# Patient Record
Sex: MALE
[De-identification: names, ages, dates, MRNs, and addresses within clinical notes are randomized per-mention and may not be internally consistent; named-entity substitution may affect disease eponyms.]

---

## 2015-05-07 RX ORDER — CARVEDILOL PHOSPHATE 80 MG/1
1 CAPSULE, EXTENDED RELEASE ORAL
Qty: 0 | Refills: 0 | COMMUNITY
Start: 2015-05-07

## 2017-01-17 ENCOUNTER — APPOINTMENT (OUTPATIENT)
Dept: INTERNAL MEDICINE | Facility: CLINIC | Age: 62
End: 2017-01-17

## 2017-04-07 ENCOUNTER — APPOINTMENT (OUTPATIENT)
Dept: ORTHOPEDIC SURGERY | Facility: CLINIC | Age: 62
End: 2017-04-07

## 2017-04-07 DIAGNOSIS — M65.9 SYNOVITIS AND TENOSYNOVITIS, UNSPECIFIED: ICD-10-CM

## 2017-04-07 DIAGNOSIS — M17.12 UNILATERAL PRIMARY OSTEOARTHRITIS, LEFT KNEE: ICD-10-CM

## 2017-04-13 ENCOUNTER — CLINICAL ADVICE (OUTPATIENT)
Age: 62
End: 2017-04-13

## 2017-04-21 ENCOUNTER — APPOINTMENT (OUTPATIENT)
Dept: ORTHOPEDIC SURGERY | Facility: AMBULATORY SURGERY CENTER | Age: 62
End: 2017-04-21

## 2017-08-01 ENCOUNTER — RX RENEWAL (OUTPATIENT)
Age: 62
End: 2017-08-01

## 2017-08-11 ENCOUNTER — APPOINTMENT (OUTPATIENT)
Dept: VASCULAR SURGERY | Facility: CLINIC | Age: 62
End: 2017-08-11
Payer: COMMERCIAL

## 2017-08-11 VITALS
HEART RATE: 80 BPM | WEIGHT: 178 LBS | DIASTOLIC BLOOD PRESSURE: 85 MMHG | TEMPERATURE: 98.01 F | SYSTOLIC BLOOD PRESSURE: 125 MMHG | HEIGHT: 67.5 IN | BODY MASS INDEX: 27.61 KG/M2

## 2017-08-11 DIAGNOSIS — R09.89 OTHER SPECIFIED SYMPTOMS AND SIGNS INVOLVING THE CIRCULATORY AND RESPIRATORY SYSTEMS: ICD-10-CM

## 2017-08-11 PROCEDURE — 93880 EXTRACRANIAL BILAT STUDY: CPT

## 2017-08-11 PROCEDURE — 99214 OFFICE O/P EST MOD 30 MIN: CPT | Mod: 25

## 2017-08-11 PROCEDURE — 93978 VASCULAR STUDY: CPT

## 2017-10-02 ENCOUNTER — RX RENEWAL (OUTPATIENT)
Age: 62
End: 2017-10-02

## 2017-10-03 ENCOUNTER — RX RENEWAL (OUTPATIENT)
Age: 62
End: 2017-10-03

## 2017-10-12 ENCOUNTER — APPOINTMENT (OUTPATIENT)
Dept: INTERNAL MEDICINE | Facility: CLINIC | Age: 62
End: 2017-10-12
Payer: COMMERCIAL

## 2017-10-12 PROCEDURE — G0008: CPT

## 2017-10-12 PROCEDURE — 90686 IIV4 VACC NO PRSV 0.5 ML IM: CPT

## 2017-10-12 PROCEDURE — 90670 PCV13 VACCINE IM: CPT

## 2017-10-12 PROCEDURE — 90472 IMMUNIZATION ADMIN EACH ADD: CPT

## 2017-11-28 ENCOUNTER — APPOINTMENT (OUTPATIENT)
Dept: CARDIOLOGY | Facility: CLINIC | Age: 62
End: 2017-11-28
Payer: COMMERCIAL

## 2017-11-28 ENCOUNTER — OUTPATIENT (OUTPATIENT)
Dept: OUTPATIENT SERVICES | Facility: HOSPITAL | Age: 62
LOS: 1 days | End: 2017-11-28
Payer: COMMERCIAL

## 2017-11-28 VITALS
RESPIRATION RATE: 16 BRPM | BODY MASS INDEX: 26.22 KG/M2 | HEIGHT: 67.5 IN | WEIGHT: 169 LBS | SYSTOLIC BLOOD PRESSURE: 128 MMHG | HEART RATE: 79 BPM | DIASTOLIC BLOOD PRESSURE: 83 MMHG | OXYGEN SATURATION: 93 %

## 2017-11-28 DIAGNOSIS — Z98.89 OTHER SPECIFIED POSTPROCEDURAL STATES: Chronic | ICD-10-CM

## 2017-11-28 DIAGNOSIS — D25.9 LEIOMYOMA OF UTERUS, UNSPECIFIED: Chronic | ICD-10-CM

## 2017-11-28 DIAGNOSIS — Z00.8 ENCOUNTER FOR OTHER GENERAL EXAMINATION: ICD-10-CM

## 2017-11-28 DIAGNOSIS — C50.919 MALIGNANT NEOPLASM OF UNSPECIFIED SITE OF UNSPECIFIED FEMALE BREAST: Chronic | ICD-10-CM

## 2017-11-28 DIAGNOSIS — Z90.710 ACQUIRED ABSENCE OF BOTH CERVIX AND UTERUS: Chronic | ICD-10-CM

## 2017-11-28 PROCEDURE — 93005 ELECTROCARDIOGRAM TRACING: CPT

## 2017-11-28 PROCEDURE — G0463: CPT | Mod: 25

## 2017-11-28 PROCEDURE — 93010 ELECTROCARDIOGRAM REPORT: CPT

## 2017-11-28 PROCEDURE — ZZZZZ: CPT

## 2017-12-01 DIAGNOSIS — I10 ESSENTIAL (PRIMARY) HYPERTENSION: ICD-10-CM

## 2017-12-01 DIAGNOSIS — R06.09 OTHER FORMS OF DYSPNEA: ICD-10-CM

## 2018-05-29 ENCOUNTER — OUTPATIENT (OUTPATIENT)
Dept: OUTPATIENT SERVICES | Facility: HOSPITAL | Age: 63
LOS: 1 days | End: 2018-05-29
Payer: COMMERCIAL

## 2018-05-29 ENCOUNTER — APPOINTMENT (OUTPATIENT)
Dept: CARDIOLOGY | Facility: CLINIC | Age: 63
End: 2018-05-29

## 2018-05-29 DIAGNOSIS — C50.919 MALIGNANT NEOPLASM OF UNSPECIFIED SITE OF UNSPECIFIED FEMALE BREAST: Chronic | ICD-10-CM

## 2018-05-29 DIAGNOSIS — Z98.89 OTHER SPECIFIED POSTPROCEDURAL STATES: Chronic | ICD-10-CM

## 2018-05-29 DIAGNOSIS — Z00.8 ENCOUNTER FOR OTHER GENERAL EXAMINATION: ICD-10-CM

## 2018-05-29 DIAGNOSIS — D25.9 LEIOMYOMA OF UTERUS, UNSPECIFIED: Chronic | ICD-10-CM

## 2018-05-29 DIAGNOSIS — Z90.710 ACQUIRED ABSENCE OF BOTH CERVIX AND UTERUS: Chronic | ICD-10-CM

## 2018-05-29 PROCEDURE — 93306 TTE W/DOPPLER COMPLETE: CPT | Mod: 26

## 2018-05-29 PROCEDURE — 93306 TTE W/DOPPLER COMPLETE: CPT

## 2018-08-31 ENCOUNTER — APPOINTMENT (OUTPATIENT)
Dept: VASCULAR SURGERY | Facility: CLINIC | Age: 63
End: 2018-08-31
Payer: COMMERCIAL

## 2018-08-31 PROCEDURE — 93978 VASCULAR STUDY: CPT

## 2018-08-31 PROCEDURE — 99213 OFFICE O/P EST LOW 20 MIN: CPT

## 2018-10-30 ENCOUNTER — MEDICATION RENEWAL (OUTPATIENT)
Age: 63
End: 2018-10-30

## 2018-11-13 ENCOUNTER — APPOINTMENT (OUTPATIENT)
Dept: INTERNAL MEDICINE | Facility: CLINIC | Age: 63
End: 2018-11-13
Payer: COMMERCIAL

## 2018-11-13 VITALS
SYSTOLIC BLOOD PRESSURE: 122 MMHG | HEIGHT: 67 IN | DIASTOLIC BLOOD PRESSURE: 84 MMHG | TEMPERATURE: 97.4 F | HEART RATE: 96 BPM | BODY MASS INDEX: 26.68 KG/M2 | OXYGEN SATURATION: 98 % | RESPIRATION RATE: 16 BRPM | WEIGHT: 170 LBS

## 2018-11-13 DIAGNOSIS — Z86.718 PERSONAL HISTORY OF OTHER VENOUS THROMBOSIS AND EMBOLISM: ICD-10-CM

## 2018-11-13 DIAGNOSIS — M25.462 EFFUSION, LEFT KNEE: ICD-10-CM

## 2018-11-13 PROCEDURE — 99214 OFFICE O/P EST MOD 30 MIN: CPT

## 2018-11-14 ENCOUNTER — MED ADMIN CHARGE (OUTPATIENT)
Age: 63
End: 2018-11-14

## 2018-11-14 LAB
25(OH)D3 SERPL-MCNC: 39.9 NG/ML
ALBUMIN SERPL ELPH-MCNC: 4.5 G/DL
ALP BLD-CCNC: 100 U/L
ALT SERPL-CCNC: 7 U/L
ANION GAP SERPL CALC-SCNC: 17 MMOL/L
AST SERPL-CCNC: 17 U/L
BASOPHILS # BLD AUTO: 0.02 K/UL
BASOPHILS NFR BLD AUTO: 0.3 %
BILIRUB SERPL-MCNC: 0.4 MG/DL
BUN SERPL-MCNC: 16 MG/DL
CALCIUM SERPL-MCNC: 10.5 MG/DL
CHLORIDE SERPL-SCNC: 102 MMOL/L
CHOLEST SERPL-MCNC: 235 MG/DL
CHOLEST/HDLC SERPL: 5.5 RATIO
CO2 SERPL-SCNC: 24 MMOL/L
CREAT SERPL-MCNC: 1.27 MG/DL
EOSINOPHIL # BLD AUTO: 0.14 K/UL
EOSINOPHIL NFR BLD AUTO: 2.2 %
GLUCOSE SERPL-MCNC: 146 MG/DL
HBA1C MFR BLD HPLC: 6.5 %
HCT VFR BLD CALC: 40.6 %
HDLC SERPL-MCNC: 43 MG/DL
HGB BLD-MCNC: 13.8 G/DL
IMM GRANULOCYTES NFR BLD AUTO: 0.2 %
LDLC SERPL CALC-MCNC: 161 MG/DL
LYMPHOCYTES # BLD AUTO: 2.53 K/UL
LYMPHOCYTES NFR BLD AUTO: 39.3 %
MAN DIFF?: NORMAL
MCHC RBC-ENTMCNC: 31.6 PG
MCHC RBC-ENTMCNC: 34 GM/DL
MCV RBC AUTO: 92.9 FL
MONOCYTES # BLD AUTO: 0.36 K/UL
MONOCYTES NFR BLD AUTO: 5.6 %
NEUTROPHILS # BLD AUTO: 3.38 K/UL
NEUTROPHILS NFR BLD AUTO: 52.4 %
PLATELET # BLD AUTO: 425 K/UL
POTASSIUM SERPL-SCNC: 3.9 MMOL/L
PROT SERPL-MCNC: 8.6 G/DL
RBC # BLD: 4.37 M/UL
RBC # FLD: 14.4 %
SODIUM SERPL-SCNC: 143 MMOL/L
TRIGL SERPL-MCNC: 154 MG/DL
TSH SERPL-ACNC: 2.14 UIU/ML
WBC # FLD AUTO: 6.44 K/UL

## 2018-11-14 PROCEDURE — G0008: CPT

## 2018-11-14 PROCEDURE — 90686 IIV4 VACC NO PRSV 0.5 ML IM: CPT

## 2018-11-14 PROCEDURE — 90472 IMMUNIZATION ADMIN EACH ADD: CPT

## 2018-11-14 PROCEDURE — 90736 HZV VACCINE LIVE SUBQ: CPT

## 2018-11-14 NOTE — REVIEW OF SYSTEMS
[Fatigue] : fatigue [Joint Pain] : joint pain [Negative] : Heme/Lymph [Joint Stiffness] : no joint stiffness [Joint Swelling] : no joint swelling [Back Pain] : no back pain [FreeTextEntry9] : left knee joint pain

## 2018-11-14 NOTE — HEALTH RISK ASSESSMENT
[Patient reported mammogram was normal] : Patient reported mammogram was normal [Patient reported bone density results were normal] : Patient reported bone density results were normal [Patient declined colonoscopy] : Patient declined colonoscopy [HIV test declined] : HIV test declined [Hepatitis C test declined] : Hepatitis C test declined [Alone] : lives alone [Fully functional (bathing, dressing, toileting, transferring, walking, feeding)] : Fully functional (bathing, dressing, toileting, transferring, walking, feeding) [Fully functional (using the telephone, shopping, preparing meals, housekeeping, doing laundry, using] : Fully functional and needs no help or supervision to perform IADLs (using the telephone, shopping, preparing meals, housekeeping, doing laundry, using transportation, managing medications and managing finances) [0] : 2) Feeling down, depressed, or hopeless: Not at all (0) [MammogramDate] : 02/18 [BoneDensityDate] : 03/18 [] : No [No falls in past year] : Patient reported no falls in the past year [[id number]] : 0

## 2018-11-14 NOTE — HISTORY OF PRESENT ILLNESS
[FreeTextEntry1] : f/u [de-identified] : 62 yo f/u c/o L.knee pain.\par PMH HTN,controlled on BB,CCB.\par h/o T2D,on diet alone,refused meds.\par HLP,on diet.\par h/o COPD/smoker.\par st.p. R lumpectomy and axillary node dissection,RT,ChemoRx for BC,advanced stage,so far no recurrence,spread,on ER antagonist.f/u with oncol.last mammogram report benign 1 month ago.\par ?thyroid nodule.\par in 2015 developed transient CHF,prob.due to chemoRx.improved LV function later.currently denies c.p.,SOB,palpit.,claudication.coronaries were normal on cath.\par also incidental finding of AAA,3,6 cm,stable,no vasc.intervention required.\par h/o Mild L.ICA stenosis,asymptomatic.\par

## 2018-11-14 NOTE — ASSESSMENT
[FreeTextEntry1] : 62 yo with HTN,T2D,HLP,stable CAD,will continue the same .\par pt will f/u with oncol.,she is on Arimidex.\par vasc.f/u in 6 months to f/u on AAA,carotid stenosis.\par will refer to ortho for L.knee pain.\par labs taken.

## 2018-11-14 NOTE — COUNSELING
[Weight management counseling provided] : Weight management [Healthy eating counseling provided] : healthy eating [Activity counseling provided] : activity [Smoking cessation counseling provided] : smoking cessation [Low Fat Diet] : Low fat diet [Low Salt Diet] : Low salt diet [Walking] : Walking [Quit Smoking] : Quit smoking [Participate in a class] : Participate in a class [None] : None [Good understanding] : Patient has a good understanding of lifestyle changes and the steps needed to achieve self management goals [de-identified] : ADA diet

## 2018-11-14 NOTE — PHYSICAL EXAM
[No Acute Distress] : no acute distress [Well Nourished] : well nourished [Well Developed] : well developed [Well-Appearing] : well-appearing [Normal Sclera/Conjunctiva] : normal sclera/conjunctiva [PERRL] : pupils equal round and reactive to light [EOMI] : extraocular movements intact [Normal Outer Ear/Nose] : the outer ears and nose were normal in appearance [Normal Oropharynx] : the oropharynx was normal [No JVD] : no jugular venous distention [Supple] : supple [No Lymphadenopathy] : no lymphadenopathy [No Respiratory Distress] : no respiratory distress  [Clear to Auscultation] : lungs were clear to auscultation bilaterally [No Accessory Muscle Use] : no accessory muscle use [Normal Rate] : normal rate  [Regular Rhythm] : with a regular rhythm [Normal S1, S2] : normal S1 and S2 [No Murmur] : no murmur heard [No Varicosities] : no varicosities [Pedal Pulses Present] : the pedal pulses are present [No Edema] : there was no peripheral edema [Declined Breast Exam] : declined breast exam  [Soft] : abdomen soft [Non Tender] : non-tender [Non-distended] : non-distended [No Masses] : no abdominal mass palpated [Normal Posterior Cervical Nodes] : no posterior cervical lymphadenopathy [Normal Anterior Cervical Nodes] : no anterior cervical lymphadenopathy [No CVA Tenderness] : no CVA  tenderness [No Spinal Tenderness] : no spinal tenderness [No Joint Swelling] : no joint swelling [Grossly Normal Strength/Tone] : grossly normal strength/tone [No Rash] : no rash [Normal Gait] : normal gait [Coordination Grossly Intact] : coordination grossly intact [No Focal Deficits] : no focal deficits [Normal Affect] : the affect was normal [Normal Mood] : the mood was normal [Normal Insight/Judgement] : insight and judgment were intact [de-identified] : left carotid bruit

## 2018-11-14 NOTE — PAST MEDICAL HISTORY
[Menopause Age____] : age at menopause was [unfilled] [Live Births ___] : P[unfilled]  [de-identified] : hysterectomy at age ~29

## 2018-11-16 LAB — URATE SERPL-MCNC: 8 MG/DL

## 2018-12-03 ENCOUNTER — MEDICATION RENEWAL (OUTPATIENT)
Age: 63
End: 2018-12-03

## 2019-01-07 ENCOUNTER — RX RENEWAL (OUTPATIENT)
Age: 64
End: 2019-01-07

## 2019-02-21 ENCOUNTER — INPATIENT (INPATIENT)
Facility: HOSPITAL | Age: 64
LOS: 0 days | Discharge: HOME | DRG: 305 | End: 2019-02-22
Attending: HOSPITALIST | Admitting: HOSPITALIST
Payer: COMMERCIAL

## 2019-02-21 VITALS — WEIGHT: 169.98 LBS | HEIGHT: 67.5 IN

## 2019-02-21 DIAGNOSIS — Z90.710 ACQUIRED ABSENCE OF BOTH CERVIX AND UTERUS: Chronic | ICD-10-CM

## 2019-02-21 DIAGNOSIS — Z29.9 ENCOUNTER FOR PROPHYLACTIC MEASURES, UNSPECIFIED: ICD-10-CM

## 2019-02-21 DIAGNOSIS — D25.9 LEIOMYOMA OF UTERUS, UNSPECIFIED: Chronic | ICD-10-CM

## 2019-02-21 DIAGNOSIS — Z98.89 OTHER SPECIFIED POSTPROCEDURAL STATES: Chronic | ICD-10-CM

## 2019-02-21 DIAGNOSIS — R09.89 OTHER SPECIFIED SYMPTOMS AND SIGNS INVOLVING THE CIRCULATORY AND RESPIRATORY SYSTEMS: ICD-10-CM

## 2019-02-21 DIAGNOSIS — I25.10 ATHEROSCLEROTIC HEART DISEASE OF NATIVE CORONARY ARTERY WITHOUT ANGINA PECTORIS: ICD-10-CM

## 2019-02-21 DIAGNOSIS — I50.9 HEART FAILURE, UNSPECIFIED: ICD-10-CM

## 2019-02-21 DIAGNOSIS — I10 ESSENTIAL (PRIMARY) HYPERTENSION: ICD-10-CM

## 2019-02-21 DIAGNOSIS — N17.9 ACUTE KIDNEY FAILURE, UNSPECIFIED: ICD-10-CM

## 2019-02-21 DIAGNOSIS — I24.9 ACUTE ISCHEMIC HEART DISEASE, UNSPECIFIED: ICD-10-CM

## 2019-02-21 DIAGNOSIS — I21.4 NON-ST ELEVATION (NSTEMI) MYOCARDIAL INFARCTION: ICD-10-CM

## 2019-02-21 DIAGNOSIS — C50.919 MALIGNANT NEOPLASM OF UNSPECIFIED SITE OF UNSPECIFIED FEMALE BREAST: Chronic | ICD-10-CM

## 2019-02-21 LAB
ALBUMIN SERPL ELPH-MCNC: 3.5 G/DL — SIGNIFICANT CHANGE UP (ref 3.5–5)
ALP SERPL-CCNC: 114 U/L — SIGNIFICANT CHANGE UP (ref 40–120)
ALT FLD-CCNC: 20 U/L DA — SIGNIFICANT CHANGE UP (ref 10–60)
ANION GAP SERPL CALC-SCNC: 8 MMOL/L — SIGNIFICANT CHANGE UP (ref 5–17)
APTT BLD: 28.4 SEC — SIGNIFICANT CHANGE UP (ref 27.5–36.3)
AST SERPL-CCNC: 21 U/L — SIGNIFICANT CHANGE UP (ref 10–40)
BASOPHILS # BLD AUTO: 0.03 K/UL — SIGNIFICANT CHANGE UP (ref 0–0.2)
BASOPHILS NFR BLD AUTO: 0.6 % — SIGNIFICANT CHANGE UP (ref 0–2)
BILIRUB SERPL-MCNC: 0.4 MG/DL — SIGNIFICANT CHANGE UP (ref 0.2–1.2)
BUN SERPL-MCNC: 13 MG/DL — SIGNIFICANT CHANGE UP (ref 7–18)
CALCIUM SERPL-MCNC: 9.4 MG/DL — SIGNIFICANT CHANGE UP (ref 8.4–10.5)
CHLORIDE SERPL-SCNC: 107 MMOL/L — SIGNIFICANT CHANGE UP (ref 96–108)
CK MB BLD-MCNC: 7.2 % — HIGH (ref 0–3.5)
CK MB CFR SERPL CALC: 32.5 NG/ML — HIGH (ref 0–3.6)
CK SERPL-CCNC: 177 U/L — SIGNIFICANT CHANGE UP (ref 21–215)
CK SERPL-CCNC: 449 U/L — HIGH (ref 21–215)
CO2 SERPL-SCNC: 28 MMOL/L — SIGNIFICANT CHANGE UP (ref 22–31)
CREAT SERPL-MCNC: 1.32 MG/DL — HIGH (ref 0.5–1.3)
EOSINOPHIL # BLD AUTO: 0.09 K/UL — SIGNIFICANT CHANGE UP (ref 0–0.5)
EOSINOPHIL NFR BLD AUTO: 1.7 % — SIGNIFICANT CHANGE UP (ref 0–6)
GLUCOSE SERPL-MCNC: 120 MG/DL — HIGH (ref 70–99)
HCT VFR BLD CALC: 39.1 % — SIGNIFICANT CHANGE UP (ref 34.5–45)
HGB BLD-MCNC: 13 G/DL — SIGNIFICANT CHANGE UP (ref 11.5–15.5)
IMM GRANULOCYTES NFR BLD AUTO: 0.2 % — SIGNIFICANT CHANGE UP (ref 0–1.5)
INR BLD: 1.19 RATIO — HIGH (ref 0.88–1.16)
LIDOCAIN IGE QN: 54 U/L — LOW (ref 73–393)
LYMPHOCYTES # BLD AUTO: 1.84 K/UL — SIGNIFICANT CHANGE UP (ref 1–3.3)
LYMPHOCYTES # BLD AUTO: 34.3 % — SIGNIFICANT CHANGE UP (ref 13–44)
MCHC RBC-ENTMCNC: 30.6 PG — SIGNIFICANT CHANGE UP (ref 27–34)
MCHC RBC-ENTMCNC: 33.2 GM/DL — SIGNIFICANT CHANGE UP (ref 32–36)
MCV RBC AUTO: 92 FL — SIGNIFICANT CHANGE UP (ref 80–100)
MONOCYTES # BLD AUTO: 0.4 K/UL — SIGNIFICANT CHANGE UP (ref 0–0.9)
MONOCYTES NFR BLD AUTO: 7.4 % — SIGNIFICANT CHANGE UP (ref 2–14)
NEUTROPHILS # BLD AUTO: 3 K/UL — SIGNIFICANT CHANGE UP (ref 1.8–7.4)
NEUTROPHILS NFR BLD AUTO: 55.8 % — SIGNIFICANT CHANGE UP (ref 43–77)
NRBC # BLD: 0 /100 WBCS — SIGNIFICANT CHANGE UP (ref 0–0)
NT-PROBNP SERPL-SCNC: 947 PG/ML — HIGH (ref 0–125)
PLATELET # BLD AUTO: 365 K/UL — SIGNIFICANT CHANGE UP (ref 150–400)
POTASSIUM SERPL-MCNC: 3.4 MMOL/L — LOW (ref 3.5–5.3)
POTASSIUM SERPL-SCNC: 3.4 MMOL/L — LOW (ref 3.5–5.3)
PROT SERPL-MCNC: 8.4 G/DL — HIGH (ref 6–8.3)
PROTHROM AB SERPL-ACNC: 13.3 SEC — HIGH (ref 10–12.9)
RBC # BLD: 4.25 M/UL — SIGNIFICANT CHANGE UP (ref 3.8–5.2)
RBC # FLD: 13.2 % — SIGNIFICANT CHANGE UP (ref 10.3–14.5)
SODIUM SERPL-SCNC: 143 MMOL/L — SIGNIFICANT CHANGE UP (ref 135–145)
TROPONIN I SERPL-MCNC: 0.14 NG/ML — HIGH (ref 0–0.04)
TROPONIN I SERPL-MCNC: 3.91 NG/ML — HIGH (ref 0–0.04)
WBC # BLD: 5.37 K/UL — SIGNIFICANT CHANGE UP (ref 3.8–10.5)
WBC # FLD AUTO: 5.37 K/UL — SIGNIFICANT CHANGE UP (ref 3.8–10.5)

## 2019-02-21 PROCEDURE — 99285 EMERGENCY DEPT VISIT HI MDM: CPT

## 2019-02-21 PROCEDURE — 99223 1ST HOSP IP/OBS HIGH 75: CPT | Mod: GC

## 2019-02-21 PROCEDURE — 71046 X-RAY EXAM CHEST 2 VIEWS: CPT | Mod: 26

## 2019-02-21 PROCEDURE — 71275 CT ANGIOGRAPHY CHEST: CPT | Mod: 26

## 2019-02-21 PROCEDURE — 93010 ELECTROCARDIOGRAM REPORT: CPT | Mod: 76

## 2019-02-21 PROCEDURE — 74174 CTA ABD&PLVS W/CONTRAST: CPT | Mod: 26

## 2019-02-21 RX ORDER — PANTOPRAZOLE SODIUM 20 MG/1
40 TABLET, DELAYED RELEASE ORAL
Qty: 0 | Refills: 0 | Status: DISCONTINUED | OUTPATIENT
Start: 2019-02-21 | End: 2019-02-22

## 2019-02-21 RX ORDER — ATORVASTATIN CALCIUM 80 MG/1
40 TABLET, FILM COATED ORAL AT BEDTIME
Qty: 0 | Refills: 0 | Status: DISCONTINUED | OUTPATIENT
Start: 2019-02-21 | End: 2019-02-22

## 2019-02-21 RX ORDER — ACETAMINOPHEN 500 MG
975 TABLET ORAL ONCE
Qty: 0 | Refills: 0 | Status: COMPLETED | OUTPATIENT
Start: 2019-02-21 | End: 2019-02-21

## 2019-02-21 RX ORDER — NITROGLYCERIN 6.5 MG
0.4 CAPSULE, EXTENDED RELEASE ORAL ONCE
Qty: 0 | Refills: 0 | Status: COMPLETED | OUTPATIENT
Start: 2019-02-21 | End: 2019-02-21

## 2019-02-21 RX ORDER — METOPROLOL TARTRATE 50 MG
25 TABLET ORAL ONCE
Qty: 0 | Refills: 0 | Status: COMPLETED | OUTPATIENT
Start: 2019-02-21 | End: 2019-02-21

## 2019-02-21 RX ORDER — DEXTROSE 50 % IN WATER 50 %
25 SYRINGE (ML) INTRAVENOUS ONCE
Qty: 0 | Refills: 0 | Status: DISCONTINUED | OUTPATIENT
Start: 2019-02-21 | End: 2019-02-22

## 2019-02-21 RX ORDER — INSULIN LISPRO 100/ML
VIAL (ML) SUBCUTANEOUS
Qty: 0 | Refills: 0 | Status: DISCONTINUED | OUTPATIENT
Start: 2019-02-21 | End: 2019-02-22

## 2019-02-21 RX ORDER — GLUCAGON INJECTION, SOLUTION 0.5 MG/.1ML
1 INJECTION, SOLUTION SUBCUTANEOUS ONCE
Qty: 0 | Refills: 0 | Status: DISCONTINUED | OUTPATIENT
Start: 2019-02-21 | End: 2019-02-22

## 2019-02-21 RX ORDER — ASPIRIN/CALCIUM CARB/MAGNESIUM 324 MG
81 TABLET ORAL DAILY
Qty: 0 | Refills: 0 | Status: DISCONTINUED | OUTPATIENT
Start: 2019-02-21 | End: 2019-02-22

## 2019-02-21 RX ORDER — CARVEDILOL PHOSPHATE 80 MG/1
3.12 CAPSULE, EXTENDED RELEASE ORAL EVERY 12 HOURS
Qty: 0 | Refills: 0 | Status: DISCONTINUED | OUTPATIENT
Start: 2019-02-21 | End: 2019-02-22

## 2019-02-21 RX ORDER — POTASSIUM CHLORIDE 20 MEQ
40 PACKET (EA) ORAL ONCE
Qty: 0 | Refills: 0 | Status: COMPLETED | OUTPATIENT
Start: 2019-02-21 | End: 2019-02-21

## 2019-02-21 RX ORDER — ENOXAPARIN SODIUM 100 MG/ML
40 INJECTION SUBCUTANEOUS DAILY
Qty: 0 | Refills: 0 | Status: DISCONTINUED | OUTPATIENT
Start: 2019-02-21 | End: 2019-02-21

## 2019-02-21 RX ORDER — DEXTROSE 50 % IN WATER 50 %
15 SYRINGE (ML) INTRAVENOUS ONCE
Qty: 0 | Refills: 0 | Status: DISCONTINUED | OUTPATIENT
Start: 2019-02-21 | End: 2019-02-22

## 2019-02-21 RX ORDER — DEXTROSE 50 % IN WATER 50 %
12.5 SYRINGE (ML) INTRAVENOUS ONCE
Qty: 0 | Refills: 0 | Status: DISCONTINUED | OUTPATIENT
Start: 2019-02-21 | End: 2019-02-22

## 2019-02-21 RX ORDER — ENOXAPARIN SODIUM 100 MG/ML
80 INJECTION SUBCUTANEOUS EVERY 12 HOURS
Qty: 0 | Refills: 0 | Status: DISCONTINUED | OUTPATIENT
Start: 2019-02-21 | End: 2019-02-22

## 2019-02-21 RX ORDER — LOSARTAN POTASSIUM 100 MG/1
100 TABLET, FILM COATED ORAL DAILY
Qty: 0 | Refills: 0 | Status: DISCONTINUED | OUTPATIENT
Start: 2019-02-21 | End: 2019-02-22

## 2019-02-21 RX ORDER — ASPIRIN/CALCIUM CARB/MAGNESIUM 324 MG
162 TABLET ORAL ONCE
Qty: 0 | Refills: 0 | Status: COMPLETED | OUTPATIENT
Start: 2019-02-21 | End: 2019-02-21

## 2019-02-21 RX ORDER — NITROGLYCERIN 6.5 MG
0.4 CAPSULE, EXTENDED RELEASE ORAL
Qty: 0 | Refills: 0 | Status: DISCONTINUED | OUTPATIENT
Start: 2019-02-21 | End: 2019-02-22

## 2019-02-21 RX ORDER — SODIUM CHLORIDE 9 MG/ML
1000 INJECTION, SOLUTION INTRAVENOUS
Qty: 0 | Refills: 0 | Status: DISCONTINUED | OUTPATIENT
Start: 2019-02-21 | End: 2019-02-22

## 2019-02-21 RX ADMIN — CARVEDILOL PHOSPHATE 3.12 MILLIGRAM(S): 80 CAPSULE, EXTENDED RELEASE ORAL at 18:26

## 2019-02-21 RX ADMIN — Medication 0.4 MILLIGRAM(S): at 15:22

## 2019-02-21 RX ADMIN — Medication 40 MILLIEQUIVALENT(S): at 18:21

## 2019-02-21 RX ADMIN — Medication 975 MILLIGRAM(S): at 13:34

## 2019-02-21 RX ADMIN — Medication 162 MILLIGRAM(S): at 14:08

## 2019-02-21 RX ADMIN — Medication 25 MILLIGRAM(S): at 14:08

## 2019-02-21 RX ADMIN — ATORVASTATIN CALCIUM 40 MILLIGRAM(S): 80 TABLET, FILM COATED ORAL at 23:01

## 2019-02-21 RX ADMIN — ENOXAPARIN SODIUM 80 MILLIGRAM(S): 100 INJECTION SUBCUTANEOUS at 20:46

## 2019-02-21 RX ADMIN — Medication 975 MILLIGRAM(S): at 17:06

## 2019-02-21 NOTE — H&P ADULT - HISTORY OF PRESENT ILLNESS
Patient is a 63 F from home with PMH of HTN, HFrEF 49% 2016, COPD (not on Home O2), DVT (15 years ago, AC x 2 years then stop),  Breast ca s/p left lumpectomy s/p Chemotherapy and Radiation Therapy (did not Mi, infra-renal abdominal aortic aneurysm, hystrectomy, Sick cell trait. DM presents with mid sternal chest pain started 10:00 am this morning. Patient's pain is pressure like, 8/10 in intensity, radiating to both shoulders and back. Patient took asa 81 but it did not relieve her symptom ,she then came in to ED for evaluation. Patient reports dyspnea on exertion but denies any orthopnea or pedal edema, fever, headache, dizziness, nausea, vomiting. She reports compliance with her medications. In Ed Patient's bp is 168/113 HR 83. EKG is NSR @ 93 with T wave inversions in lead V1, prolong . Patient saw her cardiologist Dr Hanley 1 year ago. Patient is a 63 F from home with PMH of HTN, HFrEF 49% 2016, COPD (not on Home O2), DVT (15 years ago, AC x 2 years then stop),  Breast ca s/p left lumpectomy s/p Chemotherapy and Radiation Therapy (did not Mi, infra-renal abdominal aortic aneurysm, hystrectomy, Sick cell trait. DM presents with mid sternal chest pain started 10:00 am this morning. Patient's pain is pressure like, 8/10 in intensity, radiating to both shoulders and back. Patient took asa 81 but it did not relieve her symptom ,she then came in to ED for evaluation. Patient reports dyspnea on exertion but denies any orthopnea or pedal edema, fever, headache, dizziness, nausea, vomiting. She reports compliance with her medications. In Ed Patient's bp is 168/113 HR 83. EKG is NSR @ 93 with T wave inversions in lead V1, prolong . Patient saw her cardiologist Dr Hanley 1 year ago. Labs are remarkable for positive 0.138.

## 2019-02-21 NOTE — H&P ADULT - PROBLEM SELECTOR PLAN 5
RISK                                                          Points  [x] Previous VTE                                           3  [] Thrombophilia                                        2  [] Lower limb paralysis                              2   [] Current Cancer                                       2   [x] Immobilization > 24 hrs                        1  [] ICU/CCU stay > 24 hours                       1  [x] Age > 60                                                   1  IMPROVE score 5 points.    DVT prophylaxis with Lovenox -c/w asa, start statin and c/w carvedilol

## 2019-02-21 NOTE — H&P ADULT - PROBLEM SELECTOR PLAN 6
RISK                                                          Points  [x] Previous VTE                                           3  [] Thrombophilia                                        2  [] Lower limb paralysis                              2   [] Current Cancer                                       2   [x] Immobilization > 24 hrs                        1  [] ICU/CCU stay > 24 hours                       1  [x] Age > 60                                                   1  IMPROVE score 5 points.    DVT prophylaxis with Lovenox

## 2019-02-21 NOTE — H&P ADULT - PROBLEM SELECTOR PLAN 3
-c/w Home dose of losartan -Patient has elevated BUN/Cr 13/1.32  -could be due to worsening of her CKD  -Her baseline CKD is 0.86  -f/u urine lytes   -Monitor renal function

## 2019-02-21 NOTE — ED ADULT NURSE NOTE - OBJECTIVE STATEMENT
Patient came to the ED a/o x 3 ambulates c/o left sided dull chest pain started at 10 am this morning. Pt states the chest pain is radiating to the back. No dizziness, no SOB noted.

## 2019-02-21 NOTE — ED PROVIDER NOTE - PMH
Abdominal aneurysm  infrarenal- wached by Vascular , Dr. Gutierrez  Breast cancer  right-s/p chemo and radiation  Cardiomyopathy    DVT (deep venous thrombosis)  Left leg in 1994 after hysterectomy  Hypercholesteremia    Hypertension    Internal carotid aneurysm    Knee effusion, left  arthrocentesis- 05/25/16  Sickle cell trait

## 2019-02-21 NOTE — ED PROVIDER NOTE - PROGRESS NOTE DETAILS
labs reveal elevated troponin. admit to telemetry. will do CT angio to r/o Disection. still reports mild pain, will give sl nitro.

## 2019-02-21 NOTE — ED PROVIDER NOTE - CARE PLAN
Principal Discharge DX:	NSTEMI (non-ST elevated myocardial infarction)  Secondary Diagnosis:	Chest pain

## 2019-02-21 NOTE — ED ADULT NURSE NOTE - NSIMPLEMENTINTERV_GEN_ALL_ED
Implemented All Universal Safety Interventions:  [place] to call system. Call bell, personal items and telephone within reach. Instruct patient to call for assistance. Room bathroom lighting operational. Non-slip footwear when patient is off stretcher. Physically safe environment: no spills, clutter or unnecessary equipment. Stretcher in lowest position, wheels locked, appropriate side rails in place.

## 2019-02-21 NOTE — ED PROVIDER NOTE - CLINICAL SUMMARY MEDICAL DECISION MAKING FREE TEXT BOX
64 y/o F pt presents with multiple cardiac and pulmonary risk factors. Will do labs, EKG, reassess and likely admit. 64 y/o F pt presents with multiple cardiac and pulmonary risk factors. Will do labs, EKG, reassess and admit.

## 2019-02-21 NOTE — H&P ADULT - NSHPSOCIALHISTORY_GEN_ALL_CORE
Patient lives alone, denies any alcohol use, She smokes 1-2 cig every day. She used to smoke 1 PPD but she started to cut down on her smoking 5 years ago. Now she uses nicotine lozenges 2-3 times a day. She does not use any illicit drug of abuse.

## 2019-02-21 NOTE — H&P ADULT - PSH
Breast cancer  infusaport insertion- 2014- left chest  Fibroid uterus  sp hystrectomy 1196  [place] filter in place  1994  S/P hysterectomy  1994  S/P lumpectomy, right breast  2014, revision of position- 2015

## 2019-02-21 NOTE — H&P ADULT - ASSESSMENT
Patient is a 63 F from home with PMH of HTN, HFrEF 49% 2016, COPD (not on Home O2), DVT (15 years ago, AC x 2 years then stop),  Breast ca s/p left lumpectomy s/p Chemotherapy and Radiation Therapy (did not Mi, infra-renal abdominal aortic aneurysm, hystrectomy, Sick cell trait. DM presents with mid sternal chest pain started 10:00 am this morning. Patient's pain is pressure like, 8/10 in intensity, radiating to both shoulders and back. Patient took asa 81 but it did not relieve her symptom ,she then came in to ED for evaluation. Patient reports dyspnea on exertion but denies any orthopnea or pedal edema, fever, headache, dizziness, nausea, vomiting. She reports compliance with her medications. In Ed Patient's bp is 168/113 HR 83. EKG is NSR @ 93 with T wave inversions in lead V1, prolong . Patient saw her cardiologist Dr Hanley 1 year ago. Admitted for evaluation of ACS, PE

## 2019-02-21 NOTE — ED PROVIDER NOTE - MUSCULOSKELETAL, MLM
Spine appears normal, range of motion is not limited, no muscle or joint tenderness, no chest wall tenderness

## 2019-02-21 NOTE — H&P ADULT - PROBLEM SELECTOR PLAN 1
-Patient presented with chest pain, midsternal, pressure like chest pain, 8/10 in intensity associated with palpitations.   -cardiac enzymes x 1 0.138 f/u T2 t3   -Patient has hx of DVT in past. mid sternal chest pain radiating to back. Would rule out Aortic dissection and Pulmonary embolism (well's score 2.5 Moderate risk of PE).  -EKG is NSR @ 93 T wave inversion in Lead V1.   -ALTAGRACIA score   4 points  20% risk at 14 days of: all-cause mortality, new or recurrent MI, or severe recurrent ischemia requiring urgent revascularization.  -Will start on Aspirin, statin and carvedilol 3.125 twice a day for ACS protocol. (Patient takes it only once a day as per her cardiologist).   -f/u CT angio chest r/o aortic dissection  -She will need another CT angio to r/o PE if symptoms persist.   -Cardiology consult

## 2019-02-21 NOTE — ED PROVIDER NOTE - OBJECTIVE STATEMENT
62 y/o F pt with an extensive PMHx including DVT, HTN, and a significant PSHx of hysterectomy, presents to the ED with complaints of worsening chest pain radiating to the shoulders and back that started this morning at 10am. Patient reports she was sitting and feeling fine when she had sudden onset pain described as throbbing. Patient reports she had this pain 2 years ago and was told she had a heart attack, no stents were placed. Patient denies cough, dizziness, nausea, fever, leg swelling or pain or any other complaints. Patient was taking blood thinner but then suddenly stopped. Patient notes she last saw her cardiologist in October. Allergies: Advil, Statins

## 2019-02-21 NOTE — ED PROVIDER NOTE - PSH
Breast cancer  infusaport insertion- 2014- left chest  Fibroid uterus  sp hystrectomy 1196  [place] filter in place  1994  S/P hysterectomy  1994  S/P lumpectomy, right breast  2014, revision of position- 2015

## 2019-02-21 NOTE — H&P ADULT - PROBLEM SELECTOR PLAN 2
-patient has HFrEF 49%  -Not in exacerbation  -c/w Home meds like carvedilol, asa and start on Atorvastatin   -f/u repeat echo  -Strict I/Os  -Daily weight  -Monitor on telemetry

## 2019-02-21 NOTE — H&P ADULT - ATTENDING COMMENTS
Patient seen and examined at bedside, feels ok. States that today she was anxious about a problem with a prior bank and was listening to the news when she started to feel chest pain. The pain is located in the precordial area, radiating to the back and bilateral shoulders, feels more like a dull aching pain. She denies any prior chest pain. states the history of Breast cancer, heart attack and stroke but denies any angiogram in the past.   Physical exam:  Vital Signs Last 24 Hrs  T(C): 36.7 (21 Feb 2019 13:30), Max: 36.7 (21 Feb 2019 13:30)  T(F): 98 (21 Feb 2019 13:30), Max: 98 (21 Feb 2019 13:30)  HR: 83 (21 Feb 2019 13:30) (83 - 83)  BP: 168/113 (21 Feb 2019 13:30) (168/113 - 168/113)  BP(mean): --  RR: 20 (21 Feb 2019 13:30) (20 - 20)  SpO2: 98% (21 Feb 2019 13:30) (98% - 98%)  HEENT: Neck supple  heart: S1 S2 normal  Abdomen: NT< ND  Chest: Clear, chest pain reproducible by palpation  LE: No LE edema  A/P  1- Chest pain  troponin first set positive  Differentials: ischemia, ?dissection, Musculoskeletal  CT angio to rule out dissection, patient has history of infrarenal aortic aneurysm.   trend troponin  Repeat ECHO, last echo EF: 49%  cardiology consult  Maybe patient will need stress test prior to dc.     2- HTN: may start B blocker till dissection ruled out.    3- history of Stroke vs TIA:  continue asa, statin and heparin sub Q  patient denies any neurological deficit.

## 2019-02-22 ENCOUNTER — INPATIENT (INPATIENT)
Facility: HOSPITAL | Age: 64
LOS: 3 days | Discharge: HOME | End: 2019-02-26
Attending: INTERNAL MEDICINE | Admitting: INTERNAL MEDICINE
Payer: COMMERCIAL

## 2019-02-22 VITALS
RESPIRATION RATE: 18 BRPM | DIASTOLIC BLOOD PRESSURE: 74 MMHG | HEART RATE: 72 BPM | OXYGEN SATURATION: 100 % | SYSTOLIC BLOOD PRESSURE: 111 MMHG | TEMPERATURE: 98 F

## 2019-02-22 VITALS
OXYGEN SATURATION: 100 % | HEART RATE: 71 BPM | DIASTOLIC BLOOD PRESSURE: 94 MMHG | SYSTOLIC BLOOD PRESSURE: 165 MMHG | TEMPERATURE: 98 F | RESPIRATION RATE: 17 BRPM

## 2019-02-22 DIAGNOSIS — Z98.89 OTHER SPECIFIED POSTPROCEDURAL STATES: Chronic | ICD-10-CM

## 2019-02-22 DIAGNOSIS — D25.9 LEIOMYOMA OF UTERUS, UNSPECIFIED: Chronic | ICD-10-CM

## 2019-02-22 DIAGNOSIS — I21.4 NON-ST ELEVATION (NSTEMI) MYOCARDIAL INFARCTION: ICD-10-CM

## 2019-02-22 DIAGNOSIS — Z90.710 ACQUIRED ABSENCE OF BOTH CERVIX AND UTERUS: Chronic | ICD-10-CM

## 2019-02-22 DIAGNOSIS — I71.4 ABDOMINAL AORTIC ANEURYSM, WITHOUT RUPTURE: ICD-10-CM

## 2019-02-22 DIAGNOSIS — C50.919 MALIGNANT NEOPLASM OF UNSPECIFIED SITE OF UNSPECIFIED FEMALE BREAST: Chronic | ICD-10-CM

## 2019-02-22 DIAGNOSIS — I50.9 HEART FAILURE, UNSPECIFIED: ICD-10-CM

## 2019-02-22 LAB
ALBUMIN SERPL ELPH-MCNC: 3.5 G/DL — SIGNIFICANT CHANGE UP (ref 3.5–5)
ALP SERPL-CCNC: 103 U/L — SIGNIFICANT CHANGE UP (ref 40–120)
ALT FLD-CCNC: 21 U/L DA — SIGNIFICANT CHANGE UP (ref 10–60)
ANION GAP SERPL CALC-SCNC: 9 MMOL/L — SIGNIFICANT CHANGE UP (ref 5–17)
AST SERPL-CCNC: 111 U/L — HIGH (ref 10–40)
BASOPHILS # BLD AUTO: 0.05 K/UL — SIGNIFICANT CHANGE UP (ref 0–0.2)
BASOPHILS NFR BLD AUTO: 0.9 % — SIGNIFICANT CHANGE UP (ref 0–2)
BILIRUB SERPL-MCNC: 0.4 MG/DL — SIGNIFICANT CHANGE UP (ref 0.2–1.2)
BUN SERPL-MCNC: 14 MG/DL — SIGNIFICANT CHANGE UP (ref 7–18)
CALCIUM SERPL-MCNC: 9.4 MG/DL — SIGNIFICANT CHANGE UP (ref 8.4–10.5)
CHLORIDE SERPL-SCNC: 105 MMOL/L — SIGNIFICANT CHANGE UP (ref 96–108)
CHOLEST SERPL-MCNC: 251 MG/DL — HIGH (ref 10–199)
CK MB BLD-MCNC: 5.7 % — HIGH (ref 0–3.5)
CK MB BLD-MCNC: 7.6 % — HIGH (ref 0–3.5)
CK MB CFR SERPL CALC: 35.5 NG/ML — HIGH (ref 0–3.6)
CK MB CFR SERPL CALC: 49.4 NG/ML — HIGH (ref 0–3.6)
CK SERPL-CCNC: 618 U/L — HIGH (ref 21–215)
CK SERPL-CCNC: 650 U/L — HIGH (ref 21–215)
CO2 SERPL-SCNC: 27 MMOL/L — SIGNIFICANT CHANGE UP (ref 22–31)
CREAT SERPL-MCNC: 1.28 MG/DL — SIGNIFICANT CHANGE UP (ref 0.5–1.3)
EOSINOPHIL # BLD AUTO: 0.15 K/UL — SIGNIFICANT CHANGE UP (ref 0–0.5)
EOSINOPHIL NFR BLD AUTO: 2.7 % — SIGNIFICANT CHANGE UP (ref 0–6)
GLUCOSE BLDC GLUCOMTR-MCNC: 116 MG/DL — HIGH (ref 70–99)
GLUCOSE BLDC GLUCOMTR-MCNC: 119 MG/DL — HIGH (ref 70–99)
GLUCOSE SERPL-MCNC: 189 MG/DL — HIGH (ref 70–99)
HBA1C BLD-MCNC: 7 % — HIGH (ref 4–5.6)
HCT VFR BLD CALC: 39.7 % — SIGNIFICANT CHANGE UP (ref 34.5–45)
HCV AB S/CO SERPL IA: 0.24 S/CO — SIGNIFICANT CHANGE UP (ref 0–0.79)
HCV AB SERPL-IMP: SIGNIFICANT CHANGE UP
HDLC SERPL-MCNC: 34 MG/DL — LOW
HGB BLD-MCNC: 12.8 G/DL — SIGNIFICANT CHANGE UP (ref 11.5–15.5)
IMM GRANULOCYTES NFR BLD AUTO: 0.2 % — SIGNIFICANT CHANGE UP (ref 0–1.5)
LIPID PNL WITH DIRECT LDL SERPL: 177 MG/DL — SIGNIFICANT CHANGE UP
LYMPHOCYTES # BLD AUTO: 2.58 K/UL — SIGNIFICANT CHANGE UP (ref 1–3.3)
LYMPHOCYTES # BLD AUTO: 46.7 % — HIGH (ref 13–44)
MAGNESIUM SERPL-MCNC: 2 MG/DL — SIGNIFICANT CHANGE UP (ref 1.6–2.6)
MCHC RBC-ENTMCNC: 30.1 PG — SIGNIFICANT CHANGE UP (ref 27–34)
MCHC RBC-ENTMCNC: 32.2 GM/DL — SIGNIFICANT CHANGE UP (ref 32–36)
MCV RBC AUTO: 93.4 FL — SIGNIFICANT CHANGE UP (ref 80–100)
MONOCYTES # BLD AUTO: 0.39 K/UL — SIGNIFICANT CHANGE UP (ref 0–0.9)
MONOCYTES NFR BLD AUTO: 7.1 % — SIGNIFICANT CHANGE UP (ref 2–14)
NEUTROPHILS # BLD AUTO: 2.35 K/UL — SIGNIFICANT CHANGE UP (ref 1.8–7.4)
NEUTROPHILS NFR BLD AUTO: 42.4 % — LOW (ref 43–77)
NRBC # BLD: 0 /100 WBCS — SIGNIFICANT CHANGE UP (ref 0–0)
PHOSPHATE SERPL-MCNC: 3.5 MG/DL — SIGNIFICANT CHANGE UP (ref 2.5–4.5)
PLATELET # BLD AUTO: 320 K/UL — SIGNIFICANT CHANGE UP (ref 150–400)
POTASSIUM SERPL-MCNC: 3.5 MMOL/L — SIGNIFICANT CHANGE UP (ref 3.5–5.3)
POTASSIUM SERPL-SCNC: 3.5 MMOL/L — SIGNIFICANT CHANGE UP (ref 3.5–5.3)
PROT SERPL-MCNC: 8 G/DL — SIGNIFICANT CHANGE UP (ref 6–8.3)
RBC # BLD: 4.25 M/UL — SIGNIFICANT CHANGE UP (ref 3.8–5.2)
RBC # FLD: 13.2 % — SIGNIFICANT CHANGE UP (ref 10.3–14.5)
SODIUM SERPL-SCNC: 141 MMOL/L — SIGNIFICANT CHANGE UP (ref 135–145)
TOTAL CHOLESTEROL/HDL RATIO MEASUREMENT: 7.4 RATIO — HIGH (ref 3.3–7.1)
TRIGL SERPL-MCNC: 201 MG/DL — HIGH (ref 10–149)
TROPONIN I SERPL-MCNC: 29.7 NG/ML — HIGH (ref 0–0.04)
TROPONIN I SERPL-MCNC: 60.4 NG/ML — HIGH (ref 0–0.04)
TSH SERPL-MCNC: 1.66 UU/ML — SIGNIFICANT CHANGE UP (ref 0.34–4.82)
VIT B12 SERPL-MCNC: 489 PG/ML — SIGNIFICANT CHANGE UP (ref 232–1245)
WBC # BLD: 5.53 K/UL — SIGNIFICANT CHANGE UP (ref 3.8–10.5)
WBC # FLD AUTO: 5.53 K/UL — SIGNIFICANT CHANGE UP (ref 3.8–10.5)

## 2019-02-22 PROCEDURE — 85027 COMPLETE CBC AUTOMATED: CPT

## 2019-02-22 PROCEDURE — 99285 EMERGENCY DEPT VISIT HI MDM: CPT | Mod: 25

## 2019-02-22 PROCEDURE — 82607 VITAMIN B-12: CPT

## 2019-02-22 PROCEDURE — 83690 ASSAY OF LIPASE: CPT

## 2019-02-22 PROCEDURE — 80061 LIPID PANEL: CPT

## 2019-02-22 PROCEDURE — 99239 HOSP IP/OBS DSCHRG MGMT >30: CPT

## 2019-02-22 PROCEDURE — 80053 COMPREHEN METABOLIC PANEL: CPT

## 2019-02-22 PROCEDURE — 36415 COLL VENOUS BLD VENIPUNCTURE: CPT

## 2019-02-22 PROCEDURE — 93306 TTE W/DOPPLER COMPLETE: CPT

## 2019-02-22 PROCEDURE — 82550 ASSAY OF CK (CPK): CPT

## 2019-02-22 PROCEDURE — 82553 CREATINE MB FRACTION: CPT

## 2019-02-22 PROCEDURE — 82962 GLUCOSE BLOOD TEST: CPT

## 2019-02-22 PROCEDURE — 71275 CT ANGIOGRAPHY CHEST: CPT

## 2019-02-22 PROCEDURE — 93458 L HRT ARTERY/VENTRICLE ANGIO: CPT | Mod: 26

## 2019-02-22 PROCEDURE — 86803 HEPATITIS C AB TEST: CPT

## 2019-02-22 PROCEDURE — 83880 ASSAY OF NATRIURETIC PEPTIDE: CPT

## 2019-02-22 PROCEDURE — 93010 ELECTROCARDIOGRAM REPORT: CPT | Mod: 77

## 2019-02-22 PROCEDURE — 84443 ASSAY THYROID STIM HORMONE: CPT

## 2019-02-22 PROCEDURE — 83735 ASSAY OF MAGNESIUM: CPT

## 2019-02-22 PROCEDURE — 93010 ELECTROCARDIOGRAM REPORT: CPT

## 2019-02-22 PROCEDURE — 85610 PROTHROMBIN TIME: CPT

## 2019-02-22 PROCEDURE — 85730 THROMBOPLASTIN TIME PARTIAL: CPT

## 2019-02-22 PROCEDURE — 99255 IP/OBS CONSLTJ NEW/EST HI 80: CPT

## 2019-02-22 PROCEDURE — 84484 ASSAY OF TROPONIN QUANT: CPT

## 2019-02-22 PROCEDURE — 93005 ELECTROCARDIOGRAM TRACING: CPT

## 2019-02-22 PROCEDURE — 93306 TTE W/DOPPLER COMPLETE: CPT | Mod: 26

## 2019-02-22 PROCEDURE — 71046 X-RAY EXAM CHEST 2 VIEWS: CPT

## 2019-02-22 PROCEDURE — 74174 CTA ABD&PLVS W/CONTRAST: CPT

## 2019-02-22 PROCEDURE — 84100 ASSAY OF PHOSPHORUS: CPT

## 2019-02-22 PROCEDURE — 83036 HEMOGLOBIN GLYCOSYLATED A1C: CPT

## 2019-02-22 RX ORDER — HYDROCHLOROTHIAZIDE 25 MG
12.5 TABLET ORAL DAILY
Qty: 0 | Refills: 0 | Status: DISCONTINUED | OUTPATIENT
Start: 2019-02-22 | End: 2019-02-26

## 2019-02-22 RX ORDER — ANASTROZOLE 1 MG/1
1 TABLET ORAL DAILY
Qty: 0 | Refills: 0 | Status: DISCONTINUED | OUTPATIENT
Start: 2019-02-22 | End: 2019-02-26

## 2019-02-22 RX ORDER — HEPARIN SODIUM 5000 [USP'U]/ML
4700 INJECTION INTRAVENOUS; SUBCUTANEOUS ONCE
Qty: 0 | Refills: 0 | Status: COMPLETED | OUTPATIENT
Start: 2019-02-22 | End: 2019-02-22

## 2019-02-22 RX ORDER — HEPARIN SODIUM 5000 [USP'U]/ML
4700 INJECTION INTRAVENOUS; SUBCUTANEOUS EVERY 6 HOURS
Qty: 0 | Refills: 0 | Status: DISCONTINUED | OUTPATIENT
Start: 2019-02-22 | End: 2019-02-22

## 2019-02-22 RX ORDER — CARVEDILOL PHOSPHATE 80 MG/1
12.5 CAPSULE, EXTENDED RELEASE ORAL EVERY 12 HOURS
Qty: 0 | Refills: 0 | Status: DISCONTINUED | OUTPATIENT
Start: 2019-02-22 | End: 2019-02-23

## 2019-02-22 RX ORDER — ASPIRIN/CALCIUM CARB/MAGNESIUM 324 MG
81 TABLET ORAL DAILY
Qty: 0 | Refills: 0 | Status: DISCONTINUED | OUTPATIENT
Start: 2019-02-22 | End: 2019-02-26

## 2019-02-22 RX ORDER — PANTOPRAZOLE SODIUM 20 MG/1
40 TABLET, DELAYED RELEASE ORAL
Qty: 0 | Refills: 0 | Status: DISCONTINUED | OUTPATIENT
Start: 2019-02-22 | End: 2019-02-26

## 2019-02-22 RX ORDER — LOSARTAN POTASSIUM 100 MG/1
100 TABLET, FILM COATED ORAL DAILY
Qty: 0 | Refills: 0 | Status: DISCONTINUED | OUTPATIENT
Start: 2019-02-22 | End: 2019-02-26

## 2019-02-22 RX ORDER — LOSARTAN POTASSIUM 100 MG/1
50 TABLET, FILM COATED ORAL DAILY
Qty: 0 | Refills: 0 | Status: DISCONTINUED | OUTPATIENT
Start: 2019-02-22 | End: 2019-02-22

## 2019-02-22 RX ORDER — PREGABALIN 225 MG/1
1 CAPSULE ORAL
Qty: 0 | Refills: 0 | COMMUNITY

## 2019-02-22 RX ORDER — CLOPIDOGREL BISULFATE 75 MG/1
600 TABLET, FILM COATED ORAL ONCE
Qty: 0 | Refills: 0 | Status: COMPLETED | OUTPATIENT
Start: 2019-02-22 | End: 2019-02-22

## 2019-02-22 RX ORDER — CARVEDILOL PHOSPHATE 80 MG/1
1 CAPSULE, EXTENDED RELEASE ORAL
Qty: 0 | Refills: 0 | COMMUNITY

## 2019-02-22 RX ORDER — HEPARIN SODIUM 5000 [USP'U]/ML
INJECTION INTRAVENOUS; SUBCUTANEOUS
Qty: 25000 | Refills: 0 | Status: DISCONTINUED | OUTPATIENT
Start: 2019-02-22 | End: 2019-02-22

## 2019-02-22 RX ORDER — SODIUM CHLORIDE 9 MG/ML
3 INJECTION INTRAMUSCULAR; INTRAVENOUS; SUBCUTANEOUS EVERY 8 HOURS
Qty: 0 | Refills: 0 | Status: DISCONTINUED | OUTPATIENT
Start: 2019-02-22 | End: 2019-02-26

## 2019-02-22 RX ORDER — LOSARTAN POTASSIUM 100 MG/1
1 TABLET, FILM COATED ORAL
Qty: 0 | Refills: 0 | COMMUNITY

## 2019-02-22 RX ORDER — AMLODIPINE BESYLATE 2.5 MG/1
5 TABLET ORAL DAILY
Qty: 0 | Refills: 0 | Status: DISCONTINUED | OUTPATIENT
Start: 2019-02-22 | End: 2019-02-23

## 2019-02-22 RX ORDER — HEPARIN SODIUM 5000 [USP'U]/ML
5000 INJECTION INTRAVENOUS; SUBCUTANEOUS EVERY 12 HOURS
Qty: 0 | Refills: 0 | Status: DISCONTINUED | OUTPATIENT
Start: 2019-02-23 | End: 2019-02-26

## 2019-02-22 RX ORDER — CARVEDILOL PHOSPHATE 80 MG/1
6.25 CAPSULE, EXTENDED RELEASE ORAL EVERY 12 HOURS
Qty: 0 | Refills: 0 | Status: DISCONTINUED | OUTPATIENT
Start: 2019-02-22 | End: 2019-02-22

## 2019-02-22 RX ADMIN — LOSARTAN POTASSIUM 100 MILLIGRAM(S): 100 TABLET, FILM COATED ORAL at 05:34

## 2019-02-22 RX ADMIN — HEPARIN SODIUM 950 UNIT(S)/HR: 5000 INJECTION INTRAVENOUS; SUBCUTANEOUS at 10:43

## 2019-02-22 RX ADMIN — ENOXAPARIN SODIUM 80 MILLIGRAM(S): 100 INJECTION SUBCUTANEOUS at 05:34

## 2019-02-22 RX ADMIN — CLOPIDOGREL BISULFATE 600 MILLIGRAM(S): 75 TABLET, FILM COATED ORAL at 12:42

## 2019-02-22 RX ADMIN — SODIUM CHLORIDE 3 MILLILITER(S): 9 INJECTION INTRAMUSCULAR; INTRAVENOUS; SUBCUTANEOUS at 21:25

## 2019-02-22 RX ADMIN — CARVEDILOL PHOSPHATE 3.12 MILLIGRAM(S): 80 CAPSULE, EXTENDED RELEASE ORAL at 05:34

## 2019-02-22 RX ADMIN — HEPARIN SODIUM 4700 UNIT(S): 5000 INJECTION INTRAVENOUS; SUBCUTANEOUS at 10:45

## 2019-02-22 NOTE — CHART NOTE - NSCHARTNOTEFT_GEN_A_CORE
63y Female s/p cardiac cath today for right radial site check. Pt without any complaints at this time.    Right radial site: Dressing in place c/d/i. No hematoma. No swelling/edema/discoloration/coldness of extremity. Pulses and sensation intact. Cap refill <3 secs.     Will cont to monitor.

## 2019-02-22 NOTE — TRANSFER ACCEPTANCE NOTE - NEGATIVE CARDIOVASCULAR SYMPTOMS
no peripheral edema/no paroxysmal nocturnal dyspnea/no dyspnea on exertion/no orthopnea/no palpitations

## 2019-02-22 NOTE — TRANSFER ACCEPTANCE NOTE - HISTORY OF PRESENT ILLNESS
Patient is a 63 Female everyday smoker with PMH of HTN, HFrEF 49% 2016, COPD (not on Home O2), DVT (15 years ago, AC x 2 years then stop),  Breast ca s/p left lumpectomy s/p Chemotherapy and Radiation Therapy , infra-renal abdominal aortic aneurysm, s/p hysterectomy, Sick cell trait and  DM presented to [facility] with mid sternal chest pain which she describes as pressure like, 8/10 in intensity, radiating to both shoulders and back. Patient took asa 81 but it did not relieve her symptom ,she then came in to ED for evaluation. Patient reports dyspnea on exertion but denies any orthopnea or pedal edema, fever, headache, dizziness, nausea, vomiting. She reports compliance with her medications. In [place] Ed Patient's Trops were noted to be trending up, and pt was transferred to [facility] for coronary angiogram.

## 2019-02-22 NOTE — ACUTE INTERFACILITY TRANSFER NOTE - HOSPITAL COURSE
Patient is a 63 F from home with PMH of HTN, HFrEF 49% 2016, COPD (not on Home O2), DVT (15 years ago, AC x 2 years then stop),  Breast ca s/p left lumpectomy s/p Chemotherapy and Radiation Therapy (did not Mi, infra-renal abdominal aortic aneurysm, hystrectomy, Sick cell trait. DM presents with mid sternal chest pain started 10:00 am this morning. Patient's pain is pressure like, 8/10 in intensity, radiating to both shoulders and back. Patient took asa 81 but it did not relieve her symptom ,she then came in to ED for evaluation. Patient reports dyspnea on exertion but denies any orthopnea or pedal edema, fever, headache, dizziness, nausea, vomiting. She reports compliance with her medications. In Ed Patient's bp is 168/113 HR 83. EKG is NSR @ 93 with T wave inversions in lead V1, prolong . Patient saw her cardiologist Dr Hanley 1 year ago. Labs are remarkable for positive 0.138. (21 Feb 2019 15:30)  - Trops trending up, pt to be transferred to [facility] for cath

## 2019-02-22 NOTE — CHART NOTE - NSCHARTNOTEFT_GEN_A_CORE
Cardiac enzymes are uptrending. Received a page that the patient's trop is 29.7. The patient is refusing heparin drip and is on full dose lovenox. Repeat EKG shows nonspecific changes, sinus sage, no ST elevations. The patient is lying comfortably in bed and denies chest pain, sob, dizziness, headache. She has no complaints. Dr Doshi made aware, sent image of repeat EKG, says patient will be going for cath tomorrow. Will continue to monitor. Next CE ordered for 8am.

## 2019-02-22 NOTE — ACUTE INTERFACILITY TRANSFER NOTE - PLAN OF CARE
to have cardiac cath admitted for chest pain, troponins trending up, transfer to [facility] required for cardiac cath

## 2019-02-22 NOTE — CONSULT NOTE ADULT - ASSESSMENT
64 yo F with DM, HTN, HLD, NICM (49% in 2018 echo) after chemotherapy with negative cardiac catheterization, COPD not on home O2, breast CA s/p lumpectomy, chemotherapy/XRT, who presented with chest pain in the setting of NSTEMI    1. NSTEMI:   -Heparin gtt, aspirin, atorvastatin, carvedilol  -Plavix load with 600mg PO x1  -Will discuss with patient regarding transfer for cardiac catheterization today  -Echocardiogram    2. HTN: On losartan, carvedilol    3. HLD: Atorvastatin    4. AAA: Infrarenal, 4.5cm, also ulcerative plaque  -Maintain BP in target range, should see Dr. Gutierrez vascular surgery as outpatient    ***Note that this is a preliminary note and any recommendations should NOT be carried out until this note is finalized. *** 62 yo F with DM, HTN, HLD, NICM (49% in 2018 echo) after chemotherapy with negative cardiac catheterization, COPD not on home O2, breast CA s/p lumpectomy, chemotherapy/XRT, who presented with chest pain in the setting of NSTEMI    1. NSTEMI:   -Trend troponins to peak with serial EKG (highest troponin = 60 so far)  -Heparin gtt, aspirin, atorvastatin, carvedilol  -Plavix load with 600mg PO x1  -Check echocardiogram  -Transfer today to [facility] for cardiac catheterization    2. NSVT: Likely in setting of ischemia; patient was asymptomatic  -Would cut dose of losartan in half and go up on carvedilol as hemodynamically tolerated    3. HTN: On losartan, carvedilol    4. HLD: Atorvastatin    5. AAA: Infrarenal, 4.5cm, also ulcerative plaque  -Maintain BP in target range, should see Dr. Gutierrez vascular surgery as outpatient

## 2019-02-22 NOTE — TRANSFER ACCEPTANCE NOTE - PSH
Breast cancer  infusaport insertion- 2014- left chest  Fibroid uterus  sp hystrectomy 1196  [place] filter in place  1994  S/P hysterectomy  1994  S/P lumpectomy, right breast  2014, revision of position- 2015

## 2019-02-22 NOTE — PROGRESS NOTE ADULT - SUBJECTIVE AND OBJECTIVE BOX
Patient is a 63y old  Female who presents with a chief complaint of chest pain (22 Feb 2019 11:19)      INTERVAL HPI/ OVERNIGHT EVENTS:  Patient seen and examined at bedside, feels ok  denies any chest pain.     Allergies    Advil (Hives; Vomiting)    Intolerances        REVIEW OF SYSTEMS:  CONSTITUTIONAL: No fever, weight loss, or fatigue  EYES: No eye pain, visual disturbances, or discharge  RESPIRATORY: No cough, wheezing or shortness of breath  CARDIOVASCULAR: No chest pain, feeling of heart beats  GASTROINTESTINAL: No abdominal or epigastric pain. No nausea, vomiting; No diarrhea or constipation.  GENITOURINARY: No dysuria, frequency, hematuria  NEUROLOGICAL: No headaches  MUSCULOSKELETAL: No joint pain  PSYCHIATRIC: No depression or anxiety  HEME/LYMPH: No easy bruising, or bleeding gums  ALLERGY AND IMMUNOLOGIC: No hives or eczema    Medications:  aspirin enteric coated 81 milliGRAM(s) Oral daily  atorvastatin 40 milliGRAM(s) Oral at bedtime  carvedilol 6.25 milliGRAM(s) Oral every 12 hours  dextrose 40% Gel 15 Gram(s) Oral once PRN Blood Glucose LESS THAN 70 milliGRAM(s)/deciliter  dextrose 5%. 1000 milliLiter(s) IV Continuous <Continuous>  dextrose 50% Injectable 12.5 Gram(s) IV Push once  dextrose 50% Injectable 25 Gram(s) IV Push once  dextrose 50% Injectable 25 Gram(s) IV Push once  glucagon  Injectable 1 milliGRAM(s) IntraMuscular once PRN Glucose LESS THAN 70 milligrams/deciliter  heparin  Infusion.  Unit(s)/Hr IV Continuous <Continuous>  heparin  Injectable 4700 Unit(s) IV Push every 6 hours PRN For aPTT less than 40  insulin lispro (HumaLOG) corrective regimen sliding scale   SubCutaneous three times a day before meals  losartan 50 milliGRAM(s) Oral daily  nitroglycerin     SubLingual 0.4 milliGRAM(s) SubLingual every 5 minutes PRN Chest Pain  pantoprazole    Tablet 40 milliGRAM(s) Oral before breakfast      PHYSICAL EXAM:  Vital Signs Last 24 Hrs  T(C): 36.4 (22 Feb 2019 11:18), Max: 37 (22 Feb 2019 05:24)  T(F): 97.6 (22 Feb 2019 11:18), Max: 98.6 (22 Feb 2019 05:24)  HR: 71 (22 Feb 2019 11:18) (60 - 71)  BP: 165/94 (22 Feb 2019 11:18) (143/78 - 165/94)  BP(mean): --  RR: 17 (22 Feb 2019 11:18) (16 - 20)  SpO2: 100% (22 Feb 2019 11:18) (98% - 100%)    GENERAL: NAD  HEAD:  Atraumatic, Normocephalic  EYES: EOMI, PERRLA, conjunctiva and sclera clear  ENT: moist mucous membranes  NECK: Supple, No JVD, Normal thyroid  NERVOUS SYSTEM:  Alert & Oriented X3, Good concentration; Motor Strength 5/5 B/L upper and lower extremities; DTRs 2+ intact and symmetric  CHEST/LUNG: No rales, rhonchi, wheezing, or rubs  HEART: irregular heart rate.  ABDOMEN: Soft, Nontender, Nondistended; Bowel sounds present  EXTREMITIES:  2+ Peripheral Pulses, No clubbing, cyanosis, or edema  SKIN: No rashes or lesions    LABS:                        12.8   5.53  )-----------( 320      ( 22 Feb 2019 07:14 )             39.7     02-22    141  |  105  |  14  ----------------------------<  189<H>  3.5   |  27  |  1.28    Ca    9.4      22 Feb 2019 07:14  Phos  3.5     02-22  Mg     2.0     02-22    TPro  8.0  /  Alb  3.5  /  TBili  0.4  /  DBili  x   /  AST  111<H>  /  ALT  21  /  AlkPhos  103  02-22    LIVER FUNCTIONS - ( 22 Feb 2019 07:14 )  Alb: 3.5 g/dL / Pro: 8.0 g/dL / ALK PHOS: 103 U/L / ALT: 21 U/L DA / AST: 111 U/L / GGT: x             PT/INR - ( 21 Feb 2019 13:18 )   PT: 13.3 sec;   INR: 1.19 ratio         PTT - ( 21 Feb 2019 13:18 )  PTT:28.4 sec  CARDIAC MARKERS ( 22 Feb 2019 09:43 )  60.400 ng/mL / x     / 618 U/L / x     / 35.5 ng/mL  CARDIAC MARKERS ( 22 Feb 2019 01:20 )  29.700 ng/mL / x     / 650 U/L / x     / 49.4 ng/mL  CARDIAC MARKERS ( 21 Feb 2019 19:20 )  3.910 ng/mL / x     / 449 U/L / x     / 32.5 ng/mL  CARDIAC MARKERS ( 21 Feb 2019 17:20 )  x     / x     / x     / x     / <1.0 ng/mL  CARDIAC MARKERS ( 21 Feb 2019 13:18 )  0.138 ng/mL / x     / 177 U/L / x     / x            Culture & Sensitivity:   CAPILLARY BLOOD GLUCOSE      POCT Blood Glucose.: 119 mg/dL (22 Feb 2019 14:15)  POCT Blood Glucose.: 173 mg/dL (22 Feb 2019 08:20)  POCT Blood Glucose.: 127 mg/dL (21 Feb 2019 22:30)        RADIOLOGY & ADDITIONAL TESTS:  Radiology testing independently reviewed:     Consultant(s) Notes Reviewed:  [ x] YES  [ ] NO    Care Discussed with Consultants/Other Providers [ x] YES  [ ] NO

## 2019-02-22 NOTE — CONSULT NOTE ADULT - SUBJECTIVE AND OBJECTIVE BOX
CHIEF COMPLAINT: Chest Pain    HPI: 64 yo F with DM, HTN, HLD, NICM (49% in 2018 echo) after chemotherapy with negative cardiac catheterization, COPD not on home O2, breast CA s/p lumpectomy, chemotherapy/XRT, who presented with chest pain. Patient     PAST MEDICAL & SURGICAL HISTORY:  Knee effusion, left: arthrocentesis- 05/25/16, Internal carotid aneurysm, Abdominal aneurysm: infrarenal- followed by Vascular , Dr. Gutierrez  Sickle cell trait, DVT (deep venous thrombosis): Left leg in 1994 after hysterectomy  [place] filter in place: 1994  S/P lumpectomy, right breast: 2014, revision of position- 2015    Allergies    Advil (Hives; Vomiting)    MEDICATIONS  (STANDING):  aspirin enteric coated 81 milliGRAM(s) Oral daily  atorvastatin 40 milliGRAM(s) Oral at bedtime  carvedilol 3.125 milliGRAM(s) Oral every 12 hours  dextrose 5%. 1000 milliLiter(s) (50 mL/Hr) IV Continuous <Continuous>  dextrose 50% Injectable 12.5 Gram(s) IV Push once  dextrose 50% Injectable 25 Gram(s) IV Push once  dextrose 50% Injectable 25 Gram(s) IV Push once  enoxaparin Injectable 80 milliGRAM(s) SubCutaneous every 12 hours  insulin lispro (HumaLOG) corrective regimen sliding scale   SubCutaneous three times a day before meals  losartan 100 milliGRAM(s) Oral daily  pantoprazole    Tablet 40 milliGRAM(s) Oral before breakfast    MEDICATIONS  (PRN):  dextrose 40% Gel 15 Gram(s) Oral once PRN Blood Glucose LESS THAN 70 milliGRAM(s)/deciliter  glucagon  Injectable 1 milliGRAM(s) IntraMuscular once PRN Glucose LESS THAN 70 milligrams/deciliter  nitroglycerin     SubLingual 0.4 milliGRAM(s) SubLingual every 5 minutes PRN Chest Pain      FAMILY HISTORY:  Family history of other condition: please open box and see additional medical family history  Family history of renal failure: uncle  Family history of glioblastoma (Sibling)  History of hypertension: mother  Family history of liver failure  Family history of glioblastoma  Family history of renal stone  Family history of diabetes mellitus  Family history of hypertension    No family history of premature coronary artery disease or sudden cardiac death    SOCIAL HISTORY:  Smoking-  Alcohol-  Illicit Drug use-    REVIEW OF SYSTEMS:  Constitutional: [ ] fever, [ ]weight loss,  [ ]fatigue  Eyes: [ ] visual changes  Respiratory: [ ]shortness of breath;  [ ] cough, [ ]wheezing, [ ]chills, [ ]hemoptysis  Cardiovascular: [ ] chest pain, [ ]palpitations, [ ]dizziness,  [ ]leg swelling [ ]syncope  Gastrointestinal: [ ] abdominal pain, [ ]nausea, [ ]vomiting,  [ ]diarrhea   Genitourinary: [ ] dysuria, [ ] hematuria  Neurologic: [ ] headaches [ ] tremors  [ ] weakness [ ] lightheadedness  Skin: [ ] itching, [ ]burning, [ ] rashes  Endocrine: [ ] heat or cold intolerance  Musculoskeletal: [ ] joint pain or swelling; [ ] muscle, back, or extremity pain  Psychiatric: [ ] depression, [ ]anxiety, [ ]mood swings, or [ ]difficulty sleeping  Hematologic: [ ] easy bruising, [ ] bleeding gums       [ x] All others negative	  [ ] Unable to obtain    Vital Signs Last 24 Hrs  T(C): 36.8 (22 Feb 2019 07:55), Max: 37 (22 Feb 2019 05:24)  T(F): 98.3 (22 Feb 2019 07:55), Max: 98.6 (22 Feb 2019 05:24)  HR: 65 (22 Feb 2019 07:55) (60 - 83)  BP: 151/70 (22 Feb 2019 07:55) (143/78 - 168/113)  BP(mean): --  RR: 17 (22 Feb 2019 07:55) (16 - 20)  SpO2: 100% (22 Feb 2019 07:55) (98% - 100%)  I&O's Summary      PHYSICAL EXAM:  General: No acute distress  HEENT: EOMI, PERRL  Neck: Supple, No JVD  Lungs: Clear to auscultation bilaterally; No rales or wheezing  Heart: Regular rate and rhythm; No murmurs, rubs, or gallops  Abdomen: Nontender, bowel sounds present  Extremities: No clubbing, cyanosis, or edema  Nervous system:  Alert & Oriented X3, no focal deficits  Psychiatric: Normal affect  Skin: No rashes or lesions      LABS:  02-22    141  |  105  |  14  ----------------------------<  189<H>  3.5   |  27  |  1.28    Ca    9.4      22 Feb 2019 07:14  Phos  3.5     02-22  Mg     2.0     02-22    TPro  8.0  /  Alb  3.5  /  TBili  0.4  /  DBili  x   /  AST  111<H>  /  ALT  21  /  AlkPhos  103  02-22    Creatinine Trend: 1.28<--, 1.32<--                        12.8   5.53  )-----------( 320      ( 22 Feb 2019 07:14 )             39.7     PT/INR - ( 21 Feb 2019 13:18 )   PT: 13.3 sec;   INR: 1.19 ratio         PTT - ( 21 Feb 2019 13:18 )  PTT:28.4 sec    Lipid Panel: Cholesterol, Serum 251  Direct   HDL Cholesterol, Serum 34  Triglycerides, Serum 201    Cardiac Enzymes: CARDIAC MARKERS ( 22 Feb 2019 01:20 )  29.700 ng/mL / x     / 650 U/L / x     / 49.4 ng/mL  CARDIAC MARKERS ( 21 Feb 2019 19:20 )  3.910 ng/mL / x     / 449 U/L / x     / 32.5 ng/mL  CARDIAC MARKERS ( 21 Feb 2019 17:20 )  x     / x     / x     / x     / <1.0 ng/mL  CARDIAC MARKERS ( 21 Feb 2019 13:18 )  0.138 ng/mL / x     / 177 U/L / x     / x        Serum Pro-Brain Natriuretic Peptide: 947 pg/mL (02-21-19 @ 13:18)    RADIOLOGY: < from: Xray Chest 2 Views PA/Lat (02.21.19 @ 13:24) >  IMPRESSION: Heart enlargement. No acute lung finding.    < end of copied text >    < from: CT Angio Abdomen and Pelvis w/ IV Cont (02.21.19 @ 17:08) >  IMPRESSION:  No aortic dissection. Descending aortic aneurysm, up to 4.1 cm. Abdominal   aortic aneurysm, suprarenal (3.7 cm) and infrarenal (4.5 cm). Extensive   ulcerative, irregular atheromatous plaque, partially calcified.    5 mm left upper lobe nodule. Follow-up chest CT is recommended in 6   months. Bilateral lower lobe nodular opacities measuring up to 7 mm can   also be re-evaluated on follow-up.    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    Infrarenal IVC filter in place.    Other findings as above.    < end of copied text >      ECG [my interpretation]: 2/22/2019 @ 01:01: Sinus bradycardia at 58 bpm, LVH with nonspecific ST abnormality, not significantly changed from priors from 2017    TELEMETRY:    ECHO: < from: Transthoracic Echocardiogram (05.29.18 @ 08:55) >  CONCLUSIONS:  1. Aortic Root: 3.0 cm.  2. Normal left atrium.  LA volume index = 17 cc/m2.  3. Eccentric left ventricular hypertrophy (dilated left  ventricle with normal relative wall thickness).  4. Mild to moderate global left ventricular systolic  dysfunction.  5. Grade I diastolic dysfunction (Impaired relaxation).  6. Right ventricle not well visualized.  7. Incomplete tricuspid regurgitation jet precludes  accurate assessment of pulmonary artery systolic pressure.    < end of copied text >    CATHETERIZATION: < from: Cardiac Cath Lab - Adult (05.06.15 @ 16:22) >  CORONARY VESSELS: The coronary circulation is right dominant.  LM:   --  LM: Angiography showed minor luminal irregularities with no flow  limiting lesions.  LAD:   --  Proximal LAD: Normal.  --  Mid LAD: Angiography showed minor luminal irregularities with no flow  limiting lesions.  --  Distal LAD: Angiography showed mild atherosclerosis with no flow  limiting lesions.  CX:   --  Circumflex: Angiography showed minor luminal irregularities with  no flow limiting lesions.  RCA:   --  RCA: Angiography showed mild atherosclerosis with no flow  limiting lesions.  --  RPDA: Angiography showed mild atherosclerosis with no flow limiting  lesions.  --  RPLS: Angiography showed minor luminal irregularities with no flow  limiting lesions.  COMPLICATIONS: There were no complications. No complications occurred  during the cath lab visit.  DIAGNOSTIC RECOMMENDATIONS: Coronary angiogram demonstrates mild CAD. Would  continue with medical therapy of nonischemic cardiomyopathy and provide  aggressive risk factor modification.    < end of copied text > CHIEF COMPLAINT: Chest Pain    HPI: 62 yo F with DM, HTN, HLD, NICM (49% in 2018 echo) after chemotherapy with negative cardiac catheterization, COPD not on home O2, breast CA s/p lumpectomy, chemotherapy/XRT, who presented with chest pain. Patient reports symptoms started the morning of admission when she was sitting in bed under emotional stress. She reports midsternal, "dull" pain radiating to both shoulders as well as a sharper pain in her back. No associated dyspnea, LH or syncope, no palpitations. Currently has no chest pain or dyspnea, feels at baseline.      PAST MEDICAL & SURGICAL HISTORY:  Knee effusion, left: arthrocentesis- 05/25/16, Internal carotid aneurysm, Abdominal aneurysm: infrarenal- followed by Vascular , Dr. Gutierrez  Sickle cell trait, DVT (deep venous thrombosis): Left leg in 1994 after hysterectomy  [place] filter in place: 1994  S/P lumpectomy, right breast: 2014, revision of position- 2015    Allergies    Advil (Hives; Vomiting)    MEDICATIONS  (STANDING):  aspirin enteric coated 81 milliGRAM(s) Oral daily  atorvastatin 40 milliGRAM(s) Oral at bedtime  carvedilol 3.125 milliGRAM(s) Oral every 12 hours  dextrose 5%. 1000 milliLiter(s) (50 mL/Hr) IV Continuous <Continuous>  dextrose 50% Injectable 12.5 Gram(s) IV Push once  dextrose 50% Injectable 25 Gram(s) IV Push once  dextrose 50% Injectable 25 Gram(s) IV Push once  enoxaparin Injectable 80 milliGRAM(s) SubCutaneous every 12 hours  insulin lispro (HumaLOG) corrective regimen sliding scale   SubCutaneous three times a day before meals  losartan 100 milliGRAM(s) Oral daily  pantoprazole    Tablet 40 milliGRAM(s) Oral before breakfast    MEDICATIONS  (PRN):  dextrose 40% Gel 15 Gram(s) Oral once PRN Blood Glucose LESS THAN 70 milliGRAM(s)/deciliter  glucagon  Injectable 1 milliGRAM(s) IntraMuscular once PRN Glucose LESS THAN 70 milligrams/deciliter  nitroglycerin     SubLingual 0.4 milliGRAM(s) SubLingual every 5 minutes PRN Chest Pain      FAMILY HISTORY:  Family history of other condition: please open box and see additional medical family history  Family history of renal failure: uncle  Family history of glioblastoma (Sibling)  History of hypertension: mother  Family history of liver failure  Family history of glioblastoma  Family history of renal stone  Family history of diabetes mellitus  Family history of hypertension    No family history of premature coronary artery disease or sudden cardiac death    SOCIAL HISTORY:  Smoking-40 pack-year smoker, trying to quit, now down to several cigarettes/day  Alcohol-Denies  Illicit Drug use-Denies    REVIEW OF SYSTEMS:  Constitutional: [ ] fever, [ ]weight loss,  [ ]fatigue  Eyes: [ ] visual changes  Respiratory: [ ]shortness of breath;  [ ] cough, [ ]wheezing, [ ]chills, [ ]hemoptysis  Cardiovascular: [x ] chest pain, [ ]palpitations, [ ]dizziness,  [ ]leg swelling [ ]syncope  Gastrointestinal: [ ] abdominal pain, [ ]nausea, [ ]vomiting,  [ ]diarrhea   Genitourinary: [ ] dysuria, [ ] hematuria  Neurologic: [ ] headaches [ ] tremors  [ ] weakness [ ] lightheadedness  Skin: [ ] itching, [ ]burning, [ ] rashes  Endocrine: [ ] heat or cold intolerance  Musculoskeletal: [ ] joint pain or swelling; [ ] muscle, back, or extremity pain  Psychiatric: [ ] depression, [ ]anxiety, [ ]mood swings, or [ ]difficulty sleeping  Hematologic: [ ] easy bruising, [ ] bleeding gums       [ x] All others negative	  [ ] Unable to obtain    Vital Signs Last 24 Hrs  T(C): 36.8 (22 Feb 2019 07:55), Max: 37 (22 Feb 2019 05:24)  T(F): 98.3 (22 Feb 2019 07:55), Max: 98.6 (22 Feb 2019 05:24)  HR: 65 (22 Feb 2019 07:55) (60 - 83)  BP: 151/70 (22 Feb 2019 07:55) (143/78 - 168/113)  BP(mean): --  RR: 17 (22 Feb 2019 07:55) (16 - 20)  SpO2: 100% (22 Feb 2019 07:55) (98% - 100%)  I&O's Summary      PHYSICAL EXAM:  General: No acute distress  HEENT: EOMI, PERRL  Neck: Supple, No JVD  Lungs: Clear to auscultation bilaterally; No rales or wheezing  Heart: Regular rate and rhythm; No murmurs, rubs, or gallops  Abdomen: Nontender, bowel sounds present  Extremities: No clubbing, cyanosis, or edema  Nervous system:  Alert & Oriented X3, no focal deficits  Psychiatric: Normal affect  Skin: No rashes or lesions      LABS:  02-22    141  |  105  |  14  ----------------------------<  189<H>  3.5   |  27  |  1.28    Ca    9.4      22 Feb 2019 07:14  Phos  3.5     02-22  Mg     2.0     02-22    TPro  8.0  /  Alb  3.5  /  TBili  0.4  /  DBili  x   /  AST  111<H>  /  ALT  21  /  AlkPhos  103  02-22    Creatinine Trend: 1.28<--, 1.32<--                        12.8   5.53  )-----------( 320      ( 22 Feb 2019 07:14 )             39.7     PT/INR - ( 21 Feb 2019 13:18 )   PT: 13.3 sec;   INR: 1.19 ratio         PTT - ( 21 Feb 2019 13:18 )  PTT:28.4 sec    Lipid Panel: Cholesterol, Serum 251  Direct   HDL Cholesterol, Serum 34  Triglycerides, Serum 201    Cardiac Enzymes: CARDIAC MARKERS ( 22 Feb 2019 01:20 )  29.700 ng/mL / x     / 650 U/L / x     / 49.4 ng/mL  CARDIAC MARKERS ( 21 Feb 2019 19:20 )  3.910 ng/mL / x     / 449 U/L / x     / 32.5 ng/mL  CARDIAC MARKERS ( 21 Feb 2019 17:20 )  x     / x     / x     / x     / <1.0 ng/mL  CARDIAC MARKERS ( 21 Feb 2019 13:18 )  0.138 ng/mL / x     / 177 U/L / x     / x        Serum Pro-Brain Natriuretic Peptide: 947 pg/mL (02-21-19 @ 13:18)    RADIOLOGY: < from: Xray Chest 2 Views PA/Lat (02.21.19 @ 13:24) >  IMPRESSION: Heart enlargement. No acute lung finding.    < end of copied text >    < from: CT Angio Abdomen and Pelvis w/ IV Cont (02.21.19 @ 17:08) >  IMPRESSION:  No aortic dissection. Descending aortic aneurysm, up to 4.1 cm. Abdominal   aortic aneurysm, suprarenal (3.7 cm) and infrarenal (4.5 cm). Extensive   ulcerative, irregular atheromatous plaque, partially calcified.    5 mm left upper lobe nodule. Follow-up chest CT is recommended in 6   months. Bilateral lower lobe nodular opacities measuring up to 7 mm can   also be re-evaluated on follow-up.    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    Infrarenal IVC filter in place.    Other findings as above.    < end of copied text >      ECG [my interpretation]: 2/22/2019 @ 01:01: Sinus bradycardia at 58 bpm, LVH with nonspecific ST abnormality, not significantly changed from priors from 2017    TELEMETRY: Sinus rhythm with PVCs and several episodes of NSVT, longest lasting 23 beats.     ECHO: < from: Transthoracic Echocardiogram (05.29.18 @ 08:55) >  CONCLUSIONS:  1. Aortic Root: 3.0 cm.  2. Normal left atrium.  LA volume index = 17 cc/m2.  3. Eccentric left ventricular hypertrophy (dilated left  ventricle with normal relative wall thickness).  4. Mild to moderate global left ventricular systolic  dysfunction.  5. Grade I diastolic dysfunction (Impaired relaxation).  6. Right ventricle not well visualized.  7. Incomplete tricuspid regurgitation jet precludes  accurate assessment of pulmonary artery systolic pressure.    < end of copied text >    CATHETERIZATION: < from: Cardiac Cath Lab - Adult (05.06.15 @ 16:22) >  CORONARY VESSELS: The coronary circulation is right dominant.  LM:   --  LM: Angiography showed minor luminal irregularities with no flow  limiting lesions.  LAD:   --  Proximal LAD: Normal.  --  Mid LAD: Angiography showed minor luminal irregularities with no flow  limiting lesions.  --  Distal LAD: Angiography showed mild atherosclerosis with no flow  limiting lesions.  CX:   --  Circumflex: Angiography showed minor luminal irregularities with  no flow limiting lesions.  RCA:   --  RCA: Angiography showed mild atherosclerosis with no flow  limiting lesions.  --  RPDA: Angiography showed mild atherosclerosis with no flow limiting  lesions.  --  RPLS: Angiography showed minor luminal irregularities with no flow  limiting lesions.  COMPLICATIONS: There were no complications. No complications occurred  during the cath lab visit.  DIAGNOSTIC RECOMMENDATIONS: Coronary angiogram demonstrates mild CAD. Would  continue with medical therapy of nonischemic cardiomyopathy and provide  aggressive risk factor modification.    < end of copied text >

## 2019-02-22 NOTE — TRANSFER ACCEPTANCE NOTE - ASSESSMENT
Patient is a 63 Female everyday smoker with PMH of HTN, HFrEF 49% 2016, COPD (not on Home O2), DVT (15 years ago, AC x 2 years then stop),  Breast ca s/p left lumpectomy s/p Chemotherapy and Radiation Therapy , infra-renal abdominal aortic aneurysm, s/p hysterectomy, Sick cell trait and  DM presented to [facility] with mid sternal chest pain arrived to [facility] for coronary angiogram

## 2019-02-22 NOTE — PROGRESS NOTE ADULT - PROBLEM SELECTOR PLAN 3
EF: 26%, severly declined from before 49%.  continue carvedilol, ACEI, statin, asa.   s.p Load with plavix 600 in plan for urgent cardiac cath.

## 2019-02-22 NOTE — PROGRESS NOTE ADULT - PROBLEM SELECTOR PLAN 1
Troponin peaked to 60. Patient states feeling ok and denies chest pain   ECHO done showing severe decline of her systolic function  Cardio input appreciated  plan to transfer for urgent cath.

## 2019-02-22 NOTE — TRANSFER ACCEPTANCE NOTE - FAMILY HISTORY
Family history of hypertension     Family history of diabetes mellitus     Family history of renal stone     Family history of glioblastoma     Family history of liver failure     History of hypertension, mother     Family history of renal failure, uncle     Family history of other condition, please open box and see additional medical family history     Sibling  Still living? No  Family history of glioblastoma, Age at diagnosis: Age Unknown

## 2019-02-23 LAB
ANION GAP SERPL CALC-SCNC: 12 MMO/L — SIGNIFICANT CHANGE UP (ref 7–14)
BUN SERPL-MCNC: 15 MG/DL — SIGNIFICANT CHANGE UP (ref 7–23)
CALCIUM SERPL-MCNC: 9.5 MG/DL — SIGNIFICANT CHANGE UP (ref 8.4–10.5)
CHLORIDE SERPL-SCNC: 103 MMOL/L — SIGNIFICANT CHANGE UP (ref 98–107)
CO2 SERPL-SCNC: 27 MMOL/L — SIGNIFICANT CHANGE UP (ref 22–31)
CREAT SERPL-MCNC: 1.12 MG/DL — SIGNIFICANT CHANGE UP (ref 0.5–1.3)
GLUCOSE SERPL-MCNC: 146 MG/DL — HIGH (ref 70–99)
HCT VFR BLD CALC: 38.2 % — SIGNIFICANT CHANGE UP (ref 34.5–45)
HGB BLD-MCNC: 12.5 G/DL — SIGNIFICANT CHANGE UP (ref 11.5–15.5)
MAGNESIUM SERPL-MCNC: 1.9 MG/DL — SIGNIFICANT CHANGE UP (ref 1.6–2.6)
MCHC RBC-ENTMCNC: 30.1 PG — SIGNIFICANT CHANGE UP (ref 27–34)
MCHC RBC-ENTMCNC: 32.7 % — SIGNIFICANT CHANGE UP (ref 32–36)
MCV RBC AUTO: 92 FL — SIGNIFICANT CHANGE UP (ref 80–100)
NRBC # FLD: 0 K/UL — LOW (ref 25–125)
PHOSPHATE SERPL-MCNC: 3.3 MG/DL — SIGNIFICANT CHANGE UP (ref 2.5–4.5)
PLATELET # BLD AUTO: 315 K/UL — SIGNIFICANT CHANGE UP (ref 150–400)
PMV BLD: 9.8 FL — SIGNIFICANT CHANGE UP (ref 7–13)
POTASSIUM SERPL-MCNC: 3.4 MMOL/L — LOW (ref 3.5–5.3)
POTASSIUM SERPL-SCNC: 3.4 MMOL/L — LOW (ref 3.5–5.3)
RBC # BLD: 4.15 M/UL — SIGNIFICANT CHANGE UP (ref 3.8–5.2)
RBC # FLD: 13.1 % — SIGNIFICANT CHANGE UP (ref 10.3–14.5)
SODIUM SERPL-SCNC: 142 MMOL/L — SIGNIFICANT CHANGE UP (ref 135–145)
WBC # BLD: 4.77 K/UL — SIGNIFICANT CHANGE UP (ref 3.8–10.5)
WBC # FLD AUTO: 4.77 K/UL — SIGNIFICANT CHANGE UP (ref 3.8–10.5)

## 2019-02-23 RX ORDER — SPIRONOLACTONE 25 MG/1
25 TABLET, FILM COATED ORAL DAILY
Qty: 0 | Refills: 0 | Status: DISCONTINUED | OUTPATIENT
Start: 2019-02-23 | End: 2019-02-26

## 2019-02-23 RX ORDER — POTASSIUM CHLORIDE 20 MEQ
20 PACKET (EA) ORAL ONCE
Qty: 0 | Refills: 0 | Status: COMPLETED | OUTPATIENT
Start: 2019-02-23 | End: 2019-02-23

## 2019-02-23 RX ORDER — ATORVASTATIN CALCIUM 80 MG/1
20 TABLET, FILM COATED ORAL AT BEDTIME
Qty: 0 | Refills: 0 | Status: DISCONTINUED | OUTPATIENT
Start: 2019-02-23 | End: 2019-02-26

## 2019-02-23 RX ORDER — METOPROLOL TARTRATE 50 MG
50 TABLET ORAL DAILY
Qty: 0 | Refills: 0 | Status: DISCONTINUED | OUTPATIENT
Start: 2019-02-23 | End: 2019-02-26

## 2019-02-23 RX ADMIN — AMLODIPINE BESYLATE 5 MILLIGRAM(S): 2.5 TABLET ORAL at 05:55

## 2019-02-23 RX ADMIN — SPIRONOLACTONE 25 MILLIGRAM(S): 25 TABLET, FILM COATED ORAL at 16:49

## 2019-02-23 RX ADMIN — Medication 50 MILLIGRAM(S): at 09:59

## 2019-02-23 RX ADMIN — SODIUM CHLORIDE 3 MILLILITER(S): 9 INJECTION INTRAMUSCULAR; INTRAVENOUS; SUBCUTANEOUS at 22:41

## 2019-02-23 RX ADMIN — SODIUM CHLORIDE 3 MILLILITER(S): 9 INJECTION INTRAMUSCULAR; INTRAVENOUS; SUBCUTANEOUS at 05:59

## 2019-02-23 RX ADMIN — LOSARTAN POTASSIUM 100 MILLIGRAM(S): 100 TABLET, FILM COATED ORAL at 05:57

## 2019-02-23 RX ADMIN — Medication 81 MILLIGRAM(S): at 11:56

## 2019-02-23 RX ADMIN — Medication 20 MILLIEQUIVALENT(S): at 10:00

## 2019-02-23 RX ADMIN — HEPARIN SODIUM 5000 UNIT(S): 5000 INJECTION INTRAVENOUS; SUBCUTANEOUS at 05:58

## 2019-02-23 RX ADMIN — ATORVASTATIN CALCIUM 20 MILLIGRAM(S): 80 TABLET, FILM COATED ORAL at 22:54

## 2019-02-23 RX ADMIN — SODIUM CHLORIDE 3 MILLILITER(S): 9 INJECTION INTRAMUSCULAR; INTRAVENOUS; SUBCUTANEOUS at 14:34

## 2019-02-23 RX ADMIN — Medication 12.5 MILLIGRAM(S): at 05:56

## 2019-02-23 RX ADMIN — ANASTROZOLE 1 MILLIGRAM(S): 1 TABLET ORAL at 11:57

## 2019-02-23 NOTE — CONSULT NOTE ADULT - SUBJECTIVE AND OBJECTIVE BOX
Patient is a 63y old  Female who presents with a chief complaint of NSTEMI (22 Feb 2019 14:40)      HPI:  Patient is a 63 Female everyday smoker with PMH of HTN, HFrEF 49% 2016, COPD (not on Home O2), DVT (15 years ago, AC x 2 years then stop),  Breast ca s/p left lumpectomy s/p Chemotherapy and Radiation Therapy , infra-renal abdominal aortic aneurysm, s/p hysterectomy, Sick cell trait and  DM presented to [facility] with mid sternal chest pain which she describes as pressure like, 8/10 in intensity, radiating to both shoulders and back. Patient took asa 81 but it did not relieve her symptom ,she then came in to ED for evaluation. Patient reports dyspnea on exertion but denies any orthopnea or pedal edema, fever, headache, dizziness, nausea, vomiting. She reports compliance with her medications. In [place] [facility] Patient's Trops were noted to be trending up, and pt was transferred to [facility] for coronary angiogram. (22 Feb 2019 14:40)      PAST MEDICAL & SURGICAL HISTORY:  Knee effusion, left: arthrocentesis- 05/25/16  Internal carotid aneurysm  Abdominal aneurysm: infrarenal- wached by Vascular , Dr. Gutierrez  Cardiomyopathy  Sickle cell trait  DVT (deep venous thrombosis): Left leg in 1994 after hysterectomy  Hypercholesteremia  Breast cancer: right-s/p chemo and radiation  Hypertension  Breast cancer: infusaport insertion- 2014- left chest  S/P hysterectomy: 1994  [place] filter in place: 1994  S/P lumpectomy, right breast: 2014, revision of position- 2015  Fibroid uterus: sp hystrectomy 1196      Review of Systems:   CONSTITUTIONAL: No fever,  or fatigue  CARDIOVASCULAR: Chest pain  GASTROINTESTINAL: No abdominal or epigastric pain. No nausea, vomiting, or hematemesis; No diarrhea or constipation.   NEUROLOGICAL: No headaches,           Allergies    Advil (Hives; Vomiting)    Intolerances        Social History:     FAMILY HISTORY:  Family history of other condition: please open box and see additional medical family history  Family history of renal failure: uncle  Family history of glioblastoma (Sibling)  History of hypertension: mother  Family history of liver failure  Family history of glioblastoma  Family history of renal stone  Family history of diabetes mellitus  Family history of hypertension      MEDICATIONS  (STANDING):  anastrozole 1 milliGRAM(s) Oral daily  aspirin enteric coated 81 milliGRAM(s) Oral daily  atorvastatin 20 milliGRAM(s) Oral at bedtime  heparin  Injectable 5000 Unit(s) SubCutaneous every 12 hours  hydrochlorothiazide 12.5 milliGRAM(s) Oral daily  losartan 100 milliGRAM(s) Oral daily  metoprolol succinate ER 50 milliGRAM(s) Oral daily  pantoprazole    Tablet 40 milliGRAM(s) Oral before breakfast  sodium chloride 0.9% lock flush 3 milliLiter(s) IV Push every 8 hours  spironolactone 25 milliGRAM(s) Oral daily    MEDICATIONS  (PRN):      CAPILLARY BLOOD GLUCOSE        I&O's Summary      PHYSICAL EXAM:    GENERAL: NAD  NECK: Supple, No JVD  CHEST/LUNG: Clear to auscultation bilaterally; No wheezing.  HEART: Regular rate and rhythm; No murmurs, rubs, or gallops  ABDOMEN: Soft, Nontender, Nondistended; Bowel sounds present  EXTREMITIES:  2+ Peripheral Pulses, No edema  NEUROLOGY: AAOx 3      LABS:                        12.5   4.77  )-----------( 315      ( 23 Feb 2019 06:12 )             38.2     02-23    142  |  103  |  15  ----------------------------<  146<H>  3.4<L>   |  27  |  1.12    Ca    9.5      23 Feb 2019 06:12  Phos  3.3     02-23  Mg     1.9     02-23    TPro  8.0  /  Alb  3.5  /  TBili  0.4  /  DBili  x   /  AST  111<H>  /  ALT  21  /  AlkPhos  103  02-22      CARDIAC MARKERS ( 22 Feb 2019 09:43 )  60.400 ng/mL / x     / 618 U/L / x     / 35.5 ng/mL        CAPILLARY BLOOD GLUCOSE                    RADIOLOGY & ADDITIONAL TESTS:    Imaging Personally Reviewed:    Consultant(s) Notes Reviewed:      Care Discussed with Consultants/Other Providers:    Thanks for consult. Will follow.

## 2019-02-23 NOTE — CONSULT NOTE ADULT - ASSESSMENT
62 yo F with DM, HTN, HLD, NICM (49% in 2018 echo) after chemotherapy with negative cardiac catheterization, COPD not on home O2, breast CA s/p lumpectomy, chemotherapy/XRT, who presented with chest pain in the setting of NSTEMI    1. NSTEMI:Acute Systolic Heart Failure   Cath-Severe LV Dysfunction EF-30%,-RPL1.  Medical management-Continue Carvedilol and Losartan  Added Spironolactone.      2. NSVT: Likely in setting of ischemia; patient was asymptomatic  -Continue Telemetry.  EP Consult     3. HTN: On losartan, carvedilol    4. HLD: Atorvastatin    5. AAA: Infrarenal, 4.5cm, also ulcerative plaque  -Maintain BP in target range, should see Dr. Gutierrez vascular surgery as outpatient

## 2019-02-23 NOTE — PROGRESS NOTE ADULT - SUBJECTIVE AND OBJECTIVE BOX
PRESENTING CC:Chest Pain  HPI:64 yo F with DM, HTN, HLD, NICM (49% in 2018 echo) after chemotherapy with negative cardiac catheterization, COPD not on home O2, breast CA s/p lumpectomy, chemotherapy/XRT, who presented with chest pain.Noted elevated troponins had cath-Severe LVdysfunction-EF 30%,Occluded distal RPLS , not amenable to PCI-remains chest pain free no dyspnea.        PMH -reviewed admission note, no change since admission  Heart failure: acute [x ] chronic [ ] acute or chronic [ ] diastolic [ ] systolic [x ] combined systolic and diastolic[ ]  CHEN: ATN[ ] renal medullary necrosis [ ] CKD I [ ]CKDII [ ]CKD III [ ]CKD IV [ ]CKD V [ ]Other pathological lesions [ ]    MEDICATIONS  (STANDING):  amLODIPine   Tablet 5 milliGRAM(s) Oral daily  anastrozole 1 milliGRAM(s) Oral daily  aspirin enteric coated 81 milliGRAM(s) Oral daily  carvedilol 12.5 milliGRAM(s) Oral every 12 hours  heparin  Injectable 5000 Unit(s) SubCutaneous every 12 hours  hydrochlorothiazide 12.5 milliGRAM(s) Oral daily  losartan 100 milliGRAM(s) Oral daily  pantoprazole    Tablet 40 milliGRAM(s) Oral before breakfast  sodium chloride 0.9% lock flush 3 milliLiter(s) IV Push every 8 hours      REVIEW OF SYSTEMS:  Constitutional: [ ] fever, [ ]weight loss,  [x ]fatigue  Eyes: [ ] visual changes  Respiratory: [ ]shortness of breath;  [ ] cough, [ ]wheezing, [ ]chills, [ ]hemoptysis  Cardiovascular: [ ] chest pain, [ ]palpitations, [ ]dizziness,  [ ]leg swelling[ ]orthopnea[ ]PND  Gastrointestinal: [ ] abdominal pain, [ ]nausea, [ ]vomiting,  [ ]diarrhea   Genitourinary: [ ] dysuria, [ ] hematuria  Neurologic: [ ] headaches [ ] tremors[ ]weakness  Skin: [ ] itching, [ ]burning, [ ] rashes  Endocrine: [ ] heat or cold intolerance  Musculoskeletal: [ ] joint pain or swelling; [ ] muscle, back, or extremity pain  Psychiatric: [ ] depression, [ ]anxiety, [ ]mood swings, or [ ]difficulty sleeping  Hematologic: [ ] easy bruising, [ ] bleeding gums    [x] All remaining systems negative except as per above.   [ ]Unable to obtain.    Vital Signs Last 24 Hrs  T(C): 36.9 (23 Feb 2019 05:50), Max: 36.9 (23 Feb 2019 05:50)  T(F): 98.4 (23 Feb 2019 05:50), Max: 98.4 (23 Feb 2019 05:50)  HR: 64 (23 Feb 2019 05:50) (64 - 72)  BP: 149/90 (23 Feb 2019 05:50) (111/74 - 165/94)  RR: 18 (23 Feb 2019 05:50) (17 - 18)  SpO2: 100% (23 Feb 2019 05:50) (100% - 100%)  I&O's Summary      PHYSICAL EXAM:  General: No acute distress BMI-27.1  HEENT: EOMI, PERRL  Neck: Supple, [ ] JVD  Lungs: Equal air entry bilaterally; [ ] rales [ ] wheezing [ ] rhonchi  Heart: Regular rate and rhythm; [x ] murmur  2 /6 [x ] systolic [ ] diastolic [ ] radiation[ ] rubs [ ]  gallops  Abdomen: Nontender, bowel sounds present  Extremities: No clubbing, cyanosis, [ ] edema  Nervous system:  Alert & Oriented X3, no focal deficits  Psychiatric: Normal affect  Skin: No rashes or lesions    LABS:  02-23    142  |  103  |  15  ----------------------------<  146<H>  3.4<L>   |  27  |  1.12    Ca    9.5      23 Feb 2019 06:12  Phos  3.3     02-23  Mg     1.9     02-23    TPro  8.0  /  Alb  3.5  /  TBili  0.4  /  DBili  x   /  AST  111<H>  /  ALT  21  /  AlkPhos  103  02-22    Creatinine Trend: 1.12<--, 1.28<--, 1.32<--                        12.5   4.77  )-----------( 315      ( 23 Feb 2019 06:12 )             38.2     PT/INR - ( 21 Feb 2019 13:18 )   PT: 13.3 sec;   INR: 1.19 ratio    PTT - ( 21 Feb 2019 13:18 )  PTT:28.4 sec        Cardiac Enzymes: CARDIAC MARKERS ( 22 Feb 2019 09:43 )  60.400 ng/mL / x     / 618 U/L / x     / 35.5 ng/mL  CARDIAC MARKERS ( 22 Feb 2019 01:20 )  29.700 ng/mL / x     / 650 U/L / x     / 49.4 ng/mL  CARDIAC MARKERS ( 21 Feb 2019 19:20 )  3.910 ng/mL / x     / 449 U/L / x     / 32.5 ng/mL  CARDIAC MARKERS ( 21 Feb 2019 17:20 )  x     / x     / x     / x     / <1.0 ng/mL  CARDIAC MARKERS ( 21 Feb 2019 13:18 )  0.138 ng/mL / x     / 177 U/L / x     / x          Serum Pro-Brain Natriuretic Peptide: 947 pg/mL (02-21-19 @ 13:18)          ECG [my interpretation]:Normal sinus rhythm at  93 BPM  Left anterior fascicular block  Borderline QT interval    TELEMETRY:Sinus rhythm no arrhythmias    ECHO: Study Date: 2/22/2019  Severe global left ventricular systolic dysfunction (EF 26% by biplane).  Mild mitral regurgitation.  Moderately dilated left atrium.    CATHETERIZATION:  VENTRICLES: EF estimated was 30 %.  CORONARY VESSELS: The coronary circulation is right dominant.  LM:   --  LM: Angiography showed mild atherosclerosis with no flow limiting lesions.  LAD:   --  LAD: Angiography showed mild atherosclerosis with no flow limiting lesions.  CX:   --  Circumflex: Angiography showed mild atherosclerosis with no flow limiting lesions.  RCA:   --  Mid RCA: There was a tubular 20 % stenosis at a site with no prior intervention. The lesion was eccentric. There was ALTAGRACIA grade 3 flow through the vessel (brisk flow).  --  RPDA: Angiography showed mild atherosclerosis with no flow limiting lesions.  --  RPLS: Angiography showed mild atherosclerosis with no flow limiting lesions.  --  RPL1: There was a 100 % stenosis. There was ALTAGRACIA grade 0 flow through the vessel (no flow) and a small vascular territory distal to the lesion.    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 It does not appear amenable to intervention.        IMPRESSION AND PLAN:  64 yo F with DM, HTN, HLD, NICM (49% in 2018 echo) after chemotherapy with negative cardiac catheterization, COPD not on home O2, breast CA s/p lumpectomy, chemotherapy/XRT, who presented with chest pain in the setting of NSTEMI    1. NSTEMI:Acute Systolic Heart Failure   Cath-Severe LV Dysfunction EF-30%,-RPL1.  Medical management-Continue Carvedilol and Losartan  Add Spironolactone.      2. NSVT: Likely in setting of ischemia; patient was asymptomatic  -Continue Telemetry.  EP Consult Dr Mejia    3. HTN: On losartan, carvedilol    4. HLD: Atorvastatin    5. AAA: Infrarenal, 4.5cm, also ulcerative plaque  -Maintain BP in target range, should see Dr. Gutierrez vascular surgery as outpatient

## 2019-02-24 LAB
ALBUMIN SERPL ELPH-MCNC: 4.1 G/DL — SIGNIFICANT CHANGE UP (ref 3.3–5)
ALP SERPL-CCNC: 96 U/L — SIGNIFICANT CHANGE UP (ref 40–120)
ALT FLD-CCNC: 16 U/L — SIGNIFICANT CHANGE UP (ref 4–33)
ANION GAP SERPL CALC-SCNC: 15 MMO/L — HIGH (ref 7–14)
ANION GAP SERPL CALC-SCNC: 17 MMO/L — HIGH (ref 7–14)
AST SERPL-CCNC: 38 U/L — HIGH (ref 4–32)
B PERT DNA SPEC QL NAA+PROBE: NOT DETECTED — SIGNIFICANT CHANGE UP
BASOPHILS # BLD AUTO: 0.02 K/UL — SIGNIFICANT CHANGE UP (ref 0–0.2)
BASOPHILS NFR BLD AUTO: 0.4 % — SIGNIFICANT CHANGE UP (ref 0–2)
BILIRUB SERPL-MCNC: 0.3 MG/DL — SIGNIFICANT CHANGE UP (ref 0.2–1.2)
BUN SERPL-MCNC: 15 MG/DL — SIGNIFICANT CHANGE UP (ref 7–23)
BUN SERPL-MCNC: 20 MG/DL — SIGNIFICANT CHANGE UP (ref 7–23)
C PNEUM DNA SPEC QL NAA+PROBE: NOT DETECTED — SIGNIFICANT CHANGE UP
CALCIUM SERPL-MCNC: 9.6 MG/DL — SIGNIFICANT CHANGE UP (ref 8.4–10.5)
CALCIUM SERPL-MCNC: 9.6 MG/DL — SIGNIFICANT CHANGE UP (ref 8.4–10.5)
CHLORIDE SERPL-SCNC: 100 MMOL/L — SIGNIFICANT CHANGE UP (ref 98–107)
CHLORIDE SERPL-SCNC: 98 MMOL/L — SIGNIFICANT CHANGE UP (ref 98–107)
CO2 SERPL-SCNC: 20 MMOL/L — LOW (ref 22–31)
CO2 SERPL-SCNC: 24 MMOL/L — SIGNIFICANT CHANGE UP (ref 22–31)
CREAT SERPL-MCNC: 1.05 MG/DL — SIGNIFICANT CHANGE UP (ref 0.5–1.3)
CREAT SERPL-MCNC: 1.62 MG/DL — HIGH (ref 0.5–1.3)
EOSINOPHIL # BLD AUTO: 0.03 K/UL — SIGNIFICANT CHANGE UP (ref 0–0.5)
EOSINOPHIL NFR BLD AUTO: 0.5 % — SIGNIFICANT CHANGE UP (ref 0–6)
FLUAV H1 2009 PAND RNA SPEC QL NAA+PROBE: DETECTED — HIGH
FLUAV H1 RNA SPEC QL NAA+PROBE: NOT DETECTED — SIGNIFICANT CHANGE UP
FLUAV H3 RNA SPEC QL NAA+PROBE: NOT DETECTED — SIGNIFICANT CHANGE UP
FLUBV RNA SPEC QL NAA+PROBE: NOT DETECTED — SIGNIFICANT CHANGE UP
GLUCOSE SERPL-MCNC: 134 MG/DL — HIGH (ref 70–99)
GLUCOSE SERPL-MCNC: 139 MG/DL — HIGH (ref 70–99)
HADV DNA SPEC QL NAA+PROBE: NOT DETECTED — SIGNIFICANT CHANGE UP
HCOV PNL SPEC NAA+PROBE: SIGNIFICANT CHANGE UP
HCT VFR BLD CALC: 36.8 % — SIGNIFICANT CHANGE UP (ref 34.5–45)
HCT VFR BLD CALC: 39.2 % — SIGNIFICANT CHANGE UP (ref 34.5–45)
HGB BLD-MCNC: 11.9 G/DL — SIGNIFICANT CHANGE UP (ref 11.5–15.5)
HGB BLD-MCNC: 12.4 G/DL — SIGNIFICANT CHANGE UP (ref 11.5–15.5)
HMPV RNA SPEC QL NAA+PROBE: NOT DETECTED — SIGNIFICANT CHANGE UP
HPIV1 RNA SPEC QL NAA+PROBE: NOT DETECTED — SIGNIFICANT CHANGE UP
HPIV2 RNA SPEC QL NAA+PROBE: NOT DETECTED — SIGNIFICANT CHANGE UP
HPIV3 RNA SPEC QL NAA+PROBE: NOT DETECTED — SIGNIFICANT CHANGE UP
HPIV4 RNA SPEC QL NAA+PROBE: NOT DETECTED — SIGNIFICANT CHANGE UP
IMM GRANULOCYTES NFR BLD AUTO: 0.4 % — SIGNIFICANT CHANGE UP (ref 0–1.5)
LYMPHOCYTES # BLD AUTO: 0.8 K/UL — LOW (ref 1–3.3)
LYMPHOCYTES # BLD AUTO: 14.3 % — SIGNIFICANT CHANGE UP (ref 13–44)
MAGNESIUM SERPL-MCNC: 1.8 MG/DL — SIGNIFICANT CHANGE UP (ref 1.6–2.6)
MAGNESIUM SERPL-MCNC: 1.9 MG/DL — SIGNIFICANT CHANGE UP (ref 1.6–2.6)
MCHC RBC-ENTMCNC: 29.9 PG — SIGNIFICANT CHANGE UP (ref 27–34)
MCHC RBC-ENTMCNC: 30.4 PG — SIGNIFICANT CHANGE UP (ref 27–34)
MCHC RBC-ENTMCNC: 31.6 % — LOW (ref 32–36)
MCHC RBC-ENTMCNC: 32.3 % — SIGNIFICANT CHANGE UP (ref 32–36)
MCV RBC AUTO: 93.9 FL — SIGNIFICANT CHANGE UP (ref 80–100)
MCV RBC AUTO: 94.5 FL — SIGNIFICANT CHANGE UP (ref 80–100)
MONOCYTES # BLD AUTO: 0.47 K/UL — SIGNIFICANT CHANGE UP (ref 0–0.9)
MONOCYTES NFR BLD AUTO: 8.4 % — SIGNIFICANT CHANGE UP (ref 2–14)
NEUTROPHILS # BLD AUTO: 4.25 K/UL — SIGNIFICANT CHANGE UP (ref 1.8–7.4)
NEUTROPHILS NFR BLD AUTO: 76 % — SIGNIFICANT CHANGE UP (ref 43–77)
NRBC # FLD: 0 K/UL — LOW (ref 25–125)
NRBC # FLD: 0 K/UL — LOW (ref 25–125)
PHOSPHATE SERPL-MCNC: 2.9 MG/DL — SIGNIFICANT CHANGE UP (ref 2.5–4.5)
PHOSPHATE SERPL-MCNC: 3.3 MG/DL — SIGNIFICANT CHANGE UP (ref 2.5–4.5)
PLATELET # BLD AUTO: 298 K/UL — SIGNIFICANT CHANGE UP (ref 150–400)
PLATELET # BLD AUTO: 299 K/UL — SIGNIFICANT CHANGE UP (ref 150–400)
PMV BLD: 9.6 FL — SIGNIFICANT CHANGE UP (ref 7–13)
PMV BLD: 9.9 FL — SIGNIFICANT CHANGE UP (ref 7–13)
POTASSIUM SERPL-MCNC: 3.4 MMOL/L — LOW (ref 3.5–5.3)
POTASSIUM SERPL-MCNC: 3.5 MMOL/L — SIGNIFICANT CHANGE UP (ref 3.5–5.3)
POTASSIUM SERPL-SCNC: 3.4 MMOL/L — LOW (ref 3.5–5.3)
POTASSIUM SERPL-SCNC: 3.5 MMOL/L — SIGNIFICANT CHANGE UP (ref 3.5–5.3)
PROT SERPL-MCNC: 8 G/DL — SIGNIFICANT CHANGE UP (ref 6–8.3)
RBC # BLD: 3.92 M/UL — SIGNIFICANT CHANGE UP (ref 3.8–5.2)
RBC # BLD: 4.15 M/UL — SIGNIFICANT CHANGE UP (ref 3.8–5.2)
RBC # FLD: 13.1 % — SIGNIFICANT CHANGE UP (ref 10.3–14.5)
RBC # FLD: 13.2 % — SIGNIFICANT CHANGE UP (ref 10.3–14.5)
RSV RNA SPEC QL NAA+PROBE: NOT DETECTED — SIGNIFICANT CHANGE UP
RV+EV RNA SPEC QL NAA+PROBE: NOT DETECTED — SIGNIFICANT CHANGE UP
SODIUM SERPL-SCNC: 137 MMOL/L — SIGNIFICANT CHANGE UP (ref 135–145)
SODIUM SERPL-SCNC: 137 MMOL/L — SIGNIFICANT CHANGE UP (ref 135–145)
WBC # BLD: 5.22 K/UL — SIGNIFICANT CHANGE UP (ref 3.8–10.5)
WBC # BLD: 5.59 K/UL — SIGNIFICANT CHANGE UP (ref 3.8–10.5)
WBC # FLD AUTO: 5.22 K/UL — SIGNIFICANT CHANGE UP (ref 3.8–10.5)
WBC # FLD AUTO: 5.59 K/UL — SIGNIFICANT CHANGE UP (ref 3.8–10.5)

## 2019-02-24 PROCEDURE — 71045 X-RAY EXAM CHEST 1 VIEW: CPT | Mod: 26

## 2019-02-24 RX ORDER — ACETAMINOPHEN 500 MG
650 TABLET ORAL EVERY 6 HOURS
Qty: 0 | Refills: 0 | Status: DISCONTINUED | OUTPATIENT
Start: 2019-02-24 | End: 2019-02-26

## 2019-02-24 RX ADMIN — ATORVASTATIN CALCIUM 20 MILLIGRAM(S): 80 TABLET, FILM COATED ORAL at 21:26

## 2019-02-24 RX ADMIN — SODIUM CHLORIDE 3 MILLILITER(S): 9 INJECTION INTRAMUSCULAR; INTRAVENOUS; SUBCUTANEOUS at 05:48

## 2019-02-24 RX ADMIN — HEPARIN SODIUM 5000 UNIT(S): 5000 INJECTION INTRAVENOUS; SUBCUTANEOUS at 05:57

## 2019-02-24 RX ADMIN — SPIRONOLACTONE 25 MILLIGRAM(S): 25 TABLET, FILM COATED ORAL at 17:03

## 2019-02-24 RX ADMIN — SODIUM CHLORIDE 3 MILLILITER(S): 9 INJECTION INTRAMUSCULAR; INTRAVENOUS; SUBCUTANEOUS at 15:49

## 2019-02-24 RX ADMIN — LOSARTAN POTASSIUM 100 MILLIGRAM(S): 100 TABLET, FILM COATED ORAL at 05:57

## 2019-02-24 RX ADMIN — Medication 81 MILLIGRAM(S): at 11:20

## 2019-02-24 RX ADMIN — Medication 50 MILLIGRAM(S): at 09:17

## 2019-02-24 RX ADMIN — HEPARIN SODIUM 5000 UNIT(S): 5000 INJECTION INTRAVENOUS; SUBCUTANEOUS at 17:03

## 2019-02-24 RX ADMIN — ANASTROZOLE 1 MILLIGRAM(S): 1 TABLET ORAL at 11:20

## 2019-02-24 RX ADMIN — Medication 12.5 MILLIGRAM(S): at 05:57

## 2019-02-24 RX ADMIN — PANTOPRAZOLE SODIUM 40 MILLIGRAM(S): 20 TABLET, DELAYED RELEASE ORAL at 05:58

## 2019-02-24 RX ADMIN — SODIUM CHLORIDE 3 MILLILITER(S): 9 INJECTION INTRAMUSCULAR; INTRAVENOUS; SUBCUTANEOUS at 21:25

## 2019-02-24 NOTE — PROGRESS NOTE ADULT - SUBJECTIVE AND OBJECTIVE BOX
PRESENTING CC:Chest pain    SUBJ:62 yo F with T2DM, HTN, HLD, NICM (49% in 2018 echo) after chemotherapy with negative cardiac catheterization, COPD not on home O2, breast CA s/p lumpectomy, chemotherapy/XRT, who presented with chest pain.Noted elevated troponins had cath-Severe LVdysfunction-EF 30%,Occluded distal RPLS , not amenable to PCI-remains chest pain free no dyspnea.No arrhythmias noted       PMH -reviewed admission note, no change since admission  Heart failure: acute [x ] chronic [ ] acute or chronic [ ] diastolic [x ] systolic [ ] combined systolic and diastolic[ ]  CHEN: ATN[ ] renal medullary necrosis [ ] CKD I [ ]CKDII [ ]CKD III [ ]CKD IV [ ]CKD V [ ]Other pathological lesions [ ]    MEDICATIONS  (STANDING):  anastrozole 1 milliGRAM(s) Oral daily  aspirin enteric coated 81 milliGRAM(s) Oral daily  atorvastatin 20 milliGRAM(s) Oral at bedtime  heparin  Injectable 5000 Unit(s) SubCutaneous every 12 hours  hydrochlorothiazide 12.5 milliGRAM(s) Oral daily  losartan 100 milliGRAM(s) Oral daily  metoprolol succinate ER 50 milliGRAM(s) Oral daily  pantoprazole    Tablet 40 milliGRAM(s) Oral before breakfast  sodium chloride 0.9% lock flush 3 milliLiter(s) IV Push every 8 hours  spironolactone 25 milliGRAM(s) Oral daily      FAMILY HISTORY:  No family history of premature coronary artery disease or sudden cardiac death      REVIEW OF SYSTEMS:  Constitutional: [ ] fever, [ ]weight loss,  [x]fatigue  Eyes: [ ] visual changes  Respiratory: [ ]shortness of breath;  [ ] cough, [ ]wheezing, [ ]chills, [ ]hemoptysis  Cardiovascular: [x ] chest pain, [ ]palpitations, [ ]dizziness,  [ ]leg swelling[ ]orthopnea[ ]PND  Gastrointestinal: [ ] abdominal pain, [ ]nausea, [ ]vomiting,  [ ]diarrhea   Genitourinary: [ ] dysuria, [ ] hematuria  Neurologic: [ ] headaches [ ] tremors[ ]weakness  Skin: [ ] itching, [ ]burning, [ ] rashes  Endocrine: [ ] heat or cold intolerance  Musculoskeletal: [ ] joint pain or swelling; [ ] muscle, back, or extremity pain  Psychiatric: [ ] depression, [ ]anxiety, [ ]mood swings, or [ ]difficulty sleeping  Hematologic: [ ] easy bruising, [ ] bleeding gums    [x] All remaining systems negative except as per above.   [ ]Unable to obtain.    Vital Signs Last 24 Hrs  T(C): 36.7 (24 Feb 2019 09:16), Max: 36.7 (23 Feb 2019 16:45)  T(F): 98 (24 Feb 2019 09:16), Max: 98 (23 Feb 2019 16:45)  HR: 82 (24 Feb 2019 09:16) (72 - 82)  BP: 130/72 (24 Feb 2019 09:16) (130/72 - 167/94)  RR: 18 (24 Feb 2019 09:16) (18 - 18)  SpO2: 99% (24 Feb 2019 09:16) (99% - 100%)  I&O's Summary      PHYSICAL EXAM:  General: No acute distress BMI-27.1  HEENT: EOMI, PERRL  Neck: Supple, [ ] JVD  Lungs: Equal air entry bilaterally; [ ] rales [ ] wheezing [ ] rhonchi  Heart: Regular rate and rhythm; [x ] murmur  2 /6 [x ] systolic [ ] diastolic [ ] radiation[ ] rubs [ ]  gallops  Abdomen: Nontender, bowel sounds present  Extremities: No clubbing, cyanosis, [ ] edema  Nervous system:  Alert & Oriented X3, no focal deficits  Psychiatric: Normal affect  Skin: No rashes or lesions    LABS:  02-24    137  |  100  |  15  ----------------------------<  134<H>  3.5   |  20<L>  |  1.05    Ca    9.6      24 Feb 2019 06:30  Phos  3.3     02-24  Mg     1.9     02-24      Creatinine Trend: 1.05<--, 1.12<--, 1.28<--, 1.32<--                        12.4   5.22  )-----------( 298      ( 24 Feb 2019 06:30 )             39.2       Lipid Panel:   Cardiac Enzymes: CARDIAC MARKERS ( 22 Feb 2019 09:43 )  60.400 ng/mL / x     / 618 U/L / x     / 35.5 ng/mL              ECHO: Study Date: 2/22/2019  Severe global left ventricular systolic dysfunction (EF 26% by biplane).  Mild mitral regurgitation.  Moderately dilated left atrium.      CATHETERIZATION:VENTRICLES: EF estimated was 30 %.  CORONARY VESSELS: The coronary circulation is right dominant.  LM:   --  LM: Angiography showed mild atherosclerosis with no flow limiting lesions.  LAD:   --  LAD: Angiography showed mild atherosclerosis with no flow limiting lesions.  CX:   --  Circumflex: Angiography showed mild atherosclerosis with no flow limiting lesions.  RCA:   --  Mid RCA: There was a tubular 20 % stenosis at a site with no prior intervention. The lesion was eccentric. There was ALTAGRACIA grade 3 flow through the vessel (brisk flow).  --  RPDA: Angiography showed mild atherosclerosis with no flow limiting lesions.  --  RPLS: Angiography showed mild atherosclerosis with no flow limiting lesions.  --  RPL1: There was a 100 % stenosis. There was ALTAGRACIA grade 0 flow through the vessel (no flow) and a small vascular territory distal to the lesion.    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 It does not appear amenable to intervention.        IMPRESSION AND PLAN:    62 yo F with T2DM, HTN, HLD, NICM (49% in 2018 echo) after chemotherapy with negative cardiac catheterization, COPD not on home O2, breast CA s/p lumpectomy, chemotherapy/XRT, who presented with chest pain in the setting of NSTEMI    1. NSTEMI:Acute Systolic Heart Failure   Cath-Severe LV Dysfunction EF-30%,-RPL1.  Medical management-Continue Metoprolol and Losartan  Add Spironolactone.      2. NSVT: Likely in setting of ischemia; patient was asymptomatic  -Continue Telemetry.  EP Consult Dr Mejia    3. HTN: On losartan, metoprolol      4. HLD: Atorvastatin    5. AAA: Infrarenal, 4.5cm, also ulcerative plaque  -Maintain BP in target range, should see Dr. Gutierrez vascular surgery as outpatient

## 2019-02-24 NOTE — CONSULT NOTE ADULT - SUBJECTIVE AND OBJECTIVE BOX
HPI:  Patient is a 63 Female everyday smoker with PMH of HTN, HFrEF 49% in 2016, COPD (not on Home O2), DVT (15 years ago, AC x 2 years then stop),  Breast ca s/p left lumpectomy s/p Chemotherapy and Radiation Therapy , infra-renal abdominal aortic aneurysm, s/p hysterectomy, Sick cell trait and DM who presented to [facility] with chest pain/NSTEMI and was transferred to [facility] for LHC.  LHC revealed distal disease not amenable to PCI with EF now 30%.  She denies Palpitations, syncope or SOB.        PAST MEDICAL & SURGICAL HISTORY:  Knee effusion, left: arthrocentesis- 05/25/16  Internal carotid aneurysm  Abdominal aneurysm: infrarenal- wached by Vascular , Dr. Gutierrez  Cardiomyopathy  Sickle cell trait  DVT (deep venous thrombosis): Left leg in 1994 after hysterectomy  Hypercholesteremia  Breast cancer: right-s/p chemo and radiation  Hypertension  Breast cancer: infusaport insertion- 2014- left chest  S/P hysterectomy: 1994  [place] filter in place: 1994  S/P lumpectomy, right breast: 2014, revision of position- 2015  Fibroid uterus: sp hystrectomy 1196      MEDICATIONS  (STANDING):  anastrozole 1 milliGRAM(s) Oral daily  aspirin enteric coated 81 milliGRAM(s) Oral daily  atorvastatin 20 milliGRAM(s) Oral at bedtime  heparin  Injectable 5000 Unit(s) SubCutaneous every 12 hours  hydrochlorothiazide 12.5 milliGRAM(s) Oral daily  losartan 100 milliGRAM(s) Oral daily  metoprolol succinate ER 50 milliGRAM(s) Oral daily  pantoprazole    Tablet 40 milliGRAM(s) Oral before breakfast  sodium chloride 0.9% lock flush 3 milliLiter(s) IV Push every 8 hours  spironolactone 25 milliGRAM(s) Oral daily      Allergies  Advil (Hives; Vomiting)    FAMILY HISTORY:  Family history of renal failure: uncle  Family history of glioblastoma (Sibling)  History of hypertension: mother  Family history of liver failure  Family history of renal stone  Family history of diabetes mellitus  Family history of hypertension  Noncontributory for premature coronary disease or sudden cardiac death    SOCIAL HISTORY:    [ ] Non-smoker  [x ] Smoker  [ ] Alcohol      REVIEW OF SYSTEMS:  [x ]chest pain  [  ]shortness of breath  [  ]palpitations  [  ]syncope  [ ]near syncope [ ]upper extremity weakness   [ ] lower extremity weakness  [  ]diplopia  [  ]altered mental status   [  ]fevers  [ ]chills [ ]nausea  [ ]vomitting  [  ]dysphagia    [ ]abdominal pain  [ ]melena  [ ]BRBPR    [  ]epistaxis  [  ]rash  [ ]lower extremity edema      [x ] All others negative	  [ ] Unable to obtain    PHYSICAL EXAM:  T(C): 36.7 (02-24-19 @ 09:16), Max: 36.7 (02-23-19 @ 16:45)  HR: 82 (02-24-19 @ 09:16) (72 - 82)  BP: 130/72 (02-24-19 @ 09:16) (130/72 - 146/80)  RR: 18 (02-24-19 @ 09:16) (18 - 18)  SpO2: 99% (02-24-19 @ 09:16) (99% - 99%)  	  HEENT:   Normal oral mucosa, PERRL, EOMI	  Lymphatic: No lymphadenopathy , no edema  Cardiovascular: Normal S1 S2, No JVD, No murmurs , Peripheral pulses palpable 2+ bilaterally  Respiratory: Lungs clear to auscultation, normal effort 	  Gastrointestinal:  Soft, Non-tender, + BS	  Skin: No rashes, No ecchymoses, No cyanosis, warm to touch    DIAGNOSTIC DATA:    TELEMETRY: 	 NSR     ECG:  	SB, QRS 96ms, SD 176ms, QTc 476ms    Echo: < from: Transthoracic Echocardiogram (02.22.19 @ 07:12) >  CONCLUSIONS:  1. Tethered mitral valve leaflets. Mild mitral  regurgitation.  2. Normal trileaflet aortic valve. No aortic stenosis. No  aortic valve regurgitation seen.  3. Normal aortic root.  4. Moderately dilated left atrium.  LA volume index = 47  cc/m2.  5. Moderate left ventricular enlargement.  6. Severe global left ventricular systolic dysfunction (EF  26% by biplane).  7. Grade I diastolic dysfunction (Impaired relaxation).  8. Normal right atrium.  9. Right ventricle not well visualized. Normal RV systolic  function (TAPSE 1.9 cm).  10. Normal tricuspid valve. Trace tricuspid regurgitation.  11. Normal pulmonic valve. Trace pulmonic insufficiency is  noted.  12. No pericardial effusion.    *** Compared with echocardiogram report of 5/29/2018, there  has been a significant decrease in LV systolic function.  ------------------------------------------------------------------------  Confirmed on  2/22/2019 - 14:06:58 by Nakul Doshi MD    < end of copied text >      Cath: < from: Cardiac Cath Lab - Adult (02.22.19 @ 17:02) >  VENTRICLES: EF estimated was 30 %.  CORONARY VESSELS: The coronary circulation is right dominant.  LM:   --  LM: Angiography showed mild atherosclerosis with no flow limiting  lesions.  LAD:   --  LAD: Angiography showed mild atherosclerosis with no flow  limiting lesions.  CX:   --  Circumflex: Angiography showed mild atherosclerosis with no flow  limiting lesions.  RCA:   --  Mid RCA: There was a tubular 20 % stenosis at a site with no  prior intervention. The lesion was eccentric. There was ALTAGRACIA grade 3 flow  through the vessel (brisk flow).  --  RPDA: Angiography showed mild atherosclerosis with no flow limiting  lesions.  --  RPLS: Angiography showed mild atherosclerosis with no flow limiting  lesions.  --  RPL1: Therewas a 100 % stenosis. There was ALTAGRACIA grade 0 flow through  the vessel (no flow) and a small vascular territory distal to the lesion.  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 It does not appear amenable to  intervention.  COMPLICATIONS: There were no complications.  DIAGNOSTIC IMPRESSIONS: Severe LVdysfunction  Occluded distal RPLS , not amenable to PCI  DIAGNOSTIC RECOMMENDATIONS: Medical therapy  INTERVENTIONAL IMPRESSIONS: Severe LVdysfunction  Occluded distal RPLS , not amenable to PCI  INTERVENTIONAL RECOMMENDATIONS: Medical therapy  Prepared and signed by  Eliana Hernandez M.D.  Signed 02/22/2019 18:52:28    < end of copied text >  	  LABS:	 	                        12.4   5.22  )-----------( 298      ( 24 Feb 2019 06:30 )             39.2     137  |  100  |  15  ----------------------------<  134<H>  3.5   |  20<L>  |  1.05    Ca    9.6      24 Feb 2019 06:30  Phos  3.3     02-24  Mg     1.9     02-24    ASSESSMENT/PLAN: 63 year old female with PMH NICM, EF 49% in 2016, admitted with NSTEMI/ s/p cath with non obs CAD and EF 30%, PMH of tobacco abuse, COPD, DVT, breast ca s/p lumpectomy/chemo/XRT, infra-renal AAA, sickle cell trait and DM.      --no events on tele  --cont optimal medical therapy for her NICM  --repeat TTE in 3 months and recommend ICD if LVEF remains <35%    d/w Dr Mejia

## 2019-02-24 NOTE — CHART NOTE - NSCHARTNOTEFT_GEN_A_CORE
Called by nurse for Temp 102.9     Pt is c/o nonproductive that started yesterday.  She denies chills, nausea, vomiting, shortness of breath, palpitations, chest pain, abdominal pain and sore throat.    Vital Signs Last 24 Hrs  T(C): 39.4 (24 Feb 2019 20:22), Max: 39.4 (24 Feb 2019 20:22)  T(F): 102.9 (24 Feb 2019 20:22), Max: 102.9 (24 Feb 2019 20:22)  HR: 85 (24 Feb 2019 20:22) (75 - 85)  BP: 126/57 (24 Feb 2019 20:22) (126/57 - 138/85)  RR: 19 (24 Feb 2019 20:22) (18 - 19)  SpO2: 94% (24 Feb 2019 20:22) (94% - 99%)    PHYSICAL EXAM:  Constitutional: Well developed, no signs of acute distress, A&O X 3, + cough   Skin: No rash or erythema, good turgor  HEENT: Normocephalic, atraumatic, PERRLA, EOMI,  Neck: Supple, No Lymphadenopathy, No JVD  Respiratory: Clear to Auscultation bilaterally,  No Wheezing /crackles /rhonchi  Cardiovascular: Positive  S1 and S2, Regular rate rhythm, No murmurs    Gastrointestinal:  Soft, Nontender, nondistended, positive bowel sound in all four quadrant  BacK: Spine normal without deformity or tenderness.  No CVA tenderness  Extremities: No amputations or deformities, no peripheral edema, peripheral pulses intact    Neurological: A&O x 3,  Gross motor and sensational intact. no focal deficits  Musculoskeletal: Normal gait, FROM, No tenderness, masses or effusion                          11.9   5.59  )-----------( 299      ( 24 Feb 2019 21:07 )             36.8   02-24    137  |  98  |  20  ----------------------------<  139<H>  3.4<L>   |  24  |  1.62<H>    Ca    9.6      24 Feb 2019 21:07  Phos  2.9     02-24  Mg     1.8     02-24    TPro  8.0  /  Alb  4.1  /  TBili  0.3  /  DBili  x   /  AST  38<H>  /  ALT  16  /  AlkPhos  96  02-24    RVP- Positive for Flu     This is a 63 Female everyday smoker with PMH of HTN, HFrEF 49% 2016, COPD (not on Home O2), DVT (15 years ago, AC x 2 years then stop),  Breast ca s/p left lumpectomy s/p Chemotherapy and Radiation Therapy , infra-renal abdominal aortic aneurysm, s/p hysterectomy, Sick cell trait and  DM presented to [facility] with mid sternal chest pain status post Left Heart Cath: RPLS 100% (small vessel), LVEDP 20, EF 30%, now positive for Flu.   - Influenza AH3  Isolation Ordered  Tamiflu Ordered   Tylenol Ordered   Will continue to monitor Called by nurse for Temp 102.9     Pt is c/o nonproductive cough and chills that started yesterday.  She denies nausea, vomiting, shortness of breath, palpitations, chest pain, abdominal pain and sore throat.    Vital Signs Last 24 Hrs  T(C): 39.4 (24 Feb 2019 20:22), Max: 39.4 (24 Feb 2019 20:22)  T(F): 102.9 (24 Feb 2019 20:22), Max: 102.9 (24 Feb 2019 20:22)  HR: 85 (24 Feb 2019 20:22) (75 - 85)  BP: 126/57 (24 Feb 2019 20:22) (126/57 - 138/85)  RR: 19 (24 Feb 2019 20:22) (18 - 19)  SpO2: 94% (24 Feb 2019 20:22) (94% - 99%)    PHYSICAL EXAM:  Constitutional: Well developed, no signs of acute distress, A&O X 3, + cough   Skin: No rash or erythema, good turgor  HEENT: Normocephalic, atraumatic, PERRLA, EOMI,  Neck: Supple, No Lymphadenopathy, No JVD  Respiratory: Clear to Auscultation bilaterally,  No Wheezing /crackles /rhonchi  Cardiovascular: Positive  S1 and S2, Regular rate rhythm, No murmurs    Gastrointestinal:  Soft, Nontender, nondistended, positive bowel sound in all four quadrant  BacK: Spine normal without deformity or tenderness.  No CVA tenderness  Extremities: No amputations or deformities, no peripheral edema, peripheral pulses intact    Neurological: A&O x 3,  Gross motor and sensational intact. no focal deficits  Musculoskeletal: Normal gait, FROM, No tenderness, masses or effusion                          11.9   5.59  )-----------( 299      ( 24 Feb 2019 21:07 )             36.8   02-24    137  |  98  |  20  ----------------------------<  139<H>  3.4<L>   |  24  |  1.62<H>    Ca    9.6      24 Feb 2019 21:07  Phos  2.9     02-24  Mg     1.8     02-24    TPro  8.0  /  Alb  4.1  /  TBili  0.3  /  DBili  x   /  AST  38<H>  /  ALT  16  /  AlkPhos  96  02-24    RVP- Positive for Flu     This is a 63 Female everyday smoker with PMH of HTN, HFrEF 49% 2016, COPD (not on Home O2), DVT (15 years ago, AC x 2 years then stop),  Breast ca s/p left lumpectomy s/p Chemotherapy and Radiation Therapy , infra-renal abdominal aortic aneurysm, s/p hysterectomy, Sick cell trait and  DM presented to [facility] with mid sternal chest pain status post Left Heart Cath: RPLS 100% (small vessel), LVEDP 20, EF 30%, now positive for Flu.   - Influenza AH3  Isolation Ordered  Tamiflu Ordered   Tylenol Ordered   Robitussin as needed Ordered   Will continue to monitor Called by nurse for Temp 102.9     Pt is c/o nonproductive cough and chills that started yesterday.  She denies nausea, vomiting, shortness of breath, palpitations, chest pain, abdominal pain and sore throat.    Vital Signs Last 24 Hrs  T(C): 39.4 (24 Feb 2019 20:22), Max: 39.4 (24 Feb 2019 20:22)  T(F): 102.9 (24 Feb 2019 20:22), Max: 102.9 (24 Feb 2019 20:22)  HR: 85 (24 Feb 2019 20:22) (75 - 85)  BP: 126/57 (24 Feb 2019 20:22) (126/57 - 138/85)  RR: 19 (24 Feb 2019 20:22) (18 - 19)  SpO2: 94% (24 Feb 2019 20:22) (94% - 99%)    PHYSICAL EXAM:  Constitutional: Well developed, no signs of acute distress, A&O X 3, + cough   Skin: No rash or erythema, good turgor  HEENT: Normocephalic, atraumatic, PERRLA, EOMI,  Neck: Supple, No Lymphadenopathy, No JVD  Respiratory: Clear to Auscultation bilaterally,  No Wheezing /crackles /rhonchi  Cardiovascular: Positive  S1 and S2, Regular rate rhythm, No murmurs    Gastrointestinal:  Soft, Nontender, nondistended, positive bowel sound in all four quadrant  BacK: Spine normal without deformity or tenderness.  No CVA tenderness  Extremities: No amputations or deformities, no peripheral edema, peripheral pulses intact    Neurological: A&O x 3,  Gross motor and sensational intact. no focal deficits  Musculoskeletal: Normal gait, FROM, No tenderness, masses or effusion                          11.9   5.59  )-----------( 299      ( 24 Feb 2019 21:07 )             36.8   02-24    137  |  98  |  20  ----------------------------<  139<H>  3.4<L>   |  24  |  1.62<H>    Ca    9.6      24 Feb 2019 21:07  Phos  2.9     02-24  Mg     1.8     02-24    TPro  8.0  /  Alb  4.1  /  TBili  0.3  /  DBili  x   /  AST  38<H>  /  ALT  16  /  AlkPhos  96  02-24    RVP- Positive for Flu   Blood Cx testing   This is a 63 Female everyday smoker with PMH of HTN, HFrEF 49% 2016, COPD (not on Home O2), DVT (15 years ago, AC x 2 years then stop),  Breast ca s/p left lumpectomy s/p Chemotherapy and Radiation Therapy , infra-renal abdominal aortic aneurysm, s/p hysterectomy, Sick cell trait and  DM presented to [facility] with mid sternal chest pain status post Left Heart Cath: RPLS 100% (small vessel), LVEDP 20, EF 30%, now positive for Flu.   - Influenza AH3  - Isolation, Tamiflu 30 mg PO BID X 5 days Ordered   Tylenol & Robitussin as needed Ordered   Will continue to monitor   Will continue to monitor Called by nurse for Temp 102.9     Pt is c/o nonproductive cough and chills that started yesterday.  She denies nausea, vomiting, shortness of breath, palpitations, chest pain, abdominal pain and sore throat.    Vital Signs Last 24 Hrs  T(C): 39.4 (24 Feb 2019 20:22), Max: 39.4 (24 Feb 2019 20:22)  T(F): 102.9 (24 Feb 2019 20:22), Max: 102.9 (24 Feb 2019 20:22)  HR: 85 (24 Feb 2019 20:22) (75 - 85)  BP: 126/57 (24 Feb 2019 20:22) (126/57 - 138/85)  RR: 19 (24 Feb 2019 20:22) (18 - 19)  SpO2: 94% (24 Feb 2019 20:22) (94% - 99%)    PHYSICAL EXAM:  Constitutional: Well developed, no signs of acute distress, A&O X 3, + cough   Skin: No rash or erythema, good turgor  HEENT: Normocephalic, atraumatic, PERRLA, EOMI,  Neck: Supple, No Lymphadenopathy, No JVD  Respiratory: Clear to Auscultation bilaterally,  No Wheezing /crackles /rhonchi  Cardiovascular: Positive  S1 and S2, Regular rate rhythm, No murmurs    Gastrointestinal:  Soft, Nontender, nondistended, positive bowel sound in all four quadrant  BacK: Spine normal without deformity or tenderness.  No CVA tenderness  Extremities: No amputations or deformities, no peripheral edema, peripheral pulses intact    Neurological: A&O x 3,  Gross motor and sensational intact. no focal deficits  Musculoskeletal: Normal gait, FROM, No tenderness, masses or effusion                          11.9   5.59  )-----------( 299      ( 24 Feb 2019 21:07 )             36.8   02-24    137  |  98  |  20  ----------------------------<  139<H>  3.4<L>   |  24  |  1.62<H>    Ca    9.6      24 Feb 2019 21:07  Phos  2.9     02-24  Mg     1.8     02-24    TPro  8.0  /  Alb  4.1  /  TBili  0.3  /  DBili  x   /  AST  38<H>  /  ALT  16  /  AlkPhos  96  02-24    RVP- Positive for Flu   Blood Cx testing   This is a 63 Female everyday smoker with PMH of HTN, HFrEF 49% 2016, COPD (not on Home O2), DVT (15 years ago, AC x 2 years then stop),  Breast ca s/p left lumpectomy s/p Chemotherapy and Radiation Therapy , infra-renal abdominal aortic aneurysm, s/p hysterectomy, Sick cell trait and  DM presented to [facility] with mid sternal chest pain status post Left Heart Cath: RPLS 100% (small vessel), LVEDP 20, EF 30%, now positive for Flu.     Positive Influenza AH3  - Isolation, Tamiflu 30 mg PO BID X 5 days Ordered   - Tylenol & Robitussin as needed Ordered   - FU CXR Result   Will continue to monitor

## 2019-02-24 NOTE — PROGRESS NOTE ADULT - SUBJECTIVE AND OBJECTIVE BOX
Patient is a 63y old  Female who presents with a chief complaint of NSTEMI-HFrEF (24 Feb 2019 09:29)      SUBJECTIVE / OVERNIGHT EVENTS:    Events noted.  No CP/SOB/Dizziness    MEDICATIONS  (STANDING):  anastrozole 1 milliGRAM(s) Oral daily  aspirin enteric coated 81 milliGRAM(s) Oral daily  atorvastatin 20 milliGRAM(s) Oral at bedtime  heparin  Injectable 5000 Unit(s) SubCutaneous every 12 hours  hydrochlorothiazide 12.5 milliGRAM(s) Oral daily  losartan 100 milliGRAM(s) Oral daily  metoprolol succinate ER 50 milliGRAM(s) Oral daily  pantoprazole    Tablet 40 milliGRAM(s) Oral before breakfast  sodium chloride 0.9% lock flush 3 milliLiter(s) IV Push every 8 hours  spironolactone 25 milliGRAM(s) Oral daily    MEDICATIONS  (PRN):        CAPILLARY BLOOD GLUCOSE        I&O's Summary      PHYSICAL EXAM:  GENERAL: NAD  NECK: Supple, No JVD  CHEST/LUNG: Clear to auscultation bilaterally; No wheezing.  HEART: Regular rate and rhythm; No murmurs, rubs, or gallops  ABDOMEN: Soft, Nontender, Nondistended; Bowel sounds present  EXTREMITIES:   No clubbing, cyanosis, or edema  NEUROLOGY: AAO       LABS:                        12.4   5.22  )-----------( 298      ( 24 Feb 2019 06:30 )             39.2     02-24    137  |  100  |  15  ----------------------------<  134<H>  3.5   |  20<L>  |  1.05    Ca    9.6      24 Feb 2019 06:30  Phos  3.3     02-24  Mg     1.9     02-24              CAPILLARY BLOOD GLUCOSE                    RADIOLOGY & ADDITIONAL TESTS:    Imaging Personally Reviewed:    Consultant(s) Notes Reviewed:      Care Discussed with Consultants/Other Providers:

## 2019-02-25 ENCOUNTER — OTHER (OUTPATIENT)
Age: 64
End: 2019-02-25

## 2019-02-25 DIAGNOSIS — N17.9 ACUTE KIDNEY FAILURE, UNSPECIFIED: ICD-10-CM

## 2019-02-25 DIAGNOSIS — I10 ESSENTIAL (PRIMARY) HYPERTENSION: ICD-10-CM

## 2019-02-25 DIAGNOSIS — E87.6 HYPOKALEMIA: ICD-10-CM

## 2019-02-25 LAB
ANION GAP SERPL CALC-SCNC: 18 MMO/L — HIGH (ref 7–14)
APPEARANCE UR: SIGNIFICANT CHANGE UP
BACTERIA # UR AUTO: NEGATIVE — SIGNIFICANT CHANGE UP
BILIRUB UR-MCNC: NEGATIVE — SIGNIFICANT CHANGE UP
BLOOD UR QL VISUAL: HIGH
BUN SERPL-MCNC: 21 MG/DL — SIGNIFICANT CHANGE UP (ref 7–23)
CALCIUM SERPL-MCNC: 9.3 MG/DL — SIGNIFICANT CHANGE UP (ref 8.4–10.5)
CHLORIDE SERPL-SCNC: 97 MMOL/L — LOW (ref 98–107)
CO2 SERPL-SCNC: 23 MMOL/L — SIGNIFICANT CHANGE UP (ref 22–31)
COLOR SPEC: YELLOW — SIGNIFICANT CHANGE UP
CREAT SERPL-MCNC: 1.31 MG/DL — HIGH (ref 0.5–1.3)
GLUCOSE SERPL-MCNC: 134 MG/DL — HIGH (ref 70–99)
GLUCOSE UR-MCNC: NEGATIVE — SIGNIFICANT CHANGE UP
HCT VFR BLD CALC: 38.2 % — SIGNIFICANT CHANGE UP (ref 34.5–45)
HGB BLD-MCNC: 12 G/DL — SIGNIFICANT CHANGE UP (ref 11.5–15.5)
HYALINE CASTS # UR AUTO: NEGATIVE — SIGNIFICANT CHANGE UP
KETONES UR-MCNC: NEGATIVE — SIGNIFICANT CHANGE UP
LEUKOCYTE ESTERASE UR-ACNC: SIGNIFICANT CHANGE UP
MAGNESIUM SERPL-MCNC: 2 MG/DL — SIGNIFICANT CHANGE UP (ref 1.6–2.6)
MCHC RBC-ENTMCNC: 29.3 PG — SIGNIFICANT CHANGE UP (ref 27–34)
MCHC RBC-ENTMCNC: 31.4 % — LOW (ref 32–36)
MCV RBC AUTO: 93.4 FL — SIGNIFICANT CHANGE UP (ref 80–100)
NITRITE UR-MCNC: NEGATIVE — SIGNIFICANT CHANGE UP
NRBC # FLD: 0 K/UL — LOW (ref 25–125)
PH UR: 6 — SIGNIFICANT CHANGE UP (ref 5–8)
PHOSPHATE SERPL-MCNC: 4 MG/DL — SIGNIFICANT CHANGE UP (ref 2.5–4.5)
PLATELET # BLD AUTO: 306 K/UL — SIGNIFICANT CHANGE UP (ref 150–400)
PMV BLD: 10 FL — SIGNIFICANT CHANGE UP (ref 7–13)
POTASSIUM SERPL-MCNC: 3.3 MMOL/L — LOW (ref 3.5–5.3)
POTASSIUM SERPL-SCNC: 3.3 MMOL/L — LOW (ref 3.5–5.3)
PROT UR-MCNC: 20 — SIGNIFICANT CHANGE UP
RBC # BLD: 4.09 M/UL — SIGNIFICANT CHANGE UP (ref 3.8–5.2)
RBC # FLD: 13.3 % — SIGNIFICANT CHANGE UP (ref 10.3–14.5)
RBC CASTS # UR COMP ASSIST: >50 — HIGH (ref 0–?)
SODIUM SERPL-SCNC: 138 MMOL/L — SIGNIFICANT CHANGE UP (ref 135–145)
SP GR SPEC: 1.02 — SIGNIFICANT CHANGE UP (ref 1–1.04)
SPECIMEN SOURCE: SIGNIFICANT CHANGE UP
SPECIMEN SOURCE: SIGNIFICANT CHANGE UP
SQUAMOUS # UR AUTO: SIGNIFICANT CHANGE UP
UROBILINOGEN FLD QL: NORMAL — SIGNIFICANT CHANGE UP
WBC # BLD: 4.03 K/UL — SIGNIFICANT CHANGE UP (ref 3.8–10.5)
WBC # FLD AUTO: 4.03 K/UL — SIGNIFICANT CHANGE UP (ref 3.8–10.5)
WBC UR QL: HIGH (ref 0–?)

## 2019-02-25 RX ORDER — POTASSIUM CHLORIDE 20 MEQ
40 PACKET (EA) ORAL ONCE
Qty: 0 | Refills: 0 | Status: COMPLETED | OUTPATIENT
Start: 2019-02-25 | End: 2019-02-25

## 2019-02-25 RX ADMIN — Medication 30 MILLIGRAM(S): at 10:43

## 2019-02-25 RX ADMIN — SODIUM CHLORIDE 3 MILLILITER(S): 9 INJECTION INTRAMUSCULAR; INTRAVENOUS; SUBCUTANEOUS at 06:04

## 2019-02-25 RX ADMIN — ATORVASTATIN CALCIUM 20 MILLIGRAM(S): 80 TABLET, FILM COATED ORAL at 22:04

## 2019-02-25 RX ADMIN — Medication 50 MILLIGRAM(S): at 10:43

## 2019-02-25 RX ADMIN — Medication 200 MILLIGRAM(S): at 19:53

## 2019-02-25 RX ADMIN — Medication 40 MILLIEQUIVALENT(S): at 10:43

## 2019-02-25 RX ADMIN — Medication 650 MILLIGRAM(S): at 06:23

## 2019-02-25 RX ADMIN — Medication 650 MILLIGRAM(S): at 07:23

## 2019-02-25 RX ADMIN — HEPARIN SODIUM 5000 UNIT(S): 5000 INJECTION INTRAVENOUS; SUBCUTANEOUS at 06:18

## 2019-02-25 RX ADMIN — Medication 650 MILLIGRAM(S): at 00:08

## 2019-02-25 RX ADMIN — Medication 81 MILLIGRAM(S): at 11:50

## 2019-02-25 RX ADMIN — SPIRONOLACTONE 25 MILLIGRAM(S): 25 TABLET, FILM COATED ORAL at 17:42

## 2019-02-25 RX ADMIN — SODIUM CHLORIDE 3 MILLILITER(S): 9 INJECTION INTRAMUSCULAR; INTRAVENOUS; SUBCUTANEOUS at 14:11

## 2019-02-25 RX ADMIN — Medication 600 MILLIGRAM(S): at 18:21

## 2019-02-25 RX ADMIN — Medication 30 MILLIGRAM(S): at 22:04

## 2019-02-25 RX ADMIN — Medication 650 MILLIGRAM(S): at 01:10

## 2019-02-25 RX ADMIN — SODIUM CHLORIDE 3 MILLILITER(S): 9 INJECTION INTRAMUSCULAR; INTRAVENOUS; SUBCUTANEOUS at 21:40

## 2019-02-25 RX ADMIN — ANASTROZOLE 1 MILLIGRAM(S): 1 TABLET ORAL at 11:50

## 2019-02-25 RX ADMIN — Medication 650 MILLIGRAM(S): at 14:23

## 2019-02-25 RX ADMIN — PANTOPRAZOLE SODIUM 40 MILLIGRAM(S): 20 TABLET, DELAYED RELEASE ORAL at 06:18

## 2019-02-25 RX ADMIN — LOSARTAN POTASSIUM 100 MILLIGRAM(S): 100 TABLET, FILM COATED ORAL at 06:18

## 2019-02-25 RX ADMIN — Medication 650 MILLIGRAM(S): at 15:23

## 2019-02-25 RX ADMIN — HEPARIN SODIUM 5000 UNIT(S): 5000 INJECTION INTRAVENOUS; SUBCUTANEOUS at 17:41

## 2019-02-25 RX ADMIN — Medication 100 MILLIGRAM(S): at 11:50

## 2019-02-25 RX ADMIN — Medication 30 MILLIGRAM(S): at 00:08

## 2019-02-25 RX ADMIN — Medication 12.5 MILLIGRAM(S): at 06:18

## 2019-02-25 NOTE — PROGRESS NOTE ADULT - SUBJECTIVE AND OBJECTIVE BOX
Patient is a 63y old  Female who presents with a chief complaint of Chest pain (2019 07:00)      SUBJECTIVE / OVERNIGHT EVENTS:    Events noted.  Feels better.  CONSTITUTIONAL: No fever,  or fatigue  NECK: No pain or stiffness  RESPIRATORY: No cough, wheezing, chills or hemoptysis; No shortness of breath  CARDIOVASCULAR: No chest pain, palpitations, dizziness, or leg swelling  GASTROINTESTINAL: No abdominal or epigastric pain. No nausea, vomiting, or hematemesis; No diarrhea or constipation.   NEUROLOGICAL: No headaches,     MEDICATIONS  (STANDING):  anastrozole 1 milliGRAM(s) Oral daily  aspirin enteric coated 81 milliGRAM(s) Oral daily  atorvastatin 20 milliGRAM(s) Oral at bedtime  heparin  Injectable 5000 Unit(s) SubCutaneous every 12 hours  hydrochlorothiazide 12.5 milliGRAM(s) Oral daily  losartan 100 milliGRAM(s) Oral daily  metoprolol succinate ER 50 milliGRAM(s) Oral daily  oseltamivir 30 milliGRAM(s) Oral two times a day  pantoprazole    Tablet 40 milliGRAM(s) Oral before breakfast  sodium chloride 0.9% lock flush 3 milliLiter(s) IV Push every 8 hours  spironolactone 25 milliGRAM(s) Oral daily    MEDICATIONS  (PRN):  acetaminophen   Tablet .. 650 milliGRAM(s) Oral every 6 hours PRN Temp greater or equal to 38C (100.4F), Mild Pain (1 - 3), Moderate Pain (4 - 6)  guaiFENesin   Syrup  (Sugar-Free) 200 milliGRAM(s) Oral every 6 hours PRN Cough        CAPILLARY BLOOD GLUCOSE        I&O's Summary      PHYSICAL EXAM:  GENERAL: NAD  NECK: Supple, No JVD  CHEST/LUNG: Clear to auscultation bilaterally; No wheezing.  HEART: Regular rate and rhythm; No murmurs, rubs, or gallops  ABDOMEN: Soft, Nontender, Nondistended; Bowel sounds present  EXTREMITIES:   No clubbing, cyanosis, or edema  NEUROLOGY: AAO X 3      LABS:                        12.0   4.03  )-----------( 306      ( 2019 07:15 )             38.2     02-25    138  |  97<L>  |  21  ----------------------------<  134<H>  3.3<L>   |  23  |  1.31<H>    Ca    9.3      2019 07:15  Phos  4.0       Mg     2.0         TPro  8.0  /  Alb  4.1  /  TBili  0.3  /  DBili  x   /  AST  38<H>  /  ALT  16  /  AlkPhos  96            Urinalysis Basic - ( 2019 22:10 )    Color: YELLOW / Appearance: Lt TURBID / S.023 / pH: 6.0  Gluc: NEGATIVE / Ketone: NEGATIVE  / Bili: NEGATIVE / Urobili: NORMAL   Blood: LARGE / Protein: 20 / Nitrite: NEGATIVE   Leuk Esterase: SMALL / RBC: >50 / WBC 11-25   Sq Epi: OCC / Non Sq Epi: x / Bacteria: NEGATIVE      CAPILLARY BLOOD GLUCOSE         @ 22:45  Culture-urine --  Culture results --  method type --  Organism --  Organism Identification --  Specimen source BLOOD PERIPHERAL            @ 22:45  Culture blood   NO ORGANISMS ISOLATED  NO ORGANISMS ISOLATED AT 24 HOURS  Culture results --  Gram stain --  Gram stain blood --  Method type --  Organism --  Organism identification --  Specimen source BLOOD PERIPHERAL      RADIOLOGY & ADDITIONAL TESTS:    Imaging Personally Reviewed:    Consultant(s) Notes Reviewed:      Care Discussed with Consultants/Other Providers:

## 2019-02-25 NOTE — CONSULT NOTE ADULT - ASSESSMENT
63 Female everyday smoker with PMH of HTN, HFrEF 49% 2016, COPD (not on Home O2), DVT (15 years ago, AC x 2 years then stop),  Breast ca s/p left lumpectomy s/p Chemotherapy and Radiation Therapy , infra-renal abdominal aortic aneurysm, s/p hysterectomy, Sick cell trait and  DM presented to [facility] with mid sternal chest pain transferred to [facility] for coronary angiogram. s/p cath 2/22, nephrology consulted for mireille

## 2019-02-25 NOTE — PROGRESS NOTE ADULT - SUBJECTIVE AND OBJECTIVE BOX
SUBJECTIVE:      MEDICATIONS  (STANDING):  anastrozole 1 milliGRAM(s) Oral daily  aspirin enteric coated 81 milliGRAM(s) Oral daily  atorvastatin 20 milliGRAM(s) Oral at bedtime  heparin  Injectable 5000 Unit(s) SubCutaneous every 12 hours  hydrochlorothiazide 12.5 milliGRAM(s) Oral daily  losartan 100 milliGRAM(s) Oral daily  metoprolol succinate ER 50 milliGRAM(s) Oral daily  oseltamivir 30 milliGRAM(s) Oral two times a day  pantoprazole    Tablet 40 milliGRAM(s) Oral before breakfast  sodium chloride 0.9% lock flush 3 milliLiter(s) IV Push every 8 hours  spironolactone 25 milliGRAM(s) Oral daily    MEDICATIONS  (PRN):  acetaminophen   Tablet .. 650 milliGRAM(s) Oral every 6 hours PRN Temp greater or equal to 38C (100.4F), Mild Pain (1 - 3), Moderate Pain (4 - 6)  guaiFENesin    Syrup 100 milliGRAM(s) Oral every 6 hours PRN Cough      LABS:                        12.0   4.03  )-----------( 306      ( 25 Feb 2019 07:15 )             38.2       02-25    138  |  97<L>  |  21  ----------------------------<  134<H>  3.3<L>   |  23  |  1.31<H>    Ca    9.3      25 Feb 2019 07:15  Phos  4.0     02-25  Mg     2.0     02-25    TPro  8.0  /  Alb  4.1  /  TBili  0.3  /  DBili  x   /  AST  38<H>  /  ALT  16  /  AlkPhos  96  02-24      PHYSICAL EXAM:  Vital Signs Last 24 Hrs  T(C): 36.7 (25 Feb 2019 12:01), Max: 39.4 (24 Feb 2019 20:22)  T(F): 98.1 (25 Feb 2019 12:01), Max: 102.9 (24 Feb 2019 20:22)  HR: 82 (25 Feb 2019 12:01) (71 - 85)  BP: 159/84 (25 Feb 2019 12:01) (126/57 - 159/84)  BP(mean): --  RR: 18 (25 Feb 2019 12:01) (17 - 19)  SpO2: 100% (25 Feb 2019 12:01) (94% - 100%)    Cardiovascular:  S1S2 RRR, No JVD  Respiratory: Lungs clear to auscultation, normal effort  Gastrointestinal: Abdomen soft, ND, NT, +BS  Skin: Warm, dry, intact. No rash.  Musculoskeletal: Normal ROM, normal strength  Ext: No C/C/E B/L LE      TELEMETRY: 	 NSR     ECG:  	SB, QRS 96ms, CO 176ms, QTc 476ms    Echo: < from: Transthoracic Echocardiogram (02.22.19 @ 07:12) >  CONCLUSIONS:  1. Tethered mitral valve leaflets. Mild mitral  regurgitation.  2. Normal trileaflet aortic valve. No aortic stenosis. No  aortic valve regurgitation seen.  3. Normal aortic root.  4. Moderately dilated left atrium.  LA volume index = 47  cc/m2.  5. Moderate left ventricular enlargement.  6. Severe global left ventricular systolic dysfunction (EF  26% by biplane).  7. Grade I diastolic dysfunction (Impaired relaxation).  8. Normal right atrium.  9. Right ventricle not well visualized. Normal RV systolic  function (TAPSE 1.9 cm).  10. Normal tricuspid valve. Trace tricuspid regurgitation.  11. Normal pulmonic valve. Trace pulmonic insufficiency is  noted.  12. No pericardial effusion.    *** Compared with echocardiogram report of 5/29/2018, there  has been a significant decrease in LV systolic function.  ------------------------------------------------------------------------  Confirmed on  2/22/2019 - 14:06:58 by Nakul Doshi MD    < end of copied text >      Cath: < from: Cardiac Cath Lab - Adult (02.22.19 @ 17:02) >  VENTRICLES: EF estimated was 30 %.  CORONARY VESSELS: The coronary circulation is right dominant.  LM:   --  LM: Angiography showed mild atherosclerosis with no flow limiting  lesions.  LAD:   --  LAD: Angiography showed mild atherosclerosis with no flow  limiting lesions.  CX:   --  Circumflex: Angiography showed mild atherosclerosis with no flow  limiting lesions.  RCA:   --  Mid RCA: There was a tubular 20 % stenosis at a site with no  prior intervention. The lesion was eccentric. There was ALTAGRACIA grade 3 flow  through the vessel (brisk flow).  --  RPDA: Angiography showed mild atherosclerosis with no flow limiting  lesions.  --  RPLS: Angiography showed mild atherosclerosis with no flow limiting  lesions.  --  RPL1: Therewas a 100 % stenosis. There was ALTAGRACIA grade 0 flow through  the vessel (no flow) and a small vascular territory distal to the lesion.  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 It does not appear amenable to  intervention.  COMPLICATIONS: There were no complications.  DIAGNOSTIC IMPRESSIONS: Severe LVdysfunction  Occluded distal RPLS , not amenable to PCI  DIAGNOSTIC RECOMMENDATIONS: Medical therapy  INTERVENTIONAL IMPRESSIONS: Severe LVdysfunction  Occluded distal RPLS , not amenable to PCI  INTERVENTIONAL RECOMMENDATIONS: Medical therapy  Prepared and signed by  Eliana Hernandez M.D.  Signed 02/22/2019 18:52:28    < end of copied text >      ASSESSMENT/PLAN: 63 year old female with PMH NICM, EF 49% in 2016, admitted with NSTEMI/ s/p cath with non obs CAD and EF 30%, PMH of tobacco abuse, COPD, DVT, breast ca s/p lumpectomy/chemo/XRT, infra-renal AAA, sickle cell trait and DM.      --Telemetry unremarkable thus far  --cont optimal medical therapy for her NICM ( May benefit from Entresto in lieu of current ARB)  --repeat TTE in 3 months and recommend ICD if LVEF remains <35%  --Will D/W Dr. Jackie Oneill PA-C EP     SUBJECTIVE: Patient seen and examined at the Bedside. No CP or SOB.    Denies palpitations, dizziness or syncope.    Tele: NSR, no arrhythmias noted.      MEDICATIONS  (STANDING):  anastrozole 1 milliGRAM(s) Oral daily  aspirin enteric coated 81 milliGRAM(s) Oral daily  atorvastatin 20 milliGRAM(s) Oral at bedtime  heparin  Injectable 5000 Unit(s) SubCutaneous every 12 hours  hydrochlorothiazide 12.5 milliGRAM(s) Oral daily  losartan 100 milliGRAM(s) Oral daily  metoprolol succinate ER 50 milliGRAM(s) Oral daily  oseltamivir 30 milliGRAM(s) Oral two times a day  pantoprazole    Tablet 40 milliGRAM(s) Oral before breakfast  sodium chloride 0.9% lock flush 3 milliLiter(s) IV Push every 8 hours  spironolactone 25 milliGRAM(s) Oral daily    MEDICATIONS  (PRN):  acetaminophen   Tablet .. 650 milliGRAM(s) Oral every 6 hours PRN Temp greater or equal to 38C (100.4F), Mild Pain (1 - 3), Moderate Pain (4 - 6)  guaiFENesin    Syrup 100 milliGRAM(s) Oral every 6 hours PRN Cough      LABS:                        12.0   4.03  )-----------( 306      ( 25 Feb 2019 07:15 )             38.2       02-25    138  |  97<L>  |  21  ----------------------------<  134<H>  3.3<L>   |  23  |  1.31<H>    Ca    9.3      25 Feb 2019 07:15  Phos  4.0     02-25  Mg     2.0     02-25    TPro  8.0  /  Alb  4.1  /  TBili  0.3  /  DBili  x   /  AST  38<H>  /  ALT  16  /  AlkPhos  96  02-24      PHYSICAL EXAM:  Vital Signs Last 24 Hrs  T(C): 36.7 (25 Feb 2019 12:01), Max: 39.4 (24 Feb 2019 20:22)  T(F): 98.1 (25 Feb 2019 12:01), Max: 102.9 (24 Feb 2019 20:22)  HR: 82 (25 Feb 2019 12:01) (71 - 85)  BP: 159/84 (25 Feb 2019 12:01) (126/57 - 159/84)  BP(mean): --  RR: 18 (25 Feb 2019 12:01) (17 - 19)  SpO2: 100% (25 Feb 2019 12:01) (94% - 100%)    Cardiovascular:  S1S2 RRR, No JVD  Respiratory: Lungs clear to auscultation, normal effort  Gastrointestinal: Abdomen soft, ND, NT, +BS  Skin: Warm, dry, intact. No rash.  Musculoskeletal: Normal ROM, normal strength  Ext: No C/C/E B/L LE      TELEMETRY: 	 NSR     ECG:  	SB, QRS 96ms, NM 176ms, QTc 476ms    Echo: < from: Transthoracic Echocardiogram (02.22.19 @ 07:12) >  CONCLUSIONS:  1. Tethered mitral valve leaflets. Mild mitral  regurgitation.  2. Normal trileaflet aortic valve. No aortic stenosis. No  aortic valve regurgitation seen.  3. Normal aortic root.  4. Moderately dilated left atrium.  LA volume index = 47  cc/m2.  5. Moderate left ventricular enlargement.  6. Severe global left ventricular systolic dysfunction (EF  26% by biplane).  7. Grade I diastolic dysfunction (Impaired relaxation).  8. Normal right atrium.  9. Right ventricle not well visualized. Normal RV systolic  function (TAPSE 1.9 cm).  10. Normal tricuspid valve. Trace tricuspid regurgitation.  11. Normal pulmonic valve. Trace pulmonic insufficiency is  noted.  12. No pericardial effusion.    *** Compared with echocardiogram report of 5/29/2018, there  has been a significant decrease in LV systolic function.  ------------------------------------------------------------------------  Confirmed on  2/22/2019 - 14:06:58 by Nakul Doshi MD    < end of copied text >      Cath: < from: Cardiac Cath Lab - Adult (02.22.19 @ 17:02) >  VENTRICLES: EF estimated was 30 %.  CORONARY VESSELS: The coronary circulation is right dominant.  LM:   --  LM: Angiography showed mild atherosclerosis with no flow limiting  lesions.  LAD:   --  LAD: Angiography showed mild atherosclerosis with no flow  limiting lesions.  CX:   --  Circumflex: Angiography showed mild atherosclerosis with no flow  limiting lesions.  RCA:   --  Mid RCA: There was a tubular 20 % stenosis at a site with no  prior intervention. The lesion was eccentric. There was ALTAGRACIA grade 3 flow  through the vessel (brisk flow).  --  RPDA: Angiography showed mild atherosclerosis with no flow limiting  lesions.  --  RPLS: Angiography showed mild atherosclerosis with no flow limiting  lesions.  --  RPL1: Therewas a 100 % stenosis. There was ALTAGRACIA grade 0 flow through  the vessel (no flow) and a small vascular territory distal to the lesion.  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 It does not appear amenable to  intervention.  COMPLICATIONS: There were no complications.  DIAGNOSTIC IMPRESSIONS: Severe LVdysfunction  Occluded distal RPLS , not amenable to PCI  DIAGNOSTIC RECOMMENDATIONS: Medical therapy  INTERVENTIONAL IMPRESSIONS: Severe LVdysfunction  Occluded distal RPLS , not amenable to PCI  INTERVENTIONAL RECOMMENDATIONS: Medical therapy  Prepared and signed by  Eliana Hernandez M.D.  Signed 02/22/2019 18:52:28    < end of copied text >      ASSESSMENT/PLAN: 63 year old female with PMH NICM, EF 49% in 2016, admitted with NSTEMI/ s/p cath with non obs CAD and EF 30%, PMH of tobacco abuse, COPD, DVT, breast ca s/p lumpectomy/chemo/XRT, infra-renal AAA, sickle cell trait and DM.      --Telemetry unremarkable thus far  --cont optimal medical therapy for her NICM ( May benefit from Entresto in lieu of current ARB)  --repeat TTE in 3 months and recommend ICD if LVEF remains <35%  --Will D/W Dr. Jackie Oneill PA-C EP     SUBJECTIVE: Patient seen and examined at the Bedside. No CP or SOB.    Denies palpitations, dizziness or syncope.    Tele: NSR, no arrhythmias noted.      MEDICATIONS  (STANDING):  anastrozole 1 milliGRAM(s) Oral daily  aspirin enteric coated 81 milliGRAM(s) Oral daily  atorvastatin 20 milliGRAM(s) Oral at bedtime  heparin  Injectable 5000 Unit(s) SubCutaneous every 12 hours  hydrochlorothiazide 12.5 milliGRAM(s) Oral daily  losartan 100 milliGRAM(s) Oral daily  metoprolol succinate ER 50 milliGRAM(s) Oral daily  oseltamivir 30 milliGRAM(s) Oral two times a day  pantoprazole    Tablet 40 milliGRAM(s) Oral before breakfast  sodium chloride 0.9% lock flush 3 milliLiter(s) IV Push every 8 hours  spironolactone 25 milliGRAM(s) Oral daily    MEDICATIONS  (PRN):  acetaminophen   Tablet .. 650 milliGRAM(s) Oral every 6 hours PRN Temp greater or equal to 38C (100.4F), Mild Pain (1 - 3), Moderate Pain (4 - 6)  guaiFENesin    Syrup 100 milliGRAM(s) Oral every 6 hours PRN Cough      LABS:                        12.0   4.03  )-----------( 306      ( 25 Feb 2019 07:15 )             38.2       02-25    138  |  97<L>  |  21  ----------------------------<  134<H>  3.3<L>   |  23  |  1.31<H>    Ca    9.3      25 Feb 2019 07:15  Phos  4.0     02-25  Mg     2.0     02-25    TPro  8.0  /  Alb  4.1  /  TBili  0.3  /  DBili  x   /  AST  38<H>  /  ALT  16  /  AlkPhos  96  02-24      PHYSICAL EXAM:  Vital Signs Last 24 Hrs  T(C): 36.7 (25 Feb 2019 12:01), Max: 39.4 (24 Feb 2019 20:22)  T(F): 98.1 (25 Feb 2019 12:01), Max: 102.9 (24 Feb 2019 20:22)  HR: 82 (25 Feb 2019 12:01) (71 - 85)  BP: 159/84 (25 Feb 2019 12:01) (126/57 - 159/84)  BP(mean): --  RR: 18 (25 Feb 2019 12:01) (17 - 19)  SpO2: 100% (25 Feb 2019 12:01) (94% - 100%)    Cardiovascular:  S1S2 RRR, No JVD  Respiratory: Lungs clear to auscultation, normal effort  Gastrointestinal: Abdomen soft, ND, NT, +BS  Skin: Warm, dry, intact. No rash.  Musculoskeletal: Normal ROM, normal strength  Ext: No C/C/E B/L LE      TELEMETRY: 	 NSR     ECG:  	SB, QRS 96ms, CA 176ms, QTc 476ms    Echo: < from: Transthoracic Echocardiogram (02.22.19 @ 07:12) >  CONCLUSIONS:  1. Tethered mitral valve leaflets. Mild mitral  regurgitation.  2. Normal trileaflet aortic valve. No aortic stenosis. No  aortic valve regurgitation seen.  3. Normal aortic root.  4. Moderately dilated left atrium.  LA volume index = 47  cc/m2.  5. Moderate left ventricular enlargement.  6. Severe global left ventricular systolic dysfunction (EF  26% by biplane).  7. Grade I diastolic dysfunction (Impaired relaxation).  8. Normal right atrium.  9. Right ventricle not well visualized. Normal RV systolic  function (TAPSE 1.9 cm).  10. Normal tricuspid valve. Trace tricuspid regurgitation.  11. Normal pulmonic valve. Trace pulmonic insufficiency is  noted.  12. No pericardial effusion.    *** Compared with echocardiogram report of 5/29/2018, there  has been a significant decrease in LV systolic function.  ------------------------------------------------------------------------  Confirmed on  2/22/2019 - 14:06:58 by Nakul Doshi MD    < end of copied text >      Cath: < from: Cardiac Cath Lab - Adult (02.22.19 @ 17:02) >  VENTRICLES: EF estimated was 30 %.  CORONARY VESSELS: The coronary circulation is right dominant.  LM:   --  LM: Angiography showed mild atherosclerosis with no flow limiting  lesions.  LAD:   --  LAD: Angiography showed mild atherosclerosis with no flow  limiting lesions.  CX:   --  Circumflex: Angiography showed mild atherosclerosis with no flow  limiting lesions.  RCA:   --  Mid RCA: There was a tubular 20 % stenosis at a site with no  prior intervention. The lesion was eccentric. There was ALTAGRACIA grade 3 flow  through the vessel (brisk flow).  --  RPDA: Angiography showed mild atherosclerosis with no flow limiting  lesions.  --  RPLS: Angiography showed mild atherosclerosis with no flow limiting  lesions.  --  RPL1: Therewas a 100 % stenosis. There was ALTAGRACIA grade 0 flow through  the vessel (no flow) and a small vascular territory distal to the lesion.  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 It does not appear amenable to  intervention.  COMPLICATIONS: There were no complications.  DIAGNOSTIC IMPRESSIONS: Severe LVdysfunction  Occluded distal RPLS , not amenable to PCI  DIAGNOSTIC RECOMMENDATIONS: Medical therapy  INTERVENTIONAL IMPRESSIONS: Severe LVdysfunction  Occluded distal RPLS , not amenable to PCI  INTERVENTIONAL RECOMMENDATIONS: Medical therapy  Prepared and signed by  Eliana Hernandez M.D.  Signed 02/22/2019 18:52:28    < end of copied text >      ASSESSMENT/PLAN: 63 year old female with PMH NICM, EF 49% in 2016, admitted with NSTEMI/ s/p cath with non obs CAD and EF 30%, PMH of tobacco abuse, COPD, DVT, breast ca s/p lumpectomy/chemo/XRT, infra-renal AAA, sickle cell trait and DM.      --Telemetry unremarkable thus far  --cont optimal medical therapy for her NICM   --repeat TTE in 3 months and recommend ICD if LVEF remains <35%  --Will D/W Dr. Jackie Oneill PA-C

## 2019-02-25 NOTE — CHART NOTE - NSCHARTNOTEFT_GEN_A_CORE
PA Addendum     case discussed with Dr. Patterson, agreed with Tamiflu for Flu  Will continue to monitor

## 2019-02-25 NOTE — PROGRESS NOTE ADULT - SUBJECTIVE AND OBJECTIVE BOX
PRESENTING CC:Chest pain    SUBJ: 64 yo F with T2DM, HTN, HLD, NICM (49% in 2018 echo) after chemotherapy with negative cardiac catheterization, COPD not on home O2, breast CA s/p lumpectomy, chemotherapy/XRT, who presented with chest pain.Noted elevated troponins had cath-Severe LVdysfunction-EF 30%,Occluded distal RPLS , not amenable to PCI-had fever last night -positive Influenza startred on Tamiflu,no dyspnea      PMH -reviewed admission note, no change since admission  Heart failure: acute [ ] chronic [ ] acute or chronic [x ] diastolic [ ] systolic [x ] combined systolic and diastolic[ ]  CHEN: ATN[ ] renal medullary necrosis [ ] CKD I [ ]CKDII [ ]CKD III [ ]CKD IV [ ]CKD V [ ]Other pathological lesions [ ]    MEDICATIONS  (STANDING):  anastrozole 1 milliGRAM(s) Oral daily  aspirin enteric coated 81 milliGRAM(s) Oral daily  atorvastatin 20 milliGRAM(s) Oral at bedtime  heparin  Injectable 5000 Unit(s) SubCutaneous every 12 hours  hydrochlorothiazide 12.5 milliGRAM(s) Oral daily  losartan 100 milliGRAM(s) Oral daily  metoprolol succinate ER 50 milliGRAM(s) Oral daily  oseltamivir 30 milliGRAM(s) Oral two times a day  pantoprazole    Tablet 40 milliGRAM(s) Oral before breakfast  sodium chloride 0.9% lock flush 3 milliLiter(s) IV Push every 8 hours  spironolactone 25 milliGRAM(s) Oral daily    MEDICATIONS  (PRN):  acetaminophen   Tablet .. 650 milliGRAM(s) Oral every 6 hours PRN Temp greater or equal to 38C (100.4F), Mild Pain (1 - 3), Moderate Pain (4 - 6)          FAMILY HISTORY:  No family history of premature coronary artery disease or sudden cardiac death      REVIEW OF SYSTEMS:  Constitutional: [x ] fever, [ ]weight loss,  [x ]fatigue  Eyes: [ ] visual changes  Respiratory: [ ]shortness of breath;  [x ] cough, [ ]wheezing, [ ]chills, [ ]hemoptysis  Cardiovascular: [ ] chest pain, [ ]palpitations, [ ]dizziness,  [ ]leg swelling[ ]orthopnea[ ]PND  Gastrointestinal: [ ] abdominal pain, [ ]nausea, [ ]vomiting,  [ ]diarrhea   Genitourinary: [ ] dysuria, [ ] hematuria  Neurologic: [ ] headaches [ ] tremors[ ]weakness  Skin: [ ] itching, [ ]burning, [ ] rashes  Endocrine: [ ] heat or cold intolerance  Musculoskeletal: [ ] joint pain or swelling; [ ] muscle, back, or extremity pain  Psychiatric: [ ] depression, [ ]anxiety, [ ]mood swings, or [ ]difficulty sleeping  Hematologic: [ ] easy bruising, [ ] bleeding gums    [x] All remaining systems negative except as per above.   [ ]Unable to obtain.    Vital Signs Last 24 Hrs  T(C): 37.1 (25 Feb 2019 06:14), Max: 39.4 (24 Feb 2019 20:22)  T(F): 98.7 (25 Feb 2019 06:14), Max: 102.9 (24 Feb 2019 20:22)  HR: 71 (25 Feb 2019 06:14) (71 - 85)  BP: 136/89 (25 Feb 2019 06:14) (126/57 - 138/85)  RR: 17 (25 Feb 2019 06:14) (17 - 19)  SpO2: 97% (25 Feb 2019 06:14) (94% - 99%)  I&O's Summary      PHYSICAL EXAM:  General: No acute distress BMI-27.1  HEENT: EOMI, PERRL  Neck: Supple, [ ] JVD  Lungs: Equal air entry bilaterally; [ ] rales [ ] wheezing [ ] rhonchi  Heart: Regular rate and rhythm; [x ] murmur  2 /6 [x ] systolic [ ] diastolic [ ] radiation[ ] rubs [ ]  gallops  Abdomen: Nontender, bowel sounds present  Extremities: No clubbing, cyanosis, [ ] edema  Nervous system:  Alert & Oriented X3, no focal deficits  Psychiatric: Normal affect  Skin: No rashes or lesions    LABS:  02-24    137  |  98  |  20  ----------------------------<  139<H>  3.4<L>   |  24  |  1.62<H>    Ca    9.6      24 Feb 2019 21:07  Phos  2.9     02-24  Mg     1.8     02-24    TPro  8.0  /  Alb  4.1  /  TBili  0.3  /  DBili  x   /  AST  38<H>  /  ALT  16  /  AlkPhos  96  02-24    Creatinine Trend: 1.62<--, 1.05<--, 1.12<--, 1.28<--, 1.32<--                        11.9   5.59  )-----------( 299      ( 24 Feb 2019 21:07 )             36.8     Rapid Respiratory Viral Panel (02.24.19 @ 20:45)    Adenovirus (RapRVP): Not Detected    Influenza AH1 2009 (RapRVP): Not Detected    Influenza AH1 (RapRVP): Not Detected    Influenza AH3 (RapRVP): Detected    Influenza B (RapRVP): Not Detected    Parainfluenza 1 (RapRVP): Not Detected    Parainfluenza 2 (RapRVP): Not Detected    Parainfluenza 3 (RapRVP): Not Detected    Parainfluenza 4 (RapRVP): Not Detected    Resp Syncytial Virus (RapRVP): Not Detected    Chlamydia pneumoniae (RapRVP): Not Detected    Mycoplasma pneumoniae (RapRVP): Not Detected    Entero/Rhinovirus (RapRVP): Not Detected    hMPV (RapRVP): Not Detected    Coronavirus (229E,HKU1,NL63,OC43): Not Detected     TELEMETRY:Sinus rhythm no arrhythmias    ECHO:Study Date: 2/22/2019  Severe global left ventricular systolic dysfunction (EF 26% by biplane).  Mild mitral regurgitation.  Moderately dilated left atrium.      CATHETERIZATION:VENTRICLES: EF estimated was 30 %.  CORONARY VESSELS: The coronary circulation is right dominant.  LM:   --  LM: Angiography showed mild atherosclerosis with no flow limiting lesions.  LAD:   --  LAD: Angiography showed mild atherosclerosis with no flow limiting lesions.  CX:   --  Circumflex: Angiography showed mild atherosclerosis with no flow limiting lesions.  RCA:   --  Mid RCA: There was a tubular 20 % stenosis at a site with no prior intervention. The lesion was eccentric. There was ALTAGRACIA grade 3 flow through the vessel (brisk flow).  --  RPDA: Angiography showed mild atherosclerosis with no flow limiting lesions.  --  RPLS: Angiography showed mild atherosclerosis with no flow limiting lesions.  --  RPL1: There was a 100 % stenosis. There was ALTAGRACIA grade 0 flow through the vessel (no flow) and a small vascular territory distal to the lesion.    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 It does not appear amenable to intervention.      IMPRESSION AND PLAN:    64 yo F with T2DM, HTN, HLD, NICM (49% in 2018 echo) after chemotherapy with negative cardiac catheterization, COPD not on home O2, breast CA s/p lumpectomy, chemotherapy/XRT, who presented with chest pain in the setting of NSTEMI    1. NSTEMI:Acute Systolic Heart Failure   Cath-Severe LV Dysfunction EF-30%,-RPL1.  Medical management-Continue Metoprolol and Losartan  Add Spironolactone.      2. NSVT: Likely in setting of ischemia; patient was asymptomatic  -Continue Telemetry.  EP Consult Dr Mejia noted repeat TTE in 3 months    3. HTN: On losartan, metoprolol    4. HLD: Atorvastatin  Lipid Profile (02.22.19 @ 07:14)    Total Cholesterol/HDL Ratio Measurement: 7.4 RATIO    Cholesterol, Serum: 251 mg/dL    Triglycerides, Serum: 201 mg/dL    HDL Cholesterol, Serum: 34 mg/dL      5. AAA: Infrarenal, 4.5cm, also ulcerative plaque  -Maintain BP in target range, should see Dr. Gutierrez vascular surgery as outpatient.    6.CHEN: hold diuretics monitor creatinine    7. Influenza A3-remains afebrile-complete 5 days Tamiflu.    8.T2DM-Hemoglobin A1C, Whole Blood (02.22.19 @ 10:14)    Hemoglobin A1C, Whole Blood: 7.0%  POCT  Blood Glucose (02.22.19 @ 17:38)    POCT Blood Glucose.: 116 ---> 119 ---> 173 mg/dL

## 2019-02-25 NOTE — CONSULT NOTE ADULT - PROBLEM SELECTOR RECOMMENDATION 9
CHEN likely sec to contrast use, s/p cath 2/22  scr peaked at 1.62, now improving  continue losartan, spironolactone and HCTZ   monitor bmp  avoid nephrotoxic agents  check UA

## 2019-02-25 NOTE — PROGRESS NOTE ADULT - ATTENDING COMMENTS
Patient was seen and examined,interim events noted,labs and radiology studies reviewed.  Devin Patterson MD,FACC.  [address] [place] [address].  [id number].  [phone]
Patient was seen and examined,interim events noted,labs and radiology studies reviewed.  Devin Patterson MD,FACC.  [address] [place] [address].  [id number].  [phone]
Patient was seen and examined,interim events noted,labs and radiology studies reviewed.  Devin Patterson MD,FACC.  [address] [place] [address].  [id number].  [phone]
EP ATTENDING    Patient seen and examined. Agree with above. Has a newly diagnosed systolic cardiomyopathy on 2/22/19. Recommend repeat echo after 3 months of optimal medical therapy (i.e. repeat echo after 5/22/19) to determine candidacy for primary prevention ICD. If repeat echo after 5/22/19 shows LVEF persists to be less than or equal to 35% then will recommend a dual chamber ICD given underlying SSS. No further inpatient EP workup needed as long as tele remains benign.

## 2019-02-25 NOTE — CONSULT NOTE ADULT - SUBJECTIVE AND OBJECTIVE BOX
[facility]   MD ELLYN GARVEY MD ANGELA WONG, PA    TEL:  OFFICE: [phone]  DR BAIRES CELL: [phone]  DR. EWING CELL: [phone]  CANDI HEBERT CELL: [phone]      HPI:  63 Female everyday smoker with PMH of HTN, HFrEF 49% 2016, COPD (not on Home O2), DVT (15 years ago, AC x 2 years then stop),  Breast ca s/p left lumpectomy s/p Chemotherapy and Radiation Therapy , infra-renal abdominal aortic aneurysm, s/p hysterectomy, Sick cell trait and  DM presented to [facility] with mid sternal chest pain transferred to [facility] for coronary angiogram. s/p cath 2/22, nephrology consulted for mireille      Allergies:  Advil (Hives; Vomiting)      PAST MEDICAL & SURGICAL HISTORY:  Knee effusion, left: arthrocentesis- 05/25/16  Internal carotid aneurysm  Abdominal aneurysm: infrarenal- wached by Vascular , Dr. Gutierrez  Cardiomyopathy  Sickle cell trait  DVT (deep venous thrombosis): Left leg in 1994 after hysterectomy  Hypercholesteremia  Breast cancer: right-s/p chemo and radiation  Hypertension  Breast cancer: infusaport insertion- 2014- left chest  S/P hysterectomy: 1994  Gilson filter in place: 1994  S/P lumpectomy, right breast: 2014, revision of position- 2015  Fibroid uterus: sp hystrectomy 1196      Home Medications Reviewed    Hospital Medications:   MEDICATIONS  (STANDING):  anastrozole 1 milliGRAM(s) Oral daily  aspirin enteric coated 81 milliGRAM(s) Oral daily  atorvastatin 20 milliGRAM(s) Oral at bedtime  heparin  Injectable 5000 Unit(s) SubCutaneous every 12 hours  hydrochlorothiazide 12.5 milliGRAM(s) Oral daily  losartan 100 milliGRAM(s) Oral daily  metoprolol succinate ER 50 milliGRAM(s) Oral daily  oseltamivir 30 milliGRAM(s) Oral two times a day  pantoprazole    Tablet 40 milliGRAM(s) Oral before breakfast  sodium chloride 0.9% lock flush 3 milliLiter(s) IV Push every 8 hours  spironolactone 25 milliGRAM(s) Oral daily      SOCIAL HISTORY:  Denies ETOh, Smoking,     FAMILY HISTORY:  Family history of other condition: please open box and see additional medical family history  Family history of renal failure: uncle  Family history of glioblastoma (Sibling)  History of hypertension: mother  Family history of liver failure  Family history of glioblastoma  Family history of renal stone  Family history of diabetes mellitus  Family history of hypertension      REVIEW OF SYSTEMS:  CONSTITUTIONAL: No weakness, fevers or chills  EYES/ENT: No visual changes;  No vertigo or throat pain   NECK: No pain or stiffness  RESPIRATORY: + cough, wNo shortness of breath  CARDIOVASCULAR: + chest pain   GASTROINTESTINAL: No abdominal or epigastric pain. No nausea, vomiting, or hematemesis; No diarrhea or constipation. No melena or hematochezia.  GENITOURINARY: No dysuria, frequency, foamy urine, urinary urgency, incontinence or hematuria  NEUROLOGICAL: No numbness or weakness  SKIN: No itching, burning, rashes, or lesions   VASCULAR: No bilateral lower extremity edema.   All other review of systems is negative unless indicated above.    VITALS:  T(F): 98.1 (02-25-19 @ 12:01), Max: 102.9 (02-24-19 @ 20:22)  HR: 82 (02-25-19 @ 12:01)  BP: 159/84 (02-25-19 @ 12:01)  RR: 18 (02-25-19 @ 12:01)  SpO2: 100% (02-25-19 @ 12:01)  Wt(kg): --        PHYSICAL EXAM:  Constitutional: NAD  HEENT: anicteric sclera, oropharynx clear, MMM  Neck: No JVD  Respiratory: CTAB, no wheezes, rales or rhonchi  Cardiovascular: S1, S2, RRR  Gastrointestinal: BS+, soft, NT/ND  Extremities: No cyanosis or clubbing. No peripheral edema  Neurological: A/O x 3, no focal deficits  Psychiatric: Normal mood, normal affect  : No CVA tenderness. No de.   Skin: No rashes      LABS:  02-25    138  |  97<L>  |  21  ----------------------------<  134<H>  3.3<L>   |  23  |  1.31<H>    Ca    9.3      25 Feb 2019 07:15  Phos  4.0     02-25  Mg     2.0     02-25    TPro  8.0  /  Alb  4.1  /  TBili  0.3  /  DBili      /  AST  38<H>  /  ALT  16  /  AlkPhos  96  02-24    Creatinine Trend: 1.31 <--, 1.62 <--, 1.05 <--, 1.12 <--, 1.28 <--, 1.32 <--                        12.0   4.03  )-----------( 306      ( 25 Feb 2019 07:15 )             38.2     Urine Studies:        RADIOLOGY & ADDITIONAL STUDIES:

## 2019-02-25 NOTE — PROGRESS NOTE ADULT - ASSESSMENT
62 yo F with DM, HTN, HLD, NICM (49% in 2018 echo) after chemotherapy with negative cardiac catheterization, COPD not on home O2, breast CA s/p lumpectomy, chemotherapy/XRT, who presented with chest pain in the setting of NSTEMI    1. NSTEMI:Acute Systolic Heart Failure   Cath-Severe LV Dysfunction EF-30%,-RPL1.  Medical management-Continue Carvedilol and Losartan   Spironolactone.    CHEN:  BMP  Nephro f/up noted.    2. NSVT: Likely in setting of ischemia; patient was asymptomatic  -Continue Telemetry.  EP f/up    3. HTN: On losartan, carvedilol    4. HLD: Atorvastatin

## 2019-02-26 ENCOUNTER — TRANSCRIPTION ENCOUNTER (OUTPATIENT)
Age: 64
End: 2019-02-26

## 2019-02-26 VITALS
OXYGEN SATURATION: 98 % | DIASTOLIC BLOOD PRESSURE: 79 MMHG | HEART RATE: 64 BPM | RESPIRATION RATE: 18 BRPM | SYSTOLIC BLOOD PRESSURE: 127 MMHG | TEMPERATURE: 98 F

## 2019-02-26 LAB
ANION GAP SERPL CALC-SCNC: 14 MMO/L — SIGNIFICANT CHANGE UP (ref 7–14)
APPEARANCE UR: CLEAR — SIGNIFICANT CHANGE UP
BACTERIA # UR AUTO: NEGATIVE — SIGNIFICANT CHANGE UP
BILIRUB UR-MCNC: NEGATIVE — SIGNIFICANT CHANGE UP
BLOOD UR QL VISUAL: HIGH
BUN SERPL-MCNC: 24 MG/DL — HIGH (ref 7–23)
CALCIUM SERPL-MCNC: 9.4 MG/DL — SIGNIFICANT CHANGE UP (ref 8.4–10.5)
CHLORIDE SERPL-SCNC: 101 MMOL/L — SIGNIFICANT CHANGE UP (ref 98–107)
CO2 SERPL-SCNC: 23 MMOL/L — SIGNIFICANT CHANGE UP (ref 22–31)
COLOR SPEC: SIGNIFICANT CHANGE UP
CREAT SERPL-MCNC: 1.31 MG/DL — HIGH (ref 0.5–1.3)
GLUCOSE SERPL-MCNC: 122 MG/DL — HIGH (ref 70–99)
GLUCOSE UR-MCNC: NEGATIVE — SIGNIFICANT CHANGE UP
HCT VFR BLD CALC: 36.9 % — SIGNIFICANT CHANGE UP (ref 34.5–45)
HGB BLD-MCNC: 12.1 G/DL — SIGNIFICANT CHANGE UP (ref 11.5–15.5)
HYALINE CASTS # UR AUTO: NEGATIVE — SIGNIFICANT CHANGE UP
KETONES UR-MCNC: NEGATIVE — SIGNIFICANT CHANGE UP
LEUKOCYTE ESTERASE UR-ACNC: NEGATIVE — SIGNIFICANT CHANGE UP
MAGNESIUM SERPL-MCNC: 2 MG/DL — SIGNIFICANT CHANGE UP (ref 1.6–2.6)
MCHC RBC-ENTMCNC: 30.3 PG — SIGNIFICANT CHANGE UP (ref 27–34)
MCHC RBC-ENTMCNC: 32.8 % — SIGNIFICANT CHANGE UP (ref 32–36)
MCV RBC AUTO: 92.3 FL — SIGNIFICANT CHANGE UP (ref 80–100)
NITRITE UR-MCNC: NEGATIVE — SIGNIFICANT CHANGE UP
NRBC # FLD: 0 K/UL — LOW (ref 25–125)
PH UR: 6 — SIGNIFICANT CHANGE UP (ref 5–8)
PLATELET # BLD AUTO: 300 K/UL — SIGNIFICANT CHANGE UP (ref 150–400)
PMV BLD: 10.2 FL — SIGNIFICANT CHANGE UP (ref 7–13)
POTASSIUM SERPL-MCNC: 3.7 MMOL/L — SIGNIFICANT CHANGE UP (ref 3.5–5.3)
POTASSIUM SERPL-SCNC: 3.7 MMOL/L — SIGNIFICANT CHANGE UP (ref 3.5–5.3)
PROT UR-MCNC: 10 — SIGNIFICANT CHANGE UP
RBC # BLD: 4 M/UL — SIGNIFICANT CHANGE UP (ref 3.8–5.2)
RBC # FLD: 13.3 % — SIGNIFICANT CHANGE UP (ref 10.3–14.5)
RBC CASTS # UR COMP ASSIST: >50 — HIGH (ref 0–?)
SODIUM SERPL-SCNC: 138 MMOL/L — SIGNIFICANT CHANGE UP (ref 135–145)
SP GR SPEC: 1.02 — SIGNIFICANT CHANGE UP (ref 1–1.04)
SQUAMOUS # UR AUTO: SIGNIFICANT CHANGE UP
UROBILINOGEN FLD QL: NORMAL — SIGNIFICANT CHANGE UP
WBC # BLD: 3.77 K/UL — LOW (ref 3.8–10.5)
WBC # FLD AUTO: 3.77 K/UL — LOW (ref 3.8–10.5)
WBC UR QL: SIGNIFICANT CHANGE UP (ref 0–?)

## 2019-02-26 RX ORDER — METOPROLOL TARTRATE 50 MG
1 TABLET ORAL
Qty: 30 | Refills: 0
Start: 2019-02-26 | End: 2019-03-27

## 2019-02-26 RX ORDER — CARVEDILOL PHOSPHATE 80 MG/1
1 CAPSULE, EXTENDED RELEASE ORAL
Qty: 0 | Refills: 0 | COMMUNITY

## 2019-02-26 RX ORDER — SPIRONOLACTONE 25 MG/1
1 TABLET, FILM COATED ORAL
Qty: 30 | Refills: 0
Start: 2019-02-26 | End: 2019-03-27

## 2019-02-26 RX ORDER — ATORVASTATIN CALCIUM 80 MG/1
1 TABLET, FILM COATED ORAL
Qty: 30 | Refills: 0
Start: 2019-02-26 | End: 2019-03-27

## 2019-02-26 RX ORDER — AMLODIPINE BESYLATE 2.5 MG/1
1 TABLET ORAL
Qty: 0 | Refills: 0 | COMMUNITY

## 2019-02-26 RX ADMIN — LOSARTAN POTASSIUM 100 MILLIGRAM(S): 100 TABLET, FILM COATED ORAL at 06:25

## 2019-02-26 RX ADMIN — Medication 81 MILLIGRAM(S): at 11:27

## 2019-02-26 RX ADMIN — SODIUM CHLORIDE 3 MILLILITER(S): 9 INJECTION INTRAMUSCULAR; INTRAVENOUS; SUBCUTANEOUS at 13:46

## 2019-02-26 RX ADMIN — Medication 12.5 MILLIGRAM(S): at 06:25

## 2019-02-26 RX ADMIN — Medication 50 MILLIGRAM(S): at 09:42

## 2019-02-26 RX ADMIN — SODIUM CHLORIDE 3 MILLILITER(S): 9 INJECTION INTRAMUSCULAR; INTRAVENOUS; SUBCUTANEOUS at 06:26

## 2019-02-26 RX ADMIN — HEPARIN SODIUM 5000 UNIT(S): 5000 INJECTION INTRAVENOUS; SUBCUTANEOUS at 17:24

## 2019-02-26 RX ADMIN — SPIRONOLACTONE 25 MILLIGRAM(S): 25 TABLET, FILM COATED ORAL at 17:24

## 2019-02-26 RX ADMIN — ANASTROZOLE 1 MILLIGRAM(S): 1 TABLET ORAL at 11:27

## 2019-02-26 RX ADMIN — Medication 30 MILLIGRAM(S): at 09:42

## 2019-02-26 RX ADMIN — PANTOPRAZOLE SODIUM 40 MILLIGRAM(S): 20 TABLET, DELAYED RELEASE ORAL at 06:25

## 2019-02-26 RX ADMIN — HEPARIN SODIUM 5000 UNIT(S): 5000 INJECTION INTRAVENOUS; SUBCUTANEOUS at 06:25

## 2019-02-26 NOTE — DISCHARGE NOTE ADULT - MEDICATION SUMMARY - MEDICATIONS TO TAKE
I will START or STAY ON the medications listed below when I get home from the hospital:    spironolactone 25 mg oral tablet  -- 1 tab(s) by mouth once a day  -- Indication: For Heart failure    aspirin 81 mg oral tablet  -- 1 tab(s) by mouth once a day  -- Indication: For NSTEMI (non-ST elevated myocardial infarction)    atorvastatin 20 mg oral tablet  -- 1 tab(s) by mouth once a day (at bedtime)  -- Indication: For NSTEMI (non-ST elevated myocardial infarction)    losartan-hydrochlorothiazide 100mg-12.5mg oral tablet  -- 1 tab(s) by mouth once a day  -- Indication: For Essential hypertension    anastrozole 1 mg oral tablet  -- 1 tab(s) by mouth once a day  -- Indication: For Chemotherapy    oseltamivir 30 mg oral capsule  -- 1 cap(s) by mouth 2 times a day (finish on 2/28/19)  -- Indication: For Flu    metoprolol succinate 50 mg oral tablet, extended release  -- 1 tab(s) by mouth once a day  -- Indication: For NSVT    pantoprazole 40 mg oral delayed release tablet  -- 1 tab(s) by mouth once a day  -- Indication: For GERD

## 2019-02-26 NOTE — DISCHARGE NOTE ADULT - MEDICATION SUMMARY - MEDICATIONS TO STOP TAKING
I will STOP taking the medications listed below when I get home from the hospital:    carvedilol 12.5 mg oral tablet  -- 1 tab(s) by mouth once a day    amLODIPine 5 mg oral tablet  -- 1 tab(s) by mouth once a day

## 2019-02-26 NOTE — PROGRESS NOTE ADULT - PROBLEM SELECTOR PLAN 1
CHEN likely sec to contrast use, s/p cath 2/22  scr peaked at 1.62, now improving, and stable today  continue losartan, spironolactone and HCTZ for now  monitor bmp  avoid nephrotoxic agents  Patient to follow up with dr. khoury in 1-2 weeks post discharge

## 2019-02-26 NOTE — DISCHARGE NOTE ADULT - CARE PROVIDER_API CALL
Devin Patterson)  Medicine  Dept Director  [address] [place] [address]  Phone: [phone]  Fax: [phone]  Follow Up Time:     Willie Mejia)  Cardiac Electrophysiology; Cardiovascular Disease; Nuclear Cardiology  [address] [facility], [address]  [place] [address]  Phone: [phone]  Fax: [phone]  Follow Up Time:

## 2019-02-26 NOTE — DISCHARGE NOTE ADULT - CARE PLAN
Principal Discharge DX:	NSTEMI (non-ST elevated myocardial infarction)  Goal:	Prevent further progression of disease  Assessment and plan of treatment:	You underwent cardiac catheterization with no intervention. Continue Aspirin, HCTZ, Losartan, Toprol XL, Spironolactone, and Atorvastatin. Low salt, low cholesterol diet. Follow up with Dr. Patterson (Cardiologist) in 1-2 weeks; call [phone] for appointment.  Secondary Diagnosis:	CHEN (acute kidney injury)  Assessment and plan of treatment:	Likely secondary to contrast. Stable. Follow up with your primary care physician within 1-2 weeks to monitor your kidney function.  Secondary Diagnosis:	NSVT (nonsustained ventricular tachycardia)  Assessment and plan of treatment:	Continue optimal medical therapy for NICM with HCTZ, Losartan, Toprol XL, and Spironolactone. Repeat echocardiogram after 5/22/19 to determine candidacy for primary prevention ICD. Follow up with Dr. eMjia (EP); call [phone] for appointment.  Secondary Diagnosis:	Essential hypertension  Assessment and plan of treatment:	Low sodium and fat diet, continue anti-hypertensive medications, and follow up with primary care physician.  Secondary Diagnosis:	Influenza  Assessment and plan of treatment:	Continue Tamiflu 30mg twice a day through 2/28/19.

## 2019-02-26 NOTE — DISCHARGE NOTE ADULT - HOSPITAL COURSE
62 y/o Female everyday smoker with PMHx of HTN, HFrEF 49% 2016, COPD (not on Home O2), DVT (15 years ago, AC x 2 years then stop),  Breast ca s/p left lumpectomy s/p Chemotherapy and Radiation Therapy, infra-renal abdominal aortic aneurysm, s/p hysterectomy, Sick cell trait and  DM presented to [facility] with mid sternal chest pain. Transferred from  to [facility] for cath.    +NSTEMI - s/p LHC- RPLS 100% (small vessel), LVEDP 20, EF 30%, no intervention.   +Severe LV dysfunction (EF 30%) - on Spironolactone  +NSVT - EP (Dr. Mejia) following, on Toprol XL, cont optimal medical therapy for her NICM, repeat TTE in 3 months  +CHEN - renal following, likely secondary to contrast, Cr improving.  +Flu - on Tamiflu started 2/24    Case discussed with Dr. Patterson and is medically stable for discharge home.

## 2019-02-26 NOTE — PROGRESS NOTE ADULT - SUBJECTIVE AND OBJECTIVE BOX
EP ATTENDING    tele: NSR, no events    no palpitations, no syncope, no angina    acetaminophen   Tablet .. 650 milliGRAM(s) Oral every 6 hours PRN  anastrozole 1 milliGRAM(s) Oral daily  aspirin enteric coated 81 milliGRAM(s) Oral daily  atorvastatin 20 milliGRAM(s) Oral at bedtime  guaiFENesin   Syrup  (Sugar-Free) 200 milliGRAM(s) Oral every 6 hours PRN  heparin  Injectable 5000 Unit(s) SubCutaneous every 12 hours  hydrochlorothiazide 12.5 milliGRAM(s) Oral daily  losartan 100 milliGRAM(s) Oral daily  metoprolol succinate ER 50 milliGRAM(s) Oral daily  oseltamivir 30 milliGRAM(s) Oral two times a day  pantoprazole    Tablet 40 milliGRAM(s) Oral before breakfast  sodium chloride 0.9% lock flush 3 milliLiter(s) IV Push every 8 hours  spironolactone 25 milliGRAM(s) Oral daily                            12.1   3.77  )-----------( 300      ( 26 Feb 2019 06:34 )             36.9       02-26    138  |  101  |  24<H>  ----------------------------<  122<H>  3.7   |  23  |  1.31<H>    Ca    9.4      26 Feb 2019 06:34  Phos  4.0     02-25  Mg     2.0     02-26    TPro  8.0  /  Alb  4.1  /  TBili  0.3  /  DBili  x   /  AST  38<H>  /  ALT  16  /  AlkPhos  96  02-24        T(C): 37.2 (02-26-19 @ 06:24), Max: 37.6 (02-25-19 @ 17:38)  HR: 66 (02-26-19 @ 06:24) (64 - 82)  BP: 130/81 (02-26-19 @ 06:24) (130/81 - 159/84)  RR: 18 (02-26-19 @ 06:24) (18 - 18)  SpO2: 98% (02-26-19 @ 06:24) (97% - 100%)  Wt(kg): --    no JVD  RRR, no murmurs  CTAB  soft nt/nd  no c/c/e      A/P) 64 y/o female PMH breast CA s/p lumpectomy/chemo/cxr, DM, COPD admitted with NSTEMI that was unable to be successfully revascularized. She subsequently now has newly diagnosed severe LV dysfunction.    -Has a newly diagnosed systolic cardiomyopathy on 2/22/19.   -Recommend repeat echo after 3 months of optimal medical therapy (i.e. repeat echo after 5/22/19) to determine candidacy for primary prevention ICD.   -If repeat echo after 5/22/19 shows LVEF persists to be less than or equal to 35% then will recommend a dual chamber ICD given underlying SSS.   -No further inpatient EP workup needed as long as tele remains benign  -medical therapy as per cardiology

## 2019-02-26 NOTE — DISCHARGE NOTE ADULT - PLAN OF CARE
Prevent further progression of disease You underwent cardiac catheterization with no intervention. Continue Aspirin, HCTZ, Losartan, Toprol XL, Spironolactone, and Atorvastatin. Low salt, low cholesterol diet. Follow up with Dr. Patterson (Cardiologist) in 1-2 weeks; call [phone] for appointment. Likely secondary to contrast. Stable. Follow up with your primary care physician within 1-2 weeks to monitor your kidney function. Continue optimal medical therapy for NICM with HCTZ, Losartan, Toprol XL, and Spironolactone. Repeat echocardiogram after 5/22/19 to determine candidacy for primary prevention ICD. Follow up with Dr. Mejia (EP); call [phone] for appointment. Low sodium and fat diet, continue anti-hypertensive medications, and follow up with primary care physician. Continue Tamiflu 30mg twice a day through 2/28/19.

## 2019-02-26 NOTE — PROGRESS NOTE ADULT - ASSESSMENT
63 Female everyday smoker with PMH of HTN, HFrEF 49% 2016, COPD (not on Home O2), DVT (15 years ago, AC x 2 years then stop),  Breast ca s/p left lumpectomy s/p Chemotherapy and Radiation Therapy , infra-renal abdominal aortic aneurysm, s/p hysterectomy, Sick cell trait and  DM presented to [facility] with mid sternal chest pain transferred to [facility] for coronary angiogram. s/p cath 2/22, nephrology consulted for mireille

## 2019-02-26 NOTE — DISCHARGE NOTE ADULT - PATIENT PORTAL LINK FT
You can access the [email or web address] Patient Portal, offered by [facility], by registering with the following website: [email or web address]

## 2019-02-26 NOTE — DISCHARGE NOTE ADULT - ADDITIONAL INSTRUCTIONS
no back pain/no vomiting/no shortness of breath/no dizziness/no cough Follow up with your primary care physician within 1-2 weeks to monitor your kidney function.  Follow up with Dr. Patterson (Cardiologist) in 1-2 weeks; call [phone] for appointment.  Follow up with Dr. Mejia (EP); call [phone] for appointment.

## 2019-02-26 NOTE — DISCHARGE NOTE ADULT - SECONDARY DIAGNOSIS.
CHEN (acute kidney injury) NSVT (nonsustained ventricular tachycardia) Essential hypertension Influenza

## 2019-02-26 NOTE — PROGRESS NOTE ADULT - PROVIDER SPECIALTY LIST ADULT
Cardiology
Cardiology
Electrophysiology
Electrophysiology
Internal Medicine
Nephrology
Cardiology

## 2019-02-26 NOTE — PROGRESS NOTE ADULT - SUBJECTIVE AND OBJECTIVE BOX
[facility]   MD ELLYN GARVEY MD ANGELA WONG, PA    TEL:  OFFICE: [phone]  DR BAIRES CELL: [phone]  DR. EWING CELL: [phone]  CANDI HEBERT CELL: [phone]        Patient is a 63y old  Female who presents with a chief complaint of Chest pain (25 Feb 2019 07:00)      Patient seen and examined at bedside. No chest pain/sob    VITALS:  T(F): 99.1 (02-26-19 @ 09:40), Max: 99.6 (02-25-19 @ 17:38)  HR: 67 (02-26-19 @ 09:40)  BP: 126/76 (02-26-19 @ 09:40)  RR: 18 (02-26-19 @ 09:40)  SpO2: 99% (02-26-19 @ 09:40)  Wt(kg): --        PHYSICAL EXAM:  Constitutional: NAD  Neck: No JVD  Respiratory: CTAB, no wheezes, rales or rhonchi  Cardiovascular: S1, S2, RRR  Gastrointestinal: BS+, soft, NT/ND  Extremities: No peripheral edema    Hospital Medications:   MEDICATIONS  (STANDING):  anastrozole 1 milliGRAM(s) Oral daily  aspirin enteric coated 81 milliGRAM(s) Oral daily  atorvastatin 20 milliGRAM(s) Oral at bedtime  heparin  Injectable 5000 Unit(s) SubCutaneous every 12 hours  hydrochlorothiazide 12.5 milliGRAM(s) Oral daily  losartan 100 milliGRAM(s) Oral daily  metoprolol succinate ER 50 milliGRAM(s) Oral daily  oseltamivir 30 milliGRAM(s) Oral two times a day  pantoprazole    Tablet 40 milliGRAM(s) Oral before breakfast  sodium chloride 0.9% lock flush 3 milliLiter(s) IV Push every 8 hours  spironolactone 25 milliGRAM(s) Oral daily      LABS:  02-26    138  |  101  |  24<H>  ----------------------------<  122<H>  3.7   |  23  |  1.31<H>    Ca    9.4      26 Feb 2019 06:34  Phos  4.0     02-25  Mg     2.0     02-26    TPro  8.0  /  Alb  4.1  /  TBili  0.3  /  DBili      /  AST  38<H>  /  ALT  16  /  AlkPhos  96  02-24    Creatinine Trend: 1.31 <--, 1.31 <--, 1.62 <--, 1.05 <--, 1.12 <--, 1.28 <--, 1.32 <--                                12.1   3.77  )-----------( 300      ( 26 Feb 2019 06:34 )             36.9     Urine Studies:  Urinalysis - [02-26-19 @ 09:40]      Color LIGHT YELLOW / Appearance CLEAR / SG 1.021 / pH 6.0      Gluc NEGATIVE / Ketone NEGATIVE  / Bili NEGATIVE / Urobili NORMAL       Blood MODERATE / Protein 10 / Leuk Est NEGATIVE / Nitrite NEGATIVE      RBC >50 / WBC 0-2 / Hyaline NEGATIVE / Gran  / Sq Epi OCC / Non Sq Epi  / Bacteria NEGATIVE      HbA1c 7.0      [02-22-19 @ 10:14]  TSH 1.66      [02-22-19 @ 07:14]  Lipid: chol 251, , HDL 34,       [02-22-19 @ 07:14]    HCV 0.24, Nonreact      [02-22-19 @ 09:43]      RADIOLOGY & ADDITIONAL STUDIES:

## 2019-02-26 NOTE — PROGRESS NOTE ADULT - ASSESSMENT
64 yo F with DM, HTN, HLD, NICM (49% in 2018 echo) after chemotherapy with negative cardiac catheterization, COPD not on home O2, breast CA s/p lumpectomy, chemotherapy/XRT, who presented with chest pain in the setting of NSTEMI    1. NSTEMI:Acute Systolic Heart Failure   Cath-Severe LV Dysfunction EF-30%,-RPL1.  Medical management-Continue Carvedilol and Losartan   Spironolactone.    CHEN:  BMP  Nephro f/up noted.      3. HTN: On losartan, carvedilol    Dc planning.

## 2019-02-28 ENCOUNTER — OTHER (OUTPATIENT)
Age: 64
End: 2019-02-28

## 2019-03-01 LAB
BACTERIA BLD CULT: SIGNIFICANT CHANGE UP
BACTERIA BLD CULT: SIGNIFICANT CHANGE UP

## 2019-03-05 ENCOUNTER — APPOINTMENT (OUTPATIENT)
Dept: INTERNAL MEDICINE | Facility: CLINIC | Age: 64
End: 2019-03-05
Payer: COMMERCIAL

## 2019-03-05 VITALS
BODY MASS INDEX: 27.47 KG/M2 | HEIGHT: 67 IN | OXYGEN SATURATION: 97 % | SYSTOLIC BLOOD PRESSURE: 152 MMHG | RESPIRATION RATE: 16 BRPM | DIASTOLIC BLOOD PRESSURE: 102 MMHG | TEMPERATURE: 97 F | WEIGHT: 175 LBS | HEART RATE: 84 BPM

## 2019-03-05 DIAGNOSIS — I21.4 NON-ST ELEVATION (NSTEMI) MYOCARDIAL INFARCTION: ICD-10-CM

## 2019-03-05 DIAGNOSIS — Z00.00 ENCOUNTER FOR GENERAL ADULT MEDICAL EXAMINATION W/OUT ABNORMAL FINDINGS: ICD-10-CM

## 2019-03-05 DIAGNOSIS — R09.89 OTHER SPECIFIED SYMPTOMS AND SIGNS INVOLVING THE CIRCULATORY AND RESPIRATORY SYSTEMS: ICD-10-CM

## 2019-03-05 DIAGNOSIS — E78.00 PURE HYPERCHOLESTEROLEMIA, UNSPECIFIED: ICD-10-CM

## 2019-03-05 DIAGNOSIS — L98.9 DISORDER OF THE SKIN AND SUBCUTANEOUS TISSUE, UNSPECIFIED: ICD-10-CM

## 2019-03-05 PROCEDURE — 99213 OFFICE O/P EST LOW 20 MIN: CPT | Mod: 25

## 2019-03-05 PROCEDURE — 90732 PPSV23 VACC 2 YRS+ SUBQ/IM: CPT

## 2019-03-05 PROCEDURE — G0009: CPT

## 2019-03-05 PROCEDURE — 99214 OFFICE O/P EST MOD 30 MIN: CPT | Mod: 25

## 2019-03-05 RX ORDER — NAPROXEN SODIUM 220 MG
TABLET ORAL
Refills: 0 | Status: DISCONTINUED | COMMUNITY
End: 2019-03-05

## 2019-03-05 NOTE — PHYSICAL EXAM
[No Acute Distress] : no acute distress [Well Nourished] : well nourished [Well Developed] : well developed [Well-Appearing] : well-appearing [Normal Sclera/Conjunctiva] : normal sclera/conjunctiva [PERRL] : pupils equal round and reactive to light [EOMI] : extraocular movements intact [Normal Outer Ear/Nose] : the outer ears and nose were normal in appearance [Normal Oropharynx] : the oropharynx was normal [No JVD] : no jugular venous distention [Supple] : supple [No Lymphadenopathy] : no lymphadenopathy [No Respiratory Distress] : no respiratory distress  [Clear to Auscultation] : lungs were clear to auscultation bilaterally [No Accessory Muscle Use] : no accessory muscle use [Normal Rate] : normal rate  [Regular Rhythm] : with a regular rhythm [Normal S1, S2] : normal S1 and S2 [No Murmur] : no murmur heard [No Varicosities] : no varicosities [Pedal Pulses Present] : the pedal pulses are present [No Edema] : there was no peripheral edema [Soft] : abdomen soft [Non Tender] : non-tender [Non-distended] : non-distended [No Masses] : no abdominal mass palpated [Normal Posterior Cervical Nodes] : no posterior cervical lymphadenopathy [Normal Anterior Cervical Nodes] : no anterior cervical lymphadenopathy [No CVA Tenderness] : no CVA  tenderness [No Spinal Tenderness] : no spinal tenderness [No Joint Swelling] : no joint swelling [Grossly Normal Strength/Tone] : grossly normal strength/tone [No Rash] : no rash [Normal Gait] : normal gait [Coordination Grossly Intact] : coordination grossly intact [No Focal Deficits] : no focal deficits [Normal Affect] : the affect was normal [Normal Mood] : the mood was normal [Normal Insight/Judgement] : insight and judgment were intact [Comprehensive Foot Exam Normal] : Right and left foot were examined and both feet are normal. No ulcers in either foot. Toes are normal and with full ROM.  Normal tactile sensation with monofilament testing throughout both feet [de-identified] : left carotid bruit

## 2019-03-05 NOTE — ASSESSMENT
[FreeTextEntry1] : 62 yo st.p.recent NSTEMI,small vessel on cath.,NICM,EF 30% recommended medical management and follow up echo in 3 months.\par  uncontrolled HTN due to noncompliance with meds,pt did not take her meds today.T2D,on diet alone,\par -HLP,on statin \par - Recommend to f/u with vascular surgeon for aneurysms. \par - will give pulmonology referral for further evaluation of multiple pulmonary nodules and COPD. \par - recommend compliance with diet and medications and to follow up with cardiology. \par -oncol.f/u\par

## 2019-03-05 NOTE — HISTORY OF PRESENT ILLNESS
[FreeTextEntry1] : follow up  [de-identified] : 62 yo coming in today for follow up after recent hospitalization 02/21/19-02/26/19.. c/o generalized weakness,sleepiness.\par was admitted to [facility] for chest pain and was transferred to [facility] for cardiac cath, which showed NSTEMI,RPLS 100% (small vessel), LVEDP 20, EF 30%, no intervention.\par +Severe LV dysfunction (EF 30%),was started on  Spironolactone. +NSVT - EP (Dr. Mejia) following, on Toprol XL.\par for her NICM, repeat TTE in 3 months.PMH of HTN ARB..Pt currently reports compliance with medications, denies any chest pain , SOB, palpitations,amaurosis,dizziness,syncope,claudication.\par  Pt was also treated for Influenza during the hospital stay. \par \par h/o T2D,on diet alone,refused meds. HLP,on diet. h/o COPD/smoker- recently stopped smoking 2 weeks back.\par  s/p. R lumpectomy and axillary node dissection,RT,ChemoRx for BC,advanced stage,so far no recurrence,spread,on ER antagonist\par . Pt reports having Rt sided upper chest pain upon pressure on the upper ribs, sharp in nature, not related to breathing,no recent fevers/ chills. \par

## 2019-03-05 NOTE — REVIEW OF SYSTEMS
[Negative] : Heme/Lymph [Fatigue] : no fatigue [Chest Pain] : no chest pain [Palpitations] : no palpitations [Leg Claudication] : no leg claudication [Lower Ext Edema] : no lower extremity edema [Orthopnea] : no orthopnea [Paroysmal Nocturnal Dyspnea] : no paroysmal nocturnal dyspnea [Wheezing] : no wheezing [Cough] : no cough [Dyspnea on Exertion] : no dyspnea on exertion [Joint Pain] : no joint pain [Joint Stiffness] : no joint stiffness [Joint Swelling] : no joint swelling [Back Pain] : no back pain [FreeTextEntry9] : R.upper ribs pain on pressure

## 2019-03-05 NOTE — COUNSELING
[Weight management counseling provided] : Weight management [Healthy eating counseling provided] : healthy eating [Activity counseling provided] : activity [Low Fat Diet] : Low fat diet [Low Salt Diet] : Low salt diet [None] : None [Good understanding] : Patient has a good understanding of lifestyle changes and the steps needed to achieve self management goals [de-identified] : ADA diet

## 2019-03-05 NOTE — HEALTH RISK ASSESSMENT
[Intercurrent hospitalizations] : was admitted to the hospital  [de-identified] : Chronic smoker, recently quit 2 weeks back.  [YearQuit] : 2

## 2019-03-13 ENCOUNTER — APPOINTMENT (OUTPATIENT)
Dept: CARDIOLOGY | Facility: CLINIC | Age: 64
End: 2019-03-13
Payer: COMMERCIAL

## 2019-03-13 VITALS
DIASTOLIC BLOOD PRESSURE: 85 MMHG | SYSTOLIC BLOOD PRESSURE: 146 MMHG | HEART RATE: 70 BPM | HEIGHT: 67 IN | BODY MASS INDEX: 27.47 KG/M2 | TEMPERATURE: 98.4 F | WEIGHT: 175 LBS | OXYGEN SATURATION: 99 %

## 2019-03-13 DIAGNOSIS — Z87.891 PERSONAL HISTORY OF NICOTINE DEPENDENCE: ICD-10-CM

## 2019-03-13 PROCEDURE — 99214 OFFICE O/P EST MOD 30 MIN: CPT

## 2019-03-13 NOTE — HISTORY OF PRESENT ILLNESS
[FreeTextEntry1] : 63-year-old female former smoker with DM, HTN, HLD, and NICM (EF 49% in 2018) s/p chemotherapy/XRT with negative cardiac catheterization, who presents for follow up visit. Patient initially presented with chest pain to [facility] on 2/22 in the setting of NSTEMI, with troponins peaking at 60. She was transferred for cardiac catheterization which showed dRPL occlusion without ability to intervene, and patient's echocardiogram showed EF 26% by biplane. Patient reports since her discharge, she has been feeling a lot of fatigue and has had less energy to perform her usual chores. She denies any lower extremity edema, or any kristin dyspnea, orthopnea, or PND. She reports compliance with all medications. She states she quit smoking after this last hospitalization, and has been using nicotine lozenges.

## 2019-03-13 NOTE — ASSESSMENT
[FreeTextEntry1] : 63-year-old female former smoker with DM, HTN, HLD, NICM (EF 26% by echo 02/2019) and recent NSTEMI who presents for post hospitalization visit. \par \par 1.NICM: Patient currently is on metoprolol, losartan, and spironolactone.\par -She has room on her blood pressure, will therefore start patient on Entresto 100 in lieu of losartan\par -Patient appears euvolemic on exam, no need for furosemide at that time. She was instructed on how to check herself for signs and symptoms of heart failure, and to give us a call of there is any change\par -Patient will have repeat echocardiogram after May 22 to assess for improvement in EF\par -If EF remains less than 35%, will have her followup with Dr. Mejia from electrophysiology for consideration of ICD placement for primary prevention of VT\par \par 2.HTN:\par -Currently on losartan, metoprolol and spironolactone\par -Will switch patient from losartan to Entresto, this may help improve BP control\par \par 3.HLD: Continue atorvastatin\par \par Patient will followup in one month for further titration of medications as needed

## 2019-03-14 LAB
ANION GAP SERPL CALC-SCNC: 20 MMOL/L
BUN SERPL-MCNC: 14 MG/DL
CALCIUM SERPL-MCNC: 10 MG/DL
CHLORIDE SERPL-SCNC: 104 MMOL/L
CO2 SERPL-SCNC: 21 MMOL/L
CREAT SERPL-MCNC: 1.15 MG/DL
GLUCOSE SERPL-MCNC: 125 MG/DL
POTASSIUM SERPL-SCNC: 3.9 MMOL/L
SODIUM SERPL-SCNC: 145 MMOL/L

## 2019-03-25 ENCOUNTER — APPOINTMENT (OUTPATIENT)
Dept: DERMATOLOGY | Facility: CLINIC | Age: 64
End: 2019-03-25
Payer: COMMERCIAL

## 2019-03-25 VITALS — SYSTOLIC BLOOD PRESSURE: 114 MMHG | DIASTOLIC BLOOD PRESSURE: 76 MMHG

## 2019-03-25 DIAGNOSIS — Z87.39 PERSONAL HISTORY OF OTHER DISEASES OF THE MUSCULOSKELETAL SYSTEM AND CONNECTIVE TISSUE: ICD-10-CM

## 2019-03-25 DIAGNOSIS — Z87.2 PERSONAL HISTORY OF DISEASES OF THE SKIN AND SUBCUTANEOUS TISSUE: ICD-10-CM

## 2019-03-25 DIAGNOSIS — L82.1 OTHER SEBORRHEIC KERATOSIS: ICD-10-CM

## 2019-03-25 PROCEDURE — 99201 OFFICE OUTPATIENT NEW 10 MINUTES: CPT

## 2019-04-10 ENCOUNTER — APPOINTMENT (OUTPATIENT)
Dept: CARDIOLOGY | Facility: CLINIC | Age: 64
End: 2019-04-10
Payer: COMMERCIAL

## 2019-04-10 VITALS
OXYGEN SATURATION: 98 % | WEIGHT: 179 LBS | BODY MASS INDEX: 28.09 KG/M2 | DIASTOLIC BLOOD PRESSURE: 91 MMHG | HEART RATE: 99 BPM | TEMPERATURE: 97.8 F | HEIGHT: 67 IN | SYSTOLIC BLOOD PRESSURE: 139 MMHG

## 2019-04-10 PROCEDURE — 99214 OFFICE O/P EST MOD 30 MIN: CPT

## 2019-04-10 NOTE — ASSESSMENT
[FreeTextEntry1] : 63-year-old female former smoker with DM, HTN, HLD, NICM (EF 26% by echo 02/2019) and recent NSTEMI who presents for post hospitalization visit. \par \par 1.NICM: Patient currently is on metoprolol,  spironolactone, and Entresto\par -She has room on her blood pressure, will therefore increase Entresto to 97/103 \par -Patient appears euvolemic on exam, no need for furosemide at that time. She was instructed on how to check herself for signs and symptoms of heart failure, and to give us a call of there is any change\par -Patient will have repeat echocardiogram after May 22 to assess for improvement in EF\par -If EF remains less than 35%, will have her followup with Dr. Mejia from electrophysiology for consideration of ICD placement for primary prevention of VT\par \par 2.HTN:\par -Currently on Entresto, metoprolol and spironolactone\par -Will increase dose of Entresto, this will also help improve BP control\par -Will check chem 8 at next visit to asses stability of renal function and K+\par \par 3.HLD: Continue atorvastatin\par \par Patient will followup in one month for further titration of medications as needed

## 2019-04-10 NOTE — HISTORY OF PRESENT ILLNESS
[FreeTextEntry1] : 63-year-old female former smoker with DM, HTN, HLD, and NICM (EF 49% in 2018) s/p chemotherapy/XRT with negative cardiac catheterization, who presents for follow up visit. Patient initially presented with chest pain to [facility] on 2/22 in the setting of NSTEMI, with troponins peaking at 60. She was transferred for cardiac catheterization which showed dRPL occlusion without ability to intervene, and patient's echocardiogram showed EF 26% by biplane.\par \par Since her last visit, patient reports she has been doing well. She continues to be successful in abstaining from further smoking. She is compliant with all medications including Entresto, which was started last visit, and denies any adverse effects. She denies any chest pain, LH, dyspnea, orthopnea, PND, or lower extremity edema.

## 2019-04-12 LAB
ANION GAP SERPL CALC-SCNC: 15 MMOL/L
BUN SERPL-MCNC: 12 MG/DL
CALCIUM SERPL-MCNC: 9.8 MG/DL
CHLORIDE SERPL-SCNC: 104 MMOL/L
CO2 SERPL-SCNC: 27 MMOL/L
CREAT SERPL-MCNC: 1.2 MG/DL
GLUCOSE SERPL-MCNC: 136 MG/DL
POTASSIUM SERPL-SCNC: 3.5 MMOL/L
SODIUM SERPL-SCNC: 146 MMOL/L

## 2019-04-24 ENCOUNTER — APPOINTMENT (OUTPATIENT)
Dept: CARDIOLOGY | Facility: CLINIC | Age: 64
End: 2019-04-24

## 2019-07-03 ENCOUNTER — APPOINTMENT (OUTPATIENT)
Dept: CARDIOLOGY | Facility: CLINIC | Age: 64
End: 2019-07-03
Payer: MEDICARE

## 2019-07-03 VITALS
DIASTOLIC BLOOD PRESSURE: 93 MMHG | HEIGHT: 67 IN | OXYGEN SATURATION: 96 % | SYSTOLIC BLOOD PRESSURE: 135 MMHG | TEMPERATURE: 98.4 F | BODY MASS INDEX: 28.09 KG/M2 | WEIGHT: 179 LBS | HEART RATE: 100 BPM

## 2019-07-03 VITALS — WEIGHT: 184 LBS | BODY MASS INDEX: 28.82 KG/M2

## 2019-07-03 PROCEDURE — 99214 OFFICE O/P EST MOD 30 MIN: CPT

## 2019-07-03 NOTE — HISTORY OF PRESENT ILLNESS
[FreeTextEntry1] : 63-year-old female former smoker with DM, HTN, HLD, and NICM (EF 49% in 2018) s/p chemotherapy/XRT with negative cardiac catheterization, who presents for follow up visit. Patient initially presented with chest pain to [facility] on 2/22 in the setting of NSTEMI, with troponins peaking at 60. She was transferred for cardiac catheterization which showed dRPL occlusion without ability to intervene, and patient's echocardiogram showed EF 26% by biplane.\par \par Since her last visit, patient reports she has been doing about the same. She continues to be successful in abstaining from further smoking. She does mention fatigue and lack of energy, though she still maintains her interests and spends time with friends. She is compliant with all medications including Entresto, and denies any adverse effects. She denies any chest pain, LH, dyspnea, orthopnea, PND, or lower extremity edema. \par \par Of note, patient bumped her left foot 1 week ago. The pain has been improving since that time, but she notes some LE edema on that side.

## 2019-07-03 NOTE — ASSESSMENT
[FreeTextEntry1] : 63-year-old female former smoker with DM, HTN, HLD, NICM (EF 26% by echo 02/2019) and recent NSTEMI who presents for follow-up visit.\par \par 1.NICM: Patient currently is on metoprolol,  spironolactone, and maximum-dose Entresto\par -Patient appears euvolemic on exam, no need for furosemide at that time. She was instructed on how to check herself for signs and symptoms of heart failure, and to give us a call of there is any change\par -Patient given Rx for repeat echocardiogram assess for improvement in EF\par -If EF remains less than 35%, will have her followup with Dr. Mejia from electrophysiology for consideration of ICD placement for primary prevention of VT\par \par 2.HTN:\par -Well controlled on Entresto, metoprolol and spironolactone\par -Given Rx for chem 8 to check renal function\par \par 3.HLD: Continue atorvastatin\par \par 4. LE edema: Will send for LE Duplex to rule out DVT given her history of malignancy and DVT\par -If symptoms do not improve, patient was instructed to follow-up with PMD or urgent care center for further assessment including consideration of X-ray, etc. \par \par FUV in 3 months; in the interim period will call patient with results of echocardiogram.

## 2019-07-05 LAB
ANION GAP SERPL CALC-SCNC: 16 MMOL/L
BUN SERPL-MCNC: 14 MG/DL
CALCIUM SERPL-MCNC: 10.2 MG/DL
CHLORIDE SERPL-SCNC: 102 MMOL/L
CO2 SERPL-SCNC: 27 MMOL/L
CREAT SERPL-MCNC: 1.3 MG/DL
GLUCOSE SERPL-MCNC: 142 MG/DL
POTASSIUM SERPL-SCNC: 3.6 MMOL/L
SODIUM SERPL-SCNC: 145 MMOL/L

## 2019-07-16 ENCOUNTER — OUTPATIENT (OUTPATIENT)
Dept: OUTPATIENT SERVICES | Facility: HOSPITAL | Age: 64
LOS: 1 days | End: 2019-07-16
Payer: COMMERCIAL

## 2019-07-16 ENCOUNTER — APPOINTMENT (OUTPATIENT)
Dept: ULTRASOUND IMAGING | Facility: HOSPITAL | Age: 64
End: 2019-07-16
Payer: MEDICARE

## 2019-07-16 ENCOUNTER — OUTPATIENT (OUTPATIENT)
Dept: OUTPATIENT SERVICES | Facility: HOSPITAL | Age: 64
LOS: 1 days | End: 2019-07-16
Payer: MEDICARE

## 2019-07-16 DIAGNOSIS — Z98.89 OTHER SPECIFIED POSTPROCEDURAL STATES: Chronic | ICD-10-CM

## 2019-07-16 DIAGNOSIS — I82.409 ACUTE EMBOLISM AND THROMBOSIS OF UNSPECIFIED DEEP VEINS OF UNSPECIFIED LOWER EXTREMITY: ICD-10-CM

## 2019-07-16 DIAGNOSIS — D25.9 LEIOMYOMA OF UTERUS, UNSPECIFIED: Chronic | ICD-10-CM

## 2019-07-16 DIAGNOSIS — C50.919 MALIGNANT NEOPLASM OF UNSPECIFIED SITE OF UNSPECIFIED FEMALE BREAST: Chronic | ICD-10-CM

## 2019-07-16 DIAGNOSIS — I42.8 OTHER CARDIOMYOPATHIES: ICD-10-CM

## 2019-07-16 DIAGNOSIS — Z90.710 ACQUIRED ABSENCE OF BOTH CERVIX AND UTERUS: Chronic | ICD-10-CM

## 2019-07-16 PROCEDURE — 93306 TTE W/DOPPLER COMPLETE: CPT | Mod: 26

## 2019-07-16 PROCEDURE — 93971 EXTREMITY STUDY: CPT | Mod: 26,LT

## 2019-07-16 PROCEDURE — 93971 EXTREMITY STUDY: CPT

## 2019-07-16 PROCEDURE — 93306 TTE W/DOPPLER COMPLETE: CPT

## 2019-07-22 NOTE — PATIENT PROFILE ADULT - NSTOBACCOCESSATIONEDU6_GEN_A_NUR
UPDATE TO MEDICAL RECORD--AS OF 8/7/19 PT STILL REFUSING ORDERED TEST/SYSTEMIC WORK UP BY DR. Sandeep Leon to eval poss systemic cause of ocular symptoms, Dr. Roni Powers office attempted x 2 to schedule Temporal Artery/ophthalmic artery Doppler and Carotid Doppler but pt still refuses.  Pt has been advised of reasoning for ordering additional carotid Doppler and TA scan, advised we have received last Doppler in March but again explained pt is having new symptoms and rec repeat. and that even though no blockage seen in retina on exam could be intermittent if blood supply is compromised.  Blood work ordered showed A1C 6.0; Sed Rate 17; CRP 14.3 H.  Pt was rec to seek second opinion in timely manner if does not wish to proceed with recommended testing, again pt was advised with symptoms need to eval for possible systemic cause and should not wait.  Rec pt contact her insurance company to find local retina specialist in her network and schedule an appt.  Pt also cancelled scheduled VF OU 24-2 test and exam 8/5/19. Use the 5 A's (Ask, Advise, Assess, Assist, Arrange)

## 2019-07-25 ENCOUNTER — FORM ENCOUNTER (OUTPATIENT)
Age: 64
End: 2019-07-25

## 2019-07-26 ENCOUNTER — OUTPATIENT (OUTPATIENT)
Dept: OUTPATIENT SERVICES | Facility: HOSPITAL | Age: 64
LOS: 1 days | End: 2019-07-26
Payer: MEDICARE

## 2019-07-26 DIAGNOSIS — I50.9 HEART FAILURE, UNSPECIFIED: ICD-10-CM

## 2019-07-26 DIAGNOSIS — Z98.89 OTHER SPECIFIED POSTPROCEDURAL STATES: Chronic | ICD-10-CM

## 2019-07-26 DIAGNOSIS — D25.9 LEIOMYOMA OF UTERUS, UNSPECIFIED: Chronic | ICD-10-CM

## 2019-07-26 DIAGNOSIS — C50.919 MALIGNANT NEOPLASM OF UNSPECIFIED SITE OF UNSPECIFIED FEMALE BREAST: Chronic | ICD-10-CM

## 2019-07-26 DIAGNOSIS — Z90.710 ACQUIRED ABSENCE OF BOTH CERVIX AND UTERUS: Chronic | ICD-10-CM

## 2019-07-26 PROCEDURE — 78472 GATED HEART PLANAR SINGLE: CPT

## 2019-07-26 PROCEDURE — A9512: CPT

## 2019-07-26 PROCEDURE — A9560: CPT

## 2019-07-26 PROCEDURE — 78473 GATED HEART MULTIPLE: CPT | Mod: 26

## 2019-08-19 ENCOUNTER — APPOINTMENT (OUTPATIENT)
Dept: ELECTROPHYSIOLOGY | Facility: CLINIC | Age: 64
End: 2019-08-19
Payer: MEDICARE

## 2019-08-19 VITALS
HEIGHT: 67 IN | WEIGHT: 185 LBS | DIASTOLIC BLOOD PRESSURE: 118 MMHG | HEART RATE: 72 BPM | SYSTOLIC BLOOD PRESSURE: 167 MMHG | BODY MASS INDEX: 29.03 KG/M2 | OXYGEN SATURATION: 97 %

## 2019-08-19 DIAGNOSIS — R06.00 DYSPNEA, UNSPECIFIED: ICD-10-CM

## 2019-08-19 PROCEDURE — 93000 ELECTROCARDIOGRAM COMPLETE: CPT

## 2019-08-19 PROCEDURE — 99205 OFFICE O/P NEW HI 60 MIN: CPT

## 2019-08-20 ENCOUNTER — OTHER (OUTPATIENT)
Age: 64
End: 2019-08-20

## 2019-08-20 LAB
ALBUMIN SERPL ELPH-MCNC: 4.7 G/DL
ALP BLD-CCNC: 111 U/L
ALT SERPL-CCNC: 5 U/L
ANION GAP SERPL CALC-SCNC: 19 MMOL/L
AST SERPL-CCNC: 13 U/L
BASOPHILS # BLD AUTO: 0.04 K/UL
BASOPHILS NFR BLD AUTO: 0.6 %
BILIRUB SERPL-MCNC: 0.4 MG/DL
BUN SERPL-MCNC: 19 MG/DL
CALCIUM SERPL-MCNC: 10.4 MG/DL
CHLORIDE SERPL-SCNC: 100 MMOL/L
CO2 SERPL-SCNC: 27 MMOL/L
CREAT SERPL-MCNC: 1.22 MG/DL
EOSINOPHIL # BLD AUTO: 0.05 K/UL
EOSINOPHIL NFR BLD AUTO: 0.8 %
HCT VFR BLD CALC: 44.7 %
HGB BLD-MCNC: 14 G/DL
IMM GRANULOCYTES NFR BLD AUTO: 0.3 %
LYMPHOCYTES # BLD AUTO: 2.84 K/UL
LYMPHOCYTES NFR BLD AUTO: 44.4 %
MAN DIFF?: NORMAL
MCHC RBC-ENTMCNC: 28.6 PG
MCHC RBC-ENTMCNC: 31.3 GM/DL
MCV RBC AUTO: 91.2 FL
MONOCYTES # BLD AUTO: 0.27 K/UL
MONOCYTES NFR BLD AUTO: 4.2 %
NEUTROPHILS # BLD AUTO: 3.17 K/UL
NEUTROPHILS NFR BLD AUTO: 49.7 %
PLATELET # BLD AUTO: 391 K/UL
POTASSIUM SERPL-SCNC: 2.8 MMOL/L
PROT SERPL-MCNC: 9.2 G/DL
RBC # BLD: 4.9 M/UL
RBC # FLD: 13.1 %
SODIUM SERPL-SCNC: 146 MMOL/L
WBC # FLD AUTO: 6.39 K/UL

## 2019-08-20 NOTE — HISTORY OF PRESENT ILLNESS
[FreeTextEntry1] : Nakul Doshi MD\par \par Jessy Read is a 63y/o woman with Hx of HTN, HLD, DMII, all of which are stable, breast ca s/p CT/XRT, NICM (LVEF 49% in 2018 with negative LHC), CAD s/p MI-NSTEMI (2/22/2019) and now ICM/chronic systolic CHF, LVEF 30%, who presents today for initial evaluation. Admits increased exertional fatigue and dyspnea. Follows with Cardiologist closely with LVEF > 35% despite 3 mo of OMT. Denies chest pain, palpitations, SOB at rest, syncope or near syncope.\par

## 2019-08-20 NOTE — DISCUSSION/SUMMARY
[FreeTextEntry1] : Jessy Read is a 63y/o woman with Hx of HTN, HLD, DMII, all of which are stable, breast ca s/p CT/XRT, NICM (LVEF 49% in 2018 with negative LHC), CAD s/p MI-NSTEMI (2/22/2019) and now ICM/chronic systolic CHF, LVEF 30%, who presents today for initial evaluation.\par \par Impression:\par \par 1. Chronic systolic CHF: NYHA Class II symptoms. Has been maintained on OMT > 3 mo with no improvement in LVEF. Recommend undergoing placement of ICD for primary prevention of SCD. A thorough discussion was had with the patient concerning all aspects of ICD therapy. We reviewed the data supporting ICD therapy and how it applies individually.  We discussed the procedures, risks and outcomes of ICD implantation an living with an ICD. We discussed management of ICD therapy throughout life, including deactivation of the ICD. After all questions were answered, and literature from the [facility] was provided, it was a shared decision to proceed with ICD therapy. Prefers subcutaneous placement. Screened by [employer] and deemed appropriate candidate.\par \par 2. HTN: resume oral antihypertensives as prescribed. Encouraged heart healthy diet, sodium restriction, and weight loss. Continue regular f/u with Cardiologist for further HTN management.\par \par 3. HLD: resume statin therapy as prescribed and regular f/u with Cardiologist for routine lipid monitoring and management.\par \par Will schedule for subcutaneous ICD placement. \par

## 2019-08-20 NOTE — PHYSICAL EXAM
[General Appearance - Well Developed] : well developed [Normal Appearance] : normal appearance [Well Groomed] : well groomed [General Appearance - Well Nourished] : well nourished [No Deformities] : no deformities [General Appearance - In No Acute Distress] : no acute distress [Eyelids - No Xanthelasma] : the eyelids demonstrated no xanthelasmas [Normal Conjunctiva] : the conjunctiva exhibited no abnormalities [No Oral Pallor] : no oral pallor [Normal Oral Mucosa] : normal oral mucosa [No Oral Cyanosis] : no oral cyanosis [Normal Jugular Venous A Waves Present] : normal jugular venous A waves present [Normal Jugular Venous V Waves Present] : normal jugular venous V waves present [No Jugular Venous Olivo A Waves] : no jugular venous olivo A waves [Heart Rate And Rhythm] : heart rate and rhythm were normal [Heart Sounds] : normal S1 and S2 [Murmurs] : no murmurs present [Respiration, Rhythm And Depth] : normal respiratory rhythm and effort [Exaggerated Use Of Accessory Muscles For Inspiration] : no accessory muscle use [Auscultation Breath Sounds / Voice Sounds] : lungs were clear to auscultation bilaterally [Abdomen Soft] : soft [Abdomen Tenderness] : non-tender [Abdomen Mass (___ Cm)] : no abdominal mass palpated [Gait - Sufficient For Exercise Testing] : the gait was sufficient for exercise testing [Abnormal Walk] : normal gait [Nail Clubbing] : no clubbing of the fingernails [Cyanosis, Localized] : no localized cyanosis [Petechial Hemorrhages (___cm)] : no petechial hemorrhages [Skin Color & Pigmentation] : normal skin color and pigmentation [] : no rash [No Venous Stasis] : no venous stasis [Skin Lesions] : no skin lesions [No Skin Ulcers] : no skin ulcer [No Xanthoma] : no  xanthoma was observed [Oriented To Time, Place, And Person] : oriented to person, place, and time [Affect] : the affect was normal [Mood] : the mood was normal [No Anxiety] : not feeling anxious

## 2019-08-23 ENCOUNTER — APPOINTMENT (OUTPATIENT)
Dept: VASCULAR SURGERY | Facility: CLINIC | Age: 64
End: 2019-08-23
Payer: MEDICARE

## 2019-08-23 LAB
ANION GAP SERPL CALC-SCNC: 16 MMOL/L
BUN SERPL-MCNC: 15 MG/DL
CALCIUM SERPL-MCNC: 10.1 MG/DL
CHLORIDE SERPL-SCNC: 100 MMOL/L
CO2 SERPL-SCNC: 28 MMOL/L
CREAT SERPL-MCNC: 1.25 MG/DL
GLUCOSE SERPL-MCNC: 141 MG/DL
POTASSIUM SERPL-SCNC: 3.6 MMOL/L
SODIUM SERPL-SCNC: 144 MMOL/L

## 2019-08-23 PROCEDURE — 99213 OFFICE O/P EST LOW 20 MIN: CPT

## 2019-08-23 PROCEDURE — 93978 VASCULAR STUDY: CPT

## 2019-08-23 NOTE — HISTORY OF PRESENT ILLNESS
[FreeTextEntry1] : the patient is a 62-year-old with history of abdominal aortic aneurysm. No complaint of abdominal pain back pain. Patient is a current heavy smoker. No complaint of claudication or rest pain. No history of CVA or TIA.

## 2019-08-23 NOTE — ASSESSMENT
[FreeTextEntry1] : patient with asymptomatic 3.6 cm abdominal aortic aneurysm which is not growing. No intervention. Followup in one year.\par \par Patient with left carotid bruit. No evidence of significant carotid stenosis. Continue antiplatelet therapy.\par \par Smoking cessation.

## 2019-08-23 NOTE — PHYSICAL EXAM
[JVD] : no jugular venous distention  [Normal Breath Sounds] : Normal breath sounds [Normal Heart Sounds] : normal heart sounds [Right Carotid Bruit] : right carotid bruit heard [2+] : left 2+ [Ankle Swelling (On Exam)] : not present [Varicose Veins Of Lower Extremities] : not present [Abdomen Masses] : No abdominal masses [] : not present [Skin Ulcer] : no ulcer [Oriented to Place] : oriented to place

## 2019-08-24 ENCOUNTER — NON-APPOINTMENT (OUTPATIENT)
Age: 64
End: 2019-08-24

## 2019-08-27 ENCOUNTER — APPOINTMENT (OUTPATIENT)
Dept: ULTRASOUND IMAGING | Facility: HOSPITAL | Age: 64
End: 2019-08-27

## 2019-08-27 ENCOUNTER — APPOINTMENT (OUTPATIENT)
Dept: MAMMOGRAPHY | Facility: HOSPITAL | Age: 64
End: 2019-08-27

## 2019-09-03 ENCOUNTER — OUTPATIENT (OUTPATIENT)
Dept: OUTPATIENT SERVICES | Facility: HOSPITAL | Age: 64
LOS: 1 days | End: 2019-09-03
Payer: MEDICARE

## 2019-09-03 ENCOUNTER — APPOINTMENT (OUTPATIENT)
Dept: ULTRASOUND IMAGING | Facility: HOSPITAL | Age: 64
End: 2019-09-03

## 2019-09-03 ENCOUNTER — APPOINTMENT (OUTPATIENT)
Dept: MAMMOGRAPHY | Facility: HOSPITAL | Age: 64
End: 2019-09-03

## 2019-09-03 DIAGNOSIS — C50.919 MALIGNANT NEOPLASM OF UNSPECIFIED SITE OF UNSPECIFIED FEMALE BREAST: Chronic | ICD-10-CM

## 2019-09-03 DIAGNOSIS — D24.9 BENIGN NEOPLASM OF UNSPECIFIED BREAST: ICD-10-CM

## 2019-09-03 DIAGNOSIS — Z90.710 ACQUIRED ABSENCE OF BOTH CERVIX AND UTERUS: Chronic | ICD-10-CM

## 2019-09-03 DIAGNOSIS — D25.9 LEIOMYOMA OF UTERUS, UNSPECIFIED: Chronic | ICD-10-CM

## 2019-09-03 DIAGNOSIS — Z98.89 OTHER SPECIFIED POSTPROCEDURAL STATES: Chronic | ICD-10-CM

## 2019-09-03 DIAGNOSIS — Z12.31 ENCOUNTER FOR SCREENING MAMMOGRAM FOR MALIGNANT NEOPLASM OF BREAST: ICD-10-CM

## 2019-09-03 PROCEDURE — 76641 ULTRASOUND BREAST COMPLETE: CPT | Mod: 26,50

## 2019-09-03 PROCEDURE — G0279: CPT

## 2019-09-03 PROCEDURE — 77066 DX MAMMO INCL CAD BI: CPT

## 2019-09-03 PROCEDURE — G0279: CPT | Mod: 26

## 2019-09-03 PROCEDURE — 76641 ULTRASOUND BREAST COMPLETE: CPT

## 2019-09-03 PROCEDURE — 77066 DX MAMMO INCL CAD BI: CPT | Mod: 26

## 2019-10-02 ENCOUNTER — APPOINTMENT (OUTPATIENT)
Dept: CARDIOLOGY | Facility: CLINIC | Age: 64
End: 2019-10-02
Payer: MEDICARE

## 2019-10-02 ENCOUNTER — APPOINTMENT (OUTPATIENT)
Dept: ELECTROPHYSIOLOGY | Facility: CLINIC | Age: 64
End: 2019-10-02

## 2019-10-02 VITALS
HEART RATE: 85 BPM | SYSTOLIC BLOOD PRESSURE: 147 MMHG | BODY MASS INDEX: 28.41 KG/M2 | OXYGEN SATURATION: 97 % | HEIGHT: 67 IN | WEIGHT: 181 LBS | TEMPERATURE: 98.1 F | DIASTOLIC BLOOD PRESSURE: 92 MMHG

## 2019-10-02 PROCEDURE — 99213 OFFICE O/P EST LOW 20 MIN: CPT

## 2019-10-02 NOTE — HISTORY OF PRESENT ILLNESS
[FreeTextEntry1] : 64-year-old female former smoker with DM, HTN, HLD, breast CA s/p chemo/XRT, and NICM (catheterization 02/2019 showed dRPL occlusion without ability to intervene), with latest EF 31% by MUGA 7/2019, who presents for follow-up visit. \par \par Patient ports she has been feeling well overall. She noted some dyspnea today when taking the garbage out but she notes it is a particularly hot day. She denies any lower extremity edema, orthopnea, or PND. She reports compliance with all medications. The patient was evaluated by electrophysiologist and is pending subQ ICU placement in a few days.\par \par

## 2019-10-02 NOTE — ASSESSMENT
[FreeTextEntry1] : 64-year-old female former smoker with DM, HTN, HLD, NICM (EF 31% by MUGA 07/2019) who presents for post hospitalization visit. \par \par 1.NICM: Patient currently is on metoprolol,  spironolactone, and Entresto\par -Patient is euvoelmic on exam\par -Patient has been on OMT for > 3 months without improvement in EF. She was evaluated by EP and is pending placement of SubQ ICD later this month (11/10/2019)\par \par 2.HTN:\par -Currently on max dose Entresto, metoprolol and spironolactone\par -Renal function preserved, will re-check chem 8 by the end of the year\par \par 3.HLD: Continue atorvastatin\par \par FUV 1 month after ICD placement

## 2019-10-07 ENCOUNTER — APPOINTMENT (OUTPATIENT)
Dept: ELECTROPHYSIOLOGY | Facility: CLINIC | Age: 64
End: 2019-10-07
Payer: MEDICARE

## 2019-10-07 ENCOUNTER — OTHER (OUTPATIENT)
Age: 64
End: 2019-10-07

## 2019-10-07 ENCOUNTER — LABORATORY RESULT (OUTPATIENT)
Age: 64
End: 2019-10-07

## 2019-10-07 DIAGNOSIS — Z12.11 ENCOUNTER FOR SCREENING FOR MALIGNANT NEOPLASM OF COLON: ICD-10-CM

## 2019-10-07 PROCEDURE — 36415 COLL VENOUS BLD VENIPUNCTURE: CPT | Mod: PD

## 2019-10-08 LAB
ANION GAP SERPL CALC-SCNC: 14 MMOL/L
BASOPHILS # BLD AUTO: 0.05 K/UL
BASOPHILS NFR BLD AUTO: 0.9 %
BUN SERPL-MCNC: 20 MG/DL
CALCIUM SERPL-MCNC: 10.3 MG/DL
CHLORIDE SERPL-SCNC: 99 MMOL/L
CO2 SERPL-SCNC: 27 MMOL/L
CREAT SERPL-MCNC: 1.22 MG/DL
EOSINOPHIL # BLD AUTO: 0 K/UL
EOSINOPHIL NFR BLD AUTO: 0 %
GLUCOSE SERPL-MCNC: 127 MG/DL
HCT VFR BLD CALC: 39.5 %
HGB BLD-MCNC: 12.7 G/DL
LYMPHOCYTES # BLD AUTO: 2.2 K/UL
LYMPHOCYTES NFR BLD AUTO: 41.7 %
MAN DIFF?: NORMAL
MCHC RBC-ENTMCNC: 28.8 PG
MCHC RBC-ENTMCNC: 32.2 GM/DL
MCV RBC AUTO: 89.6 FL
MONOCYTES # BLD AUTO: 0.23 K/UL
MONOCYTES NFR BLD AUTO: 4.3 %
NEUTROPHILS # BLD AUTO: 2.75 K/UL
NEUTROPHILS NFR BLD AUTO: 52.2 %
PLATELET # BLD AUTO: 400 K/UL
POTASSIUM SERPL-SCNC: 3.4 MMOL/L
RBC # BLD: 4.41 M/UL
RBC # FLD: 13.9 %
SODIUM SERPL-SCNC: 140 MMOL/L
WBC # FLD AUTO: 5.27 K/UL

## 2019-10-09 ENCOUNTER — INPATIENT (INPATIENT)
Facility: HOSPITAL | Age: 64
LOS: 0 days | Discharge: HOME | End: 2019-10-10
Attending: STUDENT IN AN ORGANIZED HEALTH CARE EDUCATION/TRAINING PROGRAM | Admitting: STUDENT IN AN ORGANIZED HEALTH CARE EDUCATION/TRAINING PROGRAM
Payer: MEDICARE

## 2019-10-09 VITALS
DIASTOLIC BLOOD PRESSURE: 92 MMHG | TEMPERATURE: 98 F | HEART RATE: 80 BPM | RESPIRATION RATE: 18 BRPM | SYSTOLIC BLOOD PRESSURE: 156 MMHG | OXYGEN SATURATION: 100 %

## 2019-10-09 DIAGNOSIS — C50.919 MALIGNANT NEOPLASM OF UNSPECIFIED SITE OF UNSPECIFIED FEMALE BREAST: Chronic | ICD-10-CM

## 2019-10-09 DIAGNOSIS — Z90.710 ACQUIRED ABSENCE OF BOTH CERVIX AND UTERUS: Chronic | ICD-10-CM

## 2019-10-09 DIAGNOSIS — Z98.89 OTHER SPECIFIED POSTPROCEDURAL STATES: Chronic | ICD-10-CM

## 2019-10-09 DIAGNOSIS — D25.9 LEIOMYOMA OF UTERUS, UNSPECIFIED: Chronic | ICD-10-CM

## 2019-10-09 DIAGNOSIS — I50.22 CHRONIC SYSTOLIC (CONGESTIVE) HEART FAILURE: ICD-10-CM

## 2019-10-09 LAB — GLUCOSE BLDC GLUCOMTR-MCNC: 113 MG/DL — HIGH (ref 70–99)

## 2019-10-09 PROCEDURE — 71045 X-RAY EXAM CHEST 1 VIEW: CPT | Mod: 26

## 2019-10-09 PROCEDURE — 33270 INS/REP SUBQ DEFIBRILLATOR: CPT

## 2019-10-09 PROCEDURE — 93010 ELECTROCARDIOGRAM REPORT: CPT

## 2019-10-09 RX ORDER — SPIRONOLACTONE 25 MG/1
25 TABLET, FILM COATED ORAL DAILY
Refills: 0 | Status: DISCONTINUED | OUTPATIENT
Start: 2019-10-09 | End: 2019-10-10

## 2019-10-09 RX ORDER — ANASTROZOLE 1 MG/1
1 TABLET ORAL DAILY
Refills: 0 | Status: DISCONTINUED | OUTPATIENT
Start: 2019-10-09 | End: 2019-10-10

## 2019-10-09 RX ORDER — METOPROLOL TARTRATE 50 MG
50 TABLET ORAL DAILY
Refills: 0 | Status: DISCONTINUED | OUTPATIENT
Start: 2019-10-09 | End: 2019-10-10

## 2019-10-09 RX ORDER — ATORVASTATIN CALCIUM 80 MG/1
20 TABLET, FILM COATED ORAL AT BEDTIME
Refills: 0 | Status: DISCONTINUED | OUTPATIENT
Start: 2019-10-09 | End: 2019-10-10

## 2019-10-09 RX ORDER — ASPIRIN/CALCIUM CARB/MAGNESIUM 324 MG
81 TABLET ORAL DAILY
Refills: 0 | Status: DISCONTINUED | OUTPATIENT
Start: 2019-10-09 | End: 2019-10-10

## 2019-10-09 RX ORDER — SACUBITRIL AND VALSARTAN 24; 26 MG/1; MG/1
1 TABLET, FILM COATED ORAL
Refills: 0 | Status: DISCONTINUED | OUTPATIENT
Start: 2019-10-09 | End: 2019-10-10

## 2019-10-09 RX ORDER — PANTOPRAZOLE SODIUM 20 MG/1
1 TABLET, DELAYED RELEASE ORAL
Qty: 0 | Refills: 0 | DISCHARGE

## 2019-10-09 RX ORDER — CHOLECALCIFEROL (VITAMIN D3) 125 MCG
1000 CAPSULE ORAL
Refills: 0 | Status: DISCONTINUED | OUTPATIENT
Start: 2019-10-09 | End: 2019-10-10

## 2019-10-09 RX ORDER — SODIUM CHLORIDE 9 MG/ML
3 INJECTION INTRAMUSCULAR; INTRAVENOUS; SUBCUTANEOUS EVERY 8 HOURS
Refills: 0 | Status: DISCONTINUED | OUTPATIENT
Start: 2019-10-09 | End: 2019-10-10

## 2019-10-09 RX ORDER — CEFAZOLIN SODIUM 1 G
1000 VIAL (EA) INJECTION EVERY 8 HOURS
Refills: 0 | Status: COMPLETED | OUTPATIENT
Start: 2019-10-09 | End: 2019-10-10

## 2019-10-09 RX ORDER — LOSARTAN/HYDROCHLOROTHIAZIDE 100MG-25MG
1 TABLET ORAL
Qty: 0 | Refills: 0 | DISCHARGE

## 2019-10-09 RX ADMIN — Medication 100 MILLIGRAM(S): at 21:52

## 2019-10-09 RX ADMIN — SODIUM CHLORIDE 3 MILLILITER(S): 9 INJECTION INTRAMUSCULAR; INTRAVENOUS; SUBCUTANEOUS at 21:37

## 2019-10-09 RX ADMIN — Medication 50 MILLIGRAM(S): at 19:05

## 2019-10-09 RX ADMIN — Medication 1000 UNIT(S): at 21:52

## 2019-10-09 RX ADMIN — SACUBITRIL AND VALSARTAN 1 TABLET(S): 24; 26 TABLET, FILM COATED ORAL at 21:51

## 2019-10-09 NOTE — H&P CARDIOLOGY - HISTORY OF PRESENT ILLNESS
65 y/o F w/ PMH of HTN, HLD, DMII, Breast CA s/p CT/XRT, ICM, HFrEF 30% and CAD s/p MI-NSTEMI (2/22/2019) presents for subcutaneous ICD placement. Pt has been maintained on OMT > 3 mo with no improvement in LVEF. Pt has agreed to undergo elective placement of ICD for primary prevention of SCD. Pt denies N/V/D, fevers, chills, cough, palpitations, chest pain, syncope, dyspnea on exertion, orthopnea, nocturnal paroxysmal dyspnea, edema, cyanosis, heart murmurs, varicosities, phlebitis, claudication.

## 2019-10-09 NOTE — H&P CARDIOLOGY - PSH
Breast cancer  infusaport insertion- 2014- left chest  Fibroid uterus  sp hystrectomy 1196  Wallace filter in place  1994  S/P hysterectomy  1994  S/P lumpectomy, right breast  2014, revision of position- 2015

## 2019-10-09 NOTE — H&P CARDIOLOGY - RS GEN PE MLT RESP DETAILS PC
clear to auscultation bilaterally/breath sounds equal/respirations non-labored/airway patent/no chest wall tenderness/good air movement

## 2019-10-10 ENCOUNTER — TRANSCRIPTION ENCOUNTER (OUTPATIENT)
Age: 64
End: 2019-10-10

## 2019-10-10 VITALS
DIASTOLIC BLOOD PRESSURE: 89 MMHG | TEMPERATURE: 98 F | OXYGEN SATURATION: 100 % | RESPIRATION RATE: 16 BRPM | HEART RATE: 63 BPM | SYSTOLIC BLOOD PRESSURE: 157 MMHG

## 2019-10-10 LAB
ANION GAP SERPL CALC-SCNC: 17 MMO/L — HIGH (ref 7–14)
B PERT DNA SPEC QL NAA+PROBE: NOT DETECTED — SIGNIFICANT CHANGE UP
BUN SERPL-MCNC: 20 MG/DL — SIGNIFICANT CHANGE UP (ref 7–23)
C PNEUM DNA SPEC QL NAA+PROBE: NOT DETECTED — SIGNIFICANT CHANGE UP
CALCIUM SERPL-MCNC: 9.7 MG/DL — SIGNIFICANT CHANGE UP (ref 8.4–10.5)
CHLORIDE SERPL-SCNC: 97 MMOL/L — LOW (ref 98–107)
CO2 SERPL-SCNC: 24 MMOL/L — SIGNIFICANT CHANGE UP (ref 22–31)
CREAT SERPL-MCNC: 1.19 MG/DL — SIGNIFICANT CHANGE UP (ref 0.5–1.3)
FLUAV H1 2009 PAND RNA SPEC QL NAA+PROBE: NOT DETECTED — SIGNIFICANT CHANGE UP
FLUAV H1 RNA SPEC QL NAA+PROBE: NOT DETECTED — SIGNIFICANT CHANGE UP
FLUAV H3 RNA SPEC QL NAA+PROBE: NOT DETECTED — SIGNIFICANT CHANGE UP
FLUAV SUBTYP SPEC NAA+PROBE: NOT DETECTED — SIGNIFICANT CHANGE UP
FLUBV RNA SPEC QL NAA+PROBE: NOT DETECTED — SIGNIFICANT CHANGE UP
GLUCOSE SERPL-MCNC: 216 MG/DL — HIGH (ref 70–99)
HADV DNA SPEC QL NAA+PROBE: NOT DETECTED — SIGNIFICANT CHANGE UP
HCOV PNL SPEC NAA+PROBE: SIGNIFICANT CHANGE UP
HCT VFR BLD CALC: 35.5 % — SIGNIFICANT CHANGE UP (ref 34.5–45)
HGB BLD-MCNC: 11.2 G/DL — LOW (ref 11.5–15.5)
HMPV RNA SPEC QL NAA+PROBE: NOT DETECTED — SIGNIFICANT CHANGE UP
HPIV1 RNA SPEC QL NAA+PROBE: NOT DETECTED — SIGNIFICANT CHANGE UP
HPIV2 RNA SPEC QL NAA+PROBE: NOT DETECTED — SIGNIFICANT CHANGE UP
HPIV3 RNA SPEC QL NAA+PROBE: NOT DETECTED — SIGNIFICANT CHANGE UP
HPIV4 RNA SPEC QL NAA+PROBE: NOT DETECTED — SIGNIFICANT CHANGE UP
MAGNESIUM SERPL-MCNC: 1.7 MG/DL — SIGNIFICANT CHANGE UP (ref 1.6–2.6)
MCHC RBC-ENTMCNC: 28 PG — SIGNIFICANT CHANGE UP (ref 27–34)
MCHC RBC-ENTMCNC: 31.5 % — LOW (ref 32–36)
MCV RBC AUTO: 88.8 FL — SIGNIFICANT CHANGE UP (ref 80–100)
NRBC # FLD: 0 K/UL — SIGNIFICANT CHANGE UP (ref 0–0)
PHOSPHATE SERPL-MCNC: 2.9 MG/DL — SIGNIFICANT CHANGE UP (ref 2.5–4.5)
PLATELET # BLD AUTO: 364 K/UL — SIGNIFICANT CHANGE UP (ref 150–400)
PMV BLD: 10.5 FL — SIGNIFICANT CHANGE UP (ref 7–13)
POTASSIUM SERPL-MCNC: 3.7 MMOL/L — SIGNIFICANT CHANGE UP (ref 3.5–5.3)
POTASSIUM SERPL-SCNC: 3.7 MMOL/L — SIGNIFICANT CHANGE UP (ref 3.5–5.3)
RBC # BLD: 4 M/UL — SIGNIFICANT CHANGE UP (ref 3.8–5.2)
RBC # FLD: 14.4 % — SIGNIFICANT CHANGE UP (ref 10.3–14.5)
RSV RNA SPEC QL NAA+PROBE: NOT DETECTED — SIGNIFICANT CHANGE UP
RV+EV RNA SPEC QL NAA+PROBE: NOT DETECTED — SIGNIFICANT CHANGE UP
SODIUM SERPL-SCNC: 138 MMOL/L — SIGNIFICANT CHANGE UP (ref 135–145)
WBC # BLD: 9.32 K/UL — SIGNIFICANT CHANGE UP (ref 3.8–10.5)
WBC # FLD AUTO: 9.32 K/UL — SIGNIFICANT CHANGE UP (ref 3.8–10.5)

## 2019-10-10 PROCEDURE — 99232 SBSQ HOSP IP/OBS MODERATE 35: CPT

## 2019-10-10 RX ORDER — NICOTINE POLACRILEX 2 MG
1 GUM BUCCAL
Qty: 30 | Refills: 0
Start: 2019-10-10 | End: 2019-11-08

## 2019-10-10 RX ORDER — NICOTINE POLACRILEX 2 MG
1 GUM BUCCAL DAILY
Refills: 0 | Status: DISCONTINUED | OUTPATIENT
Start: 2019-10-10 | End: 2019-10-10

## 2019-10-10 RX ADMIN — SPIRONOLACTONE 25 MILLIGRAM(S): 25 TABLET, FILM COATED ORAL at 05:11

## 2019-10-10 RX ADMIN — SACUBITRIL AND VALSARTAN 1 TABLET(S): 24; 26 TABLET, FILM COATED ORAL at 11:53

## 2019-10-10 RX ADMIN — SODIUM CHLORIDE 3 MILLILITER(S): 9 INJECTION INTRAMUSCULAR; INTRAVENOUS; SUBCUTANEOUS at 05:08

## 2019-10-10 RX ADMIN — Medication 100 MILLIGRAM(S): at 05:11

## 2019-10-10 RX ADMIN — Medication 1 PATCH: at 11:57

## 2019-10-10 RX ADMIN — ANASTROZOLE 1 MILLIGRAM(S): 1 TABLET ORAL at 11:57

## 2019-10-10 RX ADMIN — Medication 81 MILLIGRAM(S): at 11:57

## 2019-10-10 RX ADMIN — Medication 50 MILLIGRAM(S): at 05:10

## 2019-10-10 RX ADMIN — Medication 1000 UNIT(S): at 05:10

## 2019-10-10 NOTE — DISCHARGE NOTE PROVIDER - PROVIDER TOKENS
PROVIDER:[TOKEN:[[id number]]] PROVIDER:[TOKEN:[[id number]]],PROVIDER:[TOKEN:[[id number]]],PROVIDER:[TOKEN:[[id number]]]

## 2019-10-10 NOTE — DISCHARGE NOTE PROVIDER - NSFOLLOWUPCLINICS_GEN_ALL_ED_FT
[facility] Pulmonolgy and Sleep Medicine  Pulmonology  [address] [place] [address]  Phone: [phone]  Fax:   Follow Up Time:

## 2019-10-10 NOTE — DISCHARGE NOTE PROVIDER - NSDCCPCAREPLAN_GEN_ALL_CORE_FT
PRINCIPAL DISCHARGE DIAGNOSIS  Diagnosis: Systolic CHF  Assessment and Plan of Treatment: During your hospitalization, an ICD (defibrillator) was implanted by electrophysiology.  Continue medications as prescribed.  Low salt, fluid restricted (less than 1.5 L/day) diet.  Monitor your daily morning weight.  Follow up with EP ____  Follow up with your PCP & cardiologist within 1-2 weeks; call for appointments.      SECONDARY DISCHARGE DIAGNOSES  Diagnosis: Smoker  Assessment and Plan of Treatment: It is highly recommended that you abstain from smoking as this habit can cause & contribute to a multitude of health conditions.  Continue nicotine patch as directed.  Follow up with your PCP.    Diagnosis: DM (diabetes mellitus)  Assessment and Plan of Treatment: Monitor finger sticks pre-meal and bedtime, low salt, fat and carbohydrate diet, minimize glucose intake.  Exercise daily for at least 30 minutes and weight loss.  Follow up with primary care physician and endocrinologist for routine Hemoglobin A1C checks and management.  Follow up with your ophthalmologist for routine yearly vision exams.    Diagnosis: Breast cancer  Assessment and Plan of Treatment: Follow up with your oncologist routinely.    Diagnosis: CAD (coronary artery disease)  Assessment and Plan of Treatment: Continue aspirin and do not stop unless instructed by your physician.  Continue low salt, fat, cholesterol and carbohydrate diet. Follow up with cardiologist and primary care physician's routine appointment.    Diagnosis: HLD (hyperlipidemia)  Assessment and Plan of Treatment: Low fat diet, exercise daily and continue current medications. Follow up with primary care physician and cardiologist for management.    Diagnosis: HTN (hypertension)  Assessment and Plan of Treatment: Low sodium and fat diet, continue anti-hypertensive medications, and follow up with primary care physician. PRINCIPAL DISCHARGE DIAGNOSIS  Diagnosis: Systolic CHF  Assessment and Plan of Treatment: During your hospitalization, an ICD (defibrillator) was implanted by electrophysiology.  Continue medications as prescribed.  Low salt, fluid restricted (less than 1.5 L/day) diet.  Monitor your daily morning weight.  Follow up with EP on 10/24/19 at 9:45am (an appointment has been made for you).   Follow up with your PCP & cardiologist within 1-2 weeks; call for appointments.      SECONDARY DISCHARGE DIAGNOSES  Diagnosis: Pulmonary nodule, left  Assessment and Plan of Treatment: Your CT scan in February 2019 revealed a left upper lobe nodule.  This nodule was not seen on your CXR from this hospitalization.  Follow up with your PCP within 1-2 weeks for ongoing monitoring and chest imaging to monitor this nodule; call for appointment.  You should also follow up with a pulmonologist within 2 weeks; call for appointment.    Diagnosis: H/O aneurysm  Assessment and Plan of Treatment: You have a history of aneursyms (descending aortic aneurysm, suprarenal abdominal aortic aneurysm & infrarenal aortic aneurysm).   It is imperative that you follow up with your vascular surgeon Dr. Gutierrez for close monitoring of the size of your aneurysms.  Follow up with Dr. Gutierrez within 2 weeks; call for appointment.  You should have outpatient imaging of your aneurysms as directed by Dr. Gutierrez.    Diagnosis: Smoker  Assessment and Plan of Treatment: It is highly recommended that you abstain from smoking as this habit can cause & contribute to a multitude of health conditions.  Continue nicotine patch as directed.  Follow up with your PCP.    Diagnosis: DM (diabetes mellitus)  Assessment and Plan of Treatment: Monitor finger sticks pre-meal and bedtime, low salt, fat and carbohydrate diet, minimize glucose intake.  Exercise daily for at least 30 minutes and weight loss.  Follow up with primary care physician and endocrinologist for routine Hemoglobin A1C checks and management.  Follow up with your ophthalmologist for routine yearly vision exams.    Diagnosis: Breast cancer  Assessment and Plan of Treatment: Follow up with your oncologist routinely.    Diagnosis: CAD (coronary artery disease)  Assessment and Plan of Treatment: Continue aspirin and do not stop unless instructed by your physician.  Continue low salt, fat, cholesterol and carbohydrate diet. Follow up with cardiologist and primary care physician's routine appointment.    Diagnosis: HLD (hyperlipidemia)  Assessment and Plan of Treatment: Low fat diet, exercise daily and continue current medications. Follow up with primary care physician and cardiologist for management.    Diagnosis: HTN (hypertension)  Assessment and Plan of Treatment: Low sodium and fat diet, continue anti-hypertensive medications, and follow up with primary care physician.

## 2019-10-10 NOTE — DISCHARGE NOTE PROVIDER - NSDCFUSCHEDAPPT_GEN_ALL_CORE_FT
RISHABH HUNT ; 10/24/2019 ; NPP Cardio Electro [address]  RISHABH HUNT ; 11/06/2019 ; NPP Med [address] 25 d d  RISHABH HUNT ; 12/10/2019 ; NPP Cardio [address] [facility] RISHABH HUNT ; 10/24/2019 ; NPP Cardio Electro [address]  RISHABH HUNT ; 11/06/2019 ; NPP Med [address] 25 d d  RISHABH HUNT ; 12/10/2019 ; NPP Cardio [address] [facility] RISHABH HUNT ; 10/24/2019 ; NPP Cardio Electro [address]  RISHABH HUNT ; 11/06/2019 ; NPP Med [address] 25 d d  RISHABH HUNT ; 12/10/2019 ; NPP Cardio 95 25 [facility]

## 2019-10-10 NOTE — CHART NOTE - NSCHARTNOTEFT_GEN_A_CORE
Patient is s/p AICD placement. AICD site is clean, dry and intact.  No hematoma or swelling. Patient states she feels like as if she is getting flu. CXR: prelim: No pneumothorax. f/u official results. RVP ordered. Will continue to monitor.

## 2019-10-10 NOTE — DISCHARGE NOTE PROVIDER - INSTRUCTIONS
Continue diet modification. Avoid complex carbohydrates such as bread, pasta, cereal, white rice, white potatoes, etc. Avoid concentrated sugar as found in desserts, candy, soda, juice, etc. Consume a diet based on lean protein (chicken, fish) and vegetables. Low salt, low fat, fluid restricted (less than 1.5 L/day) diet.

## 2019-10-10 NOTE — DISCHARGE NOTE NURSING/CASE MANAGEMENT/SOCIAL WORK - PATIENT PORTAL LINK FT
You can access the FollowMyHealth Patient Portal offered by [facility] by registering at the following website: [email or web address]. By joining [facility]’s FollowMyHealth portal, you will also be able to view your health information using other applications (apps) compatible with our system.

## 2019-10-10 NOTE — PROGRESS NOTE ADULT - ASSESSMENT
63 y/o F w/ PMH of HTN, HLD, DMII, Breast CA s/p CT/XRT, ICM, HFrEF 30% and CAD s/p MI-NSTEMI (2/22/2019) presented for subcutaneous ICD placement. S/P SQ ICD placement yesterday. ICD site with no bleeding or hematoma. Mild soft tissue swelling noted. Device teaching given to patient and she demonstrated understanding of the instructions. All questions answered. CXR normal with no pneumothorax. Patient is to follow up with primary cardiologist and PCP regarding descending thoracic aneuysm and lung nodules. Last Ct scan in 9/2019 as per patient  - May D/C home  -  F/U with device clinic on 10/24/19 @ 9:45 am

## 2019-10-10 NOTE — DISCHARGE NOTE NURSING/CASE MANAGEMENT/SOCIAL WORK - NSDCPEEMAIL_GEN_ALL_CORE
[facility] for Tobacco Control email [email or web address]

## 2019-10-10 NOTE — DISCHARGE NOTE PROVIDER - CARE PROVIDER_API CALL
Taiwo Nam (MD)  Cardiac Electrophysiology; Cardiovascular Disease; Internal Medicine  [address] [address] [place] [address], [address] [place] [address]  Phone: [phone]  Fax: [phone]  Follow Up Time: Taiwo Nam (MD)  Cardiac Electrophysiology; Cardiovascular Disease; Internal Medicine  [address] [place] [address], [address]  [place] [address]  Phone: [phone]  Fax: [phone]  Follow Up Time:     Shahriar Gutierrez)  Vascular Surgery  [address] [facility], [address]  [place] [address]  Phone: [phone]  Fax: [phone]  Follow Up Time:     Huyen Figueroa)  Internal Medicine  [address] [place] [address], 3rd [place] [address]  Phone: [phone]  Fax: [phone]  Follow Up Time:

## 2019-10-10 NOTE — PROGRESS NOTE ADULT - SUBJECTIVE AND OBJECTIVE BOX
Patient is seen and examined. Denies any chest pain, SOB, palpitations or dizziness. s/p SQ ICD yesterday. No overnight events    PAST MEDICAL & SURGICAL HISTORY:  Knee effusion, left: arthrocentesis- 05/25/16  Internal carotid aneurysm  Abdominal aneurysm: infrarenal- wached by Vascular , Dr. Gutierrez  Cardiomyopathy  Sickle cell trait  DVT (deep venous thrombosis): Left leg in 1994 after hysterectomy  Hypercholesteremia  Breast cancer: right-s/p chemo and radiation  Hypertension  Breast cancer: infusaport insertion- 2014- left chest  S/P hysterectomy: 1994  [place] filter in place: 1994  S/P lumpectomy, right breast: 2014, revision of position- 2015  Fibroid uterus: sp hystrectomy 1196      MEDICATIONS  (STANDING):  anastrozole 1 milliGRAM(s) Oral daily  aspirin enteric coated 81 milliGRAM(s) Oral daily  atorvastatin 20 milliGRAM(s) Oral at bedtime  cholecalciferol 1000 Unit(s) Oral two times a day  metoprolol succinate ER 50 milliGRAM(s) Oral daily  nicotine -   7 mG/24Hr(s) Patch 1 patch Transdermal daily  sacubitril 97 mG/valsartan 103 mG 1 Tablet(s) Oral two times a day  sodium chloride 0.9% lock flush 3 milliLiter(s) IV Push every 8 hours  spironolactone 25 milliGRAM(s) Oral daily    MEDICATIONS  (PRN):    Vital Signs Last 24 Hrs  T(C): 36.7 (10 Oct 2019 12:10), Max: 36.7 (10 Oct 2019 12:10)  T(F): 98 (10 Oct 2019 12:10), Max: 98 (10 Oct 2019 12:10)  HR: 63 (10 Oct 2019 12:10) (63 - 80)  BP: 157/89 (10 Oct 2019 12:10) (149/91 - 157/89)  BP(mean): --  RR: 16 (10 Oct 2019 12:10) (16 - 18)  SpO2: 100% (10 Oct 2019 12:10) (97% - 100%)    INTERPRETATION OF TELEMETRY: Sinus rhythm with HR 50s-80s,     LABS:                        11.2   9.32  )-----------( 364      ( 10 Oct 2019 07:00 )             35.5     10-10    138  |  97<L>  |  20  ----------------------------<  216<H>  3.7   |  24  |  1.19    Ca    9.7      10 Oct 2019 07:00  Phos  2.9     10-10  Mg     1.7     10-10          PHYSICAL EXAM:    GENERAL: In no apparent distress, well nourished, and hydrated.  HEAD:  Atraumatic, Normocephalic  HEART: Regular rate and rhythm; No murmurs, rubs, or gallops.  PULMONARY: Clear to auscultation and percussion.  No rales, wheezing, or rhonchi bilaterally.  ABDOMEN: Soft, Nontender, Nondistended; Bowel sounds present  EXTREMITIES:  2+ Peripheral Pulses, No clubbing, cyanosis, or edema

## 2019-10-10 NOTE — DISCHARGE NOTE PROVIDER - NSDCFUADDINST_GEN_ALL_CORE_FT
Follow up with electrophysiology ______    Follow up with your PCP & cardiology within 1-2 weeks; call for appointments. Follow up with electrophysiology as scheduled on 10/24/19 at 9:45am (an appointment has been made for you).    Follow up with your PCP Dr. Figueroa within 1-2 weeks; call for appointment.  You should discuss with your PCP your pulmonary nodule seen on your CT scan of your chest in February 2019.  You should have repeat imaging of your chest as outpatient to monitor this & follow up with a pulmonologist.    Follow up with Dr. Gutierrez regarding your aneurysms as discussed within 2 weeks; call for appointment.  You should have regular imaging to monitor your aneurysms as outpatient as directed by Dr. Gutierrez.    Follow up with cardiology within 1-2 weeks; call for appointment.

## 2019-10-10 NOTE — DISCHARGE NOTE PROVIDER - HOSPITAL COURSE
HPI:    63 y/o F w/ PMH of HTN, HLD, DMII, Breast CA s/p CT/XRT, ICM, HFrEF 30% and CAD s/p MI-NSTEMI (2/22/2019) presents for subcutaneous ICD placement. Pt has been maintained on OMT > 3 mo with no improvement in LVEF. Pt has agreed to undergo elective placement of ICD for primary prevention of SCD. Pt denies N/V/D, fevers, chills, cough, palpitations, chest pain, syncope, dyspnea on exertion, orthopnea, nocturnal paroxysmal dyspnea, edema, cyanosis, heart murmurs, varicosities, phlebitis, claudication. (09 Oct 2019 14:08)        On admission:    s/p ICD implantation 10/10.    Site checked & stable.    Post-procedure CXR: no pneumothorax    Received post-procedure cefazolin x 2 doses.    Patient cleared for d/c home by EP.  Outpatient f/u with PCP, cardiology & EP. HPI:    65 y/o F w/ PMH of HTN, HLD, DMII, Breast CA s/p CT/XRT, ICM, HFrEF 30% and CAD s/p MI-NSTEMI (2/22/2019) presents for subcutaneous ICD placement. Pt has been maintained on OMT > 3 mo with no improvement in LVEF. Pt has agreed to undergo elective placement of ICD for primary prevention of SCD. Pt denies N/V/D, fevers, chills, cough, palpitations, chest pain, syncope, dyspnea on exertion, orthopnea, nocturnal paroxysmal dyspnea, edema, cyanosis, heart murmurs, varicosities, phlebitis, claudication. (09 Oct 2019 14:08)        On admission:    s/p ICD implantation 10/10.    Site checked & stable.    Received post-procedure cefazolin x 2 doses.        Post-procedure CXR: Subcutaneous ICD with generator projecting over the left lower chest wall. Adjacent left subcutaneous emphysema. No pneumothorax. Prominent soft tissue opacity in medial left retrocardiac region likely corresponding to a descending thoracic aortic aneurysm noted on the CT scan. Clinical correlation will determine the need for a follow-up CT     scan.        Discussed CXR findings with patient.  She is very aware that she has been diagnosed with aneurysms in the past.  She follows very closely with Dr. Shahriar Gutierrez for this and last got a CT scan to monitor this in September.  She reports that her aneurysms are stable.     Reviewed CT scan from 2/2019 with patient.  She is not aware that she had a JB pulmonary nodule at that time.  On the CXR from this admission, the radiologist did not appreciate a nodule.  Informed patient that she should follow up  with PCP regarding this nodule and have repeat imaging as outpatient.  Also informed patient that she should follow up with a pulmonologist.  Patient in agreement with this plan.         Patient cleared for d/c home by EP.  Outpatient f/u with PCP, cardiology & EP.  Outpatient EP f/u appointment 10/24 at 9:45am. HPI:    65 y/o F w/ PMH of HTN, HLD, DMII, Breast CA s/p CT/XRT, ICM, HFrEF 30% and CAD s/p MI-NSTEMI (2/22/2019) presents for subcutaneous ICD placement. Pt has been maintained on OMT > 3 mo with no improvement in LVEF. Pt has agreed to undergo elective placement of ICD for primary prevention of SCD. Pt denies N/V/D, fevers, chills, cough, palpitations, chest pain, syncope, dyspnea on exertion, orthopnea, nocturnal paroxysmal dyspnea, edema, cyanosis, heart murmurs, varicosities, phlebitis, claudication. (09 Oct 2019 14:08)        On admission:    s/p ICD implantation 10/10.    Site checked & stable.    Received post-procedure cefazolin x 2 doses.        Post-procedure CXR: Subcutaneous ICD with generator projecting over the left lower chest wall. Adjacent left subcutaneous emphysema. No pneumothorax. Prominent soft tissue opacity in medial left retrocardiac region likely corresponding to a descending thoracic aortic aneurysm noted on the CT scan. Clinical correlation will determine the need for a follow-up CT scan.        Discussed CXR findings with patient.  She is very aware that she has been diagnosed with aneurysms in the past.  She follows very closely with Dr. Shahriar Gutierrez for this and last got a CT scan to monitor this in September.  She reports that her aneurysms are stable.     Reviewed CT scan from 2/2019 with patient.  She is not aware that she had a JB pulmonary nodule at that time.  On the CXR from this admission, the radiologist did not appreciate a nodule.  Informed patient that she should follow up  with PCP regarding this nodule and have repeat imaging as outpatient.  Also informed patient that she should follow up with a pulmonologist.  Patient in agreement with this plan.         Patient cleared for d/c home by EP.  Outpatient f/u with PCP, pulm, vascular Dr. Gutierrez, cardiology & EP.  Outpatient EP f/u appointment 10/24 at 9:45am.

## 2019-10-10 NOTE — DISCHARGE NOTE PROVIDER - CARE PROVIDERS DIRECT ADDRESSES
[email or web address]

## 2019-10-10 NOTE — DISCHARGE NOTE NURSING/CASE MANAGEMENT/SOCIAL WORK - NSDCPEWEB_GEN_ALL_CORE
[facility] for Tobacco Control website --- [email or web address] website --- [email or web address]

## 2019-10-11 ENCOUNTER — OTHER (OUTPATIENT)
Age: 64
End: 2019-10-11

## 2019-10-15 ENCOUNTER — APPOINTMENT (OUTPATIENT)
Dept: INTERNAL MEDICINE | Facility: CLINIC | Age: 64
End: 2019-10-15
Payer: MEDICARE

## 2019-10-15 VITALS
WEIGHT: 186 LBS | BODY MASS INDEX: 29.19 KG/M2 | RESPIRATION RATE: 16 BRPM | SYSTOLIC BLOOD PRESSURE: 132 MMHG | TEMPERATURE: 97.4 F | DIASTOLIC BLOOD PRESSURE: 78 MMHG | HEIGHT: 67 IN | HEART RATE: 77 BPM | OXYGEN SATURATION: 97 %

## 2019-10-15 DIAGNOSIS — Z12.39 ENCOUNTER FOR OTHER SCREENING FOR MALIGNANT NEOPLASM OF BREAST: ICD-10-CM

## 2019-10-15 DIAGNOSIS — E04.9 NONTOXIC GOITER, UNSPECIFIED: ICD-10-CM

## 2019-10-15 DIAGNOSIS — C50.919 MALIGNANT NEOPLASM OF UNSPECIFIED SITE OF UNSPECIFIED FEMALE BREAST: ICD-10-CM

## 2019-10-15 PROCEDURE — 99214 OFFICE O/P EST MOD 30 MIN: CPT | Mod: 25

## 2019-10-15 PROCEDURE — 90662 IIV NO PRSV INCREASED AG IM: CPT

## 2019-10-15 PROCEDURE — G0008: CPT

## 2019-10-15 PROCEDURE — 99406 BEHAV CHNG SMOKING 3-10 MIN: CPT

## 2019-10-15 NOTE — ASSESSMENT
[FreeTextEntry1] : 63 yo with CAD/ICD,CHF,T2M,BC,COPD.\par will continue the same management,f/u card.,hemat.,pulm.

## 2019-10-15 NOTE — PHYSICAL EXAM
[Well Nourished] : well nourished [No Acute Distress] : no acute distress [Well Developed] : well developed [Normal Sclera/Conjunctiva] : normal sclera/conjunctiva [Well-Appearing] : well-appearing [PERRL] : pupils equal round and reactive to light [EOMI] : extraocular movements intact [Normal Outer Ear/Nose] : the outer ears and nose were normal in appearance [Normal Oropharynx] : the oropharynx was normal [No JVD] : no jugular venous distention [Supple] : supple [No Lymphadenopathy] : no lymphadenopathy [Clear to Auscultation] : lungs were clear to auscultation bilaterally [No Respiratory Distress] : no respiratory distress  [No Accessory Muscle Use] : no accessory muscle use [Normal Rate] : normal rate  [Regular Rhythm] : with a regular rhythm [Normal S1, S2] : normal S1 and S2 [No Murmur] : no murmur heard [Pedal Pulses Present] : the pedal pulses are present [No Varicosities] : no varicosities [No Edema] : there was no peripheral edema [Soft] : abdomen soft [Declined Breast Exam] : declined breast exam  [Non Tender] : non-tender [Non-distended] : non-distended [No Masses] : no abdominal mass palpated [Normal Posterior Cervical Nodes] : no posterior cervical lymphadenopathy [Normal Anterior Cervical Nodes] : no anterior cervical lymphadenopathy [No CVA Tenderness] : no CVA  tenderness [No Spinal Tenderness] : no spinal tenderness [No Joint Swelling] : no joint swelling [Grossly Normal Strength/Tone] : grossly normal strength/tone [No Rash] : no rash [Normal Gait] : normal gait [Coordination Grossly Intact] : coordination grossly intact [No Focal Deficits] : no focal deficits [Normal Affect] : the affect was normal [Normal Mood] : the mood was normal [Normal Insight/Judgement] : insight and judgment were intact [de-identified] : left carotid bruit

## 2019-10-15 NOTE — COUNSELING
[Fall prevention counseling provided] : Fall prevention counseling provided [Risk of tobacco use and health benefits of smoking cessation discussed] : Risk of tobacco use and health benefits of smoking cessation discussed [Cessation strategies including cessation program discussed] : Cessation strategies including cessation program discussed [Use of nicotine replacement therapies and other medications discussed] : Use of nicotine replacement therapies and other medications discussed [Encouraged to pick a quit date and identify support needed to quit] : Encouraged to pick a quit date and identify support needed to quit [Willing to Quit Smoking] : Not willing to quit smoking [Tobacco Use Cessation Intermediate Greater Than 3 Minutes Up to 10 Minutes] : Tobacco Use Cessation Intermediate Greater Than 3 Minutes Up to 10 Minutes [None] : None [Good understanding] : Patient has a good understanding of lifestyle changes and steps needed to achieve self management goal

## 2019-10-15 NOTE — HISTORY OF PRESENT ILLNESS
[Post-hospitalization from ___ Hospital] : Post-hospitalization from [unfilled] Hospital [Admitted on: ___] : The patient was admitted on [unfilled] [Discharged on ___] : discharged on [unfilled] [FreeTextEntry2] : 65 yo was admitted for sq ICD placement to prevention of SCD.\par h/o CAD,st.p.NSTEMI,cardiomyopathy,EF 30%.(failed OMT)\par PMH HTN,HLD,T2D,BC,st.p.R.lumpectomy,CT,RT,2014-15.\par h/o carotid bruit,no significant stenosis,infrarenal AAA,3,6 cm,stable. note appreciated.\par JB nodule,needs pulm.eval.\par h/o DVT post hysterectomy,in 1994,umbrella placement.\par Sickle cell trait.\par COPD/smoking.\par

## 2019-10-17 ENCOUNTER — OTHER (OUTPATIENT)
Age: 64
End: 2019-10-17

## 2019-10-17 LAB
25(OH)D3 SERPL-MCNC: 36 NG/ML
ALBUMIN SERPL ELPH-MCNC: 4.4 G/DL
ALP BLD-CCNC: 121 U/L
ALT SERPL-CCNC: 10 U/L
ANION GAP SERPL CALC-SCNC: 17 MMOL/L
AST SERPL-CCNC: 16 U/L
BASOPHILS # BLD AUTO: 0.05 K/UL
BASOPHILS NFR BLD AUTO: 0.9 %
BILIRUB SERPL-MCNC: 0.3 MG/DL
BUN SERPL-MCNC: 21 MG/DL
CALCIUM SERPL-MCNC: 10 MG/DL
CHLORIDE SERPL-SCNC: 100 MMOL/L
CHOLEST SERPL-MCNC: 207 MG/DL
CHOLEST/HDLC SERPL: 4.3 RATIO
CO2 SERPL-SCNC: 23 MMOL/L
CREAT SERPL-MCNC: 1.4 MG/DL
EOSINOPHIL # BLD AUTO: 0.09 K/UL
EOSINOPHIL NFR BLD AUTO: 1.6 %
ESTIMATED AVERAGE GLUCOSE: 151 MG/DL
GLUCOSE SERPL-MCNC: 158 MG/DL
HBA1C MFR BLD HPLC: 6.9 %
HCT VFR BLD CALC: 42.4 %
HDLC SERPL-MCNC: 48 MG/DL
HGB BLD-MCNC: 13 G/DL
IMM GRANULOCYTES NFR BLD AUTO: 0.3 %
LDLC SERPL CALC-MCNC: 137 MG/DL
LYMPHOCYTES # BLD AUTO: 2.13 K/UL
LYMPHOCYTES NFR BLD AUTO: 37.1 %
MAN DIFF?: NORMAL
MCHC RBC-ENTMCNC: 28.9 PG
MCHC RBC-ENTMCNC: 30.7 GM/DL
MCV RBC AUTO: 94.2 FL
MONOCYTES # BLD AUTO: 0.32 K/UL
MONOCYTES NFR BLD AUTO: 5.6 %
NEUTROPHILS # BLD AUTO: 3.13 K/UL
NEUTROPHILS NFR BLD AUTO: 54.5 %
PLATELET # BLD AUTO: 402 K/UL
POTASSIUM SERPL-SCNC: 4 MMOL/L
PROT SERPL-MCNC: 7.7 G/DL
RBC # BLD: 4.5 M/UL
RBC # FLD: 14.8 %
SODIUM SERPL-SCNC: 140 MMOL/L
TRIGL SERPL-MCNC: 111 MG/DL
TSH SERPL-ACNC: 1.52 UIU/ML
WBC # FLD AUTO: 5.74 K/UL

## 2019-10-24 ENCOUNTER — APPOINTMENT (OUTPATIENT)
Dept: ELECTROPHYSIOLOGY | Facility: CLINIC | Age: 64
End: 2019-10-24
Payer: MEDICARE

## 2019-10-24 PROCEDURE — 99024 POSTOP FOLLOW-UP VISIT: CPT

## 2019-11-06 ENCOUNTER — APPOINTMENT (OUTPATIENT)
Dept: INTERNAL MEDICINE | Facility: CLINIC | Age: 64
End: 2019-11-06

## 2019-12-11 ENCOUNTER — APPOINTMENT (OUTPATIENT)
Dept: CARDIOLOGY | Facility: CLINIC | Age: 64
End: 2019-12-11
Payer: MEDICARE

## 2019-12-11 VITALS
BODY MASS INDEX: 28.96 KG/M2 | TEMPERATURE: 97.2 F | HEART RATE: 87 BPM | HEIGHT: 67 IN | DIASTOLIC BLOOD PRESSURE: 87 MMHG | OXYGEN SATURATION: 97 % | WEIGHT: 184.5 LBS | SYSTOLIC BLOOD PRESSURE: 125 MMHG

## 2019-12-11 PROCEDURE — 99214 OFFICE O/P EST MOD 30 MIN: CPT

## 2019-12-11 RX ORDER — POTASSIUM CHLORIDE 1500 MG/1
20 TABLET, FILM COATED, EXTENDED RELEASE ORAL TWICE DAILY
Qty: 8 | Refills: 0 | Status: DISCONTINUED | COMMUNITY
Start: 2019-08-20 | End: 2019-12-11

## 2019-12-11 NOTE — HISTORY OF PRESENT ILLNESS
[FreeTextEntry1] : 64-year-old female former smoker with DM, HTN, HLD, breast CA s/p chemo/XRT, and NICM (catheterization 02/2019 showed dRPL occlusion without ability to intervene), EF 31% by MUGA 7/2019, s/p SubQ ICD 10/2019, who presents for follow-up visit. \par \par Patient ports she has been feeling well overall. She continues to have some exertional dyspnea that has been stable for months. She denies any lower extremity edema, orthopnea, or PND. She is "aware" of her ICD if she lies on that side, otherwise it does not bother her. She reports compliance with all medications.  \par \par

## 2019-12-12 LAB
ANION GAP SERPL CALC-SCNC: 18 MMOL/L
BUN SERPL-MCNC: 21 MG/DL
CALCIUM SERPL-MCNC: 10.4 MG/DL
CHLORIDE SERPL-SCNC: 102 MMOL/L
CO2 SERPL-SCNC: 25 MMOL/L
CREAT SERPL-MCNC: 1.69 MG/DL
GLUCOSE SERPL-MCNC: 165 MG/DL
POTASSIUM SERPL-SCNC: 3.9 MMOL/L
SODIUM SERPL-SCNC: 145 MMOL/L

## 2019-12-19 ENCOUNTER — RX RENEWAL (OUTPATIENT)
Age: 64
End: 2019-12-19

## 2019-12-20 ENCOUNTER — RX RENEWAL (OUTPATIENT)
Age: 64
End: 2019-12-20

## 2019-12-24 LAB
ANION GAP SERPL CALC-SCNC: 14 MMOL/L
BUN SERPL-MCNC: 18 MG/DL
CALCIUM SERPL-MCNC: 9.6 MG/DL
CHLORIDE SERPL-SCNC: 103 MMOL/L
CO2 SERPL-SCNC: 26 MMOL/L
CREAT SERPL-MCNC: 1.17 MG/DL
GLUCOSE SERPL-MCNC: 209 MG/DL
POTASSIUM SERPL-SCNC: 3.6 MMOL/L
SODIUM SERPL-SCNC: 143 MMOL/L

## 2020-01-21 ENCOUNTER — OTHER (OUTPATIENT)
Age: 65
End: 2020-01-21

## 2020-01-24 ENCOUNTER — APPOINTMENT (OUTPATIENT)
Dept: ELECTROPHYSIOLOGY | Facility: CLINIC | Age: 65
End: 2020-01-24
Payer: MEDICARE

## 2020-01-24 PROCEDURE — 93260 PRGRMG DEV EVAL IMPLTBL SYS: CPT

## 2020-01-24 RX ORDER — SPIRONOLACTONE 25 MG/1
25 TABLET ORAL DAILY
Qty: 15 | Refills: 5 | Status: DISCONTINUED | COMMUNITY
Start: 2019-03-05 | End: 2020-01-24

## 2020-03-11 ENCOUNTER — APPOINTMENT (OUTPATIENT)
Dept: CARDIOLOGY | Facility: CLINIC | Age: 65
End: 2020-03-11

## 2020-04-30 ENCOUNTER — INPATIENT (INPATIENT)
Facility: HOSPITAL | Age: 65
LOS: 3 days | Discharge: HOME HEALTH | DRG: 65 | End: 2020-05-04
Attending: INTERNAL MEDICINE | Admitting: INTERNAL MEDICINE
Payer: MEDICARE

## 2020-04-30 ENCOUNTER — APPOINTMENT (OUTPATIENT)
Dept: ELECTROPHYSIOLOGY | Facility: CLINIC | Age: 65
End: 2020-04-30
Payer: MEDICARE

## 2020-04-30 VITALS
DIASTOLIC BLOOD PRESSURE: 109 MMHG | WEIGHT: 179.9 LBS | HEART RATE: 86 BPM | RESPIRATION RATE: 20 BRPM | SYSTOLIC BLOOD PRESSURE: 166 MMHG | TEMPERATURE: 98 F | OXYGEN SATURATION: 96 % | HEIGHT: 67 IN

## 2020-04-30 DIAGNOSIS — D25.9 LEIOMYOMA OF UTERUS, UNSPECIFIED: Chronic | ICD-10-CM

## 2020-04-30 DIAGNOSIS — C50.919 MALIGNANT NEOPLASM OF UNSPECIFIED SITE OF UNSPECIFIED FEMALE BREAST: Chronic | ICD-10-CM

## 2020-04-30 DIAGNOSIS — Z98.89 OTHER SPECIFIED POSTPROCEDURAL STATES: Chronic | ICD-10-CM

## 2020-04-30 DIAGNOSIS — R47.01 APHASIA: ICD-10-CM

## 2020-04-30 DIAGNOSIS — I50.42 CHRONIC COMBINED SYSTOLIC (CONGESTIVE) AND DIASTOLIC (CONGESTIVE) HEART FAILURE: ICD-10-CM

## 2020-04-30 DIAGNOSIS — E87.6 HYPOKALEMIA: ICD-10-CM

## 2020-04-30 DIAGNOSIS — Z29.9 ENCOUNTER FOR PROPHYLACTIC MEASURES, UNSPECIFIED: ICD-10-CM

## 2020-04-30 DIAGNOSIS — C50.919 MALIGNANT NEOPLASM OF UNSPECIFIED SITE OF UNSPECIFIED FEMALE BREAST: ICD-10-CM

## 2020-04-30 DIAGNOSIS — Z90.710 ACQUIRED ABSENCE OF BOTH CERVIX AND UTERUS: Chronic | ICD-10-CM

## 2020-04-30 LAB
24R-OH-CALCIDIOL SERPL-MCNC: 36.9 NG/ML — SIGNIFICANT CHANGE UP (ref 30–80)
ALBUMIN SERPL ELPH-MCNC: 3.5 G/DL — SIGNIFICANT CHANGE UP (ref 3.5–5)
ALP SERPL-CCNC: 120 U/L — SIGNIFICANT CHANGE UP (ref 40–120)
ALT FLD-CCNC: 10 U/L DA — SIGNIFICANT CHANGE UP (ref 10–60)
ANION GAP SERPL CALC-SCNC: 11 MMOL/L — SIGNIFICANT CHANGE UP (ref 5–17)
APPEARANCE UR: CLEAR — SIGNIFICANT CHANGE UP
APTT BLD: 29.6 SEC — SIGNIFICANT CHANGE UP (ref 27.5–36.3)
AST SERPL-CCNC: 10 U/L — SIGNIFICANT CHANGE UP (ref 10–40)
BACTERIA # UR AUTO: ABNORMAL /HPF
BASE EXCESS BLDV CALC-SCNC: 7.6 MMOL/L — HIGH (ref -2–2)
BASOPHILS # BLD AUTO: 0.04 K/UL — SIGNIFICANT CHANGE UP (ref 0–0.2)
BASOPHILS NFR BLD AUTO: 0.7 % — SIGNIFICANT CHANGE UP (ref 0–2)
BILIRUB SERPL-MCNC: 0.4 MG/DL — SIGNIFICANT CHANGE UP (ref 0.2–1.2)
BILIRUB UR-MCNC: NEGATIVE — SIGNIFICANT CHANGE UP
BLOOD GAS COMMENTS, VENOUS: SIGNIFICANT CHANGE UP
BUN SERPL-MCNC: 18 MG/DL — SIGNIFICANT CHANGE UP (ref 7–18)
CALCIUM SERPL-MCNC: 9.8 MG/DL — SIGNIFICANT CHANGE UP (ref 8.4–10.5)
CHLORIDE SERPL-SCNC: 102 MMOL/L — SIGNIFICANT CHANGE UP (ref 96–108)
CK SERPL-CCNC: 68 U/L — SIGNIFICANT CHANGE UP (ref 21–215)
CO2 SERPL-SCNC: 31 MMOL/L — SIGNIFICANT CHANGE UP (ref 22–31)
COLOR SPEC: YELLOW — SIGNIFICANT CHANGE UP
CREAT SERPL-MCNC: 1.56 MG/DL — HIGH (ref 0.5–1.3)
CRP SERPL-MCNC: 3.79 MG/DL — HIGH (ref 0–0.4)
D DIMER BLD IA.RAPID-MCNC: 872 NG/ML DDU — HIGH
DIFF PNL FLD: ABNORMAL
EOSINOPHIL # BLD AUTO: 0.07 K/UL — SIGNIFICANT CHANGE UP (ref 0–0.5)
EOSINOPHIL NFR BLD AUTO: 1.3 % — SIGNIFICANT CHANGE UP (ref 0–6)
EPI CELLS # UR: SIGNIFICANT CHANGE UP /HPF
FERRITIN SERPL-MCNC: 527 NG/ML — HIGH (ref 15–150)
GLUCOSE SERPL-MCNC: 134 MG/DL — HIGH (ref 70–99)
GLUCOSE UR QL: NEGATIVE — SIGNIFICANT CHANGE UP
HCO3 BLDV-SCNC: 32 MMOL/L — HIGH (ref 21–29)
HCT VFR BLD CALC: 35.7 % — SIGNIFICANT CHANGE UP (ref 34.5–45)
HGB BLD-MCNC: 11.6 G/DL — SIGNIFICANT CHANGE UP (ref 11.5–15.5)
HOROWITZ INDEX BLDV+IHG-RTO: 21 — SIGNIFICANT CHANGE UP
IMM GRANULOCYTES NFR BLD AUTO: 0.4 % — SIGNIFICANT CHANGE UP (ref 0–1.5)
INR BLD: 1.29 RATIO — HIGH (ref 0.88–1.16)
KETONES UR-MCNC: NEGATIVE — SIGNIFICANT CHANGE UP
LEUKOCYTE ESTERASE UR-ACNC: ABNORMAL
LYMPHOCYTES # BLD AUTO: 1.85 K/UL — SIGNIFICANT CHANGE UP (ref 1–3.3)
LYMPHOCYTES # BLD AUTO: 34.4 % — SIGNIFICANT CHANGE UP (ref 13–44)
MCHC RBC-ENTMCNC: 29.4 PG — SIGNIFICANT CHANGE UP (ref 27–34)
MCHC RBC-ENTMCNC: 32.5 GM/DL — SIGNIFICANT CHANGE UP (ref 32–36)
MCV RBC AUTO: 90.6 FL — SIGNIFICANT CHANGE UP (ref 80–100)
MONOCYTES # BLD AUTO: 0.43 K/UL — SIGNIFICANT CHANGE UP (ref 0–0.9)
MONOCYTES NFR BLD AUTO: 8 % — SIGNIFICANT CHANGE UP (ref 2–14)
NEUTROPHILS # BLD AUTO: 2.97 K/UL — SIGNIFICANT CHANGE UP (ref 1.8–7.4)
NEUTROPHILS NFR BLD AUTO: 55.2 % — SIGNIFICANT CHANGE UP (ref 43–77)
NITRITE UR-MCNC: NEGATIVE — SIGNIFICANT CHANGE UP
NRBC # BLD: 0 /100 WBCS — SIGNIFICANT CHANGE UP (ref 0–0)
NT-PROBNP SERPL-SCNC: 2642 PG/ML — HIGH (ref 0–125)
PCO2 BLDV: 44 MMHG — SIGNIFICANT CHANGE UP (ref 35–50)
PH BLDV: 7.48 — HIGH (ref 7.35–7.45)
PH UR: 7 — SIGNIFICANT CHANGE UP (ref 5–8)
PLATELET # BLD AUTO: 320 K/UL — SIGNIFICANT CHANGE UP (ref 150–400)
PO2 BLDV: 53 MMHG — HIGH (ref 25–45)
POTASSIUM SERPL-MCNC: 3 MMOL/L — LOW (ref 3.5–5.3)
POTASSIUM SERPL-SCNC: 3 MMOL/L — LOW (ref 3.5–5.3)
PROCALCITONIN SERPL-MCNC: 0.03 NG/ML — SIGNIFICANT CHANGE UP (ref 0.02–0.1)
PROT SERPL-MCNC: 8.5 G/DL — HIGH (ref 6–8.3)
PROT UR-MCNC: NEGATIVE — SIGNIFICANT CHANGE UP
PROTHROM AB SERPL-ACNC: 14.7 SEC — HIGH (ref 10–12.9)
RBC # BLD: 3.94 M/UL — SIGNIFICANT CHANGE UP (ref 3.8–5.2)
RBC # FLD: 13.4 % — SIGNIFICANT CHANGE UP (ref 10.3–14.5)
RBC CASTS # UR COMP ASSIST: ABNORMAL /HPF (ref 0–2)
SAO2 % BLDV: 86 % — SIGNIFICANT CHANGE UP (ref 67–88)
SARS-COV-2 RNA SPEC QL NAA+PROBE: SIGNIFICANT CHANGE UP
SODIUM SERPL-SCNC: 144 MMOL/L — SIGNIFICANT CHANGE UP (ref 135–145)
SP GR SPEC: 1.01 — SIGNIFICANT CHANGE UP (ref 1.01–1.02)
TROPONIN I SERPL-MCNC: 0.07 NG/ML — HIGH (ref 0–0.04)
TROPONIN I SERPL-MCNC: 0.08 NG/ML — HIGH (ref 0–0.04)
UROBILINOGEN FLD QL: NEGATIVE — SIGNIFICANT CHANGE UP
WBC # BLD: 5.38 K/UL — SIGNIFICANT CHANGE UP (ref 3.8–10.5)
WBC # FLD AUTO: 5.38 K/UL — SIGNIFICANT CHANGE UP (ref 3.8–10.5)
WBC UR QL: ABNORMAL /HPF (ref 0–5)

## 2020-04-30 PROCEDURE — 99223 1ST HOSP IP/OBS HIGH 75: CPT | Mod: AI,GC

## 2020-04-30 PROCEDURE — 70450 CT HEAD/BRAIN W/O DYE: CPT | Mod: 26

## 2020-04-30 PROCEDURE — 93295 DEV INTERROG REMOTE 1/2/MLT: CPT

## 2020-04-30 PROCEDURE — 93010 ELECTROCARDIOGRAM REPORT: CPT

## 2020-04-30 PROCEDURE — 99285 EMERGENCY DEPT VISIT HI MDM: CPT

## 2020-04-30 PROCEDURE — 71045 X-RAY EXAM CHEST 1 VIEW: CPT | Mod: 26

## 2020-04-30 PROCEDURE — 93296 REM INTERROG EVL PM/IDS: CPT

## 2020-04-30 RX ORDER — METOPROLOL TARTRATE 50 MG
25 TABLET ORAL
Refills: 0 | Status: DISCONTINUED | OUTPATIENT
Start: 2020-04-30 | End: 2020-05-02

## 2020-04-30 RX ORDER — ASPIRIN/CALCIUM CARB/MAGNESIUM 324 MG
81 TABLET ORAL DAILY
Refills: 0 | Status: DISCONTINUED | OUTPATIENT
Start: 2020-04-30 | End: 2020-05-04

## 2020-04-30 RX ORDER — CHOLECALCIFEROL (VITAMIN D3) 125 MCG
1 CAPSULE ORAL
Qty: 0 | Refills: 0 | DISCHARGE

## 2020-04-30 RX ORDER — ENOXAPARIN SODIUM 100 MG/ML
40 INJECTION SUBCUTANEOUS DAILY
Refills: 0 | Status: DISCONTINUED | OUTPATIENT
Start: 2020-04-30 | End: 2020-05-04

## 2020-04-30 RX ORDER — ATORVASTATIN CALCIUM 80 MG/1
80 TABLET, FILM COATED ORAL AT BEDTIME
Refills: 0 | Status: DISCONTINUED | OUTPATIENT
Start: 2020-04-30 | End: 2020-05-04

## 2020-04-30 RX ORDER — SACUBITRIL AND VALSARTAN 24; 26 MG/1; MG/1
1 TABLET, FILM COATED ORAL
Qty: 0 | Refills: 0 | DISCHARGE

## 2020-04-30 RX ORDER — ASPIRIN/CALCIUM CARB/MAGNESIUM 324 MG
1 TABLET ORAL
Qty: 0 | Refills: 0 | DISCHARGE

## 2020-04-30 RX ORDER — SPIRONOLACTONE 25 MG/1
1 TABLET, FILM COATED ORAL
Qty: 0 | Refills: 0 | DISCHARGE

## 2020-04-30 RX ORDER — POTASSIUM CHLORIDE 20 MEQ
1 PACKET (EA) ORAL
Qty: 0 | Refills: 0 | DISCHARGE

## 2020-04-30 RX ORDER — POTASSIUM CHLORIDE 20 MEQ
40 PACKET (EA) ORAL EVERY 4 HOURS
Refills: 0 | Status: COMPLETED | OUTPATIENT
Start: 2020-04-30 | End: 2020-04-30

## 2020-04-30 RX ORDER — POTASSIUM CHLORIDE 20 MEQ
10 PACKET (EA) ORAL ONCE
Refills: 0 | Status: COMPLETED | OUTPATIENT
Start: 2020-04-30 | End: 2020-04-30

## 2020-04-30 RX ORDER — CLOPIDOGREL BISULFATE 75 MG/1
75 TABLET, FILM COATED ORAL DAILY
Refills: 0 | Status: DISCONTINUED | OUTPATIENT
Start: 2020-04-30 | End: 2020-05-04

## 2020-04-30 RX ORDER — SACUBITRIL AND VALSARTAN 24; 26 MG/1; MG/1
1 TABLET, FILM COATED ORAL
Refills: 0 | Status: DISCONTINUED | OUTPATIENT
Start: 2020-04-30 | End: 2020-05-04

## 2020-04-30 RX ADMIN — ATORVASTATIN CALCIUM 80 MILLIGRAM(S): 80 TABLET, FILM COATED ORAL at 22:04

## 2020-04-30 RX ADMIN — CLOPIDOGREL BISULFATE 75 MILLIGRAM(S): 75 TABLET, FILM COATED ORAL at 18:51

## 2020-04-30 RX ADMIN — Medication 40 MILLIEQUIVALENT(S): at 18:51

## 2020-04-30 RX ADMIN — Medication 100 MILLIEQUIVALENT(S): at 14:13

## 2020-04-30 RX ADMIN — Medication 40 MILLIEQUIVALENT(S): at 21:35

## 2020-04-30 RX ADMIN — SACUBITRIL AND VALSARTAN 1 TABLET(S): 24; 26 TABLET, FILM COATED ORAL at 22:04

## 2020-04-30 NOTE — H&P ADULT - HISTORY OF PRESENT ILLNESS
Patient is 64-year-old female former smoker with DM, HTN, HLD, breast CA s/p chemo/XRT, and NICM (catheterization 02/2019 showed dRPL occlusion without ability to intervene), EF 31% by MUGA 7/2019, s/p SubQ ICD 10/2019, AAA came to ED after having aphasia.   Per pt she woke at 2:30 in the AM today and she was fine until today morning around 5:30- 6 am when she suddenly found to have difficulty speaking, she could not find words then she called her cousin who called 911 and pt was brought to the ED. After arriving to ED pt had CT head which was negative for acute intracranial changes, her EKG was NSR, tele Neurology was consulted and pt did not get TPA as she was out of window. Pt passed dysphagia screen.  Pt states she was not feeling well last night. Now her symptoms are improving. Pt denies having weakness, numbness, seizure, LOC, head trauma, vision problem, fever, chills, cough, sob, cp, palpitation, abdominal pain, n/v/d, urinary symptoms, leg swelling.     Code status: Full code

## 2020-04-30 NOTE — ED ADULT NURSE NOTE - NS_SISCREENINGSR_GEN_ALL_ED
----- Message from Destinee Babcock MD sent at 5/30/2017  1:23 PM CDT -----  Please call patient, xray of back and hip shows degenerative changes. Can leave message on machine. If wants can see physical therapy or pain management. Cbc and cmp so far okay, except creatinine just slightly elevated. tsh and lipid pending  yet  
----- Message from Destinee Babcock MD sent at 5/30/2017  3:20 PM CDT -----  Please call patient with results.  Patient said okay to leave a message on her machine with results.  Also see previous x-ray results, lipid panel is good continue same medication also thyroid is good continue same medication.  
Patient informed of below. States that she will call back if she wants referral.  
Negative

## 2020-04-30 NOTE — H&P ADULT - NSHPPHYSICALEXAM_GEN_ALL_CORE
Vital Signs Last 24 Hrs  T(C): 36.8 (30 Apr 2020 15:58), Max: 36.8 (30 Apr 2020 12:39)  T(F): 98.2 (30 Apr 2020 15:58), Max: 98.2 (30 Apr 2020 12:39)  HR: 69 (30 Apr 2020 15:58) (69 - 86)  BP: 177/107 (30 Apr 2020 15:58) (166/109 - 177/107)  BP(mean): --  RR: 20 (30 Apr 2020 15:58) (20 - 20)  SpO2: 97% (30 Apr 2020 15:58) (96% - 97%)    PHYSICAL EXAM:  GENERAL: NAD, well-developed  HEAD:  Atraumatic, Normocephalic  EYES: EOMI, PERRLA, conjunctiva and sclera clear  NECK: Supple, No JVD  CHEST/LUNG: Clear to auscultation bilaterally; No wheeze  HEART: Regular rate and rhythm; s1+ s2+  ABDOMEN: Soft, Nontender, Nondistended; Bowel sounds present  EXTREMITIES: No clubbing, cyanosis, or edema,   Right foot: tenderness on dorsum of the Foot     NEUROLOGY:AAOx3 non-focal  SKIN: No rashes or lesions

## 2020-04-30 NOTE — ED ADULT NURSE NOTE - OBJECTIVE STATEMENT
patient states she was in a restaurant ordering food and all of the sudden her speech stated slurring. patient states she was in a restaurant ordering food and all of the sudden her speech started slurring. She states that she is having expressive aphasia.

## 2020-04-30 NOTE — H&P ADULT - NSICDXPASTMEDICALHX_GEN_ALL_CORE_FT
PAST MEDICAL HISTORY:  Abdominal aneurysm infrarenal- wached by Vascular , Dr. Gutierrez    Breast cancer right-s/p chemo and radiation    Cardiomyopathy     DVT (deep venous thrombosis) Left leg in 1994 after hysterectomy    Hypercholesteremia     Hypertension     Internal carotid aneurysm     Knee effusion, left arthrocentesis- 05/25/16    Sickle cell trait

## 2020-04-30 NOTE — ED PROVIDER NOTE - CLINICAL SUMMARY MEDICAL DECISION MAKING FREE TEXT BOX
65 yo female presents with dysarthria and expressive aphasia. Will obtain CT head, CT angio head and neck, and will consider admission for CVA. Will reassess. 65 yo female presents with dysarthria and expressive aphasia. Will obtain CT head, CT angio head and neck, and will consider admission for CVA. d/w Dr Blackman and ARISTEO Desai admit

## 2020-04-30 NOTE — H&P ADULT - PROBLEM SELECTOR PLAN 1
TIA or Stroke   Expressive aphasia   did not get tpa as out of window   symptoms resolving   passed dysphagia screen   f/u official s/s   started pt on asa and plavix   increased lipitor 80mg od   ct head negative  ekg nsr   trop 0.06-->0.07 trend down trop   discussed with Dr. Anguiano   pt will need MRI brain with and without contrast   PT consult   monitor on tele   cardio Dr. Doshi -TIA or Stroke   -Expressive aphasia   -did not get tpa as out of window   -symptoms resolving   -passed dysphagia screen   -f/u official s/s   -started pt on asa and plavix   -increased lipitor 80mg od   -ct head negative  -ekg nsr   -trop 0.06-->0.07 trend down trop   -discussed with Dr. Anguiano   -pt will need MRI brain with and without contrast but since pt has defoliator pt might not be the candidate for MRI would recommend pt to get MRI as out pt.    -PT consult   -monitor on tele   -cardio Dr. Doshi

## 2020-04-30 NOTE — ED ADULT NURSE NOTE - CHPI ED NUR SYMPTOMS NEG
no dizziness/no confusion/no numbness/no change in level of consciousness/no loss of consciousness/no blurred vision

## 2020-04-30 NOTE — H&P ADULT - NSICDXPASTSURGICALHX_GEN_ALL_CORE_FT
PAST SURGICAL HISTORY:  Breast cancer infusaport insertion- 2014- left chest    Fibroid uterus sp hystrectomy 1196    [place] filter in place 1994    S/P hysterectomy 1994    S/P lumpectomy, right breast 2014, revision of position- 2015

## 2020-04-30 NOTE — H&P ADULT - ASSESSMENT
Patient is 64-year-old female former smoker with DM, HTN, HLD, breast CA s/p chemo/XRT, and NICM (catheterization 02/2019 showed dRPL occlusion without ability to intervene), EF 31% by MUGA 7/2019, s/p SubQ ICD 10/2019, AAA came to ED after having aphasia. CT head negative, ekg nsr, covid negative, did not get tpa as she was out of window. Will admit pt for concern for TIA or stroke.

## 2020-04-30 NOTE — H&P ADULT - NSICDXFAMILYHX_GEN_ALL_CORE_FT
FAMILY HISTORY:  Family history of diabetes mellitus  Family history of glioblastoma  Family history of hypertension  Family history of liver failure  Family history of other condition, please open box and see additional medical family history  Family history of renal failure, uncle  Family history of renal stone  History of hypertension, mother    Sibling  Still living? No  Family history of glioblastoma, Age at diagnosis: Age Unknown

## 2020-04-30 NOTE — ED PROVIDER NOTE - PSH
Breast cancer  infusaport insertion- 2014- left chest  Fibroid uterus  sp hystrectomy 1196  [place] filter in place  1994  S/P hysterectomy  1994  S/P lumpectomy, right breast  2014, revision of position- 2015

## 2020-04-30 NOTE — ED PROVIDER NOTE - PROGRESS NOTE DETAILS
d/w neurologist on call Dr Anguiano and recommended to speak with telestroke.  d/w Dr Bailey stroke director who states patient is outside the endovascular window and TPA window since she wike with these symptoms and patient should be admitted for MRI.  Dr Bailey states that patient's low GFR precludes CTAngio. d/w neurologist on call Dr Anguiano and recommended to speak with telestroke.  d/w Dr Bailey stroke director who states patient is outside the endovascular window and TPA window since she woke with these symptoms and patient should be admitted for MRI.  Dr Bailey states that patient's low GFR precludes CTAngio.

## 2020-04-30 NOTE — H&P ADULT - PROBLEM SELECTOR PLAN 5
IMPROVE VTE Individual Risk Assessment  RISK                                                         Points  [  ] Previous VTE                                      3  [  ] Thrombophilia                                   2  [  ] Lower limb paralysis                         2 (unable to hold up >15 seconds)    [  ] Current Cancer                                  2       (within 6 months)  [  ] Immobilization > 24 hrs                    1  [  ] ICU/CCU stay > 24 hrs                         1  [  ] Age > 60                                              1  IMPROVE VTE Score 3  lovenox for dvt ppx

## 2020-04-30 NOTE — ED PROVIDER NOTE - OBJECTIVE STATEMENT
63 yo female patient with PMhx of CVA, heart failure, COPD, Type II DM,  dysarthria, AAA, CAD, left carotid bruit, presents BIBA with c/o dysarthria since 6 am today. Patient states she tried to get breakfast and order food but she could not get the words out. Patient was able to walk to the ambulance from her building. Patient denies fever, chills, abdominal pain, nausea, vomiting, or diarrhea.

## 2020-04-30 NOTE — H&P ADULT - ATTENDING COMMENTS
Patient was examined and discussed with Dr. Desai.     She presented with a new expressive aphasia and mild dysarthria x 10 hours.    She has PMH of systolic CHF with EF 31%, defibrillator, and CVA    Alert, cooperative woman, able to eat dinner and swallow  Vital Signs Last 24 Hrs  T(C): 36.8 (30 Apr 2020 15:58), Max: 36.8 (30 Apr 2020 12:39)  T(F): 98.2 (30 Apr 2020 15:58), Max: 98.2 (30 Apr 2020 12:39)  HR: 75 (30 Apr 2020 18:02) (69 - 86)  BP: 168/94 (30 Apr 2020 18:02) (166/109 - 177/107)  BP(mean): --  RR: 20 (30 Apr 2020 15:58) (20 - 20)  SpO2: 97% (30 Apr 2020 15:58) (96% - 97%)    Tongue deviates to the right.  Able to swallow                          11.6   5.38  )-----------( 320      ( 30 Apr 2020 13:28 )             35.7       04-30    144  |  102  |  18  ----------------------------<  134<H>  3.0<L>   |  31  |  1.56<H>    Ca    9.8      30 Apr 2020 13:28    TPro  8.5<H>  /  Alb  3.5  /  TBili  0.4  /  DBili  x   /  AST  10  /  ALT  10  /  AlkPhos  120  04-30                  PT/INR - ( 30 Apr 2020 13:28 )   PT: 14.7 sec;   INR: 1.29 ratio         PTT - ( 30 Apr 2020 13:28 )  PTT:29.6 sec      CARDIAC MARKERS ( 30 Apr 2020 17:54 )  0.078 ng/mL / x     / x     / x     / x      CARDIAC MARKERS ( 30 Apr 2020 13:28 )  0.067 ng/mL / x     / 68 U/L / x     / x        POCT Blood Glucose.: 154 mg/dL (30 Apr 2020 12:44)    EKG, NSR  < from: Xray Chest 1 View-PORTABLE IMMEDIATE (04.30.20 @ 14:41) >      Impression: No evidence for pulmonary consolidation, pleural effusion or pneumothorax.    Stable cardiac silhouette.    Stable implantable electronic device in the left chest/abdominal wall.      DISCRETE X-RAY DATA:  Percent of LEFT lung opacification: Clear  Percent of RIGHT lung opacification: Clear  Change in lung opacification from most recent x-ray (<=3 days): No Prior  Change from prior dated 3 or more days (same admission): No Prior    < end of copied text >    < from: CT Head No Cont (04.30.20 @ 14:29) >    There is no acute intracranial hemorrhage, or midline shift. There is no acute territorial infarct. There is no hydrocephalus. Partial empty sella    The cranium is intact.    Mild mucosal thickening paranasal sinuses. Partial opacification right mastoid air cells.    IMPRESSION:  No acute intracranialhemorrhage or acute territorial infarct.  If symptoms persist, follow-up MRI exam recommended.    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          Neurology consultation, appreciated.  In view of defibrillator, may defer MRI to outpatient facility that  can accommodate.   Plan: ASA, plavix, increase statin.           Cardiology, Dr. Doshi

## 2020-05-01 DIAGNOSIS — R47.01 APHASIA: ICD-10-CM

## 2020-05-01 DIAGNOSIS — N39.0 URINARY TRACT INFECTION, SITE NOT SPECIFIED: ICD-10-CM

## 2020-05-01 LAB
24R-OH-CALCIDIOL SERPL-MCNC: 37.1 NG/ML — SIGNIFICANT CHANGE UP (ref 30–80)
A1C WITH ESTIMATED AVERAGE GLUCOSE RESULT: 6.4 % — HIGH (ref 4–5.6)
ALBUMIN SERPL ELPH-MCNC: 3.4 G/DL — LOW (ref 3.5–5)
ALP SERPL-CCNC: 114 U/L — SIGNIFICANT CHANGE UP (ref 40–120)
ALT FLD-CCNC: 8 U/L DA — LOW (ref 10–60)
ANION GAP SERPL CALC-SCNC: 7 MMOL/L — SIGNIFICANT CHANGE UP (ref 5–17)
AST SERPL-CCNC: 12 U/L — SIGNIFICANT CHANGE UP (ref 10–40)
BASOPHILS # BLD AUTO: 0.02 K/UL — SIGNIFICANT CHANGE UP (ref 0–0.2)
BASOPHILS NFR BLD AUTO: 0.4 % — SIGNIFICANT CHANGE UP (ref 0–2)
BILIRUB SERPL-MCNC: 0.4 MG/DL — SIGNIFICANT CHANGE UP (ref 0.2–1.2)
BUN SERPL-MCNC: 14 MG/DL — SIGNIFICANT CHANGE UP (ref 7–18)
CALCIUM SERPL-MCNC: 9.1 MG/DL — SIGNIFICANT CHANGE UP (ref 8.4–10.5)
CHLORIDE SERPL-SCNC: 109 MMOL/L — HIGH (ref 96–108)
CHOLEST SERPL-MCNC: 156 MG/DL — SIGNIFICANT CHANGE UP (ref 10–199)
CO2 SERPL-SCNC: 26 MMOL/L — SIGNIFICANT CHANGE UP (ref 22–31)
CREAT SERPL-MCNC: 1.08 MG/DL — SIGNIFICANT CHANGE UP (ref 0.5–1.3)
EOSINOPHIL # BLD AUTO: 0.14 K/UL — SIGNIFICANT CHANGE UP (ref 0–0.5)
EOSINOPHIL NFR BLD AUTO: 2.6 % — SIGNIFICANT CHANGE UP (ref 0–6)
ESTIMATED AVERAGE GLUCOSE: 137 MG/DL — HIGH (ref 68–114)
FOLATE SERPL-MCNC: 8.3 NG/ML — SIGNIFICANT CHANGE UP
GLUCOSE SERPL-MCNC: 125 MG/DL — HIGH (ref 70–99)
HCT VFR BLD CALC: 35.1 % — SIGNIFICANT CHANGE UP (ref 34.5–45)
HDLC SERPL-MCNC: 37 MG/DL — LOW
HGB BLD-MCNC: 11.3 G/DL — LOW (ref 11.5–15.5)
IMM GRANULOCYTES NFR BLD AUTO: 0.2 % — SIGNIFICANT CHANGE UP (ref 0–1.5)
LIPID PNL WITH DIRECT LDL SERPL: 95 MG/DL — SIGNIFICANT CHANGE UP
LYMPHOCYTES # BLD AUTO: 2.34 K/UL — SIGNIFICANT CHANGE UP (ref 1–3.3)
LYMPHOCYTES # BLD AUTO: 44.1 % — HIGH (ref 13–44)
MAGNESIUM SERPL-MCNC: 2 MG/DL — SIGNIFICANT CHANGE UP (ref 1.6–2.6)
MCHC RBC-ENTMCNC: 29.4 PG — SIGNIFICANT CHANGE UP (ref 27–34)
MCHC RBC-ENTMCNC: 32.2 GM/DL — SIGNIFICANT CHANGE UP (ref 32–36)
MCV RBC AUTO: 91.4 FL — SIGNIFICANT CHANGE UP (ref 80–100)
MONOCYTES # BLD AUTO: 0.46 K/UL — SIGNIFICANT CHANGE UP (ref 0–0.9)
MONOCYTES NFR BLD AUTO: 8.7 % — SIGNIFICANT CHANGE UP (ref 2–14)
NEUTROPHILS # BLD AUTO: 2.34 K/UL — SIGNIFICANT CHANGE UP (ref 1.8–7.4)
NEUTROPHILS NFR BLD AUTO: 44 % — SIGNIFICANT CHANGE UP (ref 43–77)
NRBC # BLD: 0 /100 WBCS — SIGNIFICANT CHANGE UP (ref 0–0)
PHOSPHATE SERPL-MCNC: 2.8 MG/DL — SIGNIFICANT CHANGE UP (ref 2.5–4.5)
PLATELET # BLD AUTO: 295 K/UL — SIGNIFICANT CHANGE UP (ref 150–400)
POTASSIUM SERPL-MCNC: 3.6 MMOL/L — SIGNIFICANT CHANGE UP (ref 3.5–5.3)
POTASSIUM SERPL-SCNC: 3.6 MMOL/L — SIGNIFICANT CHANGE UP (ref 3.5–5.3)
PROT SERPL-MCNC: 8 G/DL — SIGNIFICANT CHANGE UP (ref 6–8.3)
RBC # BLD: 3.84 M/UL — SIGNIFICANT CHANGE UP (ref 3.8–5.2)
RBC # FLD: 13.7 % — SIGNIFICANT CHANGE UP (ref 10.3–14.5)
SODIUM SERPL-SCNC: 142 MMOL/L — SIGNIFICANT CHANGE UP (ref 135–145)
TOTAL CHOLESTEROL/HDL RATIO MEASUREMENT: 4.2 RATIO — SIGNIFICANT CHANGE UP (ref 3.3–7.1)
TRIGL SERPL-MCNC: 118 MG/DL — SIGNIFICANT CHANGE UP (ref 10–149)
TROPONIN I SERPL-MCNC: 0.08 NG/ML — HIGH (ref 0–0.04)
TROPONIN I SERPL-MCNC: 0.08 NG/ML — HIGH (ref 0–0.04)
TROPONIN I SERPL-MCNC: 0.09 NG/ML — HIGH (ref 0–0.04)
TSH SERPL-MCNC: 2.86 UU/ML — SIGNIFICANT CHANGE UP (ref 0.34–4.82)
VIT B12 SERPL-MCNC: 434 PG/ML — SIGNIFICANT CHANGE UP (ref 232–1245)
WBC # BLD: 5.31 K/UL — SIGNIFICANT CHANGE UP (ref 3.8–10.5)
WBC # FLD AUTO: 5.31 K/UL — SIGNIFICANT CHANGE UP (ref 3.8–10.5)

## 2020-05-01 PROCEDURE — 99223 1ST HOSP IP/OBS HIGH 75: CPT

## 2020-05-01 PROCEDURE — 99232 SBSQ HOSP IP/OBS MODERATE 35: CPT | Mod: GC

## 2020-05-01 RX ORDER — CEFTRIAXONE 500 MG/1
1000 INJECTION, POWDER, FOR SOLUTION INTRAMUSCULAR; INTRAVENOUS EVERY 24 HOURS
Refills: 0 | Status: DISCONTINUED | OUTPATIENT
Start: 2020-05-01 | End: 2020-05-04

## 2020-05-01 RX ORDER — OXYCODONE AND ACETAMINOPHEN 5; 325 MG/1; MG/1
1 TABLET ORAL ONCE
Refills: 0 | Status: DISCONTINUED | OUTPATIENT
Start: 2020-05-01 | End: 2020-05-01

## 2020-05-01 RX ORDER — SPIRONOLACTONE 25 MG/1
25 TABLET, FILM COATED ORAL DAILY
Refills: 0 | Status: DISCONTINUED | OUTPATIENT
Start: 2020-05-01 | End: 2020-05-04

## 2020-05-01 RX ORDER — HYDRALAZINE HCL 50 MG
5 TABLET ORAL ONCE
Refills: 0 | Status: COMPLETED | OUTPATIENT
Start: 2020-05-01 | End: 2020-05-01

## 2020-05-01 RX ORDER — ANASTROZOLE 1 MG/1
1 TABLET ORAL DAILY
Refills: 0 | Status: DISCONTINUED | OUTPATIENT
Start: 2020-05-01 | End: 2020-05-04

## 2020-05-01 RX ADMIN — OXYCODONE AND ACETAMINOPHEN 1 TABLET(S): 5; 325 TABLET ORAL at 02:47

## 2020-05-01 RX ADMIN — CLOPIDOGREL BISULFATE 75 MILLIGRAM(S): 75 TABLET, FILM COATED ORAL at 14:03

## 2020-05-01 RX ADMIN — Medication 5 MILLIGRAM(S): at 02:47

## 2020-05-01 RX ADMIN — Medication 81 MILLIGRAM(S): at 14:03

## 2020-05-01 RX ADMIN — ATORVASTATIN CALCIUM 80 MILLIGRAM(S): 80 TABLET, FILM COATED ORAL at 20:43

## 2020-05-01 RX ADMIN — SPIRONOLACTONE 25 MILLIGRAM(S): 25 TABLET, FILM COATED ORAL at 18:22

## 2020-05-01 RX ADMIN — SACUBITRIL AND VALSARTAN 1 TABLET(S): 24; 26 TABLET, FILM COATED ORAL at 17:40

## 2020-05-01 RX ADMIN — SACUBITRIL AND VALSARTAN 1 TABLET(S): 24; 26 TABLET, FILM COATED ORAL at 05:35

## 2020-05-01 RX ADMIN — Medication 25 MILLIGRAM(S): at 05:35

## 2020-05-01 RX ADMIN — ANASTROZOLE 1 MILLIGRAM(S): 1 TABLET ORAL at 21:37

## 2020-05-01 RX ADMIN — CEFTRIAXONE 100 MILLIGRAM(S): 500 INJECTION, POWDER, FOR SOLUTION INTRAMUSCULAR; INTRAVENOUS at 14:37

## 2020-05-01 RX ADMIN — ENOXAPARIN SODIUM 40 MILLIGRAM(S): 100 INJECTION SUBCUTANEOUS at 14:03

## 2020-05-01 NOTE — CONSULT NOTE ADULT - SUBJECTIVE AND OBJECTIVE BOX
CHIEF COMPLAINT:    HPI: 64-year-old female former smoker with DM, HTN, HLD, breast CA s/p chemo/XRT, and NICM (catheterization 02/2019 showed dRPL occlusion without ability to intervene), EF 31% by MUGA 7/2019, s/p SubQ ICD 10/2019, who presented with aphasia in setting of suspected TIA or CVA. Patient     PAST MEDICAL & SURGICAL HISTORY:  As above, also Knee effusion, left: arthrocentesis- 05/25/16, Internal carotid aneurysm, Abdominal aneurysm: infrarenal- followed by Vascular , Dr. Gutierrez, Sickle cell trait, DVT (deep venous thrombosis): Left leg in 1994 after hysterectomy  S/P hysterectomy: 1994, [place] filter in place: 1994  S/P lumpectomy, right breast: 2014, revision of position- 2015    Allergies    Advil (Hives; Vomiting)    MEDICATIONS  (STANDING):  aspirin enteric coated 81 milliGRAM(s) Oral daily  atorvastatin 80 milliGRAM(s) Oral at bedtime  cefTRIAXone   IVPB 1000 milliGRAM(s) IV Intermittent every 24 hours  clopidogrel Tablet 75 milliGRAM(s) Oral daily  enoxaparin Injectable 40 milliGRAM(s) SubCutaneous daily  metoprolol tartrate 25 milliGRAM(s) Oral two times a day  sacubitril 49 mG/valsartan 51 mG 1 Tablet(s) Oral two times a day    MEDICATIONS  (PRN):      FAMILY HISTORY:  Family history of other condition: please open box and see additional medical family history  Family history of renal failure: uncle  Family history of glioblastoma (Sibling)  History of hypertension: mother  Family history of liver failure  Family history of glioblastoma  Family history of renal stone  Family history of diabetes mellitus  Family history of hypertension    No family history of premature coronary artery disease or sudden cardiac death    SOCIAL HISTORY:  Smoking-  Alcohol-  Illicit Drug use-    REVIEW OF SYSTEMS:  Constitutional: [ ] fever, [ ]weight loss,  [ ]fatigue  Eyes: [ ] visual changes  Respiratory: [ ]shortness of breath;  [ ] cough, [ ]wheezing, [ ]chills, [ ]hemoptysis  Cardiovascular: [ ] chest pain, [ ]palpitations, [ ]dizziness,  [ ]leg swelling [ ]syncope  Gastrointestinal: [ ] abdominal pain, [ ]nausea, [ ]vomiting,  [ ]diarrhea   Genitourinary: [ ] dysuria, [ ] hematuria  Neurologic: [ ] headaches [ ] tremors  [ ] weakness [ ] lightheadedness  Skin: [ ] itching, [ ]burning, [ ] rashes  Endocrine: [ ] heat or cold intolerance  Musculoskeletal: [ ] joint pain or swelling; [ ] muscle, back, or extremity pain  Psychiatric: [ ] depression, [ ]anxiety, [ ]mood swings, or [ ]difficulty sleeping  Hematologic: [ ] easy bruising, [ ] bleeding gums       [ x] All others negative	  [ ] Unable to obtain    Vital Signs Last 24 Hrs  T(C): 36.4 (01 May 2020 05:29), Max: 36.8 (30 Apr 2020 12:39)  T(F): 97.5 (01 May 2020 05:29), Max: 98.2 (30 Apr 2020 12:39)  HR: 72 (01 May 2020 05:29) (57 - 86)  BP: 150/83 (01 May 2020 05:29) (150/83 - 177/110)  BP(mean): --  RR: 17 (01 May 2020 05:29) (15 - 20)  SpO2: 100% (01 May 2020 05:29) (96% - 100%)  I&O's Summary      PHYSICAL EXAM:  General: No acute distress  HEENT: EOMI, PERRL  Neck: Supple, No JVD  Lungs: Clear to auscultation bilaterally; No rales or wheezing  Heart: Regular rate and rhythm; No murmurs, rubs, or gallops  Abdomen: Nontender, bowel sounds present  Extremities: No clubbing, cyanosis, or edema  Nervous system:  Alert & Oriented X3, no focal deficits  Psychiatric: Normal affect  Skin: No rashes or lesions      LABS:  05-01    142  |  109<H>  |  14  ----------------------------<  125<H>  3.6   |  26  |  1.08    Ca    9.1      01 May 2020 06:58  Phos  2.8     05-01  Mg     2.0     05-01    TPro  8.0  /  Alb  3.4<L>  /  TBili  0.4  /  DBili  x   /  AST  12  /  ALT  8<L>  /  AlkPhos  114  05-01    Creatinine Trend: 1.08<--, 1.56<--                        11.3   5.31  )-----------( 295      ( 01 May 2020 06:58 )             35.1     PT/INR - ( 30 Apr 2020 13:28 )   PT: 14.7 sec;   INR: 1.29 ratio         PTT - ( 30 Apr 2020 13:28 )  PTT:29.6 sec    Lipid Panel: Cholesterol, Serum 156  Direct LDL 95  HDL Cholesterol, Serum 37  Triglycerides, Serum 118    Cardiac Enzymes: CARDIAC MARKERS ( 01 May 2020 06:58 )  0.083 ng/mL / x     / x     / x     / x      CARDIAC MARKERS ( 01 May 2020 00:03 )  0.087 ng/mL / x     / x     / x     / x      CARDIAC MARKERS ( 30 Apr 2020 17:54 )  0.078 ng/mL / x     / x     / x     / x      CARDIAC MARKERS ( 30 Apr 2020 13:28 )  0.067 ng/mL / x     / 68 U/L / x     / x          Serum Pro-Brain Natriuretic Peptide: 2642 pg/mL (04-30-20 @ 13:28)        RADIOLOGY:    ECG [my interpretation]:    TELEMETRY:    ECHO:    STRESS TEST:    CATHETERIZATION: CHIEF COMPLAINT:    HPI: 64-year-old female former smoker with DM, HTN, HLD, breast CA s/p chemo/XRT, and NICM (catheterization 02/2019 showed dRPL occlusion without ability to intervene), EF 31% by MUGA 7/2019, s/p SubQ ICD 10/2019, who presented with aphasia in setting of suspected TIA or CVA. Patient reports symptoms started 2 days ago when she developed difficulty with speech. There was no associated chest pain, dyspnea, palpitations, lightheadedness, or syncope. Thinks symptoms improved a bit but still worse than baseline. Reports dyspnea at baseline. No LE edema.     PAST MEDICAL & SURGICAL HISTORY:  As above, also Knee effusion, left: arthrocentesis- 05/25/16, Internal carotid aneurysm, Abdominal aneurysm: infrarenal- followed by Vascular , Dr. Gutierrez, Sickle cell trait, DVT (deep venous thrombosis): Left leg in 1994 after hysterectomy  S/P hysterectomy: 1994, [place] filter in place: 1994  S/P lumpectomy, right breast: 2014, revision of position- 2015    Allergies    Advil (Hives; Vomiting)    MEDICATIONS  (STANDING):  aspirin enteric coated 81 milliGRAM(s) Oral daily  atorvastatin 80 milliGRAM(s) Oral at bedtime  cefTRIAXone   IVPB 1000 milliGRAM(s) IV Intermittent every 24 hours  clopidogrel Tablet 75 milliGRAM(s) Oral daily  enoxaparin Injectable 40 milliGRAM(s) SubCutaneous daily  metoprolol tartrate 25 milliGRAM(s) Oral two times a day  sacubitril 49 mG/valsartan 51 mG 1 Tablet(s) Oral two times a day    MEDICATIONS  (PRN):      FAMILY HISTORY:  No family history of premature coronary artery disease or sudden cardiac death    SOCIAL HISTORY:  Smoking-Former  Alcohol-Denies  Illicit Drug use-denies    REVIEW OF SYSTEMS:  Constitutional: [ ] fever, [ ]weight loss,  [ ]fatigue  Eyes: [ ] visual changes  Respiratory: [ ]shortness of breath;  [ ] cough, [ ]wheezing, [ ]chills, [ ]hemoptysis  Cardiovascular: [ ] chest pain, [ ]palpitations, [ ]dizziness,  [ ]leg swelling [ ]syncope  Gastrointestinal: [ ] abdominal pain, [ ]nausea, [ ]vomiting,  [ ]diarrhea   Genitourinary: [ ] dysuria, [ ] hematuria  Neurologic: [ ] headaches [ ] tremors  [ ] weakness [ ] lightheadedness  Skin: [ ] itching, [ ]burning, [ ] rashes  Endocrine: [ ] heat or cold intolerance  Musculoskeletal: [ ] joint pain or swelling; [ ] muscle, back, or extremity pain  Psychiatric: [ ] depression, [ ]anxiety, [ ]mood swings, or [ ]difficulty sleeping  Hematologic: [ ] easy bruising, [ ] bleeding gums       [ x] All others negative	  [ ] Unable to obtain    Vital Signs Last 24 Hrs  T(C): 36.4 (01 May 2020 05:29), Max: 36.8 (30 Apr 2020 12:39)  T(F): 97.5 (01 May 2020 05:29), Max: 98.2 (30 Apr 2020 12:39)  HR: 72 (01 May 2020 05:29) (57 - 86)  BP: 150/83 (01 May 2020 05:29) (150/83 - 177/110)  BP(mean): --  RR: 17 (01 May 2020 05:29) (15 - 20)  SpO2: 100% (01 May 2020 05:29) (96% - 100%)  I&O's Summary      PHYSICAL EXAM:  see below      LABS:  05-01    142  |  109<H>  |  14  ----------------------------<  125<H>  3.6   |  26  |  1.08    Ca    9.1      01 May 2020 06:58  Phos  2.8     05-01  Mg     2.0     05-01    TPro  8.0  /  Alb  3.4<L>  /  TBili  0.4  /  DBili  x   /  AST  12  /  ALT  8<L>  /  AlkPhos  114  05-01    Creatinine Trend: 1.08<--, 1.56<--                        11.3   5.31  )-----------( 295      ( 01 May 2020 06:58 )             35.1     PT/INR - ( 30 Apr 2020 13:28 )   PT: 14.7 sec;   INR: 1.29 ratio         PTT - ( 30 Apr 2020 13:28 )  PTT:29.6 sec    Lipid Panel: Cholesterol, Serum 156  Direct LDL 95  HDL Cholesterol, Serum 37  Triglycerides, Serum 118    Cardiac Enzymes: CARDIAC MARKERS ( 01 May 2020 06:58 )  0.083 ng/mL / x     / x     / x     / x      CARDIAC MARKERS ( 01 May 2020 00:03 )  0.087 ng/mL / x     / x     / x     / x      CARDIAC MARKERS ( 30 Apr 2020 17:54 )  0.078 ng/mL / x     / x     / x     / x      CARDIAC MARKERS ( 30 Apr 2020 13:28 )  0.067 ng/mL / x     / 68 U/L / x     / x          Serum Pro-Brain Natriuretic Peptide: 2642 pg/mL (04-30-20 @ 13:28)    RADIOLOGY: < from: Xray Chest 1 View-PORTABLE IMMEDIATE (04.30.20 @ 14:41) >  Impression: No evidence for pulmonary consolidation, pleural effusion or pneumothorax.    Stable cardiac silhouette.    Stable implantable electronic device in the left chest/abdominal wall.    < end of copied text >    < from: CT Head No Cont (04.30.20 @ 14:29) >    IMPRESSION:  No acute intracranialhemorrhage or acute territorial infarct.  If symptoms persist, follow-up MRI exam recommended.    Stable partially calcified/thrombosed right supraclinoid internal carotid artery aneurysm    < end of copied text >      ECG [my interpretation]: < from: 12 Lead ECG (04.30.20 @ 12:49) >  Diagnosis Line Normal sinus rhythm  Left anterior fascicular block  Nonspecific T wave abnormality  Abnormal ECG    Confirmed by DEVEN QUINTANA ([id number]) on 5/1/2020 9:36:07 AM    < end of copied text >      TELEMETRY: sinus rhythm no PVCs    ECHO: < from: Transthoracic Echocardiogram (07.16.19 @ 09:00) >  CONCLUSIONS:  1. Normal mitral valve. No mitral regurgitation is noted.  2. Normal trileaflet aortic valve. No aortic stenosis. No  aortic valve regurgitation seen.  3. Normal aortic root.  4. Normal left atrium.  5. Mild concentric left ventricular hypertrophy.  6. Moderate to severe global left ventricular systolic  dysfunction (EF 35% by biplane). Not all LV wall segments  were seen.  7. Grade I diastolic dysfunction (Impaired relaxation).  8. Normal right atrium.  9. Normal right ventricular size and function (RV tissue  Doppler 10 cm/s).  10. Normal tricuspid valve. Trace tricuspid regurgitation.  11. Normal pulmonic valve. Trace pulmonic insufficiency is  noted.  12. No pericardialeffusion.    < end of copied text >      CATHETERIZATION: < from: Cardiac Cath Lab - Adult (02.22.19 @ 17:02) >  DIAGNOSTIC IMPRESSIONS: Severe LVdysfunction  Occluded distal RPLS , not amenable to PCI    < end of copied text >

## 2020-05-01 NOTE — PROGRESS NOTE ADULT - PROBLEM SELECTOR PLAN 3
EF 31% on MUGA, grade 1 DD on echo   s/p ICD  pt is on entresto and metoprolol  F/U Repeat ECHO EF 31% on MUGA, grade 1 DD on echo   s/p ICD  pt is on entresto and metoprolol, resumed on spirinolactone.  F/U Repeat ECHO

## 2020-05-01 NOTE — CONSULT NOTE ADULT - ASSESSMENT
****TEMPLATE ONLY***      Patient is a 64y old  Female who presents with a chief complaint of CVA (01 May 2020 09:34)      HPI:  Patient is 64-year-old female former smoker with DM, HTN, HLD, breast CA s/p chemo/XRT, and NICM (catheterization 2019 showed dRPL occlusion without ability to intervene), EF 31% by MUGA 2019, s/p SubQ ICD 10/2019, AAA came to ED after having aphasia.   Per pt she woke at 2:30 in the AM today and she was fine until today morning around 5:30- 6 am when she suddenly found to have difficulty speaking, she could not find words then she called her cousin who called 911 and pt was brought to the ED. After arriving to ED pt had CT head which was negative for acute intracranial changes, her EKG was NSR, tele Neurology was consulted and pt did not get TPA as she was out of window. Pt passed dysphagia screen.  Pt states she was not feeling well last night. Now her symptoms are improving. Pt denies having weakness, numbness, seizure, LOC, head trauma, vision problem, fever, chills, cough, sob, cp, palpitation, abdominal pain, n/v/d, urinary symptoms, leg swelling.     Code status: Full code (2020 17:20)           The patient was last know well at  The patient lives at home/ NH.  The patient walks without assistance/ with a cane or walker    Neurological Review of Systems:  No difficulty with language.  No vision loss or double vision.  No dizziness, vertigo or new hearing loss.  No difficulty with speech or swallowing.  No focal weakness.  No focal sensory changes.  No numbness or tingling in the bilateral lower extremities.  No difficulty with balance.  No difficulty with ambulation.      MEDICATIONS  (STANDING):  aspirin enteric coated 81 milliGRAM(s) Oral daily  atorvastatin 80 milliGRAM(s) Oral at bedtime  cefTRIAXone   IVPB 1000 milliGRAM(s) IV Intermittent every 24 hours  clopidogrel Tablet 75 milliGRAM(s) Oral daily  enoxaparin Injectable 40 milliGRAM(s) SubCutaneous daily  metoprolol tartrate 25 milliGRAM(s) Oral two times a day  sacubitril 49 mG/valsartan 51 mG 1 Tablet(s) Oral two times a day  spironolactone 25 milliGRAM(s) Oral daily    MEDICATIONS  (PRN):    Allergies    Advil (Hives; Vomiting)    Intolerances      PAST MEDICAL & SURGICAL HISTORY:  Knee effusion, left: arthrocentesis- 16  Internal carotid aneurysm  Abdominal aneurysm: infrarenal- wached by Vascular , Dr. Gutierrez  Cardiomyopathy  Sickle cell trait  DVT (deep venous thrombosis): Left leg in  after hysterectomy  Hypercholesteremia  Breast cancer: right-s/p chemo and radiation  Hypertension  Breast cancer: infusaport insertion- - left chest  S/P hysterectomy:   [place] filter in place:   S/P lumpectomy, right breast: , revision of position-   Fibroid uterus: sp hystrectomy 1196    FAMILY HISTORY:  Family history of other condition: please open box and see additional medical family history  Family history of renal failure: uncle  Family history of glioblastoma (Sibling)  History of hypertension: mother  Family history of liver failure  Family history of glioblastoma  Family history of renal stone  Family history of diabetes mellitus  Family history of hypertension    SOCIAL HISTORY: non smoker/ former smoker/ active smoker    Review of Systems:  Constitutional: No generalized weakness. No fevers or chills.                    Eyes, Ears, Mouth, Throat: No vision loss   Respiratory: No shortness of breath or cough.                                Cardiovascular: No chest pain or palpitations  Gastrointestinal: No nausea or vomiting.                                         Genitourinary: No urinary incontinence or burning on urination.  Musculoskeletal: No joint pain.                                                           Dermatologic: No rash.  Neurological: as per HPI                                                                      Psychiatric: No behavioral problems.  Endocrine: No known hypoglycemia.               Hematologic/Lymphatic: No easy bleeding.    O:  Vital Signs Last 24 Hrs  T(C): 36.3 (01 May 2020 10:05), Max: 36.8 (2020 12:39)  T(F): 97.3 (01 May 2020 10:05), Max: 98.2 (2020 12:39)  HR: 64 (01 May 2020 10:05) (57 - 86)  BP: 150/82 (01 May 2020 10:05) (150/82 - 177/110)  BP(mean): --  RR: 17 (01 May 2020 10:05) (15 - 20)  SpO2: 97% (01 May 2020 10:05) (96% - 100%)    General Exam:   General appearance: No acute distress                 Cardiovascular: Pedal dorsalis pulses intact bilaterally    Neurological Exam:  NIH Stroke Scale (NIHSS):   1a. LOConscious:  0-alert 1-lethargy 2-obtund 3-coma:    _____  1b. LOC Questions:  0-both 1-one 2-none                       _____  1c. LOC Commands:  0-both 1-one 2-none                     _____  2.   Gaze:  0-nl 1-partial 2-conjugate                                _____  3.   Visual:  0-nl 1-part wendy 2-full wendy 3-bilat wendy         _____  4.   Facial Palsy:  0-nl 1-minor 2-part 3-complete             _____  5.   Motor Arm:  0-nl 1-drift 2-effort 3-no effort         Left             _____                              4-no move UN-amputated                     Right  _____  6.   Motor Leg:                                                                 Left   _____                                                                                   Right _____  7.   Ataxia:  0-nl 1-one limb 2-two UN-amp                      _____  8.   Sensory:  0-nl 1-mild 2-severe                                  _____  9.   Language:  0-nl 1-mild 2-severe 3-mute                     _____  10.  Dysarthria:  0-nl 1-mild 2-severe 3-barrier                  _____  11.  Extinction/Inattention:  0-nl 1-mild 2-deep                 _____         TOTAL NIHSS       ________    Mental Status: Orientated to self, date and place.  Attention intact.  No dysarthria, aphasia or neglect.  Knowledge intact.  Registration intact.  Short and long term memory grossly intact.      Cranial Nerves: CN I - not tested.  PERRL, EOMI, VFF, no nystagmus or diplopia.  No APD.  Fundi not visualized bilaterally.  CN V1-3 intact to light touch and pinprick.  No facial asymmetry.  Hearing intact to finger rub bilaterally.  Tongue, uvula and palate midline.  Sternocleidomastoid and Trapezius intact bilaterally.    Motor:   Tone: normal.                  Strength intact throughout  No pronator drift bilaterally                      No dysmetria on finger-nose-finger or heel-shin-heel  No truncal ataxia.  No resting, postural or action tremor.  No myoclonus.    Sensation: intact to light touch, pinprick, vibration and proprioception    Deep Tendon Reflexes: 1+ bilateral biceps, triceps, brachioradialis, knee and ankle  Toes flexor bilaterally    Gait: normal and stable.  Rhomberg -gia.    Other:      LABS:                        11.3   5.31  )-----------( 295      ( 01 May 2020 06:58 )             35.1     05-01    142  |  109<H>  |  14  ----------------------------<  125<H>  3.6   |  26  |  1.08    Ca    9.1      01 May 2020 06:58  Phos  2.8     05-  Mg     2.0     -    TPro  8.0  /  Alb  3.4<L>  /  TBili  0.4  /  DBili  x   /  AST  12  /  ALT  8<L>  /  AlkPhos  114  -    PT/INR - ( 2020 13:28 )   PT: 14.7 sec;   INR: 1.29 ratio         PTT - ( 2020 13:28 )  PTT:29.6 sec  Urinalysis Basic - ( 2020 23:05 )    Color: Yellow / Appearance: Clear / S.010 / pH: x  Gluc: x / Ketone: Negative  / Bili: Negative / Urobili: Negative   Blood: x / Protein: Negative / Nitrite: Negative   Leuk Esterase: Moderate / RBC: 2-5 /HPF / WBC 26-50 /HPF   Sq Epi: x / Non Sq Epi: Few /HPF / Bacteria: Few /HPF      LDL  HbA1C    RADIOLOGY & ADDITIONAL STUDIES:    EKG: < from: 12 Lead ECG (20 @ 12:49) >  Diagnosis Line Normal sinus rhythm  Left anterior fascicular block  Nonspecific T wave abnormality  Abnormal ECG    < end of copied text >    < from: CT Head No Cont (20 @ 14:29) >  IMPRESSION:  No acute intracranialhemorrhage or acute territorial infarct.  If symptoms persist, follow-up MRI exam recommended.    Stable partially calcified/thrombosed right supraclinoid internal carotid artery aneurysm    < end of copied text >    Tele:  TTE:  EEG:    Impression:  Aphasia in patient with h/o breast ca concenring for new stroke vs. metastasis     Recommendations:  1.             Admit to telemetry   2.             MRI brain, MRA head with and without contrast, Carotid duplex (CD).   3.             TTE  4.             Please check HbA1C and fasting lipid profile  5.             ASA 81mg () and Lipitor 40mg HS.  Plavix x 3weeks  6.            EEG  8.             Frequent neurochecks  11.          Formal speech and swallow evaluation  12.          PT evaluation  13.          STAT CTH IF the patient has sudden change in mental status or neurological exam  14.          DVT PPx  aneurysm, fu with neurosurgery as outpatient    Thank you for the courtesy of this consult. Impression:  Aphasia in patient with h/o breast ca concenring for new stroke vs. metastasis     Recommendations:  1.             Admit to telemetry   2.             MRI brain, MRA head with and without contrast, Carotid duplex (CD).   3.             TTE  4.             Please check HbA1C and fasting lipid profile  5.             ASA 81mg () and Lipitor 40mg HS.  Plavix x 3weeks  6.            EEG  7.             Frequent neurochecks  8.          Formal speech and swallow evaluation  9.          PT evaluation  10.          STAT CTH IF the patient has sudden change in mental status or neurological exam  11.          DVT PPx  12. aneurysm, fu with neurosurgery as outpatient  dw Dr. Grier    Thank you for the courtesy of this consult.

## 2020-05-01 NOTE — PROGRESS NOTE ADULT - SUBJECTIVE AND OBJECTIVE BOX
PGY 1 Note discussed with supervising resident and primary attending    Patient is a 64y old  Female who presents with a chief complaint of     INTERVAL HPI/OVERNIGHT EVENTS: Pt aphasia resolved. Pt is able to speak well.     MEDICATIONS  (STANDING):  aspirin enteric coated 81 milliGRAM(s) Oral daily  atorvastatin 80 milliGRAM(s) Oral at bedtime  cefTRIAXone   IVPB 1000 milliGRAM(s) IV Intermittent every 24 hours  clopidogrel Tablet 75 milliGRAM(s) Oral daily  enoxaparin Injectable 40 milliGRAM(s) SubCutaneous daily  metoprolol tartrate 25 milliGRAM(s) Oral two times a day  sacubitril 49 mG/valsartan 51 mG 1 Tablet(s) Oral two times a day    MEDICATIONS  (PRN):      __________________________________________________  REVIEW OF SYSTEMS:    CONSTITUTIONAL: No fever,   EYES: no acute visual disturbances  NECK: No pain or stiffness  RESPIRATORY: No cough; No shortness of breath  CARDIOVASCULAR: No chest pain, no palpitations  GASTROINTESTINAL: No pain. No nausea or vomiting; No diarrhea   NEUROLOGICAL: No headache or numbness, no tremors  MUSCULOSKELETAL: No joint pain, no muscle pain  GENITOURINARY: no dysuria, no frequency, no hesitancy  PSYCHIATRY: no depression , no anxiety  ALL OTHER  ROS negative        Vital Signs Last 24 Hrs  T(C): 36.4 (01 May 2020 05:29), Max: 36.8 (2020 12:39)  T(F): 97.5 (01 May 2020 05:29), Max: 98.2 (2020 12:39)  HR: 72 (01 May 2020 05:29) (57 - 86)  BP: 150/83 (01 May 2020 05:29) (150/83 - 177/110)  BP(mean): --  RR: 17 (01 May 2020 05:29) (15 - 20)  SpO2: 100% (01 May 2020 05:29) (96% - 100%)    ________________________________________________  PHYSICAL EXAM:  GENERAL: well built elderly female, sitting comfortably on bed.,   HEENT: Normocephalic;  conjunctivae and sclerae clear; moist mucous membranes;   NECK : supple  CHEST/LUNG: Clear to auscultation bilaterally with good air entry   HEART: S1 S2  regular; no murmurs, gallops or rubs  ABDOMEN: Soft, Nontender, Nondistended; Bowel sounds present  EXTREMITIES: no cyanosis; no edema; no calf tenderness  SKIN: warm and dry; no rash  NERVOUS SYSTEM:  AAOX3 pt speech is good and understandable.     _________________________________________________  LABS:                        11.3   5.31  )-----------( 295      ( 01 May 2020 06:58 )             35.1     05-01    142  |  109<H>  |  14  ----------------------------<  125<H>  3.6   |  26  |  1.08    Ca    9.1      01 May 2020 06:58  Phos  2.8     05-  Mg     2.0     05-    TPro  8.0  /  Alb  3.4<L>  /  TBili  0.4  /  DBili  x   /  AST  12  /  ALT  8<L>  /  AlkPhos  114  05-01    PT/INR - ( 2020 13:28 )   PT: 14.7 sec;   INR: 1.29 ratio         PTT - ( 2020 13:28 )  PTT:29.6 sec  Urinalysis Basic - ( 2020 23:05 )    Color: Yellow / Appearance: Clear / S.010 / pH: x  Gluc: x / Ketone: Negative  / Bili: Negative / Urobili: Negative   Blood: x / Protein: Negative / Nitrite: Negative   Leuk Esterase: Moderate / RBC: 2-5 /HPF / WBC 26-50 /HPF   Sq Epi: x / Non Sq Epi: Few /HPF / Bacteria: Few /HPF      CAPILLARY BLOOD GLUCOSE      POCT Blood Glucose.: 154 mg/dL (2020 12:44)        RADIOLOGY & ADDITIONAL TESTS:    < from: CT Head No Cont (20 @ 14:29) >  IMPRESSION:  No acute intracranialhemorrhage or acute territorial infarct.  If symptoms persist, follow-up MRI exam recommended.    Stable partially calcified/thrombosed right supraclinoid internal carotid artery aneurysm          Imaging Personally Reviewed:  YES/NO    Consultant(s) Notes Reviewed:   YES/ No    Care Discussed with Consultants :     Plan of care was discussed with patient and /or primary care giver; all questions and concerns were addressed and care was aligned with patient's wishes. PGY 1 Note discussed with supervising resident and primary attending    Patient is a 64y old  Female who presents with a chief complaint of     INTERVAL HPI/OVERNIGHT EVENTS: Pt aphasia resolved. Pt is able to speak well. Cardio approved of MRI. Radiology not comfortable with mri with the defib. Resumed on spirinolactone.    MEDICATIONS  (STANDING):  aspirin enteric coated 81 milliGRAM(s) Oral daily  atorvastatin 80 milliGRAM(s) Oral at bedtime  cefTRIAXone   IVPB 1000 milliGRAM(s) IV Intermittent every 24 hours  clopidogrel Tablet 75 milliGRAM(s) Oral daily  enoxaparin Injectable 40 milliGRAM(s) SubCutaneous daily  metoprolol tartrate 25 milliGRAM(s) Oral two times a day  sacubitril 49 mG/valsartan 51 mG 1 Tablet(s) Oral two times a day    MEDICATIONS  (PRN):      __________________________________________________  REVIEW OF SYSTEMS:    CONSTITUTIONAL: No fever,   EYES: no acute visual disturbances  NECK: No pain or stiffness  RESPIRATORY: No cough; No shortness of breath  CARDIOVASCULAR: No chest pain, no palpitations  GASTROINTESTINAL: No pain. No nausea or vomiting; No diarrhea   NEUROLOGICAL: No headache or numbness, no tremors  MUSCULOSKELETAL: No joint pain, no muscle pain  GENITOURINARY: no dysuria, no frequency, no hesitancy  PSYCHIATRY: no depression , no anxiety  ALL OTHER  ROS negative        Vital Signs Last 24 Hrs  T(C): 36.4 (01 May 2020 05:29), Max: 36.8 (2020 12:39)  T(F): 97.5 (01 May 2020 05:29), Max: 98.2 (2020 12:39)  HR: 72 (01 May 2020 05:29) (57 - 86)  BP: 150/83 (01 May 2020 05:29) (150/83 - 177/110)  BP(mean): --  RR: 17 (01 May 2020 05:29) (15 - 20)  SpO2: 100% (01 May 2020 05:29) (96% - 100%)    ________________________________________________  PHYSICAL EXAM:  GENERAL: well built elderly female, sitting comfortably on bed.,   HEENT: Normocephalic;  conjunctivae and sclerae clear; moist mucous membranes;   NECK : supple  CHEST/LUNG: Clear to auscultation bilaterally with good air entry   HEART: S1 S2  regular; no murmurs, gallops or rubs  ABDOMEN: Soft, Nontender, Nondistended; Bowel sounds present  EXTREMITIES: no cyanosis; no edema; no calf tenderness  SKIN: warm and dry; no rash  NERVOUS SYSTEM:  AAOX3 pt speech is good and understandable.     _________________________________________________  LABS:                        11.3   5.31  )-----------( 295      ( 01 May 2020 06:58 )             35.1     05-    142  |  109<H>  |  14  ----------------------------<  125<H>  3.6   |  26  |  1.08    Ca    9.1      01 May 2020 06:58  Phos  2.8     05-  Mg     2.0     -    TPro  8.0  /  Alb  3.4<L>  /  TBili  0.4  /  DBili  x   /  AST  12  /  ALT  8<L>  /  AlkPhos  114  05-    PT/INR - ( 2020 13:28 )   PT: 14.7 sec;   INR: 1.29 ratio         PTT - ( 2020 13:28 )  PTT:29.6 sec  Urinalysis Basic - ( 2020 23:05 )    Color: Yellow / Appearance: Clear / S.010 / pH: x  Gluc: x / Ketone: Negative  / Bili: Negative / Urobili: Negative   Blood: x / Protein: Negative / Nitrite: Negative   Leuk Esterase: Moderate / RBC: 2-5 /HPF / WBC 26-50 /HPF   Sq Epi: x / Non Sq Epi: Few /HPF / Bacteria: Few /HPF      CAPILLARY BLOOD GLUCOSE      POCT Blood Glucose.: 154 mg/dL (2020 12:44)        RADIOLOGY & ADDITIONAL TESTS:    < from: CT Head No Cont (20 @ 14:29) >  IMPRESSION:  No acute intracranialhemorrhage or acute territorial infarct.  If symptoms persist, follow-up MRI exam recommended.    Stable partially calcified/thrombosed right supraclinoid internal carotid artery aneurysm          Imaging Personally Reviewed:  YES/NO    Consultant(s) Notes Reviewed:   YES/ No    Care Discussed with Consultants :     Plan of care was discussed with patient and /or primary care giver; all questions and concerns were addressed and care was aligned with patient's wishes.

## 2020-05-01 NOTE — CONSULT NOTE ADULT - SUBJECTIVE AND OBJECTIVE BOX
*** Patient is a 64y old  Female who presents with a chief complaint of CVA (01 May 2020 09:34)      HPI:  Patient is 64-year-old female former smoker with DM, HTN, HLD, breast CA s/p chemo/XRT, and NICM (catheterization 2019 showed dRPL occlusion without ability to intervene), EF 31% by MUGA 2019, s/p SubQ ICD 10/2019, AAA came to ED after having aphasia. Per pt she woke at 2:30 in the AM day of presentation and she was fine until today morning around 5:30- 6 am when she suddenly found to have difficulty speaking, she could not find words then she called her cousin who called 911 and pt was brought to the ED. After arriving to ED pt had CT head which was negative for acute intracranial changes, her EKG was NSR, tele Neurology was consulted and pt did not get TPA as she was out of window. Pt passed dysphagia screen.  Pt states she was not feeling well last night. Now her symptoms are improving. Pt denies having weakness, numbness, seizure, LOC, head trauma, vision problem, fever, chills, cough, sob, cp, palpitation, abdominal pain, n/v/d, urinary symptoms, leg swelling.     The patient lives at home.  The patient walks without assistance.    Neurological Review of Systems:  No vision loss or double vision.  No dizziness, vertigo or new hearing loss.  No difficulty with speech or swallowing.  No focal weakness.  No focal sensory changes.   No difficulty with balance.  No difficulty with ambulation.      MEDICATIONS  (STANDING):  aspirin enteric coated 81 milliGRAM(s) Oral daily  atorvastatin 80 milliGRAM(s) Oral at bedtime  cefTRIAXone   IVPB 1000 milliGRAM(s) IV Intermittent every 24 hours  clopidogrel Tablet 75 milliGRAM(s) Oral daily  enoxaparin Injectable 40 milliGRAM(s) SubCutaneous daily  metoprolol tartrate 25 milliGRAM(s) Oral two times a day  sacubitril 49 mG/valsartan 51 mG 1 Tablet(s) Oral two times a day  spironolactone 25 milliGRAM(s) Oral daily    MEDICATIONS  (PRN):    Allergies    Advil (Hives; Vomiting)    Intolerances      PAST MEDICAL & SURGICAL HISTORY:  Knee effusion, left: arthrocentesis- 16  Internal carotid aneurysm  Abdominal aneurysm: infrarenal- wached by Vascular , Dr. Gutierrez  Cardiomyopathy  Sickle cell trait  DVT (deep venous thrombosis): Left leg in  after hysterectomy  Hypercholesteremia  Breast cancer: right-s/p chemo and radiation  Hypertension  Breast cancer: infusaport insertion- - left chest  S/P hysterectomy:   Gilson filter in place:   S/P lumpectomy, right breast: , revision of position-   Fibroid uterus: sp hystrectomy 1196    FAMILY HISTORY:  Family history of other condition: please open box and see additional medical family history  Family history of renal failure: uncle  Family history of glioblastoma (Sibling)  History of hypertension: mother  Family history of liver failure  Family history of glioblastoma  Family history of renal stone  Family history of diabetes mellitus  Family history of hypertension    SOCIAL HISTORY: non smoker    Review of Systems:  Constitutional: No fevers.                    Eyes, Ears, Mouth, Throat: No vision loss   Respiratory: No cough.                                Cardiovascular: No chest pain or palpitations  Gastrointestinal: No  vomiting.                                         Genitourinary: No burning on urination.  Musculoskeletal: No joint pain.                                                           Dermatologic: No rash.  Neurological: as per HPI                                                                      Psychiatric: No behavioral problems.  Endocrine: No known hypoglycemia.               Hematologic/Lymphatic: No easy bleeding.    O:  Vital Signs Last 24 Hrs  T(C): 36.3 (01 May 2020 10:05), Max: 36.8 (2020 12:39)  T(F): 97.3 (01 May 2020 10:05), Max: 98.2 (2020 12:39)  HR: 64 (01 May 2020 10:05) (57 - 86)  BP: 150/82 (01 May 2020 10:05) (150/82 - 177/110)  BP(mean): --  RR: 17 (01 May 2020 10:05) (15 - 20)  SpO2: 97% (01 May 2020 10:05) (96% - 100%)    General Exam:   General appearance: No acute distress                 Cardiovascular: Pedal dorsalis pulses intact bilaterally    Neurological Exam:  NIH Stroke Scale (NIHSS):   1a. LOConscious:  0-alert 1-lethargy 2-obtund 3-coma:    __0___  1b. LOC Questions:  0-both 1-one 2-none                       ___0__  1c. LOC Commands:  0-both 1-one 2-none                     ____0_  2.   Gaze:  0-nl 1-partial 2-conjugate                                ___0__  3.   Visual:  0-nl 1-part wendy 2-full wendy 3-bilat wendy         ____0_  4.   Facial Palsy:  0-nl 1-minor 2-part 3-complete             ____0_  5.   Motor Arm:  0-nl 1-drift 2-effort 3-no effort         Left             __0___                              4-no move UN-amputated                     Right  __0___  6.   Motor Leg:                                                                 Left   ____0_                                                                                   Right ___0__  7.   Ataxia:  0-nl 1-one limb 2-two UN-amp                      ___0__  8.   Sensory:  0-nl 1-mild 2-severe                                  ____0_  9.   Language:  0-nl 1-mild 2-severe 3-mute                     ___1__  10.  Dysarthria:  0-nl 1-mild 2-severe 3-barrier                  __0___  11.  Extinction/Inattention:  0-nl 1-mild 2-deep                 ___0__         TOTAL NIHSS       ___1_____    Mental Status: Orientated to self, date and place.  Attention intact.  No dysarthria.  + expressive aphasia.  No neglect.  Knowledge intact.  Registration intact.  Short and long term memory grossly intact.      Cranial Nerves: CN I - not tested.  PERRL, EOMI, VFF, no nystagmus or diplopia.  No APD.  Fundi not visualized bilaterally.  CN V1-3 intact to light touch.  No facial asymmetry.  Hearing intact to finger rub bilaterally.  Tongue, uvula and palate midline.  Sternocleidomastoid and Trapezius intact bilaterally.    Motor:   Tone: normal.                  Strength intact throughout  No pronator drift bilaterally                      No dysmetria on finger-nose-finger or heel-shin-heel  No truncal ataxia.  No resting, postural or action tremor.  No myoclonus.    Sensation: intact to light touch    Deep Tendon Reflexes: 1+ bilateral biceps, triceps, brachioradialis, knee   Toes flexor bilaterally    Gait: normal and stable.      Other:      LABS:                        11.3   5.31  )-----------( 295      ( 01 May 2020 06:58 )             35.1     05-01    142  |  109<H>  |  14  ----------------------------<  125<H>  3.6   |  26  |  1.08    Ca    9.1      01 May 2020 06:58  Phos  2.8     05-  Mg     2.0     -    TPro  8.0  /  Alb  3.4<L>  /  TBili  0.4  /  DBili  x   /  AST  12  /  ALT  8<L>  /  AlkPhos  114      PT/INR - ( 2020 13:28 )   PT: 14.7 sec;   INR: 1.29 ratio         PTT - ( 2020 13:28 )  PTT:29.6 sec  Urinalysis Basic - ( 2020 23:05 )    Color: Yellow / Appearance: Clear / S.010 / pH: x  Gluc: x / Ketone: Negative  / Bili: Negative / Urobili: Negative   Blood: x / Protein: Negative / Nitrite: Negative   Leuk Esterase: Moderate / RBC: 2-5 /HPF / WBC 26-50 /HPF   Sq Epi: x / Non Sq Epi: Few /HPF / Bacteria: Few /HPF      LDL  HbA1C    RADIOLOGY & ADDITIONAL STUDIES:    EKG: < from: 12 Lead ECG (20 @ 12:49) >  Diagnosis Line Normal sinus rhythm  Left anterior fascicular block  Nonspecific T wave abnormality  Abnormal ECG    < end of copied text >    < from: CT Head No Cont (20 @ 14:29) > (images reviwed)  IMPRESSION:  No acute intracranialhemorrhage or acute territorial infarct.  If symptoms persist, follow-up MRI exam recommended.    Stable partially calcified/thrombosed right supraclinoid internal carotid artery aneurysm    < end of copied text >

## 2020-05-01 NOTE — CONSULT NOTE ADULT - ASSESSMENT
64-year-old female former smoker with DM, HTN, HLD, NICM (EF 31% by MUGA 07/2019) s/p [employer] SubQ ICD who presented with aphasia in setting of possible TIA.     1. NICM s/p ICD: Patient currently is on metoprolol, spironolactone, and Entresto at home  -Please resume medications including spironolactone     2. HTN:  -Currently on max dose Entresto, also on metoprolol and spironolactone at home, please resume here     3. HLD: Continue atorvastatin    4. CVA/TIA: Workup as per neurology  -MRI pending; patient's [employer] SubQ ICD is SAFE to undergo MRI  -If no need for aspirin/clopidogrel from neurology perspective, can discontinue    ***Note that this is a preliminary note and any recommendations should NOT be carried out until this note is finalized. *** 64-year-old female former smoker with DM, HTN, HLD, NICM (EF 31% by MUGA 07/2019) s/p [employer] SubQ ICD who presented with aphasia in setting of possible TIA or CVA.     1. NICM s/p ICD: Patient currently is on metoprolol, spironolactone, and Entresto at home  -Please resume medications including spironolactone     2. HTN:  -Currently on max dose Entresto, also on metoprolol and spironolactone at home, please resume here     3. HLD: Continue atorvastatin    4. CVA/TIA: Workup as per neurology  -Please have ICD interrogated; if no events then discontinue telemetry monitoring  -MRI pending; patient's [employer] MRI SubQ ICD is SAFE to undergo MRI; please have device rep place device into "mri mode" prior to imaging  -If no need for aspirin/clopidogrel from neurology perspective, can discontinue

## 2020-05-01 NOTE — PHYSICAL THERAPY INITIAL EVALUATION ADULT - GENERAL OBSERVATIONS, REHAB EVAL
awake, alert, NAD; +facial asymmetry on left lower quadrant; + left tongue deviation; +peripheral IV access on left forearm

## 2020-05-01 NOTE — PHYSICAL THERAPY INITIAL EVALUATION ADULT - DIAGNOSIS, PT EVAL
mild weakness of left UE/LE; mild left facial asymmetry; dysmetria and impaired UE coordination; mild, unsteady gait

## 2020-05-01 NOTE — PROGRESS NOTE ADULT - PROBLEM SELECTOR PLAN 1
-TIA or Stroke   -Expressive aphasia. Now resolved completely  -did not get tpa as out of window   -passed dysphagia screen   -f/u official s/s   -started pt on asa and plavix   -increased lipitor 80mg od   -ct head negative  -ekg nsr   -trop 0.06-->0.07/ 0.08 trend down trop   -discussed with Dr. Anguiano   -pt will need MRI brain with and without contrast but since pt has defoliator pt might not be the candidate for MRI would recommend pt to get MRI as out pt.    -PT consult   -monitor on tele   -cardio Dr. Doshi  f/u ECHO

## 2020-05-02 LAB
ALBUMIN SERPL ELPH-MCNC: 3.6 G/DL — SIGNIFICANT CHANGE UP (ref 3.5–5)
ALP SERPL-CCNC: 118 U/L — SIGNIFICANT CHANGE UP (ref 40–120)
ALT FLD-CCNC: 8 U/L DA — LOW (ref 10–60)
ANION GAP SERPL CALC-SCNC: 7 MMOL/L — SIGNIFICANT CHANGE UP (ref 5–17)
AST SERPL-CCNC: 10 U/L — SIGNIFICANT CHANGE UP (ref 10–40)
BILIRUB SERPL-MCNC: 0.5 MG/DL — SIGNIFICANT CHANGE UP (ref 0.2–1.2)
BUN SERPL-MCNC: 14 MG/DL — SIGNIFICANT CHANGE UP (ref 7–18)
CALCIUM SERPL-MCNC: 9.8 MG/DL — SIGNIFICANT CHANGE UP (ref 8.4–10.5)
CHLORIDE SERPL-SCNC: 106 MMOL/L — SIGNIFICANT CHANGE UP (ref 96–108)
CO2 SERPL-SCNC: 28 MMOL/L — SIGNIFICANT CHANGE UP (ref 22–31)
CREAT SERPL-MCNC: 1.25 MG/DL — SIGNIFICANT CHANGE UP (ref 0.5–1.3)
CULTURE RESULTS: SIGNIFICANT CHANGE UP
GLUCOSE SERPL-MCNC: 126 MG/DL — HIGH (ref 70–99)
HCT VFR BLD CALC: 37.9 % — SIGNIFICANT CHANGE UP (ref 34.5–45)
HGB BLD-MCNC: 11.9 G/DL — SIGNIFICANT CHANGE UP (ref 11.5–15.5)
MAGNESIUM SERPL-MCNC: 2 MG/DL — SIGNIFICANT CHANGE UP (ref 1.6–2.6)
MCHC RBC-ENTMCNC: 29 PG — SIGNIFICANT CHANGE UP (ref 27–34)
MCHC RBC-ENTMCNC: 31.4 GM/DL — LOW (ref 32–36)
MCV RBC AUTO: 92.2 FL — SIGNIFICANT CHANGE UP (ref 80–100)
NRBC # BLD: 0 /100 WBCS — SIGNIFICANT CHANGE UP (ref 0–0)
PHOSPHATE SERPL-MCNC: 3.8 MG/DL — SIGNIFICANT CHANGE UP (ref 2.5–4.5)
PLATELET # BLD AUTO: 330 K/UL — SIGNIFICANT CHANGE UP (ref 150–400)
POTASSIUM SERPL-MCNC: 3.8 MMOL/L — SIGNIFICANT CHANGE UP (ref 3.5–5.3)
POTASSIUM SERPL-SCNC: 3.8 MMOL/L — SIGNIFICANT CHANGE UP (ref 3.5–5.3)
PROT SERPL-MCNC: 8.7 G/DL — HIGH (ref 6–8.3)
RBC # BLD: 4.11 M/UL — SIGNIFICANT CHANGE UP (ref 3.8–5.2)
RBC # FLD: 13.6 % — SIGNIFICANT CHANGE UP (ref 10.3–14.5)
SODIUM SERPL-SCNC: 141 MMOL/L — SIGNIFICANT CHANGE UP (ref 135–145)
SPECIMEN SOURCE: SIGNIFICANT CHANGE UP
WBC # BLD: 4.9 K/UL — SIGNIFICANT CHANGE UP (ref 3.8–10.5)
WBC # FLD AUTO: 4.9 K/UL — SIGNIFICANT CHANGE UP (ref 3.8–10.5)

## 2020-05-02 PROCEDURE — 99232 SBSQ HOSP IP/OBS MODERATE 35: CPT

## 2020-05-02 PROCEDURE — 93880 EXTRACRANIAL BILAT STUDY: CPT | Mod: 26

## 2020-05-02 PROCEDURE — 70450 CT HEAD/BRAIN W/O DYE: CPT | Mod: 26

## 2020-05-02 RX ORDER — METOPROLOL TARTRATE 50 MG
50 TABLET ORAL
Refills: 0 | Status: DISCONTINUED | OUTPATIENT
Start: 2020-05-02 | End: 2020-05-04

## 2020-05-02 RX ADMIN — Medication 50 MILLIGRAM(S): at 16:35

## 2020-05-02 RX ADMIN — SACUBITRIL AND VALSARTAN 1 TABLET(S): 24; 26 TABLET, FILM COATED ORAL at 05:43

## 2020-05-02 RX ADMIN — ATORVASTATIN CALCIUM 80 MILLIGRAM(S): 80 TABLET, FILM COATED ORAL at 21:32

## 2020-05-02 RX ADMIN — Medication 81 MILLIGRAM(S): at 12:16

## 2020-05-02 RX ADMIN — SACUBITRIL AND VALSARTAN 1 TABLET(S): 24; 26 TABLET, FILM COATED ORAL at 16:35

## 2020-05-02 RX ADMIN — CLOPIDOGREL BISULFATE 75 MILLIGRAM(S): 75 TABLET, FILM COATED ORAL at 12:16

## 2020-05-02 RX ADMIN — ENOXAPARIN SODIUM 40 MILLIGRAM(S): 100 INJECTION SUBCUTANEOUS at 12:16

## 2020-05-02 RX ADMIN — Medication 25 MILLIGRAM(S): at 05:43

## 2020-05-02 RX ADMIN — ANASTROZOLE 1 MILLIGRAM(S): 1 TABLET ORAL at 12:16

## 2020-05-02 RX ADMIN — SPIRONOLACTONE 25 MILLIGRAM(S): 25 TABLET, FILM COATED ORAL at 06:06

## 2020-05-02 RX ADMIN — CEFTRIAXONE 100 MILLIGRAM(S): 500 INJECTION, POWDER, FOR SOLUTION INTRAMUSCULAR; INTRAVENOUS at 14:24

## 2020-05-02 NOTE — PROGRESS NOTE ADULT - SUBJECTIVE AND OBJECTIVE BOX
Neurology Follow up note    Name  RISHABH HUNT    Subjective:  aphasia has improved  had [facility]    Review of Systems:  Constitutional:      no fever  Respiratory:           no cough                   MEDICATIONS  (STANDING):  anastrozole 1 milliGRAM(s) Oral daily  aspirin enteric coated 81 milliGRAM(s) Oral daily  atorvastatin 80 milliGRAM(s) Oral at bedtime  cefTRIAXone   IVPB 1000 milliGRAM(s) IV Intermittent every 24 hours  clopidogrel Tablet 75 milliGRAM(s) Oral daily  enoxaparin Injectable 40 milliGRAM(s) SubCutaneous daily  metoprolol tartrate 50 milliGRAM(s) Oral two times a day  sacubitril 49 mG/valsartan 51 mG 1 Tablet(s) Oral two times a day  spironolactone 25 milliGRAM(s) Oral daily    MEDICATIONS  (PRN):      Allergies    Advil (Hives; Vomiting)    Intolerances        Objective:   Vital Signs Last 24 Hrs  T(C): 37 (02 May 2020 17:30), Max: 37 (02 May 2020 17:30)  T(F): 98.6 (02 May 2020 17:30), Max: 98.6 (02 May 2020 17:30)  HR: 73 (02 May 2020 17:30) (67 - 85)  BP: 176/97 (02 May 2020 17:30) (160/98 - 176/97)  BP(mean): --  RR: 18 (02 May 2020 17:30) (18 - 19)  SpO2: 98% (02 May 2020 17:30) (98% - 99%)    General Exam:   General appearance: No acute distress                 Cardiovascular: Pedal dorsalis pulses intact bilaterally    Neurological Exam:  Mental Status: Orientated to self, date and place.  Attention intact.  No dysarthria, aphasia or neglect.      Cranial Nerves: CN I - not tested.  PERRL, EOMI, VFF, no nystagmus or diplopia.  No APD.  Fundi not visualized bilaterally.  CN V1-3 intact to light touc.  No facial asymmetry.    Motor:   Tone: normal.                  Strength: intact throughout  Pronator drift: none                 Dysmeria: None to finger-nose-finger or heel-shin-heel  No truncal ataxia.    Tremor: No resting, postural or action tremor.  No myoclonus.    Sensation: intact to light touch,    Other: NIHSS 0    05-02    141  |  106  |  14  ----------------------------<  126<H>  3.8   |  28  |  1.25    Ca    9.8      02 May 2020 08:48  Phos  3.8     05-02  Mg     2.0     05-02    TPro  8.7<H>  /  Alb  3.6  /  TBili  0.5  /  DBili  x   /  AST  10  /  ALT  8<L>  /  AlkPhos  118  05-02    05-02    141  |  106  |  14  ----------------------------<  126<H>  3.8   |  28  |  1.25    Ca    9.8      02 May 2020 08:48  Phos  3.8     05-02  Mg     2.0     05-02    TPro  8.7<H>  /  Alb  3.6  /  TBili  0.5  /  DBili  x   /  AST  10  /  ALT  8<L>  /  AlkPhos  118  05-02    LIVER FUNCTIONS - ( 02 May 2020 08:48 )  Alb: 3.6 g/dL / Pro: 8.7 g/dL / ALK PHOS: 118 U/L / ALT: 8 U/L DA / AST: 10 U/L / GGT: x             Radiology    < from: CT Head No Cont (05.02.20 @ 14:36) >    IMPRESSION:   1.  No acute intracranial hemorrhage. If clinical suspicion for acute infarct persists, brain MRI can be obtained.  2.  Peripherally calcified right suprasellar lesion suggesting a right ICA aneurysm, similar to prior exam.     < end of copied text >

## 2020-05-02 NOTE — CHART NOTE - NSCHARTNOTEFT_GEN_A_CORE
Patient was hesitant to have Ct scan with contrast done. 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 Explained to her the need for the test and the intervention if needed. She showed understanding but still refused.

## 2020-05-02 NOTE — PROGRESS NOTE ADULT - ASSESSMENT
Pt is adamant on not getting a MRI brain or CTA head and neck. Pt states that she does not want contrast. She also states that her IVC filter is not compatible for MRI's. Will reorder CT brain w/o contrast. Discussed with Neuro: eeg and us carotids pending.   Pt notes her speech is improved today but still having difficulty pronouncing certain words. Speech is not completely back to normal. Pt 65 yo F with pmh dm, htn, hld, breast cancer sp chemo/radiation, NICM sp cardiac cath 2/2019, ef 31% by MUGA, sp ICD 10/2019, AAA presenting with aphasia.     -expressive aphasia  -uti  -systolic and diastolic HF: sp ICD  -Breast Ca  -Dm    Pt started on plavix with asa. Neurology consulted. Pt is adamant on not getting a MRI brain or CTA head and neck. Pt states that she does not want contrast. She also states that her IVC filter is not compatible for MRI's. Will reorder CT brain w/o contrast. Discussed with Neuro: eeg and us carotids pending. Pt notes her speech is improved today but still having difficulty pronouncing certain words. Speech is not completely back to normal. Will start DC planning for home with PT and speech therapy.

## 2020-05-02 NOTE — PROGRESS NOTE ADULT - SUBJECTIVE AND OBJECTIVE BOX
Patient is a 64y old  Female who presents with a chief complaint of CVA (01 May 2020 09:34)      HPI:  Patient is 64-year-old female former smoker with DM, HTN, HLD, breast CA s/p chemo/XRT, and NICM (catheterization 2019 showed dRPL occlusion without ability to intervene), EF 31% by MUGA 2019, s/p SubQ ICD 10/2019, AAA came to ED after having aphasia.   Per pt she woke at 2:30 in the AM today and she was fine until today morning around 5:30- 6 am when she suddenly found to have difficulty speaking, she could not find words then she called her cousin who called 911 and pt was brought to the ED. After arriving to ED pt had CT head which was negative for acute intracranial changes, her EKG was NSR, tele Neurology was consulted and pt did not get TPA as she was out of window. Pt passed dysphagia screen.  Pt states she was not feeling well last night. Now her symptoms are improving. Pt denies having weakness, numbness, seizure, LOC, head trauma, vision problem, fever, chills, cough, sob, cp, palpitation, abdominal pain, n/v/d, urinary symptoms, leg swelling.     Code status: Full code (2020 17:20)    [id number]    ROS:    PAST MEDICAL & SURGICAL HISTORY:  Knee effusion, left: arthrocentesis- 16  Internal carotid aneurysm  Abdominal aneurysm: infrarenal- wached by Vascular , Dr. Gutierrez  Cardiomyopathy  Sickle cell trait  DVT (deep venous thrombosis): Left leg in  after hysterectomy  Hypercholesteremia  Breast cancer: right-s/p chemo and radiation  Hypertension  Breast cancer: infusaport insertion- - left chest  S/P hysterectomy:   Bruni filter in place:   S/P lumpectomy, right breast: , revision of position-   Fibroid uterus: sp hystrectomy 1196    MEDICATIONS  (STANDING):  anastrozole 1 milliGRAM(s) Oral daily  aspirin enteric coated 81 milliGRAM(s) Oral daily  atorvastatin 80 milliGRAM(s) Oral at bedtime  cefTRIAXone   IVPB 1000 milliGRAM(s) IV Intermittent every 24 hours  clopidogrel Tablet 75 milliGRAM(s) Oral daily  enoxaparin Injectable 40 milliGRAM(s) SubCutaneous daily  metoprolol tartrate 50 milliGRAM(s) Oral two times a day  sacubitril 49 mG/valsartan 51 mG 1 Tablet(s) Oral two times a day  spironolactone 25 milliGRAM(s) Oral daily    MEDICATIONS  (PRN):      EXAM:  Vital Signs Last 24 Hrs  T(C): 36.3 (02 May 2020 12:19), Max: 36.7 (01 May 2020 14:07)  T(F): 97.4 (02 May 2020 12:19), Max: 98.1 (01 May 2020 14:07)  HR: 67 (02 May 2020 12:19) (67 - 85)  BP: 162/88 (02 May 2020 12:06) (141/95 - 175/95)  BP(mean): --  RR: 19 (02 May 2020 12:19) (18 - 19)  SpO2: 98% (02 May 2020 12:) (97% - 100%)      PHYSICAL EXAM:  Constitutional: awake  Neck: supple  Respiratory: bilaterally clear to auscultation, no wheezing, no rhonchi, no crackles, no decreased air entry  Cardiovascular: s1s2, rrr, no murmurs.   Gastrointestinal: soft, non tender, +bowel sounds, no rebound, no guarding.   Extremities: no edema.   Neurological: alert and oriented to person, date and place.   Skin: intact no rash, warm to touch.   Musculoskeletal: moves all 4 extremities  Psychiatric: appropriate affect.     LABS:  Urinalysis Basic - ( 2020 23:05 )    Color: Yellow / Appearance: Clear / S.010 / pH: x  Gluc: x / Ketone: Negative  / Bili: Negative / Urobili: Negative   Blood: x / Protein: Negative / Nitrite: Negative   Leuk Esterase: Moderate / RBC: 2-5 /HPF / WBC 26-50 /HPF   Sq Epi: x / Non Sq Epi: Few /HPF / Bacteria: Few /HPF      PT/INR - ( 2020 13:28 )   PT: 14.7 sec;   INR: 1.29 ratio         PTT - ( 2020 13:28 )  PTT:29.6 sec  LIVER FUNCTIONS - ( 02 May 2020 08:48 )  Alb: 3.6 g/dL / Pro: 8.7 g/dL / ALK PHOS: 118 U/L / ALT: 8 U/L DA / AST: 10 U/L / GGT: x                                 11.9   4.90  )-----------( 330      ( 02 May 2020 08:48 )             37.9     05-02    141  |  106  |  14  ----------------------------<  126<H>  3.8   |  28  |  1.25    Ca    9.8      02 May 2020 08:48  Phos  3.8     05-02  Mg     2.0     05-02    TPro  8.7<H>  /  Alb  3.6  /  TBili  0.5  /  DBili  x   /  AST  10  /  ALT  8<L>  /  AlkPhos  118  05-02    CARDIAC MARKERS ( 01 May 2020 15:06 )  0.080 ng/mL / x     / x     / x     / x      CARDIAC MARKERS ( 01 May 2020 06:58 )  0.083 ng/mL / x     / x     / x     / x      CARDIAC MARKERS ( 01 May 2020 00:03 )  0.087 ng/mL / x     / x     / x     / x      CARDIAC MARKERS ( 2020 17:54 )  0.078 ng/mL / x     / x     / x     / x      CARDIAC MARKERS ( 2020 13:28 )  0.067 ng/mL / x     / 68 U/L / x     / x Patient is a 64y old  Female who presents with a chief complaint of CVA (01 May 2020 09:34)    HPI:  Patient is 64-year-old female former smoker with DM, HTN, HLD, breast CA s/p chemo/XRT, and NICM (catheterization 2019 showed dRPL occlusion without ability to intervene), EF 31% by MUGA 2019, s/p SubQ ICD 10/2019, AAA came to ED after having aphasia.   Per pt she woke at 2:30 in the AM today and she was fine until today morning around 5:30- 6 am when she suddenly found to have difficulty speaking, she could not find words then she called her cousin who called 911 and pt was brought to the ED. After arriving to ED pt had CT head which was negative for acute intracranial changes, her EKG was NSR, tele Neurology was consulted and pt did not get TPA as she was out of window. Pt passed dysphagia screen.  Pt states she was not feeling well last night. Now her symptoms are improving. Pt denies having weakness, numbness, seizure, LOC, head trauma, vision problem, fever, chills, cough, sob, cp, palpitation, abdominal pain, n/v/d, urinary symptoms, leg swelling.     Code status: Full code (2020 17:20)    Today:  Pt states her speech is improved but still having difficulty pronouncing some words. Pt refused MRI and CTA head and neck. PT recommended home with PT.     PAST MEDICAL & SURGICAL HISTORY:  Knee effusion, left: arthrocentesis- 16  Internal carotid aneurysm  Abdominal aneurysm: infrarenal- wached by Vascular , Dr. Gutierrez  Cardiomyopathy  Sickle cell trait  DVT (deep venous thrombosis): Left leg in  after hysterectomy  Hypercholesteremia  Breast cancer: right-s/p chemo and radiation  Hypertension  Breast cancer: infusaport insertion- - left chest  S/P hysterectomy:   Gilson filter in place:   S/P lumpectomy, right breast: , revision of position-   Fibroid uterus: sp hystrectomy 1196    MEDICATIONS  (STANDING):  anastrozole 1 milliGRAM(s) Oral daily  aspirin enteric coated 81 milliGRAM(s) Oral daily  atorvastatin 80 milliGRAM(s) Oral at bedtime  cefTRIAXone   IVPB 1000 milliGRAM(s) IV Intermittent every 24 hours  clopidogrel Tablet 75 milliGRAM(s) Oral daily  enoxaparin Injectable 40 milliGRAM(s) SubCutaneous daily  metoprolol tartrate 50 milliGRAM(s) Oral two times a day  sacubitril 49 mG/valsartan 51 mG 1 Tablet(s) Oral two times a day  spironolactone 25 milliGRAM(s) Oral daily    MEDICATIONS  (PRN):      EXAM:  Vital Signs Last 24 Hrs  T(C): 36.3 (02 May 2020 12:19), Max: 36.7 (01 May 2020 14:07)  T(F): 97.4 (02 May 2020 12:19), Max: 98.1 (01 May 2020 14:07)  HR: 67 (02 May 2020 12:19) (67 - 85)  BP: 162/88 (02 May 2020 12:06) (141/95 - 175/95)  BP(mean): --  RR: 19 (02 May 2020 12:19) (18 - 19)  SpO2: 98% (02 May 2020 12:19) (97% - 100%)      PHYSICAL EXAM:  Constitutional: awake, nad. speech is improved today.   Neck: supple  Extremities: no edema.   Neurological: alert and oriented to person, date and place.   Skin: intact no rash, warm to touch.   Musculoskeletal: moves all 4 extremities  Psychiatric: appropriate affect.     LABS:  Urinalysis Basic - ( 2020 23:05 )    Color: Yellow / Appearance: Clear / S.010 / pH: x  Gluc: x / Ketone: Negative  / Bili: Negative / Urobili: Negative   Blood: x / Protein: Negative / Nitrite: Negative   Leuk Esterase: Moderate / RBC: 2-5 /HPF / WBC 26-50 /HPF   Sq Epi: x / Non Sq Epi: Few /HPF / Bacteria: Few /HPF      PT/INR - ( 2020 13:28 )   PT: 14.7 sec;   INR: 1.29 ratio         PTT - ( 2020 13:28 )  PTT:29.6 sec  LIVER FUNCTIONS - ( 02 May 2020 08:48 )  Alb: 3.6 g/dL / Pro: 8.7 g/dL / ALK PHOS: 118 U/L / ALT: 8 U/L DA / AST: 10 U/L / GGT: x                                 11.9   4.90  )-----------( 330      ( 02 May 2020 08:48 )             37.9     05-02    141  |  106  |  14  ----------------------------<  126<H>  3.8   |  28  |  1.25    Ca    9.8      02 May 2020 08:48  Phos  3.8     05-02  Mg     2.0     05-02    TPro  8.7<H>  /  Alb  3.6  /  TBili  0.5  /  DBili  x   /  AST  10  /  ALT  8<L>  /  AlkPhos  118  05-02    CARDIAC MARKERS ( 01 May 2020 15:06 )  0.080 ng/mL / x     / x     / x     / x      CARDIAC MARKERS ( 01 May 2020 06:58 )  0.083 ng/mL / x     / x     / x     / x      CARDIAC MARKERS ( 01 May 2020 00:03 )  0.087 ng/mL / x     / x     / x     / x      CARDIAC MARKERS ( 2020 17:54 )  0.078 ng/mL / x     / x     / x     / x      CARDIAC MARKERS ( 2020 13:28 )  0.067 ng/mL / x     / 68 U/L / x     / x

## 2020-05-02 NOTE — PROGRESS NOTE ADULT - ASSESSMENT
Impression:  Aphasia in patient with h/o breast ca concenring for new stroke vs. metastasis     Recommendations:  1.           EEG  2. patient understands that given she had CTH and not MRI there are limitations in picking up a stroke or mets.  can consider outpatient PET scan to evaluate for possible metastatic lesion  3. aneurysm, fu with neurosurgery as outpatient  dw Dr. Grier

## 2020-05-03 LAB
GLUCOSE BLDC GLUCOMTR-MCNC: 117 MG/DL — HIGH (ref 70–99)
GLUCOSE BLDC GLUCOMTR-MCNC: 131 MG/DL — HIGH (ref 70–99)
GLUCOSE BLDC GLUCOMTR-MCNC: 137 MG/DL — HIGH (ref 70–99)

## 2020-05-03 PROCEDURE — 99232 SBSQ HOSP IP/OBS MODERATE 35: CPT

## 2020-05-03 PROCEDURE — 99232 SBSQ HOSP IP/OBS MODERATE 35: CPT | Mod: GC

## 2020-05-03 PROCEDURE — 95819 EEG AWAKE AND ASLEEP: CPT | Mod: 26

## 2020-05-03 RX ADMIN — CEFTRIAXONE 100 MILLIGRAM(S): 500 INJECTION, POWDER, FOR SOLUTION INTRAMUSCULAR; INTRAVENOUS at 14:24

## 2020-05-03 RX ADMIN — ANASTROZOLE 1 MILLIGRAM(S): 1 TABLET ORAL at 20:35

## 2020-05-03 RX ADMIN — SACUBITRIL AND VALSARTAN 1 TABLET(S): 24; 26 TABLET, FILM COATED ORAL at 17:48

## 2020-05-03 RX ADMIN — Medication 50 MILLIGRAM(S): at 17:48

## 2020-05-03 RX ADMIN — SACUBITRIL AND VALSARTAN 1 TABLET(S): 24; 26 TABLET, FILM COATED ORAL at 05:42

## 2020-05-03 RX ADMIN — ATORVASTATIN CALCIUM 80 MILLIGRAM(S): 80 TABLET, FILM COATED ORAL at 22:11

## 2020-05-03 RX ADMIN — Medication 50 MILLIGRAM(S): at 05:42

## 2020-05-03 RX ADMIN — Medication 81 MILLIGRAM(S): at 11:23

## 2020-05-03 RX ADMIN — SPIRONOLACTONE 25 MILLIGRAM(S): 25 TABLET, FILM COATED ORAL at 05:42

## 2020-05-03 RX ADMIN — CLOPIDOGREL BISULFATE 75 MILLIGRAM(S): 75 TABLET, FILM COATED ORAL at 11:23

## 2020-05-03 RX ADMIN — ENOXAPARIN SODIUM 40 MILLIGRAM(S): 100 INJECTION SUBCUTANEOUS at 11:23

## 2020-05-03 NOTE — EEG REPORT - NS EEG TEXT BOX
[facility]  Comprehensive Epilepsy Center  Report of Routine EEG Study with Video      [facility]: [address], [address] [place] [address], Phone: [phone]  [facility]: [phone] [place] [id number], Phone: [phone]  Office: [address] [place] [address], [facility] [address], [place] [address], Phone: [phone]    Patient Name: RISHABH HUNT  Age: 64 year  : 1955  Location: [address] [place] [address] 51 [address]  Referring Physician: DR SPAIN  EEG #: [id number]    Study Date: 5/3/2020		    Technical Information:					  On Instrument: -  Placement and Labeling of Electrodes:  The EEG was performed utilizing 20 channels referential EEG connections (coronal over temporal over parasagittal montage) using all standard 10-20 electrode placements with EKG.  Recording was at a sampling rate of 256 samples per second per channel.  Time synchronized digital video recording was done simultaneously with EEG recording.  A low light infrared camera was used for low light recording.  Armen and seizure detection algorithms were utilized.    History:  ROUTINE EEG PERFORMED AT BEDSIDE  65 Y/O FEMALE   H/O : CVA , DM, HTN, HLD, BREAST CANCER s/p CHEMO / XRT, CARDIOMYOPATHY, SICKLE CELL TRAIT, INTERNAL CAROTID ANEURYSM   P/W : EXPRESSIVE APHAGIA /SLURRED SPEECH  PATIENT IS ALERT AND ORIENTED X 3   PATIENT IS RIGHT HANDED  NO HYPERVENTILATION PERFORMED DUE TO PATIENT CONDITION  PHOTIC STIMULATION COMPLETED  PATIENT COMPLAINS OF BEEING FORGETFUL  PDR 9.5  HR 62-67  -    Medication	  No Data.	    [Abbreviation Key:  PDR=alpha rhythm/posterior dominant rhythm. A-P=anterior posterior gradient.  Amplitude: ‘very low’:<20; ‘low’:20-50; ‘medium’:; ‘high’:>200uV.  Persistence for periodic/rhythmic patterns (% of epoch) ‘rare’:<1%; ‘occasional’:1-10%; ‘frequent’:10-50%; ‘abundant’:50-90%; ‘continuous’:>90%.  Persistence for sporadic discharges: ‘rare’:<1/hr; ‘occasional’:1/min-1/hr; ‘frequent’:>1/min; ‘abundant’:>1/10 sec.  GRDA=generalized rhythmic delta activity, LRDA=lateralized rhythmic delta activity, TIRDA=temporal intermittent rhythmic delta activity, FIRDA=frontal intermittent rhythmic activity. LPD=PLED=lateralized periodic discharges, GPD=generalized periodic discharges, BiPDs=BiPLEDs=bilateral independent periodic epileptiform discharges, SIRPID=stimulus induced rhythmic, periodic, or ictal appearing discharges.  Modifiers: +F=with fast component, +S=with spike component, +R=with rhythmic component.  S-B=burst suppression pattern. PFA: paroxysmal fast activity. Max=maximal. N1-drowsy, N2-stage II sleep, N3-slow wave sleep.  HV=hyperventilation, PS=photic stimulation]    Study Interpretation:    FINDINGS:      Background:   -The background was continuous, spontaneously variable and reactive. During wakefulness, the posterior dominant rhythm consisted of symmetric, well-modulated 9.5 Hz activity, with amplitude to 30 uV, that attenuated to eye opening. Low amplitude frontal beta was noted in wakefulness.      Background Slowing:  -No generalized background slowing was present.    Focal Slowing:   -there is intermittent polymorphic delta slowing independently in the left and right temporal regions. At times the slowing in the left temporal region is nearly continuous.     Sleep Background:  -Drowsiness and stage II sleep were not recorded.    Other Non-Epileptiform Findings:  -None were present.    Interictal Epileptiform Activity:   -None were present.    Events:  -Clinical events: None recorded.  -Seizures: None recorded.    Activation Procedures:   -Hyperventilation was not performed.    -Photic stimulation was performed and did not elicit any abnormalities.      Artifacts:  -Intermittent myogenic and movement artifacts were noted.    ECG:  -The heart rate on single channel ECG was predominantly between 56-80 BPM.    EEG Summary/Classification:  -Abnormal EEG in an awake patient.  1. intermittent polymorphic delta activity independently in the left and right temporal regions.    EEG Impression/Clinical Correlate:  -No clear epileptiform abnormalities recorded. There is evidence of focal dysfunction affecting the left and right temporal regions.   _______________________________________      Nadeem Kirby MD PhD  Director, Epilepsy Division, [facility]

## 2020-05-03 NOTE — PROGRESS NOTE ADULT - ASSESSMENT
Pt 63 yo F with pmh dm, htn, hld, breast cancer sp chemo/radiation, NICM sp cardiac cath 2/2019, ef 31% by MUGA, sp ICD 10/2019, AAA presenting with aphasia.     -1. Presumed Acute CVA with residual expressive aphasia  -2.  UTI  -3. Chronic Systolic and diastolic HF: sp ICD  -4. H/o Breast Ca  -5. T2DM     Continue Plavix x 21days; also continue Aspirin with statins.  Discussed with neuro attending - likely stroke.  Patient MRI brain or CTA head and neck - risks benefits discussed and understood.  Discharge planning - with out patient neuro follow up.  Home with PT and speech therapy.   Patient eager for dischare - however she lives alone and will need home care and support in the community. She has no immediate family in [place].

## 2020-05-03 NOTE — PROGRESS NOTE ADULT - SUBJECTIVE AND OBJECTIVE BOX
Neurology Follow up note    Name  RISHABH HUNT    HPI:  Patient is 64-year-old female former smoker with DM, HTN, HLD, breast CA s/p chemo/XRT, and NICM (catheterization 02/2019 showed dRPL occlusion without ability to intervene), EF 31% by MUGA 7/2019, s/p SubQ ICD 10/2019, AAA came to ED after having aphasia.   Per pt she woke at 2:30 in the AM today and she was fine until today morning around 5:30- 6 am when she suddenly found to have difficulty speaking, she could not find words then she called her cousin who called 911 and pt was brought to the ED. After arriving to ED pt had CT head which was negative for acute intracranial changes, her EKG was NSR, tele Neurology was consulted and pt did not get TPA as she was out of window. Pt passed dysphagia screen.  Pt states she was not feeling well last night. Now her symptoms are improving. Pt denies having weakness, numbness, seizure, LOC, head trauma, vision problem, fever, chills, cough, sob, cp, palpitation, abdominal pain, n/v/d, urinary symptoms, leg swelling.     Code status: Full code (30 Apr 2020 17:20)    Subjective:  patient feels better  aphasia resolved    Review of Systems:  Constitutional:      no fever  Respiratory:        no cough                      MEDICATIONS  (STANDING):  anastrozole 1 milliGRAM(s) Oral daily  aspirin enteric coated 81 milliGRAM(s) Oral daily  atorvastatin 80 milliGRAM(s) Oral at bedtime  cefTRIAXone   IVPB 1000 milliGRAM(s) IV Intermittent every 24 hours  clopidogrel Tablet 75 milliGRAM(s) Oral daily  enoxaparin Injectable 40 milliGRAM(s) SubCutaneous daily  metoprolol tartrate 50 milliGRAM(s) Oral two times a day  sacubitril 49 mG/valsartan 51 mG 1 Tablet(s) Oral two times a day  spironolactone 25 milliGRAM(s) Oral daily    MEDICATIONS  (PRN):      Allergies    Advil (Hives; Vomiting)    Intolerances        Objective:   Vital Signs Last 24 Hrs  T(C): 36.3 (03 May 2020 11:29), Max: 37.1 (03 May 2020 05:57)  T(F): 97.4 (03 May 2020 11:29), Max: 98.7 (03 May 2020 05:57)  HR: 63 (03 May 2020 11:29) (63 - 74)  BP: 150/84 (03 May 2020 11:29) (146/77 - 177/102)  BP(mean): --  RR: 18 (03 May 2020 11:29) (17 - 19)  SpO2: 99% (03 May 2020 11:29) (95% - 100%)    General Exam:   General appearance: No acute distress                 Cardiovascular: Pedal dorsalis pulses intact bilaterally    Neurological Exam:  Mental Status: Orientated to self, date and place.  Attention intact.  No dysarthria, aphasia or neglect.      Cranial Nerves: CN I - not tested.  PERRL, EOMI, VFF, no nystagmus or diplopia.  No APD.  Fundi not visualized bilaterally.  CN V1-3 intact to light touch.  No facial asymmetry.      Motor:   Tone: normal.                  Strength: intact throughout  Pronator drift: none                 Dysmeria: None to finger-nose-finger or heel-shin-heel  Tremor: No resting, postural or action tremor.  No myoclonus.    Sensation: intact to light touch    Gait: normal    Other: NIHSS 0, MRS 0    05-02    141  |  106  |  14  ----------------------------<  126<H>  3.8   |  28  |  1.25    Ca    9.8      02 May 2020 08:48  Phos  3.8     05-02  Mg     2.0     05-02    TPro  8.7<H>  /  Alb  3.6  /  TBili  0.5  /  DBili  x   /  AST  10  /  ALT  8<L>  /  AlkPhos  118  05-02    05-02    141  |  106  |  14  ----------------------------<  126<H>  3.8   |  28  |  1.25    Ca    9.8      02 May 2020 08:48  Phos  3.8     05-02  Mg     2.0     05-02    TPro  8.7<H>  /  Alb  3.6  /  TBili  0.5  /  DBili  x   /  AST  10  /  ALT  8<L>  /  AlkPhos  118  05-02    LIVER FUNCTIONS - ( 02 May 2020 08:48 )  Alb: 3.6 g/dL / Pro: 8.7 g/dL / ALK PHOS: 118 U/L / ALT: 8 U/L DA / AST: 10 U/L / GGT: x             Lipid Profile (05.01.20 @ 06:58)    Direct LDL: 95: LDL Cholesterol (mg/dL) --- Interpretive Comment (for adults 18 and over)  Optimal LDL Level may vary based on clinical situation  Below 70                   Ideal for people at very high risk of heart  disease  Below 100                  Ideal for people at risk of heart disease  100 - 129                    [place]  130 - 159                    Borderline high  160 - 189                    High  190 and Above           Very high mg/dL        Radiology    EEG Summary/Classification:  -Abnormal EEG in an awake patient.  1. intermittent polymorphic delta activity independently in the left and right temporal regions.    EEG Impression/Clinical Correlate:  -No clear epileptiform abnormalities recorded. There is evidence of focal dysfunction affecting the left and right temporal regions.          < from: TTE with Doppler (w/Cont) (05.01.20 @ 07:38) >  1. Normal mitral valve. Mild mitral regurgitation.  2. Normal trileaflet aortic valve.  3. Aortic Root: 3.3 cm.  4. Normal left atrium.  LA volume index = 34 cc/m2.  5. Mild concentric left ventricular hypertrophy.  6. Severe global left ventricular systolic dysfunction.  7. Grade I diastolic dysfunction (Impaired relaxation).  8. Normal right atrium.  9. Normal right ventricular size and systolic function  (TAPSE 1.7 cm).  10. Unable to estimate RVSP.  11. There is trace tricuspid regurgitation.  12. There is mild pulmonic regurgitation.  13. Agitated saline injection was negative for intracardiac  shunt.  14. Normal pericardium with no pericardial effusion.    < end of copied text >    Hemoglobin A1C, Whole Blood (05.03.15 @ 08:53)    Hemoglobin A1C, Whole Blood: 6.4: Method: Immunoassay, NGSP       Reference Range                4.0-5.6%       High risk (prediabetic)        5.7-6.4%       Diabetic, diagnostic             >=6.5%       ADA diabetic treatment goal       <7.0%  The Hemoglobin A1c reference ranges arebased on the 2010 recommendations  of  The American Diabetes Association.  Interpretation may vary for children  and  adolescent % Neurology Follow up note    Name  RISHABH HUNT      Subjective:  patient feels better  aphasia resolved    Review of Systems:  Constitutional:      no fever  Respiratory:        no cough                      MEDICATIONS  (STANDING):  anastrozole 1 milliGRAM(s) Oral daily  aspirin enteric coated 81 milliGRAM(s) Oral daily  atorvastatin 80 milliGRAM(s) Oral at bedtime  cefTRIAXone   IVPB 1000 milliGRAM(s) IV Intermittent every 24 hours  clopidogrel Tablet 75 milliGRAM(s) Oral daily  enoxaparin Injectable 40 milliGRAM(s) SubCutaneous daily  metoprolol tartrate 50 milliGRAM(s) Oral two times a day  sacubitril 49 mG/valsartan 51 mG 1 Tablet(s) Oral two times a day  spironolactone 25 milliGRAM(s) Oral daily    MEDICATIONS  (PRN):      Allergies    Advil (Hives; Vomiting)    Intolerances        Objective:   Vital Signs Last 24 Hrs  T(C): 36.3 (03 May 2020 11:29), Max: 37.1 (03 May 2020 05:57)  T(F): 97.4 (03 May 2020 11:29), Max: 98.7 (03 May 2020 05:57)  HR: 63 (03 May 2020 11:29) (63 - 74)  BP: 150/84 (03 May 2020 11:29) (146/77 - 177/102)  BP(mean): --  RR: 18 (03 May 2020 11:29) (17 - 19)  SpO2: 99% (03 May 2020 11:29) (95% - 100%)    General Exam:   General appearance: No acute distress                 Cardiovascular: Pedal dorsalis pulses intact bilaterally    Neurological Exam:  Mental Status: Orientated to self, date and place.  Attention intact.  No dysarthria, aphasia or neglect.      Cranial Nerves: CN I - not tested.  PERRL, EOMI, VFF, no nystagmus or diplopia.  No APD.  Fundi not visualized bilaterally.  CN V1-3 intact to light touch.  No facial asymmetry.      Motor:   Tone: normal.                  Strength: intact throughout  Pronator drift: none                 Dysmeria: None to finger-nose-finger or heel-shin-heel  Tremor: No resting, postural or action tremor.  No myoclonus.    Sensation: intact to light touch    Gait: normal    Other: NIHSS 0, MRS 0    05-02    141  |  106  |  14  ----------------------------<  126<H>  3.8   |  28  |  1.25    Ca    9.8      02 May 2020 08:48  Phos  3.8     05-02  Mg     2.0     05-02    TPro  8.7<H>  /  Alb  3.6  /  TBili  0.5  /  DBili  x   /  AST  10  /  ALT  8<L>  /  AlkPhos  118  05-02    05-02    141  |  106  |  14  ----------------------------<  126<H>  3.8   |  28  |  1.25    Ca    9.8      02 May 2020 08:48  Phos  3.8     05-02  Mg     2.0     05-02    TPro  8.7<H>  /  Alb  3.6  /  TBili  0.5  /  DBili  x   /  AST  10  /  ALT  8<L>  /  AlkPhos  118  05-02    LIVER FUNCTIONS - ( 02 May 2020 08:48 )  Alb: 3.6 g/dL / Pro: 8.7 g/dL / ALK PHOS: 118 U/L / ALT: 8 U/L DA / AST: 10 U/L / GGT: x             Lipid Profile (05.01.20 @ 06:58)    Direct LDL: 95: LDL Cholesterol (mg/dL) --- Interpretive Comment (for adults 18 and over)  Optimal LDL Level may vary based on clinical situation  Below 70                   Ideal for people at very high risk of heart  disease  Below 100                  Ideal for people at risk of heart disease  100 - 129                    [place]  130 - 159                    Borderline high  160 - 189                    High  190 and Above           Very high mg/dL        Radiology    EEG Summary/Classification:  -Abnormal EEG in an awake patient.  1. intermittent polymorphic delta activity independently in the left and right temporal regions.    EEG Impression/Clinical Correlate:  -No clear epileptiform abnormalities recorded. There is evidence of focal dysfunction affecting the left and right temporal regions.          < from: TTE with Doppler (w/Cont) (05.01.20 @ 07:38) >  1. Normal mitral valve. Mild mitral regurgitation.  2. Normal trileaflet aortic valve.  3. Aortic Root: 3.3 cm.  4. Normal left atrium.  LA volume index = 34 cc/m2.  5. Mild concentric left ventricular hypertrophy.  6. Severe global left ventricular systolic dysfunction.  7. Grade I diastolic dysfunction (Impaired relaxation).  8. Normal right atrium.  9. Normal right ventricular size and systolic function  (TAPSE 1.7 cm).  10. Unable to estimate RVSP.  11. There is trace tricuspid regurgitation.  12. There is mild pulmonic regurgitation.  13. Agitated saline injection was negative for intracardiac  shunt.  14. Normal pericardium with no pericardial effusion.    < end of copied text >    Hemoglobin A1C, Whole Blood (05.03.15 @ 08:53)    Hemoglobin A1C, Whole Blood: 6.4: Method: Immunoassay, NGSP       Reference Range                4.0-5.6%       High risk (prediabetic)        5.7-6.4%       Diabetic, diagnostic             >=6.5%       ADA diabetic treatment goal       <7.0%  The Hemoglobin A1c reference ranges arebased on the 2010 recommendations  of  The American Diabetes Association.  Interpretation may vary for children  and  adolescent %

## 2020-05-03 NOTE — PROGRESS NOTE ADULT - SUBJECTIVE AND OBJECTIVE BOX
MEDICAL ATTENDING NOTE    Patient is a 64y old  Female who presents with a chief complaint of aphasia (03 May 2020 16:07)      INTERVAL HPI/OVERNIGHT EVENTS: offers no new complaints today    MEDICATIONS  (STANDING):  anastrozole 1 milliGRAM(s) Oral daily  aspirin enteric coated 81 milliGRAM(s) Oral daily  atorvastatin 80 milliGRAM(s) Oral at bedtime  cefTRIAXone   IVPB 1000 milliGRAM(s) IV Intermittent every 24 hours  clopidogrel Tablet 75 milliGRAM(s) Oral daily  enoxaparin Injectable 40 milliGRAM(s) SubCutaneous daily  metoprolol tartrate 50 milliGRAM(s) Oral two times a day  sacubitril 49 mG/valsartan 51 mG 1 Tablet(s) Oral two times a day  spironolactone 25 milliGRAM(s) Oral daily    MEDICATIONS  (PRN):      __________________________________________________  ----------------------------------------------------------------------------------  REVIEW OF SYSTEMS: negative      Vital Signs Last 24 Hrs  T(C): 36.5 (03 May 2020 16:55), Max: 37.4 (03 May 2020 14:22)  T(F): 97.7 (03 May 2020 16:55), Max: 99.4 (03 May 2020 14:22)  HR: 71 (03 May 2020 16:55) (63 - 74)  BP: 153/92 (03 May 2020 16:55) (146/77 - 177/102)  BP(mean): --  RR: 18 (03 May 2020 16:55) (17 - 19)  SpO2: 96% (03 May 2020 16:55) (95% - 100%)    _________________  PHYSICAL EXAM:  ---------------------------   NAD; Normocephalic;   LUNGS - no wheezing  HEART: S1 S2+   ABDOMEN: Soft, Nontender, non distended  EXTREMITIES: no cyanosis; no edema  NERVOUS SYSTEM:  Awake and alert; no focal neuro deficits appreciated    _________________________________________________  LABS:                        11.9   4.90  )-----------( 330      ( 02 May 2020 08:48 )             37.9     05-02    141  |  106  |  14  ----------------------------<  126<H>  3.8   |  28  |  1.25    Ca    9.8      02 May 2020 08:48  Phos  3.8     05-02  Mg     2.0     05-02    TPro  8.7<H>  /  Alb  3.6  /  TBili  0.5  /  DBili  x   /  AST  10  /  ALT  8<L>  /  AlkPhos  118  05-02        CAPILLARY BLOOD GLUCOSE      POCT Blood Glucose.: 117 mg/dL (03 May 2020 16:44)  POCT Blood Glucose.: 131 mg/dL (03 May 2020 08:02)                Plan of care was discussed with patient ; all questions and concerns were addressed and care was aligned with patient's wishes.

## 2020-05-03 NOTE — PROGRESS NOTE ADULT - ASSESSMENT
Impression:  Aphasia in patient with h/o breast ca concenring for new stroke vs. metastasis without epileptoform activity on EEg but showing structural abnormalities, repeat CTH unremarkable, patient refused contrast CT and MRI brain.     Recommendations:  1.              ASA 81mg and Lipitor 40mg HS.  Plavix x 3weeks  2. As patient  refuses/ unable to get contrast CT and MRI brain, then recommend outpatient PET scan to evaluate for brain metastasis  3. outpatient neuro fu with Dr. Anguiano, [phone] in 2 weeks  4. aneurysm, fu with neurosurgery as outpatient Impression:  Aphasia in patient with h/o breast ca concenring for new stroke vs. metastasis without epileptoform activity on EEg but showing structural abnormalities, repeat CTH unremarkable, patient refused contrast CT and MRI brain.     Recommendations:  1.              ASA 81mg and Lipitor 40mg HS.  Plavix x 3weeks  2. As patient  refuses/ unable to get contrast CT and MRI brain, then recommend outpatient PET scan to evaluate for brain metastasis  3. outpatient neuro fu with Dr. Anguiano, [phone] in 2 weeks  4. aneurysm, fu with neurosurgery as outpatient    dw Dr. Addison

## 2020-05-04 ENCOUNTER — TRANSCRIPTION ENCOUNTER (OUTPATIENT)
Age: 65
End: 2020-05-04

## 2020-05-04 VITALS
TEMPERATURE: 98 F | SYSTOLIC BLOOD PRESSURE: 159 MMHG | HEART RATE: 63 BPM | DIASTOLIC BLOOD PRESSURE: 70 MMHG | OXYGEN SATURATION: 98 % | RESPIRATION RATE: 19 BRPM

## 2020-05-04 LAB
ANION GAP SERPL CALC-SCNC: 9 MMOL/L — SIGNIFICANT CHANGE UP (ref 5–17)
BUN SERPL-MCNC: 18 MG/DL — SIGNIFICANT CHANGE UP (ref 7–18)
CALCIUM SERPL-MCNC: 9.4 MG/DL — SIGNIFICANT CHANGE UP (ref 8.4–10.5)
CHLORIDE SERPL-SCNC: 106 MMOL/L — SIGNIFICANT CHANGE UP (ref 96–108)
CO2 SERPL-SCNC: 24 MMOL/L — SIGNIFICANT CHANGE UP (ref 22–31)
CREAT SERPL-MCNC: 1.14 MG/DL — SIGNIFICANT CHANGE UP (ref 0.5–1.3)
GLUCOSE SERPL-MCNC: 160 MG/DL — HIGH (ref 70–99)
HCT VFR BLD CALC: 37 % — SIGNIFICANT CHANGE UP (ref 34.5–45)
HGB BLD-MCNC: 11.9 G/DL — SIGNIFICANT CHANGE UP (ref 11.5–15.5)
MAGNESIUM SERPL-MCNC: 2.2 MG/DL — SIGNIFICANT CHANGE UP (ref 1.6–2.6)
MCHC RBC-ENTMCNC: 29 PG — SIGNIFICANT CHANGE UP (ref 27–34)
MCHC RBC-ENTMCNC: 32.2 GM/DL — SIGNIFICANT CHANGE UP (ref 32–36)
MCV RBC AUTO: 90.2 FL — SIGNIFICANT CHANGE UP (ref 80–100)
NRBC # BLD: 0 /100 WBCS — SIGNIFICANT CHANGE UP (ref 0–0)
PHOSPHATE SERPL-MCNC: 3.9 MG/DL — SIGNIFICANT CHANGE UP (ref 2.5–4.5)
PLATELET # BLD AUTO: 325 K/UL — SIGNIFICANT CHANGE UP (ref 150–400)
POTASSIUM SERPL-MCNC: 4 MMOL/L — SIGNIFICANT CHANGE UP (ref 3.5–5.3)
POTASSIUM SERPL-SCNC: 4 MMOL/L — SIGNIFICANT CHANGE UP (ref 3.5–5.3)
RBC # BLD: 4.1 M/UL — SIGNIFICANT CHANGE UP (ref 3.8–5.2)
RBC # FLD: 13.6 % — SIGNIFICANT CHANGE UP (ref 10.3–14.5)
SODIUM SERPL-SCNC: 139 MMOL/L — SIGNIFICANT CHANGE UP (ref 135–145)
WBC # BLD: 4.49 K/UL — SIGNIFICANT CHANGE UP (ref 3.8–10.5)
WBC # FLD AUTO: 4.49 K/UL — SIGNIFICANT CHANGE UP (ref 3.8–10.5)

## 2020-05-04 PROCEDURE — 99239 HOSP IP/OBS DSCHRG MGMT >30: CPT

## 2020-05-04 RX ORDER — SACUBITRIL AND VALSARTAN 24; 26 MG/1; MG/1
1 TABLET, FILM COATED ORAL
Qty: 60 | Refills: 0
Start: 2020-05-04 | End: 2020-06-02

## 2020-05-04 RX ORDER — CEFUROXIME AXETIL 250 MG
1 TABLET ORAL
Qty: 2 | Refills: 0
Start: 2020-05-04 | End: 2020-05-04

## 2020-05-04 RX ORDER — ASPIRIN/CALCIUM CARB/MAGNESIUM 324 MG
1 TABLET ORAL
Qty: 30 | Refills: 1
Start: 2020-05-04 | End: 2020-07-02

## 2020-05-04 RX ORDER — METOPROLOL TARTRATE 50 MG
1 TABLET ORAL
Qty: 30 | Refills: 0
Start: 2020-05-04 | End: 2020-06-02

## 2020-05-04 RX ORDER — SACUBITRIL AND VALSARTAN 24; 26 MG/1; MG/1
1 TABLET, FILM COATED ORAL
Qty: 0 | Refills: 0 | DISCHARGE

## 2020-05-04 RX ORDER — CLOPIDOGREL BISULFATE 75 MG/1
1 TABLET, FILM COATED ORAL
Qty: 16 | Refills: 0
Start: 2020-05-04 | End: 2020-05-19

## 2020-05-04 RX ORDER — SPIRONOLACTONE 25 MG/1
1 TABLET, FILM COATED ORAL
Qty: 30 | Refills: 1
Start: 2020-05-04 | End: 2020-07-02

## 2020-05-04 RX ADMIN — CEFTRIAXONE 100 MILLIGRAM(S): 500 INJECTION, POWDER, FOR SOLUTION INTRAMUSCULAR; INTRAVENOUS at 12:39

## 2020-05-04 RX ADMIN — ANASTROZOLE 1 MILLIGRAM(S): 1 TABLET ORAL at 12:39

## 2020-05-04 RX ADMIN — SACUBITRIL AND VALSARTAN 1 TABLET(S): 24; 26 TABLET, FILM COATED ORAL at 05:58

## 2020-05-04 RX ADMIN — CLOPIDOGREL BISULFATE 75 MILLIGRAM(S): 75 TABLET, FILM COATED ORAL at 12:39

## 2020-05-04 RX ADMIN — ENOXAPARIN SODIUM 40 MILLIGRAM(S): 100 INJECTION SUBCUTANEOUS at 12:38

## 2020-05-04 RX ADMIN — Medication 50 MILLIGRAM(S): at 05:58

## 2020-05-04 RX ADMIN — SPIRONOLACTONE 25 MILLIGRAM(S): 25 TABLET, FILM COATED ORAL at 05:58

## 2020-05-04 RX ADMIN — Medication 81 MILLIGRAM(S): at 12:39

## 2020-05-04 NOTE — DISCHARGE NOTE PROVIDER - NSDCMRMEDTOKEN_GEN_ALL_CORE_FT
anastrozole 1 mg oral tablet: 1 tab(s) orally once a day  atorvastatin 20 mg oral tablet: 1 tab(s) orally once a day  Entresto 49 mg-51 mg oral tablet: 1 tab(s) orally 2 times a day  metoprolol succinate 50 mg oral tablet, extended release: 1 tab(s) orally once a day anastrozole 1 mg oral tablet: 1 tab(s) orally once a day  aspirin 81 mg oral delayed release tablet: 1 tab(s) orally once a day  atorvastatin 20 mg oral tablet: 1 tab(s) orally once a day  cefuroxime 500 mg oral tablet: 1 tab(s) orally 2 times a day   Entresto 49 mg-51 mg oral tablet: 1 tab(s) orally 2 times a day  metoprolol succinate 50 mg oral tablet, extended release: 1 tab(s) orally once a day  Plavix 75 mg oral tablet: 1 tab(s) orally once a day  spironolactone 25 mg oral tablet: 1 tab(s) orally once a day

## 2020-05-04 NOTE — DISCHARGE NOTE PROVIDER - HOSPITAL COURSE
Patient is a 64 year old female, with DM, HTN, HLD, breast CA s/p chemo/XRT, and NICM (catheterization 02/2019 showed dRPL occlusion without ability to intervene), EF 31% by MUGA 7/2019, s/p SubQ ICD 10/2019, and AAA, who came to the ED after having aphasia.     Patient was evaluated by neurologist, Dr. Anguiano and was started on aspirin and plavix. CT head done was negative for acute abnormalities. Cardiologist, Dr. Doshi was consulted. Patient is a 64 year old female, with DM, HTN, HLD, breast CA s/p chemo/XRT, and NICM (catheterization 02/2019 showed dRPL occlusion without ability to intervene), EF 31% by MUGA 7/2019, s/p SubQ ICD 10/2019, and AAA, who came to the ED after having aphasia.     Patient was evaluated by neurologist, Dr. Anguiano and was started on aspirin and plavix. CT head done was negative for acute abnormalities but was noted to have peripherally calcified right suprasellar lesion suggesting a right ICA aneurysm. Cardiologist, Dr. Doshi was consulted and recommended to continue home medications. Patient was advised to have CT imaging done with contrast but refused to have one. MRI was advised by neurology, but since patient has metal in body, patient is not able to have MRI done. Advised to follow up with neurology as outpatient in 2 weeks. Advised to get PET scan done as outpatient to evaluate for lesions.         Patient is medically stable for discharge. Case was discussed with attending.

## 2020-05-04 NOTE — PROGRESS NOTE ADULT - ATTENDING COMMENTS
Patient was interviewed and examined at the bedside.     She presented with a new expressive aphasia and mild dysarthria x 10 hours.    She has PMH of systolic CHF with EF 31%, defibrillator, and CVA    Alert, cooperative woman, mild dysarthria  Vital Signs Last 24 Hrs  T(C): 36.5 (04 May 2020 10:56), Max: 37 (03 May 2020 21:30)  T(F): 97.7 (04 May 2020 10:56), Max: 98.6 (03 May 2020 21:30)  HR: 63 (04 May 2020 10:56) (57 - 74)  BP: 159/70 (04 May 2020 10:56) (145/77 - 160/87)  BP(mean): --  RR: 19 (04 May 2020 10:56) (18 - 19)  SpO2: 98% (04 May 2020 10:56) (92% - 100%)          PT/INR - ( 30 Apr 2020 13:28 )   PT: 14.7 sec;   INR: 1.29 ratio         PTT - ( 30 Apr 2020 13:28 )  PTT:29.6 sec      CARDIAC MARKERS ( 30 Apr 2020 17:54 )  0.078 ng/mL / x     / x     / x     / x      CARDIAC MARKERS ( 30 Apr 2020 13:28 )  0.067 ng/mL / x     / 68 U/L / x     / x        POCT Blood Glucose.: 154 mg/dL (30 Apr 2020 12:44)    EKG, NSR  < from: Xray Chest 1 View-PORTABLE IMMEDIATE (04.30.20 @ 14:41) >      Impression: No evidence for pulmonary consolidation, pleural effusion or pneumothorax.    Stable cardiac silhouette.    Stable implantable electronic device in the left chest/abdominal wall.      DISCRETE X-RAY DATA:  Percent of LEFT lung opacification: Clear  Percent of RIGHT lung opacification: Clear  Change in lung opacification from most recent x-ray (<=3 days): No Prior  Change from prior dated 3 or more days (same admission): No Prior    < end of copied text >    < from: CT Head No Cont (04.30.20 @ 14:29) >    There is no acute intracranial hemorrhage, or midline shift. There is no acute territorial infarct. There is no hydrocephalus. Partial empty sella    The cranium is intact.    Mild mucosal thickening paranasal sinuses. Partial opacification right mastoid air cells.    IMPRESSION:  No acute intracranialhemorrhage or acute territorial infarct.  If symptoms persist, follow-up MRI exam recommended.    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  Expressive aphasia has resolved.  Dysarthria persists.             Plan: ASA, plavix x 21 days.  Continue statin.            Home PT, evaluation for services, and speech therapy.
Pt 63 yo F with pmh dm, htn, hld, breast cancer sp chemo/radiation, NICM sp cardiac cath 2/2019, ef 31% by MUGA, sp ICD 10/2019, AAA presenting with aphasia.     -expressive aphasia  -uti  -systolic and diastolic HF: sp ICD  -Breast Ca    Pt can not have MRI due to a non compatible IVC filter. Pt to have CTA head and neck with contrast. Will cw rocephin.

## 2020-05-04 NOTE — DISCHARGE NOTE PROVIDER - CARE PROVIDERS DIRECT ADDRESSES
[email or web address]

## 2020-05-04 NOTE — PROGRESS NOTE ADULT - SUBJECTIVE AND OBJECTIVE BOX
MEDICAL ATTENDING NOTE  Patient is a 64y old  Female who presents with a chief complaint of aphasia (03 May 2020 18:11)      HPI:  Patient is 64-year-old female former smoker with DM, HTN, HLD, breast CA s/p chemo/XRT, and NICM (catheterization 02/2019 showed dRPL occlusion without ability to intervene), EF 31% by MUGA 7/2019, s/p SubQ ICD 10/2019, AAA came to ED after having aphasia.   Per pt she woke at 2:30 in the AM today and she was fine until today morning around 5:30- 6 am when she suddenly found to have difficulty speaking, she could not find words then she called her cousin who called 911 and pt was brought to the ED. After arriving to ED pt had CT head which was negative for acute intracranial changes, her EKG was NSR, tele Neurology was consulted and pt did not get TPA as she was out of window. Pt passed dysphagia screen.  Pt states she was not feeling well last night. Now her symptoms are improving. Pt denies having weakness, numbness, seizure, LOC, head trauma, vision problem, fever, chills, cough, sob, cp, palpitation, abdominal pain, n/v/d, urinary symptoms, leg swelling.     Code status: Full code (30 Apr 2020 17:20)      INTERVAL HPI/OVERNIGHT EVENTS: no new complaints    MEDICATIONS  (STANDING):  anastrozole 1 milliGRAM(s) Oral daily  aspirin enteric coated 81 milliGRAM(s) Oral daily  atorvastatin 80 milliGRAM(s) Oral at bedtime  cefTRIAXone   IVPB 1000 milliGRAM(s) IV Intermittent every 24 hours  clopidogrel Tablet 75 milliGRAM(s) Oral daily  enoxaparin Injectable 40 milliGRAM(s) SubCutaneous daily  metoprolol tartrate 50 milliGRAM(s) Oral two times a day  sacubitril 49 mG/valsartan 51 mG 1 Tablet(s) Oral two times a day  spironolactone 25 milliGRAM(s) Oral daily    MEDICATIONS  (PRN):      __________________________________________________  REVIEW OF SYSTEMS:    CONSTITUTIONAL: No fever,   EYES: no acute visual disturbances  NECK: No pain or stiffness  RESPIRATORY: No cough; No shortness of breath  CARDIOVASCULAR: No chest pain, no palpitations  GASTROINTESTINAL: No pain. No nausea or vomiting; No diarrhea   NEUROLOGICAL: No headache or numbness, no tremors  MUSCULOSKELETAL: No joint pain, no muscle pain  GENITOURINARY: no dysuria, no frequency, no hesitancy  PSYCHIATRY: no depression , no anxiety  ALL OTHER  ROS negative        Vital Signs Last 24 Hrs  T(C): 36.5 (04 May 2020 10:56), Max: 37 (03 May 2020 21:30)  T(F): 97.7 (04 May 2020 10:56), Max: 98.6 (03 May 2020 21:30)  HR: 63 (04 May 2020 10:56) (57 - 74)  BP: 159/70 (04 May 2020 10:56) (145/77 - 160/87)  BP(mean): --  RR: 19 (04 May 2020 10:56) (18 - 19)  SpO2: 98% (04 May 2020 10:56) (92% - 100%)    ________________________________________________  PHYSICAL EXAM:  GENERAL: NAD  HEENT: Normocephalic;  conjunctivae and sclerae clear; moist mucous membranes;   NECK : supple  CHEST/LUNG: Clear to auscultation bilaterally with good air entry   HEART: S1 S2  regular; no murmurs, gallops or rubs  ABDOMEN: Soft, Nontender, Nondistended; Bowel sounds present  EXTREMITIES: no cyanosis; no edema; no calf tenderness  SKIN: warm and dry; no rash  NERVOUS SYSTEM:  Awake and alert; Oriented  to place, person and time;  no expressive aphasia; mild dysarthria persists.     _________________________________________________  LABS:                        11.9   4.49  )-----------( 325      ( 04 May 2020 10:46 )             37.0     05-04    139  |  106  |  18  ----------------------------<  160<H>  4.0   |  24  |  1.14    Ca    9.4      04 May 2020 10:46  Phos  3.9     05-04  Mg     2.2     05-04          CAPILLARY BLOOD GLUCOSE      POCT Blood Glucose.: 137 mg/dL (03 May 2020 21:17)

## 2020-05-04 NOTE — DISCHARGE NOTE PROVIDER - NSDCCPCAREPLAN_GEN_ALL_CORE_FT
PRINCIPAL DISCHARGE DIAGNOSIS  Diagnosis: Expressive aphasia  Assessment and Plan of Treatment: PRINCIPAL DISCHARGE DIAGNOSIS  Diagnosis: Expressive aphasia  Assessment and Plan of Treatment:       SECONDARY DISCHARGE DIAGNOSES  Diagnosis: Chronic combined systolic and diastolic heart failure  Assessment and Plan of Treatment: Chronic combined systolic and diastolic heart failure    Diagnosis: Breast CA  Assessment and Plan of Treatment: Breast CA    Diagnosis: UTI (urinary tract infection)  Assessment and Plan of Treatment: UTI (urinary tract infection) PRINCIPAL DISCHARGE DIAGNOSIS  Diagnosis: Expressive aphasia  Assessment and Plan of Treatment: Patient came to the ED after having aphasia and difficulty with speech.   Evaluated by neurologist, Dr. Anguiano and was started on aspirin and plavix. CT head done was negative for acute abnormalities but was noted to have peripherally calcified right suprasellar lesion suggesting a right ICA aneurysm. Cardiologist, Dr. Doshi was consulted and recommended to continue home medications. Advised to have CT imaging done but did not want to be done due to contrast. MRI was advised by neurology, but since patient has metal in body, patient is not able to have MRI done. Advised to follow up with neurologist, Dr. Anguiano as outpatient in 2 weeks. Advised to get PET scan done as outpatient to evaluate for brain lesions. Continue taking aspirin 81mg daily. Take plavix once a day for 16 more days. Recommend to follow up with neurosurgeon as outpatient for further evaluation of right ICA aneurysm. Follow up with your PCP within 1 week for further evaluation and management.      SECONDARY DISCHARGE DIAGNOSES  Diagnosis: Chronic combined systolic and diastolic heart failure  Assessment and Plan of Treatment: Continue taking home medications of entresto and metoprolol. Recommend to take spirinolactone daily. Follow up with cardiologist, Dr. Doshi for further management.    Diagnosis: Breast CA  Assessment and Plan of Treatment: Continue taking home medication of anastrazole. Follow up with your PCP for further evaluation and management.    Diagnosis: UTI (urinary tract infection)  Assessment and Plan of Treatment: Noted to have positive urinalysis on admission. Started on rocephin antibiotics. Urine culture grew no bacteria. Recommend to take ceftin twice a day for one more day to complete antibiotic course. Follow up with your PCP for further evaluation and management within 1 week.

## 2020-05-04 NOTE — PROGRESS NOTE ADULT - PROBLEM SELECTOR PLAN 1
-TIA or Stroke   -Expressive aphasia   -did not get tpa as out of window   -symptoms resolving   -passed dysphagia screen   -f/u official s/s   -started pt on asa and plavix   -increased lipitor 80mg od   -ct head negative  -ekg nsr   -trop 0.06-->0.07 trend down trop   -discussed with Dr. Anguiano   -pt will need MRI brain with and without contrast but since pt has defoliator pt might not be the candidate for MRI would recommend pt to get MRI as out pt.    -PT consult   -monitor on tele   -cardio Dr. Doshi

## 2020-05-04 NOTE — PROGRESS NOTE ADULT - ASSESSMENT
Pt 65 yo F with pmh dm, htn, hld, breast cancer sp chemo/radiation, NICM sp cardiac cath 2/2019, ef 31% by MUGA, sp ICD 10/2019, AAA presenting with aphasia.     -1. Presumed Acute CVA with residual expressive aphasia  -2.  UTI  -3. Chronic Systolic and diastolic HF: sp ICD  -4. H/o Breast Ca  -5. T2DM     Continue Plavix x 21days; also continue Aspirin with statins.  Discussed with neuro attending - likely stroke.  Patient MRI brain or CTA head and neck - risks benefits discussed and understood.  Discharge planning - with out patient neuro follow up.  Home with PT and speech therapy.   Patient eager for discharge.

## 2020-05-04 NOTE — DISCHARGE NOTE NURSING/CASE MANAGEMENT/SOCIAL WORK - PATIENT PORTAL LINK FT
You can access the FollowMyHealth Patient Portal offered by [facility] by registering at the following website: [email or web address]. By joining [employer]s FollowMyHealth portal, you will also be able to view your health information using other applications (apps) compatible with our system.

## 2020-05-04 NOTE — DISCHARGE NOTE PROVIDER - CARE PROVIDER_API CALL
Nakul Doshi)  Cardiovascular Disease; Internal Medicine  [address]  [place] [address]  Phone: [phone]  Fax: [phone]  Follow Up Time:     Tianna Anguiano)  Neurology  [address] [place] [address]  Phone: [phone]  Fax: [phone]  Follow Up Time:

## 2020-05-08 ENCOUNTER — APPOINTMENT (OUTPATIENT)
Dept: NEUROLOGY | Facility: CLINIC | Age: 65
End: 2020-05-08
Payer: MEDICARE

## 2020-05-08 DIAGNOSIS — M25.561 PAIN IN RIGHT KNEE: ICD-10-CM

## 2020-05-08 PROCEDURE — 99214 OFFICE O/P EST MOD 30 MIN: CPT | Mod: 95

## 2020-05-08 PROCEDURE — 99205 OFFICE O/P NEW HI 60 MIN: CPT | Mod: 95

## 2020-05-08 NOTE — HISTORY OF PRESENT ILLNESS
[Patient] : the patient [Other Location: e.g. Home (Enter Location, City,State)___] : at [unfilled] [Home] : at home, [unfilled] , at the time of the visit. [FreeTextEntry4] : Rohith Gastelum [FreeTextEntry1] : The patient has h/o of breast CA and had acute aphasia on awakening end of April 2020.  The episode lasted for a few days.  The patient was not IVTPA candidate as she was out of the window.  The patient did not have any weakness, sensory loss, difficulty with vision, double vision, difficulty with swallowing, dizziness/ vertigo, balance or walking.\par \par The aphasia has resolves.  Show now has new right knee pain, she feels that the knee is swollen.

## 2020-05-08 NOTE — ASSESSMENT
[FreeTextEntry1] : Transient aphasia in patient with breast CA who was found to have normal CTH (unable to get MRI and declines contrast) but the EEG showed bl temporal lobe dysfunction suspicious for CVA vs. mets.  WIll get PET scan of the brain.  Plavix x 3 weeks.  Cw asa and lipitor 40,\par \par Right knee pain\par will get Xray of the knee. Tylenol prn for pain, patient unable to take Advil/\par \par Cerebral  Aneurysm, will refer to interventional neuro for eval

## 2020-05-08 NOTE — DATA REVIEWED
[de-identified] : CTH: suggestion of right ICA aneurysm\par EEG: no epileptoform activity, bl temporal lobe dysfunction\par [id number]

## 2020-05-08 NOTE — CONSULT LETTER
[Dear  ___] : Dear  [unfilled], [Consult Letter:] : I had the pleasure of evaluating your patient, [unfilled]. [Please see my note below.] : Please see my note below. [Consult Closing:] : Thank you very much for allowing me to participate in the care of this patient.  If you have any questions, please do not hesitate to contact me. [Sincerely,] : Sincerely, [FreeTextEntry3] : Tianna Anguiano MD, MPH\par

## 2020-05-08 NOTE — REVIEW OF SYSTEMS
[As Noted in HPI] : as noted in HPI [Depression] : depression [Incontinence] : incontinence [Fever] : no fever [Suicidal] : not suicidal [Anxiety] : no anxiety [Eyesight Problems] : no eyesight problems [Loss Of Hearing] : no hearing loss [Chest Pain] : no chest pain [Shortness Of Breath] : no shortness of breath [Vomiting] : no vomiting [Joint Pain] : no joint pain [Itching] : no itching [Easy Bleeding] : no tendency for easy bleeding [Muscle Weakness] : no muscle weakness

## 2020-05-08 NOTE — PHYSICAL EXAM
[General Appearance - Alert] : alert [General Appearance - In No Acute Distress] : in no acute distress [Person] : oriented to person [Place] : oriented to place [Registration Intact] : recent registration memory intact [Time] : oriented to time [Concentration Intact] : normal concentrating ability [Visual Intact] : visual attention was ~T not ~L decreased [Fluency] : fluency intact [Naming Objects] : no difficulty naming common objects [Repeating Phrases] : no difficulty repeating a phrase [Cranial Nerves Optic (II)] : visual acuity intact bilaterally,  visual fields full to confrontation, pupils equal round and reactive to light [Comprehension] : comprehension intact [Vocabulary] : adequate range of vocabulary [Cranial Nerves Facial (VII)] : face symmetrical [Cranial Nerves Oculomotor (III)] : extraocular motion intact [Cranial Nerves Trigeminal (V)] : facial sensation intact symmetrically [Cranial Nerves Vestibulocochlear (VIII)] : hearing was intact bilaterally [Cranial Nerves Glossopharyngeal (IX)] : tongue and palate midline [Cranial Nerves Accessory (XI - Cranial And Spinal)] : head turning and shoulder shrug symmetric [Cranial Nerves Hypoglossal (XII)] : there was no tongue deviation with protrusion [Motor Tone] : muscle tone was normal in all four extremities [Motor Strength] : muscle strength was normal in all four extremities [Abnormal Walk] : normal gait [Involuntary Movements] : no involuntary movements were seen [Sensation Tactile Decrease] : light touch was intact [Balance] : balance was intact [] : no respiratory distress [Coordination - Dysmetria Impaired Heel-to-Shin Bilateral] : not present [Coordination - Dysmetria Impaired Finger-to-Nose Bilateral] : not present [FreeTextEntry9] : unable [FreeTextEntry5] : fundi not visualized

## 2020-05-12 ENCOUNTER — APPOINTMENT (OUTPATIENT)
Dept: INTERNAL MEDICINE | Facility: CLINIC | Age: 65
End: 2020-05-12
Payer: MEDICARE

## 2020-05-12 DIAGNOSIS — E11.9 TYPE 2 DIABETES MELLITUS W/OUT COMPLICATIONS: ICD-10-CM

## 2020-05-12 DIAGNOSIS — E87.6 HYPOKALEMIA: ICD-10-CM

## 2020-05-12 PROCEDURE — 99443: CPT | Mod: CR

## 2020-05-12 NOTE — ASSESSMENT
[FreeTextEntry1] : 65 yo very pleasant pt,currently on DAPLT Rx post transient aphasia.\par neurology report reviewed.pt is willing to defer the recommended PET scan for now due to pandemic.\par pt is stable,A,OX3.speaks in full sentences.currently in her usual state of health,compliant with her meds.\par she will continue oncol.card.,neurology f/u.\par latest labs reviewed,all stable.\par

## 2020-05-12 NOTE — HISTORY OF PRESENT ILLNESS
[Verbal consent obtained from patient] : the patient, [unfilled] [FreeTextEntry1] : aphasia [de-identified] : 63 yo was admitted to [facility] 0n 04/30/20 and d/c-ed on 04/03/20 due to transient aphasia when she woke up on 04/30/20,she knew what she wanted to say but was not able to express it in words.she sent a text message to her cousin who called 911.\par the hospital evaluation was neg.for neurological deficit other than aphasia,which resolved in 24 h.\par the CT showed \par labs were unremarkable \par pt was d/c-ed on Plavix and ASA.\par PMH:HTN,T2D,HLD,non ischemic cardiomyopathy,EF=31%,ICD.on Entresto,Spironolactone,hemodynamically at optimal level.\par h/o BC,post chemoRx,RT,on Anastrazole.\par h/o COPD,former smoker.

## 2020-06-04 ENCOUNTER — APPOINTMENT (OUTPATIENT)
Dept: NEUROLOGY | Facility: CLINIC | Age: 65
End: 2020-06-04
Payer: MEDICARE

## 2020-06-04 DIAGNOSIS — I67.1 CEREBRAL ANEURYSM, NONRUPTURED: ICD-10-CM

## 2020-06-04 DIAGNOSIS — R47.01 APHASIA: ICD-10-CM

## 2020-06-04 PROCEDURE — 99214 OFFICE O/P EST MOD 30 MIN: CPT | Mod: 95

## 2020-06-04 RX ORDER — CLOPIDOGREL BISULFATE 75 MG/1
75 TABLET, FILM COATED ORAL
Qty: 21 | Refills: 0 | Status: DISCONTINUED | COMMUNITY
Start: 2020-05-08 | End: 2020-06-04

## 2020-06-04 RX ORDER — CLOPIDOGREL 75 MG/1
75 TABLET, FILM COATED ORAL DAILY
Refills: 0 | Status: DISCONTINUED | COMMUNITY
End: 2020-06-04

## 2020-06-04 NOTE — ASSESSMENT
[FreeTextEntry1] : Aphasia in patient with breast cancer with normal CTH, unable to get MRI and patient declines contrast CTH, EEg abnormal showing bl temporal lobe dysfunction, suspicious for CVA vs. mets.  Pending PET scan to [facility] for mets.  Cw asa 81 and lipitor 40 for secondary stroke prevention.\par \par Cerebral aneurysm\par patient has been told and understands the symptoms of thundercap headache and understands that if such headache or AMS happen she needs to go to the hospital ASA for eval of aneurysmal bleed\par referred to Dr. Antonio Shelton for further eval

## 2020-06-04 NOTE — HISTORY OF PRESENT ILLNESS
[Verbal consent obtained from patient] : the patient, [unfilled] [Home] : at home, [unfilled] , at the time of the visit. [Other Location: e.g. Home (Enter Location, City,State)___] : at [unfilled] [FreeTextEntry4] : [place] [FreeTextEntry1] : Patient's aphasia is mostly improved except her having trouble with finding the words of long sentences. She declines speech therapy.  She has not yet had the PET scan.  She is on ASA and statin for stroke prevention, completed Plavix.  She has not get seen Dr. Shelton for the aneurysm.

## 2020-06-04 NOTE — PHYSICAL EXAM
[General Appearance - Alert] : alert [General Appearance - In No Acute Distress] : in no acute distress [Person] : oriented to person [Place] : oriented to place [Concentration Intact] : normal concentrating ability [Time] : oriented to time [Naming Objects] : no difficulty naming common objects [Repeating Phrases] : no difficulty repeating a phrase [Comprehension] : comprehension intact [Vocabulary] : adequate range of vocabulary [Fluency] : fluency intact [Motor Tone] : muscle tone was normal in all four extremities [Cranial Nerves Facial (VII)] : face symmetrical [Cranial Nerves Oculomotor (III)] : extraocular motion intact [Motor Strength] : muscle strength was normal in all four extremities [FreeTextEntry4] : m

## 2020-06-09 ENCOUNTER — APPOINTMENT (OUTPATIENT)
Dept: NEUROLOGY | Facility: CLINIC | Age: 65
End: 2020-06-09
Payer: MEDICARE

## 2020-06-09 PROCEDURE — 99214 OFFICE O/P EST MOD 30 MIN: CPT | Mod: 95

## 2020-06-09 RX ORDER — CEFUROXIME AXETIL 500 MG/1
500 TABLET ORAL TWICE DAILY
Refills: 0 | Status: DISCONTINUED | COMMUNITY
End: 2020-06-09

## 2020-06-09 NOTE — PHYSICAL EXAM
[General Appearance - Alert] : alert [General Appearance - Well Nourished] : well nourished [General Appearance - Well Developed] : well developed [Oriented To Time, Place, And Person] : oriented to person, place, and time [Affect] : the affect was normal [Mood] : the mood was normal

## 2020-06-09 NOTE — HISTORY OF PRESENT ILLNESS
[Home] : at home, [unfilled] , at the time of the visit. [Medical Office: ([facility])___] : at the medical office located in  [Verbal consent obtained from patient] : the patient, [unfilled] [FreeTextEntry4] : Tigist Ho NP [FreeTextEntry1] : Ms. Read is a 64 year old woman with a PMHx of T2DM, HTN, HLD, breast CA s/p chemo/XRT, and NICM (catheterization 02/2019 showed dRPL occlusion without ability to intervene), EF 31% by MUGA 7/2019, s/p SubQ ICD 10/2019, and AAA who presented to [facility] ED on 04/30/20 for an episode of aphasia. CT head was negative for acute infarct, but showed a heavily calcified 2 cm supraclinoid right CA aneurysm. She refused to have CTA and was unable to have an MRI because of the AICD. EEG showed no clear epileptiform abnormalities. There is evidence of focal dysfunction affecting the left and right temporal regions. Her aphasia resolved after 1 day. She was discharged home on 05/04 on ASA and Plavix for stroke prevention. Today she denies any new symptoms and reports being back to baseline.

## 2020-06-09 NOTE — CONSULT LETTER
[Dear  ___] : Dear  [unfilled], [Consult Letter:] : I had the pleasure of evaluating your patient, [unfilled]. [Please see my note below.] : Please see my note below. [Consult Closing:] : Thank you very much for allowing me to participate in the care of this patient.  If you have any questions, please do not hesitate to contact me. [Sincerely,] : Sincerely, [FreeTextEntry3] : Antonio Shelton MD\par Chief, Vascular Neurology\par  of Neurology and Radiology\par [facility]\par Director, Comprehensive Stroke Center and Stroke Unit\par [facility]\par Attending, Interventional Neuroradiology\par \par

## 2020-06-09 NOTE — DISCUSSION/SUMMARY
[FreeTextEntry1] : Ms. Read is a 64 year old woman with a PMHx of T2DM, HTN, HLD, breast CA s/p chemo/XRT, and NICM (catheterization 02/2019 showed dRPL occlusion without ability to intervene), EF 31% by MUGA 7/2019, s/p SubQ ICD 10/2019, and AAA who presented to [facility] ED on 04/30/20 for an episode of aphasia, likely a TIA. CT head was negative for acute infarct, but showed heavily calcified 2 cm supraclinoid right ICA aneurysm. Plan for CTA head and neck to that this is a patent aneurysm, though my suspicion based on the appearance and calcification is that it is thrombosed. In addition, it is surrounded by a 4 mm thick wall of calcium, so the chance of it causing SAH, despite it's size, may be very low. I will see her again after the CTA. All of her questions and concerns were addressed.

## 2020-06-09 NOTE — REVIEW OF SYSTEMS
[Fever] : no fever [Chills] : no chills [Feeling Poorly] : not feeling poorly [Feeling Tired] : not feeling tired [Confused or Disoriented] : no confusion [Memory Lapses or Loss] : no memory loss [Decr. Concentrating Ability] : no decrease in concentrating ability [Difficulty with Language] : no ~M difficulty with language [Changed Thought Patterns] : no change in thought patterns [Repeating Questions] : no repeated questioning about recent events [Facial Weakness] : no facial weakness [Arm Weakness] : no arm weakness [Hand Weakness] : no hand weakness [Leg Weakness] : no leg weakness [Poor Coordination] : good coordination [Difficulty Writing] : no difficulty writing [Difficulties in Speech] : no speech difficulties [Numbness] : no numbness [Tingling] : no tingling [Seizures] : no convulsions [Dizziness] : no dizziness [Fainting] : no fainting [Lightheadedness] : no lightheadedness [Vertigo] : no vertigo [Tension Headache] : no tension-type headache [Difficulty Walking] : no difficulty walking [Inability to Walk] : able to walk [Ataxia] : no ataxia [Frequent Falls] : not falling [Anxiety] : no anxiety [Depression] : no depression [Eyesight Problems] : no eyesight problems [Nosebleeds] : no nosebleeds [Chest Pain] : no chest pain [Palpitations] : no palpitations [Cough] : no cough [Constipation] : no constipation [Diarrhea] : no diarrhea

## 2020-06-25 PROCEDURE — 36415 COLL VENOUS BLD VENIPUNCTURE: CPT

## 2020-06-25 PROCEDURE — 80061 LIPID PANEL: CPT

## 2020-06-25 PROCEDURE — 84145 PROCALCITONIN (PCT): CPT

## 2020-06-25 PROCEDURE — 80048 BASIC METABOLIC PNL TOTAL CA: CPT

## 2020-06-25 PROCEDURE — 95957 EEG DIGITAL ANALYSIS: CPT

## 2020-06-25 PROCEDURE — 95819 EEG AWAKE AND ASLEEP: CPT

## 2020-06-25 PROCEDURE — 87086 URINE CULTURE/COLONY COUNT: CPT

## 2020-06-25 PROCEDURE — 87635 SARS-COV-2 COVID-19 AMP PRB: CPT

## 2020-06-25 PROCEDURE — 82803 BLOOD GASES ANY COMBINATION: CPT

## 2020-06-25 PROCEDURE — 85027 COMPLETE CBC AUTOMATED: CPT

## 2020-06-25 PROCEDURE — 93880 EXTRACRANIAL BILAT STUDY: CPT

## 2020-06-25 PROCEDURE — 82746 ASSAY OF FOLIC ACID SERUM: CPT

## 2020-06-25 PROCEDURE — 80053 COMPREHEN METABOLIC PANEL: CPT

## 2020-06-25 PROCEDURE — 82962 GLUCOSE BLOOD TEST: CPT

## 2020-06-25 PROCEDURE — 82728 ASSAY OF FERRITIN: CPT

## 2020-06-25 PROCEDURE — 84443 ASSAY THYROID STIM HORMONE: CPT

## 2020-06-25 PROCEDURE — 71045 X-RAY EXAM CHEST 1 VIEW: CPT

## 2020-06-25 PROCEDURE — 82306 VITAMIN D 25 HYDROXY: CPT

## 2020-06-25 PROCEDURE — 85379 FIBRIN DEGRADATION QUANT: CPT

## 2020-06-25 PROCEDURE — 83880 ASSAY OF NATRIURETIC PEPTIDE: CPT

## 2020-06-25 PROCEDURE — 70450 CT HEAD/BRAIN W/O DYE: CPT

## 2020-06-25 PROCEDURE — 93005 ELECTROCARDIOGRAM TRACING: CPT

## 2020-06-25 PROCEDURE — 86140 C-REACTIVE PROTEIN: CPT

## 2020-06-25 PROCEDURE — 85610 PROTHROMBIN TIME: CPT

## 2020-06-25 PROCEDURE — 81001 URINALYSIS AUTO W/SCOPE: CPT

## 2020-06-25 PROCEDURE — 93306 TTE W/DOPPLER COMPLETE: CPT

## 2020-06-25 PROCEDURE — 85730 THROMBOPLASTIN TIME PARTIAL: CPT

## 2020-06-25 PROCEDURE — 84484 ASSAY OF TROPONIN QUANT: CPT

## 2020-06-25 PROCEDURE — 82550 ASSAY OF CK (CPK): CPT

## 2020-06-25 PROCEDURE — 82607 VITAMIN B-12: CPT

## 2020-06-25 PROCEDURE — 99285 EMERGENCY DEPT VISIT HI MDM: CPT

## 2020-06-25 PROCEDURE — 84100 ASSAY OF PHOSPHORUS: CPT

## 2020-06-25 PROCEDURE — 83036 HEMOGLOBIN GLYCOSYLATED A1C: CPT

## 2020-06-25 PROCEDURE — 83735 ASSAY OF MAGNESIUM: CPT

## 2020-07-07 ENCOUNTER — RESULT REVIEW (OUTPATIENT)
Age: 65
End: 2020-07-07

## 2020-07-07 ENCOUNTER — APPOINTMENT (OUTPATIENT)
Dept: CT IMAGING | Facility: HOSPITAL | Age: 65
End: 2020-07-07
Payer: MEDICARE

## 2020-07-07 ENCOUNTER — OUTPATIENT (OUTPATIENT)
Dept: OUTPATIENT SERVICES | Facility: HOSPITAL | Age: 65
LOS: 1 days | End: 2020-07-07
Payer: MEDICARE

## 2020-07-07 DIAGNOSIS — Z98.89 OTHER SPECIFIED POSTPROCEDURAL STATES: Chronic | ICD-10-CM

## 2020-07-07 DIAGNOSIS — D25.9 LEIOMYOMA OF UTERUS, UNSPECIFIED: Chronic | ICD-10-CM

## 2020-07-07 DIAGNOSIS — Z90.710 ACQUIRED ABSENCE OF BOTH CERVIX AND UTERUS: Chronic | ICD-10-CM

## 2020-07-07 DIAGNOSIS — I72.9 ANEURYSM OF UNSPECIFIED SITE: ICD-10-CM

## 2020-07-07 DIAGNOSIS — C50.919 MALIGNANT NEOPLASM OF UNSPECIFIED SITE OF UNSPECIFIED FEMALE BREAST: Chronic | ICD-10-CM

## 2020-07-07 PROCEDURE — 70498 CT ANGIOGRAPHY NECK: CPT

## 2020-07-07 PROCEDURE — 70496 CT ANGIOGRAPHY HEAD: CPT

## 2020-07-07 PROCEDURE — 70496 CT ANGIOGRAPHY HEAD: CPT | Mod: 26

## 2020-07-07 PROCEDURE — 70498 CT ANGIOGRAPHY NECK: CPT | Mod: 26

## 2020-07-14 ENCOUNTER — APPOINTMENT (OUTPATIENT)
Dept: NEUROLOGY | Facility: CLINIC | Age: 65
End: 2020-07-14
Payer: MEDICARE

## 2020-07-14 DIAGNOSIS — I72.9 ANEURYSM OF UNSPECIFIED SITE: ICD-10-CM

## 2020-07-14 DIAGNOSIS — Z72.0 TOBACCO USE: ICD-10-CM

## 2020-07-14 DIAGNOSIS — G45.9 TRANSIENT CEREBRAL ISCHEMIC ATTACK, UNSPECIFIED: ICD-10-CM

## 2020-07-14 PROCEDURE — 99214 OFFICE O/P EST MOD 30 MIN: CPT | Mod: 95

## 2020-07-14 NOTE — DISCUSSION/SUMMARY
[FreeTextEntry1] : Ms. Read is a 64 year old woman with a PMHx of T2DM, HTN, HLD, breast CA s/p chemo/XRT, and NICM (catheterization 02/2019 showed dRPL occlusion without ability to intervene), EF 31% by MUGA 7/2019, s/p SubQ ICD 10/2019, and AAA who presented to [facility] ED on 04/30/20 for an episode of aphasia, likely a TIA. CT head was negative for acute infarct, but showed heavily calcified 2 cm supraclinoid right ICA aneurysm.  CTA head and neck showed a thrombosed aneurysm. We discussed there is no indication for treatment because it is fully occluded spontaneous. She will continue the Plavix for stroke prevention. She quit smoking a month ago, but has a lot of cravings, will start her on Zyban. Possible medication side effects discussed with patient. All of her questions and concerns were addressed.

## 2020-07-14 NOTE — HISTORY OF PRESENT ILLNESS
[Home] : at home, [unfilled] , at the time of the visit. [Medical Office: ([facility])___] : at the medical office located in  [Verbal consent obtained from patient] : the patient, [unfilled] [FreeTextEntry4] : Tigist Ho NP [FreeTextEntry1] : Ms. Read is a 64 year old woman with a PMHx of T2DM, HTN, HLD, breast CA s/p chemo/XRT, and NICM (catheterization 02/2019 showed dRPL occlusion without ability to intervene), EF 31% by MUGA 7/2019, s/p SubQ ICD 10/2019, and AAA who presented to [facility] ED on 04/30/20 for an episode of aphasia. CT head was negative for acute infarct, but showed a heavily calcified 2 cm supraclinoid right CA aneurysm. She refused to have CTA and was unable to have an MRI because of the AICD. EEG showed no clear epileptiform abnormalities. There is evidence of focal dysfunction affecting the left and right temporal regions. Her aphasia resolved after 1 day. She was discharged home on 05/04 on ASA and Plavix for stroke prevention. CTA head and neck showed a thrombosed right supraclinoid ICA aneurysm.  Today she denies any new symptoms and reports being back to baseline. She stopped the ASA, and continues to take Plavix for stroke prevention.

## 2020-07-14 NOTE — REVIEW OF SYSTEMS
[Fever] : no fever [Feeling Poorly] : not feeling poorly [Chills] : no chills [Confused or Disoriented] : no confusion [Feeling Tired] : not feeling tired [Memory Lapses or Loss] : no memory loss [Decr. Concentrating Ability] : no decrease in concentrating ability [Difficulty with Language] : no ~M difficulty with language [Changed Thought Patterns] : no change in thought patterns [Repeating Questions] : no repeated questioning about recent events [Vertigo] : no vertigo [Tension Headache] : no tension-type headache [Difficulty Walking] : no difficulty walking [Inability to Walk] : able to walk [Anxiety] : no anxiety [Eyesight Problems] : no eyesight problems [Depression] : no depression [Chest Pain] : no chest pain [Palpitations] : no palpitations [Shortness Of Breath] : no shortness of breath [Wheezing] : no wheezing [Constipation] : no constipation [Cough] : no cough [Skin Lesions] : no skin lesions [Diarrhea] : no diarrhea [Skin Wound] : no skin wound [Easy Bleeding] : no tendency for easy bleeding [Easy Bruising] : no tendency for easy bruising

## 2020-07-30 ENCOUNTER — APPOINTMENT (OUTPATIENT)
Dept: ELECTROPHYSIOLOGY | Facility: CLINIC | Age: 65
End: 2020-07-30
Payer: MEDICARE

## 2020-07-30 PROCEDURE — 93296 REM INTERROG EVL PM/IDS: CPT

## 2020-07-30 PROCEDURE — 93295 DEV INTERROG REMOTE 1/2/MLT: CPT

## 2020-08-14 ENCOUNTER — APPOINTMENT (OUTPATIENT)
Dept: VASCULAR SURGERY | Facility: CLINIC | Age: 65
End: 2020-08-14
Payer: MEDICARE

## 2020-08-14 ENCOUNTER — EMERGENCY (EMERGENCY)
Facility: HOSPITAL | Age: 65
LOS: 1 days | Discharge: HOME | End: 2020-08-14
Attending: EMERGENCY MEDICINE
Payer: MEDICARE

## 2020-08-14 VITALS
TEMPERATURE: 98 F | RESPIRATION RATE: 18 BRPM | OXYGEN SATURATION: 96 % | SYSTOLIC BLOOD PRESSURE: 156 MMHG | DIASTOLIC BLOOD PRESSURE: 95 MMHG | HEART RATE: 70 BPM

## 2020-08-14 VITALS
BODY MASS INDEX: 28.88 KG/M2 | HEART RATE: 65 BPM | DIASTOLIC BLOOD PRESSURE: 117 MMHG | WEIGHT: 184 LBS | SYSTOLIC BLOOD PRESSURE: 173 MMHG | HEIGHT: 67 IN

## 2020-08-14 VITALS
DIASTOLIC BLOOD PRESSURE: 116 MMHG | TEMPERATURE: 98 F | HEART RATE: 76 BPM | OXYGEN SATURATION: 97 % | WEIGHT: 167.99 LBS | SYSTOLIC BLOOD PRESSURE: 169 MMHG | RESPIRATION RATE: 20 BRPM

## 2020-08-14 VITALS — TEMPERATURE: 97.9 F

## 2020-08-14 DIAGNOSIS — Z90.710 ACQUIRED ABSENCE OF BOTH CERVIX AND UTERUS: Chronic | ICD-10-CM

## 2020-08-14 DIAGNOSIS — Z98.89 OTHER SPECIFIED POSTPROCEDURAL STATES: Chronic | ICD-10-CM

## 2020-08-14 DIAGNOSIS — D25.9 LEIOMYOMA OF UTERUS, UNSPECIFIED: Chronic | ICD-10-CM

## 2020-08-14 DIAGNOSIS — C50.919 MALIGNANT NEOPLASM OF UNSPECIFIED SITE OF UNSPECIFIED FEMALE BREAST: Chronic | ICD-10-CM

## 2020-08-14 PROCEDURE — 99213 OFFICE O/P EST LOW 20 MIN: CPT

## 2020-08-14 PROCEDURE — 99283 EMERGENCY DEPT VISIT LOW MDM: CPT

## 2020-08-14 PROCEDURE — 93978 VASCULAR STUDY: CPT

## 2020-08-14 PROCEDURE — 93005 ELECTROCARDIOGRAM TRACING: CPT

## 2020-08-14 PROCEDURE — 93010 ELECTROCARDIOGRAM REPORT: CPT

## 2020-08-14 NOTE — ASSESSMENT
[FreeTextEntry1] : patient with asymptomatic 4.2 cm abdominal aortic aneurysm  No intervention. Followup in 6 months.\par \par \par \par Smoking cessation.

## 2020-08-14 NOTE — HISTORY OF PRESENT ILLNESS
[FreeTextEntry1] : the patient is a 62-year-old with history of abdominal aortic aneurysm. No complaint of abdominal pain back pain. Patient is a current heavy smoker. No complaint of claudication or rest pain.\par Patient with recent hospitalization with a CVA.  Work-up did not reveal any significant carotid disease.\par \par Patient has had prior CAT scans of the chest and abdomen which have shown the infrarenal portion of the aneurysm to be over 4 cm.  Patient also has had pulmonary nodules which have not been followed up

## 2020-08-14 NOTE — ED PROVIDER NOTE - OBJECTIVE STATEMENT
64 y/o female with h/o multiple medical problems states that for last two days had fan in her face and she felt like she couldn't breath and hence could not sleep , she is   afraid she wont sleep tonight , she is afraid she would die at home   states doesn't drink as much as as she used to

## 2020-08-14 NOTE — ED PROVIDER NOTE - PATIENT PORTAL LINK FT
You can access the FollowMyHealth Patient Portal offered by [facility] by registering at the following website: [email or web address]. By joining [facility]’s FollowMyHealth portal, you will also be able to view your health information using other applications (apps) compatible with our system.

## 2020-08-14 NOTE — ED PROVIDER NOTE - PSH
Breast cancer  infusaport insertion- 2014- left chest  Fibroid uterus  sp hystrectomy 1196  Stevenson filter in place  1994  S/P hysterectomy  1994  S/P lumpectomy, right breast  2014, revision of position- 2015

## 2020-08-14 NOTE — ED ADULT NURSE NOTE - CHPI ED NUR SYMPTOMS NEG
no nausea/no decreased eating/drinking/no weakness/no fever/no chills/no dizziness/no vomiting/no tingling/no pain

## 2020-08-14 NOTE — ED ADULT NURSE NOTE - NS ED NURSE RECORD ANOTHER VITAL SIGN
LOV- 8/8. Due for a wellness visit with fasting labs, sent letter and aniceto x1.  Irish Valerio RN      Yes

## 2020-08-14 NOTE — PHYSICAL EXAM
[JVD] : no jugular venous distention  [Normal Breath Sounds] : Normal breath sounds [Normal Heart Sounds] : normal heart sounds [Right Carotid Bruit] : right carotid bruit heard [2+] : left 2+ [Ankle Swelling (On Exam)] : not present [Varicose Veins Of Lower Extremities] : not present [] : not present [Abdomen Masses] : No abdominal masses [Skin Ulcer] : no ulcer [Oriented to Place] : oriented to place

## 2020-08-14 NOTE — SBIRT NOTE ADULT - NSSBIRTUNABLESCROTHER_GEN_A_CORE
Pt claimed that she has been sober and the last time she drank was 10 yrs ago. Pt was provided with supportive counseling and encouraged her to make need known if she ever needed assistance.  Pt is discharged from the ED.

## 2020-08-14 NOTE — CHART NOTE - NSCHARTNOTEFT_GEN_A_CORE
Positive SBIRT screen consult  Pt is a  65 year old female with h/o multiple medical problems. Ebenezer met with Pt at bedside, role of ebenezer explained. , Pt appeared alert and oriented. Pt reported that she was cold. Warm blanket provided. Pt declined screening she  claimed that she has been sober and the last time she drank was 10 yrs ago. Pt was provided with supportive counseling and was encouraged her to make need known if she ever needed assistance. Pt refused resources when offered. Pt voiced understanding. Pt was socially cleared.

## 2020-08-14 NOTE — ED ADULT NURSE NOTE - NSFALLRSKASSESSTYPE_ED_ALL_ED
Final Beta strep group A r/o culture is NEGATIVE for Group A streptococcus.    No treatment or change in treatment per [facility] Strep protocol. Initial (On Arrival)

## 2020-08-18 DIAGNOSIS — R91.8 OTHER NONSPECIFIC ABNORMAL FINDING OF LUNG FIELD: ICD-10-CM

## 2020-08-18 DIAGNOSIS — F17.200 NICOTINE DEPENDENCE, UNSPECIFIED, UNCOMPLICATED: ICD-10-CM

## 2020-08-20 ENCOUNTER — APPOINTMENT (OUTPATIENT)
Dept: ELECTROPHYSIOLOGY | Facility: CLINIC | Age: 65
End: 2020-08-20
Payer: MEDICARE

## 2020-08-20 VITALS — DIASTOLIC BLOOD PRESSURE: 106 MMHG | HEART RATE: 80 BPM | SYSTOLIC BLOOD PRESSURE: 160 MMHG | RESPIRATION RATE: 14 BRPM

## 2020-08-20 PROCEDURE — 93282 PRGRMG EVAL IMPLANTABLE DFB: CPT

## 2020-08-20 RX ORDER — SPIRONOLACTONE 25 MG/1
25 TABLET ORAL
Refills: 0 | Status: DISCONTINUED | COMMUNITY
End: 2020-08-20

## 2020-08-26 DIAGNOSIS — K59.09 OTHER CONSTIPATION: ICD-10-CM

## 2020-08-27 ENCOUNTER — APPOINTMENT (OUTPATIENT)
Dept: PULMONOLOGY | Facility: HOSPITAL | Age: 65
End: 2020-08-27

## 2020-08-27 ENCOUNTER — INPATIENT (INPATIENT)
Facility: HOSPITAL | Age: 65
LOS: 12 days | Discharge: HOME | DRG: 291 | End: 2020-09-09
Attending: HOSPITALIST | Admitting: HOSPITALIST
Payer: MEDICARE

## 2020-08-27 ENCOUNTER — APPOINTMENT (OUTPATIENT)
Dept: CT IMAGING | Facility: HOSPITAL | Age: 65
End: 2020-08-27

## 2020-08-27 VITALS
SYSTOLIC BLOOD PRESSURE: 156 MMHG | TEMPERATURE: 98 F | OXYGEN SATURATION: 99 % | RESPIRATION RATE: 16 BRPM | DIASTOLIC BLOOD PRESSURE: 98 MMHG | HEART RATE: 77 BPM

## 2020-08-27 DIAGNOSIS — Z86.72 PERSONAL HISTORY OF THROMBOPHLEBITIS: ICD-10-CM

## 2020-08-27 DIAGNOSIS — I50.9 HEART FAILURE, UNSPECIFIED: ICD-10-CM

## 2020-08-27 DIAGNOSIS — R79.89 OTHER SPECIFIED ABNORMAL FINDINGS OF BLOOD CHEMISTRY: ICD-10-CM

## 2020-08-27 DIAGNOSIS — Z90.710 ACQUIRED ABSENCE OF BOTH CERVIX AND UTERUS: Chronic | ICD-10-CM

## 2020-08-27 DIAGNOSIS — Z98.89 OTHER SPECIFIED POSTPROCEDURAL STATES: Chronic | ICD-10-CM

## 2020-08-27 DIAGNOSIS — Z29.9 ENCOUNTER FOR PROPHYLACTIC MEASURES, UNSPECIFIED: ICD-10-CM

## 2020-08-27 DIAGNOSIS — R06.00 DYSPNEA, UNSPECIFIED: ICD-10-CM

## 2020-08-27 DIAGNOSIS — N17.9 ACUTE KIDNEY FAILURE, UNSPECIFIED: ICD-10-CM

## 2020-08-27 DIAGNOSIS — D25.9 LEIOMYOMA OF UTERUS, UNSPECIFIED: Chronic | ICD-10-CM

## 2020-08-27 DIAGNOSIS — I21.4 NON-ST ELEVATION (NSTEMI) MYOCARDIAL INFARCTION: ICD-10-CM

## 2020-08-27 DIAGNOSIS — C50.919 MALIGNANT NEOPLASM OF UNSPECIFIED SITE OF UNSPECIFIED FEMALE BREAST: Chronic | ICD-10-CM

## 2020-08-27 DIAGNOSIS — J44.9 CHRONIC OBSTRUCTIVE PULMONARY DISEASE, UNSPECIFIED: ICD-10-CM

## 2020-08-27 DIAGNOSIS — I10 ESSENTIAL (PRIMARY) HYPERTENSION: ICD-10-CM

## 2020-08-27 DIAGNOSIS — I63.9 CEREBRAL INFARCTION, UNSPECIFIED: ICD-10-CM

## 2020-08-27 DIAGNOSIS — C50.919 MALIGNANT NEOPLASM OF UNSPECIFIED SITE OF UNSPECIFIED FEMALE BREAST: ICD-10-CM

## 2020-08-27 LAB
ALBUMIN SERPL ELPH-MCNC: 3.6 G/DL — SIGNIFICANT CHANGE UP (ref 3.5–5)
ALP SERPL-CCNC: 94 U/L — SIGNIFICANT CHANGE UP (ref 40–120)
ALT FLD-CCNC: 10 U/L DA — SIGNIFICANT CHANGE UP (ref 10–60)
ANION GAP SERPL CALC-SCNC: 11 MMOL/L — SIGNIFICANT CHANGE UP (ref 5–17)
ANION GAP SERPL CALC-SCNC: 12 MMOL/L — SIGNIFICANT CHANGE UP (ref 5–17)
AST SERPL-CCNC: 19 U/L — SIGNIFICANT CHANGE UP (ref 10–40)
BASE EXCESS BLDV CALC-SCNC: 2.4 MMOL/L — HIGH (ref -2–2)
BASOPHILS # BLD AUTO: 0.04 K/UL — SIGNIFICANT CHANGE UP (ref 0–0.2)
BASOPHILS NFR BLD AUTO: 0.5 % — SIGNIFICANT CHANGE UP (ref 0–2)
BILIRUB SERPL-MCNC: 0.8 MG/DL — SIGNIFICANT CHANGE UP (ref 0.2–1.2)
BLOOD GAS COMMENTS, VENOUS: SIGNIFICANT CHANGE UP
BUN SERPL-MCNC: 22 MG/DL — HIGH (ref 7–18)
BUN SERPL-MCNC: 25 MG/DL — HIGH (ref 7–18)
CALCIUM SERPL-MCNC: 9.4 MG/DL — SIGNIFICANT CHANGE UP (ref 8.4–10.5)
CALCIUM SERPL-MCNC: 9.5 MG/DL — SIGNIFICANT CHANGE UP (ref 8.4–10.5)
CHLORIDE SERPL-SCNC: 107 MMOL/L — SIGNIFICANT CHANGE UP (ref 96–108)
CHLORIDE SERPL-SCNC: 109 MMOL/L — HIGH (ref 96–108)
CO2 SERPL-SCNC: 25 MMOL/L — SIGNIFICANT CHANGE UP (ref 22–31)
CO2 SERPL-SCNC: 25 MMOL/L — SIGNIFICANT CHANGE UP (ref 22–31)
CREAT SERPL-MCNC: 1.52 MG/DL — HIGH (ref 0.5–1.3)
CREAT SERPL-MCNC: 1.62 MG/DL — HIGH (ref 0.5–1.3)
EOSINOPHIL # BLD AUTO: 0.01 K/UL — SIGNIFICANT CHANGE UP (ref 0–0.5)
EOSINOPHIL NFR BLD AUTO: 0.1 % — SIGNIFICANT CHANGE UP (ref 0–6)
GLUCOSE SERPL-MCNC: 145 MG/DL — HIGH (ref 70–99)
GLUCOSE SERPL-MCNC: 153 MG/DL — HIGH (ref 70–99)
HCO3 BLDV-SCNC: 26 MMOL/L — SIGNIFICANT CHANGE UP (ref 21–29)
HCT VFR BLD CALC: 33.6 % — LOW (ref 34.5–45)
HGB BLD-MCNC: 10.8 G/DL — LOW (ref 11.5–15.5)
HOROWITZ INDEX BLDV+IHG-RTO: 21 — SIGNIFICANT CHANGE UP
IMM GRANULOCYTES NFR BLD AUTO: 0.2 % — SIGNIFICANT CHANGE UP (ref 0–1.5)
LYMPHOCYTES # BLD AUTO: 1.97 K/UL — SIGNIFICANT CHANGE UP (ref 1–3.3)
LYMPHOCYTES # BLD AUTO: 23.7 % — SIGNIFICANT CHANGE UP (ref 13–44)
MCHC RBC-ENTMCNC: 29.3 PG — SIGNIFICANT CHANGE UP (ref 27–34)
MCHC RBC-ENTMCNC: 32.1 GM/DL — SIGNIFICANT CHANGE UP (ref 32–36)
MCV RBC AUTO: 91.1 FL — SIGNIFICANT CHANGE UP (ref 80–100)
MONOCYTES # BLD AUTO: 0.46 K/UL — SIGNIFICANT CHANGE UP (ref 0–0.9)
MONOCYTES NFR BLD AUTO: 5.5 % — SIGNIFICANT CHANGE UP (ref 2–14)
NEUTROPHILS # BLD AUTO: 5.82 K/UL — SIGNIFICANT CHANGE UP (ref 1.8–7.4)
NEUTROPHILS NFR BLD AUTO: 70 % — SIGNIFICANT CHANGE UP (ref 43–77)
NRBC # BLD: 0 /100 WBCS — SIGNIFICANT CHANGE UP (ref 0–0)
PCO2 BLDV: 36 MMHG — SIGNIFICANT CHANGE UP (ref 35–50)
PH BLDV: 7.47 — HIGH (ref 7.35–7.45)
PLATELET # BLD AUTO: 369 K/UL — SIGNIFICANT CHANGE UP (ref 150–400)
PO2 BLDV: 51 MMHG — HIGH (ref 25–45)
POTASSIUM SERPL-MCNC: 3 MMOL/L — LOW (ref 3.5–5.3)
POTASSIUM SERPL-MCNC: 3.1 MMOL/L — LOW (ref 3.5–5.3)
POTASSIUM SERPL-SCNC: 3 MMOL/L — LOW (ref 3.5–5.3)
POTASSIUM SERPL-SCNC: 3.1 MMOL/L — LOW (ref 3.5–5.3)
PROT SERPL-MCNC: 8.6 G/DL — HIGH (ref 6–8.3)
RBC # BLD: 3.69 M/UL — LOW (ref 3.8–5.2)
RBC # FLD: 13.9 % — SIGNIFICANT CHANGE UP (ref 10.3–14.5)
SAO2 % BLDV: 78 % — SIGNIFICANT CHANGE UP (ref 67–88)
SODIUM SERPL-SCNC: 144 MMOL/L — SIGNIFICANT CHANGE UP (ref 135–145)
SODIUM SERPL-SCNC: 145 MMOL/L — SIGNIFICANT CHANGE UP (ref 135–145)
TROPONIN I SERPL-MCNC: 0.23 NG/ML — HIGH (ref 0–0.04)
WBC # BLD: 8.32 K/UL — SIGNIFICANT CHANGE UP (ref 3.8–10.5)
WBC # FLD AUTO: 8.32 K/UL — SIGNIFICANT CHANGE UP (ref 3.8–10.5)

## 2020-08-27 PROCEDURE — 99285 EMERGENCY DEPT VISIT HI MDM: CPT

## 2020-08-27 PROCEDURE — 71045 X-RAY EXAM CHEST 1 VIEW: CPT | Mod: 26

## 2020-08-27 PROCEDURE — 99223 1ST HOSP IP/OBS HIGH 75: CPT

## 2020-08-27 PROCEDURE — 71275 CT ANGIOGRAPHY CHEST: CPT | Mod: 26

## 2020-08-27 RX ORDER — HEPARIN SODIUM 5000 [USP'U]/ML
5000 INJECTION INTRAVENOUS; SUBCUTANEOUS EVERY 12 HOURS
Refills: 0 | Status: DISCONTINUED | OUTPATIENT
Start: 2020-08-27 | End: 2020-09-01

## 2020-08-27 RX ORDER — FUROSEMIDE 40 MG
20 TABLET ORAL DAILY
Refills: 0 | Status: DISCONTINUED | OUTPATIENT
Start: 2020-08-27 | End: 2020-08-28

## 2020-08-27 RX ORDER — METOPROLOL TARTRATE 50 MG
50 TABLET ORAL DAILY
Refills: 0 | Status: DISCONTINUED | OUTPATIENT
Start: 2020-08-27 | End: 2020-09-09

## 2020-08-27 RX ORDER — POTASSIUM CHLORIDE 20 MEQ
40 PACKET (EA) ORAL EVERY 4 HOURS
Refills: 0 | Status: COMPLETED | OUTPATIENT
Start: 2020-08-27 | End: 2020-08-27

## 2020-08-27 RX ORDER — ALBUTEROL 90 UG/1
2 AEROSOL, METERED ORAL EVERY 6 HOURS
Refills: 0 | Status: DISCONTINUED | OUTPATIENT
Start: 2020-08-27 | End: 2020-08-31

## 2020-08-27 RX ORDER — SODIUM CHLORIDE 9 MG/ML
1000 INJECTION, SOLUTION INTRAVENOUS
Refills: 0 | Status: DISCONTINUED | OUTPATIENT
Start: 2020-08-27 | End: 2020-09-09

## 2020-08-27 RX ORDER — BUPROPION HYDROCHLORIDE 150 MG/1
150 TABLET, EXTENDED RELEASE ORAL DAILY
Refills: 0 | Status: DISCONTINUED | OUTPATIENT
Start: 2020-08-27 | End: 2020-09-03

## 2020-08-27 RX ORDER — ANASTROZOLE 1 MG/1
1 TABLET ORAL DAILY
Refills: 0 | Status: DISCONTINUED | OUTPATIENT
Start: 2020-08-27 | End: 2020-09-09

## 2020-08-27 RX ORDER — ASPIRIN/CALCIUM CARB/MAGNESIUM 324 MG
81 TABLET ORAL DAILY
Refills: 0 | Status: DISCONTINUED | OUTPATIENT
Start: 2020-08-27 | End: 2020-09-09

## 2020-08-27 RX ORDER — INSULIN LISPRO 100/ML
VIAL (ML) SUBCUTANEOUS
Refills: 0 | Status: DISCONTINUED | OUTPATIENT
Start: 2020-08-27 | End: 2020-09-09

## 2020-08-27 RX ORDER — ATORVASTATIN CALCIUM 80 MG/1
40 TABLET, FILM COATED ORAL AT BEDTIME
Refills: 0 | Status: DISCONTINUED | OUTPATIENT
Start: 2020-08-27 | End: 2020-09-03

## 2020-08-27 RX ORDER — SODIUM CHLORIDE 9 MG/ML
1000 INJECTION INTRAMUSCULAR; INTRAVENOUS; SUBCUTANEOUS
Refills: 0 | Status: DISCONTINUED | OUTPATIENT
Start: 2020-08-27 | End: 2020-08-28

## 2020-08-27 RX ORDER — INSULIN LISPRO 100/ML
VIAL (ML) SUBCUTANEOUS AT BEDTIME
Refills: 0 | Status: DISCONTINUED | OUTPATIENT
Start: 2020-08-27 | End: 2020-09-09

## 2020-08-27 RX ADMIN — SODIUM CHLORIDE 60 MILLILITER(S): 9 INJECTION INTRAMUSCULAR; INTRAVENOUS; SUBCUTANEOUS at 23:55

## 2020-08-27 RX ADMIN — ATORVASTATIN CALCIUM 40 MILLIGRAM(S): 80 TABLET, FILM COATED ORAL at 22:21

## 2020-08-27 RX ADMIN — Medication 50 MILLIGRAM(S): at 21:05

## 2020-08-27 RX ADMIN — Medication 40 MILLIEQUIVALENT(S): at 16:40

## 2020-08-27 NOTE — ED PROVIDER NOTE - CLINICAL SUMMARY MEDICAL DECISION MAKING FREE TEXT BOX
pt with cp and sob  with hx of breast ca will get ekg and labs   even though trop is elevated will get cta chest to r/o pe

## 2020-08-27 NOTE — H&P ADULT - NSICDXPASTSURGICALHX_GEN_ALL_CORE_FT
PAST SURGICAL HISTORY:  Breast cancer infusaport insertion- 2014- left chest    Fibroid uterus sp hystrectomy 1196    [place] filter in place 1994    S/P hysterectomy 1994    S/P lumpectomy, right breast 2014, revision of position- 2015

## 2020-08-27 NOTE — H&P ADULT - PROBLEM SELECTOR PLAN 3
Patient has 50 Pack year smoking history.  will continue with Albuterol inhaler PRN q 6. Patient has 50 Pack year smoking history, not on home O2 - currently does not have wheezing.   will continue with Albuterol inhaler PRN q 6, hold off on steroids/azithromycin for now, but if worsens, will add 5-day course of prednisone and azithro

## 2020-08-27 NOTE — H&P ADULT - NSHPLABSRESULTS_GEN_ALL_CORE
LABS personally reviewed: anemia, hypokalemic, CHEN on CKD, trops elevated, 	                ECG personally reviewed: yes, T-wave inversions lateral leads    RADIOLOGY:  CTA C pending

## 2020-08-27 NOTE — H&P ADULT - NSHPSOCIALHISTORY_GEN_ALL_CORE
Patient lives [place], smokes 1 PPD X 50 years, denies use of Alcohol or illicit drugs. Patient lives alone, smokes 1 PPD X 50 years, denies use of Alcohol or illicit drugs.

## 2020-08-27 NOTE — H&P ADULT - NSHPPHYSICALEXAM_GEN_ALL_CORE
Vital Signs Last 24 Hrs  T(C): 36.3 (27 Aug 2020 15:51), Max: 36.8 (27 Aug 2020 11:36)  T(F): 97.4 (27 Aug 2020 15:51), Max: 98.2 (27 Aug 2020 11:36)  HR: 82 (27 Aug 2020 15:51) (77 - 82)  BP: 164/111 (27 Aug 2020 15:51) (156/98 - 164/111)  BP(mean): --  RR: 17 (27 Aug 2020 15:51) (16 - 17)  SpO2: 98% (27 Aug 2020 15:51) (98% - 99%)    GENERAL: NAD, speaks in full sentences, no signs of respiratory distress  HEAD:  Atraumatic, Normocephalic  EYES: EOMI, PERRLA, conjunctiva and sclera clear  NECK: Supple, No JVD  CHEST/LUNG: Clear to auscultation bilaterally; No wheeze; No crackles; No accessory muscles used  HEART: Regular rate and rhythm; No murmurs;   ABDOMEN: Soft, Nontender, Nondistended; Bowel sounds present; No guarding  EXTREMITIES:  2+ Peripheral Pulses, No cyanosis or edema  PSYCH:  Normal Affect, confused  NEUROLOGY: AAO X 2. patient is not oriented to place and she knows its Thursday  and 2020 but does not remember month. can recall current President name.  SKIN: Chronic venous stasis skin changes on both Lower extremities

## 2020-08-27 NOTE — H&P ADULT - PROBLEM SELECTOR PLAN 4
Creatinine, Serum: 1.62 mg/dL (08.27.20 @ 16:20)  Patients creatinine  is elevated , has CHEN over CKD.  Follow Urine lytes.  can be CHEN ( pre renal or intrinsic)  Holding entresto in setting of CHEN.  Holding IV fluid in setting of CHF  follow BMP  consider renal US if renal function does not improve or worsens.

## 2020-08-27 NOTE — H&P ADULT - PROBLEM SELECTOR PLAN 7
Patient has hx of breast cancer . S/p lumpectomy and radiation therapy.  Will continue with Anastrazole Patient has hx of breast cancer . S/p lumpectomy and radiation therapy.  Will continue with Anastrazole for now Patient has hx of breast cancer . S/p lumpectomy, chemo, and radiation therapy.  Will continue with Anastrazole for now

## 2020-08-27 NOTE — H&P ADULT - PROBLEM SELECTOR PLAN 1
Patient presented with dyspna.   currently saturating > 95 % on room air.   can be secondary to CHF exacerbation/ COPD exacerbation or Pulmonary embolism.  F/U CT angio , pro BNP   will continue with albuterol inhaler PRN  monitor respiratory status. Patient presented with dyspnea.   currently saturating > 95 % on room air.   can be secondary to CHF exacerbation/ COPD exacerbation or Pulmonary embolism in the setting of malignancy  F/U CT angio , pro BNP   will continue with albuterol inhaler PRN  monitor respiratory status.

## 2020-08-27 NOTE — ED PROVIDER NOTE - OBJECTIVE STATEMENT
pt was at radiology outpt and felt sob and sent to the ed for further work up   has been feeling sob and tired for several days/weeks with mild cp   hx of breast ca s/p left lumpectomy

## 2020-08-27 NOTE — H&P ADULT - PROBLEM SELECTOR PLAN 8
Patient has hx of DVT.  no on any anticoagulants now. Patient has hx of DVT.  not on any anticoagulants now. Patient has hx of DVT.  not on any anticoagulants now - no clinical signs of DVT, including redness, swelling, hx travel - will f/u CTA C

## 2020-08-27 NOTE — H&P ADULT - PROBLEM SELECTOR PLAN 6
Patient has hx of [id number].   takes Entresto, lasix 20 and metoprolol at home.  Holding Entresto in setting of CHEN, will continue with home medications. Patient has hx of hypertension.   takes Entresto, lasix 20 and metoprolol at home.  Holding Entresto in setting of CHEN, will continue with home medications. Patient has hx of hypertension.   takes Entresto, lasix 20 and metoprolol at home.  Holding Entresto in setting of CHEN, will continue with clonidine, metoprolol with hold parameters  -monitorvitals

## 2020-08-27 NOTE — ED PROVIDER NOTE - PSH
Breast cancer  infusaport insertion- 2014- left chest  Fibroid uterus  sp hystrectomy 1196  [place] filter in place  1994  S/P hysterectomy  1994  S/P lumpectomy, right breast  2014, revision of position- 2015

## 2020-08-27 NOTE — H&P ADULT - PROBLEM SELECTOR PLAN 2
Patient has history of CHF.  ECHO done in may 2020 showed grade I diastolic dysfunction and severe global left ventricular dysfunction. EF 30-35%.  CXR clear.  Patient takes furosemide 20 mg at home will continue with home medications. Patient has history of CHF.  ECHO done in may 2020 showed grade I diastolic dysfunction and severe global left ventricular dysfunction. EF 30-35%.  CXR clear.  Patient takes furosemide 20 mg at home will continue with home medications  -daily weights, strict Is/Os  -monitor on tele  -diuresis with Lasix  -trend CE  -maintain K+>4 Mg2+>2  -DASH/TLC diet

## 2020-08-27 NOTE — H&P ADULT - NSHPREVIEWOFSYSTEMS_GEN_ALL_CORE
Constitutional: denies fevers, chills, sweats, weight loss  HEENT: denies changes in vision, rhinorrhea  CV: denies palpitations, CP  Pulm: +SOB +cough non-productive  GI: denies abdominal pain, hematochezia/melena + nausea vomiting clear fluid  : denies dysuria/hematuria  Skin: denies new lesions, peripheral swelling  Back/MSK: denies new muscle/joint aches  Neuro/psych: denies neurologic deficits, seizure-like activity, dizziness  Heme/lymph: denies new bruises/bleeds, lymphadenopathy, +weight loss, unsure of amount

## 2020-08-27 NOTE — H&P ADULT - PROBLEM SELECTOR PLAN 5
Patient troponin is elevated .   T1 Troponin I, Serum: 0.233:  can be in the setting of demand ischemia in setting of CHF .  EKG showed T wave inversions in lead 1, aVL, V4 and V 5. ( unchanged from old EKG)  follow up echo.  Cardiology consult Dr Doshi. Patient troponin is elevated. No CP or EKG changes, less likely acute ACS.  T1 Troponin I, Serum: 0.233:  can be in the setting of demand ischemia in setting of CHF .  EKG showed T wave inversions in lead 1, aVL, V4 and V 5. ( unchanged from old EKG)  follow up echo.  Cardiology consult Dr Doshi  -monitor on tele

## 2020-08-27 NOTE — ED ADULT NURSE NOTE - NSIMPLEMENTINTERV_GEN_ALL_ED
Implemented All Fall Risk Interventions:  [place] to call system. Call bell, personal items and telephone within reach. Instruct patient to call for assistance. Room bathroom lighting operational. Non-slip footwear when patient is off stretcher. Physically safe environment: no spills, clutter or unnecessary equipment. Stretcher in lowest position, wheels locked, appropriate side rails in place. Provide visual cue, wrist band, yellow gown, etc. Monitor gait and stability. Monitor for mental status changes and reorient to person, place, and time. Review medications for side effects contributing to fall risk. Reinforce activity limits and safety measures with patient and family.

## 2020-08-27 NOTE — H&P ADULT - PROBLEM SELECTOR PLAN 9
RISK                                                          Points  [x] Previous VTE                                           3  [] Thrombophilia                                        2  [] Lower limb paralysis                              2   [] Current Cancer                                       2   [x] Immobilization > 24 hrs                        1  [] ICU/CCU stay > 24 hours                       1  [x] Age > 60                                                   1    Heparin for DVT ppx -recent CTA head/neck shows chronic rt frontal, parietal, lacunar infarcts. Pt AAOX2, but with mild paranoia "I don't trust you... I'm a little scared of you, so I called friends to ask what I should do." May have mild vascular dementia (microischemic changes also seen on CTH), but based on chart review does not appear to be acutely worsening  -monitor, redirect if necessary

## 2020-08-27 NOTE — H&P ADULT - NSICDXFAMILYHX_GEN_ALL_CORE_FT
FAMILY HISTORY:  Family history of diabetes mellitus  Family history of glioblastoma  Family history of glioblastoma  Family history of hypertension  Family history of liver failure  Family history of other condition, please open box and see additional medical family history  Family history of renal failure, uncle  Family history of renal stone  History of hypertension, mother    Sibling  Still living? No  Family history of glioblastoma, Age at diagnosis: Age Unknown

## 2020-08-27 NOTE — H&P ADULT - ASSESSMENT
65 year-old female  smoker with DM, HTN, HLD, breast CA s/p chemo/XRT, and NICM (catheterization 02/2019 showed dRPL occlusion without ability to intervene), EF 31% by MUGA 7/2019, s/p SubQ ICD 10/2019, AAA came to ED after she was having short of breath since morning. Patient is admitted for dyspnea work up. 65 year-old female  smoker with DM, HTN, HLD, breast CA s/p chemo/XRT, and NICM (catheterization 02/2019 showed dRPL occlusion without ability to intervene), EF 31% by MUGA 7/2019, s/p SubQ ICD 10/2019, AAA came to ED for dyspnea. Patient is admitted for dyspnea work up. 65 year-old female  smoker with DM, HTN, HLD, breast CA s/p chemo/XRT, COPD not on home O2, CVA no motor deficits, and NICM (catheterization 02/2019 showed dRPL occlusion without ability to intervene), EF 31% by MUGA 7/2019, s/p SubQ ICD 10/2019, AAA came to ED for dyspnea. Patient is admitted for dyspnea work up.

## 2020-08-28 DIAGNOSIS — E87.6 HYPOKALEMIA: ICD-10-CM

## 2020-08-28 DIAGNOSIS — Z71.89 OTHER SPECIFIED COUNSELING: ICD-10-CM

## 2020-08-28 DIAGNOSIS — I50.9 HEART FAILURE, UNSPECIFIED: ICD-10-CM

## 2020-08-28 DIAGNOSIS — Z95.810 PRESENCE OF AUTOMATIC (IMPLANTABLE) CARDIAC DEFIBRILLATOR: ICD-10-CM

## 2020-08-28 LAB
A1C WITH ESTIMATED AVERAGE GLUCOSE RESULT: 5.8 % — HIGH (ref 4–5.6)
ALBUMIN SERPL ELPH-MCNC: 3.6 G/DL — SIGNIFICANT CHANGE UP (ref 3.5–5)
ALP SERPL-CCNC: 117 U/L — SIGNIFICANT CHANGE UP (ref 40–120)
ALT FLD-CCNC: 14 U/L DA — SIGNIFICANT CHANGE UP (ref 10–60)
ANION GAP SERPL CALC-SCNC: 12 MMOL/L — SIGNIFICANT CHANGE UP (ref 5–17)
AST SERPL-CCNC: 20 U/L — SIGNIFICANT CHANGE UP (ref 10–40)
BASOPHILS # BLD AUTO: 0.02 K/UL — SIGNIFICANT CHANGE UP (ref 0–0.2)
BASOPHILS NFR BLD AUTO: 0.3 % — SIGNIFICANT CHANGE UP (ref 0–2)
BILIRUB SERPL-MCNC: 0.8 MG/DL — SIGNIFICANT CHANGE UP (ref 0.2–1.2)
BUN SERPL-MCNC: 31 MG/DL — HIGH (ref 7–18)
CALCIUM SERPL-MCNC: 9 MG/DL — SIGNIFICANT CHANGE UP (ref 8.4–10.5)
CHLORIDE SERPL-SCNC: 109 MMOL/L — HIGH (ref 96–108)
CHOLEST SERPL-MCNC: 148 MG/DL — SIGNIFICANT CHANGE UP (ref 10–199)
CK MB BLD-MCNC: <1 % — SIGNIFICANT CHANGE UP (ref 0–3.5)
CK MB CFR SERPL CALC: <1 NG/ML — SIGNIFICANT CHANGE UP (ref 0–3.6)
CK SERPL-CCNC: 100 U/L — SIGNIFICANT CHANGE UP (ref 21–215)
CO2 SERPL-SCNC: 23 MMOL/L — SIGNIFICANT CHANGE UP (ref 22–31)
CREAT SERPL-MCNC: 1.55 MG/DL — HIGH (ref 0.5–1.3)
EOSINOPHIL # BLD AUTO: 0 K/UL — SIGNIFICANT CHANGE UP (ref 0–0.5)
EOSINOPHIL NFR BLD AUTO: 0 % — SIGNIFICANT CHANGE UP (ref 0–6)
ESTIMATED AVERAGE GLUCOSE: 120 MG/DL — HIGH (ref 68–114)
FOLATE SERPL-MCNC: 8 NG/ML — SIGNIFICANT CHANGE UP
GLUCOSE BLDC GLUCOMTR-MCNC: 165 MG/DL — HIGH (ref 70–99)
GLUCOSE BLDC GLUCOMTR-MCNC: 172 MG/DL — HIGH (ref 70–99)
GLUCOSE BLDC GLUCOMTR-MCNC: 183 MG/DL — HIGH (ref 70–99)
GLUCOSE BLDC GLUCOMTR-MCNC: 183 MG/DL — HIGH (ref 70–99)
GLUCOSE SERPL-MCNC: 171 MG/DL — HIGH (ref 70–99)
HCT VFR BLD CALC: 32.8 % — LOW (ref 34.5–45)
HDLC SERPL-MCNC: 36 MG/DL — LOW
HGB BLD-MCNC: 10.4 G/DL — LOW (ref 11.5–15.5)
IMM GRANULOCYTES NFR BLD AUTO: 0.8 % — SIGNIFICANT CHANGE UP (ref 0–1.5)
LIPID PNL WITH DIRECT LDL SERPL: 89 MG/DL — SIGNIFICANT CHANGE UP
LYMPHOCYTES # BLD AUTO: 2.02 K/UL — SIGNIFICANT CHANGE UP (ref 1–3.3)
LYMPHOCYTES # BLD AUTO: 27 % — SIGNIFICANT CHANGE UP (ref 13–44)
MAGNESIUM SERPL-MCNC: 2.2 MG/DL — SIGNIFICANT CHANGE UP (ref 1.6–2.6)
MCHC RBC-ENTMCNC: 28.6 PG — SIGNIFICANT CHANGE UP (ref 27–34)
MCHC RBC-ENTMCNC: 31.7 GM/DL — LOW (ref 32–36)
MCV RBC AUTO: 90.1 FL — SIGNIFICANT CHANGE UP (ref 80–100)
MONOCYTES # BLD AUTO: 0.35 K/UL — SIGNIFICANT CHANGE UP (ref 0–0.9)
MONOCYTES NFR BLD AUTO: 4.7 % — SIGNIFICANT CHANGE UP (ref 2–14)
NEUTROPHILS # BLD AUTO: 5.03 K/UL — SIGNIFICANT CHANGE UP (ref 1.8–7.4)
NEUTROPHILS NFR BLD AUTO: 67.2 % — SIGNIFICANT CHANGE UP (ref 43–77)
NRBC # BLD: 0 /100 WBCS — SIGNIFICANT CHANGE UP (ref 0–0)
NT-PROBNP SERPL-SCNC: HIGH PG/ML (ref 0–125)
PHOSPHATE SERPL-MCNC: 3.2 MG/DL — SIGNIFICANT CHANGE UP (ref 2.5–4.5)
PLATELET # BLD AUTO: 365 K/UL — SIGNIFICANT CHANGE UP (ref 150–400)
POTASSIUM SERPL-MCNC: 3.1 MMOL/L — LOW (ref 3.5–5.3)
POTASSIUM SERPL-SCNC: 3.1 MMOL/L — LOW (ref 3.5–5.3)
PROT SERPL-MCNC: 8.3 G/DL — SIGNIFICANT CHANGE UP (ref 6–8.3)
RBC # BLD: 3.64 M/UL — LOW (ref 3.8–5.2)
RBC # FLD: 14.1 % — SIGNIFICANT CHANGE UP (ref 10.3–14.5)
SARS-COV-2 IGG SERPL QL IA: NEGATIVE — SIGNIFICANT CHANGE UP
SARS-COV-2 IGM SERPL IA-ACNC: 0.23 INDEX — SIGNIFICANT CHANGE UP
SARS-COV-2 RNA SPEC QL NAA+PROBE: SIGNIFICANT CHANGE UP
SODIUM SERPL-SCNC: 144 MMOL/L — SIGNIFICANT CHANGE UP (ref 135–145)
TOTAL CHOLESTEROL/HDL RATIO MEASUREMENT: 4.1 RATIO — SIGNIFICANT CHANGE UP (ref 3.3–7.1)
TRIGL SERPL-MCNC: 114 MG/DL — SIGNIFICANT CHANGE UP (ref 10–149)
TROPONIN I SERPL-MCNC: 0.26 NG/ML — HIGH (ref 0–0.04)
TROPONIN I SERPL-MCNC: 0.26 NG/ML — HIGH (ref 0–0.04)
TROPONIN I SERPL-MCNC: 0.3 NG/ML — HIGH (ref 0–0.04)
TSH SERPL-MCNC: 2.18 UU/ML — SIGNIFICANT CHANGE UP (ref 0.34–4.82)
VIT B12 SERPL-MCNC: 645 PG/ML — SIGNIFICANT CHANGE UP (ref 232–1245)
WBC # BLD: 7.48 K/UL — SIGNIFICANT CHANGE UP (ref 3.8–10.5)
WBC # FLD AUTO: 7.48 K/UL — SIGNIFICANT CHANGE UP (ref 3.8–10.5)

## 2020-08-28 PROCEDURE — 99232 SBSQ HOSP IP/OBS MODERATE 35: CPT | Mod: GC

## 2020-08-28 PROCEDURE — 99223 1ST HOSP IP/OBS HIGH 75: CPT

## 2020-08-28 RX ORDER — FUROSEMIDE 40 MG
40 TABLET ORAL
Refills: 0 | Status: DISCONTINUED | OUTPATIENT
Start: 2020-08-28 | End: 2020-08-29

## 2020-08-28 RX ORDER — ONDANSETRON 8 MG/1
4 TABLET, FILM COATED ORAL ONCE
Refills: 0 | Status: DISCONTINUED | OUTPATIENT
Start: 2020-08-28 | End: 2020-08-28

## 2020-08-28 RX ORDER — POTASSIUM CHLORIDE 20 MEQ
40 PACKET (EA) ORAL ONCE
Refills: 0 | Status: COMPLETED | OUTPATIENT
Start: 2020-08-28 | End: 2020-08-28

## 2020-08-28 RX ORDER — FUROSEMIDE 40 MG
0 TABLET ORAL
Qty: 10 | Refills: 0 | DISCHARGE

## 2020-08-28 RX ORDER — HYDRALAZINE HCL 50 MG
5 TABLET ORAL ONCE
Refills: 0 | Status: COMPLETED | OUTPATIENT
Start: 2020-08-28 | End: 2020-08-28

## 2020-08-28 RX ORDER — SACUBITRIL AND VALSARTAN 24; 26 MG/1; MG/1
1 TABLET, FILM COATED ORAL
Refills: 0 | Status: DISCONTINUED | OUTPATIENT
Start: 2020-08-28 | End: 2020-09-09

## 2020-08-28 RX ORDER — AMLODIPINE BESYLATE 2.5 MG/1
5 TABLET ORAL DAILY
Refills: 0 | Status: DISCONTINUED | OUTPATIENT
Start: 2020-08-28 | End: 2020-08-28

## 2020-08-28 RX ORDER — TIOTROPIUM BROMIDE 18 UG/1
1 CAPSULE ORAL; RESPIRATORY (INHALATION) DAILY
Refills: 0 | Status: DISCONTINUED | OUTPATIENT
Start: 2020-08-28 | End: 2020-09-09

## 2020-08-28 RX ADMIN — Medication 40 MILLIEQUIVALENT(S): at 09:17

## 2020-08-28 RX ADMIN — Medication 1: at 09:18

## 2020-08-28 RX ADMIN — Medication 1: at 17:19

## 2020-08-28 RX ADMIN — Medication 1: at 13:30

## 2020-08-28 RX ADMIN — Medication 20 MILLIGRAM(S): at 05:20

## 2020-08-28 RX ADMIN — TIOTROPIUM BROMIDE 1 CAPSULE(S): 18 CAPSULE ORAL; RESPIRATORY (INHALATION) at 17:19

## 2020-08-28 RX ADMIN — Medication 5 MILLIGRAM(S): at 03:41

## 2020-08-28 RX ADMIN — Medication 40 MILLIGRAM(S): at 09:15

## 2020-08-28 RX ADMIN — HEPARIN SODIUM 5000 UNIT(S): 5000 INJECTION INTRAVENOUS; SUBCUTANEOUS at 05:19

## 2020-08-28 RX ADMIN — ANASTROZOLE 1 MILLIGRAM(S): 1 TABLET ORAL at 13:29

## 2020-08-28 RX ADMIN — SACUBITRIL AND VALSARTAN 1 TABLET(S): 24; 26 TABLET, FILM COATED ORAL at 17:19

## 2020-08-28 RX ADMIN — Medication 50 MILLIGRAM(S): at 05:20

## 2020-08-28 RX ADMIN — Medication 81 MILLIGRAM(S): at 13:29

## 2020-08-28 RX ADMIN — ATORVASTATIN CALCIUM 40 MILLIGRAM(S): 80 TABLET, FILM COATED ORAL at 21:15

## 2020-08-28 RX ADMIN — HEPARIN SODIUM 5000 UNIT(S): 5000 INJECTION INTRAVENOUS; SUBCUTANEOUS at 17:19

## 2020-08-28 RX ADMIN — BUPROPION HYDROCHLORIDE 150 MILLIGRAM(S): 150 TABLET, EXTENDED RELEASE ORAL at 13:29

## 2020-08-28 RX ADMIN — Medication 40 MILLIGRAM(S): at 17:19

## 2020-08-28 NOTE — PROGRESS NOTE ADULT - PROBLEM SELECTOR PLAN 1
Patient has history of CHF.  ECHO done in may 2020 showed grade I diastolic dysfunction and severe global left ventricular dysfunction. EF 30-35%.  Patient takes furosemide 20 mg at home   -daily weights, strict Is/Os  -monitor on tele  -diuresis with Lasix 40mg IVP BID   -maintain K+>4 Mg2+>2  -DASH/TLC diet.  - Dr. Doshi is following - resume entrasto 24/26 1tab QD, continue lasix and continue to hold spironolactone

## 2020-08-28 NOTE — PROGRESS NOTE ADULT - PROBLEM SELECTOR PLAN 6
takes Entrasto, lasix 20 and metoprolol at home.  resume home meds   continue with clonidine, metoprolol with hold parameters  monitor v/s

## 2020-08-28 NOTE — PROGRESS NOTE ADULT - SUBJECTIVE AND OBJECTIVE BOX
NP Note discussed with  Primary Attending    Patient is a 65y old  Female who presents with a chief complaint of Dyspnea (28 Aug 2020 09:30)    HPI - 65 year-old female  smoker with DM, HTN, HLD, breast CA s/p chemo/XRT, SCC, CVA no motor deficits, COPD not on home O2, and NICM (catheterization 02/2019 showed dRPL occlusion without ability to intervene), EF 31% by MUGA 7/2019, s/p SubQ ICD 10/2019, AAA came to ED after she was having shortness of breath x 1-2 days. She was going for an xray when they told her to come into the hospital. Patient states that she was feeling nauseous and was having difficulty breathing. Patient was little confused and is very poor historian. Patient states that she smokes 1 PPD and has been smoking for 50 years. She denies chest pain, head ache, dizziness, palpitations LOC, bowel or urinary problems. Does reports she has a cough, non-productive, and vomited once, with clear non-bloody vomit. No sick contacts, no recent travel. Of note pt reports history "a long time ago" of DVT/PE, and was on blood thinners, unsure of time period. Sister also had PE.    GOC : full code.    admitted to tele for dyspnea w/u. pt c/o dyspnea, however, O2 sat is >95% on RA, placed supplemental oxygen 2l/min via N-C PRN. due to agitation 1:1 was started.       INTERVAL HPI/OVERNIGHT EVENTS: dyspnea     MEDICATIONS  (STANDING):  anastrozole 1 milliGRAM(s) Oral daily  aspirin enteric coated 81 milliGRAM(s) Oral daily  atorvastatin 40 milliGRAM(s) Oral at bedtime  buPROPion XL . 150 milliGRAM(s) Oral daily  dextrose 5%. 1000 milliLiter(s) (50 mL/Hr) IV Continuous <Continuous>  furosemide   Injectable 40 milliGRAM(s) IV Push two times a day  heparin   Injectable 5000 Unit(s) SubCutaneous every 12 hours  insulin lispro (HumaLOG) corrective regimen sliding scale   SubCutaneous three times a day before meals  insulin lispro (HumaLOG) corrective regimen sliding scale   SubCutaneous at bedtime  metoprolol succinate ER 50 milliGRAM(s) Oral daily    MEDICATIONS  (PRN):  ALBUTerol    90 MICROgram(s) HFA Inhaler 2 Puff(s) Inhalation every 6 hours PRN Shortness of Breath and/or Wheezing      __________________________________________________  REVIEW OF SYSTEMS:    CONSTITUTIONAL: No fever,   EYES: no acute visual disturbances  NECK: No pain or stiffness  RESPIRATORY: No cough; No shortness of breath  CARDIOVASCULAR: No chest pain, no palpitations  GASTROINTESTINAL: No pain. No nausea or vomiting; No diarrhea   NEUROLOGICAL: No headache or numbness, no tremors  MUSCULOSKELETAL: No joint pain, no muscle pain  GENITOURINARY: no dysuria, no frequency, no hesitancy  PSYCHIATRY: no depression , no anxiety  ALL OTHER  ROS negative        Vital Signs Last 24 Hrs  T(C): 36.7 (28 Aug 2020 10:49), Max: 36.8 (27 Aug 2020 11:36)  T(F): 98.1 (28 Aug 2020 10:49), Max: 98.2 (27 Aug 2020 11:36)  HR: 89 (28 Aug 2020 10:49) (77 - 91)  BP: 155/88 (28 Aug 2020 10:49) (152/91 - 170/106)  BP(mean): --  RR: 18 (28 Aug 2020 10:49) (16 - 20)  SpO2: 100% (28 Aug 2020 10:49) (96% - 100%)    ________________________________________________  PHYSICAL EXAM:  GENERAL: NAD  HEENT: Normocephalic;  conjunctivae and sclerae clear; moist mucous membranes;   NECK : supple  CHEST/LUNG: Clear to auscultation bilaterally with good air entry   HEART: S1 S2  regular; no murmurs, gallops or rubs  ABDOMEN: Soft, Nontender, Nondistended; Bowel sounds present  EXTREMITIES: no cyanosis; no edema; no calf tenderness  SKIN: warm and dry; no rash  NERVOUS SYSTEM:  Awake and alert; Oriented  to place, person and time ; no new deficits    _________________________________________________  LABS:                        10.4   7.48  )-----------( 365      ( 28 Aug 2020 06:11 )             32.8     08-28    144  |  109<H>  |  31<H>  ----------------------------<  171<H>  3.1<L>   |  23  |  1.55<H>    Ca    9.0      28 Aug 2020 06:11  Phos  3.2     08-28  Mg     2.2     08-28    TPro  8.3  /  Alb  3.6  /  TBili  0.8  /  DBili  x   /  AST  20  /  ALT  14  /  AlkPhos  117  08-28        CAPILLARY BLOOD GLUCOSE      POCT Blood Glucose.: 183 mg/dL (28 Aug 2020 08:57)        RADIOLOGY & ADDITIONAL TESTS:    Imaging Personally Reviewed:  YES/NO    Consultant(s) Notes Reviewed:   YES/ No    Care Discussed with Consultants :     Plan of care was discussed with patient and /or primary care giver; all questions and concerns were addressed and care was aligned with patient's wishes. NP Note discussed with  Primary Attending    Patient is a 65y old  Female who presents with a chief complaint of Dyspnea (28 Aug 2020 09:30)    HPI - 65 year-old female  smoker with DM, HTN, HLD, breast CA s/p chemo/XRT, SCC, CVA no motor deficits, COPD not on home O2, and NICM (catheterization 02/2019 showed dRPL occlusion without ability to intervene), EF 31% by MUGA 7/2019, s/p SubQ ICD 10/2019, AAA came to ED after she was having shortness of breath x 1-2 days. She was going for an xray when they told her to come into the hospital. Patient states that she was feeling nauseous and was having difficulty breathing. Patient was little confused and is very poor historian. Patient states that she smokes 1 PPD and has been smoking for 50 years. She denies chest pain, head ache, dizziness, palpitations LOC, bowel or urinary problems. Does reports she has a cough, non-productive, and vomited once, with clear non-bloody vomit. No sick contacts, no recent travel. Of note pt reports history "a long time ago" of DVT/PE, and was on blood thinners, unsure of time period. Sister also had PE.    GOC : full code.    admitted to tele for dyspnea w/u. pt c/o dyspnea, however, O2 sat is >95% on RA, placed supplemental oxygen 2l/min via N-C PRN. due to agitation 1:1 was started.       INTERVAL HPI/OVERNIGHT EVENTS: dyspnea     MEDICATIONS  (STANDING):  anastrozole 1 milliGRAM(s) Oral daily  aspirin enteric coated 81 milliGRAM(s) Oral daily  atorvastatin 40 milliGRAM(s) Oral at bedtime  buPROPion XL . 150 milliGRAM(s) Oral daily  dextrose 5%. 1000 milliLiter(s) (50 mL/Hr) IV Continuous <Continuous>  furosemide   Injectable 40 milliGRAM(s) IV Push two times a day  heparin   Injectable 5000 Unit(s) SubCutaneous every 12 hours  insulin lispro (HumaLOG) corrective regimen sliding scale   SubCutaneous three times a day before meals  insulin lispro (HumaLOG) corrective regimen sliding scale   SubCutaneous at bedtime  metoprolol succinate ER 50 milliGRAM(s) Oral daily    MEDICATIONS  (PRN):  ALBUTerol    90 MICROgram(s) HFA Inhaler 2 Puff(s) Inhalation every 6 hours PRN Shortness of Breath and/or Wheezing      __________________________________________________  REVIEW OF SYSTEMS:    CONSTITUTIONAL: No fever,   EYES: no acute visual disturbances  NECK: No pain or stiffness  RESPIRATORY: No cough; No shortness of breath  CARDIOVASCULAR: No chest pain, no palpitations  GASTROINTESTINAL: No pain. No nausea or vomiting; No diarrhea   NEUROLOGICAL: No headache or numbness, no tremors  MUSCULOSKELETAL: No joint pain, no muscle pain  GENITOURINARY: no dysuria, no frequency, no hesitancy  PSYCHIATRY: no depression , no anxiety  ALL OTHER  ROS negative        Vital Signs Last 24 Hrs  T(C): 36.7 (28 Aug 2020 10:49), Max: 36.8 (27 Aug 2020 11:36)  T(F): 98.1 (28 Aug 2020 10:49), Max: 98.2 (27 Aug 2020 11:36)  HR: 89 (28 Aug 2020 10:49) (77 - 91)  BP: 155/88 (28 Aug 2020 10:49) (152/91 - 170/106)  BP(mean): --  RR: 18 (28 Aug 2020 10:49) (16 - 20)  SpO2: 100% (28 Aug 2020 10:49) (96% - 100%)    ________________________________________________  PHYSICAL EXAM:  GENERAL: NAD  HEENT: Normocephalic;  conjunctivae and sclerae clear; moist mucous membranes;   NECK : supple  CHEST/LUNG: Clear to auscultation bilaterally with good air entry   HEART: S1 S2  regular; no murmurs, gallops or rubs  ABDOMEN: Soft, Nontender, Nondistended; Bowel sounds present  EXTREMITIES: no cyanosis; no edema; no calf tenderness  SKIN: warm and dry; no rash  NERVOUS SYSTEM:  Awake and alert; Oriented  to place, person, forgetful  ; no new deficits    _________________________________________________  LABS:                        10.4   7.48  )-----------( 365      ( 28 Aug 2020 06:11 )             32.8     08-28    144  |  109<H>  |  31<H>  ----------------------------<  171<H>  3.1<L>   |  23  |  1.55<H>    Ca    9.0      28 Aug 2020 06:11  Phos  3.2     08-28  Mg     2.2     08-28    TPro  8.3  /  Alb  3.6  /  TBili  0.8  /  DBili  x   /  AST  20  /  ALT  14  /  AlkPhos  117  08-28        CAPILLARY BLOOD GLUCOSE      POCT Blood Glucose.: 183 mg/dL (28 Aug 2020 08:57)        RADIOLOGY & ADDITIONAL TESTS:    < from: CT Angio Chest w/ IV Cont (08.27.20 @ 23:12) >    EXAM:  CT ANGIO CHEST (W)AW IC                            PROCEDURE DATE:  08/27/2020          INTERPRETATION:  HISTORY: Shortness of breath. History of breast cancer. Evaluate for pulmonary embolism.    TECHNIQUE:  CT pulmonary angiography was performed of the chest according to standard institutional protocol. Coronal, sagittal and transaxial 3-D MIP reformatted images are provided from the transaxial source data.   50mL of Omnipaque 350 was administered without complication and 50 mL was discarded.    COMPARISON: CTA of the chest from 2/21/2019    FINDINGS:  There is good opacification of pulmonary arterial tree. No filling defect is present to suggest acute pulmonary embolus. The main pulmonary arteries within normal limits of size.    The thyroid gland is unremarkable.    Evaluation of lung parenchyma demonstrates a stable 7 mm subpleural nodule left lower lobe (series 6 image 122). Additional subcentimeter nodules described on the previous study are not clearly identified on this exam. There is no evidence of pneumonia. Stable scattered lung cysts and mild centrilobular emphysema. New mild interlobular septal thickening and trace bilateral pleural effusions. The tracheobronchial tree is clear.  There is no significant axillary, mediastinal or hilar adenopathy.    The heart is not globally enlarged, however, the left ventricle is again dilated. There is no pericardial effusion. The thoracic aorta is again mildly ectatic distally, without change. Limited assessment for dissection due to phase of contrast enhancement. Left lateral chest wall subcutaneous pacer.    Limited images to the upper abdomen demonstrate no acute finding.    There are no acute osseous abnormalities. 2.3 x 2.1 cm mass in the right breast abutting and inseparable from the underlying pectoralis major muscle is unchanged and likely corresponds to known breast cancer.    IMPRESSION:  No pulmonary embolism.    New mild pulmonary edema and trace bilateral pleural effusions. Please correlate clinicallyfor congestive heart failure.    2.3 cm right breast mass.              JAMES QUINONEZ M.D., ATTENDING RADIOLOGIST  This document has been electronically signed. Aug 28 2020 12:22AM                < end of copied text >      Imaging Personally Reviewed:  YES/NO    Consultant(s) Notes Reviewed:   YES/ No    Care Discussed with Consultants :     Plan of care was discussed with patient and /or primary care giver; all questions and concerns were addressed and care was aligned with patient's wishes.

## 2020-08-28 NOTE — PROGRESS NOTE ADULT - ATTENDING COMMENTS
I have personally seen, examined, and participated in the care of this patient. I have reviewed all pertinent clinical information, including history, physical exam, plan and medical student/resident/PA/NP note, and agree, with my independent findings and conclusions as noted:     65 year-old female  smoker with DM, HTN, HLD, breast CA s/p chemo/XRT, SCC, CVA no motor deficits, COPD not on home O2, and NICM (catheterization 02/2019 showed dRPL occlusion without ability to intervene), EF 31% by MUGA 7/2019, s/p SubQ ICD 10/2019, AAA came to ED for dyspnea.    #Dyspnea: likely CHF exacerbation given BNP >28650 and pulmonary congestion seen on CTA. CTA shows no sign of PE. Also less likely COPD exacerbation given no wheezing, and improvement with diuretics. Cardiology on board - appreciate recs. C/w diuresis, monitor Is/Os, fluid restriction 1200 ml.    #CAD: with abnormal EKG, elevated trops: EKG showing ?lateral ischemia, consistent with previous EKGs. Trops likely demand ischemia. Trend. Since ICD interrogation shows no events, will downgrade off tele per cards.    #HTN: resume entresto at lower dose, per cards. Currently elevated, added amlodipine.    #COPD: not on home COPD meds - started albuterol and tiotropium.    #DM: A1C 5.8%, HISS    #breast CA: c/w home anastrazole

## 2020-08-28 NOTE — CHART NOTE - NSCHARTNOTEFT_GEN_A_CORE
Patient's [employer] ICD was interrogated at bedside, unable to detect device with our detector. Contacted patients Cardiologist Dr. Tico ruvalcaba to call [employer] to obtain a representative who will come eval ICD. Will call Dr. Marcos to confirm when they will come to [facility]. Patient's [place] [employer] ICD was interrogated at bedside, unable to detect device with our detector. Contacted patients Cardiologist Dr. Tico ruvalcaba to call [employer] to obtain a representative who will come eval ICD. Will call Dr. Marcos to confirm when they will come to [facility].    [employer] interrogated pacemaker at bedside, no events recorded.   Normal interrogation. Placed in chart by representative.

## 2020-08-28 NOTE — PROGRESS NOTE ADULT - PROBLEM SELECTOR PLAN 2
pt has  [employer] defibrillator   Dr. Doshi evaluated pt -- do interrogation and if that is normal then pt can be d/tenzin from tele to medicine

## 2020-08-28 NOTE — CONSULT NOTE ADULT - ASSESSMENT
64-year-old female former smoker with DM, HTN, HLD, NICM (EF 31% by MUGA 07/2019) s/p [employer] SubQ ICD who presented with dyspnea in the setting of acute on chronic systolic HF exacerbation    1. NICM s/p ICD: Patient currently is on metoprolol, spironolactone, and Entresto at home  -Continue metoprolol  -Hold spironolactone in setting of elevated creatinine  -Mild CHEN, would resume lower-dose Entresto 24/26 and monitor renal function  -Please interrogate ICD; if no events can discontinue telemetry monitoring    2. HTN:  -On Entresto, metoprolol, and spironolactone at home  -Hold spironolactone, resume Entresto as per above  -May need to temporarily add on amlodipine if BP does not improve with Entresto     3. HLD: Continue atorvastatin    ***Note that this is a preliminary note and any recommendations should NOT be carried out until this note is finalized. *** 64-year-old female former smoker with DM, HTN, HLD, NICM (EF 31% by MUGA 07/2019) s/p [employer] SubQ ICD who presented with dyspnea in the setting of acute on chronic systolic HF exacerbation    1. NICM s/p ICD: Patient currently is on metoprolol, spironolactone, and Entresto at home  -IV furosemide  -Continue metoprolol  -Hold spironolactone in setting of elevated creatinine  -Mild CHEN, would resume lower-dose Entresto 24/26 and monitor renal function  -Please interrogate ICD; if no events can discontinue telemetry monitoring    2. HTN:  -On Entresto, metoprolol, and spironolactone at home  -Hold spironolactone, resume Entresto as per above  -May need to temporarily add on amlodipine if BP does not improve with Entresto     3. HLD: Continue atorvastatin

## 2020-08-28 NOTE — CONSULT NOTE ADULT - SUBJECTIVE AND OBJECTIVE BOX
CHIEF COMPLAINT: Shortness of breath    HPI: 64-year-old female former smoker with DM, HTN, HLD, breast CA s/p chemo/XRT, and NICM (catheterization 02/2019 showed dRPL occlusion without ability to intervene), EF 31% by MUGA 7/2019, s/p [employer] SubQ ICD 10/2019, who presented with dyspnea. Patient reports     PAST MEDICAL & SURGICAL HISTORY:  Knee effusion, left: arthrocentesis- 05/25/16  Internal carotid aneurysm  Abdominal aneurysm: infrarenal- wached by Vascular , Dr. Gutierrez  Cardiomyopathy  Sickle cell trait  DVT (deep venous thrombosis): Left leg in 1994 after hysterectomy  Hypercholesteremia  Breast cancer: right-s/p chemo and radiation  Hypertension  Breast cancer: infusaport insertion- 2014- left chest  S/P hysterectomy: 1994  Gilson filter in place: 1994  S/P lumpectomy, right breast: 2014, revision of position- 2015  Fibroid uterus: sp hystrectomy 1196      Allergies    Advil (Hives; Vomiting)    Intolerances        MEDICATIONS  (STANDING):  anastrozole 1 milliGRAM(s) Oral daily  aspirin enteric coated 81 milliGRAM(s) Oral daily  atorvastatin 40 milliGRAM(s) Oral at bedtime  buPROPion XL . 150 milliGRAM(s) Oral daily  dextrose 5%. 1000 milliLiter(s) (50 mL/Hr) IV Continuous <Continuous>  furosemide   Injectable 40 milliGRAM(s) IV Push two times a day  heparin   Injectable 5000 Unit(s) SubCutaneous every 12 hours  insulin lispro (HumaLOG) corrective regimen sliding scale   SubCutaneous three times a day before meals  insulin lispro (HumaLOG) corrective regimen sliding scale   SubCutaneous at bedtime  metoprolol succinate ER 50 milliGRAM(s) Oral daily    MEDICATIONS  (PRN):  ALBUTerol    90 MICROgram(s) HFA Inhaler 2 Puff(s) Inhalation every 6 hours PRN Shortness of Breath and/or Wheezing      FAMILY HISTORY:  Family history of glioblastoma  Family history of other condition: please open box and see additional medical family history  Family history of renal failure: uncle  Family history of glioblastoma (Sibling)  History of hypertension: mother  Family history of liver failure  Family history of glioblastoma  Family history of renal stone  Family history of diabetes mellitus  Family history of hypertension    No family history of premature coronary artery disease or sudden cardiac death    SOCIAL HISTORY:  Smoking-  Alcohol-  Illicit Drug use-    REVIEW OF SYSTEMS:  Constitutional: [ ] fever, [ ]weight loss,  [ ]fatigue  Eyes: [ ] visual changes  Respiratory: [ ]shortness of breath;  [ ] cough, [ ]wheezing, [ ]chills, [ ]hemoptysis  Cardiovascular: [ ] chest pain, [ ]palpitations, [ ]dizziness,  [ ]leg swelling [ ]syncope  Gastrointestinal: [ ] abdominal pain, [ ]nausea, [ ]vomiting,  [ ]diarrhea   Genitourinary: [ ] dysuria, [ ] hematuria  Neurologic: [ ] headaches [ ] tremors  [ ] weakness [ ] lightheadedness  Skin: [ ] itching, [ ]burning, [ ] rashes  Endocrine: [ ] heat or cold intolerance  Musculoskeletal: [ ] joint pain or swelling; [ ] muscle, back, or extremity pain  Psychiatric: [ ] depression, [ ]anxiety, [ ]mood swings, or [ ]difficulty sleeping  Hematologic: [ ] easy bruising, [ ] bleeding gums       [ x] All others negative	  [ ] Unable to obtain    Vital Signs Last 24 Hrs  T(C): 36.3 (28 Aug 2020 08:19), Max: 36.8 (27 Aug 2020 11:36)  T(F): 97.3 (28 Aug 2020 08:19), Max: 98.2 (27 Aug 2020 11:36)  HR: 81 (28 Aug 2020 08:19) (77 - 91)  BP: 162/90 (28 Aug 2020 08:19) (152/91 - 170/106)  BP(mean): --  RR: 20 (28 Aug 2020 08:19) (16 - 20)  SpO2: 100% (28 Aug 2020 08:19) (96% - 100%)  I&O's Summary      PHYSICAL EXAM:  General: No acute distress  HEENT: EOMI, PERRL  Neck: Supple, No JVD  Lungs: Clear to auscultation bilaterally; No rales or wheezing  Heart: Regular rate and rhythm; No murmurs, rubs, or gallops  Abdomen: Nontender, bowel sounds present  Extremities: No clubbing, cyanosis, or edema  Nervous system:  Alert & Oriented X3, no focal deficits  Psychiatric: Normal affect  Skin: No rashes or lesions      LABS:  08-28    144  |  109<H>  |  31<H>  ----------------------------<  171<H>  3.1<L>   |  23  |  1.55<H>    Ca    9.0      28 Aug 2020 06:11  Phos  3.2     08-28  Mg     2.2     08-28    TPro  8.3  /  Alb  3.6  /  TBili  0.8  /  DBili  x   /  AST  20  /  ALT  14  /  AlkPhos  117  08-28    Creatinine Trend: 1.55<--, 1.62<--, 1.52<--                        10.4   7.48  )-----------( 365      ( 28 Aug 2020 06:11 )             32.8         Lipid Panel: Cholesterol, Serum 148  Direct LDL 89  HDL Cholesterol, Serum 36  Triglycerides, Serum 114    Cardiac Enzymes: CARDIAC MARKERS ( 28 Aug 2020 06:11 )  0.262 ng/mL / x     / x     / x     / x      CARDIAC MARKERS ( 27 Aug 2020 23:37 )  0.262 ng/mL / x     / 100 U/L / x     / <1.0 ng/mL  CARDIAC MARKERS ( 27 Aug 2020 13:30 )  0.233 ng/mL / x     / x     / x     / x          Serum Pro-Brain Natriuretic Peptide: >83173 pg/mL (08-27-20 @ 23:37)        RADIOLOGY:    ECG [my interpretation]:    TELEMETRY:    ECHO:    STRESS TEST:    CATHETERIZATION: CHIEF COMPLAINT: Shortness of breath    HPI: 64-year-old female former smoker with DM, HTN, HLD, breast CA s/p chemo/XRT, and NICM (catheterization 02/2019 showed dRPL occlusion without ability to intervene), EF 31% by MUGA 7/2019, s/p [place] [employer] SubQ ICD 10/2019, who presented with dyspnea. Patient reports     PAST MEDICAL & SURGICAL HISTORY:  As above, also Knee effusion, left: arthrocentesis- 05/25/16, Internal carotid aneurysm, Abdominal aneurysm: infrarenal- followed by Vascular , Dr. Gutierrez, Sickle cell trait, DVT (deep venous thrombosis): Left leg in 1994 after hysterectomy  S/P hysterectomy: 1994, Gilson filter in place: 1994  S/P lumpectomy, right breast: 2014, revision of position- 2015    Allergies    Advil (Hives; Vomiting)    MEDICATIONS  (STANDING):  anastrozole 1 milliGRAM(s) Oral daily  aspirin enteric coated 81 milliGRAM(s) Oral daily  atorvastatin 40 milliGRAM(s) Oral at bedtime  buPROPion XL . 150 milliGRAM(s) Oral daily  dextrose 5%. 1000 milliLiter(s) (50 mL/Hr) IV Continuous <Continuous>  furosemide   Injectable 40 milliGRAM(s) IV Push two times a day  heparin   Injectable 5000 Unit(s) SubCutaneous every 12 hours  insulin lispro (HumaLOG) corrective regimen sliding scale   SubCutaneous three times a day before meals  insulin lispro (HumaLOG) corrective regimen sliding scale   SubCutaneous at bedtime  metoprolol succinate ER 50 milliGRAM(s) Oral daily    MEDICATIONS  (PRN):  ALBUTerol    90 MICROgram(s) HFA Inhaler 2 Puff(s) Inhalation every 6 hours PRN Shortness of Breath and/or Wheezing      FAMILY HISTORY:  Family history of glioblastoma  Family history of other condition: please open box and see additional medical family history  Family history of renal failure: uncle  Family history of glioblastoma (Sibling)  History of hypertension: mother  Family history of liver failure  Family history of glioblastoma  Family history of renal stone  Family history of diabetes mellitus  Family history of hypertension    No family history of premature coronary artery disease or sudden cardiac death    SOCIAL HISTORY:  Smoking-Former  Alcohol-Denies  Illicit Drug use-denies    REVIEW OF SYSTEMS:  Constitutional: [ ] fever, [ ]weight loss,  [ ]fatigue  Eyes: [ ] visual changes  Respiratory: [ ]shortness of breath;  [ ] cough, [ ]wheezing, [ ]chills, [ ]hemoptysis  Cardiovascular: [ ] chest pain, [ ]palpitations, [ ]dizziness,  [ ]leg swelling [ ]syncope  Gastrointestinal: [ ] abdominal pain, [ ]nausea, [ ]vomiting,  [ ]diarrhea   Genitourinary: [ ] dysuria, [ ] hematuria  Neurologic: [ ] headaches [ ] tremors  [ ] weakness [ ] lightheadedness  Skin: [ ] itching, [ ]burning, [ ] rashes  Endocrine: [ ] heat or cold intolerance  Musculoskeletal: [ ] joint pain or swelling; [ ] muscle, back, or extremity pain  Psychiatric: [ ] depression, [ ]anxiety, [ ]mood swings, or [ ]difficulty sleeping  Hematologic: [ ] easy bruising, [ ] bleeding gums     [ x] All others negative	  [ ] Unable to obtain    Vital Signs Last 24 Hrs  T(C): 36.3 (28 Aug 2020 08:19), Max: 36.8 (27 Aug 2020 11:36)  T(F): 97.3 (28 Aug 2020 08:19), Max: 98.2 (27 Aug 2020 11:36)  HR: 81 (28 Aug 2020 08:19) (77 - 91)  BP: 162/90 (28 Aug 2020 08:19) (152/91 - 170/106)  BP(mean): --  RR: 20 (28 Aug 2020 08:19) (16 - 20)  SpO2: 100% (28 Aug 2020 08:19) (96% - 100%)  I&O's Summary    PHYSICAL EXAM:  General: No acute distress  HEENT: EOMI, PERRL  Neck: Supple, No JVD  Lungs: Clear to auscultation bilaterally; No rales or wheezing  Heart: Regular rate and rhythm; No murmurs, rubs, or gallops  Abdomen: Nontender, bowel sounds present  Extremities: No clubbing, cyanosis, or edema  Nervous system:  Alert & Oriented X3, no focal deficits  Psychiatric: Normal affect  Skin: No rashes or lesions    LABS:  08-28    144  |  109<H>  |  31<H>  ----------------------------<  171<H>  3.1<L>   |  23  |  1.55<H>    Ca    9.0      28 Aug 2020 06:11  Phos  3.2     08-28  Mg     2.2     08-28    TPro  8.3  /  Alb  3.6  /  TBili  0.8  /  DBili  x   /  AST  20  /  ALT  14  /  AlkPhos  117  08-28    Creatinine Trend: 1.55<--, 1.62<--, 1.52<--   (1.46 in 06/2020)                        10.4   7.48  )-----------( 365      ( 28 Aug 2020 06:11 )             32.8     Lipid Panel: Cholesterol, Serum 148  Direct LDL 89  HDL Cholesterol, Serum 36  Triglycerides, Serum 114    Cardiac Enzymes: CARDIAC MARKERS ( 28 Aug 2020 06:11 )  0.262 ng/mL / x     / x     / x     / x      CARDIAC MARKERS ( 27 Aug 2020 23:37 )  0.262 ng/mL / x     / 100 U/L / x     / <1.0 ng/mL  CARDIAC MARKERS ( 27 Aug 2020 13:30 )  0.233 ng/mL / x     / x     / x     / x        Serum Pro-Brain Natriuretic Peptide: >79052 pg/mL (08-27-20 @ 23:37)    RADIOLOGY: < from: Xray Chest 1 View AP/PA (08.27.20 @ 13:13) >  IMPRESSION:  No active pulmonary disease.    < end of copied text >    ECG [my interpretation]: 8/28/2020 @ 01:22: Sinus rhythm, voltage criteria for LVH, T-wave abnormality not significantly changed from priors    TELEMETRY:    ECHO: < from: TTE with Doppler (w/Cont) (05.01.20 @ 07:38) >  CONCLUSIONS:  1. Normal mitral valve. Mild mitral regurgitation.  2. Normal trileaflet aortic valve.  3. Aortic Root: 3.3 cm.  4. Normal left atrium.  LA volume index = 34 cc/m2.  5. Mild concentric left ventricular hypertrophy.  6. Severe global left ventricular systolic dysfunction.  7. Grade I diastolic dysfunction (Impaired relaxation).  8. Normal right atrium.  9. Normal right ventricular size and systolic function  (TAPSE 1.7 cm).  10. Unable to estimate RVSP.  11. There is trace tricuspid regurgitation.  12. There is mild pulmonic regurgitation.  13. Agitated saline injection was negative for intracardiac  shunt.  14. Normal pericardium with no pericardial effusion.    < end of copied text >    CATHETERIZATION: < from: Cardiac Cath Lab - Adult (02.22.19 @ 17:02) >  DIAGNOSTIC IMPRESSIONS: Severe LVdysfunction  Occluded distal RPLS , not amenable to PCI    < end of copied text > CHIEF COMPLAINT: Shortness of breath    HPI: 64-year-old female former smoker with DM, HTN, HLD, breast CA s/p chemo/XRT, and NICM (catheterization 02/2019 showed dRPL occlusion without ability to intervene), EF 31% by MUGA 7/2019, s/p [place] [employer] SubQ ICD 10/2019, who presented with dyspnea. Patient reports symptoms have been present for the past 3-4 days and have been progressively worsening. Reports orthopnea and some PND. No associated chest pain or palpitations. No ICD shocks felt. Of note, patient reports she had not been taking all of her HF medications (she thinks she it taking too much medicine), and also she has had dietary indiscretions (take-out food including barber, etc).     PAST MEDICAL & SURGICAL HISTORY:  As above, also Knee effusion, left: arthrocentesis- 05/25/16, Internal carotid aneurysm, Abdominal aneurysm: infrarenal- followed by Vascular , Dr. Gutierrez, Sickle cell trait, DVT (deep venous thrombosis): Left leg in 1994 after hysterectomy  S/P hysterectomy: 1994, Gilson filter in place: 1994  S/P lumpectomy, right breast: 2014, revision of position- 2015    Allergies    Advil (Hives; Vomiting)    MEDICATIONS  (STANDING):  anastrozole 1 milliGRAM(s) Oral daily  aspirin enteric coated 81 milliGRAM(s) Oral daily  atorvastatin 40 milliGRAM(s) Oral at bedtime  buPROPion XL . 150 milliGRAM(s) Oral daily  dextrose 5%. 1000 milliLiter(s) (50 mL/Hr) IV Continuous <Continuous>  furosemide   Injectable 40 milliGRAM(s) IV Push two times a day  heparin   Injectable 5000 Unit(s) SubCutaneous every 12 hours  insulin lispro (HumaLOG) corrective regimen sliding scale   SubCutaneous three times a day before meals  insulin lispro (HumaLOG) corrective regimen sliding scale   SubCutaneous at bedtime  metoprolol succinate ER 50 milliGRAM(s) Oral daily    MEDICATIONS  (PRN):  ALBUTerol    90 MICROgram(s) HFA Inhaler 2 Puff(s) Inhalation every 6 hours PRN Shortness of Breath and/or Wheezing      FAMILY HISTORY:  Family history of glioblastoma  Family history of other condition: please open box and see additional medical family history  Family history of renal failure: uncle  Family history of glioblastoma (Sibling)  History of hypertension: mother  Family history of liver failure  Family history of glioblastoma  Family history of renal stone  Family history of diabetes mellitus  Family history of hypertension    No family history of premature coronary artery disease or sudden cardiac death    SOCIAL HISTORY:  Smoking-Former  Alcohol-Denies  Illicit Drug use-denies    REVIEW OF SYSTEMS:  Constitutional: [ ] fever, [ ]weight loss,  [ ]fatigue  Eyes: [ ] visual changes  Respiratory: [ ]shortness of breath;  [ ] cough, [ ]wheezing, [ ]chills, [ ]hemoptysis  Cardiovascular: [ ] chest pain, [ ]palpitations, [ ]dizziness,  [ ]leg swelling [ ]syncope  Gastrointestinal: [ ] abdominal pain, [ ]nausea, [ ]vomiting,  [ ]diarrhea   Genitourinary: [ ] dysuria, [ ] hematuria  Neurologic: [ ] headaches [ ] tremors  [ ] weakness [ ] lightheadedness  Skin: [ ] itching, [ ]burning, [ ] rashes  Endocrine: [ ] heat or cold intolerance  Musculoskeletal: [ ] joint pain or swelling; [ ] muscle, back, or extremity pain  Psychiatric: [ ] depression, [ ]anxiety, [ ]mood swings, or [ ]difficulty sleeping  Hematologic: [ ] easy bruising, [ ] bleeding gums     [ x] All others negative	  [ ] Unable to obtain    Vital Signs Last 24 Hrs  T(C): 36.3 (28 Aug 2020 08:19), Max: 36.8 (27 Aug 2020 11:36)  T(F): 97.3 (28 Aug 2020 08:19), Max: 98.2 (27 Aug 2020 11:36)  HR: 81 (28 Aug 2020 08:19) (77 - 91)  BP: 162/90 (28 Aug 2020 08:19) (152/91 - 170/106)  BP(mean): --  RR: 20 (28 Aug 2020 08:19) (16 - 20)  SpO2: 100% (28 Aug 2020 08:19) (96% - 100%)  I&O's Summary    PHYSICAL EXAM:  General: No acute distress  HEENT: EOMI, PERRL  Neck: Supple, No JVD  Lungs: Clear to auscultation bilaterally; No rales or wheezing  Heart: Regular rate and rhythm; No murmurs, rubs, or gallops  Abdomen: Nontender, bowel sounds present  Extremities: No clubbing, cyanosis, or edema  Nervous system:  Alert & Oriented X3, no focal deficits  Psychiatric: Normal affect  Skin: No rashes or lesions    LABS:  08-28    144  |  109<H>  |  31<H>  ----------------------------<  171<H>  3.1<L>   |  23  |  1.55<H>    Ca    9.0      28 Aug 2020 06:11  Phos  3.2     08-28  Mg     2.2     08-28    TPro  8.3  /  Alb  3.6  /  TBili  0.8  /  DBili  x   /  AST  20  /  ALT  14  /  AlkPhos  117  08-28    Creatinine Trend: 1.55<--, 1.62<--, 1.52<--   (1.46 in 06/2020)                        10.4   7.48  )-----------( 365      ( 28 Aug 2020 06:11 )             32.8     Lipid Panel: Cholesterol, Serum 148  Direct LDL 89  HDL Cholesterol, Serum 36  Triglycerides, Serum 114    Cardiac Enzymes: CARDIAC MARKERS ( 28 Aug 2020 06:11 )  0.262 ng/mL / x     / x     / x     / x      CARDIAC MARKERS ( 27 Aug 2020 23:37 )  0.262 ng/mL / x     / 100 U/L / x     / <1.0 ng/mL  CARDIAC MARKERS ( 27 Aug 2020 13:30 )  0.233 ng/mL / x     / x     / x     / x        Serum Pro-Brain Natriuretic Peptide: >71791 pg/mL (08-27-20 @ 23:37)    RADIOLOGY: < from: Xray Chest 1 View AP/PA (08.27.20 @ 13:13) >  IMPRESSION:  No active pulmonary disease.    < end of copied text >    ECG [my interpretation]: 8/28/2020 @ 01:22: Sinus rhythm, voltage criteria for LVH, T-wave abnormality not significantly changed from priors    TELEMETRY: Sinus rhtyhm, episodes of A tach, occasional PVCs and runs of NSVT (longest 9 beats)    ECHO: < from: TTE with Doppler (w/Cont) (05.01.20 @ 07:38) >  CONCLUSIONS:  1. Normal mitral valve. Mild mitral regurgitation.  2. Normal trileaflet aortic valve.  3. Aortic Root: 3.3 cm.  4. Normal left atrium.  LA volume index = 34 cc/m2.  5. Mild concentric left ventricular hypertrophy.  6. Severe global left ventricular systolic dysfunction.  7. Grade I diastolic dysfunction (Impaired relaxation).  8. Normal right atrium.  9. Normal right ventricular size and systolic function  (TAPSE 1.7 cm).  10. Unable to estimate RVSP.  11. There is trace tricuspid regurgitation.  12. There is mild pulmonic regurgitation.  13. Agitated saline injection was negative for intracardiac  shunt.  14. Normal pericardium with no pericardial effusion.    < end of copied text >    CATHETERIZATION: < from: Cardiac Cath Lab - Adult (02.22.19 @ 17:02) >  DIAGNOSTIC IMPRESSIONS: Severe LVdysfunction  Occluded distal RPLS , not amenable to PCI    < end of copied text >

## 2020-08-28 NOTE — PROGRESS NOTE ADULT - PROBLEM SELECTOR PLAN 4
Patient has hx of breast cancer . S/p lumpectomy, chemo, and radiation therapy.  continue with Anastrazole for now.

## 2020-08-28 NOTE — PROGRESS NOTE ADULT - PROBLEM SELECTOR PLAN 3
Cr was 1.14 in May 2020  CHEN on CKD  Follow Urine lytes.  Holding IV fluid in setting of CHF  follow BMP  consider renal US if renal function does not improve or worsens.

## 2020-08-28 NOTE — PROGRESS NOTE ADULT - PROBLEM SELECTOR PLAN 7
-recent CTA head/neck shows chronic rt frontal, parietal, lacunar infarcts. Pt AAOX2, but with mild paranoia and anxiety  - monitor any acute weakness or s/s of stroke

## 2020-08-29 LAB
ANION GAP SERPL CALC-SCNC: 10 MMOL/L — SIGNIFICANT CHANGE UP (ref 5–17)
ANION GAP SERPL CALC-SCNC: 13 MMOL/L — SIGNIFICANT CHANGE UP (ref 5–17)
BUN SERPL-MCNC: 41 MG/DL — HIGH (ref 7–18)
BUN SERPL-MCNC: 49 MG/DL — HIGH (ref 7–18)
CALCIUM SERPL-MCNC: 8.9 MG/DL — SIGNIFICANT CHANGE UP (ref 8.4–10.5)
CALCIUM SERPL-MCNC: 9.2 MG/DL — SIGNIFICANT CHANGE UP (ref 8.4–10.5)
CHLORIDE SERPL-SCNC: 103 MMOL/L — SIGNIFICANT CHANGE UP (ref 96–108)
CHLORIDE SERPL-SCNC: 107 MMOL/L — SIGNIFICANT CHANGE UP (ref 96–108)
CO2 SERPL-SCNC: 24 MMOL/L — SIGNIFICANT CHANGE UP (ref 22–31)
CO2 SERPL-SCNC: 26 MMOL/L — SIGNIFICANT CHANGE UP (ref 22–31)
CREAT SERPL-MCNC: 1.63 MG/DL — HIGH (ref 0.5–1.3)
CREAT SERPL-MCNC: 1.94 MG/DL — HIGH (ref 0.5–1.3)
GLUCOSE BLDC GLUCOMTR-MCNC: 121 MG/DL — HIGH (ref 70–99)
GLUCOSE BLDC GLUCOMTR-MCNC: 133 MG/DL — HIGH (ref 70–99)
GLUCOSE BLDC GLUCOMTR-MCNC: 155 MG/DL — HIGH (ref 70–99)
GLUCOSE BLDC GLUCOMTR-MCNC: 176 MG/DL — HIGH (ref 70–99)
GLUCOSE BLDC GLUCOMTR-MCNC: 184 MG/DL — HIGH (ref 70–99)
GLUCOSE SERPL-MCNC: 142 MG/DL — HIGH (ref 70–99)
GLUCOSE SERPL-MCNC: 172 MG/DL — HIGH (ref 70–99)
HCT VFR BLD CALC: 32.8 % — LOW (ref 34.5–45)
HGB BLD-MCNC: 10.5 G/DL — LOW (ref 11.5–15.5)
MAGNESIUM SERPL-MCNC: 2.1 MG/DL — SIGNIFICANT CHANGE UP (ref 1.6–2.6)
MCHC RBC-ENTMCNC: 29.1 PG — SIGNIFICANT CHANGE UP (ref 27–34)
MCHC RBC-ENTMCNC: 32 GM/DL — SIGNIFICANT CHANGE UP (ref 32–36)
MCV RBC AUTO: 90.9 FL — SIGNIFICANT CHANGE UP (ref 80–100)
NRBC # BLD: 0 /100 WBCS — SIGNIFICANT CHANGE UP (ref 0–0)
PHOSPHATE SERPL-MCNC: 3.7 MG/DL — SIGNIFICANT CHANGE UP (ref 2.5–4.5)
PLATELET # BLD AUTO: 364 K/UL — SIGNIFICANT CHANGE UP (ref 150–400)
POTASSIUM SERPL-MCNC: 2.7 MMOL/L — CRITICAL LOW (ref 3.5–5.3)
POTASSIUM SERPL-MCNC: 3.6 MMOL/L — SIGNIFICANT CHANGE UP (ref 3.5–5.3)
POTASSIUM SERPL-SCNC: 2.7 MMOL/L — CRITICAL LOW (ref 3.5–5.3)
POTASSIUM SERPL-SCNC: 3.6 MMOL/L — SIGNIFICANT CHANGE UP (ref 3.5–5.3)
RBC # BLD: 3.61 M/UL — LOW (ref 3.8–5.2)
RBC # FLD: 14.2 % — SIGNIFICANT CHANGE UP (ref 10.3–14.5)
SODIUM SERPL-SCNC: 141 MMOL/L — SIGNIFICANT CHANGE UP (ref 135–145)
SODIUM SERPL-SCNC: 142 MMOL/L — SIGNIFICANT CHANGE UP (ref 135–145)
TROPONIN I SERPL-MCNC: 0.3 NG/ML — HIGH (ref 0–0.04)
TROPONIN I SERPL-MCNC: 0.31 NG/ML — HIGH (ref 0–0.04)
WBC # BLD: 7.74 K/UL — SIGNIFICANT CHANGE UP (ref 3.8–10.5)
WBC # FLD AUTO: 7.74 K/UL — SIGNIFICANT CHANGE UP (ref 3.8–10.5)

## 2020-08-29 PROCEDURE — 99232 SBSQ HOSP IP/OBS MODERATE 35: CPT | Mod: GC

## 2020-08-29 RX ORDER — HYDRALAZINE HCL 50 MG
25 TABLET ORAL ONCE
Refills: 0 | Status: DISCONTINUED | OUTPATIENT
Start: 2020-08-29 | End: 2020-08-29

## 2020-08-29 RX ORDER — POTASSIUM CHLORIDE 20 MEQ
20 PACKET (EA) ORAL
Refills: 0 | Status: COMPLETED | OUTPATIENT
Start: 2020-08-29 | End: 2020-08-29

## 2020-08-29 RX ORDER — CLOPIDOGREL BISULFATE 75 MG/1
75 TABLET, FILM COATED ORAL DAILY
Refills: 0 | Status: DISCONTINUED | OUTPATIENT
Start: 2020-08-29 | End: 2020-09-09

## 2020-08-29 RX ORDER — ONDANSETRON 8 MG/1
4 TABLET, FILM COATED ORAL ONCE
Refills: 0 | Status: COMPLETED | OUTPATIENT
Start: 2020-08-29 | End: 2020-08-29

## 2020-08-29 RX ORDER — ONDANSETRON 8 MG/1
4 TABLET, FILM COATED ORAL ONCE
Refills: 0 | Status: DISCONTINUED | OUTPATIENT
Start: 2020-08-29 | End: 2020-08-29

## 2020-08-29 RX ORDER — LOSARTAN POTASSIUM 100 MG/1
50 TABLET, FILM COATED ORAL ONCE
Refills: 0 | Status: DISCONTINUED | OUTPATIENT
Start: 2020-08-29 | End: 2020-08-29

## 2020-08-29 RX ORDER — POTASSIUM CHLORIDE 20 MEQ
40 PACKET (EA) ORAL EVERY 4 HOURS
Refills: 0 | Status: DISCONTINUED | OUTPATIENT
Start: 2020-08-29 | End: 2020-08-29

## 2020-08-29 RX ADMIN — ONDANSETRON 4 MILLIGRAM(S): 8 TABLET, FILM COATED ORAL at 19:57

## 2020-08-29 RX ADMIN — Medication 81 MILLIGRAM(S): at 12:10

## 2020-08-29 RX ADMIN — HEPARIN SODIUM 5000 UNIT(S): 5000 INJECTION INTRAVENOUS; SUBCUTANEOUS at 05:15

## 2020-08-29 RX ADMIN — BUPROPION HYDROCHLORIDE 150 MILLIGRAM(S): 150 TABLET, EXTENDED RELEASE ORAL at 12:10

## 2020-08-29 RX ADMIN — Medication 20 MILLIEQUIVALENT(S): at 19:57

## 2020-08-29 RX ADMIN — ANASTROZOLE 1 MILLIGRAM(S): 1 TABLET ORAL at 12:10

## 2020-08-29 RX ADMIN — Medication 50 MILLIGRAM(S): at 05:16

## 2020-08-29 RX ADMIN — Medication 1: at 17:09

## 2020-08-29 RX ADMIN — Medication 20 MILLIEQUIVALENT(S): at 09:55

## 2020-08-29 RX ADMIN — Medication 1: at 08:07

## 2020-08-29 RX ADMIN — CLOPIDOGREL BISULFATE 75 MILLIGRAM(S): 75 TABLET, FILM COATED ORAL at 12:10

## 2020-08-29 RX ADMIN — Medication 20 MILLIEQUIVALENT(S): at 22:02

## 2020-08-29 RX ADMIN — SACUBITRIL AND VALSARTAN 1 TABLET(S): 24; 26 TABLET, FILM COATED ORAL at 05:16

## 2020-08-29 RX ADMIN — SACUBITRIL AND VALSARTAN 1 TABLET(S): 24; 26 TABLET, FILM COATED ORAL at 17:10

## 2020-08-29 RX ADMIN — Medication 40 MILLIGRAM(S): at 05:16

## 2020-08-29 RX ADMIN — Medication 20 MILLIEQUIVALENT(S): at 12:10

## 2020-08-29 RX ADMIN — Medication 40 MILLIGRAM(S): at 17:09

## 2020-08-29 RX ADMIN — TIOTROPIUM BROMIDE 1 CAPSULE(S): 18 CAPSULE ORAL; RESPIRATORY (INHALATION) at 16:28

## 2020-08-29 RX ADMIN — ATORVASTATIN CALCIUM 40 MILLIGRAM(S): 80 TABLET, FILM COATED ORAL at 22:02

## 2020-08-29 RX ADMIN — HEPARIN SODIUM 5000 UNIT(S): 5000 INJECTION INTRAVENOUS; SUBCUTANEOUS at 17:09

## 2020-08-29 NOTE — PROGRESS NOTE ADULT - PROBLEM SELECTOR PLAN 1
- Troponin I trending down 0.304>0.307>0.296  - diuresis on 40mg IV frusemide BID.  -daily weights, strict Is/Os  - I&Os for last 24hrs is Negative 200  - DASH/TLC diet  - Dr. Doshi is on board

## 2020-08-29 NOTE — PROGRESS NOTE ADULT - PROBLEM SELECTOR PLAN 4
Patient has hx of breast cancer . S/p lumpectomy, chemo, and radiation therapy.  continue with Anastrazole for now.  Called Dr. Serrato, He is following up with the patient. Next appointment is Sept 18th.

## 2020-08-29 NOTE — PROGRESS NOTE ADULT - PROBLEM SELECTOR PLAN 8
Continue with Albuterol inhaler PRN q 6.  Patient is on 2L NC. will taper it down through the day.  Will monitor vitals through the day,

## 2020-08-29 NOTE — PROGRESS NOTE ADULT - ASSESSMENT
PGY-1 Progress Note discussed with attending    PAGER #: [[phone]] TILL 5:00 PM  PLEASE CONTACT ON CALL TEAM:  - On Call Team (Please refer to Taryn) FROM 5:00 PM - 8:30PM  - Nightfloat Team FROM 8:30 -7:30 AM    INTERVAL HPI/OVERNIGHT EVENTS: No acute evew     MEDICATIONS:  ALBUTerol    90 MICROgram(s) HFA Inhaler 2 Puff(s) Inhalation every 6 hours PRN  anastrozole 1 milliGRAM(s) Oral daily  aspirin enteric coated 81 milliGRAM(s) Oral daily  atorvastatin 40 milliGRAM(s) Oral at bedtime  buPROPion XL . 150 milliGRAM(s) Oral daily  clopidogrel Tablet 75 milliGRAM(s) Oral daily  dextrose 5%. 1000 milliLiter(s) IV Continuous <Continuous>  furosemide   Injectable 40 milliGRAM(s) IV Push two times a day  guaiFENesin   Syrup  (Sugar-Free) 100 milliGRAM(s) Oral every 6 hours PRN  heparin   Injectable 5000 Unit(s) SubCutaneous every 12 hours  insulin lispro (HumaLOG) corrective regimen sliding scale   SubCutaneous three times a day before meals  insulin lispro (HumaLOG) corrective regimen sliding scale   SubCutaneous at bedtime  metoprolol succinate ER 50 milliGRAM(s) Oral daily  sacubitril 24 mG/valsartan 26 mG 1 Tablet(s) Oral two times a day  tiotropium 18 MICROgram(s) Capsule 1 Capsule(s) Inhalation daily      REVIEW OF SYSTEMS:  CONSTITUTIONAL: No fever, weight loss, or fatigue  RESPIRATORY: No cough, wheezing, chills or hemoptysis; No shortness of breath  CARDIOVASCULAR: No chest pain, palpitations, dizziness, or leg swelling  GASTROINTESTINAL: No abdominal pain. No nausea, vomiting, or hematemesis; No diarrhea or constipation. No melena or hematochezia.  GENITOURINARY: No dysuria or hematuria, urinary frequency  NEUROLOGICAL: No headaches, memory loss, loss of strength, numbness, or tremors  SKIN: No itching, burning, rashes, or lesions     Vital Signs Last 24 Hrs  T(C): 36.8 (29 Aug 2020 11:35), Max: 37.3 (29 Aug 2020 08:23)  T(F): 98.3 (29 Aug 2020 11:35), Max: 99.1 (29 Aug 2020 08:23)  HR: 79 (29 Aug 2020 11:35) (73 - 90)  BP: 146/90 (29 Aug 2020 11:35) (129/71 - 170/126)  BP(mean): --  RR: 18 (29 Aug 2020 11:35) (18 - 20)  SpO2: 100% (29 Aug 2020 11:35) (98% - 100%)    PHYSICAL EXAMINATION:  GENERAL: NAD, well built  HEAD:  Atraumatic, Normocephalic  EYES:  conjunctiva and sclera clear  NECK: Supple, No JVD, Normal thyroid  CHEST/LUNG: Clear to auscultation. Clear to percussion bilaterally; No rales, rhonchi, wheezing, or rubs  HEART: Regular rate and rhythm; No murmurs, rubs, or gallops  ABDOMEN: Soft, Nontender, Nondistended; Bowel sounds present  NERVOUS SYSTEM:  Alert & Oriented X3,    EXTREMITIES:  2+ Peripheral Pulses, No clubbing, cyanosis, or edema  SKIN: warm dry                          10.5   7.74  )-----------( 364      ( 29 Aug 2020 07:45 )             32.8     08-29    142  |  103  |  41<H>  ----------------------------<  172<H>  2.7<LL>   |  26  |  1.63<H>    Ca    9.2      29 Aug 2020 07:45  Phos  3.7     08-29  Mg     2.1     08-29    TPro  8.3  /  Alb  3.6  /  TBili  0.8  /  DBili  x   /  AST  20  /  ALT  14  /  AlkPhos  117  08-28    LIVER FUNCTIONS - ( 28 Aug 2020 06:11 )  Alb: 3.6 g/dL / Pro: 8.3 g/dL / ALK PHOS: 117 U/L / ALT: 14 U/L DA / AST: 20 U/L / GGT: x           CARDIAC MARKERS ( 29 Aug 2020 07:45 )  0.296 ng/mL / x     / x     / x     / x      CARDIAC MARKERS ( 29 Aug 2020 00:37 )  0.307 ng/mL / x     / x     / x     / x      CARDIAC MARKERS ( 28 Aug 2020 16:45 )  0.304 ng/mL / x     / x     / x     / x      CARDIAC MARKERS ( 28 Aug 2020 06:11 )  0.262 ng/mL / x     / x     / x     / x      CARDIAC MARKERS ( 27 Aug 2020 23:37 )  0.262 ng/mL / x     / 100 U/L / x     / <1.0 ng/mL  CARDIAC MARKERS ( 27 Aug 2020 13:30 )  0.233 ng/mL / x     / x     / x     / x              CAPILLARY BLOOD GLUCOSE      RADIOLOGY & ADDITIONAL TESTS: 65 year-old female  smoker with DM, HTN, HLD, breast CA s/p chemo/XRT, COPD not on home O2, CVA no motor deficits, and NICM (catheterization 02/2019 showed dRPL occlusion without ability to intervene), EF 31% by MUGA 7/2019, s/p SubQ ICD 10/2019, AAA came to ED for dyspnea. Patient is admitted for dyspnea work up.

## 2020-08-29 NOTE — PROGRESS NOTE ADULT - PROBLEM SELECTOR PLAN 6
continue with metoprolol with hold parameters.  Patient was running high blood pressure today 163/111. Probably the patient was anxious. repeated Blood pressure was 146/90 without medications   monitor v/s

## 2020-08-29 NOTE — PROGRESS NOTE ADULT - SUBJECTIVE AND OBJECTIVE BOX
PGY-1 Progress Note discussed with attending    PAGER #: [[phone]] TILL 5:00 PM  PLEASE CONTACT ON CALL TEAM:  - On Call Team (Please refer to Taryn) FROM 5:00 PM - 8:30PM  - Nightfloat Team FROM 8:30 -7:30 AM    CHIEF COMPLAINT & BRIEF HOSPITAL COURSE:    INTERVAL HPI/OVERNIGHT EVENTS:     MEDICATIONS:  ALBUTerol    90 MICROgram(s) HFA Inhaler 2 Puff(s) Inhalation every 6 hours PRN  anastrozole 1 milliGRAM(s) Oral daily  aspirin enteric coated 81 milliGRAM(s) Oral daily  atorvastatin 40 milliGRAM(s) Oral at bedtime  buPROPion XL . 150 milliGRAM(s) Oral daily  clopidogrel Tablet 75 milliGRAM(s) Oral daily  dextrose 5%. 1000 milliLiter(s) IV Continuous <Continuous>  furosemide   Injectable 40 milliGRAM(s) IV Push two times a day  guaiFENesin   Syrup  (Sugar-Free) 100 milliGRAM(s) Oral every 6 hours PRN  heparin   Injectable 5000 Unit(s) SubCutaneous every 12 hours  insulin lispro (HumaLOG) corrective regimen sliding scale   SubCutaneous three times a day before meals  insulin lispro (HumaLOG) corrective regimen sliding scale   SubCutaneous at bedtime  metoprolol succinate ER 50 milliGRAM(s) Oral daily  sacubitril 24 mG/valsartan 26 mG 1 Tablet(s) Oral two times a day  tiotropium 18 MICROgram(s) Capsule 1 Capsule(s) Inhalation daily      REVIEW OF SYSTEMS:  CONSTITUTIONAL: No fever, weight loss, or fatigue  RESPIRATORY: No cough, wheezing, chills or hemoptysis; No shortness of breath  CARDIOVASCULAR: No chest pain, palpitations, dizziness, or leg swelling  GASTROINTESTINAL: No abdominal pain. No nausea, vomiting, or hematemesis; No diarrhea or constipation. No melena or hematochezia.  GENITOURINARY: No dysuria or hematuria, urinary frequency  NEUROLOGICAL: No headaches, memory loss, loss of strength, numbness, or tremors  SKIN: No itching, burning, rashes, or lesions     Vital Signs Last 24 Hrs  T(C): 36.8 (29 Aug 2020 11:35), Max: 37.3 (29 Aug 2020 08:23)  T(F): 98.3 (29 Aug 2020 11:35), Max: 99.1 (29 Aug 2020 08:23)  HR: 79 (29 Aug 2020 11:35) (73 - 90)  BP: 146/90 (29 Aug 2020 11:35) (129/71 - 170/126)  BP(mean): --  RR: 18 (29 Aug 2020 11:35) (18 - 20)  SpO2: 100% (29 Aug 2020 11:35) (98% - 100%)    PHYSICAL EXAMINATION:  GENERAL: NAD, well built  HEAD:  Atraumatic, Normocephalic  EYES:  conjunctiva and sclera clear  NECK: Supple, No JVD, Normal thyroid  CHEST/LUNG: Clear to auscultation. Clear to percussion bilaterally; No rales, rhonchi, wheezing, or rubs  HEART: Regular rate and rhythm; No murmurs, rubs, or gallops  ABDOMEN: Soft, Nontender, Nondistended; Bowel sounds present  NERVOUS SYSTEM:  Alert & Oriented X3,    EXTREMITIES:  2+ Peripheral Pulses, No clubbing, cyanosis, or edema  SKIN: warm dry                          10.5   7.74  )-----------( 364      ( 29 Aug 2020 07:45 )             32.8     08-29    142  |  103  |  41<H>  ----------------------------<  172<H>  2.7<LL>   |  26  |  1.63<H>    Ca    9.2      29 Aug 2020 07:45  Phos  3.7     08-29  Mg     2.1     08-29    TPro  8.3  /  Alb  3.6  /  TBili  0.8  /  DBili  x   /  AST  20  /  ALT  14  /  AlkPhos  117  08-28    LIVER FUNCTIONS - ( 28 Aug 2020 06:11 )  Alb: 3.6 g/dL / Pro: 8.3 g/dL / ALK PHOS: 117 U/L / ALT: 14 U/L DA / AST: 20 U/L / GGT: x           CARDIAC MARKERS ( 29 Aug 2020 07:45 )  0.296 ng/mL / x     / x     / x     / x      CARDIAC MARKERS ( 29 Aug 2020 00:37 )  0.307 ng/mL / x     / x     / x     / x      CARDIAC MARKERS ( 28 Aug 2020 16:45 )  0.304 ng/mL / x     / x     / x     / x      CARDIAC MARKERS ( 28 Aug 2020 06:11 )  0.262 ng/mL / x     / x     / x     / x      CARDIAC MARKERS ( 27 Aug 2020 23:37 )  0.262 ng/mL / x     / 100 U/L / x     / <1.0 ng/mL  CARDIAC MARKERS ( 27 Aug 2020 13:30 )  0.233 ng/mL / x     / x     / x     / x              CAPILLARY BLOOD GLUCOSE      RADIOLOGY & ADDITIONAL TESTS: PGY-1 Progress Note discussed with attending    PAGER #: [[phone]] TILL 5:00 PM  PLEASE CONTACT ON CALL TEAM:  - On Call Team (Please refer to Taryn) FROM 5:00 PM - 8:30PM  - Nightfloat Team FROM 8:30 -7:30 AM    INTERVAL HPI/OVERNIGHT EVENTS: No acute events. Patient was examined on bedsetting. Tapered down 4L O2 to 2L O2 NC. Will monitor her PaO2 through the day.     MEDICATIONS:  ALBUTerol    90 MICROgram(s) HFA Inhaler 2 Puff(s) Inhalation every 6 hours PRN  anastrozole 1 milliGRAM(s) Oral daily  aspirin enteric coated 81 milliGRAM(s) Oral daily  atorvastatin 40 milliGRAM(s) Oral at bedtime  buPROPion XL . 150 milliGRAM(s) Oral daily  clopidogrel Tablet 75 milliGRAM(s) Oral daily  dextrose 5%. 1000 milliLiter(s) IV Continuous <Continuous>  furosemide   Injectable 40 milliGRAM(s) IV Push two times a day  guaiFENesin   Syrup  (Sugar-Free) 100 milliGRAM(s) Oral every 6 hours PRN  heparin   Injectable 5000 Unit(s) SubCutaneous every 12 hours  insulin lispro (HumaLOG) corrective regimen sliding scale   SubCutaneous three times a day before meals  insulin lispro (HumaLOG) corrective regimen sliding scale   SubCutaneous at bedtime  metoprolol succinate ER 50 milliGRAM(s) Oral daily  sacubitril 24 mG/valsartan 26 mG 1 Tablet(s) Oral two times a day  tiotropium 18 MICROgram(s) Capsule 1 Capsule(s) Inhalation daily    Vital Signs Last 24 Hrs  T(C): 36.8 (29 Aug 2020 11:35), Max: 37.3 (29 Aug 2020 08:23)  T(F): 98.3 (29 Aug 2020 11:35), Max: 99.1 (29 Aug 2020 08:23)  HR: 79 (29 Aug 2020 11:35) (73 - 90)  BP: 146/90 (29 Aug 2020 11:35) (129/71 - 170/126)  BP(mean): --  RR: 18 (29 Aug 2020 11:35) (18 - 20)  SpO2: 100% (29 Aug 2020 11:35) (98% - 100%)    GENERAL: NAD, speaks in full sentences, no signs of respiratory distressHEAD:  Atraumatic, Normocephalic  EYES: EOMI, PERRLA, conjunctiva and sclera clear  NECK: Supple, No JVD  CHEST/LUNG: Clear to auscultation bilaterally; No wheeze; No crackles; No accessory muscles used  HEART: Regular rate and rhythm; No murmurs;   ABDOMEN: Soft, Nontender, Nondistended; Bowel sounds present; No guarding  EXTREMITIES:  2+ Peripheral Pulses, No cyanosis or edema  PSYCH:  Normal Affect, confused  NEURLOGY: AAO X 2. patient is not oriented to place and she knows its Thursday  and 2020 but does not remember month. can recall current President name.  SKIN: Chronic venous stasis skin changes on both Lower extremitiesy                          10.5   7.74  )-----------( 364      ( 29 Aug 2020 07:45 )             32.8     08-29    142  |  103  |  41<H>  ----------------------------<  172<H>  2.7<LL>   |  26  |  1.63<H>    Ca    9.2      29 Aug 2020 07:45  Phos  3.7     08-29  Mg     2.1     08-29    TPro  8.3  /  Alb  3.6  /  TBili  0.8  /  DBili  x   /  AST  20  /  ALT  14  /  AlkPhos  117  08-28    LIVER FUNCTIONS - ( 28 Aug 2020 06:11 )  Alb: 3.6 g/dL / Pro: 8.3 g/dL / ALK PHOS: 117 U/L / ALT: 14 U/L DA / AST: 20 U/L / GGT: x           CARDIAC MARKERS ( 29 Aug 2020 07:45 )  0.296 ng/mL / x     / x     / x     / x      CARDIAC MARKERS ( 29 Aug 2020 00:37 )  0.307 ng/mL / x     / x     / x     / x      CARDIAC MARKERS ( 28 Aug 2020 16:45 )  0.304 ng/mL / x     / x     / x     / x      CARDIAC MARKERS ( 28 Aug 2020 06:11 )  0.262 ng/mL / x     / x     / x     / x      CARDIAC MARKERS ( 27 Aug 2020 23:37 )  0.262 ng/mL / x     / 100 U/L / x     / <1.0 ng/mL  CARDIAC MARKERS ( 27 Aug 2020 13:30 )  0.233 ng/mL / x     / x     / x     / x              CAPILLARY BLOOD GLUCOSE      RADIOLOGY & ADDITIONAL TESTS:

## 2020-08-29 NOTE — PROGRESS NOTE ADULT - ATTENDING COMMENTS
I have personally seen, examined, and participated in the care of this patient. I have reviewed all pertinent clinical information, including history, physical exam, plan and medical student/resident/PA/NP note, and agree, with my independent findings and conclusions as noted:     On physical exam:  Gen: NAD   Chest/CV: RRR, +2 peripheral pulses  Pulm: reduced lung sounds B/L  Abd: soft, NT, mildly distended +BS  MSK: trace pitting edema B/L/cyanosis     65 year-old female smoker with DM, HTN, HLD, breast CA s/p chemo/XRT, SCC, CVA no motor deficits, COPD not on home O2, and NICM (catheterization 02/2019 showed dRPL occlusion without ability to intervene), EF 31% by MUGA 7/2019, s/p SubQ ICD 10/2019, AAA came to ED for dyspnea.    #Dyspnea: likely CHF exacerbation given BNP >15571 and pulmonary congestion seen on CTA. CTA shows no sign of PE. Also less likely COPD exacerbation given no wheezing, and improvement with diuretics. Cardiology on board - appreciate recs. Improved s/p diuresis. Will monitor Is/Os, fluid restriction 1200 ml. Will change to PO tomorrow if improved then as well.    #CAD: with abnormal EKG, elevated trops: EKG showing ?lateral ischemia, consistent with previous EKGs. Trops likely demand ischemia. Trend. Since ICD interrogation shows no events, off tele per cards.    #HTN: resume entresto at lower dose, per cards. Currently improved after amlodipine addition.    #COPD: not on home COPD meds - started albuterol and tiotropium. Titrate down O2.    #DM: A1C 5.8%, HISS    #CVA: pt with prior infarcts, no known residual deficits. Currently mildly confused, AAOX1-2. Monitor, redirect if needed.    #breast CA: c/w home anastrazole. Unclear what her current plan is, or if she follows up with onc. Pt reports her heme/onc "disappeared" last year so she started with a new one. Unable to remember new one. Have consulted Dr. Lee for management of breast cancer, and to see whether or not the rt breast lesion may be associated with the pleural effusions contributing to her SOB.

## 2020-08-29 NOTE — PROGRESS NOTE ADULT - PROBLEM SELECTOR PLAN 3
Cr was 1.63   8/29  CHEN on CKD  Follow Urine lytes.  Still Holding IV fluid in setting of CHF  K was 2.7 today AM. Repleted.   follow BMP 4 PM today   consider renal US if renal function does not improve or worsens. Cr was 1.63   8/29  CHEN on CKD  Will monitor Cr and will adjust frusemide accordingly.   Follow Urine lytes.  Still Holding IV fluid in setting of CHF  K was 2.7 today AM. Repleted.   follow BMP 4 PM today   consider renal US if renal function does not improve or worsens.

## 2020-08-29 NOTE — PROGRESS NOTE ADULT - SUBJECTIVE AND OBJECTIVE BOX
PGY-1 Progress Note discussed with attending    PAGER #: [[phone]] TILL 5:00 PM  PLEASE CONTACT ON CALL TEAM:   - On Call Team (Please refer to Taryn) FROM 5:00 PM - 8:30PM  - Nightfloat Team FROM 8:30 -7:30 AM    CHIEF COMPLAINT & BRIEF HOSPITAL COURSE:  65 year-old female  smoker with DM, HTN, HLD, breast CA s/p chemo/XRT, SCC, CVA no motor deficits, COPD not on home O2, and NICM (catheterization 02/2019 showed dRPL occlusion without ability to intervene), EF 31% by MUGA 7/2019, s/p SubQ ICD 10/2019, AAA came to ED after she was having shortness of breath x 1-2 days. She was going for an xray when they told her to come into the hospital. Patient states that she was feeling nauseous and was having difficulty breathing. Patient was little confused and is very poor historian. Patient states that she smokes 1 PPD and has been smoking for 50 years. She denies chest pain, head ache, dizziness, palpitations LOC, bowel or urinary problems. Does reports she has a cough, non-productive, and vomited once, with clear non-bloody vomit. No sick contacts, no recent travel. Of note pt reports history "a long time ago" of DVT/PE, and was on blood thinners, unsure of time period. Sister also had PE. **      INTERVAL HPI/OVERNIGHT EVENTS:       REVIEW OF SYSTEMS:  CONSTITUTIONAL: No fever, weight loss, or fatigue  RESPIRATORY: No cough, wheezing, chills or hemoptysis; No shortness of breath  CARDIOVASCULAR: No chest pain, palpitations, dizziness, or leg swelling  GASTROINTESTINAL: No abdominal pain. No nausea, vomiting, or hematemesis; No diarrhea or constipation. No melena or hematochezia.  GENITOURINARY: No dysuria or hematuria, urinary frequency  NEUROLOGICAL: No headaches, memory loss, loss of strength, numbness, or tremors  SKIN: No itching, burning, rashes, or lesions     MEDICATIONS  (STANDING):  anastrozole 1 milliGRAM(s) Oral daily  aspirin enteric coated 81 milliGRAM(s) Oral daily  atorvastatin 40 milliGRAM(s) Oral at bedtime  buPROPion XL . 150 milliGRAM(s) Oral daily  clopidogrel Tablet 75 milliGRAM(s) Oral daily  dextrose 5%. 1000 milliLiter(s) (50 mL/Hr) IV Continuous <Continuous>  furosemide   Injectable 40 milliGRAM(s) IV Push two times a day  heparin   Injectable 5000 Unit(s) SubCutaneous every 12 hours  insulin lispro (HumaLOG) corrective regimen sliding scale   SubCutaneous three times a day before meals  insulin lispro (HumaLOG) corrective regimen sliding scale   SubCutaneous at bedtime  metoprolol succinate ER 50 milliGRAM(s) Oral daily  sacubitril 24 mG/valsartan 26 mG 1 Tablet(s) Oral two times a day  tiotropium 18 MICROgram(s) Capsule 1 Capsule(s) Inhalation daily    MEDICATIONS  (PRN):  ALBUTerol    90 MICROgram(s) HFA Inhaler 2 Puff(s) Inhalation every 6 hours PRN Shortness of Breath and/or Wheezing  guaiFENesin   Syrup  (Sugar-Free) 100 milliGRAM(s) Oral every 6 hours PRN Cough      Vital Signs Last 24 Hrs  T(C): 36.8 (29 Aug 2020 11:35), Max: 37.3 (29 Aug 2020 08:23)  T(F): 98.3 (29 Aug 2020 11:35), Max: 99.1 (29 Aug 2020 08:23)  HR: 79 (29 Aug 2020 11:35) (73 - 90)  BP: 146/90 (29 Aug 2020 11:35) (129/71 - 170/126)  BP(mean): --  RR: 18 (29 Aug 2020 11:35) (18 - 20)  SpO2: 100% (29 Aug 2020 11:35) (98% - 100%)    PHYSICAL EXAMINATION:  GENERAL: NAD, well built  HEAD:  Atraumatic, Normocephalic  EYES:  conjunctiva and sclera clear  NECK: Supple, No JVD, Normal thyroid  CHEST/LUNG: Clear to auscultation. Clear to percussion bilaterally; No rales, rhonchi, wheezing, or rubs  HEART: Regular rate and rhythm; No murmurs, rubs, or gallops  ABDOMEN: Soft, Nontender, Nondistended; Bowel sounds present  NERVOUS SYSTEM:  Alert & Oriented X3,    EXTREMITIES:  2+ Peripheral Pulses, No clubbing, cyanosis, or edema  SKIN: warm dry                          10.5   7.74  )-----------( 364      ( 29 Aug 2020 07:45 )             32.8     08-29    142  |  103  |  41<H>  ----------------------------<  172<H>  2.7<LL>   |  26  |  1.63<H>    Ca    9.2      29 Aug 2020 07:45  Phos  3.7     08-29  Mg     2.1     08-29    TPro  8.3  /  Alb  3.6  /  TBili  0.8  /  DBili  x   /  AST  20  /  ALT  14  /  AlkPhos  117  08-28    LIVER FUNCTIONS - ( 28 Aug 2020 06:11 )  Alb: 3.6 g/dL / Pro: 8.3 g/dL / ALK PHOS: 117 U/L / ALT: 14 U/L DA / AST: 20 U/L / GGT: x           CARDIAC MARKERS ( 29 Aug 2020 07:45 )  0.296 ng/mL / x     / x     / x     / x      CARDIAC MARKERS ( 29 Aug 2020 00:37 )  0.307 ng/mL / x     / x     / x     / x      CARDIAC MARKERS ( 28 Aug 2020 16:45 )  0.304 ng/mL / x     / x     / x     / x      CARDIAC MARKERS ( 28 Aug 2020 06:11 )  0.262 ng/mL / x     / x     / x     / x      CARDIAC MARKERS ( 27 Aug 2020 23:37 )  0.262 ng/mL / x     / 100 U/L / x     / <1.0 ng/mL  CARDIAC MARKERS ( 27 Aug 2020 13:30 )  0.233 ng/mL / x     / x     / x     / x                  I&O's Summary    28 Aug 2020 07:01  -  29 Aug 2020 07:00  --------------------------------------------------------  IN: 0 mL / OUT: 200 mL / NET: -200 mL          CAPILLARY BLOOD GLUCOSE  CAPILLARY BLOOD GLUCOSE      POCT Blood Glucose.: 133 mg/dL (29 Aug 2020 11:44)    CAPILLARY BLOOD GLUCOSE      POCT Blood Glucose.: 133 mg/dL (29 Aug 2020 11:44)  POCT Blood Glucose.: 176 mg/dL (29 Aug 2020 07:47)  POCT Blood Glucose.: 165 mg/dL (28 Aug 2020 21:09)  POCT Blood Glucose.: 183 mg/dL (28 Aug 2020 16:47)  POCT Blood Glucose.: 172 mg/dL (28 Aug 2020 13:13)      RADIOLOGY & ADDITIONAL TESTS:

## 2020-08-30 LAB
ALBUMIN SERPL ELPH-MCNC: 3.3 G/DL — LOW (ref 3.5–5)
ALP SERPL-CCNC: 437 U/L — HIGH (ref 40–120)
ALT FLD-CCNC: 24 U/L DA — SIGNIFICANT CHANGE UP (ref 10–60)
ANION GAP SERPL CALC-SCNC: 10 MMOL/L — SIGNIFICANT CHANGE UP (ref 5–17)
AST SERPL-CCNC: 43 U/L — HIGH (ref 10–40)
BILIRUB SERPL-MCNC: 1.1 MG/DL — SIGNIFICANT CHANGE UP (ref 0.2–1.2)
BUN SERPL-MCNC: 58 MG/DL — HIGH (ref 7–18)
CALCIUM SERPL-MCNC: 8.9 MG/DL — SIGNIFICANT CHANGE UP (ref 8.4–10.5)
CHLORIDE SERPL-SCNC: 105 MMOL/L — SIGNIFICANT CHANGE UP (ref 96–108)
CO2 SERPL-SCNC: 25 MMOL/L — SIGNIFICANT CHANGE UP (ref 22–31)
CREAT ?TM UR-MCNC: 215 MG/DL — SIGNIFICANT CHANGE UP
CREAT ?TM UR-MCNC: 241 MG/DL — SIGNIFICANT CHANGE UP
CREAT SERPL-MCNC: 2.06 MG/DL — HIGH (ref 0.5–1.3)
GLUCOSE BLDC GLUCOMTR-MCNC: 122 MG/DL — HIGH (ref 70–99)
GLUCOSE BLDC GLUCOMTR-MCNC: 135 MG/DL — HIGH (ref 70–99)
GLUCOSE BLDC GLUCOMTR-MCNC: 160 MG/DL — HIGH (ref 70–99)
GLUCOSE SERPL-MCNC: 135 MG/DL — HIGH (ref 70–99)
MICROALBUMIN UR-MCNC: 4.1 MG/DL — SIGNIFICANT CHANGE UP
MICROALBUMIN/CREAT UR-RTO: 19 MG/G — SIGNIFICANT CHANGE UP (ref 0–30)
OSMOLALITY UR: 711 MOS/KG — SIGNIFICANT CHANGE UP (ref 50–1200)
POTASSIUM SERPL-MCNC: 3.8 MMOL/L — SIGNIFICANT CHANGE UP (ref 3.5–5.3)
POTASSIUM SERPL-SCNC: 3.8 MMOL/L — SIGNIFICANT CHANGE UP (ref 3.5–5.3)
PROT SERPL-MCNC: 7.4 G/DL — SIGNIFICANT CHANGE UP (ref 6–8.3)
SODIUM SERPL-SCNC: 140 MMOL/L — SIGNIFICANT CHANGE UP (ref 135–145)
SODIUM UR-SCNC: 27 MMOL/L — SIGNIFICANT CHANGE UP

## 2020-08-30 PROCEDURE — 99232 SBSQ HOSP IP/OBS MODERATE 35: CPT

## 2020-08-30 RX ORDER — ONDANSETRON 8 MG/1
4 TABLET, FILM COATED ORAL ONCE
Refills: 0 | Status: COMPLETED | OUTPATIENT
Start: 2020-08-30 | End: 2020-08-30

## 2020-08-30 RX ADMIN — Medication 50 MILLIGRAM(S): at 05:29

## 2020-08-30 RX ADMIN — ANASTROZOLE 1 MILLIGRAM(S): 1 TABLET ORAL at 11:24

## 2020-08-30 RX ADMIN — BUPROPION HYDROCHLORIDE 150 MILLIGRAM(S): 150 TABLET, EXTENDED RELEASE ORAL at 11:24

## 2020-08-30 RX ADMIN — ONDANSETRON 4 MILLIGRAM(S): 8 TABLET, FILM COATED ORAL at 11:23

## 2020-08-30 RX ADMIN — TIOTROPIUM BROMIDE 1 CAPSULE(S): 18 CAPSULE ORAL; RESPIRATORY (INHALATION) at 11:24

## 2020-08-30 RX ADMIN — SACUBITRIL AND VALSARTAN 1 TABLET(S): 24; 26 TABLET, FILM COATED ORAL at 05:28

## 2020-08-30 RX ADMIN — HEPARIN SODIUM 5000 UNIT(S): 5000 INJECTION INTRAVENOUS; SUBCUTANEOUS at 19:00

## 2020-08-30 RX ADMIN — SACUBITRIL AND VALSARTAN 1 TABLET(S): 24; 26 TABLET, FILM COATED ORAL at 19:00

## 2020-08-30 RX ADMIN — Medication 81 MILLIGRAM(S): at 11:23

## 2020-08-30 RX ADMIN — ATORVASTATIN CALCIUM 40 MILLIGRAM(S): 80 TABLET, FILM COATED ORAL at 22:07

## 2020-08-30 RX ADMIN — CLOPIDOGREL BISULFATE 75 MILLIGRAM(S): 75 TABLET, FILM COATED ORAL at 11:23

## 2020-08-30 NOTE — PROGRESS NOTE ADULT - PROBLEM SELECTOR PLAN 5
-Bp ranging 130-145/90's  -Pt is currently on Entresto 24-26 mg, Metoprolol succinate 50 mg daily  -Monitor BP, hold medications with parameters

## 2020-08-30 NOTE — PROGRESS NOTE ADULT - PROBLEM SELECTOR PLAN 2
Cr was 1.63   8/29, ordered for CMP around noon today as pt refused morning labs, to follow up creatinine  CHEN on CKD  Will monitor Cr, IV Lasix d/c'ed as creatinine is uptrending  Follow Urine lytes.  Still Holding IV fluid in setting of CHF  K was 2.7 today AM. Repleted.   follow BMP 4 PM today   consider renal US if renal function does not improve or worsens. CHEN on CKD, possibly also from lasix  Will monitor Cr, IV Lasix d/c'ed as creatinine is uptrending  Follow Urine lytes.  Still Holding IV fluid in setting of CHF  consider renal US if renal function does not improve or worsens.

## 2020-08-30 NOTE — PROGRESS NOTE ADULT - PROBLEM SELECTOR PLAN 7
Continue with Albuterol inhaler PRN q 6.  Patient is on 3L NC. will taper it down through the day.  Will monitor vitals

## 2020-08-30 NOTE — PROGRESS NOTE ADULT - PROBLEM SELECTOR PLAN 1
- BNP on admission 35,000  -Troponin I trending down 0.304>0.307>0.296  - IV Lasix d/c'ed as pt's creatinine levels are uptrending  -daily weights, strict Is/Os  - I&Os negative 200 yesterday, Input 480 ml, output 100 ml as of 1 pm today. Will monitor closely  - DASH/TLC diet  - Dr. Doshi is on board  -Echo 8/29 showing EF 15-20% with severe left ventricular systolic and diastolic dysfunction. Pt has ICD placed 10/2019 ([employer]), interrogated, no events    -Pt saturating 100% on 3 L O2 NC, plan to taper off oxygen levels - BNP on admission 35,000  -Troponin I trending down 0.304>0.307>0.296  - IV Lasix d/c'ed as pt's creatinine levels are uptrending  -daily weights, strict Is/Os  - I&Os negative 200 yesterday, Input 480 ml, output 100 ml as of 1 pm today. Will monitor closely, although may be due to inaccurate tracking  - DASH/TLC diet  -Echo 8/29 showing EF 15-20% with severe left ventricular systolic and diastolic dysfunction. Pt has ICD placed 10/2019 ([employer]), interrogated, no events. Per discussion with Dr. Doshi no indication for cardiac cath to r/o ischemic etiology for worsening HF.  -Pt saturating 100% on 3 L O2 NC, plan to taper off oxygen levels

## 2020-08-30 NOTE — PROGRESS NOTE ADULT - SUBJECTIVE AND OBJECTIVE BOX
PGY-1 Progress Note discussed with attending    PAGER #: [[phone]] TILL 5:00 PM  PLEASE CONTACT ON CALL TEAM:  - On Call Team (Please refer to Taryn) FROM 5:00 PM - 8:30PM  - Nightfloat Team FROM 8:30 -7:30 AM    CHIEF COMPLAINT & BRIEF HOSPITAL COURSE: Patient is a 65y old  Female who presents with a chief complaint of Dyspnea (28 Aug 2020 09:30)    HPI - 65 year-old female  smoker with DM, HTN, HLD, breast CA s/p chemo/XRT, SCC, CVA no motor deficits, COPD not on home O2, and NICM (catheterization 02/2019 showed dRPL occlusion without ability to intervene), EF 31% by MUGA 7/2019, s/p SubQ ICD 10/2019, AAA came to ED after she was having shortness of breath x 1-2 days. She was going for an xray when they told her to come into the hospital. Patient states that she was feeling nauseous and was having difficulty breathing. Patient was little confused and is very poor historian. Patient states that she smokes 1 PPD and has been smoking for 50 years. She denies chest pain, head ache, dizziness, palpitations LOC, bowel or urinary problems. Does reports she has a cough, non-productive, and vomited once, with clear non-bloody vomit. No sick contacts, no recent travel. Of note pt reports history "a long time ago" of DVT/PE, and was on blood thinners, unsure of time period. Sister also had PE.    GOC : full code.  Admitted for Dyspnea workup, BNP >35,000 was on IV Lasix, discontinued 8/29 due to rising creatinine levels. Echo form 8/29 with EF 15-20%, troponins downtrending. Pt saturating 100% on 3 L NC.       INTERVAL HPI/OVERNIGHT EVENTS: Pt was examined at bedside this morning, no c/o chest pain or SOB- pt saturating well on 3 L NC, will try tapering off 02. Will monitor I/O's closely and do CMP to monitor creatinine trend (pt refused AM labs, CMP ordered routine at 12 pm). Intake 480 ml, voided 100 ml today as of 1 pm.    MEDICATIONS  (STANDING):  anastrozole 1 milliGRAM(s) Oral daily  aspirin enteric coated 81 milliGRAM(s) Oral daily  atorvastatin 40 milliGRAM(s) Oral at bedtime  buPROPion XL . 150 milliGRAM(s) Oral daily  clopidogrel Tablet 75 milliGRAM(s) Oral daily  dextrose 5%. 1000 milliLiter(s) (50 mL/Hr) IV Continuous <Continuous>  heparin   Injectable 5000 Unit(s) SubCutaneous every 12 hours  insulin lispro (HumaLOG) corrective regimen sliding scale   SubCutaneous three times a day before meals  insulin lispro (HumaLOG) corrective regimen sliding scale   SubCutaneous at bedtime  metoprolol succinate ER 50 milliGRAM(s) Oral daily  sacubitril 24 mG/valsartan 26 mG 1 Tablet(s) Oral two times a day  tiotropium 18 MICROgram(s) Capsule 1 Capsule(s) Inhalation daily    MEDICATIONS  (PRN):  ALBUTerol    90 MICROgram(s) HFA Inhaler 2 Puff(s) Inhalation every 6 hours PRN Shortness of Breath and/or Wheezing  guaiFENesin   Syrup  (Sugar-Free) 100 milliGRAM(s) Oral every 6 hours PRN Cough      REVIEW OF SYSTEMS:  CONSTITUTIONAL: No fever, weight loss, or fatigue  RESPIRATORY: No cough, wheezing, chills or hemoptysis; No shortness of breath  CARDIOVASCULAR: No chest pain, palpitations, dizziness, or leg swelling  GASTROINTESTINAL: No abdominal pain. No nausea, vomiting, or hematemesis; No diarrhea or constipation. No melena or hematochezia.  GENITOURINARY: No dysuria or hematuria, urinary frequency  NEUROLOGICAL: No headaches, memory loss, loss of strength, numbness, or tremors  SKIN: No itching, burning, rashes, or lesions     Vital Signs Last 24 Hrs  T(C): 36.4 (30 Aug 2020 10:30), Max: 37 (29 Aug 2020 15:06)  T(F): 97.6 (30 Aug 2020 10:30), Max: 98.6 (29 Aug 2020 15:06)  HR: 74 (30 Aug 2020 10:30) (71 - 85)  BP: 133/96 (30 Aug 2020 10:30) (132/113 - 147/105)  BP(mean): --  RR: 18 (30 Aug 2020 10:30) (16 - 18)  SpO2: 100% (30 Aug 2020 10:30) (94% - 100%)    PHYSICAL EXAMINATION:  GENERAL: Pt lying comfortably with 3 L Oxygen through NC. No acute distress noted  HEAD:  Atraumatic, Normocephalic  EYES:  conjunctiva and sclera clear  NECK: Supple, No JVD, Normal thyroid  CHEST/LUNG: Clear to auscultation. Clear to percussion bilaterally; No rales, rhonchi, wheezing, or rubs  HEART: Regular rate and rhythm; No murmurs, rubs, or gallops  ABDOMEN: Soft, Nontender, Nondistended; Bowel sounds present  NERVOUS SYSTEM:  Alert & Oriented X3,    EXTREMITIES:  2+ Peripheral Pulses, No pedal edema noted  SKIN: warm dry                          10.5   7.74  )-----------( 364      ( 29 Aug 2020 07:45 )             32.8     08-29    141  |  107  |  49<H>  ----------------------------<  142<H>  3.6   |  24  |  1.94<H>    Ca    8.9      29 Aug 2020 18:15  Phos  3.7     08-29  Mg     2.1     08-29        CARDIAC MARKERS ( 29 Aug 2020 07:45 )  0.296 ng/mL / x     / x     / x     / x      CARDIAC MARKERS ( 29 Aug 2020 00:37 )  0.307 ng/mL / x     / x     / x     / x      CARDIAC MARKERS ( 28 Aug 2020 16:45 )  0.304 ng/mL / x     / x     / x     / x              CAPILLARY BLOOD GLUCOSE      RADIOLOGY & ADDITIONAL TESTS:  Echo 8/29- EF 15-20%, severe global LV systolic dysfunction and diastolic dysfunction  < from: Transthoracic Echocardiogram (08.29.20 @ 07:23) >    Derived Variables:  LVMI: 196 g/m2  RWT: 0.42  Ejection Fraction Visual Estimate: 15-20 %    ------------------------------------------------------------------------  OBSERVATIONS:  Mitral Valve: Tethered mitral valve leaflets. Moderate to  severe mitral regurgitation.  Aortic Root: Normal aortic root.  Aortic Valve: Normal trileaflet aortic valve.  Left Atrium: Severely dilated left atrium.  LA volume index  = 63 cc/m2.  Left Ventricle: Severe global left ventricular systolic  dysfunction.   Severe diastolic dysfunction (stage III-IV).  Severe left ventricular enlargement.  Right Heart: Normal right atrium. Normal right ventricular  size with decreased RV systolic function. (TAPSE 1.5 cm)  There is moderate tricuspid regurgitation. There is mild  pulmonic regurgitation.  Pericardium/PleuraNormal pericardium with no pericardial  effusion.  Hemodynamic: RA Pressure is 10 mm Hg. RV systolic pressure  is 51 mm Hg. Moderate pulmonary hypertension.  ------------------------------------------------------------------------  CONCLUSIONS:  1. Tethered mitral valve leaflets. Moderate to severe  mitral regurgitation.  2. Severely dilated left atrium.  LA volume index = 63  cc/m2.  3. Severe left ventricular enlargement.  4. Severe global left ventricular systolic dysfunction.  Severe diastolic dysfunction (stage III-IV).  5. Normal right ventricular size with decreased RV systolic  function. (TAPSE 1.5 cm)  6. RV systolic pressure is 51 mm Hg. Moderate pulmonary  hypertension.    ------------------------------------------------------------------------  Confirmed on  8/29/2020 - 22:40:57 by Sebas Osman MD  ------------------------------------------------------------------------    < end of copied text >

## 2020-08-30 NOTE — PROGRESS NOTE ADULT - SUBJECTIVE AND OBJECTIVE BOX
Contact info:  Hillary Richard MD  Internal Medicine, PGY3  Pager: [phone] (NS)/[phone] (JALEESA)    M-F 7AM-7PM: pager covered by primary day team  Mon-Sun 7PM-7AM: Night float page [phone] for teams 1-3, [phone] for teams 4, CIRO, CMB  Sa-Sun 7AM-12PM: please see contact sheet in front of chart and/or Provider to RN, primary day team  Sa-Sun 12PM-7PM: Page [phone] for teams 1-4 (NS), primary team for CMA/LAKSHMI, JALEESA please page covering resident    24 HOUR EVENTS/ROS:    MEDICATIONS:  aspirin enteric coated 81 milliGRAM(s) Oral daily  clopidogrel Tablet 75 milliGRAM(s) Oral daily  heparin   Injectable 5000 Unit(s) SubCutaneous every 12 hours  metoprolol succinate ER 50 milliGRAM(s) Oral daily  sacubitril 24 mG/valsartan 26 mG 1 Tablet(s) Oral two times a day      ALBUTerol    90 MICROgram(s) HFA Inhaler 2 Puff(s) Inhalation every 6 hours PRN  guaiFENesin   Syrup  (Sugar-Free) 100 milliGRAM(s) Oral every 6 hours PRN  tiotropium 18 MICROgram(s) Capsule 1 Capsule(s) Inhalation daily    buPROPion XL . 150 milliGRAM(s) Oral daily  ondansetron    Tablet 4 milliGRAM(s) Oral once      atorvastatin 40 milliGRAM(s) Oral at bedtime  insulin lispro (HumaLOG) corrective regimen sliding scale   SubCutaneous three times a day before meals  insulin lispro (HumaLOG) corrective regimen sliding scale   SubCutaneous at bedtime    anastrozole 1 milliGRAM(s) Oral daily  dextrose 5%. 1000 milliLiter(s) IV Continuous <Continuous>      PHYSICAL EXAM:  T(C): 36.9 (20 @ 04:58), Max: 37 (20 @ 15:06)  HR: 81 (20 @ 06:42) (71 - 85)  BP: 145/94 (20 @ 06:42) (132/113 - 152/98)  RR: 18 (20 @ 04:58) (16 - 18)  SpO2: 94% (20 @ 04:58) (94% - 100%)  Wt(kg): --  Daily     Daily Weight in k.1 (30 Aug 2020 04:58)  I&O's Summary    29 Aug 2020 07:01  -  30 Aug 2020 07:00  --------------------------------------------------------  IN: 0 mL / OUT: 1 mL / NET: -1 mL      TELEMETRY:     Appearance: NAD	  HEENT:   Normal oral mucosa, PERRL, EOMI	  Lymphatic: No lymphadenopathy  Cardiovascular: Normal S1 S2, No JVD, No murmurs, No edema  Respiratory: Lungs clear to auscultation	  Neuro/psych: Grossly non-focal, CN 2-12 intact, AAOX3, mood and affect normal  Gastrointestinal:  Soft, Non-tender, + BS	  Skin: No rashes, No ecchymoses, No cyanosis	  Extremities: Normal range of motion, No clubbing, cyanosis or edema  Vascular: Peripheral pulses palpable 2+ bilaterally    LABS:	 	                        10.5   7.74  )-----------( 364      ( 29 Aug 2020 07:45 )             32.8         141  |  107  |  49<H>  ----------------------------<  142<H>  3.6   |  24  |  1.94<H>      142  |  103  |  41<H>  ----------------------------<  172<H>  2.7<LL>   |  26  |  1.63<H>    Ca    8.9      29 Aug 2020 18:15  Ca    9.2      29 Aug 2020 07:45  Phos  3.7       Mg     2.1           proBNP: Serum Pro-Brain Natriuretic Peptide: >44676 pg/mL ( @ 23:37)    Lipid Profile:   HgA1c:   TSH:   FS: CAPILLARY BLOOD GLUCOSE      POCT Blood Glucose.: 135 mg/dL (30 Aug 2020 07:30)  POCT Blood Glucose.: 121 mg/dL (29 Aug 2020 21:56)  POCT Blood Glucose.: 155 mg/dL (29 Aug 2020 16:32)  POCT Blood Glucose.: 133 mg/dL (29 Aug 2020 11:44)    BCX/UCX:   Coags:     CARDIAC MARKERS:  Troponin I, Serum: 0.296 ng/mL ( @ 07:45)  Troponin I, Serum: 0.307 ng/mL ( @ 00:37)  Troponin I, Serum: 0.304 ng/mL ( @ 16:45)  Troponin I, Serum: 0.262 ng/mL ( @ 06:11)  Troponin I, Serum: 0.262 ng/mL ( @ 23:37)  Troponin I, Serum: 0.233 ng/mL ( @ 13:30)            	    ECG:  	  RADIOLOGY:    Consult notes reviewed:  Care discussed with providers:

## 2020-08-30 NOTE — PROGRESS NOTE ADULT - ASSESSMENT
65 year-old female  smoker with DM, HTN, HLD, breast CA s/p chemo/XRT, COPD not on home O2, CVA no motor deficits, and NICM (catheterization 02/2019 showed dRPL occlusion without ability to intervene), EF 31% by MUGA 7/2019, s/p SubQ ICD 10/2019, AAA came to ED for dyspnea with BNP of 35,000, Echo 8/29 showing EF 15-20% with severe left ventricular systolic and diastolic dysfunction, admitted for CHF management.

## 2020-08-30 NOTE — PROGRESS NOTE ADULT - PROBLEM SELECTOR PLAN 9
pt can make decisions but has HCP also - Jaki Garland - [phone]  -- spoke with HCP - updated pts condition

## 2020-08-30 NOTE — PROGRESS NOTE ADULT - ATTENDING COMMENTS
I have personally seen, examined, and participated in the care of this patient. I have reviewed all pertinent clinical information, including history, physical exam, plan and medical student/resident/PA/NP note, and agree, with my independent findings and conclusions as noted:     On physical exam:  Gen: NAD   Chest/CV: RRR, +2 peripheral pulses  Pulm: reduced lung sounds B/L  Abd: soft, NT, mildly distended +BS  MSK: no edema, no clubbing, no cyanosis    65 year-old female smoker with DM, HTN, HLD, breast CA s/p chemo/XRT, SCC, CVA no motor deficits, COPD not on home O2, and NICM (catheterization 02/2019 showed dRPL occlusion without ability to intervene), EF 31% by MUGA 7/2019, s/p SubQ ICD 10/2019, AAA came to ED for dyspnea.    #Dyspnea: likely CHF exacerbation given BNP >15541 and pulmonary congestion seen on CTA. CTA shows no sign of PE. Also less likely COPD exacerbation given no wheezing, and improvement with diuretics. Cardiology on board - appreciate recs. Improved s/p diuresis. Will monitor Is/Os, fluid restriction 1200 ml. DC'd lasix due to uptrending creatinine. TTE shows EF 15-20%, down from 30% last year; per Dr. Doshi no catheterization intervention needed.    #CAD: with abnormal EKG, elevated trops: EKG showing ?lateral ischemia, consistent with previous EKGs. Trops likely demand ischemia. Trend. Since ICD interrogation shows no events, off tele per cards.    #HTN: resume entresto at lower dose, per cards. c/w metoprolol.    #COPD: not on home COPD meds - started albuterol and tiotropium. Titrate down O2.    #DM: A1C 5.8%, HISS Controlled in hospital.    #CVA: pt with prior infarcts, no known residual deficits. Currently mildly confused, AAOX1-2. Monitor, redirect if needed.    #breast CA: c/w home anastrazole. Following Dr. Serrato.

## 2020-08-31 DIAGNOSIS — N17.9 ACUTE KIDNEY FAILURE, UNSPECIFIED: ICD-10-CM

## 2020-08-31 DIAGNOSIS — R74.8 ABNORMAL LEVELS OF OTHER SERUM ENZYMES: ICD-10-CM

## 2020-08-31 LAB
ANION GAP SERPL CALC-SCNC: 10 MMOL/L — SIGNIFICANT CHANGE UP (ref 5–17)
APTT BLD: 25.4 SEC — LOW (ref 27.5–35.5)
BUN SERPL-MCNC: 56 MG/DL — HIGH (ref 7–18)
CALCIUM SERPL-MCNC: 8.8 MG/DL — SIGNIFICANT CHANGE UP (ref 8.4–10.5)
CHLORIDE SERPL-SCNC: 103 MMOL/L — SIGNIFICANT CHANGE UP (ref 96–108)
CO2 SERPL-SCNC: 28 MMOL/L — SIGNIFICANT CHANGE UP (ref 22–31)
CREAT SERPL-MCNC: 1.91 MG/DL — HIGH (ref 0.5–1.3)
GGT SERPL-CCNC: 656 U/L — HIGH (ref 8–40)
GGT SERPL-CCNC: 701 U/L — HIGH (ref 8–40)
GLUCOSE BLDC GLUCOMTR-MCNC: 135 MG/DL — HIGH (ref 70–99)
GLUCOSE BLDC GLUCOMTR-MCNC: 136 MG/DL — HIGH (ref 70–99)
GLUCOSE BLDC GLUCOMTR-MCNC: 163 MG/DL — HIGH (ref 70–99)
GLUCOSE BLDC GLUCOMTR-MCNC: 175 MG/DL — HIGH (ref 70–99)
GLUCOSE SERPL-MCNC: 119 MG/DL — HIGH (ref 70–99)
HCT VFR BLD CALC: 31.8 % — LOW (ref 34.5–45)
HGB BLD-MCNC: 10.2 G/DL — LOW (ref 11.5–15.5)
INR BLD: 1.43 RATIO — HIGH (ref 0.88–1.16)
MAGNESIUM SERPL-MCNC: 2.3 MG/DL — SIGNIFICANT CHANGE UP (ref 1.6–2.6)
MCHC RBC-ENTMCNC: 29.5 PG — SIGNIFICANT CHANGE UP (ref 27–34)
MCHC RBC-ENTMCNC: 32.1 GM/DL — SIGNIFICANT CHANGE UP (ref 32–36)
MCV RBC AUTO: 91.9 FL — SIGNIFICANT CHANGE UP (ref 80–100)
NRBC # BLD: 0 /100 WBCS — SIGNIFICANT CHANGE UP (ref 0–0)
PHOSPHATE SERPL-MCNC: 3.7 MG/DL — SIGNIFICANT CHANGE UP (ref 2.5–4.5)
PLATELET # BLD AUTO: 293 K/UL — SIGNIFICANT CHANGE UP (ref 150–400)
POTASSIUM SERPL-MCNC: 3.6 MMOL/L — SIGNIFICANT CHANGE UP (ref 3.5–5.3)
POTASSIUM SERPL-SCNC: 3.6 MMOL/L — SIGNIFICANT CHANGE UP (ref 3.5–5.3)
PROTHROM AB SERPL-ACNC: 16.5 SEC — HIGH (ref 10.6–13.6)
RBC # BLD: 3.46 M/UL — LOW (ref 3.8–5.2)
RBC # FLD: 14.7 % — HIGH (ref 10.3–14.5)
SODIUM SERPL-SCNC: 141 MMOL/L — SIGNIFICANT CHANGE UP (ref 135–145)
WBC # BLD: 5.65 K/UL — SIGNIFICANT CHANGE UP (ref 3.8–10.5)
WBC # FLD AUTO: 5.65 K/UL — SIGNIFICANT CHANGE UP (ref 3.8–10.5)

## 2020-08-31 PROCEDURE — 99232 SBSQ HOSP IP/OBS MODERATE 35: CPT | Mod: GC

## 2020-08-31 PROCEDURE — 99233 SBSQ HOSP IP/OBS HIGH 50: CPT

## 2020-08-31 PROCEDURE — 76705 ECHO EXAM OF ABDOMEN: CPT | Mod: 26

## 2020-08-31 RX ORDER — ACETAMINOPHEN 500 MG
650 TABLET ORAL ONCE
Refills: 0 | Status: COMPLETED | OUTPATIENT
Start: 2020-08-31 | End: 2020-08-31

## 2020-08-31 RX ORDER — MECLIZINE HCL 12.5 MG
12.5 TABLET ORAL DAILY
Refills: 0 | Status: DISCONTINUED | OUTPATIENT
Start: 2020-08-31 | End: 2020-09-09

## 2020-08-31 RX ORDER — ALBUTEROL 90 UG/1
2 AEROSOL, METERED ORAL EVERY 6 HOURS
Refills: 0 | Status: DISCONTINUED | OUTPATIENT
Start: 2020-08-31 | End: 2020-09-09

## 2020-08-31 RX ADMIN — Medication 50 MILLIGRAM(S): at 06:03

## 2020-08-31 RX ADMIN — Medication 650 MILLIGRAM(S): at 19:26

## 2020-08-31 RX ADMIN — HEPARIN SODIUM 5000 UNIT(S): 5000 INJECTION INTRAVENOUS; SUBCUTANEOUS at 17:01

## 2020-08-31 RX ADMIN — CLOPIDOGREL BISULFATE 75 MILLIGRAM(S): 75 TABLET, FILM COATED ORAL at 13:45

## 2020-08-31 RX ADMIN — BUPROPION HYDROCHLORIDE 150 MILLIGRAM(S): 150 TABLET, EXTENDED RELEASE ORAL at 13:46

## 2020-08-31 RX ADMIN — Medication 650 MILLIGRAM(S): at 20:30

## 2020-08-31 RX ADMIN — SACUBITRIL AND VALSARTAN 1 TABLET(S): 24; 26 TABLET, FILM COATED ORAL at 06:03

## 2020-08-31 RX ADMIN — HEPARIN SODIUM 5000 UNIT(S): 5000 INJECTION INTRAVENOUS; SUBCUTANEOUS at 06:03

## 2020-08-31 RX ADMIN — ANASTROZOLE 1 MILLIGRAM(S): 1 TABLET ORAL at 13:45

## 2020-08-31 RX ADMIN — Medication 1: at 17:01

## 2020-08-31 RX ADMIN — Medication 81 MILLIGRAM(S): at 13:46

## 2020-08-31 RX ADMIN — ALBUTEROL 2 PUFF(S): 90 AEROSOL, METERED ORAL at 14:15

## 2020-08-31 RX ADMIN — ATORVASTATIN CALCIUM 40 MILLIGRAM(S): 80 TABLET, FILM COATED ORAL at 21:33

## 2020-08-31 RX ADMIN — TIOTROPIUM BROMIDE 1 CAPSULE(S): 18 CAPSULE ORAL; RESPIRATORY (INHALATION) at 13:46

## 2020-08-31 RX ADMIN — ALBUTEROL 2 PUFF(S): 90 AEROSOL, METERED ORAL at 21:35

## 2020-08-31 RX ADMIN — SACUBITRIL AND VALSARTAN 1 TABLET(S): 24; 26 TABLET, FILM COATED ORAL at 17:01

## 2020-08-31 NOTE — PROGRESS NOTE ADULT - PROBLEM SELECTOR PLAN 2
- Creatinine was uptrending, hence IV lasix d/c'ed 8/29  - Started on Low dose Entresto, hold Spironolactone  - Creatinine today 1.94 (improving from 2 yesterday)  - Monitor BMP daily  - Avoid Nephrotoxins - Creatinine was uptrending, hence IV lasix d/c'ed 8/29  - Resumed Low dose Entresto, hold Spironolactone  - Creatinine today 1.94 (improving from 2 yesterday)  - Monitor BMP daily  - Avoid Nephrotoxins

## 2020-08-31 NOTE — PROGRESS NOTE ADULT - ATTENDING COMMENTS
I have personally seen, examined, and participated in the care of this patient. I have reviewed all pertinent clinical information, including history, physical exam, plan and medical student/resident/PA/NP:    On physical exam:  Gen: NAD, complaining of nausea/vomiting  Chest/CV: RRR, +2 peripheral pulses  Pulm: reduced lung sounds B/L  Abd: soft, NT, mildly distended +BS  MSK: no B/L edema  Neuro: AAOX2    65 year-old female smoker with DM, HTN, HLD, breast CA s/p chemo/XRT, SCC, CVA no motor deficits, COPD not on home O2, and NICM (catheterization 02/2019 showed dRPL occlusion without ability to intervene), EF 31% by MUGA 7/2019, s/p SubQ ICD 10/2019, AAA came to ED for dyspnea.    #Dyspnea: likely CHF exacerbation given BNP >41573 and pulmonary congestion seen on CTA. CTA shows no sign of PE. Diuresed with lasix, but holding due to CHNE on CKD. Improved today, breathing improved off O2. Will monitor.    #NICM s/p ICD: Cards following, ICD shows no events. TTE shows 15-20% EF, lower than previous. Per cards moderate to severe MR new, and may be benefitted with a mitraclip - will order ANGELINA to evaluate further. In the meanwhile, c/w metoprolol, Entresto at lower dose, holding spironolactone and lasix for CHEN.    #CHEN: likely 2/2 diuresis. Will hold lasix for now, avoid nephrotoxins, hold spironolactone.    #HTN: resume entresto at lower dose, per cards.     #COPD: not on home COPD meds - started albuterol and tiotropium. Titrate down O2.    #DM: A1C 5.8%, HISS FSBG controlled.    #CVA: pt with prior infarcts, no known residual deficits. Currently mildly confused, AAOX1-2. Monitor, redirect if needed.    #breast CA: c/w home anastrazole    #Dispo: pending ANGELINA, nutrition consult, PT I have personally seen, examined, and participated in the care of this patient. I have reviewed all pertinent clinical information, including history, physical exam, plan and medical student/resident/PA/NP:    On physical exam:  Gen: NAD, complaining of nausea/vomiting  Chest/CV: RRR, +2 peripheral pulses  Pulm: reduced lung sounds B/L  Abd: soft, NT, mildly distended +BS  MSK: no B/L edema  Neuro: AAOX2    65 year-old female smoker with DM, HTN, HLD, breast CA s/p chemo/XRT, SCC, CVA no motor deficits, COPD not on home O2, and NICM (catheterization 02/2019 showed dRPL occlusion without ability to intervene), EF 31% by MUGA 7/2019, s/p SubQ ICD 10/2019, AAA came to ED for dyspnea.    #Dyspnea: likely CHF exacerbation given BNP >54503 and pulmonary congestion seen on CTA. CTA shows no sign of PE. Diuresed with lasix, but holding due to CHEN on CKD. Improved today, breathing improved off O2. Will monitor.    #NICM s/p ICD: Cards following, ICD shows no events. TTE shows 15-20% EF, lower than previous. Per cards moderate to severe MR new, and may be benefitted with a mitraclip - will order ANGELINA to evaluate further. In the meanwhile, c/w metoprolol, Entresto at lower dose, holding spironolactone and lasix for CHEN.    #CHEN: likely 2/2 diuresis. Will hold lasix for now, avoid nephrotoxins, hold spironolactone.    #Transaminitis: alk phos, GGT elevated. RUQ US showing mild fatty liver - may be source. Pt complaining of mild LLQ aching, no guarding, no rebound on exam. Will monitor LFTs for now, consider CT A/P if no improvement tomorrow.    #HTN: resume entresto at lower dose, per cards.     #COPD: not on home COPD meds - started albuterol and tiotropium. Titrate down O2.    #DM: A1C 5.8%, HISS FSBG controlled.    #CVA: pt with prior infarcts, no known residual deficits. Currently mildly confused, AAOX1-2. Monitor, redirect if needed.    #breast CA: c/w home anastrazole    #Dispo: pending ANGELINA, nutrition consult, PT I have personally seen, examined, and participated in the care of this patient. I have reviewed all pertinent clinical information, including history, physical exam, plan and medical student/resident/PA/NP:    On physical exam:  Gen: NAD, complaining of nausea/vomiting  Chest/CV: RRR, +2 peripheral pulses  Pulm: reduced lung sounds B/L  Abd: soft, NT, mildly distended +BS  MSK: no B/L edema  Neuro: AAOX2    65 year-old female smoker with DM, HTN, HLD, breast CA s/p chemo/XRT, SCC, CVA no motor deficits, COPD not on home O2, and NICM (catheterization 02/2019 showed dRPL occlusion without ability to intervene), EF 31% by MUGA 7/2019, s/p SubQ ICD 10/2019, AAA came to ED for dyspnea.    #Dyspnea: likely CHF exacerbation given BNP >17469 and pulmonary congestion seen on CTA. CTA shows no sign of PE. Diuresed with lasix, but holding due to CHEN on CKD. Improved today, breathing improved off O2. Will monitor.    #NICM s/p ICD: Cards following, ICD shows no events. TTE shows 15-20% EF, lower than previous. Per cards moderate to severe MR new, and may be benefitted with a mitraclip - will order ANGELINA to evaluate further. In the meanwhile, c/w metoprolol, Entresto at lower dose, holding spironolactone and lasix for CHEN.    #CHEN: likely 2/2 diuresis. Will hold lasix for now, avoid nephrotoxins, hold spironolactone.    #Transaminitis: alk phos, GGT elevated. RUQ US showing mild fatty liver - may be source. Pt complaining of mild LLQ aching, no guarding, no rebound on exam, soft, BP. Will monitor LFTs for now, consider CT A/P w/contrast to eval for worsening AAA if clinical exam shows no improvement, renal function improves tomorrow.    #HTN: resume entresto at lower dose, per cards.     #COPD: not on home COPD meds - started albuterol and tiotropium. Titrate down O2.    #DM: A1C 5.8%, HISS FSBG controlled.    #CVA: pt with prior infarcts, no known residual deficits. Currently mildly confused, AAOX1-2. Monitor, redirect if needed.    #breast CA: c/w home anastrazole    #Dispo: pending ANGELINA, nutrition consult, PT

## 2020-08-31 NOTE — PROGRESS NOTE ADULT - PROBLEM SELECTOR PLAN 8
pt can make decisions but has HCP also - Jaki Garland - [phone]  -- spoke with HCP - updated pts condition continue heparin for DVT ppx

## 2020-08-31 NOTE — PROGRESS NOTE ADULT - SUBJECTIVE AND OBJECTIVE BOX
PRESENTING CC: Shortness of breath    SUBJ:     In summary, this is a 64-year-old female former smoker with DM, HTN, HLD, breast CA s/p chemo/XRT, and NICM (catheterization 02/2019 showed dRPL occlusion without ability to intervene), EF 31% by MUGA 7/2019, s/p [employer] SubQ ICD 10/2019, who presented with dyspnea in the setting of acute on chronic, decompensated, systolic HF exacerbation.     Overnight, there were no acute events. Patient reports       ------------------------  PMH: As above, also Knee effusion, left: arthrocentesis- 05/25/16, Internal carotid aneurysm, Abdominal aneurysm: infrarenal- followed by Vascular , Dr. Gutierrez, Sickle cell trait, DVT (deep venous thrombosis): Left leg in 1994 after hysterectomy  S/P hysterectomy: 1994, Gilson filter in place: 1994  S/P lumpectomy, right breast: 2014, revision of position- 2015    Allergies    Advil (Hives; Vomiting)    MEDICATIONS  (STANDING):  anastrozole 1 milliGRAM(s) Oral daily  aspirin enteric coated 81 milliGRAM(s) Oral daily  atorvastatin 40 milliGRAM(s) Oral at bedtime  buPROPion XL . 150 milliGRAM(s) Oral daily  clopidogrel Tablet 75 milliGRAM(s) Oral daily  dextrose 5%. 1000 milliLiter(s) (50 mL/Hr) IV Continuous <Continuous>  heparin   Injectable 5000 Unit(s) SubCutaneous every 12 hours  insulin lispro (HumaLOG) corrective regimen sliding scale   SubCutaneous three times a day before meals  insulin lispro (HumaLOG) corrective regimen sliding scale   SubCutaneous at bedtime  metoprolol succinate ER 50 milliGRAM(s) Oral daily  sacubitril 24 mG/valsartan 26 mG 1 Tablet(s) Oral two times a day  tiotropium 18 MICROgram(s) Capsule 1 Capsule(s) Inhalation daily    MEDICATIONS  (PRN):  ALBUTerol    90 MICROgram(s) HFA Inhaler 2 Puff(s) Inhalation every 6 hours PRN Shortness of Breath and/or Wheezing  guaiFENesin   Syrup  (Sugar-Free) 100 milliGRAM(s) Oral every 6 hours PRN Cough      FAMILY HISTORY:  Family history of glioblastoma  Family history of other condition: please open box and see additional medical family history  Family history of renal failure: uncle  Family history of glioblastoma (Sibling)  History of hypertension: mother  Family history of liver failure  Family history of glioblastoma  Family history of renal stone  Family history of diabetes mellitus  Family history of hypertension    Reviewed; no change from my prior note    SOCIAL HISTORY:  Reviewed, no change from my prior note    REVIEW OF SYSTEMS:  Constitutional: [ ] fever, [ ]weight loss,  [ ]fatigue  Eyes: [ ] visual changes  Respiratory: [ ]shortness of breath;  [ ] cough, [ ]wheezing, [ ]chills, [ ]hemoptysis  Cardiovascular: [ ] chest pain, [ ]palpitations, [ ]dizziness,  [ ]leg swelling [ ]syncope  Gastrointestinal: [ ] abdominal pain, [ ]nausea, [ ]vomiting,  [ ]diarrhea   Genitourinary: [ ] dysuria, [ ] hematuria  Neurologic: [ ] headaches [ ] tremors [ ] weakness [ ] lightheadedness  Skin: [ ] itching, [ ]burning, [ ] rashes  Endocrine: [ ] heat or cold intolerance  Musculoskeletal: [ ] joint pain or swelling; [ ] muscle, back, or extremity pain  Psychiatric: [ ] depression, [ ]anxiety, [ ]mood swings, or [ ]difficulty sleeping  Hematologic: [ ] easy bruising, [ ] bleeding gums    [x] All remaining systems negative except as per above.   [  ] Unable to obtain    Vital Signs Last 24 Hrs  T(C): 36.2 (31 Aug 2020 05:23), Max: 36.7 (30 Aug 2020 21:55)  T(F): 97.1 (31 Aug 2020 05:23), Max: 98.1 (30 Aug 2020 21:55)  HR: 70 (31 Aug 2020 05:23) (70 - 77)  BP: 147/105 (31 Aug 2020 05:23) (129/71 - 147/105)  BP(mean): --  RR: 16 (31 Aug 2020 05:23) (16 - 18)  SpO2: 100% (31 Aug 2020 05:23) (98% - 100%)  I&O's Summary    30 Aug 2020 07:01  -  31 Aug 2020 07:00  --------------------------------------------------------  IN: 600 mL / OUT: 300 mL / NET: 300 mL        PHYSICAL EXAM:  General: No acute distress  HEENT: EOMI, PERRL  Neck: Supple, No JVD  Lungs: Clear to auscultation bilaterally; No rales or wheezing  Heart: Regular rate and rhythm; No murmurs, rubs, or gallops  Abdomen: Nontender, bowel sounds present  Extremities: No clubbing, cyanosis, or edema  Nervous system:  Alert & Oriented X3, no focal deficits  Psychiatric: Normal affect  Skin: No rashes or lesions    LABS:  08-31    141  |  103  |  56<H>  ----------------------------<  119<H>  3.6   |  28  |  1.91<H>    Ca    8.8      31 Aug 2020 06:06  Phos  3.7     08-31  Mg     2.3     08-31    TPro  7.4  /  Alb  3.3<L>  /  TBili  1.1  /  DBili  x   /  AST  43<H>  /  ALT  24  /  AlkPhos  437<H>  08-30    Creatinine Trend: 1.91<--, 2.06<--, 1.94<--, 1.63<--, 1.55<--, 1.62<--                        10.2   5.65  )-----------( 293      ( 31 Aug 2020 06:06 )             31.8     PT/INR - ( 31 Aug 2020 06:06 )   PT: 16.5 sec;   INR: 1.43 ratio         PTT - ( 31 Aug 2020 06:06 )  PTT:25.4 sec       ECHO: < from: Transthoracic Echocardiogram (08.29.20 @ 07:23) >  CONCLUSIONS:  1. Tethered mitral valve leaflets. Moderate to severe  mitral regurgitation.  2. Severely dilated left atrium.  LA volume index = 63  cc/m2.  3. Severe left ventricular enlargement.  4. Severe global left ventricular systolic dysfunction.  Severe diastolic dysfunction (stage III-IV).  5. Normal right ventricular size with decreased RV systolic  function. (TAPSE 1.5 cm)  6. RV systolic pressure is 51 mm Hg. Moderate pulmonary  hypertension.    < end of copied text > PRESENTING CC: Shortness of breath    SUBJ:     In summary, this is a 64-year-old female former smoker with DM, HTN, HLD, breast CA s/p chemo/XRT, and NICM (catheterization 02/2019 showed dRPL occlusion without ability to intervene), EF 31% by MUGA 7/2019, s/p [employer] SubQ ICD 10/2019, who presented with dyspnea in the setting of acute on chronic, decompensated, systolic HF exacerbation.     Overnight, there were no acute events. Patient reports she has no chest pain or dyspnea.       ------------------------  PMH: As above, also Knee effusion, left: arthrocentesis- 05/25/16, Internal carotid aneurysm, Abdominal aneurysm: infrarenal- followed by Vascular , Dr. Gutierrez, Sickle cell trait, DVT (deep venous thrombosis): Left leg in 1994 after hysterectomy  S/P hysterectomy: 1994, Gilson filter in place: 1994  S/P lumpectomy, right breast: 2014, revision of position- 2015    Allergies    Advil (Hives; Vomiting)    MEDICATIONS  (STANDING):  anastrozole 1 milliGRAM(s) Oral daily  aspirin enteric coated 81 milliGRAM(s) Oral daily  atorvastatin 40 milliGRAM(s) Oral at bedtime  buPROPion XL . 150 milliGRAM(s) Oral daily  clopidogrel Tablet 75 milliGRAM(s) Oral daily  dextrose 5%. 1000 milliLiter(s) (50 mL/Hr) IV Continuous <Continuous>  heparin   Injectable 5000 Unit(s) SubCutaneous every 12 hours  insulin lispro (HumaLOG) corrective regimen sliding scale   SubCutaneous three times a day before meals  insulin lispro (HumaLOG) corrective regimen sliding scale   SubCutaneous at bedtime  metoprolol succinate ER 50 milliGRAM(s) Oral daily  sacubitril 24 mG/valsartan 26 mG 1 Tablet(s) Oral two times a day  tiotropium 18 MICROgram(s) Capsule 1 Capsule(s) Inhalation daily    MEDICATIONS  (PRN):  ALBUTerol    90 MICROgram(s) HFA Inhaler 2 Puff(s) Inhalation every 6 hours PRN Shortness of Breath and/or Wheezing  guaiFENesin   Syrup  (Sugar-Free) 100 milliGRAM(s) Oral every 6 hours PRN Cough      FAMILY HISTORY:  Family history of glioblastoma  Family history of other condition: please open box and see additional medical family history  Family history of renal failure: uncle  Family history of glioblastoma (Sibling)  History of hypertension: mother  Family history of liver failure  Family history of glioblastoma  Family history of renal stone  Family history of diabetes mellitus  Family history of hypertension    Reviewed; no change from my prior note    SOCIAL HISTORY:  Reviewed, no change from my prior note    REVIEW OF SYSTEMS:  Constitutional: [ ] fever, [ ]weight loss,  [ ]fatigue  Eyes: [ ] visual changes  Respiratory: [ ]shortness of breath;  [ ] cough, [ ]wheezing, [ ]chills, [ ]hemoptysis  Cardiovascular: [ ] chest pain, [ ]palpitations, [ ]dizziness,  [ ]leg swelling [ ]syncope  Gastrointestinal: [ ] abdominal pain, [ ]nausea, [ ]vomiting,  [ ]diarrhea   Genitourinary: [ ] dysuria, [ ] hematuria  Neurologic: [ ] headaches [ ] tremors [ ] weakness [ ] lightheadedness  Skin: [ ] itching, [ ]burning, [ ] rashes  Endocrine: [ ] heat or cold intolerance  Musculoskeletal: [ ] joint pain or swelling; [ ] muscle, back, or extremity pain  Psychiatric: [ ] depression, [ ]anxiety, [ ]mood swings, or [ ]difficulty sleeping  Hematologic: [ ] easy bruising, [ ] bleeding gums    [x] All remaining systems negative except as per above.   [  ] Unable to obtain    Vital Signs Last 24 Hrs  T(C): 36.2 (31 Aug 2020 05:23), Max: 36.7 (30 Aug 2020 21:55)  T(F): 97.1 (31 Aug 2020 05:23), Max: 98.1 (30 Aug 2020 21:55)  HR: 70 (31 Aug 2020 05:23) (70 - 77)  BP: 147/105 (31 Aug 2020 05:23) (129/71 - 147/105)  BP(mean): --  RR: 16 (31 Aug 2020 05:23) (16 - 18)  SpO2: 100% (31 Aug 2020 05:23) (98% - 100%)  I&O's Summary    30 Aug 2020 07:01  -  31 Aug 2020 07:00  --------------------------------------------------------  IN: 600 mL / OUT: 300 mL / NET: 300 mL        PHYSICAL EXAM:  General: No acute distress  HEENT: EOMI, PERRL  Neck: Supple, No JVD  Lungs: Clear to auscultation bilaterally; No rales or wheezing  Heart: Regular rate and rhythm; No murmurs, rubs, or gallops  Abdomen: Nontender, bowel sounds present  Extremities: No clubbing, cyanosis, or edema  Nervous system:  Alert & Oriented X3, no focal deficits  Psychiatric: Normal affect  Skin: No rashes or lesions    LABS:  08-31    141  |  103  |  56<H>  ----------------------------<  119<H>  3.6   |  28  |  1.91<H>    Ca    8.8      31 Aug 2020 06:06  Phos  3.7     08-31  Mg     2.3     08-31    TPro  7.4  /  Alb  3.3<L>  /  TBili  1.1  /  DBili  x   /  AST  43<H>  /  ALT  24  /  AlkPhos  437<H>  08-30    Creatinine Trend: 1.91<--, 2.06<--, 1.94<--, 1.63<--, 1.55<--, 1.62<--                        10.2   5.65  )-----------( 293      ( 31 Aug 2020 06:06 )             31.8     PT/INR - ( 31 Aug 2020 06:06 )   PT: 16.5 sec;   INR: 1.43 ratio         PTT - ( 31 Aug 2020 06:06 )  PTT:25.4 sec       ECHO: < from: Transthoracic Echocardiogram (08.29.20 @ 07:23) >  CONCLUSIONS:  1. Tethered mitral valve leaflets. Moderate to severe  mitral regurgitation.  2. Severely dilated left atrium.  LA volume index = 63  cc/m2.  3. Severe left ventricular enlargement.  4. Severe global left ventricular systolic dysfunction.  Severe diastolic dysfunction (stage III-IV).  5. Normal right ventricular size with decreased RV systolic  function. (TAPSE 1.5 cm)  6. RV systolic pressure is 51 mm Hg. Moderate pulmonary  hypertension.    < end of copied text >

## 2020-08-31 NOTE — PROGRESS NOTE ADULT - PROBLEM SELECTOR PLAN 9
pt can make decisions but has HCP also - Jaki Garland - [phone]  -- spoke with HCP - updated pts condition

## 2020-08-31 NOTE — PROGRESS NOTE ADULT - PROBLEM SELECTOR PLAN 3
-Patient has hx Breast Cancer S/p Lumpectomy, Chemo, Radio therapy  -C/w with Anastrazole   -Follow up with Dr. Serrato in Sep 18 -Bp ranging 140-160/'s  -Pt is currently on Entresto 24-26 mg, Metoprolol succinate 50 mg daily  -Monitor BP, hold medications with parameters  -Can add Hydralazine Imdur for further management as necessary as per cardio recommendations -Bp ranging 140-160/  -Pt is currently on Entresto 24-26 mg, Metoprolol succinate 50 mg daily  -Monitor BP, hold medications with parameters  -Can add Hydralazine Imdur for further management as necessary as per cardio recommendations

## 2020-08-31 NOTE — PROGRESS NOTE ADULT - PROBLEM SELECTOR PLAN 6
Continue with Albuterol inhaler PRN q 6.  Patient is on 3L NC. will taper it down through the day.  Will monitor vitals -recent CTA head/neck shows chronic rt frontal, parietal, lacunar infarcts. Pt AAOX2, but with mild paranoia and anxiety  - monitor any acute weakness or s/s of stroke

## 2020-08-31 NOTE — PROGRESS NOTE ADULT - ASSESSMENT
64-year-old female former smoker with DM, HTN, HLD, NICM (EF 31% by MUGA 07/2019) s/p [employer] SubQ ICD who presented with dyspnea in the setting of acute on chronic systolic HF exacerbation    1. NICM s/p ICD: Patient currently is on metoprolol, spironolactone, and Entresto at home  -Continue metoprolol  -Hold spironolactone in setting of elevated creatinine  -Mild CHEN, can continue lower-dose Entresto 24/26 for now; renal function worsened by ~15% which is within the acceptable range  -Consider outpatient referral to nephrology  -Patient with recent angiogram showing no targets for revascularization; no need to repeat one at this time.    2. Moderate-severe MR noted on echocardiogram:   -This appears new compared to prior echo; likely functional MR in setting of LV dilation  -Will discuss with patient regarding ANGELINA for further assessment of MR. If confirmed, she may benefit from MitraClip per COAPT trial     3. HTN:  -On Entresto, metoprolol, and spironolactone at home  -Hold spironolactone, resume Entresto as per above  -BP acceptable for now; may add hydralazine-Imdur if need additional BP control     4. HLD: Continue atorvastatin    ***Note that this is a preliminary note and any recommendations should NOT be carried out until this note is finalized. *** 64-year-old female former smoker with DM, HTN, HLD, NICM (EF 31% by MUGA 07/2019) s/p [employer] SubQ ICD who presented with dyspnea in the setting of acute on chronic systolic HF exacerbation    1. NICM s/p ICD: Patient currently is on metoprolol, spironolactone, and Entresto at home  -Continue metoprolol  -Hold spironolactone in setting of elevated creatinine  -Mild CHEN, can continue lower-dose Entresto 24/26 for now; renal function worsened by ~15% which is within the acceptable range  -Consider referral to nephrology  -Patient with recent angiogram showing no targets for revascularization; no need to repeat one at this time.    2. Moderate-severe MR noted on echocardiogram:   -This appears new compared to prior echo; likely functional MR in setting of LV dilation  -Will discuss with patient regarding ANGELINA for further assessment of MR. If confirmed, she may benefit from MitraClip per COAPT trial     3. HTN:  -On Entresto, metoprolol, and spironolactone at home  -Hold spironolactone, resume Entresto as per above  -BP acceptable for now; may add hydralazine-Imdur if need additional BP control     4. HLD: Continue atorvastatin

## 2020-08-31 NOTE — PROGRESS NOTE ADULT - PROBLEM SELECTOR PLAN 7
continue heparin for DVT ppx Continue with Albuterol inhaler PRN q 6.  Patient is on 3L NC. will taper it down through the day.  Will monitor vitals -Continue Albuterol inhaler Q6 hrs  -Not in exacerbation  -Pt saturating well on 1 L NC, will taper off oxygen and monitor

## 2020-08-31 NOTE — PROGRESS NOTE ADULT - SUBJECTIVE AND OBJECTIVE BOX
PGY-1 Progress Note discussed with attending    PAGER #: [[phone]] TILL 5:00 PM  PLEASE CONTACT ON CALL TEAM:  - On Call Team (Please refer to Taryn) FROM 5:00 PM - 8:30PM  - Nightfloat Team FROM 8:30 -7:30 AM    CHIEF COMPLAINT & BRIEF HOSPITAL COURSE: 64 y/o F smoker with medical history significant for DM, HTN, HLD, Breast CA s/p chemo/XRT, SCC, CVA no motor deficits, COPD (not on home O2), NICM with EF 31% by MUGA 7/2019 (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 She was going for an xray when they told her to come into the hospital. Patient states that she was feeling nauseous and was having difficulty breathing. Patient was little confused and is very poor historian. Patient states that she smokes 1 PPD x 50 years. She denies chest pain, head ache, dizziness, palpitations LOC, bowel or urinary problems. Does reports she has a cough, non-productive, and vomited once, with clear non-bloody vomit. No sick contacts, no recent travel. Of note pt reports history "a long time ago" of DVT/PE, and was on blood thinners, unsure of time period. Sister also had PE.    During her hospital course, patient was admitted for Dyspnea workup, CTA BNP >35,000, pt was started on IV Lasix, discontinued 8/29 due to rising creatinine levels. Echo from 8/29 with EF 15-20% showing new Moderate to Severe Mitral regurgitation (likely functional leading to HF exacerbations), troponins downtrending. As per Cardiology recommendations, low dose Entresto has been resumed and ANGELINA will be pursued tomorrow to investigate mitral regurgitation + need for Mitral clip.     INTERVAL HPI/OVERNIGHT EVENTS:     MEDICATIONS  (STANDING):  anastrozole 1 milliGRAM(s) Oral daily  aspirin enteric coated 81 milliGRAM(s) Oral daily  atorvastatin 40 milliGRAM(s) Oral at bedtime  buPROPion XL . 150 milliGRAM(s) Oral daily  clopidogrel Tablet 75 milliGRAM(s) Oral daily  dextrose 5%. 1000 milliLiter(s) (50 mL/Hr) IV Continuous <Continuous>  heparin   Injectable 5000 Unit(s) SubCutaneous every 12 hours  insulin lispro (HumaLOG) corrective regimen sliding scale   SubCutaneous three times a day before meals  insulin lispro (HumaLOG) corrective regimen sliding scale   SubCutaneous at bedtime  metoprolol succinate ER 50 milliGRAM(s) Oral daily  sacubitril 24 mG/valsartan 26 mG 1 Tablet(s) Oral two times a day  tiotropium 18 MICROgram(s) Capsule 1 Capsule(s) Inhalation daily    MEDICATIONS  (PRN):  ALBUTerol    90 MICROgram(s) HFA Inhaler 2 Puff(s) Inhalation every 6 hours PRN Shortness of Breath and/or Wheezing  guaiFENesin   Syrup  (Sugar-Free) 100 milliGRAM(s) Oral every 6 hours PRN Cough      REVIEW OF SYSTEMS:  CONSTITUTIONAL: No fever, weight loss, or fatigue  RESPIRATORY: No cough, wheezing, chills or hemoptysis; No shortness of breath  CARDIOVASCULAR: No chest pain, palpitations, dizziness, or leg swelling  GASTROINTESTINAL: No abdominal pain. No nausea, vomiting, or hematemesis; No diarrhea or constipation. No melena or hematochezia.  GENITOURINARY: No dysuria or hematuria, urinary frequency  NEUROLOGICAL: No headaches, memory loss, loss of strength, numbness, or tremors  SKIN: No itching, burning, rashes, or lesions     Vital Signs Last 24 Hrs  T(C): 36.2 (31 Aug 2020 10:18), Max: 36.7 (30 Aug 2020 21:55)  T(F): 97.1 (31 Aug 2020 10:18), Max: 98.1 (30 Aug 2020 21:55)  HR: 72 (31 Aug 2020 10:18) (70 - 77)  BP: 162/107 (31 Aug 2020 10:18) (129/71 - 162/107)  BP(mean): --  RR: 17 (31 Aug 2020 10:18) (16 - 18)  SpO2: 99% (31 Aug 2020 10:18) (98% - 100%)    PHYSICAL EXAMINATION:  GENERAL: NAD, well built  HEAD:  Atraumatic, Normocephalic  EYES:  conjunctiva and sclera clear  NECK: Supple, No JVD, Normal thyroid  CHEST/LUNG: Clear to auscultation. Clear to percussion bilaterally; No rales, rhonchi, wheezing, or rubs  HEART: Regular rate and rhythm; No murmurs, rubs, or gallops  ABDOMEN: Soft, Nontender, Nondistended; Bowel sounds present  NERVOUS SYSTEM:  Alert & Oriented X3,    EXTREMITIES:  2+ Peripheral Pulses, No clubbing, cyanosis, or edema  SKIN: warm dry                          10.2   5.65  )-----------( 293      ( 31 Aug 2020 06:06 )             31.8     08-31    141  |  103  |  56<H>  ----------------------------<  119<H>  3.6   |  28  |  1.91<H>    Ca    8.8      31 Aug 2020 06:06  Phos  3.7     08-31  Mg     2.3     08-31    TPro  7.4  /  Alb  3.3<L>  /  TBili  1.1  /  DBili  x   /  AST  43<H>  /  ALT  24  /  AlkPhos  437<H>  08-30    LIVER FUNCTIONS - ( 30 Aug 2020 14:46 )  Alb: 3.3 g/dL / Pro: 7.4 g/dL / ALK PHOS: 437 U/L / ALT: 24 U/L DA / AST: 43 U/L / GGT: 701 U/L           PT/INR - ( 31 Aug 2020 06:06 )   PT: 16.5 sec;   INR: 1.43 ratio         PTT - ( 31 Aug 2020 06:06 )  PTT:25.4 sec    CAPILLARY BLOOD GLUCOSE      RADIOLOGY & ADDITIONAL TESTS: PGY-1 Progress Note discussed with attending    PAGER #: [[phone]] TILL 5:00 PM  PLEASE CONTACT ON CALL TEAM:  - On Call Team (Please refer to Taryn) FROM 5:00 PM - 8:30PM  - Nightfloat Team FROM 8:30 -7:30 AM    CHIEF COMPLAINT & BRIEF HOSPITAL COURSE: 64 y/o F smoker with medical history significant for DM, HTN, HLD, Breast CA s/p chemo/XRT, SCC, CVA no motor deficits, COPD (not on home O2), NICM with EF 31% by MUGA 7/2019 (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 She was going for an xray when they told her to come into the hospital. Patient states that she was feeling nauseous and was having difficulty breathing. Patient was little confused and is very poor historian. Patient states that she smokes 1 PPD x 50 years. She denies chest pain, head ache, dizziness, palpitations LOC, bowel or urinary problems. Does reports she has a cough, non-productive, and vomited once, with clear non-bloody vomit. No sick contacts, no recent travel. Of note pt reports history "a long time ago" of DVT/PE, and was on blood thinners, unsure of time period. Sister also had PE.    During her hospital course, patient was admitted for Dyspnea workup, CTA negative for PE, BNP >35,000, pt was started on IV Lasix, discontinued 8/29 due to rising creatinine levels. Echo from 8/29 with EF 15-20% showing new Moderate to Severe Mitral regurgitation (likely functional leading to HF exacerbations), troponins downtrending. As per Cardiology recommendations, low dose Entresto has been resumed and ANGELINA will be pursued tomorrow to investigate mitral regurgitation + need for Mitral clip.     INTERVAL HPI/OVERNIGHT EVENTS: Pt assessed at bedside, did not endorse any complaints of chest pain or SOB. Pt is using NC inconsistently, uses 1 L O2 now, saturating well. Will taper off oxygen and monitor. C/o nausea +, added Meclizine PRN.    MEDICATIONS  (STANDING):  anastrozole 1 milliGRAM(s) Oral daily  aspirin enteric coated 81 milliGRAM(s) Oral daily  atorvastatin 40 milliGRAM(s) Oral at bedtime  buPROPion XL . 150 milliGRAM(s) Oral daily  clopidogrel Tablet 75 milliGRAM(s) Oral daily  dextrose 5%. 1000 milliLiter(s) (50 mL/Hr) IV Continuous <Continuous>  heparin   Injectable 5000 Unit(s) SubCutaneous every 12 hours  insulin lispro (HumaLOG) corrective regimen sliding scale   SubCutaneous three times a day before meals  insulin lispro (HumaLOG) corrective regimen sliding scale   SubCutaneous at bedtime  metoprolol succinate ER 50 milliGRAM(s) Oral daily  sacubitril 24 mG/valsartan 26 mG 1 Tablet(s) Oral two times a day  tiotropium 18 MICROgram(s) Capsule 1 Capsule(s) Inhalation daily    MEDICATIONS  (PRN):  ALBUTerol    90 MICROgram(s) HFA Inhaler 2 Puff(s) Inhalation every 6 hours PRN Shortness of Breath and/or Wheezing  guaiFENesin   Syrup  (Sugar-Free) 100 milliGRAM(s) Oral every 6 hours PRN Cough      REVIEW OF SYSTEMS:  CONSTITUTIONAL: No fever, weight loss, or fatigue  RESPIRATORY: No cough, wheezing, chills or hemoptysis; No shortness of breath  CARDIOVASCULAR: No chest pain, palpitations, dizziness, or leg swelling  GASTROINTESTINAL: No abdominal pain. No nausea, vomiting, or hematemesis; No diarrhea or constipation. No melena or hematochezia.  GENITOURINARY: No dysuria or hematuria, urinary frequency  NEUROLOGICAL: No headaches, memory loss, loss of strength, numbness, or tremors  SKIN: No itching, burning, rashes, or lesions     Vital Signs Last 24 Hrs  T(C): 36.2 (31 Aug 2020 10:18), Max: 36.7 (30 Aug 2020 21:55)  T(F): 97.1 (31 Aug 2020 10:18), Max: 98.1 (30 Aug 2020 21:55)  HR: 72 (31 Aug 2020 10:18) (70 - 77)  BP: 162/107 (31 Aug 2020 10:18) (129/71 - 162/107)  BP(mean): --  RR: 17 (31 Aug 2020 10:18) (16 - 18)  SpO2: 99% (31 Aug 2020 10:18) (98% - 100%)    PHYSICAL EXAMINATION:  GENERAL: Pt in no acute distress  HEAD:  Atraumatic, Normocephalic  EYES:  conjunctiva and sclera clear  NECK: Supple, No JVD, Normal thyroid  CHEST/LUNG: Clear to auscultation. No rales, rhonchi, wheezing, or rubs  HEART: Regular rate and rhythm; No murmurs, rubs, or gallops  ABDOMEN: Soft, Nontender, Nondistended; Bowel sounds present  NERVOUS SYSTEM:  AAO x 2    EXTREMITIES:  2+ Peripheral Pulses, No clubbing, cyanosis, or edema  SKIN: warm dry                          10.2   5.65  )-----------( 293      ( 31 Aug 2020 06:06 )             31.8     08-31    141  |  103  |  56<H>  ----------------------------<  119<H>  3.6   |  28  |  1.91<H>    Ca    8.8      31 Aug 2020 06:06  Phos  3.7     08-31  Mg     2.3     08-31    TPro  7.4  /  Alb  3.3<L>  /  TBili  1.1  /  DBili  x   /  AST  43<H>  /  ALT  24  /  AlkPhos  437<H>  08-30    LIVER FUNCTIONS - ( 30 Aug 2020 14:46 )  Alb: 3.3 g/dL / Pro: 7.4 g/dL / ALK PHOS: 437 U/L / ALT: 24 U/L DA / AST: 43 U/L / GGT: 701 U/L           PT/INR - ( 31 Aug 2020 06:06 )   PT: 16.5 sec;   INR: 1.43 ratio         PTT - ( 31 Aug 2020 06:06 )  PTT:25.4 sec    CAPILLARY BLOOD GLUCOSE  POCT 135     RADIOLOGY & ADDITIONAL TESTS:

## 2020-08-31 NOTE — PROGRESS NOTE ADULT - PROBLEM SELECTOR PLAN 4
-Bp ranging 130-145/90's  -Pt is currently on Entresto 24-26 mg, Metoprolol succinate 50 mg daily  -Monitor BP, hold medications with parameters  -Can add Hydralazine Imdur for further management as necessary as per cardio recommendations -Pt has elevated Alk phosphatase 437,   -Ordered RUQ USG to investigate for Liver/GB disease, will follow up with results  -TBili wnl  -Pt has no c/o RUQ pain, O/E No RUQ tenderness

## 2020-08-31 NOTE — PROGRESS NOTE ADULT - PROBLEM SELECTOR PLAN 1
- BNP on admission 35,000, Troponin downtrending  - IV Lasix d/c'ed 8/29 as pt's creatinine levels were uptrending  - Daily weights, strict Is/Os (fluid restriction 1200 ml)  - I&Os negative 200 yesterday, Input 480 ml, output 100 ml as of 1 pm today. Will monitor closely, although may be due to inaccurate tracking  - DASH/TLC diet  - Echo 8/29 EF 15-20%, New Moderate to Severe MR, most likely functional  - Pt planned for ANGELINA tomorrow to evaluate MR (if functional or true valve regurgitation) as it can be the precipitate of pt's HF exacerbations  - As per cardio recs, Pt may benefit from Mitral clip intervention in the future  - Pt on low dose Entresto, Metoprolol, hold Spironolactone due to creatinine elevation - BNP on admission 35,000, Troponin downtrending  - IV Lasix d/c'ed 8/29 as pt's creatinine levels were uptrending  - Daily weights, strict Is/Os (fluid restriction 1200 ml)  - DASH/TLC diet  - Echo 8/29 EF 15-20%, New Moderate to Severe MR, most likely functional  - Pt planned for ANGELINA tomorrow to evaluate MR (if functional or true valve regurgitation) as it can be the precipitate of pt's HF exacerbations  - As per cardio recs, Pt may benefit from Mitral clip intervention in the future  - Pt on low dose Entresto, Metoprolol, hold Spironolactone due to creatinine elevation

## 2020-08-31 NOTE — PROGRESS NOTE ADULT - PROBLEM SELECTOR PLAN 5
-recent CTA head/neck shows chronic rt frontal, parietal, lacunar infarcts. Pt AAOX2, but with mild paranoia and anxiety  - monitor any acute weakness or s/s of stroke -Patient has hx Breast Cancer S/p Lumpectomy, Chemo, Radio therapy  -C/w with Anastrazole   -Follow up with Dr. Serrato in Sep 18

## 2020-09-01 ENCOUNTER — TRANSCRIPTION ENCOUNTER (OUTPATIENT)
Age: 65
End: 2020-09-01

## 2020-09-01 LAB
ALP SERPL-CCNC: 556 U/L — HIGH (ref 40–120)
ANION GAP SERPL CALC-SCNC: 9 MMOL/L — SIGNIFICANT CHANGE UP (ref 5–17)
APTT BLD: 25.4 SEC — LOW (ref 27.5–35.5)
BLD GP AB SCN SERPL QL: SIGNIFICANT CHANGE UP
BUN SERPL-MCNC: 48 MG/DL — HIGH (ref 7–18)
CALCIUM SERPL-MCNC: 9.1 MG/DL — SIGNIFICANT CHANGE UP (ref 8.4–10.5)
CHLORIDE SERPL-SCNC: 105 MMOL/L — SIGNIFICANT CHANGE UP (ref 96–108)
CO2 SERPL-SCNC: 26 MMOL/L — SIGNIFICANT CHANGE UP (ref 22–31)
CREAT SERPL-MCNC: 1.59 MG/DL — HIGH (ref 0.5–1.3)
GGT SERPL-CCNC: 753 U/L — HIGH (ref 8–40)
GLUCOSE BLDC GLUCOMTR-MCNC: 111 MG/DL — HIGH (ref 70–99)
GLUCOSE BLDC GLUCOMTR-MCNC: 131 MG/DL — HIGH (ref 70–99)
GLUCOSE BLDC GLUCOMTR-MCNC: 140 MG/DL — HIGH (ref 70–99)
GLUCOSE BLDC GLUCOMTR-MCNC: 147 MG/DL — HIGH (ref 70–99)
GLUCOSE BLDC GLUCOMTR-MCNC: 162 MG/DL — HIGH (ref 70–99)
GLUCOSE SERPL-MCNC: 126 MG/DL — HIGH (ref 70–99)
HAV IGM SER-ACNC: SIGNIFICANT CHANGE UP
HBV CORE IGM SER-ACNC: SIGNIFICANT CHANGE UP
HBV SURFACE AG SER-ACNC: SIGNIFICANT CHANGE UP
HCT VFR BLD CALC: 33.3 % — LOW (ref 34.5–45)
HCV AB S/CO SERPL IA: 0.41 S/CO — SIGNIFICANT CHANGE UP (ref 0–0.99)
HCV AB SERPL-IMP: SIGNIFICANT CHANGE UP
HGB BLD-MCNC: 10.5 G/DL — LOW (ref 11.5–15.5)
INR BLD: 1.35 RATIO — HIGH (ref 0.88–1.16)
MAGNESIUM SERPL-MCNC: 2.4 MG/DL — SIGNIFICANT CHANGE UP (ref 1.6–2.6)
MCHC RBC-ENTMCNC: 29.2 PG — SIGNIFICANT CHANGE UP (ref 27–34)
MCHC RBC-ENTMCNC: 31.5 GM/DL — LOW (ref 32–36)
MCV RBC AUTO: 92.5 FL — SIGNIFICANT CHANGE UP (ref 80–100)
NRBC # BLD: 0 /100 WBCS — SIGNIFICANT CHANGE UP (ref 0–0)
PHOSPHATE SERPL-MCNC: 3.6 MG/DL — SIGNIFICANT CHANGE UP (ref 2.5–4.5)
PLATELET # BLD AUTO: 304 K/UL — SIGNIFICANT CHANGE UP (ref 150–400)
POTASSIUM SERPL-MCNC: 3.8 MMOL/L — SIGNIFICANT CHANGE UP (ref 3.5–5.3)
POTASSIUM SERPL-SCNC: 3.8 MMOL/L — SIGNIFICANT CHANGE UP (ref 3.5–5.3)
PROTHROM AB SERPL-ACNC: 15.6 SEC — HIGH (ref 10.6–13.6)
RBC # BLD: 3.6 M/UL — LOW (ref 3.8–5.2)
RBC # FLD: 15.1 % — HIGH (ref 10.3–14.5)
SARS-COV-2 RNA SPEC QL NAA+PROBE: SIGNIFICANT CHANGE UP
SODIUM SERPL-SCNC: 140 MMOL/L — SIGNIFICANT CHANGE UP (ref 135–145)
WBC # BLD: 5.91 K/UL — SIGNIFICANT CHANGE UP (ref 3.8–10.5)
WBC # FLD AUTO: 5.91 K/UL — SIGNIFICANT CHANGE UP (ref 3.8–10.5)

## 2020-09-01 PROCEDURE — 99232 SBSQ HOSP IP/OBS MODERATE 35: CPT | Mod: GC

## 2020-09-01 RX ORDER — HYDRALAZINE HCL 50 MG
1 TABLET ORAL
Qty: 60 | Refills: 0
Start: 2020-09-01 | End: 2020-09-30

## 2020-09-01 RX ORDER — SACUBITRIL AND VALSARTAN 24; 26 MG/1; MG/1
1 TABLET, FILM COATED ORAL
Qty: 60 | Refills: 0
Start: 2020-09-01 | End: 2020-09-30

## 2020-09-01 RX ORDER — ISOSORBIDE MONONITRATE 60 MG/1
1 TABLET, EXTENDED RELEASE ORAL
Qty: 30 | Refills: 0
Start: 2020-09-01 | End: 2020-09-30

## 2020-09-01 RX ORDER — APIXABAN 2.5 MG/1
2 TABLET, FILM COATED ORAL
Qty: 28 | Refills: 0
Start: 2020-09-01 | End: 2020-09-07

## 2020-09-01 RX ORDER — HYDRALAZINE HCL 50 MG
10 TABLET ORAL EVERY 12 HOURS
Refills: 0 | Status: DISCONTINUED | OUTPATIENT
Start: 2020-09-01 | End: 2020-09-01

## 2020-09-01 RX ORDER — HYDRALAZINE HCL 50 MG
10 TABLET ORAL EVERY 12 HOURS
Refills: 0 | Status: DISCONTINUED | OUTPATIENT
Start: 2020-09-01 | End: 2020-09-03

## 2020-09-01 RX ORDER — APIXABAN 2.5 MG/1
10 TABLET, FILM COATED ORAL EVERY 12 HOURS
Refills: 0 | Status: DISCONTINUED | OUTPATIENT
Start: 2020-09-01 | End: 2020-09-03

## 2020-09-01 RX ORDER — LANOLIN ALCOHOL/MO/W.PET/CERES
3 CREAM (GRAM) TOPICAL AT BEDTIME
Refills: 0 | Status: DISCONTINUED | OUTPATIENT
Start: 2020-09-01 | End: 2020-09-01

## 2020-09-01 RX ORDER — SPIRONOLACTONE 25 MG/1
25 TABLET, FILM COATED ORAL
Qty: 0 | Refills: 0 | DISCHARGE

## 2020-09-01 RX ORDER — LANOLIN ALCOHOL/MO/W.PET/CERES
3 CREAM (GRAM) TOPICAL ONCE
Refills: 0 | Status: COMPLETED | OUTPATIENT
Start: 2020-09-01 | End: 2020-09-01

## 2020-09-01 RX ORDER — ISOSORBIDE MONONITRATE 60 MG/1
30 TABLET, EXTENDED RELEASE ORAL DAILY
Refills: 0 | Status: DISCONTINUED | OUTPATIENT
Start: 2020-09-01 | End: 2020-09-09

## 2020-09-01 RX ADMIN — TIOTROPIUM BROMIDE 1 CAPSULE(S): 18 CAPSULE ORAL; RESPIRATORY (INHALATION) at 12:59

## 2020-09-01 RX ADMIN — Medication 81 MILLIGRAM(S): at 14:29

## 2020-09-01 RX ADMIN — Medication 10 MILLIGRAM(S): at 17:25

## 2020-09-01 RX ADMIN — ALBUTEROL 2 PUFF(S): 90 AEROSOL, METERED ORAL at 03:02

## 2020-09-01 RX ADMIN — Medication 3 MILLIGRAM(S): at 01:29

## 2020-09-01 RX ADMIN — Medication 1: at 17:25

## 2020-09-01 RX ADMIN — CLOPIDOGREL BISULFATE 75 MILLIGRAM(S): 75 TABLET, FILM COATED ORAL at 14:29

## 2020-09-01 RX ADMIN — ISOSORBIDE MONONITRATE 30 MILLIGRAM(S): 60 TABLET, EXTENDED RELEASE ORAL at 14:29

## 2020-09-01 RX ADMIN — BUPROPION HYDROCHLORIDE 150 MILLIGRAM(S): 150 TABLET, EXTENDED RELEASE ORAL at 14:29

## 2020-09-01 RX ADMIN — ALBUTEROL 2 PUFF(S): 90 AEROSOL, METERED ORAL at 14:30

## 2020-09-01 RX ADMIN — Medication 50 MILLIGRAM(S): at 05:03

## 2020-09-01 RX ADMIN — Medication 10 MILLIGRAM(S): at 14:29

## 2020-09-01 RX ADMIN — APIXABAN 10 MILLIGRAM(S): 2.5 TABLET, FILM COATED ORAL at 17:25

## 2020-09-01 RX ADMIN — SACUBITRIL AND VALSARTAN 1 TABLET(S): 24; 26 TABLET, FILM COATED ORAL at 17:25

## 2020-09-01 RX ADMIN — ANASTROZOLE 1 MILLIGRAM(S): 1 TABLET ORAL at 14:29

## 2020-09-01 RX ADMIN — SACUBITRIL AND VALSARTAN 1 TABLET(S): 24; 26 TABLET, FILM COATED ORAL at 05:03

## 2020-09-01 NOTE — DIETITIAN INITIAL EVALUATION ADULT. - PERTINENT LABORATORY DATA
09-01 Na140 mmol/L Glu 126 mg/dL<H> K+ 3.8 mmol/L Cr  1.59 mg/dL<H> BUN 48 mg/dL<H>   09-01 Phos 3.6 mg/dL   08-30 Alb 3.3 g/dL<L>       08-28 Chol 148 mg/dL LDL 89 mg/dL HDL 36 mg/dL<L> Trig 114 mg/dL  08-28-20 @ 09:52 HgbA1C 5.8 [4.0 - 5.6]

## 2020-09-01 NOTE — PROGRESS NOTE ADULT - ASSESSMENT
65 year-old female  smoker with DM, HTN, HLD, breast CA s/p chemo/XRT, COPD not on home O2, CVA no motor deficits, and NICM (catheterization 02/2019 showed dRPL occlusion without ability to intervene), EF 31% by MUGA 7/2019, s/p SubQ ICD 10/2019, AAA came to ED for dyspnea with BNP of 35,000, Echo 8/29 showing EF 15-20% with moderate MR, ANGELINA today showing mural thrombus noted in the descending aorta and will be started on NOACs, currently admitted for CHF management. 65 year-old female  smoker with DM, HTN, HLD, breast CA s/p chemo/XRT, COPD not on home O2, CVA no motor deficits, and NICM (catheterization 02/2019 showed dRPL occlusion without ability to intervene), EF 31% by MUGA 7/2019, s/p SubQ ICD 10/2019, AAA came to ED for dyspnea with BNP of 35,000, Echo 8/29 showing EF 15-20% with moderate MR, ANGELINA today showing mural thrombus noted in the descending aorta with Vascular surgery consulted, currently admitted for CHF management.

## 2020-09-01 NOTE — CONSULT NOTE ADULT - ASSESSMENT
65 year old female with history of AAA, found to have intramural thrombus of descending aorta on ANGELINA, aortic arch atheroma, smoker, HTN, right breast cancer    - patient known to Dr. Gutierrez  - recommend outpatient follow up with Dr. Gutierrez for continued surveillance  - recommend to continue antiplatelet therapy   - continue medical management  - discussed with Dr. Gutierrez

## 2020-09-01 NOTE — DISCHARGE NOTE PROVIDER - NSDCMRMEDTOKEN_GEN_ALL_CORE_FT
anastrozole 1 mg oral tablet: 1 tab(s) orally once a day  aspirin 81 mg oral delayed release tablet: 1 tab(s) orally once a day  atorvastatin 20 mg oral tablet: 1 tab(s) orally once a day  buPROPion 150 mg/12 hours (SR) oral tablet, extended release: 1 tab(s) orally 2 times a day  clopidogrel 75 mg oral tablet: 1 tab(s) orally once a day  Entresto 49 mg-51 mg oral tablet: 1 tab(s) orally 2 times a day  furosemide 20 mg oral tablet: 1 tab(s) orally once a day, As Needed  metoprolol succinate 50 mg oral tablet, extended release: 1 tab(s) orally once a day  spironolactone 25 mg oral tablet: 25 milligram(s) orally once a day anastrozole 1 mg oral tablet: 1 tab(s) orally once a day  aspirin 81 mg oral delayed release tablet: 1 tab(s) orally once a day  atorvastatin 20 mg oral tablet: 1 tab(s) orally once a day  buPROPion 150 mg/12 hours (SR) oral tablet, extended release: 1 tab(s) orally 2 times a day  clopidogrel 75 mg oral tablet: 1 tab(s) orally once a day  Entresto 24 mg-26 mg oral tablet: 1 tab(s) orally 2 times a day  furosemide 20 mg oral tablet: 1 tab(s) orally once a day, As Needed  hydrALAZINE 10 mg oral tablet: 1 tab(s) orally every 12 hours  isosorbide mononitrate 30 mg oral tablet, extended release: 1 tab(s) orally once a day  metoprolol succinate 50 mg oral tablet, extended release: 1 tab(s) orally once a day anastrozole 1 mg oral tablet: 1 tab(s) orally once a day  aspirin 81 mg oral delayed release tablet: 1 tab(s) orally once a day  atorvastatin 40 mg oral tablet: 1 tab(s) orally once a day (at bedtime)  buPROPion 150 mg/12 hours (SR) oral tablet, extended release: 1 tab(s) orally 2 times a day  clopidogrel 75 mg oral tablet: 1 tab(s) orally once a day  Entresto 24 mg-26 mg oral tablet: 1 tab(s) orally 2 times a day  furosemide 20 mg oral tablet: 1 tab(s) orally once a day, As Needed  hydrALAZINE 50 mg oral tablet: 1 tab(s) orally 3 times a day  isosorbide mononitrate 30 mg oral tablet, extended release: 1 tab(s) orally once a day  metoprolol succinate 50 mg oral tablet, extended release: 1 tab(s) orally once a day  QUEtiapine 25 mg oral tablet: 1 tab(s) orally once a day in the morning and 2 tab(s) at bedtime

## 2020-09-01 NOTE — PROGRESS NOTE ADULT - ATTENDING COMMENTS
History was reviewed, patient, examined, and discussed with Dr. Plasencia, Cardiology, and vascular surgery.     MR, not clinically significant  Mural thrombus in descending aorta.   Aneurysm, 4 cm, according to Radiologist's direct read.     Will send cardiolipin antibody, RPR in AM.   Add apixaban.

## 2020-09-01 NOTE — DISCHARGE NOTE PROVIDER - PROVIDER TOKENS
PROVIDER:[TOKEN:[[id number]]] PROVIDER:[TOKEN:[[id number]]],PROVIDER:[TOKEN:[[id number]]]

## 2020-09-01 NOTE — DISCHARGE NOTE PROVIDER - NSDCFUSCHEDAPPT_GEN_ALL_CORE_FT
RISHABH HUNT ; 09/08/2020 ; NPP BrstImag [id number]  RISHABH HUNT ; 09/08/2020 ; NPP Ultrasnd [id number]  RISHABH HUNT ; 09/09/2020 ; NPP Med [address] RISHABH HUNT ; 09/08/2020 ; NPP BrstImag [id number]  RISHABH HUNT ; 09/08/2020 ; NPP Ultrasnd [id number]  RISHABH HUNT ; 09/09/2020 ; NPP Med [address] RISHABH HUNT ; 09/08/2020 ; NPP BrstImag [id number]  RISHABH HUNT ; 09/08/2020 ; NPP Ultrasnd [id number]  RISHABH HUNT ; 09/09/2020 ; NPP Med [address] RISHABH HUNT ; 09/08/2020 ; NPP BrstImag [id number]  RISHABH HUNT ; 09/08/2020 ; NPP Ultrasnd [id number]  RISHABH HUNT ; 09/09/2020 ; NPP Med [address] RISHABH HUNT ; 09/08/2020 ; NPP BrstImag [address]  RISHABH HUNT ; 09/08/2020 ; NPP Ultrasnd [id number]  RISHABH HUNT ; 09/09/2020 ; NPP Med [address] RISHABH HUNT ; 09/08/2020 ; NPP BrstImag [address]  RISHABH HUNT ; 09/08/2020 ; NPP Ultrasnd [address]  RISHABH HUNT ; 09/09/2020 ; NPP Med [address] RISHABH HUNT ; 09/08/2020 ; NPP BrstImag [id number]  RISHABH HUNT ; 09/08/2020 ; NPP Ultrasnd [address]  RISHABH HUNT ; 09/09/2020 ; NPP Med [address] RISHABH HUNT ; 09/08/2020 ; NPP BrstImag [id number]  RISHABH HUNT ; 09/08/2020 ; NPP Ultrasnd [address]  RISHABH HUNT ; 09/09/2020 ; NPP Med [address] RISHABH HUNT ; 09/08/2020 ; NPP BrstImag [id number]  RISHABH HUNT ; 09/08/2020 ; NPP Ultrasnd [id number]  RISHABH HUNT ; 09/09/2020 ; NPP Med [address] RISHABH HUNT ; 12/03/2020 ; P Cardio Electro [address]

## 2020-09-01 NOTE — PROGRESS NOTE ADULT - PROBLEM SELECTOR PLAN 7
-Continue Albuterol inhaler Q6 hrs  -Not in exacerbation  -Pt saturating well on 1 L NC, will taper off oxygen and monitor -Continue Albuterol inhaler Q6 hrs  -Not in exacerbation  -Pt saturating well without oxygen

## 2020-09-01 NOTE — PROGRESS NOTE ADULT - SUBJECTIVE AND OBJECTIVE BOX
PGY-1 Progress Note discussed with attending    PAGER #: [[phone]] TILL 5:00 PM  PLEASE CONTACT ON CALL TEAM:  - On Call Team (Please refer to Taryn) FROM 5:00 PM - 8:30PM  - Nightfloat Team FROM 8:30 -7:30 AM    CHIEF COMPLAINT & BRIEF HOSPITAL COURSE:    INTERVAL HPI/OVERNIGHT EVENTS:   MEDICATIONS  (STANDING):  ALBUTerol    90 MICROgram(s) HFA Inhaler 2 Puff(s) Inhalation every 6 hours  anastrozole 1 milliGRAM(s) Oral daily  apixaban 10 milliGRAM(s) Oral every 12 hours  aspirin enteric coated 81 milliGRAM(s) Oral daily  atorvastatin 40 milliGRAM(s) Oral at bedtime  buPROPion XL . 150 milliGRAM(s) Oral daily  clopidogrel Tablet 75 milliGRAM(s) Oral daily  dextrose 5%. 1000 milliLiter(s) (50 mL/Hr) IV Continuous <Continuous>  hydrALAZINE 10 milliGRAM(s) Oral every 12 hours  insulin lispro (HumaLOG) corrective regimen sliding scale   SubCutaneous three times a day before meals  insulin lispro (HumaLOG) corrective regimen sliding scale   SubCutaneous at bedtime  isosorbide   mononitrate ER Tablet (IMDUR) 30 milliGRAM(s) Oral daily  metoprolol succinate ER 50 milliGRAM(s) Oral daily  sacubitril 24 mG/valsartan 26 mG 1 Tablet(s) Oral two times a day  tiotropium 18 MICROgram(s) Capsule 1 Capsule(s) Inhalation daily    MEDICATIONS  (PRN):  guaiFENesin   Syrup  (Sugar-Free) 100 milliGRAM(s) Oral every 6 hours PRN Cough  meclizine 12.5 milliGRAM(s) Oral daily PRN Nausea      REVIEW OF SYSTEMS:  CONSTITUTIONAL: No fever, weight loss, or fatigue  RESPIRATORY: No cough, wheezing, chills or hemoptysis; No shortness of breath  CARDIOVASCULAR: No chest pain, palpitations, dizziness, or leg swelling  GASTROINTESTINAL: No abdominal pain. No nausea, vomiting, or hematemesis; No diarrhea or constipation. No melena or hematochezia.  GENITOURINARY: No dysuria or hematuria, urinary frequency  NEUROLOGICAL: No headaches, memory loss, loss of strength, numbness, or tremors  SKIN: No itching, burning, rashes, or lesions     Vital Signs Last 24 Hrs  T(C): 36.3 (01 Sep 2020 10:20), Max: 36.7 (31 Aug 2020 18:25)  T(F): 97.4 (01 Sep 2020 10:20), Max: 98 (31 Aug 2020 18:25)  HR: 74 (01 Sep 2020 10:42) (69 - 74)  BP: 139/95 (01 Sep 2020 10:42) (139/95 - 157/100)  BP(mean): --  RR: 17 (01 Sep 2020 10:20) (16 - 17)  SpO2: 100% (01 Sep 2020 10:20) (98% - 100%)    PHYSICAL EXAMINATION:  GENERAL: NAD, well built  HEAD:  Atraumatic, Normocephalic  EYES:  conjunctiva and sclera clear  NECK: Supple, No JVD, Normal thyroid  CHEST/LUNG: Clear to auscultation. Clear to percussion bilaterally; No rales, rhonchi, wheezing, or rubs  HEART: Regular rate and rhythm; No murmurs, rubs, or gallops  ABDOMEN: Soft, Nontender, Nondistended; Bowel sounds present  NERVOUS SYSTEM:  Alert & Oriented X3,    EXTREMITIES:  2+ Peripheral Pulses, No clubbing, cyanosis, or edema  SKIN: warm dry                          10.5   5.91  )-----------( 304      ( 01 Sep 2020 06:33 )             33.3     09-01    140  |  105  |  48<H>  ----------------------------<  126<H>  3.8   |  26  |  1.59<H>    Ca    9.1      01 Sep 2020 06:33  Phos  3.6     09-01  Mg     2.4     09-01    TPro  x   /  Alb  x   /  TBili  x   /  DBili  x   /  AST  x   /  ALT  x   /  AlkPhos  556<H>  09-01    LIVER FUNCTIONS - ( 01 Sep 2020 06:33 )  Alb: x     / Pro: x     / ALK PHOS: 556 U/L / ALT: x     / AST: x     / GGT: x               PT/INR - ( 01 Sep 2020 06:33 )   PT: 15.6 sec;   INR: 1.35 ratio         PTT - ( 01 Sep 2020 06:33 )  PTT:25.4 sec    CAPILLARY BLOOD GLUCOSE      RADIOLOGY & ADDITIONAL TESTS: PGY-1 Progress Note discussed with attending    PAGER #: [[phone]] TILL 5:00 PM  PLEASE CONTACT ON CALL TEAM:  - On Call Team (Please refer to Taryn) FROM 5:00 PM - 8:30PM  - Nightfloat Team FROM 8:30 -7:30 AM    CHIEF COMPLAINT & BRIEF HOSPITAL COURSE: 66 y/o F smoker with medical history significant for DM, HTN, HLD, Breast CA s/p chemo/XRT, SCC, CVA no motor deficits, COPD (not on home O2), NICM with EF 31% by [facility] 7/2019 (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 She was going for an xray when they told her to come into the hospital. Patient states that she was feeling nauseous and was having difficulty breathing. Patient was little confused and is very poor historian. Patient states that she smokes 1 PPD x 50 years. She denies chest pain, head ache, dizziness, palpitations LOC, bowel or urinary problems. Does reports she has a cough, non-productive, and vomited once, with clear non-bloody vomit. No sick contacts, no recent travel. Of note pt reports history "a long time ago" of DVT/PE, and was on blood thinners, unsure of time period. Sister also had PE.    During her hospital course, patient was admitted for Dyspnea workup, CTA negative for PE, BNP >35,000, pt was started on IV Lasix, discontinued 8/29 due to rising creatinine levels. Echo from 8/29 with EF 15-20% showing new Moderate to Severe Mitral regurgitation (likely functional leading to HF exacerbations), troponins downtrending. As per Cardiology recommendations, low dose Entresto has been resumed and ANGELINA done 9/1 showed moderate MR, Mural thrombus extending into descending aorta, no LA thrombus, LAE + Pt will be started on NOAC for the thrombus (Eliquis).    INTERVAL HPI/OVERNIGHT EVENTS: : Pt assessed at bedside, did not endorse any complaints of chest pain or SOB. Pt is now saturating well on RA, no supplemental oxygen necessary at this time.       MEDICATIONS  (STANDING):  ALBUTerol    90 MICROgram(s) HFA Inhaler 2 Puff(s) Inhalation every 6 hours  anastrozole 1 milliGRAM(s) Oral daily  apixaban 10 milliGRAM(s) Oral every 12 hours  aspirin enteric coated 81 milliGRAM(s) Oral daily  atorvastatin 40 milliGRAM(s) Oral at bedtime  buPROPion XL . 150 milliGRAM(s) Oral daily  clopidogrel Tablet 75 milliGRAM(s) Oral daily  dextrose 5%. 1000 milliLiter(s) (50 mL/Hr) IV Continuous <Continuous>  hydrALAZINE 10 milliGRAM(s) Oral every 12 hours  insulin lispro (HumaLOG) corrective regimen sliding scale   SubCutaneous three times a day before meals  insulin lispro (HumaLOG) corrective regimen sliding scale   SubCutaneous at bedtime  isosorbide   mononitrate ER Tablet (IMDUR) 30 milliGRAM(s) Oral daily  metoprolol succinate ER 50 milliGRAM(s) Oral daily  sacubitril 24 mG/valsartan 26 mG 1 Tablet(s) Oral two times a day  tiotropium 18 MICROgram(s) Capsule 1 Capsule(s) Inhalation daily    MEDICATIONS  (PRN):  guaiFENesin   Syrup  (Sugar-Free) 100 milliGRAM(s) Oral every 6 hours PRN Cough  meclizine 12.5 milliGRAM(s) Oral daily PRN Nausea      REVIEW OF SYSTEMS:  CONSTITUTIONAL: No fever, weight loss, or fatigue  RESPIRATORY: No cough, wheezing, chills or hemoptysis; No shortness of breath  CARDIOVASCULAR: No chest pain, palpitations, dizziness, or leg swelling  GASTROINTESTINAL: No abdominal pain. No nausea, vomiting, or hematemesis; No diarrhea or constipation. No melena or hematochezia.  GENITOURINARY: No dysuria or hematuria, urinary frequency  NEUROLOGICAL: No headaches, memory loss, loss of strength, numbness, or tremors  SKIN: No itching, burning, rashes, or lesions     Vital Signs Last 24 Hrs  T(C): 36.3 (01 Sep 2020 10:20), Max: 36.7 (31 Aug 2020 18:25)  T(F): 97.4 (01 Sep 2020 10:20), Max: 98 (31 Aug 2020 18:25)  HR: 74 (01 Sep 2020 10:42) (69 - 74)  BP: 139/95 (01 Sep 2020 10:42) (139/95 - 157/100)  BP(mean): --  RR: 17 (01 Sep 2020 10:20) (16 - 17)  SpO2: 100% (01 Sep 2020 10:20) (98% - 100%)    PHYSICAL EXAMINATION:  GENERAL: Pt in no acute distress  HEAD:  Atraumatic, Normocephalic  EYES:  conjunctiva and sclera clear  NECK: Supple, No JVD, Normal thyroid  CHEST/LUNG: Clear to auscultation. No rales, rhonchi, wheezing, or rubs  HEART: Regular rate and rhythm; No murmurs, rubs, or gallops  ABDOMEN: Soft, Nontender, Nondistended; Bowel sounds present  NERVOUS SYSTEM:  AAO x 2    EXTREMITIES:  2+ Peripheral Pulses, No clubbing, cyanosis, or edema                          10.5   5.91  )-----------( 304      ( 01 Sep 2020 06:33 )             33.3     09-01    140  |  105  |  48<H>  ----------------------------<  126<H>  3.8   |  26  |  1.59<H>    Ca    9.1      01 Sep 2020 06:33  Phos  3.6     09-01  Mg     2.4     09-01    TPro  x   /  Alb  x   /  TBili  x   /  DBili  x   /  AST  x   /  ALT  x   /  AlkPhos  556<H>  09-01    LIVER FUNCTIONS - ( 01 Sep 2020 06:33 )  Alb: x     / Pro: x     / ALK PHOS: 556 U/L / ALT: x     / AST: x     / GGT: x               PT/INR - ( 01 Sep 2020 06:33 )   PT: 15.6 sec;   INR: 1.35 ratio         PTT - ( 01 Sep 2020 06:33 )  PTT:25.4 sec    CAPILLARY BLOOD GLUCOSE      RADIOLOGY & ADDITIONAL TESTS:    < from: ANGELINA w/Doppler (09.01.20 @ 10:23) >    ------------------------------------------------------------------------  OBSERVATIONS:  Mitral Valve: Tethered mitral valve leaflets. Moderate  mitral regurgitation.  Aortic Root: Aortic Root: 3.4 cm.   Normal aortic root. Simple atheroma (sessile plaque  protruding 1.0-3.9mm into lumen) noted in aortic  arch/descending aorta. Mural thrombus is also noted in the  descending aorta, up to 1.12 cm in depth on crossectional  imaging.  Aortic Valve: Normal trileaflet aortic valve. No aortic  stenosis. Trace aortic regurgitation.  Left Atrium: Severe left atrial enlargement. No left atrial  or left atrial appendage thrombus. Left atrial appendage  velocity is reduced at 0.16 m/s  Left Ventricle: Severe global left ventricular systolic  dysfunction. Severe left ventricular enlargement. Diastolic  function not assessed.  Right Heart: Normal right atrium. Normal right ventricular  size and function. Normal tricuspid valve. Moderate  tricuspid regurgitation.  Normal pulmonic valve. Trace  pulmonic insufficiency is noted.  Pericardium/PleuraNo pericardial effusion. Left pleural  effusion.  Hemodynamic: RA Pressure is 8 mm Hg. RV systolic pressure  is moderately increased at  45 mm Hg. Atrial septum appears  intact on 2D echo and color Doppler.  ------------------------------------------------------------------------  CONCLUSIONS:  1. Tethered mitral valve leaflets. Moderate mitral  regurgitation.  2. Normal trileaflet aortic valve. No aortic stenosis.  Trace aortic regurgitation.  3. Normal aortic root. Simple atheroma noted in aortic  arch/descending aorta. Mural thrombus is also noted in the  descending aorta, up to 1.12 cm in depth on crossectional  imaging.  4. Severe left atrial enlargement. No left atrial or left  atrial appendage thrombus.  5. Severe left ventricular enlargement.  6. Severe global left ventricular systolic dysfunction.  7. Normal right atrium.  8. Normal right ventricular size and function.  9. RV systolic pressure is moderately increased at 45 mm  Hg.  10. Normal tricuspid valve. Moderate tricuspid  regurgitation.  11. Normal pulmonic valve. Trace pulmonic insufficiency is  noted.  12. Atrial septum appears intact on 2D echo and color  Doppler.  13. No pericardial effusion.  14. Left pleural effusion.    *** Compared with echocardiogram of 8/29/2020, a ANGELINA was  performed and additional information provided.  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 She was going for an xray when they told her to come into the hospital. Patient states that she was feeling nauseous and was having difficulty breathing. Patient was little confused and is very poor historian. Patient states that she smokes 1 PPD x 50 years. She denies chest pain, head ache, dizziness, palpitations LOC, bowel or urinary problems. Does reports she has a cough, non-productive, and vomited once, with clear non-bloody vomit. No sick contacts, no recent travel. Of note pt reports history "a long time ago" of DVT/PE, and was on blood thinners, unsure of time period. Sister also had PE.    During her hospital course, patient was admitted for Dyspnea workup, CTA negative for PE, BNP >35,000, pt was started on IV Lasix, discontinued 8/29 due to rising creatinine levels. Echo from 8/29 with EF 15-20% showing new Moderate to Severe Mitral regurgitation (likely functional leading to HF exacerbations), troponins downtrending. As per Cardiology recommendations, low dose Entresto has been resumed and ANGELINA done 9/1 showed moderate MR, Mural thrombus extending into descending aorta, no LA thrombus, LAE + Vascular surgery was consulted regarding need for intervention.    INTERVAL HPI/OVERNIGHT EVENTS: : Pt assessed at bedside, did not endorse any complaints of chest pain or SOB. Pt is now saturating well on RA, no supplemental oxygen necessary at this time.       MEDICATIONS  (STANDING):  ALBUTerol    90 MICROgram(s) HFA Inhaler 2 Puff(s) Inhalation every 6 hours  anastrozole 1 milliGRAM(s) Oral daily  apixaban 10 milliGRAM(s) Oral every 12 hours  aspirin enteric coated 81 milliGRAM(s) Oral daily  atorvastatin 40 milliGRAM(s) Oral at bedtime  buPROPion XL . 150 milliGRAM(s) Oral daily  clopidogrel Tablet 75 milliGRAM(s) Oral daily  dextrose 5%. 1000 milliLiter(s) (50 mL/Hr) IV Continuous <Continuous>  hydrALAZINE 10 milliGRAM(s) Oral every 12 hours  insulin lispro (HumaLOG) corrective regimen sliding scale   SubCutaneous three times a day before meals  insulin lispro (HumaLOG) corrective regimen sliding scale   SubCutaneous at bedtime  isosorbide   mononitrate ER Tablet (IMDUR) 30 milliGRAM(s) Oral daily  metoprolol succinate ER 50 milliGRAM(s) Oral daily  sacubitril 24 mG/valsartan 26 mG 1 Tablet(s) Oral two times a day  tiotropium 18 MICROgram(s) Capsule 1 Capsule(s) Inhalation daily    MEDICATIONS  (PRN):  guaiFENesin   Syrup  (Sugar-Free) 100 milliGRAM(s) Oral every 6 hours PRN Cough  meclizine 12.5 milliGRAM(s) Oral daily PRN Nausea      REVIEW OF SYSTEMS:  CONSTITUTIONAL: No fever, weight loss, or fatigue  RESPIRATORY: No cough, wheezing, chills or hemoptysis; No shortness of breath  CARDIOVASCULAR: No chest pain, palpitations, dizziness, or leg swelling  GASTROINTESTINAL: No abdominal pain. No nausea, vomiting, or hematemesis; No diarrhea or constipation. No melena or hematochezia.  GENITOURINARY: No dysuria or hematuria, urinary frequency  NEUROLOGICAL: No headaches, memory loss, loss of strength, numbness, or tremors  SKIN: No itching, burning, rashes, or lesions     Vital Signs Last 24 Hrs  T(C): 36.3 (01 Sep 2020 10:20), Max: 36.7 (31 Aug 2020 18:25)  T(F): 97.4 (01 Sep 2020 10:20), Max: 98 (31 Aug 2020 18:25)  HR: 74 (01 Sep 2020 10:42) (69 - 74)  BP: 139/95 (01 Sep 2020 10:42) (139/95 - 157/100)  BP(mean): --  RR: 17 (01 Sep 2020 10:20) (16 - 17)  SpO2: 100% (01 Sep 2020 10:20) (98% - 100%)    PHYSICAL EXAMINATION:  GENERAL: Pt in no acute distress  HEAD:  Atraumatic, Normocephalic  EYES:  conjunctiva and sclera clear  NECK: Supple, No JVD, Normal thyroid  CHEST/LUNG: Clear to auscultation. No rales, rhonchi, wheezing, or rubs  HEART: Regular rate and rhythm; No murmurs, rubs, or gallops  ABDOMEN: Soft, Nontender, Nondistended; Bowel sounds present  NERVOUS SYSTEM:  AAO x 2    EXTREMITIES:  2+ Peripheral Pulses, No clubbing, cyanosis, or edema                          10.5   5.91  )-----------( 304      ( 01 Sep 2020 06:33 )             33.3     09-01    140  |  105  |  48<H>  ----------------------------<  126<H>  3.8   |  26  |  1.59<H>    Ca    9.1      01 Sep 2020 06:33  Phos  3.6     09-01  Mg     2.4     09-01    TPro  x   /  Alb  x   /  TBili  x   /  DBili  x   /  AST  x   /  ALT  x   /  AlkPhos  556<H>  09-01    LIVER FUNCTIONS - ( 01 Sep 2020 06:33 )  Alb: x     / Pro: x     / ALK PHOS: 556 U/L / ALT: x     / AST: x     / GGT: x               PT/INR - ( 01 Sep 2020 06:33 )   PT: 15.6 sec;   INR: 1.35 ratio         PTT - ( 01 Sep 2020 06:33 )  PTT:25.4 sec    CAPILLARY BLOOD GLUCOSE      RADIOLOGY & ADDITIONAL TESTS:    < from: ANGELINA w/Doppler (09.01.20 @ 10:23) >    ------------------------------------------------------------------------  OBSERVATIONS:  Mitral Valve: Tethered mitral valve leaflets. Moderate  mitral regurgitation.  Aortic Root: Aortic Root: 3.4 cm.   Normal aortic root. Simple atheroma (sessile plaque  protruding 1.0-3.9mm into lumen) noted in aortic  arch/descending aorta. Mural thrombus is also noted in the  descending aorta, up to 1.12 cm in depth on crossectional  imaging.  Aortic Valve: Normal trileaflet aortic valve. No aortic  stenosis. Trace aortic regurgitation.  Left Atrium: Severe left atrial enlargement. No left atrial  or left atrial appendage thrombus. Left atrial appendage  velocity is reduced at 0.16 m/s  Left Ventricle: Severe global left ventricular systolic  dysfunction. Severe left ventricular enlargement. Diastolic  function not assessed.  Right Heart: Normal right atrium. Normal right ventricular  size and function. Normal tricuspid valve. Moderate  tricuspid regurgitation.  Normal pulmonic valve. Trace  pulmonic insufficiency is noted.  Pericardium/PleuraNo pericardial effusion. Left pleural  effusion.  Hemodynamic: RA Pressure is 8 mm Hg. RV systolic pressure  is moderately increased at  45 mm Hg. Atrial septum appears  intact on 2D echo and color Doppler.  ------------------------------------------------------------------------  CONCLUSIONS:  1. Tethered mitral valve leaflets. Moderate mitral  regurgitation.  2. Normal trileaflet aortic valve. No aortic stenosis.  Trace aortic regurgitation.  3. Normal aortic root. Simple atheroma noted in aortic  arch/descending aorta. Mural thrombus is also noted in the  descending aorta, up to 1.12 cm in depth on crossectional  imaging.  4. Severe left atrial enlargement. No left atrial or left  atrial appendage thrombus.  5. Severe left ventricular enlargement.  6. Severe global left ventricular systolic dysfunction.  7. Normal right atrium.  8. Normal right ventricular size and function.  9. RV systolic pressure is moderately increased at 45 mm  Hg.  10. Normal tricuspid valve. Moderate tricuspid  regurgitation.  11. Normal pulmonic valve. Trace pulmonic insufficiency is  noted.  12. Atrial septum appears intact on 2D echo and color  Doppler.  13. No pericardial effusion.  14. Left pleural effusion.    *** Compared with echocardiogram of 8/29/2020, a ANGELINA was  performed and additional information provided.  ------------------------------------------------------------------------  Confirmed on  9/1/2020 - 12:34:11 by Nakul Doshi MD    < end of copied text >

## 2020-09-01 NOTE — DIETITIAN INITIAL EVALUATION ADULT. - OTHER INFO
Pt alert, oriented, well-communicated, lives home PTA; appetite good, denied recent wt changes, denied GI distress, chewing or swallowing problem at present, no specific food choices reported; NPO today for test; Discussed with RN

## 2020-09-01 NOTE — CONSULT NOTE ADULT - SUBJECTIVE AND OBJECTIVE BOX
HPI:  65 year-old female  smoker with DM, HTN, HLD, breast CA s/p chemo/XRT, SCC, CVA no motor deficits, COPD not on home O2, and NICM (catheterization 02/2019 showed dRPL occlusion without ability to intervene), EF 31% by MUGA 7/2019, s/p SubQ ICD 10/2019, AAA came to ED after she was having shortness of breath x 1-2 days. She was going for an xray when they told her to come into the hospital. Patient states that she was feeling nauseous and was having difficulty breathing. Patient was little confused and is very poor historian. Patient states that she smokes 1 PPD and has been smoking for 50 years. She denies chest pain, head ache, dizziness, palpitations LOC, bowel or urinary problems. Does reports she has a cough, non-productive, and vomited once, with clear non-bloody vomit. No sick contacts, no recent travel. Of note pt reports history "a long time ago" of DVT/PE, and was on blood thinners, unsure of time period. Sister also had PE.    [facility] : full code. (27 Aug 2020 19:28)    Patient seen and examined at bedside due to ANGELINA findings and history of AAA. Patient very poor historian and unsure if she has been followed. She is unsure if she has been on blood thinners. She is unsure of where she is at the time of consult. Denies any pain.     PAST MEDICAL & SURGICAL HISTORY:  Knee effusion, left: arthrocentesis- 05/25/16  Internal carotid aneurysm  Abdominal aneurysm: infrarenal- watched by Vascular , Dr. Gutierrez  Cardiomyopathy  Sickle cell trait  DVT (deep venous thrombosis): Left leg in 1994 after hysterectomy  Hypercholesteremia  Breast cancer: right-s/p chemo and radiation  Hypertension  Breast cancer: infusaport insertion- 2014- left chest  S/P hysterectomy: 1994  [place] filter in place: 1994  S/P lumpectomy, right breast: 2014, revision of position- 2015  Fibroid uterus: sp hystrectomy 1196    Review of Systems: Contained in HPI     MEDICATIONS  (STANDING):  ALBUTerol    90 MICROgram(s) HFA Inhaler 2 Puff(s) Inhalation every 6 hours  anastrozole 1 milliGRAM(s) Oral daily  apixaban 10 milliGRAM(s) Oral every 12 hours  aspirin enteric coated 81 milliGRAM(s) Oral daily  atorvastatin 40 milliGRAM(s) Oral at bedtime  buPROPion XL . 150 milliGRAM(s) Oral daily  clopidogrel Tablet 75 milliGRAM(s) Oral daily  dextrose 5%. 1000 milliLiter(s) (50 mL/Hr) IV Continuous <Continuous>  hydrALAZINE 10 milliGRAM(s) Oral every 12 hours  insulin lispro (HumaLOG) corrective regimen sliding scale   SubCutaneous three times a day before meals  insulin lispro (HumaLOG) corrective regimen sliding scale   SubCutaneous at bedtime  isosorbide   mononitrate ER Tablet (IMDUR) 30 milliGRAM(s) Oral daily  metoprolol succinate ER 50 milliGRAM(s) Oral daily  sacubitril 24 mG/valsartan 26 mG 1 Tablet(s) Oral two times a day  tiotropium 18 MICROgram(s) Capsule 1 Capsule(s) Inhalation daily    MEDICATIONS  (PRN):  guaiFENesin   Syrup  (Sugar-Free) 100 milliGRAM(s) Oral every 6 hours PRN Cough  meclizine 12.5 milliGRAM(s) Oral daily PRN Nausea    Allergies: Advil (Hives; Vomiting)    FAMILY HISTORY:  Family history of glioblastoma  Family history of other condition: please open box and see additional medical family history  Family history of renal failure: uncle  Family history of glioblastoma (Sibling)  History of hypertension: mother  Family history of liver failure  Family history of glioblastoma  Family history of renal stone  Family history of diabetes mellitus  Family history of hypertension    Vital Signs Last 24 Hrs  T(C): 36.3 (01 Sep 2020 10:20), Max: 36.7 (31 Aug 2020 18:25)  T(F): 97.4 (01 Sep 2020 10:20), Max: 98 (31 Aug 2020 18:25)  HR: 74 (01 Sep 2020 10:42) (69 - 74)  BP: 139/95 (01 Sep 2020 10:42) (139/95 - 157/100)  RR: 17 (01 Sep 2020 10:20) (16 - 17)  SpO2: 100% (01 Sep 2020 10:20) (98% - 100%)    Physical Exam:    General:  Appears stated age, well-groomed, well-nourished, no distress  Eyes: EOMI  HENT:  WNL, no JVD  Chest: respirations nonlabored  Cardiovascular:  Regular rate & rhythm  Abdomen: soft, NT/ND    Extremities: no edema bilaterally  Skin: warm and dry  Musculoskeletal: no calf tenderness  Vascular: 2+DP bilaterally, 2+ radial pulses bilaterally   Neuro:  Alert, oriented to person, not oriented to place  Psych: normal affect    LABS:                        10.5   5.91  )-----------( 304      ( 01 Sep 2020 06:33 )             33.3     09-01    140  |  105  |  48<H>  ----------------------------<  126<H>  3.8   |  26  |  1.59<H>    Ca    9.1      01 Sep 2020 06:33  Phos  3.6     09-01  Mg     2.4     09-01    TPro  x   /  Alb  x   /  TBili  x   /  DBili  x   /  AST  x   /  ALT  x   /  AlkPhos  556<H>  09-01    PT/INR - ( 01 Sep 2020 06:33 )   PT: 15.6 sec;   INR: 1.35 ratio      PTT - ( 01 Sep 2020 06:33 )  PTT:25.4 sec    RADIOLOGY & ADDITIONAL STUDIES:  < from: CT Angio Chest w/ IV Cont (08.27.20 @ 23:12) >  EXAM:  CT ANGIO CHEST (W)AW IC                            PROCEDURE DATE:  08/27/2020          INTERPRETATION:  HISTORY: Shortness of breath. History of breast cancer. Evaluate for pulmonary embolism.    TECHNIQUE:  CT pulmonary angiography was performed of the chest according to standard institutional protocol. Coronal, sagittal and transaxial 3-D MIP reformatted images are provided from the transaxial source data.   50mL of Omnipaque 350 was administered without complication and 50 mL was discarded.    COMPARISON: CTA of the chest from 2/21/2019    FINDINGS:  There is good opacification of pulmonary arterial tree. No filling defect is present to suggest acute pulmonary embolus. The main pulmonary arteries within normal limits of size.    The thyroid gland is unremarkable.    Evaluation of lung parenchyma demonstrates a stable 7 mm subpleural nodule left lower lobe (series 6 image 122). Additional subcentimeter nodules described on the previous study are not clearly identified on this exam. There is no evidence of pneumonia. Stable scattered lung cysts and mild centrilobular emphysema. New mild interlobular septal thickening and trace bilateral pleural effusions. The tracheobronchial tree is clear.  There is no significant axillary, mediastinal or hilar adenopathy.    The heart is not globally enlarged, however, the left ventricle is again dilated. There is no pericardial effusion. The thoracic aorta is again mildly ectatic distally, without change. Limited assessment for dissection due to phase of contrast enhancement. Left lateral chest wall subcutaneous pacer.    Limited images to the upper abdomen demonstrate no acute finding.    There are no acute osseous abnormalities. 2.3 x 2.1 cm mass in the right breast abutting and inseparable from the underlying pectoralis major muscle is unchanged and likely corresponds to known breast cancer.    IMPRESSION:  No pulmonary embolism.    New mild pulmonary edema and trace bilateral pleural effusions. Please correlate clinicallyfor congestive heart failure.    2.3 cm right breast mass.    JAMES QUINONEZ M.D., ATTENDING RADIOLOGIST  This document has been electronically signed. Aug 28 2020 12:22AM    < end of copied text >    < from: ANGELINA w/Doppler (09.01.20 @ 10:23) >  CONCLUSIONS:  1. Tethered mitral valve leaflets. Moderate mitral  regurgitation.  2. Normal trileaflet aortic valve. No aortic stenosis.  Trace aortic regurgitation.  3. Normal aortic root. Simple atheroma noted in aortic  arch/descending aorta. Mural thrombus is also noted in the  descending aorta, up to 1.12 cm in depth on crossectional  imaging.  4. Severe left atrial enlargement. No left atrial or left  atrial appendage thrombus.  5. Severe left ventricular enlargement.  6. Severe global left ventricular systolic dysfunction.  7. Normal right atrium.  8. Normal right ventricular size and function.  9. RV systolic pressure is moderately increased at 45 mm  Hg.  10. Normal tricuspid valve. Moderate tricuspid  regurgitation.  11. Normal pulmonic valve. Trace pulmonic insufficiency is  noted.  12. Atrial septum appears intact on 2D echo and color  Doppler.  13. No pericardial effusion.  14. Left pleural effusion.    *** Compared with echocardiogram of 8/29/2020, a ANGELINA was  performed and additional information provided.  ------------------------------------------------------------------------  Confirmed on  9/1/2020 - 12:34:11 by Nakul Doshi MD  ------------------------------------------------------------------------    < end of copied text >

## 2020-09-01 NOTE — PROGRESS NOTE ADULT - PROBLEM SELECTOR PLAN 4
-Pt has elevated Alk phosphatase 437,   -Ordered RUQ USG to investigate for Liver/GB disease, will follow up with results  -TBili wnl  -Pt has no c/o RUQ pain, O/E No RUQ tenderness -Pt has elevated Alk phosphatase 437,   -RUQ USG showed no CBD dilation, no GB thickening  -Pt has no c/o RUQ pain, O/E No RUQ tenderness  -Alk phos and GGT downtrending (elevated values may be 2/2 pt being sensitive to medication changes), monitor Alk phos and GGT trend  -If values worsen, CT may be pursued

## 2020-09-01 NOTE — DISCHARGE NOTE PROVIDER - HOSPITAL COURSE
66 y/o female smoker with PMH of DM, HTN, HLD, breast cancer s/p chemo/XRT, SCC, CVA no motor deficits, COPD not on home O2, and NICM (cathetrization 02/2019 showed dRPL occlusion without ability to intervene), EF 31% by MUGA 7/2019, s/p SubQ ICD 10/2019, AAA came to ED after having shortness of breath x1-2 days.  She was going to an xray when they told her to come into the hospital.  The patient reported feeling nauseous and difficulty breathing.  She reported having a non-productive cough and vomited once with clear, non-bloody vomit.  At admission the patient was confused and was a poor historian.  She has a history significant of smoking 1 PPD and has been smoking for 50 years and a DVT/[place] "a long time ago" for which she was treated with blood thinners, she is unsure of the tie period.  Family history is notable for a sister with a PE.        During her hospital course, she was admitted for dyspnea workup.  CTA was negative for PE, BNP >35,000.  The patient was started on IV Lasix, discontinued on 8/29 due to rising creatine levels (increased from admission at 1.63).  Echo from 8/29 with EF 15-20% showing new moderate to severe mitral regurgitation (likely functional leading to HF exacerbations).  At admission troponins were elevated at admission at 0.233 and downtrended.  Cardiology was consulted and recommendations were followed.  As per cardiology, low dose Entresto was resumed and ANGELINA was pursued on 9/1 to investigate mitral regurgitation and need for mitral clip. 66 y/o female smoker with PMH of DM, HTN, HLD, breast cancer s/p chemo/XRT, SCC, CVA no motor deficits, COPD not on home O2, and NICM (cathetrization 02/2019 showed dRPL occlusion without ability to intervene), EF 31% by MUGA 7/2019, s/p SubQ ICD 10/2019, AAA came to ED after having shortness of breath x1-2 days.  She was going to an xray when they told her to come into the hospital.  The patient reported feeling nauseous and difficulty breathing.  She reported having a non-productive cough and vomited once with clear, non-bloody vomit.  At admission the patient was confused and was a poor historian.  She has a history significant of smoking 1 PPD and has been smoking for 50 years and a DVT/[place] "a long time ago" for which she was treated with blood thinners, she is unsure of the tie period.  Family history is notable for a sister with a PE.        GOC: full code        Patient was admitted for dyspnea workup.        Patient was found to have CHF exacerbation.  CTA was negative for PE, BNP >35,000.  The patient was started on IV Lasix, discontinued on 8/29 due to rising creatine levels (increased from admission at 1.63).  At admission troponins were elevated at admission at 0.233 and downtrended. Echo from 8/29 with EF 15-20% showing new moderate to severe mitral regurgitation (likely functional leading to HF exacerbations).   Cardiology was consulted and recommendations were followed.  As per cardiology, low dose Entresto was resumed and ANGELINA was pursued on 9/1 to investigate mitral regurgitation and need for mitral clip.  During her stay, her pacemaker, [employer], was interrogated at bedside and no events were recorded, interrogation was normal.          During her hospital course, the patient was found to have CHEN with uptrending creatinine levels, likely due to CHF exacerbation.  Lasix and spironolactone were discontinued.  Low dose Entresto was continued.  Creatinine levels has since improved to 1.94.        Patient has a history of hypertension.  During her course her BP ranged from 140-160/.  Patient is on Entresto 24-26 mg and Metoprolol succinate 50 mg daily.  She was also started on hydralazine 10 mg on 7/31 per cardiology recommendations.  BP was monitored during her stay.        Patient had elevated alkaline phosphatase levels during her hospital course (556) from her baseline of 94 as well as elevated gamma glutamyl transferase levels (701).  Bilirubin levels were WNL (1.1).  An ultrasound of the liver and pancreas was done on 8/31 and was significant for mild fatty liver.  A 1.5 cm hyperechoic lesion was also found in the midpole of the right kidney which may reflect angiomyolipoma.        Patient has a history of breast cancer s/p lumpectomy, chemo, and radiotherapy.  The patient will be continuing with Anastrazole upon discharge asnd following up with her oncologist, Dr. Serrato on Sep. 18th.          Patient has a history of cerebrovascular accident.  CT angio of head and neck were recently done (7/7/20) which showed chronic right frontal, parietal, and lacunar infarcts.  She was AAOX2 during her stay with mild paranoia and anxiety.  She was monitored during her stay for signs of acute weakness of signs of stroke.        Patient has a history of chronic obstructive pulmonary disease for which she was continued with Albuterol inhaler Q6 hrs during her stay.  No COPD exacerbation was noted and she was saturating well and was tapered off the 1L nasal canula oxygen she was on during her stay.        Given patients improved clinical status and current hemodynamic stability, decision was made for discharge.  Please refer to patients complete medical chart with documents for a full hospital course, this is only a brief summary. 64 y/o female smoker with PMH of DM, HTN, HLD, breast cancer s/p chemo/XRT, SCC, CVA no motor deficits, COPD not on home O2, and NICM (cathetrization 02/2019 showed dRPL occlusion without ability to intervene), EF 31% by MUGA 7/2019, s/p SubQ ICD 10/2019, AAA came to ED after having shortness of breath x1-2 days.  She was going to an xray when they told her to come into the hospital.  The patient reported feeling nauseous and difficulty breathing.  She reported having a non-productive cough and vomited once with clear, non-bloody vomit.  At admission the patient was confused and was a poor historian.  She has a history significant of smoking 1 PPD and has been smoking for 50 years and a DVT/[place] "a long time ago" for which she was treated with blood thinners, she is unsure of the time period.  Family history is notable for a sister with a PE.        GOC: full code        Patient was admitted for dyspnea workup.        Patient was found to have CHF exacerbation.  CTA was negative for PE, BNP >35,000.  The patient was started on IV Lasix, discontinued on 8/29 due to rising creatine levels (increased from admission at 1.63).  At admission troponins were elevated at admission at 0.233 and downtrended. Echo from 8/29 with EF 15-20% showing new moderate to severe mitral regurgitation (likely functional leading to HF exacerbations).   Cardiology was consulted and recommendations were followed.  As per cardiology, low dose Entresto was resumed and ANGELINA was pursued on 9/1 to investigate mitral regurgitation and need for mitral clip.  During her stay, her pacemaker, [employer], was interrogated at bedside and no events were recorded, interrogation was normal.          During her hospital course, the patient was found to have CHEN with uptrending creatinine levels, likely due to CHF exacerbation.  Lasix and spironolactone were discontinued.  Low dose Entresto was continued.  Creatinine levels has since improved to 1.94.        Patient has a history of hypertension.  During her course her BP ranged from 140-160/.  Patient is on Entresto 24-26 mg and Metoprolol succinate 50 mg daily.  She was also started on hydralazine 10 mg on 7/31 per cardiology recommendations.  BP was monitored during her stay.        Patient had elevated alkaline phosphatase levels during her hospital course (556) from her baseline of 94 as well as elevated gamma glutamyl transferase levels (701).  Bilirubin levels were WNL (1.1).  An ultrasound of the liver and pancreas was done on 8/31 and was significant for mild fatty liver.  A 1.5 cm hyperechoic lesion was also found in the midpole of the right kidney which may reflect angiomyolipoma.        Patient has a history of breast cancer s/p lumpectomy, chemo, and radiotherapy.  The patient will be continuing with Anastrazole upon discharge asnd following up with her oncologist, Dr. Serrato on Sep. 18th.          Patient has a history of cerebrovascular accident.  CT angio of head and neck were recently done (7/7/20) which showed chronic right frontal, parietal, and lacunar infarcts.  She was AAOX2 during her stay with mild paranoia and anxiety.  She was monitored during her stay for signs of acute weakness of signs of stroke.        Patient has a history of chronic obstructive pulmonary disease for which she was continued with Albuterol inhaler Q6 hrs during her stay.  No COPD exacerbation was noted and she was saturating well and was tapered off the 1L nasal canula oxygen she was on during her stay.        Given patients improved clinical status and current hemodynamic stability, decision was made for discharge.  Please refer to patients complete medical chart with documents for a full hospital course, this is only a brief summary. 64 y/o female smoker with PMH of DM, HTN, HLD, breast cancer s/p chemo/XRT, SCC, CVA no motor deficits, COPD not on home O2, and NICM (cathetrization 02/2019 showed dRPL occlusion without ability to intervene), EF 31% by MUGA 7/2019, s/p SubQ ICD 10/2019, AAA came to ED after having shortness of breath x1-2 days.  She was going to an xray when they told her to come into the hospital.  The patient reported feeling nauseous and difficulty breathing.  She reported having a non-productive cough and vomited once with clear, non-bloody vomit.  At admission the patient was confused and was a poor historian.  She has a history significant of smoking 1 PPD and has been smoking for 50 years and a DVT/PE "a long time ago" for which she was treated with blood thinners, she is unsure of the time period.  Family history is notable for a sister with a PE.        GOC: full code        Patient was admitted for dyspnea workup.        Patient was found to have CHF exacerbation.  CTA was negative for PE, BNP >35,000.  The patient was started on IV Lasix, discontinued on 8/29 due to rising creatine levels (increased from admission at 1.63).  At admission troponins were elevated at admission at 0.233 and downtrended. Echo from 8/29 with EF 15-20% showing new moderate to severe mitral regurgitation (likely functional leading to HF exacerbations).   Cardiology was consulted and recommendations were followed.  As per cardiology, low dose Entresto was resumed and ANGELINA was pursued on 9/1 to investigate mitral regurgitation and need for mitral clip.  During her stay, her pacemaker, [employer], was interrogated at bedside and no events were recorded, interrogation was normal.          During her hospital course, the patient was found to have CHEN with uptrending creatinine levels, likely due to CHF exacerbation.  Lasix and spironolactone were discontinued.  Low dose Entresto was continued.  Creatinine levels has since improved to 1.94.        Patient has a history of hypertension.  During her course her BP ranged from 140-160/.  Patient is on Entresto 24-26 mg and Metoprolol succinate 50 mg daily.  She was also started on hydralazine 10 mg on 7/31 per cardiology recommendations.  BP was monitored during her stay.        Patient had elevated alkaline phosphatase levels during her hospital course (556) from her baseline of 94 as well as elevated gamma glutamyl transferase levels (701).  Bilirubin levels were WNL (1.1).  An ultrasound of the liver and pancreas was done on 8/31 and was significant for mild fatty liver.  A 1.5 cm hyperechoic lesion was also found in the midpole of the right kidney which may reflect angiomyolipoma.        Patient has a history of breast cancer s/p lumpectomy, chemo, and radiotherapy.  The patient will be continuing with Anastrazole upon discharge asnd following up with her oncologist, Dr. Serrato on Sep. 18th.          Patient has a history of cerebrovascular accident.  CT angio of head and neck were recently done (7/7/20) which showed chronic right frontal, parietal, and lacunar infarcts.  She was AAOX2 during her stay with mild paranoia and anxiety.  She was monitored during her stay for signs of acute weakness of signs of stroke.        Patient has a history of chronic obstructive pulmonary disease for which she was continued with Albuterol inhaler Q6 hrs during her stay.  No COPD exacerbation was noted and she was saturating well and was tapered off the 1L nasal canula oxygen she was on during her stay.        Given patients improved clinical status and current hemodynamic stability, decision was made for discharge.  Please refer to patients complete medical chart with documents for a full hospital course, this is only a brief summary. 64 y/o female smoker with PMH of DM, HTN, HLD, breast cancer s/p chemo/XRT, SCC, CVA no motor deficits, COPD not on home O2, and NICM (cathetrization 02/2019 showed dRPL occlusion without ability to intervene), EF 31% by MUGA 7/2019, s/p SubQ ICD 10/2019, AAA came to ED after having shortness of breath x1-2 days.  She was going to an xray when they told her to come into the hospital.  The patient reported feeling nauseous and difficulty breathing.  She reported having a non-productive cough and vomited once with clear, non-bloody vomit.  At admission the patient was confused and was a poor historian.  She has a history significant of smoking 1 PPD and has been smoking for 50 years and a DVT/PE "a long time ago" for which she was treated with blood thinners, she is unsure of the time period.  Family history is notable for a sister with a PE.        GOC: full code        Patient was admitted for dyspnea workup.        Patient was found to have CHF exacerbation.  CTA was negative for PE, BNP >35,000.  The patient was started on IV Lasix, discontinued on 8/29 due to rising creatine levels (increased from admission at 1.63).  At admission troponins were elevated at admission at 0.233 and downtrended. Echo from 8/29 with EF 15-20% showing new moderate to severe mitral regurgitation (likely functional leading to HF exacerbations).   Cardiology was consulted and recommendations were followed.  As per cardiology, low dose Entresto was resumed and ANGELINA was pursued on 9/1 to investigate mitral regurgitation and need for mitral clip.  During her stay, her pacemaker, [employer], was interrogated at bedside and no events were recorded, interrogation was normal.          During her hospital course, the patient was found to have CHEN with uptrending creatinine levels, likely due to CHF exacerbation.  Lasix and spironolactone were discontinued.  Low dose Entresto was continued.  Creatinine levels has since improved to 1.94.        Patient has a history of hypertension.  During her course her BP ranged from 140-160/.  Patient is continued on Entresto 24-26 mg and Metoprolol succinate 50 mg daily.  She was also started on hydralazine 10 mg and imdur 30 on 7/31 per cardiology recommendations as bp was high.  BP was monitored during her stay.        Patient had elevated alkaline phosphatase levels during her hospital course (556) from her baseline of 94 as well as elevated gamma glutamyl transferase levels (701).  Bilirubin levels were WNL (1.1).  An ultrasound of the liver and pancreas was done on 8/31 and was significant for mild fatty liver.  A 1.5 cm hyperechoic lesion was also found in the midpole of the right kidney which may reflect angiomyolipoma.        Patient has a history of breast cancer s/p lumpectomy, chemo, and radiotherapy.  The patient will be continuing with Anastrazole upon discharge asnd following up with her oncologist, Dr. Serrato on Sep. 18th.          Patient has a history of cerebrovascular accident.  CT angio of head and neck were recently done (7/7/20) which showed chronic right frontal, parietal, and lacunar infarcts.  She was AAOX2 during her stay with mild paranoia and anxiety.  She was monitored during her stay for signs of acute weakness of signs of stroke.        Patient has a history of chronic obstructive pulmonary disease for which she was continued with Albuterol inhaler Q6 hrs during her stay.  No COPD exacerbation was noted and she was saturating well and was tapered off the 1L nasal canula oxygen she was on during her stay.        Given patients improved clinical status and current hemodynamic stability, decision was made for discharge.  Please refer to patients complete medical chart with documents for a full hospital course, this is only a brief summary. 64 y/o female smoker with PMH of DM, HTN, HLD, breast cancer s/p chemo/XRT, SCC, CVA no motor deficits, COPD not on home O2, and NICM (cathetrization 02/2019 showed dRPL occlusion without ability to intervene), EF 31% by MUGA 7/2019, s/p SubQ ICD 10/2019, AAA came to ED after having shortness of breath x1-2 days.  She was going to an xray when they told her to come into the hospital.  The patient reported feeling nauseous and difficulty breathing.  She reported having a non-productive cough and vomited once with clear, non-bloody vomit.  At admission the patient was confused and was a poor historian.  She has a history significant of smoking 1 PPD and has been smoking for 50 years and a DVT/PE "a long time ago" for which she was treated with blood thinners, she is unsure of the time period.  Family history is notable for a sister with a PE.        GOC: full code        Patient was admitted for dyspnea workup.        Patient was found to have CHF exacerbation.  CTA was negative for PE, BNP >35,000.  The patient was started on IV Lasix, discontinued on 8/29 due to rising creatine levels (increased from admission at 1.63).  At admission troponins were elevated at admission at 0.233 and downtrended. Echo from 8/29 with EF 15-20% showing new moderate to severe mitral regurgitation (likely functional leading to HF exacerbations).   Cardiology was consulted and recommendations were followed.  As per cardiology, low dose Entresto was resumed and ANGELINA was pursued on 9/1 to investigate mitral regurgitation and need for mitral clip. 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 vascular was consulted and only recommends antiplatelet and no surgical intervention is indication and patient should follow with Dr. Gutierrez for outpatient follow up. During her stay, her pacemaker, [employer], was interrogated at bedside and no events were recorded, interrogation was normal.          During her hospital course, the patient was found to have CHEN with uptrending creatinine levels, likely due to CHF exacerbation.  Lasix and spironolactone were discontinued.  Low dose Entresto was continued.  Creatinine levels has since improved to 1.94.        Patient has a history of hypertension.  During her course her BP ranged from 140-160/.  Patient is continued on Entresto 24-26 mg and Metoprolol succinate 50 mg daily.  She was also started on hydralazine 10 mg and imdur 30 on 7/31 per cardiology recommendations as bp was high.  BP was monitored during her stay.        Patient had elevated alkaline phosphatase levels during her hospital course (556) from her baseline of 94 as well as elevated gamma glutamyl transferase levels (701).  Bilirubin levels were WNL (1.1).  An ultrasound of the liver and pancreas was done on 8/31 and was significant for mild fatty liver.  A 1.5 cm hyperechoic lesion was also found in the midpole of the right kidney which may reflect angiomyolipoma.        Patient has a history of breast cancer s/p lumpectomy, chemo, and radiotherapy.  The patient will be continuing with Anastrazole upon discharge asnd following up with her oncologist, Dr. Serrato on Sep. 18th.          Patient has a history of cerebrovascular accident.  CT angio of head and neck were recently done (7/7/20) which showed chronic right frontal, parietal, and lacunar infarcts.  She was AAOX2 during her stay with mild paranoia and anxiety.  She was monitored during her stay for signs of acute weakness of signs of stroke.        Patient has a history of chronic obstructive pulmonary disease for which she was continued with Albuterol inhaler Q6 hrs during her stay.  No COPD exacerbation was noted and she was saturating well and was tapered off the 1L nasal canula oxygen she was on during her stay.        Given patients improved clinical status and current hemodynamic stability, decision was made for discharge.  Please refer to patients complete medical chart with documents for a full hospital course, this is only a brief summary. 64 y/o female smoker with PMH of DM, HTN, HLD, breast cancer s/p chemo/XRT, SCC, CVA no motor deficits, COPD not on home O2, and NICM (cathetrization 02/2019 showed dRPL occlusion without ability to intervene), EF 31% by MUGA 7/2019, s/p SubQ ICD 10/2019, AAA came to ED after having shortness of breath x1-2 days.  She was going to an xray when they told her to come into the hospital.  The patient reported feeling nauseous and difficulty breathing.  She reported having a non-productive cough and vomited once with clear, non-bloody vomit.  At admission the patient was confused and was a poor historian.  She has a history significant of smoking 1 PPD and has been smoking for 50 years and a DVT/PE "a long time ago" for which she was treated with blood thinners, she is unsure of the time period.  Family history is notable for a sister with a PE.        GOC: full code        Patient was admitted for dyspnea workup.        Patient was found to have CHF exacerbation.  CTA was negative for PE, BNP >35,000.  The patient was started on IV Lasix, discontinued on 8/29 due to rising creatine levels (increased from admission at 1.63).  At admission troponins were elevated at admission at 0.233 and downtrended. Echo from 8/29 with EF 15-20% showing new moderate to severe mitral regurgitation (likely functional leading to HF exacerbations).   Cardiology was consulted and recommendations were followed.  As per cardiology, low dose Entresto was resumed and ANGELINA was pursued on 9/1 to investigate mitral regurgitation and need for mitral clip. 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 vascular was consulted and only recommends antiplatelet and no surgical intervention is indication and patient should follow with Dr. Gutierrez for outpatient follow up. During her stay, her pacemaker, [employer], was interrogated at bedside and no events were recorded, interrogation was normal. Patient's anticardiolipin antibody, RPR is testing and patient will follow up with PCP after discharge          During her hospital course, the patient was found to have CHEN with uptrending creatinine levels, likely due to CHF exacerbation.  Lasix and spironolactone were discontinued.  Low dose Entresto was continued.  Creatinine levels has since improved to 1.94.        Patient has a history of hypertension.  During her course her BP ranged from 140-160/.  Patient is continued on Entresto 24-26 mg and Metoprolol succinate 50 mg daily.  She was also started on hydralazine 10 mg and imdur 30 on 7/31 per cardiology recommendations as bp was high.  BP was monitored during her stay.        Patient had elevated alkaline phosphatase levels during her hospital course (556) from her baseline of 94 as well as elevated gamma glutamyl transferase levels (701).  Bilirubin levels were WNL (1.1).  An ultrasound of the liver and pancreas was done on 8/31 and was significant for mild fatty liver.  A 1.5 cm hyperechoic lesion was also found in the midpole of the right kidney which may reflect angiomyolipoma.        Patient has a history of breast cancer s/p lumpectomy, chemo, and radiotherapy.  The patient will be continuing with Anastrazole upon discharge asnd following up with her oncologist, Dr. Serrato on Sep. 18th.          Patient has a history of cerebrovascular accident.  CT angio of head and neck were recently done (7/7/20) which showed chronic right frontal, parietal, and lacunar infarcts.  She was AAOX2 during her stay with mild paranoia and anxiety.  She was monitored during her stay for signs of acute weakness of signs of stroke.        Patient has a history of chronic obstructive pulmonary disease for which she was continued with Albuterol inhaler Q6 hrs during her stay.  No COPD exacerbation was noted and she was saturating well and was tapered off the 1L nasal canula oxygen she was on during her stay.        Given patients improved clinical status and current hemodynamic stability, decision was made for discharge.  Please refer to patients complete medical chart with documents for a full hospital course, this is only a brief summary. 66 y/o female smoker with PMH of DM, HTN, HLD, breast cancer s/p chemo/XRT, SCC, CVA no motor deficits, COPD not on home O2, and NICM (cathetrization 02/2019 showed dRPL occlusion without ability to intervene), EF 31% by MUGA 7/2019, s/p SubQ ICD 10/2019, AAA came to ED after having shortness of breath x1-2 days.  She was going to an xray when they told her to come into the hospital.  The patient reported feeling nauseous and difficulty breathing.  She reported having a non-productive cough and vomited once with clear, non-bloody vomit.  At admission the patient was confused and was a poor historian.  She has a history significant of smoking 1 PPD and has been smoking for 50 years and a DVT/PE "a long time ago" for which she was treated with blood thinners, she is unsure of the time period.  Family history is notable for a sister with a PE.        GOC: full code        Patient was admitted for dyspnea workup.        Patient was found to have CHF exacerbation.  CTA was negative for PE, BNP >35,000.  The patient was started on IV Lasix, discontinued on 8/29 due to rising creatine levels (increased from admission at 1.63).  At admission troponins were elevated at admission at 0.233 and downtrended. Echo from 8/29 with EF 15-20% showing new moderate to severe mitral regurgitation (likely functional leading to HF exacerbations).   Cardiology was consulted and recommendations were followed.  As per cardiology, low dose Entresto was resumed and ANGELINA was pursued on 9/1 to investigate mitral regurgitation and need for mitral clip. 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 vascular was consulted and only recommends antiplatelet and no surgical intervention is indication and patient should follow with Dr. Gutierrez for outpatient follow up. During her stay, her pacemaker, [employer], was interrogated at bedside and no events were recorded, interrogation was normal. Patient's anticardiolipin antibody was positive, IGM, IGG is testing and patient will follow up with PCP after discharge          During her hospital course, the patient was found to have CHEN with uptrending creatinine levels, likely due to CHF exacerbation.  Lasix and spironolactone were discontinued.  Low dose Entresto was continued.  Creatinine levels has since improved to 1.94.        Patient's was noted to have worsening of mental status with agitation and CT head was performed which showed Moderate chronic microvascular changes without evidence of an acute transcortical infarction or hemorrhage. MR is a more sensitive imaging modality for the evaluation of an acute infarction. 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 Patient was continued on asa and plavix. Ammonia and acetaminophen levels were within normal range,             Patient has a history of hypertension.  During her course her BP ranged from 140-160/.  Patient is continued on Entresto 24-26 mg and Metoprolol succinate 50 mg daily.  She was also started on hydralazine 10 mg and imdur 30 on 7/31 per cardiology recommendations as bp was high.  BP was monitored during her stay.        Patient had elevated alkaline phosphatase levels during her hospital course (556) from her baseline of 94 as well as elevated gamma glutamyl transferase levels (701).  Bilirubin levels were WNL (1.1).  An ultrasound of the liver and pancreas was done on 8/31 and was significant for mild fatty liver.  A 1.5 cm hyperechoic lesion was also found in the midpole of the right kidney which may reflect angiomyolipoma. HIDA scan showed no pathology. Patient was take for EGD which showed         Patient has a history of breast cancer s/p lumpectomy, chemo, and radiotherapy.  The patient will be continuing with Anastrazole upon discharge asnd following up with her oncologist, Dr. Serrato on Sep. 18th.          Patient has a history of cerebrovascular accident.  CT angio of head and neck were recently done (7/7/20) which showed chronic right frontal, parietal, and lacunar infarcts.  She was AAOX2 during her stay with mild paranoia and anxiety.  She was monitored during her stay for signs of acute weakness of signs of stroke.        Patient has a history of chronic obstructive pulmonary disease for which she was continued with Albuterol inhaler Q6 hrs during her stay.  No COPD exacerbation was noted and she was saturating well and was tapered off the 1L nasal canula oxygen she was on during her stay.        Given patients improved clinical status and current hemodynamic stability, decision was made for discharge.  Please refer to patients complete medical chart with documents for a full hospital course, this is only a brief summary. 64 y/o female smoker with PMH of DM, HTN, HLD, breast cancer s/p chemo/XRT, SCC, CVA no motor deficits, COPD not on home O2, and NICM (cathetrization 02/2019 showed dRPL occlusion without ability to intervene), EF 31% by MUGA 7/2019, s/p SubQ ICD 10/2019, AAA came to ED after having shortness of breath x1-2 days.  She was going to an xray when they told her to come into the hospital.  The patient reported feeling nauseous and difficulty breathing.  She reported having a non-productive cough and vomited once with clear, non-bloody vomit.  At admission the patient was confused and was a poor historian.  She has a history significant of smoking 1 PPD and has been smoking for 50 years and a DVT/PE "a long time ago" for which she was treated with blood thinners, she is unsure of the time period.  Family history is notable for a sister with a PE.        GOC: full code        Patient was admitted for dyspnea workup.        Patient was found to have CHF exacerbation.  CTA was negative for PE, BNP >35,000.  The patient was started on IV Lasix, discontinued on 8/29 due to rising creatine levels (increased from admission at 1.63).  At admission troponins were elevated at admission at 0.233 and downtrended. Echo from 8/29 with EF 15-20% showing new moderate to severe mitral regurgitation (likely functional leading to HF exacerbations).   Cardiology was consulted and recommendations were followed.  As per cardiology, low dose Entresto was resumed and ANGELINA was pursued on 9/1 to investigate mitral regurgitation and need for mitral clip. 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 vascular was consulted and only recommends antiplatelet and no surgical intervention is indication and patient should follow with Dr. Gutierrez for outpatient follow up. During her stay, her pacemaker, [employer], was interrogated at bedside and no events were recorded, interrogation was normal. Patient's anticardiolipin antibody was positive, IGM, IGG is testing and patient will follow up with PCP after discharge          During her hospital course, the patient was found to have CHEN with uptrending creatinine levels, likely due to CHF exacerbation.  Lasix and spironolactone were discontinued.  Low dose Entresto was continued.  Creatinine levels has since improved and she is to continue Lasix 20mg PO daily upon discharge.        Patient's was noted to have worsening of mental status with agitation and CT head was performed which showed Moderate chronic microvascular changes without evidence of an acute transcortical infarction or hemorrhage. MR is a more sensitive imaging modality for the evaluation of an acute infarction. 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 Patient was continued on asa and plavix. Ammonia and acetaminophen levels were within normal range. Psychiatry was consulted and she was started on seroquel with improvement in her agitation. On day of discharge she was calm and cooperative. Psych recommended to continue Seroquel and her home Wellbutrin on discharge.            Patient has a history of hypertension.  During her course her BP ranged from 140-160/.  Patient is continued on Entresto 24-26 mg and Metoprolol succinate 50 mg daily.  She was also started on hydralazine 10 mg and imdur 30 on 7/31 per cardiology recommendations as bp was high.  BP was monitored during her stay.        Patient had elevated alkaline phosphatase levels during her hospital course (556) from her baseline of 94 as well as elevated gamma glutamyl transferase levels (701).  Bilirubin levels were WNL (1.1).  An ultrasound of the liver and pancreas was done on 8/31 and was significant for mild fatty liver.  A 1.5 cm hyperechoic lesion was also found in the midpole of the right kidney which may reflect angiomyolipoma. HIDA scan showed no pathology. Patient was take for EGD which showed         Patient has a history of breast cancer s/p lumpectomy, chemo, and radiotherapy.  The patient will be continuing with Anastrazole upon discharge asnd following up with her oncologist, Dr. Serrato on Sep. 18th.          Patient has a history of cerebrovascular accident.  CT angio of head and neck were recently done (7/7/20) which showed chronic right frontal, parietal, and lacunar infarcts.  She was AAOX2 during her stay with mild paranoia and anxiety.  She was monitored during her stay for signs of acute weakness of signs of stroke.        Patient has a history of chronic obstructive pulmonary disease for which she was continued with Albuterol inhaler Q6 hrs during her stay.  No COPD exacerbation was noted and she was saturating well and was tapered off the 1L nasal canula oxygen she was on during her stay.        Given patients improved clinical status and current hemodynamic stability, decision was made for discharge.  Please refer to patients complete medical chart with documents for a full hospital course, this is only a brief summary.

## 2020-09-01 NOTE — PROGRESS NOTE ADULT - PROBLEM SELECTOR PLAN 3
-Bp ranging 140-160/  -Pt is currently on Entresto 24-26 mg, Metoprolol succinate 50 mg daily  -Monitor BP, hold medications with parameters  -Can add Hydralazine Imdur for further management as necessary as per cardio recommendations -Bp ranging 140-160/  -Pt is currently on Entresto 24-26 mg, Metoprolol succinate 50 mg daily  -Monitor BP, hold medications with parameters  -Hydralazine 10 mg, Isosorbide mononitrate 30 mg added 9/1 for better control of BP

## 2020-09-01 NOTE — DISCHARGE NOTE PROVIDER - CARE PROVIDER_API CALL
Nakul Doshi  CARDIOVASCULAR DISEASE  [address] [facility], [address] [place] [address]  Phone: [phone]  Fax: [phone]  Follow Up Time: Nakul Doshi  CARDIOVASCULAR DISEASE  [address] [facility], [address] [place] [id number]  Phone: [phone]  Fax: [phone]  Follow Up Time:     Shahriar Gutierrez  VASCULAR SURGERY  [address] [facility], [address]  [place] [address]  Phone: [phone]  Fax: [phone]  Follow Up Time:

## 2020-09-01 NOTE — PROGRESS NOTE ADULT - PROBLEM SELECTOR PLAN 1
- BNP on admission 35,000, Troponin downtrending  - IV Lasix d/c'ed 8/29 as Cr was uptrending(Cr now downtrending after Lasix was d/c'ed)  - Daily weights, strict Is/Os (fluid restriction 1200 ml)  - DASH/TLC diet  - Echo 8/29 EF 15-20%, New Moderate to Severe MR  - ANGELINA was done to evaluate valve function- showed mural thrombus in the descending aorta, will be started on NOAC's Eliquis   - As per cardio recs, Pt may benefit from Mitral clip intervention in the future  - Pt on low dose Entresto, Metoprolol, hold Spironolactone due to creatinine elevation - BNP on admission 35,000, Troponin downtrending  - IV Lasix d/c'ed 8/29 as Cr was uptrending(Cr now downtrending after Lasix was d/c'ed)  - Daily weights, strict Is/Os (fluid restriction 1200 ml)  - DASH/TLC diet  - Echo 8/29 EF 15-20%, New Moderate to Severe MR  - ANGELINA was done to evaluate valve function- showed mural thrombus in the descending aorta, Pt is now started on Eliquis 10 mg 9/1  - Pt on low dose Entresto, Metoprolol, hold Spironolactone due to creatinine elevation

## 2020-09-01 NOTE — PROGRESS NOTE ADULT - PROBLEM SELECTOR PLAN 2
- Creatinine was uptrending, hence IV lasix d/c'ed 8/29  - Resumed Low dose Entresto, hold Spironolactone  - Creatinine today 1.94 (improving from 2 yesterday)  - Monitor BMP daily  - Avoid Nephrotoxins - Creatinine was uptrending, hence IV lasix d/c'ed 8/29  - Resumed Low dose Entresto, hold Spironolactone  - Creatinine today 1.5 (improving from 1.9 yesterday)  - Monitor BMP daily  - Avoid Nephrotoxins

## 2020-09-01 NOTE — DISCHARGE NOTE PROVIDER - CARE PROVIDERS DIRECT ADDRESSES
,[email or web address]

## 2020-09-01 NOTE — DIETITIAN INITIAL EVALUATION ADULT. - PERTINENT MEDS FT
MEDICATIONS  (STANDING):  ALBUTerol    90 MICROgram(s) HFA Inhaler 2 Puff(s) Inhalation every 6 hours  anastrozole 1 milliGRAM(s) Oral daily  aspirin enteric coated 81 milliGRAM(s) Oral daily  atorvastatin 40 milliGRAM(s) Oral at bedtime  buPROPion XL . 150 milliGRAM(s) Oral daily  clopidogrel Tablet 75 milliGRAM(s) Oral daily  dextrose 5%. 1000 milliLiter(s) (50 mL/Hr) IV Continuous <Continuous>  heparin   Injectable 5000 Unit(s) SubCutaneous every 12 hours  hydrALAZINE 10 milliGRAM(s) Oral every 12 hours  insulin lispro (HumaLOG) corrective regimen sliding scale   SubCutaneous three times a day before meals  insulin lispro (HumaLOG) corrective regimen sliding scale   SubCutaneous at bedtime  isosorbide   mononitrate ER Tablet (IMDUR) 30 milliGRAM(s) Oral daily  metoprolol succinate ER 50 milliGRAM(s) Oral daily  sacubitril 24 mG/valsartan 26 mG 1 Tablet(s) Oral two times a day  tiotropium 18 MICROgram(s) Capsule 1 Capsule(s) Inhalation daily

## 2020-09-01 NOTE — PROGRESS NOTE ADULT - PROBLEM SELECTOR PLAN 5
-Patient has hx Breast Cancer S/p Lumpectomy, Chemo, Radio therapy  -C/w with Anastrazole   -Follow up with Dr. Serrato in Sep 18

## 2020-09-01 NOTE — PROGRESS NOTE ADULT - PROBLEM SELECTOR PLAN 9
pt can make decisions but has HCP also - Jaki Garland - [phone]  -- spoke with HCP - updated pts condition

## 2020-09-02 ENCOUNTER — TRANSCRIPTION ENCOUNTER (OUTPATIENT)
Age: 65
End: 2020-09-02

## 2020-09-02 DIAGNOSIS — R11.0 NAUSEA: ICD-10-CM

## 2020-09-02 DIAGNOSIS — R74.8 ABNORMAL LEVELS OF OTHER SERUM ENZYMES: ICD-10-CM

## 2020-09-02 LAB
ALBUMIN SERPL ELPH-MCNC: 3.2 G/DL — LOW (ref 3.5–5)
ALP SERPL-CCNC: 517 U/L — HIGH (ref 40–120)
ALT FLD-CCNC: 25 U/L DA — SIGNIFICANT CHANGE UP (ref 10–60)
AMMONIA BLD-MCNC: 23 UMOL/L — SIGNIFICANT CHANGE UP (ref 11–32)
ANION GAP SERPL CALC-SCNC: 8 MMOL/L — SIGNIFICANT CHANGE UP (ref 5–17)
APAP SERPL-MCNC: <2 UG/ML — LOW (ref 10–30)
AST SERPL-CCNC: 34 U/L — SIGNIFICANT CHANGE UP (ref 10–40)
BILIRUB SERPL-MCNC: 0.8 MG/DL — SIGNIFICANT CHANGE UP (ref 0.2–1.2)
BUN SERPL-MCNC: 36 MG/DL — HIGH (ref 7–18)
CALCIUM SERPL-MCNC: 8.6 MG/DL — SIGNIFICANT CHANGE UP (ref 8.4–10.5)
CHLORIDE SERPL-SCNC: 106 MMOL/L — SIGNIFICANT CHANGE UP (ref 96–108)
CO2 SERPL-SCNC: 27 MMOL/L — SIGNIFICANT CHANGE UP (ref 22–31)
CREAT SERPL-MCNC: 1.36 MG/DL — HIGH (ref 0.5–1.3)
GLUCOSE BLDC GLUCOMTR-MCNC: 124 MG/DL — HIGH (ref 70–99)
GLUCOSE BLDC GLUCOMTR-MCNC: 127 MG/DL — HIGH (ref 70–99)
GLUCOSE BLDC GLUCOMTR-MCNC: 132 MG/DL — HIGH (ref 70–99)
GLUCOSE BLDC GLUCOMTR-MCNC: 161 MG/DL — HIGH (ref 70–99)
GLUCOSE SERPL-MCNC: 126 MG/DL — HIGH (ref 70–99)
HCT VFR BLD CALC: 33.1 % — LOW (ref 34.5–45)
HGB BLD-MCNC: 10.5 G/DL — LOW (ref 11.5–15.5)
LACTATE SERPL-SCNC: 1.9 MMOL/L — SIGNIFICANT CHANGE UP (ref 0.7–2)
MAGNESIUM SERPL-MCNC: 2.4 MG/DL — SIGNIFICANT CHANGE UP (ref 1.6–2.6)
MCHC RBC-ENTMCNC: 29.8 PG — SIGNIFICANT CHANGE UP (ref 27–34)
MCHC RBC-ENTMCNC: 31.7 GM/DL — LOW (ref 32–36)
MCV RBC AUTO: 94 FL — SIGNIFICANT CHANGE UP (ref 80–100)
NRBC # BLD: 0 /100 WBCS — SIGNIFICANT CHANGE UP (ref 0–0)
PHOSPHATE SERPL-MCNC: 2.6 MG/DL — SIGNIFICANT CHANGE UP (ref 2.5–4.5)
PLATELET # BLD AUTO: 286 K/UL — SIGNIFICANT CHANGE UP (ref 150–400)
POTASSIUM SERPL-MCNC: 3.6 MMOL/L — SIGNIFICANT CHANGE UP (ref 3.5–5.3)
POTASSIUM SERPL-SCNC: 3.6 MMOL/L — SIGNIFICANT CHANGE UP (ref 3.5–5.3)
PROT SERPL-MCNC: 7.2 G/DL — SIGNIFICANT CHANGE UP (ref 6–8.3)
RBC # BLD: 3.52 M/UL — LOW (ref 3.8–5.2)
RBC # FLD: 15.6 % — HIGH (ref 10.3–14.5)
SODIUM SERPL-SCNC: 141 MMOL/L — SIGNIFICANT CHANGE UP (ref 135–145)
T PALLIDUM AB TITR SER: NEGATIVE — SIGNIFICANT CHANGE UP
WBC # BLD: 4.93 K/UL — SIGNIFICANT CHANGE UP (ref 3.8–10.5)
WBC # FLD AUTO: 4.93 K/UL — SIGNIFICANT CHANGE UP (ref 3.8–10.5)

## 2020-09-02 PROCEDURE — 99233 SBSQ HOSP IP/OBS HIGH 50: CPT | Mod: GC

## 2020-09-02 RX ADMIN — Medication 81 MILLIGRAM(S): at 12:04

## 2020-09-02 RX ADMIN — BUPROPION HYDROCHLORIDE 150 MILLIGRAM(S): 150 TABLET, EXTENDED RELEASE ORAL at 12:04

## 2020-09-02 RX ADMIN — ALBUTEROL 2 PUFF(S): 90 AEROSOL, METERED ORAL at 22:04

## 2020-09-02 RX ADMIN — Medication 10 MILLIGRAM(S): at 05:35

## 2020-09-02 RX ADMIN — ALBUTEROL 2 PUFF(S): 90 AEROSOL, METERED ORAL at 14:45

## 2020-09-02 RX ADMIN — ANASTROZOLE 1 MILLIGRAM(S): 1 TABLET ORAL at 12:04

## 2020-09-02 RX ADMIN — APIXABAN 10 MILLIGRAM(S): 2.5 TABLET, FILM COATED ORAL at 17:21

## 2020-09-02 RX ADMIN — ALBUTEROL 2 PUFF(S): 90 AEROSOL, METERED ORAL at 08:34

## 2020-09-02 RX ADMIN — CLOPIDOGREL BISULFATE 75 MILLIGRAM(S): 75 TABLET, FILM COATED ORAL at 12:04

## 2020-09-02 RX ADMIN — TIOTROPIUM BROMIDE 1 CAPSULE(S): 18 CAPSULE ORAL; RESPIRATORY (INHALATION) at 12:04

## 2020-09-02 RX ADMIN — SACUBITRIL AND VALSARTAN 1 TABLET(S): 24; 26 TABLET, FILM COATED ORAL at 17:21

## 2020-09-02 RX ADMIN — Medication 50 MILLIGRAM(S): at 05:35

## 2020-09-02 RX ADMIN — SACUBITRIL AND VALSARTAN 1 TABLET(S): 24; 26 TABLET, FILM COATED ORAL at 05:35

## 2020-09-02 RX ADMIN — Medication 10 MILLIGRAM(S): at 17:21

## 2020-09-02 RX ADMIN — ISOSORBIDE MONONITRATE 30 MILLIGRAM(S): 60 TABLET, EXTENDED RELEASE ORAL at 12:04

## 2020-09-02 RX ADMIN — APIXABAN 10 MILLIGRAM(S): 2.5 TABLET, FILM COATED ORAL at 05:35

## 2020-09-02 NOTE — CHART NOTE - NSCHARTNOTEFT_GEN_A_CORE
Pt has been expressing increasing anxiety and fearfulness over her hospital stay today, uses oxygen inconsistently (saturates well on RA). O/E She is AAO x 2 on baseline, Abdominal exam- mild RUQ tenderness noted. On labs, ALP and GGT were found to be elevated, USG Abdomen done yesterday (9/1/2020) negative for CBD dilation, GB thickening. As pt has c/o nausea and mild RUQ tenderness upon exam, HIDA scan will be pursued tomorrow. Ammonia level (to r/out Encephalopathy 2/2 Liver pathology), Acetaminophen level, Blood cs and Urine cs ordered (Sepsis workup). In addition, Psych and Neurology consult (Dr. Hernandez) were requested. Pt has been expressing increasing anxiety and fearfulness over her hospital stay today, uses oxygen inconsistently (saturates well on RA). O/E She is AAO x 2 on baseline, Abdominal exam- mild RUQ tenderness noted. On labs, ALP and GGT were found to be elevated, USG Abdomen done yesterday (9/1/2020) negative for CBD dilation, GB thickening. As pt has c/o nausea after meal intake and mild RUQ tenderness upon exam, HIDA scan will be pursued tomorrow. In light of investigating causes of Encephalopathy, Ammonia level, Acetaminophen level, Blood cs and Urine cs (Sepsis workup), Head CT were ordered. In addition, Psych and Neurology consult (Dr. Hrenandez) were requested. Will sign out to call team.

## 2020-09-02 NOTE — PROGRESS NOTE ADULT - PROBLEM SELECTOR PLAN 3
-Bp ranging 140-160/  -Pt is currently on Entresto 24-26 mg, Metoprolol succinate 50 mg daily  -Monitor BP, hold medications with parameters  -Hydralazine 10 mg, Isosorbide mononitrate 30 mg added 9/1 for better control of BP

## 2020-09-02 NOTE — CONSULT NOTE ADULT - SUBJECTIVE AND OBJECTIVE BOX
All health maintenance and other pertinent information has been reviewed in preparation for today's office visit. Patient presents in the office today for:    Chief Complaint   Patient presents with    Breast Cancer     Left Side     1. Have you been to the ER, urgent care clinic since your last visit? Hospitalized since your last visit? No    2. Have you seen or consulted any other health care providers outside of the [address] [place] [address] since your last visit? Include any pap smears or colon screening.  No Patient is a 65y old  Female who presents with a chief complaint of Dyspnea (02 Sep 2020 13:27)     .     HPI:  Pt states she has nausea for 3 weeks, had 2 vomits last week.  Nausea is comes and goes, pt is not sure what makes her nauseous. Pt denied diarrhea or constipation. Pt doesn't remember the last BM, but believes earlier this week.   Last colonoscopy was 10 years ago, pt believes it was normal. She had nausea today when she had a lunch but didn't vomit. Pt states "cardiac exam this week makes me nauseous."        REVIEW OF SYSTEMS  Constitutional:   No fever, no fatigue, no pallor, no night sweats, no weight loss.  HEENT:   No eye pain, no vision changes, no icterus, no mouth ulcers.  Respiratory:   No shortness of breath, no cough, no respiratory distress.   Cardiovascular:   No chest pain, no palpitations.   Gastrointestinal: No abdominal pain, (+) nausea, no vomiting , no diarrhea, no constipation, no hematochezia,no melena.  Skin:   No rashes, no jaundice, no eczema.   Musculoskeletal:   No joint pain, no swelling, no myalgia.   Neurologic:   No headache, no seizure, no weakness.   Genitourinary:   No dysuria, no decreased urine output.  Psychiatric:  No depression, no anxiety,   Endocrine:   No thyroid disease, no diabetes.  Heme/Lymphatic:   No anemia, no blood transfusions, no lymph node enlargement, no bleeding, no bruising.  ___________________________________________________________________________________________  Allergies    Advil (Hives; Vomiting)    Intolerances      MEDICATIONS  (STANDING):  ALBUTerol    90 MICROgram(s) HFA Inhaler 2 Puff(s) Inhalation every 6 hours  anastrozole 1 milliGRAM(s) Oral daily  apixaban 10 milliGRAM(s) Oral every 12 hours  aspirin enteric coated 81 milliGRAM(s) Oral daily  atorvastatin 40 milliGRAM(s) Oral at bedtime  buPROPion XL . 150 milliGRAM(s) Oral daily  clopidogrel Tablet 75 milliGRAM(s) Oral daily  dextrose 5%. 1000 milliLiter(s) (50 mL/Hr) IV Continuous <Continuous>  hydrALAZINE 10 milliGRAM(s) Oral every 12 hours  insulin lispro (HumaLOG) corrective regimen sliding scale   SubCutaneous three times a day before meals  insulin lispro (HumaLOG) corrective regimen sliding scale   SubCutaneous at bedtime  isosorbide   mononitrate ER Tablet (IMDUR) 30 milliGRAM(s) Oral daily  metoprolol succinate ER 50 milliGRAM(s) Oral daily  sacubitril 24 mG/valsartan 26 mG 1 Tablet(s) Oral two times a day  tiotropium 18 MICROgram(s) Capsule 1 Capsule(s) Inhalation daily    MEDICATIONS  (PRN):  guaiFENesin   Syrup  (Sugar-Free) 100 milliGRAM(s) Oral every 6 hours PRN Cough  meclizine 12.5 milliGRAM(s) Oral daily PRN Nausea      PAST MEDICAL & SURGICAL HISTORY:  Knee effusion, left: arthrocentesis- 15  Internal carotid aneurysm  Abdominal aneurysm: infrarenal- wached by Vascular , Dr. Gutierrez  Cardiomyopathy  Sickle cell trait  DVT (deep venous thrombosis): Left leg in  after hysterectomy  Hypercholesteremia  Breast cancer: right-s/p chemo and radiation  Hypertension  Breast cancer: infusaport insertion- - left chest  S/P hysterectomy:   Gilson filter in place:   S/P lumpectomy, right breast: , revision of position-   Fibroid uterus: sp hystrectomy 1196    FAMILY HISTORY:  Family history of glioblastoma  Family history of other condition: please open box and see additional medical family history  Family history of renal failure: uncle  Family history of glioblastoma (Sibling)  History of hypertension: mother  Family history of liver failure  Family history of glioblastoma  Family history of renal stone  Family history of diabetes mellitus  Family history of hypertension    Social History: No hsitory of : Tobacco use, IVDA, EToH  ______________________________________________________________________________________    PHYSICAL EXAM    Daily     Daily Weight in k.7 (02 Sep 2020 04:48)  BMI: 24.5 (08-31 @ 14:36)  Change in Weight:  Vital Signs Last 24 Hrs  T(C): 36.8 (02 Sep 2020 13:46), Max: 36.8 (02 Sep 2020 04:48)  T(F): 98.2 (02 Sep 2020 13:46), Max: 98.2 (02 Sep 2020 04:48)  HR: 81 (02 Sep 2020 13:46) (68 - 81)  BP: 138/100 (02 Sep 2020 13:46) (138/100 - 149/97)  BP(mean): --  RR: 18 (02 Sep 2020 13:46) (18 - 18)  SpO2: 100% (02 Sep 2020 13:46) (97% - 100%)    General:  Well developed, well nourished, alert and active, no pallor, NAD.  HEENT:    Normal appearance of conjunctiva, ears, nose, lips, oropharynx, and oral mucosa, anicteric.  Neck:  No masses, no asymmetry.  Lymph Nodes:  (+) 2 hard lymph nodes on R axillary    Cardiovascular:  RRR normal S1/S2, no murmur.  Respiratory:  CTA B/L, normal respiratory effort.   Abdominal:   soft, no masses or tenderness, (+) uncomfortable on palpation on epigastric and RUQ area, normoactive BS, NT/ND, no HSM.  Extremities:   No clubbing or cyanosis, normal capillary refill, no edema.   Skin:   No rash, jaundice, lesions, eczema.   Musculoskeletal:  No joint swelling, erythema or tenderness.   Neuro: No focal deficits.   Other:   _______________________________________________________________________________________________  Lab Results:                          10.5   4.93  )-----------( 286      ( 02 Sep 2020 07:11 )             33.1     09-    141  |  106  |  36<H>  ----------------------------<  126<H>  3.6   |  27  |  1.36<H>    Ca    8.6      02 Sep 2020 07:11  Phos  2.6       Mg     2.4         TPro  7.2  /  Alb  3.2<L>  /  TBili  0.8  /  DBili  x   /  AST  34  /  ALT  25  /  AlkPhos  517<H>  09    LIVER FUNCTIONS - ( 02 Sep 2020 07:11 )  Alb: 3.2 g/dL / Pro: 7.2 g/dL / ALK PHOS: 517 U/L / ALT: 25 U/L DA / AST: 34 U/L / GGT: x           PT/INR - ( 01 Sep 2020 06:33 )   PT: 15.6 sec;   INR: 1.35 ratio         PTT - ( 01 Sep 2020 06:33 )  PTT:25.4 sec  Gamma Glutamyl Transferase, Serum: 753 U/L ( @ 19:43)        Stool Results:      RADIOLOGY RESULTS:    < from: CT Angio Chest w/ IV Cont (20 @ 23:12) >  FINDINGS:  There is good opacification of pulmonary arterial tree. No filling defect is present to suggest acute pulmonary embolus. The main pulmonary arteries within normal limits of size.    The thyroid gland is unremarkable.    Evaluation of lung parenchyma demonstrates a stable 7 mm subpleural nodule left lower lobe (series 6 image 122). Additional subcentimeter nodules described on the previous study are not clearly identified on this exam. There is no evidence of pneumonia. Stable scattered lung cysts and mild centrilobular emphysema. New mild interlobular septal thickening and trace bilateral pleural effusions. The tracheobronchial tree is clear.  There is no significant axillary, mediastinal or hilar adenopathy.    The heart is not globally enlarged, however, the left ventricle is again dilated. There is no pericardial effusion. The thoracic aorta is again mildly ectatic distally, without change. Limited assessment for dissection due to phase of contrast enhancement. Left lateral chest wall subcutaneous pacer.    Limited images to the upper abdomen demonstrate no acute finding.    There are no acute osseous abnormalities. 2.3 x 2.1 cm mass in the right breast abutting and inseparable from the underlying pectoralis major muscle is unchanged and likely corresponds to known breast cancer.    IMPRESSION:  No pulmonary embolism.    New mild pulmonary edema and trace bilateral pleural effusions. Please correlate clinicallyfor congestive heart failure.    2.3 cm right breast mass.      < end of copied text >    < from: US Hepatic & Pancreatic (20 @ 12:53) >  FINDINGS:   No previous examinations are available for review.    The liver demonstrates mild fatty echotexture without focal lesion.  Hepatic size and contours are maintained.Hepatic and portal veins appear patent and are not displaced.  No intrahepatic or ductal dilatation is found.  The common duct is not dilated, measuring 0.4 cm.  The gallbladder is intact without calculi.  There is no gallbladder wall thickening.  Notenderness was elicited with direct compression by the transducer (no sonographic Lopez's sign identified). Pancreas obscured by bowel gas.    The right kidney measures 10.3 cm in length. 1.5 cm hyperechoic lesion in the midpole of the RIGHT kidney which may reflect an angiomyolipoma. It demonstrates no calculus or hydronephrosis.  The abdominal aorta and IVC appear intact.      IMPRESSION:   Mild fatty liver.  1.5 cm hyperechoic lesion in the midpole of the RIGHT kidney which may reflect an angiomyolipoma.  .    < end of copied text >        SURGICAL PATHOLOGY:

## 2020-09-02 NOTE — DISCHARGE NOTE NURSING/CASE MANAGEMENT/SOCIAL WORK - PATIENT PORTAL LINK FT
You can access the FollowMyHealth Patient Portal offered by [facility] by registering at the following website: [email or web address]. By joining [email or web address]’s FollowMyHealth portal, you will also be able to view your health information using other applications (apps) compatible with our system.

## 2020-09-02 NOTE — MEDICAL STUDENT PROGRESS NOTE(EDUCATION) - NS MD HP STUD ASPLAN PLAN FT
1. Altered mental status, acute  2. Nausea  3. Elevated alkaline phosphatase levels  4. CHF exacerbation   5. Acute kidney injury  6. Hypertension  7. Breast cancer  ·	HX of r. breast cancer  ·	Anastrozole continued  ·	F/U Dr. Serrato September 18    8. Cerebrovascular accident:   ·	AAOX2; pt. is anxious and in acute distress  ·	recent CTA head/neck shows chronic rt frontal, parietal, lacunar infarcts.  ·	Plan is to continue to monitor for signs/symptoms of stroke    9. COPD: Albuterol inhaler Q6 hours.  Saturating well without oxygen (98% x2)    10. DVT Prophylaxis: pt. is currently being given heparin; also has HX of [place] filter (1994) 1. Altered mental status, acute:  ·	Differentials  ·	Metabolic encephalopathy: elevated ALKP, GGT; potential hyperammonemia   ·	Sepsis  ·	Depression  ·	Plan  ·	Psych consult r/o depression  ·	Blood culture r/o sepsis  ·	Ammonia levels r/o metabolic encephalopathy  ·	Monitor vitals + electrolytes  ·	See below for encephalopathy recommendations    2. Nausea: reports nausea x1 month  ·	Reglan PRN; zofran withheld b/c prolonged QTc (510 ms)  ·	Possible gastroparesis from DM v. biliary obstruction v. nicotine withdrawal  ·	R/O gastroparesis with gastric emptying scintigraphy  ·	See below for biliary obstruction  ·	C/W bupropion for nicotine WD    3. Elevated alkaline phosphatase levels  ·	GGT (753) and ALKP (517) elevated since admission; AST/ALT and bilirubin WNL  ·	Pt. reports RUQ abdominal pain and positive lombardi sign  ·	U/S hepatic: mild fatty liver, no gallstones, no gallbladder wall thickness or dilated CBD  ·	GI consulted  ·	R/O biliary obstruction with HIDA scan and possible MRCP  ·	Check anti-mitochondrial antibody (r/o PBC), anti P-ANCA (r/o PSC), and anti-SMA (r/o autoimmune hepatitis), EL  ·	  Atorvastatin and Anastrozole associated with elevated LFTs    4. CHF exacerbation   ·	BNP on admission 35,000; troponin downtrending  ·	IV Lasix d/c 8/29 ([facility])  ·	DASH/TLC diet  ·	Cardio consulted (Dr. Patterson)  ·	Echo 8/29 EF 15-20%.  New moderate to severe MR  ·	ANGELINA 9/1 to evaluate valve function showed mural thrombus in descending aorta; started on Eliuquis 10 mg (9/1)  ·	On low dose entresto and metoprolol; spironolactone held due to Cr. elevation    5. Acute kidney injury  ·	Superimposed on CKD  ·	Creatine initially uptrending to 1.91 (8/31), has since improved to 1.36 today   ·	BMP monitored daily  ·	Nephrotoxins avoided  ·	Lasix + spironolactone withheld    6. Hypertension  ·	BP ranging from 138/100 - 149/97  ·	Pt. on Entresto 24-26 mg and metoprolol succinate 50 mg daily  ·	Hydralazine 10 mg, isosorbide mononitrite 30 mg added 9/1 for better control of BP improved today to 138/100 - 149/97 from 140-160/  ·	Monitor BP. hold medications with parameters    7. Breast cancer  ·	HX of r. breast cancer  ·	Anastrozole continued  ·	F/U Dr. Serrato September 18    8. Cerebrovascular accident:   ·	AAOX2; pt. is anxious and in acute distress  ·	recent CTA head/neck shows chronic rt frontal, parietal, lacunar infarcts.  ·	Plan is to continue to monitor for signs/symptoms of stroke    9. COPD: Albuterol inhaler Q6 hours.  Saturating well without oxygen (98% x2)    10. DVT Prophylaxis: pt. is currently being given heparin; also has HX of [place] filter (1994)

## 2020-09-02 NOTE — PROGRESS NOTE ADULT - SUBJECTIVE AND OBJECTIVE BOX
PGY-1 Progress Note discussed with attending    PAGER #: [phone]] TILL 5:00 PM  PLEASE CONTACT ON CALL TEAM:  - On Call Team (Please refer to Taryn) FROM 5:00 PM - 8:30PM  - Nightfloat Team FROM 8:30 -7:30 AM    CHIEF COMPLAINT & BRIEF HOSPITAL COURSE: 64 y/o F smoker with medical history significant for DM, HTN, HLD, Breast CA s/p chemo/XRT, SCC, CVA no motor deficits, COPD (not on home O2), NICM with EF 31% by MUGA 7/2019 (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 She was going for an xray when they told her to come into the hospital. Patient states that she was feeling nauseous and was having difficulty breathing. Patient was little confused and is very poor historian. Patient states that she smokes 1 PPD x 50 years. She denies chest pain, head ache, dizziness, palpitations LOC, bowel or urinary problems. Does reports she has a cough, non-productive, and vomited once, with clear non-bloody vomit. No sick contacts, no recent travel. Of note pt reports history "a long time ago" of DVT/PE, and was on blood thinners, unsure of time period. Sister also had PE.    During her hospital course, patient was admitted for Dyspnea workup, CTA negative for PE, BNP >35,000, pt was started on IV Lasix, discontinued 8/29 due to rising creatinine levels. Echo from 8/29 with EF 15-20% showing new Moderate to Severe Mitral regurgitation (likely functional leading to HF exacerbations), troponins downtrending. As per Cardiology recommendations, low dose Entresto has been resumed and ANGELINA done 9/1 showed moderate MR, Mural thrombus extending into descending aorta, no LA thrombus, LAE + Vascular surgery was consulted regarding need for intervention. Due to persistent elevation in Alk phosphatase and GGT, although USG Abdomen was negative for CBD dilation and GB thickening, HIDA scan will be pursued tomorrow in AM.     INTERVAL HPI/OVERNIGHT EVENTS: Pt assessed at bedside, did not endorse any complaints of chest pain or SOB. Pt is now saturating well on RA, no supplemental oxygen necessary at this time. Patient endorses complaints of nausea after having meals, will pursue HIDA scan tomorrow.       MEDICATIONS  (STANDING):  ALBUTerol    90 MICROgram(s) HFA Inhaler 2 Puff(s) Inhalation every 6 hours  anastrozole 1 milliGRAM(s) Oral daily  apixaban 10 milliGRAM(s) Oral every 12 hours  aspirin enteric coated 81 milliGRAM(s) Oral daily  atorvastatin 40 milliGRAM(s) Oral at bedtime  buPROPion XL . 150 milliGRAM(s) Oral daily  clopidogrel Tablet 75 milliGRAM(s) Oral daily  dextrose 5%. 1000 milliLiter(s) (50 mL/Hr) IV Continuous <Continuous>  hydrALAZINE 10 milliGRAM(s) Oral every 12 hours  insulin lispro (HumaLOG) corrective regimen sliding scale   SubCutaneous three times a day before meals  insulin lispro (HumaLOG) corrective regimen sliding scale   SubCutaneous at bedtime  isosorbide   mononitrate ER Tablet (IMDUR) 30 milliGRAM(s) Oral daily  metoprolol succinate ER 50 milliGRAM(s) Oral daily  sacubitril 24 mG/valsartan 26 mG 1 Tablet(s) Oral two times a day  tiotropium 18 MICROgram(s) Capsule 1 Capsule(s) Inhalation daily    MEDICATIONS  (PRN):  guaiFENesin   Syrup  (Sugar-Free) 100 milliGRAM(s) Oral every 6 hours PRN Cough  meclizine 12.5 milliGRAM(s) Oral daily PRN Nausea      REVIEW OF SYSTEMS:  CONSTITUTIONAL: No fever, weight loss, or fatigue  RESPIRATORY: No cough, wheezing, chills or hemoptysis; No shortness of breath  CARDIOVASCULAR: No chest pain, palpitations, dizziness, or leg swelling  GASTROINTESTINAL: No abdominal pain. Vomiting, or hematemesis; No diarrhea or constipation. No melena or hematochezia.  C/o Nausea +  GENITOURINARY: No dysuria or hematuria, urinary frequency  NEUROLOGICAL: No headaches, memory loss, loss of strength, numbness, or tremors  SKIN: No itching, burning, rashes, or lesions     Vital Signs Last 24 Hrs  T(C): 36.8 (02 Sep 2020 04:48), Max: 36.8 (02 Sep 2020 04:48)  T(F): 98.2 (02 Sep 2020 04:48), Max: 98.2 (02 Sep 2020 04:48)  HR: 81 (02 Sep 2020 12:07) (68 - 81)  BP: 147/93 (02 Sep 2020 12:07) (147/93 - 149/97)  BP(mean): --  RR: 18 (02 Sep 2020 04:48) (18 - 18)  SpO2: 97% (02 Sep 2020 04:48) (97% - 100%)    PHYSICAL EXAMINATION:  GENERAL: Pt is lying comfortably, not in acute distress, AAO x 2  HEAD:  Atraumatic, Normocephalic  EYES:  conjunctiva and sclera clear  NECK: Supple, No JVD, Normal thyroid  CHEST/LUNG: Clear to auscultation. Clear to percussion bilaterally; No rales, rhonchi, wheezing, or rubs  HEART: Regular rate and rhythm; No murmurs, rubs, or gallops  ABDOMEN: Soft, Nontender, Nondistended; Bowel sounds present  NERVOUS SYSTEM:  Alert & Oriented X2    EXTREMITIES:  2+ Peripheral Pulses, No clubbing, cyanosis, or edema  SKIN: warm dry                          10.5   4.93  )-----------( 286      ( 02 Sep 2020 07:11 )             33.1     09-02    141  |  106  |  36<H>  ----------------------------<  126<H>  3.6   |  27  |  1.36<H>    Ca    8.6      02 Sep 2020 07:11  Phos  2.6     09-02  Mg     2.4     09-02    TPro  7.2  /  Alb  3.2<L>  /  TBili  0.8  /  DBili  x   /  AST  34  /  ALT  25  /  AlkPhos  517<H>  09-02    LIVER FUNCTIONS - ( 02 Sep 2020 07:11 )  Alb: 3.2 g/dL / Pro: 7.2 g/dL / ALK PHOS: 517 U/L / ALT: 25 U/L DA / AST: 34 U/L / GGT: x               PT/INR - ( 01 Sep 2020 06:33 )   PT: 15.6 sec;   INR: 1.35 ratio         PTT - ( 01 Sep 2020 06:33 )  PTT:25.4 sec    CAPILLARY BLOOD GLUCOSE      RADIOLOGY & ADDITIONAL TESTS:  No new imaging today

## 2020-09-02 NOTE — PROGRESS NOTE ADULT - PROBLEM SELECTOR PLAN 4
-Pt has elevated Alk phosphatase 437, -> today ,   -RUQ USG showed no CBD dilation, no GB thickening  -Pt has no c/o RUQ pain, O/E No RUQ tenderness  -Monitor for ALP and GGT levels, as pt has ongoing c/o nausea after meals, HIDA scan will be pursued tomorrow  -GI Dr. Ashby consulted, Pt NPO after midnight tonight

## 2020-09-02 NOTE — CONSULT NOTE ADULT - ASSESSMENT
65 year-old female  smoker with DM, HTN, HLD, breast CA s/p chemo/XRT, SCC, CVA no motor deficits, COPD not on home O2, and NICM (catheterization 02/2019 showed dRPL occlusion without ability to intervene), EF 31% by MUGA 7/2019, s/p SubQ ICD 10/2019, AAA came to ED after she was having shortness of breath x 1-2 days.    Patient has nausea x 3 weeks and had vomit 2 times last week, denied diarrhea or constipation.

## 2020-09-02 NOTE — MEDICAL STUDENT PROGRESS NOTE(EDUCATION) - SUBJECTIVE AND OBJECTIVE BOX
C/C: "I can't breathe"  HPI: Pt. is a 66 y/o female smoker with PMH of diabetes mellitus, hypertension, hyperlipidemia, breast cancer s/p chemo/XRT, cerebrovascular accident (no motor deficits), COPD (not on home O2), and non-ischemic cardiomyopathy (catheterization 2/2019 showed dRPL occlusion without ability to intervene), EF 31% by MUGA 7/2019, s/p SUBQ ICD 10/2019, AAA presented to the ED on 8/27 with SOB.  The patient is a poor historian.  Upon questioning the patient reports the SOB is a chronic issue she has had "for a while".  She reports having nausea for the past month as well that is not associated with eating.  She denied chest pain, headaches, dizziness, loss of consciousness, or bladder/bowel problems.    Overnight events: Pt. was seen at bedside.  She appeared to be in respiratory distress reporting that she cannot breathe.  Her O2 sat. was taken at the time of this complaint and was found to be 98% x2.  She also reported nausea which has been persistent for the past month.  She admits to RUQ abdominal pain.  She reports to be anxious and wishes to go home.    ROS:  Constitutional:   HEENT:  CV:  Pulm:  GI:  :  Skin:  Back/MSK:  Neuro/Psych:  Heme/lymph: C/C: "I can't breathe"  HPI: Pt. is a 66 y/o female with PMH of diabetes mellitus, hypertension, hyperlipidemia, breast cancer s/p chemo/XRT, cerebrovascular accident (no motor deficits), COPD (not on home O2), and non-ischemic cardiomyopathy (catheterization 2/2019 showed dRPL occlusion without ability to intervene), EF 31% by MUGA 7/2019, s/p SUBQ ICD 10/2019, AAA presented to the ED on 8/27 with SOB; I saw the pt. on 9/2.  The SOB began 1 month ago.  The pt. reports there was no change in her medical history, diet, activity level, or personal life at the time the SOB began.  She reports she began the new medication, bupropion, around this time to help with nicotine cravings as she has just recently quit smoking.     The patient is a poor historian.  Upon questioning the patient reports the SOB is a chronic issue she has had "for a while".  She reports having nausea for the past month as well that is not associated with eating.  She denied chest pain, headaches, dizziness, loss of consciousness, or bladder/bowel problems.    Overnight events: Pt. was seen at bedside.  She appeared to be in respiratory distress reporting that she cannot breathe.  Her O2 sat. was taken at the time of this complaint and was found to be 98% x2.  She also reported nausea which has been persistent for the past month.  She admits to RUQ abdominal pain.  She reports to be anxious and wishes to go home.    ROS:  Constitutional:   HEENT:  CV:  Pulm:  GI:  :  Skin:  Back/MSK:  Neuro/Psych:  Heme/lymph: C/C: "I can't breathe"  HPI: Pt. is a 66 y/o female with PMH of diabetes mellitus, hypertension, hyperlipidemia, breast cancer s/p chemo/XRT, cerebrovascular accident (no motor deficits), COPD (not on home O2), and non-ischemic cardiomyopathy (catheterization 2/2019 showed dRPL occlusion without ability to intervene), EF 31% by MUGA 7/2019, s/p SUBQ ICD 10/2019, AAA presented to the ED on 8/27 with SOB.  The SOB began 1 month ago.  The pt. reports there was no change in her medical history, diet, activity level, or personal life at the time the SOB began.  She reports she began the new medication, bupropion, around this time to help with nicotine cravings as she has just recently quit smoking.  She denies pain with breathing, trouble sleeping, headaches, cough, edema, or palpitations.  She reports nausea which has been persistent for 1 month and is not associated with eating.  The patient is a poor historian and was not willing to discuss any further details.  As per the ED, she came to the ED after she was told to come in when she had SOB when getting an x-ray.      Overnight events: Pt. was seen at bedside.  She appeared to be in respiratory distress reporting that she cannot breathe.  Her O2 sat. was taken at the time of this complaint and was found to be 98% x2.  She also reported nausea which has been persistent for the past month.  She admits to RUQ abdominal pain.  She reports to be anxious and wishes to go home.    MEDICATIONS  (STANDING):  ALBUTerol    90 MICROgram(s) HFA Inhaler 2 Puff(s) Inhalation every 6 hours  anastrozole 1 milliGRAM(s) Oral daily  apixaban 10 milliGRAM(s) Oral every 12 hours  aspirin enteric coated 81 milliGRAM(s) Oral daily  atorvastatin 40 milliGRAM(s) Oral at bedtime  buPROPion XL . 150 milliGRAM(s) Oral daily  clopidogrel Tablet 75 milliGRAM(s) Oral daily  dextrose 5%. 1000 milliLiter(s) (50 mL/Hr) IV Continuous <Continuous>  hydrALAZINE 10 milliGRAM(s) Oral every 12 hours  insulin lispro (HumaLOG) corrective regimen sliding scale   SubCutaneous three times a day before meals  insulin lispro (HumaLOG) corrective regimen sliding scale   SubCutaneous at bedtime  isosorbide   mononitrate ER Tablet (IMDUR) 30 milliGRAM(s) Oral daily  metoprolol succinate ER 50 milliGRAM(s) Oral daily  sacubitril 24 mG/valsartan 26 mG 1 Tablet(s) Oral two times a day  tiotropium 18 MICROgram(s) Capsule 1 Capsule(s) Inhalation daily    MEDICATIONS  (PRN):  guaiFENesin   Syrup  (Sugar-Free) 100 milliGRAM(s) Oral every 6 hours PRN Cough  meclizine 12.5 milliGRAM(s) Oral daily PRN Nausea    SOC:  -Prior smoker; recently quit    ROS:   Constitutional: Denies changes in weight, appetite, sleep  HEENT: Denies headache, denies diplopia, denies nasal congestion; denies dysphagia or odynophagia   CV: denies palpitations, denies chest pain  Pulm: admits to trouble breathing, denies cough or wheezing  GI: LUQ abdominal pain, positive Hills sign, NNV; denies changes in BM  : Denies dysuria  Skin: denies jaundice  Back/MSK: denies back pain or weakness  Neuro/Psych: denies headache; admits to anxiety    PE:  Vital Signs Last 24 Hrs  T(C): 36.8 (02 Sep 2020 13:46), Max: 36.8 (02 Sep 2020 04:48)  T(F): 98.2 (02 Sep 2020 13:46), Max: 98.2 (02 Sep 2020 04:48)  HR: 81 (02 Sep 2020 13:46) (68 - 81)  BP: 138/100 (02 Sep 2020 13:46) (138/100 - 149/97)  BP(mean): --  RR: 18 (02 Sep 2020 13:46) (18 - 18)  SpO2: 100% (02 Sep 2020 13:46) (97% - 100%)  General: Pt. appears in acute respiratory distress.  Well developed, obese,  female.  Head: Atraumatic normocephalic  Eyes: conjunctiva and sclera clear; EOMI; PERRLA  Neck: No JVD, normal thyroid, trachea midline  Chest/Lungs: CTABL  Heart: regular rate and rhythm; no murmurs rubs or gallops  Abdomen: soft, nontender, nondistended  Nervous system: AAOX2  Extremities: 2+ pre tibial and radial pulses; no clubbing cyanosis or edema  Skin: warm; lower extremities are dry                          10.5   4.93  )-----------( 286      ( 02 Sep 2020 07:11 )             33.1   09-02    141  |  106  |  36<H>  ----------------------------<  126<H>  3.6   |  27  |  1.36<H>    Ca    8.6      02 Sep 2020 07:11  Phos  2.6     09-02  Mg     2.4     09-02    TPro  7.2  /  Alb  3.2<L>  /  TBili  0.8  /  DBili  x   /  AST  34  /  ALT  25  /  AlkPhos  517<H>  09-02  GVJ=529  Treponema=negative

## 2020-09-02 NOTE — PROGRESS NOTE ADULT - ASSESSMENT
65 year-old female  smoker with DM, HTN, HLD, breast CA s/p chemo/XRT, COPD not on home O2, CVA no motor deficits, and NICM (catheterization 02/2019 showed dRPL occlusion without ability to intervene), EF 31% by MUGA 7/2019, s/p SubQ ICD 10/2019, AAA came to ED for dyspnea with BNP of 35,000, Echo 8/29 showing EF 15-20% with moderate MR, ANGELINA today showing mural thrombus noted in the descending aorta with Vascular surgery consulted, currently admitted for CHF management. As patient has ongoing c/o nausea, with ALP and GGT elevations, USG abdomen was done which was negative for CBD dilation, GB thickening, but as nausea is not relieved, HIDA scan will be pursued.

## 2020-09-02 NOTE — PROGRESS NOTE ADULT - PROBLEM SELECTOR PLAN 9
pt can make decisions but has HCP also - Jaki Garland - [phone]  -- spoke with HCP - updated pts condition

## 2020-09-02 NOTE — PROGRESS NOTE ADULT - PROBLEM SELECTOR PLAN 2
- Creatinine was uptrending, hence IV lasix d/c'ed 8/29  - Resumed Low dose Entresto, hold Spironolactone  - Creatinine today 1.36 (improving from 1.54 yesterday)  - Monitor BMP daily  - Avoid Nephrotoxins

## 2020-09-02 NOTE — CONSULT NOTE ADULT - PROBLEM SELECTOR RECOMMENDATION 9
Nausea, on and off  for 3 weeks, better now than last week  - no zofran as pt's QTc is 510ms  - Possible gastroparesis from DM  - Reglan PRN

## 2020-09-02 NOTE — CONSULT NOTE ADULT - PROBLEM SELECTOR RECOMMENDATION 2
Alk phos is elevated at 517 + nausea + RUQ discomfort, markus, AST/ALT WNL  - US hepatic:  Mild fatty liver, no gallstone, no gallbladder wall thickness or dilated CBD  - f/u HIDA scan ordered by primary team  - check AMA(anti-mitochondrial antibody), SMA(anti-smooth muscle antibody), EL  - possible MRCP (?)

## 2020-09-02 NOTE — CHART NOTE - NSCHARTNOTEFT_GEN_A_CORE
Assessment:      Nutrition consult requested for assessment and education, also verbally by MD for CHF. Chart reviewed, pt visited, verbally responsive, weak, but able to communicate well, willing to have nutrition information with discussion.     Factors impacting intake: [ ] none [ ] nausea  [ ] vomiting [ ] diarrhea [ ] constipation  [ ]chewing problems [ ] swallowing issues  [ X ] other: acute on chronic comorbidities     Diet Prescription: Diet, Clear Liquid (20 @ 13:25)    Intake: NPO/clear x 2d, diet to be advanced today per MD     Daily Weight in k.7 (02 Sep 2020 04:48)  Weight in k.3 (01 Sep 2020 09:17)  Weight in k.6 (01 Sep 2020 05:01)  Weight in k.6 (31 Aug 2020 05:23)  Weight in k.1 (30 Aug 2020 04:58)    % Weight Change    Pertinent Medications: MEDICATIONS  (STANDING):  ALBUTerol    90 MICROgram(s) HFA Inhaler 2 Puff(s) Inhalation every 6 hours  anastrozole 1 milliGRAM(s) Oral daily  apixaban 10 milliGRAM(s) Oral every 12 hours  aspirin enteric coated 81 milliGRAM(s) Oral daily  atorvastatin 40 milliGRAM(s) Oral at bedtime  buPROPion XL . 150 milliGRAM(s) Oral daily  clopidogrel Tablet 75 milliGRAM(s) Oral daily  dextrose 5%. 1000 milliLiter(s) (50 mL/Hr) IV Continuous <Continuous>  hydrALAZINE 10 milliGRAM(s) Oral every 12 hours  insulin lispro (HumaLOG) corrective regimen sliding scale   SubCutaneous three times a day before meals  insulin lispro (HumaLOG) corrective regimen sliding scale   SubCutaneous at bedtime  isosorbide   mononitrate ER Tablet (IMDUR) 30 milliGRAM(s) Oral daily  metoprolol succinate ER 50 milliGRAM(s) Oral daily  sacubitril 24 mG/valsartan 26 mG 1 Tablet(s) Oral two times a day  tiotropium 18 MICROgram(s) Capsule 1 Capsule(s) Inhalation daily    MEDICATIONS  (PRN):  guaiFENesin   Syrup  (Sugar-Free) 100 milliGRAM(s) Oral every 6 hours PRN Cough  meclizine 12.5 milliGRAM(s) Oral daily PRN Nausea    Pertinent Labs:  Na141 mmol/L Glu 126 mg/dL<H> K+ 3.6 mmol/L Cr  1.36 mg/dL<H> BUN 36 mg/dL<H>  Phos 2.6 mg/dL  Alb 3.2 g/dL<L>  Chol 148 mg/dL LDL 89 mg/dL HDL 36 mg/dL<L> Trig 114 mg/dL     CAPILLARY BLOOD GLUCOSE      POCT Blood Glucose.: 132 mg/dL (02 Sep 2020 08:20)  POCT Blood Glucose.: 111 mg/dL (01 Sep 2020 21:24)  POCT Blood Glucose.: 162 mg/dL (01 Sep 2020 17:18)    Skin: intact     Estimated Needs:   [ X ] no change since previous assessment  [ ] recalculated:     Previous Nutrition Diagnosis:   [ X ]Inadequate Oral Intake      Nutrition Diagnosis is [ X ] ongoing  [ ] Improving   [ ] resolved [ ] not applicable     New Nutrition Diagnosis: [ X ] not applicable     Interventions:   Recommend  [ X ] Change Diet To: Advance diet or consider nutritional supplement if Clear liquid diet prolonged as medically feasible   [ ] Nutrition Supplement  [ ] Nutrition Support  [ X ] Other: Discussed with MD                   Nutrition information on basic Healthy Plate, CHF Nutrition Therapy discussed with pt, written copy with RD business card attached, encouraged to contact RD as needed, pt receptive, verbalized understanding     Monitoring and Evaluation:   [ X ] PO intake [ x ] Tolerance to diet prescription [ x ] weights [ x ] labs[ x ] follow up per protocol  [ ] other:

## 2020-09-02 NOTE — PROGRESS NOTE ADULT - PROBLEM SELECTOR PLAN 1
- BNP on admission 35,000, Troponin downtrending  - IV Lasix d/c'ed 8/29 as Cr was uptrending(Cr now downtrending after Lasix was d/c'ed)  - Daily weights, strict Is/Os (fluid restriction 1200 ml)  - DASH/TLC diet  - Echo 8/29 EF 15-20%, New Moderate to Severe MR  - ANGELINA was done to evaluate valve function- showed mural thrombus in the descending aorta, Pt is now started on Eliquis 10 mg 9/1  - Pt on low dose Entresto, Metoprolol, hold Spironolactone due to creatinine elevation

## 2020-09-03 DIAGNOSIS — F05 DELIRIUM DUE TO KNOWN PHYSIOLOGICAL CONDITION: ICD-10-CM

## 2020-09-03 LAB
ALBUMIN SERPL ELPH-MCNC: 3.5 G/DL — SIGNIFICANT CHANGE UP (ref 3.5–5)
ALP SERPL-CCNC: 524 U/L — HIGH (ref 40–120)
ALT FLD-CCNC: 25 U/L DA — SIGNIFICANT CHANGE UP (ref 10–60)
ANION GAP SERPL CALC-SCNC: 13 MMOL/L — SIGNIFICANT CHANGE UP (ref 5–17)
AST SERPL-CCNC: 33 U/L — SIGNIFICANT CHANGE UP (ref 10–40)
B2 GLYCOPROT1 AB SER QL: NEGATIVE — SIGNIFICANT CHANGE UP
BILIRUB SERPL-MCNC: 1 MG/DL — SIGNIFICANT CHANGE UP (ref 0.2–1.2)
BUN SERPL-MCNC: 30 MG/DL — HIGH (ref 7–18)
CALCIUM SERPL-MCNC: 9 MG/DL — SIGNIFICANT CHANGE UP (ref 8.4–10.5)
CARDIOLIPIN AB SER-ACNC: POSITIVE
CARDIOLIPIN IGM SER-MCNC: 25 MPL — HIGH (ref 0–12.5)
CARDIOLIPIN IGM SER-MCNC: 7.8 GPL — SIGNIFICANT CHANGE UP (ref 0–12.5)
CHLORIDE SERPL-SCNC: 106 MMOL/L — SIGNIFICANT CHANGE UP (ref 96–108)
CO2 SERPL-SCNC: 23 MMOL/L — SIGNIFICANT CHANGE UP (ref 22–31)
CREAT SERPL-MCNC: 1.36 MG/DL — HIGH (ref 0.5–1.3)
CULTURE RESULTS: SIGNIFICANT CHANGE UP
DEPRECATED CARDIOLIPIN IGA SER: <5 APL — SIGNIFICANT CHANGE UP (ref 0–12.5)
GLUCOSE BLDC GLUCOMTR-MCNC: 122 MG/DL — HIGH (ref 70–99)
GLUCOSE BLDC GLUCOMTR-MCNC: 149 MG/DL — HIGH (ref 70–99)
GLUCOSE BLDC GLUCOMTR-MCNC: 155 MG/DL — HIGH (ref 70–99)
GLUCOSE BLDC GLUCOMTR-MCNC: 155 MG/DL — HIGH (ref 70–99)
GLUCOSE SERPL-MCNC: 129 MG/DL — HIGH (ref 70–99)
HCT VFR BLD CALC: 36.6 % — SIGNIFICANT CHANGE UP (ref 34.5–45)
HGB BLD-MCNC: 11.4 G/DL — LOW (ref 11.5–15.5)
MAGNESIUM SERPL-MCNC: 2.5 MG/DL — SIGNIFICANT CHANGE UP (ref 1.6–2.6)
MCHC RBC-ENTMCNC: 29.3 PG — SIGNIFICANT CHANGE UP (ref 27–34)
MCHC RBC-ENTMCNC: 31.1 GM/DL — LOW (ref 32–36)
MCV RBC AUTO: 94.1 FL — SIGNIFICANT CHANGE UP (ref 80–100)
NRBC # BLD: 0 /100 WBCS — SIGNIFICANT CHANGE UP (ref 0–0)
PHOSPHATE SERPL-MCNC: 3.2 MG/DL — SIGNIFICANT CHANGE UP (ref 2.5–4.5)
PLATELET # BLD AUTO: 319 K/UL — SIGNIFICANT CHANGE UP (ref 150–400)
POTASSIUM SERPL-MCNC: 3.9 MMOL/L — SIGNIFICANT CHANGE UP (ref 3.5–5.3)
POTASSIUM SERPL-SCNC: 3.9 MMOL/L — SIGNIFICANT CHANGE UP (ref 3.5–5.3)
PROT SERPL-MCNC: 8 G/DL — SIGNIFICANT CHANGE UP (ref 6–8.3)
RBC # BLD: 3.89 M/UL — SIGNIFICANT CHANGE UP (ref 3.8–5.2)
RBC # FLD: 15.9 % — HIGH (ref 10.3–14.5)
SODIUM SERPL-SCNC: 142 MMOL/L — SIGNIFICANT CHANGE UP (ref 135–145)
SPECIMEN SOURCE: SIGNIFICANT CHANGE UP
WBC # BLD: 5.56 K/UL — SIGNIFICANT CHANGE UP (ref 3.8–10.5)
WBC # FLD AUTO: 5.56 K/UL — SIGNIFICANT CHANGE UP (ref 3.8–10.5)

## 2020-09-03 PROCEDURE — 93010 ELECTROCARDIOGRAM REPORT: CPT

## 2020-09-03 PROCEDURE — 78227 HEPATOBIL SYST IMAGE W/DRUG: CPT | Mod: 26

## 2020-09-03 PROCEDURE — 70450 CT HEAD/BRAIN W/O DYE: CPT | Mod: 26

## 2020-09-03 PROCEDURE — 99233 SBSQ HOSP IP/OBS HIGH 50: CPT | Mod: GC

## 2020-09-03 PROCEDURE — 90792 PSYCH DIAG EVAL W/MED SRVCS: CPT | Mod: 95

## 2020-09-03 PROCEDURE — 99223 1ST HOSP IP/OBS HIGH 75: CPT

## 2020-09-03 RX ORDER — HYDRALAZINE HCL 50 MG
25 TABLET ORAL THREE TIMES A DAY
Refills: 0 | Status: DISCONTINUED | OUTPATIENT
Start: 2020-09-03 | End: 2020-09-04

## 2020-09-03 RX ORDER — HYDRALAZINE HCL 50 MG
25 TABLET ORAL ONCE
Refills: 0 | Status: COMPLETED | OUTPATIENT
Start: 2020-09-03 | End: 2020-09-03

## 2020-09-03 RX ORDER — MORPHINE SULFATE 50 MG/1
2 CAPSULE, EXTENDED RELEASE ORAL ONCE
Refills: 0 | Status: DISCONTINUED | OUTPATIENT
Start: 2020-09-03 | End: 2020-09-03

## 2020-09-03 RX ORDER — FUROSEMIDE 40 MG
20 TABLET ORAL DAILY
Refills: 0 | Status: DISCONTINUED | OUTPATIENT
Start: 2020-09-03 | End: 2020-09-09

## 2020-09-03 RX ORDER — THIAMINE MONONITRATE (VIT B1) 100 MG
500 TABLET ORAL DAILY
Refills: 0 | Status: COMPLETED | OUTPATIENT
Start: 2020-09-03 | End: 2020-09-06

## 2020-09-03 RX ORDER — MORPHINE SULFATE 50 MG/1
2 CAPSULE, EXTENDED RELEASE ORAL DAILY
Refills: 0 | Status: DISCONTINUED | OUTPATIENT
Start: 2020-09-03 | End: 2020-09-03

## 2020-09-03 RX ORDER — HEPARIN SODIUM 5000 [USP'U]/ML
5000 INJECTION INTRAVENOUS; SUBCUTANEOUS EVERY 8 HOURS
Refills: 0 | Status: DISCONTINUED | OUTPATIENT
Start: 2020-09-04 | End: 2020-09-09

## 2020-09-03 RX ORDER — THIAMINE MONONITRATE (VIT B1) 100 MG
500 TABLET ORAL DAILY
Refills: 0 | Status: DISCONTINUED | OUTPATIENT
Start: 2020-09-03 | End: 2020-09-03

## 2020-09-03 RX ORDER — LABETALOL HCL 100 MG
5 TABLET ORAL ONCE
Refills: 0 | Status: DISCONTINUED | OUTPATIENT
Start: 2020-09-03 | End: 2020-09-03

## 2020-09-03 RX ADMIN — ANASTROZOLE 1 MILLIGRAM(S): 1 TABLET ORAL at 11:56

## 2020-09-03 RX ADMIN — CLOPIDOGREL BISULFATE 75 MILLIGRAM(S): 75 TABLET, FILM COATED ORAL at 11:55

## 2020-09-03 RX ADMIN — BUPROPION HYDROCHLORIDE 150 MILLIGRAM(S): 150 TABLET, EXTENDED RELEASE ORAL at 11:55

## 2020-09-03 RX ADMIN — ALBUTEROL 2 PUFF(S): 90 AEROSOL, METERED ORAL at 03:00

## 2020-09-03 RX ADMIN — Medication 25 MILLIGRAM(S): at 17:12

## 2020-09-03 RX ADMIN — ALBUTEROL 2 PUFF(S): 90 AEROSOL, METERED ORAL at 11:21

## 2020-09-03 RX ADMIN — Medication 50 MILLIGRAM(S): at 06:55

## 2020-09-03 RX ADMIN — Medication 12.5 MILLIGRAM(S): at 17:12

## 2020-09-03 RX ADMIN — SACUBITRIL AND VALSARTAN 1 TABLET(S): 24; 26 TABLET, FILM COATED ORAL at 06:56

## 2020-09-03 RX ADMIN — Medication 25 MILLIGRAM(S): at 21:54

## 2020-09-03 RX ADMIN — Medication 1 MILLIGRAM(S): at 12:36

## 2020-09-03 RX ADMIN — Medication 105 MILLIGRAM(S): at 19:05

## 2020-09-03 RX ADMIN — ISOSORBIDE MONONITRATE 30 MILLIGRAM(S): 60 TABLET, EXTENDED RELEASE ORAL at 11:55

## 2020-09-03 RX ADMIN — Medication 81 MILLIGRAM(S): at 11:56

## 2020-09-03 RX ADMIN — APIXABAN 10 MILLIGRAM(S): 2.5 TABLET, FILM COATED ORAL at 06:54

## 2020-09-03 RX ADMIN — SACUBITRIL AND VALSARTAN 1 TABLET(S): 24; 26 TABLET, FILM COATED ORAL at 17:11

## 2020-09-03 RX ADMIN — Medication 10 MILLIGRAM(S): at 06:55

## 2020-09-03 RX ADMIN — TIOTROPIUM BROMIDE 1 CAPSULE(S): 18 CAPSULE ORAL; RESPIRATORY (INHALATION) at 11:56

## 2020-09-03 RX ADMIN — MORPHINE SULFATE 2 MILLIGRAM(S): 50 CAPSULE, EXTENDED RELEASE ORAL at 16:00

## 2020-09-03 RX ADMIN — Medication 25 MILLIGRAM(S): at 08:50

## 2020-09-03 NOTE — CONSULT NOTE ADULT - SUBJECTIVE AND OBJECTIVE BOX
++++++++++++++++++++NOTE NOT COMPLETED+++++++++++++++++++++++++    Patient is a 65y old  Female who presents with a chief complaint of Dyspnea (03 Sep 2020 15:27)      HPI:  65 year-old female  smoker with DM, HTN, HLD, breast CA s/p chemo/XRT, SCC, CVA no motor deficits, COPD not on home O2, and NICM (catheterization 2019 showed dRPL occlusion without ability to intervene), EF 31% by MUGA 2019, s/p SubQ ICD 10/2019, AAA came to ED after she was having shortness of breath x 1-2 days. She was going for an xray when they told her to come into the hospital. Patient states that she was feeling nauseous and was having difficulty breathing. Patient was little confused and is very poor historian. Patient states that she smokes 1 PPD and has been smoking for 50 years. She denies chest pain, head ache, dizziness, palpitations LOC, bowel or urinary problems. Does reports she has a cough, non-productive, and vomited once, with clear non-bloody vomit. No sick contacts, no recent travel. Of note pt reports history "a long time ago" of DVT/PE, and was on blood thinners, unsure of time period. Sister also had PE.    [facility] : full code. (27 Aug 2020 19:28)    Pt seen at bedside correctly stated her name and  but unable to provide additional history.  Lethargic.    Neurological Review of Systems:  No difficulty with language.  No vision loss or double vision.  No dizziness, vertigo or new hearing loss.  No difficulty with speech or swallowing.  No focal weakness.  No focal sensory changes.  No numbness or tingling in the bilateral lower extremities.  No difficulty with balance.  No difficulty with ambulation.        MEDICATIONS  (STANDING):  ALBUTerol    90 MICROgram(s) HFA Inhaler 2 Puff(s) Inhalation every 6 hours  anastrozole 1 milliGRAM(s) Oral daily  apixaban 10 milliGRAM(s) Oral every 12 hours  aspirin enteric coated 81 milliGRAM(s) Oral daily  atorvastatin 40 milliGRAM(s) Oral at bedtime  buPROPion XL . 150 milliGRAM(s) Oral daily  clopidogrel Tablet 75 milliGRAM(s) Oral daily  dextrose 5%. 1000 milliLiter(s) (50 mL/Hr) IV Continuous <Continuous>  hydrALAZINE 25 milliGRAM(s) Oral three times a day  hydrALAZINE 25 milliGRAM(s) Oral once  insulin lispro (HumaLOG) corrective regimen sliding scale   SubCutaneous three times a day before meals  insulin lispro (HumaLOG) corrective regimen sliding scale   SubCutaneous at bedtime  isosorbide   mononitrate ER Tablet (IMDUR) 30 milliGRAM(s) Oral daily  metoprolol succinate ER 50 milliGRAM(s) Oral daily  morphine  - Injectable 2 milliGRAM(s) IntraMuscular once  sacubitril 24 mG/valsartan 26 mG 1 Tablet(s) Oral two times a day  tiotropium 18 MICROgram(s) Capsule 1 Capsule(s) Inhalation daily    MEDICATIONS  (PRN):  guaiFENesin   Syrup  (Sugar-Free) 100 milliGRAM(s) Oral every 6 hours PRN Cough  meclizine 12.5 milliGRAM(s) Oral daily PRN Nausea    Allergies    Advil (Hives; Vomiting)    Intolerances      PAST MEDICAL & SURGICAL HISTORY:  Knee effusion, left: arthrocentesis- 16  Internal carotid aneurysm  Abdominal aneurysm: infrarenal- wached by Vascular , Dr. Gutierrez  Cardiomyopathy  Sickle cell trait  DVT (deep venous thrombosis): Left leg in  after hysterectomy  Hypercholesteremia  Breast cancer: right-s/p chemo and radiation  Hypertension  Breast cancer: infusaport insertion- - left chest  S/P hysterectomy:   [place] filter in place:   S/P lumpectomy, right breast: , revision of position-   Fibroid uterus: sp hystrectomy 1196    FAMILY HISTORY:  Family history of glioblastoma  Family history of other condition: please open box and see additional medical family history  Family history of renal failure: uncle  Family history of glioblastoma (Sibling)  History of hypertension: mother  Family history of liver failure  Family history of glioblastoma  Family history of renal stone  Family history of diabetes mellitus  Family history of hypertension    SOCIAL HISTORY:  Active smoker    Review of Systems:  Limited in Lethargic pt   Neurological: as per HPI                                                                        O:  Vital Signs Last 24 Hrs  T(C): 36.4 (03 Sep 2020 10:30), Max: 36.4 (03 Sep 2020 10:30)  T(F): 97.5 (03 Sep 2020 10:30), Max: 97.5 (03 Sep 2020 10:30)  HR: 99 (03 Sep 2020 15:23) (76 - 99)  BP: 157/97 (03 Sep 2020 15:23) (157/97 - 176/110)  RR: 19 (03 Sep 2020 10:30) (18 - 19)  SpO2: 100% (03 Sep 2020 10:30) (98% - 100%)    General Exam:   General appearance: No acute distress                 Cardiovascular: Pedal dorsalis pulses intact bilaterally    Mental Status: Orientated to self, date and place.  Attention intact.  No dysarthria, aphasia or neglect.  Knowledge intact.  Registration intact.  Short and long term memory grossly intact.      Cranial Nerves: CN I - not tested.  PERRL, EOMI, VFF, no nystagmus or diplopia.  No APD.  Fundi not visualized.  CN V1-3 intact to light touch and pinprick.  No facial asymmetry.  Hearing intact to finger rub bilaterally.  Tongue, uvula and palate midline.  Sternocleidomastoid and Trapezius intact bilaterally.    Motor:   Tone: Normal                  Strength  unable assess  Pronator unable assess                   Dysmetria unable assess  No truncal ataxia.  No resting, postural or action tremor.  No myoclonus.    Sensation: intact to light touch    Deep Tendon Reflexes: 1+ bilateral biceps, triceps, brachioradialis, knee and ankle  Toes flexor bilaterally    Gait: Unable to assess at this time    Other:     LABS:                        11.4   5.56  )-----------( 319      ( 03 Sep 2020 07:06 )             36.6     09-03    142  |  106  |  30<H>  ----------------------------<  129<H>  3.9   |  23  |  1.36<H>    Ca    9.0      03 Sep 2020 07:06  Phos  3.2     09-  Mg     2.5     09-    TPro  8.0  /  Alb  3.5  /  TBili  1.0  /  DBili  x   /  AST  33  /  ALT  25  /  AlkPhos  524<H>  09-03            RADIOLOGY & ADDITIONAL STUDIES:  CT head pending ++++++++++++++++++++NOTE NOT COMPLETED+++++++++++++++++++++++++    Patient is a 65y old  Female who presents with a chief complaint of Dyspnea (03 Sep 2020 15:27)      HPI:  65 year-old female  smoker with DM, HTN, HLD, breast CA s/p chemo/XRT, SCC, CVA no motor deficits, COPD not on home O2, and NICM (catheterization 2019 showed dRPL occlusion without ability to intervene), EF 31% by MUGA 2019, s/p SubQ ICD 10/2019, AAA came to ED after she was having shortness of breath x 1-2 days. She was going for an xray when they told her to come into the hospital. Patient states that she was feeling nauseous and was having difficulty breathing. Patient was little confused and is very poor historian. Patient states that she smokes 1 PPD and has been smoking for 50 years. She denies chest pain, head ache, dizziness, palpitations LOC, bowel or urinary problems. Does reports she has a cough, non-productive, and vomited once, with clear non-bloody vomit. No sick contacts, no recent travel. Of note pt reports history "a long time ago" of DVT/PE, and was on blood thinners, unsure of time period. Sister also had PE.    [facility] : full code. (27 Aug 2020 19:28)    Pt seen at bedside, disrobing and lethargic.  Correctly stated her name and  but unable to provide additional history.      Neurological Review of Systems:  No difficulty with language.  No vision loss or double vision.  No dizziness, vertigo or new hearing loss.  No difficulty with speech or swallowing.  No focal weakness.  No focal sensory changes.  No numbness or tingling in the bilateral lower extremities.  No difficulty with balance.  No difficulty with ambulation.        MEDICATIONS  (STANDING):  ALBUTerol    90 MICROgram(s) HFA Inhaler 2 Puff(s) Inhalation every 6 hours  anastrozole 1 milliGRAM(s) Oral daily  apixaban 10 milliGRAM(s) Oral every 12 hours  aspirin enteric coated 81 milliGRAM(s) Oral daily  atorvastatin 40 milliGRAM(s) Oral at bedtime  buPROPion XL . 150 milliGRAM(s) Oral daily  clopidogrel Tablet 75 milliGRAM(s) Oral daily  dextrose 5%. 1000 milliLiter(s) (50 mL/Hr) IV Continuous <Continuous>  hydrALAZINE 25 milliGRAM(s) Oral three times a day  hydrALAZINE 25 milliGRAM(s) Oral once  insulin lispro (HumaLOG) corrective regimen sliding scale   SubCutaneous three times a day before meals  insulin lispro (HumaLOG) corrective regimen sliding scale   SubCutaneous at bedtime  isosorbide   mononitrate ER Tablet (IMDUR) 30 milliGRAM(s) Oral daily  metoprolol succinate ER 50 milliGRAM(s) Oral daily  morphine  - Injectable 2 milliGRAM(s) IntraMuscular once  sacubitril 24 mG/valsartan 26 mG 1 Tablet(s) Oral two times a day  tiotropium 18 MICROgram(s) Capsule 1 Capsule(s) Inhalation daily    MEDICATIONS  (PRN):  guaiFENesin   Syrup  (Sugar-Free) 100 milliGRAM(s) Oral every 6 hours PRN Cough  meclizine 12.5 milliGRAM(s) Oral daily PRN Nausea    Allergies    Advil (Hives; Vomiting)    Intolerances      PAST MEDICAL & SURGICAL HISTORY:  Knee effusion, left: arthrocentesis- 16  Internal carotid aneurysm  Abdominal aneurysm: infrarenal- wached by Vascular , Dr. Gutierrez  Cardiomyopathy  Sickle cell trait  DVT (deep venous thrombosis): Left leg in  after hysterectomy  Hypercholesteremia  Breast cancer: right-s/p chemo and radiation  Hypertension  Breast cancer: infusaport insertion- - left chest  S/P hysterectomy:   [place] filter in place:   S/P lumpectomy, right breast: , revision of position-   Fibroid uterus: sp hystrectomy 1196    FAMILY HISTORY:  Family history of glioblastoma  Family history of other condition: please open box and see additional medical family history  Family history of renal failure: uncle  Family history of glioblastoma (Sibling)  History of hypertension: mother  Family history of liver failure  Family history of glioblastoma  Family history of renal stone  Family history of diabetes mellitus  Family history of hypertension    SOCIAL HISTORY:  Active smoker    Review of Systems:  Limited in Lethargic pt   Neurological: as per HPI                                                                        O:  Vital Signs Last 24 Hrs  T(C): 36.4 (03 Sep 2020 10:30), Max: 36.4 (03 Sep 2020 10:30)  T(F): 97.5 (03 Sep 2020 10:30), Max: 97.5 (03 Sep 2020 10:30)  HR: 99 (03 Sep 2020 15:23) (76 - 99)  BP: 157/97 (03 Sep 2020 15:23) (157/97 - 176/110)  RR: 19 (03 Sep 2020 10:30) (18 - 19)  SpO2: 100% (03 Sep 2020 10:30) (98% - 100%)    General Exam:   General appearance: No acute distress                 Cardiovascular: Pedal dorsalis pulses intact bilaterally    Mental Status: Orientated to self, date and place.  Attention intact.  No dysarthria, aphasia or neglect.  Knowledge intact.  Registration intact.  Short and long term memory grossly intact.      Cranial Nerves: CN I - not tested.  PERRL, EOMI, VFF, no nystagmus or diplopia.  No APD.  Fundi not visualized.  CN V1-3 intact to light touch.  No facial asymmetry.  Hearing intact to finger rub bilaterally.  Tongue, uvula and palate midline.  Sternocleidomastoid and Trapezius intact bilaterally.    Motor:   Tone: Normal                  Strength  unable assess  Pronator unable assess                   Dysmetria unable assess  No truncal ataxia.  No resting, postural or action tremor.  No myoclonus.    Sensation: intact to light touch    Deep Tendon Reflexes: 1+ bilateral biceps, triceps, brachioradialis, knee and ankle  Toes flexor bilaterally    Gait: Unable to assess at this time    Other:     LABS:                        11.4   5.56  )-----------( 319      ( 03 Sep 2020 07:06 )             36.6     09-03    142  |  106  |  30<H>  ----------------------------<  129<H>  3.9   |  23  |  1.36<H>    Ca    9.0      03 Sep 2020 07:06  Phos  3.2     09-  Mg     2.5     09-    TPro  8.0  /  Alb  3.5  /  TBili  1.0  /  DBili  x   /  AST  33  /  ALT  25  /  AlkPhos  524<H>  09-03            RADIOLOGY & ADDITIONAL STUDIES:  CT head pending Patient is a 65y old  Female who presents with a chief complaint of Dyspnea (03 Sep 2020 15:27)      HPI:  65 year-old female  smoker with DM, HTN, HLD, breast CA s/p chemo/XRT, SCC, CVA no motor deficits, COPD not on home O2, and NICM (catheterization 2019 showed dRPL occlusion without ability to intervene), EF 31% by MUGA 2019, s/p SubQ ICD 10/2019, AAA came to ED after she was having shortness of breath x 1-2 days. She was going for an xray when they told her to come into the hospital. Patient states that she was feeling nauseous and was having difficulty breathing. Patient was little confused and is very poor historian. Patient states that she smokes 1 PPD and has been smoking for 50 years. She denies chest pain, head ache, dizziness, palpitations LOC, bowel or urinary problems. Does reports she has a cough, non-productive, and vomited once, with clear non-bloody vomit. No sick contacts, no recent travel. Of note pt reports history "a long time ago" of DVT/PE, and was on blood thinners, unsure of time period. Sister also had PE.    [facility] : full code. (27 Aug 2020 19:28)    Pt seen at bedside, disrobing and lethargic.  Correctly stated her name and  but unable to provide additional history.      Neurological Review of Systems:  No difficulty with language.  No vision loss or double vision.  No dizziness, vertigo or new hearing loss.  No difficulty with speech or swallowing.  No focal weakness.  No focal sensory changes.  No numbness or tingling in the bilateral lower extremities.  No difficulty with balance.  No difficulty with ambulation.        MEDICATIONS  (STANDING):  ALBUTerol    90 MICROgram(s) HFA Inhaler 2 Puff(s) Inhalation every 6 hours  anastrozole 1 milliGRAM(s) Oral daily  apixaban 10 milliGRAM(s) Oral every 12 hours  aspirin enteric coated 81 milliGRAM(s) Oral daily  atorvastatin 40 milliGRAM(s) Oral at bedtime  buPROPion XL . 150 milliGRAM(s) Oral daily  clopidogrel Tablet 75 milliGRAM(s) Oral daily  dextrose 5%. 1000 milliLiter(s) (50 mL/Hr) IV Continuous <Continuous>  hydrALAZINE 25 milliGRAM(s) Oral three times a day  hydrALAZINE 25 milliGRAM(s) Oral once  insulin lispro (HumaLOG) corrective regimen sliding scale   SubCutaneous three times a day before meals  insulin lispro (HumaLOG) corrective regimen sliding scale   SubCutaneous at bedtime  isosorbide   mononitrate ER Tablet (IMDUR) 30 milliGRAM(s) Oral daily  metoprolol succinate ER 50 milliGRAM(s) Oral daily  morphine  - Injectable 2 milliGRAM(s) IntraMuscular once  sacubitril 24 mG/valsartan 26 mG 1 Tablet(s) Oral two times a day  tiotropium 18 MICROgram(s) Capsule 1 Capsule(s) Inhalation daily    MEDICATIONS  (PRN):  guaiFENesin   Syrup  (Sugar-Free) 100 milliGRAM(s) Oral every 6 hours PRN Cough  meclizine 12.5 milliGRAM(s) Oral daily PRN Nausea    Allergies    Advil (Hives; Vomiting)    Intolerances      PAST MEDICAL & SURGICAL HISTORY:  Knee effusion, left: arthrocentesis- 16  Internal carotid aneurysm  Abdominal aneurysm: infrarenal- wached by Vascular , Dr. Gutierrez  Cardiomyopathy  Sickle cell trait  DVT (deep venous thrombosis): Left leg in  after hysterectomy  Hypercholesteremia  Breast cancer: right-s/p chemo and radiation  Hypertension  Breast cancer: infusaport insertion- - left chest  S/P hysterectomy:   [place] filter in place:   S/P lumpectomy, right breast: , revision of position-   Fibroid uterus: sp hystrectomy 1196    FAMILY HISTORY:  Family history of glioblastoma  Family history of other condition: please open box and see additional medical family history  Family history of renal failure: uncle  Family history of glioblastoma (Sibling)  History of hypertension: mother  Family history of liver failure  Family history of glioblastoma  Family history of renal stone  Family history of diabetes mellitus  Family history of hypertension    SOCIAL HISTORY:  Active smoker    Review of Systems:  Limited in Lethargic pt   Neurological: as per HPI                                                                        O:  Vital Signs Last 24 Hrs  T(C): 36.4 (03 Sep 2020 10:30), Max: 36.4 (03 Sep 2020 10:30)  T(F): 97.5 (03 Sep 2020 10:30), Max: 97.5 (03 Sep 2020 10:30)  HR: 99 (03 Sep 2020 15:23) (76 - 99)  BP: 157/97 (03 Sep 2020 15:23) (157/97 - 176/110)  RR: 19 (03 Sep 2020 10:30) (18 - 19)  SpO2: 100% (03 Sep 2020 10:30) (98% - 100%)    General Exam:   General appearance: No acute distress                 Cardiovascular: Pedal dorsalis pulses intact bilaterally    Mental Status: Orientated to self, date and place.  Attention intact.  No dysarthria, aphasia or neglect.  Knowledge intact.  Registration intact.  Short and long term memory grossly intact.      Cranial Nerves: CN I - not tested.  PERRL, EOMI, VFF, no nystagmus or diplopia.  No APD.  Fundi not visualized.  CN V1-3 intact to light touch.  No facial asymmetry.  Hearing intact to finger rub bilaterally.  Tongue, uvula and palate midline.  Sternocleidomastoid and Trapezius intact bilaterally.    Motor:   Tone: Normal                  Strength  unable assess  Pronator unable assess                   Dysmetria unable assess  No truncal ataxia.  No resting, postural or action tremor.  No myoclonus.    Sensation: intact to light touch    Deep Tendon Reflexes: 1+ bilateral biceps, triceps, brachioradialis, knee and ankle  Toes flexor bilaterally    Gait: Unable to assess at this time    Other:     LABS:                        11.4   5.56  )-----------( 319      ( 03 Sep 2020 07:06 )             36.6     -    142  |  106  |  30<H>  ----------------------------<  129<H>  3.9   |  23  |  1.36<H>    Ca    9.0      03 Sep 2020 07:06  Phos  3.2     -  Mg     2.5     -    TPro  8.0  /  Alb  3.5  /  TBili  1.0  /  DBili  x   /  AST  33  /  ALT  25  /  AlkPhos  524<H>  09-03            RADIOLOGY & ADDITIONAL STUDIES:  CT head pending Patient is a 65y old  Female who presents with a chief complaint of Dyspnea (03 Sep 2020 15:27)    HPI:  65 year-old female  smoker with DM, HTN, HLD, breast CA s/p chemo/XRT, SCC, CVA no motor deficits, COPD not on home O2, and NICM (catheterization 2019 showed dRPL occlusion without ability to intervene), EF 31% by MUGA 2019, s/p SubQ ICD 10/2019, AAA came to ED after she was having shortness of breath x 1-2 days. She was going for an xray when they told her to come into the hospital. Patient states that she was feeling nauseous and was having difficulty breathing. Patient was little confused and is very poor historian. Patient states that she smokes 1 PPD and has been smoking for 50 years. She denies chest pain, head ache, dizziness, palpitations LOC, bowel or urinary problems. Does reports she has a cough, non-productive, and vomited once, with clear non-bloody vomit. No sick contacts, no recent travel. Of note pt reports history "a long time ago" of DVT/PE, and was on blood thinners, unsure of time period. Sister also had PE.    [facility] : full code. (27 Aug 2020 19:28)    Pt seen at bedside, disrobing and lethargic.  Correctly stated her name and  but unable to provide additional history.      Neurological Review of Systems:  No difficulty with language.  No vision loss or double vision.  No dizziness, vertigo or new hearing loss.  No difficulty with speech or swallowing.  No focal weakness.  No focal sensory changes.  No numbness or tingling in the bilateral lower extremities.  No difficulty with balance.  No difficulty with ambulation.        MEDICATIONS  (STANDING):  ALBUTerol    90 MICROgram(s) HFA Inhaler 2 Puff(s) Inhalation every 6 hours  anastrozole 1 milliGRAM(s) Oral daily  apixaban 10 milliGRAM(s) Oral every 12 hours  aspirin enteric coated 81 milliGRAM(s) Oral daily  atorvastatin 40 milliGRAM(s) Oral at bedtime  buPROPion XL . 150 milliGRAM(s) Oral daily  clopidogrel Tablet 75 milliGRAM(s) Oral daily  dextrose 5%. 1000 milliLiter(s) (50 mL/Hr) IV Continuous <Continuous>  hydrALAZINE 25 milliGRAM(s) Oral three times a day  hydrALAZINE 25 milliGRAM(s) Oral once  insulin lispro (HumaLOG) corrective regimen sliding scale   SubCutaneous three times a day before meals  insulin lispro (HumaLOG) corrective regimen sliding scale   SubCutaneous at bedtime  isosorbide   mononitrate ER Tablet (IMDUR) 30 milliGRAM(s) Oral daily  metoprolol succinate ER 50 milliGRAM(s) Oral daily  morphine  - Injectable 2 milliGRAM(s) IntraMuscular once  sacubitril 24 mG/valsartan 26 mG 1 Tablet(s) Oral two times a day  tiotropium 18 MICROgram(s) Capsule 1 Capsule(s) Inhalation daily    MEDICATIONS  (PRN):  guaiFENesin   Syrup  (Sugar-Free) 100 milliGRAM(s) Oral every 6 hours PRN Cough  meclizine 12.5 milliGRAM(s) Oral daily PRN Nausea    Allergies  Advil (Hives; Vomiting)    PAST MEDICAL & SURGICAL HISTORY:  Knee effusion, left: arthrocentesis- 16  Internal carotid aneurysm  Abdominal aneurysm: infrarenal- wached by Vascular , Dr. Gutierrez  Cardiomyopathy  Sickle cell trait  DVT (deep venous thrombosis): Left leg in  after hysterectomy  Hypercholesteremia  Breast cancer: right-s/p chemo and radiation  Hypertension  Breast cancer: infusaport insertion- - left chest  S/P hysterectomy:   [place] filter in place:   S/P lumpectomy, right breast: , revision of position-   Fibroid uterus: sp hystrectomy 1196    FAMILY HISTORY:  Family history of glioblastoma  Family history of other condition: please open box and see additional medical family history  Family history of renal failure: uncle  Family history of glioblastoma (Sibling)  History of hypertension: mother  Family history of liver failure  Family history of glioblastoma  Family history of renal stone  Family history of diabetes mellitus  Family history of hypertension    SOCIAL HISTORY:  Active smoker    Review of Systems:  Limited in Lethargic pt   Neurological: as per HPI                                                                            O:  Vital Signs Last 24 Hrs  T(C): 36.4 (03 Sep 2020 10:30), Max: 36.4 (03 Sep 2020 10:30)  T(F): 97.5 (03 Sep 2020 10:30), Max: 97.5 (03 Sep 2020 10:30)  HR: 99 (03 Sep 2020 15:23) (76 - 99)  BP: 157/97 (03 Sep 2020 15:23) (157/97 - 176/110)  RR: 19 (03 Sep 2020 10:30) (18 - 19)  SpO2: 100% (03 Sep 2020 10:30) (98% - 100%)    General Exam:   General appearance: No acute distress                 Cardiovascular: Pedal dorsalis pulses intact bilaterally    Mental Status: Oriented to self, date and place.  Attention intact.  No dysarthria, aphasia or neglect.  Knowledge intact.  Registration intact.  Short and long term memory grossly intact.      Cranial Nerves: CN I - not tested.  PERRL, EOMI, VFF x 4, no nystagmus or diplopia.  No APD.  Fundi not visualized.  CN V1-3 intact to light touch.  No facial asymmetry.  Hearing intact to finger rub bilaterally.  Tongue, uvula and palate midline.  Sternocleidomastoid and Trapezius intact bilaterally.    Motor:   Tone: Normal                  Strength  unable to assess  Pronator unable to assess                   Dysmetria unable to assess  No truncal ataxia.  No resting, postural or action tremor.  No myoclonus.    Sensation: intact to light touch x 4    Deep Tendon Reflexes: 1+ bilateral biceps, triceps, brachioradialis, knee and ankle  Toes flexor bilaterally    Gait: Unable to assess at this time          LABS:                        11.4   5.56  )-----------( 319      ( 03 Sep 2020 07:06 )             36.6     09-    142  |  106  |  30<H>  ----------------------------<  129<H>  3.9   |  23  |  1.36<H>    Ca    9.0      03 Sep 2020 07:06  Phos  3.2     -  Mg     2.5     -    TPro  8.0  /  Alb  3.5  /  TBili  1.0  /  DBili  x   /  AST  33  /  ALT  25  /  AlkPhos  524<H>  09-03      Gamma Glutamyl Transferase, Serum (20 @ 19:43)    Gamma Glutamyl Transferase, Serum: 753 U/L  Gamma Glutamyl Transferase, Serum (20 @ 14:03)    Gamma Glutamyl Transferase, Serum: 656 U/L  Gamma Glutamyl Transferase, Serum (20 @ 14:46)    Gamma Glutamyl Transferase, Serum: 701 U/L    Ammonia, Serum (20 @ 16:21)    Ammonia, Serum: 23 umol/L    Lactate, Blood (20 @ 16:21)    Lactate, Blood: 1.9 mmol/L          RADIOLOGY & ADDITIONAL STUDIES:  CT Head pending

## 2020-09-03 NOTE — CONSULT NOTE ADULT - ATTENDING COMMENTS
Thank you for the courtesy of a consultation, please contact me for any additional questions
I have seen and examined the patient at bedside, and I agree with the history, examination, assessment, and plan. I counseled the primary team about the patient's likely diagnosis, and the further testing indicated to help evaluate for contributing factors for the patient's encephalopathy.

## 2020-09-03 NOTE — PROGRESS NOTE ADULT - PROBLEM SELECTOR PLAN 4
-Pt has elevated Alk phosphatase 437, -> today ,   -RUQ USG showed no CBD dilation, no GB thickening  -Pt has no c/o RUQ pain, O/E No RUQ tenderness  -Monitor for ALP and GGT levels, as pt has ongoing c/o nausea after meals, HIDA scan was pursued today, study could not be completed as pt was agitated  -GI Dr. Ashby consulted, Pt NPO after midnight tonight for EGD tomorrow

## 2020-09-03 NOTE — BEHAVIORAL HEALTH ASSESSMENT NOTE - NSBHCHARTREVIEWINVESTIGATE_PSY_A_CORE FT
8/28  Ventricular Rate 76 BPM    Atrial Rate 76 BPM    P-R Interval 162 ms    QRS Duration 100 ms    Q-T Interval 438 ms    QTC Calculation(Bezet) 492 ms    P Axis 58 degrees    R Axis -35 degrees    T Axis 122 degrees    Diagnosis Line Normal sinus rhythm  Left axis deviation  Minimal voltage criteria for LVH, may be normal variant  T wave abnormality, consider lateral ischemia  Prolonged QT  Abnormal ECG    Confirmed by RGADY EDWARDS, LAURA ([id number]) on 8/31/2020 1:35:30 PM

## 2020-09-03 NOTE — PROGRESS NOTE ADULT - PROBLEM SELECTOR PLAN 1
Nausea, on and off  for 3 weeks, better now than last week  - no zofran as pt's QTc is 510ms  - Possible gastroparesis from DM  - Reglan PRN  - Plan for EGD tomorrow  - Clear diet today, NPO after midnight

## 2020-09-03 NOTE — BEHAVIORAL HEALTH ASSESSMENT NOTE - HPI (INCLUDE ILLNESS QUALITY, SEVERITY, DURATION, TIMING, CONTEXT, MODIFYING FACTORS, ASSOCIATED SIGNS AND SYMPTOMS)
65 year-old female, 50 pack year smoker with DM, HTN, HLD, CVA, breast CA s/p chemo/XRT, and NICM (catheterization 02/2019 showed dRPL occlusion without ability to intervene), EF 31% by MUGA 7/2019, s/p SubQ ICD 10/2019, AAA came to ED after she was having short of breath since morning. Patient states that she was feeling nauseous and was having difficulty breathing. Patient was little confused and is very poor historian. Psychiatry was consulted for medication management in the context of confusion.    Patient was admitted for dyspnea workup. Her CTA was negative for PE, BNP >35,000, so IV Lasix was started but discontinued to rising creatinine levels. Also had echo done 8/29 with EF 15-20% showing new moderate to severe mitral regurgitation. 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 Elevated Alk phosphatase and GGT, neg USH Abd for CBD dilation and GB thickening, plan for HIDA scan tomorrow AM.     Patient was visualized and interviewed by telepsychiatry. AOx2, able to provide name and hospital "[facility]," however, thought the month was Jan and year was 2020. Impairments in attention were noticeable and patient was unable to name days of the week forwards or backwards. 0/3 short term word recall. During interview, patient talked in a disorganized way about her quilting "I haven't done it for 4 years," and when asked if she enjoys it replies "no." Illogical and nonsensical. However was able to answer more closed ended questions. Endorsed anxiety regarding medical conditions. Endorsed feeling mostly safe in the hospital, however, has moments of feeling tense or "angry." Patient denies thoughts of harming others or HI. She denies thoughts of harming self or SI. Denies past psych hospitalizations or SAs. Denies having a current psychiatrist, and when asked about Wellbutrin XL, does not recall taking it or knowing about it. Patient endorses depressed mood and anhedonia that do last more than 2 weeks. Denies poor appetite or insomnia. Sleeps 6-8 hours a night. Does endorse hopelessness and helplessness. Denies AH or VH or ideas of reference. Some paranoid thoughts regarding the government, difficult to understand and very distractible. Spoke more extensively about her sister's death in the 1980s, her paternal grandmother having depression. Not agitated or with physical aggression or behavioral dysregulation.

## 2020-09-03 NOTE — PROGRESS NOTE ADULT - ASSESSMENT
65 year-old female  smoker with DM, HTN, HLD, breast CA s/p chemo/XRT, COPD not on home O2, CVA no motor deficits, and NICM (catheterization 02/2019 showed dRPL occlusion without ability to intervene), EF 31% by MUGA 7/2019, s/p SubQ ICD 10/2019, AAA came to ED for dyspnea with BNP of 35,000, Echo 8/29 showing EF 15-20% with moderate MR, ANGELINA today showing mural thrombus noted in the descending aorta with Vascular surgery consulted, currently admitted for CHF management. As patient has ongoing c/o nausea, with ALP and GGT elevations, USG abdomen was done which was negative for CBD dilation, GB thickening, but as nausea is not relieved, HIDA pursued was not completed as pt was agitated, will pursue EGD.

## 2020-09-03 NOTE — BEHAVIORAL HEALTH ASSESSMENT NOTE - NSBHCONSULTMEDANXIETY_PSY_A_CORE FT
If qtc is below 500, can provide low-dose antipsychotic for psychosis in delirium:  Haldol 2.5mg q12 PO/IM/IV and continue to monitor qtc  An alternative antipsychotic would be Seroquel. Please do not provide 2 antipsychotics at once.  Would attempt to avoid benzos, anticholinergic, antihistaminergic 2/2 delirium.  Consider depakote if antipsychotic is not an option, however would have risk of side effects (liver, pancreas)  Could benefit from long term SSRI if continues to have anxiety/depression symptoms once delirium resolves If qtc is below 500, can provide low-dose antipsychotic for psychosis in delirium:  Haldol 2.5mg q12 PO/IM/IV PRN and continue to monitor qtc  An alternative antipsychotic would be Seroquel. Please do not provide 2 antipsychotics at once.  Would attempt to avoid benzos, anticholinergic, antihistaminergic 2/2 delirium.  Consider depakote if antipsychotic is not an option, however would have risk of side effects (liver, pancreas)  Could benefit from long term SSRI if continues to have anxiety/depression symptoms once delirium resolves

## 2020-09-03 NOTE — PROGRESS NOTE ADULT - PROBLEM SELECTOR PLAN 9
pt can make decisions but has HCP also - Jaki Garland - [phone]  -- spoke with HCP - updated pts condition HCP- Jaki Garland - [phone]  FULL CODE

## 2020-09-03 NOTE — PROGRESS NOTE ADULT - SUBJECTIVE AND OBJECTIVE BOX
Patient is a 65y old  Female who presents with a chief complaint of Dyspnea (02 Sep 2020 14:09)     .     HPI:  Pt states still having nausea, but no vomiting. Pt agreed to get EGD tomorrow          REVIEW OF SYSTEMS  Constitutional:   No fever, no fatigue, no pallor, no night sweats, no weight loss.  HEENT:   No eye pain, no vision changes, no icterus, no mouth ulcers.  Respiratory:   No shortness of breath, no cough, no respiratory distress.   Cardiovascular:   No chest pain, no palpitations.   Gastrointestinal: No abdominal pain, (+) nausea, no vomiting , no diarrhea, no constipation, no hematochezia,no melena.  Skin:   No rashes, no jaundice, no eczema.   Musculoskeletal:   No joint pain, no swelling, no myalgia.   Neurologic:   No headache, no seizure, no weakness.   Genitourinary:   No dysuria, no decreased urine output.  Psychiatric:  No depression, no anxiety,   Endocrine:   No thyroid disease, no diabetes.  Heme/Lymphatic:   No anemia, no blood transfusions, no lymph node enlargement, no bleeding, no bruising.  ___________________________________________________________________________________________  Allergies    Advil (Hives; Vomiting)    Intolerances      MEDICATIONS  (STANDING):  ALBUTerol    90 MICROgram(s) HFA Inhaler 2 Puff(s) Inhalation every 6 hours  anastrozole 1 milliGRAM(s) Oral daily  apixaban 10 milliGRAM(s) Oral every 12 hours  aspirin enteric coated 81 milliGRAM(s) Oral daily  atorvastatin 40 milliGRAM(s) Oral at bedtime  buPROPion XL . 150 milliGRAM(s) Oral daily  clopidogrel Tablet 75 milliGRAM(s) Oral daily  dextrose 5%. 1000 milliLiter(s) (50 mL/Hr) IV Continuous <Continuous>  hydrALAZINE 25 milliGRAM(s) Oral three times a day  insulin lispro (HumaLOG) corrective regimen sliding scale   SubCutaneous three times a day before meals  insulin lispro (HumaLOG) corrective regimen sliding scale   SubCutaneous at bedtime  isosorbide   mononitrate ER Tablet (IMDUR) 30 milliGRAM(s) Oral daily  metoprolol succinate ER 50 milliGRAM(s) Oral daily  sacubitril 24 mG/valsartan 26 mG 1 Tablet(s) Oral two times a day  tiotropium 18 MICROgram(s) Capsule 1 Capsule(s) Inhalation daily    MEDICATIONS  (PRN):  guaiFENesin   Syrup  (Sugar-Free) 100 milliGRAM(s) Oral every 6 hours PRN Cough  meclizine 12.5 milliGRAM(s) Oral daily PRN Nausea  morphine  - Injectable 2 milliGRAM(s) IV Push daily PRN Moderate Pain (4 - 6)      PAST MEDICAL & SURGICAL HISTORY:  Knee effusion, left: arthrocentesis- 16  Internal carotid aneurysm  Abdominal aneurysm: infrarenal- wached by Vascular , Dr. Gutierrez  Cardiomyopathy  Sickle cell trait  DVT (deep venous thrombosis): Left leg in  after hysterectomy  Hypercholesteremia  Breast cancer: right-s/p chemo and radiation  Hypertension  Breast cancer: infusaport insertion- - left chest  S/P hysterectomy:   Gilson filter in place:   S/P lumpectomy, right breast: , revision of position-   Fibroid uterus: sp hystrectomy 1196    FAMILY HISTORY:  Family history of glioblastoma  Family history of other condition: please open box and see additional medical family history  Family history of renal failure: uncle  Family history of glioblastoma (Sibling)  History of hypertension: mother  Family history of liver failure  Family history of glioblastoma  Family history of renal stone  Family history of diabetes mellitus  Family history of hypertension    Social History: No hsitory of : Tobacco use, IVDA, EToH  ______________________________________________________________________________________    PHYSICAL EXAM    Daily     Daily Weight in k.1 (03 Sep 2020 06:24)  BMI: 24.5 (08 @ 14:36)  Change in Weight:  Vital Signs Last 24 Hrs  T(C): 36.4 (03 Sep 2020 10:30), Max: 36.8 (02 Sep 2020 13:46)  T(F): 97.5 (03 Sep 2020 10:30), Max: 98.2 (02 Sep 2020 13:46)  HR: 84 (03 Sep 2020 10:30) (76 - 88)  BP: 162/126 (03 Sep 2020 10:30) (138/100 - 176/110)  BP(mean): --  RR: 19 (03 Sep 2020 10:30) (18 - 19)  SpO2: 100% (03 Sep 2020 10:30) (98% - 100%)      General:  Well developed, well nourished, alert and active, no pallor, NAD.  HEENT:    Normal appearance of conjunctiva, ears, nose, lips, oropharynx, and oral mucosa, anicteric.  Neck:  No masses, no asymmetry.  Lymph Nodes:  (+) 2 hard lymph nodes on R axillary    Cardiovascular:  RRR normal S1/S2, no murmur.  Respiratory:  CTA B/L, normal respiratory effort.   Abdominal:   soft, no masses or tenderness, (+) uncomfortable on palpation on epigastric and RUQ area, normoactive BS, NT/ND, no HSM.  Extremities:   No clubbing or cyanosis, normal capillary refill, no edema.   Skin:   No rash, jaundice, lesions, eczema.   Musculoskeletal:  No joint swelling, erythema or tenderness.   Neuro: No focal deficits.   Other:   _______________________________________________________________________________________________  Lab Results:                          11.4   5.56  )-----------( 319      ( 03 Sep 2020 07:06 )             36.6     09-03    142  |  106  |  30<H>  ----------------------------<  129<H>  3.9   |  23  |  1.36<H>    Ca    9.0      03 Sep 2020 07:06  Phos  3.2     09-03  Mg     2.5         TPro  8.0  /  Alb  3.5  /  TBili  1.0  /  DBili  x   /  AST  33  /  ALT  25  /  AlkPhos  524<H>      LIVER FUNCTIONS - ( 03 Sep 2020 07:06 )  Alb: 3.5 g/dL / Pro: 8.0 g/dL / ALK PHOS: 524 U/L / ALT: 25 U/L DA / AST: 33 U/L / GGT: x                   Stool Results:          RADIOLOGY RESULTS:    SURGICAL PATHOLOGY:

## 2020-09-03 NOTE — BEHAVIORAL HEALTH ASSESSMENT NOTE - ATTENTION / CONCENTRATION
HPI Comments: Tyler Cortez is a 69 y.o. female presents to emergency room via EMS c/o R knee pain and R popliteal pain which began 2-3 days ago, improved with walking (walks 2 miles a day at baseline and walked 2 miles today and notes pain improved) but states an increase in R knee pain when her right leg gave out while stepping on the ground floor from walking down stairs and states her leg gave out and she went forward through a baby gate and fell onto her hands and knees approximately 2 hours PTA. EMS was called right away and brought patient due to pain with weight-bearing. She did not hit her head, suffer LOC, denies neck/back pain, numbness/tingling or leg swelling. PCP: in [facility]- from [place], traveled to [place] via plane on 10/5    The patient has no other complaints at this time. Patient is a 69 y.o. female presenting with leg pain. The history is provided by the patient. Leg Pain    Pertinent negatives include no numbness, no back pain and no neck pain. No past medical history on file. No past surgical history on file. No family history on file. Social History     Social History    Marital status:      Spouse name: N/A    Number of children: N/A    Years of education: N/A     Occupational History    Not on file. Social History Main Topics    Smoking status: Not on file    Smokeless tobacco: Not on file    Alcohol use Not on file    Drug use: Not on file    Sexual activity: Not on file     Other Topics Concern    Not on file     Social History Narrative         ALLERGIES: Review of patient's allergies indicates no known allergies. Review of Systems   Constitutional: Negative. Negative for activity change, chills, fatigue and unexpected weight change. Respiratory: Negative for cough, chest tightness, shortness of breath and wheezing. Cardiovascular: Negative. Negative for chest pain and palpitations. Gastrointestinal: Negative.   Negative for abdominal pain, diarrhea, nausea and vomiting. Genitourinary: Negative. Negative for dysuria, flank pain, frequency and hematuria. Musculoskeletal: Positive for arthralgias (R knee) and gait problem. Negative for back pain, neck pain and neck stiffness. Skin: Negative. Negative for color change and rash. Neurological: Negative for dizziness, numbness and headaches. Psychiatric/Behavioral: Negative. Negative for confusion. All other systems reviewed and are negative. Vitals:    10/11/17 1856   BP: 192/89   Pulse: 82   Resp: 15   Temp: 98 °F (36.7 °C)   SpO2: 100%   Weight: 61.2 kg (135 lb)   Height: 5' 2\" (1.575 m)            Physical Exam   Constitutional: She is oriented to person, place, and time. She appears well-developed and well-nourished. She is active. Non-toxic appearance. No distress. HENT:   Head: Normocephalic and atraumatic. Eyes: Conjunctivae are normal. Pupils are equal, round, and reactive to light. Right eye exhibits no discharge. Left eye exhibits no discharge. Neck: Normal range of motion and full passive range of motion without pain. Neck supple. No tracheal tenderness present. Cardiovascular: Normal rate, regular rhythm, normal heart sounds, intact distal pulses and normal pulses. Exam reveals no gallop and no friction rub. No murmur heard. Pulmonary/Chest: Effort normal and breath sounds normal. No respiratory distress. She has no wheezes. She has no rales. She exhibits no tenderness. Abdominal: Soft. Bowel sounds are normal. She exhibits no distension. There is no tenderness. There is no rebound and no guarding. Musculoskeletal: She exhibits tenderness (R popliteal with fullness). She exhibits no edema. No midline spinous process TTP. R knee with LROM due to pain, mild R calf TTP. FROM of R ankle and toes. NVI throughout. Capp refill brisk. Neurological: She is alert and oriented to person, place, and time. She has normal strength.  No cranial nerve deficit or sensory deficit. Coordination normal.   Skin: Skin is warm, dry and intact. No abrasion and no rash noted. She is not diaphoretic. No erythema. Psychiatric: She has a normal mood and affect. Her speech is normal and behavior is normal. Cognition and memory are normal.   Nursing note and vitals reviewed. MDM  Number of Diagnoses or Management Options  Acute pain of right knee:   Effusion of right knee:   Diagnosis management comments:   Ddx: frx, DVT, sprain       Amount and/or Complexity of Data Reviewed  Tests in the radiology section of CPT®: reviewed and ordered  Review and summarize past medical records: yes  Discuss the patient with other providers: yes (ER attending)    Patient Progress  Patient progress: stable    ED Course       Procedures      I discussed patient's PMH, exam findings as well as careplan with the ER attending who agrees with care plan. Elda King PA-C      DISCHARGE NOTE:  8:12 PM  The patient's results have been reviewed with them and/or available family. Patient and/or family verbally conveyed their understanding and agreement of the patient's signs, symptoms, diagnosis, treatment and prognosis and additionally agree to follow up as recommended in the discharge instructions or to return to the Emergency Room should their condition change prior to their follow-up appointment. The patient/family verbally agrees with the care-plan and verbally conveys that all of their questions have been answered. The discharge instructions have also been provided to the patient and/or family with some educational information regarding the patient's diagnosis as well a list of reasons why the patient would want to return to the ER prior to their follow-up appointment, should their condition change. Plan:  1. F/U with orthopedics  2. Rx Norco; NSAID of choice is able to take  3.  Knee immobilizer, walker given with instruction; RICE encouraged  Return precautions discussed and advised to return to ER if worse Impaired

## 2020-09-03 NOTE — MEDICAL STUDENT PROGRESS NOTE(EDUCATION) - NS MD HP STUD ASPLAN PLAN FT
1. Altered mental status, acute:  ·	Differentials  ·	Metabolic encephalopathy: elevated ALKP, GGT; ammonia levels WNL  ·	Breast cancer metastasis  ·	Sepsis  ·	Depression: psych was consulted; on Bupropion (nicotine cessation  ·	Plan  ·	Head CT r/o metastasis  ·	Blood culture r/o sepsis  ·	Neurology consult pending  ·	Monitor vitals + electrolytes  ·	May benefit from further pharmacological management as per psych  ·	Continue Bupropion 150 mg  ·	See below for metabolic encephalopathy recommendations    2. Nausea: reports nausea x1 month  ·	Reglan PRN; zofran withheld b/c prolonged QTc (510 ms)  ·	Possible gastroparesis from DM v. biliary obstruction v. nicotine withdrawal  ·	R/O gastroparesis with gastric emptying scintigraphy  ·	See below for biliary obstruction  ·	C/W bupropion for nicotine WD    3. Elevated alkaline phosphatase levels  ·	GGT (753; 9/2) and ALKP (524) elevated since admission; AST/ALT and bilirubin WNL  ·	Pt. reports RUQ abdominal pain and positive lombardi sign yesterday, negative today  ·	U/S hepatic: mild fatty liver, no gallstones, no gallbladder wall thickness or dilated CBD  ·	GI consulted  ·	R/O biliary obstruction with HIDA scan and possible MRCP  ·	Check anti-mitochondrial antibody (r/o PBC), anti P-ANCA (r/o PSC), and anti-SMA (r/o autoimmune hepatitis), EL  ·	  Atorvastatin and Anastrozole associated with elevated LFTs    4. CHF exacerbation   ·	BNP on admission 35,000; troponin downtrending  ·	IV Lasix d/c 8/29 (CHEN)  ·	DASH/TLC diet  ·	Cardio consulted (Dr. Patterson)  ·	Echo 8/29 EF 15-20%.  New moderate to severe MR  ·	ANGELINA 9/1 to evaluate valve function showed mural thrombus in descending aorta; started on Eliuquis 10 mg (9/1)  ·	On low dose entresto and metoprolol; spironolactone held due to Cr. elevation    5. Acute kidney injury  ·	Superimposed on CKD  ·	Creatine initially uptrending to 1.91 (8/31), has since improved to 1.36 today   ·	BMP monitored daily  ·	Nephrotoxins avoided  ·	Lasix + spironolactone withheld    6. Hypertension  ·	BP ranging from 138/100 - 176/110  ·	Pt. on Entresto 24-26 mg and metoprolol succinate 50 mg daily  ·	Hydralazine 10 mg, isosorbide mononitrite 30 mg added 9/1 for better control of BP  ·	Monitor BP. hold medications with parameters    7. Breast cancer  ·	HX of r. breast cancer  ·	Anastrozole continued  ·	F/U Dr. Serrato September 18    8. Cerebrovascular accident:   ·	AAOX2; pt. is anxious and in acute distress  ·	recent CTA head/neck shows chronic rt frontal, parietal, lacunar infarcts.  ·	Plan is to continue to monitor for signs/symptoms of stroke    9. COPD: Albuterol inhaler Q6 hours.  Saturating well without oxygen (98% x2)    10. DVT Prophylaxis: pt. is currently being given apixaban; also has HX of Gilson filter (1994)

## 2020-09-03 NOTE — PROGRESS NOTE ADULT - PROBLEM SELECTOR PLAN 2
Alk phos is elevated at 517 + nausea + RUQ discomfort, markus, AST/ALT WNL  - US hepatic:  Mild fatty liver, no gallstone, no gallbladder wall thickness or dilated CBD  - f/u HIDA scan today ordered by primary team  - f/u AMA(anti-mitochondrial antibody), SMA(anti-smooth muscle antibody), EL (testing)  - Get MRCP

## 2020-09-03 NOTE — PROGRESS NOTE ADULT - PROBLEM SELECTOR PLAN 3
-Bp ranging 140-160/-> has been ranging higher today as pt is agitated, oral hydralazine was given   -Pt is currently on Entresto 24-26 mg, Metoprolol succinate 50 mg daily  -Monitor BP, hold medications with parameters  -Hydralazine 10 mg, Isosorbide mononitrate 30 mg added 9/1 for better control of BP-> Hydralazine 25 mg now

## 2020-09-03 NOTE — CONSULT NOTE ADULT - ASSESSMENT
66yo female w/ AMS    Recommendations: 66yo female w/ AMS    Recommendations:    -Urine toxicology    -Please obtain labs: Vit D, B6, homocysteine & thiamine    -EEG to evaluate for abnormality 66 y/o female w/ altered mental status, concerning for hepatic encephalopathy or other toxic/metabolic encephalopathy      Recommendations:    -Urine toxicology    -Please obtain labs: Vit D, B6, homocysteine & thiamine    -Continue to trend LFTs & GGT    -Routine EEG approved to evaluate for seizure tendency

## 2020-09-03 NOTE — PROGRESS NOTE ADULT - ATTENDING COMMENTS
Advanced care planning was discussed with patient and family.  Advanced care planning forms were reviewed and discussed.  Risks, benefits and alternatives of gastroenterologic procedures were discussed in detail and all questions were answered.    -Unclear etiology of elevated alk phos   -US negative for biliary pathology   -Unable to do MRCP 2/2 to pacemaker   - Plan EGD tomorrow to evaluate nausea and vomiting.     I was physically present for the key portions of the evaluation and management (E/M) service provided.  The patient was personally seen and examined at bedside.  I have edited the note as appropriate.   Thank you for your consultation and allowing  me to participate in the care of your patients. If you have further questions please contact me at [phone].     Vahe Kurtz M.D.       _________________________________________________________________________________________________  [facility]  [address] [place] [address]  Office: [phone]    Juan Li PA-C  ___________________________________________________________________________________________________

## 2020-09-03 NOTE — CHART NOTE - NSCHARTNOTEFT_GEN_A_CORE
As pt was increasingly agitated today, HIDA scan could not be completed this morning. After administering 2 mg IM Morphine, pt was able to complete HIDA scan which showed filling of gallbladder ruling out acalculous cholecystitis as the cause of pt's confusion, nausea after food intake. Will monitor respiratory status closely. As medication interactions can contribute to mental status changes, Atorvastatin and Bupropion has been discontinued. Nicotine patches can be prescribed if pt has any withdrawal symptoms. CT head ordered, will follow up with results. As pt was increasingly agitated today, HIDA scan could not be completed this morning. After administering 2 mg IM Morphine, pt was able to complete HIDA scan which showed filling of gallbladder ruling out acalculous cholecystitis as the cause of pt's confusion, nausea after food intake. Will monitor respiratory status closely. As medication interactions can contribute to mental status changes, Atorvastatin and Bupropion have been discontinued. Nicotine patches can be prescribed if pt has any withdrawal symptoms. CT head ordered, will follow up with results.

## 2020-09-03 NOTE — MEDICAL STUDENT PROGRESS NOTE(EDUCATION) - SUBJECTIVE AND OBJECTIVE BOX
C/C: "I can't breathe"  HPI: Pt. is a 64 y/o female with PMH of diabetes mellitus, hypertension, hyperlipidemia, breast cancer s/p chemo/XRT, cerebrovascular accident (no motor deficits), COPD (not on home O2), and non-ischemic cardiomyopathy (catheterization 2/2019 showed dRPL occlusion without ability to intervene), EF 31% by MUGA 7/2019, s/p SUBQ ICD 10/2019, AAA presented to the ED on 8/27 with SOB.  The SOB began 1 month ago.  The pt. reports there was no change in her medical history, diet, activity level, or personal life at the time the SOB began.  She reports she began the new medication, bupropion, around this time to help with nicotine cravings as she has just recently quit smoking.  She denies pain with breathing, trouble sleeping, headaches, cough, edema, or palpitations.  She reports nausea which has been persistent for 1 month and is not associated with eating.  The patient is a poor historian and was not willing to discuss any further details.  As per the ED, she came to the ED after she was told to come in when she had SOB when getting an x-ray.      During her hospital course, patient was admitted for Dyspnea workup, CTA negative for PE, BNP >35,000, pt was started on IV Lasix, discontinued 8/29 due to rising creatinine levels. Echo from 8/29 with EF 15-20% showing new Moderate to Severe Mitral regurgitation (likely functional leading to HF exacerbations), troponins downtrending. As per Cardiology recommendations, low dose Entresto has been resumed and ANGELINA done 9/1 showed moderate MR, Mural thrombus extending into descending aorta, no LA thrombus, LAE + Vascular surgery was consulted regarding need for intervention. Due to persistent elevation in Alk phosphatase and GGT, although USG Abdomen was negative for CBD dilation and GB thickening, HIDA scan will be pursued tomorrow in AM.     Overnight events: Pt. was seen at bedside.  She looked comfortable and in no acute distress.  She was fearful and anxious however and reported that she wishes to go home.  She reported her breathing has improved and her nausea is still present but is the same as it has been for the past month.  Pt. denied abdominal pain or chest pain.    MEDICATIONS  (STANDING):  ALBUTerol    90 MICROgram(s) HFA Inhaler 2 Puff(s) Inhalation every 6 hours  anastrozole 1 milliGRAM(s) Oral daily  apixaban 10 milliGRAM(s) Oral every 12 hours  aspirin enteric coated 81 milliGRAM(s) Oral daily  atorvastatin 40 milliGRAM(s) Oral at bedtime  buPROPion XL . 150 milliGRAM(s) Oral daily  clopidogrel Tablet 75 milliGRAM(s) Oral daily  dextrose 5%. 1000 milliLiter(s) (50 mL/Hr) IV Continuous <Continuous>  hydrALAZINE 25 milliGRAM(s) Oral three times a day  insulin lispro (HumaLOG) corrective regimen sliding scale   SubCutaneous three times a day before meals  insulin lispro (HumaLOG) corrective regimen sliding scale   SubCutaneous at bedtime  isosorbide   mononitrate ER Tablet (IMDUR) 30 milliGRAM(s) Oral daily  metoprolol succinate ER 50 milliGRAM(s) Oral daily  sacubitril 24 mG/valsartan 26 mG 1 Tablet(s) Oral two times a day  tiotropium 18 MICROgram(s) Capsule 1 Capsule(s) Inhalation daily    MEDICATIONS  (PRN):  guaiFENesin   Syrup  (Sugar-Free) 100 milliGRAM(s) Oral every 6 hours PRN Cough  meclizine 12.5 milliGRAM(s) Oral daily PRN Nausea  morphine  - Injectable 2 milliGRAM(s) IV Push daily PRN Moderate Pain (4 - 6)    ROS:   Constitutional: Denies changes in weight, appetite, sleep; admits to nausea  HEENT: Denies headache, denies diplopia, denies nasal congestion; denies dysphagia or odynophagia   CV: denies palpitations, denies chest pain  Pulm: admits to trouble breathing, denies cough or wheezing  GI: LUQ abdominal pain; denies changes in BM  : Denies dysuria  Skin: denies jaundice  Back/MSK: denies back pain or weakness  Neuro/Psych: denies headache; admits to anxiety and feelings of hopelessness      Physical Exam:  Vital Signs Last 24 Hrs  T(C): 36.4 (03 Sep 2020 10:30), Max: 36.8 (02 Sep 2020 13:46)  T(F): 97.5 (03 Sep 2020 10:30), Max: 98.2 (02 Sep 2020 13:46)  HR: 84 (03 Sep 2020 10:30) (76 - 88)  BP: 162/126 (03 Sep 2020 10:30) (138/100 - 176/110)  BP(mean): --  RR: 19 (03 Sep 2020 10:30) (18 - 19)  SpO2: 100% (03 Sep 2020 10:30) (98% - 100%)  General: Pt. appears to be comfortable.  Well developed, obese,  female.  Head: Atraumatic normocephalic  Eyes: conjunctiva and sclera clear; EOMI; PERRLA  Neck: No JVD, normal thyroid, trachea midline  Chest/Lungs: CTABL  Heart: regular rate and rhythm; no murmurs rubs or gallops  Abdomen: soft, nontender, nondistended  Nervous system: AAOX2  Psych: patient is fearful and paranoid.  She reports feelings of hopelessness.  Extremities: 2+ pre tibial and radial pulses; no clubbing cyanosis or edema  Skin: warm; lower extremities are dry                          11.4   5.56  )-----------( 319      ( 03 Sep 2020 07:06 )             36.6   09-03    142  |  106  |  30<H>  ----------------------------<  129<H>  3.9   |  23  |  1.36<H>    Ca    9.0      03 Sep 2020 07:06  Phos  3.2     09-03  Mg     2.5     09-03    TPro  8.0  /  Alb  3.5  /  TBili  1.0  /  DBili  x   /  AST  33  /  ALT  25  /  AlkPhos  524<H>  09-03  Anticardiolipin Antibody: Positive  Ammonia: 23  Lactate 1.9  Acetaminophen <2

## 2020-09-03 NOTE — BEHAVIORAL HEALTH ASSESSMENT NOTE - SUICIDE PROTECTIVE FACTORS
Responsibility to family and others/Supportive social network of family or friends/Fear of death or the actual act of killing self/Identifies reasons for living

## 2020-09-03 NOTE — BEHAVIORAL HEALTH ASSESSMENT NOTE - SUMMARY
65 year-old female, 50 pack year smoker with DM, HTN, HLD, CVA, breast CA s/p chemo/XRT, and NICM (catheterization 02/2019 showed dRPL occlusion without ability to intervene), EF 31% by MUGA 7/2019, s/p SubQ ICD 10/2019, AAA came to ED after she was having short of breath since morning. Patient states that she was feeling nauseous and was having difficulty breathing. Patient was little confused and is very poor historian. Psychiatry was consulted for medication management in the context of confusion.    Patient presents with likely acute onset (pending collateral, reports being able to care for ADLs prior) of change in mental status with fluctuations in attention and impaired cognition consistent with delirium.  No acute agitation, however, does present with symptoms of psychosis-- disorganized thinking, paranoia, anxiety. These are occurring in the context of past CVA chronic changes in frontal/parietal/lacunar, renal and cardiac issues. Currently on Wellbutrin XL 150mg. Could benefit from further pharmacological management while considering side effects of medications. Could also benefit from further medical workup (head imaging, r/o mets) if changes in behavior are believed to be more longstanding.

## 2020-09-03 NOTE — PROGRESS NOTE ADULT - PROBLEM SELECTOR PLAN 3
Pt had breast cancer and likely  following with Dr. Rachel in [facility]  - Last chemo was 10 years ago as per pt, nausea is not likely from cancer treatment  - Pt will need colonoscopy too, but not likely tolerated oral prep. Not this time

## 2020-09-03 NOTE — CHART NOTE - NSCHARTNOTEFT_GEN_A_CORE
Jessy Read MRN: [id number] : 1955      NAME: Jaki Garland  NUMBER: [phone]  CONSENT OBTAINED? IF NO, PLEASE LIST REASON:  RELATIONSHIP: Friend  RELIABILITY:   COMMENTS: Lives in [place]. Speaks to patient on a regular basis via text/email. Calls have been less frequent recently because of patient’s shortness of breath.      ========================  PATIENT DEMOGRAPHICS:  ========================  BASELINE FUNCTIONING: At baseline, collateral describes pt as someone typically very involved in the management of her health. Pt is able to live independently and maintain residence and ADLs. Recently notices decreased frustration tolerance (last 1-2 weeks).      HPI: Patient is a 65 year old female, single, retired, domiciled in a private residence with a PMhx of DM, HTN, HLD, breast cancer, who presented to [facility] ED with shortness of breath for the last two weeks. Reports increased weakness over last two weeks with bouts of nausea. Patient denies any fever, chest pain, vomiting, abdominal pain, burning with urination or frequency or urgency, pain or swelling of lower extremities, blood in urine or stool. Patient states she has now had to hire outside help to assist with household chores and cleaning.       ROS: All other review of systems negative, except noted in HPI  MEDICAL DECOMPENSATION: See HPI  CHANGES IN PSYCHIATRIC CONDITION: Collateral observes patient has become frustrated and intermittently disoriented over the last week  CORRELATION BETWEEN MEDICAL AND PSYCHIATRIC CONDITION:  SUICIDALITY: Denies SI, plan or intent  VIOLENCE:  Collateral denies   SUBSTANCE: Patient smokes cigarettes         ========================  PAST PSYCHIATRIC HISTORY  ========================    DATE PAST PSYCHIATRIC HISTORY STARTED: Collateral unable to provide  MAIN PSYCHIATRIC DIAGNOSIS: Collateral reports pt never formally diagnosed   PSYCHIATRIC HOSPITALIZATIONS:  Collateral reports no prior psych hospitalizations   PRIOR ILLNESS: See HPI  SUICIDALITY:  Collateral denies any hx plan or intent  VIOLENCE: Collateral denies   SUBSTANCE USE: Patient smokes cigarettes, hx of etoh and MJ socially, no drugs     ==============  OTHER HISTORY  ==============    CURRENT MEDICATION:   	-Eliquis  	-Plavix  	-Ecotrin  	-HumaLOG  	-Arimidex  	-IMDUR  	-Antivert  	-HFA Inhaler  	-Spiriva  	-Entresto  	-Lipitor  	-Toprol XL  	-Wellbutrin  MEDICAL HISTORY:  DM, HTN, HLD, breast cancer  SURGICAL HISTORY:   ALLERGIES:   LEGAL ISSUES: Collateral denies any legal hx  FIREARM ACCESS: Collateral denies access to firearms  SOCIAL HISTORY: Unsubstantiated hx of trauma. Not a vet. No CPS/APS involvement. No access to firearms.   FAMILY HISTORY: Paternal hx of depression  DEVELOPMENTAL HISTORY: Collateral denies any hx

## 2020-09-03 NOTE — BEHAVIORAL HEALTH ASSESSMENT NOTE - RISK ASSESSMENT
Low Acute Suicide Risk No history of rehabs, detoxes, psych hospitalizations, suicide attempts, or self injury. Currently patient is more so worried about her health and would like to be alive.

## 2020-09-03 NOTE — PROGRESS NOTE ADULT - SUBJECTIVE AND OBJECTIVE BOX
PGY-1 Progress Note discussed with attending    PAGER #: [[phone]] TILL 5:00 PM  PLEASE CONTACT ON CALL TEAM:  - On Call Team (Please refer to Taryn) FROM 5:00 PM - 8:30PM  - Nightfloat Team FROM 8:30 -7:30 AM    CHIEF COMPLAINT & BRIEF HOSPITAL COURSE: 64 y/o F smoker with medical history significant for DM, HTN, HLD, Breast CA s/p chemo/XRT, SCC, CVA no motor deficits, COPD (not on home O2), NICM with EF 31% by MUGA 7/2019 (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 She was going for an xray when they told her to come into the hospital. Patient states that she was feeling nauseous and was having difficulty breathing. Patient was little confused and is very poor historian. Patient states that she smokes 1 PPD x 50 years. She denies chest pain, head ache, dizziness, palpitations LOC, bowel or urinary problems. Does reports she has a cough, non-productive, and vomited once, with clear non-bloody vomit. No sick contacts, no recent travel. Of note pt reports history "a long time ago" of DVT/PE, and was on blood thinners, unsure of time period. Sister also had PE.    During her hospital course, patient was admitted for Dyspnea workup, CTA negative for PE, BNP >35,000, pt was started on IV Lasix, discontinued 8/29 due to rising creatinine levels. Echo from 8/29 with EF 15-20% showing new Moderate to Severe Mitral regurgitation (likely functional leading to HF exacerbations), troponins downtrending. As per Cardiology recommendations, low dose Entresto has been resumed and ANGELINA done 9/1 showed moderate MR, Mural thrombus extending into descending aorta, no LA thrombus, LAE + Vascular surgery was consulted regarding need for intervention. Due to persistent elevation in Alk phosphatase and GGT, although USG Abdomen was negative for CBD dilation and GB thickening, HIDA scan was pursued, however study could not be completed as pt did not cooperative.       INTERVAL HPI/OVERNIGHT EVENTS:     MEDICATIONS  (STANDING):  ALBUTerol    90 MICROgram(s) HFA Inhaler 2 Puff(s) Inhalation every 6 hours  anastrozole 1 milliGRAM(s) Oral daily  apixaban 10 milliGRAM(s) Oral every 12 hours  aspirin enteric coated 81 milliGRAM(s) Oral daily  atorvastatin 40 milliGRAM(s) Oral at bedtime  buPROPion XL . 150 milliGRAM(s) Oral daily  clopidogrel Tablet 75 milliGRAM(s) Oral daily  dextrose 5%. 1000 milliLiter(s) (50 mL/Hr) IV Continuous <Continuous>  hydrALAZINE 25 milliGRAM(s) Oral three times a day  insulin lispro (HumaLOG) corrective regimen sliding scale   SubCutaneous three times a day before meals  insulin lispro (HumaLOG) corrective regimen sliding scale   SubCutaneous at bedtime  isosorbide   mononitrate ER Tablet (IMDUR) 30 milliGRAM(s) Oral daily  labetalol Injectable 5 milliGRAM(s) IV Push once  metoprolol succinate ER 50 milliGRAM(s) Oral daily  sacubitril 24 mG/valsartan 26 mG 1 Tablet(s) Oral two times a day  tiotropium 18 MICROgram(s) Capsule 1 Capsule(s) Inhalation daily    MEDICATIONS  (PRN):  guaiFENesin   Syrup  (Sugar-Free) 100 milliGRAM(s) Oral every 6 hours PRN Cough  meclizine 12.5 milliGRAM(s) Oral daily PRN Nausea      REVIEW OF SYSTEMS:  CONSTITUTIONAL: No fever, weight loss, or fatigue  RESPIRATORY: No cough, wheezing, chills or hemoptysis; No shortness of breath  CARDIOVASCULAR: No chest pain, palpitations, dizziness, or leg swelling  GASTROINTESTINAL: No abdominal pain. No nausea, vomiting, or hematemesis; No diarrhea or constipation. No melena or hematochezia.  GENITOURINARY: No dysuria or hematuria, urinary frequency  NEUROLOGICAL: No headaches, memory loss, loss of strength, numbness, or tremors  SKIN: No itching, burning, rashes, or lesions     Vital Signs Last 24 Hrs  T(C): 36.4 (03 Sep 2020 10:30), Max: 36.4 (03 Sep 2020 10:30)  T(F): 97.5 (03 Sep 2020 10:30), Max: 97.5 (03 Sep 2020 10:30)  HR: 99 (03 Sep 2020 15:23) (76 - 99)  BP: 157/97 (03 Sep 2020 15:23) (157/97 - 176/110)  BP(mean): --  RR: 19 (03 Sep 2020 10:30) (18 - 19)  SpO2: 100% (03 Sep 2020 10:30) (98% - 100%)    PHYSICAL EXAMINATION:  GENERAL: NAD, well built  HEAD:  Atraumatic, Normocephalic  EYES:  conjunctiva and sclera clear  NECK: Supple, No JVD, Normal thyroid  CHEST/LUNG: Clear to auscultation. Clear to percussion bilaterally; No rales, rhonchi, wheezing, or rubs  HEART: Regular rate and rhythm; No murmurs, rubs, or gallops  ABDOMEN: Soft, Nontender, Nondistended; Bowel sounds present  NERVOUS SYSTEM:  Alert & Oriented X3,    EXTREMITIES:  2+ Peripheral Pulses, No clubbing, cyanosis, or edema  SKIN: warm dry                          11.4   5.56  )-----------( 319      ( 03 Sep 2020 07:06 )             36.6     09-03    142  |  106  |  30<H>  ----------------------------<  129<H>  3.9   |  23  |  1.36<H>    Ca    9.0      03 Sep 2020 07:06  Phos  3.2     09-03  Mg     2.5     09-03    TPro  8.0  /  Alb  3.5  /  TBili  1.0  /  DBili  x   /  AST  33  /  ALT  25  /  AlkPhos  524<H>  09-03    LIVER FUNCTIONS - ( 03 Sep 2020 07:06 )  Alb: 3.5 g/dL / Pro: 8.0 g/dL / ALK PHOS: 524 U/L / ALT: 25 U/L DA / AST: 33 U/L / GGT: x                   CAPILLARY BLOOD GLUCOSE      RADIOLOGY & ADDITIONAL TESTS: PGY-1 Progress Note discussed with attending    PAGER #: [[phone]] TILL 5:00 PM  PLEASE CONTACT ON CALL TEAM:  - On Call Team (Please refer to Taryn) FROM 5:00 PM - 8:30PM  - Nightfloat Team FROM 8:30 -7:30 AM    CHIEF COMPLAINT & BRIEF HOSPITAL COURSE: 64 y/o F smoker with medical history significant for DM, HTN, HLD, Breast CA s/p chemo/XRT, SCC, CVA no motor deficits, COPD (not on home O2), NICM with EF 31% by MUGA 7/2019 (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 She was going for an xray when they told her to come into the hospital. Patient states that she was feeling nauseous and was having difficulty breathing. Patient was little confused and is very poor historian. Patient states that she smokes 1 PPD x 50 years. She denies chest pain, head ache, dizziness, palpitations LOC, bowel or urinary problems. Does reports she has a cough, non-productive, and vomited once, with clear non-bloody vomit. No sick contacts, no recent travel. Of note pt reports history "a long time ago" of DVT/PE, and was on blood thinners, unsure of time period. Sister also had PE.    During her hospital course, patient was admitted for Dyspnea workup, CTA negative for PE, BNP >35,000, pt was started on IV Lasix, discontinued 8/29 due to rising creatinine levels. Echo from 8/29 with EF 15-20% showing new Moderate to Severe Mitral regurgitation (likely functional leading to HF exacerbations), troponins downtrending. As per Cardiology recommendations, low dose Entresto has been resumed and ANGELINA done 9/1 showed moderate MR, Mural thrombus extending into descending aorta, no LA thrombus, LAE + Vascular surgery was consulted regarding need for intervention. Due to persistent elevation in Alk phosphatase and GGT, although USG Abdomen was negative for CBD dilation and GB thickening, HIDA scan was pursued, however study could not be completed as pt did not cooperative.       INTERVAL HPI/OVERNIGHT EVENTS: Pt is AAO x 2, as per baseline. Persistently questioning about discharge home, pt was explained about the pending procedures and the importance of the procedures. Pt was reassured and offered support.    MEDICATIONS  (STANDING):  ALBUTerol    90 MICROgram(s) HFA Inhaler 2 Puff(s) Inhalation every 6 hours  anastrozole 1 milliGRAM(s) Oral daily  apixaban 10 milliGRAM(s) Oral every 12 hours  aspirin enteric coated 81 milliGRAM(s) Oral daily  atorvastatin 40 milliGRAM(s) Oral at bedtime  buPROPion XL . 150 milliGRAM(s) Oral daily  clopidogrel Tablet 75 milliGRAM(s) Oral daily  dextrose 5%. 1000 milliLiter(s) (50 mL/Hr) IV Continuous <Continuous>  hydrALAZINE 25 milliGRAM(s) Oral three times a day  insulin lispro (HumaLOG) corrective regimen sliding scale   SubCutaneous three times a day before meals  insulin lispro (HumaLOG) corrective regimen sliding scale   SubCutaneous at bedtime  isosorbide   mononitrate ER Tablet (IMDUR) 30 milliGRAM(s) Oral daily  labetalol Injectable 5 milliGRAM(s) IV Push once  metoprolol succinate ER 50 milliGRAM(s) Oral daily  sacubitril 24 mG/valsartan 26 mG 1 Tablet(s) Oral two times a day  tiotropium 18 MICROgram(s) Capsule 1 Capsule(s) Inhalation daily    MEDICATIONS  (PRN):  guaiFENesin   Syrup  (Sugar-Free) 100 milliGRAM(s) Oral every 6 hours PRN Cough  meclizine 12.5 milliGRAM(s) Oral daily PRN Nausea      REVIEW OF SYSTEMS:  CONSTITUTIONAL: No fever, weight loss, or fatigue  RESPIRATORY: No cough, wheezing, chills or hemoptysis; No shortness of breath  CARDIOVASCULAR: No chest pain, palpitations, dizziness, or leg swelling  GASTROINTESTINAL: No abdominal pain. Vomiting, or hematemesis; No diarrhea or constipation. No melena or hematochezia.  C/o Nausea +  GENITOURINARY: No dysuria or hematuria, urinary frequency  NEUROLOGICAL: No headaches, memory loss, loss of strength, numbness, or tremors  SKIN: No itching, burning, rashes, or lesions     Vital Signs Last 24 Hrs  T(C): 36.4 (03 Sep 2020 10:30), Max: 36.4 (03 Sep 2020 10:30)  T(F): 97.5 (03 Sep 2020 10:30), Max: 97.5 (03 Sep 2020 10:30)  HR: 99 (03 Sep 2020 15:23) (76 - 99)  BP: 157/97 (03 Sep 2020 15:23) (157/97 - 176/110)  BP(mean): --  RR: 19 (03 Sep 2020 10:30) (18 - 19)  SpO2: 100% (03 Sep 2020 10:30) (98% - 100%)    PHYSICAL EXAMINATION:  GENERAL: Pt is lying comfortably, not in acute distress, AAO x 2  HEAD:  Atraumatic, Normocephalic  EYES:  conjunctiva and sclera clear  NECK: Supple, No JVD, Normal thyroid  CHEST/LUNG: Clear to auscultation. Clear to percussion bilaterally; No rales, rhonchi, wheezing, or rubs  HEART: Regular rate and rhythm; No murmurs, rubs, or gallops  ABDOMEN: Soft, Nontender, Nondistended; Bowel sounds present  NERVOUS SYSTEM:  Alert & Oriented X2    EXTREMITIES:  2+ Peripheral Pulses, No clubbing, cyanosis, or edema  SKIN: warm dry                        11.4   5.56  )-----------( 319      ( 03 Sep 2020 07:06 )             36.6     09-03    142  |  106  |  30<H>  ----------------------------<  129<H>  3.9   |  23  |  1.36<H>    Ca    9.0      03 Sep 2020 07:06  Phos  3.2     09-03  Mg     2.5     09-03    TPro  8.0  /  Alb  3.5  /  TBili  1.0  /  DBili  x   /  AST  33  /  ALT  25  /  AlkPhos  524<H>  09-03    LIVER FUNCTIONS - ( 03 Sep 2020 07:06 )  Alb: 3.5 g/dL / Pro: 8.0 g/dL / ALK PHOS: 524 U/L / ALT: 25 U/L DA / AST: 33 U/L / GGT: x                   CAPILLARY BLOOD GLUCOSE      RADIOLOGY & ADDITIONAL TESTS:    < from: NM Hepatobiliary Imaging w/ RX (09.03.20 @ 13:20) >  INTERPRETATION:  RADIOPHARMACEUTICAL: 3.1 mCi Tc-99m-Mebrofenin, I.V.    CLINICAL INFORMATION: 65 year old female, referred to evaluate for acute cholecystitis    TECHNIQUE: Dynamic imaging of the anterior abdomen was performed for 30 minutes following injection of radiotracer. The patient refused additional imaging after 30 minutes.    FINDINGS: There is prompt, homogeneous uptake of radiotracer bythe hepatocytes. Activity is first seen in the bowel at 20 minutes. Patient refused to continue after 30 minutes.    IMPRESSION: Incomplete study.              QUENTIN FOLEY M.D., NUCLEAR MEDICINE ATTENDING  This document has been electronically signed. Sep  3 2020  1:50PM    < end of copied text >

## 2020-09-04 ENCOUNTER — RESULT REVIEW (OUTPATIENT)
Age: 65
End: 2020-09-04

## 2020-09-04 DIAGNOSIS — R41.0 DISORIENTATION, UNSPECIFIED: ICD-10-CM

## 2020-09-04 LAB
ALBUMIN SERPL ELPH-MCNC: 3.1 G/DL — LOW (ref 3.5–5)
ALP SERPL-CCNC: 370 U/L — HIGH (ref 40–120)
ALT FLD-CCNC: 19 U/L DA — SIGNIFICANT CHANGE UP (ref 10–60)
ANION GAP SERPL CALC-SCNC: 7 MMOL/L — SIGNIFICANT CHANGE UP (ref 5–17)
AST SERPL-CCNC: 19 U/L — SIGNIFICANT CHANGE UP (ref 10–40)
BILIRUB SERPL-MCNC: 0.8 MG/DL — SIGNIFICANT CHANGE UP (ref 0.2–1.2)
BUN SERPL-MCNC: 28 MG/DL — HIGH (ref 7–18)
CALCIUM SERPL-MCNC: 8.6 MG/DL — SIGNIFICANT CHANGE UP (ref 8.4–10.5)
CHLORIDE SERPL-SCNC: 111 MMOL/L — HIGH (ref 96–108)
CO2 SERPL-SCNC: 27 MMOL/L — SIGNIFICANT CHANGE UP (ref 22–31)
CREAT SERPL-MCNC: 1.54 MG/DL — HIGH (ref 0.5–1.3)
GLUCOSE BLDC GLUCOMTR-MCNC: 119 MG/DL — HIGH (ref 70–99)
GLUCOSE BLDC GLUCOMTR-MCNC: 123 MG/DL — HIGH (ref 70–99)
GLUCOSE BLDC GLUCOMTR-MCNC: 124 MG/DL — HIGH (ref 70–99)
GLUCOSE BLDC GLUCOMTR-MCNC: 136 MG/DL — HIGH (ref 70–99)
GLUCOSE SERPL-MCNC: 150 MG/DL — HIGH (ref 70–99)
HCT VFR BLD CALC: 33.5 % — LOW (ref 34.5–45)
HGB BLD-MCNC: 10.3 G/DL — LOW (ref 11.5–15.5)
MAGNESIUM SERPL-MCNC: 2.3 MG/DL — SIGNIFICANT CHANGE UP (ref 1.6–2.6)
MCHC RBC-ENTMCNC: 29.8 PG — SIGNIFICANT CHANGE UP (ref 27–34)
MCHC RBC-ENTMCNC: 30.7 GM/DL — LOW (ref 32–36)
MCV RBC AUTO: 96.8 FL — SIGNIFICANT CHANGE UP (ref 80–100)
MITOCHONDRIA AB SER-ACNC: SIGNIFICANT CHANGE UP
NRBC # BLD: 0 /100 WBCS — SIGNIFICANT CHANGE UP (ref 0–0)
PHOSPHATE SERPL-MCNC: 3 MG/DL — SIGNIFICANT CHANGE UP (ref 2.5–4.5)
PLATELET # BLD AUTO: 293 K/UL — SIGNIFICANT CHANGE UP (ref 150–400)
POTASSIUM SERPL-MCNC: 3.6 MMOL/L — SIGNIFICANT CHANGE UP (ref 3.5–5.3)
POTASSIUM SERPL-SCNC: 3.6 MMOL/L — SIGNIFICANT CHANGE UP (ref 3.5–5.3)
PROT SERPL-MCNC: 7.3 G/DL — SIGNIFICANT CHANGE UP (ref 6–8.3)
RBC # BLD: 3.46 M/UL — LOW (ref 3.8–5.2)
RBC # FLD: 16.5 % — HIGH (ref 10.3–14.5)
SMOOTH MUSCLE AB SER-ACNC: SIGNIFICANT CHANGE UP
SODIUM SERPL-SCNC: 145 MMOL/L — SIGNIFICANT CHANGE UP (ref 135–145)
WBC # BLD: 6.05 K/UL — SIGNIFICANT CHANGE UP (ref 3.8–10.5)
WBC # FLD AUTO: 6.05 K/UL — SIGNIFICANT CHANGE UP (ref 3.8–10.5)

## 2020-09-04 PROCEDURE — 71045 X-RAY EXAM CHEST 1 VIEW: CPT | Mod: 26

## 2020-09-04 PROCEDURE — 99233 SBSQ HOSP IP/OBS HIGH 50: CPT

## 2020-09-04 PROCEDURE — 88312 SPECIAL STAINS GROUP 1: CPT | Mod: 26

## 2020-09-04 PROCEDURE — 99232 SBSQ HOSP IP/OBS MODERATE 35: CPT | Mod: 95

## 2020-09-04 PROCEDURE — 99233 SBSQ HOSP IP/OBS HIGH 50: CPT | Mod: GC

## 2020-09-04 PROCEDURE — 88305 TISSUE EXAM BY PATHOLOGIST: CPT | Mod: 26

## 2020-09-04 RX ORDER — LIDOCAINE 4 G/100G
1 CREAM TOPICAL ONCE
Refills: 0 | Status: COMPLETED | OUTPATIENT
Start: 2020-09-04 | End: 2020-09-04

## 2020-09-04 RX ORDER — APIXABAN 2.5 MG/1
1 TABLET, FILM COATED ORAL
Qty: 60 | Refills: 0
Start: 2020-09-04 | End: 2020-10-03

## 2020-09-04 RX ORDER — HYDRALAZINE HCL 50 MG
50 TABLET ORAL EVERY 8 HOURS
Refills: 0 | Status: DISCONTINUED | OUTPATIENT
Start: 2020-09-04 | End: 2020-09-04

## 2020-09-04 RX ORDER — HYDRALAZINE HCL 50 MG
50 TABLET ORAL EVERY 8 HOURS
Refills: 0 | Status: DISCONTINUED | OUTPATIENT
Start: 2020-09-04 | End: 2020-09-09

## 2020-09-04 RX ORDER — QUETIAPINE FUMARATE 200 MG/1
25 TABLET, FILM COATED ORAL AT BEDTIME
Refills: 0 | Status: DISCONTINUED | OUTPATIENT
Start: 2020-09-04 | End: 2020-09-06

## 2020-09-04 RX ADMIN — ALBUTEROL 2 PUFF(S): 90 AEROSOL, METERED ORAL at 13:08

## 2020-09-04 RX ADMIN — CLOPIDOGREL BISULFATE 75 MILLIGRAM(S): 75 TABLET, FILM COATED ORAL at 12:07

## 2020-09-04 RX ADMIN — ISOSORBIDE MONONITRATE 30 MILLIGRAM(S): 60 TABLET, EXTENDED RELEASE ORAL at 12:07

## 2020-09-04 RX ADMIN — ALBUTEROL 2 PUFF(S): 90 AEROSOL, METERED ORAL at 10:42

## 2020-09-04 RX ADMIN — SACUBITRIL AND VALSARTAN 1 TABLET(S): 24; 26 TABLET, FILM COATED ORAL at 05:43

## 2020-09-04 RX ADMIN — Medication 81 MILLIGRAM(S): at 12:07

## 2020-09-04 RX ADMIN — HEPARIN SODIUM 5000 UNIT(S): 5000 INJECTION INTRAVENOUS; SUBCUTANEOUS at 21:54

## 2020-09-04 RX ADMIN — ANASTROZOLE 1 MILLIGRAM(S): 1 TABLET ORAL at 12:07

## 2020-09-04 RX ADMIN — ALBUTEROL 2 PUFF(S): 90 AEROSOL, METERED ORAL at 21:59

## 2020-09-04 RX ADMIN — HEPARIN SODIUM 5000 UNIT(S): 5000 INJECTION INTRAVENOUS; SUBCUTANEOUS at 13:08

## 2020-09-04 RX ADMIN — QUETIAPINE FUMARATE 25 MILLIGRAM(S): 200 TABLET, FILM COATED ORAL at 21:55

## 2020-09-04 RX ADMIN — Medication 50 MILLIGRAM(S): at 13:08

## 2020-09-04 RX ADMIN — Medication 50 MILLIGRAM(S): at 21:55

## 2020-09-04 RX ADMIN — Medication 25 MILLIGRAM(S): at 05:41

## 2020-09-04 RX ADMIN — Medication 105 MILLIGRAM(S): at 12:08

## 2020-09-04 RX ADMIN — Medication 20 MILLIGRAM(S): at 05:42

## 2020-09-04 RX ADMIN — TIOTROPIUM BROMIDE 1 CAPSULE(S): 18 CAPSULE ORAL; RESPIRATORY (INHALATION) at 12:08

## 2020-09-04 RX ADMIN — HEPARIN SODIUM 5000 UNIT(S): 5000 INJECTION INTRAVENOUS; SUBCUTANEOUS at 05:43

## 2020-09-04 RX ADMIN — Medication 50 MILLIGRAM(S): at 05:41

## 2020-09-04 RX ADMIN — SACUBITRIL AND VALSARTAN 1 TABLET(S): 24; 26 TABLET, FILM COATED ORAL at 17:03

## 2020-09-04 NOTE — PROGRESS NOTE ADULT - PROBLEM SELECTOR PLAN 2
Alk phos is elevated at 517 + nausea + RUQ discomfort, markus, AST/ALT WNL  - US hepatic:  Mild fatty liver, no gallstone, no gallbladder wall thickness or dilated CBD  - HIDA scan: no acute cholecystitis  - f/u AMA(anti-mitochondrial antibody), SMA(anti-smooth muscle antibody), EL (testing)  - cannot get MRCP due to AICD

## 2020-09-04 NOTE — PROGRESS NOTE ADULT - ASSESSMENT
65 year-old female  smoker with DM, HTN, HLD, breast CA s/p chemo/XRT, COPD not on home O2, CVA no motor deficits, and NICM (catheterization 02/2019 showed dRPL occlusion without ability to intervene), EF 31% by MUGA 7/2019, s/p SubQ ICD 10/2019, AAA came to ED for dyspnea with BNP of 35,000, Echo 8/29 showing EF 15-20% with moderate MR, ANGELINA today showing mural thrombus noted in the descending aorta with Vascular surgery consulted, currently admitted for CHF management. As patient has ongoing c/o nausea, with ALP and GGT elevations, USG abdomen was done which was negative for CBD dilation, GB thickening, but as nausea is not relieved, HIDA pursued which was negative for acute cholecystitis, EGD will be performed today to evaluate further. 65 year-old female  smoker with DM, HTN, HLD, breast CA s/p chemo/XRT, COPD not on home O2, CVA no motor deficits, and NICM (catheterization 02/2019 showed dRPL occlusion without ability to intervene), EF 31% by MUGA 7/2019, s/p SubQ ICD 10/2019, AAA came to ED for dyspnea with BNP of 35,000, Echo 8/29 showing EF 15-20% with moderate MR, ANGELINA today showing mural thrombus noted in the descending aorta with Vascular surgery consulted, admitted for CHF management. As patient has ongoing c/o nausea, with ALP and GGT elevations, USG abdomen was done which was negative for CBD dilation, GB thickening, but as nausea is not relieved, HIDA pursued which was negative for acute cholecystitis, EGD will be performed today to evaluate further. Pt has been having recent experiencing increasing agitation and confusion in the past few days- 65 year-old female  smoker with DM, HTN, HLD, breast CA s/p chemo/XRT, COPD not on home O2, CVA no motor deficits, and NICM (catheterization 02/2019 showed dRPL occlusion without ability to intervene), EF 31% by MUGA 7/2019, s/p SubQ ICD 10/2019, AAA came to ED for dyspnea with BNP of 35,000, Echo 8/29 showing EF 15-20% with moderate MR, ANGELINA today showing mural thrombus noted in the descending aorta with Vascular surgery consulted, admitted for CHF management. As patient has ongoing c/o nausea, with ALP and GGT elevations, USG abdomen was done which was negative for CBD dilation, GB thickening, but as nausea is not relieved, HIDA pursued which was negative for acute cholecystitis, EGD will be performed today to evaluate further. Pt has been having recent experiencing increasing agitation and confusion in the past few days- CT brain done- no acute bleed, Urine cs, Blood cs negative, will continue to monitor for Delirium.

## 2020-09-04 NOTE — CHART NOTE - NSCHARTNOTEFT_GEN_A_CORE
Reassessment:   65yFemalePatient is a 65y old  Female who presents with a chief complaint of Dyspnea (04 Sep 2020 13:38)  s/p EGD today-moderate gastritis. per GI resume reg diet. pt currently on clear liquids.     Factors impacting intake: [ ] none [ ] nausea  [ ] vomiting [ ] diarrhea [ ] constipation  [ ]chewing problems [ ] swallowing issues  [ ] other:     Diet Prescription: Diet, Clear Liquid (20 @ 13:39)      Daily Weight in k.1 (04 Sep 2020 05:00)  Weight in k.1 (03 Sep 2020 06:24)  Weight in k.7 (02 Sep 2020 04:48)  Weight in k.3 (01 Sep 2020 09:17)  Weight in k.6 (01 Sep 2020 05:01)  Weight in k.6 (31 Aug 2020 05:23)  Weight in k.1 (30 Aug 2020 04:58)    % Weight Change-CHF pt receiving lasix    Pertinent Medications: MEDICATIONS  (STANDING):  ALBUTerol    90 MICROgram(s) HFA Inhaler 2 Puff(s) Inhalation every 6 hours  anastrozole 1 milliGRAM(s) Oral daily  aspirin enteric coated 81 milliGRAM(s) Oral daily  clopidogrel Tablet 75 milliGRAM(s) Oral daily  dextrose 5%. 1000 milliLiter(s) (50 mL/Hr) IV Continuous <Continuous>  furosemide    Tablet 20 milliGRAM(s) Oral daily  heparin   Injectable 5000 Unit(s) SubCutaneous every 8 hours  hydrALAZINE 50 milliGRAM(s) Oral every 8 hours  insulin lispro (HumaLOG) corrective regimen sliding scale   SubCutaneous three times a day before meals  insulin lispro (HumaLOG) corrective regimen sliding scale   SubCutaneous at bedtime  isosorbide   mononitrate ER Tablet (IMDUR) 30 milliGRAM(s) Oral daily  metoprolol succinate ER 50 milliGRAM(s) Oral daily  QUEtiapine 25 milliGRAM(s) Oral at bedtime  sacubitril 24 mG/valsartan 26 mG 1 Tablet(s) Oral two times a day  thiamine IVPB 500 milliGRAM(s) IV Intermittent daily  tiotropium 18 MICROgram(s) Capsule 1 Capsule(s) Inhalation daily    MEDICATIONS  (PRN):  guaiFENesin   Syrup  (Sugar-Free) 100 milliGRAM(s) Oral every 6 hours PRN Cough  meclizine 12.5 milliGRAM(s) Oral daily PRN Nausea    Pertinent Labs:  Na145 mmol/L Glu 150 mg/dL<H> K+ 3.6 mmol/L Cr  1.54 mg/dL<H> BUN 28 mg/dL<H>  Phos 3.0 mg/dL  Alb 3.1 g/dL<L>  Chol 148 mg/dL LDL 89 mg/dL HDL 36 mg/dL<L> Trig 114 mg/dL     CAPILLARY BLOOD GLUCOSE      POCT Blood Glucose.: 124 mg/dL (04 Sep 2020 12:05)  POCT Blood Glucose.: 136 mg/dL (04 Sep 2020 07:55)  POCT Blood Glucose.: 155 mg/dL (03 Sep 2020 21:56)  POCT Blood Glucose.: 122 mg/dL (03 Sep 2020 16:57)    Skin: intact    Estimated Needs:   [ X] no change since previous assessment  [ ] recalculated:     Previous Nutrition Diagnosis:   [ ] Inadequate Energy Intake [X ]Inadequate Oral Intake [ ] Excessive Energy Intake   [ ] Underweight [ ] Increased Nutrient Needs [ ] Overweight/Obesity   [ ] Altered GI Function [ ] Unintended Weight Loss [ ] Food & Nutrition Related Knowledge Deficit [ ] Malnutrition     Nutrition Diagnosis is [ X] ongoing  [ ] resolved [ ] not applicable     New Nutrition Diagnosis: [ ] not applicable       Interventions:   Recommend  [ ] Change Diet To:  [ ] Nutrition Supplement  [ ] Nutrition Support  [X] Other: advance diet as medically feasible/tolerated.     Monitoring and Evaluation:   [X ] PO intake [ x ] Tolerance to diet prescription [ x ] weights [ x ] labs[ x ] follow up per protocol  [ ] other:

## 2020-09-04 NOTE — MEDICAL STUDENT PROGRESS NOTE(EDUCATION) - SUBJECTIVE AND OBJECTIVE BOX
C/C: "I can't breathe"  HPI: Pt. is a 66 y/o female with PMH of diabetes mellitus, hypertension, hyperlipidemia, breast cancer s/p chemo/XRT, cerebrovascular accident (no motor deficits), COPD (not on home O2), and non-ischemic cardiomyopathy (catheterization 2/2019 showed dRPL occlusion without ability to intervene), EF 31% by MUGA 7/2019, s/p SUBQ ICD 10/2019, AAA presented to the ED on 8/27 with SOB.  The SOB began 1 month ago.  The pt. reports there was no change in her medical history, diet, activity level, or personal life at the time the SOB began.  She reports she began the new medication, bupropion, around this time to help with nicotine cravings as she has just recently quit smoking.  She denies pain with breathing, trouble sleeping, headaches, cough, edema, or palpitations.  She reports nausea which has been persistent for 1 month and is not associated with eating.  The patient is a poor historian and was not willing to discuss any further details.  As per the ED, she came to the ED after she was told to come in when she had SOB when getting an x-ray.      During her hospital course, patient was admitted for Dyspnea workup, CTA negative for PE, BNP >35,000, pt was started on IV Lasix, discontinued 8/29 due to rising creatinine levels. Echo from 8/29 with EF 15-20% showing new Moderate to Severe Mitral regurgitation (likely functional leading to HF exacerbations), troponins downtrending. As per Cardiology recommendations, low dose Entresto has been resumed and ANGELINA done 9/1 showed moderate MR, Mural thrombus extending into descending aorta, no LA thrombus, LAE + Vascular surgery was consulted regarding need for intervention. Due to persistent elevation in Alk phosphatase and GGT, although USG Abdomen was negative for CBD dilation and GB thickening, HIDA scan was pursued 9/3 and no abnormal findings were found.  Bupropion and statin was D/C due to elevated LFTS and AMS.  Endoscopy was pursued 9/4 and showed gastritis.    Interval HPI/overnight events: Pt. was seen at bedside this AM.  She was disoriented and had no complaints.  AAOX1 (only knew her name).  Thoughts were not intact, she had delusional and paranoid ideas this AM.  Pt. denied nausea, abdominal pain, chest pain, or headache.  She reported no complaints other than she wished to go home.    MEDICATIONS  (STANDING):  ALBUTerol    90 MICROgram(s) HFA Inhaler 2 Puff(s) Inhalation every 6 hours  anastrozole 1 milliGRAM(s) Oral daily  aspirin enteric coated 81 milliGRAM(s) Oral daily  clopidogrel Tablet 75 milliGRAM(s) Oral daily  dextrose 5%. 1000 milliLiter(s) (50 mL/Hr) IV Continuous <Continuous>  furosemide    Tablet 20 milliGRAM(s) Oral daily  heparin   Injectable 5000 Unit(s) SubCutaneous every 8 hours  hydrALAZINE 50 milliGRAM(s) Oral every 8 hours  insulin lispro (HumaLOG) corrective regimen sliding scale   SubCutaneous three times a day before meals  insulin lispro (HumaLOG) corrective regimen sliding scale   SubCutaneous at bedtime  isosorbide   mononitrate ER Tablet (IMDUR) 30 milliGRAM(s) Oral daily  metoprolol succinate ER 50 milliGRAM(s) Oral daily  QUEtiapine 25 milliGRAM(s) Oral at bedtime  sacubitril 24 mG/valsartan 26 mG 1 Tablet(s) Oral two times a day  thiamine IVPB 500 milliGRAM(s) IV Intermittent daily  tiotropium 18 MICROgram(s) Capsule 1 Capsule(s) Inhalation daily    MEDICATIONS  (PRN):  guaiFENesin   Syrup  (Sugar-Free) 100 milliGRAM(s) Oral every 6 hours PRN Cough  meclizine 12.5 milliGRAM(s) Oral daily PRN Nausea    ROS:   Constitutional: Denies changes in weight, appetite, sleep; denies nausea  HEENT: Denies headache, denies diplopia, denies nasal congestion; denies dysphagia or odynophagia   CV: denies palpitations, denies chest pain  Pulm: admits to trouble breathing, denies cough or wheezing  GI: denies abdominal pain; denies changes in BM  : Denies dysuria  Skin: denies jaundice  Back/MSK: denies back pain or weakness  Neuro/Psych: denies headache; admits to anxiety and feelings of hopelessness    Physical Exam:  Vital Signs Last 24 Hrs  T(C): 36.4 (04 Sep 2020 14:26), Max: 36.4 (04 Sep 2020 05:00)  T(F): 97.5 (04 Sep 2020 14:26), Max: 97.5 (04 Sep 2020 05:00)  HR: 77 (04 Sep 2020 14:26) (72 - 87)  BP: 168/108 (04 Sep 2020 14:26) (119/74 - 168/108)  BP(mean): 119 (03 Sep 2020 17:08) (119 - 119)  RR: 18 (04 Sep 2020 14:26) (18 - 22)  SpO2: 100% (04 Sep 2020 14:26) (95% - 100%)    General: Pt. appears to be comfortable.  Well developed, obese,  female.  Head: Atraumatic normocephalic  Eyes: conjunctiva and sclera clear; EOMI; PERRLA  Neck: No JVD, normal thyroid, trachea midline  Chest/Lungs: CTABL  Heart: regular rate and rhythm; no murmurs rubs or gallops  Abdomen: soft, nontender, nondistended  Nervous system: AAOX1  Psych: patient is fearful and paranoid.  She reports feelings of hopelessness.  Extremities: 2+ pre tibial and radial pulses; no clubbing cyanosis or edema  Skin: warm; lower extremities are dry    09-04    145  |  111<H>  |  28<H>  ----------------------------<  150<H>  3.6   |  27  |  1.54<H>    Ca    8.6      04 Sep 2020 14:48  Phos  3.0     09-04  Mg     2.3     09-04    TPro  7.3  /  Alb  3.1<L>  /  TBili  0.8  /  DBili  x   /  AST  19  /  ALT  19  /  AlkPhos  370<H>  09-04                        10.3   6.05  )-----------( 293      ( 04 Sep 2020 14:48 )             33.5

## 2020-09-04 NOTE — PROGRESS NOTE ADULT - PROBLEM SELECTOR PLAN 8
continue heparin for DVT ppx -Continue Albuterol inhaler Q6 hrs  -Not in exacerbation  -Pt saturating well without oxygen

## 2020-09-04 NOTE — PROGRESS NOTE BEHAVIORAL HEALTH - NSBHCONSULTMEDAGITATION_PSY_A_CORE FT
If qtc is below 500, can provide low-dose antipsychotic for psychosis in delirium:  Haldol 1mg q4H PO/IM/IV PRN and continue to monitor qtc

## 2020-09-04 NOTE — PROGRESS NOTE ADULT - PROBLEM SELECTOR PLAN 1
Nausea, on and off  for 3 weeks, better now than last week  - no zofran as pt's QTc is 510ms  - Possible gastroparesis from DM  - Reglan PRN  - EGD was performed today : showed moderate gastritis   - resume regular diet, recommend PPI daily

## 2020-09-04 NOTE — PROGRESS NOTE ADULT - PROBLEM SELECTOR PLAN 1
- BNP on admission 35,000, Troponin downtrending  - IV Lasix d/c'ed 8/29 as Cr was uptrending(Cr now downtrending after Lasix was d/c'ed)  - Daily weights, strict Is/Os (fluid restriction 1200 ml)  - DASH/TLC diet  - Echo 8/29 EF 15-20%, New Moderate to Severe MR  - ANGELINA was done to evaluate valve function- showed mural thrombus in the descending aorta, Pt is now started on Eliquis 10 mg 9/1  - Pt on low dose Entresto, Metoprolol, hold Spironolactone due to creatinine elevation -Pt has been increasingly agitated and confused for about 2-3 days  -Ammonia and lactate levels wnl, CT head no acute bleed + stable calcified R supraclinoid aneursym noted (no acute intervention as per Neuro), Urine cs and Blood cs no growth  -As pt's ALP and GGT were elevated, Acalculous Dalia was evaluated as a possible cause of pt's nausea and confusion- HIDA scan 9/3 no acute cholecystitis, no MRCP as pt has ICD  -Will continue to monitor closely  -Neuro Dr. Hernandez- advised no acute intervention, outpt followup recommended  -D/c'ed Atorvastatin and Bupropion 9/3 as medication interactions can contribute to mental status changes  -Can add Nicotine patches for withdrawal symptoms (Bupropion d/c'ed) as pt has a strong smoking hx  -Pt on Enhanced Supervision for now -Pt has been increasingly agitated and confused for about 2-3 days  -Ammonia and lactate levels wnl, CT head no acute bleed + stable calcified R supraclinoid aneursym noted (no acute intervention as per Neuro), Urine cs and Blood cs no growth  -As pt's ALP and GGT were elevated, Acalculous Dalia was evaluated as a possible cause of pt's nausea and confusion- HIDA scan 9/3 no acute cholecystitis, no MRCP as pt has ICD  -Will continue to monitor closely  -Neuro Dr. Hernandez- advised no acute intervention, outpt followup recommended  -D/c'ed Atorvastatin and Bupropion 9/3 as medication interactions can contribute to mental status changes  -Can add Nicotine patches for withdrawal symptoms (Bupropion d/c'ed) as pt has a strong smoking hx  -Psych consulted- Antipsychotics could not be prescribed earlier as QTc prolongation was noted on EKG. Repeat EKG shows normal QTc, hence, Seroquel 25 mg HS added today (9/4), Psych will be re-consulted today  -Pt on Enhanced Supervision for now

## 2020-09-04 NOTE — PROGRESS NOTE ADULT - SUBJECTIVE AND OBJECTIVE BOX
Patient is a 65y old  Female who presents with a chief complaint of Dyspnea (04 Sep 2020 11:17)     .     HPI:  Pt states nausea is gone and last nausea was 1 week ago. Pt states she felt nauseous after ANGELINA last week. Pt went EGD today        REVIEW OF SYSTEMS  Constitutional:   No fever, no fatigue, no pallor, no night sweats, no weight loss.  HEENT:   No eye pain, no vision changes, no icterus, no mouth ulcers.  Respiratory:   No shortness of breath, no cough, no respiratory distress.   Cardiovascular:   No chest pain, no palpitations.   Gastrointestinal: No abdominal pain, no nausea, no vomiting , no diarrhea, no constipation, no hematochezia,no melena.  Skin:   No rashes, no jaundice, no eczema.   Musculoskeletal:   No joint pain, no swelling, no myalgia.   Neurologic:   No headache, no seizure, no weakness.   Genitourinary:   No dysuria, no decreased urine output.  Psychiatric:  No depression, no anxiety,   Endocrine:   No thyroid disease, no diabetes.  Heme/Lymphatic:   No anemia, no blood transfusions, no lymph node enlargement, no bleeding, no bruising.  ___________________________________________________________________________________________  Allergies    Advil (Hives; Vomiting)    Intolerances      MEDICATIONS  (STANDING):  ALBUTerol    90 MICROgram(s) HFA Inhaler 2 Puff(s) Inhalation every 6 hours  anastrozole 1 milliGRAM(s) Oral daily  aspirin enteric coated 81 milliGRAM(s) Oral daily  clopidogrel Tablet 75 milliGRAM(s) Oral daily  dextrose 5%. 1000 milliLiter(s) (50 mL/Hr) IV Continuous <Continuous>  furosemide    Tablet 20 milliGRAM(s) Oral daily  heparin   Injectable 5000 Unit(s) SubCutaneous every 8 hours  hydrALAZINE 50 milliGRAM(s) Oral every 8 hours  insulin lispro (HumaLOG) corrective regimen sliding scale   SubCutaneous three times a day before meals  insulin lispro (HumaLOG) corrective regimen sliding scale   SubCutaneous at bedtime  isosorbide   mononitrate ER Tablet (IMDUR) 30 milliGRAM(s) Oral daily  metoprolol succinate ER 50 milliGRAM(s) Oral daily  sacubitril 24 mG/valsartan 26 mG 1 Tablet(s) Oral two times a day  thiamine IVPB 500 milliGRAM(s) IV Intermittent daily  tiotropium 18 MICROgram(s) Capsule 1 Capsule(s) Inhalation daily    MEDICATIONS  (PRN):  guaiFENesin   Syrup  (Sugar-Free) 100 milliGRAM(s) Oral every 6 hours PRN Cough  meclizine 12.5 milliGRAM(s) Oral daily PRN Nausea      PAST MEDICAL & SURGICAL HISTORY:  Knee effusion, left: arthrocentesis- 15  Internal carotid aneurysm  Abdominal aneurysm: infrarenal- wached by Vascular , Dr. Gutierrez  Cardiomyopathy  Sickle cell trait  DVT (deep venous thrombosis): Left leg in  after hysterectomy  Hypercholesteremia  Breast cancer: right-s/p chemo and radiation  Hypertension  Breast cancer: infusaport insertion- - left chest  S/P hysterectomy:   Gilson filter in place:   S/P lumpectomy, right breast: , revision of position-   Fibroid uterus: sp hystrectomy 1196    FAMILY HISTORY:  Family history of glioblastoma  Family history of other condition: please open box and see additional medical family history  Family history of renal failure: uncle  Family history of glioblastoma (Sibling)  History of hypertension: mother  Family history of liver failure  Family history of glioblastoma  Family history of renal stone  Family history of diabetes mellitus  Family history of hypertension    Social History: No hsitory of : Tobacco use, IVDA, EToH  ______________________________________________________________________________________    PHYSICAL EXAM    Daily     Daily Weight in k.1 (04 Sep 2020 05:00)  BMI: 24.5 (08 @ 14:36)  Change in Weight:  Vital Signs Last 24 Hrs  T(C): 36.4 (04 Sep 2020 05:00), Max: 36.4 (04 Sep 2020 05:00)  T(F): 97.5 (04 Sep 2020 05:00), Max: 97.5 (04 Sep 2020 05:00)  HR: 72 (04 Sep 2020 05:00) (72 - 99)  BP: 157/99 (04 Sep 2020 05:00) (119/74 - 160/106)  BP(mean): 119 (03 Sep 2020 17:08) (119 - 119)  RR: 18 (04 Sep 2020 05:00) (18 - 22)  SpO2: 100% (04 Sep 2020 05:00) (95% - 100%)    General:  Well developed, well nourished, alert and active, no pallor, NAD.  HEENT:    Normal appearance of conjunctiva, ears, nose, lips, oropharynx, and oral mucosa, anicteric.  Neck:  No masses, no asymmetry.  Lymph Nodes:  (+) 2 hard lymph nodes on R axillary    Cardiovascular:  RRR normal S1/S2, no murmur.  Respiratory:  CTA B/L, normal respiratory effort.   Abdominal:   soft, no masses or tenderness, (+) uncomfortable on palpation on epigastric and RUQ area, normoactive BS, NT/ND, no HSM.  Extremities:   No clubbing or cyanosis, normal capillary refill, no edema.   Skin:   No rash, jaundice, lesions, eczema.   Musculoskeletal:  No joint swelling, erythema or tenderness.   Neuro: No focal deficits.   Other:   _______________________________________________________________________________________________  Lab Results:                          11.4   5.56  )-----------( 319      ( 03 Sep 2020 07:06 )             36.6     09-03    142  |  106  |  30<H>  ----------------------------<  129<H>  3.9   |  23  |  1.36<H>    Ca    9.0      03 Sep 2020 07:06  Phos  3.2     09-03  Mg     2.5     09-03    TPro  8.0  /  Alb  3.5  /  TBili  1.0  /  DBili  x   /  AST  33  /  ALT  25  /  AlkPhos  524<H>      LIVER FUNCTIONS - ( 03 Sep 2020 07:06 )  Alb: 3.5 g/dL / Pro: 8.0 g/dL / ALK PHOS: 524 U/L / ALT: 25 U/L DA / AST: 33 U/L / GGT: x                   Stool Results:          RADIOLOGY RESULTS:    SURGICAL PATHOLOGY:

## 2020-09-04 NOTE — PROGRESS NOTE BEHAVIORAL HEALTH - NSBHADMITCOUNSEL_PSY_A_CORE
importance of adherence to chosen treatment/risks and benefits of treatment options/instructions for management, treatment and follow up/risk factor reduction/diagnostic results/impressions and/or recommended studies

## 2020-09-04 NOTE — PROGRESS NOTE ADULT - SUBJECTIVE AND OBJECTIVE BOX
PRESENTING CC:    SUBJ:     In summary, this is     Overnight,       ------------------------  PMH:     Allergies    Advil (Hives; Vomiting)    Intolerances        MEDICATIONS  (STANDING):  ALBUTerol    90 MICROgram(s) HFA Inhaler 2 Puff(s) Inhalation every 6 hours  anastrozole 1 milliGRAM(s) Oral daily  aspirin enteric coated 81 milliGRAM(s) Oral daily  clopidogrel Tablet 75 milliGRAM(s) Oral daily  dextrose 5%. 1000 milliLiter(s) (50 mL/Hr) IV Continuous <Continuous>  furosemide    Tablet 20 milliGRAM(s) Oral daily  heparin   Injectable 5000 Unit(s) SubCutaneous every 8 hours  hydrALAZINE 25 milliGRAM(s) Oral three times a day  insulin lispro (HumaLOG) corrective regimen sliding scale   SubCutaneous three times a day before meals  insulin lispro (HumaLOG) corrective regimen sliding scale   SubCutaneous at bedtime  isosorbide   mononitrate ER Tablet (IMDUR) 30 milliGRAM(s) Oral daily  metoprolol succinate ER 50 milliGRAM(s) Oral daily  sacubitril 24 mG/valsartan 26 mG 1 Tablet(s) Oral two times a day  thiamine IVPB 500 milliGRAM(s) IV Intermittent daily  tiotropium 18 MICROgram(s) Capsule 1 Capsule(s) Inhalation daily    MEDICATIONS  (PRN):  guaiFENesin   Syrup  (Sugar-Free) 100 milliGRAM(s) Oral every 6 hours PRN Cough  meclizine 12.5 milliGRAM(s) Oral daily PRN Nausea      FAMILY HISTORY:  Family history of glioblastoma  Family history of other condition: please open box and see additional medical family history  Family history of renal failure: uncle  Family history of glioblastoma (Sibling)  History of hypertension: mother  Family history of liver failure  Family history of glioblastoma  Family history of renal stone  Family history of diabetes mellitus  Family history of hypertension    Reviewed; no change from my prior note    SOCIAL HISTORY:  Reviewed, no change from my prior note    REVIEW OF SYSTEMS:  Constitutional: [ ] fever, [ ]weight loss,  [ ]fatigue  Eyes: [ ] visual changes  Respiratory: [ ]shortness of breath;  [ ] cough, [ ]wheezing, [ ]chills, [ ]hemoptysis  Cardiovascular: [ ] chest pain, [ ]palpitations, [ ]dizziness,  [ ]leg swelling [ ]syncope  Gastrointestinal: [ ] abdominal pain, [ ]nausea, [ ]vomiting,  [ ]diarrhea   Genitourinary: [ ] dysuria, [ ] hematuria  Neurologic: [ ] headaches [ ] tremors [ ] weakness [ ] lightheadedness  Skin: [ ] itching, [ ]burning, [ ] rashes  Endocrine: [ ] heat or cold intolerance  Musculoskeletal: [ ] joint pain or swelling; [ ] muscle, back, or extremity pain  Psychiatric: [ ] depression, [ ]anxiety, [ ]mood swings, or [ ]difficulty sleeping  Hematologic: [ ] easy bruising, [ ] bleeding gums    [x] All remaining systems negative except as per above.   [  ] Unable to obtain    Vital Signs Last 24 Hrs  T(C): 36.4 (04 Sep 2020 05:00), Max: 36.4 (03 Sep 2020 10:30)  T(F): 97.5 (04 Sep 2020 05:00), Max: 97.5 (03 Sep 2020 10:30)  HR: 72 (04 Sep 2020 05:00) (72 - 99)  BP: 157/99 (04 Sep 2020 05:00) (119/74 - 175/112)  BP(mean): 119 (03 Sep 2020 17:08) (119 - 119)  RR: 18 (04 Sep 2020 05:00) (18 - 22)  SpO2: 100% (04 Sep 2020 05:00) (95% - 100%)  I&O's Summary      PHYSICAL EXAM:  General: No acute distress  HEENT: EOMI, PERRL  Neck: Supple, No JVD  Lungs: Clear to auscultation bilaterally; No rales or wheezing  Heart: Regular rate and rhythm; No murmurs, rubs, or gallops  Abdomen: Nontender, bowel sounds present  Extremities: No clubbing, cyanosis, or edema  Nervous system:  Alert & Oriented X3, no focal deficits  Psychiatric: Normal affect  Skin: No rashes or lesions    LABS:  09-03    142  |  106  |  30<H>  ----------------------------<  129<H>  3.9   |  23  |  1.36<H>    Ca    9.0      03 Sep 2020 07:06  Phos  3.2     09-03  Mg     2.5     09-03    TPro  8.0  /  Alb  3.5  /  TBili  1.0  /  DBili  x   /  AST  33  /  ALT  25  /  AlkPhos  524<H>  09-03    Creatinine Trend: 1.36<--, 1.36<--, 1.59<--, 1.91<--, 2.06<--, 1.94<--                        11.4   5.56  )-----------( 319      ( 03 Sep 2020 07:06 )             36.6       Lipid Panel:   Cardiac Enzymes:           RADIOLOGY:    ECG [my interpretation]:    TELEMETRY:    ECHO:    STRESS TEST:    CATHETERIZATION: PRESENTING CC: Shortness of breath    SUBJ:     In summary, this is a 64-year-old female former smoker with DM, HTN, HLD, breast CA s/p chemo/XRT, and NICM (catheterization 02/2019 showed dRPL occlusion without ability to intervene), EF 31% by MUGA 7/2019, s/p [employer] SubQ ICD 10/2019, who presented with dyspnea in the setting of acute on chronic, decompensated, systolic HF exacerbation.     Overnight, there were no acute events. Patient reports she has improvement in dyspnea.     ------------------------  PMH: As above, also Knee effusion, left: arthrocentesis- 05/25/16, Internal carotid aneurysm, Abdominal aneurysm: infrarenal- followed by Vascular , Dr. Gutierrez, Sickle cell trait, DVT (deep venous thrombosis): Left leg in 1994 after hysterectomy  S/P hysterectomy: 1994, Gilson filter in place: 1994  S/P lumpectomy, right breast: 2014, revision of position- 2015    Allergies    Advil (Hives; Vomiting)    MEDICATIONS  (STANDING):  ALBUTerol    90 MICROgram(s) HFA Inhaler 2 Puff(s) Inhalation every 6 hours  anastrozole 1 milliGRAM(s) Oral daily  aspirin enteric coated 81 milliGRAM(s) Oral daily  clopidogrel Tablet 75 milliGRAM(s) Oral daily  dextrose 5%. 1000 milliLiter(s) (50 mL/Hr) IV Continuous <Continuous>  furosemide    Tablet 20 milliGRAM(s) Oral daily  heparin   Injectable 5000 Unit(s) SubCutaneous every 8 hours  hydrALAZINE 25 milliGRAM(s) Oral three times a day  insulin lispro (HumaLOG) corrective regimen sliding scale   SubCutaneous three times a day before meals  insulin lispro (HumaLOG) corrective regimen sliding scale   SubCutaneous at bedtime  isosorbide   mononitrate ER Tablet (IMDUR) 30 milliGRAM(s) Oral daily  metoprolol succinate ER 50 milliGRAM(s) Oral daily  sacubitril 24 mG/valsartan 26 mG 1 Tablet(s) Oral two times a day  thiamine IVPB 500 milliGRAM(s) IV Intermittent daily  tiotropium 18 MICROgram(s) Capsule 1 Capsule(s) Inhalation daily    MEDICATIONS  (PRN):  guaiFENesin   Syrup  (Sugar-Free) 100 milliGRAM(s) Oral every 6 hours PRN Cough  meclizine 12.5 milliGRAM(s) Oral daily PRN Nausea      FAMILY HISTORY:  Family history of glioblastoma  Family history of other condition: please open box and see additional medical family history  Family history of renal failure: uncle  Family history of glioblastoma (Sibling)  History of hypertension: mother  Family history of liver failure  Family history of glioblastoma  Family history of renal stone  Family history of diabetes mellitus  Family history of hypertension    Reviewed; no change from my prior note    SOCIAL HISTORY:  Reviewed, no change from my prior note    REVIEW OF SYSTEMS:  Constitutional: [ ] fever, [ ]weight loss,  [ ]fatigue  Eyes: [ ] visual changes  Respiratory: [ ]shortness of breath;  [ ] cough, [ ]wheezing, [ ]chills, [ ]hemoptysis  Cardiovascular: [ ] chest pain, [ ]palpitations, [ ]dizziness,  [ ]leg swelling [ ]syncope  Gastrointestinal: [ ] abdominal pain, [ ]nausea, [ ]vomiting,  [ ]diarrhea   Genitourinary: [ ] dysuria, [ ] hematuria  Neurologic: [ ] headaches [ ] tremors [ ] weakness [ ] lightheadedness  Skin: [ ] itching, [ ]burning, [ ] rashes  Endocrine: [ ] heat or cold intolerance  Musculoskeletal: [ ] joint pain or swelling; [ ] muscle, back, or extremity pain  Psychiatric: [ ] depression, [ ]anxiety, [ ]mood swings, or [ ]difficulty sleeping  Hematologic: [ ] easy bruising, [ ] bleeding gums    [x] All remaining systems negative except as per above.   [  ] Unable to obtain    Vital Signs Last 24 Hrs  T(C): 36.4 (04 Sep 2020 05:00), Max: 36.4 (03 Sep 2020 10:30)  T(F): 97.5 (04 Sep 2020 05:00), Max: 97.5 (03 Sep 2020 10:30)  HR: 72 (04 Sep 2020 05:00) (72 - 99)  BP: 157/99 (04 Sep 2020 05:00) (119/74 - 175/112)  BP(mean): 119 (03 Sep 2020 17:08) (119 - 119)  RR: 18 (04 Sep 2020 05:00) (18 - 22)  SpO2: 100% (04 Sep 2020 05:00) (95% - 100%)  I&O's Summary      PHYSICAL EXAM:  General: No acute distress  HEENT: EOMI, PERRL  Neck: Supple, No JVD  Lungs: Clear to auscultation bilaterally; No rales or wheezing  Heart: Regular rate and rhythm; No murmurs, rubs, or gallops  Abdomen: Nontender, bowel sounds present  Extremities: No clubbing, cyanosis, or edema  Nervous system:  Alert & Oriented X3, no focal deficits  Psychiatric: Normal affect  Skin: No rashes or lesions    LABS:  09-03    142  |  106  |  30<H>  ----------------------------<  129<H>  3.9   |  23  |  1.36<H>    Ca    9.0      03 Sep 2020 07:06  Phos  3.2     09-03  Mg     2.5     09-03    TPro  8.0  /  Alb  3.5  /  TBili  1.0  /  DBili  x   /  AST  33  /  ALT  25  /  AlkPhos  524<H>  09-03    Creatinine Trend: 1.36<--, 1.36<--, 1.59<--, 1.91<--, 2.06<--, 1.94<--                        11.4   5.56  )-----------( 319      ( 03 Sep 2020 07:06 )             36.6       RADIOLOGY: < from: CT Head No Cont (09.03.20 @ 18:28) >  IMPRESSION:  Moderate chronic microvascular changes without evidence of an acute transcortical infarction or hemorrhage. MR is a more sensitive imaging modality for the evaluation of an acute infarction. 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    < end of copied text >    < from: NM Hepatobiliary Imaging w/ RX (09.03.20 @ 13:20) >  IMPRESSION: Normal hepatobiliary scan.    < end of copied text >

## 2020-09-04 NOTE — CHART NOTE - NSCHARTNOTEFT_GEN_A_CORE
Esophagogastroduodenoscopy Report      Indication: Nausea, Dyspepsia   Referring MD: Dr. Blackman   Anesthesia: MAC  Consent:  Informed consent was obtained from the patient after providing any opportunity for questions  Procedure: The gastroscope was gently passed through the incisoral orifice into the oral cavity and under direct visualization the esophagus was intubated. The endoscope was passed down the esophagus, through the stomach and into duodenum . Color, texture, mucosa and anatomy of the esophagus, stomach, and duodenum were carefully examined with the scope. The patient tolerated the procedure well. After completion of the examination, the patient was transferred to the recovery room.   Preparation: NPO   Findings:     Oropharynx	Normal    Esophagus	Normal.     EG-junction	Normal    Cardia:	Normal.    Body:	Multiple superficial erosions with surrounding edema  Random Biopsies taken [1]    Antrum:	Multiple superficial erosions with surrounding edema  Random Biopsies taken [1]    Pylorus:	Normal.    Duodenal Bulb:	Normal     2nd portion:	Normal    3rd portion:	Normal.    EBL:0      Impression: 1-Moderate gastritis      Plan: 1- PPI Daily 2- Follow up pathology 3- Avoid NSAIDs             Attending:       Vahe Hurt M.D.   09-04-20 @ 16:15      PLEASE REFER TO PAPER CHART FOR PICTURES

## 2020-09-04 NOTE — PROGRESS NOTE ADULT - PROBLEM SELECTOR PLAN 3
-Bp ranging 140-160/-> has been ranging higher today as pt is agitated, oral hydralazine was given   -Pt is currently on Entresto 24-26 mg, Metoprolol succinate 50 mg daily  -Monitor BP, hold medications with parameters  -Hydralazine 10 mg, Isosorbide mononitrate 30 mg added 9/1 for better control of BP-> Hydralazine 25 mg now - Creatinine was uptrending, hence IV lasix d/c'ed 8/29  - Resumed Low dose Entresto, hold Spironolactone  - Creatinine improving (last creatinine 1.36)  - Monitor BMP daily  - Avoid Nephrotoxins - Creatinine was uptrending, hence IV lasix d/c'ed 8/29  - Resumed Low dose Entresto, hold Spironolactone  - Creatinine 1.54 today (1.36 yesterday), will keep monitoring BMP daily  - Avoid Nephrotoxins

## 2020-09-04 NOTE — PROGRESS NOTE ADULT - PROBLEM SELECTOR PLAN 4
-Pt has elevated Alk phosphatase 437, -> today ,   -RUQ USG showed no CBD dilation, no GB thickening  -Pt has no c/o RUQ pain, O/E No RUQ tenderness  -Monitor for ALP and GGT levels, as pt has ongoing c/o nausea after meals, HIDA scan was pursued today, study could not be completed as pt was agitated  -GI Dr. Ashby consulted, Pt NPO after midnight tonight for EGD tomorrow -Bp ranging 140-160/-> has been ranging higher today as pt is agitated, oral hydralazine was given   -Pt is currently on Entresto 24-26 mg, Metoprolol succinate 50 mg daily  -Monitor BP, hold medications with parameters  -Hydralazine 10 mg, Isosorbide mononitrate 30 mg added 9/1 for better control of BP-> Titrated up to Hydralazine 25 mg-> currently on Hydralazine 50 mg Q8 hrs 9/4

## 2020-09-04 NOTE — PROGRESS NOTE ADULT - PROBLEM SELECTOR PLAN 7
-Continue Albuterol inhaler Q6 hrs  -Not in exacerbation  -Pt saturating well without oxygen -recent CTA head/neck shows chronic rt frontal, parietal, lacunar infarcts. Pt AAOX2, but with mild paranoia and anxiety  - monitor any acute weakness or s/s of stroke

## 2020-09-04 NOTE — MEDICAL STUDENT PROGRESS NOTE(EDUCATION) - NS MD HP STUD ASPLAN PLAN FT
1. Altered mental status, acute:  ·	Differentials  ·	Metabolic encephalopathy: elevated ALKP, GGT; ammonia levels WNL  ·	Breast cancer metastasis  ·	Sepsis  ·	Depression: psych was consulted; on Bupropion (nicotine cessation  ·	Plan  ·	Head CT r/o metastasis  ·	Blood culture r/o sepsis  ·	Neurology consulted; need thiamine, b12, and vit. D levels  ·	Monitor vitals + electrolytes  ·	May benefit from further pharmacological management as per psych  ·	Bupropion 150 mg + statin d/c  ·	See below for metabolic encephalopathy recommendations    2. Nausea: reports nausea x1 month  ·	Reglan PRN; zofran withheld b/c prolonged QTc (510 ms)  ·	Possible gastroparesis from DM v. biliary obstruction v. nicotine withdrawal  ·	R/O gastroparesis with gastric emptying scintigraphy  ·	See below for biliary obstruction  ·	Bupropion d/c if smoking w/d symptoms persist use a nicotine patch    3. Elevated alkaline phosphatase levels  ·	GGT (753; 9/2) and ALKP (524) elevated since admission; AST/ALT and bilirubin WNL  ·	Pt. reports RUQ abdominal pain and positive lombardi sign 9/2, negative today  ·	U/S hepatic: mild fatty liver, no gallstones, no gallbladder wall thickness or dilated CBD  ·	GI consulted  ·	biliary obstruction ruled out with HIDA scan  ·	Endoscopy shows gastritis  ·	Check anti-mitochondrial antibody (r/o PBC), anti P-ANCA (r/o PSC), and anti-SMA (r/o autoimmune hepatitis), LE    Atorvastatin and Anastrozole associated with elevated LFTs    4. CHF exacerbation   ·	BNP on admission 35,000; troponin downtrending  ·	IV Lasix d/c 8/29 ([facility])  ·	DASH/TLC diet  ·	Cardio consulted (Dr. Patterson)  ·	Echo 8/29 EF 15-20%.  New moderate to severe MR  ·	ANGELINA 9/1 to evaluate valve function showed mural thrombus in descending aorta; started on Eliuquis 10 mg (9/1)  ·	On low dose entresto and metoprolol; spironolactone held due to Cr. elevation    5. Acute kidney injury  ·	Superimposed on CKD  ·	Creatine initially uptrending to 1.91 (8/31), 1.54 today  ·	BMP monitored daily  ·	Nephrotoxins avoided  ·	Lasix + spironolactone withheld    6. Hypertension  ·	BP ranging from 138/100 - 176/110  ·	Pt. on Entresto 24-26 mg and metoprolol succinate 50 mg daily  ·	Hydralazine 10 mg, isosorbide mononitrite 30 mg added 9/1 for better control of BP  ·	Monitor BP. hold medications with parameters    7. Breast cancer  ·	HX of r. breast cancer  ·	Anastrozole continued  ·	F/U Dr. Serrato September 18    8. Cerebrovascular accident:   ·	AAOX2; pt. is anxious and in acute distress  ·	recent CTA head/neck shows chronic rt frontal, parietal, lacunar infarcts.  ·	Plan is to continue to monitor for signs/symptoms of stroke    9. COPD: Albuterol inhaler Q6 hours.  Saturating well without oxygen (98% x2)    10. DVT Prophylaxis: pt. is currently being given apixaban; also has HX of Gilson filter (1994)

## 2020-09-04 NOTE — PROGRESS NOTE ADULT - PROBLEM SELECTOR PLAN 2
- Creatinine was uptrending, hence IV lasix d/c'ed 8/29  - Resumed Low dose Entresto, hold Spironolactone  - Creatinine today 1.36 (improving from 1.54 yesterday)  - Monitor BMP daily  - Avoid Nephrotoxins - BNP on admission 35,000, Troponin downtrending  - IV Lasix d/c'ed 8/29 as Cr was uptrending(Cr now downtrending after Lasix was d/c'ed)  - Daily weights, strict Is/Os (fluid restriction 1200 ml)  - DASH/TLC diet  - Echo 8/29 EF 15-20%, New Moderate to Severe MR  - ANGELINA was done to evaluate valve function- showed mural thrombus in the descending aorta, Pt is now started on Eliquis 10 mg 9/1  - Pt on low dose Entresto, Metoprolol, hold Spironolactone due to creatinine elevation

## 2020-09-04 NOTE — PROGRESS NOTE BEHAVIORAL HEALTH - NSBHCONSULTMEDANXIETY_PSY_A_CORE FT
If qtc is below 500, can provide low-dose antipsychotic for psychosis in delirium:  Haldol 1mg q4H PO/IM/IV PRN and continue to monitor qtc  An alternative antipsychotic would be Seroquel. Please do not provide 2 antipsychotics at once.  Would attempt to avoid benzos, anticholinergic, antihistaminergic 2/2 delirium.  Consider depakote if antipsychotic is not an option, however would have risk of side effects (liver, pancreas)  Could benefit from long term SSRI if continues to have anxiety/depression symptoms once delirium resolves

## 2020-09-04 NOTE — PROGRESS NOTE ADULT - PROBLEM SELECTOR PLAN 5
-Patient has hx Breast Cancer S/p Lumpectomy, Chemo, Radio therapy  -C/w with Anastrazole   -Follow up with Dr. Serrato in Sep 18 -Pt has elevated Alk phosphatase 437, -> today ,   -RUQ USG showed no CBD dilation, no GB thickening  -Pt has no c/o RUQ pain, O/E No RUQ tenderness  -Monitor for ALP and GGT levels, as pt has ongoing c/o nausea after meals, HIDA scan was pursued today, study could not be completed as pt was agitated  -GI Dr. Ashby consulted, Pt NPO after midnight tonight for EGD tomorrow -Pt has elevated Alk phosphatase 437, -> today ,   -RUQ USG showed no CBD dilation, no GB thickening  -Pt has no c/o RUQ pain, O/E No RUQ tenderness  -Monitor for ALP and GGT levels, as pt has ongoing c/o nausea after meals, HIDA scan was pursued- no acute linda  -MRCP not completed as pt has ICD  -EGD- moderate gastritis  -GI Dr. Ashby consulted

## 2020-09-04 NOTE — PROGRESS NOTE ADULT - ASSESSMENT
64-year-old female former smoker with DM, HTN, HLD, NICM (EF 31% by MUGA 07/2019) s/p [employer] SubQ ICD who presented with dyspnea in the setting of acute on chronic systolic HF exacerbation    1. NICM s/p ICD: Patient currently is on metoprolol, spironolactone, and Entresto at home  -Continue metoprolol  -Hold spironolactone in setting of elevated creatinine  -Mild HCEN, can continue lower-dose Entresto 24/26 for now; renal function worsened by ~15% which is within the acceptable range  -Consider referral to nephrology  -Patient with recent angiogram showing no targets for revascularization; no need to repeat one at this time.  -Would increase hydralazine to 50mg Q8H, continue Imdur    2. Moderate-severe MR noted on echocardiogram:   -ANGELINA shows moderate MR, will continue monitoring as outpatient    3. HTN:  -On Entresto, metoprolol, and spironolactone at home  -Hold spironolactone, resume Entresto as per above  -Agree with hydralazine-Imdur; increase hydralazine to 50mg q8H     4. HLD: Continue atorvastatin    5. Aortic mural thrombus: As per vascular

## 2020-09-04 NOTE — PROGRESS NOTE ADULT - SUBJECTIVE AND OBJECTIVE BOX
PGY-1 Progress Note discussed with attending    PAGER #: [[phone]] TILL 5:00 PM  PLEASE CONTACT ON CALL TEAM:  - On Call Team (Please refer to Taryn) FROM 5:00 PM - 8:30PM  - Nightfloat Team FROM 8:30 -7:30 AM    CHIEF COMPLAINT & BRIEF HOSPITAL COURSE: 64 y/o F smoker with medical history significant for DM, HTN, HLD, Breast CA s/p chemo/XRT, SCC, CVA no motor deficits, COPD (not on home O2), NICM with EF 31% by MUGA 7/2019 (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 She was going for an xray when they told her to come into the hospital. Patient states that she was feeling nauseous and was having difficulty breathing. Patient was little confused and is very poor historian. Patient states that she smokes 1 PPD x 50 years. She denies chest pain, head ache, dizziness, palpitations LOC, bowel or urinary problems. Does reports she has a cough, non-productive, and vomited once, with clear non-bloody vomit. No sick contacts, no recent travel. Of note pt reports history "a long time ago" of DVT/PE, and was on blood thinners, unsure of time period. Sister also had PE.    During her hospital course, patient was admitted for Dyspnea workup, CTA negative for PE, BNP >35,000, pt was started on IV Lasix, discontinued 8/29 due to rising creatinine levels. Echo from 8/29 with EF 15-20% showing new Moderate to Severe Mitral regurgitation (likely functional leading to HF exacerbations), troponins downtrending. As per Cardiology recommendations, low dose Entresto has been resumed and ANGELINA done 9/1 showed moderate MR, Mural thrombus extending into descending aorta, no LA thrombus, LAE + Vascular surgery was consulted regarding need for intervention. Due to persistent elevation in Alk phosphatase and GGT, although USG Abdomen was negative for CBD dilation and GB thickening, HIDA scan was pursued which was negative for Acute cholecystitis. As pt has been increasingly agitated recently, pt's Atorvastatin and Bupropion was discontinued on 9/3 as medical interactions can contribute to mental status changes. Hydralazine dose increased to 50 mg Q8 9/4 for better control of BP. CT head was done 9/3 to evaluate pt's mental status changes, no evidence of acute intracranial bleed found, chronic microvascular changes and 1.5 cm calcified R supraclinoid aneursym (requested Neuro followup)    INTERVAL HPI/OVERNIGHT EVENTS: Patient was assessed at bedside, agitation has reduced since yesterday. Denies c/o chest pain, SOB, nausea, vomiting, abdominal pain this morning. Patient planned for EGD today.    MEDICATIONS  (STANDING):  ALBUTerol    90 MICROgram(s) HFA Inhaler 2 Puff(s) Inhalation every 6 hours  anastrozole 1 milliGRAM(s) Oral daily  aspirin enteric coated 81 milliGRAM(s) Oral daily  clopidogrel Tablet 75 milliGRAM(s) Oral daily  dextrose 5%. 1000 milliLiter(s) (50 mL/Hr) IV Continuous <Continuous>  furosemide    Tablet 20 milliGRAM(s) Oral daily  heparin   Injectable 5000 Unit(s) SubCutaneous every 8 hours  hydrALAZINE 50 milliGRAM(s) Oral every 8 hours  insulin lispro (HumaLOG) corrective regimen sliding scale   SubCutaneous three times a day before meals  insulin lispro (HumaLOG) corrective regimen sliding scale   SubCutaneous at bedtime  isosorbide   mononitrate ER Tablet (IMDUR) 30 milliGRAM(s) Oral daily  metoprolol succinate ER 50 milliGRAM(s) Oral daily  sacubitril 24 mG/valsartan 26 mG 1 Tablet(s) Oral two times a day  thiamine IVPB 500 milliGRAM(s) IV Intermittent daily  tiotropium 18 MICROgram(s) Capsule 1 Capsule(s) Inhalation daily    MEDICATIONS  (PRN):  guaiFENesin   Syrup  (Sugar-Free) 100 milliGRAM(s) Oral every 6 hours PRN Cough  meclizine 12.5 milliGRAM(s) Oral daily PRN Nausea      REVIEW OF SYSTEMS:  CONSTITUTIONAL: No fever, weight loss, or fatigue  RESPIRATORY: No cough, wheezing, chills or hemoptysis; No shortness of breath  CARDIOVASCULAR: No chest pain, palpitations, dizziness, or leg swelling  GASTROINTESTINAL: No abdominal pain. No nausea, vomiting, or hematemesis; No diarrhea or constipation. No melena or hematochezia.  GENITOURINARY: No dysuria or hematuria, urinary frequency  NEUROLOGICAL: No headaches, memory loss, loss of strength, numbness, or tremors  SKIN: No itching, burning, rashes, or lesions     Vital Signs Last 24 Hrs  T(C): 36.4 (04 Sep 2020 05:00), Max: 36.4 (04 Sep 2020 05:00)  T(F): 97.5 (04 Sep 2020 05:00), Max: 97.5 (04 Sep 2020 05:00)  HR: 72 (04 Sep 2020 05:00) (72 - 99)  BP: 157/99 (04 Sep 2020 05:00) (119/74 - 175/112)  BP(mean): 119 (03 Sep 2020 17:08) (119 - 119)  RR: 18 (04 Sep 2020 05:00) (18 - 22)  SpO2: 100% (04 Sep 2020 05:00) (95% - 100%)    PHYSICAL EXAMINATION:  GENERAL: Pt is lying comfortably, not in acute distress, AAO x 2  HEAD:  Atraumatic, Normocephalic  EYES:  conjunctiva and sclera clear  NECK: Supple, No JVD, Normal thyroid  CHEST/LUNG: Clear to auscultation. Clear to percussion bilaterally; No rales, rhonchi, wheezing, or rubs  HEART: Regular rate and rhythm; No murmurs, rubs, or gallops  ABDOMEN: Soft, Nontender, Nondistended; Bowel sounds present  NERVOUS SYSTEM:  Alert & Oriented X2    EXTREMITIES:  2+ Peripheral Pulses, No clubbing, cyanosis, or edema  SKIN: warm dry                          11.4   5.56  )-----------( 319      ( 03 Sep 2020 07:06 )             36.6     09-03    142  |  106  |  30<H>  ----------------------------<  129<H>  3.9   |  23  |  1.36<H>    Ca    9.0      03 Sep 2020 07:06  Phos  3.2     09-03  Mg     2.5     09-03    TPro  8.0  /  Alb  3.5  /  TBili  1.0  /  DBili  x   /  AST  33  /  ALT  25  /  AlkPhos  524<H>  09-03    LIVER FUNCTIONS - ( 03 Sep 2020 07:06 )  Alb: 3.5 g/dL / Pro: 8.0 g/dL / ALK PHOS: 524 U/L / ALT: 25 U/L DA / AST: 33 U/L / GGT: x                   CAPILLARY BLOOD GLUCOSE      RADIOLOGY & ADDITIONAL TESTS:  < from: CT Head No Cont (09.03.20 @ 18:28) >  TECHNIQUE: Noncontrast CT of the head. Multiplanar reformations are submitted.    FINDINGS: Limited by motion artifact Chronic right parietal lobe cortical infarct. Stable 1.5 cm eggshell calcification in the right supraclinoid region suggesting calcified aneurysm, unchanged as compared to the priorCTA brain dated 7/7/2020.  There is periventricular and subcortical white matter hypodensity without mass effect, nonspecific, likely representing mild chronic microvascular ischemic changes. There is no compelling evidence for an acute transcorticalinfarction. There is no evidence of mass, mass effect, midline shift or extra-axial fluid collection. The lateral ventricles and cortical sulci are age-appropriate in size and configuration. The orbits, mastoid air cells and visualized paranasal sinuses are unremarkable. The calvarium is intact.    IMPRESSION:  Moderate chronic microvascular changes without evidence of an acute transcortical infarction or hemorrhage. MR is a more sensitive imaging modality for the evaluation of an acute infarction. 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            OCTAVIA MEDRANO M.D., ATTENDING RADIOLOGIST  This document has been electronically signed. Sep  3 2020  6:56PM    < end of copied text >

## 2020-09-04 NOTE — PROGRESS NOTE BEHAVIORAL HEALTH - NSBHFUPINTERVALHXFT_PSY_A_CORE
Per RN and primary team attending, Dr. Blackman, patient remains confused, and being worked up medically, remains medically acute, did not require PRN medication overnight, did not have aggressive behavior.  Patient had repeated EKG QTc is under 500.  Patient is seen and evaluated, aaox2 to hospital and self, does not remember why she was admitted and says it is month of November.  She states she is not feeling good, with poor sleep, tangential on evaluation, states that she wants her own TV and has some paranoia of people "taking my stuff" but unable to explain further.  Denied SI/HI/AVH, was advised she is being admitted due to medical reason and is willing to stay at this time.

## 2020-09-05 LAB
ALBUMIN SERPL ELPH-MCNC: 3 G/DL — LOW (ref 3.5–5)
ALP SERPL-CCNC: 323 U/L — HIGH (ref 40–120)
ALT FLD-CCNC: 18 U/L DA — SIGNIFICANT CHANGE UP (ref 10–60)
ANA TITR SER: NEGATIVE — SIGNIFICANT CHANGE UP
ANION GAP SERPL CALC-SCNC: 8 MMOL/L — SIGNIFICANT CHANGE UP (ref 5–17)
AST SERPL-CCNC: 19 U/L — SIGNIFICANT CHANGE UP (ref 10–40)
BILIRUB DIRECT SERPL-MCNC: 0.3 MG/DL — HIGH (ref 0–0.2)
BILIRUB SERPL-MCNC: 1 MG/DL — SIGNIFICANT CHANGE UP (ref 0.2–1.2)
BUN SERPL-MCNC: 23 MG/DL — HIGH (ref 7–18)
CALCIUM SERPL-MCNC: 8.6 MG/DL — SIGNIFICANT CHANGE UP (ref 8.4–10.5)
CHLORIDE SERPL-SCNC: 110 MMOL/L — HIGH (ref 96–108)
CO2 SERPL-SCNC: 26 MMOL/L — SIGNIFICANT CHANGE UP (ref 22–31)
CREAT SERPL-MCNC: 1.28 MG/DL — SIGNIFICANT CHANGE UP (ref 0.5–1.3)
GLUCOSE BLDC GLUCOMTR-MCNC: 105 MG/DL — HIGH (ref 70–99)
GLUCOSE BLDC GLUCOMTR-MCNC: 110 MG/DL — HIGH (ref 70–99)
GLUCOSE BLDC GLUCOMTR-MCNC: 120 MG/DL — HIGH (ref 70–99)
GLUCOSE BLDC GLUCOMTR-MCNC: 128 MG/DL — HIGH (ref 70–99)
GLUCOSE SERPL-MCNC: 93 MG/DL — SIGNIFICANT CHANGE UP (ref 70–99)
HCT VFR BLD CALC: 33 % — LOW (ref 34.5–45)
HGB BLD-MCNC: 10.2 G/DL — LOW (ref 11.5–15.5)
MAGNESIUM SERPL-MCNC: 2.4 MG/DL — SIGNIFICANT CHANGE UP (ref 1.6–2.6)
MCHC RBC-ENTMCNC: 29.7 PG — SIGNIFICANT CHANGE UP (ref 27–34)
MCHC RBC-ENTMCNC: 30.9 GM/DL — LOW (ref 32–36)
MCV RBC AUTO: 96.2 FL — SIGNIFICANT CHANGE UP (ref 80–100)
NRBC # BLD: 0 /100 WBCS — SIGNIFICANT CHANGE UP (ref 0–0)
PHOSPHATE SERPL-MCNC: 3.1 MG/DL — SIGNIFICANT CHANGE UP (ref 2.5–4.5)
PLATELET # BLD AUTO: 255 K/UL — SIGNIFICANT CHANGE UP (ref 150–400)
POTASSIUM SERPL-MCNC: 3.4 MMOL/L — LOW (ref 3.5–5.3)
POTASSIUM SERPL-SCNC: 3.4 MMOL/L — LOW (ref 3.5–5.3)
PROT SERPL-MCNC: 7 G/DL — SIGNIFICANT CHANGE UP (ref 6–8.3)
RBC # BLD: 3.43 M/UL — LOW (ref 3.8–5.2)
RBC # FLD: 16.2 % — HIGH (ref 10.3–14.5)
SODIUM SERPL-SCNC: 144 MMOL/L — SIGNIFICANT CHANGE UP (ref 135–145)
WBC # BLD: 4.84 K/UL — SIGNIFICANT CHANGE UP (ref 3.8–10.5)
WBC # FLD AUTO: 4.84 K/UL — SIGNIFICANT CHANGE UP (ref 3.8–10.5)

## 2020-09-05 PROCEDURE — 99233 SBSQ HOSP IP/OBS HIGH 50: CPT

## 2020-09-05 RX ORDER — POTASSIUM CHLORIDE 20 MEQ
20 PACKET (EA) ORAL ONCE
Refills: 0 | Status: COMPLETED | OUTPATIENT
Start: 2020-09-05 | End: 2020-09-05

## 2020-09-05 RX ORDER — QUETIAPINE FUMARATE 200 MG/1
25 TABLET, FILM COATED ORAL DAILY
Refills: 0 | Status: DISCONTINUED | OUTPATIENT
Start: 2020-09-06 | End: 2020-09-09

## 2020-09-05 RX ADMIN — Medication 50 MILLIGRAM(S): at 05:50

## 2020-09-05 RX ADMIN — Medication 81 MILLIGRAM(S): at 11:29

## 2020-09-05 RX ADMIN — ANASTROZOLE 1 MILLIGRAM(S): 1 TABLET ORAL at 11:29

## 2020-09-05 RX ADMIN — ALBUTEROL 2 PUFF(S): 90 AEROSOL, METERED ORAL at 09:57

## 2020-09-05 RX ADMIN — Medication 20 MILLIEQUIVALENT(S): at 10:57

## 2020-09-05 RX ADMIN — HEPARIN SODIUM 5000 UNIT(S): 5000 INJECTION INTRAVENOUS; SUBCUTANEOUS at 05:50

## 2020-09-05 RX ADMIN — HEPARIN SODIUM 5000 UNIT(S): 5000 INJECTION INTRAVENOUS; SUBCUTANEOUS at 14:46

## 2020-09-05 RX ADMIN — ALBUTEROL 2 PUFF(S): 90 AEROSOL, METERED ORAL at 21:48

## 2020-09-05 RX ADMIN — Medication 105 MILLIGRAM(S): at 11:30

## 2020-09-05 RX ADMIN — SACUBITRIL AND VALSARTAN 1 TABLET(S): 24; 26 TABLET, FILM COATED ORAL at 17:31

## 2020-09-05 RX ADMIN — ALBUTEROL 2 PUFF(S): 90 AEROSOL, METERED ORAL at 14:46

## 2020-09-05 RX ADMIN — HEPARIN SODIUM 5000 UNIT(S): 5000 INJECTION INTRAVENOUS; SUBCUTANEOUS at 21:47

## 2020-09-05 RX ADMIN — Medication 50 MILLIGRAM(S): at 21:47

## 2020-09-05 RX ADMIN — Medication 20 MILLIGRAM(S): at 05:50

## 2020-09-05 RX ADMIN — CLOPIDOGREL BISULFATE 75 MILLIGRAM(S): 75 TABLET, FILM COATED ORAL at 11:29

## 2020-09-05 RX ADMIN — QUETIAPINE FUMARATE 25 MILLIGRAM(S): 200 TABLET, FILM COATED ORAL at 21:47

## 2020-09-05 RX ADMIN — Medication 50 MILLIGRAM(S): at 14:46

## 2020-09-05 RX ADMIN — SACUBITRIL AND VALSARTAN 1 TABLET(S): 24; 26 TABLET, FILM COATED ORAL at 05:50

## 2020-09-05 RX ADMIN — TIOTROPIUM BROMIDE 1 CAPSULE(S): 18 CAPSULE ORAL; RESPIRATORY (INHALATION) at 11:29

## 2020-09-05 RX ADMIN — ISOSORBIDE MONONITRATE 30 MILLIGRAM(S): 60 TABLET, EXTENDED RELEASE ORAL at 11:30

## 2020-09-05 NOTE — PROGRESS NOTE ADULT - PROBLEM SELECTOR PLAN 3
- Creatinine was uptrending, hence IV lasix d/c'ed 8/29  - Resumed Low dose Entresto, hold Spironolactone  - Creatinine, Serum: 1.28 mg/dL (09.05.20 @ 06:35)  , will keep monitoring BMP daily  - CHEN, improving

## 2020-09-05 NOTE — PROGRESS NOTE ADULT - PROBLEM SELECTOR PLAN 4
-Bp ranging 140-160/-> has been ranging higher today as pt is agitated, oral hydralazine was given   -Pt is currently on Entresto 24-26 mg, Metoprolol succinate 50 mg daily  -Monitor BP, hold medications with parameters  -Hydralazine 10 mg, Isosorbide mononitrate 30 mg added 9/1 for better control of BP-> Titrated up to Hydralazine 25 mg-> currently on Hydralazine 50 mg Q8 hrs 9/4

## 2020-09-05 NOTE — PROGRESS NOTE ADULT - ATTENDING COMMENTS
Patient was examined at the bedside earlier today.     She is alert.  She c/o not knowing why she is in the hospital.  She states that she remembers coming to ED.   Alert, cooperative  Vital Signs Last 24 Hrs  T(C): 36.5 (05 Sep 2020 14:14), Max: 36.9 (04 Sep 2020 21:00)  T(F): 97.7 (05 Sep 2020 14:14), Max: 98.4 (04 Sep 2020 21:00)  HR: 78 (05 Sep 2020 17:33) (71 - 80)  BP: 136/78 (05 Sep 2020 17:33) (122/88 - 156/95)  BP(mean): --  RR: 18 (05 Sep 2020 17:33) (18 - 18)  SpO2: 100% (05 Sep 2020 17:33) (98% - 100%)  Lungs, clear  Cor, RRR  Abdomen, soft  Neurological, alert, confused, verbal, cooperative                        10.2   4.84  )-----------( 255      ( 05 Sep 2020 06:35 )             33.0   09-05    144  |  110<H>  |  23<H>  ----------------------------<  93  3.4<L>   |  26  |  1.28    Ca    8.6      05 Sep 2020 06:35  Phos  3.1     09-05  Mg     2.4     09-05    TPro  7.0  /  Alb  3.0<L>  /  TBili  1.0  /  DBili  0.3<H>  /  AST  19  /  ALT  18  /  AlkPhos  323<H>  09-05    EGD showed gastritis    IMP:  ASCVD with aortic aneurism, aortic thrombus, vascular changes on CT          Breast cancer, on treatment          Encephalopathy, etiology is unclear, CT does not show CNS metastases.  Patient has fatty liver and abnormal alk phos, trending downward.          HIDA shows uptake of GB and ultrasound shows no stones.  Ammonia level is normal.            Repeat EKG does not show QTc prolongation  Plan: Continue seroquel.  f/u EKG Patient was examined at the bedside earlier today.     She is alert.  She c/o not knowing why she is in the hospital.  She states that she remembers coming to ED.   Alert, cooperative  Vital Signs Last 24 Hrs  T(C): 36.5 (05 Sep 2020 14:14), Max: 36.9 (04 Sep 2020 21:00)  T(F): 97.7 (05 Sep 2020 14:14), Max: 98.4 (04 Sep 2020 21:00)  HR: 78 (05 Sep 2020 17:33) (71 - 80)  BP: 136/78 (05 Sep 2020 17:33) (122/88 - 156/95)  BP(mean): --  RR: 18 (05 Sep 2020 17:33) (18 - 18)  SpO2: 100% (05 Sep 2020 17:33) (98% - 100%)  Lungs, clear  Cor, RRR  Abdomen, soft  Neurological, alert, confused, verbal, cooperative                        10.2   4.84  )-----------( 255      ( 05 Sep 2020 06:35 )             33.0   09-05    144  |  110<H>  |  23<H>  ----------------------------<  93  3.4<L>   |  26  |  1.28    Ca    8.6      05 Sep 2020 06:35  Phos  3.1     09-05  Mg     2.4     09-05    TPro  7.0  /  Alb  3.0<L>  /  TBili  1.0  /  DBili  0.3<H>  /  AST  19  /  ALT  18  /  AlkPhos  323<H>  09-05    EGD showed gastritis    < from: 12 Lead ECG (09.03.20 @ 19:06) >    Atrial Rate 81 BPM    P-R Interval 154 ms    QRS Duration 98 ms    Q-T Interval 404 ms    QTC Calculation(Bezet) 469 ms    P Axis 49 degrees    R Axis -30 degrees    T Axis 99 degrees    Diagnosis Line Normal sinus rhythm  Possible Left atrial enlargement  Left axis deviation  T wave abnormality, consider lateral ischemia  Abnormal ECG    < end of copied text >        IMP:  ASCVD with aortic aneurism, aortic thrombus, vascular changes on CT          Breast cancer, on treatment          Encephalopathy, etiology is unclear, CT does not show CNS metastases.  Patient has fatty liver and abnormal alk phos, trending downward.          HIDA shows uptake of GB and ultrasound shows no stones.  Ammonia level is normal.            Repeat EKG does not show QTc prolongation  Plan: Continue seroquel.  f/u EKG

## 2020-09-05 NOTE — PHYSICAL THERAPY INITIAL EVALUATION ADULT - CRITERIA FOR SKILLED THERAPEUTIC INTERVENTIONS
functional limitations in following categories/risk reduction/prevention/impairments found/anticipated discharge recommendation/LIONEL pending functional progress and participation in PT

## 2020-09-05 NOTE — PHYSICAL THERAPY INITIAL EVALUATION ADULT - LIVES WITH, PROFILE
in an apartment building, claims to be modified independent in basic ADLS and ambulates adlib w/o AD/alone

## 2020-09-05 NOTE — PROGRESS NOTE ADULT - PROBLEM SELECTOR PLAN 1
-Pt has been increasingly agitated and confused for about 2-3 days  -Ammonia and lactate levels wnl, CT head no acute bleed + stable calcified R supraclinoid aneursym noted (no acute intervention as per Neuro), Urine cs and Blood cs no growth  -As pt's ALP and GGT were elevated, Acalculous Dalia was evaluated as a possible cause of pt's nausea and confusion- HIDA scan 9/3 no acute cholecystitis, no MRCP as pt has ICD  -Will continue to monitor closely  -Neuro Dr. Hernandez- advised no acute intervention, outpt followup recommended  -D/c'ed Atorvastatin and Bupropion 9/3 as medication interactions can contribute to mental status changes  -Can add Nicotine patches for withdrawal symptoms (Bupropion d/c'ed) as pt has a strong smoking hx  -Psych consulted- Antipsychotics could not be prescribed earlier as QTc prolongation was noted on EKG. Repeat EKG shows normal QTc, hence, Seroquel 25 mg HS added today (9/4), Psych will be re-consulted today  -Pt on Enhanced Supervision for now

## 2020-09-05 NOTE — PHYSICAL THERAPY INITIAL EVALUATION ADULT - PLANNED THERAPY INTERVENTIONS, PT EVAL
bed mobility training/transfer training/gait training/strengthening/neuromuscular re-education/balance training

## 2020-09-05 NOTE — PROGRESS NOTE ADULT - SUBJECTIVE AND OBJECTIVE BOX
MEDICAL ATTENDING NOTE  Patient is a 65y old  Female who presents with a chief complaint of Dyspnea (04 Sep 2020 13:38)      HPI:  65 year-old female  smoker with DM, HTN, HLD, breast CA s/p chemo/XRT, SCC, CVA no motor deficits, COPD not on home O2, and NICM (catheterization 02/2019 showed dRPL occlusion without ability to intervene), EF 31% by MUGA 7/2019, s/p SubQ ICD 10/2019, AAA came to ED after she was having shortness of breath x 1-2 days. She was going for an xray when they told her to come into the hospital. Patient states that she was feeling nauseous and was having difficulty breathing. Patient was little confused and is very poor historian. Patient states that she smokes 1 PPD and has been smoking for 50 years. She denies chest pain, head ache, dizziness, palpitations LOC, bowel or urinary problems. Does reports she has a cough, non-productive, and vomited once, with clear non-bloody vomit. No sick contacts, no recent travel. Of note pt reports history "a long time ago" of DVT/PE, and was on blood thinners, unsure of time period. Sister also had PE.    GO : full code. (27 Aug 2020 19:28)      INTERVAL HPI/OVERNIGHT EVENTS: states that she does not know why she is in the hospital.     MEDICATIONS  (STANDING):  ALBUTerol    90 MICROgram(s) HFA Inhaler 2 Puff(s) Inhalation every 6 hours  anastrozole 1 milliGRAM(s) Oral daily  aspirin enteric coated 81 milliGRAM(s) Oral daily  clopidogrel Tablet 75 milliGRAM(s) Oral daily  dextrose 5%. 1000 milliLiter(s) (50 mL/Hr) IV Continuous <Continuous>  furosemide    Tablet 20 milliGRAM(s) Oral daily  heparin   Injectable 5000 Unit(s) SubCutaneous every 8 hours  hydrALAZINE 50 milliGRAM(s) Oral every 8 hours  insulin lispro (HumaLOG) corrective regimen sliding scale   SubCutaneous three times a day before meals  insulin lispro (HumaLOG) corrective regimen sliding scale   SubCutaneous at bedtime  isosorbide   mononitrate ER Tablet (IMDUR) 30 milliGRAM(s) Oral daily  metoprolol succinate ER 50 milliGRAM(s) Oral daily  QUEtiapine 25 milliGRAM(s) Oral at bedtime  sacubitril 24 mG/valsartan 26 mG 1 Tablet(s) Oral two times a day  thiamine IVPB 500 milliGRAM(s) IV Intermittent daily  tiotropium 18 MICROgram(s) Capsule 1 Capsule(s) Inhalation daily    MEDICATIONS  (PRN):  guaiFENesin   Syrup  (Sugar-Free) 100 milliGRAM(s) Oral every 6 hours PRN Cough  meclizine 12.5 milliGRAM(s) Oral daily PRN Nausea      __________________________________________________  REVIEW OF SYSTEMS:    CONSTITUTIONAL: No fever,   EYES: no acute visual disturbances  NECK: No pain or stiffness  RESPIRATORY: No cough; No shortness of breath  CARDIOVASCULAR: No chest pain, no palpitations  GASTROINTESTINAL: No pain. No nausea or vomiting; No diarrhea   NEUROLOGICAL: No headache or numbness, no tremors, c/o being hungry  MUSCULOSKELETAL: No joint pain, no muscle pain  GENITOURINARY: no dysuria, no frequency, no hesitancy  PSYCHIATRY: no depression , no anxiety  ALL OTHER  ROS negative        Vital Signs Last 24 Hrs  T(C): 36.5 (05 Sep 2020 14:14), Max: 36.9 (04 Sep 2020 21:00)  T(F): 97.7 (05 Sep 2020 14:14), Max: 98.4 (04 Sep 2020 21:00)  HR: 78 (05 Sep 2020 17:33) (71 - 80)  BP: 136/78 (05 Sep 2020 17:33) (122/88 - 156/95)  BP(mean): --  RR: 18 (05 Sep 2020 17:33) (18 - 18)  SpO2: 100% (05 Sep 2020 17:33) (98% - 100%)    ________________________________________________  PHYSICAL EXAM:  GENERAL: NAD, alert  HEENT: Normocephalic;  conjunctivae and sclerae clear; moist mucous membranes;   NECK : supple  CHEST/LUNG: Clear to auscultation bilaterally with good air entry   HEART: S1 S2  regular; no murmurs, gallops or rubs  ABDOMEN: Soft, Nontender, Nondistended; Bowel sounds present  EXTREMITIES: no cyanosis; no edema; no calf tenderness  SKIN: warm and dry; no rash  NERVOUS SYSTEM:  Awake and alert; Oriented  to  person; confused    _________________________________________________  LABS:                        10.2   4.84  )-----------( 255      ( 05 Sep 2020 06:35 )             33.0     09-05    144  |  110<H>  |  23<H>  ----------------------------<  93  3.4<L>   |  26  |  1.28    Ca    8.6      05 Sep 2020 06:35  Phos  3.1     09-05  Mg     2.4     09-05    TPro  7.0  /  Alb  3.0<L>  /  TBili  1.0  /  DBili  0.3<H>  /  AST  19  /  ALT  18  /  AlkPhos  323<H>  09-05      POCT Blood Glucose.: 120 mg/dL (05 Sep 2020 16:55)  POCT Blood Glucose.: 128 mg/dL (05 Sep 2020 11:35)  POCT Blood Glucose.: 105 mg/dL (05 Sep 2020 07:48)  POCT Blood Glucose.: 123 mg/dL (04 Sep 2020 21:42)

## 2020-09-05 NOTE — PROGRESS NOTE ADULT - ASSESSMENT
65 year-old female  smoker with DM, HTN, HLD, breast CA s/p chemo/XRT, COPD not on home O2, CVA no motor deficits, and NICM (catheterization 02/2019 showed dRPL occlusion without ability to intervene), EF 31% by MUGA 7/2019, s/p SubQ ICD 10/2019, AAA came to ED for dyspnea with BNP of 35,000, Echo 8/29 showing EF 15-20% with moderate MR, ANGELINA today showing mural thrombus noted in the descending aorta with Vascular surgery consulted, admitted for CHF management. As patient has ongoing c/o nausea, with ALP and GGT elevations, USG abdomen was done which was negative for CBD dilation, GB thickening, but as nausea is not relieved, HIDA pursued which was negative for acute cholecystitis, EGD will be performed today to evaluate further. Pt has been having recent experiencing increasing agitation and confusion in the past few days- CT brain done- no acute bleed, Urine cs, Blood cs negative, will continue to monitor for Delirium.

## 2020-09-05 NOTE — PROGRESS NOTE ADULT - PROBLEM SELECTOR PLAN 5
-Pt has elevated Alk phosphatase trending downward  -RUQ USG showed no CBD dilation, no GB thickening  -Pt has no c/o RUQ pain, O/E No RUQ tenderness  -Monitor for ALP and GGT levels, as pt has ongoing c/o nausea after meals, HIDA scan was pursued- no acute linda  -MRCP not completed as pt has ICD  -EGD- moderate gastritis  -GI Dr. Ashby consulted

## 2020-09-05 NOTE — PHYSICAL THERAPY INITIAL EVALUATION ADULT - GENERAL OBSERVATIONS, REHAB EVAL
Pt seen supine in bed w/ no lines attached, denied c/o pain however claims she feels very tired and weak, RN AWARE. Pt was cooperative during assessment

## 2020-09-06 LAB
GLUCOSE BLDC GLUCOMTR-MCNC: 102 MG/DL — HIGH (ref 70–99)
GLUCOSE BLDC GLUCOMTR-MCNC: 122 MG/DL — HIGH (ref 70–99)
GLUCOSE BLDC GLUCOMTR-MCNC: 93 MG/DL — SIGNIFICANT CHANGE UP (ref 70–99)
GLUCOSE BLDC GLUCOMTR-MCNC: 97 MG/DL — SIGNIFICANT CHANGE UP (ref 70–99)

## 2020-09-06 PROCEDURE — 99233 SBSQ HOSP IP/OBS HIGH 50: CPT | Mod: GC

## 2020-09-06 RX ORDER — METOPROLOL TARTRATE 50 MG
50 TABLET ORAL DAILY
Refills: 0 | Status: DISCONTINUED | OUTPATIENT
Start: 2020-09-06 | End: 2020-09-06

## 2020-09-06 RX ORDER — ATORVASTATIN CALCIUM 80 MG/1
40 TABLET, FILM COATED ORAL AT BEDTIME
Refills: 0 | Status: DISCONTINUED | OUTPATIENT
Start: 2020-09-06 | End: 2020-09-09

## 2020-09-06 RX ORDER — SACUBITRIL AND VALSARTAN 24; 26 MG/1; MG/1
1 TABLET, FILM COATED ORAL ONCE
Refills: 0 | Status: COMPLETED | OUTPATIENT
Start: 2020-09-06 | End: 2020-09-06

## 2020-09-06 RX ORDER — HYDRALAZINE HCL 50 MG
50 TABLET ORAL ONCE
Refills: 0 | Status: DISCONTINUED | OUTPATIENT
Start: 2020-09-06 | End: 2020-09-06

## 2020-09-06 RX ORDER — QUETIAPINE FUMARATE 200 MG/1
50 TABLET, FILM COATED ORAL AT BEDTIME
Refills: 0 | Status: DISCONTINUED | OUTPATIENT
Start: 2020-09-06 | End: 2020-09-09

## 2020-09-06 RX ORDER — METOPROLOL TARTRATE 50 MG
25 TABLET ORAL ONCE
Refills: 0 | Status: COMPLETED | OUTPATIENT
Start: 2020-09-06 | End: 2020-09-06

## 2020-09-06 RX ORDER — HYDRALAZINE HCL 50 MG
25 TABLET ORAL ONCE
Refills: 0 | Status: COMPLETED | OUTPATIENT
Start: 2020-09-06 | End: 2020-09-06

## 2020-09-06 RX ORDER — FUROSEMIDE 40 MG
20 TABLET ORAL ONCE
Refills: 0 | Status: COMPLETED | OUTPATIENT
Start: 2020-09-06 | End: 2020-09-06

## 2020-09-06 RX ADMIN — HEPARIN SODIUM 5000 UNIT(S): 5000 INJECTION INTRAVENOUS; SUBCUTANEOUS at 23:02

## 2020-09-06 RX ADMIN — SACUBITRIL AND VALSARTAN 1 TABLET(S): 24; 26 TABLET, FILM COATED ORAL at 09:56

## 2020-09-06 RX ADMIN — Medication 20 MILLIGRAM(S): at 10:37

## 2020-09-06 RX ADMIN — Medication 81 MILLIGRAM(S): at 12:28

## 2020-09-06 RX ADMIN — QUETIAPINE FUMARATE 25 MILLIGRAM(S): 200 TABLET, FILM COATED ORAL at 12:27

## 2020-09-06 RX ADMIN — Medication 50 MILLIGRAM(S): at 23:02

## 2020-09-06 RX ADMIN — Medication 25 MILLIGRAM(S): at 13:19

## 2020-09-06 RX ADMIN — SACUBITRIL AND VALSARTAN 1 TABLET(S): 24; 26 TABLET, FILM COATED ORAL at 17:21

## 2020-09-06 RX ADMIN — ISOSORBIDE MONONITRATE 30 MILLIGRAM(S): 60 TABLET, EXTENDED RELEASE ORAL at 12:27

## 2020-09-06 RX ADMIN — CLOPIDOGREL BISULFATE 75 MILLIGRAM(S): 75 TABLET, FILM COATED ORAL at 12:27

## 2020-09-06 RX ADMIN — Medication 25 MILLIGRAM(S): at 09:56

## 2020-09-06 RX ADMIN — QUETIAPINE FUMARATE 50 MILLIGRAM(S): 200 TABLET, FILM COATED ORAL at 23:02

## 2020-09-06 RX ADMIN — HEPARIN SODIUM 5000 UNIT(S): 5000 INJECTION INTRAVENOUS; SUBCUTANEOUS at 13:55

## 2020-09-06 RX ADMIN — ATORVASTATIN CALCIUM 40 MILLIGRAM(S): 80 TABLET, FILM COATED ORAL at 23:02

## 2020-09-06 RX ADMIN — TIOTROPIUM BROMIDE 1 CAPSULE(S): 18 CAPSULE ORAL; RESPIRATORY (INHALATION) at 12:31

## 2020-09-06 RX ADMIN — Medication 105 MILLIGRAM(S): at 12:27

## 2020-09-06 RX ADMIN — Medication 50 MILLIGRAM(S): at 13:55

## 2020-09-06 RX ADMIN — ANASTROZOLE 1 MILLIGRAM(S): 1 TABLET ORAL at 12:27

## 2020-09-06 RX ADMIN — ALBUTEROL 2 PUFF(S): 90 AEROSOL, METERED ORAL at 10:04

## 2020-09-06 NOTE — PROGRESS NOTE ADULT - PROBLEM SELECTOR PLAN 1
-Pt has been increasingly agitated and confused for about 2-3 days  -Ammonia and lactate levels wnl, CT head no acute bleed + stable calcified R supraclinoid aneursym noted (no acute intervention as per Neuro), Urine cs and Blood cs no growth  -As pt's ALP and GGT were elevated, Acalculous Dalia was evaluated as a possible cause of pt's nausea and confusion- HIDA scan 9/3 no acute cholecystitis, no MRCP as pt has ICD  -Will continue to monitor closely  -Neuro Dr. Hernandez- advised no acute intervention, outpt followup recommended  -D/c'ed Atorvastatin and Bupropion 9/3 as medication interactions can contribute to mental status changes  -Can add Nicotine patches for withdrawal symptoms (Bupropion d/c'ed) as pt has a strong smoking hx  -Psych consulted- Antipsychotics could not be prescribed earlier as QTc prolongation was noted on EKG. Repeat EKG shows normal QTc, hence, Seroquel 25 mg HS added today (9/4), Psych will be re-consulted today  -Pt on Enhanced Supervision for now  -PT recommending Rehab facility upon discharge -Pt has been increasingly agitated and confused for about 2-3 days (eager about discharge planning when questioned 9/6 morning)  -Ammonia and lactate levels wnl, CT head no acute bleed + stable calcified R supraclinoid aneursym noted (no acute intervention as per Neuro), Urine cs and Blood cs no growth  -As pt's ALP and GGT were elevated, Acalculous Dalia was evaluated as a possible cause of pt's nausea and confusion- HIDA scan 9/3 no acute cholecystitis, no MRCP as pt has ICD  -Will continue to monitor closely  -Neuro Dr. Hernandez- advised no acute intervention, outpt followup recommended  -D/c'ed Atorvastatin and Bupropion 9/3 as medication interactions can contribute to mental status changes  -Can add Nicotine patches for withdrawal symptoms (Bupropion d/c'ed) as pt has a strong smoking hx  -Psych consulted- Antipsychotics could not be prescribed earlier as QTc prolongation was noted on EKG. Repeat EKG shows normal QTc, hence, Seroquel 25 mg HS added today (9/4), Psych was reconsulted- agree with  -Pt on Enhanced Supervision for now  -PT recommending Rehab facility upon discharge -Pt has been increasingly agitated and confused for about 2-3 days (eager about discharge planning when questioned 9/6 morning)  -Ammonia and lactate levels wnl, CT head no acute bleed + stable calcified R supraclinoid aneursym noted (no acute intervention as per Neuro), Urine cs and Blood cs no growth  -As pt's ALP and GGT were elevated, Acalculous Dalia was evaluated as a possible cause of pt's nausea and confusion- HIDA scan 9/3 no acute cholecystitis, no MRCP as pt has ICD  -Will continue to monitor closely  -Neuro Dr. Hernandez- advised no acute intervention, outpt followup recommended  -D/c'ed Atorvastatin and Bupropion 9/3 as medication interactions can contribute to mental status changes  -Can add Nicotine patches for withdrawal symptoms (Bupropion d/c'ed) as pt has a strong smoking hx  -Psych consulted- Antipsychotics could not be prescribed earlier as QTc prolongation was noted on EKG. Repeat EKG shows normal QTc, hence, Seroquel 25 mg HS added today (9/4), Psych was reconsulted- pt is now on Seroqeul 25 mg twice daily, will repeat EKG today 9/6 and evaluate if Seroquel dose can be titrated up   -Pt on Enhanced Supervision for now  -PT recommending Rehab facility upon discharge -Pt has been increasingly agitated and confused for about 2-3 days (eager about discharge planning when questioned 9/6 morning)  -Ammonia and lactate levels wnl, CT head no acute bleed + stable calcified R supraclinoid aneursym noted (no acute intervention as per Neuro), Urine cs and Blood cs no growth  -As pt's ALP and GGT were elevated, Acalculous Dalia was evaluated as a possible cause of pt's nausea and confusion- HIDA scan 9/3 no acute cholecystitis, no MRCP as pt has ICD  -Will continue to monitor closely  -Neuro Dr. Hernandez- advised no acute intervention, outpt followup recommended  -D/c'ed Atorvastatin and Bupropion 9/3 as medication interactions can contribute to mental status changes  -Can add Nicotine patches for withdrawal symptoms (Bupropion d/c'ed) as pt has a strong smoking hx  -Psych consulted- Antipsychotics could not be prescribed earlier as QTc prolongation was noted on EKG. Repeat EKG shows normal QTc, hence, Seroquel 25 mg HS added today (9/4)-> titrated up to Seroquel 50 mg at bedtime (repeat EKG 9/6 QTc 423msec), and Seroquel 25 mg daily    -Pt on Enhanced Supervision for now  -PT recommending Rehab facility upon discharge

## 2020-09-06 NOTE — PROGRESS NOTE ADULT - PROBLEM SELECTOR PLAN 5
-Pt has elevated Alk phosphatase 437, -> today ,   -RUQ USG showed no CBD dilation, no GB thickening  -Pt has no c/o RUQ pain, O/E No RUQ tenderness  -Monitor for ALP and GGT levels, as pt has ongoing c/o nausea after meals, HIDA scan was pursued- no acute linda  -MRCP not completed as pt has ICD  -EGD- moderate gastritis  -GI Dr. Ashby consulted

## 2020-09-06 NOTE — PROGRESS NOTE ADULT - ATTENDING COMMENTS
Advanced care planning was discussed with patient and family.  Advanced care planning forms were reviewed and discussed.  Risks, benefits and alternatives of gastroenterologic procedures were discussed in detail and all questions were answered.

## 2020-09-06 NOTE — PROGRESS NOTE ADULT - ATTENDING COMMENTS
Patient was interviewed and examined and discussed with Dr. Plasencia.     She is alert and oriented, knows she's in the hospital, knows that "something happened" over the last few days.  She had "not exactly dreams."  She is feeling better today.  She states that she previously had an adverse reaction to sedation.     Alert, cooperative woman, oriented, in NAD  Vital Signs Last 24 Hrs  T(C): 36.7 (06 Sep 2020 13:57), Max: 36.9 (05 Sep 2020 21:42)  T(F): 98 (06 Sep 2020 13:57), Max: 98.5 (05 Sep 2020 21:42)  HR: 80 (06 Sep 2020 13:57) (76 - 88)  BP: 123/66 (06 Sep 2020 13:57) (123/66 - 165/107)  BP(mean): --  RR: 17 (06 Sep 2020 13:57) (17 - 18)  SpO2: 100% (06 Sep 2020 13:57) (95% - 100%)  Lungs, clear  Cor, RRR  Abdomen, soft  Neurological, intact                        10.2   4.84  )-----------( 255      ( 05 Sep 2020 06:35 )             33.0   09-05    144  |  110<H>  |  23<H>  ----------------------------<  93  3.4<L>   |  26  |  1.28    Ca    8.6      05 Sep 2020 06:35  Phos  3.1     09-05  Mg     2.4     09-05    TPro  7.0  /  Alb  3.0<L>  /  TBili  1.0  /  DBili  0.3<H>  /  AST  19  /  ALT  18  /  AlkPhos  323<H>  09-05  Gamma Glutamyl Transferase, Serum: 753 U/L (09.01.20 @ 19:43)  < from: [facility]atobiliary [facility] w/ RX (09.03.20 @ 13:20) >    FINDINGS: There is prompt, homogeneous uptake of radiotracer by the hepatocytes. Activity is first seen in the bowel at 20 minutes. Patient refused to continue after 30 minutes.    IMPRESSION: Incomplete study.    < end of copied text >    < from: [facility] Hep[facility] Pancreatic (08.31.20 @ 12:53) >      IMPRESSION:   Mild fatty liver.  1.5 cm hyperechoic lesion in the midpole of the RIGHT kidney which may reflect an angiomyolipoma.    < end of copied text >    < from: 12 Lead ECG (09.05.20 @ 16:34) >    Atrial Rate 78 BPM    P-R Interval 168 ms    QRS Duration 100 ms    Q-T Interval 382 ms    QTC Calculation(Bezet) 435 ms    P Axis 40 degrees    R Axis -26 degrees    T Axis 117 degrees    Diagnosis Line Normal sinus rhythm  Minimal voltage criteria for LVH, may be normal variant  Nonspecific T wave abnormality  Abnormal ECG    < end of copied text >      IMP:  Encephalopathy, resolving.  Paranoia is improving.  Patient is now alert and oriented and has insight.  She was previously living alone.  Now retired, she had a medical         administrative job.  This may have been a reaction to medications, possibly to benzodiazepines.   Spironolactone had been stopped before she         had sedation for ANGELINA.  Subsequently, after she became agitated, she had a paradoxical reaction to ativan.            QTc prolongation is now corrected.  She is currently on seroquel and feels well.          - CHFrEF with defibrillator, stable.  Not currently on spironolactone.         -mural thrombus, aorta, on anticoagulation       - DM, moderate control       -Hypertension, moderate control       -Breast cancer on anastrozole.  No evidence of CNS metastases       -CVA, lacunar infarcts  Plan:  Increase seroquel to 50 mg hs, 25 AM           Continue holding spironolactone.           resume atorvastatin           avoid benzodiazepines           avoid bupropion, at present  Will reevaluate and strongly consider discharge home with home care f/u. Patient was interviewed and examined and discussed with Dr. Plasencia.     She is alert and oriented, knows she's in the hospital, knows that "something happened" over the last few days.  She had "not exactly dreams."  She is feeling better today.  She states that she previously had an adverse reaction to sedation.     Alert, cooperative woman, oriented, in NAD  Vital Signs Last 24 Hrs  T(C): 36.7 (06 Sep 2020 13:57), Max: 36.9 (05 Sep 2020 21:42)  T(F): 98 (06 Sep 2020 13:57), Max: 98.5 (05 Sep 2020 21:42)  HR: 80 (06 Sep 2020 13:57) (76 - 88)  BP: 123/66 (06 Sep 2020 13:57) (123/66 - 165/107)  BP(mean): --  RR: 17 (06 Sep 2020 13:57) (17 - 18)  SpO2: 100% (06 Sep 2020 13:57) (95% - 100%)  Lungs, clear  Cor, RRR  Abdomen, soft  Neurological, intact                        10.2   4.84  )-----------( 255      ( 05 Sep 2020 06:35 )             33.0   09-05    144  |  110<H>  |  23<H>  ----------------------------<  93  3.4<L>   |  26  |  1.28    Ca    8.6      05 Sep 2020 06:35  Phos  3.1     09-05  Mg     2.4     09-05    TPro  7.0  /  Alb  3.0<L>  /  TBili  1.0  /  DBili  0.3<H>  /  AST  19  /  ALT  18  /  AlkPhos  323<H>  09-05  Gamma Glutamyl Transferase, Serum: 753 U/L (09.01.20 @ 19:43)  < from: [facility] Hepatobiliary Imaging w/ RX (09.03.20 @ 13:20) >    FINDINGS: There is prompt, homogeneous uptake of radiotracer by the hepatocytes. Activity is first seen in the bowel at 20 minutes. Patient refused to continue after 30 minutes.    IMPRESSION: Incomplete study.    < end of copied text >    < from: [facility] Hepatic & Pancreatic (08.31.20 @ 12:53) >      IMPRESSION:   Mild fatty liver.  1.5 cm hyperechoic lesion in the midpole of the RIGHT kidney which may reflect an angiomyolipoma.    < end of copied text >    < from: 12 Lead ECG (09.05.20 @ 16:34) >    Atrial Rate 78 BPM    P-R Interval 168 ms    QRS Duration 100 ms    Q-T Interval 382 ms    QTC Calculation(Bezet) 435 ms    P Axis 40 degrees    R Axis -26 degrees    T Axis 117 degrees    Diagnosis Line Normal sinus rhythm  Minimal voltage criteria for LVH, may be normal variant  Nonspecific T wave abnormality  Abnormal ECG    < end of copied text >      IMP:  Encephalopathy, resolving.  Paranoia is improving.  Patient is now alert and oriented and has insight.  She was previously living alone.  Now retired, she had a medical         administrative job.  This may have been a reaction to medications, possibly to benzodiazepines.   Spironolactone had been stopped before she         had sedation for ANGELINA.  Subsequently, after she became agitated, she had a paradoxical reaction to ativan.            QTc prolongation is now corrected.  She is currently on seroquel and feels well.          - CHFrEF with defibrillator, stable.  Not currently on spironolactone.         -mural thrombus, aorta, on anticoagulation       - DM, moderate control       -Hypertension, moderate control       -Breast cancer on anastrozole.  No evidence of CNS metastases       -CVA, lacunar infarcts  Plan:  Increase seroquel to 50 mg hs, 25 AM           Continue holding spironolactone.           resume atorvastatin           avoid benzodiazepines           avoid bupropion, at present  Will reevaluate and strongly consider discharge to [facility] or to home with home care f/u.

## 2020-09-06 NOTE — PROGRESS NOTE ADULT - PROBLEM SELECTOR PLAN 3
- Creatinine was uptrending, hence IV lasix d/c'ed 8/29  - Resumed Low dose Entresto, hold Spironolactone  - Creatinine 1.54 today (1.36 yesterday), will keep monitoring BMP daily  - Avoid Nephrotoxins

## 2020-09-06 NOTE — PROGRESS NOTE ADULT - SUBJECTIVE AND OBJECTIVE BOX
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 She was going for an xray when they told her to come into the hospital. Patient states that she was feeling nauseous and was having difficulty breathing. Patient was little confused and is very poor historian. Patient states that she smokes 1 PPD x 50 years. She denies chest pain, head ache, dizziness, palpitations LOC, bowel or urinary problems. Does reports she has a cough, non-productive, and vomited once, with clear non-bloody vomit. No sick contacts, no recent travel. Of note pt reports history "a long time ago" of DVT/PE, and was on blood thinners, unsure of time period. Sister also had PE.    During her hospital course, patient was admitted for Dyspnea workup, CTA negative for PE, BNP >35,000, pt was started on IV Lasix, discontinued 8/29 due to rising creatinine levels. Echo from 8/29 with EF 15-20% showing new Moderate to Severe Mitral regurgitation (likely functional leading to HF exacerbations), troponins downtrending. As per Cardiology recommendations, low dose Entresto has been resumed and ANGELINA done 9/1 showed moderate MR, Mural thrombus extending into descending aorta, no LA thrombus, LAE + Vascular surgery was consulted regarding need for intervention. Due to persistent elevation in Alk phosphatase and GGT, although USG Abdomen was negative for CBD dilation and GB thickening, HIDA scan was pursued which was negative for Acute cholecystitis. As pt has been increasingly agitated recently, pt's Atorvastatin and Bupropion was discontinued on 9/3 as medical interactions can contribute to mental status changes. Hydralazine dose increased to 50 mg Q8 9/4 for better control of BP. 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 As repeat EKG shows normal QTc interval, patient was started on Seroquel HS on 9/4. Physical therapy was re-consulted, now recommending LIONEL placement for pt.         INTERVAL HPI/OVERNIGHT EVENTS:     MEDICATIONS  (STANDING):  ALBUTerol    90 MICROgram(s) HFA Inhaler 2 Puff(s) Inhalation every 6 hours  anastrozole 1 milliGRAM(s) Oral daily  aspirin enteric coated 81 milliGRAM(s) Oral daily  clopidogrel Tablet 75 milliGRAM(s) Oral daily  dextrose 5%. 1000 milliLiter(s) (50 mL/Hr) IV Continuous <Continuous>  furosemide    Tablet 20 milliGRAM(s) Oral daily  heparin   Injectable 5000 Unit(s) SubCutaneous every 8 hours  hydrALAZINE 50 milliGRAM(s) Oral every 8 hours  insulin lispro (HumaLOG) corrective regimen sliding scale   SubCutaneous three times a day before meals  insulin lispro (HumaLOG) corrective regimen sliding scale   SubCutaneous at bedtime  isosorbide   mononitrate ER Tablet (IMDUR) 30 milliGRAM(s) Oral daily  metoprolol succinate ER 50 milliGRAM(s) Oral daily  QUEtiapine 25 milliGRAM(s) Oral daily  QUEtiapine 25 milliGRAM(s) Oral at bedtime  sacubitril 24 mG/valsartan 26 mG 1 Tablet(s) Oral two times a day  thiamine IVPB 500 milliGRAM(s) IV Intermittent daily  tiotropium 18 MICROgram(s) Capsule 1 Capsule(s) Inhalation daily    MEDICATIONS  (PRN):  guaiFENesin   Syrup  (Sugar-Free) 100 milliGRAM(s) Oral every 6 hours PRN Cough  meclizine 12.5 milliGRAM(s) Oral daily PRN Nausea      REVIEW OF SYSTEMS:  CONSTITUTIONAL: No fever, weight loss, or fatigue  RESPIRATORY: No cough, wheezing, chills or hemoptysis; No shortness of breath  CARDIOVASCULAR: No chest pain, palpitations, dizziness, or leg swelling  GASTROINTESTINAL: No abdominal pain. No nausea, vomiting, or hematemesis; No diarrhea or constipation. No melena or hematochezia.  GENITOURINARY: No dysuria or hematuria, urinary frequency  NEUROLOGICAL: No headaches, memory loss, loss of strength, numbness, or tremors  SKIN: No itching, burning, rashes, or lesions     Vital Signs Last 24 Hrs  T(C): 36.5 (06 Sep 2020 05:24), Max: 36.9 (05 Sep 2020 21:42)  T(F): 97.7 (06 Sep 2020 05:24), Max: 98.5 (05 Sep 2020 21:42)  HR: 82 (06 Sep 2020 05:24) (76 - 82)  BP: 149/110 (06 Sep 2020 05:24) (128/81 - 156/95)  BP(mean): --  RR: 18 (06 Sep 2020 05:24) (18 - 18)  SpO2: 95% (06 Sep 2020 05:24) (95% - 100%)    PHYSICAL EXAMINATION:  GENERAL: Pt is lying comfortably, not in acute distress, AAO x 2  HEAD:  Atraumatic, Normocephalic  EYES:  conjunctiva and sclera clear  NECK: Supple, No JVD, Normal thyroid  CHEST/LUNG: Clear to auscultation. Clear to percussion bilaterally; No rales, rhonchi, wheezing, or rubs  HEART: Regular rate and rhythm; No murmurs, rubs, or gallops  ABDOMEN: Soft, Nontender, Nondistended; Bowel sounds present  NERVOUS SYSTEM:  Alert & Oriented X2    EXTREMITIES:  2+ Peripheral Pulses, No clubbing, cyanosis, or edema  SKIN: warm dry                          10.2   4.84  )-----------( 255      ( 05 Sep 2020 06:35 )             33.0     09-05    144  |  110<H>  |  23<H>  ----------------------------<  93  3.4<L>   |  26  |  1.28    Ca    8.6      05 Sep 2020 06:35  Phos  3.1     09-05  Mg     2.4     09-05    TPro  7.0  /  Alb  3.0<L>  /  TBili  1.0  /  DBili  0.3<H>  /  AST  19  /  ALT  18  /  AlkPhos  323<H>  09-05    LIVER FUNCTIONS - ( 05 Sep 2020 06:35 )  Alb: 3.0 g/dL / Pro: 7.0 g/dL / ALK PHOS: 323 U/L / ALT: 18 U/L DA / AST: 19 U/L / GGT: x                   CAPILLARY BLOOD GLUCOSE      RADIOLOGY & ADDITIONAL TESTS: PGY-1 Progress Note discussed with attending    PAGER #: [[phone]] TILL 5:00 PM  PLEASE CONTACT ON CALL TEAM:  - On Call Team (Please refer to Taryn) FROM 5:00 PM - 8:30PM  - Nightfloat Team FROM 8:30 -7:30 AM    CHIEF COMPLAINT & BRIEF HOSPITAL COURSE: 66 y/o F smoker with medical history significant for DM, HTN, HLD, Breast CA s/p chemo/XRT, SCC, CVA no motor deficits, COPD (not on home O2), NICM with EF 31% by MUGA 7/2019 (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 She was going for an xray when they told her to come into the hospital. Patient states that she was feeling nauseous and was having difficulty breathing. Patient was little confused and is very poor historian. Patient states that she smokes 1 PPD x 50 years. She denies chest pain, head ache, dizziness, palpitations LOC, bowel or urinary problems. Does reports she has a cough, non-productive, and vomited once, with clear non-bloody vomit. No sick contacts, no recent travel. Of note pt reports history "a long time ago" of DVT/PE, and was on blood thinners, unsure of time period. Sister also had PE.    During her hospital course, patient was admitted for Dyspnea workup, CTA negative for PE, BNP >35,000, pt was started on IV Lasix, discontinued 8/29 due to rising creatinine levels. Echo from 8/29 with EF 15-20% showing new Moderate to Severe Mitral regurgitation (likely functional leading to HF exacerbations), troponins downtrending. As per Cardiology recommendations, low dose Entresto has been resumed and ANGELINA done 9/1 showed moderate MR, Mural thrombus extending into descending aorta, no LA thrombus, LAE + Vascular surgery was consulted regarding need for intervention. Due to persistent elevation in Alk phosphatase and GGT, although USG Abdomen was negative for CBD dilation and GB thickening, HIDA scan was pursued which was negative for Acute cholecystitis. As pt has been increasingly agitated recently, pt's Atorvastatin and Bupropion was discontinued on 9/3 as medical interactions can contribute to mental status changes. Hydralazine dose increased to 50 mg Q8 9/4 for better control of BP. 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 As repeat EKG shows normal QTc interval, patient was started on Seroquel HS on 9/4. Physical therapy was re-consulted, now recommending LIONEL placement for pt.     INTERVAL HPI/OVERNIGHT EVENTS: Pt less agitated this morning, her confusion has a waxing and waning pattern. Refused medications and labs at 6 am, convinced pt to take her BP meds around 10 am.     MEDICATIONS  (STANDING):  ALBUTerol    90 MICROgram(s) HFA Inhaler 2 Puff(s) Inhalation every 6 hours  anastrozole 1 milliGRAM(s) Oral daily  aspirin enteric coated 81 milliGRAM(s) Oral daily  clopidogrel Tablet 75 milliGRAM(s) Oral daily  dextrose 5%. 1000 milliLiter(s) (50 mL/Hr) IV Continuous <Continuous>  furosemide    Tablet 20 milliGRAM(s) Oral daily  heparin   Injectable 5000 Unit(s) SubCutaneous every 8 hours  hydrALAZINE 50 milliGRAM(s) Oral every 8 hours  insulin lispro (HumaLOG) corrective regimen sliding scale   SubCutaneous three times a day before meals  insulin lispro (HumaLOG) corrective regimen sliding scale   SubCutaneous at bedtime  isosorbide   mononitrate ER Tablet (IMDUR) 30 milliGRAM(s) Oral daily  metoprolol succinate ER 50 milliGRAM(s) Oral daily  QUEtiapine 25 milliGRAM(s) Oral daily  QUEtiapine 25 milliGRAM(s) Oral at bedtime  sacubitril 24 mG/valsartan 26 mG 1 Tablet(s) Oral two times a day  thiamine IVPB 500 milliGRAM(s) IV Intermittent daily  tiotropium 18 MICROgram(s) Capsule 1 Capsule(s) Inhalation daily    MEDICATIONS  (PRN):  guaiFENesin   Syrup  (Sugar-Free) 100 milliGRAM(s) Oral every 6 hours PRN Cough  meclizine 12.5 milliGRAM(s) Oral daily PRN Nausea      REVIEW OF SYSTEMS:  CONSTITUTIONAL: No fever, weight loss, or fatigue  RESPIRATORY: No cough, wheezing, chills or hemoptysis; No shortness of breath  CARDIOVASCULAR: No chest pain, palpitations, dizziness, or leg swelling  GASTROINTESTINAL: No abdominal pain. No nausea, vomiting, or hematemesis; No diarrhea or constipation. No melena or hematochezia.  GENITOURINARY: No dysuria or hematuria, urinary frequency  NEUROLOGICAL: No headaches, memory loss, loss of strength, numbness, or tremors  SKIN: No itching, burning, rashes, or lesions     Vital Signs Last 24 Hrs  T(C): 36.5 (06 Sep 2020 05:24), Max: 36.9 (05 Sep 2020 21:42)  T(F): 97.7 (06 Sep 2020 05:24), Max: 98.5 (05 Sep 2020 21:42)  HR: 82 (06 Sep 2020 05:24) (76 - 82)  BP: 149/110 (06 Sep 2020 05:24) (128/81 - 156/95)  BP(mean): --  RR: 18 (06 Sep 2020 05:24) (18 - 18)  SpO2: 95% (06 Sep 2020 05:24) (95% - 100%)    PHYSICAL EXAMINATION:  GENERAL: Pt is lying comfortably, not in acute distress, AAO x 2  HEAD:  Atraumatic, Normocephalic  EYES:  conjunctiva and sclera clear  NECK: Supple, No JVD, Normal thyroid  CHEST/LUNG: Clear to auscultation. Clear to percussion bilaterally; No rales, rhonchi, wheezing, or rubs  HEART: Regular rate and rhythm; No murmurs, rubs, or gallops  ABDOMEN: Soft, Nontender, Nondistended; Bowel sounds present  NERVOUS SYSTEM:  Alert & Oriented X2    EXTREMITIES:  2+ Peripheral Pulses, No clubbing, cyanosis, or edema  SKIN: warm dry                          10.2   4.84  )-----------( 255      ( 05 Sep 2020 06:35 )             33.0     09-05    144  |  110<H>  |  23<H>  ----------------------------<  93  3.4<L>   |  26  |  1.28    Ca    8.6      05 Sep 2020 06:35  Phos  3.1     09-05  Mg     2.4     09-05    TPro  7.0  /  Alb  3.0<L>  /  TBili  1.0  /  DBili  0.3<H>  /  AST  19  /  ALT  18  /  AlkPhos  323<H>  09-05    LIVER FUNCTIONS - ( 05 Sep 2020 06:35 )  Alb: 3.0 g/dL / Pro: 7.0 g/dL / ALK PHOS: 323 U/L / ALT: 18 U/L DA / AST: 19 U/L / GGT: x                   CAPILLARY BLOOD GLUCOSE      RADIOLOGY & ADDITIONAL TESTS:  No new imaging

## 2020-09-06 NOTE — PROGRESS NOTE ADULT - PROBLEM SELECTOR PLAN 2
Alk phos is elevated at 517 + nausea + RUQ discomfort, markus, AST/ALT WNL  - US hepatic:  Mild fatty liver, no gallstone, no gallbladder wall thickness or dilated CBD  - HIDA scan: no acute cholecystitis  -Repeat as an outpatient

## 2020-09-06 NOTE — PROGRESS NOTE ADULT - SUBJECTIVE AND OBJECTIVE BOX
Summary:   65y  Female      Subjective:   feels well     Objective:    MEDICATIONS  (STANDING):  ALBUTerol    90 MICROgram(s) HFA Inhaler 2 Puff(s) Inhalation every 6 hours  anastrozole 1 milliGRAM(s) Oral daily  aspirin enteric coated 81 milliGRAM(s) Oral daily  atorvastatin 40 milliGRAM(s) Oral at bedtime  clopidogrel Tablet 75 milliGRAM(s) Oral daily  dextrose 5%. 1000 milliLiter(s) (50 mL/Hr) IV Continuous <Continuous>  furosemide    Tablet 20 milliGRAM(s) Oral daily  heparin   Injectable 5000 Unit(s) SubCutaneous every 8 hours  hydrALAZINE 50 milliGRAM(s) Oral every 8 hours  insulin lispro (HumaLOG) corrective regimen sliding scale   SubCutaneous three times a day before meals  insulin lispro (HumaLOG) corrective regimen sliding scale   SubCutaneous at bedtime  isosorbide   mononitrate ER Tablet (IMDUR) 30 milliGRAM(s) Oral daily  metoprolol succinate ER 50 milliGRAM(s) Oral daily  QUEtiapine 25 milliGRAM(s) Oral daily  QUEtiapine 50 milliGRAM(s) Oral at bedtime  sacubitril 24 mG/valsartan 26 mG 1 Tablet(s) Oral two times a day  tiotropium 18 MICROgram(s) Capsule 1 Capsule(s) Inhalation daily    MEDICATIONS  (PRN):  guaiFENesin   Syrup  (Sugar-Free) 100 milliGRAM(s) Oral every 6 hours PRN Cough  meclizine 12.5 milliGRAM(s) Oral daily PRN Nausea              Vital Signs Last 24 Hrs  T(C): 36.7 (06 Sep 2020 13:57), Max: 36.9 (05 Sep 2020 21:42)  T(F): 98 (06 Sep 2020 13:57), Max: 98.5 (05 Sep 2020 21:42)  HR: 80 (06 Sep 2020 13:57) (76 - 88)  BP: 123/66 (06 Sep 2020 13:57) (123/66 - 165/107)  BP(mean): --  RR: 17 (06 Sep 2020 13:57) (17 - 18)  SpO2: 100% (06 Sep 2020 13:57) (95% - 100%)      General:  Well developed, well nourished, alert and active, no pallor, NAD.  HEENT:    Normal appearance of conjunctiva, ears, nose, lips, oropharynx, and oral mucosa, anicteric.  Neck:  No masses, no asymmetry.  Lymph Nodes:  No lymphadenopathy.   Cardiovascular:  RRR normal S1/S2, no murmur.  Respiratory:  CTA B/L, normal respiratory effort.   Abdominal:   soft, no masses or tenderness, normoactive BS, NT/ND, no HSM.  Extremities:   No clubbing or cyanosis, normal capillary refill, no edema.   Skin:   No rash, jaundice, lesions, eczema.   Musculoskeletal:  No joint swelling, erythema or tenderness.   Neuro: No focal deficits.   Other:       LABS:                        10.2   4.84  )-----------( 255      ( 05 Sep 2020 06:35 )             33.0     09-05    144  |  110<H>  |  23<H>  ----------------------------<  93  3.4<L>   |  26  |  1.28    Ca    8.6      05 Sep 2020 06:35  Phos  3.1     09-05  Mg     2.4     09-05    TPro  7.0  /  Alb  3.0<L>  /  TBili  1.0  /  DBili  0.3<H>  /  AST  19  /  ALT  18  /  AlkPhos  323<H>  09-05          RADIOLOGY & ADDITIONAL TESTS:

## 2020-09-06 NOTE — PROGRESS NOTE ADULT - PROBLEM SELECTOR PLAN 2
- BNP on admission 35,000, Troponin downtrending  - IV Lasix d/c'ed 8/29 as Cr was uptrending(Cr now downtrending after Lasix was d/c'ed)  - Daily weights, strict Is/Os (fluid restriction 1200 ml)  - DASH/TLC diet  - Echo 8/29 EF 15-20%, New Moderate to Severe MR  - ANGELINA was done to evaluate valve function- showed mural thrombus in the descending aorta, Pt is now started on Eliquis 10 mg 9/1  - Pt on low dose Entresto, Metoprolol, hold Spironolactone due to creatinine elevation - BNP on admission 35,000, Troponin downtrending  - IV Lasix d/c'ed 8/29 as Cr was uptrending (Cr now downtrending after Lasix was d/c'ed)  - Daily weights, strict Is/Os (fluid restriction 1200 ml)  - DASH/TLC diet  - Echo 8/29 EF 15-20%, New Moderate to Severe MR  - ANGELINA was done to evaluate valve function- showed mural thrombus in the descending aorta, Pt is now started on Eliquis 10 mg 9/1  - Pt on low dose Entresto, Metoprolol, hold Spironolactone due to creatinine elevation

## 2020-09-07 LAB
ALBUMIN SERPL ELPH-MCNC: 3 G/DL — LOW (ref 3.5–5)
ALP SERPL-CCNC: 248 U/L — HIGH (ref 40–120)
ALT FLD-CCNC: 14 U/L DA — SIGNIFICANT CHANGE UP (ref 10–60)
ANION GAP SERPL CALC-SCNC: 8 MMOL/L — SIGNIFICANT CHANGE UP (ref 5–17)
AST SERPL-CCNC: 16 U/L — SIGNIFICANT CHANGE UP (ref 10–40)
BILIRUB DIRECT SERPL-MCNC: 0.4 MG/DL — HIGH (ref 0–0.2)
BILIRUB SERPL-MCNC: 1.5 MG/DL — HIGH (ref 0.2–1.2)
BUN SERPL-MCNC: 11 MG/DL — SIGNIFICANT CHANGE UP (ref 7–18)
CALCIUM SERPL-MCNC: 8.7 MG/DL — SIGNIFICANT CHANGE UP (ref 8.4–10.5)
CHLORIDE SERPL-SCNC: 106 MMOL/L — SIGNIFICANT CHANGE UP (ref 96–108)
CO2 SERPL-SCNC: 27 MMOL/L — SIGNIFICANT CHANGE UP (ref 22–31)
CREAT SERPL-MCNC: 1.14 MG/DL — SIGNIFICANT CHANGE UP (ref 0.5–1.3)
GLUCOSE BLDC GLUCOMTR-MCNC: 105 MG/DL — HIGH (ref 70–99)
GLUCOSE BLDC GLUCOMTR-MCNC: 112 MG/DL — HIGH (ref 70–99)
GLUCOSE BLDC GLUCOMTR-MCNC: 114 MG/DL — HIGH (ref 70–99)
GLUCOSE BLDC GLUCOMTR-MCNC: 138 MG/DL — HIGH (ref 70–99)
GLUCOSE SERPL-MCNC: 100 MG/DL — HIGH (ref 70–99)
HCT VFR BLD CALC: 35.2 % — SIGNIFICANT CHANGE UP (ref 34.5–45)
HGB BLD-MCNC: 11.2 G/DL — LOW (ref 11.5–15.5)
MAGNESIUM SERPL-MCNC: 2 MG/DL — SIGNIFICANT CHANGE UP (ref 1.6–2.6)
MCHC RBC-ENTMCNC: 30 PG — SIGNIFICANT CHANGE UP (ref 27–34)
MCHC RBC-ENTMCNC: 31.8 GM/DL — LOW (ref 32–36)
MCV RBC AUTO: 94.4 FL — SIGNIFICANT CHANGE UP (ref 80–100)
NRBC # BLD: 0 /100 WBCS — SIGNIFICANT CHANGE UP (ref 0–0)
PHOSPHATE SERPL-MCNC: 3.4 MG/DL — SIGNIFICANT CHANGE UP (ref 2.5–4.5)
PLATELET # BLD AUTO: 279 K/UL — SIGNIFICANT CHANGE UP (ref 150–400)
POTASSIUM SERPL-MCNC: 3 MMOL/L — LOW (ref 3.5–5.3)
POTASSIUM SERPL-SCNC: 3 MMOL/L — LOW (ref 3.5–5.3)
PROT SERPL-MCNC: 7.1 G/DL — SIGNIFICANT CHANGE UP (ref 6–8.3)
RBC # BLD: 3.73 M/UL — LOW (ref 3.8–5.2)
RBC # FLD: 16 % — HIGH (ref 10.3–14.5)
SODIUM SERPL-SCNC: 141 MMOL/L — SIGNIFICANT CHANGE UP (ref 135–145)
WBC # BLD: 4.45 K/UL — SIGNIFICANT CHANGE UP (ref 3.8–10.5)
WBC # FLD AUTO: 4.45 K/UL — SIGNIFICANT CHANGE UP (ref 3.8–10.5)

## 2020-09-07 PROCEDURE — 99233 SBSQ HOSP IP/OBS HIGH 50: CPT | Mod: GC

## 2020-09-07 RX ORDER — POTASSIUM CHLORIDE 20 MEQ
40 PACKET (EA) ORAL EVERY 4 HOURS
Refills: 0 | Status: COMPLETED | OUTPATIENT
Start: 2020-09-07 | End: 2020-09-07

## 2020-09-07 RX ADMIN — Medication 50 MILLIGRAM(S): at 21:36

## 2020-09-07 RX ADMIN — QUETIAPINE FUMARATE 50 MILLIGRAM(S): 200 TABLET, FILM COATED ORAL at 21:36

## 2020-09-07 RX ADMIN — Medication 50 MILLIGRAM(S): at 13:35

## 2020-09-07 RX ADMIN — Medication 50 MILLIGRAM(S): at 06:00

## 2020-09-07 RX ADMIN — HEPARIN SODIUM 5000 UNIT(S): 5000 INJECTION INTRAVENOUS; SUBCUTANEOUS at 13:34

## 2020-09-07 RX ADMIN — ALBUTEROL 2 PUFF(S): 90 AEROSOL, METERED ORAL at 21:36

## 2020-09-07 RX ADMIN — HEPARIN SODIUM 5000 UNIT(S): 5000 INJECTION INTRAVENOUS; SUBCUTANEOUS at 21:36

## 2020-09-07 RX ADMIN — ALBUTEROL 2 PUFF(S): 90 AEROSOL, METERED ORAL at 15:00

## 2020-09-07 RX ADMIN — SACUBITRIL AND VALSARTAN 1 TABLET(S): 24; 26 TABLET, FILM COATED ORAL at 05:58

## 2020-09-07 RX ADMIN — Medication 40 MILLIEQUIVALENT(S): at 08:34

## 2020-09-07 RX ADMIN — ATORVASTATIN CALCIUM 40 MILLIGRAM(S): 80 TABLET, FILM COATED ORAL at 21:36

## 2020-09-07 RX ADMIN — CLOPIDOGREL BISULFATE 75 MILLIGRAM(S): 75 TABLET, FILM COATED ORAL at 11:35

## 2020-09-07 RX ADMIN — TIOTROPIUM BROMIDE 1 CAPSULE(S): 18 CAPSULE ORAL; RESPIRATORY (INHALATION) at 11:37

## 2020-09-07 RX ADMIN — ISOSORBIDE MONONITRATE 30 MILLIGRAM(S): 60 TABLET, EXTENDED RELEASE ORAL at 11:36

## 2020-09-07 RX ADMIN — Medication 40 MILLIEQUIVALENT(S): at 11:35

## 2020-09-07 RX ADMIN — SACUBITRIL AND VALSARTAN 1 TABLET(S): 24; 26 TABLET, FILM COATED ORAL at 17:34

## 2020-09-07 RX ADMIN — Medication 50 MILLIGRAM(S): at 05:59

## 2020-09-07 RX ADMIN — ANASTROZOLE 1 MILLIGRAM(S): 1 TABLET ORAL at 11:36

## 2020-09-07 RX ADMIN — HEPARIN SODIUM 5000 UNIT(S): 5000 INJECTION INTRAVENOUS; SUBCUTANEOUS at 06:00

## 2020-09-07 RX ADMIN — QUETIAPINE FUMARATE 25 MILLIGRAM(S): 200 TABLET, FILM COATED ORAL at 11:36

## 2020-09-07 RX ADMIN — Medication 81 MILLIGRAM(S): at 13:16

## 2020-09-07 RX ADMIN — Medication 20 MILLIGRAM(S): at 06:00

## 2020-09-07 NOTE — PROGRESS NOTE ADULT - PROBLEM SELECTOR PLAN 5
-Pt has elevated Alk phosphatase 437, -> today ,   -RUQ USG showed no CBD dilation, no GB thickening  -Pt has no c/o RUQ pain, O/E No RUQ tenderness  -Monitor for ALP and GGT levels, as pt has ongoing c/o nausea after meals, HIDA scan was pursued- no acute linda  -MRCP not completed as pt has ICD  -EGD- moderate gastritis  -GI Dr. Ashby consulted, recommended to repeat EGD outpatient

## 2020-09-07 NOTE — PROGRESS NOTE ADULT - PROBLEM SELECTOR PLAN 1
-Pt has been increasingly agitated and confused for about 2-3 days (eager about discharge planning when questioned 9/6 morning)  -Ammonia and lactate levels wnl, CT head no acute bleed + stable calcified R supraclinoid aneursym noted (no acute intervention as per Neuro), Urine cs and Blood cs no growth  -As pt's ALP and GGT were elevated, Acalculous Dalia was evaluated as a possible cause of pt's nausea and confusion- HIDA scan 9/3 no acute cholecystitis, no MRCP as pt has ICD  -Will continue to monitor closely  -Neuro Dr. Hernandez- advised no acute intervention, outpt followup recommended  -D/c'ed Atorvastatin and Bupropion 9/3 as medication interactions can contribute to mental status changes  -Can add Nicotine patches for withdrawal symptoms (Bupropion d/c'ed) as pt has a strong smoking hx  -Psych consulted- Antipsychotics could not be prescribed earlier as QTc prolongation was noted on EKG. Repeat EKG shows normal QTc, hence, Seroquel 25 mg HS added today (9/4)-> titrated up to Seroquel 50 mg HS(repeat EKG 9/6 QTc 423msec), and Seroquel 25 mg daily    -Pt on Enhanced Supervision for now  -PT recommending Rehab facility upon discharge, discharge planning  -Will reconsult Psych prior to discharge

## 2020-09-07 NOTE — PROGRESS NOTE ADULT - PROBLEM SELECTOR PLAN 2
- BNP on admission 35,000, Troponin downtrending  - IV Lasix d/c'ed 8/29 as Cr was uptrending (Cr now downtrending after Lasix was d/c'ed)  - Daily weights, strict Is/Os (fluid restriction 1200 ml)  - DASH/TLC diet  - Echo 8/29 EF 15-20%, New Moderate to Severe MR  - ANGELINA was done to evaluate valve function- showed mural thrombus in the descending aorta, Pt is now started on Eliquis 10 mg 9/1  - Pt on low dose Entresto, Metoprolol, hold Spironolactone due to creatinine elevation

## 2020-09-07 NOTE — PROGRESS NOTE ADULT - ATTENDING COMMENTS
65 year-old female smoker with DM, HTN, HLD, breast CA s/p chemo/XRT, COPD not on home O2, CVA no motor deficits, and NICM (catheterization 02/2019 showed dRPL occlusion without ability to intervene), EF 31% by MUGA 7/2019, s/p SubQ ICD 10/2019, AAA came to ED for dyspnea with BNP of 35,000, Echo 8/29 showing EF 15-20% with moderate MR, ANGELINA showed mural thrombus noted in the descending aorta with Vascular surgery consulted, admitted and managed for CHF management. As patient has ongoing c/o nausea and ALP and GGT elevations, USG abdomen was done  negative for CBD dilation, GB thickening. Given nausea was not relieved, HIDA was pursued > negative for acute cholecystitis, EGD showed moderate gastritis. Pt has been having recent experiencing increasing agitation and confusion in the past few days- CT brain done- no acute bleed, Urine cs, Blood cs negative.    Plan:  case management on board  reconsult Psych in am for reevaluation on decision making capacity.

## 2020-09-07 NOTE — PROGRESS NOTE ADULT - SUBJECTIVE AND OBJECTIVE BOX
PGY-1 Progress Note discussed with attending    PAGER #: [[phone]] TILL 5:00 PM  PLEASE CONTACT ON CALL TEAM:  - On Call Team (Please refer to Taryn) FROM 5:00 PM - 8:30PM  - Nightfloat Team FROM 8:30 -7:30 AM    CHIEF COMPLAINT & BRIEF HOSPITAL COURSE: 66 y/o F smoker with medical history significant for DM, HTN, HLD, Breast CA s/p chemo/XRT, SCC, CVA no motor deficits, COPD (not on home O2), NICM with EF 31% by MUGA 7/2019 (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 She was going for an xray when they told her to come into the hospital. Patient states that she was feeling nauseous and was having difficulty breathing. Patient was little confused and is very poor historian. Patient states that she smokes 1 PPD x 50 years. She denies chest pain, head ache, dizziness, palpitations LOC, bowel or urinary problems. Does reports she has a cough, non-productive, and vomited once, with clear non-bloody vomit. No sick contacts, no recent travel. Of note pt reports history "a long time ago" of DVT/PE, and was on blood thinners, unsure of time period. Sister also had PE.    During her hospital course, patient was admitted for Dyspnea workup, CTA negative for PE, BNP >35,000, pt was started on IV Lasix, discontinued 8/29 due to rising creatinine levels. Echo from 8/29 with EF 15-20% showing new Moderate to Severe Mitral regurgitation (likely functional leading to HF exacerbations), troponins downtrending. As per Cardiology recommendations, low dose Entresto has been resumed and ANGELINA done 9/1 showed moderate MR, Mural thrombus extending into descending aorta, no LA thrombus, LAE + Vascular surgery was consulted regarding need for intervention. Due to persistent elevation in Alk phosphatase and GGT, although USG Abdomen was negative for CBD dilation and GB thickening, HIDA scan was pursued which was negative for Acute cholecystitis. As pt has been increasingly agitated recently, pt's Atorvastatin and Bupropion was discontinued on 9/3 as medical interactions can contribute to mental status changes. Hydralazine dose increased to 50 mg Q8 9/4 for better control of BP. 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 As repeat EKG shows normal QTc interval, patient was started on Seroquel HS on 9/4-> uptitrated to Seroquel 50 mg HS, 25 mg AM. Physical therapy was re-consulted, now recommending LION[facility] placement for pt. Atorvastatin has been resumed 9/6    INTERVAL HPI/OVERNIGHT EVENTS: Patient assessed at bedside, less confused today. She is alert, she can mention her name, her cousins time, and she is aware that she is at the hospital.    MEDICATIONS  (STANDING):  ALBUTerol    90 MICROgram(s) HFA Inhaler 2 Puff(s) Inhalation every 6 hours  anastrozole 1 milliGRAM(s) Oral daily  aspirin enteric coated 81 milliGRAM(s) Oral daily  atorvastatin 40 milliGRAM(s) Oral at bedtime  clopidogrel Tablet 75 milliGRAM(s) Oral daily  dextrose 5%. 1000 milliLiter(s) (50 mL/Hr) IV Continuous <Continuous>  furosemide    Tablet 20 milliGRAM(s) Oral daily  heparin   Injectable 5000 Unit(s) SubCutaneous every 8 hours  hydrALAZINE 50 milliGRAM(s) Oral every 8 hours  insulin lispro (HumaLOG) corrective regimen sliding scale   SubCutaneous three times a day before meals  insulin lispro (HumaLOG) corrective regimen sliding scale   SubCutaneous at bedtime  isosorbide   mononitrate ER Tablet (IMDUR) 30 milliGRAM(s) Oral daily  metoprolol succinate ER 50 milliGRAM(s) Oral daily  QUEtiapine 25 milliGRAM(s) Oral daily  QUEtiapine 50 milliGRAM(s) Oral at bedtime  sacubitril 24 mG/valsartan 26 mG 1 Tablet(s) Oral two times a day  tiotropium 18 MICROgram(s) Capsule 1 Capsule(s) Inhalation daily    MEDICATIONS  (PRN):  guaiFENesin   Syrup  (Sugar-Free) 100 milliGRAM(s) Oral every 6 hours PRN Cough  meclizine 12.5 milliGRAM(s) Oral daily PRN Nausea      REVIEW OF SYSTEMS:  CONSTITUTIONAL: No fever, weight loss, or fatigue  RESPIRATORY: No cough, wheezing, chills or hemoptysis; No shortness of breath  CARDIOVASCULAR: No chest pain, palpitations, dizziness, or leg swelling  GASTROINTESTINAL: No abdominal pain. No nausea, vomiting, or hematemesis; No diarrhea or constipation. No melena or hematochezia.  GENITOURINARY: No dysuria or hematuria, urinary frequency  NEUROLOGICAL: No headaches, memory loss, loss of strength, numbness, or tremors  SKIN: No itching, burning, rashes, or lesions     Vital Signs Last 24 Hrs  T(C): 36.4 (07 Sep 2020 07:25), Max: 36.7 (06 Sep 2020 13:57)  T(F): 97.5 (07 Sep 2020 07:25), Max: 98 (06 Sep 2020 13:57)  HR: 83 (07 Sep 2020 11:33) (74 - 88)  BP: 130/91 (07 Sep 2020 11:33) (123/66 - 165/107)  BP(mean): --  RR: 18 (07 Sep 2020 11:33) (17 - 20)  SpO2: 95% (07 Sep 2020 11:33) (95% - 100%)    PHYSICAL EXAMINATION:  GENERAL: NAD, well built  HEAD:  Atraumatic, Normocephalic  EYES:  conjunctiva and sclera clear  NECK: Supple, No JVD, Normal thyroid  CHEST/LUNG: Clear to auscultation. Clear to percussion bilaterally; No rales, rhonchi, wheezing, or rubs  HEART: Regular rate and rhythm; No murmurs, rubs, or gallops  ABDOMEN: Soft, Nontender, Nondistended; Bowel sounds present  NERVOUS SYSTEM:  Alert & Oriented X3    EXTREMITIES:  2+ Peripheral Pulses, No clubbing, cyanosis, or edema  SKIN: warm dry                          11.2   4.45  )-----------( 279      ( 07 Sep 2020 06:32 )             35.2     09-07    141  |  106  |  11  ----------------------------<  100<H>  3.0<L>   |  27  |  1.14    Ca    8.7      07 Sep 2020 06:32  Phos  3.4     09-07  Mg     2.0     09-07    TPro  7.1  /  Alb  3.0<L>  /  TBili  1.5<H>  /  DBili  0.4<H>  /  AST  16  /  ALT  14  /  AlkPhos  248<H>  09-07    LIVER FUNCTIONS - ( 07 Sep 2020 06:32 )  Alb: 3.0 g/dL / Pro: 7.1 g/dL / ALK PHOS: 248 U/L / ALT: 14 U/L DA / AST: 16 U/L / GGT: x                   CAPILLARY BLOOD GLUCOSE  POCT 138    RADIOLOGY & ADDITIONAL TESTS:  No new imaging today

## 2020-09-07 NOTE — PROGRESS NOTE ADULT - PROBLEM SELECTOR PLAN 4
No -Bp ranging 130-150/90s  -Pt is currently on Entresto 24-26 mg, Metoprolol succinate 50 mg daily  -Monitor BP, hold medications with parameters  -Hydralazine 10 mg, Isosorbide mononitrate 30 mg added 9/1 for better control of BP-> Titrated up to Hydralazine 25 mg-> currently on Hydralazine 50 mg Q8 hrs since 9/4

## 2020-09-07 NOTE — PROGRESS NOTE ADULT - PROBLEM SELECTOR PLAN 3
- Creatinine was uptrending, hence IV lasix d/c'ed 8/29  - Resumed Low dose Entresto, hold Spironolactone  - Creatinine 1.14 today, RESOLVED (1.28 yesterday), will keep monitoring BMP daily  - Avoid Nephrotoxins

## 2020-09-08 ENCOUNTER — APPOINTMENT (OUTPATIENT)
Dept: MAMMOGRAPHY | Facility: HOSPITAL | Age: 65
End: 2020-09-08

## 2020-09-08 ENCOUNTER — APPOINTMENT (OUTPATIENT)
Dept: ULTRASOUND IMAGING | Facility: HOSPITAL | Age: 65
End: 2020-09-08

## 2020-09-08 LAB
ALBUMIN SERPL ELPH-MCNC: 2.7 G/DL — LOW (ref 3.5–5)
ALP SERPL-CCNC: 209 U/L — HIGH (ref 40–120)
ALT FLD-CCNC: 12 U/L DA — SIGNIFICANT CHANGE UP (ref 10–60)
ANION GAP SERPL CALC-SCNC: 8 MMOL/L — SIGNIFICANT CHANGE UP (ref 5–17)
AST SERPL-CCNC: 14 U/L — SIGNIFICANT CHANGE UP (ref 10–40)
BILIRUB SERPL-MCNC: 0.9 MG/DL — SIGNIFICANT CHANGE UP (ref 0.2–1.2)
BUN SERPL-MCNC: 9 MG/DL — SIGNIFICANT CHANGE UP (ref 7–18)
CALCIUM SERPL-MCNC: 8.4 MG/DL — SIGNIFICANT CHANGE UP (ref 8.4–10.5)
CHLORIDE SERPL-SCNC: 108 MMOL/L — SIGNIFICANT CHANGE UP (ref 96–108)
CO2 SERPL-SCNC: 23 MMOL/L — SIGNIFICANT CHANGE UP (ref 22–31)
CREAT SERPL-MCNC: 1.15 MG/DL — SIGNIFICANT CHANGE UP (ref 0.5–1.3)
CULTURE RESULTS: SIGNIFICANT CHANGE UP
GLUCOSE BLDC GLUCOMTR-MCNC: 110 MG/DL — HIGH (ref 70–99)
GLUCOSE BLDC GLUCOMTR-MCNC: 111 MG/DL — HIGH (ref 70–99)
GLUCOSE BLDC GLUCOMTR-MCNC: 117 MG/DL — HIGH (ref 70–99)
GLUCOSE BLDC GLUCOMTR-MCNC: 128 MG/DL — HIGH (ref 70–99)
GLUCOSE SERPL-MCNC: 102 MG/DL — HIGH (ref 70–99)
HCT VFR BLD CALC: 33.9 % — LOW (ref 34.5–45)
HGB BLD-MCNC: 11.1 G/DL — LOW (ref 11.5–15.5)
MAGNESIUM SERPL-MCNC: 2.1 MG/DL — SIGNIFICANT CHANGE UP (ref 1.6–2.6)
MCHC RBC-ENTMCNC: 31.4 PG — SIGNIFICANT CHANGE UP (ref 27–34)
MCHC RBC-ENTMCNC: 32.7 GM/DL — SIGNIFICANT CHANGE UP (ref 32–36)
MCV RBC AUTO: 96 FL — SIGNIFICANT CHANGE UP (ref 80–100)
NRBC # BLD: 0 /100 WBCS — SIGNIFICANT CHANGE UP (ref 0–0)
PHOSPHATE SERPL-MCNC: 3.3 MG/DL — SIGNIFICANT CHANGE UP (ref 2.5–4.5)
PLATELET # BLD AUTO: 250 K/UL — SIGNIFICANT CHANGE UP (ref 150–400)
POTASSIUM SERPL-MCNC: 3.5 MMOL/L — SIGNIFICANT CHANGE UP (ref 3.5–5.3)
POTASSIUM SERPL-SCNC: 3.5 MMOL/L — SIGNIFICANT CHANGE UP (ref 3.5–5.3)
PROT SERPL-MCNC: 7.1 G/DL — SIGNIFICANT CHANGE UP (ref 6–8.3)
RBC # BLD: 3.53 M/UL — LOW (ref 3.8–5.2)
RBC # FLD: 16.5 % — HIGH (ref 10.3–14.5)
SODIUM SERPL-SCNC: 139 MMOL/L — SIGNIFICANT CHANGE UP (ref 135–145)
SPECIMEN SOURCE: SIGNIFICANT CHANGE UP
WBC # BLD: 3.96 K/UL — SIGNIFICANT CHANGE UP (ref 3.8–10.5)
WBC # FLD AUTO: 3.96 K/UL — SIGNIFICANT CHANGE UP (ref 3.8–10.5)

## 2020-09-08 PROCEDURE — 99232 SBSQ HOSP IP/OBS MODERATE 35: CPT | Mod: GC

## 2020-09-08 PROCEDURE — 99232 SBSQ HOSP IP/OBS MODERATE 35: CPT

## 2020-09-08 RX ORDER — APIXABAN 2.5 MG/1
1 TABLET, FILM COATED ORAL
Qty: 166 | Refills: 0
Start: 2020-09-08 | End: 2020-11-29

## 2020-09-08 RX ADMIN — SACUBITRIL AND VALSARTAN 1 TABLET(S): 24; 26 TABLET, FILM COATED ORAL at 17:49

## 2020-09-08 RX ADMIN — HEPARIN SODIUM 5000 UNIT(S): 5000 INJECTION INTRAVENOUS; SUBCUTANEOUS at 23:03

## 2020-09-08 RX ADMIN — HEPARIN SODIUM 5000 UNIT(S): 5000 INJECTION INTRAVENOUS; SUBCUTANEOUS at 13:02

## 2020-09-08 RX ADMIN — QUETIAPINE FUMARATE 50 MILLIGRAM(S): 200 TABLET, FILM COATED ORAL at 23:03

## 2020-09-08 RX ADMIN — HEPARIN SODIUM 5000 UNIT(S): 5000 INJECTION INTRAVENOUS; SUBCUTANEOUS at 06:43

## 2020-09-08 RX ADMIN — ALBUTEROL 2 PUFF(S): 90 AEROSOL, METERED ORAL at 23:04

## 2020-09-08 RX ADMIN — ALBUTEROL 2 PUFF(S): 90 AEROSOL, METERED ORAL at 10:40

## 2020-09-08 RX ADMIN — Medication 50 MILLIGRAM(S): at 06:43

## 2020-09-08 RX ADMIN — Medication 20 MILLIGRAM(S): at 06:43

## 2020-09-08 RX ADMIN — TIOTROPIUM BROMIDE 1 CAPSULE(S): 18 CAPSULE ORAL; RESPIRATORY (INHALATION) at 12:09

## 2020-09-08 RX ADMIN — SACUBITRIL AND VALSARTAN 1 TABLET(S): 24; 26 TABLET, FILM COATED ORAL at 06:43

## 2020-09-08 RX ADMIN — ALBUTEROL 2 PUFF(S): 90 AEROSOL, METERED ORAL at 04:31

## 2020-09-08 RX ADMIN — QUETIAPINE FUMARATE 25 MILLIGRAM(S): 200 TABLET, FILM COATED ORAL at 12:09

## 2020-09-08 RX ADMIN — Medication 50 MILLIGRAM(S): at 23:03

## 2020-09-08 RX ADMIN — ANASTROZOLE 1 MILLIGRAM(S): 1 TABLET ORAL at 12:09

## 2020-09-08 RX ADMIN — CLOPIDOGREL BISULFATE 75 MILLIGRAM(S): 75 TABLET, FILM COATED ORAL at 12:09

## 2020-09-08 RX ADMIN — ATORVASTATIN CALCIUM 40 MILLIGRAM(S): 80 TABLET, FILM COATED ORAL at 23:03

## 2020-09-08 RX ADMIN — ISOSORBIDE MONONITRATE 30 MILLIGRAM(S): 60 TABLET, EXTENDED RELEASE ORAL at 12:09

## 2020-09-08 RX ADMIN — Medication 50 MILLIGRAM(S): at 13:02

## 2020-09-08 RX ADMIN — Medication 81 MILLIGRAM(S): at 12:09

## 2020-09-08 NOTE — PROGRESS NOTE ADULT - PROBLEM SELECTOR PLAN 4
-Bp ranging 130-150/90s  -Pt is currently on Entresto 24-26 mg, Metoprolol succinate 50 mg daily  -Monitor BP, hold medications with parameters  -Hydralazine 10 mg, Isosorbide mononitrate 30 mg added 9/1 for better control of BP-> Titrated up to Hydralazine 25 mg-> currently on Hydralazine 50 mg Q8 hrs since 9/4

## 2020-09-08 NOTE — PROGRESS NOTE BEHAVIORAL HEALTH - OTHER
deferred paranoid content from time to time Dense facial hair on chin and upper neck Not observed Relates having paranoid beliefs (eg being held prisoner) earlier in admission, but now describes them as mistaken

## 2020-09-08 NOTE — PROGRESS NOTE BEHAVIORAL HEALTH - NSBHCHARTREVIEWINVESTIGATE_PSY_A_CORE FT
8/28  Ventricular Rate 76 BPM    Atrial Rate 76 BPM    P-R Interval 162 ms    QRS Duration 100 ms    Q-T Interval 438 ms    QTC Calculation(Bezet) 492 ms    P Axis 58 degrees    R Axis -35 degrees    T Axis 122 degrees    Diagnosis Line Normal sinus rhythm  Left axis deviation  Minimal voltage criteria for LVH, may be normal variant  T wave abnormality, consider lateral ischemia  Prolonged QT  Abnormal ECG
< from: 12 Lead ECG (09.06.20 @ 11:29) >    QTC Calculation(Bezet) 441 ms    P Axis 46 degrees    R Axis -21 degrees    T Axis 87 degrees    Diagnosis Line Normal sinus rhythm  Minimal voltage criteria for LVH, may be normal variant  T wave abnormality, consider lateral ischemia  Abnormal ECG    < end of copied text >

## 2020-09-08 NOTE — PROGRESS NOTE BEHAVIORAL HEALTH - NSBHFUPINTERVALHXFT_PSY_A_CORE
Pt seen today for f/u of initial and f/u telepsych consults on 9/3 and 9/4 respectively. Pt encountered sitting up in bed, awake and alert, A&Ox3. Pt initially wary of psych MD ("Your profession has never been helpful to my people--why are you here now?"), but becomes much more receptive when MD explains that psych consult was triggered by acute delirium, not suspicion of primary mental illness. MD reviews events prior to and immediately following pt's arrival at the hospital, and she reports being very confused and disoriented early in admission: "I woke up in this strange place [[facility] ED] and I thought I was being held prisoner. I thought this was some kind of illegal research facility where they were doing experiments on people. I pulled the fire alarm because I wanted someone to come rescue me." MD explains to pt that her acute SOB (which is why she called 911) was most likely due to fluid overload from acute CHF exacerbation, given BNP 35,000 on labs obtained shortly after admission. Pt's fluid overload likely worsened after home Lasix was DC'd due to concern for elevated creatinine, which may have triggered onset of acute delirium which led to psych consult being requested on 9/3. COPD may have been another contributing factor in pt's SOB and associated delirium. Pt's mental status appears to have greatly improved since initial psych consult, especially after Seroquel was started on 9/4. Pt is highly receptive to education about delirium and agrees that she feels much less confused than formerly, with an increased sense of trust toward the hospital and her care providers (toward whom she was previously quite guarded and somewhat paranoid). Pt says she would now like to return home to her apartment as soon as she is medically cleared, but now agrees that it would be a good idea to have HHA to provide additional assistance at home, given her increasing difficulties with walking. Pt asks MD to speak to her HCP (cousin Jaki Theodorachad who lives in [place]) to discuss dispo and home care issues in more detail. Pt describes mood as "much better" and denies SI/HI/AVH.     Later in day, MD speaks to HCP Ms. Garland by phone. HCP (who reported she has been communicating with pt by phone throughout admission) agrees with MD's impression that pt had acute delirium which is now almost completely resolved. HCP states that she also agrees with pt's newly expressed interest in HHA services to facilitate return home to her apt, and agrees to speak to primary team SW regarding options. Pt seen today for f/u of initial and f/u telepsych consults on 9/3 and 9/4 respectively. Pt encountered sitting up in bed, awake and alert, A&Ox3. Pt initially wary of psych MD ("Your profession has never been helpful to my people--why are you here now?"), but becomes much more receptive when MD explains that psych consult was triggered by acute delirium, not suspicion of primary mental illness. MD reviews events prior to and immediately following pt's arrival at the hospital, and she reports being very confused and disoriented early in admission: "I woke up in this strange place [[facility] ED] and I thought I was being held prisoner. I thought this was some kind of illegal research facility where they were doing experiments on people. I pulled the fire alarm because I wanted someone to come rescue me." MD explains to pt that her acute SOB (which is why she called 911) was most likely due to fluid overload from acute CHF exacerbation, with possible contribution from COPD (pt is an active 50 py smoker). Pt's fluid overload likely worsened after home Lasix was DC'd due to concern for elevated creatinine, which may have triggered onset/worsening of acute delirium which led to psych consult being requested on 9/3. Pt's mental status appears to have greatly improved since initial psych consult, especially after Seroquel was started on 9/4. Pt is highly receptive to education about delirium and agrees that she feels much less confused than formerly, with an increased sense of trust toward the hospital and her care providers (toward whom she was previously quite guarded and somewhat paranoid). Pt says she would now like to return home to her apartment as soon as she is medically cleared, but now agrees that it would be a good idea to have HHA to provide additional assistance at home, given her increasing difficulties with walking. Pt asks MD to speak to her HCP (cousin Jaki Garland who lives in [place]) to discuss dispo and home care issues in more detail. Pt describes mood as "much better" and denies SI/HI/AVH.     Later in day, MD speaks to HCP Ms. Garland by phone. HCP (who reported she has been communicating with pt by phone throughout admission) agrees with MD's impression that pt had acute delirium which is now almost completely resolved. HCP states that she also agrees with pt's newly expressed interest in HHA services to facilitate return home to her apt, and agrees to speak to primary team SW regarding options.

## 2020-09-08 NOTE — PROGRESS NOTE BEHAVIORAL HEALTH - NSBHCHARTREVIEWVS_PSY_A_CORE FT
Vital Signs Last 24 Hrs  T(C): 36.4 (04 Sep 2020 14:26), Max: 36.4 (04 Sep 2020 05:00)  T(F): 97.5 (04 Sep 2020 14:26), Max: 97.5 (04 Sep 2020 05:00)  HR: 77 (04 Sep 2020 14:26) (72 - 87)  BP: 168/108 (04 Sep 2020 14:26) (119/74 - 168/108)  BP(mean): 119 (03 Sep 2020 17:08) (119 - 119)  RR: 18 (04 Sep 2020 14:26) (18 - 22)  SpO2: 100% (04 Sep 2020 14:26) (95% - 100%)
Vital Signs Last 24 Hrs  T(C): 37 (09 Sep 2020 05:00), Max: 37 (09 Sep 2020 05:00)  T(F): 98.6 (09 Sep 2020 05:00), Max: 98.6 (09 Sep 2020 05:00)  HR: 78 (09 Sep 2020 05:00) (74 - 97)  BP: 122/78 (09 Sep 2020 05:00) (117/83 - 127/76)  BP(mean): --  RR: 18 (09 Sep 2020 05:00) (17 - 18)  SpO2: 98% (09 Sep 2020 05:00) (98% - 100%)

## 2020-09-08 NOTE — PROGRESS NOTE BEHAVIORAL HEALTH - NSBHCONSULTRECOMMENDOTHER_PSY_A_CORE FT
1. C/w Seroquel 25 mg PO BID for management of acute delirium  --Recommend continuing this medication for 1-2 wks after DC, then stopping  2. C/w home Wellbutrin  mg daily  3. For SEVERE acute agitation, can use Haldol 1 mg PO/IV/IM q12h PRN (unlikely to need, as pt has not been agitated since initial consult)  4. Continue to monitor QTc at least every other day while on antipsychotics and obtain cardiology clearance for QTc >550 ms  5. Pt is psych cleared for dispo as soon as she is medically optimized  --Pt DOES appear to have capacity to make dispo decisions, but states she would like her HCP Jaki Garland to help with arrangements  6. Psychiatry is signing off. Reconsult if additional issues arise as inpatient  7. Pt can be referred to [facility] ([phone]) for OP f/u  8. Case d/w Dr. Plasencia of primary team    Danyelle Moya MD  Director, Consultation-Liaison Psychiatry Service  [id number]

## 2020-09-08 NOTE — PROGRESS NOTE ADULT - PROBLEM SELECTOR PLAN 3
- Creatinine was uptrending, hence IV lasix d/c'ed 8/29  - Resumed Low dose Entresto, hold Spironolactone  - Creatinine 1.14 today, RESOLVED (1.28 yesterday), will keep monitoring BMP daily  - Avoid Nephrotoxins - Creatinine was uptrending, hence IV lasix d/c'ed 8/29  - Resumed Low dose Entresto, hold Spironolactone  - Creatinine 1.15 today, RESOLVED (1.15 yesterday), will keep monitoring BMP daily  - Avoid Nephrotoxins

## 2020-09-08 NOTE — MEDICAL STUDENT PROGRESS NOTE(EDUCATION) - NS MD HP STUD ASPLAN ASSES FT
64 y/o female 64 y/o female with PMH of diabetes mellitus, hypertension, hyperlipidemia, breast cancer s/p chemo/XRT, cerebrovascular accident (no motor deficits), COPD (not on home O2), and non-ischemic cardiomyopathy (catheterization 2/2019 showed dRPL occlusion without ability to intervene), EF 31% by MUGA 7/2019, s/p SUBQ ICD 10/2019, AAA currently being worked up for dyspnea, abdominal pain, and AMS.    1. Altered mental status, acute  2. Nausea  3. Elevated alkaline phosphatase levels  4. CHF exacerbation   5. Acute kidney injury  6. Hypertension  7. Breast cancer  8. Cerebrovascular accident  9. COPD  10. DVT Prophylaxis
64 y/o female 64 y/o female with PMH of diabetes mellitus, hypertension, hyperlipidemia, breast cancer s/p chemo/XRT, cerebrovascular accident (no motor deficits), COPD (not on home O2), and non-ischemic cardiomyopathy (catheterization 2/2019 showed dRPL occlusion without ability to intervene), EF 31% by MUGA 7/2019, s/p SUBQ ICD 10/2019, AAA currently being worked up for dyspnea, abdominal pain, and AMS.    1. Altered mental status, acute  2. Nausea  3. Elevated alkaline phosphatase levels  4. CHF exacerbation   5. Acute kidney injury  6. Hypertension  7. Breast cancer  8. Cerebrovascular accident  9. COPD  10. DVT Prophylaxis
64 y/o female smoker with PMH of diabetes mellitus, hypertension, hyperlipidemia, breast cancer s/p chemo/XRT, cerebrovascular accident (no motor deficits), COPD (not on home O2), and non-ischemic cardiomyopathy (catheterization 2/2019 showed dRPL occlusion without ability to intervene), EF 31% by MUGA 7/2019, s/p SUBQ ICD 10/2019, AAA currently being worked up for dyspnea, abdominal pain, and AMS.    1. Altered mental status, acute  2. Nausea  3. Elevated alkaline phosphatase levels  4. CHF exacerbation   5. Acute kidney injury  6. Hypertension  7. Breast cancer  8. Cerebrovascular accident  9. COPD  10. DVT Prophylaxis
64 y/o female 64 y/o female with PMH of diabetes mellitus, hypertension, hyperlipidemia, breast cancer s/p chemo/XRT, cerebrovascular accident (no motor deficits), COPD (not on home O2), and non-ischemic cardiomyopathy (catheterization 2/2019 showed dRPL occlusion without ability to intervene), EF 31% by MUGA 7/2019, s/p SUBQ ICD 10/2019, AAA currently being worked up for dyspnea, abdominal pain, and AMS.    1. Delirium  2. CHF exacerbation  3. Acute kidney injury  4. hypertension  5. elevated alkaline phosphatase levels  6. breast cancer  7. cerebrovascular accident  8. COPD  9. Nausea, resolved  10. DVT prophylaxis

## 2020-09-08 NOTE — MEDICAL STUDENT PROGRESS NOTE(EDUCATION) - NS MD HP STUD ASPLAN PLAN FT
1. Delirium   ·	Differentials  ·	Metabolic encephalopathy: elevated ALKP, GGT; ammonia levels WNL  ·	Breast cancer metastasis  ·	Sepsis  ·	Depression: psych was consulted; on Bupropion (nicotine cessation  ·	Plan  ·	Monitor vitals + electrolytes  ·	May benefit from further pharmacological management as per psych  ·	D/c'ed Atorvastatin and Bupropion 9/3 as medication interactions can contribute to mental status changes; nicotine patches PRN for nicotine withdrawal  ·	Ammonia and lactate levels WNL, CT head no acute bleed  + stable calcified R supraclinoid aneursym noted (no acute intervention as per Neuro), Urine cs and Blood cs no growth  ·	Pt. Alk Phos + GGT elevated; HIDA scan + EGD + Abdominal U/S show no signs of cholecystitis  ·	Neuro Dr. Hernandez- advised no acute intervention, outpt followup recommended  ·	Psych consulted- Antipsychotics could not be prescribed earlier as QTc prolongation was noted on EKG. Repeat EKG shows normal QTc, hence, Seroquel 25 mg HS added today (9/4)-> titrated up to Seroquel 50 mg HS(repeat EKG 9/6 QTc 423msec), and Seroquel 25 mg daily    ·	Pt on Enhanced Supervision for now  ·	PT recommending Rehab facility upon discharge, discharge planning  ·	Psych reconsulted today; awaiting recommendations    2. CHF exacerbation   ·	BNP on admission 35,000; troponin downtrending  ·	IV Lasix d/c 8/29 (CHEN)  ·	DASH/TLC diet  ·	Cardio consulted (Dr. Patterson)  ·	Echo 8/29 EF 15-20%.  New moderate to severe MR  ·	ANGELINA 9/1 to evaluate valve function showed mural thrombus in descending aorta; started on Eliuquis 10 mg (9/1)  ·	On low dose entresto and metoprolol; spironolactone held due to Cr. elevation    3. Acute kidney injury  ·	Superimposed on CKD  ·	Creatine initially uptrending to 1.91 (8/31), has since improved to 1.15 today   ·	BMP monitored daily  ·	Nephrotoxins avoided  ·	Lasix + spironolactone withheld    4. Hypertension  ·	BP ranging from 138/100 - 176/110  ·	Pt. on Entresto 24-26 mg and metoprolol succinate 50 mg daily  ·	Hydralazine 10 mg, isosorbide mononitrite 30 mg added 9/1 for better control of BP  Monitor BP. hold medications with parameters    5. Elevated alkaline phosphatase levels  ·	GGT (753; 9/2) and ALKP (209) elevated since admission; AST/ALT and bilirubin WNL  ·	Pt. reports RUQ abdominal pain and positive lombardi sign yesterday, negative today  ·	U/S hepatic: mild fatty liver, no gallstones, no gallbladder wall thickness or dilated CBD  ·	biliary obstruction ruled out with HIDA scan  ·	negative anti-mitochondrial antibody and anti-smooth muscle antibody  ·	Atorvastatin d/c  ·	EGD shows gastritis; Dr. Waters to repeat outpatient     6. Breast cancer  ·	HX of r. breast cancer  ·	Anastrozole continued  ·	F/U Dr. Serrato September 18    7. Cerebrovascular accident:   ·	AAOX2; pt. is anxious and in acute distress  ·	recent CTA head/neck shows chronic rt frontal, parietal, lacunar infarcts.  ·	Plan is to continue to monitor for signs/symptoms of stroke    8. COPD: Albuterol inhaler Q6 hours.  Saturating well without oxygen (98% x2)    9. Nausea, resolved: reports nausea x1 month  ·	Reglan PRN; zofran initially withheld b/c prolonged QTc (510 ms)  ·	Possible gastroparesis from DM v. biliary obstruction v. nicotine withdrawal  ·	R/O gastroparesis with gastric emptying scintigraphy  ·	EGD showed gastritis  ·	Daily PPI  ·	C/W bupropion for nicotine WD    10. DVT Prophylaxis: continue hepatin; also has HX of Gilson filter (1994)

## 2020-09-08 NOTE — PROGRESS NOTE ADULT - ATTENDING COMMENTS
Patient seen and examined this morning. Plan of care discussed with medical residents Dr. Plasencia and Dr. Desai.    Patient states she feels well and denies any acute complaints.    VITALS: reviewed    EXAM:  GEN: alert, in no acute distress  CVS: RRR, normal s1/s2  RESP: clear bilaterally, no wheezing, rales, or rhonchi  ABD: soft, nontender, nondistended, normoactive bowel sounds  EXT: no LE edema  NEURO: AAOx3, no focal deficits    LABS: reviewed    IMAGING: reviewed    A/P:  66 y/o female with multiple comorbidities as above admitted for acute CHF exacerbation. Hospital course complicated by Delirium, CHEN, and transaminitis.    #Delirium  -Appreciate psych and neuro inputs  -DC'd atorvastatin and buproprion  -c/w increased Seroquel  -Mental status improved, is now calm and cooperative    #Acute on chronic CHF exacerbation, s/p ICD  -resolved s/p Lasix, was DC'd due to CHEN, now is euvolemic, can resume PO lasix if become volume overloaded  -Echo with depressed EF of 15-20%  -c/w Metoprolol and Entresto    #CHEN  -resolved, continue to monitor    #Elevated Alk Phos  -s/p HIDA and EGD  -appreciate GI input, needs outpatient GI follow up and repeat EGD as outpatient    Rest as per resident's note.

## 2020-09-08 NOTE — PROGRESS NOTE BEHAVIORAL HEALTH - SUMMARY
65F, single and living alone in apt without services, with no reported PHx and MHx of 50 pack year smoking with DM, HTN, HLD, CVA, breast CA s/p chemo/XRT, and NICM (catheterization 02/2019 showed dRPL occlusion without ability to intervene), EF 31% by MUGA 7/2019, s/p SubQ ICD 10/2019, and AAA, BIBEMS activated by self on 8/27/20 c/o SOB and admitted for management of CHF exacerbation. 65F, single and living alone in [facility], [place] without services, with no reported PHx and MHx of 50 pack year smoking with DM, HTN, HLD, CVA, breast CA s/p chemo/XRT, and NICM (catheterization 02/2019 showed dRPL occlusion without ability to intervene), EF 31% by MUGA 7/2019, s/p SubQ ICD 10/2019, and AAA, BIBEMS activated by self on 8/27/20 c/o SOB and admitted for management of CHF exacerbation and COPD. Psych consult was initially requested on 9/3 for assistance in managing acute delirium. Pt was started on Seroquel on 9/4, and since then her delirium has improved dramatically (now almost completely resolved). On f/u exam today, pt is receptive to psychoeducation about delirium and is able to reality-test that her previously stated paranoid beliefs (she believed she was being held prisoner in an illegal medical research facility) were in fact delusions i/s/o acute delirium. Pt now also expresses interest in receiving HHA services (to which she was previously resistant) with goal of preserving her ability to live independently at home, and she states that she is willing to delegate decision-making and planning regarding dispo to her HCP, cousin Jaki Santanaaubrey who lives in [place]. At this time, pt appears sufficiently psych stable for dispo.  Pt does not appear to present an acute risk of harm to self or others at the time of assessment, and does not appear to be in need of admission to IP psych at the time of assessment.

## 2020-09-08 NOTE — MEDICAL STUDENT PROGRESS NOTE(EDUCATION) - SUBJECTIVE AND OBJECTIVE BOX
C/C: "I can't breathe"  HPI: Pt. is a 64 y/o female with PMH of diabetes mellitus, hypertension, hyperlipidemia, breast cancer s/p chemo/XRT, cerebrovascular accident (no motor deficits), COPD (not on home O2), and non-ischemic cardiomyopathy (catheterization 2/2019 showed dRPL occlusion without ability to intervene), EF 31% by MUGA 7/2019, s/p SUBQ ICD 10/2019, AAA presented to the ED on 8/27 with SOB.  The SOB began 1 month ago.  The pt. reports there was no change in her medical history, diet, activity level, or personal life at the time the SOB began.  She reports she began the new medication, bupropion, around this time to help with nicotine cravings as she has just recently quit smoking.  She denies pain with breathing, trouble sleeping, headaches, cough, edema, or palpitations.  She reports nausea which has been persistent for 1 month and is not associated with eating.  The patient is a poor historian and was not willing to discuss any further details.  As per the ED, she came to the ED after she was told to come in when she had SOB when getting an x-ray.      During her hospital course, patient was admitted for Dyspnea workup, CTA negative for PE, BNP >35,000, pt was started on IV Lasix, discontinued 8/29 due to rising creatinine levels. Echo from 8/29 with EF 15-20% showing new Moderate to Severe Mitral regurgitation (likely functional leading to HF exacerbations), troponins downtrending. As per Cardiology recommendations, low dose Entresto has been resumed and ANGELINA done 9/1 showed moderate MR, Mural thrombus extending into descending aorta, no LA thrombus, LAE + Vascular surgery was consulted regarding need for intervention. Due to persistent elevation in Alk phosphatase and GGT, although USG Abdomen was negative for CBD dilation and GB thickening, HIDA scan was pursued 9/3 and no abnormal findings were found.  Bupropion and statin was D/C due to elevated LFTS and AMS.  Endoscopy was pursued 9/4 and showed gastritis.  Pt. is currently being worked up for waxing and waning of agitation and congusion-CT brain done-no acute bleed, urine culture and blood cultures negative.  Psych consulted.    Pt has been having recent experiencing increasing agitation and confusion in the past few days- CT brain done- no acute bleed, Urine cs, Blood cs negative, will continue to     Interval HPI/overnight events: C/C: "I can't breathe"  HPI: Pt. is a 64 y/o female with PMH of diabetes mellitus, hypertension, hyperlipidemia, breast cancer s/p chemo/XRT, cerebrovascular accident (no motor deficits), COPD (not on home O2), and non-ischemic cardiomyopathy (catheterization 2/2019 showed dRPL occlusion without ability to intervene), EF 31% by MUGA 7/2019, s/p SUBQ ICD 10/2019, AAA presented to the ED on 8/27 with SOB.  The SOB began 1 month ago.  The pt. reports there was no change in her medical history, diet, activity level, or personal life at the time the SOB began.  She reports she began the new medication, bupropion, around this time to help with nicotine cravings as she has just recently quit smoking.  She denies pain with breathing, trouble sleeping, headaches, cough, edema, or palpitations.  She reports nausea which has been persistent for 1 month and is not associated with eating.  The patient is a poor historian and was not willing to discuss any further details.  As per the ED, she came to the ED after she was told to come in when she had SOB when getting an x-ray.      During her hospital course, patient was admitted for Dyspnea workup, CTA negative for PE, BNP >35,000, pt was started on IV Lasix, discontinued 8/29 due to rising creatinine levels. Echo from 8/29 with EF 15-20% showing new Moderate to Severe Mitral regurgitation (likely functional leading to HF exacerbations), troponins downtrending. As per Cardiology recommendations, low dose Entresto has been resumed and ANGELINA done 9/1 showed moderate MR, Mural thrombus extending into descending aorta, no LA thrombus, LAE + Vascular surgery was consulted regarding need for intervention. Due to persistent elevation in Alk phosphatase and GGT, although USG Abdomen was negative for CBD dilation and GB thickening, HIDA scan was pursued 9/3 and no abnormal findings were found.  Bupropion and statin was D/C due to elevated LFTS and AMS.  Endoscopy was pursued 9/4 and showed gastritis.  Pt. is currently being worked up for waxing and waning of agitation and congusion-CT brain done-no acute bleed, urine culture and blood cultures negative.  Psych consulted.    Interval HPI/overnight events: Pt. seen at bedside this AM, her affect was pleasant and she was AAOX3.  She had no complaints and denied any abdominal pain or SOB.    MEDICATIONS  (STANDING):  ALBUTerol    90 MICROgram(s) HFA Inhaler 2 Puff(s) Inhalation every 6 hours  anastrozole 1 milliGRAM(s) Oral daily  aspirin enteric coated 81 milliGRAM(s) Oral daily  atorvastatin 40 milliGRAM(s) Oral at bedtime  clopidogrel Tablet 75 milliGRAM(s) Oral daily  dextrose 5%. 1000 milliLiter(s) (50 mL/Hr) IV Continuous <Continuous>  furosemide    Tablet 20 milliGRAM(s) Oral daily  heparin   Injectable 5000 Unit(s) SubCutaneous every 8 hours  hydrALAZINE 50 milliGRAM(s) Oral every 8 hours  insulin lispro (HumaLOG) corrective regimen sliding scale   SubCutaneous three times a day before meals  insulin lispro (HumaLOG) corrective regimen sliding scale   SubCutaneous at bedtime  isosorbide   mononitrate ER Tablet (IMDUR) 30 milliGRAM(s) Oral daily  metoprolol succinate ER 50 milliGRAM(s) Oral daily  QUEtiapine 25 milliGRAM(s) Oral daily  QUEtiapine 50 milliGRAM(s) Oral at bedtime  sacubitril 24 mG/valsartan 26 mG 1 Tablet(s) Oral two times a day  tiotropium 18 MICROgram(s) Capsule 1 Capsule(s) Inhalation daily    MEDICATIONS  (PRN):  guaiFENesin   Syrup  (Sugar-Free) 100 milliGRAM(s) Oral every 6 hours PRN Cough  meclizine 12.5 milliGRAM(s) Oral daily PRN Nausea    ROS:   Constitutional: Denies changes in weight, appetite, sleep; admits to nausea  HEENT: Denies headache, denies diplopia, denies nasal congestion; denies dysphagia or odynophagia   CV: denies palpitations, denies chest pain  Pulm: admits to trouble breathing, denies cough or wheezing  GI: LUQ abdominal pain; denies changes in BM  : Denies dysuria  Skin: denies jaundice  Back/MSK: denies back pain or weakness  Neuro/Psych: denies headache; admits to anxiety and feelings of hopelessness    Physical Exam:  Vital Signs Last 24 Hrs  T(C): 36.7 (08 Sep 2020 05:20), Max: 36.9 (07 Sep 2020 21:14)  T(F): 98 (08 Sep 2020 05:20), Max: 98.5 (07 Sep 2020 21:14)  HR: 81 (08 Sep 2020 12:12) (79 - 91)  BP: 122/76 (08 Sep 2020 12:12) (117/78 - 134/97)  BP(mean): --  RR: 18 (08 Sep 2020 05:20) (18 - 18)  SpO2: 96% (08 Sep 2020 05:20) (96% - 100%)  General: Pt. appears to be comfortable.  Well developed, obese,  female.  Head: Atraumatic normocephalic  Eyes: conjunctiva and sclera clear; EOMI; PERRLA  Neck: No JVD, normal thyroid, trachea midline  Chest/Lungs: CTABL  Heart: regular rate and rhythm; no murmurs rubs or gallops  Abdomen: soft, nontender, nondistended  Nervous system: AAOX2  Psych: patient is fearful and paranoid.  She reports feelings of hopelessness.  Extremities: 2+ pre tibial and radial pulses; no clubbing cyanosis or edema  Skin: warm; lower extremities are dry                          11.1   3.96  )-----------( 250      ( 08 Sep 2020 07:29 )             33.9       09-08    139  |  108  |  9   ----------------------------<  102<H>  3.5   |  23  |  1.15    Ca    8.4      08 Sep 2020 07:29  Phos  3.3     09-08  Mg     2.1     09-08    TPro  7.1  /  Alb  2.7<L>  /  TBili  0.9  /  DBili  x   /  AST  14  /  ALT  12  /  AlkPhos  209<H>  09-08

## 2020-09-08 NOTE — PROGRESS NOTE BEHAVIORAL HEALTH - NSBHCONSULTFOLLOWAFTERCARE_PSY_A_CORE FT
Pt psych cleared for dispo as soon as she is medically optimized  Pt wishes to delegate dispo planning and arrangements for HHA to her HCP Jaki Garland

## 2020-09-08 NOTE — PROGRESS NOTE ADULT - PROBLEM SELECTOR PLAN 1
-Pt was been increasingly agitated and confused for about 2-3 days last week (Sep 3rd)  -Ammonia and lactate levels wnl, CT head no acute bleed + stable calcified R supraclinoid aneursym noted (no acute intervention as per Neuro), Urine cs and Blood cs no growth  -As pt's ALP and GGT were elevated, Acalculous Dalia was evaluated as a possible cause of pt's nausea and confusion- HIDA scan 9/3 no acute cholecystitis, no MRCP as pt has ICD  -Will continue to monitor closely  -Neuro Dr. Hernandez- advised no acute intervention, outpt followup recommended  -D/c'ed Atorvastatin and Bupropion 9/3 as medication interactions can contribute to mental status changes  -Can add Nicotine patches for withdrawal symptoms (Bupropion d/c'ed) as pt has a strong smoking hx  -Psych consulted- Antipsychotics could not be prescribed earlier as QTc prolongation was noted on EKG. Repeat EKG shows normal QTc, hence, Seroquel 25 mg HS added today (9/4)-> titrated up to Seroquel 50 mg HS(repeat EKG 9/6 QTc 423msec), and Seroquel 25 mg daily    -Psych re-consulted 9/8- -Pt was been increasingly agitated and confused for about 2-3 days last week (Sep 3rd)  -Ammonia and lactate levels wnl, CT head no acute bleed + stable calcified R supraclinoid aneursym noted (no acute intervention as per Neuro), Urine cs and Blood cs no growth  -As pt's ALP and GGT were elevated, Acalculous Dalia was evaluated as a possible cause of pt's nausea and confusion- HIDA scan 9/3 no acute cholecystitis, no MRCP as pt has ICD  -Will continue to monitor closely  -Neuro Dr. Hernandez- advised no acute intervention, outpt followup recommended  -D/c'ed Atorvastatin and Bupropion 9/3 as medication interactions can contribute to mental status changes  -Can add Nicotine patches for withdrawal symptoms (Bupropion d/c'ed) as pt has a strong smoking hx  -Psych consulted- Antipsychotics could not be prescribed earlier as QTc prolongation was noted on EKG. Repeat EKG shows normal QTc, hence, Seroquel 25 mg HS added today (9/4)-> titrated up to Seroquel 50 mg HS(repeat EKG 9/6 QTc 423msec), and Seroquel 25 mg daily    -Psych re-consulted 9/8-As per Psych, pt has decision making capacity as her mental status has improved  -D/C planning

## 2020-09-08 NOTE — PROGRESS NOTE ADULT - SUBJECTIVE AND OBJECTIVE BOX
PGY-1 Progress Note discussed with attending    PAGER #: [[phone]] TILL 5:00 PM  PLEASE CONTACT ON CALL TEAM:  - On Call Team (Please refer to Taryn) FROM 5:00 PM - 8:30PM  - Nightfloat Team FROM 8:30 -7:30 AM    CHIEF COMPLAINT & BRIEF HOSPITAL COURSE: 66 y/o F smoker with medical history significant for DM, HTN, HLD, Breast CA s/p chemo/XRT, SCC, CVA no motor deficits, COPD (not on home O2), NICM with EF 31% by MUGA 7/2019 (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 She was going for an xray when they told her to come into the hospital. Patient states that she was feeling nauseous and was having difficulty breathing. Patient was little confused and is very poor historian. Patient states that she smokes 1 PPD x 50 years. She denies chest pain, head ache, dizziness, palpitations LOC, bowel or urinary problems. Does reports she has a cough, non-productive, and vomited once, with clear non-bloody vomit. No sick contacts, no recent travel. Of note pt reports history "a long time ago" of DVT/PE, and was on blood thinners, unsure of time period. Sister also had PE.    During her hospital course, patient was admitted for Dyspnea workup, CTA negative for PE, BNP >35,000, pt was started on IV Lasix, discontinued 8/29 due to rising creatinine levels. Echo from 8/29 with EF 15-20% showing new Moderate to Severe Mitral regurgitation (likely functional leading to HF exacerbations), troponins downtrending. As per Cardiology recommendations, low dose Entresto has been resumed and ANGELINA done 9/1 showed moderate MR, Mural thrombus extending into descending aorta, no LA thrombus, LAE + Vascular surgery was consulted regarding need for intervention. Due to persistent elevation in Alk phosphatase and GGT, although USG Abdomen was negative for CBD dilation and GB thickening, HIDA scan was pursued which was negative for Acute cholecystitis. As pt has been increasingly agitated recently, pt's Atorvastatin and Bupropion was discontinued on 9/3 as medical interactions can contribute to mental status changes. Hydralazine dose increased to 50 mg Q8 9/4 for better control of BP. 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 As repeat EKG shows normal QTc interval, patient was started on Seroquel HS on 9/4-> uptitrated to Seroquel 50 mg HS, 25 mg AM. Physical therapy was re-consulted, now recommending LIONEL placement for pt. Atorvastatin has been resumed 9/6    INTERVAL HPI/OVERNIGHT EVENTS: Pt assessed at bedside, her agitated has improved, she is more alert today, conversing comfortably. Eager about discharge.    MEDICATIONS  (STANDING):  ALBUTerol    90 MICROgram(s) HFA Inhaler 2 Puff(s) Inhalation every 6 hours  anastrozole 1 milliGRAM(s) Oral daily  aspirin enteric coated 81 milliGRAM(s) Oral daily  atorvastatin 40 milliGRAM(s) Oral at bedtime  clopidogrel Tablet 75 milliGRAM(s) Oral daily  dextrose 5%. 1000 milliLiter(s) (50 mL/Hr) IV Continuous <Continuous>  furosemide    Tablet 20 milliGRAM(s) Oral daily  heparin   Injectable 5000 Unit(s) SubCutaneous every 8 hours  hydrALAZINE 50 milliGRAM(s) Oral every 8 hours  insulin lispro (HumaLOG) corrective regimen sliding scale   SubCutaneous three times a day before meals  insulin lispro (HumaLOG) corrective regimen sliding scale   SubCutaneous at bedtime  isosorbide   mononitrate ER Tablet (IMDUR) 30 milliGRAM(s) Oral daily  metoprolol succinate ER 50 milliGRAM(s) Oral daily  QUEtiapine 25 milliGRAM(s) Oral daily  QUEtiapine 50 milliGRAM(s) Oral at bedtime  sacubitril 24 mG/valsartan 26 mG 1 Tablet(s) Oral two times a day  tiotropium 18 MICROgram(s) Capsule 1 Capsule(s) Inhalation daily    MEDICATIONS  (PRN):  guaiFENesin   Syrup  (Sugar-Free) 100 milliGRAM(s) Oral every 6 hours PRN Cough  meclizine 12.5 milliGRAM(s) Oral daily PRN Nausea      REVIEW OF SYSTEMS:  CONSTITUTIONAL: No fever, weight loss, or fatigue  RESPIRATORY: No cough, wheezing, chills or hemoptysis; No shortness of breath  CARDIOVASCULAR: No chest pain, palpitations, dizziness, or leg swelling  GASTROINTESTINAL: No abdominal pain. No nausea, vomiting, or hematemesis; No diarrhea or constipation. No melena or hematochezia.  GENITOURINARY: No dysuria or hematuria, urinary frequency  NEUROLOGICAL: No headaches, memory loss, loss of strength, numbness, or tremors  SKIN: No itching, burning, rashes, or lesions     Vital Signs Last 24 Hrs  T(C): 36.7 (08 Sep 2020 14:44), Max: 36.9 (07 Sep 2020 21:14)  T(F): 98 (08 Sep 2020 14:44), Max: 98.5 (07 Sep 2020 21:14)  HR: 74 (08 Sep 2020 14:44) (74 - 91)  BP: 127/76 (08 Sep 2020 14:44) (117/78 - 134/97)  BP(mean): --  RR: 17 (08 Sep 2020 14:44) (17 - 18)  SpO2: 100% (08 Sep 2020 14:44) (96% - 100%)    PHYSICAL EXAMINATION:  GENERAL: NAD, well built  HEAD:  Atraumatic, Normocephalic  EYES:  conjunctiva and sclera clear  NECK: Supple, No JVD, Normal thyroid  CHEST/LUNG: Clear to auscultation. Clear to percussion bilaterally; No rales, rhonchi, wheezing, or rubs  HEART: Regular rate and rhythm; No murmurs, rubs, or gallops  ABDOMEN: Soft, Nontender, Nondistended; Bowel sounds present  NERVOUS SYSTEM:  Alert & Oriented X3,    EXTREMITIES:  2+ Peripheral Pulses, No clubbing, cyanosis, or edema  SKIN: warm dry                          11.1   3.96  )-----------( 250      ( 08 Sep 2020 07:29 )             33.9     09-08    139  |  108  |  9   ----------------------------<  102<H>  3.5   |  23  |  1.15    Ca    8.4      08 Sep 2020 07:29  Phos  3.3     09-08  Mg     2.1     09-08    TPro  7.1  /  Alb  2.7<L>  /  TBili  0.9  /  DBili  x   /  AST  14  /  ALT  12  /  AlkPhos  209<H>  09-08    LIVER FUNCTIONS - ( 08 Sep 2020 07:29 )  Alb: 2.7 g/dL / Pro: 7.1 g/dL / ALK PHOS: 209 U/L / ALT: 12 U/L DA / AST: 14 U/L / GGT: x                   CAPILLARY BLOOD GLUCOSE      RADIOLOGY & ADDITIONAL TESTS: PGY-1 Progress Note discussed with attending    PAGER #: [[phone]] TILL 5:00 PM  PLEASE CONTACT ON CALL TEAM:  - On Call Team (Please refer to Taryn) FROM 5:00 PM - 8:30PM  - Nightfloat Team FROM 8:30 -7:30 AM    CHIEF COMPLAINT & BRIEF HOSPITAL COURSE: 66 y/o F smoker with medical history significant for DM, HTN, HLD, Breast CA s/p chemo/XRT, SCC, CVA no motor deficits, COPD (not on home O2), NICM with EF 31% by MUGA 7/2019 (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 She was going for an xray when they told her to come into the hospital. Patient states that she was feeling nauseous and was having difficulty breathing. Patient was little confused and is very poor historian. Patient states that she smokes 1 PPD x 50 years. She denies chest pain, head ache, dizziness, palpitations LOC, bowel or urinary problems. Does reports she has a cough, non-productive, and vomited once, with clear non-bloody vomit. No sick contacts, no recent travel. Of note pt reports history "a long time ago" of DVT/PE, and was on blood thinners, unsure of time period. Sister also had PE.    During her hospital course, patient was admitted for Dyspnea workup, CTA negative for PE, BNP >35,000, pt was started on IV Lasix, discontinued 8/29 due to rising creatinine levels. Echo from 8/29 with EF 15-20% showing new Moderate to Severe Mitral regurgitation (likely functional leading to HF exacerbations), troponins downtrending. As per Cardiology recommendations, low dose Entresto has been resumed and ANGELINA done 9/1 showed moderate MR, Mural thrombus extending into descending aorta, no LA thrombus, LAE + Vascular surgery was consulted regarding need for intervention. Due to persistent elevation in Alk phosphatase and GGT, although USG Abdomen was negative for CBD dilation and GB thickening, HIDA scan was pursued which was negative for Acute cholecystitis. As pt has been increasingly agitated recently, pt's Atorvastatin and Bupropion was discontinued on 9/3 as medical interactions can contribute to mental status changes. Hydralazine dose increased to 50 mg Q8 9/4 for better control of BP. 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 As repeat EKG shows normal QTc interval, patient was started on Seroquel HS on 9/4-> uptitrated to Seroquel 50 mg HS, 25 mg AM. Physical therapy was re-consulted, now recommending LIONEL placement for pt. Atorvastatin has been resumed 9/6    INTERVAL HPI/OVERNIGHT EVENTS: Pt assessed at bedside, her agitated has improved, she is more alert today, conversing comfortably. Eager about discharge.    MEDICATIONS  (STANDING):  ALBUTerol    90 MICROgram(s) HFA Inhaler 2 Puff(s) Inhalation every 6 hours  anastrozole 1 milliGRAM(s) Oral daily  aspirin enteric coated 81 milliGRAM(s) Oral daily  atorvastatin 40 milliGRAM(s) Oral at bedtime  clopidogrel Tablet 75 milliGRAM(s) Oral daily  dextrose 5%. 1000 milliLiter(s) (50 mL/Hr) IV Continuous <Continuous>  furosemide    Tablet 20 milliGRAM(s) Oral daily  heparin   Injectable 5000 Unit(s) SubCutaneous every 8 hours  hydrALAZINE 50 milliGRAM(s) Oral every 8 hours  insulin lispro (HumaLOG) corrective regimen sliding scale   SubCutaneous three times a day before meals  insulin lispro (HumaLOG) corrective regimen sliding scale   SubCutaneous at bedtime  isosorbide   mononitrate ER Tablet (IMDUR) 30 milliGRAM(s) Oral daily  metoprolol succinate ER 50 milliGRAM(s) Oral daily  QUEtiapine 25 milliGRAM(s) Oral daily  QUEtiapine 50 milliGRAM(s) Oral at bedtime  sacubitril 24 mG/valsartan 26 mG 1 Tablet(s) Oral two times a day  tiotropium 18 MICROgram(s) Capsule 1 Capsule(s) Inhalation daily    MEDICATIONS  (PRN):  guaiFENesin   Syrup  (Sugar-Free) 100 milliGRAM(s) Oral every 6 hours PRN Cough  meclizine 12.5 milliGRAM(s) Oral daily PRN Nausea      REVIEW OF SYSTEMS:  CONSTITUTIONAL: No fever, weight loss, or fatigue  RESPIRATORY: No cough, wheezing, chills or hemoptysis; No shortness of breath  CARDIOVASCULAR: No chest pain, palpitations, dizziness, or leg swelling  GASTROINTESTINAL: No abdominal pain. No nausea, vomiting, or hematemesis; No diarrhea or constipation. No melena or hematochezia.  GENITOURINARY: No dysuria or hematuria, urinary frequency  NEUROLOGICAL: No headaches, memory loss, loss of strength, numbness, or tremors  SKIN: No itching, burning, rashes, or lesions     Vital Signs Last 24 Hrs  T(C): 36.7 (08 Sep 2020 14:44), Max: 36.9 (07 Sep 2020 21:14)  T(F): 98 (08 Sep 2020 14:44), Max: 98.5 (07 Sep 2020 21:14)  HR: 74 (08 Sep 2020 14:44) (74 - 91)  BP: 127/76 (08 Sep 2020 14:44) (117/78 - 134/97)  BP(mean): --  RR: 17 (08 Sep 2020 14:44) (17 - 18)  SpO2: 100% (08 Sep 2020 14:44) (96% - 100%)    PHYSICAL EXAMINATION:  GENERAL: NAD, well built  HEAD:  Atraumatic, Normocephalic  EYES:  conjunctiva and sclera clear  NECK: Supple, No JVD, Normal thyroid  CHEST/LUNG: Clear to auscultation. Clear to percussion bilaterally; No rales, rhonchi, wheezing, or rubs  HEART: Regular rate and rhythm; No murmurs, rubs, or gallops  ABDOMEN: Soft, Nontender, Nondistended; Bowel sounds present  NERVOUS SYSTEM:  Alert & Oriented X3   EXTREMITIES:  2+ Peripheral Pulses, No clubbing, cyanosis, or edema  SKIN: warm dry                          11.1   3.96  )-----------( 250      ( 08 Sep 2020 07:29 )             33.9     09-08    139  |  108  |  9   ----------------------------<  102<H>  3.5   |  23  |  1.15    Ca    8.4      08 Sep 2020 07:29  Phos  3.3     09-08  Mg     2.1     09-08    TPro  7.1  /  Alb  2.7<L>  /  TBili  0.9  /  DBili  x   /  AST  14  /  ALT  12  /  AlkPhos  209<H>  09-08    LIVER FUNCTIONS - ( 08 Sep 2020 07:29 )  Alb: 2.7 g/dL / Pro: 7.1 g/dL / ALK PHOS: 209 U/L / ALT: 12 U/L DA / AST: 14 U/L / GGT: x                   CAPILLARY BLOOD GLUCOSE      RADIOLOGY & ADDITIONAL TESTS:  No new imaging today

## 2020-09-08 NOTE — PROGRESS NOTE BEHAVIORAL HEALTH - PRIMARY DX
Delirium due to another medical condition, acute, mixed level of activity
Delirium due to another medical condition, acute, mixed level of activity

## 2020-09-08 NOTE — PROGRESS NOTE BEHAVIORAL HEALTH - NSBHCHARTREVIEWLAB_PSY_A_CORE FT
CBC Full  -  ( 04 Sep 2020 14:48 )  WBC Count : 6.05 K/uL  RBC Count : 3.46 M/uL  Hemoglobin : 10.3 g/dL  Hematocrit : 33.5 %  Platelet Count - Automated : 293 K/uL  Mean Cell Volume : 96.8 fl  Mean Cell Hemoglobin : 29.8 pg  Mean Cell Hemoglobin Concentration : 30.7 gm/dL  Auto Neutrophil # : x  Auto Lymphocyte # : x  Auto Monocyte # : x  Auto Eosinophil # : x  Auto Basophil # : x  Auto Neutrophil % : x  Auto Lymphocyte % : x  Auto Monocyte % : x  Auto Eosinophil % : x  Auto Basophil % : x
11.1   3.96  )-----------( 250      ( 08 Sep 2020 07:29 )             33.9   09-08    139  |  108  |  9   ----------------------------<  102<H>  3.5   |  23  |  1.15    Ca    8.4      08 Sep 2020 07:29  Phos  3.3     09-08  Mg     2.1     09-08    TPro  7.1  /  Alb  2.7<L>  /  TBili  0.9  /  DBili  x   /  AST  14  /  ALT  12  /  AlkPhos  209<H>  09-08

## 2020-09-09 ENCOUNTER — APPOINTMENT (OUTPATIENT)
Dept: INTERNAL MEDICINE | Facility: CLINIC | Age: 65
End: 2020-09-09

## 2020-09-09 VITALS
DIASTOLIC BLOOD PRESSURE: 74 MMHG | SYSTOLIC BLOOD PRESSURE: 104 MMHG | OXYGEN SATURATION: 99 % | TEMPERATURE: 98 F | HEART RATE: 76 BPM | RESPIRATION RATE: 16 BRPM

## 2020-09-09 LAB
GLUCOSE BLDC GLUCOMTR-MCNC: 110 MG/DL — HIGH (ref 70–99)
GLUCOSE BLDC GLUCOMTR-MCNC: 93 MG/DL — SIGNIFICANT CHANGE UP (ref 70–99)
SARS-COV-2 RNA SPEC QL NAA+PROBE: SIGNIFICANT CHANGE UP

## 2020-09-09 PROCEDURE — U0003: CPT

## 2020-09-09 PROCEDURE — 83880 ASSAY OF NATRIURETIC PEPTIDE: CPT

## 2020-09-09 PROCEDURE — 36415 COLL VENOUS BLD VENIPUNCTURE: CPT

## 2020-09-09 PROCEDURE — 85730 THROMBOPLASTIN TIME PARTIAL: CPT

## 2020-09-09 PROCEDURE — 86381 MITOCHONDRIAL ANTIBODY EACH: CPT

## 2020-09-09 PROCEDURE — 86900 BLOOD TYPING SEROLOGIC ABO: CPT

## 2020-09-09 PROCEDURE — 82550 ASSAY OF CK (CPK): CPT

## 2020-09-09 PROCEDURE — 93320 DOPPLER ECHO COMPLETE: CPT

## 2020-09-09 PROCEDURE — 80061 LIPID PANEL: CPT

## 2020-09-09 PROCEDURE — 84100 ASSAY OF PHOSPHORUS: CPT

## 2020-09-09 PROCEDURE — 82607 VITAMIN B-12: CPT

## 2020-09-09 PROCEDURE — 87635 SARS-COV-2 COVID-19 AMP PRB: CPT

## 2020-09-09 PROCEDURE — 80074 ACUTE HEPATITIS PANEL: CPT

## 2020-09-09 PROCEDURE — 82570 ASSAY OF URINE CREATININE: CPT

## 2020-09-09 PROCEDURE — 93325 DOPPLER ECHO COLOR FLOW MAPG: CPT

## 2020-09-09 PROCEDURE — 78227 HEPATOBIL SYST IMAGE W/DRUG: CPT

## 2020-09-09 PROCEDURE — 83935 ASSAY OF URINE OSMOLALITY: CPT

## 2020-09-09 PROCEDURE — 85027 COMPLETE CBC AUTOMATED: CPT

## 2020-09-09 PROCEDURE — 80048 BASIC METABOLIC PNL TOTAL CA: CPT

## 2020-09-09 PROCEDURE — 86038 ANTINUCLEAR ANTIBODIES: CPT

## 2020-09-09 PROCEDURE — 86147 CARDIOLIPIN ANTIBODY EA IG: CPT

## 2020-09-09 PROCEDURE — 83605 ASSAY OF LACTIC ACID: CPT

## 2020-09-09 PROCEDURE — 99239 HOSP IP/OBS DSCHRG MGMT >30: CPT

## 2020-09-09 PROCEDURE — 84443 ASSAY THYROID STIM HORMONE: CPT

## 2020-09-09 PROCEDURE — 70450 CT HEAD/BRAIN W/O DYE: CPT

## 2020-09-09 PROCEDURE — 84075 ASSAY ALKALINE PHOSPHATASE: CPT

## 2020-09-09 PROCEDURE — 82746 ASSAY OF FOLIC ACID SERUM: CPT

## 2020-09-09 PROCEDURE — 71045 X-RAY EXAM CHEST 1 VIEW: CPT

## 2020-09-09 PROCEDURE — 82140 ASSAY OF AMMONIA: CPT

## 2020-09-09 PROCEDURE — 83036 HEMOGLOBIN GLYCOSYLATED A1C: CPT

## 2020-09-09 PROCEDURE — 82248 BILIRUBIN DIRECT: CPT

## 2020-09-09 PROCEDURE — 82977 ASSAY OF GGT: CPT

## 2020-09-09 PROCEDURE — 93306 TTE W/DOPPLER COMPLETE: CPT

## 2020-09-09 PROCEDURE — 80307 DRUG TEST PRSMV CHEM ANLYZR: CPT

## 2020-09-09 PROCEDURE — 87040 BLOOD CULTURE FOR BACTERIA: CPT

## 2020-09-09 PROCEDURE — 88312 SPECIAL STAINS GROUP 1: CPT

## 2020-09-09 PROCEDURE — 97162 PT EVAL MOD COMPLEX 30 MIN: CPT

## 2020-09-09 PROCEDURE — 86850 RBC ANTIBODY SCREEN: CPT

## 2020-09-09 PROCEDURE — 82043 UR ALBUMIN QUANTITATIVE: CPT

## 2020-09-09 PROCEDURE — 85610 PROTHROMBIN TIME: CPT

## 2020-09-09 PROCEDURE — 83735 ASSAY OF MAGNESIUM: CPT

## 2020-09-09 PROCEDURE — 82962 GLUCOSE BLOOD TEST: CPT

## 2020-09-09 PROCEDURE — 94640 AIRWAY INHALATION TREATMENT: CPT

## 2020-09-09 PROCEDURE — 84484 ASSAY OF TROPONIN QUANT: CPT

## 2020-09-09 PROCEDURE — 80053 COMPREHEN METABOLIC PANEL: CPT

## 2020-09-09 PROCEDURE — 93312 ECHO TRANSESOPHAGEAL: CPT

## 2020-09-09 PROCEDURE — 86146 BETA-2 GLYCOPROTEIN ANTIBODY: CPT

## 2020-09-09 PROCEDURE — 82803 BLOOD GASES ANY COMBINATION: CPT

## 2020-09-09 PROCEDURE — 86901 BLOOD TYPING SEROLOGIC RH(D): CPT

## 2020-09-09 PROCEDURE — 71275 CT ANGIOGRAPHY CHEST: CPT

## 2020-09-09 PROCEDURE — 87086 URINE CULTURE/COLONY COUNT: CPT

## 2020-09-09 PROCEDURE — 88305 TISSUE EXAM BY PATHOLOGIST: CPT

## 2020-09-09 PROCEDURE — 82553 CREATINE MB FRACTION: CPT

## 2020-09-09 PROCEDURE — 86780 TREPONEMA PALLIDUM: CPT

## 2020-09-09 PROCEDURE — 93005 ELECTROCARDIOGRAM TRACING: CPT

## 2020-09-09 PROCEDURE — 86255 FLUORESCENT ANTIBODY SCREEN: CPT

## 2020-09-09 PROCEDURE — 84300 ASSAY OF URINE SODIUM: CPT

## 2020-09-09 PROCEDURE — 86769 SARS-COV-2 COVID-19 ANTIBODY: CPT

## 2020-09-09 PROCEDURE — 99285 EMERGENCY DEPT VISIT HI MDM: CPT

## 2020-09-09 PROCEDURE — 76705 ECHO EXAM OF ABDOMEN: CPT

## 2020-09-09 RX ORDER — HYDRALAZINE HCL 50 MG
1 TABLET ORAL
Qty: 90 | Refills: 0
Start: 2020-09-09 | End: 2020-10-08

## 2020-09-09 RX ORDER — ATORVASTATIN CALCIUM 80 MG/1
1 TABLET, FILM COATED ORAL
Qty: 30 | Refills: 0
Start: 2020-09-09 | End: 2020-10-08

## 2020-09-09 RX ORDER — ATORVASTATIN CALCIUM 80 MG/1
1 TABLET, FILM COATED ORAL
Qty: 0 | Refills: 0 | DISCHARGE

## 2020-09-09 RX ORDER — FUROSEMIDE 40 MG
1 TABLET ORAL
Qty: 0 | Refills: 0 | DISCHARGE

## 2020-09-09 RX ORDER — FUROSEMIDE 40 MG
20 TABLET ORAL DAILY
Refills: 0 | Status: DISCONTINUED | OUTPATIENT
Start: 2020-09-09 | End: 2020-09-09

## 2020-09-09 RX ORDER — SACUBITRIL AND VALSARTAN 24; 26 MG/1; MG/1
1 TABLET, FILM COATED ORAL
Qty: 60 | Refills: 0
Start: 2020-09-09 | End: 2020-10-08

## 2020-09-09 RX ORDER — QUETIAPINE FUMARATE 200 MG/1
1 TABLET, FILM COATED ORAL
Qty: 0 | Refills: 0 | DISCHARGE
Start: 2020-09-09

## 2020-09-09 RX ORDER — ISOSORBIDE MONONITRATE 60 MG/1
1 TABLET, EXTENDED RELEASE ORAL
Qty: 30 | Refills: 0
Start: 2020-09-09 | End: 2020-10-08

## 2020-09-09 RX ORDER — METOPROLOL TARTRATE 50 MG
1 TABLET ORAL
Qty: 30 | Refills: 0
Start: 2020-09-09 | End: 2020-10-08

## 2020-09-09 RX ORDER — FUROSEMIDE 40 MG
1 TABLET ORAL
Qty: 30 | Refills: 0
Start: 2020-09-09 | End: 2020-10-08

## 2020-09-09 RX ORDER — QUETIAPINE FUMARATE 200 MG/1
1 TABLET, FILM COATED ORAL
Qty: 30 | Refills: 0
Start: 2020-09-09 | End: 2020-10-08

## 2020-09-09 RX ADMIN — CLOPIDOGREL BISULFATE 75 MILLIGRAM(S): 75 TABLET, FILM COATED ORAL at 12:05

## 2020-09-09 RX ADMIN — Medication 20 MILLIGRAM(S): at 06:26

## 2020-09-09 RX ADMIN — SACUBITRIL AND VALSARTAN 1 TABLET(S): 24; 26 TABLET, FILM COATED ORAL at 06:25

## 2020-09-09 RX ADMIN — TIOTROPIUM BROMIDE 1 CAPSULE(S): 18 CAPSULE ORAL; RESPIRATORY (INHALATION) at 12:06

## 2020-09-09 RX ADMIN — ISOSORBIDE MONONITRATE 30 MILLIGRAM(S): 60 TABLET, EXTENDED RELEASE ORAL at 12:05

## 2020-09-09 RX ADMIN — Medication 81 MILLIGRAM(S): at 12:05

## 2020-09-09 RX ADMIN — ALBUTEROL 2 PUFF(S): 90 AEROSOL, METERED ORAL at 09:48

## 2020-09-09 RX ADMIN — QUETIAPINE FUMARATE 25 MILLIGRAM(S): 200 TABLET, FILM COATED ORAL at 12:05

## 2020-09-09 RX ADMIN — ALBUTEROL 2 PUFF(S): 90 AEROSOL, METERED ORAL at 15:39

## 2020-09-09 RX ADMIN — Medication 50 MILLIGRAM(S): at 06:25

## 2020-09-09 RX ADMIN — Medication 50 MILLIGRAM(S): at 06:26

## 2020-09-09 RX ADMIN — HEPARIN SODIUM 5000 UNIT(S): 5000 INJECTION INTRAVENOUS; SUBCUTANEOUS at 06:25

## 2020-09-09 RX ADMIN — ANASTROZOLE 1 MILLIGRAM(S): 1 TABLET ORAL at 12:05

## 2020-09-09 NOTE — PROGRESS NOTE ADULT - PROBLEM SELECTOR PLAN 3
- Creatinine was uptrending, hence IV lasix d/c'ed 8/29  - Resumed Low dose Entresto, hold Spironolactone  - Creatinine 1.15 today, RESOLVED (1.15 yesterday), will keep monitoring BMP daily  - Avoid Nephrotoxins

## 2020-09-09 NOTE — PROGRESS NOTE ADULT - ATTENDING COMMENTS
Patient seen and examined this morning. Plan of care discussed with medical residents Dr. Plasencia and Dr. Desai.    Patient states she feels well and denies any acute complaints. Agreeable to home health aide. Ambulated well with PT.    VITALS: reviewed    EXAM:  GEN: alert, in no acute distress  CVS: RRR, normal s1/s2  RESP: clear bilaterally, no wheezing, rales, or rhonchi  ABD: soft, nontender, nondistended, normoactive bowel sounds  EXT: no LE edema  NEURO: AAOx2, no focal deficits    LABS: reviewed    IMAGING: reviewed    A/P:  64 y/o female with multiple comorbidities as above admitted for acute CHF exacerbation. Hospital course complicated by Delirium, CHEN, and transaminitis.    #Delirium  -Appreciate psych and neuro inputs  -c/w increased Seroquel  -restart Wellbutrin as per psych  -Mental status improved, is now calm and cooperative, stable for discharge home with home care services    #Acute on chronic CHF exacerbation, s/p ICD  -acute exacerbation resolved s/p IV Lasix, c/w PO lasix 20mg daily  -Echo with depressed EF of 15-20%  -c/w Metoprolol and Entresto    #CHEN  -resolved, continue to monitor    #Elevated Alk Phos  -s/p HIDA and EGD  -appreciate GI input, needs outpatient GI follow up and repeat EGD as outpatient    Rest as per resident's note.

## 2020-09-09 NOTE — PROGRESS NOTE ADULT - PROVIDER SPECIALTY LIST ADULT
Cardiology
Cardiology
Gastroenterology
Hospitalist
Internal Medicine

## 2020-09-09 NOTE — PROGRESS NOTE ADULT - PROBLEM SELECTOR PLAN 2
- BNP on admission 35,000, Troponin downtrending  - IV Lasix d/c'ed 8/29 as Cr was uptrending (Cr now downtrending after Lasix was d/c'ed)  - Daily weights, strict Is/Os (fluid restriction 1200 ml)  - DASH/TLC diet  - Echo 8/29 EF 15-20%, New Moderate to Severe MR  - ANGELINA was done to evaluate valve function- showed mural thrombus in the descending aorta, Pt is now started on Eliquis 10 mg 9/1  - Pt on low dose Entresto, Metoprolol, Furosemide 20 mg, hold Spironolactone due to creatinine elevation

## 2020-09-09 NOTE — PROGRESS NOTE ADULT - SUBJECTIVE AND OBJECTIVE BOX
PGY-1 Progress Note discussed with attending    PAGER #: [[phone]] TILL 5:00 PM  PLEASE CONTACT ON CALL TEAM:  - On Call Team (Please refer to Taryn) FROM 5:00 PM - 8:30PM  - Nightfloat Team FROM 8:30 -7:30 AM    CHIEF COMPLAINT & BRIEF HOSPITAL COURSE: 66 y/o F smoker with medical history significant for DM, HTN, HLD, Breast CA s/p chemo/XRT, SCC, CVA no motor deficits, COPD (not on home O2), NICM with EF 31% by MUGA 7/2019 (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 She was going for an xray when they told her to come into the hospital. Patient states that she was feeling nauseous and was having difficulty breathing. Patient was little confused and is very poor historian. Patient states that she smokes 1 PPD x 50 years. She denies chest pain, head ache, dizziness, palpitations LOC, bowel or urinary problems. Does reports she has a cough, non-productive, and vomited once, with clear non-bloody vomit. No sick contacts, no recent travel. Of note pt reports history "a long time ago" of DVT/PE, and was on blood thinners, unsure of time period. Sister also had PE.    During her hospital course, patient was admitted for Dyspnea workup, CTA negative for PE, BNP >35,000, pt was started on IV Lasix, discontinued 8/29 due to rising creatinine levels. Echo from 8/29 with EF 15-20% showing new Moderate to Severe Mitral regurgitation (likely functional leading to HF exacerbations), troponins downtrending. As per Cardiology recommendations, low dose Entresto has been resumed and ANGELINA done 9/1 showed moderate MR, Mural thrombus extending into descending aorta, no LA thrombus, LAE + Vascular surgery was consulted regarding need for intervention. Due to persistent elevation in Alk phosphatase and GGT, although USG Abdomen was negative for CBD dilation and GB thickening, HIDA scan was pursued which was negative for Acute cholecystitis. As pt has been increasingly agitated recently, pt's Atorvastatin and Bupropion was discontinued on 9/3 as medical interactions can contribute to mental status changes. Hydralazine dose increased to 50 mg Q8 9/4 for better control of BP. 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 As repeat EKG shows normal QTc interval, patient was started on Seroquel HS on 9/4-> uptitrated to Seroquel 50 mg HS, 25 mg AM. Physical therapy was re-consulted, now recommending LIONEL placement for pt. Furosemide resumed, Atorvastatin resumed 9/6.    INTERVAL HPI/OVERNIGHT EVENTS: Patient was assessed at bedside,     MEDICATIONS  (STANDING):  ALBUTerol    90 MICROgram(s) HFA Inhaler 2 Puff(s) Inhalation every 6 hours  anastrozole 1 milliGRAM(s) Oral daily  aspirin enteric coated 81 milliGRAM(s) Oral daily  atorvastatin 40 milliGRAM(s) Oral at bedtime  clopidogrel Tablet 75 milliGRAM(s) Oral daily  dextrose 5%. 1000 milliLiter(s) (50 mL/Hr) IV Continuous <Continuous>  furosemide    Tablet 20 milliGRAM(s) Oral daily  heparin   Injectable 5000 Unit(s) SubCutaneous every 8 hours  hydrALAZINE 50 milliGRAM(s) Oral every 8 hours  insulin lispro (HumaLOG) corrective regimen sliding scale   SubCutaneous three times a day before meals  insulin lispro (HumaLOG) corrective regimen sliding scale   SubCutaneous at bedtime  isosorbide   mononitrate ER Tablet (IMDUR) 30 milliGRAM(s) Oral daily  metoprolol succinate ER 50 milliGRAM(s) Oral daily  QUEtiapine 25 milliGRAM(s) Oral daily  QUEtiapine 50 milliGRAM(s) Oral at bedtime  sacubitril 24 mG/valsartan 26 mG 1 Tablet(s) Oral two times a day  tiotropium 18 MICROgram(s) Capsule 1 Capsule(s) Inhalation daily    MEDICATIONS  (PRN):  guaiFENesin   Syrup  (Sugar-Free) 100 milliGRAM(s) Oral every 6 hours PRN Cough  meclizine 12.5 milliGRAM(s) Oral daily PRN Nausea      REVIEW OF SYSTEMS:  CONSTITUTIONAL: No fever, weight loss, or fatigue  RESPIRATORY: No cough, wheezing, chills or hemoptysis; No shortness of breath  CARDIOVASCULAR: No chest pain, palpitations, dizziness, or leg swelling  GASTROINTESTINAL: No abdominal pain. No nausea, vomiting, or hematemesis; No diarrhea or constipation. No melena or hematochezia.  GENITOURINARY: No dysuria or hematuria, urinary frequency  NEUROLOGICAL: No headaches, memory loss, loss of strength, numbness, or tremors  SKIN: No itching, burning, rashes, or lesions     Vital Signs Last 24 Hrs  T(C): 36.7 (09 Sep 2020 14:35), Max: 37 (09 Sep 2020 05:00)  T(F): 98 (09 Sep 2020 14:35), Max: 98.6 (09 Sep 2020 05:00)  HR: 93 (09 Sep 2020 14:35) (78 - 97)  BP: 105/70 (09 Sep 2020 14:35) (105/70 - 122/78)  BP(mean): --  RR: 18 (09 Sep 2020 14:35) (18 - 18)  SpO2: 98% (09 Sep 2020 14:35) (98% - 98%)    PHYSICAL EXAMINATION:  GENERAL: NAD, well built  HEAD:  Atraumatic, Normocephalic  EYES:  conjunctiva and sclera clear  NECK: Supple, No JVD, Normal thyroid  CHEST/LUNG: Clear to auscultation. Clear to percussion bilaterally; No rales, rhonchi, wheezing, or rubs  HEART: Regular rate and rhythm; No murmurs, rubs, or gallops  ABDOMEN: Soft, Nontender, Nondistended; Bowel sounds present  NERVOUS SYSTEM:  Alert & Oriented X3,    EXTREMITIES:  2+ Peripheral Pulses, No clubbing, cyanosis, or edema  SKIN: warm dry                          11.1   3.96  )-----------( 250      ( 08 Sep 2020 07:29 )             33.9     09-08    139  |  108  |  9   ----------------------------<  102<H>  3.5   |  23  |  1.15    Ca    8.4      08 Sep 2020 07:29  Phos  3.3     09-08  Mg     2.1     09-08    TPro  7.1  /  Alb  2.7<L>  /  TBili  0.9  /  DBili  x   /  AST  14  /  ALT  12  /  AlkPhos  209<H>  09-08    LIVER FUNCTIONS - ( 08 Sep 2020 07:29 )  Alb: 2.7 g/dL / Pro: 7.1 g/dL / ALK PHOS: 209 U/L / ALT: 12 U/L DA / AST: 14 U/L / GGT: x                   CAPILLARY BLOOD GLUCOSE      RADIOLOGY & ADDITIONAL TESTS:  No new imaging PGY-1 Progress Note discussed with attending    PAGER #: [[phone]] TILL 5:00 PM  PLEASE CONTACT ON CALL TEAM:  - On Call Team (Please refer to Taryn) FROM 5:00 PM - 8:30PM  - Nightfloat Team FROM 8:30 -7:30 AM    CHIEF COMPLAINT & BRIEF HOSPITAL COURSE: 64 y/o F smoker with medical history significant for DM, HTN, HLD, Breast CA s/p chemo/XRT, SCC, CVA no motor deficits, COPD (not on home O2), NICM with EF 31% by [facility] 7/2019 (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 She was going for an xray when they told her to come into the hospital. Patient states that she was feeling nauseous and was having difficulty breathing. Patient was little confused and is very poor historian. Patient states that she smokes 1 PPD x 50 years. She denies chest pain, head ache, dizziness, palpitations LOC, bowel or urinary problems. Does reports she has a cough, non-productive, and vomited once, with clear non-bloody vomit. No sick contacts, no recent travel. Of note pt reports history "a long time ago" of DVT/PE, and was on blood thinners, unsure of time period. Sister also had PE.    During her hospital course, patient was admitted for Dyspnea workup, CTA negative for PE, BNP >35,000, pt was started on IV Lasix, discontinued 8/29 due to rising creatinine levels. Echo from 8/29 with EF 15-20% showing new Moderate to Severe Mitral regurgitation (likely functional leading to HF exacerbations), troponins downtrending. As per Cardiology recommendations, low dose Entresto has been resumed and ANGELINA done 9/1 showed moderate MR, Mural thrombus extending into descending aorta, no LA thrombus, LAE + Vascular surgery was consulted regarding need for intervention. Due to persistent elevation in Alk phosphatase and GGT, although USG Abdomen was negative for CBD dilation and GB thickening, HIDA scan was pursued which was negative for Acute cholecystitis. As pt has been increasingly agitated recently, pt's Atorvastatin and Bupropion was discontinued on 9/3 as medical interactions can contribute to mental status changes. Hydralazine dose increased to 50 mg Q8 9/4 for better control of BP. 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 As repeat EKG shows normal QTc interval, patient was started on Seroquel HS on 9/4-> uptitrated to Seroquel 50 mg HS, 25 mg AM. Physical therapy was re-consulted, now recommending LIONEL placement for pt. Furosemide resumed, Atorvastatin resumed 9/6.    INTERVAL HPI/OVERNIGHT EVENTS: Patient was assessed at bedside, AAO x 3 today, endorses no acute complaints. D/c planning today    MEDICATIONS  (STANDING):  ALBUTerol    90 MICROgram(s) HFA Inhaler 2 Puff(s) Inhalation every 6 hours  anastrozole 1 milliGRAM(s) Oral daily  aspirin enteric coated 81 milliGRAM(s) Oral daily  atorvastatin 40 milliGRAM(s) Oral at bedtime  clopidogrel Tablet 75 milliGRAM(s) Oral daily  dextrose 5%. 1000 milliLiter(s) (50 mL/Hr) IV Continuous <Continuous>  furosemide    Tablet 20 milliGRAM(s) Oral daily  heparin   Injectable 5000 Unit(s) SubCutaneous every 8 hours  hydrALAZINE 50 milliGRAM(s) Oral every 8 hours  insulin lispro (HumaLOG) corrective regimen sliding scale   SubCutaneous three times a day before meals  insulin lispro (HumaLOG) corrective regimen sliding scale   SubCutaneous at bedtime  isosorbide   mononitrate ER Tablet (IMDUR) 30 milliGRAM(s) Oral daily  metoprolol succinate ER 50 milliGRAM(s) Oral daily  QUEtiapine 25 milliGRAM(s) Oral daily  QUEtiapine 50 milliGRAM(s) Oral at bedtime  sacubitril 24 mG/valsartan 26 mG 1 Tablet(s) Oral two times a day  tiotropium 18 MICROgram(s) Capsule 1 Capsule(s) Inhalation daily    MEDICATIONS  (PRN):  guaiFENesin   Syrup  (Sugar-Free) 100 milliGRAM(s) Oral every 6 hours PRN Cough  meclizine 12.5 milliGRAM(s) Oral daily PRN Nausea      REVIEW OF SYSTEMS:  CONSTITUTIONAL: No fever, weight loss, or fatigue  RESPIRATORY: No cough, wheezing, chills or hemoptysis; No shortness of breath  CARDIOVASCULAR: No chest pain, palpitations, dizziness, or leg swelling  GASTROINTESTINAL: No abdominal pain. No nausea, vomiting, or hematemesis; No diarrhea or constipation. No melena or hematochezia.  GENITOURINARY: No dysuria or hematuria, urinary frequency  NEUROLOGICAL: No headaches, memory loss, loss of strength, numbness, or tremors  SKIN: No itching, burning, rashes, or lesions     Vital Signs Last 24 Hrs  T(C): 36.7 (09 Sep 2020 14:35), Max: 37 (09 Sep 2020 05:00)  T(F): 98 (09 Sep 2020 14:35), Max: 98.6 (09 Sep 2020 05:00)  HR: 93 (09 Sep 2020 14:35) (78 - 97)  BP: 105/70 (09 Sep 2020 14:35) (105/70 - 122/78)  BP(mean): --  RR: 18 (09 Sep 2020 14:35) (18 - 18)  SpO2: 98% (09 Sep 2020 14:35) (98% - 98%)    PHYSICAL EXAMINATION:  GENERAL: NAD, well built  HEAD:  Atraumatic, Normocephalic  EYES:  conjunctiva and sclera clear  NECK: Supple, No JVD, Normal thyroid  CHEST/LUNG: Clear to auscultation. Clear to percussion bilaterally; No rales, rhonchi, wheezing, or rubs  HEART: Regular rate and rhythm; No murmurs, rubs, or gallops  ABDOMEN: Soft, Nontender, Nondistended; Bowel sounds present  NERVOUS SYSTEM:  Alert & Oriented X3,    EXTREMITIES:  2+ Peripheral Pulses, No clubbing, cyanosis, or edema  SKIN: warm dry                          11.1   3.96  )-----------( 250      ( 08 Sep 2020 07:29 )             33.9     09-08    139  |  108  |  9   ----------------------------<  102<H>  3.5   |  23  |  1.15    Ca    8.4      08 Sep 2020 07:29  Phos  3.3     09-08  Mg     2.1     09-08    TPro  7.1  /  Alb  2.7<L>  /  TBili  0.9  /  DBili  x   /  AST  14  /  ALT  12  /  AlkPhos  209<H>  09-08    LIVER FUNCTIONS - ( 08 Sep 2020 07:29 )  Alb: 2.7 g/dL / Pro: 7.1 g/dL / ALK PHOS: 209 U/L / ALT: 12 U/L DA / AST: 14 U/L / GGT: x                   CAPILLARY BLOOD GLUCOSE      RADIOLOGY & ADDITIONAL TESTS:  No new imaging

## 2020-09-09 NOTE — PROGRESS NOTE ADULT - PROBLEM SELECTOR PLAN 4
-Bp ranging 130-150/90s  -Pt is currently on Entresto 24-26 mg, Metoprolol succinate 50 mg daily, Furosemide 20 mg  -Monitor BP, hold medications with parameters  -Hydralazine 10 mg, Isosorbide mononitrate 30 mg added 9/1 for better control of BP-> Titrated up to Hydralazine 25 mg-> currently on Hydralazine 50 mg Q8 hrs since 9/4

## 2020-09-09 NOTE — PROGRESS NOTE ADULT - REASON FOR ADMISSION
Dyspnea

## 2020-09-09 NOTE — PROGRESS NOTE ADULT - COVID-19 NEGATIVE LAB RESULT
COVID-19 ruled out

## 2020-09-09 NOTE — PROGRESS NOTE ADULT - NUTRITIONAL ASSESSMENT
Diet- Regular, DASH/TLC, Fluid restricted 1200 ml
Diet- Regular, DASH/TLC, Fluid restricted 1200 ml  Diet NPO from midnight for HIDA scan tomorrow
Diet- Regular, DASH/TLC, Fluid restricted 1200 ml  Diet NPO from midnight, HIDA scan could not be completed, EGD will be pursued tomorrow
Diet- Regular, DASH/TLC, Fluid restricted 1200 ml  Diet has been NPO since midnight, EGD planned today
Diet- Liquids, will be advanced (pt had ANGELINA procedure today)
Diet- Regular, DASH/TLC, Fluid restricted 1200 ml
Diet- Regular, DASH/TLC, Fluid restricted 1200 ml  Diet has been NPO since midnight, EGD planned today

## 2020-09-09 NOTE — PROGRESS NOTE ADULT - NSHPATTENDINGPLANDISCUSS_GEN_ALL_CORE
medicine team resident
medicine team resident Dr. Plasencia
Patient, RN
Dr. Plasencia
Dr. Plasencia, Cardiology, and vascular surgery.
Patient, Dr. Plasencia

## 2020-09-09 NOTE — PROGRESS NOTE ADULT - PROBLEM SELECTOR PLAN 1
-Pt was been increasingly agitated and confused for about 2-3 days last week (Sep 3rd)  -Ammonia and lactate levels wnl, CT head no acute bleed + stable calcified R supraclinoid aneursym noted (no acute intervention as per Neuro), Urine cs and Blood cs no growth  -As pt's ALP and GGT were elevated, Acalculous Dalia was evaluated as a possible cause of pt's nausea and confusion- HIDA scan 9/3 no acute cholecystitis, no MRCP as pt has ICD  -Will continue to monitor closely  -Neuro Dr. Hernandez- advised no acute intervention, outpt followup recommended  -Psych consulted- Antipsychotics could not be prescribed earlier as QTc prolongation was noted on EKG. Repeat EKG shows normal QTc, hence, Seroquel 25 mg HS added today (9/4)-> titrated up to Seroquel 50 mg HS(repeat EKG 9/6 QTc 423msec), and Seroquel 25 mg daily    -Psych re-consulted 9/8-As per Psych, pt has decision making capacity as her mental status has improved  -Atorvastatin resumed 9/6  -D/C planning

## 2020-09-09 NOTE — PROGRESS NOTE ADULT - PROBLEM SELECTOR PLAN 10
HCP- Jaki Garland - [phone]  FULL CODE
HCP- Jaki Garland - [phone]  FULL CODE
HCP- Jaki Garland - [phone]  FULL CODE
HCP- Jaki Garland - [phone]  FULL CODE
HCP- Jaki Garland - [phone]  FULL CODE
pt can make decisions but has HCP also - Jaki Garland - [phone]  -- spoke with HCP - updated pts condition
pt can make decisions but has HCP also - Jaki Garland - [phone]  -- spoke with HCP - updated pts condition
HCP- Jaki Garland - [phone]  FULL CODE

## 2020-09-10 NOTE — DISCHARGE NOTE PROVIDER - NSDCCPCAREPLAN_GEN_ALL_CORE_FT
MD Notification    Notified Person: MD    Notified Person Name: Dr. Schwartz    Notification Date/Time: 9/9/2020 at 10:04pm    Notification Interaction: Text paged    Purpose of Notification: FYI low urine output. Bladder scanned at 2145 for 108cc. Straight cath'd at 1445 for 350cc.    Orders Received:    Comments:     PRINCIPAL DISCHARGE DIAGNOSIS  Diagnosis: NSTEMI (non-ST elevated myocardial infarction)  Assessment and Plan of Treatment: PRINCIPAL DISCHARGE DIAGNOSIS  Diagnosis: Acute on chronic systolic heart failure  Assessment and Plan of Treatment: Patient was found to have CHF exacerbation.  CTA was negative for PE, BNP >35,000.  The patient was started on IV Lasix, discontinued on 8/29 due to rising creatine levels (increased from admission at 1.63).  At admission troponins were elevated at admission at 0.233 and downtrended. Echo from 8/29 with EF 15-20% showing new moderate to severe mitral regurgitation (likely functional leading to HF exacerbations).   Cardiology was consulted and recommendations were followed.  As per cardiology, low dose Entresto was resumed and ANGELINA was pursued on 9/1 to investigate mitral regurgitation and need for mitral clip.  During her stay, her pacemaker, [employer], was interrogated at bedside and no events were recorded, interrogation was normal.  TRANSESOPHAGEAL echocardiogram showed xx  Patient is continued on Entresto 24-26 mg and Metoprolol succinate 50 mg daily.  She was also started on hydralazine 10 mg and imdur 30 on 7/31 per cardiology recommendations as bp was high.        SECONDARY DISCHARGE DIAGNOSES  Diagnosis: Acute kidney injury superimposed on chronic kidney disease  Assessment and Plan of Treatment: During her hospital course, the patient was found to have CHEN with uptrending creatinine levels, likely due to CHF exacerbation.  Lasix and spironolactone were discontinued.  Low dose Entresto was continued.  Creatinine levels has since improved to 1.59. PLEASE FOLLOW UP WITH YOUR DOCTOR TO REPEAT BMP AFTER DISCHARGE    Diagnosis: COPD (chronic obstructive pulmonary disease)  Assessment and Plan of Treatment: Patient has a history of chronic obstructive pulmonary disease for which she was continued with Albuterol inhaler Q6 hrs during her stay.  No COPD exacerbation was noted and she was saturating well and was tapered off the 1L nasal canula oxygen she was on during her stay.    Diagnosis: Breast cancer  Assessment and Plan of Treatment: Patient has a history of breast cancer s/p lumpectomy, chemo, and radiotherapy.  The patient will be continuing with Anastrazole upon discharge asnd following up with her oncologist, Dr. Serrato on Sep. 18th.    Diagnosis: Alkaline phosphatase elevation  Assessment and Plan of Treatment: Patient had elevated alkaline phosphatase levels during her hospital course (556) from her baseline of 94 as well as elevated gamma glutamyl transferase levels (701).  Bilirubin levels were WNL (1.1).  An ultrasound of the liver and pancreas was done on 8/31 and was significant for mild fatty liver.  A 1.5 cm hyperechoic lesion was also found in the midpole of the right kidney which may reflect angiomyolipoma.    Diagnosis: Hypertension  Assessment and Plan of Treatment: Patient has a history of hypertension.  During her course her BP ranged from 140-160/.  Patient is continued on Entresto 24-26 mg and Metoprolol succinate 50 mg daily.  She was also started on hydralazine 10 mg and imdur 30 on 7/31 per cardiology recommendations as bp was high. we are holding your spironolactone as you have CHEN, Maintain a healthy diet that consist of low sugar, low fat, low sodium diet. Exercise frequently if possible.  Follow up with primary care physician in one week after discharge. PRINCIPAL DISCHARGE DIAGNOSIS  Diagnosis: Acute on chronic systolic heart failure  Assessment and Plan of Treatment: Patient was found to have CHF exacerbation.  CTA was negative for PE, BNP >35,000.  The patient was started on IV Lasix, discontinued on 8/29 due to rising creatine levels (increased from admission at 1.63).  At admission troponins were elevated at admission at 0.233 and downtrended. Echo from 8/29 with EF 15-20% showing new moderate to severe mitral regurgitation (likely functional leading to HF exacerbations).   Cardiology was consulted and recommendations were followed.  As per cardiology, low dose Entresto was resumed and ANGELINA was pursued on 9/1 to investigate mitral regurgitation and need for mitral clip.  During her stay, her pacemaker, [employer], was interrogated at bedside and no events were recorded, interrogation was normal.  TRANSESOPHAGEAL echocardiogram showed mural thrombus in aorta. vascular was consulted and only recommends antiplatelet and no surgical intervention is indication and patient should follow with Dr. Gutierrez for outpatient follow up.  Patient is continued on Entresto 24-26 mg and Metoprolol succinate 50 mg daily.  She was also started on hydralazine 10 mg and imdur 30 on 7/31 per cardiology recommendations as bp was high.        SECONDARY DISCHARGE DIAGNOSES  Diagnosis: Acute kidney injury superimposed on chronic kidney disease  Assessment and Plan of Treatment: During her hospital course, the patient was found to have CHEN with uptrending creatinine levels, likely due to CHF exacerbation.  Lasix and spironolactone were discontinued.  Low dose Entresto was continued.  Creatinine levels has since improved to 1.59. PLEASE FOLLOW UP WITH YOUR DOCTOR TO REPEAT BMP AFTER DISCHARGE    Diagnosis: COPD (chronic obstructive pulmonary disease)  Assessment and Plan of Treatment: Patient has a history of chronic obstructive pulmonary disease for which she was continued with Albuterol inhaler Q6 hrs during her stay.  No COPD exacerbation was noted and she was saturating well and was tapered off the 1L nasal canula oxygen she was on during her stay.    Diagnosis: Breast cancer  Assessment and Plan of Treatment: Patient has a history of breast cancer s/p lumpectomy, chemo, and radiotherapy.  The patient will be continuing with Anastrazole upon discharge asnd following up with her oncologist, Dr. Serrato on Sep. 18th.    Diagnosis: Alkaline phosphatase elevation  Assessment and Plan of Treatment: Patient had elevated alkaline phosphatase levels during her hospital course (556) from her baseline of 94 as well as elevated gamma glutamyl transferase levels (701).  Bilirubin levels were WNL (1.1).  An ultrasound of the liver and pancreas was done on 8/31 and was significant for mild fatty liver.  A 1.5 cm hyperechoic lesion was also found in the midpole of the right kidney which may reflect angiomyolipoma.    Diagnosis: Hypertension  Assessment and Plan of Treatment: Patient has a history of hypertension.  During her course her BP ranged from 140-160/.  Patient is continued on Entresto 24-26 mg and Metoprolol succinate 50 mg daily.  She was also started on hydralazine 10 mg and imdur 30 on 7/31 per cardiology recommendations as bp was high. we are holding your spironolactone as you have CHEN, Maintain a healthy diet that consist of low sugar, low fat, low sodium diet. Exercise frequently if possible.  Follow up with primary care physician in one week after discharge. PRINCIPAL DISCHARGE DIAGNOSIS  Diagnosis: Acute on chronic systolic heart failure  Assessment and Plan of Treatment: Patient was found to have CHF exacerbation.  CTA was negative for PE, BNP >35,000.  The patient was started on IV Lasix, discontinued on 8/29 due to rising creatine levels (increased from admission at 1.63).  At admission troponins were elevated at admission at 0.233 and downtrended. Echo from 8/29 with EF 15-20% showing new moderate to severe mitral regurgitation (likely functional leading to HF exacerbations).   Cardiology was consulted and recommendations were followed.  As per cardiology, low dose Entresto was resumed and ANGELINA was pursued on 9/1 to investigate mitral regurgitation and need for mitral clip.  During her stay, her pacemaker, [employer], was interrogated at bedside and no events were recorded, interrogation was normal.  TRANSESOPHAGEAL echocardiogram showed mural thrombus in aorta. vascular was consulted and only recommends antiplatelet and no surgical intervention is indication and patient should follow with Dr. Gutierrez for outpatient follow up. Patient's anticardiolipin antibody, RPR is testing and patient will follow up with PCP after discharge    Patient is continued on Entresto 24-26 mg and Metoprolol succinate 50 mg daily.  She was also started on hydralazine 10 mg and imdur 30 on 7/31 per cardiology recommendations as bp was high.        SECONDARY DISCHARGE DIAGNOSES  Diagnosis: Acute kidney injury superimposed on chronic kidney disease  Assessment and Plan of Treatment: During her hospital course, the patient was found to have CHEN with uptrending creatinine levels, likely due to CHF exacerbation.  Lasix and spironolactone were discontinued.  Low dose Entresto was continued.  Creatinine levels has since improved to 1.59. PLEASE FOLLOW UP WITH YOUR DOCTOR TO REPEAT BMP AFTER DISCHARGE    Diagnosis: COPD (chronic obstructive pulmonary disease)  Assessment and Plan of Treatment: Patient has a history of chronic obstructive pulmonary disease for which she was continued with Albuterol inhaler Q6 hrs during her stay.  No COPD exacerbation was noted and she was saturating well and was tapered off the 1L nasal canula oxygen she was on during her stay.    Diagnosis: Breast cancer  Assessment and Plan of Treatment: Patient has a history of breast cancer s/p lumpectomy, chemo, and radiotherapy.  The patient will be continuing with Anastrazole upon discharge asnd following up with her oncologist, Dr. Serrato on Sep. 18th.    Diagnosis: Alkaline phosphatase elevation  Assessment and Plan of Treatment: Patient had elevated alkaline phosphatase levels during her hospital course (556) from her baseline of 94 as well as elevated gamma glutamyl transferase levels (701).  Bilirubin levels were WNL (1.1).  An ultrasound of the liver and pancreas was done on 8/31 and was significant for mild fatty liver.  A 1.5 cm hyperechoic lesion was also found in the midpole of the right kidney which may reflect angiomyolipoma.    Diagnosis: Hypertension  Assessment and Plan of Treatment: Patient has a history of hypertension.  During her course her BP ranged from 140-160/.  Patient is continued on Entresto 24-26 mg and Metoprolol succinate 50 mg daily.  She was also started on hydralazine 10 mg and imdur 30 on 7/31 per cardiology recommendations as bp was high. we are holding your spironolactone as you have CHEN, Maintain a healthy diet that consist of low sugar, low fat, low sodium diet. Exercise frequently if possible.  Follow up with primary care physician in one week after discharge. PRINCIPAL DISCHARGE DIAGNOSIS  Diagnosis: Acute on chronic systolic heart failure  Assessment and Plan of Treatment: Patient was found to have CHF exacerbation.  CTA was negative for PE, BNP >35,000.  The patient was started on IV Lasix, discontinued on 8/29 due to rising creatine levels (increased from admission at 1.63).  At admission troponins were elevated at admission at 0.233 and downtrended. Echo from 8/29 with EF 15-20% showing new moderate to severe mitral regurgitation (likely functional leading to HF exacerbations).   Cardiology was consulted and recommendations were followed.  As per cardiology, low dose Entresto was resumed and ANGELINA was pursued on 9/1 to investigate mitral regurgitation and need for mitral clip.  During her stay, her pacemaker, [employer], was interrogated at bedside and no events were recorded, interrogation was normal.  TRANSESOPHAGEAL echocardiogram showed mural thrombus in aorta. vascular was consulted and only recommends antiplatelet and no surgical intervention is indication and patient should follow with Dr. Gutierrez for outpatient follow up. Patient's anticardiolipin antibody, RPR is testing and patient will follow up with PCP after discharge    Patient is continued on Entresto 24-26 mg and Metoprolol succinate 50 mg daily.  She was also started on hydralazine 10 mg and imdur 30 on 7/31 per cardiology recommendations as bp was high.        SECONDARY DISCHARGE DIAGNOSES  Diagnosis: Delirium, in, senile dementia  Assessment and Plan of Treatment: Patient's was noted to have worsening of mental status with agitation and CT head was performed which showed Moderate chronic microvascular changes without evidence of an acute transcortical infarction or hemorrhage. MR is a more sensitive imaging modality for the evaluation of an acute infarction. 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 Patient was continued on asa and plavix. Ammonia and acetaminophen levels were within normal range,    Diagnosis: Acute kidney injury superimposed on chronic kidney disease  Assessment and Plan of Treatment: During her hospital course, the patient was found to have CHEN with uptrending creatinine levels, likely due to CHF exacerbation.  Lasix and spironolactone were discontinued.  Low dose Entresto was continued.  Creatinine levels has since improved to 1.59. PLEASE FOLLOW UP WITH YOUR DOCTOR TO REPEAT BMP AFTER DISCHARGE    Diagnosis: COPD (chronic obstructive pulmonary disease)  Assessment and Plan of Treatment: Patient has a history of chronic obstructive pulmonary disease for which she was continued with Albuterol inhaler Q6 hrs during her stay.  No COPD exacerbation was noted and she was saturating well and was tapered off the 1L nasal canula oxygen she was on during her stay.    Diagnosis: Breast cancer  Assessment and Plan of Treatment: Patient has a history of breast cancer s/p lumpectomy, chemo, and radiotherapy.  The patient will be continuing with Anastrazole upon discharge asnd following up with her oncologist, Dr. Serrato on Sep. 18th.    Diagnosis: Alkaline phosphatase elevation  Assessment and Plan of Treatment: Patient had elevated alkaline phosphatase levels during her hospital course (556) from her baseline of 94 as well as elevated gamma glutamyl transferase levels (701).  Bilirubin levels were WNL (1.1).  An ultrasound of the liver and pancreas was done on 8/31 and was significant for mild fatty liver.  A 1.5 cm hyperechoic lesion was also found in the midpole of the right kidney which may reflect angiomyolipoma.  HIDA scan showed no pathology. Patient was take for EGD which showed    Diagnosis: Hypertension  Assessment and Plan of Treatment: Patient has a history of hypertension.  During her course her BP ranged from 140-160/.  Patient is continued on Entresto 24-26 mg and Metoprolol succinate 50 mg daily.  She was also started on hydralazine 10 mg and imdur 30 on 7/31 per cardiology recommendations as bp was high. we are holding your spironolactone as you have CHEN, Maintain a healthy diet that consist of low sugar, low fat, low sodium diet. Exercise frequently if possible.  Follow up with primary care physician in one week after discharge. PRINCIPAL DISCHARGE DIAGNOSIS  Diagnosis: Acute on chronic systolic heart failure  Assessment and Plan of Treatment: Patient was found to have CHF exacerbation.  CTA was negative for PE, BNP >35,000.  The patient was started on IV Lasix, discontinued on 8/29 due to rising creatine levels (increased from admission at 1.63).  At admission troponins were elevated at admission at 0.233 and downtrended. Echo from 8/29 with EF 15-20% showing new moderate to severe mitral regurgitation (likely functional leading to HF exacerbations).   Cardiology was consulted and recommendations were followed.  As per cardiology, low dose Entresto was resumed and ANGELINA was pursued on 9/1 to investigate mitral regurgitation and need for mitral clip.  During her stay, her pacemaker, [employer], was interrogated at bedside and no events were recorded, interrogation was normal.  TRANSESOPHAGEAL echocardiogram showed mural thrombus in aorta. vascular was consulted and only recommends antiplatelet and no surgical intervention is indication and patient should follow with Dr. Gutierrez for outpatient follow up. Patient's anticardiolipin antibody, RPR is testing and patient will follow up with PCP after discharge    Patient is continued on Entresto 24-26 mg and Metoprolol succinate 50 mg daily.  She was also started on hydralazine 10 mg and imdur 30 on 7/31 per cardiology recommendations as bp was high.        SECONDARY DISCHARGE DIAGNOSES  Diagnosis: Delirium, in, senile dementia  Assessment and Plan of Treatment: Patient's was noted to have worsening of mental status with agitation and CT head was performed which showed Moderate chronic microvascular changes without evidence of an acute transcortical infarction or hemorrhage. MR is a more sensitive imaging modality for the evaluation of an acute infarction. 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 Patient was continued on asa and plavix. Ammonia and acetaminophen levels were within normal range. Your delirium in the hospital was transient and has since resolved.  Psychiatry evaluated you in the hospital and advised medical management of your delirium with medications. Continue taking these medications as prescribed. No further inpatient testing is needed.    Diagnosis: Breast cancer  Assessment and Plan of Treatment: Patient has a history of breast cancer s/p lumpectomy, chemo, and radiotherapy.  The patient will be continuing with Anastrazole upon discharge asnd following up with her oncologist, Dr. Serrato on Sep. 18th.    Diagnosis: COPD (chronic obstructive pulmonary disease)  Assessment and Plan of Treatment: Patient has a history of chronic obstructive pulmonary disease for which she was continued with Albuterol inhaler Q6 hrs during her stay.  No COPD exacerbation was noted and she was saturating well and was tapered off the 1L nasal canula oxygen she was on during her stay.    Diagnosis: Hypertension  Assessment and Plan of Treatment: Patient has a history of hypertension.  During her course her BP ranged from 140-160/.  Patient is continued on Entresto 24-26 mg and Metoprolol succinate 50 mg daily.  She was also started on hydralazine 10 mg and imdur 30 on 7/31 per cardiology recommendations as bp was high. we are holding your spironolactone as you have CHEN, Maintain a healthy diet that consist of low sugar, low fat, low sodium diet. Exercise frequently if possible.  Follow up with primary care physician in one week after discharge.      Diagnosis: Acute kidney injury superimposed on chronic kidney disease  Assessment and Plan of Treatment: During her hospital course, the patient was found to have CHEN with uptrending creatinine levels, likely due to CHF exacerbation.  Lasix and spironolactone were discontinued.  Low dose Entresto was continued.  Creatinine levels are stable now. PLEASE FOLLOW UP WITH YOUR DOCTOR TO REPEAT BMP AFTER DISCHARGE.    Diagnosis: Alkaline phosphatase elevation  Assessment and Plan of Treatment: Patient had elevated alkaline phosphatase levels during her hospital course (556) from her baseline of 94 as well as elevated gamma glutamyl transferase levels (701).  Bilirubin levels were WNL (1.1).  An ultrasound of the liver and pancreas was done on 8/31 and was significant for mild fatty liver.  A 1.5 cm hyperechoic lesion was also found in the midpole of the right kidney which may reflect angiomyolipoma.  HIDA scan showed no pathology. Patient was take for EGD which showed moderate gastritis. No further testing is needed inpatient. Follow up with PMD outpatient for repeat blood work routinely.

## 2020-09-15 ENCOUNTER — APPOINTMENT (OUTPATIENT)
Dept: INTERNAL MEDICINE | Facility: CLINIC | Age: 65
End: 2020-09-15
Payer: MEDICARE

## 2020-09-15 VITALS
RESPIRATION RATE: 16 BRPM | OXYGEN SATURATION: 97 % | SYSTOLIC BLOOD PRESSURE: 147 MMHG | TEMPERATURE: 98.2 F | HEIGHT: 67 IN | DIASTOLIC BLOOD PRESSURE: 93 MMHG | BODY MASS INDEX: 23.54 KG/M2 | HEART RATE: 89 BPM | WEIGHT: 150 LBS

## 2020-09-15 DIAGNOSIS — Z23 ENCOUNTER FOR IMMUNIZATION: ICD-10-CM

## 2020-09-15 DIAGNOSIS — I63.9 CEREBRAL INFARCTION, UNSPECIFIED: ICD-10-CM

## 2020-09-15 DIAGNOSIS — I71.4 ABDOMINAL AORTIC ANEURYSM, W/OUT RUPTURE: ICD-10-CM

## 2020-09-15 PROCEDURE — 90662 IIV NO PRSV INCREASED AG IM: CPT

## 2020-09-15 PROCEDURE — G0008: CPT

## 2020-09-15 PROCEDURE — 99214 OFFICE O/P EST MOD 30 MIN: CPT | Mod: 25

## 2020-09-15 NOTE — HISTORY OF PRESENT ILLNESS
[Admitted on: ___] : The patient was admitted on [unfilled] [Post-hospitalization from ___ Hospital] : Post-hospitalization from [unfilled] Hospital [Discharged on ___] : discharged on [unfilled] [FreeTextEntry2] : 64 yo was admitted with progressive LOZA.\par card.eval.:NICM,EF=31%,st.p.ICD,on Metoprolol,Entresto 24/26 low dose,Hydralazine 10 mg tid,Imdur\par CRI,referral to nephrology\par EGD-multiple erosions,PPI\par h/o infrarenal AAA=4,2 cm,no intervention.no carotid disease.\par Currently pt states her breathing improved,no more extreme LOZA,she is very limited in exertion even such as house hold needs,unable to take care of herself.\par she is compliant with meds.

## 2020-09-15 NOTE — COUNSELING
[Fall prevention counseling provided] : Fall prevention counseling provided [Good understanding] : Patient has a good understanding of disease, goals and obesity follow-up plan [FreeTextEntry2] : low salt\par wt daily [Inadequate social support] : Inadequate social support [Needs reinforcement, referred] : Patient needs reinforcement on understanding of lifestyle changes and steps needed to achieve self management goal; patient was referred

## 2020-09-15 NOTE — PHYSICAL EXAM
[No Acute Distress] : no acute distress [Normal Sclera/Conjunctiva] : normal sclera/conjunctiva [Ill-Appearing] : ill-appearing [Normal Voice/Communication] : normal voice/communication [Supple] : supple [No Lymphadenopathy] : no lymphadenopathy [Regular Rhythm] : with a regular rhythm [No Accessory Muscle Use] : no accessory muscle use [No Respiratory Distress] : no respiratory distress  [No Varicosities] : no varicosities [Normal S1, S2] : normal S1 and S2 [No Carotid Bruits] : no carotid bruits [Normal Appearance] : normal in appearance [No Edema] : there was no peripheral edema [No Masses] : no palpable masses [No Axillary Lymphadenopathy] : no axillary lymphadenopathy [Soft] : abdomen soft [Normal Supraclavicular Nodes] : no supraclavicular lymphadenopathy [Normal Axillary Nodes] : no axillary lymphadenopathy [No Focal Deficits] : no focal deficits [Normal Insight/Judgement] : insight and judgment were intact [de-identified] : L>basilar dullness. [Normal Gait] : normal gait [de-identified] : PPM

## 2020-09-15 NOTE — ASSESSMENT
[FreeTextEntry1] : 64 yo with CHF due to NICM,low EF,doing better on Entresto.may need home O2.\par pt to f/u with card.\par vascular f/u.\par nephrology referral.\par [facility] referral for poss.Home Care services.\par

## 2020-09-25 ENCOUNTER — INPATIENT (INPATIENT)
Facility: HOSPITAL | Age: 65
LOS: 4 days | Discharge: NURSING, REHAB OR LONG-TERM CARE | DRG: 417 | End: 2020-09-30
Attending: INTERNAL MEDICINE | Admitting: INTERNAL MEDICINE
Payer: MEDICARE

## 2020-09-25 VITALS
HEART RATE: 105 BPM | WEIGHT: 149.91 LBS | OXYGEN SATURATION: 100 % | TEMPERATURE: 98 F | SYSTOLIC BLOOD PRESSURE: 145 MMHG | DIASTOLIC BLOOD PRESSURE: 110 MMHG | HEIGHT: 67 IN | RESPIRATION RATE: 19 BRPM

## 2020-09-25 DIAGNOSIS — I10 ESSENTIAL (PRIMARY) HYPERTENSION: ICD-10-CM

## 2020-09-25 DIAGNOSIS — K81.0 ACUTE CHOLECYSTITIS: ICD-10-CM

## 2020-09-25 DIAGNOSIS — I50.9 HEART FAILURE, UNSPECIFIED: ICD-10-CM

## 2020-09-25 DIAGNOSIS — C50.919 MALIGNANT NEOPLASM OF UNSPECIFIED SITE OF UNSPECIFIED FEMALE BREAST: ICD-10-CM

## 2020-09-25 DIAGNOSIS — C50.919 MALIGNANT NEOPLASM OF UNSPECIFIED SITE OF UNSPECIFIED FEMALE BREAST: Chronic | ICD-10-CM

## 2020-09-25 DIAGNOSIS — Z98.89 OTHER SPECIFIED POSTPROCEDURAL STATES: Chronic | ICD-10-CM

## 2020-09-25 DIAGNOSIS — Z90.710 ACQUIRED ABSENCE OF BOTH CERVIX AND UTERUS: Chronic | ICD-10-CM

## 2020-09-25 DIAGNOSIS — Z29.9 ENCOUNTER FOR PROPHYLACTIC MEASURES, UNSPECIFIED: ICD-10-CM

## 2020-09-25 DIAGNOSIS — N17.9 ACUTE KIDNEY FAILURE, UNSPECIFIED: ICD-10-CM

## 2020-09-25 DIAGNOSIS — D25.9 LEIOMYOMA OF UTERUS, UNSPECIFIED: Chronic | ICD-10-CM

## 2020-09-25 DIAGNOSIS — R79.89 OTHER SPECIFIED ABNORMAL FINDINGS OF BLOOD CHEMISTRY: ICD-10-CM

## 2020-09-25 LAB
ALBUMIN SERPL ELPH-MCNC: 4 G/DL — SIGNIFICANT CHANGE UP (ref 3.5–5)
ALP SERPL-CCNC: 339 U/L — HIGH (ref 40–120)
ALT FLD-CCNC: 129 U/L DA — HIGH (ref 10–60)
ANION GAP SERPL CALC-SCNC: 14 MMOL/L — SIGNIFICANT CHANGE UP (ref 5–17)
APTT BLD: 28.4 SEC — SIGNIFICANT CHANGE UP (ref 27.5–35.5)
AST SERPL-CCNC: 190 U/L — HIGH (ref 10–40)
BASOPHILS # BLD AUTO: 0.04 K/UL — SIGNIFICANT CHANGE UP (ref 0–0.2)
BASOPHILS NFR BLD AUTO: 0.5 % — SIGNIFICANT CHANGE UP (ref 0–2)
BILIRUB SERPL-MCNC: 0.8 MG/DL — SIGNIFICANT CHANGE UP (ref 0.2–1.2)
BLD GP AB SCN SERPL QL: SIGNIFICANT CHANGE UP
BUN SERPL-MCNC: 23 MG/DL — HIGH (ref 7–18)
CALCIUM SERPL-MCNC: 9.6 MG/DL — SIGNIFICANT CHANGE UP (ref 8.4–10.5)
CHLORIDE SERPL-SCNC: 111 MMOL/L — HIGH (ref 96–108)
CK MB BLD-MCNC: <1.1 % — SIGNIFICANT CHANGE UP (ref 0–3.5)
CK MB CFR SERPL CALC: <1 NG/ML — SIGNIFICANT CHANGE UP (ref 0–3.6)
CK SERPL-CCNC: 95 U/L — SIGNIFICANT CHANGE UP (ref 21–215)
CO2 SERPL-SCNC: 20 MMOL/L — LOW (ref 22–31)
CREAT SERPL-MCNC: 1.65 MG/DL — HIGH (ref 0.5–1.3)
D DIMER BLD IA.RAPID-MCNC: 6270 NG/ML DDU — HIGH
EOSINOPHIL # BLD AUTO: 0.07 K/UL — SIGNIFICANT CHANGE UP (ref 0–0.5)
EOSINOPHIL NFR BLD AUTO: 0.9 % — SIGNIFICANT CHANGE UP (ref 0–6)
GLUCOSE SERPL-MCNC: 118 MG/DL — HIGH (ref 70–99)
HCT VFR BLD CALC: 33.9 % — LOW (ref 34.5–45)
HGB BLD-MCNC: 10.9 G/DL — LOW (ref 11.5–15.5)
IMM GRANULOCYTES NFR BLD AUTO: 0.4 % — SIGNIFICANT CHANGE UP (ref 0–1.5)
INR BLD: 1.67 RATIO — HIGH (ref 0.88–1.16)
LIDOCAIN IGE QN: 53 U/L — LOW (ref 73–393)
LYMPHOCYTES # BLD AUTO: 3.19 K/UL — SIGNIFICANT CHANGE UP (ref 1–3.3)
LYMPHOCYTES # BLD AUTO: 41 % — SIGNIFICANT CHANGE UP (ref 13–44)
MAGNESIUM SERPL-MCNC: 1.9 MG/DL — SIGNIFICANT CHANGE UP (ref 1.6–2.6)
MCHC RBC-ENTMCNC: 30.6 PG — SIGNIFICANT CHANGE UP (ref 27–34)
MCHC RBC-ENTMCNC: 32.2 GM/DL — SIGNIFICANT CHANGE UP (ref 32–36)
MCV RBC AUTO: 95.2 FL — SIGNIFICANT CHANGE UP (ref 80–100)
MONOCYTES # BLD AUTO: 0.38 K/UL — SIGNIFICANT CHANGE UP (ref 0–0.9)
MONOCYTES NFR BLD AUTO: 4.9 % — SIGNIFICANT CHANGE UP (ref 2–14)
NEUTROPHILS # BLD AUTO: 4.07 K/UL — SIGNIFICANT CHANGE UP (ref 1.8–7.4)
NEUTROPHILS NFR BLD AUTO: 52.3 % — SIGNIFICANT CHANGE UP (ref 43–77)
NRBC # BLD: 1 /100 WBCS — HIGH (ref 0–0)
NT-PROBNP SERPL-SCNC: HIGH PG/ML (ref 0–125)
PHOSPHATE SERPL-MCNC: 3.3 MG/DL — SIGNIFICANT CHANGE UP (ref 2.5–4.5)
PLATELET # BLD AUTO: 332 K/UL — SIGNIFICANT CHANGE UP (ref 150–400)
POTASSIUM SERPL-MCNC: 3.8 MMOL/L — SIGNIFICANT CHANGE UP (ref 3.5–5.3)
POTASSIUM SERPL-SCNC: 3.8 MMOL/L — SIGNIFICANT CHANGE UP (ref 3.5–5.3)
PROT SERPL-MCNC: 8.9 G/DL — HIGH (ref 6–8.3)
PROTHROM AB SERPL-ACNC: 19.1 SEC — HIGH (ref 10.6–13.6)
RBC # BLD: 3.56 M/UL — LOW (ref 3.8–5.2)
RBC # FLD: 16.2 % — HIGH (ref 10.3–14.5)
SARS-COV-2 RNA SPEC QL NAA+PROBE: SIGNIFICANT CHANGE UP
SODIUM SERPL-SCNC: 145 MMOL/L — SIGNIFICANT CHANGE UP (ref 135–145)
TROPONIN I SERPL-MCNC: 0.27 NG/ML — HIGH (ref 0–0.04)
WBC # BLD: 7.78 K/UL — SIGNIFICANT CHANGE UP (ref 3.8–10.5)
WBC # FLD AUTO: 7.78 K/UL — SIGNIFICANT CHANGE UP (ref 3.8–10.5)

## 2020-09-25 PROCEDURE — 76705 ECHO EXAM OF ABDOMEN: CPT | Mod: 26

## 2020-09-25 PROCEDURE — 99285 EMERGENCY DEPT VISIT HI MDM: CPT

## 2020-09-25 PROCEDURE — 71045 X-RAY EXAM CHEST 1 VIEW: CPT | Mod: 26

## 2020-09-25 PROCEDURE — 99223 1ST HOSP IP/OBS HIGH 75: CPT

## 2020-09-25 RX ORDER — QUETIAPINE FUMARATE 200 MG/1
50 TABLET, FILM COATED ORAL AT BEDTIME
Refills: 0 | Status: DISCONTINUED | OUTPATIENT
Start: 2020-09-25 | End: 2020-09-28

## 2020-09-25 RX ORDER — SODIUM CHLORIDE 9 MG/ML
500 INJECTION, SOLUTION INTRAVENOUS
Refills: 0 | Status: DISCONTINUED | OUTPATIENT
Start: 2020-09-25 | End: 2020-09-27

## 2020-09-25 RX ORDER — PANTOPRAZOLE SODIUM 20 MG/1
40 TABLET, DELAYED RELEASE ORAL ONCE
Refills: 0 | Status: COMPLETED | OUTPATIENT
Start: 2020-09-25 | End: 2020-09-25

## 2020-09-25 RX ORDER — CIPROFLOXACIN LACTATE 400MG/40ML
400 VIAL (ML) INTRAVENOUS ONCE
Refills: 0 | Status: COMPLETED | OUTPATIENT
Start: 2020-09-25 | End: 2020-09-25

## 2020-09-25 RX ORDER — ASPIRIN/CALCIUM CARB/MAGNESIUM 324 MG
81 TABLET ORAL DAILY
Refills: 0 | Status: DISCONTINUED | OUTPATIENT
Start: 2020-09-25 | End: 2020-09-26

## 2020-09-25 RX ORDER — CIPROFLOXACIN LACTATE 400MG/40ML
400 VIAL (ML) INTRAVENOUS EVERY 12 HOURS
Refills: 0 | Status: DISCONTINUED | OUTPATIENT
Start: 2020-09-25 | End: 2020-09-25

## 2020-09-25 RX ORDER — SODIUM CHLORIDE 9 MG/ML
1000 INJECTION, SOLUTION INTRAVENOUS
Refills: 0 | Status: DISCONTINUED | OUTPATIENT
Start: 2020-09-25 | End: 2020-09-25

## 2020-09-25 RX ORDER — METOPROLOL TARTRATE 50 MG
50 TABLET ORAL ONCE
Refills: 0 | Status: COMPLETED | OUTPATIENT
Start: 2020-09-25 | End: 2020-09-25

## 2020-09-25 RX ORDER — ANASTROZOLE 1 MG/1
1 TABLET ORAL ONCE
Refills: 0 | Status: COMPLETED | OUTPATIENT
Start: 2020-09-25 | End: 2020-09-25

## 2020-09-25 RX ORDER — SACUBITRIL AND VALSARTAN 24; 26 MG/1; MG/1
1 TABLET, FILM COATED ORAL
Refills: 0 | Status: DISCONTINUED | OUTPATIENT
Start: 2020-09-25 | End: 2020-09-25

## 2020-09-25 RX ORDER — METRONIDAZOLE 500 MG
500 TABLET ORAL EVERY 8 HOURS
Refills: 0 | Status: DISCONTINUED | OUTPATIENT
Start: 2020-09-25 | End: 2020-09-25

## 2020-09-25 RX ORDER — METRONIDAZOLE 500 MG
500 TABLET ORAL ONCE
Refills: 0 | Status: COMPLETED | OUTPATIENT
Start: 2020-09-25 | End: 2020-09-25

## 2020-09-25 RX ORDER — PIPERACILLIN AND TAZOBACTAM 4; .5 G/20ML; G/20ML
3.38 INJECTION, POWDER, LYOPHILIZED, FOR SOLUTION INTRAVENOUS EVERY 8 HOURS
Refills: 0 | Status: DISCONTINUED | OUTPATIENT
Start: 2020-09-25 | End: 2020-09-28

## 2020-09-25 RX ORDER — ISOSORBIDE MONONITRATE 60 MG/1
30 TABLET, EXTENDED RELEASE ORAL DAILY
Refills: 0 | Status: DISCONTINUED | OUTPATIENT
Start: 2020-09-25 | End: 2020-09-28

## 2020-09-25 RX ORDER — METOPROLOL TARTRATE 50 MG
50 TABLET ORAL DAILY
Refills: 0 | Status: DISCONTINUED | OUTPATIENT
Start: 2020-09-25 | End: 2020-09-28

## 2020-09-25 RX ORDER — HYDRALAZINE HCL 50 MG
50 TABLET ORAL THREE TIMES A DAY
Refills: 0 | Status: DISCONTINUED | OUTPATIENT
Start: 2020-09-25 | End: 2020-09-28

## 2020-09-25 RX ORDER — QUETIAPINE FUMARATE 200 MG/1
25 TABLET, FILM COATED ORAL DAILY
Refills: 0 | Status: DISCONTINUED | OUTPATIENT
Start: 2020-09-26 | End: 2020-09-28

## 2020-09-25 RX ORDER — CLOPIDOGREL BISULFATE 75 MG/1
75 TABLET, FILM COATED ORAL DAILY
Refills: 0 | Status: DISCONTINUED | OUTPATIENT
Start: 2020-09-25 | End: 2020-09-26

## 2020-09-25 RX ORDER — BUPROPION HYDROCHLORIDE 150 MG/1
150 TABLET, EXTENDED RELEASE ORAL DAILY
Refills: 0 | Status: DISCONTINUED | OUTPATIENT
Start: 2020-09-25 | End: 2020-09-28

## 2020-09-25 RX ORDER — ENOXAPARIN SODIUM 100 MG/ML
70 INJECTION SUBCUTANEOUS ONCE
Refills: 0 | Status: COMPLETED | OUTPATIENT
Start: 2020-09-25 | End: 2020-09-25

## 2020-09-25 RX ORDER — HYDROMORPHONE HYDROCHLORIDE 2 MG/ML
0.5 INJECTION INTRAMUSCULAR; INTRAVENOUS; SUBCUTANEOUS EVERY 4 HOURS
Refills: 0 | Status: DISCONTINUED | OUTPATIENT
Start: 2020-09-25 | End: 2020-09-28

## 2020-09-25 RX ORDER — ONDANSETRON 8 MG/1
4 TABLET, FILM COATED ORAL EVERY 6 HOURS
Refills: 0 | Status: DISCONTINUED | OUTPATIENT
Start: 2020-09-25 | End: 2020-09-28

## 2020-09-25 RX ORDER — ANASTROZOLE 1 MG/1
1 TABLET ORAL DAILY
Refills: 0 | Status: DISCONTINUED | OUTPATIENT
Start: 2020-09-25 | End: 2020-09-28

## 2020-09-25 RX ADMIN — Medication 100 MILLIGRAM(S): at 21:27

## 2020-09-25 RX ADMIN — Medication 100 MILLIGRAM(S): at 22:19

## 2020-09-25 RX ADMIN — ONDANSETRON 4 MILLIGRAM(S): 8 TABLET, FILM COATED ORAL at 22:19

## 2020-09-25 RX ADMIN — ENOXAPARIN SODIUM 70 MILLIGRAM(S): 100 INJECTION SUBCUTANEOUS at 21:26

## 2020-09-25 RX ADMIN — PANTOPRAZOLE SODIUM 40 MILLIGRAM(S): 20 TABLET, DELAYED RELEASE ORAL at 16:42

## 2020-09-25 RX ADMIN — SODIUM CHLORIDE 65 MILLILITER(S): 9 INJECTION, SOLUTION INTRAVENOUS at 23:01

## 2020-09-25 RX ADMIN — SACUBITRIL AND VALSARTAN 1 TABLET(S): 24; 26 TABLET, FILM COATED ORAL at 21:27

## 2020-09-25 RX ADMIN — Medication 50 MILLIGRAM(S): at 21:27

## 2020-09-25 RX ADMIN — ANASTROZOLE 1 MILLIGRAM(S): 1 TABLET ORAL at 21:27

## 2020-09-25 RX ADMIN — Medication 200 MILLIGRAM(S): at 21:27

## 2020-09-25 NOTE — H&P ADULT - PROBLEM SELECTOR PLAN 4
Patient takes hydralazine, Imdur, metoprolol, Lasix ,and Entresto.  Will hold Lasix and Entresto in setting of CHEN.  will continue with hydralazine , Imdur and metoprolol.  monitor vitals.

## 2020-09-25 NOTE — ED PROVIDER NOTE - PROGRESS NOTE DETAILS
Patient is resting comfortably, NAD. Patient remains comfortable. Still tachycardic. I d/w Dr. Gerardo. Will admit after US result available. Will treat for possible PE in light of tachycardia and no acute CHF exacerbation. pro-BNP elevated but less than last admission. Troponin elevated but less same as last admission. US positive for acute cholecystitis. Likely explains high d-dimer. Also explains vomiting. Spoke with surgery PA, who will come see patient. Spoke with Dr. Gotti. He will come see patient and d/w Dr. Ramesh to determine appropriate admitting service. Patient accepted by Dr. Gotti. After further discussion with him, we decided to start lovenox therapy until patient's tachycardia is better explained

## 2020-09-25 NOTE — ED PROVIDER NOTE - CARDIAC, MLM
Normal rate, regular rhythm.  Heart sounds S1, S2.  No murmurs, rubs or gallops. no peripheral edema

## 2020-09-25 NOTE — ED PROVIDER NOTE - OBJECTIVE STATEMENT
65F from home, PMH of DM, HTN, HLD, Breast CA s/p chemo/XRT, SCC, CVA no motor deficits, COPD (not on home O2), NICM with EF 31% by MUGA 7/2019, s/p SubQ ICD 10/2019, AAA presented to ED due  to weakness and vomiting. She states she has been having NBNB vomiting multiple times since she was discharged, aggravated on taking her regular meds and relieved on taking peptobismol. She does have shortness of breath but states no change from her baseline. She denies any diarrhea, abdominal pain, urinary complaints, chest pain, palpitations, leg swelling, headache, dizziness.  She also states she was denied HHA and needs help.

## 2020-09-25 NOTE — ED PROVIDER NOTE - ATTENDING CONTRIBUTION TO CARE
I performed a face to face bedside interview with this patient regarding history of present illness, review of symptoms and past medical, social and family history.  I completed an independent physical examination.  I have independently reviewed any laboratory or radiologic studies. I have reviewed the resident's documentation and discussed the patient’s plan of care and disposition with the resident. I have documented any discrepancies between the resident's evaluation and my own.    Patient reports since she was discharged from the hospital two weeks ago, she has been vomiting once a day, NBNB. Usually after she takes her meds on an empty stomach. did not take meds today, and did not vomit. Also has been getting gradually short of breath for the past few days, with nocturnal cough. no cp, ap, diarrhea, dysuria, urgency, frequency.     Gen: Well-developed, well-nourished, NAD, VS as noted by nursing. HEENT: NCAT, mmm   Chest: tachycardic, regular, nl S1 and S2, no m/r/g, no JVD. Resp: No respiratory distress. CTAB, no w/r/r  Abd: nl BS, soft, nt/nd. Ext: Warm, dry. no pedal edema  Neuro: CN II-XII intact, normal and equal strength, sensation, and reflexes bilaterally  Psych: AAOx3     MDM: Patient tachycardic with h/o multiple medical problems, will check labs and admit.

## 2020-09-25 NOTE — H&P ADULT - PROBLEM SELECTOR PLAN 8
RISK                                                          Points  [] Previous VTE                                           3  [] Thrombophilia                                        2  [] Lower limb paralysis                              2   [] Current Cancer                                       2   [x] Immobilization > 24 hrs                        1  [] ICU/CCU stay > 24 hours                       1  [x] Age > 60                                                   1    s/p full dose of Lovenox, will continue with heparin drip for elevated d-dimers

## 2020-09-25 NOTE — H&P ADULT - PROBLEM SELECTOR PLAN 1
Patient presented with abdominal pain , nausea and Vomiting.  has elevated liver enzymes.  , ,  , US showed Acute cholecystitis .  Surgery consulted.  recommendations noted .  Will start the patient on Zosyn,  f/u blood cultures  NPO, FS Q6.  Follow CMP.  RCRI 4 points, Class IV Risk 15.0 %  30-day risk of death, MI, or cardiac arrest.  RODRIGUEZ risk index 4.5 %  Risk of myocardial infarction or cardiac arrest, intraoperatively or up to 30 days post-op.

## 2020-09-25 NOTE — H&P ADULT - NSHPSOCIALHISTORY_GEN_ALL_CORE
Patient lives alone, smokes 1 PPD X 50 years, denies use of Alcohol or illicit drugs. left smoking recently as per patient.

## 2020-09-25 NOTE — ED PROVIDER NOTE - CHPI ED SYMPTOMS NEG
no blood in stool/no fever/no burning urination/no chills/no abdominal distension/no dysuria/no diarrhea

## 2020-09-25 NOTE — ED PROVIDER NOTE - CARE PLAN
Principal Discharge DX:	Acute cholecystitis without calculus   Principal Discharge DX:	Acute cholecystitis without calculus  Secondary Diagnosis:	Tachycardia   Principal Discharge DX:	Acute cholecystitis without calculus  Secondary Diagnosis:	Tachycardia  Secondary Diagnosis:	Cardiac enzymes elevated  Secondary Diagnosis:	Acute kidney injury

## 2020-09-25 NOTE — H&P ADULT - HISTORY OF PRESENT ILLNESS
65 year old woman with PMH of non ischemic cardiomyopathy with EF 15 - 20% s/p ICD, COPD, DM2, HTN, HLD, breast cancer in remission on lifelong hormonal therapy with anastrazole was recently discharged after treatment of  acute respiratory failure from acute CHF exacerbation. She states she has been having  vomiting multiple times for past 2 weeks ,sometimes relieved by taking anta acid. Patient also states that she is getting more short of breath than usual. She denies any diarrhea, urinary complaints, chest pain, palpitations, leg swelling, headache, dizziness.

## 2020-09-25 NOTE — H&P ADULT - PROBLEM SELECTOR PLAN 2
Patients Creatinine is elevated to 1.61.  follow urine lytes  Will continue with gentle hydration 500cc @ 65cc/hr.  follow BMP in am .  Holding Entresto and Lasix for now.

## 2020-09-25 NOTE — H&P ADULT - PROBLEM SELECTOR PLAN 6
Patient has hx of CHF. ANGELINA with doppler done on 9/1 /20 showed   Mural thrombus is also noted in the  descending aorta, up to 1.12 cm in depth on crossectional  imaging.  - Severe left atrial enlargement. No left atrial or left  atrial appendage thrombus.  - Severe left ventricular enlargement.  - Severe global left ventricular systolic dysfunction.  - Normal right atrium.  holding home dose of Lasix and Entresto for now.   will continue with metoprolol.  Cardiology consult dr Doshi

## 2020-09-25 NOTE — H&P ADULT - NSHPPHYSICALEXAM_GEN_ALL_CORE
Vital Signs Last 24 Hrs  T(C): 36.6 (25 Sep 2020 17:27), Max: 36.9 (25 Sep 2020 16:09)  T(F): 97.9 (25 Sep 2020 17:27), Max: 98.4 (25 Sep 2020 16:09)  HR: 106 (25 Sep 2020 17:27) (105 - 106)  BP: 164/120 (25 Sep 2020 17:27) (142/105 - 164/120)  BP(mean): --  RR: 18 (25 Sep 2020 17:27) (18 - 19)  SpO2: 100% (25 Sep 2020 17:27) (100% - 100%)    GENERAL: elderly female in no acute distress  HEAD:  Atraumatic, Normocephalic  EYES: EOMI, PERRLA, conjunctiva and sclera clear  NECK: Supple, No JVD  CHEST/LUNG: Clear to auscultation bilaterally; No wheeze; No crackles; No accessory muscles used  HEART: Regular rate and rhythm; No murmurs;   ABDOMEN: Soft, Tender RUQ, Nondistended; Bowel sounds present; No guarding, positive murphys   EXTREMITIES:  2+ Peripheral Pulses, No cyanosis or edema  PSYCH:   Affect  NEUROLOGY: non-focal, AAO X 3.   confused at base line  SKIN: Chronic venous stasis skin changes on both Lower extremities

## 2020-09-25 NOTE — H&P ADULT - ATTENDING COMMENTS
Pt seen and examined.  Case discussed with MAR.  65 year old woman with PMH of non ischemic cardiomyopathy with EF 15 - 20% s/p ICD, COPD, DM2, HTN, HLD, breast cancer in remission on lifelong hormonal therapy with anastrazole recently managed in this facility for acute respiratory failure from acute CHF exacerbation.   She comes in today on account of worsening exercise tolerance and SOB with mild exertion.  At baseline, she is able to ambulate to the bathroom and back with no issues but over the last 2 days she gets SOB/ winded  and weak doing that which was noticed by her friend. She used her home albuterol with minimal help. She had continued to have nausea and vomiting at baseline intermittently.    She denies chest pain, palpitations, dizziness or LOC.    Vital Signs Last 24 Hrs  T(C): 36.6 (25 Sep 2020 17:27), Max: 36.9 (25 Sep 2020 16:09)  T(F): 97.9 (25 Sep 2020 17:27), Max: 98.4 (25 Sep 2020 16:09)  HR: 106 (25 Sep 2020 17:27) (105 - 106)  BP: 164/120 (25 Sep 2020 17:27) (142/105 - 164/120)  RR: 18 (25 Sep 2020 17:27) (18 - 19)  SpO2: 100% (25 Sep 2020 17:27) (100% - 100%)    P/E  Pt seen lying in bed, NAD, AAO X 3 able to speak in full sentences.  SaO2 at bedside is 100% in RA  RRR S1S2 with tachycardia in the 100s to 110s/minute  CTA B/L no crackles or wheezing noted.  Soft, TTP in the epigastric and RUQ; no rebound or rigidity, no distension, BS+  No pedal edema  No focal deficits    Labs                        10.9   7.78  )-----------( 332      ( 25 Sep 2020 16:44 )             33.9     145  |  111<H>  |  23<H>  ----------------------------<  118<H>  3.8   |  20<L>  |  1.65<H>    Ca    9.6      25 Sep 2020 17:18Phos  3.3     09-25Mg     1.9     09-25  TPro  8.9<H>  /  Alb  4.0  /  TBili  0.8  /  DBili  x   /  AST  190<H>  /  ALT  129<H>  /  AlkPhos  339<H>  09-25  D-dimer - 6270;  Lipase 53  CARDIAC MARKERS ( 25 Sep 2020 17:26 ) -0.272 ng/mL / x     / 95 U/L / x     / <1.0 ng/mL    Pro BNP - 52525    US RUQ  Gallbladder wall thickening and pericholecystic fluid, most suggestive of acute cholecystitis. No gallbladder calculi.  1.9 x 1.6 cm echogenic lesion within the superior pole of the right kidney, nonspecific, may reflect an AML.  CXR - Unremarkable - similar to recent CXR on last admission  EKG - Sinus tachycardia with LAE, LAD  Recent ECHO noted    Impression  -  Acute SOB with no acute respiratory failure  Worsening SOB compared to baseline  Pt remains euvolemic with no change on CXR or physical exam and no finding to suggest a CHF or COPD exacerbation.   She however is persistently tachycardic at rest compared to her baseline.  D-dimer is significantly elevated over her baseline with hx of remote VTE and finding of descending aortic mural thrombus on last ANGELINA.  Concerns for PE are warranted. Pt unable to get a CTA angio until CHEN which appears pre-renal is corrected.  Plan  1. Admit to telemetry  2. One dose of full dose anticoagulation with LMWH or UFH drip if corrected GFR is too low.  3. CTA chest w/contrast once able  4. Monitor closely    - Acute cholecystitis with no choledocholithiasis  1. Empiric IV antibiotics with zosyn  2. NPO except meds  3. General surgery consult; Spoke with Dr Ramesh; PA aware  4. Consider low volume IVF- 500cc @ 75cc/hour  5. Monitor liver enzymes    - Acute kidney injury  Appears to be pre-renal either from ineffective circulating volume from chronic heart failure or sepsis.  Plan  1. Will hold off IV diuresis  2. recheck chemistry in AM; check urine Na, Cr    - Hx of breast cancer in remission   Continue anastrazole    - CHF   No clinical or imaging evidence of acute exacerbation  Continue home meds including Entresto and BP meds Pt seen and examined.  Case discussed with MAR.  65 year old woman with PMH of non ischemic cardiomyopathy with EF 15 - 20% s/p ICD, COPD, DM2, HTN, HLD, breast cancer in remission on lifelong hormonal therapy with anastrazole recently managed in this facility for acute respiratory failure from acute CHF exacerbation.   She comes in today on account of worsening exercise tolerance and SOB with mild exertion.  At baseline, she is able to ambulate to the bathroom and back with no issues but over the last 2 days she gets SOB/ winded  and weak doing that which was noticed by her friend. She used her home albuterol with minimal help. She had continued to have nausea and vomiting at baseline intermittently.    She denies chest pain, palpitations, dizziness or LOC.    Vital Signs Last 24 Hrs  T(C): 36.6 (25 Sep 2020 17:27), Max: 36.9 (25 Sep 2020 16:09)  T(F): 97.9 (25 Sep 2020 17:27), Max: 98.4 (25 Sep 2020 16:09)  HR: 106 (25 Sep 2020 17:27) (105 - 106)  BP: 164/120 (25 Sep 2020 17:27) (142/105 - 164/120)  RR: 18 (25 Sep 2020 17:27) (18 - 19)  SpO2: 100% (25 Sep 2020 17:27) (100% - 100%)    P/E  Pt seen lying in bed, NAD, AAO X 3 able to speak in full sentences.  SaO2 at bedside is 100% in RA  RRR S1S2 with tachycardia in the 100s to 110s/minute  CTA B/L no crackles or wheezing noted.  Soft, TTP in the epigastric and RUQ; no rebound or rigidity, no distension, BS+  No pedal edema  No focal deficits    Labs                        10.9   7.78  )-----------( 332      ( 25 Sep 2020 16:44 )             33.9     145  |  111<H>  |  23<H>  ----------------------------<  118<H>  3.8   |  20<L>  |  1.65<H>    Ca    9.6      25 Sep 2020 17:18Phos  3.3     09-25Mg     1.9     09-25  TPro  8.9<H>  /  Alb  4.0  /  TBili  0.8  /  DBili  x   /  AST  190<H>  /  ALT  129<H>  /  AlkPhos  339<H>  09-25  D-dimer - 6270;  Lipase 53  CARDIAC MARKERS ( 25 Sep 2020 17:26 ) -0.272 ng/mL / x     / 95 U/L / x     / <1.0 ng/mL    Pro BNP - 03871    US RUQ  Gallbladder wall thickening and pericholecystic fluid, most suggestive of acute cholecystitis. No gallbladder calculi.  1.9 x 1.6 cm echogenic lesion within the superior pole of the right kidney, nonspecific, may reflect an AML.  CXR - Unremarkable - similar to recent CXR on last admission  EKG - Sinus tachycardia with LAE, LAD  Recent ECHO noted    Impression  -  Acute SOB with no acute respiratory failure  Worsening SOB compared to baseline  Pt remains euvolemic with no change on CXR or physical exam and no finding to suggest a CHF or COPD exacerbation.   She however is persistently tachycardic at rest compared to her baseline.  D-dimer is significantly elevated over her baseline with hx of remote VTE and finding of descending aortic mural thrombus on last NAGELINA.  Concerns for PE are warranted. Pt unable to get a CTA angio until CHEN which appears pre-renal is corrected.  Plan  1. Admit to telemetry  2. One dose of full dose anticoagulation with LMWH or UFH drip if corrected GFR is too low.  3. CTA chest w/contrast once able  4. Monitor closely    - Acute cholecystitis with no choledocholithiasis  1. Empiric IV antibiotics with zosyn  2. NPO except meds  3. General surgery consult; Spoke with Dr Ramesh; PA aware  4. Consider low volume IVF- 500cc @ 75cc/hour  5. Monitor liver enzymes    - Acute kidney injury  Appears to be pre-renal either from ineffective circulating volume from chronic heart failure or sepsis.  Plan  1. Will hold off IV diuresis  2. recheck chemistry in AM; check urine Na, Cr    - Hx of breast cancer in remission   Continue anastrazole    - CHF   No clinical or imaging evidence of acute exacerbation  Continue home cardiac meds except Entresto

## 2020-09-25 NOTE — CONSULT NOTE ADULT - ASSESSMENT
INCOMPLETE 65F from home, PMH of DM, HTN, HLD, Breast CA s/p chemo/XRT, SCC, CVA no motor deficits, COPD (not on home O2), NICM with EF 31% by MUGA 7/2019, s/p SubQ ICD 10/2019, AAA presented to ED due  to weakness and vomiting. She states she has been having NBNB vomiting multiple times since she was discharged, aggravated on taking her regular meds and relieved on taking peptobismol. She does have shortness of breath but states no change from her baseline.   Surgery was consulted because pt has abdominal pain, nausea and vomiting with a resultant GB US showing Acute Cholecystitis . Pain is severe, located in the RUQ/ epigastric associated with multiple NBNB vomiting . Denies fever, chills but also admits SOB.    Care as per primary team   Keep NPO, IVF on NPO   Pain /Nausea mgmt  Preop labs   f/up EKG   f/up COVID PCR  Surgery will follow

## 2020-09-25 NOTE — H&P ADULT - PROBLEM SELECTOR PLAN 3
Patient D dimers are elevated. Wells score.  4.5 points  Moderate risk group: 16.2% chance of PE in an ED population.  Another study assigned scores = 4 as “PE Unlikely” and had a 3% incidence of PE.  Another study assigned scores > 4 as “PE Likely” and had a 28% incidence of PE.  Consider CT angio of chest if creatinine improves .  other wise will proceed with VQ scan.  s/p full dose of lovenox in ED, will  start heparin drip in setting of CHEN

## 2020-09-25 NOTE — CONSULT NOTE ADULT - SUBJECTIVE AND OBJECTIVE BOX
GENERAL SURGERY CONSULT NOTE    Patient is a 65y old  Female who presents with a chief complaint of     HPI:  65F from home, PMH of DM, HTN, HLD, Breast CA s/p chemo/XRT, SCC, CVA no motor deficits, COPD (not on home O2), NICM with EF 31% by MUGA 7/2019, s/p SubQ ICD 10/2019, AAA presented to ED due  to weakness and vomiting. She states she has been having NBNB vomiting multiple times since she was discharged, aggravated on taking her regular meds and relieved on taking peptobismol. She does have shortness of breath but states no change from her baseline. She denies any diarrhea, abdominal pain, urinary complaints, chest pain, palpitations, leg swelling, headache, dizziness.    Surgery     PAST MEDICAL & SURGICAL HISTORY:  Knee effusion, left  arthrocentesis- 05/25/16    Internal carotid aneurysm    Abdominal aneurysm  infrarenal- wached by Vascular , Dr. Gutierrez    Cardiomyopathy    Sickle cell trait    DVT (deep venous thrombosis)  Left leg in 1994 after hysterectomy    Hypercholesteremia    Breast cancer  right-s/p chemo and radiation    Hypertension    Breast cancer  infusaport insertion- 2014- left chest    S/P hysterectomy  1994    [place] filter in place  1994    S/P lumpectomy, right breast  2014, revision of position- 2015    Fibroid uterus  sp hystrectomy 1196        Review of Systems:    I have reviewed 9 systems with the patient and the only positive findings were     MEDICATIONS  (STANDING):  anastrozole 1 milliGRAM(s) Oral Once  ciprofloxacin   IVPB 400 milliGRAM(s) IV Intermittent once  ciprofloxacin   IVPB 400 milliGRAM(s) IV Intermittent every 12 hours  dextrose 5% + sodium chloride 0.45%. 1000 milliLiter(s) (70 mL/Hr) IV Continuous <Continuous>  enoxaparin Injectable 70 milliGRAM(s) SubCutaneous Once  metoprolol tartrate 50 milliGRAM(s) Oral Once  metroNIDAZOLE  IVPB 500 milliGRAM(s) IV Intermittent every 8 hours  metroNIDAZOLE  IVPB 500 milliGRAM(s) IV Intermittent once  sacubitril 24 mG/valsartan 26 mG 1 Tablet(s) Oral two times a day    MEDICATIONS  (PRN):  HYDROmorphone  Injectable 0.5 milliGRAM(s) IV Push every 4 hours PRN Moderate Pain (4 - 6)  ondansetron Injectable 4 milliGRAM(s) IV Push every 6 hours PRN Nausea      Allergies    Advil (Hives; Vomiting)    Intolerances        Social History:      FAMILY HISTORY:  Family history of glioblastoma    Family history of other condition  please open box and see additional medical family history    Family history of renal failure  uncle    Family history of glioblastoma (Sibling)    History of hypertension  mother    Family history of liver failure    Family history of glioblastoma    Family history of renal stone    Family history of diabetes mellitus    Family history of hypertension        Vital Signs Last 24 Hrs  T(C): 36.6 (25 Sep 2020 17:27), Max: 36.9 (25 Sep 2020 16:09)  T(F): 97.9 (25 Sep 2020 17:27), Max: 98.4 (25 Sep 2020 16:09)  HR: 106 (25 Sep 2020 17:27) (105 - 106)  BP: 164/120 (25 Sep 2020 17:27) (142/105 - 164/120)  BP(mean): --  RR: 18 (25 Sep 2020 17:27) (18 - 19)  SpO2: 100% (25 Sep 2020 17:27) (100% - 100%)    Physical Exam:  Vital Signs Last 24 Hrs  T(C): 36.6 (25 Sep 2020 17:27), Max: 36.9 (25 Sep 2020 16:09)  T(F): 97.9 (25 Sep 2020 17:27), Max: 98.4 (25 Sep 2020 16:09)  HR: 106 (25 Sep 2020 17:27) (105 - 106)  BP: 164/120 (25 Sep 2020 17:27) (142/105 - 164/120)  BP(mean): --  RR: 18 (25 Sep 2020 17:27) (18 - 19)  SpO2: 100% (25 Sep 2020 17:27) (100% - 100%)    General:  A&Ox3,Appears stated age, No acute distress,  Head: NC/AT  EENT: PERRLA. EOMI. Conjunctiva and sclera clear. Pharynx clear.  Neck: Supple. No JVD  Lungs: CTA B/l. Nonlabored Respirations  CV: +S1S2, RRR  Abdomen: Soft, Nondistended,  Nontender, no guarding, no rebound  Extremities: Warm and well perfused. 2+ peripheral pulses b/l. Calf soft, nontender b/l. No pedal edema.            LABS:                        10.9   7.78  )-----------( 332      ( 25 Sep 2020 16:44 )             33.9     09-25    145  |  111<H>  |  23<H>  ----------------------------<  118<H>  3.8   |  20<L>  |  1.65<H>    Ca    9.6      25 Sep 2020 17:18  Phos  3.3     09-25  Mg     1.9     09-25    TPro  8.9<H>  /  Alb  4.0  /  TBili  0.8  /  DBili  x   /  AST  190<H>  /  ALT  129<H>  /  AlkPhos  339<H>  09-25    LIVER FUNCTIONS - ( 25 Sep 2020 17:18 )  Alb: 4.0 g/dL / Pro: 8.9 g/dL / ALK PHOS: 339 U/L / ALT: 129 U/L DA / AST: 190 U/L / GGT: x                 RADIOLOGY & ADDITIONAL STUDIES:     GENERAL SURGERY CONSULT NOTE     chief complaint of SOB/weakness    HPI:  65F from home, PMH of DM, HTN, HLD, Breast CA s/p chemo/XRT, SCC, CVA no motor deficits, COPD (not on home O2), NICM with EF 31% by MUGA 7/2019, s/p SubQ ICD 10/2019, AAA presented to ED due  to weakness and vomiting. She states she has been having NBNB vomiting multiple times since she was discharged, aggravated on taking her regular meds and relieved on taking peptobismol. She does have shortness of breath but states no change from her baseline. She denies any diarrhea, abdominal pain, urinary complaints, chest pain, palpitations, leg swelling, headache, dizziness.    Surgery was consulted because pt has abdominal pain, nausea and vomiting with a resultant GB US showing Acute Cholecystitis . Pain is severe, located in the RUQ/ epigastric associated with multiple NBNB vomiting . Denies fever, chills but also admits SOB. Also denies any other symptoms at this time .      PAST MEDICAL & SURGICAL HISTORY:  Knee effusion, left  arthrocentesis- 05/25/16    Internal carotid aneurysm    Abdominal aneurysm  infrarenal- watched by Vascular , Dr. Gutierrez    Cardiomyopathy    Sickle cell trait    DVT (deep venous thrombosis)  Left leg in 1994 after hysterectomy    Hypercholesteremia    Breast cancer  right-s/p chemo and radiation    Hypertension    Breast cancer  infusaport insertion- 2014- left chest    S/P hysterectomy  1994    [place] filter in place  1994    S/P lumpectomy, right breast  2014, revision of position- 2015    Fibroid uterus  sp hystrectomy 1196        Review of Systems:    I have reviewed 9 systems with the patient and the only positive findings were     MEDICATIONS  (STANDING):  anastrozole 1 milliGRAM(s) Oral Once  ciprofloxacin   IVPB 400 milliGRAM(s) IV Intermittent once  ciprofloxacin   IVPB 400 milliGRAM(s) IV Intermittent every 12 hours  dextrose 5% + sodium chloride 0.45%. 1000 milliLiter(s) (70 mL/Hr) IV Continuous <Continuous>  enoxaparin Injectable 70 milliGRAM(s) SubCutaneous Once  metoprolol tartrate 50 milliGRAM(s) Oral Once  metroNIDAZOLE  IVPB 500 milliGRAM(s) IV Intermittent every 8 hours  metroNIDAZOLE  IVPB 500 milliGRAM(s) IV Intermittent once  sacubitril 24 mG/valsartan 26 mG 1 Tablet(s) Oral two times a day    MEDICATIONS  (PRN):  HYDROmorphone  Injectable 0.5 milliGRAM(s) IV Push every 4 hours PRN Moderate Pain (4 - 6)  ondansetron Injectable 4 milliGRAM(s) IV Push every 6 hours PRN Nausea      Allergies    Advil (Hives; Vomiting)    Intolerances        Social History:      FAMILY HISTORY:  Family history of glioblastoma    Family history of other condition  please open box and see additional medical family history    Family history of renal failure  uncle    Family history of glioblastoma (Sibling)    History of hypertension  mother    Family history of liver failure    Family history of glioblastoma    Family history of renal stone    Family history of diabetes mellitus    Family history of hypertension        Vital Signs Last 24 Hrs  T(C): 36.6 (25 Sep 2020 17:27), Max: 36.9 (25 Sep 2020 16:09)  T(F): 97.9 (25 Sep 2020 17:27), Max: 98.4 (25 Sep 2020 16:09)  HR: 106 (25 Sep 2020 17:27) (105 - 106)  BP: 164/120 (25 Sep 2020 17:27) (142/105 - 164/120)  BP(mean): --  RR: 18 (25 Sep 2020 17:27) (18 - 19)  SpO2: 100% (25 Sep 2020 17:27) (100% - 100%)    Physical Exam:  Vital Signs Last 24 Hrs  T(C): 36.6 (25 Sep 2020 17:27), Max: 36.9 (25 Sep 2020 16:09)  T(F): 97.9 (25 Sep 2020 17:27), Max: 98.4 (25 Sep 2020 16:09)  HR: 106 (25 Sep 2020 17:27) (105 - 106)  BP: 164/120 (25 Sep 2020 17:27) (142/105 - 164/120)  BP(mean): --  RR: 18 (25 Sep 2020 17:27) (18 - 19)  SpO2: 100% (25 Sep 2020 17:27) (100% - 100%)    General:  A&Ox3,Appears stated age, No acute distress,  Head: NC/AT  EENT: PERRLA. EOMI. Conjunctiva and sclera clear. Pharynx clear.  Neck: Supple. No JVD  Lungs: CTA B/l. Nonlabored Respirations  CV: +S1S2, RRR  Abdomen: Soft, Nondistended,  Nontender, no guarding, no rebound  Extremities: Warm and well perfused. 2+ peripheral pulses b/l. Calf soft, nontender b/l. No pedal edema.      LABS:                        10.9   7.78  )-----------( 332      ( 25 Sep 2020 16:44 )             33.9     09-25    145  |  111<H>  |  23<H>  ----------------------------<  118<H>  3.8   |  20<L>  |  1.65<H>    Ca    9.6      25 Sep 2020 17:18  Phos  3.3     09-25  Mg     1.9     09-25    TPro  8.9<H>  /  Alb  4.0  /  TBili  0.8  /  DBili  x   /  AST  190<H>  /  ALT  129<H>  /  AlkPhos  339<H>  09-25    LIVER FUNCTIONS - ( 25 Sep 2020 17:18 )  Alb: 4.0 g/dL / Pro: 8.9 g/dL / ALK PHOS: 339 U/L / ALT: 129 U/L DA / AST: 190 U/L / GGT: x                 RADIOLOGY & ADDITIONAL STUDIES:  < from: US Hepatic & Pancreatic (09.25.20 @ 19:42) >    IMPRESSION:    Gallbladder wall thickening and pericholecystic fluid, most suggestive of acute cholecystitis. No gallbladder calculi.    1.9 x 1.6 cm echogenic lesion within the superior pole of the right kidney, nonspecific, may reflect an AML.    < end of copied text >

## 2020-09-25 NOTE — H&P ADULT - PROBLEM SELECTOR PLAN 7
Patient s ANGELINA on 9/1/20 showed  Mural thrombus is also noted in the  descending aorta, up to 1.12 cm in depth on crossectional imaging.  will consult vascular

## 2020-09-25 NOTE — ED PROVIDER NOTE - CLINICAL SUMMARY MEDICAL DECISION MAKING FREE TEXT BOX
Patient with tachycardia and hypertension, h/o clots, CHF. Will check labs and admit. Ddx: PE, CHF, ACS.

## 2020-09-25 NOTE — ED PROVIDER NOTE - PSH
Breast cancer  infusaport insertion- 2014- left chest  Fibroid uterus  sp hystrectomy 1196  [place] filter in place  1994  S/P hysterectomy  1994  S/P lumpectomy, right breast  2014, revision of position- 2015

## 2020-09-26 DIAGNOSIS — R09.89 OTHER SPECIFIED SYMPTOMS AND SIGNS INVOLVING THE CIRCULATORY AND RESPIRATORY SYSTEMS: ICD-10-CM

## 2020-09-26 DIAGNOSIS — I51.3 INTRACARDIAC THROMBOSIS, NOT ELSEWHERE CLASSIFIED: ICD-10-CM

## 2020-09-26 LAB
A1C WITH ESTIMATED AVERAGE GLUCOSE RESULT: 5.9 % — HIGH (ref 4–5.6)
ALBUMIN SERPL ELPH-MCNC: 2.9 G/DL — LOW (ref 3.5–5)
ALP SERPL-CCNC: 265 U/L — HIGH (ref 40–120)
ALT FLD-CCNC: 139 U/L DA — HIGH (ref 10–60)
ANION GAP SERPL CALC-SCNC: 9 MMOL/L — SIGNIFICANT CHANGE UP (ref 5–17)
APTT BLD: 90.2 SEC — HIGH (ref 27.5–35.5)
AST SERPL-CCNC: 204 U/L — HIGH (ref 10–40)
BASOPHILS # BLD AUTO: 0.02 K/UL — SIGNIFICANT CHANGE UP (ref 0–0.2)
BASOPHILS NFR BLD AUTO: 0.3 % — SIGNIFICANT CHANGE UP (ref 0–2)
BILIRUB SERPL-MCNC: 0.6 MG/DL — SIGNIFICANT CHANGE UP (ref 0.2–1.2)
BUN SERPL-MCNC: 26 MG/DL — HIGH (ref 7–18)
CALCIUM SERPL-MCNC: 8.2 MG/DL — LOW (ref 8.4–10.5)
CHLORIDE SERPL-SCNC: 110 MMOL/L — HIGH (ref 96–108)
CHOLEST SERPL-MCNC: 149 MG/DL — SIGNIFICANT CHANGE UP (ref 10–199)
CK MB BLD-MCNC: <1.2 % — SIGNIFICANT CHANGE UP (ref 0–3.5)
CK MB CFR SERPL CALC: <1 NG/ML — SIGNIFICANT CHANGE UP (ref 0–3.6)
CK SERPL-CCNC: 84 U/L — SIGNIFICANT CHANGE UP (ref 21–215)
CO2 SERPL-SCNC: 21 MMOL/L — LOW (ref 22–31)
CREAT SERPL-MCNC: 1.79 MG/DL — HIGH (ref 0.5–1.3)
EOSINOPHIL # BLD AUTO: 0.06 K/UL — SIGNIFICANT CHANGE UP (ref 0–0.5)
EOSINOPHIL NFR BLD AUTO: 0.9 % — SIGNIFICANT CHANGE UP (ref 0–6)
ESTIMATED AVERAGE GLUCOSE: 123 MG/DL — HIGH (ref 68–114)
FOLATE SERPL-MCNC: 6.1 NG/ML — SIGNIFICANT CHANGE UP
GLUCOSE BLDC GLUCOMTR-MCNC: 140 MG/DL — HIGH (ref 70–99)
GLUCOSE BLDC GLUCOMTR-MCNC: 147 MG/DL — HIGH (ref 70–99)
GLUCOSE SERPL-MCNC: 345 MG/DL — HIGH (ref 70–99)
HCT VFR BLD CALC: 27.6 % — LOW (ref 34.5–45)
HCT VFR BLD CALC: 28.4 % — LOW (ref 34.5–45)
HDLC SERPL-MCNC: 45 MG/DL — LOW
HGB BLD-MCNC: 9 G/DL — LOW (ref 11.5–15.5)
HGB BLD-MCNC: 9.2 G/DL — LOW (ref 11.5–15.5)
IMM GRANULOCYTES NFR BLD AUTO: 0.5 % — SIGNIFICANT CHANGE UP (ref 0–1.5)
LIPID PNL WITH DIRECT LDL SERPL: 88 MG/DL — SIGNIFICANT CHANGE UP
LYMPHOCYTES # BLD AUTO: 2.85 K/UL — SIGNIFICANT CHANGE UP (ref 1–3.3)
LYMPHOCYTES # BLD AUTO: 42.9 % — SIGNIFICANT CHANGE UP (ref 13–44)
MAGNESIUM SERPL-MCNC: 1.7 MG/DL — SIGNIFICANT CHANGE UP (ref 1.6–2.6)
MCHC RBC-ENTMCNC: 30.8 PG — SIGNIFICANT CHANGE UP (ref 27–34)
MCHC RBC-ENTMCNC: 30.8 PG — SIGNIFICANT CHANGE UP (ref 27–34)
MCHC RBC-ENTMCNC: 32.4 GM/DL — SIGNIFICANT CHANGE UP (ref 32–36)
MCHC RBC-ENTMCNC: 32.6 GM/DL — SIGNIFICANT CHANGE UP (ref 32–36)
MCV RBC AUTO: 94.5 FL — SIGNIFICANT CHANGE UP (ref 80–100)
MCV RBC AUTO: 95 FL — SIGNIFICANT CHANGE UP (ref 80–100)
MONOCYTES # BLD AUTO: 0.43 K/UL — SIGNIFICANT CHANGE UP (ref 0–0.9)
MONOCYTES NFR BLD AUTO: 6.5 % — SIGNIFICANT CHANGE UP (ref 2–14)
NEUTROPHILS # BLD AUTO: 3.25 K/UL — SIGNIFICANT CHANGE UP (ref 1.8–7.4)
NEUTROPHILS NFR BLD AUTO: 48.9 % — SIGNIFICANT CHANGE UP (ref 43–77)
NRBC # BLD: 1 /100 WBCS — HIGH (ref 0–0)
NRBC # BLD: 1 /100 WBCS — HIGH (ref 0–0)
PHOSPHATE SERPL-MCNC: 3.6 MG/DL — SIGNIFICANT CHANGE UP (ref 2.5–4.5)
PLATELET # BLD AUTO: 252 K/UL — SIGNIFICANT CHANGE UP (ref 150–400)
PLATELET # BLD AUTO: 280 K/UL — SIGNIFICANT CHANGE UP (ref 150–400)
POTASSIUM SERPL-MCNC: 3.6 MMOL/L — SIGNIFICANT CHANGE UP (ref 3.5–5.3)
POTASSIUM SERPL-SCNC: 3.6 MMOL/L — SIGNIFICANT CHANGE UP (ref 3.5–5.3)
PROT SERPL-MCNC: 6.7 G/DL — SIGNIFICANT CHANGE UP (ref 6–8.3)
RBC # BLD: 2.92 M/UL — LOW (ref 3.8–5.2)
RBC # BLD: 2.99 M/UL — LOW (ref 3.8–5.2)
RBC # FLD: 16.1 % — HIGH (ref 10.3–14.5)
RBC # FLD: 16.3 % — HIGH (ref 10.3–14.5)
SODIUM SERPL-SCNC: 140 MMOL/L — SIGNIFICANT CHANGE UP (ref 135–145)
TOTAL CHOLESTEROL/HDL RATIO MEASUREMENT: 3.3 RATIO — SIGNIFICANT CHANGE UP (ref 3.3–7.1)
TRIGL SERPL-MCNC: 81 MG/DL — SIGNIFICANT CHANGE UP (ref 10–149)
TROPONIN I SERPL-MCNC: 0.24 NG/ML — HIGH (ref 0–0.04)
TROPONIN I SERPL-MCNC: 0.25 NG/ML — HIGH (ref 0–0.04)
TSH SERPL-MCNC: 2.33 UU/ML — SIGNIFICANT CHANGE UP (ref 0.34–4.82)
VIT B12 SERPL-MCNC: 934 PG/ML — SIGNIFICANT CHANGE UP (ref 232–1245)
WBC # BLD: 6.28 K/UL — SIGNIFICANT CHANGE UP (ref 3.8–10.5)
WBC # BLD: 6.64 K/UL — SIGNIFICANT CHANGE UP (ref 3.8–10.5)
WBC # FLD AUTO: 6.28 K/UL — SIGNIFICANT CHANGE UP (ref 3.8–10.5)
WBC # FLD AUTO: 6.64 K/UL — SIGNIFICANT CHANGE UP (ref 3.8–10.5)

## 2020-09-26 PROCEDURE — 99222 1ST HOSP IP/OBS MODERATE 55: CPT

## 2020-09-26 PROCEDURE — 93970 EXTREMITY STUDY: CPT | Mod: 26

## 2020-09-26 PROCEDURE — 99233 SBSQ HOSP IP/OBS HIGH 50: CPT | Mod: GC

## 2020-09-26 RX ORDER — HEPARIN SODIUM 5000 [USP'U]/ML
3000 INJECTION INTRAVENOUS; SUBCUTANEOUS EVERY 6 HOURS
Refills: 0 | Status: DISCONTINUED | OUTPATIENT
Start: 2020-09-26 | End: 2020-09-27

## 2020-09-26 RX ORDER — HEPARIN SODIUM 5000 [USP'U]/ML
INJECTION INTRAVENOUS; SUBCUTANEOUS
Qty: 25000 | Refills: 0 | Status: DISCONTINUED | OUTPATIENT
Start: 2020-09-26 | End: 2020-09-27

## 2020-09-26 RX ORDER — MAGNESIUM SULFATE 500 MG/ML
1 VIAL (ML) INJECTION ONCE
Refills: 0 | Status: COMPLETED | OUTPATIENT
Start: 2020-09-26 | End: 2020-09-26

## 2020-09-26 RX ORDER — POTASSIUM CHLORIDE 20 MEQ
40 PACKET (EA) ORAL ONCE
Refills: 0 | Status: COMPLETED | OUTPATIENT
Start: 2020-09-26 | End: 2020-09-26

## 2020-09-26 RX ORDER — SODIUM CHLORIDE 9 MG/ML
1000 INJECTION INTRAMUSCULAR; INTRAVENOUS; SUBCUTANEOUS
Refills: 0 | Status: DISCONTINUED | OUTPATIENT
Start: 2020-09-26 | End: 2020-09-26

## 2020-09-26 RX ORDER — HEPARIN SODIUM 5000 [USP'U]/ML
6000 INJECTION INTRAVENOUS; SUBCUTANEOUS EVERY 6 HOURS
Refills: 0 | Status: DISCONTINUED | OUTPATIENT
Start: 2020-09-26 | End: 2020-09-27

## 2020-09-26 RX ORDER — HEPARIN SODIUM 5000 [USP'U]/ML
6000 INJECTION INTRAVENOUS; SUBCUTANEOUS ONCE
Refills: 0 | Status: DISCONTINUED | OUTPATIENT
Start: 2020-09-26 | End: 2020-09-26

## 2020-09-26 RX ADMIN — Medication 100 GRAM(S): at 20:09

## 2020-09-26 RX ADMIN — ISOSORBIDE MONONITRATE 30 MILLIGRAM(S): 60 TABLET, EXTENDED RELEASE ORAL at 11:11

## 2020-09-26 RX ADMIN — Medication 81 MILLIGRAM(S): at 11:10

## 2020-09-26 RX ADMIN — SODIUM CHLORIDE 65 MILLILITER(S): 9 INJECTION, SOLUTION INTRAVENOUS at 16:42

## 2020-09-26 RX ADMIN — ONDANSETRON 4 MILLIGRAM(S): 8 TABLET, FILM COATED ORAL at 08:00

## 2020-09-26 RX ADMIN — Medication 40 MILLIEQUIVALENT(S): at 20:10

## 2020-09-26 RX ADMIN — SODIUM CHLORIDE 65 MILLILITER(S): 9 INJECTION, SOLUTION INTRAVENOUS at 11:13

## 2020-09-26 RX ADMIN — HEPARIN SODIUM 1300 UNIT(S)/HR: 5000 INJECTION INTRAVENOUS; SUBCUTANEOUS at 09:12

## 2020-09-26 RX ADMIN — Medication 50 MILLIGRAM(S): at 14:03

## 2020-09-26 RX ADMIN — PIPERACILLIN AND TAZOBACTAM 25 GRAM(S): 4; .5 INJECTION, POWDER, LYOPHILIZED, FOR SOLUTION INTRAVENOUS at 13:54

## 2020-09-26 RX ADMIN — PIPERACILLIN AND TAZOBACTAM 25 GRAM(S): 4; .5 INJECTION, POWDER, LYOPHILIZED, FOR SOLUTION INTRAVENOUS at 06:43

## 2020-09-26 RX ADMIN — Medication 50 MILLIGRAM(S): at 06:43

## 2020-09-26 RX ADMIN — Medication 50 MILLIGRAM(S): at 21:41

## 2020-09-26 RX ADMIN — HEPARIN SODIUM 1300 UNIT(S)/HR: 5000 INJECTION INTRAVENOUS; SUBCUTANEOUS at 16:37

## 2020-09-26 RX ADMIN — PIPERACILLIN AND TAZOBACTAM 25 GRAM(S): 4; .5 INJECTION, POWDER, LYOPHILIZED, FOR SOLUTION INTRAVENOUS at 21:41

## 2020-09-26 RX ADMIN — QUETIAPINE FUMARATE 25 MILLIGRAM(S): 200 TABLET, FILM COATED ORAL at 11:16

## 2020-09-26 RX ADMIN — CLOPIDOGREL BISULFATE 75 MILLIGRAM(S): 75 TABLET, FILM COATED ORAL at 13:50

## 2020-09-26 RX ADMIN — ANASTROZOLE 1 MILLIGRAM(S): 1 TABLET ORAL at 11:10

## 2020-09-26 RX ADMIN — BUPROPION HYDROCHLORIDE 150 MILLIGRAM(S): 150 TABLET, EXTENDED RELEASE ORAL at 11:11

## 2020-09-26 NOTE — PROGRESS NOTE ADULT - ASSESSMENT
65 year old woman with PMH of NICM with EF 15 - 20% s/p ICD, COPD, DM2, HTN, HLD, Breast Cancer in remission on lifelong hormonal therapy with anastrazole was recently discharged after treatment of Acute Respiratory Failure from Acute CHF exacerbation, presented with c/o RUQ pain, nausea, and vomiting, USG Hepatic and Pancreatic consistent with Acute cholecystitis, admitted for management of Cholecystitis.

## 2020-09-26 NOTE — PROGRESS NOTE ADULT - ATTENDING COMMENTS
Patient seen and examined this morning. Plan of care discussed with medical team.    Patient reports she continues to have right upper quadrant pain and nausea but no more vomiting. Denies chest pain, sob, cough, fever, or chills.    VITALS: reviewed    EXAM:  GEN: alert, in no acute distress  CVS: RRR, normal s1/s2  RESP: on O2 via NC, clear bilaterally, no wheezing, rales, or rhonchi  ABD: soft, RUQ tenderness to palpation with + murphys, nondistended, normoactive bowel sounds  EXT: no LE edema  NEURO: Alert and oriented    LABS: reviewed    IMAGING: reivewed    A/P:  66 y/o female with a PMH of non ischemic cardiomyopathy with 15-20% EF s/p ICD, COPD, DM2, HTN, HLD, CKD stage 3, Breast CA in remission on  anastrazole, admitted for acute cholecystitis.    #Acute Cholecystitis  -Surgery consulted, will f/up  -c/w Zosyn  -Gentle IV hydration  -Keep NPO for now  -On Heparin drip in place of Eliquis  -c/w ASA and Plavix for now, may need to hold if proceeding with surgical intervention  -RCRI of 4, High risk of perioperative cardiac complications, patient and her cousin Jaki (HCP) notified of elevated risk  -Cardiology consulted for pre-operative evaluation  -monitor LFTs    #Elevated D-dimer  -Unable to obtain CT-A due to CHEN, can obtain V/Q scan to r/o PE and LE dopplers to r/o DVT  -has a h/o of Mural thrombus, was on eliquis  -c/w Heparin drip for now for possible surgical intervention, can restart Eliquis post-operatively    #CHEN on CKD-3  -gentle IV hydration, monitor for fluid overload given CHF  -hold Lasix and Entresto   -avoid nephrotoxic meds    #Nonischemic cardiomyopathy s/p ICD, HTN  -suspect mild troponin elevation is chronic, unchanged from prior hospitalization, denies chest pain  -f/up cardiology consult  -c/w metoprolol, hydralazine, Imdur  -c/w ASA and Plavix for now, may need to hold  -hold Lasix and Entresto due to CHEN    #Mural thrombus  -on Heparin drip as above, can restart Eliquis postoperatively    Rest as per resident's note.

## 2020-09-26 NOTE — PROGRESS NOTE ADULT - PROBLEM SELECTOR PLAN 7
Patient s ANGELINA on 9/1/20 showed  Mural thrombus is also noted in the  descending aorta, up to 1.12 cm in depth on crossectional imaging.  Vascular advised no acute intervention last admission  Pt will be started on Eliquis after surgery (cholecystectomy)

## 2020-09-26 NOTE — PROGRESS NOTE ADULT - PROBLEM SELECTOR PLAN 2
Patients Creatinine is elevated to 1.61-> 1.79 today morning 9/26.  Urine lytes pending  Will continue with gentle hydration 500cc @ 65cc/hr.  Follow BMP  Holding Entresto and Lasix for now

## 2020-09-26 NOTE — PROGRESS NOTE ADULT - SUBJECTIVE AND OBJECTIVE BOX
INTERVAL HPI/OVERNIGHT EVENTS:    No acute events overnight.   Pt resting comfortably.   C/o RUQ dull pain comes and goes  endorses hunger  no other complaints      MEDICATIONS  (STANDING):  anastrozole 1 milliGRAM(s) Oral daily  aspirin enteric coated 81 milliGRAM(s) Oral daily  buPROPion XL . 150 milliGRAM(s) Oral daily  clopidogrel Tablet 75 milliGRAM(s) Oral daily  dextrose 5% + sodium chloride 0.45%. 500 milliLiter(s) (65 mL/Hr) IV Continuous <Continuous>  heparin  Infusion.  Unit(s)/Hr (13 mL/Hr) IV Continuous <Continuous>  hydrALAZINE 50 milliGRAM(s) Oral three times a day  isosorbide   mononitrate ER Tablet (IMDUR) 30 milliGRAM(s) Oral daily  metoprolol succinate ER 50 milliGRAM(s) Oral daily  piperacillin/tazobactam IVPB.. 3.375 Gram(s) IV Intermittent every 8 hours  QUEtiapine 25 milliGRAM(s) Oral daily  QUEtiapine 50 milliGRAM(s) Oral at bedtime  sodium chloride 0.9%. 1000 milliLiter(s) (65 mL/Hr) IV Continuous <Continuous>    MEDICATIONS  (PRN):  heparin   Injectable 6000 Unit(s) IV Push every 6 hours PRN For aPTT less than 40  heparin   Injectable 3000 Unit(s) IV Push every 6 hours PRN For aPTT between 40 - 57  HYDROmorphone  Injectable 0.5 milliGRAM(s) IV Push every 4 hours PRN Moderate Pain (4 - 6)  ondansetron Injectable 4 milliGRAM(s) IV Push every 6 hours PRN Nausea      Vital Signs Last 24 Hrs  T(C): 36.4 (26 Sep 2020 07:41), Max: 36.9 (25 Sep 2020 16:09)  T(F): 97.5 (26 Sep 2020 07:41), Max: 98.4 (25 Sep 2020 16:09)  HR: 76 (26 Sep 2020 07:41) (74 - 106)  BP: 142/104 (26 Sep 2020 07:41) (126/90 - 164/120)  BP(mean): --  RR: 18 (26 Sep 2020 07:41) (18 - 19)  SpO2: 100% (26 Sep 2020 07:41) (97% - 100%)      PHYSICAL EXAM  General: Alert and oriented, not in acute distress  Resp: Breathing unlabored  Abdomen: Soft, nondistended, RUQ tenderness      I&O's Detail      LABS:                        9.2    6.64  )-----------( 280      ( 26 Sep 2020 06:34 )             28.4             09-26    140  |  110<H>  |  26<H>  ----------------------------<  345<H>  3.6   |  21<L>  |  1.79<H>    Ca    8.2<L>      26 Sep 2020 06:34  Phos  3.6     09-26  Mg     1.7     09-26    TPro  6.7  /  Alb  2.9<L>  /  TBili  0.6  /  DBili  x   /  AST  204<H>  /  ALT  139<H>  /  AlkPhos  265<H>  09-26

## 2020-09-26 NOTE — PROGRESS NOTE ADULT - PROBLEM SELECTOR PLAN 3
Patient D dimers are elevated. As per Wells score- 4.5 points  Moderate risk group: 16.2% chance of PE in an ED population.  s/p full dose of Lovenox in ED, Heparin drip started considering CHEN and tenzin-operative period  CT angio chest could not be done as creatinine is elevated  USG doppler Bilateral LE- negative for DVT  V/Q scan on Monday 9/27 - pt is already on Heparin drip

## 2020-09-26 NOTE — PROGRESS NOTE ADULT - SUBJECTIVE AND OBJECTIVE BOX
PGY-1 Progress Note discussed with attending    PAGER #: [phone]] TILL 5:00 PM  PLEASE CONTACT ON CALL TEAM:  - On Call Team (Please refer to Taryn) FROM 5:00 PM - 8:30PM  - Nightfloat Team FROM 8:30 -7:30 AM    CHIEF COMPLAINT & BRIEF HOSPITAL COURSE: 65 year old woman with PMH of NICM with EF 15 - 20% s/p ICD, COPD, DM2, HTN, HLD, Breast Cancer in remission on lifelong hormonal therapy with anastrazole was recently discharged after treatment of  acute respiratory failure from acute CHF exacerbation. She states she has been having  vomiting multiple times for past 2 weeks ,sometimes relieved by taking anta acid. Patient also states that she is getting more short of breath than usual. She denies any diarrhea, urinary complaints, chest pain, palpitations, leg swelling, headache, dizziness.    INTERVAL HPI/OVERNIGHT EVENTS: Pt c/o RUQ pain and nausea, currently NPO for possible linda today    MEDICATIONS  (STANDING):  anastrozole 1 milliGRAM(s) Oral daily  aspirin enteric coated 81 milliGRAM(s) Oral daily  buPROPion XL . 150 milliGRAM(s) Oral daily  clopidogrel Tablet 75 milliGRAM(s) Oral daily  dextrose 5% + sodium chloride 0.45%. 500 milliLiter(s) (65 mL/Hr) IV Continuous <Continuous>  heparin  Infusion.  Unit(s)/Hr (13 mL/Hr) IV Continuous <Continuous>  hydrALAZINE 50 milliGRAM(s) Oral three times a day  isosorbide   mononitrate ER Tablet (IMDUR) 30 milliGRAM(s) Oral daily  metoprolol succinate ER 50 milliGRAM(s) Oral daily  piperacillin/tazobactam IVPB.. 3.375 Gram(s) IV Intermittent every 8 hours  QUEtiapine 25 milliGRAM(s) Oral daily  QUEtiapine 50 milliGRAM(s) Oral at bedtime    MEDICATIONS  (PRN):  heparin   Injectable 6000 Unit(s) IV Push every 6 hours PRN For aPTT less than 40  heparin   Injectable 3000 Unit(s) IV Push every 6 hours PRN For aPTT between 40 - 57  HYDROmorphone  Injectable 0.5 milliGRAM(s) IV Push every 4 hours PRN Moderate Pain (4 - 6)  ondansetron Injectable 4 milliGRAM(s) IV Push every 6 hours PRN Nausea      REVIEW OF SYSTEMS:  CONSTITUTIONAL: No fever, weight loss, or fatigue  RESPIRATORY: No cough, wheezing, chills or hemoptysis; No shortness of breath  CARDIOVASCULAR: No chest pain, palpitations, dizziness, or leg swelling  GASTROINTESTINAL: RUQ pain+, nausea + ,no vomiting, or hematemesis; No diarrhea or constipation. No melena or hematochezia.  GENITOURINARY: No dysuria or hematuria, urinary frequency  NEUROLOGICAL: No headaches, memory loss, loss of strength, numbness, or tremors  SKIN: No itching, burning, rashes, or lesions     Vital Signs Last 24 Hrs  T(C): 36.2 (26 Sep 2020 11:01), Max: 36.9 (25 Sep 2020 16:09)  T(F): 97.1 (26 Sep 2020 11:01), Max: 98.4 (25 Sep 2020 16:09)  HR: 73 (26 Sep 2020 11:01) (73 - 106)  BP: 123/89 (26 Sep 2020 11:01) (123/89 - 164/120)  BP(mean): --  RR: 18 (26 Sep 2020 11:01) (18 - 19)  SpO2: 100% (26 Sep 2020 11:01) (97% - 100%)    PHYSICAL EXAMINATION:  GENERAL: Not in acute distress  HEAD:  Atraumatic, Normocephalic  EYES:  conjunctiva and sclera clear  NECK: Supple, No JVD, Normal thyroid  CHEST/LUNG: Clear to auscultation. Clear to percussion bilaterally; No rales, rhonchi, wheezing, or rubs  HEART: Regular rate and rhythm; No murmurs, rubs, or gallops  ABDOMEN: RUQ tender, Lopez's +, bowel sounds heard  NERVOUS SYSTEM:  Alert & Oriented X3,    EXTREMITIES:  2+ Peripheral Pulses, No clubbing, cyanosis, or edema  SKIN: warm dry                          9.2    6.64  )-----------( 280      ( 26 Sep 2020 06:34 )             28.4     09-26    140  |  110<H>  |  26<H>  ----------------------------<  345<H>  3.6   |  21<L>  |  1.79<H>    Ca    8.2<L>      26 Sep 2020 06:34  Phos  3.6     09-26  Mg     1.7     09-26    TPro  6.7  /  Alb  2.9<L>  /  TBili  0.6  /  DBili  x   /  AST  204<H>  /  ALT  139<H>  /  AlkPhos  265<H>  09-26    LIVER FUNCTIONS - ( 26 Sep 2020 06:34 )  Alb: 2.9 g/dL / Pro: 6.7 g/dL / ALK PHOS: 265 U/L / ALT: 139 U/L DA / AST: 204 U/L / GGT: x           CARDIAC MARKERS ( 26 Sep 2020 06:34 )  0.251 ng/mL / x     / x     / x     / x      CARDIAC MARKERS ( 26 Sep 2020 01:46 )  0.243 ng/mL / x     / 84 U/L / x     / <1.0 ng/mL  CARDIAC MARKERS ( 25 Sep 2020 17:26 )  0.272 ng/mL / x     / 95 U/L / x     / <1.0 ng/mL      PT/INR - ( 25 Sep 2020 22:00 )   PT: 19.1 sec;   INR: 1.67 ratio         PTT - ( 25 Sep 2020 22:00 )  PTT:28.4 sec    CAPILLARY BLOOD GLUCOSE      RADIOLOGY & ADDITIONAL TESTS: USG Hepatic and Pancreatic- GB thickening consistent with Acute cholecystitis  USG Bilateral LE negative for DVT    < from: US Hepatic & Pancreatic (09.25.20 @ 19:42) >    FINDINGS:   No previous examinations are available for review.    The liver demonstrates fatty infiltration without focal lesion.  Hepatic size and contours are maintained.  Hepatic and portal veins appear patent and are not displaced.  No intrahepatic or ductal dilatation is found.  The common duct is not dilated, measuring 0.4 cm.    There is gallbladder wall thickening measuring up to 6 mm. Thereis pericholecystic fluid present. No calculi are visualized. There is a negative sonographic Lopez sign.    The right kidney measures 10.0 cm in length. There is a 1.9 x 1.6 cm echogenic lesion within the superior pole of the right kidney, nonspecific, may reflect an AML.    The abdominal aorta and IVC appear intact.      IMPRESSION:    Gallbladder wall thickening and pericholecystic fluid, most suggestive of acute cholecystitis. No gallbladder calculi.    1.9 x 1.6 cm echogenic lesion within the superior pole of the right kidney, nonspecific, may reflect an AML.                LUCIANO COPELAND M.D., ATTENDING RADIOLOGIST  This document has been electronically signed. Sep 25 2020  7:48PM    < end of copied text >    < from: US Duplex Venous Lower Ext Complete, Bilateral (09.26.20 @ 12:02) >  FINDINGS:    There is normal compressibility of the bilateral common femoral, femoral and popliteal veins.  Doppler examination shows normal spontaneous and phasic flow.    No calf vein thrombosis is detected.    IMPRESSION:  No evidence of deep venous thrombosis in either lower extremity.            JORDYN LYNNE M.D. ATTENDING RADIOLOGIST    < end of copied text >

## 2020-09-26 NOTE — PROGRESS NOTE ADULT - ASSESSMENT
65 yoF with significant PMH with acute cholecystitis    afeb vss  no leukocytosis, elevated LFTs, T bili wnl  CHEN, Cr 1.79  CHF exerbation  RUQ tender    Pt with significant PMH, will need to be optimized and require medical/cardiac clearance to purse surgical intervention  Case d/w Dr. Ramesh, surgery will follow

## 2020-09-27 ENCOUNTER — TRANSCRIPTION ENCOUNTER (OUTPATIENT)
Age: 65
End: 2020-09-27

## 2020-09-27 LAB
ALBUMIN SERPL ELPH-MCNC: 2.9 G/DL — LOW (ref 3.5–5)
ALP SERPL-CCNC: 262 U/L — HIGH (ref 40–120)
ALT FLD-CCNC: 210 U/L DA — HIGH (ref 10–60)
ANION GAP SERPL CALC-SCNC: 8 MMOL/L — SIGNIFICANT CHANGE UP (ref 5–17)
APTT BLD: 28.7 SEC — SIGNIFICANT CHANGE UP (ref 27.5–35.5)
APTT BLD: 58.9 SEC — HIGH (ref 27.5–35.5)
AST SERPL-CCNC: 259 U/L — HIGH (ref 10–40)
BASOPHILS # BLD AUTO: 0.04 K/UL — SIGNIFICANT CHANGE UP (ref 0–0.2)
BASOPHILS NFR BLD AUTO: 0.8 % — SIGNIFICANT CHANGE UP (ref 0–2)
BILIRUB SERPL-MCNC: 0.6 MG/DL — SIGNIFICANT CHANGE UP (ref 0.2–1.2)
BUN SERPL-MCNC: 25 MG/DL — HIGH (ref 7–18)
CALCIUM SERPL-MCNC: 8.5 MG/DL — SIGNIFICANT CHANGE UP (ref 8.4–10.5)
CHLORIDE SERPL-SCNC: 109 MMOL/L — HIGH (ref 96–108)
CO2 SERPL-SCNC: 22 MMOL/L — SIGNIFICANT CHANGE UP (ref 22–31)
CREAT SERPL-MCNC: 1.79 MG/DL — HIGH (ref 0.5–1.3)
EOSINOPHIL # BLD AUTO: 0.17 K/UL — SIGNIFICANT CHANGE UP (ref 0–0.5)
EOSINOPHIL NFR BLD AUTO: 3.4 % — SIGNIFICANT CHANGE UP (ref 0–6)
GLUCOSE BLDC GLUCOMTR-MCNC: 124 MG/DL — HIGH (ref 70–99)
GLUCOSE BLDC GLUCOMTR-MCNC: 127 MG/DL — HIGH (ref 70–99)
GLUCOSE BLDC GLUCOMTR-MCNC: 171 MG/DL — HIGH (ref 70–99)
GLUCOSE BLDC GLUCOMTR-MCNC: 98 MG/DL — SIGNIFICANT CHANGE UP (ref 70–99)
GLUCOSE SERPL-MCNC: 100 MG/DL — HIGH (ref 70–99)
HCT VFR BLD CALC: 29.1 % — LOW (ref 34.5–45)
HGB BLD-MCNC: 9.1 G/DL — LOW (ref 11.5–15.5)
IMM GRANULOCYTES NFR BLD AUTO: 0.6 % — SIGNIFICANT CHANGE UP (ref 0–1.5)
INR BLD: 1.75 RATIO — HIGH (ref 0.88–1.16)
LYMPHOCYTES # BLD AUTO: 2.09 K/UL — SIGNIFICANT CHANGE UP (ref 1–3.3)
LYMPHOCYTES # BLD AUTO: 42.1 % — SIGNIFICANT CHANGE UP (ref 13–44)
MAGNESIUM SERPL-MCNC: 2 MG/DL — SIGNIFICANT CHANGE UP (ref 1.6–2.6)
MCHC RBC-ENTMCNC: 30.1 PG — SIGNIFICANT CHANGE UP (ref 27–34)
MCHC RBC-ENTMCNC: 31.3 GM/DL — LOW (ref 32–36)
MCV RBC AUTO: 96.4 FL — SIGNIFICANT CHANGE UP (ref 80–100)
MONOCYTES # BLD AUTO: 0.33 K/UL — SIGNIFICANT CHANGE UP (ref 0–0.9)
MONOCYTES NFR BLD AUTO: 6.7 % — SIGNIFICANT CHANGE UP (ref 2–14)
NEUTROPHILS # BLD AUTO: 2.3 K/UL — SIGNIFICANT CHANGE UP (ref 1.8–7.4)
NEUTROPHILS NFR BLD AUTO: 46.4 % — SIGNIFICANT CHANGE UP (ref 43–77)
NRBC # BLD: 1 /100 WBCS — HIGH (ref 0–0)
PLATELET # BLD AUTO: 269 K/UL — SIGNIFICANT CHANGE UP (ref 150–400)
POTASSIUM SERPL-MCNC: 3.8 MMOL/L — SIGNIFICANT CHANGE UP (ref 3.5–5.3)
POTASSIUM SERPL-SCNC: 3.8 MMOL/L — SIGNIFICANT CHANGE UP (ref 3.5–5.3)
PROT SERPL-MCNC: 6.8 G/DL — SIGNIFICANT CHANGE UP (ref 6–8.3)
PROTHROM AB SERPL-ACNC: 19.9 SEC — HIGH (ref 10.6–13.6)
RBC # BLD: 3.02 M/UL — LOW (ref 3.8–5.2)
RBC # FLD: 16.3 % — HIGH (ref 10.3–14.5)
SARS-COV-2 IGG SERPL QL IA: NEGATIVE — SIGNIFICANT CHANGE UP
SARS-COV-2 IGM SERPL IA-ACNC: 0.22 INDEX — SIGNIFICANT CHANGE UP
SODIUM SERPL-SCNC: 139 MMOL/L — SIGNIFICANT CHANGE UP (ref 135–145)
WBC # BLD: 4.96 K/UL — SIGNIFICANT CHANGE UP (ref 3.8–10.5)
WBC # FLD AUTO: 4.96 K/UL — SIGNIFICANT CHANGE UP (ref 3.8–10.5)

## 2020-09-27 PROCEDURE — 99232 SBSQ HOSP IP/OBS MODERATE 35: CPT | Mod: 57

## 2020-09-27 PROCEDURE — 99233 SBSQ HOSP IP/OBS HIGH 50: CPT

## 2020-09-27 RX ORDER — SODIUM CHLORIDE 9 MG/ML
1000 INJECTION INTRAMUSCULAR; INTRAVENOUS; SUBCUTANEOUS
Refills: 0 | Status: DISCONTINUED | OUTPATIENT
Start: 2020-09-27 | End: 2020-09-29

## 2020-09-27 RX ORDER — HEPARIN SODIUM 5000 [USP'U]/ML
5000 INJECTION INTRAVENOUS; SUBCUTANEOUS ONCE
Refills: 0 | Status: COMPLETED | OUTPATIENT
Start: 2020-09-27 | End: 2020-09-27

## 2020-09-27 RX ADMIN — ISOSORBIDE MONONITRATE 30 MILLIGRAM(S): 60 TABLET, EXTENDED RELEASE ORAL at 11:40

## 2020-09-27 RX ADMIN — Medication 50 MILLIGRAM(S): at 06:02

## 2020-09-27 RX ADMIN — QUETIAPINE FUMARATE 25 MILLIGRAM(S): 200 TABLET, FILM COATED ORAL at 11:40

## 2020-09-27 RX ADMIN — Medication 50 MILLIGRAM(S): at 13:06

## 2020-09-27 RX ADMIN — ANASTROZOLE 1 MILLIGRAM(S): 1 TABLET ORAL at 11:40

## 2020-09-27 RX ADMIN — HEPARIN SODIUM 1300 UNIT(S)/HR: 5000 INJECTION INTRAVENOUS; SUBCUTANEOUS at 00:58

## 2020-09-27 RX ADMIN — Medication 50 MILLIGRAM(S): at 21:41

## 2020-09-27 RX ADMIN — PIPERACILLIN AND TAZOBACTAM 25 GRAM(S): 4; .5 INJECTION, POWDER, LYOPHILIZED, FOR SOLUTION INTRAVENOUS at 05:24

## 2020-09-27 RX ADMIN — QUETIAPINE FUMARATE 50 MILLIGRAM(S): 200 TABLET, FILM COATED ORAL at 21:41

## 2020-09-27 RX ADMIN — HEPARIN SODIUM 5000 UNIT(S): 5000 INJECTION INTRAVENOUS; SUBCUTANEOUS at 17:12

## 2020-09-27 RX ADMIN — BUPROPION HYDROCHLORIDE 150 MILLIGRAM(S): 150 TABLET, EXTENDED RELEASE ORAL at 11:40

## 2020-09-27 NOTE — PROGRESS NOTE ADULT - ASSESSMENT
65F with PMH CHF, HTN, DM, Breast Ca s/p chemoradiation, cardiomyopathy with Acute cholecystitis  1. Plan for OR in AM  2. clears ok for now, NPO after midnight  3. IVF while NPO  4. AM labs  5. Per medicine, Cardiology consultant, Dr. Patterson, has given clearance  6. Consent to be obtained  7. D/w Dr. Ramesh and agreed

## 2020-09-27 NOTE — PROGRESS NOTE ADULT - SUBJECTIVE AND OBJECTIVE BOX
DATE OF SERVICE:Patient was seen and examined :09/27/2020    PRESENTING CC:Abdominal Pain    SUBJ: 65 year old woman with PMH of Non ischemic cardiomyopathy with EF 15 - 20% s/p ICD, COPD, DM2, HTN, HLD, breast cancer in remission on lifelong hormonal therapy with anastrazole was recently discharged after treatment of  acute respiratory failure from acute CHF exacerbation. She states she has been having  vomiting multiple times for past 2 weeks ,sometimes relieved by taking antaacid. Patient also states that she is getting more short of breath than usual. She denies any diarrhea, urinary complaints, chest pain, palpitations, leg swelling, headache, dizziness.      PMH -reviewed admission note, no change since admission  Heart failure: acute [ ] chronic [x ] acute or chronic [ ] diastolic [ ] systolic [x ] combined systolic and diastolic[ ]  CHEN: ATN[ ] renal medullary necrosis [ ] CKD I [ ]CKDII [ ]CKD III [ ]CKD IV [ ]CKD V [ ]Other pathological lesions [ ]    MEDICATIONS  (STANDING):  anastrozole 1 milliGRAM(s) Oral daily  buPROPion XL . 150 milliGRAM(s) Oral daily  heparin  Infusion.  Unit(s)/Hr (13 mL/Hr) IV Continuous <Continuous>  hydrALAZINE 50 milliGRAM(s) Oral three times a day  isosorbide   mononitrate ER Tablet (IMDUR) 30 milliGRAM(s) Oral daily  metoprolol succinate ER 50 milliGRAM(s) Oral daily  piperacillin/tazobactam IVPB.. 3.375 Gram(s) IV Intermittent every 8 hours  QUEtiapine 25 milliGRAM(s) Oral daily  QUEtiapine 50 milliGRAM(s) Oral at bedtime    MEDICATIONS  (PRN):  heparin   Injectable 6000 Unit(s) IV Push every 6 hours PRN For aPTT less than 40  heparin   Injectable 3000 Unit(s) IV Push every 6 hours PRN For aPTT between 40 - 57  HYDROmorphone  Injectable 0.5 milliGRAM(s) IV Push every 4 hours PRN Moderate Pain (4 - 6)  ondansetron Injectable 4 milliGRAM(s) IV Push every 6 hours PRN Nausea              REVIEW OF SYSTEMS:  Constitutional: [ ] fever, [ ]weight loss,  [x ]fatigue  Eyes: [ ] visual changes  Respiratory: [ ]shortness of breath;  [ ] cough, [ ]wheezing, [ ]chills, [ ]hemoptysis  Cardiovascular: [ ] chest pain, [ ]palpitations, [ ]dizziness,  [ ]leg swelling[ ]orthopnea[ ]PND  Gastrointestinal: [x ] abdominal pain, [ ]nausea, [ ]vomiting,  [ ]diarrhea   Genitourinary: [ ] dysuria, [ ] hematuria  Neurologic: [ ] headaches [ ] tremors[ ]weakness  Skin: [ ] itching, [ ]burning, [ ] rashes  Endocrine: [ ] heat or cold intolerance  Musculoskeletal: [ ] joint pain or swelling; [ ] muscle, back, or extremity pain  Psychiatric: [ ] depression, [ ]anxiety, [ ]mood swings, or [ ]difficulty sleeping  Hematologic: [ ] easy bruising, [ ] bleeding gums    [x] All remaining systems negative except as per above.   [ ]Unable to obtain.    Vital Signs Last 24 Hrs  T(C): 36.7 (27 Sep 2020 04:33), Max: 37.3 (26 Sep 2020 15:49)  T(F): 98 (27 Sep 2020 04:33), Max: 99.2 (26 Sep 2020 15:49)  HR: 71 (27 Sep 2020 04:33) (71 - 73)  BP: 115/78 (27 Sep 2020 04:33) (114/79 - 123/89)  BP(mean): --  RR: 18 (27 Sep 2020 04:33) (17 - 18)  SpO2: 97% (27 Sep 2020 04:33) (96% - 100%)  I&O's Summary    26 Sep 2020 07:01  -  27 Sep 2020 07:00  --------------------------------------------------------  IN: 0 mL / OUT: 1 mL / NET: -1 mL        PHYSICAL EXAM:  General: No acute distress BMI-29  HEENT: EOMI, PERRL  Neck: Supple, [ ] JVD  Lungs: Equal air entry bilaterally; [ ] rales [ ] wheezing [ ] rhonchi  Heart: Regular rate and rhythm; [x]murmur   2/6 [x ] systolic [ ] diastolic [ ] radiation[ ] rubs [ ]  gallops  Abdomen: Nontender, bowel sounds present  Extremities: No clubbing, cyanosis, [ ] edema  Nervous system:  Alert & Oriented X3, no focal deficits  Psychiatric: Normal affect  Skin: No rashes or lesions    LABS:  09-27    139  |  109<H>  |  25<H>  ----------------------------<  100<H>  3.8   |  22  |  1.79<H>    Ca    8.5      27 Sep 2020 06:31  Phos  3.6     09-26  Mg     2.0     09-27    TPro  6.8  /  Alb  2.9<L>  /  TBili  0.6  /  DBili  x   /  AST  259<H>  /  ALT  210<H>  /  AlkPhos  262<H>  09-27    Creatinine Trend: 1.79<--, 1.79<--, 1.65<--, 1.15<--, 1.14<--, 1.28<--                        9.1    4.96  )-----------( 269      ( 27 Sep 2020 06:31 )             29.1     PT/INR - ( 27 Sep 2020 00:06 )   PT: 19.9 sec;   INR: 1.75 ratio         PTT - ( 27 Sep 2020 06:31 )  PTT:28.7 sec  Lipid Panel:   Cardiac Enzymes: CARDIAC MARKERS ( 26 Sep 2020 06:34 )  0.251 ng/mL / x     / x     / x     / x      CARDIAC MARKERS ( 26 Sep 2020 01:46 )  0.243 ng/mL / x     / 84 U/L / x     / <1.0 ng/mL  CARDIAC MARKERS ( 25 Sep 2020 17:26 )  0.272 ng/mL / x     / 95 U/L / x     / <1.0 ng/mL      Serum Pro-Brain Natriuretic Peptide: 13403 pg/mL (09-25-20 @ 17:26)        RADIOLOGY:  US DPLX LWR EXT VEINS COMPL BI       IMPRESSION:  No evidence of deep venous thrombosis in either lower extremity.      ECG [my interpretation]:    TELEMETRY:    ECHO:    STRESS TEST:    CATHETERIZATION:      IMPRESSION AND PLAN:

## 2020-09-27 NOTE — PROGRESS NOTE ADULT - SUBJECTIVE AND OBJECTIVE BOX
Patient seen and examined this morning. Patient reports she feels better, does not have abdominal pain at rest but continues to have right upper quadrant pain with palpation. States she's hungry and wants to eat. Otherwise, denies chest pain, sob, cough, fever, or chills. Discussed plans to go to the OR tomorrow, patient understands and is in agreement.    VITALS: reviewed    EXAM:  GEN: alert, in no acute distress  CVS: RRR, normal s1/s2  RESP: in no respiratory distress clear bilaterally, no wheezing, rales, or rhonchi  ABD: soft, RUQ tenderness to palpation with + murphys, nondistended, normoactive bowel sounds  EXT: no LE edema  NEURO: Alert and oriented    LABS: reviewed    IMAGING: reivewed

## 2020-09-27 NOTE — PROGRESS NOTE ADULT - ASSESSMENT
66 y/o female with a PMH of non ischemic cardiomyopathy with 15-20% EF s/p ICD, COPD, DM2, HTN, HLD, CKD stage 3, Breast CA in remission on  anastrazole, admitted for acute cholecystitis.    #Acute Cholecystitis  -Surgery consulted, Dr. Ramesh planning to take patient to the OR tomorrow  -c/w Zosyn  -c/w Gentle IV hydration  -clear liquid diet today, NPO at midnight  -On Heparin drip in place of Eliquis, hold 1 hour prior to procedure  -hold ASA and Plavix  -RCRI of 4, High risk of perioperative cardiac complications, patient and her cousin Jaki (HCP) notified of elevated risk  -Cardiology consulted for pre-operative evaluation, cleared to proceed for procedure  -continue monitor LFTs    #Elevated D-dimer  -Unable to obtain CT-A due to CHEN, pending V/Q scan to r/o PE   -LE dopplers neg for DVT  -has a h/o of Mural thrombus, was on eliquis, will c/w Heparin drip for now, hold prior to the OR, and can restart Eliquis post-operatively    #CHEN on CKD-3  -gentle IV hydration, monitor for fluid overload given CHF  -hold Lasix and Entresto   -avoid nephrotoxic meds    #Nonischemic cardiomyopathy s/p ICD, HTN  -suspect mild troponin elevation is chronic, unchanged from prior hospitalization, denies chest pain  -f/up cardiology consult  -c/w metoprolol, hydralazine, Imdur  -hold ASA and Plavix for procedure  -hold Lasix and Entresto due to CHEN    #Mural thrombus  -on Heparin drip as above, can restart Eliquis postoperatively    #Depression, h/o delirium  -c/w Seroquel and Buproprion    #DVT ppx  -Heparin drip 66 y/o female with a PMH of non ischemic cardiomyopathy with 15-20% EF s/p ICD, COPD, DM2, HTN, HLD, CKD stage 3, Breast CA in remission on  anastrazole, admitted for acute cholecystitis.    #Acute Cholecystitis  -Surgery consulted, Dr. Ramesh planning to take patient to the OR tomorrow  -c/w Zosyn  -c/w Gentle IV hydration  -clear liquid diet today, NPO at midnight  -On Heparin drip in place of Eliquis, hold 3-4 hours prior to procedure  -hold ASA and Plavix  -RCRI of 4, High risk of perioperative cardiac complications, patient and her cousin Jaki (HCP) notified of elevated risk  -Cardiology consulted for pre-operative evaluation, cleared to proceed for procedure  -continue monitor LFTs    #Elevated D-dimer  -Unable to obtain CT-A due to CHEN, pending V/Q scan to r/o PE   -LE dopplers neg for DVT  -has a h/o of Mural thrombus, was on eliquis, will c/w Heparin drip for now, hold prior to the OR, and can restart Eliquis post-operatively    #CHEN on CKD-3  -gentle IV hydration, monitor for fluid overload given CHF  -hold Lasix and Entresto   -avoid nephrotoxic meds    #Nonischemic cardiomyopathy s/p ICD, HTN  -suspect mild troponin elevation is chronic, unchanged from prior hospitalization, denies chest pain  -f/up cardiology consult  -c/w metoprolol, hydralazine, Imdur  -hold ASA and Plavix for procedure  -hold Lasix and Entresto due to CHEN    #Mural thrombus  -on Heparin drip as above, can restart Eliquis postoperatively    #Depression, h/o delirium  -c/w Seroquel and Buproprion    #DVT ppx  -Heparin drip

## 2020-09-27 NOTE — CHART NOTE - NSCHARTNOTEFT_GEN_A_CORE
Was paged by RN that patient's IV access was infiltrated. Was tried multiple times by R but failed. I tried US guided IV line placement, line got infiltrated again. Patient is off heparin drip due to no IV access. Attending was informed and is aware. Recommended to give heparin sub q for today, one dose. Patient is planned for OR tomorrow.  Will restart Eliquis post op.

## 2020-09-27 NOTE — PROGRESS NOTE ADULT - SUBJECTIVE AND OBJECTIVE BOX
Surgery Pre-op Note    Preoperative Diagnosis: Acute cholecystitis    Planned Procedure: Laparoscopic cholecystectomy, possible open                          9.1    4.96  )-----------( 269      ( 27 Sep 2020 06:31 )             29.1       09-27    139  |  109<H>  |  25<H>  ----------------------------<  100<H>  3.8   |  22  |  1.79<H>    Ca    8.5      27 Sep 2020 06:31  Phos  3.6     09-26  Mg     2.0     09-27    TPro  6.8  /  Alb  2.9<L>  /  TBili  0.6  /  DBili  x   /  AST  259<H>  /  ALT  210<H>  /  AlkPhos  262<H>  09-27      LIVER FUNCTIONS - ( 27 Sep 2020 06:31 )  Alb: 2.9 g/dL / Pro: 6.8 g/dL / ALK PHOS: 262 U/L / ALT: 210 U/L DA / AST: 259 U/L / GGT: x             PT/INR - ( 27 Sep 2020 00:06 )   PT: 19.9 sec;   INR: 1.75 ratio         PTT - ( 27 Sep 2020 06:31 )  PTT:28.7 sec    Type & Screen:  ABO RH Interpretation: B POS (09.25.20 @ 22:23)     EKG: < from: 12 Lead ECG (09.06.20 @ 11:29) >  Ventricular Rate 84 BPM    Atrial Rate 84 BPM    P-R Interval 180 ms    QRS Duration 96 ms    Q-T Interval 374 ms    QTC Calculation(Bezet) 441 ms    P Axis 46 degrees    R Axis -21 degrees    T Axis 87 degrees    Diagnosis Line Normal sinus rhythm  Minimal voltage criteria for LVH, may be normal variant  T wave abnormality, consider lateral ischemia  Abnormal ECG      < end of copied text >      Echo:  `< from: ANGELINA w/Doppler (09.01.20 @ 10:23) >  CONCLUSIONS:  1. Tethered mitral valve leaflets. Moderate mitral  regurgitation.  2. Normal trileaflet aortic valve. No aortic stenosis.  Trace aortic regurgitation.  3. Normal aortic root. Simple atheroma noted in aortic  arch/descending aorta. Mural thrombus is also noted in the  descending aorta, up to 1.12 cm in depth on crossectional  imaging.  4. Severe left atrial enlargement. No left atrial or left  atrial appendage thrombus.  5. Severe left ventricular enlargement.  6. Severe global left ventricular systolic dysfunction.  7. Normal right atrium.  8. Normal right ventricular size and function.  9. RV systolic pressure is moderately increased at 45 mm  Hg.  10. Normal tricuspid valve. Moderate tricuspid  regurgitation.  11. Normal pulmonic valve. Trace pulmonic insufficiency is  noted.  12. Atrial septum appears intact on 2D echo and color  Doppler.  13. No pericardial effusion.  14. Left pleural effusion.      < end of copied text >        CXR:  `c< from: Xray Chest 1 View AP/PA (09.25.20 @ 16:50) >  EXAM:  XR CHEST AP OR PA 1V                            PROCEDURE DATE:  09/25/2020          INTERPRETATION:  AP semierect chest on September 25, 2020 at 4:37 PM. Patient is short of breath.    Heart is magnified by technique. Left subcutaneous defibrillator again noted.    Lungs remain clear.    Chest is similar to September 4.    IMPRESSION: No acute finding or change.      < end of copied text >

## 2020-09-28 ENCOUNTER — RESULT REVIEW (OUTPATIENT)
Age: 65
End: 2020-09-28

## 2020-09-28 DIAGNOSIS — R53.81 OTHER MALAISE: ICD-10-CM

## 2020-09-28 DIAGNOSIS — E43 UNSPECIFIED SEVERE PROTEIN-CALORIE MALNUTRITION: ICD-10-CM

## 2020-09-28 DIAGNOSIS — N18.9 CHRONIC KIDNEY DISEASE, UNSPECIFIED: ICD-10-CM

## 2020-09-28 DIAGNOSIS — F03.90 UNSPECIFIED DEMENTIA WITHOUT BEHAVIORAL DISTURBANCE: ICD-10-CM

## 2020-09-28 DIAGNOSIS — Z51.5 ENCOUNTER FOR PALLIATIVE CARE: ICD-10-CM

## 2020-09-28 LAB
ANION GAP SERPL CALC-SCNC: 9 MMOL/L — SIGNIFICANT CHANGE UP (ref 5–17)
APTT BLD: 29.9 SEC — SIGNIFICANT CHANGE UP (ref 27.5–35.5)
BASOPHILS # BLD AUTO: 0.03 K/UL — SIGNIFICANT CHANGE UP (ref 0–0.2)
BASOPHILS NFR BLD AUTO: 0.7 % — SIGNIFICANT CHANGE UP (ref 0–2)
BUN SERPL-MCNC: 20 MG/DL — HIGH (ref 7–18)
CALCIUM SERPL-MCNC: 8.8 MG/DL — SIGNIFICANT CHANGE UP (ref 8.4–10.5)
CHLORIDE SERPL-SCNC: 111 MMOL/L — HIGH (ref 96–108)
CO2 SERPL-SCNC: 21 MMOL/L — LOW (ref 22–31)
CREAT SERPL-MCNC: 1.47 MG/DL — HIGH (ref 0.5–1.3)
EOSINOPHIL # BLD AUTO: 0.19 K/UL — SIGNIFICANT CHANGE UP (ref 0–0.5)
EOSINOPHIL NFR BLD AUTO: 4.2 % — SIGNIFICANT CHANGE UP (ref 0–6)
GLUCOSE BLDC GLUCOMTR-MCNC: 102 MG/DL — HIGH (ref 70–99)
GLUCOSE BLDC GLUCOMTR-MCNC: 102 MG/DL — HIGH (ref 70–99)
GLUCOSE BLDC GLUCOMTR-MCNC: 107 MG/DL — HIGH (ref 70–99)
GLUCOSE BLDC GLUCOMTR-MCNC: 113 MG/DL — HIGH (ref 70–99)
GLUCOSE BLDC GLUCOMTR-MCNC: 128 MG/DL — HIGH (ref 70–99)
GLUCOSE SERPL-MCNC: 89 MG/DL — SIGNIFICANT CHANGE UP (ref 70–99)
HCT VFR BLD CALC: 31 % — LOW (ref 34.5–45)
HGB BLD-MCNC: 9.7 G/DL — LOW (ref 11.5–15.5)
IMM GRANULOCYTES NFR BLD AUTO: 0.4 % — SIGNIFICANT CHANGE UP (ref 0–1.5)
INR BLD: 1.45 RATIO — HIGH (ref 0.88–1.16)
LYMPHOCYTES # BLD AUTO: 2.1 K/UL — SIGNIFICANT CHANGE UP (ref 1–3.3)
LYMPHOCYTES # BLD AUTO: 46.2 % — HIGH (ref 13–44)
MCHC RBC-ENTMCNC: 30.1 PG — SIGNIFICANT CHANGE UP (ref 27–34)
MCHC RBC-ENTMCNC: 31.3 GM/DL — LOW (ref 32–36)
MCV RBC AUTO: 96.3 FL — SIGNIFICANT CHANGE UP (ref 80–100)
MONOCYTES # BLD AUTO: 0.34 K/UL — SIGNIFICANT CHANGE UP (ref 0–0.9)
MONOCYTES NFR BLD AUTO: 7.5 % — SIGNIFICANT CHANGE UP (ref 2–14)
NEUTROPHILS # BLD AUTO: 1.87 K/UL — SIGNIFICANT CHANGE UP (ref 1.8–7.4)
NEUTROPHILS NFR BLD AUTO: 41 % — LOW (ref 43–77)
NRBC # BLD: 0 /100 WBCS — SIGNIFICANT CHANGE UP (ref 0–0)
PLATELET # BLD AUTO: 277 K/UL — SIGNIFICANT CHANGE UP (ref 150–400)
POTASSIUM SERPL-MCNC: 3.5 MMOL/L — SIGNIFICANT CHANGE UP (ref 3.5–5.3)
POTASSIUM SERPL-SCNC: 3.5 MMOL/L — SIGNIFICANT CHANGE UP (ref 3.5–5.3)
PROTHROM AB SERPL-ACNC: 16.7 SEC — HIGH (ref 10.6–13.6)
RBC # BLD: 3.22 M/UL — LOW (ref 3.8–5.2)
RBC # FLD: 17.1 % — HIGH (ref 10.3–14.5)
SODIUM SERPL-SCNC: 141 MMOL/L — SIGNIFICANT CHANGE UP (ref 135–145)
WBC # BLD: 4.55 K/UL — SIGNIFICANT CHANGE UP (ref 3.8–10.5)
WBC # FLD AUTO: 4.55 K/UL — SIGNIFICANT CHANGE UP (ref 3.8–10.5)

## 2020-09-28 PROCEDURE — 47563 LAPARO CHOLECYSTECTOMY/GRAPH: CPT | Mod: AS

## 2020-09-28 PROCEDURE — 88304 TISSUE EXAM BY PATHOLOGIST: CPT | Mod: 26

## 2020-09-28 PROCEDURE — 99233 SBSQ HOSP IP/OBS HIGH 50: CPT | Mod: GC

## 2020-09-28 PROCEDURE — 47563 LAPARO CHOLECYSTECTOMY/GRAPH: CPT

## 2020-09-28 RX ORDER — HYDROMORPHONE HYDROCHLORIDE 2 MG/ML
0.5 INJECTION INTRAMUSCULAR; INTRAVENOUS; SUBCUTANEOUS
Refills: 0 | Status: DISCONTINUED | OUTPATIENT
Start: 2020-09-28 | End: 2020-09-29

## 2020-09-28 RX ORDER — PIPERACILLIN AND TAZOBACTAM 4; .5 G/20ML; G/20ML
3.38 INJECTION, POWDER, LYOPHILIZED, FOR SOLUTION INTRAVENOUS EVERY 8 HOURS
Refills: 0 | Status: DISCONTINUED | OUTPATIENT
Start: 2020-09-28 | End: 2020-09-29

## 2020-09-28 RX ORDER — QUETIAPINE FUMARATE 200 MG/1
50 TABLET, FILM COATED ORAL AT BEDTIME
Refills: 0 | Status: DISCONTINUED | OUTPATIENT
Start: 2020-09-28 | End: 2020-09-30

## 2020-09-28 RX ORDER — HYDRALAZINE HCL 50 MG
50 TABLET ORAL THREE TIMES A DAY
Refills: 0 | Status: DISCONTINUED | OUTPATIENT
Start: 2020-09-28 | End: 2020-09-30

## 2020-09-28 RX ORDER — ANASTROZOLE 1 MG/1
1 TABLET ORAL DAILY
Refills: 0 | Status: DISCONTINUED | OUTPATIENT
Start: 2020-09-28 | End: 2020-09-30

## 2020-09-28 RX ORDER — ISOSORBIDE MONONITRATE 60 MG/1
30 TABLET, EXTENDED RELEASE ORAL DAILY
Refills: 0 | Status: DISCONTINUED | OUTPATIENT
Start: 2020-09-28 | End: 2020-09-30

## 2020-09-28 RX ORDER — QUETIAPINE FUMARATE 200 MG/1
25 TABLET, FILM COATED ORAL DAILY
Refills: 0 | Status: DISCONTINUED | OUTPATIENT
Start: 2020-09-28 | End: 2020-09-30

## 2020-09-28 RX ORDER — BUPROPION HYDROCHLORIDE 150 MG/1
150 TABLET, EXTENDED RELEASE ORAL DAILY
Refills: 0 | Status: DISCONTINUED | OUTPATIENT
Start: 2020-09-28 | End: 2020-09-30

## 2020-09-28 RX ORDER — FENTANYL CITRATE 50 UG/ML
25 INJECTION INTRAVENOUS
Refills: 0 | Status: DISCONTINUED | OUTPATIENT
Start: 2020-09-28 | End: 2020-09-29

## 2020-09-28 RX ORDER — METOPROLOL TARTRATE 50 MG
50 TABLET ORAL DAILY
Refills: 0 | Status: DISCONTINUED | OUTPATIENT
Start: 2020-09-28 | End: 2020-09-30

## 2020-09-28 RX ADMIN — BUPROPION HYDROCHLORIDE 150 MILLIGRAM(S): 150 TABLET, EXTENDED RELEASE ORAL at 18:27

## 2020-09-28 RX ADMIN — Medication 50 MILLIGRAM(S): at 05:50

## 2020-09-28 RX ADMIN — Medication 50 MILLIGRAM(S): at 21:46

## 2020-09-28 RX ADMIN — QUETIAPINE FUMARATE 25 MILLIGRAM(S): 200 TABLET, FILM COATED ORAL at 18:26

## 2020-09-28 RX ADMIN — QUETIAPINE FUMARATE 50 MILLIGRAM(S): 200 TABLET, FILM COATED ORAL at 21:46

## 2020-09-28 RX ADMIN — PIPERACILLIN AND TAZOBACTAM 25 GRAM(S): 4; .5 INJECTION, POWDER, LYOPHILIZED, FOR SOLUTION INTRAVENOUS at 21:47

## 2020-09-28 RX ADMIN — ANASTROZOLE 1 MILLIGRAM(S): 1 TABLET ORAL at 18:26

## 2020-09-28 RX ADMIN — Medication 50 MILLIGRAM(S): at 18:26

## 2020-09-28 RX ADMIN — ISOSORBIDE MONONITRATE 30 MILLIGRAM(S): 60 TABLET, EXTENDED RELEASE ORAL at 18:27

## 2020-09-28 NOTE — BRIEF OPERATIVE NOTE - NSICDXBRIEFPROCEDURE_GEN_ALL_CORE_FT
PROCEDURES:  Cholecystectomy, laparoscopic, with intraoperative cholangiogram 28-Sep-2020 15:55:55  Jayesh Wolf

## 2020-09-28 NOTE — PROGRESS NOTE ADULT - ATTENDING COMMENTS
Patient seen and examined this morning. Had multiple attempts to re-establish an IV overnight without success and patient refused further attempts. This morning she states she feels better, abdominal pain has improved. Otherwise denies chest pain, sob, nausea, vomiting. Vitals and labs reviewed. Plan of care discussed with medical team.    A/P:  66 y/o female with a PMH of non ischemic cardiomyopathy with 15-20% EF s/p ICD, COPD, DM2, HTN, HLD, CKD stage 3, Breast CA in remission on  anastrazole, admitted for acute cholecystitis. Surgery following, Dr. Ramesh planning to take patient to the OR this afternoon, c/w Zosycaroline. Cardiology, Dr. Patterson consulted for pre-operative evaluation, cleared to proceed for procedure. Can restart Eliquis, ASA and Plavix, tomorrow unless stated otherwise by surgery, will continue to monitor LFTs. D-dimer was elevated on admission, unable to obtain CT-A due to renal function, pending V/Q scan to r/o PE, LE dopplers neg for DVT. Has a h/o of Mural thrombus, for which she was already on Eliquis, will resume as above. CHEN on CKD-3 improved w/ gentle IV hydration, continue to hold Lasix and Entresto for now. Suspect mild troponin elevation is chronic, unchanged from prior hospitalization, denies chest pain, restart ASA/Plavix as above.    Rest as per resident's note.

## 2020-09-28 NOTE — GOALS OF CARE CONVERSATION - ADVANCED CARE PLANNING - CONVERSATION/DISCUSSION
MOLST Discussed/Treatment Options/Palliative Care Referral/education was provided regarding hospice for the future/Diagnosis

## 2020-09-28 NOTE — PROGRESS NOTE ADULT - SUBJECTIVE AND OBJECTIVE BOX
English POST-OPERATIVE NOTE    Subjective:   65y Female s/p lap. cholecystectomy with IOC POD #0 . Denies nausea, vomiting, chest pain, sob, fevers chills. Pain is well controlled. Ambulating independently. Voiding spontaneously.    Vital Signs Last 24 Hrs  T(C): 36.2 (28 Sep 2020 20:13), Max: 36.7 (28 Sep 2020 07:33)  T(F): 97.1 (28 Sep 2020 20:13), Max: 98 (28 Sep 2020 07:33)  HR: 66 (28 Sep 2020 17:20) (63 - 73)  BP: 148/100 (28 Sep 2020 17:20) (122/78 - 156/96)  BP(mean): 113 (28 Sep 2020 16:51) (85 - 113)  RR: 18 (28 Sep 2020 20:13) (12 - 18)  SpO2: 100% (28 Sep 2020 20:13) (95% - 100%)  I&O's Detail    28 Sep 2020 07:01  -  28 Sep 2020 22:08  --------------------------------------------------------  IN:    Lactated Ringers Bolus: 500 mL  Total IN: 500 mL    OUT:    Estimated Blood Loss (mL): 20 mL  Total OUT: 20 mL    Total NET: 480 mL          Physical Exam:  General: NAD, resting comfortably in bed  Pulmonary: Nonlabored breathing, no respiratory distress  Cardiovascular: NSR, S1, S2  Abdominal: soft, NT/ND, dressing c/d/i  Extremities: no edema, no calf tenderness, distal pulses are palpable     LABS:                        9.7    4.55  )-----------( 277      ( 28 Sep 2020 06:27 )             31.0     09-28    141  |  111<H>  |  20<H>  ----------------------------<  89  3.5   |  21<L>  |  1.47<H>    Ca    8.8      28 Sep 2020 06:27  Mg     2.0     09-27    TPro  6.8  /  Alb  2.9<L>  /  TBili  0.6  /  DBili  x   /  AST  259<H>  /  ALT  210<H>  /  AlkPhos  262<H>  09-27    LIVER FUNCTIONS - ( 27 Sep 2020 06:31 )  Alb: 2.9 g/dL / Pro: 6.8 g/dL / ALK PHOS: 262 U/L / ALT: 210 U/L DA / AST: 259 U/L / GGT: x             MEDICATIONS  (STANDING):  anastrozole 1 milliGRAM(s) Oral daily  buPROPion XL . 150 milliGRAM(s) Oral daily  hydrALAZINE 50 milliGRAM(s) Oral three times a day  isosorbide   mononitrate ER Tablet (IMDUR) 30 milliGRAM(s) Oral daily  metoprolol succinate ER 50 milliGRAM(s) Oral daily  piperacillin/tazobactam IVPB.. 3.375 Gram(s) IV Intermittent every 8 hours  QUEtiapine 50 milliGRAM(s) Oral at bedtime  QUEtiapine 25 milliGRAM(s) Oral daily  sodium chloride 0.9%. 1000 milliLiter(s) (75 mL/Hr) IV Continuous <Continuous>    MEDICATIONS  (PRN):  fentaNYL    Injectable 25 MICROGram(s) IV Push every 5 minutes PRN Moderate Pain (4 - 6)  HYDROmorphone  Injectable 0.5 milliGRAM(s) IV Push every 10 minutes PRN Severe Pain (7 - 10)      Assessment:  The patient is a 65y Female who is s/p lap. cholecystectomy with IOC POD #0. Stable    Plan:  - Pain control as needed  -Dressing change prn   - OOB and ambulating as tolerated  - F/u AM labs  - DVT ppx

## 2020-09-28 NOTE — CONSULT NOTE ADULT - PROBLEM SELECTOR RECOMMENDATION 6
Please see discussion noted above in GOC conversation. 2/2 comorbidities.  CHF, COPD and breat ca. Pt reports good po intake. Albumin 2.9.   Diet as tolerated.   Encouraged frequent small high protein meals.   Nutrition consult

## 2020-09-28 NOTE — PROGRESS NOTE ADULT - PROBLEM SELECTOR PLAN 2
Patients Creatinine is elevated to 1.61-> 1.79-> 1.47 today morning 9/28.  Improving with IV gentle hydration  Urine lytes pending  Follow BMP  Holding Entresto and Lasix for now in light of CHEN

## 2020-09-28 NOTE — PROGRESS NOTE ADULT - SUBJECTIVE AND OBJECTIVE BOX
pt seen and examined; discussed risks, benefits alternatives. risks discussed included (but not limited to) cardiac complications, thrombus migration and CVA or ischemia at other sites and death. All questions answered and she agrees to proceed. Discussed also with HCP Jaki by phone who supports patient's decision. pt seen and examined; she is  to RUQ. Acute cholecystitis. discussed risks, benefits alternatives. risks discussed included (but not limited to) cardiac complications, thrombus migration and CVA or ischemia at other sites and death. All questions answered and she agrees to proceed with planned procedure. Discussed also with HCP Jaki by phone who supports patient's decision.

## 2020-09-28 NOTE — CONSULT NOTE ADULT - PROBLEM SELECTOR RECOMMENDATION 3
Acute kidney injury superimposed on chronic kidney disease.  Scr today 1.47 s/p IVF. High risk for fluid overload.   Nephrology consult    Avoid NSAIDs Patient has hx Breast Cancer S/p Lumpectomy, Chemo, Radio therapy  -C/w with Anastrazole   Pt reports her oncologist has disappeared. She plans to see another oncologist.

## 2020-09-28 NOTE — PROGRESS NOTE ADULT - SUBJECTIVE AND OBJECTIVE BOX
PGY-1 Progress Note discussed with attending    PAGER #: [[phone]] TILL 5:00 PM  PLEASE CONTACT ON CALL TEAM:  - On Call Team (Please refer to Taryn) FROM 5:00 PM - 8:30PM  - Nightfloat Team FROM 8:30 -7:30 AM    CHIEF COMPLAINT & BRIEF HOSPITAL COURSE: 65 year old woman with PMH of NICM with EF 15 - 20% s/p ICD, COPD, DM2, HTN, HLD, Breast Cancer in remission on lifelong hormonal therapy with anastrazole was recently discharged after treatment of  acute respiratory failure from acute CHF exacerbation. She states she has been having  vomiting multiple times for past 2 weeks ,sometimes relieved by taking anta acid. Patient also states that she is getting more short of breath than usual. She denies any diarrhea, urinary complaints, chest pain, palpitations, leg swelling, headache, dizziness.    Upon hospitalization, RUQ ultrasound showed GB thickening consistent with Acute cholecystitis. Pt has been cleared by cardiology for Cholecystectomy planned for Sep 28.    INTERVAL HPI/OVERNIGHT EVENTS: Pt has no c/o RUQ pain, nausea, vomiting. No chest pain or SOB. Planned for Cholecystectomy today    MEDICATIONS  (STANDING):  sodium chloride 0.9%. 1000 milliLiter(s) (75 mL/Hr) IV Continuous <Continuous>    MEDICATIONS  (PRN):      REVIEW OF SYSTEMS:  CONSTITUTIONAL: No fever, weight loss, or fatigue  RESPIRATORY: No cough, wheezing, chills or hemoptysis; No shortness of breath  CARDIOVASCULAR: No chest pain, palpitations, dizziness, or leg swelling  GASTROINTESTINAL: No abdominal pain. No nausea, vomiting, or hematemesis; No diarrhea or constipation. No melena or hematochezia.  GENITOURINARY: No dysuria or hematuria, urinary frequency  NEUROLOGICAL: No headaches, memory loss, loss of strength, numbness, or tremors  SKIN: No itching, burning, rashes, or lesions     Vital Signs Last 24 Hrs  T(C): 36.3 (28 Sep 2020 13:15), Max: 36.7 (28 Sep 2020 07:33)  T(F): 97.4 (28 Sep 2020 13:15), Max: 98 (28 Sep 2020 07:33)  HR: 73 (28 Sep 2020 13:15) (71 - 77)  BP: 138/94 (28 Sep 2020 13:15) (123/73 - 138/95)  BP(mean): 104 (28 Sep 2020 13:15) (85 - 104)  RR: 16 (28 Sep 2020 13:15) (16 - 18)  SpO2: 97% (28 Sep 2020 13:15) (97% - 100%)    PHYSICAL EXAMINATION:  GENERAL: NAD, well built  HEAD:  Atraumatic, Normocephalic  EYES:  conjunctiva and sclera clear  NECK: Supple, No JVD, Normal thyroid  CHEST/LUNG: Clear to auscultation. Clear to percussion bilaterally; No rales, rhonchi, wheezing, or rubs  HEART: Regular rate and rhythm; No murmurs, rubs, or gallops  ABDOMEN: Soft, Nontender, Nondistended; Bowel sounds present  NERVOUS SYSTEM:  Alert & Oriented X3,    EXTREMITIES:  2+ Peripheral Pulses, No clubbing, cyanosis, or edema  SKIN: warm dry                          9.7    4.55  )-----------( 277      ( 28 Sep 2020 06:27 )             31.0     09-28    141  |  111<H>  |  20<H>  ----------------------------<  89  3.5   |  21<L>  |  1.47<H>    Ca    8.8      28 Sep 2020 06:27  Mg     2.0     09-27    TPro  6.8  /  Alb  2.9<L>  /  TBili  0.6  /  DBili  x   /  AST  259<H>  /  ALT  210<H>  /  AlkPhos  262<H>  09-27    LIVER FUNCTIONS - ( 27 Sep 2020 06:31 )  Alb: 2.9 g/dL / Pro: 6.8 g/dL / ALK PHOS: 262 U/L / ALT: 210 U/L DA / AST: 259 U/L / GGT: x               PT/INR - ( 28 Sep 2020 06:27 )   PT: 16.7 sec;   INR: 1.45 ratio         PTT - ( 28 Sep 2020 06:27 )  PTT:29.9 sec    CAPILLARY BLOOD GLUCOSE      RADIOLOGY & ADDITIONAL TESTS:  No new imaging today

## 2020-09-28 NOTE — CONSULT NOTE ADULT - SUBJECTIVE AND OBJECTIVE BOX
HPI:  65 year old woman with PMH of non ischemic cardiomyopathy with EF 15 - 20% s/p ICD, COPD, DM2, HTN, HLD, breast cancer in remission on lifelong hormonal therapy with anastrazole was recently discharged after treatment of  acute respiratory failure from acute CHF exacerbation. She states she has been having  vomiting multiple times for past 2 weeks ,sometimes relieved by taking anta acid. Patient also states that she is getting more short of breath than usual. She denies any diarrhea, urinary complaints, chest pain, palpitations, leg swelling, headache, dizziness.   (25 Sep 2020 20:52)    Interval hx:  Pt seen and examined a t the bedside, AOX3. NAD. Palliative care to establish goals of care.   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 Gutierrez    Cardiomyopathy    Sickle cell trait    DVT (deep venous thrombosis)  Left leg in 1994 after hysterectomy    Hypercholesteremia    Breast cancer  right-s/p chemo and radiation    Hypertension    Breast cancer  infusaport insertion- 2014- left chest    S/P hysterectomy  1994    Gilson filter in place  1994    S/P lumpectomy, right breast  2014, revision of position- 2015    Fibroid uterus  sp hystrectomy 1196        SOCIAL HISTORY:    Admitted from:  Home alone  Pt's reports closest family is her cousin  Jaki Garland who is her assigned HCP    Rastafari:   n/a                                   Surrogate/HCP/Guardian:  Jaki Garland          Phone#: [phone]    FAMILY HISTORY:  Family history of glioblastoma    Family history of other condition  please open box and see additional medical family history    Family history of renal failure  uncle    Family history of glioblastoma (Sibling)    History of hypertension  mother    Family history of liver failure    Family history of glioblastoma    Family history of renal stone    Family history of diabetes mellitus    Family history of hypertension      Baseline ADLs (prior to admission):    Allergies    Advil (Hives; Vomiting)    Intolerances      Present Symptoms:   Dyspnea: yes  Nausea/Vomiting: denies  Anxiety: denies  Fatigue: denies  Loss of appetite: denies  Pain:    denies                           l     Review of Systems: [All others negative    MEDICATIONS  (STANDING):  anastrozole 1 milliGRAM(s) Oral daily  buPROPion XL . 150 milliGRAM(s) Oral daily  hydrALAZINE 50 milliGRAM(s) Oral three times a day  isosorbide   mononitrate ER Tablet (IMDUR) 30 milliGRAM(s) Oral daily  metoprolol succinate ER 50 milliGRAM(s) Oral daily  piperacillin/tazobactam IVPB.. 3.375 Gram(s) IV Intermittent every 8 hours  QUEtiapine 25 milliGRAM(s) Oral daily  QUEtiapine 50 milliGRAM(s) Oral at bedtime  sodium chloride 0.9%. 1000 milliLiter(s) (75 mL/Hr) IV Continuous <Continuous>    MEDICATIONS  (PRN):  HYDROmorphone  Injectable 0.5 milliGRAM(s) IV Push every 4 hours PRN Moderate Pain (4 - 6)  ondansetron Injectable 4 milliGRAM(s) IV Push every 6 hours PRN Nausea      PHYSICAL EXAM:    Vital Signs Last 24 Hrs  T(C): 36.7 (28 Sep 2020 07:33), Max: 36.7 (28 Sep 2020 07:33)  T(F): 98 (28 Sep 2020 07:33), Max: 98 (28 Sep 2020 07:33)  HR: 73 (28 Sep 2020 07:33) (71 - 77)  BP: 123/73 (28 Sep 2020 07:33) (123/73 - 138/95)  BP(mean): 85 (28 Sep 2020 07:33) (85 - 85)  RR: 16 (28 Sep 2020 07:33) (16 - 18)  SpO2: 100% (28 Sep 2020 07:33) (98% - 100%)    General: AOX3, NAD  Karnofsky Performance Score/Palliative Performance Status Version2: 50     %    HEENT: Atraumatic, moist mucous membranes  Lungs: unlabored on RA  CV: S1S2, RRR  GI: soft non tender on palpation  : urinating  Musculoskeletal: no edema  Skin: very dry, no rash or lesions noted  Neuro: no deficits cognitive impairment   Oral intake ability: moderate po intake      LABS:                        9.7    4.55  )-----------( 277      ( 28 Sep 2020 06:27 )             31.0     09-28    141  |  111<H>  |  20<H>  ----------------------------<  89  3.5   |  21<L>  |  1.47<H>    Ca    8.8      28 Sep 2020 06:27  Mg     2.0     09-27    TPro  6.8  /  Alb  2.9<L>  /  TBili  0.6  /  DBili  x   /  AST  259<H>  /  ALT  210<H>  /  AlkPhos  262<H>  09-27    < from: Transthoracic Echocardiogram (08.29.20 @ 07:23) >    Patient name: RISHABH HUNT  YOB: 1955   Age: 65 (F)   MR#: [id number]  Study Date: 8/29/2020  Location: [address] [place], [place] [address]Sonographer: Karl Chiang [facility]  Study quality: Technically good  Referring Physician:  ANGEL BONILLA MD  Blood Pressure: 161/102 mmHg  Height: 170 cm  Weight: 64 kg  BSA: 1.7 m2  ------------------------------------------------------------------------    PROCEDURE: Transthoracic echocardiogram with 2-D, M-Mode  and complete spectral and color flow Doppler.  INDICATION: Non-ST elevation (NSTEMI) myocardial infarction  (I21.4)  HISTORY:  ------------------------------------------------------------------------  DIMENSIONS:  Dimensions:     Normal Values:  LA:     4.3 cm    2.0 - 4.0 cm  Ao:     3.6 cm    2.0 - 3.8 cm  SEPTUM: 1.2 cm    0.6 - 1.2 cm  PWT:    1.3 cm    0.6 - 1.1 cm  LVIDd:  6.1 cm    3.0 - 5.6 cm  LVIDs:  5.6 cm    1.8 - 4.0 cm      Derived Variables:  LVMI: 196 g/m2  RWT: 0.42  Ejection Fraction Visual Estimate: 15-20 %    ------------------------------------------------------------------------  OBSERVATIONS:  Mitral Valve: Tethered mitral valve leaflets. Moderate to  severe mitral regurgitation.  Aortic Root: Normal aortic root.  Aortic Valve: Normal trileaflet aortic valve.  Left Atrium: Severely dilated left atrium.  LA volume index  = 63 cc/m2.  Left Ventricle: Severe global left ventricular systolic  dysfunction.   Severe diastolic dysfunction (stage III-IV).  Severe left ventricular enlargement.  Right Heart: Normal right atrium. Normal right ventricular  size with decreased RV systolic function. (TAPSE 1.5 cm)  There is moderate tricuspid regurgitation. There is mild  pulmonic regurgitation.  Pericardium/PleuraNormal pericardium with no pericardial  effusion.  Hemodynamic: RA Pressure is 10 mm Hg. RV systolic pressure  is 51 mm Hg. Moderate pulmonary hypertension.  ------------------------------------------------------------------------  CONCLUSIONS:  1. Tethered mitral valve leaflets. Moderate to severe  mitral regurgitation.  2. Severely dilated left atrium.  LA volume index = 63  cc/m2.  3. Severe left ventricular enlargement.  4. Severe global left ventricular systolic dysfunction.  Severe diastolic dysfunction (stage III-IV).  5. Normal right ventricular size with decreased RV systolic  function. (TAPSE 1.5 cm)  6. RV systolic pressure is 51 mm Hg. Moderate pulmonary  hypertension.    < end of copied text >      RADIOLOGY & ADDITIONAL STUDIES: Reviewed    ADVANCE DIRECTIVES:  MOLST; CPR with trial intubation and trial of mechanical ventilation

## 2020-09-28 NOTE — CONSULT NOTE ADULT - PROBLEM SELECTOR RECOMMENDATION 2
Patient has hx Breast Cancer S/p Lumpectomy, Chemo, Radio therapy  -C/w with Anastrazole   Pt reports her oncologist has disappeared. She plans to see another oncologist. Likely vascular dementia 2/2 CVA. AOX3 at the time of exam. Noted with cognitive impairment periods of forgetfulness and poor insight.     July 2020 CTA head/neck shows chronic rt frontal, parietal, lacunar infarcts.

## 2020-09-28 NOTE — PROGRESS NOTE ADULT - PROBLEM SELECTOR PLAN 3
Patient D dimers are elevated. As per Wells score- 4.5 points  Moderate risk group: 16.2% chance of PE in an ED population.  s/p full dose of Lovenox in ED, Heparin drip started considering CHEN and tenzin-operative period  CT angio chest could not be done as creatinine is elevated  USG doppler Bilateral LE- negative for DVT  Heparin drip has been continued until today morning 9/28  Could not give today's dose as pt did not have IV access- pt scheduled for Cholecystectomy today, will restart anticoag after surgery   V/Q scan scheduled for tomorrow 9/29

## 2020-09-28 NOTE — PROGRESS NOTE ADULT - SUBJECTIVE AND OBJECTIVE BOX
DATE OF SERVICE:Patient was seen and examined :09/28/2020    PRESENTING CC:Abdominal Pain    SUBJ: 65 year old woman with PMH of Non ischemic cardiomyopathy with EF 15 - 20% s/p ICD, COPD, DM2, HTN, HLD, breast cancer in remission on lifelong hormonal therapy with anastrazole was recently discharged after treatment of  acute respiratory failure from acute CHF exacerbation. She states she has been having  vomiting multiple times for past 2 weeks ,sometimes relieved by taking antaacid. Patient also states that she is getting more short of breath than usual. She denies any diarrhea, urinary complaints, chest pain, palpitations, leg swelling, headache, dizziness.  Scheduled for Cholecystectomy      PMH -reviewed admission note, no change since admission  Heart failure: acute [ ] chronic [x ] acute or chronic [ ] diastolic [ ] systolic [x ] combined systolic and diastolic[ ]  CHEN: ATN[ ] renal medullary necrosis [ ] CKD I [ ]CKDII [ ]CKD III [ ]CKD IV [ ]CKD V [ ]Other pathological lesions [ ]    MEDICATIONS  (STANDING):  sodium chloride 0.9%. 1000 milliLiter(s) (75 mL/Hr) IV Continuous <Continuous>    MEDICATIONS  (PRN):              REVIEW OF SYSTEMS:  Constitutional: [ ] fever, [ ]weight loss,  [ ]fatigue  Eyes: [ ] visual changes  Respiratory: [ ]shortness of breath;  [ ] cough, [ ]wheezing, [ ]chills, [ ]hemoptysis  Cardiovascular: [ ] chest pain, [ ]palpitations, [ ]dizziness,  [ ]leg swelling[ ]orthopnea[ ]PND  Gastrointestinal: [ ] abdominal pain, [ ]nausea, [ ]vomiting,  [ ]diarrhea   Genitourinary: [ ] dysuria, [ ] hematuria  Neurologic: [ ] headaches [ ] tremors[ ]weakness  Skin: [ ] itching, [ ]burning, [ ] rashes  Endocrine: [ ] heat or cold intolerance  Musculoskeletal: [ ] joint pain or swelling; [ ] muscle, back, or extremity pain  Psychiatric: [ ] depression, [ ]anxiety, [ ]mood swings, or [ ]difficulty sleeping  Hematologic: [ ] easy bruising, [ ] bleeding gums    [x] All remaining systems negative except as per above.   [ ]Unable to obtain.    Vital Signs Last 24 Hrs  T(C): 36.3 (28 Sep 2020 13:15), Max: 36.7 (28 Sep 2020 07:33)  T(F): 97.4 (28 Sep 2020 13:15), Max: 98 (28 Sep 2020 07:33)  HR: 73 (28 Sep 2020 13:15) (71 - 77)  BP: 138/94 (28 Sep 2020 13:15) (123/73 - 138/95)  BP(mean): 104 (28 Sep 2020 13:15) (85 - 104)  RR: 16 (28 Sep 2020 13:15) (16 - 18)  SpO2: 97% (28 Sep 2020 13:15) (97% - 100%)  I&O's Summary      PHYSICAL EXAM:  General: No acute distress BMI-  HEENT: EOMI, PERRL  Neck: Supple, [ ] JVD  Lungs: Equal air entry bilaterally; [ ] rales [ ] wheezing [ ] rhonchi  Heart: Regular rate and rhythm; [ ] murmur   /6 [ ] systolic [ ] diastolic [ ] radiation[ ] rubs [ ]  gallops  Abdomen: Nontender, bowel sounds present  Extremities: No clubbing, cyanosis, [ ] edema  Nervous system:  Alert & Oriented X3, no focal deficits  Psychiatric: Normal affect  Skin: No rashes or lesions    LABS:  09-28    141  |  111<H>  |  20<H>  ----------------------------<  89  3.5   |  21<L>  |  1.47<H>    Ca    8.8      28 Sep 2020 06:27  Mg     2.0     09-27    TPro  6.8  /  Alb  2.9<L>  /  TBili  0.6  /  DBili  x   /  AST  259<H>  /  ALT  210<H>  /  AlkPhos  262<H>  09-27    Creatinine Trend: 1.47<--, 1.79<--, 1.79<--, 1.65<--, 1.15<--, 1.14<--                        9.7    4.55  )-----------( 277      ( 28 Sep 2020 06:27 )             31.0     PT/INR - ( 28 Sep 2020 06:27 )   PT: 16.7 sec;   INR: 1.45 ratio         PTT - ( 28 Sep 2020 06:27 )  PTT:29.9 sec  Lipid Panel:   Cardiac Enzymes:           RADIOLOGY:    ECG [my interpretation]:    TELEMETRY:    ECHO:    STRESS TEST:    CATHETERIZATION:      IMPRESSION AND PLAN:       DATE OF SERVICE:Patient was seen and examined :09/28/2020    PRESENTING CC:Abdominal Pain    SUBJ: 65 year old woman with PMH of Non ischemic cardiomyopathy with EF 15 - 20% s/p ICD, COPD, DM2, HTN, HLD, breast cancer in remission on lifelong hormonal therapy with anastrazole was recently discharged after treatment of  acute respiratory failure from acute CHF exacerbation. She states she has been having  vomiting multiple times for past 2 weeks ,sometimes relieved by taking antaacid. Patient also states that she is getting more short of breath than usual. She denies any diarrhea, urinary complaints, chest pain, palpitations, leg swelling, headache, dizziness.  Scheduled for Cholecystectomy      PMH -reviewed admission note, no change since admission  Heart failure: acute [ ] chronic [x ] acute or chronic [ ] diastolic [ ] systolic [x ] combined systolic and diastolic[ ]  CHEN: ATN[ ] renal medullary necrosis [ ] CKD I [ ]CKDII [ ]CKD III [ ]CKD IV [ ]CKD V [ ]Other pathological lesions [ ]    MEDICATIONS  (STANDING):  sodium chloride 0.9%. 1000 milliLiter(s) (75 mL/Hr) IV Continuous <Continuous>      REVIEW OF SYSTEMS:  Constitutional: [ ] fever, [ ]weight loss,  [ ]fatigue  Eyes: [ ] visual changes  Respiratory: [ ]shortness of breath;  [ ] cough, [ ]wheezing, [ ]chills, [ ]hemoptysis  Cardiovascular: [ ] chest pain, [ ]palpitations, [ ]dizziness,  [ ]leg swelling[ ]orthopnea[ ]PND  Gastrointestinal: [ ] abdominal pain, [ ]nausea, [ ]vomiting,  [ ]diarrhea   Genitourinary: [ ] dysuria, [ ] hematuria  Neurologic: [ ] headaches [ ] tremors[ ]weakness  Skin: [ ] itching, [ ]burning, [ ] rashes  Endocrine: [ ] heat or cold intolerance  Musculoskeletal: [ ] joint pain or swelling; [ ] muscle, back, or extremity pain  Psychiatric: [ ] depression, [ ]anxiety, [ ]mood swings, or [ ]difficulty sleeping  Hematologic: [ ] easy bruising, [ ] bleeding gums    [x] All remaining systems negative except as per above.   [ ]Unable to obtain.    Vital Signs Last 24 Hrs  T(C): 36.3 (28 Sep 2020 13:15), Max: 36.7 (28 Sep 2020 07:33)  T(F): 97.4 (28 Sep 2020 13:15), Max: 98 (28 Sep 2020 07:33)  HR: 73 (28 Sep 2020 13:15) (71 - 77)  BP: 138/94 (28 Sep 2020 13:15) (123/73 - 138/95)  BP(mean): 104 (28 Sep 2020 13:15) (85 - 104)  RR: 16 (28 Sep 2020 13:15) (16 - 18)  SpO2: 97% (28 Sep 2020 13:15) (97% - 100%)  I&O's Summary      PHYSICAL EXAM:  General: No acute distress BMI-31  HEENT: EOMI, PERRL  Neck: Supple, [ ] JVD  Lungs: Equal air entry bilaterally; [ ] rales [ ] wheezing [ ] rhonchi  Heart: Regular rate and rhythm; [x ] murmur  2 /6 [x ] systolic [ ] diastolic [ ] radiation[ ] rubs [ ]  gallops  Abdomen: Tender, bowel sounds present  Extremities: No clubbing, cyanosis, [ ] edema  Nervous system:  Alert & Oriented X3, no focal deficits  Psychiatric: Normal affect  Skin: No rashes or lesions    LABS:  09-28    141  |  111<H>  |  20<H>  ----------------------------<  89  3.5   |  21<L>  |  1.47<H>    Ca    8.8      28 Sep 2020 06:27  Mg     2.0     09-27    TPro  6.8  /  Alb  2.9<L>  /  TBili  0.6  /  DBili  x   /  AST  259<H>  /  ALT  210<H>  /  AlkPhos  262<H>  09-27    Creatinine Trend: 1.47<--, 1.79<--, 1.79<--, 1.65<--, 1.15<--, 1.14<--                        9.7    4.55  )-----------( 277      ( 28 Sep 2020 06:27 )             31.0     PT/INR - ( 28 Sep 2020 06:27 )   PT: 16.7 sec;   INR: 1.45 ratio         PTT - ( 28 Sep 2020 06:27 )  PTT:29.9 sec  Lipid Panel:   Cardiac Enzymes:           RADIOLOGY:        IMPRESSION AND PLAN:    64-year-old female former smoker with DM, HTN, HLD, breast CA s/p chemo/XRT, and NICM (catheterization 02/2019 showed dRPL occlusion without ability to intervene), EF 31% by MUGA 7/2019, s/p [employer] SubQ ICD 10/2019,    Scheduled for Cholecystectony    She is in optimal medical condition at intermediate cardiac risk for proposed procedure      1. NICM s/p ICD: Patient currently is on metoprolol, spironolactone, and Entresto at home  HF stable optimized medical therapy    -Continue metoprolol  -Hold spironolactone in setting of elevated creatinine  -Mild CHEN, can continue lower-dose Entresto 24/26 for now; renal function worsened by ~15% which is within the acceptable range  -Consider referral to nephrology  -Patient with recent angiogram showing no targets for revascularization;    2. Moderate-severe MR noted on echocardiogram:   -ANGELINA shows moderate MR, will continue monitoring as outpatient    3. HTN:  -On Entresto, metoprolol, and spironolactone at home  -Hold spironolactone,      4. HLD: Continue atorvastatin    5. Aortic mural thrombus: No need for Anticoagulation

## 2020-09-28 NOTE — CONSULT NOTE ADULT - CONVERSATION DETAILS
Palliative care SW and NOP met with the patient at the bedside, AOX3 at the time of exam.  Discussed her oncology history, current clinical status and goals of care.  She explains her health is fragile, but she wants to live.  Pt asked that we update her cousin Jaki as to our discussion.   Regarding goals of care, explained  cardiopulmonary resuscitation and intubation.  Pt stated "I want to live, but not as a vegetable".  ADI drafted CPR with trial of intubation.    Spoke with the pt's cousin Jaki, discussed above.  Explained that the pt will become hospice appropriate in the near future.  Educated her about hospice philosophy and locations of care.  She expressed understanding and was appreciative of our involvement.     all questions answered.  Support provided.

## 2020-09-28 NOTE — GOALS OF CARE CONVERSATION - ADVANCED CARE PLANNING - CONVERSATION DETAILS
PC  and PC NP met with the patient to address goals of care.  Patient provided a life and illness review.  Ms. Read is single, does not have children, identifies her cousin Jaki Garland as supportive and her HCP, does not identify with a particular [employer] and is retired from the [employer].  PC NP provided a clinical overview which included todays' surgical procedure to remove her gall bladder.  Patient stated she was awoken with pain, nausea and vomiting three nights in a row which is what brought her to the hospital.  Emotional support was provided as the patient has had chronic health issues.  Ms. Read resides alone in an apt. and stated her friend arranged food delivery as she is beginning to have difficulty shopping and caring for herself without assistance.  PC  and PC NP completed a MOLST form to reflect the patient's wishes as follows: CPR, Trial of intubation.  Patient stated "I want to live."  Following their discussion this  and NP contacted the patient's cousin Jaki to review the patient's clinical information, review the MOLST form and discuss needs moving forward.  PC  educated Jaki regarding medicaid in the community and SNF, [facility], home P.T. and PC NP discussed hospice.  Patient may not want hospice at this time, however her cousin was provided education regarding the services.  No addl. needs were identified.  PC Gus provided her contact information and will remain available as needed.

## 2020-09-28 NOTE — CONSULT NOTE ADULT - PROBLEM SELECTOR RECOMMENDATION 5
2/2 comorbidities.  CHF, COPD and breat ca. Pt reports good po intake. Albumin 2.9.   Diet as tolerated.   Encouraged frequent small high protein meals.   Nutrition consult 2/2 comorbidities.  CHF, CVA and dementia. Pt reports LOZA, making it more difficult to maintain ADLs.    PT eval.

## 2020-09-28 NOTE — CONSULT NOTE ADULT - PROBLEM SELECTOR RECOMMENDATION 4
Pt reports LOZA, making it more difficult to maintain ADLs.    PT eval. Hx of CVA with .  Pt reports LOZA, making it more difficult to maintain ADLs.    PT eval.    July 2020 CTA head/neck shows chronic rt frontal, parietal, lacunar infarcts. Acute kidney injury superimposed on chronic kidney disease.  Scr today 1.47 s/p IVF. High risk for fluid overload.   Nephrology consult    Avoid NSAIDs

## 2020-09-28 NOTE — PROGRESS NOTE ADULT - PROBLEM SELECTOR PLAN 7
Verified patient with two types of identifiers. Spoke to patient's mother (on HIPAA) and gave her results of echo and loop. She states the patient has been feeling a little better however she is still having dizzy spells. Reiterated Dr. Rosy Chatman message of salty morning foods, a Gatorade a day and staying hydrated. Also confirmed follow up next week. Patient's mother verbalized understanding and will call with any other questions. Patient s ANGELINA on 9/1/20 showed  Mural thrombus is also noted in the  descending aorta, up to 1.12 cm in depth on crossectional imaging.  Vascular advised no acute intervention last admission  Pt will be started on Eliquis after surgery (cholecystectomy)

## 2020-09-28 NOTE — CONSULT NOTE ADULT - PROBLEM SELECTOR RECOMMENDATION 9
chronic heart failure with reduced ejection fraction and diastolic dysfunction (EF 15-20%) s/p ICD.  With worsening renal function.  Diurese as tolerated. Cardiology following.    Pt is a FULL CODE. chronic heart failure with reduced ejection fraction and diastolic dysfunction (EF 15-20%) s/p ICD.  With worsening renal function.  Diurese as tolerated. Cardiology following.  Pt is hospice appropriate.    Pt's cousin Jaki will address LTC and hospice.     Pt is a FULL CODE.

## 2020-09-28 NOTE — PROGRESS NOTE ADULT - PROBLEM SELECTOR PLAN 8
RISK                                                          Points  [] Previous VTE                                           3  [] Thrombophilia                                        2  [] Lower limb paralysis                              2   [] Current Cancer                                       2   [x] Immobilization > 24 hrs                        1  [] ICU/CCU stay > 24 hours                       1  [x] Age > 60                                                   1    s/p full dose of Lovenox, pt was on Heparin drip due to elevated d-dimers  Will restart Anticoag post-surgery

## 2020-09-29 ENCOUNTER — APPOINTMENT (OUTPATIENT)
Dept: CARE COORDINATION | Facility: HOME HEALTH | Age: 65
End: 2020-09-29

## 2020-09-29 LAB
ALBUMIN SERPL ELPH-MCNC: 2.8 G/DL — LOW (ref 3.5–5)
ALP SERPL-CCNC: 254 U/L — HIGH (ref 40–120)
ALT FLD-CCNC: 164 U/L DA — HIGH (ref 10–60)
ANION GAP SERPL CALC-SCNC: 8 MMOL/L — SIGNIFICANT CHANGE UP (ref 5–17)
AST SERPL-CCNC: 96 U/L — HIGH (ref 10–40)
BASOPHILS # BLD AUTO: 0.03 K/UL — SIGNIFICANT CHANGE UP (ref 0–0.2)
BASOPHILS NFR BLD AUTO: 0.6 % — SIGNIFICANT CHANGE UP (ref 0–2)
BILIRUB SERPL-MCNC: 0.5 MG/DL — SIGNIFICANT CHANGE UP (ref 0.2–1.2)
BUN SERPL-MCNC: 16 MG/DL — SIGNIFICANT CHANGE UP (ref 7–18)
CALCIUM SERPL-MCNC: 8.7 MG/DL — SIGNIFICANT CHANGE UP (ref 8.4–10.5)
CHLORIDE SERPL-SCNC: 111 MMOL/L — HIGH (ref 96–108)
CO2 SERPL-SCNC: 24 MMOL/L — SIGNIFICANT CHANGE UP (ref 22–31)
CREAT SERPL-MCNC: 1.28 MG/DL — SIGNIFICANT CHANGE UP (ref 0.5–1.3)
EOSINOPHIL # BLD AUTO: 0.17 K/UL — SIGNIFICANT CHANGE UP (ref 0–0.5)
EOSINOPHIL NFR BLD AUTO: 3.7 % — SIGNIFICANT CHANGE UP (ref 0–6)
GLUCOSE BLDC GLUCOMTR-MCNC: 100 MG/DL — HIGH (ref 70–99)
GLUCOSE BLDC GLUCOMTR-MCNC: 128 MG/DL — HIGH (ref 70–99)
GLUCOSE BLDC GLUCOMTR-MCNC: 134 MG/DL — HIGH (ref 70–99)
GLUCOSE BLDC GLUCOMTR-MCNC: 134 MG/DL — HIGH (ref 70–99)
GLUCOSE BLDC GLUCOMTR-MCNC: 95 MG/DL — SIGNIFICANT CHANGE UP (ref 70–99)
GLUCOSE SERPL-MCNC: 83 MG/DL — SIGNIFICANT CHANGE UP (ref 70–99)
HCT VFR BLD CALC: 29.8 % — LOW (ref 34.5–45)
HGB BLD-MCNC: 9.5 G/DL — LOW (ref 11.5–15.5)
IMM GRANULOCYTES NFR BLD AUTO: 0.4 % — SIGNIFICANT CHANGE UP (ref 0–1.5)
LYMPHOCYTES # BLD AUTO: 1.79 K/UL — SIGNIFICANT CHANGE UP (ref 1–3.3)
LYMPHOCYTES # BLD AUTO: 38.7 % — SIGNIFICANT CHANGE UP (ref 13–44)
MAGNESIUM SERPL-MCNC: 1.9 MG/DL — SIGNIFICANT CHANGE UP (ref 1.6–2.6)
MCHC RBC-ENTMCNC: 30.7 PG — SIGNIFICANT CHANGE UP (ref 27–34)
MCHC RBC-ENTMCNC: 31.9 GM/DL — LOW (ref 32–36)
MCV RBC AUTO: 96.4 FL — SIGNIFICANT CHANGE UP (ref 80–100)
MONOCYTES # BLD AUTO: 0.42 K/UL — SIGNIFICANT CHANGE UP (ref 0–0.9)
MONOCYTES NFR BLD AUTO: 9.1 % — SIGNIFICANT CHANGE UP (ref 2–14)
NEUTROPHILS # BLD AUTO: 2.19 K/UL — SIGNIFICANT CHANGE UP (ref 1.8–7.4)
NEUTROPHILS NFR BLD AUTO: 47.5 % — SIGNIFICANT CHANGE UP (ref 43–77)
NRBC # BLD: 0 /100 WBCS — SIGNIFICANT CHANGE UP (ref 0–0)
PHOSPHATE SERPL-MCNC: 4.2 MG/DL — SIGNIFICANT CHANGE UP (ref 2.5–4.5)
PLATELET # BLD AUTO: 264 K/UL — SIGNIFICANT CHANGE UP (ref 150–400)
POTASSIUM SERPL-MCNC: 3.7 MMOL/L — SIGNIFICANT CHANGE UP (ref 3.5–5.3)
POTASSIUM SERPL-SCNC: 3.7 MMOL/L — SIGNIFICANT CHANGE UP (ref 3.5–5.3)
PROT SERPL-MCNC: 6.6 G/DL — SIGNIFICANT CHANGE UP (ref 6–8.3)
RBC # BLD: 3.09 M/UL — LOW (ref 3.8–5.2)
RBC # FLD: 17.6 % — HIGH (ref 10.3–14.5)
SODIUM SERPL-SCNC: 143 MMOL/L — SIGNIFICANT CHANGE UP (ref 135–145)
WBC # BLD: 4.62 K/UL — SIGNIFICANT CHANGE UP (ref 3.8–10.5)
WBC # FLD AUTO: 4.62 K/UL — SIGNIFICANT CHANGE UP (ref 3.8–10.5)

## 2020-09-29 PROCEDURE — 78580 LUNG PERFUSION IMAGING: CPT | Mod: 26

## 2020-09-29 PROCEDURE — 99232 SBSQ HOSP IP/OBS MODERATE 35: CPT | Mod: GC

## 2020-09-29 RX ORDER — ASPIRIN/CALCIUM CARB/MAGNESIUM 324 MG
81 TABLET ORAL DAILY
Refills: 0 | Status: DISCONTINUED | OUTPATIENT
Start: 2020-09-29 | End: 2020-09-30

## 2020-09-29 RX ORDER — APIXABAN 2.5 MG/1
10 TABLET, FILM COATED ORAL EVERY 12 HOURS
Refills: 0 | Status: DISCONTINUED | OUTPATIENT
Start: 2020-09-29 | End: 2020-09-30

## 2020-09-29 RX ORDER — SODIUM CHLORIDE 9 MG/ML
1000 INJECTION INTRAMUSCULAR; INTRAVENOUS; SUBCUTANEOUS
Refills: 0 | Status: DISCONTINUED | OUTPATIENT
Start: 2020-09-29 | End: 2020-09-29

## 2020-09-29 RX ORDER — ACETAMINOPHEN 500 MG
500 TABLET ORAL ONCE
Refills: 0 | Status: COMPLETED | OUTPATIENT
Start: 2020-09-29 | End: 2020-09-29

## 2020-09-29 RX ORDER — ENOXAPARIN SODIUM 100 MG/ML
40 INJECTION SUBCUTANEOUS DAILY
Refills: 0 | Status: DISCONTINUED | OUTPATIENT
Start: 2020-09-29 | End: 2020-09-29

## 2020-09-29 RX ORDER — APIXABAN 2.5 MG/1
2 TABLET, FILM COATED ORAL
Qty: 20 | Refills: 0
Start: 2020-09-29 | End: 2020-10-03

## 2020-09-29 RX ADMIN — Medication 50 MILLIGRAM(S): at 15:41

## 2020-09-29 RX ADMIN — Medication 500 MILLIGRAM(S): at 21:08

## 2020-09-29 RX ADMIN — Medication 50 MILLIGRAM(S): at 06:09

## 2020-09-29 RX ADMIN — PIPERACILLIN AND TAZOBACTAM 25 GRAM(S): 4; .5 INJECTION, POWDER, LYOPHILIZED, FOR SOLUTION INTRAVENOUS at 06:09

## 2020-09-29 RX ADMIN — Medication 500 MILLIGRAM(S): at 22:10

## 2020-09-29 RX ADMIN — ISOSORBIDE MONONITRATE 30 MILLIGRAM(S): 60 TABLET, EXTENDED RELEASE ORAL at 12:57

## 2020-09-29 RX ADMIN — BUPROPION HYDROCHLORIDE 150 MILLIGRAM(S): 150 TABLET, EXTENDED RELEASE ORAL at 12:56

## 2020-09-29 RX ADMIN — Medication 81 MILLIGRAM(S): at 15:42

## 2020-09-29 RX ADMIN — QUETIAPINE FUMARATE 25 MILLIGRAM(S): 200 TABLET, FILM COATED ORAL at 15:53

## 2020-09-29 RX ADMIN — HYDROMORPHONE HYDROCHLORIDE 0.5 MILLIGRAM(S): 2 INJECTION INTRAMUSCULAR; INTRAVENOUS; SUBCUTANEOUS at 03:09

## 2020-09-29 RX ADMIN — SODIUM CHLORIDE 60 MILLILITER(S): 9 INJECTION INTRAMUSCULAR; INTRAVENOUS; SUBCUTANEOUS at 17:40

## 2020-09-29 RX ADMIN — ANASTROZOLE 1 MILLIGRAM(S): 1 TABLET ORAL at 12:56

## 2020-09-29 RX ADMIN — ENOXAPARIN SODIUM 40 MILLIGRAM(S): 100 INJECTION SUBCUTANEOUS at 12:57

## 2020-09-29 RX ADMIN — APIXABAN 10 MILLIGRAM(S): 2.5 TABLET, FILM COATED ORAL at 17:38

## 2020-09-29 RX ADMIN — PIPERACILLIN AND TAZOBACTAM 25 GRAM(S): 4; .5 INJECTION, POWDER, LYOPHILIZED, FOR SOLUTION INTRAVENOUS at 15:41

## 2020-09-29 RX ADMIN — Medication 50 MILLIGRAM(S): at 21:10

## 2020-09-29 RX ADMIN — HYDROMORPHONE HYDROCHLORIDE 0.5 MILLIGRAM(S): 2 INJECTION INTRAMUSCULAR; INTRAVENOUS; SUBCUTANEOUS at 04:15

## 2020-09-29 RX ADMIN — QUETIAPINE FUMARATE 50 MILLIGRAM(S): 200 TABLET, FILM COATED ORAL at 21:10

## 2020-09-29 NOTE — PROGRESS NOTE ADULT - PROBLEM SELECTOR PLAN 7
Patient s ANGELINA on 9/1/20 showed  Mural thrombus is also noted in the  descending aorta, up to 1.12 cm in depth on crossectional imaging.  Vascular advised no acute intervention last admission  Pt will be started on Eliquis after surgery (cholecystectomy) Patient s ANGELINA on 9/1/20 showed  Mural thrombus is also noted in the  descending aorta, up to 1.12 cm in depth on crossectional imaging.  Vascular advised no acute intervention last admission  Cardiology recommended no anticoagulation as per Dr Patterson  However, pt is on Eliquis 10 mg BID for abnormal V/Q scan

## 2020-09-29 NOTE — PROGRESS NOTE ADULT - PROBLEM SELECTOR PLAN 8
RISK                                                          Points  [] Previous VTE                                           3  [] Thrombophilia                                        2  [] Lower limb paralysis                              2   [] Current Cancer                                       2   [x] Immobilization > 24 hrs                        1  [] ICU/CCU stay > 24 hours                       1  [x] Age > 60                                                   1    s/p full dose of Lovenox, pt was on Heparin drip due to elevated d-dimers  Will restart Anticoag post-surgery RISK                                                          Points  [] Previous VTE                                           3  [] Thrombophilia                                        2  [] Lower limb paralysis                              2   [] Current Cancer                                       2   [x] Immobilization > 24 hrs                        1  [] ICU/CCU stay > 24 hours                       1  [x] Age > 60                                                   1    Pt started on Eliquis 10 mg BID for abnormal V/Q scan

## 2020-09-29 NOTE — PROGRESS NOTE ADULT - SUBJECTIVE AND OBJECTIVE BOX
PGY-1 Progress Note discussed with attending    PAGER #: [[phone]] TILL 5:00 PM  PLEASE CONTACT ON CALL TEAM:  - On Call Team (Please refer to Taryn) FROM 5:00 PM - 8:30PM  - Nightfloat Team FROM 8:30 -7:30 AM    CHIEF COMPLAINT & BRIEF HOSPITAL COURSE: 65 year old woman with PMH of NICM with EF 15 - 20% s/p ICD, COPD, DM2, HTN, HLD, Breast Cancer in remission on lifelong hormonal therapy with anastrazole was recently discharged after treatment of  acute respiratory failure from acute CHF exacerbation. She states she has been having  vomiting multiple times for past 2 weeks ,sometimes relieved by taking anta acid. Patient also states that she is getting more short of breath than usual. She denies any diarrhea, urinary complaints, chest pain, palpitations, leg swelling, headache, dizziness.    Upon hospitalization, RUQ ultrasound showed GB thickening consistent with Acute cholecystitis. Pt has been cleared by cardiology for Cholecystectomy, today is POD Day 1.      INTERVAL HPI/OVERNIGHT EVENTS:     MEDICATIONS  (STANDING):  anastrozole 1 milliGRAM(s) Oral daily  apixaban 10 milliGRAM(s) Oral every 12 hours  aspirin  chewable 81 milliGRAM(s) Oral daily  buPROPion XL . 150 milliGRAM(s) Oral daily  hydrALAZINE 50 milliGRAM(s) Oral three times a day  isosorbide   mononitrate ER Tablet (IMDUR) 30 milliGRAM(s) Oral daily  metoprolol succinate ER 50 milliGRAM(s) Oral daily  piperacillin/tazobactam IVPB.. 3.375 Gram(s) IV Intermittent every 8 hours  QUEtiapine 50 milliGRAM(s) Oral at bedtime  QUEtiapine 25 milliGRAM(s) Oral daily  sodium chloride 0.9%. 1000 milliLiter(s) (75 mL/Hr) IV Continuous <Continuous>    MEDICATIONS  (PRN):      REVIEW OF SYSTEMS:  CONSTITUTIONAL: No fever, weight loss, or fatigue  RESPIRATORY: No cough, wheezing, chills or hemoptysis; No shortness of breath  CARDIOVASCULAR: No chest pain, palpitations, dizziness, or leg swelling  GASTROINTESTINAL: No abdominal pain. No nausea, vomiting, or hematemesis; No diarrhea or constipation. No melena or hematochezia.  GENITOURINARY: No dysuria or hematuria, urinary frequency  NEUROLOGICAL: No headaches, memory loss, loss of strength, numbness, or tremors  SKIN: No itching, burning, rashes, or lesions     Vital Signs Last 24 Hrs  T(C): 36.8 (29 Sep 2020 15:10), Max: 37 (29 Sep 2020 07:32)  T(F): 98.2 (29 Sep 2020 15:10), Max: 98.6 (29 Sep 2020 07:32)  HR: 78 (29 Sep 2020 15:10) (63 - 78)  BP: 138/95 (29 Sep 2020 15:10) (124/75 - 156/96)  BP(mean): 113 (28 Sep 2020 16:51) (107 - 113)  RR: 18 (29 Sep 2020 15:10) (12 - 18)  SpO2: 97% (29 Sep 2020 15:10) (95% - 100%)    PHYSICAL EXAMINATION:  GENERAL: NAD, well built  HEAD:  Atraumatic, Normocephalic  EYES:  conjunctiva and sclera clear  NECK: Supple, No JVD, Normal thyroid  CHEST/LUNG: Clear to auscultation. Clear to percussion bilaterally; No rales, rhonchi, wheezing, or rubs  HEART: Regular rate and rhythm; No murmurs, rubs, or gallops  ABDOMEN: Soft, Nontender, Nondistended; Bowel sounds present  NERVOUS SYSTEM:  Alert & Oriented X3,    EXTREMITIES:  2+ Peripheral Pulses, No clubbing, cyanosis, or edema  SKIN: warm dry                          9.5    4.62  )-----------( 264      ( 29 Sep 2020 06:45 )             29.8     09-29    143  |  111<H>  |  16  ----------------------------<  83  3.7   |  24  |  1.28    Ca    8.7      29 Sep 2020 06:45  Phos  4.2     09-29  Mg     1.9     09-29    TPro  6.6  /  Alb  2.8<L>  /  TBili  0.5  /  DBili  x   /  AST  96<H>  /  ALT  164<H>  /  AlkPhos  254<H>  09-29    LIVER FUNCTIONS - ( 29 Sep 2020 06:45 )  Alb: 2.8 g/dL / Pro: 6.6 g/dL / ALK PHOS: 254 U/L / ALT: 164 U/L DA / AST: 96 U/L / GGT: x               PT/INR - ( 28 Sep 2020 06:27 )   PT: 16.7 sec;   INR: 1.45 ratio         PTT - ( 28 Sep 2020 06:27 )  PTT:29.9 sec    CAPILLARY BLOOD GLUCOSE      RADIOLOGY & ADDITIONAL TESTS:                   PGY-1 Progress Note discussed with attending    PAGER #: [[phone]] TILL 5:00 PM  PLEASE CONTACT ON CALL TEAM:  - On Call Team (Please refer to Taryn) FROM 5:00 PM - 8:30PM  - Nightfloat Team FROM 8:30 -7:30 AM    CHIEF COMPLAINT & BRIEF HOSPITAL COURSE: 65 year old woman with PMH of NICM with EF 15 - 20% s/p ICD, COPD, DM2, HTN, HLD, Breast Cancer in remission on lifelong hormonal therapy with anastrazole was recently discharged after treatment of  acute respiratory failure from acute CHF exacerbation. She states she has been having  vomiting multiple times for past 2 weeks ,sometimes relieved by taking anta acid. Patient also states that she is getting more short of breath than usual. She denies any diarrhea, urinary complaints, chest pain, palpitations, leg swelling, headache, dizziness.    Upon hospitalization, RUQ ultrasound showed GB thickening consistent with Acute cholecystitis. Pt has been cleared by cardiology for Cholecystectomy, today is POD Day 1.      INTERVAL HPI/OVERNIGHT EVENTS: Pt assessed at bedside, mentions that she has no abdominal pain, nausea, vomiting earlier this morning. V/Q scan abnormal for pulm embolus, started on Eliquis 10 mg BID    MEDICATIONS  (STANDING):  anastrozole 1 milliGRAM(s) Oral daily  apixaban 10 milliGRAM(s) Oral every 12 hours  aspirin  chewable 81 milliGRAM(s) Oral daily  buPROPion XL . 150 milliGRAM(s) Oral daily  hydrALAZINE 50 milliGRAM(s) Oral three times a day  isosorbide   mononitrate ER Tablet (IMDUR) 30 milliGRAM(s) Oral daily  metoprolol succinate ER 50 milliGRAM(s) Oral daily  piperacillin/tazobactam IVPB.. 3.375 Gram(s) IV Intermittent every 8 hours  QUEtiapine 50 milliGRAM(s) Oral at bedtime  QUEtiapine 25 milliGRAM(s) Oral daily  sodium chloride 0.9%. 1000 milliLiter(s) (75 mL/Hr) IV Continuous <Continuous>    MEDICATIONS  (PRN):      REVIEW OF SYSTEMS:  CONSTITUTIONAL: No fever, weight loss, or fatigue  RESPIRATORY: No cough, wheezing, chills or hemoptysis; No shortness of breath  CARDIOVASCULAR: No chest pain, palpitations, dizziness, or leg swelling  GASTROINTESTINAL: No abdominal pain. No nausea, vomiting, or hematemesis; No diarrhea or constipation. No melena or hematochezia.  GENITOURINARY: No dysuria or hematuria, urinary frequency  NEUROLOGICAL: No headaches, memory loss, loss of strength, numbness, or tremors  SKIN: No itching, burning, rashes, or lesions     Vital Signs Last 24 Hrs  T(C): 36.8 (29 Sep 2020 15:10), Max: 37 (29 Sep 2020 07:32)  T(F): 98.2 (29 Sep 2020 15:10), Max: 98.6 (29 Sep 2020 07:32)  HR: 78 (29 Sep 2020 15:10) (63 - 78)  BP: 138/95 (29 Sep 2020 15:10) (124/75 - 156/96)  BP(mean): 113 (28 Sep 2020 16:51) (107 - 113)  RR: 18 (29 Sep 2020 15:10) (12 - 18)  SpO2: 97% (29 Sep 2020 15:10) (95% - 100%)    PHYSICAL EXAMINATION:  GENERAL: Pt was calm earlier this morning, however, was a bit agitated later this afternoon   HEAD:  Atraumatic, Normocephalic  EYES:  conjunctiva and sclera clear  NECK: Supple, No JVD, Normal thyroid  CHEST/LUNG: Clear to auscultation. Clear to percussion bilaterally; No rales, rhonchi, wheezing, or rubs  HEART: Regular rate and rhythm; No murmurs, rubs, or gallops  ABDOMEN: Soft, Nontender, Nondistended; Bowel sounds present  NERVOUS SYSTEM:  Alert & Oriented X2-3    EXTREMITIES:  2+ Peripheral Pulses, No clubbing, cyanosis, or edema  SKIN: warm dry                          9.5    4.62  )-----------( 264      ( 29 Sep 2020 06:45 )             29.8     09-29    143  |  111<H>  |  16  ----------------------------<  83  3.7   |  24  |  1.28    Ca    8.7      29 Sep 2020 06:45  Phos  4.2     09-29  Mg     1.9     09-29    TPro  6.6  /  Alb  2.8<L>  /  TBili  0.5  /  DBili  x   /  AST  96<H>  /  ALT  164<H>  /  AlkPhos  254<H>  09-29    LIVER FUNCTIONS - ( 29 Sep 2020 06:45 )  Alb: 2.8 g/dL / Pro: 6.6 g/dL / ALK PHOS: 254 U/L / ALT: 164 U/L DA / AST: 96 U/L / GGT: x               PT/INR - ( 28 Sep 2020 06:27 )   PT: 16.7 sec;   INR: 1.45 ratio         PTT - ( 28 Sep 2020 06:27 )  PTT:29.9 sec    CAPILLARY BLOOD GLUCOSE      RADIOLOGY & ADDITIONAL TESTS:  < from: NM Pulmonary Perfusion Scan (09.29.20 @ 15:03) >  INTERPRETATION:  RADIOPHARMACEUTICAL: 99mTc-MAA       DOSE: 3.1 mCi IV    CLINICAL INFORMATION: 65 year old female with shortness of breath and elevated D-dimer, referred to evaluate for pulmonary embolus.    TECHNIQUE: Perfusion images of the lungs were obtained following administration of Tc-99m-MAA. Images were obtained in the anterior, posterior, both lateral, and all 4 oblique projections.    COMPARISON: No previous lung scan for comparison.    FINDINGS: The study demonstrates heterogenous tracer distribution in both lungs. There are segmental perfusion defects in the superior segment of the right lower lobe and anterior basal segment of the left lower lobe.    IMPRESSION: Abnormal perfusion scan. Indeterminate probability of pulmonary embolus.      Dr. Lozano was informed of the above findings by Dr. Bueno via telephone on 9/29/2020 @ 3:15 PM with read back.    < end of copied text >

## 2020-09-29 NOTE — PROGRESS NOTE ADULT - ATTENDING COMMENTS
Patient was examined and discussed with Dr. Plasencia.     She was admitted with c/o vomiting.  W/u showed acute cholecystitis.  She is now POD # 1 cholecystectomy.   She is concerned lest she not receive "that medicine which makes me very upset."    Alert, cooperative woman  Vital Signs Last 24 Hrs  T(C): 36.8 (29 Sep 2020 15:10), Max: 37 (29 Sep 2020 07:32)  T(F): 98.2 (29 Sep 2020 15:10), Max: 98.6 (29 Sep 2020 07:32)  HR: 78 (29 Sep 2020 15:10) (66 - 78)  BP: 138/95 (29 Sep 2020 15:10) (124/75 - 148/100)  BP(mean): --  RR: 18 (29 Sep 2020 15:10) (16 - 18)  SpO2: 97% (29 Sep 2020 15:10) (96% - 100%)  Lungs, clear  Cor, RRR  Abdomen, diffusely tender, soft  EKG shows QTc 450                        9.5    4.62  )-----------( 264      ( 29 Sep 2020 06:45 )             29.8   09-29    143  |  111<H>  |  16  ----------------------------<  83  3.7   |  24  |  1.28    Ca    8.7      29 Sep 2020 06:45  Phos  4.2     09-29  Mg     1.9     09-29    TPro  6.6  /  Alb  2.8<L>  /  TBili  0.5  /  DBili  x   /  AST  96<H>  /  ALT  164<H>  /  AlkPhos  254<H>  09-29    < from: NM Pulmonary Perfusion Scan (09.29.20 @ 15:03) >      FINDINGS: The study demonstrates heterogenous tracer distribution in both lungs. There are segmental perfusion defects in the superior segment of the right lower lobe and anterior basal segment of the left lower lobe.    IMPRESSION: Abnormal perfusion scan. Indeterminate probability of pulmonary embolus.    < end of copied text >    IMP:  PE is likely based on nuclear scan--this was performed as f/u for abnormal ddimer on admission.           POD # 1 cholecystectomy for cholecystitis, stable          CHF, stable,           atrial thrombus, dx on previous echo          anxiety disorder, stable          Social issues  Plan: begin NOAC          PT consultation          Discontinue IV antibiotics          Hold entresta, at present          likely discharge to [facility] Patient was examined and discussed with Dr. Plasencia.     She was admitted with c/o vomiting.  W/u showed acute cholecystitis.  She is now POD # 1 cholecystectomy.   She is concerned lest she not receive "that medicine which makes me very upset."    Alert, cooperative woman  Vital Signs Last 24 Hrs  T(C): 36.8 (29 Sep 2020 15:10), Max: 37 (29 Sep 2020 07:32)  T(F): 98.2 (29 Sep 2020 15:10), Max: 98.6 (29 Sep 2020 07:32)  HR: 78 (29 Sep 2020 15:10) (66 - 78)  BP: 138/95 (29 Sep 2020 15:10) (124/75 - 148/100)  BP(mean): --  RR: 18 (29 Sep 2020 15:10) (16 - 18)  SpO2: 97% (29 Sep 2020 15:10) (96% - 100%)  Lungs, clear  Cor, RRR  Abdomen, diffusely tender, soft  EKG shows QTc 450                        9.5    4.62  )-----------( 264      ( 29 Sep 2020 06:45 )             29.8   09-29    143  |  111<H>  |  16  ----------------------------<  83  3.7   |  24  |  1.28    Ca    8.7      29 Sep 2020 06:45  Phos  4.2     09-29  Mg     1.9     09-29    TPro  6.6  /  Alb  2.8<L>  /  TBili  0.5  /  DBili  x   /  AST  96<H>  /  ALT  164<H>  /  AlkPhos  254<H>  09-29    < from: NM Pulmonary Perfusion Scan (09.29.20 @ 15:03) >      FINDINGS: The study demonstrates heterogenous tracer distribution in both lungs. There are segmental perfusion defects in the superior segment of the right lower lobe and anterior basal segment of the left lower lobe.    IMPRESSION: Abnormal perfusion scan. Indeterminate probability of pulmonary embolus.    < end of copied text >    IMP:  PE is likely based on nuclear scan--this was performed as f/u for abnormal d-dimer on admission. Mulitple hospitalizations.  Hx breast cancer.           POD # 1 cholecystectomy for cholecystitis, stable          CHF, stable,           atrial thrombus, dx on previous echo          anxiety disorder, stable          Social issues          anemia, doubt iron deficiency          CHEN, resolved.  CKD 3  Plan: begin NOAC          PT consultation          Discontinue IV antibiotics          Hold entresta, at present          iron studies, SPEP          likely discharge to [facility]

## 2020-09-29 NOTE — PROGRESS NOTE ADULT - ASSESSMENT
65 year old woman with PMH of NICM with EF 15 - 20% s/p ICD, COPD, DM2, HTN, HLD, Breast Cancer in remission on lifelong hormonal therapy with anastrazole was recently discharged after treatment of Acute Respiratory Failure from Acute CHF exacerbation, presented with c/o RUQ pain, nausea, and vomiting, USG Hepatic and Pancreatic consistent with Acute cholecystitis s/p Cholecystectomy POD 1.

## 2020-09-29 NOTE — PROGRESS NOTE ADULT - PROBLEM SELECTOR PLAN 4
Patient takes hydralazine, Imdur, Metoprolol, Lasix ,and Entresto.  Will hold Lasix and Entresto in setting of CHEN.  Will continue with hydralazine , Imdur and metoprolol.  Monitor vitals. Patient takes hydralazine, Imdur, Metoprolol, Lasix ,and Entresto.  Will hold Lasix and Entresto in setting of CHEN.  Will continue with Hydralazine, Imdur and Metoprolol.  Monitor vitals  Will consider resuming low dose Entresto if creatinine keeps improving

## 2020-09-29 NOTE — PROGRESS NOTE ADULT - PROBLEM SELECTOR PLAN 2
Patients Creatinine is elevated to 1.61-> 1.79-> 1.47-> 1.28 this morning 9/29  Improving with IV gentle hydration  Urine lytes pending  Follow BMP  Holding Entresto and Lasix for now in light of CHEN Patients Creatinine is elevated to 1.61-> 1.79-> 1.47-> 1.28 this morning 9/29  Improving with IV gentle hydration  Will monitor for improving with fluids  Follow BMP  Holding Entresto and Lasix for now in light of CHEN

## 2020-09-29 NOTE — PROGRESS NOTE ADULT - SUBJECTIVE AND OBJECTIVE BOX
Pt s- new complaints. no n/v   tolerating diet  ICU Vital Signs Last 24 Hrs  T(C): 36.8 (29 Sep 2020 15:10), Max: 37 (29 Sep 2020 07:32)  T(F): 98.2 (29 Sep 2020 15:10), Max: 98.6 (29 Sep 2020 07:32)  HR: 78 (29 Sep 2020 15:10) (67 - 78)  BP: 138/95 (29 Sep 2020 15:10) (124/75 - 138/95)  BP(mean): --  ABP: 138/95 (29 Sep 2020 15:10) (138/95 - 138/95)  ABP(mean): --  RR: 18 (29 Sep 2020 15:10) (18 - 18)  SpO2: 97% (29 Sep 2020 15:10) (96% - 100%)  Alert nad  Abd: soft, minimal incisional tenderness, dressings cdi  no calf tenderness or erythema                          9.5    4.62  )-----------( 264      ( 29 Sep 2020 06:45 )             29.8     < from: NM Pulmonary Perfusion Scan (09.29.20 @ 15:03) >  COMPARISON: No previous lung scan for comparison.    FINDINGS: The study demonstrates heterogenous tracer distribution in both lungs. There are segmental perfusion defects in the superior segment of the right lower lobe and anterior basal segment of the left lower lobe.    IMPRESSION: Abnormal perfusion scan. Indeterminate probability of pulmonary embolus.    < end of copied text >

## 2020-09-29 NOTE — PROGRESS NOTE ADULT - PROBLEM SELECTOR PLAN 3
Patient D dimers are elevated. As per Wells score- 4.5 points  Moderate risk group: 16.2% chance of PE in an ED population.  s/p full dose of Lovenox in ED, Heparin drip started considering CHEN and tenzin-operative period  CT angio chest could not be done as creatinine is elevated  USG doppler Bilateral LE- negative for DVT  Heparin drip has been continued until today morning 9/28  Could not give today's dose as pt did not have IV access- pt scheduled for Cholecystectomy today, will restart anticoag after surgery   V/Q scan scheduled for tomorrow 9/29 Patient's D-dimers elevated upon admission- 6270  CT angio chest not done due to elevated creatinine  V/Q scan done showed segmental defects in R L Lobe, L lower lobe, indeterminate for pulm embolus-> Abnormal study  Pt started on Eliquis 10 mg BID 9/29

## 2020-09-29 NOTE — PROGRESS NOTE ADULT - ASSESSMENT
post op lap linda  wounds healing well, no evidence of bleed or infection  tolerating diet  possible PE per nuc perfusion scan, pt anticoagulated  reconsult surgical service as needed.

## 2020-09-30 ENCOUNTER — TRANSCRIPTION ENCOUNTER (OUTPATIENT)
Age: 65
End: 2020-09-30

## 2020-09-30 VITALS
DIASTOLIC BLOOD PRESSURE: 90 MMHG | TEMPERATURE: 99 F | OXYGEN SATURATION: 98 % | RESPIRATION RATE: 18 BRPM | SYSTOLIC BLOOD PRESSURE: 148 MMHG | HEART RATE: 87 BPM

## 2020-09-30 LAB
% ALBUMIN: 51.8 % — SIGNIFICANT CHANGE UP
% ALPHA 1: 7.2 % — SIGNIFICANT CHANGE UP
% ALPHA 2: 11.5 % — SIGNIFICANT CHANGE UP
% BETA: 11.3 % — SIGNIFICANT CHANGE UP
% GAMMA: 18.2 % — SIGNIFICANT CHANGE UP
% M SPIKE: 3.2 % — SIGNIFICANT CHANGE UP
ALBUMIN SERPL ELPH-MCNC: 3.4 G/DL — LOW (ref 3.6–5.5)
ALBUMIN/GLOB SERPL ELPH: 1.1 RATIO — SIGNIFICANT CHANGE UP
ALPHA1 GLOB SERPL ELPH-MCNC: 0.5 G/DL — HIGH (ref 0.1–0.4)
ALPHA2 GLOB SERPL ELPH-MCNC: 0.8 G/DL — SIGNIFICANT CHANGE UP (ref 0.5–1)
ANION GAP SERPL CALC-SCNC: 9 MMOL/L — SIGNIFICANT CHANGE UP (ref 5–17)
B-GLOBULIN SERPL ELPH-MCNC: 0.7 G/DL — SIGNIFICANT CHANGE UP (ref 0.5–1)
BUN SERPL-MCNC: 13 MG/DL — SIGNIFICANT CHANGE UP (ref 7–18)
CALCIUM SERPL-MCNC: 9 MG/DL — SIGNIFICANT CHANGE UP (ref 8.4–10.5)
CHLORIDE SERPL-SCNC: 112 MMOL/L — HIGH (ref 96–108)
CO2 SERPL-SCNC: 24 MMOL/L — SIGNIFICANT CHANGE UP (ref 22–31)
CREAT ?TM UR-MCNC: 231 MG/DL — SIGNIFICANT CHANGE UP
CREAT SERPL-MCNC: 1.49 MG/DL — HIGH (ref 0.5–1.3)
FERRITIN SERPL-MCNC: 612 NG/ML — HIGH (ref 15–150)
GAMMA GLOBULIN: 1.2 G/DL — SIGNIFICANT CHANGE UP (ref 0.6–1.6)
GLUCOSE SERPL-MCNC: 130 MG/DL — HIGH (ref 70–99)
HCT VFR BLD CALC: 30.8 % — LOW (ref 34.5–45)
HGB BLD-MCNC: 9.8 G/DL — LOW (ref 11.5–15.5)
INTERPRETATION SERPL IFE-IMP: SIGNIFICANT CHANGE UP
IRON SATN MFR SERPL: 28 % — SIGNIFICANT CHANGE UP (ref 15–50)
IRON SATN MFR SERPL: 56 UG/DL — SIGNIFICANT CHANGE UP (ref 40–150)
M-SPIKE: 0.2 G/DL — HIGH (ref 0–0)
MAGNESIUM SERPL-MCNC: 2 MG/DL — SIGNIFICANT CHANGE UP (ref 1.6–2.6)
MCHC RBC-ENTMCNC: 30.7 PG — SIGNIFICANT CHANGE UP (ref 27–34)
MCHC RBC-ENTMCNC: 31.8 GM/DL — LOW (ref 32–36)
MCV RBC AUTO: 96.6 FL — SIGNIFICANT CHANGE UP (ref 80–100)
NRBC # BLD: 0 /100 WBCS — SIGNIFICANT CHANGE UP (ref 0–0)
OSMOLALITY UR: 588 MOS/KG — SIGNIFICANT CHANGE UP (ref 50–1200)
PHOSPHATE SERPL-MCNC: 3.6 MG/DL — SIGNIFICANT CHANGE UP (ref 2.5–4.5)
PLATELET # BLD AUTO: 268 K/UL — SIGNIFICANT CHANGE UP (ref 150–400)
POTASSIUM SERPL-MCNC: 3.5 MMOL/L — SIGNIFICANT CHANGE UP (ref 3.5–5.3)
POTASSIUM SERPL-SCNC: 3.5 MMOL/L — SIGNIFICANT CHANGE UP (ref 3.5–5.3)
PROT PATTERN SERPL ELPH-IMP: SIGNIFICANT CHANGE UP
PROT SERPL-MCNC: 6.6 G/DL — SIGNIFICANT CHANGE UP (ref 6–8.3)
PROT SERPL-MCNC: 6.6 G/DL — SIGNIFICANT CHANGE UP (ref 6–8.3)
RBC # BLD: 3.19 M/UL — LOW (ref 3.8–5.2)
RBC # FLD: 17.9 % — HIGH (ref 10.3–14.5)
SARS-COV-2 RNA SPEC QL NAA+PROBE: SIGNIFICANT CHANGE UP
SODIUM SERPL-SCNC: 145 MMOL/L — SIGNIFICANT CHANGE UP (ref 135–145)
SODIUM UR-SCNC: 38 MMOL/L — SIGNIFICANT CHANGE UP
TIBC SERPL-MCNC: 203 UG/DL — LOW (ref 250–450)
UIBC SERPL-MCNC: 147 UG/DL — SIGNIFICANT CHANGE UP (ref 110–370)
WBC # BLD: 4.47 K/UL — SIGNIFICANT CHANGE UP (ref 3.8–10.5)
WBC # FLD AUTO: 4.47 K/UL — SIGNIFICANT CHANGE UP (ref 3.8–10.5)

## 2020-09-30 PROCEDURE — 84484 ASSAY OF TROPONIN QUANT: CPT

## 2020-09-30 PROCEDURE — 78580 LUNG PERFUSION IMAGING: CPT

## 2020-09-30 PROCEDURE — 99285 EMERGENCY DEPT VISIT HI MDM: CPT

## 2020-09-30 PROCEDURE — 84443 ASSAY THYROID STIM HORMONE: CPT

## 2020-09-30 PROCEDURE — C1889: CPT

## 2020-09-30 PROCEDURE — 83036 HEMOGLOBIN GLYCOSYLATED A1C: CPT

## 2020-09-30 PROCEDURE — A9540: CPT

## 2020-09-30 PROCEDURE — 86900 BLOOD TYPING SEROLOGIC ABO: CPT

## 2020-09-30 PROCEDURE — 99239 HOSP IP/OBS DSCHRG MGMT >30: CPT

## 2020-09-30 PROCEDURE — 84155 ASSAY OF PROTEIN SERUM: CPT

## 2020-09-30 PROCEDURE — 82962 GLUCOSE BLOOD TEST: CPT

## 2020-09-30 PROCEDURE — 83880 ASSAY OF NATRIURETIC PEPTIDE: CPT

## 2020-09-30 PROCEDURE — 83935 ASSAY OF URINE OSMOLALITY: CPT

## 2020-09-30 PROCEDURE — 36415 COLL VENOUS BLD VENIPUNCTURE: CPT

## 2020-09-30 PROCEDURE — 82570 ASSAY OF URINE CREATININE: CPT

## 2020-09-30 PROCEDURE — 88304 TISSUE EXAM BY PATHOLOGIST: CPT

## 2020-09-30 PROCEDURE — 85379 FIBRIN DEGRADATION QUANT: CPT

## 2020-09-30 PROCEDURE — 82550 ASSAY OF CK (CPK): CPT

## 2020-09-30 PROCEDURE — 86901 BLOOD TYPING SEROLOGIC RH(D): CPT

## 2020-09-30 PROCEDURE — 80061 LIPID PANEL: CPT

## 2020-09-30 PROCEDURE — 84100 ASSAY OF PHOSPHORUS: CPT

## 2020-09-30 PROCEDURE — 76705 ECHO EXAM OF ABDOMEN: CPT

## 2020-09-30 PROCEDURE — 83735 ASSAY OF MAGNESIUM: CPT

## 2020-09-30 PROCEDURE — 84300 ASSAY OF URINE SODIUM: CPT

## 2020-09-30 PROCEDURE — 83540 ASSAY OF IRON: CPT

## 2020-09-30 PROCEDURE — 86923 COMPATIBILITY TEST ELECTRIC: CPT

## 2020-09-30 PROCEDURE — 71045 X-RAY EXAM CHEST 1 VIEW: CPT

## 2020-09-30 PROCEDURE — 93970 EXTREMITY STUDY: CPT

## 2020-09-30 PROCEDURE — 85610 PROTHROMBIN TIME: CPT

## 2020-09-30 PROCEDURE — 82607 VITAMIN B-12: CPT

## 2020-09-30 PROCEDURE — 84165 PROTEIN E-PHORESIS SERUM: CPT

## 2020-09-30 PROCEDURE — 83550 IRON BINDING TEST: CPT

## 2020-09-30 PROCEDURE — 82746 ASSAY OF FOLIC ACID SERUM: CPT

## 2020-09-30 PROCEDURE — 93005 ELECTROCARDIOGRAM TRACING: CPT

## 2020-09-30 PROCEDURE — 86334 IMMUNOFIX E-PHORESIS SERUM: CPT

## 2020-09-30 PROCEDURE — 85025 COMPLETE CBC W/AUTO DIFF WBC: CPT

## 2020-09-30 PROCEDURE — 85027 COMPLETE CBC AUTOMATED: CPT

## 2020-09-30 PROCEDURE — 82728 ASSAY OF FERRITIN: CPT

## 2020-09-30 PROCEDURE — 85730 THROMBOPLASTIN TIME PARTIAL: CPT

## 2020-09-30 PROCEDURE — 76000 FLUOROSCOPY <1 HR PHYS/QHP: CPT

## 2020-09-30 PROCEDURE — 80048 BASIC METABOLIC PNL TOTAL CA: CPT

## 2020-09-30 PROCEDURE — 80053 COMPREHEN METABOLIC PANEL: CPT

## 2020-09-30 PROCEDURE — 83690 ASSAY OF LIPASE: CPT

## 2020-09-30 PROCEDURE — 82553 CREATINE MB FRACTION: CPT

## 2020-09-30 PROCEDURE — 86850 RBC ANTIBODY SCREEN: CPT

## 2020-09-30 PROCEDURE — 87635 SARS-COV-2 COVID-19 AMP PRB: CPT

## 2020-09-30 PROCEDURE — 86769 SARS-COV-2 COVID-19 ANTIBODY: CPT

## 2020-09-30 RX ORDER — APIXABAN 2.5 MG/1
2 TABLET, FILM COATED ORAL
Qty: 8 | Refills: 0
Start: 2020-09-30 | End: 2020-10-01

## 2020-09-30 RX ORDER — CLOPIDOGREL BISULFATE 75 MG/1
1 TABLET, FILM COATED ORAL
Qty: 0 | Refills: 0 | DISCHARGE

## 2020-09-30 RX ORDER — FUROSEMIDE 40 MG
1 TABLET ORAL
Qty: 30 | Refills: 0
Start: 2020-09-30 | End: 2020-10-29

## 2020-09-30 RX ADMIN — QUETIAPINE FUMARATE 25 MILLIGRAM(S): 200 TABLET, FILM COATED ORAL at 12:25

## 2020-09-30 RX ADMIN — Medication 50 MILLIGRAM(S): at 06:12

## 2020-09-30 RX ADMIN — BUPROPION HYDROCHLORIDE 150 MILLIGRAM(S): 150 TABLET, EXTENDED RELEASE ORAL at 12:25

## 2020-09-30 RX ADMIN — Medication 81 MILLIGRAM(S): at 12:26

## 2020-09-30 RX ADMIN — APIXABAN 10 MILLIGRAM(S): 2.5 TABLET, FILM COATED ORAL at 06:12

## 2020-09-30 RX ADMIN — ISOSORBIDE MONONITRATE 30 MILLIGRAM(S): 60 TABLET, EXTENDED RELEASE ORAL at 12:25

## 2020-09-30 RX ADMIN — Medication 50 MILLIGRAM(S): at 14:58

## 2020-09-30 RX ADMIN — ANASTROZOLE 1 MILLIGRAM(S): 1 TABLET ORAL at 12:25

## 2020-09-30 NOTE — DISCHARGE NOTE PROVIDER - CARE PROVIDER_API CALL
Nakul Doshi  CARDIOVASCULAR DISEASE  [address] [facility], [address] [place] [id number]  Phone: [phone]  Fax: [phone]  Follow Up Time:

## 2020-09-30 NOTE — PHYSICAL THERAPY INITIAL EVALUATION ADULT - DIAGNOSIS, PT EVAL
Decline in functional mobility due to weakness, SOB with increase in activities, decreased balance and endurance.

## 2020-09-30 NOTE — PHYSICAL THERAPY INITIAL EVALUATION ADULT - PERTINENT HX OF CURRENT PROBLEM, REHAB EVAL
Pt. admitted for abdominal pain, nausea and vomiting. Pt. underwent laparascopic cholecystectomy on 9/28/20. Head CT revealed moderate chronic microvascular changes without evidence of an acute transcortical infarction or hemorrhage

## 2020-09-30 NOTE — PROGRESS NOTE ADULT - PROBLEM SELECTOR PLAN 5
Patient has hx of breast cancer.  currently in remission.  Will cw with anastrozole

## 2020-09-30 NOTE — PROGRESS NOTE ADULT - PROBLEM SELECTOR PLAN 6
Patient has hx of CHF. ANGELINA with doppler done on 9/1/20 showed   Mural thrombus is also noted in the  descending aorta, up to 1.12 cm in depth on crossectional  imaging.  - Severe LAE, no thrombus in LA  - Severe left ventricular enlargement.  - Severe global left ventricular systolic dysfunction.  - Normal right atrium.  Holding home dose of Lasix and Entresto for now.   Will continue with Metoprolol.  Cardiology consult Dr. Doshi
Patient has hx of CHF. ANGELINA with doppler done on 9/1/20 showed   Mural thrombus is also noted in the  descending aorta, up to 1.12 cm in depth on crossectional  imaging.  - Severe left atrial enlargement. No left atrial or left  atrial appendage thrombus.  - Severe left ventricular enlargement.  - Severe global left ventricular systolic dysfunction.  - Normal right atrium.  Holding home dose of Lasix and Entresto for now.   Will continue with metoprolol.  Cardiology consult Dr. Doshi

## 2020-09-30 NOTE — DISCHARGE NOTE PROVIDER - NSDCCPCAREPLAN_GEN_ALL_CORE_FT
PRINCIPAL DISCHARGE DIAGNOSIS  Diagnosis: Acute cholecystitis without calculus  Assessment and Plan of Treatment: You presented to the hospital with abdominal pain, nausea, and vomiting. As your liver function tests were abnormal and you had ultrasound findings suggestive of Cholecystitis (inflammation of the gall bladder), you underwent Cholecystectomy on Sep 28 without any complications (removal of gall bladder sugery).      SECONDARY DISCHARGE DIAGNOSES  Diagnosis: Elevated d-dimer  Assessment and Plan of Treatment: Your D-dimer levels were elevated upon admission. This is a test that predicts that probability of you having a lung clot. Anticoagulation with Heparin (blood thinner) was started prior to your surgery and discontinued right before your surgery. After your surgery, a ventilation/perfusion scan was done to check for a lung clot- the results of the test was indeterminate hence we started anticoagulation with oral blood thinner Eliquis 10 mg twice a day from Sep 29. You are advised to take Eliquis 10    Diagnosis: Acute kidney injury  Assessment and Plan of Treatment:     Diagnosis: Cardiac enzymes elevated  Assessment and Plan of Treatment:     Diagnosis: Tachycardia  Assessment and Plan of Treatment:      PRINCIPAL DISCHARGE DIAGNOSIS  Diagnosis: Acute cholecystitis without calculus  Assessment and Plan of Treatment: You presented to the hospital with abdominal pain, nausea, and vomiting. As your liver function tests were abnormal and you had ultrasound findings suggestive of Cholecystitis (inflammation of the gall bladder), you underwent Cholecystectomy on Sep 28 without any complications (removal of gall bladder sugery).      SECONDARY DISCHARGE DIAGNOSES  Diagnosis: Hypertension  Assessment and Plan of Treatment: You are advised to continue IMDUR, Metoprolol, Hydralazine for your Blood pressure. Lasix and Entresto were held during hospitalization as your creatinine levels were elevated.    Diagnosis: Elevated d-dimer  Assessment and Plan of Treatment: Your D-dimer levels were elevated upon admission. This is a test that predicts that probability of you having a lung clot. Anticoagulation with Heparin (blood thinner) was started prior to your surgery and discontinued right before your surgery. After your surgery, a ventilation/perfusion scan was done to check for a lung clot- the results of the test was indeterminate hence we started anticoagulation with oral blood thinner Eliquis 10 mg twice a day from Sep 29. You are advised to take Eliquis 10 mg until Oct 6th then change to Eliquis 5 mg BID from Oct 6th onwards for 3 months. Please follow up with your PCP regarding further management.    Diagnosis: Acute kidney injury  Assessment and Plan of Treatment: Your creatinine levels were elevated upon admission, and improved with IV fluids. You are encouraged to keep yourself well-hydrated. Lasix and Entresto (your blood pressure medications) were not administered as these medications can cause elevation in creatinine levels. Your blood pressure was well-controlled throughout your hospital stay without Lasix and Entresto.     PRINCIPAL DISCHARGE DIAGNOSIS  Diagnosis: Acute cholecystitis without calculus  Assessment and Plan of Treatment: You presented to the hospital with abdominal pain, nausea, and vomiting. As your liver function tests were abnormal and you had ultrasound findings suggestive of Cholecystitis (inflammation of the gall bladder), you underwent laproscopic Cholecystectomy on Sep 28 without any complications (removal of gall bladder sugery). You were trated with iv antibiotics. Follow with gastroenterologist as an outpatient.      SECONDARY DISCHARGE DIAGNOSES  Diagnosis: Hypertension  Assessment and Plan of Treatment: You are advised to continue IMDUR, Metoprolol, Hydralazine for your Blood pressure. Lasix and Entresto were held during hospitalization as your creatinine levels were elevated.    Diagnosis: Elevated d-dimer  Assessment and Plan of Treatment: Your D-dimer levels were elevated upon admission. This is a test that predicts that probability of you having a lung clot. Anticoagulation with Heparin (blood thinner) was started prior to your surgery and discontinued right before your surgery. After your surgery, a ventilation/perfusion scan was done to check for a lung clot- the results of the test was indeterminate hence we started anticoagulation with oral blood thinner Eliquis 10 mg twice a day from Sep 29. You are advised to take Eliquis 10 mg until Oct 6th then change to Eliquis 5 mg BID from Oct 6th onwards for 3 months. Please follow up with your PCP regarding further management.    Diagnosis: Acute kidney injury  Assessment and Plan of Treatment: Your creatinine levels were elevated upon admission, and improved with IV fluids. You are encouraged to keep yourself well-hydrated. Lasix and Entresto (your blood pressure medications) were not administered as these medications can cause elevation in creatinine levels. Your blood pressure was well-controlled throughout your hospital stay without Lasix and Entresto.     PRINCIPAL DISCHARGE DIAGNOSIS  Diagnosis: Acute cholecystitis without calculus  Assessment and Plan of Treatment: You presented to the hospital with abdominal pain, nausea, and vomiting. As your liver function tests were abnormal and you had ultrasound findings suggestive of Cholecystitis (inflammation of the gall bladder), you underwent laproscopic Cholecystectomy on Sep 28 without any complications (removal of gall bladder sugery). You were trated with iv antibiotics. Follow with gastroenterologist as an outpatient.      SECONDARY DISCHARGE DIAGNOSES  Diagnosis: Hypertension  Assessment and Plan of Treatment: You are advised to continue IMDUR, Metoprolol, Hydralazine for your Blood pressure. Lasix and Entresto were held during hospitalization as your creatinine levels were elevated. Please continue IMDUR, Metoprolol, Hydralazine, low dose Entresto (24/26). Lasix can be taken as needed for any lower extremity swelling.    Diagnosis: Elevated d-dimer  Assessment and Plan of Treatment: Your D-dimer levels were elevated upon admission. This is a test that predicts that probability of you having a lung clot. Anticoagulation with Heparin (blood thinner) was started prior to your surgery and discontinued right before your surgery. After your surgery, a ventilation/perfusion scan was done to check for a lung clot- the results of the test was indeterminate hence we started anticoagulation with oral blood thinner Eliquis 10 mg twice a day from Sep 29. You are advised to take Eliquis 10 mg until Oct 6th then change to Eliquis 5 mg BID from Oct 6th onwards for 3 months. Please follow up with your PCP regarding further management.    Diagnosis: Acute kidney injury  Assessment and Plan of Treatment: Your creatinine levels were elevated upon admission, and improved with IV fluids. You are encouraged to keep yourself well-hydrated. Lasix and Entresto (your blood pressure medications) were not administered as these medications can cause elevation in creatinine levels. Your blood pressure was well-controlled throughout your hospital stay without Lasix and Entresto. You can re-start Entresto upon discharge.     PRINCIPAL DISCHARGE DIAGNOSIS  Diagnosis: Acute cholecystitis without calculus  Assessment and Plan of Treatment: You presented to the hospital with abdominal pain, nausea, and vomiting. As your liver function tests were abnormal and you had ultrasound findings suggestive of Cholecystitis (inflammation of the gall bladder), you underwent laproscopic Cholecystectomy on Sep 28 without any complications (removal of gall bladder sugery). You were trated with iv antibiotics. Follow with gastroenterologist as an outpatient.      SECONDARY DISCHARGE DIAGNOSES  Diagnosis: Pulmonary embolism  Assessment and Plan of Treatment: Your D-dimer levels were elevated upon admission. This is a test that predicts that probability of you having a lung clot. Anticoagulation with Heparin (blood thinner) was started prior to your surgery and discontinued right before your surgery. After your surgery, a ventilation/perfusion scan was done to check for a lung clot- the results of the test was indeterminate hence we started anticoagulation with oral blood thinner Eliquis 10 mg twice a day from Sep 29. You are advised to take Eliquis 10 mg until Oct 6th then change to Eliquis 5 mg BID from Oct 6th onwards for 3 months. Please follow up with your PCP regarding further management.    Diagnosis: Hypertension  Assessment and Plan of Treatment: You are advised to continue IMDUR, Metoprolol, Hydralazine for your Blood pressure. Lasix and Entresto were held during hospitalization as your creatinine levels were elevated. Please continue IMDUR, Metoprolol, Hydralazine, low dose Entresto (24/26). Lasix can be taken as needed for any lower extremity swelling.    Diagnosis: Acute kidney injury  Assessment and Plan of Treatment: Your creatinine levels were elevated upon admission, and improved with IV fluids. You are encouraged to keep yourself well-hydrated. Lasix and Entresto (your blood pressure medications) were not administered as these medications can cause elevation in creatinine levels. Your blood pressure was well-controlled throughout your hospital stay without Lasix and Entresto. You can re-start Entresto upon discharge.

## 2020-09-30 NOTE — PROGRESS NOTE ADULT - PROBLEM SELECTOR PLAN 8
RISK                                                          Points  [] Previous VTE                                           3  [] Thrombophilia                                        2  [] Lower limb paralysis                              2   [] Current Cancer                                       2   [x] Immobilization > 24 hrs                        1  [] ICU/CCU stay > 24 hours                       1  [x] Age > 60                                                   1    Pt started on Eliquis 10 mg BID for abnormal V/Q scan

## 2020-09-30 NOTE — PROGRESS NOTE ADULT - REASON FOR ADMISSION
Abdominal Pain, nausea vomiting

## 2020-09-30 NOTE — PROGRESS NOTE ADULT - NSHPATTENDINGPLANDISCUSS_GEN_ALL_CORE
siged out to covering surgery PA
Dr. Plasencia
Luis Angel
Patient, Dr. Plasencia
Patient, Dr. Plasencia

## 2020-09-30 NOTE — DISCHARGE NOTE PROVIDER - CARE PROVIDERS DIRECT ADDRESSES
[email or web address]

## 2020-09-30 NOTE — DISCHARGE NOTE NURSING/CASE MANAGEMENT/SOCIAL WORK - PATIENT PORTAL LINK FT
You can access the FollowMyHealth Patient Portal offered by [facility] by registering at the following website: [email or web address]. By joining [facility]’s FollowMyHealth portal, you will also be able to view your health information using other applications (apps) compatible with our system.

## 2020-09-30 NOTE — PROGRESS NOTE ADULT - PROBLEM SELECTOR PLAN 7
Patient s ANGELINA on 9/1/20 showed  Mural thrombus is also noted in the  descending aorta, up to 1.12 cm in depth on crossectional imaging.  Vascular advised no acute intervention last admission  Cardiology recommended no anticoagulation as per Dr Patterson  However, pt is on Eliquis 10 mg BID for abnormal V/Q scan

## 2020-09-30 NOTE — DISCHARGE NOTE PROVIDER - HOSPITAL COURSE
65 year old woman with PMH of NICM with EF 15 - 20% s/p ICD, COPD, DM2, HTN, HLD, Breast Cancer in remission on lifelong hormonal therapy with anastrazole was recently discharged after treatment of  acute respiratory failure from acute CHF exacerbation with c/o RUQ pain, nausea, vomiting. She states she has been having  vomiting multiple times for past 2 weeks, sometimes relieved by taking antacid. Patient also states that she is getting more short of breath than usual. She denies any diarrhea, urinary complaints, chest pain, palpitations, leg swelling, headache, dizziness.     Patient was admitted for RUQ pain, nausea, vomiting, USG showing GB thickening suggestive of Cholecystitis with elevated LFTs. After cardiology clearance, patient underwent Cholecystectomy on Sep 28. As D-dimers were elevated upon admission, pt was started on Heparin drip which was discontinued prior to surgery. V/Q scan done after surgery showed segmental defects Right lower lobe and Left lower lobe, indeterminate for pulmonary embolus. Patient was started on Eliquis 10 mg BID on Sep 29th, patient will be advised to continue Eliquis 5 mg BID after October 6th.     PT recommended [facility] for strength training.    As patient is medically stable, she will be ready for discharge to [facility].

## 2020-09-30 NOTE — PROGRESS NOTE ADULT - PROBLEM SELECTOR PLAN 2
Patients Creatinine is elevated to 1.61-> 1.79-> 1.47-> 1.28 this morning 9/29  Improving with IV gentle hydration  Will monitor for improving with fluids  Follow BMP  Holding Entresto and Lasix for now in light of CHEN

## 2020-09-30 NOTE — PROGRESS NOTE ADULT - ASSESSMENT
65y.o. Female s/p lap linda POD#2    -Diet as tolerated  -Stable for discharge from surgical point of view. Recommend discharge with prescribed PO pain control prn.  -May shower with soap and warm water. Leave steristrips in place.  -Outpt f/u with Dr. Ramesh 1-2 weeks after discharge.  -No further surgical intervention indicated at present time.  -Reconsult prn

## 2020-09-30 NOTE — DISCHARGE NOTE PROVIDER - NSDCMRMEDTOKEN_GEN_ALL_CORE_FT
anastrozole 1 mg oral tablet: 1 tab(s) orally once a day  apixaban 5 mg oral tablet: 2 tab(s) orally every 12 hours   apixaban 5 mg oral tablet: 1 tab(s) orally 2 times a day   aspirin 81 mg oral delayed release tablet: 1 tab(s) orally once a day  atorvastatin 40 mg oral tablet: 1 tab(s) orally once a day (at bedtime)  buPROPion 150 mg/12 hours (SR) oral tablet, extended release: 1 tab(s) orally 2 times a day  clopidogrel 75 mg oral tablet: 1 tab(s) orally once a day  Entresto 24 mg-26 mg oral tablet: 1 tab(s) orally 2 times a day  furosemide 20 mg oral tablet: 1 tab(s) orally once a day, As Needed  hydrALAZINE 50 mg oral tablet: 1 tab(s) orally 3 times a day  isosorbide mononitrate 30 mg oral tablet, extended release: 1 tab(s) orally once a day  metoprolol succinate 50 mg oral tablet, extended release: 1 tab(s) orally once a day  QUEtiapine 25 mg oral tablet: 1 tab(s) orally once a day in the morning and 2 tab(s) at bedtime   anastrozole 1 mg oral tablet: 1 tab(s) orally once a day  apixaban 5 mg oral tablet: 2 tab(s) orally every 12 hours   apixaban 5 mg oral tablet: 1 tab(s) orally 2 times a day   aspirin 81 mg oral delayed release tablet: 1 tab(s) orally once a day  atorvastatin 40 mg oral tablet: 1 tab(s) orally once a day (at bedtime)  buPROPion 150 mg/12 hours (SR) oral tablet, extended release: 1 tab(s) orally 2 times a day  clopidogrel 75 mg oral tablet: 1 tab(s) orally once a day  Entresto 24 mg-26 mg oral tablet: 1 tab(s) orally 2 times a day  hydrALAZINE 50 mg oral tablet: 1 tab(s) orally 3 times a day  isosorbide mononitrate 30 mg oral tablet, extended release: 1 tab(s) orally once a day  metoprolol succinate 50 mg oral tablet, extended release: 1 tab(s) orally once a day  QUEtiapine 25 mg oral tablet: 1 tab(s) orally once a day in the morning and 2 tab(s) at bedtime   anastrozole 1 mg oral tablet: 1 tab(s) orally once a day  apixaban 5 mg oral tablet: 2 tab(s) orally every 12 hours   apixaban 5 mg oral tablet: 1 tab(s) orally 2 times a day   aspirin 81 mg oral delayed release tablet: 1 tab(s) orally once a day  atorvastatin 40 mg oral tablet: 1 tab(s) orally once a day (at bedtime)  buPROPion 150 mg/12 hours (SR) oral tablet, extended release: 1 tab(s) orally 2 times a day  Entresto 24 mg-26 mg oral tablet: 1 tab(s) orally 2 times a day  hydrALAZINE 50 mg oral tablet: 1 tab(s) orally 3 times a day  isosorbide mononitrate 30 mg oral tablet, extended release: 1 tab(s) orally once a day  Lasix 20 mg oral tablet: 1 tab(s) orally once a day, As Needed for shortness of breath or leg swelling  metoprolol succinate 50 mg oral tablet, extended release: 1 tab(s) orally once a day  QUEtiapine 25 mg oral tablet: 1 tab(s) orally once a day in the morning and 2 tab(s) at bedtime

## 2020-09-30 NOTE — PROGRESS NOTE ADULT - SUBJECTIVE AND OBJECTIVE BOX
INTERVAL HPI/OVERNIGHT EVENTS:  Pt resting comfortably. No overnight events.  Tolerating pain on movement with meds.  Tolerating diet.   Pt on Eliquis.    MEDICATIONS  (STANDING):  anastrozole 1 milliGRAM(s) Oral daily  apixaban 10 milliGRAM(s) Oral every 12 hours  aspirin  chewable 81 milliGRAM(s) Oral daily  buPROPion XL . 150 milliGRAM(s) Oral daily  hydrALAZINE 50 milliGRAM(s) Oral three times a day  isosorbide   mononitrate ER Tablet (IMDUR) 30 milliGRAM(s) Oral daily  metoprolol succinate ER 50 milliGRAM(s) Oral daily  QUEtiapine 50 milliGRAM(s) Oral at bedtime  QUEtiapine 25 milliGRAM(s) Oral daily    Vital Signs Last 24 Hrs  T(C): 37 (30 Sep 2020 07:29), Max: 37 (30 Sep 2020 07:29)  T(F): 98.6 (30 Sep 2020 07:29), Max: 98.6 (30 Sep 2020 07:29)  HR: 85 (30 Sep 2020 09:51) (76 - 90)  BP: 158/104 (30 Sep 2020 09:51) (129/81 - 158/104)  BP(mean): --  RR: 18 (30 Sep 2020 07:29) (18 - 19)  SpO2: 95% (30 Sep 2020 09:51) (95% - 100%)    Physical:  General: A&O. NAD.  Abdomen: Soft nondistended, Dressing C/D/I. No surrounding ecchymosis or fluctuance noted    I&O's Detail    29 Sep 2020 07:01  -  30 Sep 2020 07:00  --------------------------------------------------------  IN:    Oral Fluid: 200 mL  Total IN: 200 mL    OUT:    Voided (mL): 250 mL  Total OUT: 250 mL    Total NET: -50 mL      30 Sep 2020 07:01  -  30 Sep 2020 10:16  --------------------------------------------------------  IN:    Oral Fluid: 200 mL  Total IN: 200 mL    OUT:  Total OUT: 0 mL    Total NET: 200 mL    LABS:                        9.8    4.47  )-----------( 268      ( 30 Sep 2020 07:22 )             30.8             09-30    145  |  112<H>  |  13  ----------------------------<  130<H>  3.5   |  24  |  1.49<H>    Ca    9.0      30 Sep 2020 07:22  Phos  3.6     09-30  Mg     2.0     09-30    TPro  6.6  /  Alb  2.8<L>  /  TBili  0.5  /  DBili  x   /  AST  96<H>  /  ALT  164<H>  /  AlkPhos  254<H>  09-29

## 2020-09-30 NOTE — PROGRESS NOTE ADULT - PROBLEM SELECTOR PLAN 4
Patient takes hydralazine, Imdur, Metoprolol, Lasix ,and Entresto.  Will hold Lasix and Entresto in setting of CHEN.  Will continue with Hydralazine, Imdur and Metoprolol.  Monitor vitals  Will consider resuming low dose Entresto if creatinine keeps improving

## 2020-09-30 NOTE — PROGRESS NOTE ADULT - SUBJECTIVE AND OBJECTIVE BOX
MEDICAL ATTENDING NOTE  Patient is a 65y old  Female who presents with a chief complaint of Abdominal Pain, nausea vomiting (30 Sep 2020 11:40)      HPI:  65 year old woman with PMH of non ischemic cardiomyopathy with EF 15 - 20% s/p ICD, COPD, DM2, HTN, HLD, breast cancer in remission on lifelong hormonal therapy with anastrazole was recently discharged after treatment of  acute respiratory failure from acute CHF exacerbation. She states she has been having  vomiting multiple times for past 2 weeks ,sometimes relieved by taking anta acid. Patient also states that she is getting more short of breath than usual. She denies any diarrhea, urinary complaints, chest pain, palpitations, leg swelling, headache, dizziness.   (25 Sep 2020 20:52)      INTERVAL HPI/OVERNIGHT EVENTS: one episode of fecal incontinence, thought "gas"    MEDICATIONS  (STANDING):  anastrozole 1 milliGRAM(s) Oral daily  apixaban 10 milliGRAM(s) Oral every 12 hours  aspirin  chewable 81 milliGRAM(s) Oral daily  buPROPion XL . 150 milliGRAM(s) Oral daily  hydrALAZINE 50 milliGRAM(s) Oral three times a day  isosorbide   mononitrate ER Tablet (IMDUR) 30 milliGRAM(s) Oral daily  metoprolol succinate ER 50 milliGRAM(s) Oral daily  QUEtiapine 50 milliGRAM(s) Oral at bedtime  QUEtiapine 25 milliGRAM(s) Oral daily    MEDICATIONS  (PRN):      __________________________________________________  REVIEW OF SYSTEMS:    CONSTITUTIONAL: No fever,   EYES: no acute visual disturbances  NECK: No pain or stiffness  RESPIRATORY: No cough; No shortness of breath  CARDIOVASCULAR: No chest pain, no palpitations  GASTROINTESTINAL: See above  NEUROLOGICAL: No headache or numbness, no tremors  MUSCULOSKELETAL: No joint pain, no muscle pain  GENITOURINARY: no dysuria, no frequency, no hesitancy  PSYCHIATRY: no depression , no anxiety  ALL OTHER  ROS negative        Vital Signs Last 24 Hrs  T(C): 37.1 (30 Sep 2020 11:08), Max: 37.1 (30 Sep 2020 11:08)  T(F): 98.7 (30 Sep 2020 11:08), Max: 98.7 (30 Sep 2020 11:08)  HR: 87 (30 Sep 2020 11:08) (81 - 90)  BP: 148/90 (30 Sep 2020 11:08) (129/81 - 158/104)  BP(mean): --  RR: 18 (30 Sep 2020 11:08) (18 - 19)  SpO2: 98% (30 Sep 2020 11:08) (95% - 100%)    ________________________________________________  PHYSICAL EXAM:  GENERAL: NAD, alert, confused  HEENT: Normocephalic;  conjunctivae and sclerae clear; moist mucous membranes;   NECK : supple  CHEST/LUNG: Clear to auscultation bilaterally with good air entry   HEART: S1 S2  regular; no murmurs, gallops or rubs  ABDOMEN: Soft, Nontender, Nondistended; Bowel sounds present  EXTREMITIES: no cyanosis; no edema; no calf tenderness  SKIN: warm and dry; no rash  NERVOUS SYSTEM:  Awake and alert; Oriented  to place, person and time ; no new deficits    _________________________________________________  LABS:                        9.8    4.47  )-----------( 268      ( 30 Sep 2020 07:22 )             30.8     09-30    145  |  112<H>  |  13  ----------------------------<  130<H>  3.5   |  24  |  1.49<H>    Ca    9.0      30 Sep 2020 07:22  Phos  3.6     09-30  Mg     2.0     09-30    TPro  6.6  /  Alb  x   /  TBili  x   /  DBili  x   /  AST  x   /  ALT  x   /  AlkPhos  x   09-30        CAPILLARY BLOOD GLUCOSE      POCT Blood Glucose.: 128 mg/dL (29 Sep 2020 20:54)

## 2020-09-30 NOTE — PROGRESS NOTE ADULT - ATTENDING COMMENTS
Patient was examined and discussed with Dr. Plasencia.     She was admitted with c/o vomiting.  W/u showed acute cholecystitis.  She is now POD # 2 cholecystectomy.   She is concerned lest she not receive "that medicine which makes me very upset."  One episode of loose stool today.  Thought she released "gas"    Alert, cooperative woman, confused  Vital Signs Last 24 Hrs  T(C): 37.1 (30 Sep 2020 11:08), Max: 37.1 (30 Sep 2020 11:08)  T(F): 98.7 (30 Sep 2020 11:08), Max: 98.7 (30 Sep 2020 11:08)  HR: 87 (30 Sep 2020 11:08) (81 - 90)  BP: 148/90 (30 Sep 2020 11:08) (129/81 - 158/104)  BP(mean): --  RR: 18 (30 Sep 2020 11:08) (18 - 19)  SpO2: 98% (30 Sep 2020 11:08) (95% - 100%)  Lungs, clear  Cor, RRR  Abdomen, diffusely tender, soft  EKG shows QTc 450                                   9.8    4.47  )-----------( 268      ( 30 Sep 2020 07:22 )             30.8   09-30    145  |  112<H>  |  13  ----------------------------<  130<H>  3.5   |  24  |  1.49<H>    Ca    9.0      30 Sep 2020 07:22  Phos  3.6     09-30  Mg     2.0     09-30    TPro  6.6  /  Alb  x   /  TBili  x   /  DBili  x   /  AST  x   /  ALT  x   /  AlkPhos  x   09-30      < from: NM Pulmonary Perfusion Scan (09.29.20 @ 15:03) >      FINDINGS: The study demonstrates heterogenous tracer distribution in both lungs. There are segmental perfusion defects in the superior segment of the right lower lobe and anterior basal segment of the left lower lobe.    IMPRESSION: Abnormal perfusion scan. Indeterminate probability of pulmonary embolus.    < end of copied text >    % Saturation, Iron: 28 % (09.30.20 @ 07:22)       IMP:  Pulmonary embolism is likely based on nuclear scan--this was performed as f/u for abnormal d-dimer on admission. [facility] hospitalizations.  Hx breast cancer.           POD # 2 cholecystectomy for cholecystitis, stable          CHF, stable,           atrial thrombus, dx on previous echo          anxiety disorder, stable          Social issues          anemia, not iron deficiency          CHEN, resolved.  CKD 3  Plan: begin NOAC          PT consultation, appreciated          Discontinue IV antibiotics          Resume entresta, on advice of Cardiology          discharge to [facility]

## 2020-09-30 NOTE — PROGRESS NOTE ADULT - PROBLEM SELECTOR PLAN 1
Pt p/w RUQ pain, nausea, vomiting, elevated LFTs  USG showed GB thickening, consistent with Acute cholecystitis  Today is POD Day 1 post Cholecystectomy  Pt on IV Zosyn completed 2 days of IV (today is Day 4)
Pt p/w RUQ pain, nausea, vomiting, elevated LFTs  USG showed GB thickening, consistent with Acute cholecystitis  Today is POD Day 1 post Cholecystectomy  Pt on IV Zosyn completed 2 days of IV (today is Day 4)
Patient presented with abdominal pain , nausea and Vomiting.  has elevated liver enzymes.  AST, ALT, Alk phos uptrending, T bili wnl  USG showed GB thickening, consistent with Acute cholecystitis  Surgery consulted- recommendations noted  Pt currently NPO, IV Zosyn started Day 1  F/u blood cultures, FS Q6.  Follow LFTs  Surgical clearance:   RCRI 4 points, Class IV Risk 15.0 %  30-day risk of death, MI, or cardiac arrest.  RODRIGUEZ risk index 4.5 %  Risk of myocardial infarction or cardiac arrest, intraoperatively or up to 30 days post-op.  Will await Cardiology clearance and proceed with surgery accordingly- Dr Patterson consulted
Patient presented with abdominal pain , nausea and Vomiting.  has elevated liver enzymes.  AST, ALT, Alk phos uptrending, T bili wnl  USG showed GB thickening, consistent with Acute cholecystitis  Surgery consulted- recommendations noted  Pt currently NPO, IV Zosyn completed 2 days of IV (today is Day 3, however IV access could not be established after multiple attempts. OR informed)  Trend LFTs  Surgery clearance:  RCRI 4 points, Class IV Risk 15.0 % 30-day risk of death, MI, or cardiac arrest.  RODRIGUEZ risk index 4.5 %- Risk of MI or cardiac arrest, intraop or up to 30 days post-op.  Cardiology cleared pt for surgery, pt is at intermittent cardiac risk for undergoing procedure, discussed with Dr. Patterson (cardio)

## 2020-09-30 NOTE — PROGRESS NOTE ADULT - PROBLEM SELECTOR PLAN 3
Patient's D-dimers elevated upon admission- 6270  CT angio chest not done due to elevated creatinine  V/Q scan done showed segmental defects in R L Lobe, L lower lobe, indeterminate for pulm embolus-> Abnormal study  Pt started on Eliquis 10 mg BID 9/29

## 2020-10-02 RX ORDER — APIXABAN 2.5 MG/1
1 TABLET, FILM COATED ORAL
Qty: 60 | Refills: 0
Start: 2020-10-02 | End: 2020-10-31

## 2020-10-06 LAB — SURGICAL PATHOLOGY STUDY: SIGNIFICANT CHANGE UP

## 2020-10-16 ENCOUNTER — INPATIENT (INPATIENT)
Facility: HOSPITAL | Age: 65
LOS: 5 days | Discharge: NURSING, REHAB OR LONG-TERM CARE | DRG: 291 | End: 2020-10-22
Attending: INTERNAL MEDICINE | Admitting: INTERNAL MEDICINE
Payer: MEDICARE

## 2020-10-16 VITALS
HEIGHT: 67 IN | RESPIRATION RATE: 20 BRPM | HEART RATE: 73 BPM | SYSTOLIC BLOOD PRESSURE: 151 MMHG | DIASTOLIC BLOOD PRESSURE: 98 MMHG | TEMPERATURE: 97 F | OXYGEN SATURATION: 99 %

## 2020-10-16 DIAGNOSIS — Z98.89 OTHER SPECIFIED POSTPROCEDURAL STATES: Chronic | ICD-10-CM

## 2020-10-16 DIAGNOSIS — D25.9 LEIOMYOMA OF UTERUS, UNSPECIFIED: Chronic | ICD-10-CM

## 2020-10-16 DIAGNOSIS — C50.919 MALIGNANT NEOPLASM OF UNSPECIFIED SITE OF UNSPECIFIED FEMALE BREAST: Chronic | ICD-10-CM

## 2020-10-16 DIAGNOSIS — Z90.710 ACQUIRED ABSENCE OF BOTH CERVIX AND UTERUS: Chronic | ICD-10-CM

## 2020-10-16 PROCEDURE — 93010 ELECTROCARDIOGRAM REPORT: CPT

## 2020-10-16 RX ORDER — ACETAMINOPHEN 500 MG
650 TABLET ORAL ONCE
Refills: 0 | Status: COMPLETED | OUTPATIENT
Start: 2020-10-16 | End: 2020-10-16

## 2020-10-16 RX ORDER — SODIUM CHLORIDE 9 MG/ML
250 INJECTION INTRAMUSCULAR; INTRAVENOUS; SUBCUTANEOUS ONCE
Refills: 0 | Status: COMPLETED | OUTPATIENT
Start: 2020-10-16 | End: 2020-10-16

## 2020-10-16 RX ORDER — IOHEXOL 300 MG/ML
30 INJECTION, SOLUTION INTRAVENOUS ONCE
Refills: 0 | Status: COMPLETED | OUTPATIENT
Start: 2020-10-16 | End: 2020-10-16

## 2020-10-16 RX ORDER — SODIUM CHLORIDE 9 MG/ML
3 INJECTION INTRAMUSCULAR; INTRAVENOUS; SUBCUTANEOUS EVERY 8 HOURS
Refills: 0 | Status: DISCONTINUED | OUTPATIENT
Start: 2020-10-16 | End: 2020-10-17

## 2020-10-16 RX ADMIN — SODIUM CHLORIDE 250 MILLILITER(S): 9 INJECTION INTRAMUSCULAR; INTRAVENOUS; SUBCUTANEOUS at 23:08

## 2020-10-16 NOTE — ED ADULT TRIAGE NOTE - CHIEF COMPLAINT QUOTE
as per EMS : patient complained of trouble breathing, refusing to eat and take medications all day as per nursing home staff as per EMS : patient complained of trouble breathing, refusing to eat and take medications all day as per nursing home staff  PCR negative 10/14

## 2020-10-16 NOTE — ED ADULT NURSE NOTE - CHIEF COMPLAINT QUOTE
as per EMS : patient complained of trouble breathing, refusing to eat and take medications all day as per nursing home staff  PCR negative 10/14

## 2020-10-17 DIAGNOSIS — N17.9 ACUTE KIDNEY FAILURE, UNSPECIFIED: ICD-10-CM

## 2020-10-17 LAB
ALBUMIN SERPL ELPH-MCNC: 3.2 G/DL — LOW (ref 3.5–5)
ALBUMIN SERPL ELPH-MCNC: 3.3 G/DL — LOW (ref 3.5–5)
ALBUMIN SERPL ELPH-MCNC: 3.4 G/DL — LOW (ref 3.5–5)
ALP SERPL-CCNC: 612 U/L — HIGH (ref 40–120)
ALP SERPL-CCNC: 646 U/L — HIGH (ref 40–120)
ALP SERPL-CCNC: 653 U/L — HIGH (ref 40–120)
ALT FLD-CCNC: 36 U/L DA — SIGNIFICANT CHANGE UP (ref 10–60)
ALT FLD-CCNC: 42 U/L DA — SIGNIFICANT CHANGE UP (ref 10–60)
ALT FLD-CCNC: 50 U/L DA — SIGNIFICANT CHANGE UP (ref 10–60)
AMMONIA BLD-MCNC: 19 UMOL/L — SIGNIFICANT CHANGE UP (ref 11–32)
ANION GAP SERPL CALC-SCNC: 18 MMOL/L — HIGH (ref 5–17)
ANION GAP SERPL CALC-SCNC: 22 MMOL/L — HIGH (ref 5–17)
ANION GAP SERPL CALC-SCNC: 23 MMOL/L — HIGH (ref 5–17)
ANISOCYTOSIS BLD QL: SIGNIFICANT CHANGE UP
APPEARANCE UR: CLEAR — SIGNIFICANT CHANGE UP
APTT BLD: 31.7 SEC — SIGNIFICANT CHANGE UP (ref 27.5–35.5)
APTT BLD: 37.4 SEC — HIGH (ref 27.5–35.5)
AST SERPL-CCNC: 55 U/L — HIGH (ref 10–40)
AST SERPL-CCNC: 76 U/L — HIGH (ref 10–40)
AST SERPL-CCNC: 91 U/L — HIGH (ref 10–40)
BACTERIA # UR AUTO: ABNORMAL /HPF
BASE EXCESS BLDA CALC-SCNC: -14 MMOL/L — LOW (ref -2–2)
BASOPHILS # BLD AUTO: 0.01 K/UL — SIGNIFICANT CHANGE UP (ref 0–0.2)
BASOPHILS # BLD AUTO: 0.01 K/UL — SIGNIFICANT CHANGE UP (ref 0–0.2)
BASOPHILS NFR BLD AUTO: 0.1 % — SIGNIFICANT CHANGE UP (ref 0–2)
BASOPHILS NFR BLD AUTO: 0.2 % — SIGNIFICANT CHANGE UP (ref 0–2)
BILIRUB SERPL-MCNC: 2.2 MG/DL — HIGH (ref 0.2–1.2)
BILIRUB SERPL-MCNC: 2.4 MG/DL — HIGH (ref 0.2–1.2)
BILIRUB SERPL-MCNC: 2.5 MG/DL — HIGH (ref 0.2–1.2)
BILIRUB UR-MCNC: NEGATIVE — SIGNIFICANT CHANGE UP
BUN SERPL-MCNC: 41 MG/DL — HIGH (ref 7–18)
BUN SERPL-MCNC: 47 MG/DL — HIGH (ref 7–18)
BUN SERPL-MCNC: 48 MG/DL — HIGH (ref 7–18)
BURR CELLS BLD QL SMEAR: PRESENT — SIGNIFICANT CHANGE UP
BURR CELLS BLD QL SMEAR: SIGNIFICANT CHANGE UP
CALCIUM SERPL-MCNC: 10.2 MG/DL — SIGNIFICANT CHANGE UP (ref 8.4–10.5)
CALCIUM SERPL-MCNC: 9.2 MG/DL — SIGNIFICANT CHANGE UP (ref 8.4–10.5)
CALCIUM SERPL-MCNC: 9.7 MG/DL — SIGNIFICANT CHANGE UP (ref 8.4–10.5)
CHLORIDE SERPL-SCNC: 106 MMOL/L — SIGNIFICANT CHANGE UP (ref 96–108)
CHLORIDE SERPL-SCNC: 108 MMOL/L — SIGNIFICANT CHANGE UP (ref 96–108)
CHLORIDE SERPL-SCNC: 108 MMOL/L — SIGNIFICANT CHANGE UP (ref 96–108)
CK MB BLD-MCNC: 1 % — SIGNIFICANT CHANGE UP (ref 0–3.5)
CK MB CFR SERPL CALC: 2.6 NG/ML — SIGNIFICANT CHANGE UP (ref 0–3.6)
CK SERPL-CCNC: 268 U/L — HIGH (ref 21–215)
CO2 SERPL-SCNC: 12 MMOL/L — LOW (ref 22–31)
CO2 SERPL-SCNC: 14 MMOL/L — LOW (ref 22–31)
CO2 SERPL-SCNC: 20 MMOL/L — LOW (ref 22–31)
COD CRY URNS QL: ABNORMAL /HPF
COLOR SPEC: YELLOW — SIGNIFICANT CHANGE UP
CREAT SERPL-MCNC: 2.13 MG/DL — HIGH (ref 0.5–1.3)
CREAT SERPL-MCNC: 2.16 MG/DL — HIGH (ref 0.5–1.3)
CREAT SERPL-MCNC: 2.22 MG/DL — HIGH (ref 0.5–1.3)
D DIMER BLD IA.RAPID-MCNC: 1109 NG/ML DDU — HIGH
DIFF PNL FLD: ABNORMAL
EOSINOPHIL # BLD AUTO: 0 K/UL — SIGNIFICANT CHANGE UP (ref 0–0.5)
EOSINOPHIL # BLD AUTO: 0.01 K/UL — SIGNIFICANT CHANGE UP (ref 0–0.5)
EOSINOPHIL NFR BLD AUTO: 0 % — SIGNIFICANT CHANGE UP (ref 0–6)
EOSINOPHIL NFR BLD AUTO: 0.1 % — SIGNIFICANT CHANGE UP (ref 0–6)
EPI CELLS # UR: SIGNIFICANT CHANGE UP /HPF
GLUCOSE SERPL-MCNC: 114 MG/DL — HIGH (ref 70–99)
GLUCOSE SERPL-MCNC: 143 MG/DL — HIGH (ref 70–99)
GLUCOSE SERPL-MCNC: 69 MG/DL — LOW (ref 70–99)
GLUCOSE UR QL: NEGATIVE — SIGNIFICANT CHANGE UP
HCO3 BLDA-SCNC: 10 MMOL/L — LOW (ref 23–27)
HCT VFR BLD CALC: 34.3 % — LOW (ref 34.5–45)
HCT VFR BLD CALC: 36.6 % — SIGNIFICANT CHANGE UP (ref 34.5–45)
HGB BLD-MCNC: 11.1 G/DL — LOW (ref 11.5–15.5)
HGB BLD-MCNC: 11.7 G/DL — SIGNIFICANT CHANGE UP (ref 11.5–15.5)
HOROWITZ INDEX BLDA+IHG-RTO: 21 — SIGNIFICANT CHANGE UP
HYALINE CASTS # UR AUTO: ABNORMAL /LPF
IMM GRANULOCYTES NFR BLD AUTO: 0.2 % — SIGNIFICANT CHANGE UP (ref 0–1.5)
IMM GRANULOCYTES NFR BLD AUTO: 0.3 % — SIGNIFICANT CHANGE UP (ref 0–1.5)
INR BLD: 4.31 RATIO — HIGH (ref 0.88–1.16)
INR BLD: 4.32 RATIO — HIGH (ref 0.88–1.16)
KETONES UR-MCNC: ABNORMAL
LACTATE SERPL-SCNC: 11.9 MMOL/L — CRITICAL HIGH (ref 0.7–2)
LACTATE SERPL-SCNC: 12.4 MMOL/L — CRITICAL HIGH (ref 0.7–2)
LACTATE SERPL-SCNC: 13 MMOL/L — CRITICAL HIGH (ref 0.7–2)
LACTATE SERPL-SCNC: 9.2 MMOL/L — CRITICAL HIGH (ref 0.7–2)
LEUKOCYTE ESTERASE UR-ACNC: NEGATIVE — SIGNIFICANT CHANGE UP
LIDOCAIN IGE QN: 69 U/L — LOW (ref 73–393)
LYMPHOCYTES # BLD AUTO: 0.78 K/UL — LOW (ref 1–3.3)
LYMPHOCYTES # BLD AUTO: 12.5 % — LOW (ref 13–44)
LYMPHOCYTES # BLD AUTO: 2.19 K/UL — SIGNIFICANT CHANGE UP (ref 1–3.3)
LYMPHOCYTES # BLD AUTO: 27.3 % — SIGNIFICANT CHANGE UP (ref 13–44)
MAGNESIUM SERPL-MCNC: 2.4 MG/DL — SIGNIFICANT CHANGE UP (ref 1.6–2.6)
MANUAL SMEAR VERIFICATION: SIGNIFICANT CHANGE UP
MCHC RBC-ENTMCNC: 31.6 PG — SIGNIFICANT CHANGE UP (ref 27–34)
MCHC RBC-ENTMCNC: 31.6 PG — SIGNIFICANT CHANGE UP (ref 27–34)
MCHC RBC-ENTMCNC: 32 GM/DL — SIGNIFICANT CHANGE UP (ref 32–36)
MCHC RBC-ENTMCNC: 32.4 GM/DL — SIGNIFICANT CHANGE UP (ref 32–36)
MCV RBC AUTO: 97.7 FL — SIGNIFICANT CHANGE UP (ref 80–100)
MCV RBC AUTO: 98.9 FL — SIGNIFICANT CHANGE UP (ref 80–100)
MONOCYTES # BLD AUTO: 0.41 K/UL — SIGNIFICANT CHANGE UP (ref 0–0.9)
MONOCYTES # BLD AUTO: 0.46 K/UL — SIGNIFICANT CHANGE UP (ref 0–0.9)
MONOCYTES NFR BLD AUTO: 5.7 % — SIGNIFICANT CHANGE UP (ref 2–14)
MONOCYTES NFR BLD AUTO: 6.6 % — SIGNIFICANT CHANGE UP (ref 2–14)
NEUTROPHILS # BLD AUTO: 5 K/UL — SIGNIFICANT CHANGE UP (ref 1.8–7.4)
NEUTROPHILS # BLD AUTO: 5.34 K/UL — SIGNIFICANT CHANGE UP (ref 1.8–7.4)
NEUTROPHILS NFR BLD AUTO: 66.6 % — SIGNIFICANT CHANGE UP (ref 43–77)
NEUTROPHILS NFR BLD AUTO: 80.4 % — HIGH (ref 43–77)
NITRITE UR-MCNC: NEGATIVE — SIGNIFICANT CHANGE UP
NRBC # BLD: 0 /100 WBCS — SIGNIFICANT CHANGE UP (ref 0–0)
NRBC # BLD: 2 /100 WBCS — HIGH (ref 0–0)
NT-PROBNP SERPL-SCNC: HIGH PG/ML (ref 0–125)
OSMOLALITY UR: 631 MOS/KG — SIGNIFICANT CHANGE UP (ref 50–1200)
PCO2 BLDA: 18 MMHG — LOW (ref 32–46)
PH BLDA: 7.35 — SIGNIFICANT CHANGE UP (ref 7.35–7.45)
PH UR: 5 — SIGNIFICANT CHANGE UP (ref 5–8)
PHOSPHATE SERPL-MCNC: 5.1 MG/DL — HIGH (ref 2.5–4.5)
PLAT MORPH BLD: NORMAL — SIGNIFICANT CHANGE UP
PLATELET # BLD AUTO: 263 K/UL — SIGNIFICANT CHANGE UP (ref 150–400)
PLATELET # BLD AUTO: 278 K/UL — SIGNIFICANT CHANGE UP (ref 150–400)
PLATELET COUNT - ESTIMATE: NORMAL — SIGNIFICANT CHANGE UP
PO2 BLDA: 119 MMHG — HIGH (ref 74–108)
POIKILOCYTOSIS BLD QL AUTO: SIGNIFICANT CHANGE UP
POLYCHROMASIA BLD QL SMEAR: SLIGHT — SIGNIFICANT CHANGE UP
POTASSIUM SERPL-MCNC: 4.1 MMOL/L — SIGNIFICANT CHANGE UP (ref 3.5–5.3)
POTASSIUM SERPL-MCNC: 4.5 MMOL/L — SIGNIFICANT CHANGE UP (ref 3.5–5.3)
POTASSIUM SERPL-MCNC: 4.7 MMOL/L — SIGNIFICANT CHANGE UP (ref 3.5–5.3)
POTASSIUM SERPL-SCNC: 4.1 MMOL/L — SIGNIFICANT CHANGE UP (ref 3.5–5.3)
POTASSIUM SERPL-SCNC: 4.5 MMOL/L — SIGNIFICANT CHANGE UP (ref 3.5–5.3)
POTASSIUM SERPL-SCNC: 4.7 MMOL/L — SIGNIFICANT CHANGE UP (ref 3.5–5.3)
PROT SERPL-MCNC: 7.1 G/DL — SIGNIFICANT CHANGE UP (ref 6–8.3)
PROT SERPL-MCNC: 7.2 G/DL — SIGNIFICANT CHANGE UP (ref 6–8.3)
PROT SERPL-MCNC: 7.5 G/DL — SIGNIFICANT CHANGE UP (ref 6–8.3)
PROT UR-MCNC: 100
PROTHROM AB SERPL-ACNC: 47.8 SEC — HIGH (ref 10.6–13.6)
PROTHROM AB SERPL-ACNC: 47.9 SEC — HIGH (ref 10.6–13.6)
RBC # BLD: 3.51 M/UL — LOW (ref 3.8–5.2)
RBC # BLD: 3.7 M/UL — LOW (ref 3.8–5.2)
RBC # FLD: 20.4 % — HIGH (ref 10.3–14.5)
RBC # FLD: 20.7 % — HIGH (ref 10.3–14.5)
RBC BLD AUTO: ABNORMAL
RBC CASTS # UR COMP ASSIST: ABNORMAL /HPF (ref 0–2)
SAO2 % BLDA: 98 % — HIGH (ref 92–96)
SARS-COV-2 RNA SPEC QL NAA+PROBE: SIGNIFICANT CHANGE UP
SODIUM SERPL-SCNC: 142 MMOL/L — SIGNIFICANT CHANGE UP (ref 135–145)
SODIUM SERPL-SCNC: 143 MMOL/L — SIGNIFICANT CHANGE UP (ref 135–145)
SODIUM SERPL-SCNC: 146 MMOL/L — HIGH (ref 135–145)
SODIUM UR-SCNC: 26 MMOL/L — SIGNIFICANT CHANGE UP
SP GR SPEC: 1.02 — SIGNIFICANT CHANGE UP (ref 1.01–1.02)
TROPONIN I SERPL-MCNC: 0.1 NG/ML — HIGH (ref 0–0.04)
TROPONIN I SERPL-MCNC: 0.11 NG/ML — HIGH (ref 0–0.04)
TROPONIN I SERPL-MCNC: 0.11 NG/ML — HIGH (ref 0–0.04)
URATE UR-MCNC: 51.4 MG/DL — SIGNIFICANT CHANGE UP
UROBILINOGEN FLD QL: 4
UUN UR-MCNC: 910 MG/DL — SIGNIFICANT CHANGE UP
WBC # BLD: 6.22 K/UL — SIGNIFICANT CHANGE UP (ref 3.8–10.5)
WBC # BLD: 8.03 K/UL — SIGNIFICANT CHANGE UP (ref 3.8–10.5)
WBC # FLD AUTO: 6.22 K/UL — SIGNIFICANT CHANGE UP (ref 3.8–10.5)
WBC # FLD AUTO: 8.03 K/UL — SIGNIFICANT CHANGE UP (ref 3.8–10.5)
WBC UR QL: SIGNIFICANT CHANGE UP /HPF (ref 0–5)

## 2020-10-17 PROCEDURE — 74176 CT ABD & PELVIS W/O CONTRAST: CPT | Mod: 26

## 2020-10-17 PROCEDURE — 71250 CT THORAX DX C-: CPT | Mod: 26

## 2020-10-17 PROCEDURE — 99285 EMERGENCY DEPT VISIT HI MDM: CPT

## 2020-10-17 PROCEDURE — 71045 X-RAY EXAM CHEST 1 VIEW: CPT | Mod: 26

## 2020-10-17 PROCEDURE — 70450 CT HEAD/BRAIN W/O DYE: CPT | Mod: 26

## 2020-10-17 RX ORDER — DEXTROSE 50 % IN WATER 50 %
25 SYRINGE (ML) INTRAVENOUS ONCE
Refills: 0 | Status: COMPLETED | OUTPATIENT
Start: 2020-10-17 | End: 2020-10-17

## 2020-10-17 RX ORDER — SODIUM CHLORIDE 9 MG/ML
1000 INJECTION INTRAMUSCULAR; INTRAVENOUS; SUBCUTANEOUS
Refills: 0 | Status: DISCONTINUED | OUTPATIENT
Start: 2020-10-17 | End: 2020-10-17

## 2020-10-17 RX ORDER — SODIUM CHLORIDE 9 MG/ML
1000 INJECTION INTRAMUSCULAR; INTRAVENOUS; SUBCUTANEOUS ONCE
Refills: 0 | Status: DISCONTINUED | OUTPATIENT
Start: 2020-10-17 | End: 2020-10-17

## 2020-10-17 RX ORDER — SODIUM BICARBONATE 1 MEQ/ML
50 SYRINGE (ML) INTRAVENOUS ONCE
Refills: 0 | Status: COMPLETED | OUTPATIENT
Start: 2020-10-17 | End: 2020-10-17

## 2020-10-17 RX ORDER — SODIUM CHLORIDE 9 MG/ML
1000 INJECTION, SOLUTION INTRAVENOUS
Refills: 0 | Status: DISCONTINUED | OUTPATIENT
Start: 2020-10-17 | End: 2020-10-17

## 2020-10-17 RX ORDER — ASPIRIN/CALCIUM CARB/MAGNESIUM 324 MG
324 TABLET ORAL ONCE
Refills: 0 | Status: COMPLETED | OUTPATIENT
Start: 2020-10-17 | End: 2020-10-17

## 2020-10-17 RX ORDER — PANTOPRAZOLE SODIUM 20 MG/1
40 TABLET, DELAYED RELEASE ORAL DAILY
Refills: 0 | Status: DISCONTINUED | OUTPATIENT
Start: 2020-10-17 | End: 2020-10-19

## 2020-10-17 RX ORDER — CLOPIDOGREL BISULFATE 75 MG/1
300 TABLET, FILM COATED ORAL ONCE
Refills: 0 | Status: COMPLETED | OUTPATIENT
Start: 2020-10-17 | End: 2020-10-17

## 2020-10-17 RX ORDER — SODIUM BICARBONATE 1 MEQ/ML
0.21 SYRINGE (ML) INTRAVENOUS
Qty: 150 | Refills: 0 | Status: DISCONTINUED | OUTPATIENT
Start: 2020-10-17 | End: 2020-10-17

## 2020-10-17 RX ORDER — FUROSEMIDE 40 MG
40 TABLET ORAL ONCE
Refills: 0 | Status: COMPLETED | OUTPATIENT
Start: 2020-10-17 | End: 2020-10-17

## 2020-10-17 RX ADMIN — Medication 650 MILLIGRAM(S): at 00:03

## 2020-10-17 RX ADMIN — CLOPIDOGREL BISULFATE 300 MILLIGRAM(S): 75 TABLET, FILM COATED ORAL at 06:39

## 2020-10-17 RX ADMIN — Medication 324 MILLIGRAM(S): at 06:39

## 2020-10-17 RX ADMIN — Medication 25 MILLILITER(S): at 12:59

## 2020-10-17 RX ADMIN — Medication 50 MILLIEQUIVALENT(S): at 13:00

## 2020-10-17 RX ADMIN — SODIUM CHLORIDE 100 MILLILITER(S): 9 INJECTION INTRAMUSCULAR; INTRAVENOUS; SUBCUTANEOUS at 10:00

## 2020-10-17 RX ADMIN — Medication 40 MILLIGRAM(S): at 13:12

## 2020-10-17 RX ADMIN — SODIUM CHLORIDE 3 MILLILITER(S): 9 INJECTION INTRAMUSCULAR; INTRAVENOUS; SUBCUTANEOUS at 06:19

## 2020-10-17 RX ADMIN — PANTOPRAZOLE SODIUM 40 MILLIGRAM(S): 20 TABLET, DELAYED RELEASE ORAL at 11:06

## 2020-10-17 RX ADMIN — Medication 650 MILLIGRAM(S): at 00:19

## 2020-10-17 NOTE — CONSULT NOTE ADULT - SUBJECTIVE AND OBJECTIVE BOX
HPI:  65 Y F from [facility] has medical hx significant for COPD, emphysema, R breast ca, HTN, HLD, CHF ef 15-20%, G3-4 DD, ?DM, Pacemaker, Presumed PE on Apixaban and has IVC filter, recent cholecystectomy 3 weeks ago was sent from NH for shortness of breath, pt is not able to provide any information, Called NH spoke to nursing supervisor Yisel Mendoza pt was complaining of sob around 10 pm last night, her o2 sat was 75% and she was wheezing, pt was put on 3L NC and was given Duoneb her o2 sat improved to 94%, pt's blood pressure was elevated to 169/135mmhg on exam at NH. Pt was sent to the hospital for further evaluation. Pt denies any acute complains.      Pt was recently admitted to the [facility] on last week of Sept with complains of abdominal pain during the work up finding where consistent with acute cholecystitis and underwent cholecystectomy during the same admission pt was found to have elevated d-dimer and had nuclear scan which was indeterminant of pulmonary embolism but given cancer hx pt was started on AC with apixaban.     Full code as per charts  (17 Oct 2020 08:45)      PAST MEDICAL & SURGICAL HISTORY:  Knee effusion, left  arthrocentesis- 05/25/16    Internal carotid aneurysm    Abdominal aneurysm  infrarenal- wached by Vascular , Dr. Gutierrez    Cardiomyopathy    Sickle cell trait    DVT (deep venous thrombosis)  Left leg in 1994 after hysterectomy    Hypercholesteremia    Breast cancer  right-s/p chemo and radiation    Hypertension    Breast cancer  infusaport insertion- 2014- left chest    S/P hysterectomy  1994    Gilson filter in place  1994    S/P lumpectomy, right breast  2014, revision of position- 2015    Fibroid uterus  sp hystrectomy 1196        Vital Signs Last 24 Hrs  T(C): 36.1 (17 Oct 2020 08:08), Max: 37.1 (16 Oct 2020 23:33)  T(F): 97 (17 Oct 2020 08:08), Max: 98.8 (16 Oct 2020 23:33)  HR: 74 (17 Oct 2020 08:08) (73 - 78)  BP: 150/98 (17 Oct 2020 08:08) (150/98 - 157/78)  BP(mean): --  RR: 20 (17 Oct 2020 08:08) (20 - 22)  SpO2: 100% (17 Oct 2020 08:08) (97% - 100%)                          11.7   6.22  )-----------( 278      ( 17 Oct 2020 00:58 )             36.6     10-17    142  |  106  |  41<H>  ----------------------------<  114<H>  4.5   |  14<L>  |  2.22<H>    Ca    10.2      17 Oct 2020 00:58    TPro  7.5  /  Alb  3.4<L>  /  TBili  2.2<H>  /  DBili  x   /  AST  55<H>  /  ALT  36  /  AlkPhos  646<H>  10-17    PT/INR - ( 17 Oct 2020 00:58 )   PT: 47.8 sec;   INR: 4.31 ratio         PTT - ( 17 Oct 2020 00:58 )  PTT:31.7 sec  < from: CT Abdomen and Pelvis No Cont (10.17.20 @ 05:00) >    FINDINGS:    Left lateral chest wall subcutaneous pacer again noted.    No enlarged axillary, mediastinal or hilar adenopathy.    Heart size is enlarged. No pericardial effusion. Coronary artery vascular calcifications are noted. Again noted is a 2.5 cm mass in the right breast, abutting and inseparable from the right pectoralis muscle consistent with known breast carcinoma.    Mild upper lobe predominant centrilobular emphysema. Scattered small blebs  bilaterally in all lobes. There is subpleural reticulation in the right lung which may reflect posttreatment change. Mild bilateral dependent of segmental atelectasis. No focal lung consolidation. Small bilateral pleural effusions. Trachea and central airways are patent.    The liver, pancreas, spleen and adrenal glands appear  unremarkable. The gallbladder is surgically absent.    No hydroureteronephrosis. 4 mm nonobstructing calculus in the lower pole the right kidney. 1.3 cm angiomyolipoma in the right kidney.    Mild aneurysmal dilatation of the descending aorta, from the mid descending  aorta with fusiform aneurysmal dilatation just above the diaphragmatic  hiatus measuring up to 4.2 cm in diameter.    Infrarenal abdominal aorta demonstrates an aneurysm measuring up to 4.3 cm above the aortic bifurcation.    Bilateral common iliac arteries are again noted to be ectatic.    Infrarenal IVC filter is noted.    Evaluation of the GI tract is limited without oral contrast. Small hiatal  hernia. Scattered colonic diverticula without evidence of diverticulitis. GI  tract is unobstructed. Appendix is unremarkable. No free air or ascites.    Urinary bladder appears unremarkable. Uterus is not visualized. Trace free abdominal and pelvic ascites.    Degenerative changes of the spine.    IMPRESSION:  No airspace consolidation. Small bilateral pleural effusions.    2.5 cm mass in the right breast, abutting and inseparable from the right pectoralis muscle consistent with known breast carcinoma.    No small bowel obstruction or active bowel inflammation.    Trace free abdominal and pelvic ascites.        < end of copied text >    PHYSICAL EXAM  General: awake, responsive to name, poor historian, non verbal  Neurology: nonfocal, RAY x 4  Respiratory: CTA B/L  CV: RRR, S1S2  Abdominal: Soft, generalized tenderness, ND +BS, no guarding or peritoneal signs  Extremities: no c/c        ASSESSMENT/ PLAN:  s/p lap linda 9/28, negative IOC  sent by NH for SOB, low sats, generalized abd pain, diarrhea, Covid neg today  pt admitted to ICU for hydration with elevated renal function, lactate  pt was on apixiban for indeterminant PE on nuclear pulm scan  INR 4+  no acute surgical pathology on CT scan  would cont care as per ICU  r/o cdiff, hydrate  cont to trend lactate, CMP  serial abd exams

## 2020-10-17 NOTE — H&P ADULT - HISTORY OF PRESENT ILLNESS
65 Y F from [facility] has medical hx significant for COPD, emphysema, R breast ca, HTN, HLD, CHF ef 15-20%, G3-4 DD, ?DM, Pacemaker, Presumed PE on Apixaban and has IVC filter, recent cholecystectomy 3 weeks ago was sent from NH for shortness of breath, pt is not able to provide any information, Called NH spoke to nursing supervisor Yisel Mendoza pt was complaining of sob around 10 pm last night, her o2 sat was 75% and she was wheezing, pt was put on 3L NC and was given Duoneb her o2 sat improved to 94%, pt's blood pressure was elevated to 169/135mmhg on exam at NH. Pt was sent to the hospital for further evaluation. Pt denies any acute complains.      Pt was recently admitted to the [facility] on last week of Sept with complains of abdominal pain during the work up finding where consistent with acute cholecystitis and underwent cholecystectomy during the same admission pt was found to have elevated d-dimer and had nuclear scan which was indeterminant of pulmonary embolism but given cancer hx pt was started on AC with apixaban.     Full code as per charts 65 Y F from [facility] has medical hx significant for COPD, emphysema, R breast ca, HTN, HLD, CHF ef 15-20%, G3-4 DD s/p defibrillator, ?DM, Presumed PE on Apixaban and has IVC filter, recent cholecystectomy 3 weeks ago was sent from NH for shortness of breath, pt is not able to provide any information, Called NH spoke to nursing supervisor Yisel Mendoza pt was complaining of sob around 10 pm last night, her o2 sat was 75% and she was wheezing, pt was put on 3L NC and was given Duoneb her o2 sat improved to 94%, pt's blood pressure was elevated to 169/135mmhg on exam at NH. Pt was sent to the hospital for further evaluation. Pt denies any acute complains.      Pt was recently admitted to the [facility] on last week of Sept with complains of abdominal pain during the work up finding where consistent with acute cholecystitis and underwent cholecystectomy during the same admission pt was found to have elevated d-dimer and had nuclear scan which was indeterminant of pulmonary embolism but given cancer hx pt was started on AC with apixaban.     Full code as per charts

## 2020-10-17 NOTE — H&P ADULT - NSHPPHYSICALEXAM_GEN_ALL_CORE
ICU Vital Signs Last 24 Hrs  T(C): 36.1 (17 Oct 2020 08:08), Max: 37.1 (16 Oct 2020 23:33)  T(F): 97 (17 Oct 2020 08:08), Max: 98.8 (16 Oct 2020 23:33)  HR: 74 (17 Oct 2020 08:08) (73 - 78)  BP: 150/98 (17 Oct 2020 08:08) (150/98 - 157/78)  BP(mean): --  ABP: --  ABP(mean): --  RR: 20 (17 Oct 2020 08:08) (20 - 22)  SpO2: 100% (17 Oct 2020 08:08) (97% - 100%)    PHYSICAL EXAM:  GENERAL: Well-developed, Well nourished B F not in distress  HEAD:  Atraumatic, Normocephalic  EYES: PERRLA, conjunctiva and sclera clear  Mouth: Dry  NECK: Supple, No JVD  CHEST/LUNG: Clear to auscultation bilaterally; No wheeze, chest Pacemaker without any erythema or tenderness  HEART: Regular rate and rhythm; s1+ s2+  ABDOMEN: Soft, Nontender, Nondistended; Bowel sounds present, Lower abdomen vertical scar, healed laparoscopy scar + on RUQ  EXTREMITIES:, No clubbing, cyanosis, ++ Edema on b/l leg, onychomycosis, cold extremities, + scratch marks on R arm and B/l lower leg     NEUROLOGY:AAOx1-2, knows year 2020, knows president, but thinks she is in [place]   SKIN: As above ICU Vital Signs Last 24 Hrs  T(C): 36.1 (17 Oct 2020 08:08), Max: 37.1 (16 Oct 2020 23:33)  T(F): 97 (17 Oct 2020 08:08), Max: 98.8 (16 Oct 2020 23:33)  HR: 74 (17 Oct 2020 08:08) (73 - 78)  BP: 150/98 (17 Oct 2020 08:08) (150/98 - 157/78)  BP(mean): --  ABP: --  ABP(mean): --  RR: 20 (17 Oct 2020 08:08) (20 - 22)  SpO2: 100% (17 Oct 2020 08:08) (97% - 100%)    PHYSICAL EXAM:  GENERAL: Well-developed, Well nourished B F not in distress  HEAD:  Atraumatic, Normocephalic  EYES: PERRLA, conjunctiva and sclera clear  Mouth: Dry  NECK: Supple, No JVD  CHEST/LUNG: Clear to auscultation bilaterally; No wheeze, Left chest defibrillator without any erythema or tenderness  HEART: Regular rate and rhythm; s1+ s2+  ABDOMEN: Soft, Nontender, Nondistended; Bowel sounds present, Lower abdomen vertical scar, healed laparoscopy scar + on RUQ  EXTREMITIES:, No clubbing, cyanosis, ++ Edema on b/l leg L>R, onychomycosis, cold extremities, + scratch marks on R arm and B/l lower leg     NEUROLOGY:AAOx1-2, knows year 2020, knows president, but thinks she is in [place]   SKIN: As above

## 2020-10-17 NOTE — PROGRESS NOTE ADULT - SUBJECTIVE AND OBJECTIVE BOX
Patient is a 65y old  Female who presents with a chief complaint of Anion gap metabolic acidosis (17 Oct 2020 08:45)      INTERVAL HPI/OVERNIGHT EVENTS:  ICU Vital Signs Last 24 Hrs  T(C): 35.4 (17 Oct 2020 13:00), Max: 37.1 (16 Oct 2020 23:33)  T(F): 95.7 (17 Oct 2020 13:00), Max: 98.8 (16 Oct 2020 23:33)  HR: 75 (17 Oct 2020 15:00) (67 - 78)  BP: 143/78 (17 Oct 2020 15:00) (114/64 - 159/91)  BP(mean): 95 (17 Oct 2020 15:00) (74 - 116)  ABP: --  ABP(mean): --  RR: 26 (17 Oct 2020 15:00) (18 - 28)  SpO2: 95% (17 Oct 2020 15:00) (95% - 100%)    I&O's Summary    17 Oct 2020 07:01  -  17 Oct 2020 15:52  --------------------------------------------------------  IN: 300 mL / OUT: 910 mL / NET: -610 mL          LABS:                        11.7   6.22  )-----------( 278      ( 17 Oct 2020 00:58 )             36.6     10-17    143  |  108  |  47<H>  ----------------------------<  69<L>  4.7   |  12<L>  |  2.13<H>    Ca    9.7      17 Oct 2020 11:41  Phos  5.1     10-  Mg     2.4     10-    TPro  7.1  /  Alb  3.3<L>  /  TBili  2.5<H>  /  DBili  x   /  AST  76<H>  /  ALT  42  /  AlkPhos  612<H>  10-17    PT/INR - ( 17 Oct 2020 11:41 )   PT: 47.9 sec;   INR: 4.32 ratio         PTT - ( 17 Oct 2020 11:41 )  PTT:37.4 sec  Urinalysis Basic - ( 17 Oct 2020 05:30 )    Color: Yellow / Appearance: Clear / S.025 / pH: x  Gluc: x / Ketone: Trace  / Bili: Negative / Urobili: 4   Blood: x / Protein: 100 / Nitrite: Negative   Leuk Esterase: Negative / RBC: 2-5 /HPF / WBC 0-2 /HPF   Sq Epi: x / Non Sq Epi: Few /HPF / Bacteria: Few /HPF      CAPILLARY BLOOD GLUCOSE      POCT Blood Glucose.: 81 mg/dL (17 Oct 2020 13:24)  POCT Blood Glucose.: 103 mg/dL (16 Oct 2020 23:00)    ABG - ( 17 Oct 2020 11:37 )  pH, Arterial: 7.35  pH, Blood: x     /  pCO2: 18    /  pO2: 119   / HCO3: 10    / Base Excess: -14.0 /  SaO2: 98                  RADIOLOGY & ADDITIONAL TESTS:    Consultant(s) Notes Reviewed:  [x ] YES  [ ] NO    MEDICATIONS  (STANDING):  pantoprazole  Injectable 40 milliGRAM(s) IV Push daily    MEDICATIONS  (PRN):      PHYSICAL EXAM:  GENERAL: well built, well nourished  HEAD:  Atraumatic, Normocephalic  EYES: EOMI, PERRLA, conjunctiva and sclera clear  ENT: No tonsillar erythema, exudates, or enlargement; Moist mucous membranes, Good dentition, No lesions  NECK: Supple, No JVD, Normal thyroid, no enlarged nodes  NERVOUS SYSTEM:  Alert & Oriented X3, Good concentration; Motor Strength 5/5 B/L upper and lower extremities; DTRs 2+ intact and symmetric, sensory intact  CHEST/LUNG: B/L good air entry; No rales, rhonchi, or wheezing  HEART: S1S2 normal, no S3, Regular rate and rhythm; No murmurs, rubs, or gallops  ABDOMEN: Soft, Nontender, Nondistended; Bowel sounds present  EXTREMITIES:  2+ Peripheral Pulses, No clubbing, cyanosis, or edema  LYMPH: No lymphadenopathy noted  SKIN: No rashes or lesions    Care Discussed with Consultants/Other Providers [ x] YES  [ ] NO

## 2020-10-17 NOTE — ED PROVIDER NOTE - CLINICAL SUMMARY MEDICAL DECISION MAKING FREE TEXT BOX
Pt p/w unclear HPI denying SOB (for which she was sent by NH) and c/o ABD pain with diarrhea. LLE edema and otw unremarkable exam.

## 2020-10-17 NOTE — H&P ADULT - ATTENDING COMMENTS
Assessment:  1. Encephalopathy   2. Acute renal failure  3. Lactic acidosis   4. Heart failure with reduced EF   5. Breast cancer  6. Hypertension   7. Assessment:  1. Encephalopathy   2. Acute renal failure  3. Lactic acidosis with anion gap metabolic acidosis   4. Heart failure with reduced EF   5. Breast cancer  6. Hypertension     POCUS perfomed showing hypodynamic LV with distended IVC. Also noted with bilateral B-line pattern and bilateral pleural effusions. Concerning for decompensation of heart failure.     Plan  - Admit to ICu   - Close monitoring of respiratory status.   - S/p IV fluid resuscitation   - CT abdomen grossly unremarkable for perforation  - Concern for low flow state, as patient with cool extremities  - Will plan for diuresis and monitoring urine output  - Hold entresto and other nephrotoxic agents  - Check UA and urine electrolytes  - Hold Eliquis  - CT head unremarkable   - Nephrology and cardiology evaluation   - Surgery consult called in the ED, noted recs   - Mauro catheter placed for monitoring of urine output   - HCP listed Jaki Collazo, call placed but number out of service, called second contact listed Sil Jaquez and updated about clinical condition. Assessment:  1. Encephalopathy   2. Acute renal failure  3. Lactic acidosis with anion gap metabolic acidosis   4. Heart failure with reduced EF   5. Breast cancer  6. Hypertension     POCUS performed showing hypodynamic LV with distended IVC. Also noted with bilateral B-line pattern and bilateral pleural effusions. Concerning for decompensation of heart failure.     Plan  - Admit to ICu   - Close monitoring of respiratory status.   - S/p IV fluid resuscitation   - CT abdomen grossly unremarkable for perforation  - Concern for low flow state, as patient with cool extremities  - Will plan for diuresis and monitoring urine output  - Hold entresto and other nephrotoxic agents  - Check UA and urine electrolytes  - Hold Eliquis  - CT head unremarkable   - Nephrology and cardiology evaluation   - Surgery consult called in the ED, noted recs   - Mauro catheter placed for monitoring of urine output   - HCP listed Jaki Collazo, call placed but number out of service, called second contact listed Sil Jaquez and updated about clinical condition.

## 2020-10-17 NOTE — ED PROVIDER NOTE - PROGRESS NOTE DETAILS
Lactate noted to be increasing, now 12.4, concern for bowel ischemia given ABD pain with benign exam and increasing lactate. C/s ICU and Gen Surg, spoke with both consults at 0645 and they will see pt in the ED. Signing out to Dr Velásquez pending these consults. Pt's VS continue to be stable on the monitor with an unchanged exam. Didier: patient signed out by Dr. Brandon. Pending surgery and ICU eval for dispo. ICU eval and will accept patient for closer monitoring and hydration. Patient with hx of CHF with EF of 10% and likely dehydrated. Increasing lactic acidosis. ICU accepts for admission. Endorsed to Dr. Hobson.

## 2020-10-17 NOTE — ED PROVIDER NOTE - PHYSICAL EXAMINATION
Vital Signs Reviewed  GEN: Comfortable, NAD, AAOx3  HEENT: NCAT, EOMI, MMM, Neck Supple  RESP: CTAB, No rales/rhonchi/wheezing  CV: RRR, S1S2, No murmurs  ABD: No TTP, ND, No masses, No CVA Tenderness  Extrem/Skin: Equal pulses bilat, No cyanosis/rashes, left leg 2+ pitting edema, right leg 1+ pitting edema  Neuro: No focal deficits Vital Signs Reviewed  GEN: Comfortable, NAD  HEENT: NCAT, EOMI, MMM, Neck Supple  RESP: CTAB, No rales/rhonchi/wheezing  CV: RRR, S1S2, No murmurs  ABD: Mild TTP of ABD diffusely without guarding, ND, No masses, No CVA Tenderness  Extrem/Skin: Equal pulses bilat, No cyanosis/rashes, left leg 2+ pitting edema, right leg 1+ pitting edema  Neuro: Moving all extremities

## 2020-10-17 NOTE — H&P ADULT - NSICDXPASTSURGICALHX_GEN_ALL_CORE_FT
PAST SURGICAL HISTORY:  Breast cancer infusaport insertion- 2014- left chest    Fibroid uterus sp hystrectomy 1196    Mchenry filter in place 1994    S/P hysterectomy 1994    S/P lumpectomy, right breast 2014, revision of position- 2015

## 2020-10-17 NOTE — H&P ADULT - ASSESSMENT
65 year old F hx of HFrEF s/p defibrillator, HTN, COPD, presumed PE on AC, IVC filter was send from NH for concern for respiratory distress, pt is found to have anion gap metabolic acidosis, being admitted to the ICU for close monitoring for respiratory status.         Assessment:  Anion gap metabolic acidosis    CHEN  Encephalopathy  Supratherapeutic INR   Presumed PE  HFrEF  COPD  R breast CA   HTN    Plan:    =====================[CNS ]==============================  # Encephalopathy likely metabolic 2/2 uremia    - Avoid sedatives   - CT head negative for any acute intracranial pathology     =====================[CVS ]===============================  # HFrEF (ef 15-20%, grade 3-4 DD)   # Elevated trop 0.108   - s/p defibrillator  - elevated BNP but pt is clinically dry   - will give iv fluids 100cc/hr   - hold lasix and entresto given dehydration and chen   - ct chest did not show any effusion or pul edema    - trend troponin which is likely from demand given dehydration ekg nsr, lad  - cardio Dr. Patterson   =====================[RESP ]==============================  # COPD/ Emphysema   # Presumed PE on Apixaban   - Lungs are clear on exam   - Covid negative in hospital, pt was also tested on 10/14 in NH which was negative as well   - CT chest did not show any infiltrate   - Hold apixaban given elevated INR     =====================[ GI ]================================   - NPO  for now given encephalopathy   - PPI for gi ppx     ====================[ RENAL ]=============================   # Anion gap metabolic acidosis likely from lactatic acidosis   # CHEN   # lactatic acidosis   - Low sodium and elevated osmolarity consistent with dehydration   - c/w IVF   - Repeat BMP, lactate, f/u ABG, F/u urine lytes   - monitor urine output s/p Mauro  - I&Os  - Monitor electrolytes     =====================[ ID ]================================  # No concern of infection     ===================[ HEME/ONC ]===========================  # Elevated INR pt is not on Warfarin   - could be from vitamin k deficiency  - no sings of bleeding  - will just monitor for now    - f/u INR    - Hb and Plt stable   - monitor cbc daily     =====================[ ENDO ]=============================  # DM pt on sliding scale at NH   - A1c 5.9  - target CBG < 180  - FS q6 while NPO  - Start diet when possible    ==================[ PROPHYLAXIS ]==========================   # Dvt : on hold given elevated INR   # Gi : Protonix     ====================[ DISPO ]==============================   - Monitor in ICU     ===================[ PROGNOSIS ]===========================  - Guarded      65 year old F hx of HFrEF s/p defibrillator, HTN, COPD, presumed PE on AC, IVC filter was send from NH for concern for respiratory distress, pt is found to have anion gap metabolic acidosis, being admitted to the ICU for close monitoring for respiratory status.     Assessment:  Anion gap metabolic acidosis    CHEN  Encephalopathy  Supratherapeutic INR   Presumed PE  HFrEF  COPD  R breast CA   HTN    Plan:    =====================[CNS ]==============================  # Encephalopathy likely metabolic 2/2 uremia    - Avoid sedatives   - CT head negative for any acute intracranial pathology     =====================[CVS ]===============================  # HFrEF (ef 15-20%, grade 3-4 DD)   # Elevated trop 0.108   - s/p defibrillator  - elevated BNP but pt is clinically dry   - will give iv fluids 100cc/hr   - hold lasix and entresto given dehydration and chen   - ct chest did not show any effusion or pul edema    - trend troponin which is likely from demand given dehydration ekg nsr, lad  - cardio Dr. Patterson   =====================[RESP ]==============================  # COPD/ Emphysema   # Presumed PE on Apixaban   - Lungs are clear on exam   - Covid negative in hospital, pt was also tested on 10/14 in NH which was negative as well   - CT chest did not show any infiltrate   - Hold apixaban given elevated INR     =====================[ GI ]================================   - NPO  for now given encephalopathy   - PPI for gi ppx     ====================[ RENAL ]=============================   # Anion gap metabolic acidosis likely from lactatic acidosis   # CHEN   # lactatic acidosis   - Low sodium and elevated osmolarity consistent with dehydration   - c/w IVF   - Repeat BMP, lactate, f/u ABG, F/u urine lytes   - monitor urine output s/p Mauro  - I&Os  - Monitor electrolytes     =====================[ ID ]================================  # No concern of infection     ===================[ HEME/ONC ]===========================  # Elevated INR pt is not on Warfarin   - could be from vitamin k deficiency  - no sings of bleeding  - will just monitor for now    - f/u INR    - Hb and Plt stable   - monitor cbc daily     =====================[ ENDO ]=============================  # DM pt on sliding scale at NH   - A1c 5.9  - target CBG < 180  - FS q6 while NPO  - Start diet when possible    ==================[ PROPHYLAXIS ]==========================   # Dvt : on hold given elevated INR   # Gi : Protonix     ====================[ DISPO ]==============================   - Monitor in ICU     ===================[ PROGNOSIS ]===========================  - Guarded

## 2020-10-17 NOTE — ED PROVIDER NOTE - CARE PLAN
Principal Discharge DX:	CHEN (acute kidney injury)   Principal Discharge DX:	CHEN (acute kidney injury)  Secondary Diagnosis:	CHF (congestive heart failure)  Secondary Diagnosis:	Lactic acidosis  Secondary Diagnosis:	Elevated troponin

## 2020-10-17 NOTE — ED PROVIDER NOTE - OBJECTIVE STATEMENT
66 y/o F patient with a significant PMHx of CHF (EF 15-20%), COPD, DM, HTN, HLD and a significant PSHx of linda 3w ago presents to the ED sent in from nursing home with c/o shortness of breath. As per nursing home, patient has been refusing medications and food today. Patient denies any shortness of breath and is only complaining of diffuse abdominal pain and multiple episodes of watery diarrhea. Patient denies any fever, chest pain, shortness of breath, vomiting, recent antibiotics, recent illness or hospitalizations 66 y/o F patient with a significant PMHx of CHF (EF 15-20%), COPD, DM, HTN, HLD and a significant PSHx of linda 3w ago presents to the ED sent in from nursing home with c/o shortness of breath. As per nursing home, patient has been refusing medications and food today. Patient denies any shortness of breath and is only complaining of diffuse abdominal pain and multiple episodes of watery diarrhea. Patient denies any fever, vomiting, chest pain, shortness of breath, focal numbness/weakness, recent antibiotics, other recent illness or hospitalizations

## 2020-10-17 NOTE — PROGRESS NOTE ADULT - ASSESSMENT
65 year old F hx of HFrEF s/p defibrillator, HTN, COPD, presumed PE on AC, IVC filter was send from NH for concern for respiratory distress, pt is found to have anion gap metabolic acidosis, being admitted to the ICU for close monitoring for respiratory status.     Assessment:  Anion gap metabolic acidosis    CHEN  Encephalopathy  Supratherapeutic INR   Presumed PE  HFrEF  COPD  R breast CA   HTN    Plan:    =====================[CNS ]==============================  # Encephalopathy likely metabolic 2/2 uremia    - Avoid sedatives   - CT head negative for any acute intracranial pathology     =====================[CVS ]===============================  # HFrEF (ef 15-20%, grade 3-4 DD)   # Elevated trop 0.108   - s/p defibrillator  - elevated BNP but pt is clinically dry   - will give iv fluids 100cc/hr   - hold lasix and entresto given dehydration and chen   - ct chest did not show any effusion or pul edema    - trend troponin which is likely from demand given dehydration ekg nsr, lad  - cardio Dr. Patterson   =====================[RESP ]==============================  # COPD/ Emphysema   # Presumed PE on Apixaban   - Lungs are clear on exam   - Covid negative in hospital, pt was also tested on 10/14 in NH which was negative as well   - CT chest did not show any infiltrate   - Hold apixaban given elevated INR     =====================[ GI ]================================   - NPO  for now given encephalopathy   - PPI for gi ppx     ====================[ RENAL ]=============================   # Anion gap metabolic acidosis likely from lactatic acidosis   # CHEN   # lactatic acidosis   - Low sodium and elevated osmolarity consistent with dehydration   - c/w IVF   - Repeat BMP, lactate, f/u ABG, F/u urine lytes   - monitor urine output s/p Mauro  - I&Os  - Monitor electrolytes     =====================[ ID ]================================  # No concern of infection     ===================[ HEME/ONC ]===========================  # Elevated INR pt is not on Warfarin   - could be from vitamin k deficiency  - no sings of bleeding  - will just monitor for now    - f/u INR    - Hb and Plt stable   - monitor cbc daily     =====================[ ENDO ]=============================  # DM pt on sliding scale at NH   - A1c 5.9  - target CBG < 180  - FS q6 while NPO  - Start diet when possible    ==================[ PROPHYLAXIS ]==========================   # Dvt : on hold given elevated INR   # Gi : Protonix     ====================[ DISPO ]==============================   - Monitor in ICU     ===================[ PROGNOSIS ]===========================  - Guarded

## 2020-10-18 LAB
ALBUMIN SERPL ELPH-MCNC: 3.2 G/DL — LOW (ref 3.5–5)
ALP SERPL-CCNC: 598 U/L — HIGH (ref 40–120)
ALT FLD-CCNC: 53 U/L DA — SIGNIFICANT CHANGE UP (ref 10–60)
AMMONIA BLD-MCNC: 33 UMOL/L — HIGH (ref 11–32)
AMMONIA BLD-MCNC: 51 UMOL/L — HIGH (ref 11–32)
ANION GAP SERPL CALC-SCNC: 13 MMOL/L — SIGNIFICANT CHANGE UP (ref 5–17)
ANION GAP SERPL CALC-SCNC: 9 MMOL/L — SIGNIFICANT CHANGE UP (ref 5–17)
APTT BLD: 29.8 SEC — SIGNIFICANT CHANGE UP (ref 27.5–35.5)
AST SERPL-CCNC: 106 U/L — HIGH (ref 10–40)
BILIRUB DIRECT SERPL-MCNC: 1.6 MG/DL — HIGH (ref 0–0.2)
BILIRUB INDIRECT FLD-MCNC: 0.6 MG/DL — SIGNIFICANT CHANGE UP (ref 0.2–1)
BILIRUB SERPL-MCNC: 2.2 MG/DL — HIGH (ref 0.2–1.2)
BILIRUB SERPL-MCNC: 2.2 MG/DL — HIGH (ref 0.2–1.2)
BUN SERPL-MCNC: 48 MG/DL — HIGH (ref 7–18)
BUN SERPL-MCNC: 51 MG/DL — HIGH (ref 7–18)
CALCIUM SERPL-MCNC: 8.9 MG/DL — SIGNIFICANT CHANGE UP (ref 8.4–10.5)
CALCIUM SERPL-MCNC: 9.1 MG/DL — SIGNIFICANT CHANGE UP (ref 8.4–10.5)
CHLORIDE SERPL-SCNC: 108 MMOL/L — SIGNIFICANT CHANGE UP (ref 96–108)
CHLORIDE SERPL-SCNC: 109 MMOL/L — HIGH (ref 96–108)
CO2 SERPL-SCNC: 25 MMOL/L — SIGNIFICANT CHANGE UP (ref 22–31)
CO2 SERPL-SCNC: 30 MMOL/L — SIGNIFICANT CHANGE UP (ref 22–31)
CREAT SERPL-MCNC: 1.81 MG/DL — HIGH (ref 0.5–1.3)
CREAT SERPL-MCNC: 1.88 MG/DL — HIGH (ref 0.5–1.3)
ETHANOL SERPL-MCNC: 5 MG/DL — SIGNIFICANT CHANGE UP (ref 0–10)
GLUCOSE BLDC GLUCOMTR-MCNC: 142 MG/DL — HIGH (ref 70–99)
GLUCOSE SERPL-MCNC: 154 MG/DL — HIGH (ref 70–99)
GLUCOSE SERPL-MCNC: 173 MG/DL — HIGH (ref 70–99)
HCT VFR BLD CALC: 33.9 % — LOW (ref 34.5–45)
HGB BLD-MCNC: 11.3 G/DL — LOW (ref 11.5–15.5)
INR BLD: 4.49 RATIO — HIGH (ref 0.88–1.16)
LACTATE SERPL-SCNC: 2.4 MMOL/L — HIGH (ref 0.7–2)
LACTATE SERPL-SCNC: 4.8 MMOL/L — CRITICAL HIGH (ref 0.7–2)
MAGNESIUM SERPL-MCNC: 2.3 MG/DL — SIGNIFICANT CHANGE UP (ref 1.6–2.6)
MCHC RBC-ENTMCNC: 31.6 PG — SIGNIFICANT CHANGE UP (ref 27–34)
MCHC RBC-ENTMCNC: 33.3 GM/DL — SIGNIFICANT CHANGE UP (ref 32–36)
MCV RBC AUTO: 94.7 FL — SIGNIFICANT CHANGE UP (ref 80–100)
NRBC # BLD: 0 /100 WBCS — SIGNIFICANT CHANGE UP (ref 0–0)
PHOSPHATE SERPL-MCNC: 3.1 MG/DL — SIGNIFICANT CHANGE UP (ref 2.5–4.5)
PLATELET # BLD AUTO: 256 K/UL — SIGNIFICANT CHANGE UP (ref 150–400)
POTASSIUM SERPL-MCNC: 3.4 MMOL/L — LOW (ref 3.5–5.3)
POTASSIUM SERPL-MCNC: 4.2 MMOL/L — SIGNIFICANT CHANGE UP (ref 3.5–5.3)
POTASSIUM SERPL-SCNC: 3.4 MMOL/L — LOW (ref 3.5–5.3)
POTASSIUM SERPL-SCNC: 4.2 MMOL/L — SIGNIFICANT CHANGE UP (ref 3.5–5.3)
PROT SERPL-MCNC: 6.8 G/DL — SIGNIFICANT CHANGE UP (ref 6–8.3)
PROTHROM AB SERPL-ACNC: 49.7 SEC — HIGH (ref 10.6–13.6)
RBC # BLD: 3.58 M/UL — LOW (ref 3.8–5.2)
RBC # FLD: 20.5 % — HIGH (ref 10.3–14.5)
SARS-COV-2 IGG SERPL QL IA: NEGATIVE — SIGNIFICANT CHANGE UP
SARS-COV-2 IGM SERPL IA-ACNC: <0.1 INDEX — SIGNIFICANT CHANGE UP
SODIUM SERPL-SCNC: 147 MMOL/L — HIGH (ref 135–145)
SODIUM SERPL-SCNC: 147 MMOL/L — HIGH (ref 135–145)
WBC # BLD: 7.99 K/UL — SIGNIFICANT CHANGE UP (ref 3.8–10.5)
WBC # FLD AUTO: 7.99 K/UL — SIGNIFICANT CHANGE UP (ref 3.8–10.5)

## 2020-10-18 RX ORDER — QUETIAPINE FUMARATE 200 MG/1
25 TABLET, FILM COATED ORAL DAILY
Refills: 0 | Status: DISCONTINUED | OUTPATIENT
Start: 2020-10-18 | End: 2020-10-20

## 2020-10-18 RX ORDER — POTASSIUM CHLORIDE 20 MEQ
40 PACKET (EA) ORAL ONCE
Refills: 0 | Status: COMPLETED | OUTPATIENT
Start: 2020-10-18 | End: 2020-10-18

## 2020-10-18 RX ORDER — FOLIC ACID 0.8 MG
1 TABLET ORAL DAILY
Refills: 0 | Status: DISCONTINUED | OUTPATIENT
Start: 2020-10-18 | End: 2020-10-22

## 2020-10-18 RX ORDER — BUPROPION HYDROCHLORIDE 150 MG/1
150 TABLET, EXTENDED RELEASE ORAL DAILY
Refills: 0 | Status: DISCONTINUED | OUTPATIENT
Start: 2020-10-18 | End: 2020-10-22

## 2020-10-18 RX ORDER — LACTULOSE 10 G/15ML
10 SOLUTION ORAL DAILY
Refills: 0 | Status: DISCONTINUED | OUTPATIENT
Start: 2020-10-18 | End: 2020-10-22

## 2020-10-18 RX ORDER — FUROSEMIDE 40 MG
20 TABLET ORAL ONCE
Refills: 0 | Status: COMPLETED | OUTPATIENT
Start: 2020-10-18 | End: 2020-10-18

## 2020-10-18 RX ORDER — METOPROLOL TARTRATE 50 MG
12.5 TABLET ORAL
Refills: 0 | Status: DISCONTINUED | OUTPATIENT
Start: 2020-10-18 | End: 2020-10-22

## 2020-10-18 RX ORDER — ISOSORBIDE MONONITRATE 60 MG/1
30 TABLET, EXTENDED RELEASE ORAL DAILY
Refills: 0 | Status: DISCONTINUED | OUTPATIENT
Start: 2020-10-18 | End: 2020-10-22

## 2020-10-18 RX ORDER — POTASSIUM CHLORIDE 20 MEQ
20 PACKET (EA) ORAL ONCE
Refills: 0 | Status: DISCONTINUED | OUTPATIENT
Start: 2020-10-18 | End: 2020-10-18

## 2020-10-18 RX ORDER — MULTIVIT-MIN/FERROUS GLUCONATE 9 MG/15 ML
1 LIQUID (ML) ORAL DAILY
Refills: 0 | Status: DISCONTINUED | OUTPATIENT
Start: 2020-10-18 | End: 2020-10-22

## 2020-10-18 RX ORDER — FUROSEMIDE 40 MG
40 TABLET ORAL DAILY
Refills: 0 | Status: DISCONTINUED | OUTPATIENT
Start: 2020-10-18 | End: 2020-10-20

## 2020-10-18 RX ORDER — ASCORBIC ACID 60 MG
500 TABLET,CHEWABLE ORAL DAILY
Refills: 0 | Status: DISCONTINUED | OUTPATIENT
Start: 2020-10-18 | End: 2020-10-22

## 2020-10-18 RX ORDER — HYDRALAZINE HCL 50 MG
25 TABLET ORAL EVERY 8 HOURS
Refills: 0 | Status: DISCONTINUED | OUTPATIENT
Start: 2020-10-18 | End: 2020-10-22

## 2020-10-18 RX ADMIN — Medication 25 MILLIGRAM(S): at 13:28

## 2020-10-18 RX ADMIN — QUETIAPINE FUMARATE 25 MILLIGRAM(S): 200 TABLET, FILM COATED ORAL at 11:25

## 2020-10-18 RX ADMIN — Medication 1 MILLIGRAM(S): at 11:15

## 2020-10-18 RX ADMIN — ISOSORBIDE MONONITRATE 30 MILLIGRAM(S): 60 TABLET, EXTENDED RELEASE ORAL at 11:15

## 2020-10-18 RX ADMIN — Medication 12.5 MILLIGRAM(S): at 17:30

## 2020-10-18 RX ADMIN — Medication 25 MILLIGRAM(S): at 21:38

## 2020-10-18 RX ADMIN — Medication 40 MILLIGRAM(S): at 10:22

## 2020-10-18 RX ADMIN — PANTOPRAZOLE SODIUM 40 MILLIGRAM(S): 20 TABLET, DELAYED RELEASE ORAL at 11:20

## 2020-10-18 RX ADMIN — Medication 20 MILLIGRAM(S): at 06:40

## 2020-10-18 RX ADMIN — Medication 40 MILLIEQUIVALENT(S): at 15:58

## 2020-10-18 RX ADMIN — BUPROPION HYDROCHLORIDE 150 MILLIGRAM(S): 150 TABLET, EXTENDED RELEASE ORAL at 11:25

## 2020-10-18 RX ADMIN — LACTULOSE 10 GRAM(S): 10 SOLUTION ORAL at 11:20

## 2020-10-18 RX ADMIN — Medication 500 MILLIGRAM(S): at 11:14

## 2020-10-18 RX ADMIN — Medication 1 TABLET(S): at 11:14

## 2020-10-18 NOTE — PROGRESS NOTE ADULT - ASSESSMENT
65 year old F hx of HFrEF s/p defibrillator, HTN, COPD, presumed PE on AC, IVC filter was send from NH for concern for respiratory distress, pt is found to have anion gap metabolic acidosis, being admitted to the ICU for close monitoring for respiratory status.     Assessment:  Anion gap metabolic acidosis    CHEN  Encephalopathy  Supratherapeutic INR   Presumed PE  HFrEF  COPD  R breast CA   HTN    Plan:    =====================[CNS ]==============================  # Encephalopathy likely metabolic 2/2 uremia    - Avoid sedatives   - CT head negative for any acute intracranial pathology     =====================[CVS ]===============================  # HFrEF (ef 15-20%, grade 3-4 DD)   # Elevated trop trended down  - s/p defibrillator  - elevated BNP but pt is clinically dry   - will give iv fluids 100cc/hr   - hold lasix and entresto given dehydration and chen   - ct chest did not show any effusion or pul edema    -  ekg nsr, lad  - cardio Dr. Patterson   =====================[RESP ]==============================  # COPD/ Emphysema   # Presumed PE on Apixaban   - Lungs are clear on exam   - Covid negative in hospital, pt was also tested on 10/14 in NH which was negative as well   - CT chest did not show any infiltrate   - Hold apixaban given elevated INR     =====================[ GI ]================================   - NPO  for now given encephalopathy   - PPI for gi ppx     ====================[ RENAL ]=============================   # Anion gap metabolic acidosis likely from lactatic acidosis   # CHEN   # lactatic acidosis   - Low sodium and elevated osmolarity consistent with dehydration   - c/w IVF   - Repeat BMP, lactate, f/u ABG,   - urine lytes shows fena 0.2 pre-renal  - monitor urine output s/p Mauro  - I&Os  - Monitor electrolytes     =====================[ ID ]================================  # No concern of infection     ===================[ HEME/ONC ]===========================  # Elevated INR pt is not on Warfarin   - could be from vitamin k deficiency  - no sings of bleeding  - will just monitor for now    - f/u INR    - Hb and Plt stable   - monitor cbc daily     =====================[ ENDO ]=============================  # DM pt on sliding scale at NH   - A1c 5.9  - target CBG < 180  - FS q6 while NPO  - Start diet when possible    ==================[ PROPHYLAXIS ]==========================   # Dvt : on hold given elevated INR   # Gi : Protonix     ====================[ DISPO ]==============================   - Monitor in ICU     ===================[ PROGNOSIS ]===========================  - Guarded      65 year old F hx of HFrEF s/p defibrillator, HTN, COPD, presumed PE on AC, IVC filter was send from NH for concern for respiratory distress, pt is found to have anion gap metabolic acidosis, being admitted to the ICU for close monitoring for respiratory status.     Assessment:  Anion gap metabolic acidosis    CHEN  Encephalopathy  Supratherapeutic INR   Presumed PE  HFrEF  COPD  R breast CA   HTN    Plan:    =====================[CNS ]==============================  # Encephalopathy likely metabolic 2/2 uremia    - Avoid sedatives   - CT head negative for any acute intracranial pathology     =====================[CVS ]===============================  # HFrEF (ef 15-20%, grade 3-4 DD)   # Elevated trop trended down  - s/p defibrillator  - elevated BNP but pt is clinically dry   - will give iv fluids 100cc/hr   - hold lasix and entresto given dehydration and chen   - ct chest did not show any effusion or pul edema    -  ekg nsr, lad  - restarted metoprolol, isosorbide and hydralazine  - continue lasixs  - holding entresto, statin and apixaban   - cardio Dr. Patterson   =====================[RESP ]==============================  # COPD/ Emphysema   # Presumed PE on Apixaban   - Lungs are clear on exam   - Covid negative in hospital, pt was also tested on 10/14 in NH which was negative as well   - CT chest did not show any infiltrate   - Hold apixaban given elevated INR     =====================[ GI ]================================   - NPO  for now given encephalopathy   - PPI for gi ppx     ====================[ RENAL ]=============================   # Anion gap metabolic acidosis likely from lactic acidosis   # CHEN   # lactic acidosis   - Low sodium and elevated osmolarity consistent with dehydration   - c/w IVF   - Repeat BMP, lactate  - urine lytes shows fena 0.2 pre-renal  - monitor urine output s/p Mauro  - I&Os  - Monitor electrolytes     =====================[ ID ]================================  # No concern of infection     ===================[ HEME/ONC ]===========================  # Elevated INR pt is not on Warfarin   - could be from vitamin k deficiency  - no sings of bleeding  - will just monitor for now    - f/u INR    - Hb and Plt stable   - monitor cbc daily     =====================[ ENDO ]=============================  # DM pt on sliding scale at NH   - A1c 5.9  - target CBG < 180  - FS q6 while NPO  - Start diet when possible    ==================[ PROPHYLAXIS ]==========================   # Dvt : on hold given elevated INR   # Gi : Protonix     ====================[ DISPO ]==============================   - Monitor in ICU     ===================[ PROGNOSIS ]===========================  - Guarded

## 2020-10-18 NOTE — PROGRESS NOTE ADULT - SUBJECTIVE AND OBJECTIVE BOX
Patient is a 65y old  Female who presents with a chief complaint of respiratory distress/AMS/metabolic encephalopathy/Anion gap metabolic acidosis (18 Oct 2020 01:38)/CHEN/high INR/HTN      INTERVAL HPI/OVERNIGHT EVENTS:  T(C): 36 (10-18-20 @ 08:00), Max: 36.2 (10-18-20 @ 00:00)  HR: 75 (10-18-20 @ 08:00) (67 - 80)  BP: 152/110 (10-18-20 @ 08:00) (110/85 - 159/115)  RR: 25 (10-18-20 @ 08:00) (12 - 41)  SpO2: 99% (10-18-20 @ 08:00) (94% - 100%)  Wt(kg): --    LABS:                        11.3   7.99  )-----------( 256      ( 18 Oct 2020 03:54 )             33.9     10-18    147<H>  |  109<H>  |  51<H>  ----------------------------<  154<H>  4.2   |  25  |  1.88<H>    Ca    9.1      18 Oct 2020 03:54  Phos  3.1     10-18  Mg     2.3     10-18    TPro  6.8  /  Alb  3.2<L>  /  TBili  2.2<H>  /  DBili  1.6<H>  /  AST  106<H>  /  ALT  53  /  AlkPhos  598<H>  10-18    PT/INR - ( 18 Oct 2020 03:54 )   PT: 49.7 sec;   INR: 4.49 ratio         PTT - ( 18 Oct 2020 03:54 )  PTT:29.8 sec  Urinalysis Basic - ( 17 Oct 2020 05:30 )    Color: Yellow / Appearance: Clear / S.025 / pH: x  Gluc: x / Ketone: Trace  / Bili: Negative / Urobili: 4   Blood: x / Protein: 100 / Nitrite: Negative   Leuk Esterase: Negative / RBC: 2-5 /HPF / WBC 0-2 /HPF   Sq Epi: x / Non Sq Epi: Few /HPF / Bacteria: Few /HPF      CAPILLARY BLOOD GLUCOSE      POCT Blood Glucose.: 130 mg/dL (17 Oct 2020 23:31)  POCT Blood Glucose.: 101 mg/dL (17 Oct 2020 16:30)  POCT Blood Glucose.: 81 mg/dL (17 Oct 2020 13:24)    ABG - ( 17 Oct 2020 11:37 )  pH, Arterial: 7.35  pH, Blood: x     /  pCO2: 18    /  pO2: 119   / HCO3: 10    / Base Excess: -14.0 /  SaO2: 98                  RADIOLOGY & ADDITIONAL TESTS:    Consultant(s) Notes Reviewed:  [x ] YES  [ ] NO    PHYSICAL EXAM:  GENERAL: well built, well nourished  HEAD:  Atraumatic, Normocephalic  EYES: EOMI, PERRLA, conjunctiva and sclera clear  ENT: No tonsillar erythema, exudates, or enlargement; Moist mucous membranes, Good dentition, No lesions  NECK: Supple, No JVD, Normal thyroid, no enlarged nodes  NERVOUS SYSTEM:  Alert confused, move all limbs  CHEST/LUNG: B/L good air entry; No rales, rhonchi, or wheezing  HEART: S1S2 normal, no S3, Regular rate and rhythm; No murmurs, rubs, or gallops  ABDOMEN: Soft, Nontender, Nondistended; Bowel sounds present  EXTREMITIES:  2+ Peripheral Pulses, No clubbing, cyanosis, or edema  LYMPH: No lymphadenopathy noted  SKIN: No rashes or lesions    Care Discussed with Consultants/Other Providers [ x] YES  [ ] NO

## 2020-10-18 NOTE — PROGRESS NOTE ADULT - SUBJECTIVE AND OBJECTIVE BOX
INTERVAL HPI/OVERNIGHT EVENTS: URINE output 100cc per hr, lactate trending down, bicarb improved, on pocus b- lines.       Pulmonary:    Hematalogic:    Other:  pantoprazole  Injectable 40 milliGRAM(s) IV Push daily    pantoprazole  Injectable 40 milliGRAM(s) IV Push daily    Drug Dosing Weight  Height (cm): 170.2 (16 Oct 2020 22:34)  Weight (kg): 72.5 (17 Oct 2020 09:30)  BMI (kg/m2): 25 (17 Oct 2020 09:30)  BSA (m2): 1.84 (17 Oct 2020 09:30)    CENTRAL LINE: [ ] YES [x ] NO  LOCATION:   DATE INSERTED:  REMOVE: [ ] YES [ ] NO  EXPLAIN:    HARRIS: [x ] YES [ ] NO    DATE INSERTED: 10/17  REMOVE:  [ ] YES [ ] NO  EXPLAIN:    A-LINE:  [ ] YES [ x] NO  LOCATION:   DATE INSERTED:  REMOVE:  [ ] YES [ ] NO  EXPLAIN:    PMH -reviewed admission note, no change since admission    ICU Vital Signs Last 24 Hrs  T(C): 35.6 (17 Oct 2020 19:56), Max: 37.1 (17 Oct 2020 05:30)  T(F): 96 (17 Oct 2020 19:56), Max: 98.8 (17 Oct 2020 05:30)  HR: 78 (17 Oct 2020 23:00) (67 - 80)  BP: 145/100 (17 Oct 2020 23:00) (110/85 - 159/91)  BP(mean): 111 (17 Oct 2020 23:00) (74 - 116)  ABP: --  ABP(mean): --  RR: 14 (17 Oct 2020 23:00) (12 - 35)  SpO2: 95% (17 Oct 2020 23:00) (94% - 100%)      ABG - ( 17 Oct 2020 11:37 )  pH, Arterial: 7.35  pH, Blood: x     /  pCO2: 18    /  pO2: 119   / HCO3: 10    / Base Excess: -14.0 /  SaO2: 98                            PHYSICAL EXAM:    GENERAL: Well-developed, Well nourished , confused  HEAD:  Atraumatic, Normocephalic  EYES: PERRLA, conjunctiva and sclera clear  Mouth: Dry  NECK: Supple, No JVD  CHEST/LUNG: Clear to auscultation bilaterally; No wheeze, Left chest defibrillator without any erythema or tenderness  HEART: Regular rate and rhythm; s1+ s2+  ABDOMEN: Soft, Nontender, Nondistended; Bowel sounds present, Lower abdomen vertical scar, healed laparoscopy scar + on RUQ  EXTREMITIES:, No clubbing, cyanosis, ++ Edema on b/l leg L>R, onychomycosis, cold extremities, + scratch marks on R arm and B/l lower leg     NEUROLOGY:AAOx1-2, knows year , her name and , knows president, but thinks she is in [place]   SKIN:surgical scar marks of  and laproscopic cholycystectomy    LABS:  CBC Full  -  ( 17 Oct 2020 18:09 )  WBC Count : 8.03 K/uL  RBC Count : 3.51 M/uL  Hemoglobin : 11.1 g/dL  Hematocrit : 34.3 %  Platelet Count - Automated : 263 K/uL  Mean Cell Volume : 97.7 fl  Mean Cell Hemoglobin : 31.6 pg  Mean Cell Hemoglobin Concentration : 32.4 gm/dL  Auto Neutrophil # : 5.34 K/uL  Auto Lymphocyte # : 2.19 K/uL  Auto Monocyte # : 0.46 K/uL  Auto Eosinophil # : 0.01 K/uL  Auto Basophil # : 0.01 K/uL  Auto Neutrophil % : 66.6 %  Auto Lymphocyte % : 27.3 %  Auto Monocyte % : 5.7 %  Auto Eosinophil % : 0.1 %  Auto Basophil % : 0.1 %    10-17    146<H>  |  108  |  48<H>  ----------------------------<  143<H>  4.1   |  20<L>  |  2.16<H>    Ca    9.2      17 Oct 2020 18:09  Phos  5.1     10-  Mg     2.4     10-    TPro  7.2  /  Alb  3.2<L>  /  TBili  2.4<H>  /  DBili  x   /  AST  91<H>  /  ALT  50  /  AlkPhos  653<H>  10-17    PT/INR - ( 17 Oct 2020 11:41 )   PT: 47.9 sec;   INR: 4.32 ratio         PTT - ( 17 Oct 2020 11:41 )  PTT:37.4 sec  Urinalysis Basic - ( 17 Oct 2020 05:30 )    Color: Yellow / Appearance: Clear / S.025 / pH: x  Gluc: x / Ketone: Trace  / Bili: Negative / Urobili: 4   Blood: x / Protein: 100 / Nitrite: Negative   Leuk Esterase: Negative / RBC: 2-5 /HPF / WBC 0-2 /HPF   Sq Epi: x / Non Sq Epi: Few /HPF / Bacteria: Few /HPF      CRITICAL CARE TIME SPENT: 35 minutes INTERVAL HPI/OVERNIGHT EVENTS: urine output 100cc per hr, lactate trending down, bicarb improved, on pocus b- lines. Patient's creatinine is improving, urine output improved after administration of Lasix. Patient is awake, but not alert or oriented.     Pulmonary:    Hematalogic:    Other:  pantoprazole  Injectable 40 milliGRAM(s) IV Push daily    pantoprazole  Injectable 40 milliGRAM(s) IV Push daily    Drug Dosing Weight  Height (cm): 170.2 (16 Oct 2020 22:34)  Weight (kg): 72.5 (17 Oct 2020 09:30)  BMI (kg/m2): 25 (17 Oct 2020 09:30)  BSA (m2): 1.84 (17 Oct 2020 09:30)    CENTRAL LINE: [ ] YES [x ] NO  LOCATION:   DATE INSERTED:  REMOVE: [ ] YES [ ] NO  EXPLAIN:    HARRIS: [x ] YES [ ] NO    DATE INSERTED: 10/17  REMOVE:  [ ] YES [ ] NO  EXPLAIN:    A-LINE:  [ ] YES [ x] NO  LOCATION:   DATE INSERTED:  REMOVE:  [ ] YES [ ] NO  EXPLAIN:    PMH -reviewed admission note, no change since admission    ICU Vital Signs Last 24 Hrs  T(C): 35.6 (17 Oct 2020 19:56), Max: 37.1 (17 Oct 2020 05:30)  T(F): 96 (17 Oct 2020 19:56), Max: 98.8 (17 Oct 2020 05:30)  HR: 78 (17 Oct 2020 23:00) (67 - 80)  BP: 145/100 (17 Oct 2020 23:00) (110/85 - 159/91)  BP(mean): 111 (17 Oct 2020 23:00) (74 - 116)  ABP: --  ABP(mean): --  RR: 14 (17 Oct 2020 23:00) (12 - 35)  SpO2: 95% (17 Oct 2020 23:00) (94% - 100%)      ABG - ( 17 Oct 2020 11:37 )  pH, Arterial: 7.35  pH, Blood: x     /  pCO2: 18    /  pO2: 119   / HCO3: 10    / Base Excess: -14.0 /  SaO2: 98        PHYSICAL EXAM:    GENERAL: Well-developed, Well nourished , confused  HEAD:  Atraumatic, Normocephalic  EYES: PERRLA, conjunctiva and sclera clear  Mouth: Dry  NECK: Supple, No JVD  CHEST/LUNG: Clear to auscultation bilaterally; No wheeze, Left chest defibrillator without any erythema or tenderness  HEART: Regular rate and rhythm; s1+ s2+  ABDOMEN: Soft, Nontender, Nondistended; Bowel sounds present, Lower abdomen vertical scar, healed laparoscopy scar + on RUQ  EXTREMITIES:, No clubbing, cyanosis, ++ Edema on b/l leg L>R, onychomycosis, cold extremities, + scratch marks on R arm and B/l lower leg     NEUROLOGY:AAOx1-2, confused, unknown baseline  SKIN:surgical scar marks of  and laproscopic cholycystectomy    LABS:  CBC Full  -  ( 17 Oct 2020 18:09 )  WBC Count : 8.03 K/uL  RBC Count : 3.51 M/uL  Hemoglobin : 11.1 g/dL  Hematocrit : 34.3 %  Platelet Count - Automated : 263 K/uL  Mean Cell Volume : 97.7 fl  Mean Cell Hemoglobin : 31.6 pg  Mean Cell Hemoglobin Concentration : 32.4 gm/dL  Auto Neutrophil # : 5.34 K/uL  Auto Lymphocyte # : 2.19 K/uL  Auto Monocyte # : 0.46 K/uL  Auto Eosinophil # : 0.01 K/uL  Auto Basophil # : 0.01 K/uL  Auto Neutrophil % : 66.6 %  Auto Lymphocyte % : 27.3 %  Auto Monocyte % : 5.7 %  Auto Eosinophil % : 0.1 %  Auto Basophil % : 0.1 %    10-17    146<H>  |  108  |  48<H>  ----------------------------<  143<H>  4.1   |  20<L>  |  2.16<H>    Ca    9.2      17 Oct 2020 18:09  Phos  5.1     10-  Mg     2.4     10-    TPro  7.2  /  Alb  3.2<L>  /  TBili  2.4<H>  /  DBili  x   /  AST  91<H>  /  ALT  50  /  AlkPhos  653<H>  10-    PT/INR - ( 17 Oct 2020 11:41 )   PT: 47.9 sec;   INR: 4.32 ratio         PTT - ( 17 Oct 2020 11:41 )  PTT:37.4 sec  Urinalysis Basic - ( 17 Oct 2020 05:30 )    Color: Yellow / Appearance: Clear / S.025 / pH: x  Gluc: x / Ketone: Trace  / Bili: Negative / Urobili: 4   Blood: x / Protein: 100 / Nitrite: Negative   Leuk Esterase: Negative / RBC: 2-5 /HPF / WBC 0-2 /HPF   Sq Epi: x / Non Sq Epi: Few /HPF / Bacteria: Few /HPF      CRITICAL CARE TIME SPENT: 35 minutes

## 2020-10-18 NOTE — CHART NOTE - NSCHARTNOTEFT_GEN_A_CORE
65 Y F from [facility] has medical hx significant for COPD, emphysema, R breast ca, HTN, HLD, CHF ef 15-20%, G3-4 DD s/p defibrillator, ?DM, Presumed PE on Apixaban and has IVC filter, recent cholecystectomy 3 weeks ago was sent from NH for shortness of breath, pt is not able to provide any information, Called NH spoke to nursing supervisor Yisel Mendoza pt was complaining of sob around 10 pm last night, her o2 sat was 75% and she was wheezing, pt was put on 3L NC and was given Duoneb her o2 sat improved to 94%, pt's blood pressure was elevated to 169/135mmhg on exam at NH. Pt was sent to the hospital for further evaluation. Pt denies any acute complains.      Pt was recently admitted to the [facility] on last week of Sept with complains of abdominal pain during the work up finding where consistent with acute cholecystitis and underwent cholecystectomy during the same admission pt was found to have elevated d-dimer and had nuclear scan which was indeterminant of pulmonary embolism but given cancer hx pt was started on AC with apixaban.       CT Head: No acute intracranial bleeding. No change in rim calcified 15 mm supraclinoid right ICA aneurysm. 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 Advanced chronic microvascular ischemic changes.    CT Chest/Abdomen: No airspace consolidation. Small bilateral pleural effusions. 2.5 cm mass in the right breast, abutting and inseparable from the right pectoralis muscle consistent with known breast carcinoma. No small bowel obstruction or active bowel inflammation. Trace free abdominal and pelvic ascites.    Patient was admitted to ICU for treatment of CHF exacerbation and encephalopathy, she was diuresed with lasix, her lactate improved from 13 > 2.4 . Her creatinine improved, and her anion gap resolved.     Things to follow:  -monitor lactate level  -daily INR, resume Eliquis when INR <2  -monitor mental status    Patient is being down graded to medicine floor 5 south. signed out to Dr Matthews and Resident dr Medina

## 2020-10-18 NOTE — CONSULT NOTE ADULT - SUBJECTIVE AND OBJECTIVE BOX
[facility] : Progress Note :: [phone], (office [phone]),   Dr Naik / Dr Patel / Dr Ramos / Dr Marino / Dr Megan GAMBLE / Dr Elias / Dr Flores / Dr Rogerio arauz  _____________________________________________________________________________________________  Patient is a 65y Female whom presented to the hospital with abdominal pain and SOB.  she has H/O of severe cardiomyopathy LVEF 15-20%, on Apixaban for ?pulmonary embolism HTN.  Alos with underlying proteinuric CKD III baseline SCR ~1.5.  In ED found to have CHEN Serum Creatinine 2.1, ANION GAP METABOLIC ACIDOSIS and hyperlactatenemia.  s/p isotonic fluid rescucitation with improved lactate level and improvement of CHEN.  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 Gutierrez    Cardiomyopathy    Sickle cell trait    DVT (deep venous thrombosis)  Left leg in  after hysterectomy    Hypercholesteremia    Breast cancer  right-s/p chemo and radiation    Hypertension    Breast cancer  infusaport insertion- - left chest    S/P hysterectomy      [place] filter in place      S/P lumpectomy, right breast  , revision of position-     Fibroid uterus  sp hystrectomy 1196      Advil (Hives; Vomiting)    Home Medications Reviewed  Hospital Medications:   MEDICATIONS  (STANDING):  ascorbic acid 500 milliGRAM(s) Oral daily  buPROPion XL . 150 milliGRAM(s) Oral daily  folic acid 1 milliGRAM(s) Oral daily  furosemide   Injectable 40 milliGRAM(s) IV Push daily  hydrALAZINE 25 milliGRAM(s) Oral every 8 hours  isosorbide   mononitrate ER Tablet (IMDUR) 30 milliGRAM(s) Oral daily  lactulose Syrup 10 Gram(s) Oral daily  metoprolol tartrate 12.5 milliGRAM(s) Oral two times a day  multivitamin/minerals 1 Tablet(s) Oral daily  pantoprazole  Injectable 40 milliGRAM(s) IV Push daily  QUEtiapine 25 milliGRAM(s) Oral daily    SOCIAL HISTORY:  Denies ETOh,Smoking,   FAMILY HISTORY:  Family history of glioblastoma    Family history of other condition  please open box and see additional medical family history    Family history of renal failure  uncle    Family history of glioblastoma (Sibling)    History of hypertension  mother    Family history of liver failure    Family history of glioblastoma    Family history of renal stone    Family history of diabetes mellitus    Family history of hypertension        VITALS:  T(F): 96 (10-18-20 @ 12:00), Max: 97.1 (10-18-20 @ 00:00)  HR: 78 (10-18-20 @ 12:00)  BP: 149/111 (10-18-20 @ 12:00)  RR: 0 (10-18-20 @ 12:00)  SpO2: 99% (10-18-20 @ 12:00)  Wt(kg): --    10-17 @ 07:01  -  10-18 @ 07:00  --------------------------------------------------------  IN: 300 mL / OUT: 2060 mL / NET: -1760 mL    10-18 @ 07:01  -  10-18 @ 12:25  --------------------------------------------------------  IN: 0 mL / OUT: 1000 mL / NET: -1000 mL        PHYSICAL EXAM:  Constitutional: NAD  HEENT: anicteric sclera, oropharynx clear.  Neck: No JVD  Respiratory: CTAB, no wheezes, rales or rhonchi  Cardiovascular: S1, S2, RRR  Gastrointestinal: BS+, soft, NT/ND  Extremities:  peripheral edema+  Neurological: A/O x 3, no focal deficits  : de+       LABS:  10-18    147<H>  |  109<H>  |  51<H>  ----------------------------<  154<H>  4.2   |  25  |  1.88<H>    Ca    9.1      18 Oct 2020 03:54  Phos  3.1     10-18  Mg     2.3     10-18    TPro  6.8  /  Alb  3.2<L>  /  TBili  2.2<H>  /  DBili  1.6<H>  /  AST  106<H>  /  ALT  53  /  AlkPhos  598<H>  10-18    Creatinine Trend: 1.88 <--, 2.16 <--, 2.13 <--, 2.22 <--                        11.3   7.99  )-----------( 256      ( 18 Oct 2020 03:54 )             33.9     Urine Studies:  Urinalysis Basic - ( 17 Oct 2020 05:30 )    Color: Yellow / Appearance: Clear / S.025 / pH:   Gluc:  / Ketone: Trace  / Bili: Negative / Urobili: 4   Blood:  / Protein: 100 / Nitrite: Negative   Leuk Esterase: Negative / RBC: 2-5 /HPF / WBC 0-2 /HPF   Sq Epi:  / Non Sq Epi: Few /HPF / Bacteria: Few /HPF      Sodium, Random Urine: 26 mmol/L (10-17 @ 10:17)  Osmolality, Random Urine: 631 mos/kg (10-17 @ 10:17)    RADIOLOGY & ADDITIONAL STUDIES:

## 2020-10-18 NOTE — PROGRESS NOTE ADULT - ATTENDING COMMENTS
Assessment:  1. Encephalopathy   2. Acute renal failure  3. Lactic acidosis with anion gap metabolic acidosis   4. Heart failure with reduced EF - likely decompensation  5. Breast cancer  6. Hypertension   7. Transaminitis - likely congestive hepatopathy    Plan  - CHEN improved with diuresis, will continue for now  - Restart CHF meds, except for entresto given abnormal renal function  - CT abdomen grossly unremarkable for perforation  - Hold Eliquis, now with liver dysfunction and abnormal INR  - Nephrology and cardiology evaluation pending  - Surgery consult called in the ED, noted recs   - Mauro catheter placed for monitoring of urine output   - Psychiatry notes noted from last month, patient's oriented to self and place at baseline, but with paranoid pyschotic features  - Will restart home psych medications today  - Start oral diet  - Repeat labs in PM  - Overall prognosis is guarded given severe LV dysfunction

## 2020-10-18 NOTE — CONSULT NOTE ADULT - ASSESSMENT
#non-oliguric acute kidney injury in a patient with underlying CKD III.  Has severe cardiomyopathy  hypoperfusion from severe CMPATHY  underlying cardiorenal syndrome  CHEN improving  hyperlactatenemia improved with IVF  now on IV lasix  CT chest did not reveal any obstruction.    Recs:  con IVF  RPT BMP in AM  avoid NSAID's and IV contrast media    may thanks for the consult

## 2020-10-19 DIAGNOSIS — C50.919 MALIGNANT NEOPLASM OF UNSPECIFIED SITE OF UNSPECIFIED FEMALE BREAST: ICD-10-CM

## 2020-10-19 DIAGNOSIS — I10 ESSENTIAL (PRIMARY) HYPERTENSION: ICD-10-CM

## 2020-10-19 DIAGNOSIS — R79.89 OTHER SPECIFIED ABNORMAL FINDINGS OF BLOOD CHEMISTRY: ICD-10-CM

## 2020-10-19 DIAGNOSIS — R79.1 ABNORMAL COAGULATION PROFILE: ICD-10-CM

## 2020-10-19 DIAGNOSIS — G93.40 ENCEPHALOPATHY, UNSPECIFIED: ICD-10-CM

## 2020-10-19 DIAGNOSIS — E87.2 ACIDOSIS: ICD-10-CM

## 2020-10-19 DIAGNOSIS — N17.9 ACUTE KIDNEY FAILURE, UNSPECIFIED: ICD-10-CM

## 2020-10-19 DIAGNOSIS — I50.20 UNSPECIFIED SYSTOLIC (CONGESTIVE) HEART FAILURE: ICD-10-CM

## 2020-10-19 DIAGNOSIS — E11.9 TYPE 2 DIABETES MELLITUS WITHOUT COMPLICATIONS: ICD-10-CM

## 2020-10-19 DIAGNOSIS — J44.9 CHRONIC OBSTRUCTIVE PULMONARY DISEASE, UNSPECIFIED: ICD-10-CM

## 2020-10-19 DIAGNOSIS — I50.43 ACUTE ON CHRONIC COMBINED SYSTOLIC (CONGESTIVE) AND DIASTOLIC (CONGESTIVE) HEART FAILURE: ICD-10-CM

## 2020-10-19 DIAGNOSIS — Z29.9 ENCOUNTER FOR PROPHYLACTIC MEASURES, UNSPECIFIED: ICD-10-CM

## 2020-10-19 LAB
ALBUMIN SERPL ELPH-MCNC: 3.2 G/DL — LOW (ref 3.5–5)
ALP SERPL-CCNC: 551 U/L — HIGH (ref 40–120)
ALT FLD-CCNC: 73 U/L DA — HIGH (ref 10–60)
ANION GAP SERPL CALC-SCNC: 10 MMOL/L — SIGNIFICANT CHANGE UP (ref 5–17)
AST SERPL-CCNC: 160 U/L — HIGH (ref 10–40)
BILIRUB SERPL-MCNC: 1.7 MG/DL — HIGH (ref 0.2–1.2)
BUN SERPL-MCNC: 38 MG/DL — HIGH (ref 7–18)
CALCIUM SERPL-MCNC: 9.4 MG/DL — SIGNIFICANT CHANGE UP (ref 8.4–10.5)
CHLORIDE SERPL-SCNC: 106 MMOL/L — SIGNIFICANT CHANGE UP (ref 96–108)
CO2 SERPL-SCNC: 32 MMOL/L — HIGH (ref 22–31)
CREAT SERPL-MCNC: 1.51 MG/DL — HIGH (ref 0.5–1.3)
GLUCOSE BLDC GLUCOMTR-MCNC: 117 MG/DL — HIGH (ref 70–99)
GLUCOSE BLDC GLUCOMTR-MCNC: 142 MG/DL — HIGH (ref 70–99)
GLUCOSE BLDC GLUCOMTR-MCNC: 88 MG/DL — SIGNIFICANT CHANGE UP (ref 70–99)
GLUCOSE SERPL-MCNC: 127 MG/DL — HIGH (ref 70–99)
HCT VFR BLD CALC: 37.2 % — SIGNIFICANT CHANGE UP (ref 34.5–45)
HGB BLD-MCNC: 12.3 G/DL — SIGNIFICANT CHANGE UP (ref 11.5–15.5)
INR BLD: 2.22 RATIO — HIGH (ref 0.88–1.16)
LACTATE SERPL-SCNC: 1.7 MMOL/L — SIGNIFICANT CHANGE UP (ref 0.7–2)
MAGNESIUM SERPL-MCNC: 2.2 MG/DL — SIGNIFICANT CHANGE UP (ref 1.6–2.6)
MCHC RBC-ENTMCNC: 31.6 PG — SIGNIFICANT CHANGE UP (ref 27–34)
MCHC RBC-ENTMCNC: 33.1 GM/DL — SIGNIFICANT CHANGE UP (ref 32–36)
MCV RBC AUTO: 95.6 FL — SIGNIFICANT CHANGE UP (ref 80–100)
NRBC # BLD: 0 /100 WBCS — SIGNIFICANT CHANGE UP (ref 0–0)
PHOSPHATE SERPL-MCNC: 1.8 MG/DL — LOW (ref 2.5–4.5)
PLATELET # BLD AUTO: 244 K/UL — SIGNIFICANT CHANGE UP (ref 150–400)
POTASSIUM SERPL-MCNC: 3.3 MMOL/L — LOW (ref 3.5–5.3)
POTASSIUM SERPL-SCNC: 3.3 MMOL/L — LOW (ref 3.5–5.3)
PROT SERPL-MCNC: 7.2 G/DL — SIGNIFICANT CHANGE UP (ref 6–8.3)
PROTHROM AB SERPL-ACNC: 25.4 SEC — HIGH (ref 10.6–13.6)
RBC # BLD: 3.89 M/UL — SIGNIFICANT CHANGE UP (ref 3.8–5.2)
RBC # FLD: 21.2 % — HIGH (ref 10.3–14.5)
SODIUM SERPL-SCNC: 148 MMOL/L — HIGH (ref 135–145)
WBC # BLD: 8.39 K/UL — SIGNIFICANT CHANGE UP (ref 3.8–10.5)
WBC # FLD AUTO: 8.39 K/UL — SIGNIFICANT CHANGE UP (ref 3.8–10.5)

## 2020-10-19 PROCEDURE — 74018 RADEX ABDOMEN 1 VIEW: CPT | Mod: 26

## 2020-10-19 RX ORDER — APIXABAN 2.5 MG/1
2.5 TABLET, FILM COATED ORAL
Refills: 0 | Status: DISCONTINUED | OUTPATIENT
Start: 2020-10-19 | End: 2020-10-20

## 2020-10-19 RX ORDER — PANTOPRAZOLE SODIUM 20 MG/1
40 TABLET, DELAYED RELEASE ORAL
Refills: 0 | Status: DISCONTINUED | OUTPATIENT
Start: 2020-10-19 | End: 2020-10-22

## 2020-10-19 RX ORDER — POTASSIUM CHLORIDE 20 MEQ
20 PACKET (EA) ORAL ONCE
Refills: 0 | Status: COMPLETED | OUTPATIENT
Start: 2020-10-19 | End: 2020-10-19

## 2020-10-19 RX ADMIN — QUETIAPINE FUMARATE 25 MILLIGRAM(S): 200 TABLET, FILM COATED ORAL at 13:57

## 2020-10-19 RX ADMIN — Medication 40 MILLIGRAM(S): at 05:38

## 2020-10-19 RX ADMIN — Medication 12.5 MILLIGRAM(S): at 05:38

## 2020-10-19 RX ADMIN — Medication 25 MILLIGRAM(S): at 05:38

## 2020-10-19 RX ADMIN — ISOSORBIDE MONONITRATE 30 MILLIGRAM(S): 60 TABLET, EXTENDED RELEASE ORAL at 13:56

## 2020-10-19 RX ADMIN — LACTULOSE 10 GRAM(S): 10 SOLUTION ORAL at 13:57

## 2020-10-19 RX ADMIN — APIXABAN 2.5 MILLIGRAM(S): 2.5 TABLET, FILM COATED ORAL at 18:05

## 2020-10-19 RX ADMIN — Medication 25 MILLIGRAM(S): at 21:43

## 2020-10-19 RX ADMIN — BUPROPION HYDROCHLORIDE 150 MILLIGRAM(S): 150 TABLET, EXTENDED RELEASE ORAL at 13:57

## 2020-10-19 RX ADMIN — Medication 25 MILLIGRAM(S): at 13:56

## 2020-10-19 RX ADMIN — Medication 1 MILLIGRAM(S): at 13:56

## 2020-10-19 RX ADMIN — Medication 1 TABLET(S): at 13:56

## 2020-10-19 RX ADMIN — Medication 500 MILLIGRAM(S): at 13:57

## 2020-10-19 RX ADMIN — Medication 20 MILLIEQUIVALENT(S): at 18:08

## 2020-10-19 RX ADMIN — Medication 12.5 MILLIGRAM(S): at 18:05

## 2020-10-19 NOTE — PROGRESS NOTE ADULT - ASSESSMENT
a 65yoF, w/ PMH of HFrEF s/p defibrillator, HTN, COPD, presumed PE (on AC and s/p IVC filter) was send from NH for concern for respiratory distress, pt is found to have anion gap metabolic acidosis, being admitted to the ICU for close monitoring for respiratory status.     Assessment:  Anion gap metabolic acidosis    CHEN  Encephalopathy  Supratherapeutic INR   Presumed PE  HFrEF  COPD  R breast CA   HTN    Plan:    =====================[CNS ]==============================  # Encephalopathy likely metabolic 2/2 uremia    - Avoid sedatives   - CT head negative for any acute intracranial pathology     =====================[CVS ]===============================  # HFrEF (ef 15-20%, grade 3-4 DD)   # Elevated trop trended down  - s/p defibrillator  - elevated BNP but pt is clinically dry   - will give iv fluids 100cc/hr   - hold lasix and entresto given dehydration and chen   - ct chest did not show any effusion or pul edema    -  ekg nsr, lad  - restarted metoprolol, isosorbide and hydralazine  - continue lasixs  - holding entresto, statin and apixaban   - cardio DrUmm Patterson   =====================[RESP ]==============================  # COPD/ Emphysema   # Presumed PE on Apixaban   - Lungs are clear on exam   - Covid negative in hospital, pt was also tested on 10/14 in NH which was negative as well   - CT chest did not show any infiltrate   - Hold apixaban given elevated INR     =====================[ GI ]================================   - NPO  for now given encephalopathy   - PPI for gi ppx     ====================[ RENAL ]=============================   # Anion gap metabolic acidosis likely from lactic acidosis   # [facility]   # lactic acidosis   - Low sodium and elevated osmolarity consistent with dehydration   - c/w IVF   - Repeat BMP, lactate  - urine lytes shows fena 0.2 pre-renal  - monitor urine output s/p Mauro  - I&Os  - Monitor electrolytes     =====================[ ID ]================================  # No concern of infection     ===================[ HEME/ONC ]===========================  # Elevated INR pt is not on Warfarin   - could be from vitamin k deficiency  - no sings of bleeding  - will just monitor for now    - f/u INR    - Hb and Plt stable   - monitor cbc daily     =====================[ ENDO ]=============================  # DM pt on sliding scale at NH   - A1c 5.9  - target CBG < 180  - FS q6 while NPO  - Start diet when possible    ==================[ PROPHYLAXIS ]==========================   # Dvt : on hold given elevated INR   # Gi : Protonix     ====================[ DISPO ]==============================   - Monitor in ICU     ===================[ PROGNOSIS ]===========================  - Guarded      a 65yoF, w/ PMH of HFrEF s/p defibrillator, HTN, COPD, presumed PE (on AC and s/p IVC filter) was send from NH for concern for respiratory distress, pt is found to have anion gap metabolic acidosis; admitted to the ICU for close monitoring for respiratory status.         =====================[CVS ]===============================  # HFrEF (ef 15-20%, grade 3-4 DD)   # Elevated trop trended down    =====================[RESP ]==============================  # COPD/ Emphysema   # Presumed PE on Apixaban   - Lungs are clear on exam   - Covid negative in hospital, pt was also tested on 10/14 in NH which was negative as well   - CT chest did not show any infiltrate   - Hold apixaban given elevated INR       - PPI for gi ppx     ====================[ RENAL ]=============================   # Anion gap metabolic acidosis likely from lactic acidosis   # CHEN   # lactic acidosis       =====================[ ID ]================================  # No concern of infection     ===================[ HEME/ONC ]===========================  # Elevated INR pt is     =====================[ ENDO ]=============================  # DM pt on sliding scale at NH   - A1c 5.9  - target CBG < 180  - FS q6 while NPO  - Start diet when possible    ==================[ PROPHYLAXIS ]==========================   # Dvt : on hold given elevated INR   # Gi : Protonix     ====================[ DISPO ]==============================   - Monitor in ICU     ===================[ PROGNOSIS ]===========================  - Guarded      a 65yoF, w/ PMH of HFrEF s/p defibrillator, HTN, COPD, presumed PE (on AC and s/p IVC filter) was send from NH for concern for respiratory distress, pt is found to have anion gap metabolic acidosis; admitted to the ICU for close monitoring for respiratory status.         =====================[CVS ]===============================  # HFrEF (ef 15-20%, grade 3-4 DD)   # Elevated trop trended down    =====================[RESP ]==============================  # COPD/ Emphysema   # Presumed PE on Apixaban   - Lungs are clear on exam   - Covid negative in hospital, pt was also tested on 10/14 in NH which was negative as well   -   - Hold apixaban given elevated INR       - PPI for gi ppx     ====================[ RENAL ]=============================   # Anion gap metabolic acidosis likely from lactic acidosis   # CHEN   # lactic acidosis       =====================[ ID ]================================  # No concern of infection     ===================[ HEME/ONC ]===========================  # Elevated INR pt is     =====================[ ENDO ]=============================  # DM pt on sliding scale at NH       ==================[ PROPHYLAXIS ]==========================       ====================[ DISPO ]==============================   - Monitor in ICU     ===================[ PROGNOSIS ]===========================  - Guarded      a 65yoF, w/ PMH of HFrEF s/p defibrillator, HTN, COPD, presumed PE (on AC and s/p IVC filter) was send from NH for concern for respiratory distress, pt is found to have anion gap metabolic acidosis; admitted to the ICU for close monitoring for respiratory status.

## 2020-10-19 NOTE — PROGRESS NOTE ADULT - PROBLEM SELECTOR PLAN 6
- at baseline    - baseline AAO x2-3  - likely metabolic 2/2 uremia    - Avoid sedatives   - CT head negative for any acute intracranial pathology

## 2020-10-19 NOTE — PHARMACOTHERAPY INTERVENTION NOTE - OUTCOME
Ok to schedule colonoscopy for screening.  Suprep    Electronically signed by: Molly North MD  12/23/2019   accepted

## 2020-10-19 NOTE — CONSULT NOTE ADULT - PROBLEM SELECTOR RECOMMENDATION 2
-Patient p/w CHEN, BUN/Cr 41/2.22   -Creatinine improved to 1.51 post IV diuresis , probably cardio renal   -Nephrologist to follow

## 2020-10-19 NOTE — CONSULT NOTE ADULT - SUBJECTIVE AND OBJECTIVE BOX
CHIEF COMPLAINT: shortness of breath     HPI:  65 Y F from [facility] has medical hx significant for COPD, emphysema, R breast ca, HTN, HLD, CHF ef 15-20%, G3-4 DD s/p defibrillator, ?DM, Presumed PE on Apixaban and has IVC filter, recent cholecystectomy 3 weeks ago was sent from NH for shortness of breath, pt is not able to provide any information, Called NH spoke to nursing supervisor Yisel Mendoza pt was complaining of sob around 10 pm last night, her o2 sat was 75% and she was wheezing, pt was put on 3L NC and was given Duoneb her o2 sat improved to 94%, pt's blood pressure was elevated to 169/135mmhg on exam at NH. Pt was sent to the hospital for further evaluation. Pt denies any acute complains.      Pt was recently admitted to the [facility] on last week of Sept with complains of abdominal pain during the work up finding where consistent with acute cholecystitis and underwent cholecystectomy during the same admission pt was found to have elevated d-dimer and had nuclear scan which was indeterminant of pulmonary embolism but given cancer hx pt was started on AC with apixaban.     Brief Hospital Course: Patient was admitted for Acute encephalopathy likely metabolic. As per chart review, she was initially noted to be dry and was given IVF 100cc/hr, on POCUS B lines were seen, hence Lasix PRN was given after which diuresed well and renal function improved. She was down graded from ICU to telemetry floor. Cardiology service consulted for further evaluation.     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 Gutierrez    Cardiomyopathy    Sickle cell trait    DVT (deep venous thrombosis)  Left leg in 1994 after hysterectomy    Hypercholesteremia    Breast cancer  right-s/p chemo and radiation    Hypertension    Breast cancer  infusaport insertion- 2014- left chest    S/P hysterectomy  1994    Gilson filter in place  1994    S/P lumpectomy, right breast  2014, revision of position- 2015    Fibroid uterus  sp hystrectomy 1196        Allergies    Advil (Hives; Vomiting)    Intolerances        MEDICATIONS  (STANDING):  ascorbic acid 500 milliGRAM(s) Oral daily  buPROPion XL . 150 milliGRAM(s) Oral daily  folic acid 1 milliGRAM(s) Oral daily  furosemide   Injectable 40 milliGRAM(s) IV Push daily  hydrALAZINE 25 milliGRAM(s) Oral every 8 hours  isosorbide   mononitrate ER Tablet (IMDUR) 30 milliGRAM(s) Oral daily  lactulose Syrup 10 Gram(s) Oral daily  metoprolol tartrate 12.5 milliGRAM(s) Oral two times a day  multivitamin/minerals 1 Tablet(s) Oral daily  pantoprazole  Injectable 40 milliGRAM(s) IV Push daily  QUEtiapine 25 milliGRAM(s) Oral daily    MEDICATIONS  (PRN):      FAMILY HISTORY:  Family history of glioblastoma    Family history of other condition  please open box and see additional medical family history    Family history of renal failure  uncle    Family history of glioblastoma (Sibling)    History of hypertension  mother    Family history of liver failure    Family history of glioblastoma    Family history of renal stone    Family history of diabetes mellitus    Family history of hypertension      No family history of premature coronary artery disease or sudden cardiac death    SOCIAL HISTORY:  Not available given encephalopathy     REVIEW OF SYSTEMS:  Constitutional: [ ] fever, [ ]weight loss,  [ ]fatigue  Eyes: [ ] visual changes  Respiratory: [ ]shortness of breath;  [ ] cough, [ ]wheezing, [ ]chills, [ ]hemoptysis  Cardiovascular: [ ] chest pain, [ ]palpitations, [ ]dizziness,  [ ]leg swelling [ ]syncope  Gastrointestinal: [ ] abdominal pain, [ ]nausea, [ ]vomiting,  [ ]diarrhea   Genitourinary: [ ] dysuria, [ ] hematuria  Neurologic: [ ] headaches [ ] tremors  [ ] weakness [ ] lightheadedness  Skin: [ ] itching, [ ]burning, [ ] rashes  Endocrine: [ ] heat or cold intolerance  Musculoskeletal: [ ] joint pain or swelling; [ ] muscle, back, or extremity pain  Psychiatric: [ ] depression, [ ]anxiety, [ ]mood swings, or [ ]difficulty sleeping  Hematologic: [ ] easy bruising, [ ] bleeding gums       [ x] All others negative	  [ ] Unable to obtain    Vital Signs Last 24 Hrs  T(C): 36.3 (19 Oct 2020 04:38), Max: 36.4 (18 Oct 2020 22:42)  T(F): 97.3 (19 Oct 2020 04:38), Max: 97.6 (18 Oct 2020 22:42)  HR: 70 (19 Oct 2020 05:30) (54 - 82)  BP: 144/86 (19 Oct 2020 04:38) (123/95 - 160/97)  BP(mean): 106 (18 Oct 2020 20:00) (90 - 124)  RR: 18 (19 Oct 2020 04:38) (0 - 27)  SpO2: 99% (19 Oct 2020 04:38) (95% - 100%)  I&O's Summary    18 Oct 2020 07:01  -  19 Oct 2020 07:00  --------------------------------------------------------  IN: 0 mL / OUT: 3200 mL / NET: -3200 mL        PHYSICAL EXAM:  General: No acute distress  HEENT: EOMI, PERRL  Neck: Supple, No JVD  Lungs: Clear to auscultation bilaterally; No rales or wheezing  Heart: Regular rate and rhythm; No murmurs, rubs, or gallops  Abdomen: Nontender, bowel sounds present  Extremities: No clubbing, cyanosis, or edema  Nervous system:  Alert & Oriented X3, no focal deficits  Psychiatric: Normal affect  Skin: No rashes or lesions      LABS:  10-19    148<H>  |  106  |  38<H>  ----------------------------<  127<H>  3.3<L>   |  32<H>  |  1.51<H>    Ca    9.4      19 Oct 2020 07:06  Phos  1.8     10-19  Mg     2.2     10-19    TPro  7.2  /  Alb  3.2<L>  /  TBili  1.7<H>  /  DBili  x   /  AST  160<H>  /  ALT  73<H>  /  AlkPhos  551<H>  10-19    Creatinine Trend: 1.51<--, 1.81<--, 1.88<--, 2.16<--, 2.13<--, 2.22<--                        12.3   8.39  )-----------( 244      ( 19 Oct 2020 07:06 )             37.2     PT/INR - ( 19 Oct 2020 07:06 )   PT: 25.4 sec;   INR: 2.22 ratio         PTT - ( 18 Oct 2020 03:54 )  PTT:29.8 sec    Lipid Panel:   Cardiac Enzymes: CARDIAC MARKERS ( 17 Oct 2020 11:41 )  0.106 ng/mL / x     / 268 U/L / x     / 2.6 ng/mL            RADIOLOGY:    ECG [my interpretation]:    TELEMETRY:    ECHO:    STRESS TEST:    CATHETERIZATION: CHIEF COMPLAINT: shortness of breath     HPI:  65 Y F from [facility] has medical hx significant for COPD, emphysema, R breast ca, HTN, HLD, CHF ef 15-20%, G3-4 DD s/p defibrillator, ?DM, Presumed PE on Apixaban and has IVC filter, recent cholecystectomy 3 weeks ago was sent from NH for shortness of breath, pt is not able to provide any information, Called NH spoke to nursing supervisor Yisel Mendoza pt was complaining of sob around 10 pm last night, her o2 sat was 75% and she was wheezing, pt was put on 3L NC and was given Duoneb her o2 sat improved to 94%, pt's blood pressure was elevated to 169/135mmhg on exam at NH. Pt was sent to the hospital for further evaluation. Pt denies any acute complains.      [facility] with complains of abdominal pain during the work up finding where consistent with acute cholecystitis and underwent cholecystectomy during the same admission pt was found to have elevated d-dimer and had nuclear scan which was indeterminant of pulmonary embolism but given cancer hx pt was started on AC with apixaban.     Brief Hospital Course: Patient was admitted for Acute encephalopathy likely metabolic. As per chart review, she was initially noted to be dry and was given IVF 100cc/hr, on POCUS B lines were seen, hence Lasix PRN was given after which diuresed well and renal function improved. She was down graded from ICU to telemetry floor. Cardiology service consulted for further evaluation.     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 Gutierrez    Cardiomyopathy    Sickle cell trait    DVT (deep venous thrombosis)  Left leg in 1994 after hysterectomy    Hypercholesteremia    Breast cancer  right-s/p chemo and radiation    Hypertension    Breast cancer  infusaport insertion- 2014- left chest    S/P hysterectomy  1994    Gilson filter in place  1994    S/P lumpectomy, right breast  2014, revision of position- 2015    Fibroid uterus  sp hystrectomy 1196        Allergies    Advil (Hives; Vomiting)    Intolerances        MEDICATIONS  (STANDING):  ascorbic acid 500 milliGRAM(s) Oral daily  buPROPion XL . 150 milliGRAM(s) Oral daily  folic acid 1 milliGRAM(s) Oral daily  furosemide   Injectable 40 milliGRAM(s) IV Push daily  hydrALAZINE 25 milliGRAM(s) Oral every 8 hours  isosorbide   mononitrate ER Tablet (IMDUR) 30 milliGRAM(s) Oral daily  lactulose Syrup 10 Gram(s) Oral daily  metoprolol tartrate 12.5 milliGRAM(s) Oral two times a day  multivitamin/minerals 1 Tablet(s) Oral daily  pantoprazole  Injectable 40 milliGRAM(s) IV Push daily  QUEtiapine 25 milliGRAM(s) Oral daily    MEDICATIONS  (PRN):      FAMILY HISTORY:  Family history of glioblastoma    Family history of other condition  please open box and see additional medical family history    Family history of renal failure  uncle    Family history of glioblastoma (Sibling)    History of hypertension  mother    Family history of liver failure    Family history of glioblastoma    Family history of renal stone    Family history of diabetes mellitus    Family history of hypertension      No family history of premature coronary artery disease or sudden cardiac death    SOCIAL HISTORY:  Not available given encephalopathy     REVIEW OF SYSTEMS:  unable to obtain given poor mental status           Vital Signs Last 24 Hrs  T(C): 36.3 (19 Oct 2020 04:38), Max: 36.4 (18 Oct 2020 22:42)  T(F): 97.3 (19 Oct 2020 04:38), Max: 97.6 (18 Oct 2020 22:42)  HR: 70 (19 Oct 2020 05:30) (54 - 82)  BP: 144/86 (19 Oct 2020 04:38) (123/95 - 160/97)  BP(mean): 106 (18 Oct 2020 20:00) (90 - 124)  RR: 18 (19 Oct 2020 04:38) (0 - 27)  SpO2: 99% (19 Oct 2020 04:38) (95% - 100%)  I&O's Summary    18 Oct 2020 07:01  -  19 Oct 2020 07:00  --------------------------------------------------------  IN: 0 mL / OUT: 3200 mL / NET: -3200 mL        PHYSICAL EXAM:  General: No acute distress  HEENT: EOMI, PERRL  Neck: Supple, No JVD, has left EJ placed   Lungs: Clear to auscultation bilaterally; No rales or wheezing  Heart: Regular rate and rhythm; No murmurs, rubs, or gallops  Abdomen: Nontender, bowel sounds present  Extremities: No clubbing, cyanosis, or edema  Nervous system:  Alert & Oriented X1 to self only, no focal deficits,  Psychiatric: Normal affect  Skin: No rashes or lesions      LABS:  10-19    148<H>  |  106  |  38<H>  ----------------------------<  127<H>  3.3<L>   |  32<H>  |  1.51<H>    Ca    9.4      19 Oct 2020 07:06  Phos  1.8     10-19  Mg     2.2     10-19    TPro  7.2  /  Alb  3.2<L>  /  TBili  1.7<H>  /  DBili  x   /  AST  160<H>  /  ALT  73<H>  /  AlkPhos  551<H>  10-19    Creatinine Trend: 1.51<--, 1.81<--, 1.88<--, 2.16<--, 2.13<--, 2.22<--                        12.3   8.39  )-----------( 244      ( 19 Oct 2020 07:06 )             37.2     PT/INR - ( 19 Oct 2020 07:06 )   PT: 25.4 sec;   INR: 2.22 ratio         PTT - ( 18 Oct 2020 03:54 )  PTT:29.8 sec    Lipid Panel:   Cardiac Enzymes: CARDIAC MARKERS ( 17 Oct 2020 11:41 )  0.106 ng/mL / x     / 268 U/L / x     / 2.6 ng/mL        RADIOLOGY: < from: CT Chest No Cont (10.17.20 @ 05:00) >  No airspace consolidation. Small bilateral pleural effusions.    2.5 cm mass in the right breast, abutting and inseparable from the right pectoralis muscle consistent with known breast carcinoma.    No small bowel obstruction or active bowel inflammation.    Trace free abdominal and pelvic ascites.  < end of copied text >      ECG [my interpretation]: Normal Sinus rhythm     ECHO: < from: Transthoracic Echocardiogram (08.29.20 @ 07:23) >  CONCLUSIONS:  1. Tethered mitral valve leaflets. Moderate to severe  mitral regurgitation.  2. Severely dilated left atrium.  LA volume index = 63  cc/m2.  3. Severe left ventricular enlargement.  4. Severe global left ventricular systolic dysfunction.  Severe diastolic dysfunction (stage III-IV).  5. Normal right ventricular size with decreased RV systolic  function. (TAPSE 1.5 cm)  6. RV systolic pressure is 51 mm Hg. Moderate pulmonary  hypertension.      < end of copied text >

## 2020-10-19 NOTE — PROGRESS NOTE ADULT - ASSESSMENT
65 yr old female from NH, H/O COPD/CHF/AICD/PE(?)/IVC filter  on A/C/right breast CA/old CVA, recent cholecystectomy,  patient admit hospital for respiratory distress/AMS/metabolic encephalopathy/CHEN/acidosis/elevated INR, admit ICU, NPO IV hydration, close  monitor labs, ECHO, 9/20 severe CHF LVEF 15-20 %,  CT head old CVA, ICA aneurysm, CT chest no infiltrate small pleural effusion/right breast CA, lab showed acidosis/with CHEN, elevated INR, all A/C on hold, acidosis improved, elevated LFT, ABD sonogram, fall precaution, psych consult continue restrain !:! for now, if cleared by cardiology D/C tele. po diet

## 2020-10-19 NOTE — PROGRESS NOTE ADULT - PROBLEM SELECTOR PLAN 1
- likely 2/2 lactic acidosis  - Low sodium and elevated osmolarity consistent with dehydration   - c/w IVF   - Repeat BMP, lactate  - urine lytes shows fena 0.2 pre-renal  - monitor urine output s/p Mauro  - I&Os  - Monitor electrolytes - LFT trending up  -  (10/19)  - AST//73 (10/19)  - monitor LFT daily  - f/u hepatic U/S

## 2020-10-19 NOTE — PROGRESS NOTE ADULT - PROBLEM SELECTOR PLAN 5
- s/p defibrillator  - elevated BNP but pt is clinically dry   - will give iv fluids 100cc/hr   - hold lasix and entresto given dehydration and mireille   - ct chest did not show any effusion or pul edema    -  ekg nsr, lad  - restarted metoprolol, isosorbide and hydralazine  - continue lasixs  - holding entresto, statin and apixaban   - cardio Dr. Patterson - clinically stable    - h/o HFrEF  - s/p defibrillator  - BNP noted to be 18064, but pt is clinically dry   - EKG NSR  - CT chest shows no effusion or pulmonary edema    - s/p IVF 100cc/hr   - c/w metoprolol, isosorbide and hydralazine, lasix  - hold entresto    - cardio, Dr. Patterson, onboard - clinically stable    - h/o HFrEF  - s/p defibrillator  - BNP noted to be 41741, but pt is clinically dry   - EKG NSR  - CT chest shows no effusion or pulmonary edema    - s/p IVF 100cc/hr   - c/w metoprolol, isosorbide and hydralazine, lasix  - hold entresto    - cardio, Dr. Patterson, onboard  - on telemonitoring, will d/c tomorrow if cardio clears

## 2020-10-19 NOTE — PROGRESS NOTE ADULT - PROBLEM SELECTOR PLAN 3
- baseline AAO x2-3  - baseline    - likely metabolic 2/2 uremia    - Avoid sedatives   - CT head negative for any acute intracranial pathology - at baseline    - Cr 2.22 on admission  - FeNa 0.2%  - s/p IVF  - monitor I&O's

## 2020-10-19 NOTE — CONSULT NOTE ADULT - PROBLEM SELECTOR RECOMMENDATION 9
Patient p/w shortness of breath and hypoxia   -Pro BNP: 40786  -ECHO Sep 20: EF 15-20% with Decreased RV systolic function.   -Symptoms resolved post IV diuresis in ICU, currently does not appear volume overloaded   -C/w Lasix PRN, metoprolol and hydralazine   -Valerie adjust HF medications on discharge

## 2020-10-19 NOTE — PROGRESS NOTE ADULT - SUBJECTIVE AND OBJECTIVE BOX
Patient is a 65y old  Female who presents with a chief complaint of AMS/metabolic encephalopathy/Anion gap metabolic acidosis (19 Oct 2020 09:12)/acidosis/CHEN/elevated INR, still confusion on restrain       INTERVAL HPI/OVERNIGHT EVENTS:  T(C): 36.3 (10-19-20 @ 04:38), Max: 36.4 (10-18-20 @ 22:42)  HR: 70 (10-19-20 @ 05:30) (54 - 82)  BP: 144/86 (10-19-20 @ 04:38) (123/95 - 160/97)  RR: 18 (10-19-20 @ 04:38) (0 - 27)  SpO2: 99% (10-19-20 @ 04:38) (95% - 100%)  Wt(kg): --    LABS:                        12.3   8.39  )-----------( 244      ( 19 Oct 2020 07:06 )             37.2     10-19    148<H>  |  106  |  38<H>  ----------------------------<  127<H>  3.3<L>   |  32<H>  |  1.51<H>    Ca    9.4      19 Oct 2020 07:06  Phos  1.8     10-19  Mg     2.2     10-19    TPro  7.2  /  Alb  3.2<L>  /  TBili  1.7<H>  /  DBili  x   /  AST  160<H>  /  ALT  73<H>  /  AlkPhos  551<H>  10-19    PT/INR - ( 19 Oct 2020 07:06 )   PT: 25.4 sec;   INR: 2.22 ratio         PTT - ( 18 Oct 2020 03:54 )  PTT:29.8 sec    CAPILLARY BLOOD GLUCOSE      POCT Blood Glucose.: 142 mg/dL (19 Oct 2020 06:47)  POCT Blood Glucose.: 142 mg/dL (18 Oct 2020 23:50)  POCT Blood Glucose.: 173 mg/dL (18 Oct 2020 17:47)  POCT Blood Glucose.: 177 mg/dL (18 Oct 2020 11:31)    ABG - ( 17 Oct 2020 11:37 )  pH, Arterial: 7.35  pH, Blood: x     /  pCO2: 18    /  pO2: 119   / HCO3: 10    / Base Excess: -14.0 /  SaO2: 98                  RADIOLOGY & ADDITIONAL TESTS:    Consultant(s) Notes Reviewed:  [x ] YES  [ ] NO    PHYSICAL EXAM:  GENERAL: well built, well nourished  HEAD:  Atraumatic, Normocephalic  EYES: EOMI, PERRLA, conjunctiva and sclera clear  ENT: No tonsillar erythema, exudates, or enlargement; Moist mucous membranes, Good dentition, No lesions  NECK: Supple, No JVD, Normal thyroid, no enlarged nodes  NERVOUS SYSTEM:  Awake confused  CHEST/LUNG: B/L good air entry; No rales, rhonchi, or wheezing, AICD in place  HEART: S1S2 normal, no S3, Regular rate and rhythm; No murmurs, rubs, or gallops  ABDOMEN: Soft, Nontender, Nondistended; Bowel sounds present  EXTREMITIES:  2+ Peripheral Pulses, No clubbing, cyanosis, or edema  LYMPH: No lymphadenopathy noted  SKIN: No rashes or lesions    Care Discussed with Consultants/Other Providers [ x] YES  [ ] NO

## 2020-10-19 NOTE — PROGRESS NOTE ADULT - SUBJECTIVE AND OBJECTIVE BOX
PGY-1 Progress Note discussed with attending    PAGER #: [[phone]] TILL 5:00 PM  PLEASE CONTACT ON CALL TEAM:  - On Call Team (Please refer to Taryn) FROM 5:00 PM - 8:30PM  - Nightfloat Team FROM 8:30 -7:30 AM    INTERVAL HPI/OVERNIGHT EVENTS:   - Patient is AAO x2, at baseline. She is unable to articulate her symptoms, but denies any pain or discomfort.    REVIEW OF SYSTEMS:  CONSTITUTIONAL: No fever, weight loss, or fatigue  RESPIRATORY: No cough, wheezing, chills or hemoptysis; No shortness of breath  CARDIOVASCULAR: No chest pain, palpitations, dizziness, or leg swelling  GASTROINTESTINAL: No abdominal pain. No nausea, vomiting, or hematemesis; No diarrhea or constipation. No melena or hematochezia.  GENITOURINARY: No dysuria or hematuria, urinary frequency  NEUROLOGICAL: No headaches, memory loss, loss of strength, numbness, or tremors    MEDICATIONS  (STANDING):  ascorbic acid 500 milliGRAM(s) Oral daily  buPROPion XL . 150 milliGRAM(s) Oral daily  folic acid 1 milliGRAM(s) Oral daily  furosemide   Injectable 40 milliGRAM(s) IV Push daily  hydrALAZINE 25 milliGRAM(s) Oral every 8 hours  isosorbide   mononitrate ER Tablet (IMDUR) 30 milliGRAM(s) Oral daily  lactulose Syrup 10 Gram(s) Oral daily  metoprolol tartrate 12.5 milliGRAM(s) Oral two times a day  multivitamin/minerals 1 Tablet(s) Oral daily  pantoprazole    Tablet 40 milliGRAM(s) Oral before breakfast  QUEtiapine 25 milliGRAM(s) Oral daily    MEDICATIONS  (PRN):      Vital Signs Last 24 Hrs  T(C): 36.7 (19 Oct 2020 13:42), Max: 36.7 (19 Oct 2020 13:42)  T(F): 98 (19 Oct 2020 13:42), Max: 98 (19 Oct 2020 13:42)  HR: 72 (19 Oct 2020 13:42) (54 - 82)  BP: 154/79 (19 Oct 2020 13:42) (123/95 - 160/97)  BP(mean): 106 (18 Oct 2020 20:00) (90 - 113)  RR: 17 (19 Oct 2020 13:42) (0 - 27)  SpO2: 95% (19 Oct 2020 13:42) (95% - 100%)    PHYSICAL EXAMINATION:  GENERAL: NAD, AAOx3  HEAD: AT/NC  EYES: conjunctiva and sclera clear  NECK: supple, No JVD noted, Normal thyroid  CHEST/LUNG: CTABL; no rales, rhonchi, wheezing, or rubs  HEART: regular rate and rhythm; no murmurs, rubs, or gallops  ABDOMEN: generalized tenderness to palpation, but soft and nondistended; Bowel sounds present  EXTREMITIES:  2+ Peripheral Pulses, No clubbing, cyanosis, or edema  SKIN: warm dry                          12.3   8.39  )-----------( 244      ( 19 Oct 2020 07:06 )             37.2     10-19    148<H>  |  106  |  38<H>  ----------------------------<  127<H>  3.3<L>   |  32<H>  |  1.51<H>    Ca    9.4      19 Oct 2020 07:06  Phos  1.8     10-19  Mg     2.2     10-19    TPro  7.2  /  Alb  3.2<L>  /  TBili  1.7<H>  /  DBili  x   /  AST  160<H>  /  ALT  73<H>  /  AlkPhos  551<H>  10-19    LIVER FUNCTIONS - ( 19 Oct 2020 07:06 )  Alb: 3.2 g/dL / Pro: 7.2 g/dL / ALK PHOS: 551 U/L / ALT: 73 U/L DA / AST: 160 U/L / GGT: x               PT/INR - ( 19 Oct 2020 07:06 )   PT: 25.4 sec;   INR: 2.22 ratio         PTT - ( 18 Oct 2020 03:54 )  PTT:29.8 sec  COVID-19 PCR: NotDetec (17 Oct 2020 07:09)      CAPILLARY BLOOD GLUCOSE      POCT Blood Glucose.: 117 mg/dL (19 Oct 2020 11:51)  POCT Blood Glucose.: 142 mg/dL (19 Oct 2020 06:47)  POCT Blood Glucose.: 142 mg/dL (18 Oct 2020 23:50)  POCT Blood Glucose.: 173 mg/dL (18 Oct 2020 17:47)      RADIOLOGY & ADDITIONAL TESTS:                   PGY-1 Progress Note discussed with attending    PAGER #: [[phone]] TILL 5:00 PM  PLEASE CONTACT ON CALL TEAM:  - On Call Team (Please refer to Taryn) FROM 5:00 PM - 8:30PM  - Nightfloat Team FROM 8:30 -7:30 AM    INTERVAL HPI/OVERNIGHT EVENTS:   - Patient is AAO x2, at baseline. She is unable to articulate her symptoms, but denies any pain or discomfort.    REVIEW OF SYSTEMS:  CONSTITUTIONAL: No fever, weight loss, or fatigue  RESPIRATORY: No cough, wheezing, chills or hemoptysis; No shortness of breath  CARDIOVASCULAR: No chest pain, palpitations, dizziness, or leg swelling  GASTROINTESTINAL: No abdominal pain. No nausea, vomiting, or hematemesis; No diarrhea or constipation. No melena or hematochezia.  GENITOURINARY: No dysuria or hematuria, urinary frequency  NEUROLOGICAL: No headaches, memory loss, loss of strength, numbness, or tremors    MEDICATIONS  (STANDING):  ascorbic acid 500 milliGRAM(s) Oral daily  buPROPion XL . 150 milliGRAM(s) Oral daily  folic acid 1 milliGRAM(s) Oral daily  furosemide   Injectable 40 milliGRAM(s) IV Push daily  hydrALAZINE 25 milliGRAM(s) Oral every 8 hours  isosorbide   mononitrate ER Tablet (IMDUR) 30 milliGRAM(s) Oral daily  lactulose Syrup 10 Gram(s) Oral daily  metoprolol tartrate 12.5 milliGRAM(s) Oral two times a day  multivitamin/minerals 1 Tablet(s) Oral daily  pantoprazole    Tablet 40 milliGRAM(s) Oral before breakfast  QUEtiapine 25 milliGRAM(s) Oral daily    MEDICATIONS  (PRN):      Vital Signs Last 24 Hrs  T(C): 36.7 (19 Oct 2020 13:42), Max: 36.7 (19 Oct 2020 13:42)  T(F): 98 (19 Oct 2020 13:42), Max: 98 (19 Oct 2020 13:42)  HR: 72 (19 Oct 2020 13:42) (54 - 82)  BP: 154/79 (19 Oct 2020 13:42) (123/95 - 160/97)  BP(mean): 106 (18 Oct 2020 20:00) (90 - 113)  RR: 17 (19 Oct 2020 13:42) (0 - 27)  SpO2: 95% (19 Oct 2020 13:42) (95% - 100%)    PHYSICAL EXAMINATION:  GENERAL: NAD, AAOx2  HEAD: AT/NC  EYES: conjunctiva and sclera clear  NECK: supple, No JVD noted, Normal thyroid  CHEST/LUNG: CTABL; no rales, rhonchi, wheezing, or rubs  HEART: regular rate and rhythm; no murmurs, rubs, or gallops  ABDOMEN: generalized tenderness to palpation, but soft and nondistended; Bowel sounds present  EXTREMITIES:  2+ Peripheral Pulses, No clubbing, cyanosis, or edema  SKIN: warm dry                          12.3   8.39  )-----------( 244      ( 19 Oct 2020 07:06 )             37.2     10-19    148<H>  |  106  |  38<H>  ----------------------------<  127<H>  3.3<L>   |  32<H>  |  1.51<H>    Ca    9.4      19 Oct 2020 07:06  Phos  1.8     10-19  Mg     2.2     10-19    TPro  7.2  /  Alb  3.2<L>  /  TBili  1.7<H>  /  DBili  x   /  AST  160<H>  /  ALT  73<H>  /  AlkPhos  551<H>  10-19    LIVER FUNCTIONS - ( 19 Oct 2020 07:06 )  Alb: 3.2 g/dL / Pro: 7.2 g/dL / ALK PHOS: 551 U/L / ALT: 73 U/L DA / AST: 160 U/L / GGT: x               PT/INR - ( 19 Oct 2020 07:06 )   PT: 25.4 sec;   INR: 2.22 ratio         PTT - ( 18 Oct 2020 03:54 )  PTT:29.8 sec  COVID-19 PCR: NotDetec (17 Oct 2020 07:09)      CAPILLARY BLOOD GLUCOSE      POCT Blood Glucose.: 117 mg/dL (19 Oct 2020 11:51)  POCT Blood Glucose.: 142 mg/dL (19 Oct 2020 06:47)  POCT Blood Glucose.: 142 mg/dL (18 Oct 2020 23:50)  POCT Blood Glucose.: 173 mg/dL (18 Oct 2020 17:47)      RADIOLOGY & ADDITIONAL TESTS:

## 2020-10-19 NOTE — PROGRESS NOTE ADULT - SUBJECTIVE AND OBJECTIVE BOX
[facility] : Progress Note :: [phone], (office [phone]),   Dr Naik / Dr Patel / Dr Ramos / Dr Marino / Dr Megan GAMBLE / Dr Elias / Dr Flores / Dr Rogerio arauz  _____________________________________________________________________________________________    transferred  out of ICU  Advil (Hives; Vomiting)    Hospital Medications:   MEDICATIONS  (STANDING):  ascorbic acid 500 milliGRAM(s) Oral daily  buPROPion XL . 150 milliGRAM(s) Oral daily  folic acid 1 milliGRAM(s) Oral daily  furosemide   Injectable 40 milliGRAM(s) IV Push daily  hydrALAZINE 25 milliGRAM(s) Oral every 8 hours  isosorbide   mononitrate ER Tablet (IMDUR) 30 milliGRAM(s) Oral daily  lactulose Syrup 10 Gram(s) Oral daily  metoprolol tartrate 12.5 milliGRAM(s) Oral two times a day  multivitamin/minerals 1 Tablet(s) Oral daily  pantoprazole    Tablet 40 milliGRAM(s) Oral before breakfast  QUEtiapine 25 milliGRAM(s) Oral daily        VITALS:  T(F): 98 (10-19-20 @ 13:42), Max: 98 (10-19-20 @ 13:42)  HR: 72 (10-19-20 @ 13:42)  BP: 154/79 (10-19-20 @ 13:42)  RR: 17 (10-19-20 @ 13:42)  SpO2: 95% (10-19-20 @ 13:42)  Wt(kg): --    10-18 @ 07:01  -  10-19 @ 07:00  --------------------------------------------------------  IN: 0 mL / OUT: 3200 mL / NET: -3200 mL        PHYSICAL EXAM:  Constitutional: NAD  HEENT: anicteric sclera, oropharynx clear.  Neck: No JVD  Respiratory: CTAB, no wheezes, rales or rhonchi  Cardiovascular: S1, S2, RRR  Gastrointestinal: BS+, soft, NT/ND  Extremities:  No peripheral edema  Neurological: A/O x 3, no focal deficits  : No CVA tenderness. No de.       LABS:  10-19    148<H>  |  106  |  38<H>  ----------------------------<  127<H>  3.3<L>   |  32<H>  |  1.51<H>    Ca    9.4      19 Oct 2020 07:06  Phos  1.8     10-19  Mg     2.2     10-19    TPro  7.2  /  Alb  3.2<L>  /  TBili  1.7<H>  /  DBili      /  AST  160<H>  /  ALT  73<H>  /  AlkPhos  551<H>  10-19    Creatinine Trend: 1.51 <--, 1.81 <--, 1.88 <--, 2.16 <--, 2.13 <--, 2.22 <--                        12.3   8.39  )-----------( 244      ( 19 Oct 2020 07:06 )             37.2     Urine Studies:  Urinalysis Basic - ( 17 Oct 2020 05:30 )    Color: Yellow / Appearance: Clear / S.025 / pH:   Gluc:  / Ketone: Trace  / Bili: Negative / Urobili: 4   Blood:  / Protein: 100 / Nitrite: Negative   Leuk Esterase: Negative / RBC: 2-5 /HPF / WBC 0-2 /HPF   Sq Epi:  / Non Sq Epi: Few /HPF / Bacteria: Few /HPF      Sodium, Random Urine: 26 mmol/L (10-17 @ 10:17)  Osmolality, Random Urine: 631 mos/kg (10-17 @ 10:17)    RADIOLOGY & ADDITIONAL STUDIES:

## 2020-10-19 NOTE — PROGRESS NOTE ADULT - PROBLEM SELECTOR PLAN 4
not on Warfarin   - could be from vitamin k deficiency  - no sings of bleeding  - will just monitor for now    - f/u INR    - Hb and Plt stable   - monitor cbc daily - resolved    - not on Warfarin; no signs of acute bleeding  - possibly 2/2 vitamin k deficiency  - INR 4.31 -> 4.32 -> 4.49 -> 2.22  - monitor INR and CBC daily

## 2020-10-19 NOTE — PROGRESS NOTE ADULT - PROBLEM SELECTOR PLAN 2
- resolved    - likely 2/2 lactic acidosis  - urine sodium 26  - urine osmolarity 631  - anion gap resolved  - c/w IVF   - Monitor electrolytes

## 2020-10-19 NOTE — PROGRESS NOTE ADULT - PROBLEM SELECTOR PLAN 9
IMPROVE VTE Individual Risk Assessment  RISK                                                                Points  [  ] Previous VTE                                                  3  [  ] Thrombophilia                                               2  [  ] Lower limb paralysis                                      2        (unable to hold up >15 seconds)    [  ] Current Cancer                                              2         (within 6 months)  [ x ] Immobilization > 24 hrs                                1  [  ] ICU/CCU stay > 24 hours                              1  [x  ] Age > 60                                                      1  IMPROVE VTE Score ____2_____    eliquis for dvt ppx or previously presumed PE  protonix for GI ppx IMPROVE VTE Individual Risk Assessment  RISK                                                                Points  [  ] Previous VTE                                                  3  [  ] Thrombophilia                                               2  [  ] Lower limb paralysis                                      2        (unable to hold up >15 seconds)    [  ] Current Cancer                                              2         (within 6 months)  [ x ] Immobilization > 24 hrs                                1  [  ] ICU/CCU stay > 24 hours                              1  [x  ] Age > 60                                                      1  IMPROVE VTE Score ____2_____    eliquis 2.5mg bid (Cr >1.5) for dvt ppx or previously presumed PE  protonix for GI ppx

## 2020-10-19 NOTE — CONSULT NOTE ADULT - PROBLEM SELECTOR RECOMMENDATION 3
-Patient was noted to have supra- therapeutic INR on admission   -Patient was on Eliquis for presumed PE  which was held on admission given supra-therapeutic INR   -INR improved to 2.2 this morning -Patient was noted to have supra- therapeutic INR on admission   -Patient was on Eliquis for presumed PE  which was held on admission given supra-therapeutic INR   -INR improved to 2.2 this morning, consider resuming Eliquis

## 2020-10-19 NOTE — PROGRESS NOTE ADULT - ASSESSMENT
#non-oliguric acute kidney injury in a patient with underlying CKD III.  Has severe cardiomyopathy  hypoperfusion from severe cardiomyopathy  underlying cardiorenal syndrome  CHEN improving  hyperlactatenemia improved with IVF  On IV lasix  CT abdomen did not reveal any obstruction.    Recs:  cont IV lasix  potassium repleted

## 2020-10-19 NOTE — CONSULT NOTE ADULT - ASSESSMENT
65 Y F from [facility] has medical hx significant for COPD, emphysema, R breast ca, HTN, HLD, CHF ef 15-20%, G3-4 DD s/p defibrillator, ?DM, Presumed PE on Apixaban and has IVC filter, recent cholecystectomy 3 weeks ago was sent from NH for shortness of breath and altered mental status. 65 Y F from [facility] has medical hx significant for COPD, emphysema, R breast ca, HTN, HLD, CHF ef 15-20%, G3-4 DD s/p defibrillator, ?DM, Presumed PE on Apixaban and has IVC filter, recent cholecystectomy 3 weeks ago was sent from NH for shortness of breath and altered mental status.       CHF exacerbation:  -Patient p/w shortness of breath and hypoxia   -Pro BNP: 52848  -ECHO Sep 20: EF 15-20% with Decreased RV systolic function.   -Symptoms resolved post IV diuresis in ICU, currently does not appear volume overloaded   -C/w Lasix PRN, metoprolol and hydralazine   -Valerie adjust HF medications on discharge 65 Y F from [facility] has medical hx significant for COPD, emphysema, R breast ca, HTN, HLD, CHF ef 15-20%, G3-4 DD s/p defibrillator, ?DM, Presumed PE on Apixaban and has IVC filter, recent cholecystectomy 3 weeks ago was sent from NH for shortness of breath and altered mental status.        Combined Systolic and Diastolic CHF exacerbation:  -Patient p/w shortness of breath and hypoxia   -Pro BNP: 68794  -ECHO Sep 20: EF 15-20% with Decreased RV systolic function.   -Symptoms resolved post IV diuresis in ICU, currently does not appear volume overloaded   -C/w Lasix PRN, metoprolol and hydralazine   -Valerie adjust HF medications on discharge 65 Y F from [facility] has medical hx significant for COPD, emphysema, R breast ca, HTN, HLD, CHF ef 15-20%, G3-4 DD s/p defibrillator, ?DM, Presumed PE on Apixaban and has IVC filter, recent cholecystectomy 3 weeks ago was sent from NH for shortness of breath and altered mental status.

## 2020-10-20 DIAGNOSIS — F32.9 MAJOR DEPRESSIVE DISORDER, SINGLE EPISODE, UNSPECIFIED: ICD-10-CM

## 2020-10-20 DIAGNOSIS — G93.40 ENCEPHALOPATHY, UNSPECIFIED: ICD-10-CM

## 2020-10-20 DIAGNOSIS — F05 DELIRIUM DUE TO KNOWN PHYSIOLOGICAL CONDITION: ICD-10-CM

## 2020-10-20 LAB
ALBUMIN SERPL ELPH-MCNC: 3.1 G/DL — LOW (ref 3.5–5)
ALP SERPL-CCNC: 502 U/L — HIGH (ref 40–120)
ALT FLD-CCNC: 68 U/L DA — HIGH (ref 10–60)
ANION GAP SERPL CALC-SCNC: 7 MMOL/L — SIGNIFICANT CHANGE UP (ref 5–17)
ANION GAP SERPL CALC-SCNC: 8 MMOL/L — SIGNIFICANT CHANGE UP (ref 5–17)
ANION GAP SERPL CALC-SCNC: 8 MMOL/L — SIGNIFICANT CHANGE UP (ref 5–17)
APTT BLD: 29.9 SEC — SIGNIFICANT CHANGE UP (ref 27.5–35.5)
AST SERPL-CCNC: 119 U/L — HIGH (ref 10–40)
BILIRUB SERPL-MCNC: 1.9 MG/DL — HIGH (ref 0.2–1.2)
BUN SERPL-MCNC: 26 MG/DL — HIGH (ref 7–18)
BUN SERPL-MCNC: 27 MG/DL — HIGH (ref 7–18)
BUN SERPL-MCNC: 28 MG/DL — HIGH (ref 7–18)
CALCIUM SERPL-MCNC: 8.9 MG/DL — SIGNIFICANT CHANGE UP (ref 8.4–10.5)
CALCIUM SERPL-MCNC: 9 MG/DL — SIGNIFICANT CHANGE UP (ref 8.4–10.5)
CALCIUM SERPL-MCNC: 9.1 MG/DL — SIGNIFICANT CHANGE UP (ref 8.4–10.5)
CHLORIDE SERPL-SCNC: 104 MMOL/L — SIGNIFICANT CHANGE UP (ref 96–108)
CHLORIDE SERPL-SCNC: 106 MMOL/L — SIGNIFICANT CHANGE UP (ref 96–108)
CHLORIDE SERPL-SCNC: 107 MMOL/L — SIGNIFICANT CHANGE UP (ref 96–108)
CO2 SERPL-SCNC: 30 MMOL/L — SIGNIFICANT CHANGE UP (ref 22–31)
CO2 SERPL-SCNC: 34 MMOL/L — HIGH (ref 22–31)
CO2 SERPL-SCNC: 35 MMOL/L — HIGH (ref 22–31)
CREAT SERPL-MCNC: 1.2 MG/DL — SIGNIFICANT CHANGE UP (ref 0.5–1.3)
CREAT SERPL-MCNC: 1.3 MG/DL — SIGNIFICANT CHANGE UP (ref 0.5–1.3)
CREAT SERPL-MCNC: 1.44 MG/DL — HIGH (ref 0.5–1.3)
GLUCOSE BLDC GLUCOMTR-MCNC: 109 MG/DL — HIGH (ref 70–99)
GLUCOSE BLDC GLUCOMTR-MCNC: 117 MG/DL — HIGH (ref 70–99)
GLUCOSE BLDC GLUCOMTR-MCNC: 137 MG/DL — HIGH (ref 70–99)
GLUCOSE BLDC GLUCOMTR-MCNC: 181 MG/DL — HIGH (ref 70–99)
GLUCOSE BLDC GLUCOMTR-MCNC: 257 MG/DL — HIGH (ref 70–99)
GLUCOSE SERPL-MCNC: 111 MG/DL — HIGH (ref 70–99)
GLUCOSE SERPL-MCNC: 117 MG/DL — HIGH (ref 70–99)
GLUCOSE SERPL-MCNC: 139 MG/DL — HIGH (ref 70–99)
HCT VFR BLD CALC: 42.5 % — SIGNIFICANT CHANGE UP (ref 34.5–45)
HGB BLD-MCNC: 13.3 G/DL — SIGNIFICANT CHANGE UP (ref 11.5–15.5)
INR BLD: 1.81 RATIO — HIGH (ref 0.88–1.16)
MAGNESIUM SERPL-MCNC: 2.1 MG/DL — SIGNIFICANT CHANGE UP (ref 1.6–2.6)
MCHC RBC-ENTMCNC: 30.6 PG — SIGNIFICANT CHANGE UP (ref 27–34)
MCHC RBC-ENTMCNC: 31.3 GM/DL — LOW (ref 32–36)
MCV RBC AUTO: 97.7 FL — SIGNIFICANT CHANGE UP (ref 80–100)
NRBC # BLD: 0 /100 WBCS — SIGNIFICANT CHANGE UP (ref 0–0)
PHOSPHATE SERPL-MCNC: 3 MG/DL — SIGNIFICANT CHANGE UP (ref 2.5–4.5)
PLATELET # BLD AUTO: 234 K/UL — SIGNIFICANT CHANGE UP (ref 150–400)
POTASSIUM SERPL-MCNC: 2.9 MMOL/L — CRITICAL LOW (ref 3.5–5.3)
POTASSIUM SERPL-MCNC: 3 MMOL/L — LOW (ref 3.5–5.3)
POTASSIUM SERPL-MCNC: 4 MMOL/L — SIGNIFICANT CHANGE UP (ref 3.5–5.3)
POTASSIUM SERPL-SCNC: 2.9 MMOL/L — CRITICAL LOW (ref 3.5–5.3)
POTASSIUM SERPL-SCNC: 3 MMOL/L — LOW (ref 3.5–5.3)
POTASSIUM SERPL-SCNC: 4 MMOL/L — SIGNIFICANT CHANGE UP (ref 3.5–5.3)
PROT SERPL-MCNC: 7.5 G/DL — SIGNIFICANT CHANGE UP (ref 6–8.3)
PROTHROM AB SERPL-ACNC: 20.9 SEC — HIGH (ref 10.6–13.6)
RBC # BLD: 4.35 M/UL — SIGNIFICANT CHANGE UP (ref 3.8–5.2)
RBC # FLD: 21 % — HIGH (ref 10.3–14.5)
SODIUM SERPL-SCNC: 145 MMOL/L — SIGNIFICANT CHANGE UP (ref 135–145)
SODIUM SERPL-SCNC: 147 MMOL/L — HIGH (ref 135–145)
SODIUM SERPL-SCNC: 147 MMOL/L — HIGH (ref 135–145)
WBC # BLD: 8.17 K/UL — SIGNIFICANT CHANGE UP (ref 3.8–10.5)
WBC # FLD AUTO: 8.17 K/UL — SIGNIFICANT CHANGE UP (ref 3.8–10.5)

## 2020-10-20 PROCEDURE — 90792 PSYCH DIAG EVAL W/MED SRVCS: CPT

## 2020-10-20 RX ORDER — APIXABAN 2.5 MG/1
5 TABLET, FILM COATED ORAL
Refills: 0 | Status: DISCONTINUED | OUTPATIENT
Start: 2020-10-20 | End: 2020-10-22

## 2020-10-20 RX ORDER — POTASSIUM CHLORIDE 20 MEQ
20 PACKET (EA) ORAL THREE TIMES A DAY
Refills: 0 | Status: DISCONTINUED | OUTPATIENT
Start: 2020-10-20 | End: 2020-10-20

## 2020-10-20 RX ORDER — SENNA PLUS 8.6 MG/1
2 TABLET ORAL AT BEDTIME
Refills: 0 | Status: DISCONTINUED | OUTPATIENT
Start: 2020-10-20 | End: 2020-10-22

## 2020-10-20 RX ORDER — POTASSIUM CHLORIDE 20 MEQ
10 PACKET (EA) ORAL
Refills: 0 | Status: COMPLETED | OUTPATIENT
Start: 2020-10-20 | End: 2020-10-20

## 2020-10-20 RX ORDER — FUROSEMIDE 40 MG
40 TABLET ORAL DAILY
Refills: 0 | Status: DISCONTINUED | OUTPATIENT
Start: 2020-10-21 | End: 2020-10-21

## 2020-10-20 RX ORDER — SACUBITRIL AND VALSARTAN 24; 26 MG/1; MG/1
1 TABLET, FILM COATED ORAL
Refills: 0 | Status: DISCONTINUED | OUTPATIENT
Start: 2020-10-20 | End: 2020-10-22

## 2020-10-20 RX ORDER — ARIPIPRAZOLE 15 MG/1
2 TABLET ORAL
Refills: 0 | Status: DISCONTINUED | OUTPATIENT
Start: 2020-10-20 | End: 2020-10-22

## 2020-10-20 RX ORDER — POTASSIUM CHLORIDE 20 MEQ
10 PACKET (EA) ORAL ONCE
Refills: 0 | Status: DISCONTINUED | OUTPATIENT
Start: 2020-10-20 | End: 2020-10-20

## 2020-10-20 RX ORDER — POTASSIUM CHLORIDE 20 MEQ
10 PACKET (EA) ORAL
Refills: 0 | Status: DISCONTINUED | OUTPATIENT
Start: 2020-10-20 | End: 2020-10-20

## 2020-10-20 RX ORDER — POTASSIUM CHLORIDE 20 MEQ
40 PACKET (EA) ORAL EVERY 4 HOURS
Refills: 0 | Status: COMPLETED | OUTPATIENT
Start: 2020-10-20 | End: 2020-10-20

## 2020-10-20 RX ADMIN — APIXABAN 5 MILLIGRAM(S): 2.5 TABLET, FILM COATED ORAL at 18:04

## 2020-10-20 RX ADMIN — Medication 40 MILLIEQUIVALENT(S): at 16:49

## 2020-10-20 RX ADMIN — SACUBITRIL AND VALSARTAN 1 TABLET(S): 24; 26 TABLET, FILM COATED ORAL at 18:05

## 2020-10-20 RX ADMIN — Medication 12.5 MILLIGRAM(S): at 05:36

## 2020-10-20 RX ADMIN — Medication 40 MILLIEQUIVALENT(S): at 21:19

## 2020-10-20 RX ADMIN — Medication 12.5 MILLIGRAM(S): at 18:05

## 2020-10-20 RX ADMIN — APIXABAN 2.5 MILLIGRAM(S): 2.5 TABLET, FILM COATED ORAL at 05:36

## 2020-10-20 RX ADMIN — PANTOPRAZOLE SODIUM 40 MILLIGRAM(S): 20 TABLET, DELAYED RELEASE ORAL at 05:42

## 2020-10-20 RX ADMIN — Medication 25 MILLIGRAM(S): at 21:19

## 2020-10-20 RX ADMIN — SENNA PLUS 2 TABLET(S): 8.6 TABLET ORAL at 21:19

## 2020-10-20 RX ADMIN — Medication 40 MILLIGRAM(S): at 05:37

## 2020-10-20 RX ADMIN — Medication 25 MILLIGRAM(S): at 05:37

## 2020-10-20 NOTE — PROGRESS NOTE ADULT - PROBLEM SELECTOR PLAN 5
- clinically stable    - h/o HFrEF  - s/p defibrillator  - BNP noted to be 64719, but pt is clinically dry   - EKG NSR  - CT chest shows no effusion or pulmonary edema    - s/p IVF 100cc/hr   - c/w metoprolol, isosorbide and hydralazine, lasix  - hold entresto    - cardio, Dr. Patterson, onboard - resolved    - not on Warfarin; no signs of acute bleeding  - possibly 2/2 vitamin k deficiency  - INR 4.31 -> 4.32 -> 4.49 -> 2.22  - monitor INR and CBC daily

## 2020-10-20 NOTE — BEHAVIORAL HEALTH ASSESSMENT NOTE - PAST PSYCHOTROPIC MEDICATION
Wellbutrin  mg qd prescribed by PMD  Seroquel 25 mg BID prescribed by [facility] CL for delirium in 9/2020

## 2020-10-20 NOTE — PROGRESS NOTE ADULT - SUBJECTIVE AND OBJECTIVE BOX
PGY-1 Progress Note discussed with attending    PAGER #: [[phone]] TILL 5:00 PM  PLEASE CONTACT ON CALL TEAM:  - On Call Team (Please refer to Taryn) FROM 5:00 PM - 8:30PM  - Nightfloat Team FROM 8:30 -7:30 AM    INTERVAL HPI/OVERNIGHT EVENTS:   - Patient reports improved generalized abdominal pain from yesterday. She denies fever, chills, N/V, SOB, and chest pain. She is unable to provide last bowel movement.    REVIEW OF SYSTEMS:  CONSTITUTIONAL: No fever, weight loss, or fatigue  RESPIRATORY: No cough, wheezing, chills or hemoptysis; No shortness of breath  CARDIOVASCULAR: No chest pain, palpitations, dizziness, or leg swelling  GASTROINTESTINAL: complains of generalized bdominal pain. No nausea, vomiting, or hematemesis; No diarrhea or constipation. No melena or hematochezia.  GENITOURINARY: No dysuria or hematuria, urinary frequency  NEUROLOGICAL: No headaches, memory loss, loss of strength, numbness, or tremors    MEDICATIONS  (STANDING):  apixaban 2.5 milliGRAM(s) Oral two times a day  ascorbic acid 500 milliGRAM(s) Oral daily  buPROPion XL . 150 milliGRAM(s) Oral daily  folic acid 1 milliGRAM(s) Oral daily  furosemide   Injectable 40 milliGRAM(s) IV Push daily  hydrALAZINE 25 milliGRAM(s) Oral every 8 hours  isosorbide   mononitrate ER Tablet (IMDUR) 30 milliGRAM(s) Oral daily  lactulose Syrup 10 Gram(s) Oral daily  metoprolol tartrate 12.5 milliGRAM(s) Oral two times a day  multivitamin/minerals 1 Tablet(s) Oral daily  pantoprazole    Tablet 40 milliGRAM(s) Oral before breakfast  QUEtiapine 25 milliGRAM(s) Oral daily    MEDICATIONS  (PRN):      Vital Signs Last 24 Hrs  T(C): 36.3 (20 Oct 2020 05:28), Max: 37 (20 Oct 2020 02:39)  T(F): 97.3 (20 Oct 2020 05:28), Max: 98.6 (20 Oct 2020 02:39)  HR: 70 (20 Oct 2020 05:28) (67 - 78)  BP: 131/72 (20 Oct 2020 05:28) (131/72 - 154/79)  BP(mean): 99 (19 Oct 2020 18:03) (99 - 99)  RR: 17 (20 Oct 2020 05:28) (17 - 18)  SpO2: 98% (20 Oct 2020 05:28) (95% - 98%)    PHYSICAL EXAMINATION:  GENERAL: NAD, AAOx2 (person and time)  HEAD: AT/NC  EYES: conjunctiva and sclera clear  NECK: supple, No JVD noted, Normal thyroid  CHEST/LUNG: CTABL; no rales, rhonchi, wheezing, or rubs  HEART: regular rate and rhythm; no murmurs, rubs, or gallops  ABDOMEN: generalized tenderness to palpation, but soft and nondistended; Bowel sounds present  EXTREMITIES:  2+ Peripheral Pulses, No clubbing, cyanosis, or edema  SKIN: warm dry                          12.3   8.39  )-----------( 244      ( 19 Oct 2020 07:06 )             37.2     10-19    148<H>  |  106  |  38<H>  ----------------------------<  127<H>  3.3<L>   |  32<H>  |  1.51<H>    Ca    9.4      19 Oct 2020 07:06  Phos  1.8     10-19  Mg     2.2     10-19    TPro  7.2  /  Alb  3.2<L>  /  TBili  1.7<H>  /  DBili  x   /  AST  160<H>  /  ALT  73<H>  /  AlkPhos  551<H>  10-19    LIVER FUNCTIONS - ( 19 Oct 2020 07:06 )  Alb: 3.2 g/dL / Pro: 7.2 g/dL / ALK PHOS: 551 U/L / ALT: 73 U/L DA / AST: 160 U/L / GGT: x               PT/INR - ( 19 Oct 2020 07:06 )   PT: 25.4 sec;   INR: 2.22 ratio           COVID-19 PCR: NotDetec (17 Oct 2020 07:09)      CAPILLARY BLOOD GLUCOSE      POCT Blood Glucose.: 117 mg/dL (20 Oct 2020 06:19)  POCT Blood Glucose.: 109 mg/dL (20 Oct 2020 00:18)  POCT Blood Glucose.: 88 mg/dL (19 Oct 2020 19:11)  POCT Blood Glucose.: 117 mg/dL (19 Oct 2020 11:51)      RADIOLOGY & ADDITIONAL TESTS:

## 2020-10-20 NOTE — BEHAVIORAL HEALTH ASSESSMENT NOTE - HPI (INCLUDE ILLNESS QUALITY, SEVERITY, DURATION, TIMING, CONTEXT, MODIFYING FACTORS, ASSOCIATED SIGNS AND SYMPTOMS)
65F, single and normally living alone in [facility] without services, with PHx of delirium during past admissions to this hospital, known to writer from consult for delirium on 9/8/20 during previous admission here, and MHx of 50 pack year smoking with DM, HTN, HLD, CVA, breast CA s/p chemo/XRT, CHF with EF 15-20% a/o 9/2020, G3-4 DD s/p defibrillator, presumed PE on Apixaban and has IVC filter, SHx of lap linda 9/28/20, sent from [facility] ([facility]) on 10/17 c/o shortness of breath and altered mental status and admitted for management of CHF exacerbation and COPD. Psych consult was requested for assistance in managing delirium, after primary team reported that on 10/19 pt had become agitated and was trying to pull out her lines (since resolved). Pt seen in her room twice for eval, once in mid-morning and again in the afternoon. On first encounter, pt presents quite drowsy and oriented only to self; she appears not to know where she is currently or where she had been staying immediately PTA (ie pt reports she had been living at home, when she was at [facility] where she had been dispo'd following her lap linda in late September). On second encounter, pt is slightly more alert, but still very poorly oriented (she gives the date as 12/31/2020 and [facility] Pt seems very surprised when informed that she is in [facility] and had come from [facility], both in [place]. Pt readily endorses feeling confused, stating "I don't really know what's going on" and expressing mild paranoia about her care ("no one will give me a straight explanation of why I am here or what they are doing for me").     Between first and second encounters, MD spoke by phone to pt's cousin/HCP Jaki Currie, who is known to MD from conversation during pt's admission in early September. Per HCP, after being DC'd from that admission, pt went home with plan to receive home PT, but this proved to be impossible due to the cluttered conditions in pt's apartment. HCP continued to check in with pt by phone, and called 911 when pt sounded acutely ill around 9/25; she was brought to this hospital and found to have acute cholecystitis which was tx with IV ABX and lap linda. After that admission, pt was dispo'd to [facility] for [facility] so she could receive PT there, but was brought here on 10/17 from [facility] for acute-onset SOB and was admitted for management of CHF exacerbation. Per HCP, pt's mental status appeared to deteriorate significantly several days into [facility] admission, and she expressed concern that pt's antipsychotic medication (Seroquel, which was started during early September admission for management of delirium) could be contributing to her somnolence and confusion. HCP spoke to [facility] psychiatrist who agreed to DC Seroquel, but pt began to have return of behavioral sx (agitation) so Seroquel was reinstated at 25 mg qd (half of previous dose). MD recommends DC'ing Seroquel and substituting Abilify 2 mg qd with goal of managing pt's delirium with less sedation. HCP agrees with plan. 65F, single and normally living alone in [facility] without services, with PHx of delirium during past admissions to this hospital, known to writer from consult for delirium on 9/8/20 during previous admission here, and MHx of 50 pack year smoking with DM, HTN, HLD, CVA, breast CA s/p chemo/XRT, CHF with EF 15-20% a/o 9/2020, G3-4 DD s/p defibrillator, presumed PE on Apixaban and has IVC filter, SHx of lap linda 9/28/20, sent from [facility] ([facility]) on 10/17 c/o shortness of breath and altered mental status and admitted for management of CHF exacerbation and COPD. Psych consult was requested for assistance in managing delirium, after primary team reported that on 10/19 pt had become agitated and was trying to pull out her lines (since resolved). Pt seen in her room twice for eval, once in mid-morning and again in the afternoon. On first encounter, pt presents quite drowsy and oriented only to self; she appears not to know where she is currently or where she had been staying immediately PTA (ie pt reports she had been living at home, when she was at [facility] where she had been dispo'd following her lap linda in late September). On second encounter, pt is slightly more alert, but still very poorly oriented (she gives the date as 12/31/2020 and [facility] Pt seems very surprised when informed that she is in [facility] and had come from [facility], both in [place]. Pt readily endorses feeling confused, stating "I don't really know what's going on" and expressing mild paranoia about her care ("no one will give me a straight explanation of why I am here or what they are doing for me"). MD reorients pt to date and circumstances of her admission and recommends switching pt's antipsychotic from Seroquel to Abilify to address delirium with less sedation. Pt agrees.    Between first and second encounters, MD spoke by phone to pt's cousin/HCP Jaki Currie, who is known to MD from conversation during pt's admission in early September. Per HCP, after being DC'd from that admission, pt went home with plan to receive home PT, but this proved to be impossible due to the cluttered conditions in pt's apartment. HCP continued to check in with pt by phone, and called 911 when pt sounded acutely ill around 9/25; she was brought to this hospital and found to have acute cholecystitis which was tx with IV ABX and lap linda. After that admission, pt was dispo'd to [facility] for [facility] so she could receive PT there, but was brought here on 10/17 from [facility] for acute-onset SOB and was admitted for management of CHF exacerbation. Per HCP, pt's mental status appeared to deteriorate significantly several days into [facility] admission, and she expressed concern that pt's antipsychotic medication (Seroquel, which was started during early September admission for management of delirium) could be contributing to her somnolence and confusion. HCP spoke to [facility] psychiatrist who agreed to DC Seroquel, but pt began to have return of behavioral sx (agitation) so Seroquel was reinstated at 25 mg qd (half of previous dose). MD recommends DC'ing Seroquel and substituting Abilify 2 mg qd with goal of managing pt's delirium with less sedation. HCP agrees with plan.

## 2020-10-20 NOTE — BEHAVIORAL HEALTH ASSESSMENT NOTE - NSBHCONSULTRECOMMENDOTHER_PSY_A_CORE FT
1. For management of acute delirium, recommend DC'ing Seroquel 25 mg qd due to excessive sedation and substituting Abilify 2 mg qd, to be titrated for effect  2. C/w home Wellbutrin  mg qd  3. No indication for other standing or PRN psychotropic medications at this time  4. Medical management as directed by primary team  5. Pt is NOT yet psych cleared for DC, as her delirium needs to be better managed prior to dispo  6. Psychiatry will continue to follow  7. Case d/w Dr. Najera of primary team    Danyelle Moya MD  Director, Consultation-Liaison Psychiatry Service  [phone]

## 2020-10-20 NOTE — PROGRESS NOTE ADULT - ASSESSMENT
65 yr old female from NH, H/O COPD/CHF/AICD/PE(?)/IVC filter  on A/C/right breast CA/old CVA, recent cholecystectomy,  patient admit hospital for respiratory distress/AMS/metabolic encephalopathy/CHEN/acidosis/elevated INR, admit ICU, NPO IV hydration, close  monitor labs, ECHO, 9/20 severe CHF LVEF 15-20 %,  CT head old CVA, ICA aneurysm, CT chest no infiltrate small pleural effusion/right breast CA, lab showed acidosis/with CHEN, elevated INR, all A/C on hold, acidosis improved, elevated LFT, trending LFT today, 1:1 watch discontinued patient is calm, D/C planning, PT eval. Fall precaution

## 2020-10-20 NOTE — PROGRESS NOTE ADULT - PROBLEM SELECTOR PLAN 6
- at baseline    - baseline AAO x2-3  - likely metabolic 2/2 uremia    - Avoid sedatives   - CT head negative for any acute intracranial pathology - clinically stable    - h/o HFrEF  - s/p defibrillator  - BNP [place] be 75098, but pt is clinically dry   - EKG NSR  - CT chest shows no effusion or pulmonary edema    - s/p IVF 100cc/hr   - c/w metoprolol, isosorbide and hydralazine, lasix  - hold entresto    - cardio, Dr. Patterson, onboard

## 2020-10-20 NOTE — PROGRESS NOTE ADULT - PROBLEM SELECTOR PLAN 8
- h/o DM  - a1c 5.9  - c/w HSS qACHs  - accucheck qACHs - h/o HTN  - c/w metoprolol, isosorbide and hydralazine, lasix

## 2020-10-20 NOTE — BEHAVIORAL HEALTH ASSESSMENT NOTE - NSBHCHARTREVIEWVS_PSY_A_CORE FT
Vital Signs Last 24 Hrs  T(C): 36.6 (20 Oct 2020 13:56), Max: 37.1 (20 Oct 2020 10:14)  T(F): 97.8 (20 Oct 2020 13:56), Max: 98.7 (20 Oct 2020 10:14)  HR: 61 (20 Oct 2020 13:56) (61 - 78)  BP: 123/92 (20 Oct 2020 13:56) (123/92 - 140/78)  BP(mean): 99 (19 Oct 2020 18:03) (99 - 99)  RR: 16 (20 Oct 2020 13:56) (16 - 18)  SpO2: 94% (20 Oct 2020 13:56) (94% - 98%)

## 2020-10-20 NOTE — PROGRESS NOTE ADULT - ASSESSMENT
a 65yoF, from [facility], w/ PMH of HFrEF s/p defibrillator, HTN, COPD, breast CA, HLD, and presumed PE (on AC and s/p IVC filter) was send from NH for concern for respiratory distress, pt is found to have anion gap metabolic acidosis. Initially, admitted to the ICU for close monitoring for respiratory status.

## 2020-10-20 NOTE — BEHAVIORAL HEALTH ASSESSMENT NOTE - SUICIDE PROTECTIVE FACTORS
Responsibility to family and others/Has future plans/L[employer] beliefs/Identifies reasons for living/Supportive social network of family or friends

## 2020-10-20 NOTE — PROGRESS NOTE ADULT - ASSESSMENT
65 Y F from [facility] has medical hx significant for COPD, emphysema, R breast ca, HTN, HLD, CHF ef 15-20%, G3-4 DD s/p defibrillator, ?DM, Presumed PE on Apixaban and has IVC filter, recent cholecystectomy 3 weeks ago was sent from NH for shortness of breath and altered mental status.

## 2020-10-20 NOTE — PROGRESS NOTE ADULT - PROBLEM SELECTOR PLAN 7
- h/o HTN  - c/w metoprolol, isosorbide and hydralazine, lasix - at baseline    - baseline AAO x2-3  - likely metabolic 2/2 uremia    - Avoid sedatives   - CT head negative for any acute intracranial pathology

## 2020-10-20 NOTE — PROGRESS NOTE ADULT - PROBLEM SELECTOR PLAN 1
- LFT trending up  -  (10/19)  - AST//73 (10/19)  - CT abdomen shows no liver pathology  - monitor LFT daily

## 2020-10-20 NOTE — PROGRESS NOTE ADULT - PROBLEM SELECTOR PLAN 3
Patient was noted to have supra- therapeutic INR on admission   -Patient was on Eliquis for presumed PE  which was held on admission given supra-therapeutic INR   -INR improved , c/w Eliquis

## 2020-10-20 NOTE — BEHAVIORAL HEALTH ASSESSMENT NOTE - RISK ASSESSMENT
Low Acute Suicide Risk Risk factors: Older age, lives alone, multiple medical problems, delirium. Protective factors: Absent h/o SI/SA/SIB, stable domicile, supportive cousin/HCP, attachment to independence, help seeking. Risk level appears low at the time of assessment.

## 2020-10-20 NOTE — PROGRESS NOTE ADULT - PROBLEM SELECTOR PLAN 4
- resolved    - not on Warfarin; no signs of acute bleeding  - possibly 2/2 vitamin k deficiency  - INR 4.31 -> 4.32 -> 4.49 -> 2.22  - monitor INR and CBC daily - at baseline    - Cr 2.22 on admission  - FeNa 0.2%  - s/p IVF  - monitor I&O's

## 2020-10-20 NOTE — PROGRESS NOTE ADULT - PROBLEM SELECTOR PLAN 2
Patient p/w CHEN, BUN/Cr 41/2.22   -Creatinine improved to 1.51 post IV diuresis , probably cardio renal   -Nephrologist to follow.

## 2020-10-20 NOTE — PROGRESS NOTE ADULT - SUBJECTIVE AND OBJECTIVE BOX
[facility] : Progress Note :: [phone], (office [phone]),   Dr Naik / Dr Patel / Dr Ramos / Dr Marino / Dr Megan GAMBLE / Dr Elias / Dr Flores / Dr Rogerio arauz  _____________________________________________________________________________________________    denies any complains    Advil (Hives; Vomiting)    Hospital Medications:   MEDICATIONS  (STANDING):  apixaban 5 milliGRAM(s) Oral two times a day  ARIPiprazole 2 milliGRAM(s) Oral <User Schedule>  ascorbic acid 500 milliGRAM(s) Oral daily  buPROPion XL . 150 milliGRAM(s) Oral daily  folic acid 1 milliGRAM(s) Oral daily  hydrALAZINE 25 milliGRAM(s) Oral every 8 hours  isosorbide   mononitrate ER Tablet (IMDUR) 30 milliGRAM(s) Oral daily  lactulose Syrup 10 Gram(s) Oral daily  metoprolol tartrate 12.5 milliGRAM(s) Oral two times a day  multivitamin/minerals 1 Tablet(s) Oral daily  pantoprazole    Tablet 40 milliGRAM(s) Oral before breakfast  potassium chloride   Powder 40 milliEquivalent(s) Oral every 4 hours  potassium chloride  10 mEq/100 mL IVPB 10 milliEquivalent(s) IV Intermittent every 1 hour  sacubitril 24 mG/valsartan 26 mG 1 Tablet(s) Oral two times a day  senna 2 Tablet(s) Oral at bedtime      VITALS:  T(F): 97.8 (10-20-20 @ 13:56), Max: 98.7 (10-20-20 @ 10:14)  HR: 61 (10-20-20 @ 13:56)  BP: 123/92 (10-20-20 @ 13:56)  RR: 16 (10-20-20 @ 13:56)  SpO2: 94% (10-20-20 @ 13:56)  Wt(kg): --    10-19 @ 07:01  -  10-20 @ 07:00  --------------------------------------------------------  IN: 0 mL / OUT: 200 mL / NET: -200 mL        PHYSICAL EXAM:  Constitutional: NAD  HEENT: anicteric sclera, oropharynx clear.  Neck: No JVD  Respiratory: CTAB, no wheezes, rales or rhonchi  Cardiovascular: S1, S2, RRR  Gastrointestinal: BS+, soft, NT/ND  Extremities:  No peripheral edema  Neurological: A/O x 3, no focal deficits  : No CVA tenderness. No de.       LABS:  10-20    147<H>  |  106  |  27<H>  ----------------------------<  111<H>  3.0<L>   |  34<H>  |  1.30    Ca    8.9      20 Oct 2020 15:39  Phos  3.0     10-20  Mg     2.1     10-20    TPro  7.5  /  Alb  3.1<L>  /  TBili  1.9<H>  /  DBili      /  AST  119<H>  /  ALT  68<H>  /  AlkPhos  502<H>  10-20    Creatinine Trend: 1.30 <--, 1.20 <--, 1.51 <--, 1.81 <--, 1.88 <--, 2.16 <--, 2.13 <--, 2.22 <--                        13.3   8.17  )-----------( 234      ( 20 Oct 2020 10:45 )             42.5     Urine Studies:  Urinalysis Basic - ( 17 Oct 2020 05:30 )    Color: Yellow / Appearance: Clear / S.025 / pH:   Gluc:  / Ketone: Trace  / Bili: Negative / Urobili: 4   Blood:  / Protein: 100 / Nitrite: Negative   Leuk Esterase: Negative / RBC: 2-5 /HPF / WBC 0-2 /HPF   Sq Epi:  / Non Sq Epi: Few /HPF / Bacteria: Few /HPF      Sodium, Random Urine: 26 mmol/L (10-17 @ 10:17)  Osmolality, Random Urine: 631 mos/kg (10-17 @ 10:17)    RADIOLOGY & ADDITIONAL STUDIES:

## 2020-10-20 NOTE — PROGRESS NOTE ADULT - PROBLEM SELECTOR PLAN 1
Patient p/w shortness of breath and hypoxia   -Pro BNP: 66708  -ECHO Sep 20: EF 15-20% with Decreased RV systolic function.   -Symptoms resolved post IV diuresis in ICU, currently does not appear volume overloaded   -Decrease Lasix to 40 daily  metoprolol and hydralazine   -Resume Imdur and Entresto

## 2020-10-20 NOTE — PROGRESS NOTE ADULT - PROBLEM SELECTOR PLAN 3
- at baseline    - Cr 2.22 on admission  - FeNa 0.2%  - s/p IVF  - monitor I&O's - resolved    - likely 2/2 lactic acidosis  - urine sodium 26  - urine osmolarity 631  - anion gap resolved  - s/p IVF   - Monitor electrolytes

## 2020-10-20 NOTE — BEHAVIORAL HEALTH ASSESSMENT NOTE - SUMMARY
65F, single and normally living alone in [facility] without services, with PHx of delirium during past admissions to this hospital, known to writer from consult for delirium on 9/8/20 during previous admission here, and MHx of 50 pack year smoking with DM, HTN, HLD, CVA, breast CA s/p chemo/XRT, CHF with EF 15-20% a/o 9/2020, G3-4 DD s/p defibrillator, presumed PE on Apixaban and has IVC filter, SHx of lap linda 9/28/20, sent from [facility] ([facility]) on 10/17 c/o shortness of breath and altered mental status and admitted for management of CHF exacerbation and COPD. Psych consult was requested for assistance in managing delirium, after primary team reported that on 10/19 pt had become agitated and was trying to pull out her lines (since resolved). On exam, pt presents calm and cooperative but extremely confused and oriented only to self, c/w recurrent acute delirium i/s/o CHF exacerbation. While pt appeared to respond to Seroquel 25 mg BID which was prescribed for delirium during early 9/2020 admission, on today's exam she appears less alert and more confused than formerly; her HCP expresses concern, which we share, that Seroquel may now be exacerbating pt's somnolence and disorientation. Accordingly, we now recommend DC'ing Seroquel and substituting Abilify 2 mg qd, with goal of addressing pt's delirium with less sedation. As pt's delirium still appears to be acute and not yet under good control, she is NOT yet psych cleared for DC back to [facility] at this time. However, pt does not appear to present an acute risk of harm to self or others at the time of assessment, and does not appear to be in need of admission to  psych at the time of assessment.

## 2020-10-20 NOTE — BEHAVIORAL HEALTH ASSESSMENT NOTE - NSBHCHARTREVIEWINVESTIGATE_PSY_A_CORE FT
< from: 12 Lead ECG (10.16.20 @ 22:52) >    QTC Calculation(Bazett) 486 ms    P Axis 51 degrees    R Axis -33 degrees    T Axis 78 degrees    Diagnosis Line Normal sinus rhythm  Left axis deviation  Abnormal ECG    < end of copied text >

## 2020-10-20 NOTE — BEHAVIORAL HEALTH ASSESSMENT NOTE - OTHER
CHF exacerbation Lying diagonally across bed, wearing hospital gown and adult diaper Not observed Appears very confused Mild paranoia about her care ("I don't know why I'm here, no one will tell me") Impaired by confusion

## 2020-10-20 NOTE — BEHAVIORAL HEALTH ASSESSMENT NOTE - NSBHCHARTREVIEWIMAGING_PSY_A_CORE FT
< from: Xray Abdomen 1 View PORTABLE -Routine (Xray Abdomen 1 View PORTABLE -Routine .) (10.19.20 @ 13:50) >    NTERPRETATION:  Portable supine abdominal film. Patient has abdominal pain.    Amorphous left upper quadrant calcifications noted. Clips in the right upper quadrant seen.    Tilted IVC filter at the L4 level is noted.    There is an isolated clip in the pelvis.    Gas pattern shows no obstruction. No gross organomegaly is seen. Bones are grossly intact.    Epicardial pacer device is noted.    < end of copied text >

## 2020-10-20 NOTE — BEHAVIORAL HEALTH ASSESSMENT NOTE - NSBHCHARTREVIEWLAB_PSY_A_CORE FT
13.3   8.17  )-----------( 234      ( 20 Oct 2020 10:45 )             42.5   10-20    147<H>  |  106  |  27<H>  ----------------------------<  111<H>  3.0<L>   |  34<H>  |  1.30    Ca    8.9      20 Oct 2020 15:39  Phos  3.0     10-20  Mg     2.1     10-20    TPro  7.5  /  Alb  3.1<L>  /  TBili  1.9<H>  /  DBili  x   /  AST  119<H>  /  ALT  68<H>  /  AlkPhos  502<H>  10-20

## 2020-10-20 NOTE — PROGRESS NOTE ADULT - PROBLEM SELECTOR PLAN 2
- resolved    - likely 2/2 lactic acidosis  - urine sodium 26  - urine osmolarity 631  - anion gap resolved  - s/p IVF   - Monitor electrolytes - h/o depression, on seroquel at home  - per psych, pt is more lethargic previous admission  - switch to abilify 2mg qAM    - Psych onboard

## 2020-10-20 NOTE — PROGRESS NOTE ADULT - SUBJECTIVE AND OBJECTIVE BOX
SUBJ: Follow up on CHF exacerbation .       MEDICATIONS  (STANDING):  apixaban 5 milliGRAM(s) Oral two times a day  ascorbic acid 500 milliGRAM(s) Oral daily  buPROPion XL . 150 milliGRAM(s) Oral daily  folic acid 1 milliGRAM(s) Oral daily  hydrALAZINE 25 milliGRAM(s) Oral every 8 hours  isosorbide   mononitrate ER Tablet (IMDUR) 30 milliGRAM(s) Oral daily  lactulose Syrup 10 Gram(s) Oral daily  metoprolol tartrate 12.5 milliGRAM(s) Oral two times a day  multivitamin/minerals 1 Tablet(s) Oral daily  pantoprazole    Tablet 40 milliGRAM(s) Oral before breakfast  QUEtiapine 25 milliGRAM(s) Oral daily  sacubitril 24 mG/valsartan 26 mG 1 Tablet(s) Oral two times a day  senna 2 Tablet(s) Oral at bedtime          Vital Signs Last 24 Hrs  T(C): 37.1 (20 Oct 2020 10:14), Max: 37.1 (20 Oct 2020 10:14)  T(F): 98.7 (20 Oct 2020 10:14), Max: 98.7 (20 Oct 2020 10:14)  HR: 73 (20 Oct 2020 10:14) (70 - 78)  BP: 133/81 (20 Oct 2020 10:14) (131/72 - 154/79)  BP(mean): 99 (19 Oct 2020 18:03) (99 - 99)  RR: 17 (20 Oct 2020 10:14) (17 - 18)  SpO2: 95% (20 Oct 2020 10:14) (95% - 98%)    REVIEW OF SYSTEMS:  CONSTITUTIONAL: No fever, weight loss, or fatigue  EYES: No eye pain, visual disturbances, or discharge  ENMT:  No difficulty hearing, tinnitus, vertigo; No sinus or throat pain  NECK: No pain or stiffness  RESPIRATORY: No cough, wheezing, chills or hemoptysis; No shortness of breath  CARDIOVASCULAR: No chest pain, palpitations, dizziness, or leg swelling  GASTROINTESTINAL: No abdominal or epigastric pain. No nausea, vomiting, or hematemesis; No diarrhea or constipation. No melena or hematochezia.  GENITOURINARY: No dysuria, frequency, hematuria, or incontinence  NEUROLOGICAL: No headaches, memory loss, loss of strength, numbness, or tremors  SKIN: No itching, burning, rashes, or lesions   LYMPH NODES: No enlarged glands  ENDOCRINE: No heat or cold intolerance; No hair loss  MUSCULOSKELETAL: No joint pain or swelling; No muscle, back, or extremity pain  PSYCHIATRIC: No depression, anxiety, mood swings, or difficulty sleeping  HEME/LYMPH: No easy bruising, or bleeding gums  ALLERY AND IMMUNOLOGIC: No hives or eczema      PHYSICAL EXAM:  General: No acute distress  HEENT: EOMI, PERRL  Neck: Supple, No JVD, has left EJ placed   Lungs: Clear to auscultation bilaterally; No rales or wheezing  Heart: Regular rate and rhythm; No murmurs, rubs, or gallops  Abdomen: Nontender, bowel sounds present  Extremities: No clubbing, cyanosis, or edema  Nervous system:  Alert & Oriented X1 to self only, no focal deficits,  Psychiatric: Normal affect  Skin: No rashes or lesions          ECG: NSR       LABS:                        13.3   8.17  )-----------( 234      ( 20 Oct 2020 10:45 )             42.5     10-19    148<H>  |  106  |  38<H>  ----------------------------<  127<H>  3.3<L>   |  32<H>  |  1.51<H>    Ca    9.4      19 Oct 2020 07:06  Phos  1.8     10-19  Mg     2.2     10-19    TPro  7.2  /  Alb  3.2<L>  /  TBili  1.7<H>  /  DBili  x   /  AST  160<H>  /  ALT  73<H>  /  AlkPhos  551<H>  10-19        PT/INR - ( 19 Oct 2020 07:06 )   PT: 25.4 sec;   INR: 2.22 ratio           Creatinine Trend: 1.51<--, 1.81<--, 1.88<--, 2.16<--, 2.13<--, 2.22<--  I&O's Summary    19 Oct 2020 07:01  -  20 Oct 2020 07:00  --------------------------------------------------------  IN: 0 mL / OUT: 200 mL / NET: -200 mL      BNP  RADIOLOGY & ADDITIONAL STUDIES:  < from: CT Chest No Cont (10.17.20 @ 05:00) >  IMPRESSION:  No airspace consolidation. Small bilateral pleural effusions.    2.5 cm mass in the right breast, abutting and inseparable from the right pectoralis muscle consistent with known breast carcinoma.    < end of copied text >               SUBJ: Follow up on CHF exacerbation .       MEDICATIONS  (STANDING):  apixaban 5 milliGRAM(s) Oral two times a day  ascorbic acid 500 milliGRAM(s) Oral daily  buPROPion XL . 150 milliGRAM(s) Oral daily  folic acid 1 milliGRAM(s) Oral daily  hydrALAZINE 25 milliGRAM(s) Oral every 8 hours  isosorbide   mononitrate ER Tablet (IMDUR) 30 milliGRAM(s) Oral daily  lactulose Syrup 10 Gram(s) Oral daily  metoprolol tartrate 12.5 milliGRAM(s) Oral two times a day  multivitamin/minerals 1 Tablet(s) Oral daily  pantoprazole    Tablet 40 milliGRAM(s) Oral before breakfast  QUEtiapine 25 milliGRAM(s) Oral daily  sacubitril 24 mG/valsartan 26 mG 1 Tablet(s) Oral two times a day  senna 2 Tablet(s) Oral at bedtime          Vital Signs Last 24 Hrs  T(C): 37.1 (20 Oct 2020 10:14), Max: 37.1 (20 Oct 2020 10:14)  T(F): 98.7 (20 Oct 2020 10:14), Max: 98.7 (20 Oct 2020 10:14)  HR: 73 (20 Oct 2020 10:14) (70 - 78)  BP: 133/81 (20 Oct 2020 10:14) (131/72 - 154/79)  BP(mean): 99 (19 Oct 2020 18:03) (99 - 99)  RR: 17 (20 Oct 2020 10:14) (17 - 18)  SpO2: 95% (20 Oct 2020 10:14) (95% - 98%)    REVIEW OF SYSTEMS:  unable to obtain, denies any complain in general       PHYSICAL EXAM:  General: No acute distress  HEENT: EOMI, PERRL  Neck: Supple, No JVD, has left EJ placed   Lungs: Clear to auscultation bilaterally; No rales or wheezing  Heart: Regular rate and rhythm; No murmurs, rubs, or gallops  Abdomen: Nontender, bowel sounds present  Extremities: No clubbing, cyanosis, or edema  Nervous system:  Alert & Oriented X1 to self only, no focal deficits,  Psychiatric: Normal affect  Skin: No rashes or lesions          ECG: NSR       LABS:                        13.3   8.17  )-----------( 234      ( 20 Oct 2020 10:45 )             42.5     10-19    148<H>  |  106  |  38<H>  ----------------------------<  127<H>  3.3<L>   |  32<H>  |  1.51<H>    Ca    9.4      19 Oct 2020 07:06  Phos  1.8     10-19  Mg     2.2     10-19    TPro  7.2  /  Alb  3.2<L>  /  TBili  1.7<H>  /  DBili  x   /  AST  160<H>  /  ALT  73<H>  /  AlkPhos  551<H>  10-19        PT/INR - ( 19 Oct 2020 07:06 )   PT: 25.4 sec;   INR: 2.22 ratio           Creatinine Trend: 1.51<--, 1.81<--, 1.88<--, 2.16<--, 2.13<--, 2.22<--  I&O's Summary    19 Oct 2020 07:01  -  20 Oct 2020 07:00  --------------------------------------------------------  IN: 0 mL / OUT: 200 mL / NET: -200 mL      BNP  RADIOLOGY & ADDITIONAL STUDIES:  < from: CT Chest No Cont (10.17.20 @ 05:00) >  IMPRESSION:  No airspace consolidation. Small bilateral pleural effusions.    2.5 cm mass in the right breast, abutting and inseparable from the right pectoralis muscle consistent with known breast carcinoma.    < end of copied text >

## 2020-10-20 NOTE — PROGRESS NOTE ADULT - ASSESSMENT
#improving non-oliguric acute kidney injury in a patient with underlying CKD III.  Has severe cardiomyopathy  underlying cardiorenal syndrome  CHEN improving  bicarb rsing sec to contraction alkalosis. lasix on hold.  CT abdomen did not reveal any obstruction.  hypokalemia repleted  D/C planning

## 2020-10-20 NOTE — PROGRESS NOTE ADULT - SUBJECTIVE AND OBJECTIVE BOX
Patient is a 65y old  Female who presents with a chief complaint of AMS/metabolic encephalopathy/Anion gap metabolic acidosis (20 Oct 2020 09:15)/respiratory distress/CHEN/elevated LFT, F/U LFT today, patient's restrain discontinued calm, D/C planning if LFT trending down      INTERVAL HPI/OVERNIGHT EVENTS:  T(C): 37.1 (10-20-20 @ 10:14), Max: 37.1 (10-20-20 @ 10:14)  HR: 73 (10-20-20 @ 10:14) (67 - 78)  BP: 133/81 (10-20-20 @ 10:14) (131/72 - 154/79)  RR: 17 (10-20-20 @ 10:14) (17 - 18)  SpO2: 95% (10-20-20 @ 10:14) (95% - 98%)  Wt(kg): --    LABS:                        12.3   8.39  )-----------( 244      ( 19 Oct 2020 07:06 )             37.2     10-19    148<H>  |  106  |  38<H>  ----------------------------<  127<H>  3.3<L>   |  32<H>  |  1.51<H>    Ca    9.4      19 Oct 2020 07:06  Phos  1.8     10-19  Mg     2.2     10-19    TPro  7.2  /  Alb  3.2<L>  /  TBili  1.7<H>  /  DBili  x   /  AST  160<H>  /  ALT  73<H>  /  AlkPhos  551<H>  10-19    PT/INR - ( 19 Oct 2020 07:06 )   PT: 25.4 sec;   INR: 2.22 ratio             CAPILLARY BLOOD GLUCOSE      POCT Blood Glucose.: 117 mg/dL (20 Oct 2020 06:19)  POCT Blood Glucose.: 109 mg/dL (20 Oct 2020 00:18)  POCT Blood Glucose.: 88 mg/dL (19 Oct 2020 19:11)  POCT Blood Glucose.: 117 mg/dL (19 Oct 2020 11:51)        RADIOLOGY & ADDITIONAL TESTS:    Consultant(s) Notes Reviewed:  [x ] YES  [ ] NO    PHYSICAL EXAM:  GENERAL: well built, well nourished  HEAD:  Atraumatic, Normocephalic  EYES: EOMI, PERRLA, conjunctiva and sclera clear  ENT: No tonsillar erythema, exudates, or enlargement; Moist mucous membranes, Good dentition, No lesions  NECK: Supple, No JVD, Normal thyroid, no enlarged nodes  NERVOUS SYSTEM:  Alert and calm  CHEST/LUNG: B/L good air entry; No rales, rhonchi, or wheezing, AICD in place rate controlled  HEART: S1S2 normal, no S3, Regular rate and rhythm; No murmurs, rubs, or gallops  ABDOMEN: Soft, Nontender, Nondistended; Bowel sounds present  EXTREMITIES:  2+ Peripheral Pulses, No clubbing, cyanosis, or edema  LYMPH: No lymphadenopathy noted  SKIN: No rashes or lesions    Care Discussed with Consultants/Other Providers [ x] YES  [ ] NO

## 2020-10-20 NOTE — PROGRESS NOTE ADULT - PROBLEM SELECTOR PLAN 9
IMPROVE VTE Individual Risk Assessment  RISK                                                                Points  [  ] Previous VTE                                                  3  [  ] Thrombophilia                                               2  [  ] Lower limb paralysis                                      2        (unable to hold up >15 seconds)    [  ] Current Cancer                                              2         (within 6 months)  [ x ] Immobilization > 24 hrs                                1  [  ] ICU/CCU stay > 24 hours                              1  [x  ] Age > 60                                                      1  IMPROVE VTE Score ____2_____    eliquis 2.5mg bid (Cr >1.5) for dvt ppx or previously presumed PE  protonix for GI ppx today - h/o DM  - a1c 5.9  - c/w HSS qACHs  - accucheck qACHs

## 2020-10-21 ENCOUNTER — TRANSCRIPTION ENCOUNTER (OUTPATIENT)
Age: 65
End: 2020-10-21

## 2020-10-21 DIAGNOSIS — F05 DELIRIUM DUE TO KNOWN PHYSIOLOGICAL CONDITION: ICD-10-CM

## 2020-10-21 LAB
ALBUMIN SERPL ELPH-MCNC: 2.9 G/DL — LOW (ref 3.5–5)
ALP SERPL-CCNC: 414 U/L — HIGH (ref 40–120)
ALT FLD-CCNC: 57 U/L DA — SIGNIFICANT CHANGE UP (ref 10–60)
ANION GAP SERPL CALC-SCNC: 6 MMOL/L — SIGNIFICANT CHANGE UP (ref 5–17)
AST SERPL-CCNC: 82 U/L — HIGH (ref 10–40)
BILIRUB SERPL-MCNC: 1.5 MG/DL — HIGH (ref 0.2–1.2)
BUN SERPL-MCNC: 32 MG/DL — HIGH (ref 7–18)
CALCIUM SERPL-MCNC: 9.3 MG/DL — SIGNIFICANT CHANGE UP (ref 8.4–10.5)
CHLORIDE SERPL-SCNC: 105 MMOL/L — SIGNIFICANT CHANGE UP (ref 96–108)
CO2 SERPL-SCNC: 34 MMOL/L — HIGH (ref 22–31)
CREAT SERPL-MCNC: 1.38 MG/DL — HIGH (ref 0.5–1.3)
GLUCOSE BLDC GLUCOMTR-MCNC: 143 MG/DL — HIGH (ref 70–99)
GLUCOSE BLDC GLUCOMTR-MCNC: 146 MG/DL — HIGH (ref 70–99)
GLUCOSE BLDC GLUCOMTR-MCNC: 146 MG/DL — HIGH (ref 70–99)
GLUCOSE BLDC GLUCOMTR-MCNC: 148 MG/DL — HIGH (ref 70–99)
GLUCOSE SERPL-MCNC: 123 MG/DL — HIGH (ref 70–99)
HCT VFR BLD CALC: 40 % — SIGNIFICANT CHANGE UP (ref 34.5–45)
HGB BLD-MCNC: 13.1 G/DL — SIGNIFICANT CHANGE UP (ref 11.5–15.5)
MAGNESIUM SERPL-MCNC: 2.1 MG/DL — SIGNIFICANT CHANGE UP (ref 1.6–2.6)
MCHC RBC-ENTMCNC: 31.7 PG — SIGNIFICANT CHANGE UP (ref 27–34)
MCHC RBC-ENTMCNC: 32.8 GM/DL — SIGNIFICANT CHANGE UP (ref 32–36)
MCV RBC AUTO: 96.9 FL — SIGNIFICANT CHANGE UP (ref 80–100)
NRBC # BLD: 0 /100 WBCS — SIGNIFICANT CHANGE UP (ref 0–0)
PHOSPHATE SERPL-MCNC: 3.3 MG/DL — SIGNIFICANT CHANGE UP (ref 2.5–4.5)
PLATELET # BLD AUTO: 203 K/UL — SIGNIFICANT CHANGE UP (ref 150–400)
POTASSIUM SERPL-MCNC: 3 MMOL/L — LOW (ref 3.5–5.3)
POTASSIUM SERPL-SCNC: 3 MMOL/L — LOW (ref 3.5–5.3)
PROT SERPL-MCNC: 7.1 G/DL — SIGNIFICANT CHANGE UP (ref 6–8.3)
RBC # BLD: 4.13 M/UL — SIGNIFICANT CHANGE UP (ref 3.8–5.2)
RBC # FLD: 21 % — HIGH (ref 10.3–14.5)
SARS-COV-2 RNA SPEC QL NAA+PROBE: SIGNIFICANT CHANGE UP
SODIUM SERPL-SCNC: 145 MMOL/L — SIGNIFICANT CHANGE UP (ref 135–145)
WBC # BLD: 7.85 K/UL — SIGNIFICANT CHANGE UP (ref 3.8–10.5)
WBC # FLD AUTO: 7.85 K/UL — SIGNIFICANT CHANGE UP (ref 3.8–10.5)

## 2020-10-21 PROCEDURE — 99232 SBSQ HOSP IP/OBS MODERATE 35: CPT

## 2020-10-21 RX ORDER — ARIPIPRAZOLE 15 MG/1
1 TABLET ORAL
Qty: 0 | Refills: 0 | DISCHARGE
Start: 2020-10-21

## 2020-10-21 RX ORDER — POLYETHYLENE GLYCOL 3350 17 G/17G
17 POWDER, FOR SOLUTION ORAL AT BEDTIME
Refills: 0 | Status: DISCONTINUED | OUTPATIENT
Start: 2020-10-21 | End: 2020-10-22

## 2020-10-21 RX ORDER — POTASSIUM CHLORIDE 20 MEQ
1 PACKET (EA) ORAL
Qty: 30 | Refills: 0
Start: 2020-10-21 | End: 2020-11-19

## 2020-10-21 RX ORDER — POTASSIUM CHLORIDE 20 MEQ
40 PACKET (EA) ORAL
Refills: 0 | Status: COMPLETED | OUTPATIENT
Start: 2020-10-21 | End: 2020-10-21

## 2020-10-21 RX ADMIN — Medication 40 MILLIGRAM(S): at 06:10

## 2020-10-21 RX ADMIN — PANTOPRAZOLE SODIUM 40 MILLIGRAM(S): 20 TABLET, DELAYED RELEASE ORAL at 06:11

## 2020-10-21 RX ADMIN — Medication 40 MILLIEQUIVALENT(S): at 08:32

## 2020-10-21 RX ADMIN — Medication 12.5 MILLIGRAM(S): at 17:53

## 2020-10-21 RX ADMIN — SENNA PLUS 2 TABLET(S): 8.6 TABLET ORAL at 22:42

## 2020-10-21 RX ADMIN — APIXABAN 5 MILLIGRAM(S): 2.5 TABLET, FILM COATED ORAL at 06:10

## 2020-10-21 RX ADMIN — Medication 25 MILLIGRAM(S): at 22:43

## 2020-10-21 RX ADMIN — BUPROPION HYDROCHLORIDE 150 MILLIGRAM(S): 150 TABLET, EXTENDED RELEASE ORAL at 12:41

## 2020-10-21 RX ADMIN — APIXABAN 5 MILLIGRAM(S): 2.5 TABLET, FILM COATED ORAL at 17:53

## 2020-10-21 RX ADMIN — Medication 12.5 MILLIGRAM(S): at 06:09

## 2020-10-21 RX ADMIN — Medication 500 MILLIGRAM(S): at 12:41

## 2020-10-21 RX ADMIN — Medication 1 MILLIGRAM(S): at 12:41

## 2020-10-21 RX ADMIN — Medication 25 MILLIGRAM(S): at 06:10

## 2020-10-21 RX ADMIN — Medication 40 MILLIEQUIVALENT(S): at 17:53

## 2020-10-21 RX ADMIN — Medication 25 MILLIGRAM(S): at 14:44

## 2020-10-21 RX ADMIN — LACTULOSE 10 GRAM(S): 10 SOLUTION ORAL at 12:41

## 2020-10-21 RX ADMIN — ARIPIPRAZOLE 2 MILLIGRAM(S): 15 TABLET ORAL at 06:09

## 2020-10-21 RX ADMIN — SACUBITRIL AND VALSARTAN 1 TABLET(S): 24; 26 TABLET, FILM COATED ORAL at 06:10

## 2020-10-21 RX ADMIN — ISOSORBIDE MONONITRATE 30 MILLIGRAM(S): 60 TABLET, EXTENDED RELEASE ORAL at 12:41

## 2020-10-21 RX ADMIN — SACUBITRIL AND VALSARTAN 1 TABLET(S): 24; 26 TABLET, FILM COATED ORAL at 17:53

## 2020-10-21 RX ADMIN — POLYETHYLENE GLYCOL 3350 17 GRAM(S): 17 POWDER, FOR SOLUTION ORAL at 22:43

## 2020-10-21 RX ADMIN — Medication 1 TABLET(S): at 12:41

## 2020-10-21 NOTE — PROGRESS NOTE ADULT - PROBLEM SELECTOR PLAN 2
- h/o depression, on seroquel at home  - per psych, pt is more lethargic previous admission  - switch to abilify 2mg qAM    - Psych onboard

## 2020-10-21 NOTE — PHYSICAL THERAPY INITIAL EVALUATION ADULT - IMPAIRMENTS FOUND, PT EVAL
cognitive impairment/gait, locomotion, and balance/decreased midline orientation/aerobic capacity/endurance/muscle strength/posture

## 2020-10-21 NOTE — PHYSICAL THERAPY INITIAL EVALUATION ADULT - MANUAL MUSCLE TESTING RESULTS, REHAB EVAL
MMT on right upper and lower extremities are grossly graded 4-/5; left upper and lower extremities are grossly graded 3+/5; no facial droop or pronator drift noted

## 2020-10-21 NOTE — PROGRESS NOTE ADULT - PROBLEM SELECTOR PLAN 10
IMPROVE VTE Individual Risk Assessment  RISK                                                                Points  [  ] Previous VTE                                                  3  [  ] Thrombophilia                                               2  [  ] Lower limb paralysis                                      2        (unable to hold up >15 seconds)    [  ] Current Cancer                                              2         (within 6 months)  [ x ] Immobilization > 24 hrs                                1  [  ] ICU/CCU stay > 24 hours                              1  [x  ] Age > 60                                                      1  IMPROVE VTE Score ____2_____    eliquis 2.5mg bid (Cr >1.5) for dvt ppx or previously presumed PE  protonix for GI ppx
IMPROVE VTE Individual Risk Assessment  RISK                                                                Points  [  ] Previous VTE                                                  3  [  ] Thrombophilia                                               2  [  ] Lower limb paralysis                                      2        (unable to hold up >15 seconds)    [  ] Current Cancer                                              2         (within 6 months)  [ x ] Immobilization > 24 hrs                                1  [  ] ICU/CCU stay > 24 hours                              1  [x  ] Age > 60                                                      1  IMPROVE VTE Score ____2_____    eliquis 2.5mg bid (Cr >1.5) for dvt ppx or previously presumed PE  protonix for GI ppx

## 2020-10-21 NOTE — PROGRESS NOTE ADULT - SUBJECTIVE AND OBJECTIVE BOX
[facility] : Progress Note :: [phone], (office [phone]),   Dr Naik / Dr Patel / Dr Ramos / Dr Marino / Dr Megan GAMBLE / Dr Elias / Dr Flores / Dr Rogerio arauz  _____________________________________________________________________________________________    no complains    Advil (Hives; Vomiting)    Hospital Medications:   MEDICATIONS  (STANDING):  apixaban 5 milliGRAM(s) Oral two times a day  ARIPiprazole 2 milliGRAM(s) Oral <User Schedule>  ascorbic acid 500 milliGRAM(s) Oral daily  buPROPion XL . 150 milliGRAM(s) Oral daily  folic acid 1 milliGRAM(s) Oral daily  hydrALAZINE 25 milliGRAM(s) Oral every 8 hours  isosorbide   mononitrate ER Tablet (IMDUR) 30 milliGRAM(s) Oral daily  lactulose Syrup 10 Gram(s) Oral daily  metoprolol tartrate 12.5 milliGRAM(s) Oral two times a day  multivitamin/minerals 1 Tablet(s) Oral daily  pantoprazole    Tablet 40 milliGRAM(s) Oral before breakfast  polyethylene glycol 3350 17 Gram(s) Oral at bedtime  potassium chloride   Powder 40 milliEquivalent(s) Oral two times a day  sacubitril 24 mG/valsartan 26 mG 1 Tablet(s) Oral two times a day  senna 2 Tablet(s) Oral at bedtime      VITALS:  T(F): 98 (10-21-20 @ 13:22), Max: 98 (10-21-20 @ 13:22)  HR: 88 (10-21-20 @ 13:22)  BP: 121/95 (10-21-20 @ 13:22)  RR: 18 (10-21-20 @ 13:22)  SpO2: 98% (10-21-20 @ 13:22)  Wt(kg): --      PHYSICAL EXAM:  Constitutional: NAD  HEENT: anicteric sclera, oropharynx clear  Neck: No JVD  Respiratory: CTAB, no wheezes, rales or rhonchi  Cardiovascular: S1, S2, RRR  Gastrointestinal: BS+, soft, NT/ND  Extremities: No peripheral edema  Neurological: A/O x 3, no focal deficits  : No CVA tenderness. No de.       LABS:  10-21    145  |  105  |  32<H>  ----------------------------<  123<H>  3.0<L>   |  34<H>  |  1.38<H>    Ca    9.3      21 Oct 2020 06:31  Phos  3.3     10-21  Mg     2.1     10-21    TPro  7.1  /  Alb  2.9<L>  /  TBili  1.5<H>  /  DBili      /  AST  82<H>  /  ALT  57  /  AlkPhos  414<H>  10-21    Creatinine Trend: 1.38 <--, 1.44 <--, 1.30 <--, 1.20 <--, 1.51 <--, 1.81 <--, 1.88 <--, 2.16 <--, 2.13 <--, 2.22 <--                        13.1   7.85  )-----------( 203      ( 21 Oct 2020 06:31 )             40.0     Urine Studies:  Urinalysis Basic - ( 17 Oct 2020 05:30 )    Color: Yellow / Appearance: Clear / S.025 / pH:   Gluc:  / Ketone: Trace  / Bili: Negative / Urobili: 4   Blood:  / Protein: 100 / Nitrite: Negative   Leuk Esterase: Negative / RBC: 2-5 /HPF / WBC 0-2 /HPF   Sq Epi:  / Non Sq Epi: Few /HPF / Bacteria: Few /HPF      Sodium, Random Urine: 26 mmol/L (10-17 @ 10:17)  Osmolality, Random Urine: 631 mos/kg (10-17 @ 10:17)    RADIOLOGY & ADDITIONAL STUDIES:

## 2020-10-21 NOTE — DISCHARGE NOTE PROVIDER - NSDCCPCAREPLAN_GEN_ALL_CORE_FT
PRINCIPAL DISCHARGE DIAGNOSIS  Diagnosis: Acute on chronic combined systolic and diastolic congestive heart failure  Assessment and Plan of Treatment: You were admitted for      SECONDARY DISCHARGE DIAGNOSES  Diagnosis: Acute encephalopathy  Assessment and Plan of Treatment:     Diagnosis: CHEN (acute kidney injury)  Assessment and Plan of Treatment: CHEN (acute kidney injury)    Diagnosis: Depression  Assessment and Plan of Treatment: Depression    Diagnosis: Diabetes  Assessment and Plan of Treatment: Diabetes    Diagnosis: Supratherapeutic INR  Assessment and Plan of Treatment: Supratherapeutic INR    Diagnosis: HTN (hypertension)  Assessment and Plan of Treatment:     Diagnosis: Breast CA  Assessment and Plan of Treatment: Breast CA    Diagnosis: Elevated LFTs  Assessment and Plan of Treatment: Elevated LFTs    Diagnosis: Lactic acidosis  Assessment and Plan of Treatment:      PRINCIPAL DISCHARGE DIAGNOSIS  Diagnosis: Acute on chronic combined systolic and diastolic congestive heart failure  Assessment and Plan of Treatment: You presented with respiratory distress, likley 2/2 acute on chronic CHF  - weigh yourself daily; if you gain 3lbs in 3 days, or 5lbs in a week call your Health Care Provider  - do not eat or drink foods containing more than 2000mg of salt (sodium) in your diet every day.  - call your Health Care Provider if you have any swelling or increased swelling in your feet, ankles, and/or stomach.  - take all of your medication as directed.  If you become dizzy call your Health Care Provider.        SECONDARY DISCHARGE DIAGNOSES  Diagnosis: Acute encephalopathy  Assessment and Plan of Treatment: You came in with altered mental state, likely secondary to metabolic causes  - please take all the recommended medications, and avoid environmental factors that can induce encephalopathy      Diagnosis: CHEN (acute kidney injury)  Assessment and Plan of Treatment: You presented with elevated Creatinine level  - Please continue oral hydration as much as possible within the daily drinking limit of 2 L per day  - You are medically stable to be discharged from the hospital.  - You need to follow up with your Primary Care Physician in 1 week.  - You need to continue your medications regularly as prescribed      Diagnosis: Depression  Assessment and Plan of Treatment: You have a history of depression  - your medication was changed to Abilify  - please follow up with your primary care physician, as well as psychiatist    Diagnosis: Diabetes  Assessment and Plan of Treatment: You have a history of diabetes  - Be sure to maintain within normal range of blood glucose level  - Your HbA1c is 5.8  - You should continue to take your medication regimen regularly as prescribed  - Please follow up with your primary care provider/endocrinologist within a week of discharge.  - You need to continue monitoring your blood sugar levels closely.  - Please maintain healthy lifestyle by eating healthy diabetic regimen, weight loss and exercise regularly as tolerated.  - Make sure you get your HgA1c checked every three months.  - If you take oral diabetes medications, check your blood glucose two times a day.  - If you take insulin, check your blood glucose before meals and at bedtime.  - It's important not to skip any meals.  - Keep a log of your blood glucose results and always take it with you to your doctor appointments.  - Keep a list of your current medications including injectables and over the counter medications and bring this medication list with you to all your doctor appointments.  - If you have not seen your ophthalmologist this year call for appointment.  - Check your feet daily for redness, sores, or openings. Do not self treat. If no improvement in two days call your primary care physician for an appointment.  - Low blood sugar (hypoglycemia) is a blood sugar below 70mg/dl.  Check your blood sugar if you feel signs/symptoms of hypoglycemia. - If your blood sugar is below 70, take 15 grams of carbohydrates (ex 4 oz of apple juice, 3-4 glucose tablets, or 4-6 oz of regular soda) wait 15 minutes and repeat blood sugar to make sure it comes up above 70.  If your blood sugar is above 70 and you are due for a meal, have a meal.  If you are not due for a meal have a snack.  This snack helps keeps your blood sugar at a safe range.      Diagnosis: Supratherapeutic INR  Assessment and Plan of Treatment: You were noted to have elevated INR  - please call an ambulance or visit nearest ER if you have any signs and symptoms of severe bleeding    Diagnosis: HTN (hypertension)  Assessment and Plan of Treatment: You have a history of hypertension  - Please continue current medication regimen, and follow up with your PCP   - You should continue on the current antihypertensive regimen regularly.  - You blood pressure should be within 140-120/80-90.  - You should follow-up with your PCP within 1 week of your discharge for routine blood pressure monitoring at your next visit.  - Notify your doctor if you have any of the following symptoms:   (Dizziness, Lightheadedness, Blurry vision, Headache, Chest pain, Shortness of breath.)  - You should maintain healthy lifestyle by eating healthy low salt diet, avoid fatty food, weight loss, exercise regularly as tolerated 30 mins X 3 time per week.      Diagnosis: Breast CA  Assessment and Plan of Treatment: You have a history of Breast Ca  -  Please continue current medication regimen, and follow up with your PCP, as applicable.    Diagnosis: Elevated LFTs  Assessment and Plan of Treatment: You were noted to have elevated LFTs  - Blood tests showed signs of liver damage   - You need to stop taking your Statin and follow up with your PCP for repeat liver function testing

## 2020-10-21 NOTE — PROGRESS NOTE ADULT - SUBJECTIVE AND OBJECTIVE BOX
Patient is a 65y old  Female who presents with a chief complaint of AMS/metabolic encephalopathy/Anion gap metabolic acidosis (20 Oct 2020 15:59)/respiratory distress/CHEN/elevated INR/elevated LFT , hypokalemia supplement KCL,  D/C planning return NH      INTERVAL HPI/OVERNIGHT EVENTS:  T(C): 36.4 (10-21-20 @ 05:18), Max: 37.1 (10-20-20 @ 16:20)  HR: 68 (10-21-20 @ 05:18) (61 - 91)  BP: 129/81 (10-21-20 @ 05:18) (117/72 - 140/106)  RR: 18 (10-21-20 @ 05:18) (16 - 18)  SpO2: 97% (10-21-20 @ 05:18) (94% - 100%)  Wt(kg): --    LABS:                        13.1   7.85  )-----------( 203      ( 21 Oct 2020 06:31 )             40.0     10-21    145  |  105  |  32<H>  ----------------------------<  123<H>  3.0<L>   |  34<H>  |  1.38<H>    Ca    9.3      21 Oct 2020 06:31  Phos  3.3     10-21  Mg     2.1     10-21    TPro  7.1  /  Alb  2.9<L>  /  TBili  1.5<H>  /  DBili  x   /  AST  82<H>  /  ALT  57  /  AlkPhos  414<H>  10-21    PT/INR - ( 20 Oct 2020 10:45 )   PT: 20.9 sec;   INR: 1.81 ratio         PTT - ( 20 Oct 2020 10:45 )  PTT:29.9 sec    CAPILLARY BLOOD GLUCOSE      POCT Blood Glucose.: 146 mg/dL (21 Oct 2020 08:07)  POCT Blood Glucose.: 137 mg/dL (20 Oct 2020 22:53)  POCT Blood Glucose.: 257 mg/dL (20 Oct 2020 17:35)  POCT Blood Glucose.: 181 mg/dL (20 Oct 2020 11:17)        RADIOLOGY & ADDITIONAL TESTS:    Consultant(s) Notes Reviewed:  [x ] YES  [ ] NO    PHYSICAL EXAM:  GENERAL: well built, well nourished  HEAD:  Atraumatic, Normocephalic  EYES: EOMI, PERRLA, conjunctiva and sclera clear  ENT: No tonsillar erythema, exudates, or enlargement; Moist mucous membranes, Good dentition, No lesions  NECK: Supple, No JVD, Normal thyroid, no enlarged nodes  NERVOUS SYSTEM:  Alert and calm   CHEST/LUNG: B/L good air entry; No rales, rhonchi, or wheezing, AICD in place  HEART: S1S2 normal, no S3, Regular rate and rhythm; No murmurs, rubs, or gallops  ABDOMEN: Soft, Nontender, Nondistended; Bowel sounds present  EXTREMITIES:  2+ Peripheral Pulses, No clubbing, cyanosis, or edema  LYMPH: No lymphadenopathy noted  SKIN: No rashes or lesions    Care Discussed with Consultants/Other Providers [ x] YES  [ ] NO

## 2020-10-21 NOTE — PROGRESS NOTE ADULT - SUBJECTIVE AND OBJECTIVE BOX
SUBJ: Patient is admitted for CHF exacerbation, now euvolemic.        MEDICATIONS  (STANDING):  apixaban 5 milliGRAM(s) Oral two times a day  ARIPiprazole 2 milliGRAM(s) Oral <User Schedule>  ascorbic acid 500 milliGRAM(s) Oral daily  buPROPion XL . 150 milliGRAM(s) Oral daily  folic acid 1 milliGRAM(s) Oral daily  hydrALAZINE 25 milliGRAM(s) Oral every 8 hours  isosorbide   mononitrate ER Tablet (IMDUR) 30 milliGRAM(s) Oral daily  lactulose Syrup 10 Gram(s) Oral daily  metoprolol tartrate 12.5 milliGRAM(s) Oral two times a day  multivitamin/minerals 1 Tablet(s) Oral daily  pantoprazole    Tablet 40 milliGRAM(s) Oral before breakfast  potassium chloride   Powder 40 milliEquivalent(s) Oral two times a day  sacubitril 24 mG/valsartan 26 mG 1 Tablet(s) Oral two times a day  senna 2 Tablet(s) Oral at bedtime    MEDICATIONS  (PRN):            Vital Signs Last 24 Hrs  T(C): 36.4 (21 Oct 2020 05:18), Max: 37.1 (20 Oct 2020 16:20)  T(F): 97.5 (21 Oct 2020 05:18), Max: 98.7 (20 Oct 2020 16:20)  HR: 68 (21 Oct 2020 05:18) (61 - 91)  BP: 129/81 (21 Oct 2020 05:18) (117/72 - 140/106)  BP(mean): 103 (20 Oct 2020 18:02) (103 - 103)  RR: 18 (21 Oct 2020 05:18) (16 - 18)  SpO2: 97% (21 Oct 2020 05:18) (94% - 100%)    REVIEW OF SYSTEMS:  Unable to obtain given poor mental status, confused     PHYSICAL EXAM:  General : No acute distress  HEENT: EOMI, PERRL  Neck: Supple, No JVD, has left EJ placed   Lungs: Clear to auscultation bilaterally; No rales or wheezing  Heart: Regular rate and rhythm; No murmurs, rubs, or gallops  Abdomen: Nontender, bowel sounds present  Extremities: No clubbing, cyanosis, or edema  Nervous system:  Alert & Oriented X1 to self only, no focal deficits,  Psychiatric: Normal affect  Skin: No rashes or lesions    ECG: NSR       LABS:                        13.1   7.85  )-----------( 203      ( 21 Oct 2020 06:31 )             40.0     10-21    145  |  105  |  32<H>  ----------------------------<  123<H>  3.0<L>   |  34<H>  |  1.38<H>    Ca    9.3      21 Oct 2020 06:31  Phos  3.3     10-21  Mg     2.1     10-21    TPro  7.1  /  Alb  2.9<L>  /  TBili  1.5<H>  /  DBili  x   /  AST  82<H>  /  ALT  57  /  AlkPhos  414<H>  10-21        PT/INR - ( 20 Oct 2020 10:45 )   PT: 20.9 sec;   INR: 1.81 ratio         PTT - ( 20 Oct 2020 10:45 )  PTT:29.9 sec  Creatinine Trend: 1.38<--, 1.44<--, 1.30<--, 1.20<--, 1.51<--, 1.81<--  I&O's Summary        RADIOLOGY & ADDITIONAL STUDIES:   No airspace consolidation. Small bilateral pleural effusions.    2.5 cm mass in the right breast, abutting and inseparable from the right pectoralis muscle consistent with known breast carcinoma.    < end of copied text >

## 2020-10-21 NOTE — PROGRESS NOTE BEHAVIORAL HEALTH - NSBHFUPINTERVALHXFT_PSY_A_CORE
65F, single and normally living alone in [facility] without services, with PHx of delirium during past admissions to this hospital, known to writer from consult for delirium on 9/8/20 during previous admission here, and MHx of 50 pack year smoking with DM, HTN, HLD, CVA, breast CA s/p chemo/XRT, CHF with EF 15-20% a/o 9/2020, G3-4 DD s/p defibrillator, presumed PE on Apixaban and has IVC filter, SHx of lap linda 9/28/20, sent from [facility] ([facility]) on 10/17 c/o shortness of breath and altered mental status and admitted for management of CHF exacerbation and COPD. Pt seen today for follow up encountered lying in bed, staring blankly. Psych team greets pt to which she responds "good morning." Pt appears more alert and is increasingly verbal in comparison to yesterday's encounter. MD asks pt how she is feeling today, she responds "I'm good, and I had a good night sleep." MD validates pt and asks if she found recommended Abilify helpful, to which pt replies "yes." Psych team wishes pt a good day and leaves bedside. Pt seen today for follow up encountered lying in bed, lying quietly awake and appearing in NAD. Psych team greets pt to which she responds "good morning." Pt appears more alert and is increasingly verbal in comparison to yesterday's encounter. MD asks pt how she is feeling today, she responds "I'm good, and I had a good night sleep." MD validates pt and asks if she found recommended Abilify helpful, to which pt replies "yes." Psych team wishes pt a good day and leaves bedside.

## 2020-10-21 NOTE — DISCHARGE NOTE PROVIDER - HOSPITAL COURSE
Patient is a 65yoF, from [facility], w/ PMH of COPD, emphysema, R breast Ca, HTN, HLD, HFrEF (w/ EF 15-20%, G3-4 DD, s/p defibrillator), and Presumed PE (on Apixaban and s/p IVC filter), p/w shortness of breath. She was initially presented w/ encephalopathy, and information was obtained from nursing supervisor, Yisel Mendoza. Patient was complaining of sob around 10 pm night before the admission; her O2 sat was 75% and she was wheezing. she was given 3L NC and Duoneb; her O2 sat improved to 94%, but blood pressure was elevated to 169/135mmhg on exam at NH. Patient was admitted to ICU for CHF exacerbation and encephalopathy. Patient noted to have elevated lactate level, [place] [address]. CT chest showed no signs of pulmonary edema or effusion. Patient was diuresed with lasix in ICU.    Patient noted to have encephalopathy, like metabolic. CT head showed no acute intracranial bleeding. Patient eventually improved to her baseline with medical management of other conditions.    Patient noted to have elevated INR level of 4.31, although she was not on Warfarin. Eliquis was initially held until INR level improved. Her CBC and INR level was monitored.    Patient noted to have elevated creatinine level of 2.22, likely 2/2 to volume depletion. Patient was cared with intravenous fluid.    Patient noted to have anion gap metabolic acidosis, likely secondary lactic acidosis. Patient was treated with intravenous fluid, which closed the anion gap.     Patient noted to have elevated liver function tests. CT abdomen showed no signs of acute liver pathology. LFTs was monitored, until it trended down.    Patient has history of depression, on Seroquel. Psychiatrist, Dr. Moya, who was initially consulted for Encephalopathy, is familiar with the patient, and recommended switching  Seroquel to Abilify.     Patient has a history of hypertension, on Metoprolol, isosorbide and hydralazine at home. Patient was treated with home medications during the stay. Blood pressure was monitored during the stay.

## 2020-10-21 NOTE — PROGRESS NOTE ADULT - PROBLEM SELECTOR PLAN 5
- resolved    - not on Warfarin; no signs of acute bleeding  - possibly 2/2 vitamin k deficiency  - INR 4.31 -> 4.32 -> 4.49 -> 2.22  - monitor INR and CBC daily

## 2020-10-21 NOTE — PROGRESS NOTE ADULT - SUBJECTIVE AND OBJECTIVE BOX
PGY-1 Progress Note discussed with attending    PAGER #: [[phone]] TILL 5:00 PM  PLEASE CONTACT ON CALL TEAM:  - On Call Team (Please refer to Taryn) FROM 5:00 PM - 8:30PM  - Nightfloat Team FROM 8:30 -7:30 AM    INTERVAL HPI/OVERNIGHT EVENTS:   - No acute events overnight. Patient reports that she has urinary incontinence s/p cholecystectomy; she expresses sadness. She remains AAOx2, likely baseline. She is unable to provide time of her last bowel movement, but continues to complain of LLQ & LUQ abdominal pain w/ palpation. She denies fever, chills, N/V, chest pain, or sob.    REVIEW OF SYSTEMS:  CONSTITUTIONAL: No fever, weight loss, or fatigue  RESPIRATORY: No cough, wheezing, chills or hemoptysis; No shortness of breath  CARDIOVASCULAR: No chest pain, palpitations, dizziness, or leg swelling  GASTROINTESTINAL: complains of LLQ & LUQ abdominal pain; no nausea, vomiting, or hematemesis; No diarrhea or constipation. No melena or hematochezia.  GENITOURINARY: No dysuria or hematuria, urinary frequency  NEUROLOGICAL: No headaches, memory loss, loss of strength, numbness, or tremors  SKIN: No itching, burning, rashes, or lesions     MEDICATIONS  (STANDING):  apixaban 5 milliGRAM(s) Oral two times a day  ARIPiprazole 2 milliGRAM(s) Oral <User Schedule>  ascorbic acid 500 milliGRAM(s) Oral daily  buPROPion XL . 150 milliGRAM(s) Oral daily  folic acid 1 milliGRAM(s) Oral daily  hydrALAZINE 25 milliGRAM(s) Oral every 8 hours  isosorbide   mononitrate ER Tablet (IMDUR) 30 milliGRAM(s) Oral daily  lactulose Syrup 10 Gram(s) Oral daily  metoprolol tartrate 12.5 milliGRAM(s) Oral two times a day  multivitamin/minerals 1 Tablet(s) Oral daily  pantoprazole    Tablet 40 milliGRAM(s) Oral before breakfast  potassium chloride   Powder 40 milliEquivalent(s) Oral two times a day  sacubitril 24 mG/valsartan 26 mG 1 Tablet(s) Oral two times a day  senna 2 Tablet(s) Oral at bedtime    MEDICATIONS  (PRN):      Vital Signs Last 24 Hrs  T(C): 36.4 (21 Oct 2020 05:18), Max: 37.1 (20 Oct 2020 16:20)  T(F): 97.5 (21 Oct 2020 05:18), Max: 98.7 (20 Oct 2020 16:20)  HR: 68 (21 Oct 2020 05:18) (61 - 91)  BP: 129/81 (21 Oct 2020 05:18) (117/72 - 140/106)  BP(mean): 103 (20 Oct 2020 18:02) (103 - 103)  RR: 18 (21 Oct 2020 05:18) (16 - 18)  SpO2: 97% (21 Oct 2020 05:18) (94% - 100%)    PHYSICAL EXAMINATION:  GENERAL: NAD, AAOx2 (person and time)  HEAD: AT/NC  EYES: conjunctiva and sclera clear  NECK: supple, No JVD noted, Normal thyroid  CHEST/LUNG: CTABL; no rales, rhonchi, wheezing, or rubs  HEART: regular rate and rhythm; no murmurs, rubs, or gallops  ABDOMEN: soft, nontender, nondistended; Bowel sounds present  : Primafit in place  EXTREMITIES:  2+ Peripheral Pulses, No clubbing, cyanosis, or edema  SKIN: warm dry                          13.1   7.85  )-----------( 203      ( 21 Oct 2020 06:31 )             40.0     10-21    145  |  105  |  32<H>  ----------------------------<  123<H>  3.0<L>   |  34<H>  |  1.38<H>    Ca    9.3      21 Oct 2020 06:31  Phos  3.3     10-21  Mg     2.1     10-21    TPro  7.1  /  Alb  2.9<L>  /  TBili  1.5<H>  /  DBili  x   /  AST  82<H>  /  ALT  57  /  AlkPhos  414<H>  10-21    LIVER FUNCTIONS - ( 21 Oct 2020 06:31 )  Alb: 2.9 g/dL / Pro: 7.1 g/dL / ALK PHOS: 414 U/L / ALT: 57 U/L DA / AST: 82 U/L / GGT: x               PT/INR - ( 20 Oct 2020 10:45 )   PT: 20.9 sec;   INR: 1.81 ratio         PTT - ( 20 Oct 2020 10:45 )  PTT:29.9 sec  COVID-19 PCR: NotDetec (20 Oct 2020 19:59)      CAPILLARY BLOOD GLUCOSE      POCT Blood Glucose.: 146 mg/dL (21 Oct 2020 08:07)  POCT Blood Glucose.: 137 mg/dL (20 Oct 2020 22:53)  POCT Blood Glucose.: 257 mg/dL (20 Oct 2020 17:35)  POCT Blood Glucose.: 181 mg/dL (20 Oct 2020 11:17)      RADIOLOGY & ADDITIONAL TESTS:

## 2020-10-21 NOTE — PROGRESS NOTE ADULT - PROBLEM SELECTOR PLAN 6
- clinically stable    - h/o HFrEF  - s/p defibrillator  - [place] 94[address], but pt is clinically dry   - EKG NSR  - CT chest shows no effusion or pulmonary edema    - s/p IVF 100cc/hr   - c/w metoprolol, isosorbide and hydralazine, lasix  - hold entresto    - cardio, Dr. Patterson, onboard

## 2020-10-21 NOTE — PROGRESS NOTE ADULT - PROBLEM SELECTOR PLAN 1
Patient p/w shortness of breath and hypoxia   -Pro BNP: 41796  -ECHO Sep 20: EF 15-20% with Decreased RV systolic function.   -Symptoms resolved post IV diuresis in ICU, currently does not appear volume overloaded   -C/w HF medications: carvedilol, Imdur, Entresto and hydralazine

## 2020-10-21 NOTE — PHYSICAL THERAPY INITIAL EVALUATION ADULT - PATIENT/FAMILY/SIGNIFICANT OTHER GOALS STATEMENT, PT EVAL
unable to state functional goals for self at this time; previously independent in mobility and function

## 2020-10-21 NOTE — PROGRESS NOTE BEHAVIORAL HEALTH - NSBHCHARTREVIEWVS_PSY_A_CORE FT
Vital Signs Last 24 Hrs  T(C): 36.4 (21 Oct 2020 05:18), Max: 37.1 (20 Oct 2020 16:20)  T(F): 97.5 (21 Oct 2020 05:18), Max: 98.7 (20 Oct 2020 16:20)  HR: 68 (21 Oct 2020 05:18) (61 - 91)  BP: 129/81 (21 Oct 2020 05:18) (117/72 - 140/106)  BP(mean): 103 (20 Oct 2020 18:02) (103 - 103)  RR: 18 (21 Oct 2020 05:18) (16 - 18)  SpO2: 97% (21 Oct 2020 05:18) (94% - 100%)

## 2020-10-21 NOTE — PROGRESS NOTE ADULT - PROBLEM SELECTOR PLAN 2
Patient p/w CHEN, BUN/Cr 41/2.22   -Creatinine improved  post IV diuresis , probably cardio renal   -Nephrologist to follow.

## 2020-10-21 NOTE — PHYSICAL THERAPY INITIAL EVALUATION ADULT - LIVES WITH, PROFILE
recently came from [facility] where she was receiving skilled rehabilitation services; previously lives alone in an apartment

## 2020-10-21 NOTE — PHYSICAL THERAPY INITIAL EVALUATION ADULT - PLANNED THERAPY INTERVENTIONS, PT EVAL
balance training/gait training/motor coordination training/bed mobility training/orthotic fitting/training

## 2020-10-21 NOTE — PROGRESS NOTE ADULT - ASSESSMENT
65 Y F from [facility] has medical hx significant for COPD, emphysema, R breast ca, HTN, HLD, CHF ef 15-20%, G3-4 DD s/p defibrillator, ?DM, Presumed PE on Apixaban and has IVC filter, recent cholecystectomy 3 weeks ago was sent from NH for shortness of breath and altered mental status.

## 2020-10-21 NOTE — PROGRESS NOTE BEHAVIORAL HEALTH - NSBHCONSULTRECOMMENDOTHER_PSY_A_CORE FT
1. For management of acute delirium, recommend c/w Abilify 2 mg qd, to be titrated for effect  2. C/w home Wellbutrin  mg qd  3. No indication for other standing or PRN psychotropic medications at this time  4. Medical management as directed by primary team  5. Pt is psych cleared for DC  6. Psychiatry is signing off. Reconsult if additional issues arise as inpatient  7. Case d/w Dr. Najera of primary team    Danyelle Moya MD  Director, Consultation-Liaison Psychiatry Service  [id number]

## 2020-10-21 NOTE — PROGRESS NOTE ADULT - ASSESSMENT
#improving non-oliguric acute kidney injury in a patient with underlying CKD III.  Has severe cardiomyopathy  underlying cardiorenal syndrome  CHEN improving  bicarb rsing sec to contraction alkalosis. lasix on hold.  hypokalemia- repleted and lasix on hold  CT abdomen did not reveal any obstruction.  D/C planning

## 2020-10-21 NOTE — PHYSICAL THERAPY INITIAL EVALUATION ADULT - GENERAL OBSERVATIONS, REHAB EVAL
awake, alert, NAD; confused; oriented to self only; able to follow simple commands; +prima fit to wall suction

## 2020-10-21 NOTE — PROGRESS NOTE ADULT - PROBLEM SELECTOR PLAN 3
- resolved    - likely 2/2 lactic acidosis  - urine sodium 26  - urine osmolarity 631  - anion gap resolved  - s/p IVF   - Monitor electrolytes

## 2020-10-21 NOTE — PROGRESS NOTE ADULT - ASSESSMENT
65 yr old female from NH, H/O COPD/CHF/AICD/PE(?)/IVC filter  on A/C/right breast CA/old CVA, recent cholecystectomy,  patient admit hospital for respiratory distress/AMS/metabolic encephalopathy/CHEN/acidosis/elevated INR, admit ICU, NPO IV hydration, close  monitor labs, ECHO, 9/20 severe CHF LVEF 15-20 %,  CT head old CVA, ICA aneurysm, CT chest no infiltrate small pleural effusion/right breast CA, lab showed acidosis/with CHEN, elevated INR, all A/C on hold, acidosis improved, elevated LFT, trending down today, 1:1 watch discontinued patient is calm, D/C planning, return NH, F/U psych at NH, PT eval, hypokalemia supplement KCL, F/U lab at NH, resume A/C. COVID-19 swab negative on 10/20

## 2020-10-21 NOTE — DISCHARGE NOTE PROVIDER - NSDCMRMEDTOKEN_GEN_ALL_CORE_FT
anastrozole 1 mg oral tablet: 1 tab(s) orally once a day  apixaban 5 mg oral tablet: 1 tab(s) orally 2 times a day   ARIPiprazole 2 mg oral tablet: 1 tab(s) orally   aspirin 81 mg oral delayed release tablet: 1 tab(s) orally once a day  atorvastatin 40 mg oral tablet: 1 tab(s) orally once a day (at bedtime)  buPROPion 150 mg/12 hours (SR) oral tablet, extended release: 1 tab(s) orally 2 times a day  Entresto 24 mg-26 mg oral tablet: 1 tab(s) orally 2 times a day  folic acid 1 mg oral tablet: 1 tab(s) orally once a day  HumuLIN R 100 units/mL injectable solution: sliding scale   hydrALAZINE 50 mg oral tablet: 1 tab(s) orally 3 times a day  isosorbide mononitrate 30 mg oral tablet, extended release: 1 tab(s) orally once a day  Lasix 20 mg oral tablet: 1 tab(s) orally once a day, As Needed for shortness of breath or leg swelling  metoprolol succinate 50 mg oral tablet, extended release: 1 tab(s) orally once a day  Multiple Vitamins with Minerals oral tablet: 1 tab(s) orally once a day  Tylenol 325 mg oral tablet: 2 tab(s) orally every 6 hours, As Needed  Vitamin C 250 mg oral tablet: 1 tab(s) orally once a day   anastrozole 1 mg oral tablet: 1 tab(s) orally once a day  apixaban 5 mg oral tablet: 1 tab(s) orally 2 times a day   ARIPiprazole 2 mg oral tablet: 1 tab(s) orally   aspirin 81 mg oral delayed release tablet: 1 tab(s) orally once a day  atorvastatin 40 mg oral tablet: 1 tab(s) orally once a day (at bedtime)  buPROPion 150 mg/12 hours (SR) oral tablet, extended release: 1 tab(s) orally 2 times a day  Entresto 24 mg-26 mg oral tablet: 1 tab(s) orally 2 times a day  folic acid 1 mg oral tablet: 1 tab(s) orally once a day  HumuLIN R 100 units/mL injectable solution: sliding scale   hydrALAZINE 50 mg oral tablet: 1 tab(s) orally 3 times a day  isosorbide mononitrate 30 mg oral tablet, extended release: 1 tab(s) orally once a day  K-Tab 10 mEq oral tablet, extended release: 1 tab(s) orally once a day   Lasix 20 mg oral tablet: 1 tab(s) orally once a day, As Needed for shortness of breath or leg swelling  metoprolol succinate 50 mg oral tablet, extended release: 1 tab(s) orally once a day  Multiple Vitamins with Minerals oral tablet: 1 tab(s) orally once a day  Tylenol 325 mg oral tablet: 2 tab(s) orally every 6 hours, As Needed  Vitamin C 250 mg oral tablet: 1 tab(s) orally once a day

## 2020-10-21 NOTE — PROGRESS NOTE BEHAVIORAL HEALTH - SUMMARY
65F, single and normally living alone in [facility] without services, with PHx of delirium during past admissions to this hospital, known to writer from consult for delirium on 9/8/20 during previous admission here, and MHx of 50 pack year smoking with DM, HTN, HLD, CVA, breast CA s/p chemo/XRT, CHF with EF 15-20% a/o 9/2020, G3-4 DD s/p defibrillator, presumed PE on Apixaban and has IVC filter, SHx of lap linda 9/28/20, sent from [facility] ([facility]) on 10/17 c/o shortness of breath and altered mental status and admitted for management of CHF exacerbation and COPD. Psych consult was requested for assistance in managing delirium, after primary team reported that on 10/19 pt had become agitated and was trying to pull out her lines (since resolved). On exam, pt presents calm and cooperative but extremely confused and oriented only to self, c/w recurrent acute delirium i/s/o CHF exacerbation. While pt appeared to respond to Seroquel 25 mg BID which was prescribed for delirium during early 9/2020 admission, on today's exam she appears less alert and more confused than formerly; her HCP expresses concern, which we share, that Seroquel may now be exacerbating pt's somnolence and disorientation. Accordingly, we now recommend c/w Abilify 2 mg qd, with goal of addressing pt's delirium with less sedation. As pt's delirium now appears to be under good control, she is psych cleared for DC back to [facility] at this time. Pt does not appear to present an acute risk of harm to self or others at the time of assessment, and does not appear to be in need of admission to  psych at the time of assessment.

## 2020-10-21 NOTE — PHYSICAL THERAPY INITIAL EVALUATION ADULT - ADDITIONAL COMMENTS
no assistive device used; recently came from subacute rehabilitation facility where she was receiving skilled rehabilitation services
PAIN/left hip

## 2020-10-21 NOTE — PROGRESS NOTE ADULT - ASSESSMENT
a 65yoF, from [facility], w/ PMH of HFrEF s/p defibrillator, HTN, COPD, breast CA, HLD, and presumed PE (on AC and s/p IVC filter) was send from NH for concern for respiratory distress, pt is found to have anion gap metabolic acidosis. Initially, admitted to the ICU for close monitoring for respiratory status.

## 2020-10-22 ENCOUNTER — TRANSCRIPTION ENCOUNTER (OUTPATIENT)
Age: 65
End: 2020-10-22

## 2020-10-22 ENCOUNTER — NON-APPOINTMENT (OUTPATIENT)
Age: 65
End: 2020-10-22

## 2020-10-22 VITALS
DIASTOLIC BLOOD PRESSURE: 85 MMHG | SYSTOLIC BLOOD PRESSURE: 126 MMHG | TEMPERATURE: 100 F | OXYGEN SATURATION: 100 % | RESPIRATION RATE: 18 BRPM | HEART RATE: 92 BPM

## 2020-10-22 LAB
ALBUMIN SERPL ELPH-MCNC: 2.8 G/DL — LOW (ref 3.5–5)
ALP SERPL-CCNC: 372 U/L — HIGH (ref 40–120)
ALT FLD-CCNC: 49 U/L DA — SIGNIFICANT CHANGE UP (ref 10–60)
ANION GAP SERPL CALC-SCNC: 6 MMOL/L — SIGNIFICANT CHANGE UP (ref 5–17)
AST SERPL-CCNC: 60 U/L — HIGH (ref 10–40)
BILIRUB SERPL-MCNC: 1.6 MG/DL — HIGH (ref 0.2–1.2)
BUN SERPL-MCNC: 26 MG/DL — HIGH (ref 7–18)
CALCIUM SERPL-MCNC: 9.1 MG/DL — SIGNIFICANT CHANGE UP (ref 8.4–10.5)
CHLORIDE SERPL-SCNC: 108 MMOL/L — SIGNIFICANT CHANGE UP (ref 96–108)
CO2 SERPL-SCNC: 31 MMOL/L — SIGNIFICANT CHANGE UP (ref 22–31)
CREAT SERPL-MCNC: 1.14 MG/DL — SIGNIFICANT CHANGE UP (ref 0.5–1.3)
GLUCOSE BLDC GLUCOMTR-MCNC: 129 MG/DL — HIGH (ref 70–99)
GLUCOSE BLDC GLUCOMTR-MCNC: 134 MG/DL — HIGH (ref 70–99)
GLUCOSE SERPL-MCNC: 117 MG/DL — HIGH (ref 70–99)
HCT VFR BLD CALC: 41.4 % — SIGNIFICANT CHANGE UP (ref 34.5–45)
HGB BLD-MCNC: 13.2 G/DL — SIGNIFICANT CHANGE UP (ref 11.5–15.5)
MAGNESIUM SERPL-MCNC: 2 MG/DL — SIGNIFICANT CHANGE UP (ref 1.6–2.6)
MCHC RBC-ENTMCNC: 31.1 PG — SIGNIFICANT CHANGE UP (ref 27–34)
MCHC RBC-ENTMCNC: 31.9 GM/DL — LOW (ref 32–36)
MCV RBC AUTO: 97.4 FL — SIGNIFICANT CHANGE UP (ref 80–100)
NRBC # BLD: 0 /100 WBCS — SIGNIFICANT CHANGE UP (ref 0–0)
PHOSPHATE SERPL-MCNC: 2.9 MG/DL — SIGNIFICANT CHANGE UP (ref 2.5–4.5)
PLATELET # BLD AUTO: 219 K/UL — SIGNIFICANT CHANGE UP (ref 150–400)
POTASSIUM SERPL-MCNC: 3.7 MMOL/L — SIGNIFICANT CHANGE UP (ref 3.5–5.3)
POTASSIUM SERPL-SCNC: 3.7 MMOL/L — SIGNIFICANT CHANGE UP (ref 3.5–5.3)
PROT SERPL-MCNC: 7.2 G/DL — SIGNIFICANT CHANGE UP (ref 6–8.3)
RBC # BLD: 4.25 M/UL — SIGNIFICANT CHANGE UP (ref 3.8–5.2)
RBC # FLD: 20.8 % — HIGH (ref 10.3–14.5)
SODIUM SERPL-SCNC: 145 MMOL/L — SIGNIFICANT CHANGE UP (ref 135–145)
WBC # BLD: 6 K/UL — SIGNIFICANT CHANGE UP (ref 3.8–10.5)
WBC # FLD AUTO: 6 K/UL — SIGNIFICANT CHANGE UP (ref 3.8–10.5)

## 2020-10-22 PROCEDURE — 80053 COMPREHEN METABOLIC PANEL: CPT

## 2020-10-22 PROCEDURE — 84540 ASSAY OF URINE/UREA-N: CPT

## 2020-10-22 PROCEDURE — 36415 COLL VENOUS BLD VENIPUNCTURE: CPT

## 2020-10-22 PROCEDURE — 99285 EMERGENCY DEPT VISIT HI MDM: CPT | Mod: 25

## 2020-10-22 PROCEDURE — 83605 ASSAY OF LACTIC ACID: CPT

## 2020-10-22 PROCEDURE — 80048 BASIC METABOLIC PNL TOTAL CA: CPT

## 2020-10-22 PROCEDURE — 85610 PROTHROMBIN TIME: CPT

## 2020-10-22 PROCEDURE — 81001 URINALYSIS AUTO W/SCOPE: CPT

## 2020-10-22 PROCEDURE — 93005 ELECTROCARDIOGRAM TRACING: CPT

## 2020-10-22 PROCEDURE — 70450 CT HEAD/BRAIN W/O DYE: CPT

## 2020-10-22 PROCEDURE — 82247 BILIRUBIN TOTAL: CPT

## 2020-10-22 PROCEDURE — 71250 CT THORAX DX C-: CPT

## 2020-10-22 PROCEDURE — 86901 BLOOD TYPING SEROLOGIC RH(D): CPT

## 2020-10-22 PROCEDURE — 82248 BILIRUBIN DIRECT: CPT

## 2020-10-22 PROCEDURE — U0003: CPT

## 2020-10-22 PROCEDURE — 74176 CT ABD & PELVIS W/O CONTRAST: CPT

## 2020-10-22 PROCEDURE — 83935 ASSAY OF URINE OSMOLALITY: CPT

## 2020-10-22 PROCEDURE — 84100 ASSAY OF PHOSPHORUS: CPT

## 2020-10-22 PROCEDURE — 84560 ASSAY OF URINE/URIC ACID: CPT

## 2020-10-22 PROCEDURE — 82553 CREATINE MB FRACTION: CPT

## 2020-10-22 PROCEDURE — 82962 GLUCOSE BLOOD TEST: CPT

## 2020-10-22 PROCEDURE — 83690 ASSAY OF LIPASE: CPT

## 2020-10-22 PROCEDURE — 85379 FIBRIN DEGRADATION QUANT: CPT

## 2020-10-22 PROCEDURE — 71045 X-RAY EXAM CHEST 1 VIEW: CPT

## 2020-10-22 PROCEDURE — 85025 COMPLETE CBC W/AUTO DIFF WBC: CPT

## 2020-10-22 PROCEDURE — 85027 COMPLETE CBC AUTOMATED: CPT

## 2020-10-22 PROCEDURE — 87635 SARS-COV-2 COVID-19 AMP PRB: CPT

## 2020-10-22 PROCEDURE — 74018 RADEX ABDOMEN 1 VIEW: CPT

## 2020-10-22 PROCEDURE — 86900 BLOOD TYPING SEROLOGIC ABO: CPT

## 2020-10-22 PROCEDURE — 82550 ASSAY OF CK (CPK): CPT

## 2020-10-22 PROCEDURE — 82140 ASSAY OF AMMONIA: CPT

## 2020-10-22 PROCEDURE — 84484 ASSAY OF TROPONIN QUANT: CPT

## 2020-10-22 PROCEDURE — 83735 ASSAY OF MAGNESIUM: CPT

## 2020-10-22 PROCEDURE — 83880 ASSAY OF NATRIURETIC PEPTIDE: CPT

## 2020-10-22 PROCEDURE — 80307 DRUG TEST PRSMV CHEM ANLYZR: CPT

## 2020-10-22 PROCEDURE — 85730 THROMBOPLASTIN TIME PARTIAL: CPT

## 2020-10-22 PROCEDURE — 86769 SARS-COV-2 COVID-19 ANTIBODY: CPT

## 2020-10-22 PROCEDURE — 84300 ASSAY OF URINE SODIUM: CPT

## 2020-10-22 PROCEDURE — 86850 RBC ANTIBODY SCREEN: CPT

## 2020-10-22 PROCEDURE — 82803 BLOOD GASES ANY COMBINATION: CPT

## 2020-10-22 RX ORDER — METOPROLOL TARTRATE 50 MG
12.5 TABLET ORAL
Refills: 0 | Status: DISCONTINUED | OUTPATIENT
Start: 2020-10-22 | End: 2020-10-22

## 2020-10-22 RX ORDER — FUROSEMIDE 40 MG
20 TABLET ORAL DAILY
Refills: 0 | Status: DISCONTINUED | OUTPATIENT
Start: 2020-10-22 | End: 2020-10-22

## 2020-10-22 RX ADMIN — Medication 500 MILLIGRAM(S): at 12:08

## 2020-10-22 RX ADMIN — Medication 25 MILLIGRAM(S): at 05:16

## 2020-10-22 RX ADMIN — SACUBITRIL AND VALSARTAN 1 TABLET(S): 24; 26 TABLET, FILM COATED ORAL at 05:17

## 2020-10-22 RX ADMIN — Medication 12.5 MILLIGRAM(S): at 05:16

## 2020-10-22 RX ADMIN — ARIPIPRAZOLE 2 MILLIGRAM(S): 15 TABLET ORAL at 06:15

## 2020-10-22 RX ADMIN — PANTOPRAZOLE SODIUM 40 MILLIGRAM(S): 20 TABLET, DELAYED RELEASE ORAL at 06:15

## 2020-10-22 RX ADMIN — ISOSORBIDE MONONITRATE 30 MILLIGRAM(S): 60 TABLET, EXTENDED RELEASE ORAL at 12:08

## 2020-10-22 RX ADMIN — LACTULOSE 10 GRAM(S): 10 SOLUTION ORAL at 12:08

## 2020-10-22 RX ADMIN — Medication 1 TABLET(S): at 12:07

## 2020-10-22 RX ADMIN — BUPROPION HYDROCHLORIDE 150 MILLIGRAM(S): 150 TABLET, EXTENDED RELEASE ORAL at 12:08

## 2020-10-22 RX ADMIN — APIXABAN 5 MILLIGRAM(S): 2.5 TABLET, FILM COATED ORAL at 05:17

## 2020-10-22 RX ADMIN — Medication 25 MILLIGRAM(S): at 13:10

## 2020-10-22 RX ADMIN — Medication 1 MILLIGRAM(S): at 12:08

## 2020-10-22 NOTE — PROGRESS NOTE ADULT - REASON FOR ADMISSION
Anion gap metabolic acidosis
AMS/Anion gap metabolic acidosis
AMS/metabolic encephalopathy
AMS/respiratory distress
Anion gap metabolic acidosis
Anion gap metabolic acidosis/AMS
Anion gap metabolic acidosis

## 2020-10-22 NOTE — DISCHARGE NOTE NURSING/CASE MANAGEMENT/SOCIAL WORK - PATIENT PORTAL LINK FT
You can access the FollowMyHealth Patient Portal offered by [facility] by registering at the following website: [email or web address]. By joining [facility]’s FollowMyHealth portal, you will also be able to view your health information using other applications (apps) compatible with our system.

## 2020-10-22 NOTE — PROGRESS NOTE ADULT - SUBJECTIVE AND OBJECTIVE BOX
Patient is a 65y old  Female who presents with a chief complaint of AMS/metabolic encephalopathy/Anion gap metabolic acidosis (21 Oct 2020 16:32)/CHEN/elevated INR/elevated LFT, improved, D/C NH today      INTERVAL HPI/OVERNIGHT EVENTS:  T(C): 36.2 (10-22-20 @ 05:07), Max: 36.7 (10-21-20 @ 13:22)  HR: 76 (10-22-20 @ 05:07) (76 - 100)  BP: 113/68 (10-22-20 @ 05:07) (113/68 - 133/62)  RR: 17 (10-22-20 @ 05:07) (17 - 18)  SpO2: 98% (10-22-20 @ 05:07) (95% - 98%)  Wt(kg): --    LABS:                        13.2   6.00  )-----------( 219      ( 22 Oct 2020 06:24 )             41.4     10-22    145  |  108  |  26<H>  ----------------------------<  117<H>  3.7   |  31  |  1.14    Ca    9.1      22 Oct 2020 06:24  Phos  2.9     10-22  Mg     2.0     10-22    TPro  7.2  /  Alb  2.8<L>  /  TBili  1.6<H>  /  DBili  x   /  AST  60<H>  /  ALT  49  /  AlkPhos  372<H>  10-22    PT/INR - ( 20 Oct 2020 10:45 )   PT: 20.9 sec;   INR: 1.81 ratio         PTT - ( 20 Oct 2020 10:45 )  PTT:29.9 sec    CAPILLARY BLOOD GLUCOSE      POCT Blood Glucose.: 134 mg/dL (22 Oct 2020 08:02)  POCT Blood Glucose.: 146 mg/dL (21 Oct 2020 21:46)  POCT Blood Glucose.: 143 mg/dL (21 Oct 2020 16:45)  POCT Blood Glucose.: 148 mg/dL (21 Oct 2020 11:36)        RADIOLOGY & ADDITIONAL TESTS:    Consultant(s) Notes Reviewed:  [x ] YES  [ ] NO    PHYSICAL EXAM:  GENERAL: well built, well nourished  HEAD:  Atraumatic, Normocephalic  EYES: EOMI, PERRLA, conjunctiva and sclera clear  ENT: No tonsillar erythema, exudates, or enlargement; Moist mucous membranes, Good dentition, No lesions  NECK: Supple, No JVD, Normal thyroid, no enlarged nodes  NERVOUS SYSTEM:  Alert & Oriented X3, move all limbs   CHEST/LUNG: B/L good air entry; No rales, rhonchi, or wheezing, AICD in place  HEART: S1S2 normal, no S3, Regular rate and rhythm; No murmurs, rubs, or gallops  ABDOMEN: Soft, Nontender, Nondistended; Bowel sounds present  EXTREMITIES:  2+ Peripheral Pulses, No clubbing, cyanosis, or edema  LYMPH: No lymphadenopathy noted  SKIN: No rashes or lesions    Care Discussed with Consultants/Other Providers [ x] YES  [ ] NO

## 2020-10-22 NOTE — PROGRESS NOTE ADULT - ASSESSMENT
65 yr old female from NH, H/O COPD/CHF/AICD/PE(?)/IVC filter  on A/C/right breast CA/old CVA, recent cholecystectomy,  patient admit hospital for respiratory distress/AMS/metabolic encephalopathy/CHEN/acidosis/elevated INR, admit ICU, NPO IV hydration, close  monitor labs, ECHO, 9/20 severe CHF LVEF 15-20 %,  CT head old CVA, ICA aneurysm, CT chest no infiltrate small pleural effusion/right breast CA, lab showed acidosis/with CHEN, elevated INR, all A/C on hold, acidosis improved, elevated LFT, trending down , 1:1 watch discontinued patient is calm, D/C planning, return NH, F/U psych at NH,  F/U lab at NH, resume A/C. COVID-19 swab negative on 10/20

## 2020-10-30 ENCOUNTER — NON-APPOINTMENT (OUTPATIENT)
Age: 65
End: 2020-10-30

## 2020-11-19 ENCOUNTER — NON-APPOINTMENT (OUTPATIENT)
Age: 65
End: 2020-11-19

## 2020-11-25 ENCOUNTER — NON-APPOINTMENT (OUTPATIENT)
Age: 65
End: 2020-11-25

## 2020-12-02 ENCOUNTER — NON-APPOINTMENT (OUTPATIENT)
Age: 65
End: 2020-12-02

## 2020-12-03 ENCOUNTER — APPOINTMENT (OUTPATIENT)
Dept: ELECTROPHYSIOLOGY | Facility: CLINIC | Age: 65
End: 2020-12-03

## 2020-12-03 ENCOUNTER — NON-APPOINTMENT (OUTPATIENT)
Age: 65
End: 2020-12-03

## 2020-12-07 ENCOUNTER — FORM ENCOUNTER (OUTPATIENT)
Age: 65
End: 2020-12-07

## 2021-02-06 NOTE — PROGRESS NOTE ADULT - PROBLEM SELECTOR PROBLEM 9
Goal Outcome Evaluation:  Plan of Care Reviewed With: patient  Progress: improving  Outcome Summary: Pt rested on and off this shift.  C/o pain to left shoulder.  Meds whole with thin liquids.  frequent use of bedpan.  continent of bladder.  skin bruised, especially left hand.   Prophylactic measure

## 2021-02-12 ENCOUNTER — APPOINTMENT (OUTPATIENT)
Dept: VASCULAR SURGERY | Facility: CLINIC | Age: 66
End: 2021-02-12

## 2021-02-22 ENCOUNTER — APPOINTMENT (OUTPATIENT)
Dept: ELECTROPHYSIOLOGY | Facility: CLINIC | Age: 66
End: 2021-02-22

## 2021-03-04 NOTE — PATIENT PROFILE ADULT - DISASTER - FALL HARM RISK TYPE OF ASSESSMENT
daily assessment Medical Necessity Information: It is in your best interest to select a reason for this procedure from the list below. All of these items fulfill various CMS LCD requirements except the new and changing color options. Render Post-Care Instructions In Note?: no Post-Care Instructions: I reviewed with the patient in detail post-care instructions. Patient is to wear sunprotection, and avoid picking at any of the treated lesions. Pt may apply Vaseline to crusted or scabbing areas. Consent: The patient's consent was obtained including but not limited to risks of crusting, scabbing, blistering, scarring, darker or lighter pigmentary change, recurrence, incomplete removal and infection. Medical Necessity Clause: This procedure was medically necessary because the lesions that were treated were: Topical Anesthesia Type: 4% lidocaine Detail Level: Detailed Pared With?: curette

## 2021-05-12 ENCOUNTER — NON-APPOINTMENT (OUTPATIENT)
Age: 66
End: 2021-05-12

## 2021-05-12 ENCOUNTER — APPOINTMENT (OUTPATIENT)
Dept: ELECTROPHYSIOLOGY | Facility: CLINIC | Age: 66
End: 2021-05-12
Payer: MEDICARE

## 2021-05-12 PROCEDURE — 93296 REM INTERROG EVL PM/IDS: CPT

## 2021-05-12 PROCEDURE — 93295 DEV INTERROG REMOTE 1/2/MLT: CPT

## 2021-05-13 ENCOUNTER — NON-APPOINTMENT (OUTPATIENT)
Age: 66
End: 2021-05-13

## 2021-05-24 ENCOUNTER — APPOINTMENT (OUTPATIENT)
Dept: ELECTROPHYSIOLOGY | Facility: CLINIC | Age: 66
End: 2021-05-24

## 2021-06-01 ENCOUNTER — APPOINTMENT (OUTPATIENT)
Dept: ELECTROPHYSIOLOGY | Facility: CLINIC | Age: 66
End: 2021-06-01
Payer: MEDICARE

## 2021-06-01 VITALS — SYSTOLIC BLOOD PRESSURE: 137 MMHG | HEART RATE: 80 BPM | RESPIRATION RATE: 14 BRPM | DIASTOLIC BLOOD PRESSURE: 90 MMHG

## 2021-06-01 VITALS — WEIGHT: 174 LBS | HEIGHT: 67 IN | BODY MASS INDEX: 27.31 KG/M2

## 2021-06-01 PROCEDURE — 93260 PRGRMG DEV EVAL IMPLTBL SYS: CPT

## 2021-06-01 RX ORDER — CLOPIDOGREL 75 MG/1
75 TABLET, FILM COATED ORAL
Refills: 0 | Status: DISCONTINUED | COMMUNITY
End: 2021-06-01

## 2021-06-01 RX ORDER — ALBUTEROL SULFATE 90 UG/1
108 (90 BASE) INHALANT RESPIRATORY (INHALATION)
Qty: 1 | Refills: 5 | Status: DISCONTINUED | COMMUNITY
Start: 2020-09-10 | End: 2021-06-01

## 2021-06-01 RX ORDER — METOPROLOL SUCCINATE 50 MG/1
50 TABLET, EXTENDED RELEASE ORAL
Qty: 90 | Refills: 3 | Status: DISCONTINUED | COMMUNITY
Start: 2019-03-05 | End: 2021-06-01

## 2021-06-01 RX ORDER — BUPROPION HYDROCHLORIDE 150 MG/1
150 TABLET, EXTENDED RELEASE ORAL
Qty: 60 | Refills: 5 | Status: DISCONTINUED | COMMUNITY
Start: 2020-07-14 | End: 2021-06-01

## 2021-06-01 RX ORDER — FUROSEMIDE 20 MG/1
20 TABLET ORAL
Qty: 10 | Refills: 0 | Status: DISCONTINUED | COMMUNITY
Start: 2020-07-02 | End: 2021-06-01

## 2021-06-01 RX ORDER — HYDRALAZINE HYDROCHLORIDE 10 MG/1
10 TABLET ORAL
Qty: 60 | Refills: 0 | Status: DISCONTINUED | COMMUNITY
Start: 2020-09-01 | End: 2021-06-01

## 2021-06-01 RX ORDER — ISOSORBIDE MONONITRATE 30 MG/1
30 TABLET, EXTENDED RELEASE ORAL
Qty: 30 | Refills: 0 | Status: DISCONTINUED | COMMUNITY
Start: 2020-09-01 | End: 2021-06-01

## 2021-06-01 RX ORDER — ATORVASTATIN CALCIUM 20 MG/1
20 TABLET, FILM COATED ORAL
Qty: 90 | Refills: 3 | Status: DISCONTINUED | COMMUNITY
Start: 2019-03-05 | End: 2021-06-01

## 2021-06-01 NOTE — ED PROVIDER NOTE - DURATION
Procedure type:    _____ultrasound guided _____stereotactic    Breast:    _____Left __x___Right    Location: 1 o'clock 9 cmfn    Needle: 14 gauge rocky     # of passes: 2    Clip: Heart    Performed by: Dr Julio Cesar eRn held for 5 minutes by: Glenis Mott     Steri Strips:    ___x__yes _____no    Band aid:    __x___yes_____no    Tape and guaze:    _____yes ___x__no    Tolerated procedure:    ___x__yes _____no today

## 2021-07-26 NOTE — PATIENT PROFILE ADULT - LIVING ENVIRONMENT
[facility]        NAME: Divina Stubbs is a 46 y o  male  : 1967    MRN: [id number]  DATE: 2021  TIME: 7:06 AM    Assessment and Plan   Dermatitis due to plants, including poison ivy, sumac, and oak [L25 5]  1  Dermatitis due to plants, including poison ivy, sumac, and oak  predniSONE 20 mg tablet         Patient Instructions       Follow up with PCP in 3-5 days  Proceed to  ER if symptoms worsen  Chief Complaint     Chief Complaint   Patient presents with    Poison Ivy     Pt acquired poison ivy x 3 day  Pt states he always needs steriods when he gets poison ivy  RUE x 3 days  Pt using benadryl gel and cortisone 10, and alcohol  History of Present Illness       48-year-old male presents to the clinic with poison ivy that started 3 days ago  Patient states that he gets poison ivy easily and states that he would need steroids will get poison ivy to treat  Patient notes the poison ivy rash to his right upper extremity that started 3 days ago and has spreading  Patient has used Benadryl cream and cortisone and has clean the area several times alcohol  Review of Systems   Review of Systems   Constitutional: Negative for chills and fever  HENT: Negative for facial swelling, sore throat, trouble swallowing and voice change  Eyes: Negative for photophobia, pain, discharge, redness, itching and visual disturbance  Respiratory: Negative for cough, chest tightness, shortness of breath and wheezing  Cardiovascular: Negative for chest pain and palpitations  Musculoskeletal: Negative for myalgias  Skin: Positive for color change and rash  Neurological: Negative for dizziness, weakness, light-headedness and headaches           Current Medications       Current Outpatient Medications:     multivitamin (THERAGRAN) TABS, Take 1 tablet by mouth daily, Disp: , Rfl:     hydrOXYzine HCL (ATARAX) 25 mg tablet, Take 1 tablet (25 mg total) by mouth every 6 (six) hours as needed for itching (Patient not taking: Reported on 6/26/2021), Disp: 30 tablet, Rfl: 0    predniSONE 10 mg tablet, Take 5 tablets/day for 3 days, 4 tablets/day for 3 days, 3 tablets/day for 3 days, 2 tablets/day for 3 days, 1 tablet/day for 3 days (Patient not taking: Reported on 6/26/2021), Disp: 45 tablet, Rfl: 0    predniSONE 20 mg tablet, Take 3 tablets for 3 days, then 2 tablets for 3 days, then 1 tablet for 3 days  Take in morning with food  , Disp: 18 tablet, Rfl: 0    Current Allergies     Allergies as of 06/26/2021 - Reviewed 06/26/2021   Allergen Reaction Noted    Penicillins  05/21/2019            The following portions of the patient's history were reviewed and updated as appropriate: allergies, current medications, past family history, past medical history, past social history, past surgical history and problem list      Past Medical History:   Diagnosis Date    Chondromalacia of left patellofemoral joint        Past Surgical History:   Procedure Laterality Date    TONSILLECTOMY         Family History   Problem Relation Age of Onset    Heart defect Mother     Stroke Father     Coronary artery disease Father     Prostate cancer Father     Diabetes Maternal Grandmother     Diabetes Maternal Aunt     Diabetes Paternal Uncle          Medications have been verified  Objective   /88   Pulse 83   Temp 99 2 °F (37 3 °C)   Resp 16   Ht 5' 3" (1 6 m)   Wt 86 1 kg (189 lb 12 8 oz)   SpO2 95%   BMI 33 62 kg/m²   No LMP for male patient  Physical Exam     Physical Exam  Vitals and nursing note reviewed  Constitutional:       General: He is not in acute distress  Appearance: He is well-developed  He is not diaphoretic  HENT:      Head: Normocephalic and atraumatic  Pulmonary:      Effort: Pulmonary effort is normal    Musculoskeletal:         General: Normal range of motion  Skin:     General: Skin is warm and dry  Findings: Erythema and rash present  Comments: Erythematous macular papular rash with small vesicles noted, blanchable, excoriations are noted  Neurological:      Mental Status: He is alert and oriented to person, place, and time  no

## 2021-08-12 ENCOUNTER — APPOINTMENT (OUTPATIENT)
Dept: ELECTROPHYSIOLOGY | Facility: CLINIC | Age: 66
End: 2021-08-12

## 2021-08-17 ENCOUNTER — APPOINTMENT (OUTPATIENT)
Dept: CARDIOLOGY | Facility: CLINIC | Age: 66
End: 2021-08-17
Payer: MEDICARE

## 2021-08-17 VITALS
HEART RATE: 106 BPM | TEMPERATURE: 97 F | HEIGHT: 67 IN | DIASTOLIC BLOOD PRESSURE: 112 MMHG | WEIGHT: 175 LBS | SYSTOLIC BLOOD PRESSURE: 152 MMHG | OXYGEN SATURATION: 93 % | BODY MASS INDEX: 27.47 KG/M2

## 2021-08-17 DIAGNOSIS — E78.5 HYPERLIPIDEMIA, UNSPECIFIED: ICD-10-CM

## 2021-08-17 DIAGNOSIS — I42.8 OTHER CARDIOMYOPATHIES: ICD-10-CM

## 2021-08-17 DIAGNOSIS — I50.22 CHRONIC SYSTOLIC (CONGESTIVE) HEART FAILURE: ICD-10-CM

## 2021-08-17 PROCEDURE — 93000 ELECTROCARDIOGRAM COMPLETE: CPT

## 2021-08-17 PROCEDURE — 99214 OFFICE O/P EST MOD 30 MIN: CPT

## 2021-08-17 RX ORDER — METOPROLOL SUCCINATE 25 MG/1
25 TABLET, EXTENDED RELEASE ORAL DAILY
Qty: 30 | Refills: 5 | Status: ACTIVE | COMMUNITY
Start: 2021-08-17 | End: 1900-01-01

## 2021-08-17 NOTE — REASON FOR VISIT
[Hyperlipidemia] : hyperlipidemia [Hypertension] : hypertension [Other: ____] : [unfilled] [Other: _____] : [unfilled]

## 2021-08-18 PROBLEM — I50.22 CHRONIC SYSTOLIC CONGESTIVE HEART FAILURE: Status: ACTIVE | Noted: 2019-08-19

## 2021-08-18 LAB
ALBUMIN SERPL ELPH-MCNC: 3.8 G/DL
ALP BLD-CCNC: 95 U/L
ALT SERPL-CCNC: 6 U/L
ANION GAP SERPL CALC-SCNC: 16 MMOL/L
AST SERPL-CCNC: 14 U/L
BILIRUB DIRECT SERPL-MCNC: 0.1 MG/DL
BILIRUB INDIRECT SERPL-MCNC: 0.3 MG/DL
BILIRUB SERPL-MCNC: 0.4 MG/DL
BUN SERPL-MCNC: 17 MG/DL
CALCIUM SERPL-MCNC: 9.2 MG/DL
CHLORIDE SERPL-SCNC: 105 MMOL/L
CHOLEST SERPL-MCNC: 271 MG/DL
CO2 SERPL-SCNC: 23 MMOL/L
CREAT SERPL-MCNC: 1.17 MG/DL
GLUCOSE SERPL-MCNC: 121 MG/DL
HDLC SERPL-MCNC: 51 MG/DL
LDLC SERPL CALC-MCNC: 196 MG/DL
NONHDLC SERPL-MCNC: 220 MG/DL
POTASSIUM SERPL-SCNC: 3.3 MMOL/L
PROT SERPL-MCNC: 7.5 G/DL
SODIUM SERPL-SCNC: 144 MMOL/L
TRIGL SERPL-MCNC: 118 MG/DL
TSH SERPL-ACNC: 2.62 UIU/ML

## 2021-08-18 NOTE — HISTORY OF PRESENT ILLNESS
[FreeTextEntry1] : 66-year-old female former smoker with DM, HTN, HLD, breast CA s/p chemo/XRT, and NICM (catheterization 02/2019 showed dRPL occlusion without ability to intervene), EF 10-15% by echo 10/2020, s/p SubQ ICD 10/2019, who presents for follow-up visit, last seen in 2019.  \par \par In the intervening time period, patient has had several hospitalizations for HF exacerbation as well as CHEN. She also had a ? PE for which she was started on Eliquis (IVC filter is also present). Patient was also at a series of rehab facilities, most recently [facility], where she was discharged ~ 1 month ago. Since that last discharge, patient had stopped taking all of her medications except for anastrozole and Entresto because she said the medications made her feel sick. Patient reports exertional dyspnea for the past several weeks, though per chart review this seems to have been present for some time. Denies LE edema, orthopnea, or PND.\par \par

## 2021-08-18 NOTE — ASSESSMENT
[FreeTextEntry1] : 66-year-old female former smoker with DM, HTN, HLD, NICM (EF 31% by MUGA 07/2019) s/p SubQ ICD who presents for follow-up visit after almost 2 years. \par \par 1.NICM s/p ICD: Patient is euvolemic on exam\par -Patient currently is only on Entresto. Given her concern that her prior medications made her feel sick, I will reintroduce her HF meds one at a time to make sure she has no side effects, starting with metoprolol ER 25mg. \par -Will plan to add on spironolactone at next visit \par \par 2.HTN:\par -Currently on Entresto, restarted metoprolol \par -Renal function intact, will add on spironolactone at next visit in 6 weeks. \par -Repeat blood work in 2 weeks. \par \par 3.HLD: Previously on atorvastatin, will add on later\par \par 4. ? PE s/p IVC filter: The diagnosis of PE was suspected in September 2020 when patient had elevated D-dimer and "indeterminate" probability VQ scan. At that time, Eliquis was started empirically. Patient also had a cholecystectomy at that time. She has an IVC filter in place. \par -I am not sure if patient should remain on Eliquis (refusing it now anyway).  If this presumed PE occurred in setting of surgery, then it was provoked and so long-term anticoagulation may not be needed. However, she does have h/o malignancy (? in remission). Will refer her to her hematologist to help address that question.\par \par FUV 6 weeks

## 2021-08-21 ENCOUNTER — INPATIENT (INPATIENT)
Facility: HOSPITAL | Age: 66
LOS: 12 days | Discharge: HOME | End: 2021-09-03
Attending: HOSPITALIST | Admitting: HOSPITALIST
Payer: MEDICARE

## 2021-08-21 VITALS
TEMPERATURE: 99 F | RESPIRATION RATE: 17 BRPM | OXYGEN SATURATION: 99 % | DIASTOLIC BLOOD PRESSURE: 110 MMHG | SYSTOLIC BLOOD PRESSURE: 143 MMHG | HEIGHT: 67 IN | HEART RATE: 92 BPM

## 2021-08-21 DIAGNOSIS — Z98.89 OTHER SPECIFIED POSTPROCEDURAL STATES: Chronic | ICD-10-CM

## 2021-08-21 DIAGNOSIS — Z90.710 ACQUIRED ABSENCE OF BOTH CERVIX AND UTERUS: Chronic | ICD-10-CM

## 2021-08-21 DIAGNOSIS — I50.22 CHRONIC SYSTOLIC (CONGESTIVE) HEART FAILURE: ICD-10-CM

## 2021-08-21 DIAGNOSIS — R09.89 OTHER SPECIFIED SYMPTOMS AND SIGNS INVOLVING THE CIRCULATORY AND RESPIRATORY SYSTEMS: ICD-10-CM

## 2021-08-21 DIAGNOSIS — R06.02 SHORTNESS OF BREATH: ICD-10-CM

## 2021-08-21 DIAGNOSIS — C50.919 MALIGNANT NEOPLASM OF UNSPECIFIED SITE OF UNSPECIFIED FEMALE BREAST: Chronic | ICD-10-CM

## 2021-08-21 DIAGNOSIS — N17.9 ACUTE KIDNEY FAILURE, UNSPECIFIED: ICD-10-CM

## 2021-08-21 DIAGNOSIS — I10 ESSENTIAL (PRIMARY) HYPERTENSION: ICD-10-CM

## 2021-08-21 DIAGNOSIS — D25.9 LEIOMYOMA OF UTERUS, UNSPECIFIED: Chronic | ICD-10-CM

## 2021-08-21 DIAGNOSIS — C50.919 MALIGNANT NEOPLASM OF UNSPECIFIED SITE OF UNSPECIFIED FEMALE BREAST: ICD-10-CM

## 2021-08-21 DIAGNOSIS — Z29.9 ENCOUNTER FOR PROPHYLACTIC MEASURES, UNSPECIFIED: ICD-10-CM

## 2021-08-21 DIAGNOSIS — E87.0 HYPEROSMOLALITY AND HYPERNATREMIA: ICD-10-CM

## 2021-08-21 LAB
ALBUMIN SERPL ELPH-MCNC: 4.4 G/DL — SIGNIFICANT CHANGE UP (ref 3.3–5)
ALP SERPL-CCNC: 126 U/L — HIGH (ref 40–120)
ALT FLD-CCNC: 12 U/L — SIGNIFICANT CHANGE UP (ref 4–33)
ANION GAP SERPL CALC-SCNC: 17 MMOL/L — HIGH (ref 7–14)
ANION GAP SERPL CALC-SCNC: 18 MMOL/L — HIGH (ref 7–14)
AST SERPL-CCNC: 27 U/L — SIGNIFICANT CHANGE UP (ref 4–32)
B PERT DNA SPEC QL NAA+PROBE: SIGNIFICANT CHANGE UP
B PERT+PARAPERT DNA PNL SPEC NAA+PROBE: SIGNIFICANT CHANGE UP
BASOPHILS # BLD AUTO: 0 K/UL — SIGNIFICANT CHANGE UP (ref 0–0.2)
BASOPHILS NFR BLD AUTO: 0 % — SIGNIFICANT CHANGE UP (ref 0–2)
BILIRUB SERPL-MCNC: 0.6 MG/DL — SIGNIFICANT CHANGE UP (ref 0.2–1.2)
BLOOD GAS VENOUS COMPREHENSIVE RESULT: SIGNIFICANT CHANGE UP
BORDETELLA PARAPERTUSSIS (RAPRVP): SIGNIFICANT CHANGE UP
BUN SERPL-MCNC: 21 MG/DL — SIGNIFICANT CHANGE UP (ref 7–23)
BUN SERPL-MCNC: 21 MG/DL — SIGNIFICANT CHANGE UP (ref 7–23)
C PNEUM DNA SPEC QL NAA+PROBE: SIGNIFICANT CHANGE UP
CALCIUM SERPL-MCNC: 8.2 MG/DL — LOW (ref 8.4–10.5)
CALCIUM SERPL-MCNC: 9.3 MG/DL — SIGNIFICANT CHANGE UP (ref 8.4–10.5)
CHLORIDE SERPL-SCNC: 111 MMOL/L — HIGH (ref 98–107)
CHLORIDE SERPL-SCNC: 114 MMOL/L — HIGH (ref 98–107)
CO2 SERPL-SCNC: 16 MMOL/L — LOW (ref 22–31)
CO2 SERPL-SCNC: 18 MMOL/L — LOW (ref 22–31)
CREAT SERPL-MCNC: 1.54 MG/DL — HIGH (ref 0.5–1.3)
CREAT SERPL-MCNC: 1.72 MG/DL — HIGH (ref 0.5–1.3)
D DIMER BLD IA.RAPID-MCNC: 7410 NG/ML DDU — HIGH
EOSINOPHIL # BLD AUTO: 0.07 K/UL — SIGNIFICANT CHANGE UP (ref 0–0.5)
EOSINOPHIL NFR BLD AUTO: 0.9 % — SIGNIFICANT CHANGE UP (ref 0–6)
FLUAV SUBTYP SPEC NAA+PROBE: SIGNIFICANT CHANGE UP
FLUBV RNA SPEC QL NAA+PROBE: SIGNIFICANT CHANGE UP
GLUCOSE SERPL-MCNC: 135 MG/DL — HIGH (ref 70–99)
GLUCOSE SERPL-MCNC: 165 MG/DL — HIGH (ref 70–99)
HADV DNA SPEC QL NAA+PROBE: SIGNIFICANT CHANGE UP
HCOV 229E RNA SPEC QL NAA+PROBE: SIGNIFICANT CHANGE UP
HCOV HKU1 RNA SPEC QL NAA+PROBE: SIGNIFICANT CHANGE UP
HCOV NL63 RNA SPEC QL NAA+PROBE: SIGNIFICANT CHANGE UP
HCOV OC43 RNA SPEC QL NAA+PROBE: SIGNIFICANT CHANGE UP
HCT VFR BLD CALC: 36.6 % — SIGNIFICANT CHANGE UP (ref 34.5–45)
HGB BLD-MCNC: 11.5 G/DL — SIGNIFICANT CHANGE UP (ref 11.5–15.5)
HMPV RNA SPEC QL NAA+PROBE: SIGNIFICANT CHANGE UP
HPIV1 RNA SPEC QL NAA+PROBE: SIGNIFICANT CHANGE UP
HPIV2 RNA SPEC QL NAA+PROBE: SIGNIFICANT CHANGE UP
HPIV3 RNA SPEC QL NAA+PROBE: SIGNIFICANT CHANGE UP
HPIV4 RNA SPEC QL NAA+PROBE: SIGNIFICANT CHANGE UP
IANC: 4.07 K/UL — SIGNIFICANT CHANGE UP (ref 1.5–8.5)
LYMPHOCYTES # BLD AUTO: 2.59 K/UL — SIGNIFICANT CHANGE UP (ref 1–3.3)
LYMPHOCYTES # BLD AUTO: 33.9 % — SIGNIFICANT CHANGE UP (ref 13–44)
M PNEUMO DNA SPEC QL NAA+PROBE: SIGNIFICANT CHANGE UP
MAGNESIUM SERPL-MCNC: 1.7 MG/DL — SIGNIFICANT CHANGE UP (ref 1.6–2.6)
MCHC RBC-ENTMCNC: 28.8 PG — SIGNIFICANT CHANGE UP (ref 27–34)
MCHC RBC-ENTMCNC: 31.4 GM/DL — LOW (ref 32–36)
MCV RBC AUTO: 91.7 FL — SIGNIFICANT CHANGE UP (ref 80–100)
MONOCYTES # BLD AUTO: 0.53 K/UL — SIGNIFICANT CHANGE UP (ref 0–0.9)
MONOCYTES NFR BLD AUTO: 6.9 % — SIGNIFICANT CHANGE UP (ref 2–14)
NEUTROPHILS # BLD AUTO: 4.45 K/UL — SIGNIFICANT CHANGE UP (ref 1.8–7.4)
NEUTROPHILS NFR BLD AUTO: 54.8 % — SIGNIFICANT CHANGE UP (ref 43–77)
NT-PROBNP SERPL-SCNC: HIGH PG/ML
PHOSPHATE SERPL-MCNC: 3.1 MG/DL — SIGNIFICANT CHANGE UP (ref 2.5–4.5)
PLATELET # BLD AUTO: 166 K/UL — SIGNIFICANT CHANGE UP (ref 150–400)
POTASSIUM SERPL-MCNC: 3.4 MMOL/L — LOW (ref 3.5–5.3)
POTASSIUM SERPL-MCNC: 3.7 MMOL/L — SIGNIFICANT CHANGE UP (ref 3.5–5.3)
POTASSIUM SERPL-SCNC: 3.4 MMOL/L — LOW (ref 3.5–5.3)
POTASSIUM SERPL-SCNC: 3.7 MMOL/L — SIGNIFICANT CHANGE UP (ref 3.5–5.3)
PROT SERPL-MCNC: 7.5 G/DL — SIGNIFICANT CHANGE UP (ref 6–8.3)
RAPID RVP RESULT: SIGNIFICANT CHANGE UP
RBC # BLD: 3.99 M/UL — SIGNIFICANT CHANGE UP (ref 3.8–5.2)
RBC # FLD: 14.3 % — SIGNIFICANT CHANGE UP (ref 10.3–14.5)
RSV RNA SPEC QL NAA+PROBE: SIGNIFICANT CHANGE UP
RV+EV RNA SPEC QL NAA+PROBE: SIGNIFICANT CHANGE UP
SARS-COV-2 RNA SPEC QL NAA+PROBE: SIGNIFICANT CHANGE UP
SODIUM SERPL-SCNC: 147 MMOL/L — HIGH (ref 135–145)
SODIUM SERPL-SCNC: 147 MMOL/L — HIGH (ref 135–145)
TROPONIN T, HIGH SENSITIVITY RESULT: 49 NG/L — SIGNIFICANT CHANGE UP
TROPONIN T, HIGH SENSITIVITY RESULT: 55 NG/L — CRITICAL HIGH
TROPONIN T, HIGH SENSITIVITY RESULT: 55 NG/L — CRITICAL HIGH
WBC # BLD: 7.63 K/UL — SIGNIFICANT CHANGE UP (ref 3.8–10.5)
WBC # FLD AUTO: 7.63 K/UL — SIGNIFICANT CHANGE UP (ref 3.8–10.5)

## 2021-08-21 PROCEDURE — 93010 ELECTROCARDIOGRAM REPORT: CPT

## 2021-08-21 PROCEDURE — 71046 X-RAY EXAM CHEST 2 VIEWS: CPT | Mod: 26

## 2021-08-21 PROCEDURE — 99223 1ST HOSP IP/OBS HIGH 75: CPT | Mod: GC

## 2021-08-21 PROCEDURE — 99285 EMERGENCY DEPT VISIT HI MDM: CPT | Mod: CS,25

## 2021-08-21 RX ORDER — FUROSEMIDE 40 MG
20 TABLET ORAL ONCE
Refills: 0 | Status: COMPLETED | OUTPATIENT
Start: 2021-08-21 | End: 2021-08-21

## 2021-08-21 RX ORDER — ANASTROZOLE 1 MG/1
1 TABLET ORAL DAILY
Refills: 0 | Status: DISCONTINUED | OUTPATIENT
Start: 2021-08-21 | End: 2021-09-03

## 2021-08-21 RX ORDER — HEPARIN SODIUM 5000 [USP'U]/ML
5000 INJECTION INTRAVENOUS; SUBCUTANEOUS EVERY 8 HOURS
Refills: 0 | Status: DISCONTINUED | OUTPATIENT
Start: 2021-08-21 | End: 2021-08-21

## 2021-08-21 RX ORDER — HEPARIN SODIUM 5000 [USP'U]/ML
5000 INJECTION INTRAVENOUS; SUBCUTANEOUS EVERY 8 HOURS
Refills: 0 | Status: DISCONTINUED | OUTPATIENT
Start: 2021-08-22 | End: 2021-08-22

## 2021-08-21 RX ORDER — FUROSEMIDE 40 MG
20 TABLET ORAL DAILY
Refills: 0 | Status: DISCONTINUED | OUTPATIENT
Start: 2021-08-21 | End: 2021-08-21

## 2021-08-21 RX ORDER — ACETAMINOPHEN 500 MG
2 TABLET ORAL
Qty: 0 | Refills: 0 | DISCHARGE

## 2021-08-21 RX ORDER — ENOXAPARIN SODIUM 100 MG/ML
30 INJECTION SUBCUTANEOUS DAILY
Refills: 0 | Status: DISCONTINUED | OUTPATIENT
Start: 2021-08-21 | End: 2021-08-21

## 2021-08-21 RX ORDER — POTASSIUM CHLORIDE 20 MEQ
40 PACKET (EA) ORAL ONCE
Refills: 0 | Status: COMPLETED | OUTPATIENT
Start: 2021-08-21 | End: 2021-08-21

## 2021-08-21 RX ORDER — INSULIN HUMAN 100 [IU]/ML
0 INJECTION, SOLUTION SUBCUTANEOUS
Qty: 0 | Refills: 0 | DISCHARGE

## 2021-08-21 RX ORDER — ASCORBIC ACID 60 MG
1 TABLET,CHEWABLE ORAL
Qty: 0 | Refills: 0 | DISCHARGE

## 2021-08-21 RX ORDER — BUPROPION HYDROCHLORIDE 150 MG/1
1 TABLET, EXTENDED RELEASE ORAL
Qty: 0 | Refills: 0 | DISCHARGE

## 2021-08-21 RX ORDER — HYDRALAZINE HCL 50 MG
5 TABLET ORAL EVERY 8 HOURS
Refills: 0 | Status: DISCONTINUED | OUTPATIENT
Start: 2021-08-21 | End: 2021-08-21

## 2021-08-21 RX ORDER — FOLIC ACID 0.8 MG
1 TABLET ORAL
Qty: 0 | Refills: 0 | DISCHARGE

## 2021-08-21 RX ORDER — METOPROLOL TARTRATE 50 MG
25 TABLET ORAL DAILY
Refills: 0 | Status: DISCONTINUED | OUTPATIENT
Start: 2021-08-21 | End: 2021-09-03

## 2021-08-21 RX ORDER — HYDRALAZINE HCL 50 MG
5 TABLET ORAL EVERY 8 HOURS
Refills: 0 | Status: DISCONTINUED | OUTPATIENT
Start: 2021-08-21 | End: 2021-08-28

## 2021-08-21 RX ORDER — MULTIVIT-MIN/FERROUS GLUCONATE 9 MG/15 ML
1 LIQUID (ML) ORAL
Qty: 0 | Refills: 0 | DISCHARGE

## 2021-08-21 RX ADMIN — Medication 25 MILLIGRAM(S): at 16:27

## 2021-08-21 RX ADMIN — Medication 20 MILLIGRAM(S): at 13:28

## 2021-08-21 RX ADMIN — Medication 40 MILLIEQUIVALENT(S): at 15:12

## 2021-08-21 RX ADMIN — ANASTROZOLE 1 MILLIGRAM(S): 1 TABLET ORAL at 18:23

## 2021-08-21 RX ADMIN — ENOXAPARIN SODIUM 30 MILLIGRAM(S): 100 INJECTION SUBCUTANEOUS at 16:27

## 2021-08-21 RX ADMIN — Medication 5 MILLIGRAM(S): at 18:23

## 2021-08-21 NOTE — ED ADULT TRIAGE NOTE - CHIEF COMPLAINT QUOTE
Pt c/o SOB and cough at night x3 weeks.  Denies fever or CP.  Hx COPD HTN CHF CVA.  Not taking hypertensive meds. Pt c/o SOB and cough at night x3 weeks.  Denies fever or CP.  Hx COPD HTN CHF defibrillator CVA.  Not taking hypertensive meds.

## 2021-08-21 NOTE — H&P ADULT - ASSESSMENT
66F with COPD/emphysema, DVT L leg 1994 with [place]son IVC filter and apixaban, HTN, HLD, infrarenal abd aneurysm, internal carotid aneurysm, depression, R breast cancer s/p chemo and radiation, cardiomyopathy, CHF with SICD, sickle cell trait, uterine fibroids s/p hysterectomy, s/p cholecystectomy, and former smoker (quit 2 years ago). Presented to ED with SOB f1robby and dry cough x3w.

## 2021-08-21 NOTE — ED PROVIDER NOTE - ATTENDING CONTRIBUTION TO CARE
66 year old with chf and  presents with pan concern for acs vs copd exacerbation vs hf exacerbation vs pna vs ptx less likely pe. lasb trop cxr bnp diuretics admit

## 2021-08-21 NOTE — ED PROVIDER NOTE - NSICDXPASTSURGICALHX_GEN_ALL_CORE_FT
PAST SURGICAL HISTORY:  Breast cancer infusaport insertion- 2014- left chest    Fibroid uterus sp hystrectomy 1196    [place] filter in place 1994    S/P hysterectomy 1994    S/P lumpectomy, right breast 2014, revision of position- 2015

## 2021-08-21 NOTE — ED ADULT NURSE NOTE - OBJECTIVE STATEMENT
Pt A ox3 came in c/o SOB . denies CP. fever. breathing even and LOZA noted. states she is unable to walk or perform activities of daily living without getting SOB .connected to CM shows NSR. will continue to monitor. unable to get IV access. MD made aware. pending US guided IV and labs.

## 2021-08-21 NOTE — H&P ADULT - PROBLEM SELECTOR PLAN 1
Maybe 2/2 deconditioning, anxiety, and metabolic acidosis (delta delta gradient -7).   May have progressive lung disease, but less likely acute infection or COPD exacerbation (no reported change in cough or sputum in the past month, and CXR unremarkable).  May have progressive HFrEF, but less likely HFrEF exacerbation despite high proBNP (has been that elevated since 8/2020) and HsenTnT (likely 2/2 demand ischemia); no JVD, peripheral edema, orthopnea, or other suggestive S/S.   Low prob of PE by Stuart, and doesn't meet PERC, but has DDimer~7400H.  - CTPA when Cr improves  - PT consulted Maybe 2/2 deconditioning, anxiety, and metabolic acidosis (delta delta gradient -7).   May have progressive lung disease, but less likely acute infection or COPD exacerbation (no reported change in cough or sputum in the past month, and CXR unremarkable).  May have progressive HFrEF, but less likely HFrEF exacerbation despite high proBNP (has been that elevated since 8/2020) and HsenTnT (likely 2/2 demand ischemia); no JVD, peripheral edema, orthopnea, or other suggestive S/S.   Low prob of PE by Stuart, and doesn't meet PERC, but has DDimer~7400H.  - V/Q scan and TTE while waiting for Cr to improve for CTPA  - PT consulted  - no need for empiric PE rx

## 2021-08-21 NOTE — H&P ADULT - PROBLEM SELECTOR PLAN 3
R/o CHEN. Cr 1.72H (and eGFR 30-35L) on admission and 1.14 2020.   - holding home sacubitril+valsartan 24+26mg BID until r/o CHEN BP 140s / 100s-110s on admission.  - home metoprolol succinate 25mg QD  - hydralazine 5mg Q8H

## 2021-08-21 NOTE — ED ADULT NURSE NOTE - NSIMPLEMENTINTERV_GEN_ALL_ED
Implemented All Fall Risk Interventions:  [place] to call system. Call bell, personal items and telephone within reach. Instruct patient to call for assistance. Room bathroom lighting operational. Non-slip footwear when patient is off stretcher. Physically safe environment: no spills, clutter or unnecessary equipment. Stretcher in lowest position, wheels locked, appropriate side rails in place. Provide visual cue, wrist band, yellow gown, etc. Monitor gait and stability. Monitor for mental status changes and reorient to person, place, and time. Review medications for side effects contributing to fall risk. Reinforce activity limits and safety measures with patient and family.

## 2021-08-21 NOTE — H&P ADULT - PROBLEM SELECTOR PLAN 2
LVEF 15-20% 2020. ProBNP~76602R ([id number] since 8/2020), HsenTnT 55H likely 2/2 demand ischemia. ECG unremarkable.  - trended HsenTnT to peak  - has SICD  - home metoprolol succinate 25mg QD  - holding home sacubitril+valsartan 24+26mg BID until r/o CHEN  - furosemide 20mg QD that patient stopped on her own outpt LVEF 15-20% 2020. ProBNP~55491D ([id number] since 8/2020), HsenTnT 55H likely 2/2 demand ischemia. ECG unremarkable. Euvolemic on exam.  - trended HsenTnT to peak  - has SICD  - home metoprolol succinate 25mg QD  - holding home sacubitril+valsartan 24+26mg BID until r/o CHEN  - f/u repeat proBNP

## 2021-08-21 NOTE — PHARMACOTHERAPY INTERVENTION NOTE - COMMENTS
Medication history is complete and was verified with patient, [facility] and [facility] ([phone]). Medication list updated in Outpatient Medication Record ([facility]). Please call spectra [phone] if you have any questions.

## 2021-08-21 NOTE — H&P ADULT - HISTORY OF PRESENT ILLNESS
66F with COPD/emphysema, DVT L leg 1994 with [place] IVC filter and apixaban, HTN, HLD, infrarenal abd aneurysm, internal carotid aneurysm, depression, R breast cancer s/p chemo and radiation, cardiomyopathy, CHF with SICD, sickle cell trait, uterine fibroids s/p hysterectomy, and former smoker.    Presents to ED with worsening SOB [id number]. Unable to walk on her own because of SOB. Productive cough at night. Lives at home and hired an aide. Denies chest pain, fever, abd pain, nausea, emesis, diarrhea, back pain, dizziness, palpitations, leg swelling, and recent travel.    ED course:   BP 140s / 100s-110s.   DDimer~7400H. ProBNP~24761I. HsenTnT 55H. HyperNa 147H. Cr 1.72H and eGFR 30-35L. Lactate and CBC unremarkable.   S/p furosemide 20mg IV x1     #SOB  Maybe 2/2 HFrEF exacerbation, COPD/emphysema exacerbation, PE, or metabolic acidosis. CXR unremarkable.  - low prob of PE by [place] -> didn't meet PERC -> DDimer~7400H -> can't get CTPA because high Cr, so V/Q scan    #HFrEF  LVEF 15-20% 2020. ProBNP~59071U, HsenTnT 55H. ECG unremarkable.  - tele  - trended HsenTnT to peak  - has SICD    #CHEN r/o  Cr 1.72H (and eGFR 30-35L) on admission and 1.14 2020.     #Metabolic acidosis  Delta delta gradient -7.    #Prophylaxis  - PT  -  66F with COPD/emphysema, DVT L leg 1994 with [place] IVC filter and apixaban, HTN, HLD, infrarenal abd aneurysm, internal carotid aneurysm, depression, R breast cancer s/p chemo and radiation, cardiomyopathy, CHF with SICD, sickle cell trait, uterine fibroids s/p hysterectomy, and former smoker.    Presents to ED with worsening SOB [id number]. Unable to walk on her own because of SOB. Productive cough at night. Lives at home and hired an aide. Denies chest pain, fever, abd pain, nausea, emesis, diarrhea, back pain, dizziness, palpitations, leg swelling, and recent travel.    ED course:   BP 140s / 100s-110s.   DDimer~7400H. ProBNP~19221N. HsenTnT 55H. HyperNa 147H. Cr 1.72H and eGFR 30-35L. Lactate and CBC unremarkable.   S/p furosemide 20mg IV x1     #SOB  Maybe 2/2 HFrEF exacerbation, COPD/emphysema exacerbation, PE, or metabolic acidosis. CXR unremarkable.  - low prob of PE by [place] -> didn't meet PERC -> DDimer~7400H -> can't get CTPA because high Cr, so V/Q scan    #HFrEF  LVEF 15-20% 2020. ProBNP~63408Z, HsenTnT 55H. ECG unremarkable.  - tele  - trended HsenTnT to peak  - has SICD    #CHEN r/o  Cr 1.72H (and eGFR 30-35L) on admission and 1.14 2020.     #Metabolic acidosis  Delta delta gradient -7.    #Prophylaxis  - PT consulted  - enoxaparin 30 66F with COPD/emphysema, DVT L leg 1994 with [place] IVC filter and apixaban, HTN, HLD, infrarenal abd aneurysm, internal carotid aneurysm, depression, R breast cancer s/p chemo and radiation, cardiomyopathy, CHF with SICD, sickle cell trait, uterine fibroids s/p hysterectomy, and former smoker.    Presents to ED with worsening SOB [id number]. Unable to walk on her own because of SOB. Productive cough at night. Lives at home and hired an aide. Denies chest pain, fever, abd pain, nausea, emesis, diarrhea, back pain, dizziness, palpitations, leg swelling, and recent travel.    ED course:   BP 140s / 100s-110s.   DDimer~7400H. ProBNP~79741C. HsenTnT 55H. HyperNa 147H. Cr 1.72H and eGFR 30-35L. Lactate and CBC unremarkable.   S/p furosemide 20mg IV x1     #SOB  Maybe 2/2 HFrEF exacerbation, COPD/emphysema exacerbation, PE, or metabolic acidosis. CXR unremarkable.  - low prob of PE by [place] -> didn't meet PERC -> DDimer~7400H -> can't get CTPA because high Cr, can consider V/Q scan    #HFrEF  LVEF 15-20% 2020. ProBNP~79838V, HsenTnT 55H. ECG unremarkable.  - tele  - trended HsenTnT to peak  - has SICD  - home metoprolol succinate 25mg QD  - holding home sacubitril+valsartan 24+26mg BID until r/o CHEN    #CHEN r/o  Cr 1.72H (and eGFR 30-35L) on admission and 1.14 2020.   - holding home sacubitril+valsartan 24+26mg BID until r/o CHEN    #Metabolic acidosis  Delta delta gradient -7.    #Breast cancer  - home anastrozole     #Prophylaxis  - PT consulted  - enoxaparin 30 66F with COPD/emphysema, DVT L leg 1994 with [place] IVC filter and apixaban, HTN, HLD, infrarenal abd aneurysm, internal carotid aneurysm, depression, R breast cancer s/p chemo and radiation, cardiomyopathy, CHF with SICD, sickle cell trait, uterine fibroids s/p hysterectomy, s/p cholecystectomy, former smoker (quit 2 years ago).    Presents to ED with SOB [id number]. Describes it as pain in airways. Unable to describe when it happens and what exacerbates/alleviates it. Also endorses dry cough x3w, and no changes in sputum productions. Also endorses diarrhea for a few days, described as soft stools in the morning and sometimes in the PM after eating. Also endorses weight loss and chronic knee pain. Denies fevers/chills, chest pain, palpitations, reflux, abd pain, nausea, emesis, leg swelling, and dysuria. Hasn't used alcohol since at least 10/2021.  Reports transferring between nursing/rehab facilities since her last hospital discharge in 10/2021, most recently leaving [facility] on 7/31/21 after ~4months. 6 months ago at a nursing rehab, she reports cutting out the meds that made her "sick" (ie nausea and emesis). Currently lives at home alone. Enjoys sitting in front of the TV all day. Has a walker that she doesn't use. Hired an aide who helps cook, wash her, but not with meds. PCP is Dr. Huyen Figueroa at [facility].     ED course:   BP 140s / 100s-110s.   DDimer~7400H. ProBNP~25272N. HsenTnT 55H. HyperNa 147H. Cr 1.72H and eGFR 30-35L. Lactate and CBC unremarkable.   S/p furosemide 20mg IV x1  66F with COPD/emphysema, DVT L leg 1994 with [place] IVC filter, HTN, HLD, infrarenal abd aneurysm, internal carotid aneurysm, depression, R breast cancer s/p chemo and radiation, cardiomyopathy, HFrEF with SICD, sickle cell trait, uterine fibroids s/p hysterectomy, s/p cholecystectomy, former smoker (quit 2 years ago).    Presents to ED with SOB [id number]. Describes it as pain in airways. Unable to describe when it happens and what exacerbates/alleviates it. Also endorses dry cough x3w, and no changes in sputum productions. Also endorses diarrhea for a few days, described as soft stools in the morning and sometimes in the PM after eating. Also endorses weight loss and chronic L knee pain. Denies fevers/chills, chest pain, palpitations, reflux, abd pain, nausea, emesis, leg swelling, and dysuria. Hasn't used alcohol since at least 10/2020.  Reports transferring between nursing/rehab facilities since her last hospital discharge in 10/2020, most recently leaving [facility] on 7/31/21 after ~4months. 6 months ago at a nursing rehab, she reports cutting out the meds that made her "sick" (ie nausea and emesis). Currently lives at home alone. Enjoys sitting in front of the TV all day. Has a walker that she doesn't use. Hired an aide who helps cook, wash her, but not with meds. PCP is Dr. Huyen Figueroa at [facility].     ED course:   BP 140s / 100s-110s.   DDimer~7400H. ProBNP~63434Q. HsenTnT 55H. HyperNa 147H. Cr 1.72H and eGFR 30-35L. Lactate and CBC unremarkable.   S/p furosemide 20mg IV x1

## 2021-08-21 NOTE — ED PROVIDER NOTE - OBJECTIVE STATEMENT
65 yo female c pmhx COPD, abdominal aneurysm infrarenal, DVT left leg, s/p IVC filter, HTN, HLD, internal carotid aneurysm, s/p right breast ca, CHF s/p defibrillator presents to ED c/o worsening SOB for the past month.  Pt states has not been able to walk on her own due to the SOB.  +productive cough at night.  Pt currently living at home right now and hired an aid to be with her.  Pt denies any CP, fever, abd pain, n/v/d, back pain, dizziness, palpitations, leg swelling, recent travel.

## 2021-08-21 NOTE — ED PROVIDER NOTE - PROGRESS NOTE DETAILS
VALERI Zuñiga: Dimer >7000, has CHEN so cannot do CAT with contrast, probnp>00243, trop 55, EKG nonischemic, will admit to tele for further workup.

## 2021-08-21 NOTE — ED PROVIDER NOTE - CLINICAL SUMMARY MEDICAL DECISION MAKING FREE TEXT BOX
65 yo female c pmhx COPD, abdominal aneurysm infrarenal, DVT left leg, s/p IVC filter, HTN, HLD, internal carotid aneurysm, s/p right breast ca, CHF s/p defibrillator presents to ED c/o worsening SOB for the past month.   Pt does not appear fluid overloaded- lungs clear, no leg swelling however given history will r/o chf exacerbation vs PE; check labs, ekg, cxr, likely admit.

## 2021-08-21 NOTE — H&P ADULT - NSHPPHYSICALEXAM_GEN_ALL_CORE
Con: NAD  HEENT: NCAT, no conjunctival injection, no scleral icterus, moist oral mucosa  Neck: JVD, supple, no LAD, L postmandibular tenderness  Resp: normal WOB at rest, CTAB  CV: RRR, physiologic S1 and S2, no murmurs, no rubs, no gallops, 1+ radial pulses  Abd: nondistended, normoactive bowel sounds, soft, RUQ tenderness, no rebound, no involuntary guarding, no organomegaly  Ext: no clubbing, warm hands and feet, no edema  Neuro: alert, A&Ox3  MSK: spontaneous movement of all 4 extremities  Psych: anxious demeanor, some paranoid comments about medicines

## 2021-08-21 NOTE — PROVIDER CONTACT NOTE (OTHER) - SITUATION
patient's BP is 134/107 and patient complains of shortness of breath with O2 saturation of 100% on room air

## 2021-08-21 NOTE — H&P ADULT - NSICDXPASTSURGICALHX_GEN_ALL_CORE_FT
PAST SURGICAL HISTORY:  Breast cancer infusaport insertion- 2014- left chest    Fibroid uterus sp hystrectomy 1196    [place] filter in place 1994    S/P hysterectomy 1994    S/P lumpectomy, right breast 2014, revision of position- 2015

## 2021-08-21 NOTE — ED ADULT NURSE NOTE - CHIEF COMPLAINT QUOTE
Pt c/o SOB and cough at night x3 weeks.  Denies fever or CP.  Hx COPD HTN CHF defibrillator CVA.  Not taking hypertensive meds.

## 2021-08-21 NOTE — H&P ADULT - NSHPLABSRESULTS_GEN_ALL_CORE
Vital Signs Last 24 Hrs  T(C): 37.1 (21 Aug 2021 09:08), Max: 37.1 (21 Aug 2021 09:08)  T(F): 98.8 (21 Aug 2021 09:08), Max: 98.8 (21 Aug 2021 09:08)  HR: 87 (21 Aug 2021 13:12) (87 - 95)  BP: 147/114 (21 Aug 2021 13:12) (140/102 - 147/114)  BP(mean): --  RR: 19 (21 Aug 2021 13:12) (17 - 20)  SpO2: 100% (21 Aug 2021 13:12) (99% - 100%)                       11.5   7.63  )-----------( 166      ( 21 Aug 2021 11:15 )             36.6     08-21  147<H>  |  111<H>  |  21  ----------------------------<  135<H>  3.4<L>   |  18<L>  |  1.72<H>  Ca    9.3      21 Aug 2021 11:15    TPro  7.5  /  Alb  4.4  /  TBili  0.6  /  DBili  x   /  AST  27  /  ALT  12  /  AlkPhos  126<H>  08-21 Vital Signs Last 24 Hrs  T(C): 37.1 (21 Aug 2021 09:08), Max: 37.1 (21 Aug 2021 09:08)  T(F): 98.8 (21 Aug 2021 09:08), Max: 98.8 (21 Aug 2021 09:08)  HR: 87 (21 Aug 2021 13:12) (87 - 95)  BP: 147/114 (21 Aug 2021 13:12) (140/102 - 147/114)  BP(mean): --  RR: 19 (21 Aug 2021 13:12) (17 - 20)  SpO2: 100% (21 Aug 2021 13:12) (99% - 100%)                       11.5   7.63  )-----------( 166      ( 21 Aug 2021 11:15 )             36.6     08-21  147<H>  |  111<H>  |  21  ----------------------------<  135<H>  3.4<L>   |  18<L>  |  1.72<H>  Ca    9.3      21 Aug 2021 11:15  TPro  7.5  /  Alb  4.4  /  TBili  0.6  /  DBili  x   /  AST  27  /  ALT  12  /  AlkPhos  126<H>  08-21

## 2021-08-21 NOTE — H&P ADULT - ATTENDING COMMENTS
66W with PMH significant for but not limited to CHFrEF s/p AICD, CKDII, HTN, HLD, breast Ca, who is presenting to the hospital with 6 weeks of shortness of breath which she reports has not changed over this time. She reports this started right before leaving [facility] in [facility] when she stopped taking majority of her medications as she felt they were making her feel ill.   In ED patient was noted to be in no respiratory distress, breathing comfortably, with normal saturations on RA. Her CXR was clear. Her BNP was elevated at 34K, consistent with previous levels, Cr of 1.7 with prior at 1.1 (8 months ago) D-Dimer was 7410 and also had mildly elevated trops that were downtrending. She actively denies any chest pain.    # Hypertensive Urgency – /110s likely due to medication non-compliance. Will start on hydralazine 5mg q8H in addition to home metoprolol. Holding entresto due to CHEN. Titrate up hydralazine as tolerated   # Shortness of breath – ongoing complaint that has unchanged for last 6 weeks. Low suspicion for decompensated CHF as lungs are clear, CXR without evidence of decompensation. PE remains on differential with elevated Dimer, but wells 1.5 for previous VTE. CTA not obtained due to CHEN, no utility in V/Q given indeterminate result in past. Will get TTE to assess for right heart strain, monitor trops. Low threshold for starting empiric AC should she develop worsening SOB, chest pain, tachycardia.   # CHEN on CKD– 1.14-> 1.7 on admission. Unclear if this is acute CHEN vs progression of her CKD. Will send urine studies. Trial of IVF 500cc as appears possibly mildly hypovolemic. Repeat BMP in AM. Hold nephrotoxic agents   # Elevated trops – likely from hypertensive crisis iso CHEN. EKG reviewed lateral TWI inversions seen on EKG from 9/2020.   # Hypernatremia – suspect secondary to poor PO intake, trial of IVF as above.   # Breast Ca – Reports she is s/p chemo/RT. OP Oncologist-> Dr. Serrato. c/w home anastrozole.

## 2021-08-21 NOTE — PROVIDER CONTACT NOTE (OTHER) - SITUATION
patient's BP is 135/109 and patient complains of shortness of breath with O2 saturation of 100% on room air

## 2021-08-21 NOTE — H&P ADULT - PROBLEM SELECTOR PLAN 4
- home anastrozole R/o CHEN. Cr 1.72H (and eGFR 30-35L) on admission and 1.14 2020.   - holding home sacubitril+valsartan 24+26mg BID until r/o CHEN  - f/u urine labs  - if repeat Cr not improved s/p furosemide, then NS 500ml 75/h

## 2021-08-22 DIAGNOSIS — N18.9 CHRONIC KIDNEY DISEASE, UNSPECIFIED: ICD-10-CM

## 2021-08-22 LAB
ALBUMIN SERPL ELPH-MCNC: 4 G/DL — SIGNIFICANT CHANGE UP (ref 3.3–5)
ALP SERPL-CCNC: 140 U/L — HIGH (ref 40–120)
ALT FLD-CCNC: 15 U/L — SIGNIFICANT CHANGE UP (ref 4–33)
ANION GAP SERPL CALC-SCNC: 19 MMOL/L — HIGH (ref 7–14)
AST SERPL-CCNC: 21 U/L — SIGNIFICANT CHANGE UP (ref 4–32)
BASOPHILS # BLD AUTO: 0.05 K/UL — SIGNIFICANT CHANGE UP (ref 0–0.2)
BASOPHILS NFR BLD AUTO: 0.6 % — SIGNIFICANT CHANGE UP (ref 0–2)
BILIRUB SERPL-MCNC: 0.6 MG/DL — SIGNIFICANT CHANGE UP (ref 0.2–1.2)
BUN SERPL-MCNC: 26 MG/DL — HIGH (ref 7–23)
CALCIUM SERPL-MCNC: 9.5 MG/DL — SIGNIFICANT CHANGE UP (ref 8.4–10.5)
CHLORIDE SERPL-SCNC: 110 MMOL/L — HIGH (ref 98–107)
CO2 SERPL-SCNC: 16 MMOL/L — LOW (ref 22–31)
COVID-19 SPIKE DOMAIN AB INTERP: POSITIVE
COVID-19 SPIKE DOMAIN ANTIBODY RESULT: >250 U/ML — HIGH
CREAT SERPL-MCNC: 1.67 MG/DL — HIGH (ref 0.5–1.3)
EOSINOPHIL # BLD AUTO: 0.06 K/UL — SIGNIFICANT CHANGE UP (ref 0–0.5)
EOSINOPHIL NFR BLD AUTO: 0.8 % — SIGNIFICANT CHANGE UP (ref 0–6)
GLUCOSE SERPL-MCNC: 171 MG/DL — HIGH (ref 70–99)
HCT VFR BLD CALC: 37.5 % — SIGNIFICANT CHANGE UP (ref 34.5–45)
HGB BLD-MCNC: 11.9 G/DL — SIGNIFICANT CHANGE UP (ref 11.5–15.5)
IANC: 4.38 K/UL — SIGNIFICANT CHANGE UP (ref 1.5–8.5)
IMM GRANULOCYTES NFR BLD AUTO: 0.3 % — SIGNIFICANT CHANGE UP (ref 0–1.5)
LYMPHOCYTES # BLD AUTO: 2.89 K/UL — SIGNIFICANT CHANGE UP (ref 1–3.3)
LYMPHOCYTES # BLD AUTO: 36.8 % — SIGNIFICANT CHANGE UP (ref 13–44)
MAGNESIUM SERPL-MCNC: 1.9 MG/DL — SIGNIFICANT CHANGE UP (ref 1.6–2.6)
MCHC RBC-ENTMCNC: 29.4 PG — SIGNIFICANT CHANGE UP (ref 27–34)
MCHC RBC-ENTMCNC: 31.7 GM/DL — LOW (ref 32–36)
MCV RBC AUTO: 92.6 FL — SIGNIFICANT CHANGE UP (ref 80–100)
MONOCYTES # BLD AUTO: 0.45 K/UL — SIGNIFICANT CHANGE UP (ref 0–0.9)
MONOCYTES NFR BLD AUTO: 5.7 % — SIGNIFICANT CHANGE UP (ref 2–14)
NEUTROPHILS # BLD AUTO: 4.38 K/UL — SIGNIFICANT CHANGE UP (ref 1.8–7.4)
NEUTROPHILS NFR BLD AUTO: 55.8 % — SIGNIFICANT CHANGE UP (ref 43–77)
NRBC # BLD: 0 /100 WBCS — SIGNIFICANT CHANGE UP
NRBC # FLD: 0.03 K/UL — HIGH
NT-PROBNP SERPL-SCNC: HIGH PG/ML
PHOSPHATE SERPL-MCNC: 3.4 MG/DL — SIGNIFICANT CHANGE UP (ref 2.5–4.5)
PLATELET # BLD AUTO: 161 K/UL — SIGNIFICANT CHANGE UP (ref 150–400)
POTASSIUM SERPL-MCNC: 3.9 MMOL/L — SIGNIFICANT CHANGE UP (ref 3.5–5.3)
POTASSIUM SERPL-SCNC: 3.9 MMOL/L — SIGNIFICANT CHANGE UP (ref 3.5–5.3)
PROT SERPL-MCNC: 7.7 G/DL — SIGNIFICANT CHANGE UP (ref 6–8.3)
RBC # BLD: 4.05 M/UL — SIGNIFICANT CHANGE UP (ref 3.8–5.2)
RBC # FLD: 14.6 % — HIGH (ref 10.3–14.5)
SARS-COV-2 IGG+IGM SERPL QL IA: >250 U/ML — HIGH
SARS-COV-2 IGG+IGM SERPL QL IA: POSITIVE
SODIUM SERPL-SCNC: 145 MMOL/L — SIGNIFICANT CHANGE UP (ref 135–145)
TROPONIN T, HIGH SENSITIVITY RESULT: 52 NG/L — HIGH
WBC # BLD: 7.85 K/UL — SIGNIFICANT CHANGE UP (ref 3.8–10.5)
WBC # FLD AUTO: 7.85 K/UL — SIGNIFICANT CHANGE UP (ref 3.8–10.5)

## 2021-08-22 PROCEDURE — 99233 SBSQ HOSP IP/OBS HIGH 50: CPT | Mod: GC

## 2021-08-22 PROCEDURE — 71275 CT ANGIOGRAPHY CHEST: CPT | Mod: 26

## 2021-08-22 RX ORDER — FUROSEMIDE 40 MG
20 TABLET ORAL ONCE
Refills: 0 | Status: DISCONTINUED | OUTPATIENT
Start: 2021-08-22 | End: 2021-08-22

## 2021-08-22 RX ORDER — APIXABAN 2.5 MG/1
10 TABLET, FILM COATED ORAL EVERY 12 HOURS
Refills: 0 | Status: DISCONTINUED | OUTPATIENT
Start: 2021-08-22 | End: 2021-08-23

## 2021-08-22 RX ORDER — SACUBITRIL AND VALSARTAN 24; 26 MG/1; MG/1
1 TABLET, FILM COATED ORAL
Refills: 0 | Status: DISCONTINUED | OUTPATIENT
Start: 2021-08-22 | End: 2021-09-03

## 2021-08-22 RX ADMIN — Medication 5 MILLIGRAM(S): at 05:10

## 2021-08-22 RX ADMIN — Medication 25 MILLIGRAM(S): at 05:10

## 2021-08-22 RX ADMIN — SACUBITRIL AND VALSARTAN 1 TABLET(S): 24; 26 TABLET, FILM COATED ORAL at 18:32

## 2021-08-22 RX ADMIN — Medication 5 MILLIGRAM(S): at 13:38

## 2021-08-22 RX ADMIN — APIXABAN 10 MILLIGRAM(S): 2.5 TABLET, FILM COATED ORAL at 11:47

## 2021-08-22 RX ADMIN — ANASTROZOLE 1 MILLIGRAM(S): 1 TABLET ORAL at 13:37

## 2021-08-22 RX ADMIN — Medication 5 MILLIGRAM(S): at 21:22

## 2021-08-22 RX ADMIN — APIXABAN 10 MILLIGRAM(S): 2.5 TABLET, FILM COATED ORAL at 18:32

## 2021-08-22 NOTE — PROGRESS NOTE ADULT - PROBLEM SELECTOR PLAN 4
R/o CHEN. Cr 1.72H (and eGFR 30-35L) on admission and 1.14 2020.   - holding home sacubitril+valsartan 24+26mg BID until r/o CHEN  - f/u urine labs  - if repeat Cr not improved s/p furosemide, then NS 500ml 75/h eGFR 30-35L and Cr 1.72H on admission, which is slightly above baseline per [employer].   - s/p furosemide

## 2021-08-22 NOTE — PROGRESS NOTE ADULT - ASSESSMENT
66F with COPD/emphysema, DVT L leg 1994 with [place] IVC filter and apixaban, HTN, HLD, infrarenal abd aneurysm, internal carotid aneurysm, depression, R breast cancer s/p chemo and radiation, cardiomyopathy, CHF with SICD, sickle cell trait, uterine fibroids s/p hysterectomy, s/p cholecystectomy, and former smoker (quit 2 years ago). Presented to ED with SOB [id number] and dry cough x3w.  66F with COPD/emphysema, DVT L leg 1994 with [place] IVC filter, HTN, HLD, infrarenal abd aneurysm, internal carotid aneurysm, depression, R breast cancer s/p chemo and radiation, cardiomyopathy, CHF with SICD, sickle cell trait, uterine fibroids s/p hysterectomy, s/p cholecystectomy, and former smoker (quit 2 years ago). Presented to ED with SOB [id number] and dry cough x3w. 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 66F with COPD/emphysema, DVT L leg 1994 with [place] IVC filter, HTN, HLD, infrarenal abd aneurysm, internal carotid aneurysm, depression, R breast cancer s/p chemo and radiation, cardiomyopathy, CHF with SICD, sickle cell trait, uterine fibroids s/p hysterectomy, s/p cholecystectomy, and former smoker (quit 2 years ago).   Presented to ED with SOB [id number] and dry cough x3w. 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 Discharge pending TTE and SW consult.

## 2021-08-22 NOTE — PHYSICAL THERAPY INITIAL EVALUATION ADULT - ACTIVE RANGE OF MOTION EXAMINATION, REHAB EVAL
bilateral upper extremity Active ROM was WFL (within functional limits)/bilateral  lower extremity Active ROM was WFL (within functional limits) on unit

## 2021-08-22 NOTE — PHYSICAL THERAPY INITIAL EVALUATION ADULT - ADDITIONAL COMMENTS
Patient report lives with 24 hours of home health aide in apartment, elevator access and ambulated with assistance of aide.

## 2021-08-22 NOTE — PROGRESS NOTE ADULT - PROBLEM SELECTOR PLAN 7
- PT consulted  - heparin SC instead of enoxaparin because CrCl 15-20  - DASH/TLC diet - appreciate PT rec: home with home PT  - DASH/TLC diet

## 2021-08-22 NOTE — PROGRESS NOTE ADULT - PROBLEM SELECTOR PLAN 2
LVEF 15-20% 2020. ProBNP~52250K ([id number] since 8/2020), HsenTnT 55H likely 2/2 demand ischemia. ECG unremarkable. Euvolemic on exam.  - trended HsenTnT to peak  - has SICD  - home metoprolol succinate 25mg QD  - holding home sacubitril+valsartan 24+26mg BID until r/o CHEN  - f/u repeat proBNP LVEF 15-20% 2020. ProBNP~67633V ([id number] since 8/2020), HsenTnT 55H likely 2/2 demand ischemia. ECG unremarkable. Euvolemic on exam.  - trended HsenTnT to peak  - has SICD  - home metoprolol succinate 25mg QD  - home sacubitril+valsartan 24+26mg BID

## 2021-08-22 NOTE — CHART NOTE - NSCHARTNOTEFT_GEN_A_CORE
I was notified of chest pain. Went down to ED to see the patient. Reviewed EKG, no evidence of STEMI. Patient stated that she had an episode of chest pain that lasted about 20 min then resolved. Otherwise, no acute complaints. Troponin  55 -> 52 but patient also with CHEN. Continue to trend. Pro-BNP elevated at >50381 consistent with acute on chronic HF. I was notified of chest pain. Went down to ED to see the patient. Reviewed EKG, no evidence of STEMI. Patient stated that she had an episode of chest pain that lasted about 20 min then resolved. Otherwise, no acute complaints. Troponin  55 -> 52 but patient also with CHEN. Continue to trend. Pro-BNP elevated at >04533 consistent with acute on chronic HF. DBP to 110 and currently receiving hydralazine so will continue to monitor.

## 2021-08-22 NOTE — PHYSICAL THERAPY INITIAL EVALUATION ADULT - IMPAIRMENTS CONTRIBUTING TO GAIT DEVIATIONS, PT EVAL
DISPLAY PLAN FREE TEXT DISPLAY PLAN FREE TEXT DISPLAY PLAN FREE TEXT DISPLAY PLAN FREE TEXT DISPLAY PLAN FREE TEXT DISPLAY PLAN FREE TEXT DISPLAY PLAN FREE TEXT DISPLAY PLAN FREE TEXT DISPLAY PLAN FREE TEXT DISPLAY PLAN FREE TEXT impaired balance/decreased strength

## 2021-08-22 NOTE — PHYSICAL THERAPY INITIAL EVALUATION ADULT - PERTINENT HX OF CURRENT PROBLEM, REHAB EVAL
Patient is 66 year old female admitted with history of COPD/emphysema, DVT L leg 1994 with [place] IVC filter, HTN, HLD, infrarenal abd aneurysm, internal carotid aneurysm, depression, R breast cancer s/p chemo and radiation, cardiomyopathy, HFrEF with SICD, sickle cell trait, uterine fibroids s/p hysterectomy, s/p cholecystectomy, former smoker , presents with shortness of breath.

## 2021-08-22 NOTE — PROGRESS NOTE ADULT - ATTENDING COMMENTS
66F CHFrEF s/p AICD, CKD2-3, HTN, HLD, breast Ca, hx PE IVC filter in place with 6 weeks of shortness of breath, hypertensive urgency, found to have saddle PE    #PE: no hypoxia or tachycardia, right heart strain not evident on CT, OK to initiate AC with eliquis.  Check TTE r/o right heart strain/pulm HTN  # r/o CHEN/CKD3: Creat improved, may be at baseline.  Can resume entresto  # chronic systolic CHF: resume home meds  # Breast Ca – Reports she is s/p chemo/RT. OP Oncologist-> Dr. Serrato. c/w home anastrozole.

## 2021-08-22 NOTE — PROGRESS NOTE ADULT - SUBJECTIVE AND OBJECTIVE BOX
Resident: Bisi Velásquez, PGY1, Medicine, Pager [phone]) [phone] ([facility])    24-Hour Events and Subjective:  -     Hospital Meds:  MEDICATIONS  (STANDING):  anastrozole 1 milliGRAM(s) Oral daily  heparin   Injectable 5000 Unit(s) SubCutaneous every 8 hours  hydrALAZINE 5 milliGRAM(s) Oral every 8 hours  metoprolol succinate ER 25 milliGRAM(s) Oral daily    Vitals:  ICU Vital Signs Last 24 Hrs  T(C): 36.4 (22 Aug 2021 05:08), Max: 37.1 (21 Aug 2021 09:08)  T(F): 97.6 (22 Aug 2021 05:08), Max: 98.8 (21 Aug 2021 09:08)  HR: 81 (22 Aug 2021 05:08) (81 - 95)  BP: 138/103 (22 Aug 2021 05:08) (134/107 - 147/114)  BP(mean): --  ABP: --  ABP(mean): --  RR: 18 (22 Aug 2021 05:08) (14 - 20)  SpO2: 100% (22 Aug 2021 05:08) (99% - 100%)    Physical Exam:  Con: NAD  HEENT: NCAT, no conjunctival injection, no scleral icterus, moist oral mucosa  Neck: JVD, supple, no LAD, L postmandibular tenderness  Resp: normal WOB at rest, CTAB  CV: RRR, physiologic S1 and S2, no murmurs, no rubs, no gallops, 1+ radial pulses  Abd: nondistended, normoactive bowel sounds, soft, RUQ tenderness, no rebound, no involuntary guarding, no organomegaly  Ext: no clubbing, warm hands and feet, no edema  Neuro: alert, A&Ox3  MSK: spontaneous movement of all 4 extremities  Psych: anxious demeanor, some paranoid comments about medicines    Labs:                         11.9   7.85  )-----------( 161      ( 22 Aug 2021 03:20 )             37.5     08-22    145  |  110<H>  |  26<H>  ----------------------------<  171<H>  3.9   |  16<L>  |  1.67<H>    Ca    9.5      22 Aug 2021 03:20  Phos  3.4     08-22  Mg     1.90     08-22    TPro  7.7  /  Alb  4.0  /  TBili  0.6  /  DBili  x   /  AST  21  /  ALT  15  /  AlkPhos  140<H>  08-22    CAPILLARY BLOOD GLUCOSE                    Studies and Radiology:  ECG ():   Stress test ():   Echo ():   Heart Cath ():   CXR (): Resident: Bisi Velásquez, PGY1, Medicine, Pager [phone]) [phone] ([facility])    24-Hour Events and Subjective:  -     Hospital Meds:  MEDICATIONS  (STANDING):  anastrozole 1 milliGRAM(s) Oral daily  heparin   Injectable 5000 Unit(s) SubCutaneous every 8 hours  hydrALAZINE 5 milliGRAM(s) Oral every 8 hours  metoprolol succinate ER 25 milliGRAM(s) Oral daily    Vitals:  ICU Vital Signs Last 24 Hrs  T(C): 36.4 (22 Aug 2021 05:08), Max: 37.1 (21 Aug 2021 09:08)  T(F): 97.6 (22 Aug 2021 05:08), Max: 98.8 (21 Aug 2021 09:08)  HR: 81 (22 Aug 2021 05:08) (81 - 95)  BP: 138/103 (22 Aug 2021 05:08) (134/107 - 147/114)  BP(mean): --  ABP: --  ABP(mean): --  RR: 18 (22 Aug 2021 05:08) (14 - 20)  SpO2: 100% (22 Aug 2021 05:08) (99% - 100%)    Physical Exam:  Con: NAD  HEENT: NCAT, no conjunctival injection, no scleral icterus, moist oral mucosa  Neck: JVD, supple, no LAD, L postmandibular tenderness  Resp: normal WOB at rest, CTAB  CV: RRR, physiologic S1 and S2, no murmurs, no rubs, no gallops, 1+ radial pulses  Abd: nondistended, normoactive bowel sounds, soft, RUQ tenderness, no rebound, no involuntary guarding, no organomegaly  Ext: no clubbing, warm hands and feet, no edema  Neuro: alert, A&Ox3  MSK: spontaneous movement of all 4 extremities  Psych: anxious demeanor, some paranoid comments about medicines    Labs:                         11.9   7.85  )-----------( 161      ( 22 Aug 2021 03:20 )             37.5     08-22  145  |  110<H>  |  26<H>  ----------------------------<  171<H>  3.9   |  16<L>  |  1.67<H>  Ca    9.5      22 Aug 2021 03:20  Phos  3.4     08-22  Mg     1.90     08-22  TPro  7.7  /  Alb  4.0  /  TBili  0.6  /  DBili  x   /  AST  21  /  ALT  15  /  AlkPhos  140<H>  08-22    Studies and Radiology: no new Resident: Bisi Velásquez, PGY1, Medicine, Pager [phone]) [phone] ([facility])    24-Hour Events and Subjective:  - dyspneic despite normal SpO2  - overnight chest pain with normal ECG and baseline HsenTnT  - hungry as hasn't eaten since admission  - feels sticky in vulvar area and would like wipes when move to tele floor  - walking to use bathroom is very tiring    Hospital Meds:  MEDICATIONS  (STANDING):  anastrozole 1 milliGRAM(s) Oral daily  heparin   Injectable 5000 Unit(s) SubCutaneous every 8 hours  hydrALAZINE 5 milliGRAM(s) Oral every 8 hours  metoprolol succinate ER 25 milliGRAM(s) Oral daily    Vitals:  ICU Vital Signs Last 24 Hrs  T(C): 36.4 (22 Aug 2021 05:08), Max: 37.1 (21 Aug 2021 09:08)  T(F): 97.6 (22 Aug 2021 05:08), Max: 98.8 (21 Aug 2021 09:08)  HR: 81 (22 Aug 2021 05:08) (81 - 95)  BP: 138/103 (22 Aug 2021 05:08) (134/107 - 147/114)  BP(mean): --  ABP: --  ABP(mean): --  RR: 18 (22 Aug 2021 05:08) (14 - 20)  SpO2: 100% (22 Aug 2021 05:08) (99% - 100%)    Physical Exam:  Con: NAD  HEENT: NCAT, no conjunctival injection, no scleral icterus, moist oral mucosa  Neck: JVD, supple, no LAD  Resp: normal WOB at rest, CTAB  CV: RRR, physiologic S1 and S2, no murmurs, no rubs, no gallops, 1+ radial pulses  Abd: nondistended, normoactive bowel sounds, soft, RUQ tenderness, no rebound, no involuntary guarding, no organomegaly  Ext: no clubbing, warm hands and feet, no edema  Neuro: alert, A&Ox3  MSK: spontaneous movement of all 4 extremities  Psych: anxious    Labs:                         11.9   7.85  )-----------( 161      ( 22 Aug 2021 03:20 )             37.5     08-22  145  |  110<H>  |  26<H>  ----------------------------<  171<H>  3.9   |  16<L>  |  1.67<H>  Ca    9.5      22 Aug 2021 03:20  Phos  3.4     08-22  Mg     1.90     08-22  TPro  7.7  /  Alb  4.0  /  TBili  0.6  /  DBili  x   /  AST  21  /  ALT  15  /  AlkPhos  140<H>  08-22    Studies and Radiology: no new

## 2021-08-22 NOTE — PROGRESS NOTE ADULT - PROBLEM SELECTOR PLAN 1
Maybe 2/2 deconditioning, anxiety, and metabolic acidosis (delta delta gradient -7).   May have progressive lung disease, but less likely acute infection or COPD exacerbation (no reported change in cough or sputum in the past month, and CXR unremarkable).  May have progressive HFrEF, but less likely HFrEF exacerbation despite high proBNP (has been that elevated since 8/2020) and HsenTnT (likely 2/2 demand ischemia); no JVD, peripheral edema, orthopnea, or other suggestive S/S.   Low prob of PE by [place], and doesn't meet PERC, but has DDimer~7400H.  - V/Q scan and TTE while waiting for Cr to improve for CTPA  - PT consulted  - no need for empiric PE rx 2/2 saddle pulmonary embolus w/o evidence of RH strain per CTPA. DDimer~7400H. ProBNP~07292S->12072C.  Less likely pulm acute infection or COPD exacerbation (no reported change in cough or sputum in the past month, and CXR unremarkable). Unlikely HFrEF exacerbation despite high proBNP (has been that elevated since 8/2020) and HsenTnT (likely 2/2 demand ischemia), given no JVD, peripheral edema, orthopnea, or other suggestive S/S.   - f/u TTE   - apixaban for at least 5 months if not life-long  - consider removing IVC filter outpt

## 2021-08-23 DIAGNOSIS — I82.90 ACUTE EMBOLISM AND THROMBOSIS OF UNSPECIFIED VEIN: ICD-10-CM

## 2021-08-23 LAB
ALBUMIN SERPL ELPH-MCNC: 4 G/DL — SIGNIFICANT CHANGE UP (ref 3.3–5)
ALP SERPL-CCNC: 166 U/L — HIGH (ref 40–120)
ALT FLD-CCNC: 13 U/L — SIGNIFICANT CHANGE UP (ref 4–33)
ANION GAP SERPL CALC-SCNC: 18 MMOL/L — HIGH (ref 7–14)
APTT BLD: 34.1 SEC — SIGNIFICANT CHANGE UP (ref 27–36.3)
AST SERPL-CCNC: 17 U/L — SIGNIFICANT CHANGE UP (ref 4–32)
BASOPHILS # BLD AUTO: 0.04 K/UL — SIGNIFICANT CHANGE UP (ref 0–0.2)
BASOPHILS NFR BLD AUTO: 0.5 % — SIGNIFICANT CHANGE UP (ref 0–2)
BILIRUB SERPL-MCNC: 0.6 MG/DL — SIGNIFICANT CHANGE UP (ref 0.2–1.2)
BUN SERPL-MCNC: 32 MG/DL — HIGH (ref 7–23)
CALCIUM SERPL-MCNC: 9.3 MG/DL — SIGNIFICANT CHANGE UP (ref 8.4–10.5)
CHLORIDE SERPL-SCNC: 106 MMOL/L — SIGNIFICANT CHANGE UP (ref 98–107)
CO2 SERPL-SCNC: 18 MMOL/L — LOW (ref 22–31)
CREAT SERPL-MCNC: 1.51 MG/DL — HIGH (ref 0.5–1.3)
EOSINOPHIL # BLD AUTO: 0.1 K/UL — SIGNIFICANT CHANGE UP (ref 0–0.5)
EOSINOPHIL NFR BLD AUTO: 1.2 % — SIGNIFICANT CHANGE UP (ref 0–6)
GAS PNL BLDA: SIGNIFICANT CHANGE UP
GLUCOSE SERPL-MCNC: 152 MG/DL — HIGH (ref 70–99)
HCT VFR BLD CALC: 36.2 % — SIGNIFICANT CHANGE UP (ref 34.5–45)
HGB BLD-MCNC: 11.6 G/DL — SIGNIFICANT CHANGE UP (ref 11.5–15.5)
IANC: 4.67 K/UL — SIGNIFICANT CHANGE UP (ref 1.5–8.5)
IMM GRANULOCYTES NFR BLD AUTO: 0.5 % — SIGNIFICANT CHANGE UP (ref 0–1.5)
INR BLD: 3.09 RATIO — HIGH (ref 0.88–1.16)
LYMPHOCYTES # BLD AUTO: 3.18 K/UL — SIGNIFICANT CHANGE UP (ref 1–3.3)
LYMPHOCYTES # BLD AUTO: 37.3 % — SIGNIFICANT CHANGE UP (ref 13–44)
MAGNESIUM SERPL-MCNC: 2 MG/DL — SIGNIFICANT CHANGE UP (ref 1.6–2.6)
MCHC RBC-ENTMCNC: 30.1 PG — SIGNIFICANT CHANGE UP (ref 27–34)
MCHC RBC-ENTMCNC: 32 GM/DL — SIGNIFICANT CHANGE UP (ref 32–36)
MCV RBC AUTO: 94 FL — SIGNIFICANT CHANGE UP (ref 80–100)
MONOCYTES # BLD AUTO: 0.5 K/UL — SIGNIFICANT CHANGE UP (ref 0–0.9)
MONOCYTES NFR BLD AUTO: 5.9 % — SIGNIFICANT CHANGE UP (ref 2–14)
NEUTROPHILS # BLD AUTO: 4.67 K/UL — SIGNIFICANT CHANGE UP (ref 1.8–7.4)
NEUTROPHILS NFR BLD AUTO: 54.6 % — SIGNIFICANT CHANGE UP (ref 43–77)
NRBC # BLD: 1 /100 WBCS — SIGNIFICANT CHANGE UP
NRBC # FLD: 0.09 K/UL — HIGH
PHOSPHATE SERPL-MCNC: 3.3 MG/DL — SIGNIFICANT CHANGE UP (ref 2.5–4.5)
PLATELET # BLD AUTO: 175 K/UL — SIGNIFICANT CHANGE UP (ref 150–400)
POTASSIUM SERPL-MCNC: 3.7 MMOL/L — SIGNIFICANT CHANGE UP (ref 3.5–5.3)
POTASSIUM SERPL-SCNC: 3.7 MMOL/L — SIGNIFICANT CHANGE UP (ref 3.5–5.3)
PROT SERPL-MCNC: 7.5 G/DL — SIGNIFICANT CHANGE UP (ref 6–8.3)
PROTHROM AB SERPL-ACNC: 33.7 SEC — HIGH (ref 10.6–13.6)
RBC # BLD: 3.85 M/UL — SIGNIFICANT CHANGE UP (ref 3.8–5.2)
RBC # FLD: 15 % — HIGH (ref 10.3–14.5)
SODIUM SERPL-SCNC: 142 MMOL/L — SIGNIFICANT CHANGE UP (ref 135–145)
WBC # BLD: 8.53 K/UL — SIGNIFICANT CHANGE UP (ref 3.8–10.5)
WBC # FLD AUTO: 8.53 K/UL — SIGNIFICANT CHANGE UP (ref 3.8–10.5)

## 2021-08-23 PROCEDURE — 99222 1ST HOSP IP/OBS MODERATE 55: CPT | Mod: GC

## 2021-08-23 PROCEDURE — 99233 SBSQ HOSP IP/OBS HIGH 50: CPT | Mod: GC

## 2021-08-23 PROCEDURE — 93306 TTE W/DOPPLER COMPLETE: CPT | Mod: 26

## 2021-08-23 RX ORDER — HEPARIN SODIUM 5000 [USP'U]/ML
6500 INJECTION INTRAVENOUS; SUBCUTANEOUS EVERY 6 HOURS
Refills: 0 | Status: DISCONTINUED | OUTPATIENT
Start: 2021-08-23 | End: 2021-08-30

## 2021-08-23 RX ORDER — HEPARIN SODIUM 5000 [USP'U]/ML
3000 INJECTION INTRAVENOUS; SUBCUTANEOUS EVERY 6 HOURS
Refills: 0 | Status: DISCONTINUED | OUTPATIENT
Start: 2021-08-23 | End: 2021-08-30

## 2021-08-23 RX ORDER — HEPARIN SODIUM 5000 [USP'U]/ML
INJECTION INTRAVENOUS; SUBCUTANEOUS
Qty: 25000 | Refills: 0 | Status: DISCONTINUED | OUTPATIENT
Start: 2021-08-23 | End: 2021-08-30

## 2021-08-23 RX ORDER — ACETAMINOPHEN 500 MG
650 TABLET ORAL EVERY 6 HOURS
Refills: 0 | Status: DISCONTINUED | OUTPATIENT
Start: 2021-08-23 | End: 2021-09-03

## 2021-08-23 RX ORDER — HEPARIN SODIUM 5000 [USP'U]/ML
6500 INJECTION INTRAVENOUS; SUBCUTANEOUS ONCE
Refills: 0 | Status: COMPLETED | OUTPATIENT
Start: 2021-08-23 | End: 2021-08-23

## 2021-08-23 RX ADMIN — HEPARIN SODIUM 6500 UNIT(S): 5000 INJECTION INTRAVENOUS; SUBCUTANEOUS at 19:02

## 2021-08-23 RX ADMIN — APIXABAN 10 MILLIGRAM(S): 2.5 TABLET, FILM COATED ORAL at 06:48

## 2021-08-23 RX ADMIN — SACUBITRIL AND VALSARTAN 1 TABLET(S): 24; 26 TABLET, FILM COATED ORAL at 06:48

## 2021-08-23 RX ADMIN — Medication 5 MILLIGRAM(S): at 06:47

## 2021-08-23 RX ADMIN — ANASTROZOLE 1 MILLIGRAM(S): 1 TABLET ORAL at 12:07

## 2021-08-23 RX ADMIN — Medication 25 MILLIGRAM(S): at 06:47

## 2021-08-23 RX ADMIN — Medication 5 MILLIGRAM(S): at 15:06

## 2021-08-23 RX ADMIN — Medication 5 MILLIGRAM(S): at 23:00

## 2021-08-23 RX ADMIN — SACUBITRIL AND VALSARTAN 1 TABLET(S): 24; 26 TABLET, FILM COATED ORAL at 18:59

## 2021-08-23 RX ADMIN — Medication 650 MILLIGRAM(S): at 23:00

## 2021-08-23 RX ADMIN — HEPARIN SODIUM 1400 UNIT(S)/HR: 5000 INJECTION INTRAVENOUS; SUBCUTANEOUS at 19:01

## 2021-08-23 NOTE — PROGRESS NOTE ADULT - PROBLEM SELECTOR PLAN 3
BP 140s / 100s-110s on admission.  - home metoprolol succinate 25mg QD  - hydralazine 5mg Q8H LVEF 15-20% 2020. ProBNP~39490I (31255J-83950M since 8/2020), HsenTnT 55H likely 2/2 demand ischemia. ECG unremarkable. Euvolemic on exam.  - has SICD  - home metoprolol succinate 25mg QD  - home sacubitril+valsartan 24+26mg BID LVEF 12% per TTE. ProBNP~55462Y (56593Z-43391W since 8/2020), HsenTnT 55H likely 2/2 demand ischemia. ECG unremarkable. Euvolemic on exam.  - has SICD  - home metoprolol succinate 25mg QD  - home sacubitril+valsartan 24+26mg BID

## 2021-08-23 NOTE — PROGRESS NOTE ADULT - SUBJECTIVE AND OBJECTIVE BOX
Resident: Bisi Velásquez, PGY1, Medicine, Pager [phone] ([facility])    24-Hour Events and Subjective:  - dyspneic despite normal SpO2  - overnight chest pain with normal ECG and baseline HsenTnT  - hungry as hasn't eaten since admission  - feels sticky in vulvar area and would like wipes when move to tele floor  - walking to use bathroom is very tiring    Hospital Meds:  MEDICATIONS  (STANDING):  anastrozole 1 milliGRAM(s) Oral daily  apixaban 10 milliGRAM(s) Oral every 12 hours  hydrALAZINE 5 milliGRAM(s) Oral every 8 hours  metoprolol succinate ER 25 milliGRAM(s) Oral daily  sacubitril 49 mG/valsartan 51 mG 1 Tablet(s) Oral two times a day    Vitals:  Vital Signs Last 24 Hrs  T(C): 36.8 (23 Aug 2021 06:43), Max: 36.8 (22 Aug 2021 13:40)  T(F): 98.2 (23 Aug 2021 06:43), Max: 98.3 (22 Aug 2021 20:27)  HR: 78 (23 Aug 2021 06:43) (78 - 92)  BP: 141/101 (23 Aug 2021 06:43) (126/98 - 142/110)  RR: 17 (23 Aug 2021 06:43) (16 - 20)  SpO2: 99% (23 Aug 2021 06:43) (99% - 100%)    Physical Exam:  Con: NAD  HEENT: NCAT, no conjunctival injection, no scleral icterus, moist oral mucosa  Neck: JVD, supple, no LAD  Resp: normal WOB at rest, CTAB  CV: RRR, physiologic S1 and S2, no murmurs, no rubs, no gallops, 1+ radial pulses  Abd: nondistended, normoactive bowel sounds, soft, RUQ tenderness, no rebound, no involuntary guarding, no organomegaly  Ext: no clubbing, warm hands and feet, no edema  Neuro: alert, A&Ox3  MSK: spontaneous movement of all 4 extremities  Psych: anxious    Labs:                         11.9   7.85  )-----------( 161      ( 22 Aug 2021 03:20 )             37.5     08-22  145  |  110<H>  |  26<H>  ----------------------------<  171<H>  3.9   |  16<L>  |  1.67<H>  Ca    9.5      22 Aug 2021 03:20  Phos  3.4     08-22  Mg     1.90     08-22  TPro  7.7  /  Alb  4.0  /  TBili  0.6  /  DBili  x   /  AST  21  /  ALT  15  /  AlkPhos  140<H>  08-22    Studies and Radiology: no new Resident: Bisi Velásquez, PGY1, Medicine, Pager [phone]) [phone] ([facility])    24-Hour Events and Subjective:  - dyspneic despite normal SpO2 -> 6L face mask for comfort  - tele: 4-5 beat vtach, 8-14 beats AIVR  - denies chest pain or palpitations  - sister had ?PE, and no FH/o stillbirths/abortions    Hospital Meds:  MEDICATIONS  (STANDING):  anastrozole 1 milliGRAM(s) Oral daily  apixaban 10 milliGRAM(s) Oral every 12 hours  hydrALAZINE 5 milliGRAM(s) Oral every 8 hours  metoprolol succinate ER 25 milliGRAM(s) Oral daily  sacubitril 49 mG/valsartan 51 mG 1 Tablet(s) Oral two times a day    Vitals:  Vital Signs Last 24 Hrs  T(C): 36.8 (23 Aug 2021 06:43), Max: 36.8 (22 Aug 2021 13:40)  T(F): 98.2 (23 Aug 2021 06:43), Max: 98.3 (22 Aug 2021 20:27)  HR: 78 (23 Aug 2021 06:43) (78 - 92)  BP: 141/101 (23 Aug 2021 06:43) (126/98 - 142/110)  RR: 17 (23 Aug 2021 06:43) (16 - 20)  SpO2: 99% (23 Aug 2021 06:43) (99% - 100%)    Physical Exam:  Con: NAD  HEENT: NCAT, no conjunctival injection, no scleral icterus, strabismus, moist oral mucosa  Neck: JVD  Resp: normal WOB at rest, CTAB  CV: RRR, physiologic S1 and S2, no murmurs, no rubs, no gallops, 1+ radial pulses  Abd: nondistended, normoactive bowel sounds, soft, RUQ tenderness, no rebound, no involuntary guarding, no organomegaly  Ext: no clubbing, warm hands, no edema  Neuro: alert, A&Ox3  Psych: appropriate mood and affect    Labs:                         11.6   8.53  )-----------( 175      ( 23 Aug 2021 08:07 )             36.2                         11.9   7.85  )-----------( 161      ( 22 Aug 2021 03:20 )             37.5     08-23  142  |  106  |  32<H>  ----------------------------<  152<H>  3.7   |  18<L>  |  1.51<H>  eGFR 41L  Ca    9.3      23 Aug 2021 08:07  Phos  3.3     08-23  Mg     2.00     08-23 08-22  145  |  110<H>  |  26<H>  ----------------------------<  171<H>  3.9   |  16<L>  |  1.67<H>  Ca    9.5      22 Aug 2021 03:20  Phos  3.4     08-22  Mg     1.90     08-22    TPro  7.5  /  Alb  4.0  /  TBili  0.6  /  DBili  x   /  AST  17  /  ALT  13  /  AlkPhos  166<H>  08-23  TPro  7.7  /  Alb  4.0  /  TBili  0.6  /  DBili  x   /  AST  21  /  ALT  15  /  AlkPhos  140<H>  08-22    Studies and Radiology: no new Resident: Bisi Velásquez, PGY1, Medicine, Pager [phone]) [phone] ([facility])    24-Hour Events and Subjective:  - dyspneic despite normal SpO2 -> 6L face mask for comfort  - tele: 4-5 beat vtach, 8-14 beats AIVR  - denies chest pain or palpitations  - sister had ?PE, and no FH/o stillbirths/abortions    Hospital Meds:  MEDICATIONS  (STANDING):  anastrozole 1 milliGRAM(s) Oral daily  apixaban 10 milliGRAM(s) Oral every 12 hours  hydrALAZINE 5 milliGRAM(s) Oral every 8 hours  metoprolol succinate ER 25 milliGRAM(s) Oral daily  sacubitril 49 mG/valsartan 51 mG 1 Tablet(s) Oral two times a day    Vitals:  Vital Signs Last 24 Hrs  T(C): 36.8 (23 Aug 2021 06:43), Max: 36.8 (22 Aug 2021 13:40)  T(F): 98.2 (23 Aug 2021 06:43), Max: 98.3 (22 Aug 2021 20:27)  HR: 78 (23 Aug 2021 06:43) (78 - 92)  BP: 141/101 (23 Aug 2021 06:43) (126/98 - 142/110)  RR: 17 (23 Aug 2021 06:43) (16 - 20)  SpO2: 99% (23 Aug 2021 06:43) (99% - 100%)    Physical Exam:  Con: NAD  HEENT: NCAT, no conjunctival injection, no scleral icterus, strabismus, moist oral mucosa  Neck: JVD  Resp: normal WOB at rest, CTAB  CV: RRR, physiologic S1 and S2, no murmurs, no rubs, no gallops, 1+ radial pulses  Abd: nondistended, normoactive bowel sounds, soft, RUQ tenderness, no rebound, no involuntary guarding, no organomegaly  Ext: no clubbing, warm hands, no edema  Neuro: alert, A&Ox3  Psych: appropriate mood and affect    Labs:                         11.6   8.53  )-----------( 175      ( 23 Aug 2021 08:07 )             36.2                         11.9   7.85  )-----------( 161      ( 22 Aug 2021 03:20 )             37.5     08-23  142  |  106  |  32<H>  ----------------------------<  152<H>  3.7   |  18<L>  |  1.51<H>  eGFR 41L  Ca    9.3      23 Aug 2021 08:07  Phos  3.3     08-23  Mg     2.00     08-23 08-22  145  |  110<H>  |  26<H>  ----------------------------<  171<H>  3.9   |  16<L>  |  1.67<H>  Ca    9.5      22 Aug 2021 03:20  Phos  3.4     08-22  Mg     1.90     08-22    TPro  7.5  /  Alb  4.0  /  TBili  0.6  /  DBili  x   /  AST  17  /  ALT  13  /  AlkPhos  166<H>  08-23  TPro  7.7  /  Alb  4.0  /  TBili  0.6  /  DBili  x   /  AST  21  /  ALT  15  /  AlkPhos  140<H>  08-22    Studies and Radiology:     TTE 8/23: Mod-severe MR. Severe global LV systolic dysfunction. A filling defect is seen and is consistent with an LV apical thrombus. Decreased RV systolic function.

## 2021-08-23 NOTE — PROGRESS NOTE ADULT - ASSESSMENT
66F with COPD/emphysema, DVT L leg 1994 with [place] IVC filter, HTN, HLD, infrarenal abd aneurysm, internal carotid aneurysm, depression, R breast cancer s/p chemo and radiation, cardiomyopathy, CHF with SICD, sickle cell trait, uterine fibroids s/p hysterectomy, s/p cholecystectomy, and former smoker (quit 2 years ago).   Presented to ED with SOB [id number] and dry cough x3w. 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 Discharge pending TTE and SW consult. 66F with COPD/emphysema, DVT L leg 1994 with [place] IVC filter, HTN, HLD, infrarenal abd aneurysm, internal carotid aneurysm, depression, R breast cancer s/p chemo and radiation, cardiomyopathy, CHF with SICD, sickle cell trait, uterine fibroids s/p hysterectomy, s/p cholecystectomy, and former smoker (quit 2 years ago).   Presented to ED with SOB [id number] and dry cough x3w. 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 Discharge pending TTE, PERT recs, and SW consult. 66F with COPD/emphysema, DVT L leg 1994 with [place] IVC filter, HTN, HLD, infrarenal abd aneurysm, internal carotid aneurysm, depression, R breast cancer s/p chemo and radiation, cardiomyopathy, CHF with SICD, sickle cell trait, uterine fibroids s/p hysterectomy, s/p cholecystectomy, and former smoker (quit 2 years ago).   Presented to ED with SOB [id number] and dry cough x3w. Found to have saddle embolism and LV apical thrombus. Heparin bridge to warfarin.

## 2021-08-23 NOTE — PROGRESS NOTE ADULT - PROBLEM SELECTOR PLAN 4
eGFR 30-35L and Cr 1.72H on admission, which is slightly above baseline per [employer].   - s/p furosemide eGFR 30-35L and Cr 1.72H on admission, which is slightly above baseline per [employer].   - s/p furosemide 20 x1 BP 140s / 100s-110s on admission.  - home metoprolol succinate 25mg QD  - hydralazine 5mg Q8H

## 2021-08-23 NOTE — PROGRESS NOTE ADULT - PROBLEM SELECTOR PLAN 5
Na 147 on admission.  - monitor  - encourage PO intake eGFR 30-35L and Cr 1.72H on admission, which is slightly above baseline per [employer].   - s/p furosemide 20 x1

## 2021-08-23 NOTE — PROGRESS NOTE ADULT - PROBLEM SELECTOR PLAN 1
2/2 saddle pulmonary embolus w/o evidence of RH strain per CTPA. DDimer~7400H. ProBNP~94066W->19101I.  Less likely pulm acute infection or COPD exacerbation (no reported change in cough or sputum in the past month, and CXR unremarkable). Unlikely HFrEF exacerbation despite high proBNP (has been that elevated since 8/2020) and HsenTnT (likely 2/2 demand ischemia), given no JVD, peripheral edema, orthopnea, or other suggestive S/S.   - f/u TTE   - apixaban for at least 5 months if not life-long  - consider removing IVC filter outpt 2/2 saddle pulmonary embolus w/o evidence of RH strain per CTPA. DDimer~7400H. ProBNP~28020W->96747T.  Less likely pulm acute infection or COPD exacerbation (no reported change in cough or sputum in the past month, and CXR unremarkable). Unlikely HFrEF exacerbation despite high proBNP (has been that elevated since 8/2020) and HsenTnT (likely 2/2 demand ischemia), given no JVD, peripheral edema, orthopnea, or other suggestive S/S.   - f/u TTE   - apixaban for at least 5 months if not life-long  - consider removing IVC filter outpt  - consulted PERT  - f/u ambulator SpO2  - f/u ABG  - f/u BLE Duplex  - low threshold to call rapid response 2/2 at least submassive saddle pulmonary embolus w/o evidence of RH strain per CTPA. DDimer~7400H. ProBNP~40591U->86956O. ABG ___.  Less likely pulm acute infection or COPD exacerbation (no reported change in cough or sputum in the past month, and CXR unremarkable). Unlikely HFrEF exacerbation despite high proBNP (has been that elevated since 8/2020) and HsenTnT (likely 2/2 demand ischemia), given no JVD, peripheral edema, orthopnea, or other suggestive S/S.   - f/u TTE   - apixaban for at least 5 months if not life-long (if active cancer, then switch to enoxaparin)  - consider removing IVC filter outpt  - consulted PERT  - f/u ambulator SpO2  - f/u ABG  - f/u BLE Duplex  - low threshold to call rapid response 2/2 at least submassive saddle pulmonary embolus w/o evidence of RH strain per CTPA. DDimer~7400H. ProBNP~28069D->78153F. ABG pH.  Less likely pulm acute infection or COPD exacerbation (no reported change in cough or sputum in the past month, and CXR unremarkable). Unlikely HFrEF exacerbation despite high proBNP (has been that elevated since 8/2020) and HsenTnT (likely 2/2 demand ischemia), given no JVD, peripheral edema, orthopnea, or other suggestive S/S.   - f/u TTE   - apixaban for at least 5 months if not life-long (if active cancer, then switch to enoxaparin)  - consider removing IVC filter outpt  - consulted PERT  - f/u ambulator SpO2  - f/u BLE Duplex  - if e/o RH strain on TTE, will consider catheter directed thrombolysis therapy by Interventional Card   - goal SpO2 88-92% for COPD 2/2 at least submassive saddle pulmonary embolus w/o evidence of RH strain per CTPA. DDimer~7400H. ProBNP~58894V->14796N. ABG pH 7.49H, pCO2 22L, SaO2 99.2H.  Less likely pulm acute infection or COPD exacerbation (no reported change in cough or sputum in the past month, and CXR unremarkable). Unlikely HFrEF exacerbation despite high proBNP (has been that elevated since 8/2020) and HsenTnT (likely 2/2 demand ischemia), given no JVD, peripheral edema, orthopnea, or other suggestive S/S.   - f/u TTE   - apixaban for at least 5 months if not life-long (if active cancer, then switch to enoxaparin)  - consider removing IVC filter outpt  - consulted PERT  - f/u ambulator SpO2  - f/u BLE Duplex  - if e/o RH strain on TTE, will consider catheter directed thrombolysis therapy by Interventional Card   - goal SpO2 88-92% for COPD 2/2 at least submassive saddle pulmonary embolus w/o evidence of RH strain per CTPA. DDimer~7400H. ProBNP~00821Z->41429Z. ABG pH 7.49H, pCO2 22L, SaO2 99.2H. Got COVID19 vaccine Pfizer/Moderna not J&J.  Less likely pulm acute infection or COPD exacerbation (no reported change in cough or sputum in the past month, and CXR unremarkable). Unlikely HFrEF exacerbation despite high proBNP (has been that elevated since 8/2020) and HsenTnT (likely 2/2 demand ischemia), given no JVD, peripheral edema, orthopnea, or other suggestive S/S.   - heparin bridge to warfarin because LV thrombus  - consider removing IVC filter outpt  - appreciate PERT recs  - f/u ambulator SpO2  - f/u BLE Duplex  - goal SpO2 88-92% for COPD 2/2 at least submassive saddle pulmonary embolus w/o evidence of RH strain per CTPA and TTE. DDimer~7400H. ProBNP~23708V->17332C. ABG pH 7.49H, pCO2 22L, SaO2 99.2H. Got COVID19 vaccine Pfizer/Moderna not J&J.  Less likely pulm acute infection or COPD exacerbation (no reported change in cough or sputum in the past month, and CXR unremarkable). Unlikely HFrEF exacerbation despite high proBNP (has been that elevated since 8/2020) and HsenTnT (likely 2/2 demand ischemia), given no JVD, peripheral edema, orthopnea, or other suggestive S/S.   - heparin bridge to warfarin because LV thrombus  - consider removing IVC filter outpt  - appreciate PERT recs  - f/u ambulator SpO2  - f/u BLE Duplex  - goal SpO2 88-92% for COPD

## 2021-08-23 NOTE — PROGRESS NOTE ADULT - ATTENDING COMMENTS
66F CHFrEF s/p AICD, CKD2-3, HTN, HLD, breast Ca, hx PE IVC filter in place with 6 weeks of shortness of breath, hypertensive urgency, found to have saddle PE    #PE: no hypoxia or tachycardia, right heart strain not evident on CT, OK to initiate AC with eliquis.  Check TTE r/o right heart strain/pulm HTN  # r/o CHEN/CKD3: Creat improved, may be at baseline.  Can resume entresto  # chronic systolic CHF: resume home meds  # Breast Ca – Reports she is s/p chemo/RT. OP Oncologist-> Dr. Serrato. c/w home anastrozole. 66F CHFrEF s/p AICD, CKD2-3, HTN, HLD, breast Ca, hx PE IVC filter in place with 6 weeks of shortness of breath, hypertensive urgency, found to have saddle PE    #PE: no hypoxia or tachycardia, right heart strain not evident on CT, OK to initiate AC with Eliquis.  expedite TTE r/o right heart strain/pulm HTN  # r/o CHEN/CKD3: Creat improved, may be at baseline.  Can resume Entresto, trop leak likely in setting of CKD - delta not impressive   # chronic systolic CHF: resume home meds  # Breast Ca – Reports she is s/p chemo/RT. OP Oncologist-> Dr. Serrato. c/w home anastrozole.     - although not hypoxic, remains quite tachypneic at rest and on exertion , placed on O2 for comfort, check sat on ambulation - will consult interventional cards for possible catheter guided lysis   - dispo - eventual home with home PT

## 2021-08-23 NOTE — PROGRESS NOTE ADULT - PROBLEM SELECTOR PLAN 2
LVEF 15-20% 2020. ProBNP~74796H (20862A-73261K since 8/2020), HsenTnT 55H likely 2/2 demand ischemia. ECG unremarkable. Euvolemic on exam.  - trended HsenTnT to peak  - has SICD  - home metoprolol succinate 25mg QD  - home sacubitril+valsartan 24+26mg BID LVEF 15-20% 2020. ProBNP~20823A (48588T-34204A since 8/2020), HsenTnT 55H likely 2/2 demand ischemia. ECG unremarkable. Euvolemic on exam.  - has SICD  - home metoprolol succinate 25mg QD  - home sacubitril+valsartan 24+26mg BID See on TTE 8/23.  - heparin bridge to warfarin Seen in LV on TTE 8/23. No h/o afib/aflutter.  - heparin bridge to warfarin

## 2021-08-23 NOTE — CONSULT NOTE ADULT - SUBJECTIVE AND OBJECTIVE BOX
PERT CONSULT INITIAL EVALUATION  RISHABH HUNT  MRN-[id number]    CONSULTING SERVICE: IM  CONSULT QUESTION: PE    HPI:  67yo F former smoker (q. 2y ago) h/o HTN, HLD, COPD/emphysema, sickle cell trait, remote DVT (1994, s/p IVC filter), breast cancer (s/p chemo/rad 2014), HFrEF (s/p ICD), ICA+infrarenal aneurysms, originally p/w acute on chronic dyspnea.    Pt was resident of assisted living facility where she states they were not giving her HF meds (including Entresto), leading to progressive dyspnea, so she petitioned for release, and after release she called her cardiologist who recommended she present to the ED for evaluation. In ED VS initially stable, DDmier 7000s, HsT 55, Cr 1.7, BNP 20223i, lact nml, received furo 40 IV x1, CTA chest w/ saddle embolism, started on apixaban, admitted to medicine for further management. Cardiology c/s'd for PERT eval.    On interview pt is tachypneic, dyspneic, stating her dyspnea acutely worsened over last day or so, denied palpitations, CP, cough, dizziness, syncope. Cannot lay flat. ROS otherwise neg.    ROS:  Negative, except as above.    PAST MEDICAL & SURGICAL HISTORY:  Hypertension  Breast cancer  right-s/p chemo and radiation  Hypercholesteremia  DVT (deep venous thrombosis)  Left leg in 1994 after hysterectomy  Sickle cell trait  Cardiomyopathy  Abdominal aneurysm  infrarenal- wached by Vascular , Dr. Gutierrez  Internal carotid aneurysm  Knee effusion, left  arthrocentesis- 05/25/16  Fibroid uterus  sp hystrectomy 1196  S/P lumpectomy, right breast  2014, revision of position- 2015  [place] filter in place  1994  S/P hysterectomy  1994  Breast cancer  infusaport insertion- 2014- left chest    Home Medications:  acetaminophen 325 mg oral tablet: 2 tab(s) orally every 6 hours, As Needed for Pain (21 Aug 2021 14:49)  anastrozole 1 mg oral tablet: 1 tab(s) orally once a day  **Per pharmacy  ([facility]), last filled 6/23/21 for 30 day supply (21 Aug 2021 14:49)  Entresto 24 mg-26 mg oral tablet: 1 tab(s) orally 2 times a day  **Per pharmacy ([facility]), last filled 7/9/21 for 30 day supply (21 Aug 2021 14:49)  metoprolol succinate 25 mg oral tablet, extended release: 1 tab(s) orally once a day  **Per [facility] pharmacy, metoprolol succinate is a new prescription filled on 8/17/21 for 90 day supply (21 Aug 2021 14:49)    MEDICATIONS  (STANDING):  anastrozole 1 milliGRAM(s) Oral daily  apixaban 10 milliGRAM(s) Oral every 12 hours  hydrALAZINE 5 milliGRAM(s) Oral every 8 hours  metoprolol succinate ER 25 milliGRAM(s) Oral daily  sacubitril 49 mG/valsartan 51 mG 1 Tablet(s) Oral two times a day    Allergies  Advil (Hives; Vomiting)    FAMILY HISTORY:  Family history of hypertension  Family history of diabetes mellitus  Family history of renal stone  Family history of glioblastoma  Family history of liver failure  History of hypertension  mother  Family history of glioblastoma (Sibling)  Family history of renal failure  uncle  Family history of other condition  please open box and see additional medical family history  Family history of glioblastoma    Social History:  see HPI (21 Aug 2021 14:24)    P/E:  Vital Signs Last 24 Hrs  T(C): 36.6 (23 Aug 2021 12:02), Max: 36.8 (22 Aug 2021 13:40)  T(F): 97.8 (23 Aug 2021 12:02), Max: 98.3 (22 Aug 2021 20:27)  HR: 82 (23 Aug 2021 12:02) (73 - 92)  BP: 125/93 (23 Aug 2021 12:02) (125/93 - 142/110)  BP(mean): --  RR: 30 (23 Aug 2021 12:02) (16 - 30)  SpO2: 100% (23 Aug 2021 12:02) (99% - 100%)    - gen: tired appearing elderly woman, laying in hospital bed, NAD  - HEENT: MMM, NCAT  - neck: JVP ~12 cm H2O  - heart: RRR, nml S1/S2, no RMg  - lungs: bibasilar rales that improve slightly w/ deep respiration, tachypneic, nml WOB  - abd: soft, NTND, NABS  - ext: WWP, PPP, no EZEQUIEL    RELEVANT RECENT LABS/IMAGING/STUDIES:  CT P/E (08/22/21) -  Saddle pulmonary embolus.  Cardiomegaly. No evidence of right heart strain.    CXR (08/21/21) -  Clear lungs. Nopleural effusions or pneumothorax.  Stable left chest wall SICD and sightly prominent appearing cardiac and mediastinal silhouettes.  Trachea midline.  Unremarkable osseous structures.  Right upper quadrant surgical clips present.  Incidentally noted breast shadow asymmetry, right appearing more receded relative to left.               11.6   8.53  )-----------( 175      ( 23 Aug 2021 08:07 )             36.2     08-23    142  |  106  |  32<H>  ----------------------------<  152<H>  3.7   |  18<L>  |  1.51<H>    Ca    9.3      23 Aug 2021 08:07  Phos  3.3     08-23  Mg     2.00     08-23    TPro  7.5  /  Alb  4.0  /  TBili  0.6  /  DBili  x   /  AST  17  /  ALT  13  /  AlkPhos  166<H>  08-23    LIVER FUNCTIONS - ( 23 Aug 2021 08:07 )  Alb: 4.0 g/dL / Pro: 7.5 g/dL / ALK PHOS: 166 U/L / ALT: 13 U/L / AST: 17 U/L / GGT: x           RELEVANT REMOTE DATA:  TTE (08/29/20) -   1. Tethered mitral valve leaflets. Moderate to severe  mitral regurgitation.  2. Severely dilated left atrium.  LA volume index = 63  cc/m2.  3. Severe left ventricular enlargement.  4. Severe global left ventricular systolic dysfunction.  Severe diastolic dysfunction (stage III-IV).  5. Normal right ventricular size with decreased RV systolic  function. (TAPSE 1.5 cm)  6. RV systolic pressure is 51 mm Hg. Moderate pulmonary  hypertension.    CT chest (10/17/20) -  No airspace consolidation. Small bilateral pleural effusions.  2.5 cm mass in the right breast, abutting and inseparable from the right pectoralis muscle consistent with known breast carcinoma.  No small bowel obstruction or active bowel inflammation.  Trace free abdominal and pelvic ascites.

## 2021-08-23 NOTE — PROGRESS NOTE ADULT - NUTRITIONAL ASSESSMENT
- given pt h/o COPD would titrate O2 for goal O2 sat 88-92%, if she is overoxygenated this will lead to retention and possibly compromise her respiratory status  - please get a room air ABG  - discussed w/ interventional cardiology, if there is e/o RH strain w/ w/u above will further consider catheter directed thrombolysis therapy

## 2021-08-23 NOTE — CONSULT NOTE ADULT - ASSESSMENT
67yo F former smoker (q. 2y ago) h/o HTN, HLD, COPD, HFrEF (s/p ICD), remote DVT (1994 s/p IVC filter), breast cancer, ICA+infrarenal aneurysms, originally p/w acute on chronic dyspnea, found to have saddle PE. Cards called for PERT c/s.    sac/patel 24/26, succ 25, apix 10 q12h, hydral 5    # submassive PE  - HD stable, no tachycardia, no hypotension, is hypoxic, +HsT, BNP is also elevated though difficult to assess given her chronically elevated values per chart review, pt is at least submassive PE  - further risk stratification w/ TTE to r/o RH strain (non seen on CTA chest, though this is not sensitive)  - currently on apixaban, would continue however would further investigate her cancer hx as per chart review e.g. CT chest from 10/2020 showing breast mass, if there is active cancer would prefer enoxaparin for therapeutic AC  - given pt h/o COPD would titrate O2 for goal O2 sat 88-92%, if she is overoxygenated this will lead to retention and possibly compromise her respiratory status  - please get a room air ABG  - discussed w/ interventional cardiology, if there is e/o RH strain w/ w/u above will further consider catheter directed thrombolysis therapy  - would c/t cardiac meds at this time pending TTE (Entresto, metop succinate, hydralazine)  - please call with any clinical updates or questions    Chris Ricardo MD  Cardiology Fellow - PGY 4  [phone]  For all New Consults and Questions:  [email or web address]   Login: [email or web address]    *** Recommendations are preliminary until cosigned by the attending.

## 2021-08-23 NOTE — PROVIDER CONTACT NOTE (OTHER) - SITUATION
Patient continues to be dyspneic complaining of SOB Patient continues to be dyspneic complaining of SOB. Patient had 14 beats of AIVR.

## 2021-08-24 DIAGNOSIS — R94.31 ABNORMAL ELECTROCARDIOGRAM [ECG] [EKG]: ICD-10-CM

## 2021-08-24 LAB
ALBUMIN SERPL ELPH-MCNC: 3.6 G/DL — SIGNIFICANT CHANGE UP (ref 3.3–5)
ALP SERPL-CCNC: 186 U/L — HIGH (ref 40–120)
ALT FLD-CCNC: 13 U/L — SIGNIFICANT CHANGE UP (ref 4–33)
ANION GAP SERPL CALC-SCNC: 16 MMOL/L — HIGH (ref 7–14)
APTT BLD: 174.6 SEC — CRITICAL HIGH (ref 27–36.3)
APTT BLD: 52.6 SEC — HIGH (ref 27–36.3)
APTT BLD: >200 SEC — CRITICAL HIGH (ref 27–36.3)
AST SERPL-CCNC: 20 U/L — SIGNIFICANT CHANGE UP (ref 4–32)
BILIRUB SERPL-MCNC: 0.7 MG/DL — SIGNIFICANT CHANGE UP (ref 0.2–1.2)
BUN SERPL-MCNC: 34 MG/DL — HIGH (ref 7–23)
CALCIUM SERPL-MCNC: 9.1 MG/DL — SIGNIFICANT CHANGE UP (ref 8.4–10.5)
CHLORIDE SERPL-SCNC: 109 MMOL/L — HIGH (ref 98–107)
CO2 SERPL-SCNC: 17 MMOL/L — LOW (ref 22–31)
CREAT SERPL-MCNC: 1.39 MG/DL — HIGH (ref 0.5–1.3)
GLUCOSE SERPL-MCNC: 140 MG/DL — HIGH (ref 70–99)
HCT VFR BLD CALC: 35 % — SIGNIFICANT CHANGE UP (ref 34.5–45)
HCT VFR BLD CALC: 36.2 % — SIGNIFICANT CHANGE UP (ref 34.5–45)
HGB BLD-MCNC: 11 G/DL — LOW (ref 11.5–15.5)
HGB BLD-MCNC: 11.4 G/DL — LOW (ref 11.5–15.5)
INR BLD: 3.44 RATIO — HIGH (ref 0.88–1.16)
MAGNESIUM SERPL-MCNC: 2.1 MG/DL — SIGNIFICANT CHANGE UP (ref 1.6–2.6)
MCHC RBC-ENTMCNC: 29.6 PG — SIGNIFICANT CHANGE UP (ref 27–34)
MCHC RBC-ENTMCNC: 29.7 PG — SIGNIFICANT CHANGE UP (ref 27–34)
MCHC RBC-ENTMCNC: 31.4 GM/DL — LOW (ref 32–36)
MCHC RBC-ENTMCNC: 31.5 GM/DL — LOW (ref 32–36)
MCV RBC AUTO: 94.1 FL — SIGNIFICANT CHANGE UP (ref 80–100)
MCV RBC AUTO: 94.3 FL — SIGNIFICANT CHANGE UP (ref 80–100)
NRBC # BLD: 1 /100 WBCS — SIGNIFICANT CHANGE UP
NRBC # BLD: 2 /100 WBCS — SIGNIFICANT CHANGE UP
NRBC # FLD: 0.09 K/UL — HIGH
NRBC # FLD: 0.12 K/UL — HIGH
PHOSPHATE SERPL-MCNC: 3.2 MG/DL — SIGNIFICANT CHANGE UP (ref 2.5–4.5)
PLATELET # BLD AUTO: 191 K/UL — SIGNIFICANT CHANGE UP (ref 150–400)
PLATELET # BLD AUTO: 196 K/UL — SIGNIFICANT CHANGE UP (ref 150–400)
POTASSIUM SERPL-MCNC: 3.4 MMOL/L — LOW (ref 3.5–5.3)
POTASSIUM SERPL-SCNC: 3.4 MMOL/L — LOW (ref 3.5–5.3)
PROT SERPL-MCNC: 7.1 G/DL — SIGNIFICANT CHANGE UP (ref 6–8.3)
PROTHROM AB SERPL-ACNC: 37.3 SEC — HIGH (ref 10.6–13.6)
RBC # BLD: 3.72 M/UL — LOW (ref 3.8–5.2)
RBC # BLD: 3.84 M/UL — SIGNIFICANT CHANGE UP (ref 3.8–5.2)
RBC # FLD: 15.2 % — HIGH (ref 10.3–14.5)
RBC # FLD: 15.2 % — HIGH (ref 10.3–14.5)
SODIUM SERPL-SCNC: 142 MMOL/L — SIGNIFICANT CHANGE UP (ref 135–145)
WBC # BLD: 7.44 K/UL — SIGNIFICANT CHANGE UP (ref 3.8–10.5)
WBC # BLD: 7.55 K/UL — SIGNIFICANT CHANGE UP (ref 3.8–10.5)
WBC # FLD AUTO: 7.44 K/UL — SIGNIFICANT CHANGE UP (ref 3.8–10.5)
WBC # FLD AUTO: 7.55 K/UL — SIGNIFICANT CHANGE UP (ref 3.8–10.5)

## 2021-08-24 PROCEDURE — 99233 SBSQ HOSP IP/OBS HIGH 50: CPT | Mod: GC

## 2021-08-24 PROCEDURE — 93970 EXTREMITY STUDY: CPT | Mod: 26

## 2021-08-24 PROCEDURE — 99231 SBSQ HOSP IP/OBS SF/LOW 25: CPT | Mod: GC

## 2021-08-24 RX ORDER — POTASSIUM CHLORIDE 20 MEQ
40 PACKET (EA) ORAL ONCE
Refills: 0 | Status: COMPLETED | OUTPATIENT
Start: 2021-08-24 | End: 2021-08-24

## 2021-08-24 RX ORDER — WARFARIN SODIUM 2.5 MG/1
5 TABLET ORAL ONCE
Refills: 0 | Status: DISCONTINUED | OUTPATIENT
Start: 2021-08-24 | End: 2021-08-24

## 2021-08-24 RX ADMIN — SACUBITRIL AND VALSARTAN 1 TABLET(S): 24; 26 TABLET, FILM COATED ORAL at 18:17

## 2021-08-24 RX ADMIN — Medication 5 MILLIGRAM(S): at 17:06

## 2021-08-24 RX ADMIN — Medication 25 MILLIGRAM(S): at 06:50

## 2021-08-24 RX ADMIN — HEPARIN SODIUM 1000 UNIT(S)/HR: 5000 INJECTION INTRAVENOUS; SUBCUTANEOUS at 21:22

## 2021-08-24 RX ADMIN — ANASTROZOLE 1 MILLIGRAM(S): 1 TABLET ORAL at 11:19

## 2021-08-24 RX ADMIN — Medication 40 MILLIEQUIVALENT(S): at 09:10

## 2021-08-24 RX ADMIN — HEPARIN SODIUM 0 UNIT(S)/HR: 5000 INJECTION INTRAVENOUS; SUBCUTANEOUS at 04:53

## 2021-08-24 RX ADMIN — SACUBITRIL AND VALSARTAN 1 TABLET(S): 24; 26 TABLET, FILM COATED ORAL at 06:50

## 2021-08-24 RX ADMIN — HEPARIN SODIUM 0 UNIT(S)/HR: 5000 INJECTION INTRAVENOUS; SUBCUTANEOUS at 20:20

## 2021-08-24 RX ADMIN — HEPARIN SODIUM 1100 UNIT(S)/HR: 5000 INJECTION INTRAVENOUS; SUBCUTANEOUS at 06:00

## 2021-08-24 RX ADMIN — Medication 5 MILLIGRAM(S): at 06:50

## 2021-08-24 RX ADMIN — Medication 30 MILLILITER(S): at 11:20

## 2021-08-24 RX ADMIN — HEPARIN SODIUM 3000 UNIT(S): 5000 INJECTION INTRAVENOUS; SUBCUTANEOUS at 13:29

## 2021-08-24 RX ADMIN — Medication 650 MILLIGRAM(S): at 00:00

## 2021-08-24 RX ADMIN — HEPARIN SODIUM 1300 UNIT(S)/HR: 5000 INJECTION INTRAVENOUS; SUBCUTANEOUS at 13:28

## 2021-08-24 NOTE — PROGRESS NOTE ADULT - SUBJECTIVE AND OBJECTIVE BOX
Resident: Bisi Velásquez, PGY1, Medicine, Pager [phone]) [phone] ([facility])    24-Hour Events and Subjective:  - tele: few beats vtach asymptomatic, otherwise sinus  - dyspneic despite normal SpO2 -> amenable to now comfort supp O2  - pain with breathing -> acetaminophen PRN x1  - denies chest pain or palpitations    Hospital Meds:  MEDICATIONS  (STANDING):  anastrozole 1 milliGRAM(s) Oral daily  heparin  Infusion.  Unit(s)/Hr (14 mL/Hr) IV Continuous <Continuous>  hydrALAZINE 5 milliGRAM(s) Oral every 8 hours  metoprolol succinate ER 25 milliGRAM(s) Oral daily  sacubitril 49 mG/valsartan 51 mG 1 Tablet(s) Oral two times a day  MEDICATIONS  (PRN):  acetaminophen   Tablet .. 650 milliGRAM(s) Oral every 6 hours PRN Mild Pain (1 - 3), Moderate Pain (4 - 6), Severe Pain (7 - 10)  heparin   Injectable 6500 Unit(s) IV Push every 6 hours PRN For aPTT less than 40  heparin   Injectable 3000 Unit(s) IV Push every 6 hours PRN For aPTT between 40 - 57    Vitals:  T(F): 97.9 (23 Aug 2021 22:57), Max: 98.3 (23 Aug 2021 20:15)  HR: 85 (23 Aug 2021 22:57) (73 - 85)  BP: 141/100 (23 Aug 2021 22:57) (125/93 - 141/101)  RR: 21 (23 Aug 2021 22:57) (17 - 30)  SpO2: 100% (23 Aug 2021 20:15) (94% - 100%)    Physical Exam:  Con: NAD  HEENT: NCAT, no conjunctival injection, no scleral icterus, strabismus, moist oral mucosa  Neck: JVD  Resp: normal WOB at rest, CTAB  CV: RRR, physiologic S1 and S2, no murmurs, no rubs, no gallops, 1+ radial pulses  Abd: nondistended, normoactive bowel sounds, soft, RUQ tenderness, no rebound, no involuntary guarding, no organomegaly  Ext: no clubbing, warm hands, no edema  Neuro: alert, A&Ox3  Psych: appropriate mood and affect    Labs:                        11.0L   7.44  )-----------( 191      ( 24 Aug 2021 03:56 )             35.0                         11.6   8.53  )-----------( 175      ( 23 Aug 2021 08:07 )             36.2     08-24  142  |  109<H>  |  34<H>  ----------------------------<  140<H>  3.4<L>   |  17<L>  |  1.39<H>  eGFR 45L  Ca    9.1      24 Aug 2021 03:56    08-23  142  |  106  |  32<H>  ----------------------------<  152<H>  3.7   |  18<L>  |  1.51<H>  eGFR 41L  Ca    9.3      23 Aug 2021 08:07  Phos  3.3     08-23  Mg     2.00     08-23    TPro  7.1  /  Alb  3.6  /  TBili  0.7  /  DBili  x   /  AST  20  /  ALT  13  /  AlkPhos  186<H>  08-24  TPro  7.5  /  Alb  4.0  /  TBili  0.6  /  DBili  x   /  AST  17  /  ALT  13  /  AlkPhos  166<H>  08-23    Studies and Radiology:     TTE 8/23: Mod-severe MR. Severe global LV systolic dysfunction. A filling defect is seen and is consistent with an LV apical thrombus. Decreased RV systolic function.  Resident: Bisi Velásquez, PGY1, Medicine, Pager [phone] ([facility])    24-Hour Events and Subjective:  - tele: few beats vtach asymptomatic, otherwise sinus  - dyspneic despite normal SpO2 -> amenable to now comfort supp O2  - pain with breathing -> acetaminophen PRN x1  - denies chest pain or palpitations    Hospital Meds:  MEDICATIONS  (STANDING):  anastrozole 1 milliGRAM(s) Oral daily  heparin  Infusion.  Unit(s)/Hr (14 mL/Hr) IV Continuous <Continuous>  hydrALAZINE 5 milliGRAM(s) Oral every 8 hours  metoprolol succinate ER 25 milliGRAM(s) Oral daily  sacubitril 49 mG/valsartan 51 mG 1 Tablet(s) Oral two times a day  MEDICATIONS  (PRN):  acetaminophen   Tablet .. 650 milliGRAM(s) Oral every 6 hours PRN Mild Pain (1 - 3), Moderate Pain (4 - 6), Severe Pain (7 - 10)  heparin   Injectable 6500 Unit(s) IV Push every 6 hours PRN For aPTT less than 40  heparin   Injectable 3000 Unit(s) IV Push every 6 hours PRN For aPTT between 40 - 57    Vitals:  T(F): 97.9 (23 Aug 2021 22:57), Max: 98.3 (23 Aug 2021 20:15)  HR: 85 (23 Aug 2021 22:57) (73 - 85)  BP: 141/100 (23 Aug 2021 22:57) (125/93 - 141/101)  RR: 21 (23 Aug 2021 22:57) (17 - 30)  SpO2: 100% (23 Aug 2021 20:15) (94% - 100%)    Physical Exam:  Con: NAD  HEENT: NCAT, no conjunctival injection, no scleral icterus, strabismus, moist oral mucosa  Neck: JVD  Resp: normal WOB at rest, CTAB  CV: RRR, physiologic S1 and S2, no murmurs, no rubs, no gallops, 1+ radial pulses  Abd: nondistended, normoactive bowel sounds, soft, RUQ tenderness, no rebound, no involuntary guarding, no organomegaly  Ext: no clubbing, warm hands, no edema  Neuro: alert, A&Ox3  Psych: appropriate mood and affect    Labs:                        11.0L   7.44  )-----------( 191      ( 24 Aug 2021 03:56 )             35.0                         11.6   8.53  )-----------( 175      ( 23 Aug 2021 08:07 )             36.2     08-24  142  |  109<H>  |  34<H>  ----------------------------<  140<H>  3.4<L repleted>   |  17<L>  |  1.39<H>  eGFR 45L  Ca    9.1      24 Aug 2021 03:56    08-23  142  |  106  |  32<H>  ----------------------------<  152<H>  3.7   |  18<L>  |  1.51<H>  eGFR 41L  Ca    9.3      23 Aug 2021 08:07  Phos  3.3     08-23  Mg     2.00     08-23    TPro  7.1  /  Alb  3.6  /  TBili  0.7  /  DBili  x   /  AST  20  /  ALT  13  /  AlkPhos  186<H>  08-24  TPro  7.5  /  Alb  4.0  /  TBili  0.6  /  DBili  x   /  AST  17  /  ALT  13  /  AlkPhos  166<H>  08-23    Studies and Radiology:     TTE 8/23: Mod-severe MR. Severe global LV systolic dysfunction. A filling defect is seen and is consistent with an LV apical thrombus. Decreased RV systolic function.  Resident: Bisi Velásquez, PGY1, Medicine, Pager [phone]) [phone] ([facility])    24-Hour Events and Subjective:  - tele: few beats vtach asymptomatic, otherwise sinus  - pain with breathing -> acetaminophen x1  - denies chest pain, palpitations, dizziness  - dyspnea intermittently   - loose BM every AM  - walked to bathroom with help this AM    Hospital Meds:  MEDICATIONS  (STANDING):  anastrozole 1 milliGRAM(s) Oral daily  heparin  Infusion.  Unit(s)/Hr (14 mL/Hr) IV Continuous <Continuous>  hydrALAZINE 5 milliGRAM(s) Oral every 8 hours  metoprolol succinate ER 25 milliGRAM(s) Oral daily  sacubitril 49 mG/valsartan 51 mG 1 Tablet(s) Oral two times a day  MEDICATIONS  (PRN):  acetaminophen   Tablet .. 650 milliGRAM(s) Oral every 6 hours PRN Mild Pain (1 - 3), Moderate Pain (4 - 6), Severe Pain (7 - 10)  heparin   Injectable 6500 Unit(s) IV Push every 6 hours PRN For aPTT less than 40  heparin   Injectable 3000 Unit(s) IV Push every 6 hours PRN For aPTT between 40 - 57    Vitals:  T(F): 97.9 (23 Aug 2021 22:57), Max: 98.3 (23 Aug 2021 20:15)  HR: 85 (23 Aug 2021 22:57) (73 - 85)  BP: 141/100 (23 Aug 2021 22:57) (125/93 - 141/101)  RR: 21 (23 Aug 2021 22:57) (17 - 30)  SpO2: 100% (23 Aug 2021 20:15) (94% - 100%)  Down to 91% while ambulating and 87% while at rest post ambulation.    Physical Exam:  Con: NAD  HEENT: NCAT, no conjunctival injection, no scleral icterus, strabismus, moist oral mucosa  Neck: JVD  Resp: normal WOB at rest, CTAB  CV: RRR, physiologic S1 and S2, no murmurs, no rubs, no gallops, 1+ radial pulses  Abd: nondistended, normoactive bowel sounds, soft, RUQ tenderness, no rebound, no involuntary guarding, no organomegaly  Ext: no clubbing, warm hands, no edema  Neuro: alert, A&Ox3  Psych: appropriate mood and affect    Labs:                        11.0L   7.44  )-----------( 191      ( 24 Aug 2021 03:56 )             35.0                         11.6   8.53  )-----------( 175      ( 23 Aug 2021 08:07 )             36.2     08-24  142  |  109<H>  |  34<H>  ----------------------------<  140<H>  3.4<L repleted>   |  17<L>  |  1.39<H>  eGFR 45L  Ca    9.1      24 Aug 2021 03:56    08-23  142  |  106  |  32<H>  ----------------------------<  152<H>  3.7   |  18<L>  |  1.51<H>  eGFR 41L  Ca    9.3      23 Aug 2021 08:07  Phos  3.3     08-23  Mg     2.00     08-23    TPro  7.1  /  Alb  3.6  /  TBili  0.7  /  DBili  x   /  AST  20  /  ALT  13  /  AlkPhos  186<H>  08-24  TPro  7.5  /  Alb  4.0  /  TBili  0.6  /  DBili  x   /  AST  17  /  ALT  13  /  AlkPhos  166<H>  08-23    Studies and Radiology:     TTE 8/23: Mod-severe MR. Severe global LV systolic dysfunction. A filling defect is seen and is consistent with an LV apical thrombus. Decreased RV systolic function.  Resident: Bisi Velásquez, PGY1, Medicine, Pager [phone]) [phone] ([facility])    24-Hour Events and Subjective:  - tele: few beats vtach asymptomatic, otherwise sinus  - pain with breathing -> acetaminophen x1  - denies chest pain, palpitations, dizziness  - dyspnea intermittently   - loose BM every AM  - walked to bathroom with help this AM    Hospital Meds:  MEDICATIONS  (STANDING):  anastrozole 1 milliGRAM(s) Oral daily  heparin  Infusion.  Unit(s)/Hr (14 mL/Hr) IV Continuous <Continuous>  hydrALAZINE 5 milliGRAM(s) Oral every 8 hours  metoprolol succinate ER 25 milliGRAM(s) Oral daily  sacubitril 49 mG/valsartan 51 mG 1 Tablet(s) Oral two times a day  MEDICATIONS  (PRN):  acetaminophen   Tablet .. 650 milliGRAM(s) Oral every 6 hours PRN Mild Pain (1 - 3), Moderate Pain (4 - 6), Severe Pain (7 - 10)  heparin   Injectable 6500 Unit(s) IV Push every 6 hours PRN For aPTT less than 40  heparin   Injectable 3000 Unit(s) IV Push every 6 hours PRN For aPTT between 40 - 57    Vitals:  T(F): 97.9 (23 Aug 2021 22:57), Max: 98.3 (23 Aug 2021 20:15)  HR: 85 (23 Aug 2021 22:57) (73 - 85)  BP: 141/100 (23 Aug 2021 22:57) (125/93 - 141/101)  RR: 21 (23 Aug 2021 22:57) (17 - 30)  SpO2: 100% (23 Aug 2021 20:15) (94% - 100%)  Down to 91% while ambulating and 87% while at rest post ambulation.    Physical Exam:  Con: NAD  HEENT: NCAT, no conjunctival injection, no scleral icterus, strabismus, moist oral mucosa  Neck: JVD  Resp: normal WOB at rest, CTAB  CV: RRR, physiologic S1 and S2, no murmurs, no rubs, no gallops, 1+ radial pulses  Abd: nondistended, normoactive bowel sounds, soft, RUQ tenderness, no rebound, no involuntary guarding, no organomegaly  Ext: no clubbing, warm hands, no edema  Neuro: alert, A&Ox3  Psych: appropriate mood and affect    Labs:                        11.0L   7.44  )-----------( 191      ( 24 Aug 2021 03:56 )             35.0                         11.6   8.53  )-----------( 175      ( 23 Aug 2021 08:07 )             36.2     08-24  142  |  109<H>  |  34<H>  ----------------------------<  140<H>  3.4<L repleted>   |  17<L>  |  1.39<H>  eGFR 45L  Ca    9.1      24 Aug 2021 03:56    08-23  142  |  106  |  32<H>  ----------------------------<  152<H>  3.7   |  18<L>  |  1.51<H>  eGFR 41L  Ca    9.3      23 Aug 2021 08:07  Phos  3.3     08-23  Mg     2.00     08-23    TPro  7.1  /  Alb  3.6  /  TBili  0.7  /  DBili  x   /  AST  20  /  ALT  13  /  AlkPhos  186<H>  08-24  TPro  7.5  /  Alb  4.0  /  TBili  0.6  /  DBili  x   /  AST  17  /  ALT  13  /  AlkPhos  166<H>  08-23    Studies and Radiology:     TTE 8/23: Mod-severe MR. Severe global LV systolic dysfunction. A filling defect is seen and is consistent with an LV apical thrombus. Decreased RV systolic function.     BLE Duplex 8/24:   R: Nonocclusive thrombus in the right popliteal and posterior tibial veins. Normal compressibility of the right common femoral and femoral veins. Right peroneal veins not visualized.  L: Nonocclusive thrombus in the left popliteal vein. Normal compressibility of the right common femoral and femoral veins. Posterior tibial and peroneal veins are patent.

## 2021-08-24 NOTE — PROGRESS NOTE ADULT - PROBLEM SELECTOR PLAN 1
2/2 at least submassive saddle pulmonary embolus w/o evidence of RH strain per CTPA and TTE. DDimer~7400H. ProBNP~06869U->22183G. ABG pH 7.49H, pCO2 22L, SaO2 99.2H. Got COVID19 vaccine Pfizer/Moderna not J&J.  Less likely pulm acute infection or COPD exacerbation (no reported change in cough or sputum in the past month, and CXR unremarkable). Unlikely HFrEF exacerbation despite high proBNP (has been that elevated since 8/2020) and HsenTnT (likely 2/2 demand ischemia), given no JVD, peripheral edema, orthopnea, or other suggestive S/S.   - heparin bridge to warfarin because LV thrombus  - consider removing IVC filter outpt  - appreciate PERT recs  - f/u ambulator SpO2  - f/u BLE Duplex  - goal SpO2 88-92% for COPD 2/2 at least submassive saddle pulmonary embolus w/o evidence of RH strain per CTPA and TTE. DDimer~7400H. ProBNP~08454F->06213A. ABG pH 7.49H, pCO2 22L, SaO2 99.2H. Got COVID19 vaccine Pfizer/Moderna not J&J. IVC filter can be thrombogenic.  Less likely pulm acute infection or COPD exacerbation (no reported change in cough or sputum in the past month, and CXR unremarkable). Unlikely HFrEF exacerbation despite high proBNP (has been that elevated since 8/2020) and HsenTnT (likely 2/2 demand ischemia), given no JVD, peripheral edema, orthopnea, or other suggestive S/S.   - heparin bridge to warfarin because LV thrombus  - appreciate Card recs  - f/u ambulator SpO2  - f/u BLE Duplex  - goal SpO2 88-92% for COPD  - continuous pulse ox 2/2 at least submassive saddle pulmonary embolus w/o evidence of RH strain per CTPA and TTE. DDimer~7400H. ProBNP~89813R->88729P. ABG pH 7.49H, pCO2 22L, SaO2 99.2H. Got COVID19 vaccine Pfizer/Moderna not J&J. IVC filter can be thrombogenic.  Less likely pulm acute infection or COPD exacerbation (no reported change in cough or sputum in the past month, and CXR unremarkable). Unlikely HFrEF exacerbation despite high proBNP (has been that elevated since 8/2020) and HsenTnT (likely 2/2 demand ischemia), given no JVD, peripheral edema, orthopnea, or other suggestive S/S.   - heparin bridge to warfarin because LV thrombus  - appreciate Card recs: no indication for catheter-directed thrombolysis  - f/u ambulator SpO2  - f/u BLE Duplex  - goal SpO2 88-92% for COPD  - continuous pulse ox 2/2 at least submassive saddle pulmonary embolus w/o evidence of RH strain per CTPA and TTE. B/l DVTs. DDimer~7400H. ProBNP~12756E->27438D. ABG pH 7.49H, pCO2 22L, SaO2 99.2H. Got COVID19 vaccine Pfizer/Moderna not J&J. IVC filter can be thrombogenic.  Less likely pulm acute infection or COPD exacerbation (no reported change in cough or sputum in the past month, and CXR unremarkable). Unlikely HFrEF exacerbation despite high proBNP (has been that elevated since 8/2020) and HsenTnT (likely 2/2 demand ischemia), given no JVD, peripheral edema, orthopnea, or other suggestive S/S.   - heparin bridge to warfarin because LV thrombus  - appreciate Card recs: no indication for catheter-directed thrombolysis  - f/u ambulator SpO2  - f/u BLE Duplex  - goal SpO2 88-92% for COPD  - continuous pulse ox 2/2 at least submassive saddle pulmonary embolus w/o evidence of RH strain per CTPA and TTE. B/l nonocclusive DVTs. DDimer~7400H. ProBNP~81743P->26052O. ABG pH 7.49H, pCO2 22L, SaO2 99.2H. Got COVID19 vaccine Pfizer/Moderna not J&J. IVC filter can be thrombogenic.  Less likely pulm acute infection or COPD exacerbation (no reported change in cough or sputum in the past month, and CXR unremarkable). Unlikely HFrEF exacerbation despite high proBNP (has been that elevated since 8/2020) and HsenTnT (likely 2/2 demand ischemia), given no JVD, peripheral edema, orthopnea, or other suggestive S/S.   - heparin bridge to warfarin because LV thrombus  - appreciate Card recs: no indication for catheter-directed thrombolysis  - f/u ambulator SpO2  - f/u BLE Duplex  - goal SpO2 88-92% for COPD  - continuous pulse ox 2/2 at least submassive saddle pulmonary embolus w/o evidence of RH strain per CTPA and TTE. B/l nonocclusive DVTs. DDimer~7400H. ProBNP~64024C->34080X. ABG pH 7.49H, pCO2 22L, SaO2 99.2H. Got COVID19 vaccine Pfizer/Moderna not J&J. IVC filter can be thrombogenic.  Less likely pulm acute infection or COPD exacerbation (no reported change in cough or sputum in the past month, and CXR unremarkable). Unlikely HFrEF exacerbation despite high proBNP (has been that elevated since 8/2020) and HsenTnT (likely 2/2 demand ischemia), given no JVD, peripheral edema, orthopnea, or other suggestive S/S.   - heparin bridge to warfarin because LV thrombus  - appreciate Card recs: no indication for catheter-directed thrombolysis  - f/u ambulator SpO2  - f/u BLE Duplex  - goal SpO2 88-92% for COPD  - continuous pulse ox  - consult Heme regarding hypercoagulability w/u, anticoag, and cancer w/u 2/2 at least submassive saddle pulmonary embolus w/o evidence of RH strain per CTPA and TTE. B/l nonocclusive DVTs. DDimer~7400H. ProBNP~74662M->30142S. ABG pH 7.49H, pCO2 22L, SaO2 99.2H. Got COVID19 vaccine Pfizer/Moderna not J&J. IVC filter can be thrombogenic.  Less likely pulm acute infection or COPD exacerbation (no reported change in cough or sputum in the past month, and CXR unremarkable). Unlikely HFrEF exacerbation despite high proBNP (has been that elevated since 8/2020) and HsenTnT (likely 2/2 demand ischemia), given no JVD, peripheral edema, orthopnea, or other suggestive S/S.   - heparin bridge to warfarin because LV thrombus  - appreciate Card recs: no indication for catheter-directed thrombolysis  - f/u ambulator SpO2  - f/u BLE Duplex  - goal SpO2 88-92% for COPD  - continuous pulse ox  - consult Heme regarding hypercoagulability w/u, anticoag, and cancer w/u  - consider pan CT Con to r/o malignancy when renal function improves

## 2021-08-24 NOTE — PROVIDER CONTACT NOTE (OTHER) - SITUATION
Pt desaturated 91% on room air while ambulating.   Pt desaturated to 87% on room air while at rest, post ambulation.   Pt SpO2 now % on room air while at rest.  Pt ambulated to bathroom to void.

## 2021-08-24 NOTE — PROGRESS NOTE ADULT - ATTENDING COMMENTS
66F CHFrEF s/p AICD, CKD2-3, HTN, HLD, breast Ca, hx PE IVC filter in place with 6 weeks of shortness of breath, hypertensive urgency, found to have saddle PE    #PE: no hypoxia or tachycardia, right heart strain not evident on CT, OK to initiate AC with Eliquis.  expedite TTE r/o right heart strain/pulm HTN  # r/o CHEN/CKD3: Creat improved, may be at baseline.  Can resume Entresto, trop leak likely in setting of CKD - delta not impressive   # chronic systolic CHF: resume home meds  # Breast Ca – Reports she is s/p chemo/RT. OP Oncologist-> Dr. Serrato. c/w home anastrozole.     - although not hypoxic, remains quite tachypneic at rest and on exertion , placed on O2 for comfort, check sat on ambulation - will consult interventional cards for possible catheter guided lysis   - dispo - eventual home with home PT TTE now with apical LV thrombus  remains LOZA on at times, tachypneic, though not hypoxic at rest or ambulation for baseline COPD  baylee cards - no plans for catheter lysis - transitioned to heparin in setting of LV thrombus as well as submassive PE  if no objection by cards will begin bridge to coumadin   f/u Le duplex - still has IVC in place   resolving CHEN, supplement for hypokalemia  2.3 cm breat lesion on CT - prior vic read as necrotic fat tissue   out-pt f.u

## 2021-08-24 NOTE — PROGRESS NOTE ADULT - PROBLEM SELECTOR PLAN 5
eGFR 30-35L and Cr 1.72H on admission, which is slightly above baseline per [employer].   - s/p furosemide 20 x1

## 2021-08-24 NOTE — PROGRESS NOTE ADULT - SUBJECTIVE AND OBJECTIVE BOX
CARDIOLOGY CONSULT PROGRESS NOTE  RISHABH HUNT  MRN-[id number]    INTERVAL EVENTS:  - tele: sinus, NSVT x4 beats, rates mostly 80s  - unchanged from yesterday, no acute complaints, ROS otherwise neg    MEDICATIONS  (STANDING):  anastrozole 1 milliGRAM(s) Oral daily  heparin  Infusion.  Unit(s)/Hr (14 mL/Hr) IV Continuous <Continuous>  hydrALAZINE 5 milliGRAM(s) Oral every 8 hours  metoprolol succinate ER 25 milliGRAM(s) Oral daily  sacubitril 49 mG/valsartan 51 mG 1 Tablet(s) Oral two times a day    MEDICATIONS  (PRN):  acetaminophen   Tablet .. 650 milliGRAM(s) Oral every 6 hours PRN Mild Pain (1 - 3), Moderate Pain (4 - 6), Severe Pain (7 - 10)  aluminum hydroxide/magnesium hydroxide/simethicone Suspension 30 milliLiter(s) Oral every 6 hours PRN Dyspepsia  heparin   Injectable 6500 Unit(s) IV Push every 6 hours PRN For aPTT less than 40  heparin   Injectable 3000 Unit(s) IV Push every 6 hours PRN For aPTT between 40 - 57    Allergies  Advil (Hives; Vomiting)    P/E:  Vital Signs Last 24 Hrs  T(C): 36.7 (24 Aug 2021 06:45), Max: 36.8 (23 Aug 2021 20:15)  T(F): 98.1 (24 Aug 2021 06:45), Max: 98.3 (23 Aug 2021 20:15)  HR: 82 (24 Aug 2021 06:45) (80 - 85)  BP: 144/103 (24 Aug 2021 06:45) (127/93 - 144/103)  BP(mean): --  RR: 21 (24 Aug 2021 09:51) (20 - 26)  SpO2: 91% (24 Aug 2021 09:51) (91% - 100%)    I&O's Summary  24 Aug 2021 07:01  -  24 Aug 2021 12:47  --------------------------------------------------------  IN: 233 mL / OUT: 0 mL / NET: 233 mL    - gen: tired appearing elderly woman, laying in hospital bed, NAD  - HEENT: MMM, NCAT  - heart: RRR, nml S1/S2, no RMg  - lungs: bibasilar rales that improve slightly w/ deep respiration, tachypneic, nml WOB  - abd: soft, NTND, NABS  - ext: WWP, PPP, no EZEQUIEL    RELEVANT RECENT LABS/IMAGING/STUDIES:  TTE (08/23/21) -  1. Mitral annular calcification, otherwise normal mitral  valve. Moderate-severe mitral regurgitation.  2. Normal left ventricular internal dimensions and wall  thicknesses.  3. Severe global left ventricular systolic dysfunction.  Endocardial visualization enhanced with intravenous  injection of echo contrast (Definity).  A filling defect is  seen and is consistent with an LV apical thrombus.  4.Normal right ventricular size with decreased right  ventricular systolic function.  5. Inadequate tricuspid regurgitation Doppler envelope  precludes estimation of RVSP.  *** No previous Echo exam.               11.0   7.44  )-----------( 191      ( 24 Aug 2021 03:56 )             35.0     08-24    142  |  109<H>  |  34<H>  ----------------------------<  140<H>  3.4<L>   |  17<L>  |  1.39<H>    Ca    9.1      24 Aug 2021 03:56  Phos  3.2     08-24  Mg     2.10     08-24    TPro  7.1  /  Alb  3.6  /  TBili  0.7  /  DBili  x   /  AST  20  /  ALT  13  /  AlkPhos  186<H>  08-24    LIVER FUNCTIONS - ( 24 Aug 2021 03:56 )  Alb: 3.6 g/dL / Pro: 7.1 g/dL / ALK PHOS: 186 U/L / ALT: 13 U/L / AST: 20 U/L / GGT: x           PT/INR - ( 24 Aug 2021 03:56 )   PT: 37.3 sec;   INR: 3.44 ratio      PTT - ( 24 Aug 2021 03:56 )  PTT:>200.0 sec    ABG - ( 23 Aug 2021 14:01 )  pH, Arterial: 7.49  pH, Blood: x     /  pCO2: 22    /  pO2: 178   / HCO3: 17    / Base Excess: -4.8  /  SaO2: 99.2

## 2021-08-24 NOTE — PROGRESS NOTE ADULT - PROBLEM SELECTOR PLAN 2
Seen in LV on TTE 8/23. No h/o afib/aflutter.  - heparin bridge to warfarin Seen in LV on TTE 8/23. Maybe 2/2 low LVEF. No h/o afib/aflutter.  - heparin bridge to warfarin

## 2021-08-24 NOTE — PROGRESS NOTE ADULT - PROBLEM SELECTOR PLAN 3
LVEF 12% per TTE. ProBNP~42163R (80264Y-12336L since 8/2020), HsenTnT 55H likely 2/2 demand ischemia. ECG unremarkable. Euvolemic on exam.  - has SICD  - home metoprolol succinate 25mg QD  - home sacubitril+valsartan 24+26mg BID LVEF 12% per TTE. ProBNP~96680U (10430W-30657I since 8/2020), HsenTnT 55H likely 2/2 demand ischemia. ECG unremarkable with QTc 498. Euvolemic on exam.  - has SICD  - home metoprolol succinate 25mg QD  - home sacubitril+valsartan 24+26mg BID LVEF 12% per TTE. ProBNP~31120S (76293X-38437D since 8/2020), HsenTnT 55H likely 2/2 demand ischemia. QTc 524 8/21. Euvolemic on exam.  - has SICD  - home metoprolol succinate 25mg QD  - home sacubitril+valsartan 24+26mg BID

## 2021-08-24 NOTE — PROGRESS NOTE ADULT - PROBLEM SELECTOR PLAN 8
- appreciate PT rec: home with home PT  - DASH/TLC diet - appreciate PT rec: home with home PT  - DASH/TLC diet  - consulted SW No outpt oncologist besides PCP Huyen Figueroa at [facility].  - home anastrozole

## 2021-08-24 NOTE — PROGRESS NOTE ADULT - PROBLEM SELECTOR PLAN 7
Outpt Dr. Serrato.  - home anastrozole No outpt oncologist besides PCP Huyen Figueroa at [facility].  - home anastrozole Na 147 on admission.  - monitor  - encourage PO intake

## 2021-08-24 NOTE — PROGRESS NOTE ADULT - ASSESSMENT
65yo F former smoker (q. 2y ago) h/o HTN, HLD, COPD, HFrEF (s/p ICD), remote DVT (1994 s/p IVC filter), breast cancer, ICA+infrarenal aneurysms, originally p/w acute on chronic dyspnea, found to have saddle PE. Cards called for PERT c/s.    - submassive PE, no indication for catheter directed thrombolysis, currently HD stable, no tachycardia/hypotensive/hypoxia  - given LV apical thrombus can transition from hep gtt to warfarin for long-term therapy  - given COPD would target SPO2 88-92%  - c/t sacubitril/valsartan 49/51, metoprolol succiante 25, hydralazine 5  - please call cardiology back if there are any further questions    Chris Ricardo MD  Cardiology Fellow - PGY 4  [phone]  For all New Consults and Questions:  [email or web address]   Login: [employer]

## 2021-08-24 NOTE — CHART NOTE - NSCHARTNOTEFT_GEN_A_CORE
Patient seen and evaluated at bedside for pain. She told RN Mago that she was experiencing sharp pain with inspiration - at time of exam endorsing neck pain. Agreed to take Tylenol. She was requesting non-rebreather and reports it would make her pain feel better; explained that her O2 sat is 100% and risks of supplemental O2 with COPD. Patient understood.

## 2021-08-24 NOTE — PROGRESS NOTE ADULT - ATTENDING COMMENTS
The patient was seen and examined with the Cardiology Consultation Teaching Service.     No overnight events    No chest pain  Dyspnea is improving; no orthopnea or PND  No supplemental oxygen requirements  No palpitations or dizziness    Comfortable-appearing woman in no acute distress  Alert and oriented  Afebrile  Vital signs stable  JVP is not elevated  Clear lungs  Normal heart sounds  Soft, non-tender, non-distended abdomen  Extremities are warm and perfused  No peripheral edema     Mild normocytic anemia  PTT 52  Mild azotemia, hypokalemia  Stable GFR 39    Echocardiography demonstrated severe global LV systolic dysfunction and LV thrombus was seen. There is moderate-severe MR. No RV enlargement or strain noted.    Impression and Recommendations:  66-year-old woman with chronic heart failure with severely reduced LV function and ICD implantation, COPD, carotid and infrarenal aneurysms, and breast cancer who presented with acute worsening of her chronic dyspnea found to have a saddle PE.     This case was discussed with interventional cardiology. No invasive management was indicated at this time.   Echocardiography did not demonstrate RV enlargement or hypokinesis.     Continue anticoagulation with plan for transition to warfarin.  Assessment for malignancy or recurrent malignancy is reasonable since there are no clear triggers for recurrence of VTE.  Continue current cardiac medications as noted.    Please reconsult cardiology with additional questions or concerns.    Cuba Guerrier MD  Cardiology  [phone]

## 2021-08-24 NOTE — PROGRESS NOTE ADULT - ASSESSMENT
66F with COPD/emphysema, DVT L leg 1994 with [place] IVC filter, HTN, HLD, infrarenal abd aneurysm, internal carotid aneurysm, depression, R breast cancer s/p chemo and radiation, cardiomyopathy, CHF with SICD, sickle cell trait, uterine fibroids s/p hysterectomy, s/p cholecystectomy, and former smoker (quit 2 years ago).   Presented to ED with SOB [id number] and dry cough x3w. Found to have saddle embolism and LV apical thrombus. Heparin bridge to warfarin.  66F with COPD/emphysema, DVT L leg 1994 with [place] IVC filter, HTN, HLD, infrarenal abd aneurysm, internal carotid aneurysm, depression, R breast cancer s/p chemo and radiation, cardiomyopathy, CHF with SICD, sickle cell trait, uterine fibroids s/p hysterectomy, s/p cholecystectomy, and former smoker (quit 2 years ago).   Presented to ED with SOB [id number] and dry cough x3w. Found to have saddle embolism, LV apical thrombus, and b/l DVTs. Heparin bridge to warfarin.  66F with COPD/emphysema, DVT L leg 1994 with [place] IVC filter, HTN, HLD, infrarenal abd aneurysm, internal carotid aneurysm, depression, R breast cancer s/p chemo and radiation, cardiomyopathy, CHF with SICD, sickle cell trait, uterine fibroids s/p hysterectomy, s/p cholecystectomy, and former smoker (quit 2 years ago).   Presented to ED with SOB [id number] and dry cough x3w. Found to have saddle embolism, LV apical thrombus, and b/l nonocclusive DVTs. Heparin bridge to warfarin.

## 2021-08-25 LAB
ALBUMIN SERPL ELPH-MCNC: 3.9 G/DL — SIGNIFICANT CHANGE UP (ref 3.3–5)
ALP SERPL-CCNC: 217 U/L — HIGH (ref 40–120)
ALT FLD-CCNC: 17 U/L — SIGNIFICANT CHANGE UP (ref 4–33)
ANION GAP SERPL CALC-SCNC: 19 MMOL/L — HIGH (ref 7–14)
APTT BLD: 113.8 SEC — HIGH (ref 27–36.3)
APTT BLD: 150.2 SEC — SIGNIFICANT CHANGE UP (ref 27–36.3)
APTT BLD: 44.7 SEC — HIGH (ref 27–36.3)
AST SERPL-CCNC: 21 U/L — SIGNIFICANT CHANGE UP (ref 4–32)
BILIRUB SERPL-MCNC: 0.8 MG/DL — SIGNIFICANT CHANGE UP (ref 0.2–1.2)
BUN SERPL-MCNC: 33 MG/DL — HIGH (ref 7–23)
CALCIUM SERPL-MCNC: 9.5 MG/DL — SIGNIFICANT CHANGE UP (ref 8.4–10.5)
CHLORIDE SERPL-SCNC: 108 MMOL/L — HIGH (ref 98–107)
CO2 SERPL-SCNC: 17 MMOL/L — LOW (ref 22–31)
CREAT SERPL-MCNC: 1.43 MG/DL — HIGH (ref 0.5–1.3)
GLUCOSE SERPL-MCNC: 162 MG/DL — HIGH (ref 70–99)
HCT VFR BLD CALC: 36.2 % — SIGNIFICANT CHANGE UP (ref 34.5–45)
HGB BLD-MCNC: 11.6 G/DL — SIGNIFICANT CHANGE UP (ref 11.5–15.5)
INR BLD: 2.17 RATIO — HIGH (ref 0.88–1.16)
MAGNESIUM SERPL-MCNC: 2.4 MG/DL — SIGNIFICANT CHANGE UP (ref 1.6–2.6)
MCHC RBC-ENTMCNC: 30.4 PG — SIGNIFICANT CHANGE UP (ref 27–34)
MCHC RBC-ENTMCNC: 32 GM/DL — SIGNIFICANT CHANGE UP (ref 32–36)
MCV RBC AUTO: 94.8 FL — SIGNIFICANT CHANGE UP (ref 80–100)
NRBC # BLD: 0 /100 WBCS — SIGNIFICANT CHANGE UP
NRBC # FLD: 0.07 K/UL — HIGH
PHOSPHATE SERPL-MCNC: 3 MG/DL — SIGNIFICANT CHANGE UP (ref 2.5–4.5)
PLATELET # BLD AUTO: 233 K/UL — SIGNIFICANT CHANGE UP (ref 150–400)
POTASSIUM SERPL-MCNC: 4.3 MMOL/L — SIGNIFICANT CHANGE UP (ref 3.5–5.3)
POTASSIUM SERPL-SCNC: 4.3 MMOL/L — SIGNIFICANT CHANGE UP (ref 3.5–5.3)
PROT SERPL-MCNC: 7.6 G/DL — SIGNIFICANT CHANGE UP (ref 6–8.3)
PROTHROM AB SERPL-ACNC: 24.1 SEC — HIGH (ref 10.6–13.6)
RBC # BLD: 3.82 M/UL — SIGNIFICANT CHANGE UP (ref 3.8–5.2)
RBC # FLD: 16 % — HIGH (ref 10.3–14.5)
SODIUM SERPL-SCNC: 144 MMOL/L — SIGNIFICANT CHANGE UP (ref 135–145)
WBC # BLD: 7.47 K/UL — SIGNIFICANT CHANGE UP (ref 3.8–10.5)
WBC # FLD AUTO: 7.47 K/UL — SIGNIFICANT CHANGE UP (ref 3.8–10.5)

## 2021-08-25 PROCEDURE — 99233 SBSQ HOSP IP/OBS HIGH 50: CPT | Mod: GC

## 2021-08-25 RX ORDER — LANOLIN ALCOHOL/MO/W.PET/CERES
3 CREAM (GRAM) TOPICAL ONCE
Refills: 0 | Status: COMPLETED | OUTPATIENT
Start: 2021-08-25 | End: 2021-08-25

## 2021-08-25 RX ADMIN — Medication 5 MILLIGRAM(S): at 00:47

## 2021-08-25 RX ADMIN — Medication 3 MILLIGRAM(S): at 00:48

## 2021-08-25 RX ADMIN — Medication 5 MILLIGRAM(S): at 17:54

## 2021-08-25 RX ADMIN — HEPARIN SODIUM 3000 UNIT(S): 5000 INJECTION INTRAVENOUS; SUBCUTANEOUS at 15:27

## 2021-08-25 RX ADMIN — Medication 25 MILLIGRAM(S): at 06:36

## 2021-08-25 RX ADMIN — Medication 5 MILLIGRAM(S): at 09:10

## 2021-08-25 RX ADMIN — Medication 30 MILLILITER(S): at 15:56

## 2021-08-25 RX ADMIN — HEPARIN SODIUM 800 UNIT(S)/HR: 5000 INJECTION INTRAVENOUS; SUBCUTANEOUS at 06:01

## 2021-08-25 RX ADMIN — SACUBITRIL AND VALSARTAN 1 TABLET(S): 24; 26 TABLET, FILM COATED ORAL at 17:54

## 2021-08-25 RX ADMIN — HEPARIN SODIUM 1000 UNIT(S)/HR: 5000 INJECTION INTRAVENOUS; SUBCUTANEOUS at 15:26

## 2021-08-25 RX ADMIN — ANASTROZOLE 1 MILLIGRAM(S): 1 TABLET ORAL at 11:38

## 2021-08-25 RX ADMIN — SACUBITRIL AND VALSARTAN 1 TABLET(S): 24; 26 TABLET, FILM COATED ORAL at 06:36

## 2021-08-25 NOTE — PROVIDER CONTACT NOTE (CRITICAL VALUE NOTIFICATION) - RECOMMENDATIONS
Follow orders as per full anticoagulation nonogram
Continue to monitor the patient
Follow full anticoagulation nonogram as ordered

## 2021-08-25 NOTE — PROVIDER CONTACT NOTE (CRITICAL VALUE NOTIFICATION) - ASSESSMENT
Patient is stable and shows no signs of bleeding

## 2021-08-25 NOTE — PROGRESS NOTE ADULT - PROBLEM SELECTOR PLAN 1
2/2 at least submassive saddle pulmonary embolus w/o evidence of RH strain per CTPA and TTE. B/l nonocclusive DVTs. DDimer~7400H. ProBNP~71245F->38663G. ABG pH 7.49H, pCO2 22L, SaO2 99.2H. Got COVID19 vaccine Pfizer/Moderna not J&J. IVC filter can be thrombogenic.  Less likely pulm acute infection or COPD exacerbation (no reported change in cough or sputum in the past month, and CXR unremarkable). Unlikely HFrEF exacerbation despite high proBNP (has been that elevated since 8/2020) and HsenTnT (likely 2/2 demand ischemia), given no JVD, peripheral edema, orthopnea, or other suggestive S/S.   - heparin bridge to warfarin because LV thrombus  - appreciate Card recs: no indication for catheter-directed thrombolysis  - f/u ambulator SpO2  - f/u BLE Duplex  - goal SpO2 88-92% for COPD  - continuous pulse ox  - consult Heme regarding hypercoagulability w/u, anticoag, and cancer w/u  - consider pan CT Con to r/o malignancy when renal function improves 2/2 at least submassive saddle pulmonary embolus w/o evidence of RH strain per CTPA and TTE. B/l nonocclusive DVTs. DDimer~7400H. ProBNP~57217I->53258P. ABG pH 7.49H, pCO2 22L, SaO2 99.2H. Got COVID19 vaccine Pfizer/Moderna not J&J. IVC filter can be thrombogenic. Has h/o breast cancer with increasing alk phos.  SOB less likely 2/2 pulm acute infection or COPD exacerbation (no reported change in cough or sputum in the past month, and CXR unremarkable). Unlikely HFrEF exacerbation despite high proBNP (has been that elevated since 8/2020) and HsenTnT (likely 2/2 demand ischemia), given no JVD, peripheral edema, orthopnea, or other suggestive S/S.   - heparin bridge to warfarin because LV thrombus  - appreciate Card recs: no indication for catheter-directed thrombolysis  - f/u ambulator SpO2  - f/u BLE Duplex  - goal SpO2 88-92% for COPD  - continuous pulse ox  - consult Heme regarding hypercoagulability w/u, anticoag, and cancer w/u  - consider pan CT Con to r/o malignancy when renal function improves 2/2 at least submassive saddle pulmonary embolus w/o evidence of RH strain per CTPA and TTE. B/l nonocclusive DVTs. DDimer~7400H. ProBNP~18769A->84919N. ABG pH 7.49H, pCO2 22L, SaO2 99.2H. Got COVID19 vaccine Pfizer/Moderna not J&J. IVC filter can be thrombogenic. Has h/o breast cancer with increasing alk phos.  SOB less likely 2/2 pulm acute infection or COPD exacerbation (no reported change in cough or sputum in the past month, and CXR unremarkable). Unlikely HFrEF exacerbation despite high proBNP (has been that elevated since 8/2020) and HsenTnT (likely 2/2 demand ischemia), given no JVD, peripheral edema, orthopnea, or other suggestive S/S.   - heparin bridge to warfarin because LV thrombus; start warfarin when INR<2  - appreciate Card recs: no indication for catheter-directed thrombolysis  - f/u ambulator SpO2  - f/u BLE Duplex  - goal SpO2 88-92% for COPD  - continuous pulse ox  - appreciate Heme recs: f/u APLS labs (lupus anticoagulant, B2 glycoprotein, anticardiolipin), no genetic/functional thrombophilia, agree w/ heparin gtt bridge to warfarin, outpt cancer screening  - consider pan CT Con to r/o malignancy when renal function improves 2/2 at least submassive saddle pulmonary embolus w/o evidence of RH strain per CTPA and TTE. B/l nonocclusive DVTs. DDimer~7400H. ProBNP~39924U->32749H. ABG pH 7.49H, pCO2 22L, SaO2 99.2H. Got COVID19 vaccine Pfizer/Moderna not J&J. IVC filter can be thrombogenic. Has h/o breast cancer with increasing alk phos.  SOB less likely 2/2 pulm acute infection or COPD exacerbation (no reported change in cough or sputum in the past month, and CXR unremarkable). Unlikely HFrEF exacerbation despite high proBNP (has been that elevated since 8/2020) and HsenTnT (likely 2/2 demand ischemia), given no JVD, peripheral edema, orthopnea, or other suggestive S/S.   - heparin bridge to warfarin because LV thrombus; start warfarin when INR<2  - appreciate Card recs: no indication for catheter-directed thrombolysis  - goal SpO2 88-92% for COPD  - continuous pulse ox  - f/u CT AP IV Con  - appreciate Heme recs: f/u APLS labs (lupus anticoagulant, B2 glycoprotein, anticardiolipin), no genetic/functional thrombophilia, agree w/ heparin gtt bridge to warfarin, outpt cancer screening

## 2021-08-25 NOTE — PROVIDER CONTACT NOTE (CRITICAL VALUE NOTIFICATION) - ACTION/TREATMENT ORDERED:
As per HS #5 Anny, will continue to monitor pt and follow full anticoagulation nonogram
As per HS #5 Anny, continue to monitor patient and follow orders as per full anticoagulation nonogram
As per HS #5 Anny Oh, will continue to monitor patient and follow full anticoagulation nonogram as ordered

## 2021-08-25 NOTE — PROGRESS NOTE ADULT - SUBJECTIVE AND OBJECTIVE BOX
Resident: Bisi Velásquez, PGY1, Medicine, Pager [phone]) [phone] ([facility])    24-Hour Events and Subjective:  - tele: few beats vtach asymptomatic, otherwise sinus  - pain with breathing -> acetaminophen x1  - denies chest pain, palpitations, dizziness  - dyspnea intermittently   - loose BM every AM  - walked to bathroom with help this AM    Hospital Meds:  MEDICATIONS  (STANDING):  anastrozole 1 milliGRAM(s) Oral daily  heparin  Infusion.  Unit(s)/Hr (14 mL/Hr) IV Continuous <Continuous>  hydrALAZINE 5 milliGRAM(s) Oral every 8 hours  metoprolol succinate ER 25 milliGRAM(s) Oral daily  sacubitril 49 mG/valsartan 51 mG 1 Tablet(s) Oral two times a day  MEDICATIONS  (PRN):  acetaminophen   Tablet .. 650 milliGRAM(s) Oral every 6 hours PRN Mild Pain (1 - 3), Moderate Pain (4 - 6), Severe Pain (7 - 10)  aluminum hydroxide/magnesium hydroxide/simethicone Suspension 30 milliLiter(s) Oral every 6 hours PRN Dyspepsia  heparin   Injectable 6500 Unit(s) IV Push every 6 hours PRN For aPTT less than 40  heparin   Injectable 3000 Unit(s) IV Push every 6 hours PRN For aPTT between 40 - 57    Vitals:  T(F): 97.9 (25 Aug 2021 00:45), Max: 98.1 (24 Aug 2021 06:45)  HR: 84 (25 Aug 2021 00:45) (77 - 87)  BP: 135/109 (25 Aug 2021 00:45) (118/91 - 149/92)  RR: 17 (25 Aug 2021 00:45) (17 - 21)  SpO2: 99% (25 Aug 2021 00:45) (91% - 100%)  8/24 down to 91% while ambulating and 87% while at rest post ambulation.    I&O: 24 Aug 2021 07:01  -  25 Aug 2021 05:43  IN: 733 mL / OUT: 0 mL / NET: 733 mL    Physical Exam:  Con: NAD  HEENT: NCAT, no conjunctival injection, no scleral icterus, strabismus, moist oral mucosa  Neck: JVD  Resp: normal WOB at rest, CTAB  CV: RRR, physiologic S1 and S2, no murmurs, no rubs, no gallops, 1+ radial pulses  Abd: nondistended, normoactive bowel sounds, soft, RUQ tenderness, no rebound, no involuntary guarding, no organomegaly  Ext: no clubbing, warm hands, no edema  Neuro: alert, A&Ox3  Psych: appropriate mood and affect    Labs:                        11.0L   7.44  )-----------( 191      ( 24 Aug 2021 03:56 )             35.0              08-24  142  |  109<H>  |  34<H>  ----------------------------<  140<H>  3.4<L repleted>   |  17<L>  |  1.39<H>  eGFR 45L  Ca    9.1      24 Aug 2021 03:56    TPro  7.1  /  Alb  3.6  /  TBili  0.7  /  DBili  x   /  AST  20  /  ALT  13  /  AlkPhos  186<H>  08-24    Studies and Radiology:     TTE 8/23: Mod-severe MR. Severe global LV systolic dysfunction. A filling defect is seen and is consistent with an LV apical thrombus. Decreased RV systolic function.     BLE Duplex 8/24:   R: Nonocclusive thrombus in the right popliteal and posterior tibial veins. Normal compressibility of the right common femoral and femoral veins. Right peroneal veins not visualized.  L: Nonocclusive thrombus in the left popliteal vein. Normal compressibility of the right common femoral and femoral veins. Posterior tibial and peroneal veins are patent. Resident: Bisi Velásquez, PGY1, Medicine, Pager [phone]) [phone] ([facility])    24-Hour Events and Subjective:  - tele: 21 beats AIVR asymptomatic, otherwise sinus  - melatonin for agitation/anxiety  - didn't sleep but thought she did  - denies chest, muscle, and bone pain  - SOB with comfort NC that I removed    Hospital Meds:  MEDICATIONS  (STANDING):  anastrozole 1 milliGRAM(s) Oral daily  heparin  Infusion.  Unit(s)/Hr (14 mL/Hr) IV Continuous <Continuous>  hydrALAZINE 5 milliGRAM(s) Oral every 8 hours  metoprolol succinate ER 25 milliGRAM(s) Oral daily  sacubitril 49 mG/valsartan 51 mG 1 Tablet(s) Oral two times a day  MEDICATIONS  (PRN):  acetaminophen   Tablet .. 650 milliGRAM(s) Oral every 6 hours PRN Mild Pain (1 - 3), Moderate Pain (4 - 6), Severe Pain (7 - 10)  aluminum hydroxide/magnesium hydroxide/simethicone Suspension 30 milliLiter(s) Oral every 6 hours PRN Dyspepsia  heparin   Injectable 6500 Unit(s) IV Push every 6 hours PRN For aPTT less than 40  heparin   Injectable 3000 Unit(s) IV Push every 6 hours PRN For aPTT between 40 - 57    Vitals:  T(F): 97.9 (25 Aug 2021 00:45), Max: 98.1 (24 Aug 2021 06:45)  HR: 84 (25 Aug 2021 00:45) (77 - 87)  BP: 135/109 (25 Aug 2021 00:45) (118/91 - 149/92)  RR: 17 (25 Aug 2021 00:45) (17 - 21)  SpO2: 99% (25 Aug 2021 00:45) (91% - 100%)  8/24 down to 91% while ambulating and 87% while at rest post ambulation.  8/25 down to 81% at rest that resolved on RA    I&O: 24 Aug 2021 07:01  -  25 Aug 2021 05:43  IN: 733 mL / OUT: 0 mL / NET: 733 mL    Physical Exam:  Con: NAD  HEENT: NCAT, no conjunctival injection, no scleral icterus, strabismus, moist oral mucosa  Neck: JVD  Resp: normal WOB at rest, CTAB  CV: RRR, physiologic S1 and S2, no murmurs, no rubs, no gallops, 1+ radial pulses  Abd: nondistended, normoactive bowel sounds, soft, RUQ tenderness, no rebound, no involuntary guarding, no organomegaly  Ext: no clubbing, warm hands, no edema  Neuro: alert, A&Ox3  Psych: appropriate mood and affect    Labs:                         11.6   7.47  )-----------( 233      ( 25 Aug 2021 05:15 )             36.2                        11.0L   7.44  )-----------( 191      ( 24 Aug 2021 03:56 )             35.0     PT/INR - ( 25 Aug 2021 05:27 )   PT: 24.1 sec;   INR: 2.17  PTT - ( 25 Aug 2021 05:15 )  PTT:113.8 sec             08-25  144  |  108<H>  |  33<H>  ----------------------------<  162<H>  4.3   |  17<L>  |  1.43<H>  eGFR 44L  Ca    9.5      25 Aug 2021 05:15  Phos  3.0     08-25  Mg     2.40     08-25 08-24  142  |  109<H>  |  34<H>  ----------------------------<  140<H>  3.4<L repleted>   |  17<L>  |  1.39<H>  eGFR 45L  Ca    9.1      24 Aug 2021 03:56    TPro  7.6  /  Alb  3.9  /  TBili  0.8  /  DBili  x   /  AST  21  /  ALT  17  /  AlkPhos  217<H>  08-25  TPro  7.1  /  Alb  3.6  /  TBili  0.7  /  DBili  x   /  AST  20  /  ALT  13  /  AlkPhos  186<H>  08-24    Studies and Radiology:     TTE 8/23: Mod-severe MR. Severe global LV systolic dysfunction. A filling defect is seen and is consistent with an LV apical thrombus. Decreased RV systolic function.     BLE Duplex 8/24:   R: Nonocclusive thrombus in the right popliteal and posterior tibial veins. Normal compressibility of the right common femoral and femoral veins. Right peroneal veins not visualized.  L: Nonocclusive thrombus in the left popliteal vein. Normal compressibility of the right common femoral and femoral veins. Posterior tibial and peroneal veins are patent.

## 2021-08-25 NOTE — CHART NOTE - NSCHARTNOTEFT_GEN_A_CORE
Patient seen and evaluated at bedside for agitation/anxiety. Patient startled very easily and unable to sleep. She reports being very anxious and scared about being in the hospital and starting new medications. She was redirectable and not violent with staff. Explained I would give Melatonin to help her sleep, and ordered 0.5mg Ativan as one time PRN for worsening agitation/anxiety.

## 2021-08-25 NOTE — PROGRESS NOTE ADULT - PROBLEM SELECTOR PLAN 9
- appreciate PT rec: home with home PT  - DASH/TLC diet  - consulted SW - appreciate PT rec: home with home PT  - DASH/TLC diet  - consulted SW  - power of  Jaki [phone] or [phone] in [place] - appreciate PT rec: home with home PT  - DASH/TLC diet  - consulted SW  - power of arash Pollack [phone] or [phone] in [place]  - PCP/Alex Figueroa [phone] - appreciate PT rec: home with home PT, consider reconsult closer to discharge  - DASH/TLC diet  - consulted SW  - power of  Jaki [phone] or [phone] in [place]  - PCP/Alex Figueroa [phone]

## 2021-08-25 NOTE — PROGRESS NOTE ADULT - PROBLEM SELECTOR PLAN 8
No outpt oncologist besides PCP Huyen Figueroa at [facility].  - home anastrozole No outpt oncologist besides PCP/Endo Huyen Figueroa [phone].  - home anastrozole

## 2021-08-25 NOTE — PROGRESS NOTE ADULT - PROBLEM SELECTOR PLAN 2
Seen in LV on TTE 8/23. Maybe 2/2 low LVEF. No h/o afib/aflutter.  - heparin bridge to warfarin Seen in LV on TTE 8/23. Maybe 2/2 low LVEF. No h/o afib/aflutter.  - heparin bridge to warfarin; start warfarin when INR<2

## 2021-08-25 NOTE — PROVIDER CONTACT NOTE (OTHER) - SITUATION
Pt had 4 beats of VTACH on Tele; /95; HR 79; Pt asymptomatic.  Pt's aPTT 44.7; Bolus given and new rate of Heparin 10gtt/hr.

## 2021-08-25 NOTE — CONSULT NOTE ADULT - ATTENDING COMMENTS
The patient was seen and examined with the Cardiology Consultation Teaching Service.     She is a 66-year-old woman with chronic heart failure with severely reduced LV function and ICD implantation, COPD, carotid and infrarenal aneurysms, and breast cancer who presented with acute worsening of her chronic dyspnea found to have a saddle PE. PERT called.     The patient remains dyspneic but reported feeling better on supplemental oxygen.   Her oxygen saturation was stable without supplemental oxygen.   Hemodynamics were stable.    Echocardiography was pending at the time of our interview.   We were later called and notified LV thrombus was seen. No RV enlargement or strain noted.    This case was discussed with interventional cardiology. No invasive management was indicated at this time. The patient's old IVC filter may also make performing such a procedure more challenging.     Continue anticoagulation as noted. Will need to work the patient up for recurrence of malignancy.   Continue current cardiac medications as noted.    Cuba Guerrier MD  Cardiology  [phone]
Patient seen and examined, elements of the history reviewed in detail. Films reviewed. Apical thrombus is greatest concern. I agree with the assessment and plan as in Dr Lee' note. Discussed with team, including Nakul Diallo, MS-3.

## 2021-08-25 NOTE — CONSULT NOTE ADULT - SUBJECTIVE AND OBJECTIVE BOX
Hematology Consult Note    HPI: 66F with COPD/emphysema, DVT L leg 1994 with [place] IVC filter, HTN, HLD, infrarenal abd aneurysm, internal carotid aneurysm, depression, R breast cancer s/p chemo and radiation, cardiomyopathy, HFrEF with SICD, sickle cell trait, uterine fibroids s/p hysterectomy, s/p cholecystectomy, former smoker (quit 2 years ago).    Presents to ED with SOB h2pmwdc. Describes it as pain in airways. Unable to describe when it happens and what exacerbates/alleviates it. Also endorses dry cough x3w, and no changes in sputum productions. Also endorses diarrhea for a few days, described as soft stools in the morning and sometimes in the PM after eating. Also endorses weight loss and chronic L knee pain. Denies fevers/chills, chest pain, palpitations, reflux, abd pain, nausea, emesis, leg swelling, and dysuria. Hasn't used alcohol since at least 10/2020.  Reports transferring between nursing/rehab facilities since her last hospital discharge in 10/2020, most recently leaving [facility] on 7/31/21 after ~4months. 6 months ago at a nursing rehab, she reports cutting out the meds that made her "sick" (ie nausea and emesis). Currently lives at home alone. Enjoys sitting in front of the TV all day. Has a walker that she doesn't use. Hired an aide who helps cook, wash her, but not with meds. PCP is Dr. Huyen Figueroa at [facility].     Hematology consulted given presence of saddle thrombus, bilateral PEs; patient also found to have apical LV thrombus on echocardiogram.    ED course:   BP 140s / 100s-110s.   DDimer~7400H. ProBNP~49593K. HsenTnT 55H. HyperNa 147H. Cr 1.72H and eGFR 30-35L. Lactate and CBC unremarkable.   S/p furosemide 20mg IV x1  (21 Aug 2021 14:24)      PAST MEDICAL & SURGICAL HISTORY:  Hypertension    Breast cancer  right-s/p chemo and radiation    Hypercholesteremia    DVT (deep venous thrombosis)  Left leg in 1994 after hysterectomy    Sickle cell trait    Cardiomyopathy    Abdominal aneurysm  infrarenal- wached by Vascular , Dr. Gutierrez    Internal carotid aneurysm    Knee effusion, left  arthrocentesis- 05/25/16    Fibroid uterus  sp hystrectomy 1196    S/P lumpectomy, right breast  2014, revision of position- 2015    [place] filter in place  1994    S/P hysterectomy  1994    Breast cancer  infusaport insertion- 2014- left chest        FAMILY HISTORY:  Family history of hypertension    Family history of diabetes mellitus    Family history of renal stone    Family history of glioblastoma    Family history of liver failure    History of hypertension  mother    Family history of glioblastoma (Sibling)    Family history of renal failure  uncle    Family history of other condition  please open box and see additional medical family history    Family history of glioblastoma        MEDICATIONS  (STANDING):  anastrozole 1 milliGRAM(s) Oral daily  heparin  Infusion.  Unit(s)/Hr (14 mL/Hr) IV Continuous <Continuous>  hydrALAZINE 5 milliGRAM(s) Oral every 8 hours  metoprolol succinate ER 25 milliGRAM(s) Oral daily  sacubitril 49 mG/valsartan 51 mG 1 Tablet(s) Oral two times a day    MEDICATIONS  (PRN):  acetaminophen   Tablet .. 650 milliGRAM(s) Oral every 6 hours PRN Mild Pain (1 - 3), Moderate Pain (4 - 6), Severe Pain (7 - 10)  aluminum hydroxide/magnesium hydroxide/simethicone Suspension 30 milliLiter(s) Oral every 6 hours PRN Dyspepsia  heparin   Injectable 6500 Unit(s) IV Push every 6 hours PRN For aPTT less than 40  heparin   Injectable 3000 Unit(s) IV Push every 6 hours PRN For aPTT between 40 - 57      Allergies    Advil (Hives; Vomiting)    Intolerances        SOCIAL HISTORY: No EtOH, no tobacco    REVIEW OF SYSTEMS:    CONSTITUTIONAL: No weakness, fevers or chills  EYES/ENT: No visual changes;  No vertigo or throat pain   NECK: No pain or stiffness  RESPIRATORY: No cough, wheezing, hemoptysis; No shortness of breath  CARDIOVASCULAR: No chest pain or palpitations  GASTROINTESTINAL: No abdominal or epigastric pain. No nausea, vomiting, or hematemesis; No diarrhea or constipation. No melena or hematochezia.  GENITOURINARY: No dysuria, frequency or hematuria  NEUROLOGICAL: No numbness or weakness  SKIN: No itching, burning, rashes, or lesions   All other review of systems is negative unless indicated above.        T(F): 97.3 (08-25-21 @ 09:07), Max: 98.1 (08-25-21 @ 06:34)  HR: 82 (08-25-21 @ 09:07)  BP: 134/105 (08-25-21 @ 09:07)  RR: 18 (08-25-21 @ 09:07)  SpO2: 100% (08-25-21 @ 09:07)  Wt(kg): --    GENERAL: NAD, well-developed  HEAD:  Atraumatic, Normocephalic  EYES: EOMI, PERRLA, conjunctiva and sclera clear  NECK: Supple, No JVD  CHEST/LUNG: Clear to auscultation bilaterally; No wheeze  HEART: Regular rate and rhythm; No murmurs, rubs, or gallops  ABDOMEN: Soft, Nontender, Nondistended; Bowel sounds present  EXTREMITIES:  2+ Peripheral Pulses, No clubbing, cyanosis, or edema  NEUROLOGY: non-focal  SKIN: No rashes or lesions                          11.6   7.47  )-----------( 233      ( 25 Aug 2021 05:15 )             36.2       08-25    144  |  108<H>  |  33<H>  ----------------------------<  162<H>  4.3   |  17<L>  |  1.43<H>    Ca    9.5      25 Aug 2021 05:15  Phos  3.0     08-25  Mg     2.40     08-25    TPro  7.6  /  Alb  3.9  /  TBili  0.8  /  DBili  x   /  AST  21  /  ALT  17  /  AlkPhos  217<H>  08-25      Magnesium, Serum: 2.40 mg/dL (08-25 @ 05:15)  Phosphorus Level, Serum: 3.0 mg/dL (08-25 @ 05:15)      < from: CT Angio Chest PE Protocol w/ IV Cont (08.22.21 @ 08:56) >    EXAM:  CT ANGIO CHEST PULM ART WAWIC      PROCEDURE DATE:  Aug 22 2021     INTERPRETATION:  CLINICAL INFORMATION: Elevated d-dimer. Evaluate for pulmonary embolism. Breast cancer, shortness of breath    COMPARISON: CT chest 10/17/2020. CT chest 8/27/2020. CT chest 2/21/2019.    CONTRAST/COMPLICATIONS:  IV Contrast: Omnipaque 350  90 cc administered   10 cc discarded  Oral Contrast: NONE  Complications: None reported at time of study completion    PROCEDURE:  CT Angiography of the Chest.  Sagittal and coronal reformats were performed as well as 3D (MIP) reconstructions.    FINDINGS:    LUNGS AND AIRWAYS: Patent central airways.  Stable atelectasis along the descending thoracic aorta within the left lower lobe, unchanged from prior. Thelungs are otherwise clear.  PLEURA: No pleural effusion.  MEDIASTINUM AND LATONIA: No lymphadenopathy.  VESSELS: Saddle pulmonary embolus with extension into the lobar branches of the pulmonary arteries. The descending thoracic aorta at the level of thehiatus is dilated measuring 4.5 cm, unchanged. Thrombosed intramural hematoma is unchanged.  HEART: Cardiomegaly. No evidence of right heart strain. No pericardial effusion.  CHEST WALL AND LOWER NECK: Right lateral breast mass measuring 2.0 x 1.7 cmappears unchanged from 2/21/2019. External defibrillator in place within the left chest wall with lead within the anterior subcutaneous tissues.  VISUALIZED UPPER ABDOMEN: Status post cholecystectomy.  BONES: Within normal limits.    IMPRESSION:    Saddle pulmonary embolus.    Cardiomegaly. No evidence of right heart strain.    These findings were discussed with Dr. LESLI LAMB, on 8/22/2021 10:16 AM with read back.    --- End of Report ---    JACKIE MCKINNEY MD; Resident Radiology  Thisdocument has been electronically signed.  EVELINE MARTINEZ MD; Attending Radiologist  This document has been electronically signed. Aug 22 2021 10:17AM    < end of copied text >  < from: US Duplex Venous Lower Ext Complete, Bilateral (08.24.21 @ 14:37) >    EXAM:  US DPLX LWR EXT VEINS COMPL BI      PROCEDURE DATE:  Aug 24 2021     INTERPRETATION:  CLINICAL INFORMATION: Saddle pulmonary embolism on CT dated 8/22/2021. History of prior DVT.    COMPARISON: Ultrasound venous duplex of the bilaterallower extremities 9/26/2020.    TECHNIQUE: Duplex sonography of the BILATERAL LOWER extremity veins with color and spectral Doppler, with and without compression.    FINDINGS:    RIGHT:  Nonocclusive thrombus in the right popliteal and posterior tibial veins. Normal compressibility of the right common femoral and femoral veins. Right peroneal veins not visualized.    LEFT:  Nonocclusive thrombus in the left popliteal vein. Normal compressibility of the right common femoral and femoral veins. Posterior tibial and peroneal veins are patent.    IMPRESSION:  Acute deep venous thrombosis in the bilateral lower extremities as above.    Findings were discussed with Dr. Mead on 8/24/2021 at 3:00 PM by Dr. Lott with read back confirmation.    --- End of Report ---    ELISSA LOTT MD; Resident Radiologist  This document has been electronically signed.  ROBERTO SMILEY MD; Attending Radiologist  This document has been electronically signed. Aug 24 2021  3:28PM    < end of copied text >  < from: TTE with Doppler (w/Cont) (08.23.21 @ 15:49) >    Patient name: RISHABH HUNT  YOB: 1955   Age: 66 (F)   MR#: [id number]  Study Date: 8/23/2021  [place] [facility] [place]ographer: STEVEN Alvarado  Study quality: Technically good  Referring Physician: Luis Carlos Carrillo MD  Blood Pressure: 127/93 mmHg  Height: 170 cm  Weight: 77 kg  BSA: 1.9 m2  ------------------------------------------------------------------------  PROCEDURE: Transthoracic echocardiogram with 2-D, M-Mode  and complete spectral and color flow Doppler.  Intravenous ultrasound enhancing agent was administered for  improved left ventricular endocardial border definition.  Following the intravenous injection of ultrasound enhancing  agent, harmonic imaging was performed.  INDICATION: Other pulmonary embolism withoutacute cor  pulmonale (I26.99)  ------------------------------------------------------------------------  DIMENSIONS:  Dimensions:     Normal Values:  LA:     3.7 cm    2.0 - 4.0 cm  Ao:     3.2 cm    2.0 - 3.8 cm  SEPTUM: 0.8 cm    0.6 - 1.2 cm  PWT:   0.8 cm    0.6 - 1.1 cm  LVIDd:  5.7 cm    3.0 - 5.6 cm  LVIDs:  5.4 cm    1.8 - 4.0 cm  Derived Variables:  LVMI: 91 g/m2  RWT: 0.28  Fractional short: 5 %  Ejection Fraction (Teicholtz): 12 %  ------------------------------------------------------------------------  OBSERVATIONS:  Mitral Valve: Mitral annular calcification, otherwise  normal mitral valve. Moderate-severe mitral regurgitation.  Aortic Root: Normal aortic root.  Aortic Valve: Calcified trileaflet aortic valve with normal  opening.  Left Atrium: Normal left atrium.  LA volume index = 32  cc/m2.  Left Ventricle: Severe global left ventricular systolic  dysfunction.  Endocardial visualization enhanced with  intravenous injection of echo contrast (Definity).  A  filling defect is seen and is consistent with an LV apical  thrombus. Normal left ventricular internal dimensions and  wall thicknesses.  Right Heart: Normal right atrium. Normal right ventricular  size with decreased right ventricular systolic function.  Normal tricuspid valve. Mild tricuspid regurgitation.  Normal pulmonic valve.  Mild pulmonic regurgitation.  Pericardium/PleuraNormal pericardium with no pericardial  effusion.  Hemodynamic: Inadequate tricuspid regurgitation Doppler  envelope precludes estimation of RVSP.  ------------------------------------------------------------------------  CONCLUSIONS:  1. Mitral annular calcification, otherwise normal mitral  valve. Moderate-severe mitral regurgitation.  2. Normal left ventricular internal dimensions and wall  thicknesses.  3. Severe global left ventricular systolic dysfunction.  Endocardial visualization enhanced with intravenous  injection of echo contrast (Definity).  A filling defect is  seen and is consistent with an LV apical thrombus.  4.Normal right ventricular size with decreased right  ventricular systolic function.  5. Inadequate tricuspid regurgitation Doppler envelope  precludes estimation of RVSP.  *** No previous Echo exam.  ------------------------------------------------------------------------  Confirmed on  8/23/2021 - 17:22:57 by Adonay Phillip M.D.,  [facility], [place]  ------------------------------------------------------------------------    < end of copied text >   Hematology Consult Note    HPI: 66F with COPD/emphysema, DVT L leg 1994 with [place] IVC filter, HTN, HLD, infrarenal abd aneurysm, internal carotid aneurysm, depression, R breast cancer s/p chemo and radiation, cardiomyopathy, HFrEF with SICD, sickle cell trait, uterine fibroids s/p hysterectomy, s/p cholecystectomy, former smoker (quit 2 years ago).    Presents to ED with SOB [id number]. Describes it as pain in airways. Unable to describe when it happens and what exacerbates/alleviates it. Also endorses dry cough x3w, and no changes in sputum productions. Also endorses diarrhea for a few days, described as soft stools in the morning and sometimes in the PM after eating. Also endorses weight loss and chronic L knee pain. Denies fevers/chills, chest pain, palpitations, reflux, abd pain, nausea, emesis, leg swelling, and dysuria. Hasn't used alcohol since at least 10/2020.  Reports transferring between nursing/rehab facilities since her last hospital discharge in 10/2020, most recently leaving [facility] on 7/31/21 after ~4months. 6 months ago at a nursing rehab, she reports cutting out the meds that made her "sick" (ie nausea and emesis). Currently lives at home alone. Enjoys sitting in front of the TV all day. Has a walker that she doesn't use. Hired an aide who helps cook, wash her, but not with meds. PCP is Dr. Huyen Figueroa at [facility].     Hematology consulted given presence of saddle thrombus, bilateral PEs; patient also found to have apical LV thrombus on echocardiogram.    ED course:   BP 140s / 100s-110s.   DDimer~7400H. ProBNP~95903O. HsenTnT 55H. HyperNa 147H. Cr 1.72H and eGFR 30-35L. Lactate and CBC unremarkable.   S/p furosemide 20mg IV x1  (21 Aug 2021 14:24)      PAST MEDICAL & SURGICAL HISTORY:  Hypertension    Breast cancer  right-s/p chemo and radiation    Hypercholesteremia    DVT (deep venous thrombosis)  Left leg in 1994 after hysterectomy    Sickle cell trait    Cardiomyopathy    Abdominal aneurysm  infrarenal- wached by Vascular , Dr. Gutierrez    Internal carotid aneurysm    Knee effusion, left  arthrocentesis- 05/25/16    Fibroid uterus  sp hystrectomy 1196    S/P lumpectomy, right breast  2014, revision of position- 2015    [place] filter in place  1994    S/P hysterectomy  1994    Breast cancer  infusaport insertion- 2014- left chest        FAMILY HISTORY:  Family history of hypertension    Family history of diabetes mellitus    Family history of renal stone    Family history of glioblastoma    Family history of liver failure    History of hypertension  mother    Family history of glioblastoma (Sibling)    Family history of renal failure  uncle    Family history of other condition  please open box and see additional medical family history    Family history of glioblastoma        MEDICATIONS  (STANDING):  anastrozole 1 milliGRAM(s) Oral daily  heparin  Infusion.  Unit(s)/Hr (14 mL/Hr) IV Continuous <Continuous>  hydrALAZINE 5 milliGRAM(s) Oral every 8 hours  metoprolol succinate ER 25 milliGRAM(s) Oral daily  sacubitril 49 mG/valsartan 51 mG 1 Tablet(s) Oral two times a day    MEDICATIONS  (PRN):  acetaminophen   Tablet .. 650 milliGRAM(s) Oral every 6 hours PRN Mild Pain (1 - 3), Moderate Pain (4 - 6), Severe Pain (7 - 10)  aluminum hydroxide/magnesium hydroxide/simethicone Suspension 30 milliLiter(s) Oral every 6 hours PRN Dyspepsia  heparin   Injectable 6500 Unit(s) IV Push every 6 hours PRN For aPTT less than 40  heparin   Injectable 3000 Unit(s) IV Push every 6 hours PRN For aPTT between 40 - 57      Allergies:     Advil (Hives; Vomiting)    Intolerances        SOCIAL HISTORY: No EtOH, no tobacco    REVIEW OF SYSTEMS:    CONSTITUTIONAL: No weakness, fevers or chills  EYES/ENT: No visual changes;  No vertigo or throat pain   NECK: No pain or stiffness  RESPIRATORY: No cough, wheezing, hemoptysis; No shortness of breath  CARDIOVASCULAR: No chest pain or palpitations  GASTROINTESTINAL: No abdominal or epigastric pain. No nausea, vomiting, or hematemesis; No diarrhea or constipation. No melena or hematochezia.  GENITOURINARY: No dysuria, frequency or hematuria  NEUROLOGICAL: No numbness or weakness  SKIN: No itching, burning, rashes, or lesions   All other review of systems is negative unless indicated above.        T(F): 97.3 (08-25-21 @ 09:07), Max: 98.1 (08-25-21 @ 06:34)  HR: 82 (08-25-21 @ 09:07)  BP: 134/105 (08-25-21 @ 09:07)  RR: 18 (08-25-21 @ 09:07)  SpO2: 100% (08-25-21 @ 09:07)  Wt(kg): --    GENERAL: NAD, well-developed  HEAD:  Atraumatic, Normocephalic  EYES: EOMI, PERRLA, conjunctiva and sclera clear  NECK: Supple, No JVD  CHEST/LUNG: Clear to auscultation bilaterally; No wheeze  HEART: Regular rate and rhythm; No murmurs, rubs, or gallops  ABDOMEN: Soft, Nontender, Nondistended; Bowel sounds present  EXTREMITIES:  2+ Peripheral Pulses, No clubbing, cyanosis, or edema  NEUROLOGY: non-focal  SKIN: No rashes or lesions                          11.6   7.47  )-----------( 233      ( 25 Aug 2021 05:15 )             36.2       08-25    144  |  108<H>  |  33<H>  ----------------------------<  162<H>  4.3   |  17<L>  |  1.43<H>    Ca    9.5      25 Aug 2021 05:15  Phos  3.0     08-25  Mg     2.40     08-25    TPro  7.6  /  Alb  3.9  /  TBili  0.8  /  DBili  x   /  AST  21  /  ALT  17  /  AlkPhos  217<H>  08-25      Magnesium, Serum: 2.40 mg/dL (08-25 @ 05:15)  Phosphorus Level, Serum: 3.0 mg/dL (08-25 @ 05:15)      < from: CT Angio Chest PE Protocol w/ IV Cont (08.22.21 @ 08:56) >    EXAM:  CT ANGIO CHEST PULM ART WAWIC      PROCEDURE DATE:  Aug 22 2021     INTERPRETATION:  CLINICAL INFORMATION: Elevated d-dimer. Evaluate for pulmonary embolism. Breast cancer, shortness of breath    COMPARISON: CT chest 10/17/2020. CT chest 8/27/2020. CT chest 2/21/2019.    CONTRAST/COMPLICATIONS:  IV Contrast: Omnipaque 350  90 cc administered   10 cc discarded  Oral Contrast: NONE  Complications: None reported at time of study completion    PROCEDURE:  CT Angiography of the Chest.  Sagittal and coronal reformats were performed as well as 3D (MIP) reconstructions.    FINDINGS:    LUNGS AND AIRWAYS: Patent central airways.  Stable atelectasis along the descending thoracic aorta within the left lower lobe, unchanged from prior. Thelungs are otherwise clear.  PLEURA: No pleural effusion.  MEDIASTINUM AND LATONIA: No lymphadenopathy.  VESSELS: Saddle pulmonary embolus with extension into the lobar branches of the pulmonary arteries. The descending thoracic aorta at the level of thehiatus is dilated measuring 4.5 cm, unchanged. Thrombosed intramural hematoma is unchanged.  HEART: Cardiomegaly. No evidence of right heart strain. No pericardial effusion.  CHEST WALL AND LOWER NECK: Right lateral breast mass measuring 2.0 x 1.7 cmappears unchanged from 2/21/2019. External defibrillator in place within the left chest wall with lead within the anterior subcutaneous tissues.  VISUALIZED UPPER ABDOMEN: Status post cholecystectomy.  BONES: Within normal limits.    IMPRESSION:    Saddle pulmonary embolus.    Cardiomegaly. No evidence of right heart strain.    These findings were discussed with Dr. LESLI LAMB, on 8/22/2021 10:16 AM with read back.    --- End of Report ---    JACKIE MCKINNEY MD; Resident Radiology  Thisdocument has been electronically signed.  EVELINE MARTINEZ MD; Attending Radiologist  This document has been electronically signed. Aug 22 2021 10:17AM    < end of copied text >  < from: US Duplex Venous Lower Ext Complete, Bilateral (08.24.21 @ 14:37) >    EXAM:  US DPLX LWR EXT VEINS COMPL BI      PROCEDURE DATE:  Aug 24 2021     INTERPRETATION:  CLINICAL INFORMATION: Saddle pulmonary embolism on CT dated 8/22/2021. History of prior DVT.    COMPARISON: Ultrasound venous duplex of the bilaterallower extremities 9/26/2020.    TECHNIQUE: Duplex sonography of the BILATERAL LOWER extremity veins with color and spectral Doppler, with and without compression.    FINDINGS:    RIGHT:  Nonocclusive thrombus in the right popliteal and posterior tibial veins. Normal compressibility of the right common femoral and femoral veins. Right peroneal veins not visualized.    LEFT:  Nonocclusive thrombus in the left popliteal vein. Normal compressibility of the right common femoral and femoral veins. Posterior tibial and peroneal veins are patent.    IMPRESSION:  Acute deep venous thrombosis in the bilateral lower extremities as above.    Findings were discussed with Dr. Mead on 8/24/2021 at 3:00 PM by Dr. Lott with read back confirmation.    --- End of Report ---    ELISSA LOTT MD; Resident Radiologist  This document has been electronically signed.  ROBERTO SMILEY MD; Attending Radiologist  This document has been electronically signed. Aug 24 2021  3:28PM    < end of copied text >  < from: TTE with Doppler (w/Cont) (08.23.21 @ 15:49) >    Patient name: RISHABH HUNT  YOB: 1955   Age: 66 (F)   MR#: [id number]  Study Date: 8/23/2021  Location: [facility] [place]***Sonographer: STEVEN Alvarado  Study quality: Technically good  Referring Physician: Luis Carlos Carrillo MD  Blood Pressure: 127/93 mmHg  Height: 170 cm  Weight: 77 kg  BSA: 1.9 m2  ------------------------------------------------------------------------  PROCEDURE: Transthoracic echocardiogram with 2-D, M-Mode  and complete spectral and color flow Doppler.  Intravenous ultrasound enhancing agent was administered for  improved left ventricular endocardial border definition.  Following the intravenous injection of ultrasound enhancing  agent, harmonic imaging was performed.  INDICATION: Other pulmonary embolism withoutacute cor  pulmonale (I26.99)  ------------------------------------------------------------------------  DIMENSIONS:  Dimensions:     Normal Values:  LA:     3.7 cm    2.0 - 4.0 cm  Ao:     3.2 cm    2.0 - 3.8 cm  SEPTUM: 0.8 cm    0.6 - 1.2 cm  PWT:   0.8 cm    0.6 - 1.1 cm  LVIDd:  5.7 cm    3.0 - 5.6 cm  LVIDs:  5.4 cm    1.8 - 4.0 cm  Derived Variables:  LVMI: 91 g/m2  RWT: 0.28  Fractional short: 5 %  Ejection Fraction (Teicholtz): 12 %  ------------------------------------------------------------------------  OBSERVATIONS:  Mitral Valve: Mitral annular calcification, otherwise  normal mitral valve. Moderate-severe mitral regurgitation.  Aortic Root: Normal aortic root.  Aortic Valve: Calcified trileaflet aortic valve with normal  opening.  Left Atrium: Normal left atrium.  LA volume index = 32  cc/m2.  Left Ventricle: Severe global left ventricular systolic  dysfunction.  Endocardial visualization enhanced with  intravenous injection of echo contrast (Definity).  A  filling defect is seen and is consistent with an LV apical  thrombus. Normal left ventricular internal dimensions and  wall thicknesses.  Right Heart: Normal right atrium. Normal right ventricular  size with decreased right ventricular systolic function.  Normal tricuspid valve. Mild tricuspid regurgitation.  Normal pulmonic valve.  Mild pulmonic regurgitation.  Pericardium/PleuraNormal pericardium with no pericardial  effusion.  Hemodynamic: Inadequate tricuspid regurgitation Doppler  envelope precludes estimation of RVSP.  ------------------------------------------------------------------------  CONCLUSIONS:  1. Mitral annular calcification, otherwise normal mitral  valve. Moderate-severe mitral regurgitation.  2. Normal left ventricular internal dimensions and wall  thicknesses.  3. Severe global left ventricular systolic dysfunction.  Endocardial visualization enhanced with intravenous  injection of echo contrast (Definity).  A filling defect is  seen and is consistent with an LV apical thrombus.  4.Normal right ventricular size with decreased right  ventricular systolic function.  5. Inadequate tricuspid regurgitation Doppler envelope  precludes estimation of RVSP.  *** No previous Echo exam.  ------------------------------------------------------------------------  Confirmed on  8/23/2021 - 17:22:57 by Adonay Phillip M.D.,  [facility], [place]  ------------------------------------------------------------------------    < end of copied text >   Hematology Consult Note    HPI: 66F with COPD/emphysema, DVT L leg 1994 with [place] IVC filter, HTN, HLD, infrarenal abd aneurysm, internal carotid aneurysm, depression, R breast cancer s/p chemo and radiation, cardiomyopathy, HFrEF with SICD, sickle cell trait, uterine fibroids s/p hysterectomy, s/p cholecystectomy, former smoker (quit 2 years ago).    Presents to ED with SOB [id number]. Describes it as pain in airways. Unable to describe when it happens and what exacerbates/alleviates it. Also endorses dry cough x3w, and no changes in sputum productions. Also endorses diarrhea for a few days, described as soft stools in the morning and sometimes in the PM after eating. Also endorses weight loss and chronic L knee pain. Denies fevers/chills, chest pain, palpitations, reflux, abd pain, nausea, emesis, leg swelling, and dysuria. Hasn't used alcohol since at least 10/2020.  Reports transferring between nursing/rehab facilities since her last hospital discharge in 10/2020, most recently leaving [facility] on 7/31/21 after ~4months. 6 months ago at a nursing rehab, she reports cutting out the meds that made her "sick" (ie nausea and emesis). Currently lives at home alone. Enjoys sitting in front of the TV all day. Has a walker that she doesn't use. Hired an aide who helps cook, wash her, but not with meds. PCP is Dr. Huyen Figueroa at [facility].     Hematology consulted given presence of saddle thrombus, bilateral PEs; patient also found to have apical LV thrombus on echocardiogram.    ED course:   BP 140s / 100s-110s.   DDimer~7400H. ProBNP~34405K. HsenTnT 55H. HyperNa 147H. Cr 1.72H and eGFR 30-35L. Lactate and CBC unremarkable.   S/p furosemide 20mg IV x1  (21 Aug 2021 14:24)      PAST MEDICAL & SURGICAL HISTORY:  Hypertension    Breast cancer  right-s/p chemo and radiation    Hypercholesteremia    DVT (deep venous thrombosis)  Left leg in 1994 after hysterectomy    Sickle cell trait    Cardiomyopathy    Abdominal aneurysm  infrarenal- wached by Vascular , Dr. Gutierrez    Internal carotid aneurysm    Knee effusion, left  arthrocentesis- 05/25/16    Fibroid uterus  sp hystrectomy 1196    S/P lumpectomy, right breast  2014, revision of position- 2015    [place] filter in place  1994    S/P hysterectomy  1994    Breast cancer  infusaport insertion- 2014- left chest        FAMILY HISTORY:  Family history of hypertension    Family history of diabetes mellitus    Family history of renal stone    Family history of glioblastoma    Family history of liver failure    History of hypertension  mother    Family history of glioblastoma (Sibling)    Family history of renal failure  uncle    Family history of other condition  please open box and see additional medical family history    Family history of glioblastoma        MEDICATIONS  (STANDING):  anastrozole 1 milliGRAM(s) Oral daily  heparin  Infusion.  Unit(s)/Hr (14 mL/Hr) IV Continuous <Continuous>  hydrALAZINE 5 milliGRAM(s) Oral every 8 hours  metoprolol succinate ER 25 milliGRAM(s) Oral daily  sacubitril 49 mG/valsartan 51 mG 1 Tablet(s) Oral two times a day    MEDICATIONS  (PRN):  acetaminophen   Tablet .. 650 milliGRAM(s) Oral every 6 hours PRN Mild Pain (1 - 3), Moderate Pain (4 - 6), Severe Pain (7 - 10)  aluminum hydroxide/magnesium hydroxide/simethicone Suspension 30 milliLiter(s) Oral every 6 hours PRN Dyspepsia  heparin   Injectable 6500 Unit(s) IV Push every 6 hours PRN For aPTT less than 40  heparin   Injectable 3000 Unit(s) IV Push every 6 hours PRN For aPTT between 40 - 57      Allergies:     Advil (Hives; Vomiting)    Intolerances        SOCIAL HISTORY: No EtOH, no tobacco    REVIEW OF SYSTEMS:    CONSTITUTIONAL: No weakness, fevers or chills  EYES/ENT: No visual changes;  No vertigo or throat pain   NECK: No pain or stiffness  RESPIRATORY: No cough, wheezing, hemoptysis; No shortness of breath  CARDIOVASCULAR: No chest pain or palpitations  GASTROINTESTINAL: (+) Diffuse abdominal pain. No nausea, vomiting, or hematemesis; No diarrhea or constipation. No melena or hematochezia.  GENITOURINARY: No dysuria, frequency or hematuria  NEUROLOGICAL: No numbness or weakness  SKIN: No itching, burning, rashes, or lesions   All other review of systems is negative unless indicated above.        T(F): 97.3 (08-25-21 @ 09:07), Max: 98.1 (08-25-21 @ 06:34)  HR: 82 (08-25-21 @ 09:07)  BP: 134/105 (08-25-21 @ 09:07)  RR: 18 (08-25-21 @ 09:07)  SpO2: 100% (08-25-21 @ 09:07)  Wt(kg): --    GENERAL: NAD, well-developed  HEAD:  Atraumatic, Normocephalic  EYES: EOMI, PERRLA, conjunctiva and sclera clear  NECK: Supple, No JVD  CHEST/LUNG: Clear to auscultation bilaterally; No wheeze  HEART: Regular rate and rhythm; No murmurs, rubs, or gallops  ABDOMEN: Soft, Nontender, Nondistended; Bowel sounds present  EXTREMITIES:  2+ Peripheral Pulses, No clubbing, cyanosis, or edema  NEUROLOGY: non-focal  SKIN: No rashes or lesions                          11.6   7.47  )-----------( 233      ( 25 Aug 2021 05:15 )             36.2       08-25    144  |  108<H>  |  33<H>  ----------------------------<  162<H>  4.3   |  17<L>  |  1.43<H>    Ca    9.5      25 Aug 2021 05:15  Phos  3.0     08-25  Mg     2.40     08-25    TPro  7.6  /  Alb  3.9  /  TBili  0.8  /  DBili  x   /  AST  21  /  ALT  17  /  AlkPhos  217<H>  08-25      Magnesium, Serum: 2.40 mg/dL (08-25 @ 05:15)  Phosphorus Level, Serum: 3.0 mg/dL (08-25 @ 05:15)      < from: CT Angio Chest PE Protocol w/ IV Cont (08.22.21 @ 08:56) >    EXAM:  CT ANGIO CHEST PULM ART WAWIC      PROCEDURE DATE:  Aug 22 2021     INTERPRETATION:  CLINICAL INFORMATION: Elevated d-dimer. Evaluate for pulmonary embolism. Breast cancer, shortness of breath    COMPARISON: CT chest 10/17/2020. CT chest 8/27/2020. CT chest 2/21/2019.    CONTRAST/COMPLICATIONS:  IV Contrast: Omnipaque 350  90 cc administered   10 cc discarded  Oral Contrast: NONE  Complications: None reported at time of study completion    PROCEDURE:  CT Angiography of the Chest.  Sagittal and coronal reformats were performed as well as 3D (MIP) reconstructions.    FINDINGS:    LUNGS AND AIRWAYS: Patent central airways.  Stable atelectasis along the descending thoracic aorta within the left lower lobe, unchanged from prior. Thelungs are otherwise clear.  PLEURA: No pleural effusion.  MEDIASTINUM AND LATONIA: No lymphadenopathy.  VESSELS: Saddle pulmonary embolus with extension into the lobar branches of the pulmonary arteries. The descending thoracic aorta at the level of thehiatus is dilated measuring 4.5 cm, unchanged. Thrombosed intramural hematoma is unchanged.  HEART: Cardiomegaly. No evidence of right heart strain. No pericardial effusion.  CHEST WALL AND LOWER NECK: Right lateral breast mass measuring 2.0 x 1.7 cmappears unchanged from 2/21/2019. External defibrillator in place within the left chest wall with lead within the anterior subcutaneous tissues.  VISUALIZED UPPER ABDOMEN: Status post cholecystectomy.  BONES: Within normal limits.    IMPRESSION:    Saddle pulmonary embolus.    Cardiomegaly. No evidence of right heart strain.    These findings were discussed with Dr. LESLI LAMB, on 8/22/2021 10:16 AM with read back.    --- End of Report ---    JACKIE MCKINNEY MD; Resident Radiology  Thisdocument has been electronically signed.  EVELINE MARTINEZ MD; Attending Radiologist  This document has been electronically signed. Aug 22 2021 10:17AM    < end of copied text >  < from: US Duplex Venous Lower Ext Complete, Bilateral (08.24.21 @ 14:37) >    EXAM:  US DPLX LWR EXT VEINS COMPL BI      PROCEDURE DATE:  Aug 24 2021     INTERPRETATION:  CLINICAL INFORMATION: Saddle pulmonary embolism on CT dated 8/22/2021. History of prior DVT.    COMPARISON: Ultrasound venous duplex of the bilaterallower extremities 9/26/2020.    TECHNIQUE: Duplex sonography of the BILATERAL LOWER extremity veins with color and spectral Doppler, with and without compression.    FINDINGS:    RIGHT:  Nonocclusive thrombus in the right popliteal and posterior tibial veins. Normal compressibility of the right common femoral and femoral veins. Right peroneal veins not visualized.    LEFT:  Nonocclusive thrombus in the left popliteal vein. Normal compressibility of the right common femoral and femoral veins. Posterior tibial and peroneal veins are patent.    IMPRESSION:  Acute deep venous thrombosis in the bilateral lower extremities as above.    Findings were discussed with Dr. Mead on 8/24/2021 at 3:00 PM by Dr. Lott with read back confirmation.    --- End of Report ---    ELISSA LOTT MD; Resident Radiologist  This document has been electronically signed.  ROBERTO SMILEY MD; Attending Radiologist  This document has been electronically signed. Aug 24 2021  3:28PM    < end of copied text >  < from: TTE with Doppler (w/Cont) (08.23.21 @ 15:49) >    Patient name: RISHABH HUNT  YOB: 1955   Age: 66 (F)   MR#: [id number]  Study Date: 8/23/2021  [facility]***Sonographer: STEVEN Alvarado  Study quality: Technically good  Referring Physician: Luis Carlos Carrillo MD  Blood Pressure: 127/93 mmHg  Height: 170 cm  Weight: 77 kg  BSA: 1.9 m2  ------------------------------------------------------------------------  PROCEDURE: Transthoracic echocardiogram with 2-D, M-Mode  and complete spectral and color flow Doppler.  Intravenous ultrasound enhancing agent was administered for  improved left ventricular endocardial border definition.  Following the intravenous injection of ultrasound enhancing  agent, harmonic imaging was performed.  INDICATION: Other pulmonary embolism withoutacute cor  pulmonale (I26.99)  ------------------------------------------------------------------------  DIMENSIONS:  Dimensions:     Normal Values:  LA:     3.7 cm    2.0 - 4.0 cm  Ao:     3.2 cm    2.0 - 3.8 cm  SEPTUM: 0.8 cm    0.6 - 1.2 cm  PWT:   0.8 cm    0.6 - 1.1 cm  LVIDd:  5.7 cm    3.0 - 5.6 cm  LVIDs:  5.4 cm    1.8 - 4.0 cm  Derived Variables:  LVMI: 91 g/m2  RWT: 0.28  Fractional short: 5 %  Ejection Fraction (Teicholtz): 12 %  ------------------------------------------------------------------------  OBSERVATIONS:  Mitral Valve: Mitral annular calcification, otherwise  normal mitral valve. Moderate-severe mitral regurgitation.  Aortic Root: Normal aortic root.  Aortic Valve: Calcified trileaflet aortic valve with normal  opening.  Left Atrium: Normal left atrium.  LA volume index = 32  cc/m2.  Left Ventricle: Severe global left ventricular systolic  dysfunction.  Endocardial visualization enhanced with  intravenous injection of echo contrast (Definity).  A  filling defect is seen and is consistent with an LV apical  thrombus. Normal left ventricular internal dimensions and  wall thicknesses.  Right Heart: Normal right atrium. Normal right ventricular  size with decreased right ventricular systolic function.  Normal tricuspid valve. Mild tricuspid regurgitation.  Normal pulmonic valve.  Mild pulmonic regurgitation.  Pericardium/PleuraNormal pericardium with no pericardial  effusion.  Hemodynamic: Inadequate tricuspid regurgitation Doppler  envelope precludes estimation of RVSP.  ------------------------------------------------------------------------  CONCLUSIONS:  1. Mitral annular calcification, otherwise normal mitral  valve. Moderate-severe mitral regurgitation.  2. Normal left ventricular internal dimensions and wall  thicknesses.  3. Severe global left ventricular systolic dysfunction.  Endocardial visualization enhanced with intravenous  injection of echo contrast (Definity).  A filling defect is  seen and is consistent with an LV apical thrombus.  4.Normal right ventricular size with decreased right  ventricular systolic function.  5. Inadequate tricuspid regurgitation Doppler envelope  precludes estimation of RVSP.  *** No previous Echo exam.  ------------------------------------------------------------------------  Confirmed on  8/23/2021 - 17:22:57 by Adonay Phillip M.D.,  [facility], [place]  ------------------------------------------------------------------------    < end of copied text >

## 2021-08-25 NOTE — CONSULT NOTE ADULT - ASSESSMENT
66F with COPD/emphysema, DVT L leg 1994 with [place] IVC filter, HTN, HLD, infrarenal abd aneurysm, internal carotid aneurysm, depression, R breast cancer s/p chemo and radiation, cardiomyopathy, CHF with SICD, sickle cell trait, uterine fibroids s/p hysterectomy, s/p cholecystectomy, and former smoker (quit 2 years ago).   Presented to ED with SOB [id number] and dry cough x3w. Found to have saddle embolism, LV apical thrombus, and b/l nonocclusive DVTs. Heparin bridge to warfarin.     Hematology consulted for hypercoagulable work up.    #Saddle thrombus  #Bilateral LE acute DVT  #LV apical thrombus  -can check APLS labs (lupus anticoagulant, beta-2 glycoprotein, anticardiolipin)  -do not send genetic thrombophilia work up at this time; do not send functional thrombophilia (protein C and S, antithrombin III levels) at this time, patient with acute clot and on anticoagulation will have abnormal levels  -heparin gtt for now  -given apical thrombus, long term anticoagulation of choice would be warfarin; when no additional invasive procedures are planned, w  -outpatient age appropriate cancer screening    Please call with any questions.    Rocky Lee MD, MPH  Hematology / Oncology Fellow, PGY7  p  66F with COPD/emphysema, DVT L leg 1994 with [place] IVC filter, HTN, HLD, infrarenal abd aneurysm, internal carotid aneurysm, depression, R breast cancer s/p chemo and radiation, cardiomyopathy, CHF with SICD, sickle cell trait, uterine fibroids s/p hysterectomy, s/p cholecystectomy, and former smoker (quit 2 years ago).   Presented to ED with SOB [id number] and dry cough x3w. Found to have saddle embolism, LV apical thrombus, and b/l nonocclusive DVTs. Heparin bridge to warfarin.     Hematology consulted for hypercoagulable work up.    #Saddle thrombus  #Bilateral LE acute DVT  #LV apical thrombus  -can check APLS labs (lupus anticoagulant, beta-2 glycoprotein, anticardiolipin)  -do not send genetic thrombophilia work up at this time; do not send functional thrombophilia (protein C and S, antithrombin III levels) at this time, patient with acute clot and on anticoagulation will have abnormal levels  -heparin gtt for now  -given clot burden and previous hx of malignancy, would check CT abdomen and pelvis to r/o malignancy  -given apical thrombus, long term anticoagulation of choice would be warfarin; when no additional invasive procedures are planned, w  -outpatient age appropriate cancer screening    Please call with any questions.    Rocky Lee MD, MPH  Hematology / Oncology Fellow, PGY7  p

## 2021-08-25 NOTE — PROGRESS NOTE ADULT - ATTENDING COMMENTS
TTE now with apical LV thrombus  remains LOZA on at times, tachypneic, though not hypoxic at rest or ambulation for baseline COPD  baylee cards - no plans for catheter lysis - transitioned to heparin in setting of LV thrombus as well as submassive PE  if no objection by cards will begin bridge to coumadin   f/u Le duplex - still has IVC in place   resolving CHEN, supplement for hypokalemia  2.3 cm breat lesion on CT - prior vic read as necrotic fat tissue   out-pt f.u has had some in-hospital delirium but easily redirectable - not needing PRNs  transient hypoxemia reported overnight, self-resolved - not sure if accurate  clinically improving slowly with less LOZA and no further tachypnea  INR self trending down, close to subtherapeutic now, should be able to start coumadin tonight or jurgen  awaiting heme eval on hypercoagulable work up and will proceed with CT a/p w con for r/o malignancy as renal fx now at baseline and stable, close to normal - can not do IVF for risk of JACQUELINE prior to CT d/t EF of 12%

## 2021-08-25 NOTE — PROGRESS NOTE ADULT - NSPROGADDITIONALINFOA_GEN_ALL_CORE
addendum - LE duplex with multiple DVTs, INR came back 3.4  elevated INR in setting of recent Eliquis dosing unreliable and not a marker of effective anticontagion state - could be high partly d/t vitk def  I suspect considering clot burden should cw heparin gtt for now, hold off on Coumadin ands trend INR   considering above, will have heme eval to help guide AC and bridging and hypercoagulable w/u. would like to r/o malignancy but recovering CHEN, need to hold off on contrast for now and once renal fx better, will do pan CT w con r/o malignancy/mets
addendum - LE duplex with multiple DVTs, INR came back 3.4  elevated INR in setting of recent Eliquis dosing unreliable and not a marker of effective anticontagion state - could be high partly d/t vitk def  I suspect considering clot burden should cw heparin gtt for now, hold off on Coumadin ands trend INR   considering above, will have heme eval to help guide AC and bridging and hypercoagulable w/u. would like to r/o malignancy but recovering CHEN, need to hold off on contrast for now and once renal fx better, will do pan CT w con r/o malignancy/mets

## 2021-08-25 NOTE — PROGRESS NOTE ADULT - PROBLEM SELECTOR PLAN 3
LVEF 12% per TTE. ProBNP~85718W (78466H-08500P since 8/2020), HsenTnT 55H likely 2/2 demand ischemia. QTc 524 8/21. Euvolemic on exam.  - has SICD  - home metoprolol succinate 25mg QD  - home sacubitril+valsartan 24+26mg BID

## 2021-08-25 NOTE — PROVIDER CONTACT NOTE (CRITICAL VALUE NOTIFICATION) - BACKGROUND
Patient admitted for SOB and dry cough. Pt has pmh of DVT, breast cancer, and HTN.
Patient was admitted for SOB and dry cough. Pt has pmh of DVT. breast cancer, and HTN.
Patient admitted for SOB and dry cough. Pt has pmh of DVT, breast cancer, and HTN.

## 2021-08-25 NOTE — PROGRESS NOTE ADULT - ASSESSMENT
66F with COPD/emphysema, DVT L leg 1994 with [place] IVC filter, HTN, HLD, infrarenal abd aneurysm, internal carotid aneurysm, depression, R breast cancer s/p chemo and radiation, cardiomyopathy, CHF with SICD, sickle cell trait, uterine fibroids s/p hysterectomy, s/p cholecystectomy, and former smoker (quit 2 years ago).   Presented to ED with SOB d6vqate and dry cough x3w. Found to have saddle embolism, LV apical thrombus, and b/l nonocclusive DVTs. Heparin bridge to warfarin.

## 2021-08-26 DIAGNOSIS — I82.90 ACUTE EMBOLISM AND THROMBOSIS OF UNSPECIFIED VEIN: ICD-10-CM

## 2021-08-26 LAB
ALBUMIN SERPL ELPH-MCNC: 3.6 G/DL — SIGNIFICANT CHANGE UP (ref 3.3–5)
ALP SERPL-CCNC: 210 U/L — HIGH (ref 40–120)
ALT FLD-CCNC: 22 U/L — SIGNIFICANT CHANGE UP (ref 4–33)
ANION GAP SERPL CALC-SCNC: 14 MMOL/L — SIGNIFICANT CHANGE UP (ref 7–14)
APTT BLD: 190.7 SEC — SIGNIFICANT CHANGE UP (ref 27–36.3)
APTT BLD: 36 SEC — SIGNIFICANT CHANGE UP (ref 27–36.3)
APTT BLD: 72.9 SEC — HIGH (ref 27–36.3)
AST SERPL-CCNC: 68 U/L — HIGH (ref 4–32)
BILIRUB SERPL-MCNC: 0.8 MG/DL — SIGNIFICANT CHANGE UP (ref 0.2–1.2)
BUN SERPL-MCNC: 34 MG/DL — HIGH (ref 7–23)
CALCIUM SERPL-MCNC: 9.2 MG/DL — SIGNIFICANT CHANGE UP (ref 8.4–10.5)
CHLORIDE SERPL-SCNC: 107 MMOL/L — SIGNIFICANT CHANGE UP (ref 98–107)
CO2 SERPL-SCNC: 15 MMOL/L — LOW (ref 22–31)
CREAT SERPL-MCNC: 1.44 MG/DL — HIGH (ref 0.5–1.3)
DAT POLY-SP REAG RBC QL: POSITIVE — SIGNIFICANT CHANGE UP
GLUCOSE SERPL-MCNC: 144 MG/DL — HIGH (ref 70–99)
HCT VFR BLD CALC: 36.3 % — SIGNIFICANT CHANGE UP (ref 34.5–45)
HGB BLD-MCNC: 11.5 G/DL — SIGNIFICANT CHANGE UP (ref 11.5–15.5)
INR BLD: 1.54 RATIO — HIGH (ref 0.88–1.16)
LUPUS ANTICOAGULANT PROFILE RESULT: SIGNIFICANT CHANGE UP
MAGNESIUM SERPL-MCNC: 2.3 MG/DL — SIGNIFICANT CHANGE UP (ref 1.6–2.6)
MCHC RBC-ENTMCNC: 30.5 PG — SIGNIFICANT CHANGE UP (ref 27–34)
MCHC RBC-ENTMCNC: 31.7 GM/DL — LOW (ref 32–36)
MCV RBC AUTO: 96.3 FL — SIGNIFICANT CHANGE UP (ref 80–100)
NRBC # BLD: 0 /100 WBCS — SIGNIFICANT CHANGE UP
NRBC # FLD: 0.05 K/UL — HIGH
PHOSPHATE SERPL-MCNC: 3.7 MG/DL — SIGNIFICANT CHANGE UP (ref 2.5–4.5)
PLATELET # BLD AUTO: 223 K/UL — SIGNIFICANT CHANGE UP (ref 150–400)
POTASSIUM SERPL-MCNC: SIGNIFICANT CHANGE UP MMOL/L (ref 3.5–5.3)
POTASSIUM SERPL-SCNC: SIGNIFICANT CHANGE UP MMOL/L (ref 3.5–5.3)
PROT SERPL-MCNC: 8 G/DL — SIGNIFICANT CHANGE UP (ref 6–8.3)
PROTHROM AB SERPL-ACNC: 17.2 SEC — HIGH (ref 10.6–13.6)
RBC # BLD: 3.77 M/UL — LOW (ref 3.8–5.2)
RBC # FLD: 17.3 % — HIGH (ref 10.3–14.5)
SODIUM SERPL-SCNC: 136 MMOL/L — SIGNIFICANT CHANGE UP (ref 135–145)
WBC # BLD: 6.63 K/UL — SIGNIFICANT CHANGE UP (ref 3.8–10.5)
WBC # FLD AUTO: 6.63 K/UL — SIGNIFICANT CHANGE UP (ref 3.8–10.5)

## 2021-08-26 PROCEDURE — 74177 CT ABD & PELVIS W/CONTRAST: CPT | Mod: 26

## 2021-08-26 PROCEDURE — 86077 PHYS BLOOD BANK SERV XMATCH: CPT

## 2021-08-26 PROCEDURE — 99233 SBSQ HOSP IP/OBS HIGH 50: CPT | Mod: GC

## 2021-08-26 RX ORDER — PROCHLORPERAZINE MALEATE 5 MG
5 TABLET ORAL EVERY 8 HOURS
Refills: 0 | Status: DISCONTINUED | OUTPATIENT
Start: 2021-08-26 | End: 2021-08-27

## 2021-08-26 RX ORDER — WARFARIN SODIUM 2.5 MG/1
5 TABLET ORAL ONCE
Refills: 0 | Status: COMPLETED | OUTPATIENT
Start: 2021-08-26 | End: 2021-08-26

## 2021-08-26 RX ADMIN — HEPARIN SODIUM 0 UNIT(S)/HR: 5000 INJECTION INTRAVENOUS; SUBCUTANEOUS at 16:03

## 2021-08-26 RX ADMIN — Medication 5 MILLIGRAM(S): at 22:54

## 2021-08-26 RX ADMIN — HEPARIN SODIUM 0 UNIT(S)/HR: 5000 INJECTION INTRAVENOUS; SUBCUTANEOUS at 00:06

## 2021-08-26 RX ADMIN — Medication 5 MILLIGRAM(S): at 16:00

## 2021-08-26 RX ADMIN — HEPARIN SODIUM 700 UNIT(S)/HR: 5000 INJECTION INTRAVENOUS; SUBCUTANEOUS at 01:19

## 2021-08-26 RX ADMIN — WARFARIN SODIUM 5 MILLIGRAM(S): 2.5 TABLET ORAL at 19:10

## 2021-08-26 RX ADMIN — SACUBITRIL AND VALSARTAN 1 TABLET(S): 24; 26 TABLET, FILM COATED ORAL at 19:07

## 2021-08-26 RX ADMIN — HEPARIN SODIUM 6500 UNIT(S): 5000 INJECTION INTRAVENOUS; SUBCUTANEOUS at 08:35

## 2021-08-26 RX ADMIN — HEPARIN SODIUM 1000 UNIT(S)/HR: 5000 INJECTION INTRAVENOUS; SUBCUTANEOUS at 08:26

## 2021-08-26 RX ADMIN — Medication 5 MILLIGRAM(S): at 02:26

## 2021-08-26 RX ADMIN — ANASTROZOLE 1 MILLIGRAM(S): 1 TABLET ORAL at 12:35

## 2021-08-26 RX ADMIN — HEPARIN SODIUM 700 UNIT(S)/HR: 5000 INJECTION INTRAVENOUS; SUBCUTANEOUS at 17:09

## 2021-08-26 RX ADMIN — HEPARIN SODIUM 700 UNIT(S)/HR: 5000 INJECTION INTRAVENOUS; SUBCUTANEOUS at 23:58

## 2021-08-26 RX ADMIN — Medication 650 MILLIGRAM(S): at 16:30

## 2021-08-26 RX ADMIN — Medication 650 MILLIGRAM(S): at 16:01

## 2021-08-26 RX ADMIN — SACUBITRIL AND VALSARTAN 1 TABLET(S): 24; 26 TABLET, FILM COATED ORAL at 05:49

## 2021-08-26 RX ADMIN — Medication 5 MILLIGRAM(S): at 12:31

## 2021-08-26 RX ADMIN — Medication 25 MILLIGRAM(S): at 05:49

## 2021-08-26 NOTE — PROGRESS NOTE ADULT - PROBLEM SELECTOR PLAN 10
- appreciate PT rec: home with home PT, consider reconsult closer to discharge  - DASH/TLC diet  - consulted SW  - power of  Jaki [phone] or [phone] in [place]  - PCP/Alex Figueroa [phone]

## 2021-08-26 NOTE — PROGRESS NOTE ADULT - PROBLEM SELECTOR PROBLEM 2
[address] [facility]  [address] [place] [address]  Phone: [phone]  Fax: [phone]    WASHINGTON Cao CNP        November 2, 2020    [id number] [address] [place] [address] [address] [address]      Dear Florentino Branch: The results of your most recent Pap smear are normal. This means that no cancerous or precancerous cells were seen. We recommend that you come back in 1 year for your next routine Pap smear. If you have any questions or concerns, please don't hesitate to call.     Sincerely,        WASHINGTON Cao CNP Thrombus

## 2021-08-26 NOTE — PROGRESS NOTE ADULT - ATTENDING COMMENTS
APLS work up sent  for CT a/p today r.o malignancy  baylee heme, cw heparin to coumadin bridge with INR now below 2 - dose 5mg today  not requiring O2   dipso home with home PT - has HHA

## 2021-08-26 NOTE — PROVIDER CONTACT NOTE (CRITICAL VALUE NOTIFICATION) - SITUATION
Patients aPTT is 113.8
Patients aPTT of 174.6
.7, patient alert with no complaints, nomogram followed for heparin gtt, held for 1 HR and restarted @7cc, MD made aware, will continue to monitor
Patients aPTT is greater than 200

## 2021-08-26 NOTE — PROGRESS NOTE ADULT - ASSESSMENT
66F with COPD/emphysema, DVT L leg 1994 with [place] IVC filter, HTN, HLD, infrarenal abd aneurysm, internal carotid aneurysm, depression, R breast cancer s/p chemo and radiation, cardiomyopathy, CHF with SICD, sickle cell trait, uterine fibroids s/p hysterectomy, s/p cholecystectomy, and former smoker (quit 2 years ago).   Presented to ED with SOB [id number] and dry cough x3w. Found to have saddle embolism, LV apical thrombus, and b/l nonocclusive DVTs. Heparin bridge to warfarin.  66F with COPD/emphysema, DVT L leg 1994 with [place] IVC filter, HTN, HLD, thoracic aorta and infrarenal abd aortic dilation 2/2 thrombosed hematoma, internal carotid aneurysm, depression, R breast cancer s/p chemo and radiation, cardiomyopathy, CHF with SICD, sickle cell trait, uterine fibroids s/p hysterectomy, s/p cholecystectomy, and former smoker (quit 2 years ago).   Presented to ED with SOB [id number] and dry cough x3w. Found to have saddle embolism, LV apical thrombus, and b/l nonocclusive DVTs. Heparin bridge to warfarin.

## 2021-08-26 NOTE — PROGRESS NOTE ADULT - PROBLEM SELECTOR PLAN 2
Seen in LV on TTE 8/23. Maybe 2/2 low LVEF. No h/o afib/aflutter.  - heparin bridge to warfarin; start warfarin when INR<2

## 2021-08-26 NOTE — PROGRESS NOTE ADULT - PROBLEM SELECTOR PLAN 8
No outpt oncologist besides PCP/Endo Huyen Figueroa [phone].  - home anastrozole Na 147 on admission.  - monitor  - encourage PO intake

## 2021-08-26 NOTE — PROGRESS NOTE ADULT - PROBLEM SELECTOR PLAN 4
BP 140s / 100s-110s on admission.  - home metoprolol succinate 25mg QD  - hydralazine 5mg Q8H LVEF 12% per TTE. ProBNP~62184B (17303S-38559Z since 8/2020), HsenTnT 55H likely 2/2 demand ischemia. QTc 524 8/21. Euvolemic on exam.  - has SICD  - home metoprolol succinate 25mg QD  - home sacubitril+valsartan 24+26mg BID

## 2021-08-26 NOTE — PROGRESS NOTE ADULT - PROBLEM SELECTOR PLAN 3
LVEF 12% per TTE. ProBNP~06564H (11782D-40617U since 8/2020), HsenTnT 55H likely 2/2 demand ischemia. QTc 524 8/21. Euvolemic on exam.  - has SICD  - home metoprolol succinate 25mg QD  - home sacubitril+valsartan 24+26mg BID 2/2 at least submassive saddle pulmonary embolus w/o evidence of RH strain per CTPA and TTE. B/l nonocclusive DVTs. DDimer~7400H. ProBNP~89557L->40269F. ABG pH 7.49H, pCO2 22L, SaO2 99.2H.  SOB less likely 2/2 pulm acute infection or COPD exacerbation (no reported change in cough or sputum in the past month, and CXR unremarkable). Unlikely HFrEF exacerbation despite high proBNP (has been that elevated since 8/2020) and HsenTnT (likely 2/2 demand ischemia), given no JVD, peripheral edema, orthopnea, or other suggestive S/S.   - heparin bridge to warfarin because LV thrombus; start warfarin when INR<2  - appreciate Card recs: no indication for catheter-directed thrombolysis  - goal SpO2 88-92% for COPD  - continuous pulse ox

## 2021-08-26 NOTE — PROGRESS NOTE ADULT - PROBLEM SELECTOR PLAN 9
- appreciate PT rec: home with home PT, consider reconsult closer to discharge  - DASH/TLC diet  - consulted SW  - power of  Jaki [phone] or [phone] in [place]  - PCP/Alex Figueroa [phone] No outpt oncologist besides PCP/Endo Huyen Figueroa [phone].  - home anastrozole

## 2021-08-26 NOTE — PROGRESS NOTE ADULT - PROBLEM SELECTOR PLAN 5
eGFR 30-35L and Cr 1.72H on admission, which is slightly above baseline per [employer].   - s/p furosemide 20 x1 BP 140s / 100s-110s on admission.  - home metoprolol succinate 25mg QD  - hydralazine 5mg Q8H

## 2021-08-26 NOTE — PROGRESS NOTE ADULT - SUBJECTIVE AND OBJECTIVE BOX
Resident: Bisi Velásquez, PGY1, Medicine, Pager [phone]) [phone] ([facility])    24-Hour Events and Subjective:  - tele: 21 beats AIVR asymptomatic, otherwise sinus  - melatonin for agitation/anxiety  - didn't sleep but thought she did  - denies chest, muscle, and bone pain  - SOB with comfort NC that I removed    Hospital Meds:  MEDICATIONS  (STANDING):  anastrozole 1 milliGRAM(s) Oral daily  heparin  Infusion.  Unit(s)/Hr (14 mL/Hr) IV Continuous <Continuous>  hydrALAZINE 5 milliGRAM(s) Oral every 8 hours  metoprolol succinate ER 25 milliGRAM(s) Oral daily  sacubitril 49 mG/valsartan 51 mG 1 Tablet(s) Oral two times a day  MEDICATIONS  (PRN):  acetaminophen   Tablet .. 650 milliGRAM(s) Oral every 6 hours PRN Mild Pain (1 - 3), Moderate Pain (4 - 6), Severe Pain (7 - 10)  aluminum hydroxide/magnesium hydroxide/simethicone Suspension 30 milliLiter(s) Oral every 6 hours PRN Dyspepsia  heparin   Injectable 6500 Unit(s) IV Push every 6 hours PRN For aPTT less than 40  heparin   Injectable 3000 Unit(s) IV Push every 6 hours PRN For aPTT between 40 - 57    Vitals:  T(F): 97.8 (26 Aug 2021 05:44), Max: 98.1 (25 Aug 2021 06:34)  HR: 79 (26 Aug 2021 05:44) (79 - 83)  BP: 123/86 (26 Aug 2021 05:44) (123/86 - 143/103)  RR: 18 (26 Aug 2021 05:44) (16 - 19)  SpO2: 100% (26 Aug 2021 05:44) (81% - 100%)  8/24 down to 91% while ambulating and 87% while at rest post ambulation.  8/25 down to 81% at rest that resolved on RA    I&O: 25 Aug 2021 07:01  -  26 Aug 2021 06:27  IN: 686 mL / OUT: 0 mL / NET: 686 mL    Physical Exam:  Con: NAD  HEENT: NCAT, no conjunctival injection, no scleral icterus, strabismus, moist oral mucosa  Neck: JVD  Resp: normal WOB at rest, CTAB  CV: RRR, physiologic S1 and S2, no murmurs, no rubs, no gallops, 1+ radial pulses  Abd: nondistended, normoactive bowel sounds, soft, RUQ tenderness, no rebound, no involuntary guarding, no organomegaly  Ext: no clubbing, warm hands, no edema  Neuro: alert, A&Ox3  Psych: appropriate mood and affect    Labs:                         11.6   7.47  )-----------( 233      ( 25 Aug 2021 05:15 )             36.2         PT/INR - ( 25 Aug 2021 05:27 )   PT: 24.1 sec;   INR: 2.17  PTT - ( 25 Aug 2021 05:15 )  PTT:113.8 sec             08-25  144  |  108<H>  |  33<H>  ----------------------------<  162<H>  4.3   |  17<L>  |  1.43<H>  eGFR 44L  Ca    9.5      25 Aug 2021 05:15  Phos  3.0     08-25  Mg     2.40     08-25    TPro  7.6  /  Alb  3.9  /  TBili  0.8  /  DBili  x   /  AST  21  /  ALT  17  /  AlkPhos  217<H>  08-25    Studies and Radiology:     TTE 8/23: Mod-severe MR. Severe global LV systolic dysfunction. A filling defect is seen and is consistent with an LV apical thrombus. Decreased RV systolic function.     BLE Duplex 8/24:   R: Nonocclusive thrombus in the right popliteal and posterior tibial veins. Normal compressibility of the right common femoral and femoral veins. Right peroneal veins not visualized.  L: Nonocclusive thrombus in the left popliteal vein. Normal compressibility of the right common femoral and femoral veins. Posterior tibial and peroneal veins are patent. Resident: Bisi Velásquez, PGY1, Medicine, Pager [phone] (NS) [phone] ([facility])    24-Hour Events and Subjective:  - tele: sinus sage to 56  - denies chest, muscle, and bone pain  - SOB with comfort NC that I removed    Hospital Meds:  MEDICATIONS  (STANDING):  anastrozole 1 milliGRAM(s) Oral daily  heparin  Infusion.  Unit(s)/Hr (14 mL/Hr) IV Continuous <Continuous>  hydrALAZINE 5 milliGRAM(s) Oral every 8 hours  metoprolol succinate ER 25 milliGRAM(s) Oral daily  sacubitril 49 mG/valsartan 51 mG 1 Tablet(s) Oral two times a day  MEDICATIONS  (PRN):  acetaminophen   Tablet .. 650 milliGRAM(s) Oral every 6 hours PRN Mild Pain (1 - 3), Moderate Pain (4 - 6), Severe Pain (7 - 10)  aluminum hydroxide/magnesium hydroxide/simethicone Suspension 30 milliLiter(s) Oral every 6 hours PRN Dyspepsia  heparin   Injectable 6500 Unit(s) IV Push every 6 hours PRN For aPTT less than 40  heparin   Injectable 3000 Unit(s) IV Push every 6 hours PRN For aPTT between 40 - 57    Vitals:  T(F): 97.8 (26 Aug 2021 05:44), Max: 98.1 (25 Aug 2021 06:34)  HR: 79 (26 Aug 2021 05:44) (79 - 83)  BP: 123/86 (26 Aug 2021 05:44) (123/86 - 143/103)  RR: 18 (26 Aug 2021 05:44) (16 - 19)  SpO2: 100% (26 Aug 2021 05:44) (81% - 100%)  8/24 down to 91% while ambulating and 87% while at rest post ambulation.  8/25 down to 81% at rest that resolved on RA    I&O: 25 Aug 2021 07:01  -  26 Aug 2021 06:27  IN: 686 mL / OUT: 0 mL / NET: 686 mL    Physical Exam:  Con: NAD  HEENT: NCAT, no conjunctival injection, no scleral icterus, strabismus, moist oral mucosa  Neck: JVD  Resp: normal WOB at rest, CTAB  CV: RRR, physiologic S1 and S2, no murmurs, no rubs, no gallops, 1+ radial pulses  Abd: nondistended, normoactive bowel sounds, soft, RUQ tenderness, no rebound, no involuntary guarding, no organomegaly  Ext: no clubbing, warm hands, no edema  Neuro: alert, A&Ox3  Psych: appropriate mood and affect    Labs:                        11.5   6.63  )-----------( 223      ( 26 Aug 2021 07:44 )             36.3                11.6   7.47  )-----------( 233      ( 25 Aug 2021 05:15 )             36.2     PT/INR - ( 26 Aug 2021 07:44 )   PT: 17.2 sec;   INR: 1.54 ratio    PTT - ( 26 Aug 2021 07:44 )  PTT:36.0 sec    08-26  136  |  107  |  34<H>  ----------------------------<  144<H>  TNP   |  15<L>  |  1.44<H>  eGFR 43L  Ca    9.2      26 Aug 2021 07:44  Phos  3.7     08-26  Mg     2.30     08-26 08-25  144  |  108<H>  |  33<H>  ----------------------------<  162<H>  4.3   |  17<L>  |  1.43<H>  eGFR 44L  Ca    9.5      25 Aug 2021 05:15  Phos  3.0     08-25  Mg     2.40     08-25    TPro  8.0  /  Alb  3.6  /  TBili  0.8  /  DBili  x   /  AST  68<H>  /  ALT  22  /  AlkPhos  210<H>  08-26  TPro  7.6  /  Alb  3.9  /  TBili  0.8  /  DBili  x   /  AST  21  /  ALT  17  /  AlkPhos  217<H>  08-25    Studies and Radiology:     TTE 8/23: Mod-severe MR. Severe global LV systolic dysfunction. A filling defect is seen and is consistent with an LV apical thrombus. Decreased RV systolic function.     BLE Duplex 8/24:   R: Nonocclusive thrombus in the right popliteal and posterior tibial veins. Normal compressibility of the right common femoral and femoral veins. Right peroneal veins not visualized.  L: Nonocclusive thrombus in the left popliteal vein. Normal compressibility of the right common femoral and femoral veins. Posterior tibial and peroneal veins are patent. Resident: Bisi Velásquez, PGY1, Medicine, Pager [phone] (NS) [phone] ([facility])    24-Hour Events and Subjective:  - tele: sinus sage to 56  - denies chest, muscle, and bone pain  - SOB with comfort NC that I removed    Hospital Meds:  MEDICATIONS  (STANDING):  anastrozole 1 milliGRAM(s) Oral daily  heparin  Infusion.  Unit(s)/Hr (14 mL/Hr) IV Continuous <Continuous>  hydrALAZINE 5 milliGRAM(s) Oral every 8 hours  metoprolol succinate ER 25 milliGRAM(s) Oral daily  sacubitril 49 mG/valsartan 51 mG 1 Tablet(s) Oral two times a day  MEDICATIONS  (PRN):  acetaminophen   Tablet .. 650 milliGRAM(s) Oral every 6 hours PRN Mild Pain (1 - 3), Moderate Pain (4 - 6), Severe Pain (7 - 10)  aluminum hydroxide/magnesium hydroxide/simethicone Suspension 30 milliLiter(s) Oral every 6 hours PRN Dyspepsia  heparin   Injectable 6500 Unit(s) IV Push every 6 hours PRN For aPTT less than 40  heparin   Injectable 3000 Unit(s) IV Push every 6 hours PRN For aPTT between 40 - 57    Vitals:  T(F): 97.8 (26 Aug 2021 05:44), Max: 98.1 (25 Aug 2021 06:34)  HR: 79 (26 Aug 2021 05:44) (79 - 83)  BP: 123/86 (26 Aug 2021 05:44) (123/86 - 143/103)  RR: 18 (26 Aug 2021 05:44) (16 - 19)  SpO2: 100% (26 Aug 2021 05:44) (81% - 100%)  8/24 down to 91% while ambulating and 87% while at rest post ambulation.  8/25 down to 81% at rest that resolved on RA    I&O: 25 Aug 2021 07:01  -  26 Aug 2021 06:27  IN: 686 mL / OUT: 0 mL / NET: 686 mL    Physical Exam:  Con: NAD  HEENT: NCAT, no conjunctival injection, no scleral icterus, strabismus, moist oral mucosa  Neck: JVD  Resp: normal WOB at rest, CTAB  CV: RRR, physiologic S1 and S2, no murmurs, no rubs, no gallops, 1+ radial pulses  Abd: nondistended, normoactive bowel sounds, soft, RUQ tenderness, no rebound, no involuntary guarding, no organomegaly  Ext: no clubbing, warm hands, no edema  Neuro: alert, A&Ox3  Psych: appropriate mood and affect    Labs:                        11.5   6.63  )-----------( 223      ( 26 Aug 2021 07:44 )             36.3                11.6   7.47  )-----------( 233      ( 25 Aug 2021 05:15 )             36.2     PT/INR - ( 26 Aug 2021 07:44 )   PT: 17.2 sec;   INR: 1.54 ratio    PTT - ( 26 Aug 2021 07:44 )  PTT:36.0 sec    08-26  136  |  107  |  34<H>  ----------------------------<  144<H>  TNP   |  15<L>  |  1.44<H>  eGFR 43L  Ca    9.2      26 Aug 2021 07:44  Phos  3.7     08-26  Mg     2.30     08-26 08-25  144  |  108<H>  |  33<H>  ----------------------------<  162<H>  4.3   |  17<L>  |  1.43<H>  eGFR 44L  Ca    9.5      25 Aug 2021 05:15  Phos  3.0     08-25  Mg     2.40     08-25    TPro  8.0  /  Alb  3.6  /  TBili  0.8  /  DBili  x   /  AST  68<H>  /  ALT  22  /  AlkPhos  210<H>  08-26  TPro  7.6  /  Alb  3.9  /  TBili  0.8  /  DBili  x   /  AST  21  /  ALT  17  /  AlkPhos  217<H>  08-25    Studies and Radiology:     TTE 8/23: Mod-severe MR. Severe global LV systolic dysfunction. A filling defect is seen and is consistent with an LV apical thrombus. Decreased RV systolic function.     BLE Duplex 8/24:   R: Nonocclusive thrombus in the right popliteal and posterior tibial veins. Normal compressibility of the right common femoral and femoral veins. Right peroneal veins not visualized.  L: Nonocclusive thrombus in the left popliteal vein. Normal compressibility of the right common femoral and femoral veins. Posterior tibial and peroneal veins are patent.    CTAP no con 8/26: No evidence of a neoplastic process in the abdomen and pelvis. Significant atherosclerotic changes and thrombosed hematoma involving the visualized descending thoracic aorta and the abdominal aorta, which are both dilated measuring up to 5.4 cm, without significant interval change. Resident: Bisi Velásquez, PGY1, Medicine, Pager [phone] ([facility]) [phone] ([facility])    24-Hour Events and Subjective:  - tele: sinus sage to 56  - denies chest, muscle, and bone pain  - SOB with comfort NC that I removed    Hospital Meds:  MEDICATIONS  (STANDING):  anastrozole 1 milliGRAM(s) Oral daily  heparin  Infusion.  Unit(s)/Hr (14 mL/Hr) IV Continuous <Continuous>  hydrALAZINE 5 milliGRAM(s) Oral every 8 hours  metoprolol succinate ER 25 milliGRAM(s) Oral daily  sacubitril 49 mG/valsartan 51 mG 1 Tablet(s) Oral two times a day  MEDICATIONS  (PRN):  acetaminophen   Tablet .. 650 milliGRAM(s) Oral every 6 hours PRN Mild Pain (1 - 3), Moderate Pain (4 - 6), Severe Pain (7 - 10)  aluminum hydroxide/magnesium hydroxide/simethicone Suspension 30 milliLiter(s) Oral every 6 hours PRN Dyspepsia  heparin   Injectable 6500 Unit(s) IV Push every 6 hours PRN For aPTT less than 40  heparin   Injectable 3000 Unit(s) IV Push every 6 hours PRN For aPTT between 40 - 57    Vitals:  T(F): 97.8 (26 Aug 2021 05:44), Max: 98.1 (25 Aug 2021 06:34)  HR: 79 (26 Aug 2021 05:44) (79 - 83)  BP: 123/86 (26 Aug 2021 05:44) (123/86 - 143/103)  RR: 18 (26 Aug 2021 05:44) (16 - 19)  SpO2: 100% (26 Aug 2021 05:44) (81% - 100%)  8/24 down to 91% while ambulating and 87% while at rest post ambulation.  8/25 down to 81% at rest that resolved on RA    I&O: 25 Aug 2021 07:01  -  26 Aug 2021 06:27  IN: 686 mL / OUT: 0 mL / NET: 686 mL    Physical Exam:  Con: NAD  HEENT: NCAT, no conjunctival injection, no scleral icterus, strabismus, moist oral mucosa  Neck: JVD  Resp: normal WOB at rest, CTAB  CV: RRR, physiologic S1 and S2, no murmurs, no rubs, no gallops, 1+ radial pulses  Abd: nondistended, normoactive bowel sounds, soft, RUQ tenderness, no rebound, no involuntary guarding, no organomegaly  Ext: no clubbing, warm hands, no edema  Neuro: alert, A&Ox3  Psych: appropriate mood and affect    Labs:                        11.5   6.63  )-----------( 223      ( 26 Aug 2021 07:44 )             36.3                11.6   7.47  )-----------( 233      ( 25 Aug 2021 05:15 )             36.2     PT/INR - ( 26 Aug 2021 07:44 )   PT: 17.2 sec;   INR: 1.54 ratio    PTT - ( 26 Aug 2021 07:44 )  PTT:36.0 sec    08-26  136  |  107  |  34<H>  ----------------------------<  144<H>  TNP   |  15<L>  |  1.44<H>  eGFR 43L  Ca    9.2      26 Aug 2021 07:44  Phos  3.7     08-26  Mg     2.30     08-26 08-25  144  |  108<H>  |  33<H>  ----------------------------<  162<H>  4.3   |  17<L>  |  1.43<H>  eGFR 44L  Ca    9.5      25 Aug 2021 05:15  Phos  3.0     08-25  Mg     2.40     08-25    TPro  8.0  /  Alb  3.6  /  TBili  0.8  /  DBili  x   /  AST  68<H>  /  ALT  22  /  AlkPhos  210<H>  08-26  TPro  7.6  /  Alb  3.9  /  TBili  0.8  /  DBili  x   /  AST  21  /  ALT  17  /  AlkPhos  217<H>  08-25    Studies and Radiology:     TTE 8/23: Mod-severe MR. Severe global LV systolic dysfunction. A filling defect is seen and is consistent with an LV apical thrombus. Decreased RV systolic function.     BLE Duplex 8/24:   R: Nonocclusive thrombus in the right popliteal and posterior tibial veins. Normal compressibility of the right common femoral and femoral veins. Right peroneal veins not visualized.  L: Nonocclusive thrombus in the left popliteal vein. Normal compressibility of the right common femoral and femoral veins. Posterior tibial and peroneal veins are patent.    CTAP 8/26: No evidence of a neoplastic process in the abdomen and pelvis. Significant atherosclerotic changes and thrombosed hematoma involving the visualized descending thoracic aorta and the abdominal aorta, which are both dilated measuring up to 5.4 cm, without significant interval change.

## 2021-08-26 NOTE — PROGRESS NOTE ADULT - PROBLEM SELECTOR PLAN 6
QTc>524 8/21. eGFR 30-35L and Cr 1.72H on admission, which is slightly above baseline per [employer].   - s/p furosemide 20 x1

## 2021-08-26 NOTE — PROGRESS NOTE ADULT - PROBLEM SELECTOR PLAN 1
2/2 at least submassive saddle pulmonary embolus w/o evidence of RH strain per CTPA and TTE. B/l nonocclusive DVTs. DDimer~7400H. ProBNP~25902Q->22229E. ABG pH 7.49H, pCO2 22L, SaO2 99.2H. Got COVID19 vaccine Pfizer/Moderna not J&J. IVC filter can be thrombogenic. Has h/o breast cancer with increasing alk phos.  SOB less likely 2/2 pulm acute infection or COPD exacerbation (no reported change in cough or sputum in the past month, and CXR unremarkable). Unlikely HFrEF exacerbation despite high proBNP (has been that elevated since 8/2020) and HsenTnT (likely 2/2 demand ischemia), given no JVD, peripheral edema, orthopnea, or other suggestive S/S.   - heparin bridge to warfarin because LV thrombus; start warfarin when INR<2  - appreciate Card recs: no indication for catheter-directed thrombolysis  - goal SpO2 88-92% for COPD  - continuous pulse ox  - f/u CT AP IV Con (can't do IVF to decrease risk of JACQUELINE before CT because EF 12%)  - appreciate Heme recs: f/u APLS labs (lupus anticoagulant, B2 glycoprotein, anticardiolipin), HTLVI+II Ab, and direct Charles; no genetic/functional thrombophilia, agree w/ heparin gtt bridge to warfarin, outpt cancer screening 2/2 at least submassive saddle pulmonary embolus w/o evidence of RH strain per CTPA and TTE. B/l nonocclusive DVTs. DDimer~7400H. ProBNP~38790H->98182E. ABG pH 7.49H, pCO2 22L, SaO2 99.2H. Got COVID19 vaccine Pfizer/Moderna not J&J. IVC filter can be thrombogenic. Has h/o breast cancer with increasing alk phos.  SOB less likely 2/2 pulm acute infection or COPD exacerbation (no reported change in cough or sputum in the past month, and CXR unremarkable). Unlikely HFrEF exacerbation despite high proBNP (has been that elevated since 8/2020) and HsenTnT (likely 2/2 demand ischemia), given no JVD, peripheral edema, orthopnea, or other suggestive S/S.   - heparin bridge to warfarin because LV thrombus; start warfarin when INR<2  - appreciate Card recs: no indication for catheter-directed thrombolysis  - goal SpO2 88-92% for COPD  - continuous pulse ox  - f/u CT AP IV Con (can't do IVF to decrease risk of JACQUELINE before CT because EF 12%)  - appreciate Heme recs: f/u APLS labs (lupus anticoagulant, B2 glycoprotein), HTLVI+II Ab, and direct Charles; no genetic/functional thrombophilia, agree w/ heparin gtt bridge to warfarin, outpt cancer screening Saddle PE and b/l nonocclusive DVTs. Direct Charles IgG+ and poly+, diluted TT 50.3H. [place] Ab, direct Charles C3 unremarkable. Has b/o cancer. Didn't get J&J COVID vaccine.  - heparin bridge to warfarin because LV thrombus; start warfarin when INR<2  - f/u CT AP IV Con (can't do IVF to decrease risk of JACQUELINE before CT because EF 12%)  - appreciate Heme recs: no genetic/functional thrombophilia, agree w/ heparin gtt bridge to warfarin, outpt cancer screening  f/u APLS labs (lupus anticoagulant, B2 glycoprotein, anticardiolipin IgM and IgG), HTLVI+II Ab, direct Charles, haptoglobin, LDH, direct Charles IgG and C3, PT/INR/aPTT   - Saddle PE and b/l nonocclusive DVTs. Has b/o cancer. Didn't get J&J COVID vaccine.  Direct Charles IgG+ and poly+, diluted TT 50.3H, silica clotting time S/C 0.75L, DRVVT inhibitor 55.9H,   - heparin bridge to warfarin because LV thrombus; start warfarin when INR<2  - f/u CT AP IV Con (can't do IVF to decrease risk of JACQUELINE before CT because EF 12%)  - appreciate Heme recs: no genetic/functional thrombophilia, agree w/ heparin gtt bridge to warfarin, outpt cancer screening  f/u APLS labs (lupus anticoagulant, B2 glycoprotein, anticardiolipin IgM and IgG), HTLVI+II Ab, haptoglobin, LDH Saddle PE and b/l nonocclusive DVTs. Has b/o cancer. Didn't get J&J COVID vaccine. No cancer on CTAP non con. Direct Charles IgG+ and poly+, diluted TT 50.3H, silica clotting time S/C 0.75L, DRVVT inhibitor 55.9H,   - heparin bridge to warfarin because LV thrombus; start warfarin when INR<2  - appreciate Heme recs: no genetic/functional thrombophilia, agree w/ heparin gtt bridge to warfarin, outpt cancer screening  f/u APLS labs (lupus anticoagulant, B2 glycoprotein, anticardiolipin IgM and IgG), HTLVI+II Ab, haptoglobin, LDH Saddle PE and b/l nonocclusive DVTs. Has b/o cancer. Didn't get J&J COVID vaccine. No cancer on CTAP non con. Direct Charles IgG+ and poly+, diluted TT 50.3H, silica clotting time S/C 0.75L, DRVVT inhibitor 55.9H,   - heparin bridge to warfarin because LV thrombus; start warfarin when INR<2  - appreciate Heme recs: no genetic/functional thrombophilia, agree w/ heparin gtt bridge to warfarin, outpt cancer screening  - f/u APLS labs (lupus anticoagulant, B2 glycoprotein, anticardiolipin IgM and IgG), HTLVI+II Ab, haptoglobin, LDH Saddle PE and b/l nonocclusive DVTs. Has b/o cancer. Didn't get J&J COVID vaccine. No cancer on CTAP non con. Direct Charles IgG+ and poly+, diluted TT 50.3H, silica clotting time S/C 0.75L, DRVVT inhibitor 55.9H.  - heparin bridge to warfarin because LV thrombus; start warfarin when INR<2  - appreciate Heme recs: no genetic/functional thrombophilia, agree w/ heparin gtt bridge to warfarin, outpt cancer screening  - f/u APLS labs (lupus anticoagulant, B2 glycoprotein, anticardiolipin IgM and IgG), HTLVI+II Ab, haptoglobin, LDH Saddle PE and b/l nonocclusive DVTs. Has b/o cancer. Didn't get J&J COVID vaccine. No cancer on CTAP. Direct Charles IgG+ and poly+, diluted TT 50.3H, silica clotting time S/C 0.75L, DRVVT inhibitor 55.9H.  - heparin bridge to warfarin because LV thrombus; start warfarin when INR<2  - appreciate Heme recs: no genetic/functional thrombophilia, agree w/ heparin gtt bridge to warfarin, outpt cancer screening  - f/u APLS labs (lupus anticoagulant, B2 glycoprotein, anticardiolipin IgM and IgG), HTLVI+II Ab, haptoglobin, LDH

## 2021-08-26 NOTE — PROVIDER CONTACT NOTE (OTHER) - SITUATION
patient awake and alert with no complaints, VS wnl, had 7 beats of V-tach on tele, MD made aware, will continue to monitor

## 2021-08-26 NOTE — PROVIDER CONTACT NOTE (OTHER) - SITUATION
patient had 5 beats of V-tach, patient awake and alert, denies complaints, VS MD merly made aware, will continue to monitor

## 2021-08-27 ENCOUNTER — TRANSCRIPTION ENCOUNTER (OUTPATIENT)
Age: 66
End: 2021-08-27

## 2021-08-27 LAB
ALBUMIN SERPL ELPH-MCNC: 3.8 G/DL — SIGNIFICANT CHANGE UP (ref 3.3–5)
ALP SERPL-CCNC: 258 U/L — HIGH (ref 40–120)
ALT FLD-CCNC: 16 U/L — SIGNIFICANT CHANGE UP (ref 4–33)
ANION GAP SERPL CALC-SCNC: 19 MMOL/L — HIGH (ref 7–14)
APTT BLD: 58.3 SEC — HIGH (ref 27–36.3)
AST SERPL-CCNC: 22 U/L — SIGNIFICANT CHANGE UP (ref 4–32)
BILIRUB SERPL-MCNC: 1.1 MG/DL — SIGNIFICANT CHANGE UP (ref 0.2–1.2)
BUN SERPL-MCNC: 39 MG/DL — HIGH (ref 7–23)
CALCIUM SERPL-MCNC: 9.3 MG/DL — SIGNIFICANT CHANGE UP (ref 8.4–10.5)
CHLORIDE SERPL-SCNC: 104 MMOL/L — SIGNIFICANT CHANGE UP (ref 98–107)
CO2 SERPL-SCNC: 15 MMOL/L — LOW (ref 22–31)
CREAT SERPL-MCNC: 1.59 MG/DL — HIGH (ref 0.5–1.3)
GLUCOSE SERPL-MCNC: 133 MG/DL — HIGH (ref 70–99)
HAPTOGLOB SERPL-MCNC: 100 MG/DL — SIGNIFICANT CHANGE UP (ref 34–200)
HCT VFR BLD CALC: 35.5 % — SIGNIFICANT CHANGE UP (ref 34.5–45)
HGB BLD-MCNC: 11.4 G/DL — LOW (ref 11.5–15.5)
HTLV I+II AB PATRN SER RIPA-IMP: SIGNIFICANT CHANGE UP
INR BLD: 1.75 RATIO — HIGH (ref 0.88–1.16)
LDH SERPL L TO P-CCNC: 333 U/L — HIGH (ref 135–225)
MAGNESIUM SERPL-MCNC: 2.3 MG/DL — SIGNIFICANT CHANGE UP (ref 1.6–2.6)
MCHC RBC-ENTMCNC: 30.9 PG — SIGNIFICANT CHANGE UP (ref 27–34)
MCHC RBC-ENTMCNC: 32.1 GM/DL — SIGNIFICANT CHANGE UP (ref 32–36)
MCV RBC AUTO: 96.2 FL — SIGNIFICANT CHANGE UP (ref 80–100)
NRBC # BLD: 0 /100 WBCS — SIGNIFICANT CHANGE UP
NRBC # FLD: 0.03 K/UL — HIGH
PHOSPHATE SERPL-MCNC: 4.1 MG/DL — SIGNIFICANT CHANGE UP (ref 2.5–4.5)
PLATELET # BLD AUTO: 258 K/UL — SIGNIFICANT CHANGE UP (ref 150–400)
POTASSIUM SERPL-MCNC: 4.4 MMOL/L — SIGNIFICANT CHANGE UP (ref 3.5–5.3)
POTASSIUM SERPL-SCNC: 4.4 MMOL/L — SIGNIFICANT CHANGE UP (ref 3.5–5.3)
PROT SERPL-MCNC: 7.6 G/DL — SIGNIFICANT CHANGE UP (ref 6–8.3)
PROTHROM AB SERPL-ACNC: 19.4 SEC — HIGH (ref 10.6–13.6)
RBC # BLD: 3.69 M/UL — LOW (ref 3.8–5.2)
RBC # FLD: 16.8 % — HIGH (ref 10.3–14.5)
SODIUM SERPL-SCNC: 138 MMOL/L — SIGNIFICANT CHANGE UP (ref 135–145)
WBC # BLD: 6.04 K/UL — SIGNIFICANT CHANGE UP (ref 3.8–10.5)
WBC # FLD AUTO: 6.04 K/UL — SIGNIFICANT CHANGE UP (ref 3.8–10.5)

## 2021-08-27 PROCEDURE — 99232 SBSQ HOSP IP/OBS MODERATE 35: CPT | Mod: GC

## 2021-08-27 RX ORDER — WARFARIN SODIUM 2.5 MG/1
5 TABLET ORAL ONCE
Refills: 0 | Status: COMPLETED | OUTPATIENT
Start: 2021-08-27 | End: 2021-08-27

## 2021-08-27 RX ADMIN — Medication 5 MILLIGRAM(S): at 23:02

## 2021-08-27 RX ADMIN — WARFARIN SODIUM 5 MILLIGRAM(S): 2.5 TABLET ORAL at 18:15

## 2021-08-27 RX ADMIN — SACUBITRIL AND VALSARTAN 1 TABLET(S): 24; 26 TABLET, FILM COATED ORAL at 06:04

## 2021-08-27 RX ADMIN — Medication 5 MILLIGRAM(S): at 18:14

## 2021-08-27 RX ADMIN — HEPARIN SODIUM 700 UNIT(S)/HR: 5000 INJECTION INTRAVENOUS; SUBCUTANEOUS at 07:50

## 2021-08-27 RX ADMIN — Medication 25 MILLIGRAM(S): at 06:04

## 2021-08-27 RX ADMIN — Medication 5 MILLIGRAM(S): at 06:05

## 2021-08-27 RX ADMIN — ANASTROZOLE 1 MILLIGRAM(S): 1 TABLET ORAL at 11:43

## 2021-08-27 RX ADMIN — SACUBITRIL AND VALSARTAN 1 TABLET(S): 24; 26 TABLET, FILM COATED ORAL at 18:15

## 2021-08-27 NOTE — DISCHARGE NOTE PROVIDER - NSDCFUADDAPPT_GEN_ALL_CORE_FT
CV    Heme     Please make an appointment with Dr. Huyen Figueroa for September 2021 to check a blood test called "INR" regularly to adjust the amount of your warfarin.    Please call [phone] to make an appointment with a Cardiologist (heart doctor) at [phone] [place] [address].    Please call [phone] to make an appointment with a Hematologist (blood clot doctor) at [facility] at [address] [facility] [address], [place] [address].     Your appointment on 9/7/21 (see above) is with Dr. Haas.    Please make an appointment with Dr. Huyen Figueroa in Sept 2021 to check a blood test called "INR" regularly to adjust the amount of your warfarin.    Please call [phone] to make an appointment in Sept/Oct 2021 with a Hematologist (blood clot doctor) at [facility] at [address] [place] [address], [place] [address].    Please call [phone] (see above) to make an appointment with a Cardiologist at [phone] [place] [address].

## 2021-08-27 NOTE — DISCHARGE NOTE PROVIDER - NSDCFUSCHEDAPPT_GEN_ALL_CORE_FT
RISHABH HUNT ; 09/13/2021 ; NPP Cardio Electro [phone]  RISHABH HUNT ; 09/28/2021 ; [facility] [address] [facility] RISHABH HUNT ; 09/07/2021 ; NP Med [facility] [address] [facility]  RISHABH HUNT ; 09/13/2021 ; hospitals Cardio Electro [phone]  RISHABH HUNT ; 09/28/2021 ; hospitals Cardio [address] [facility]

## 2021-08-27 NOTE — PROGRESS NOTE ADULT - PROBLEM SELECTOR PLAN 10
- appreciate PT rec: home with home PT, consider reconsult closer to discharge  - DASH/TLC diet  - consulted SW  - power of  Jaki [phone] or [phone] in [place]  - PCP/Alex Figueroa [phone] - appreciate PT rec: home with home PT, reconsulted  - DASH/TLC diet  - consulted SW  - power of  Jaki [phone] or [phone] in [place]  - PCP/Alex Figueroa [phone]

## 2021-08-27 NOTE — DISCHARGE NOTE PROVIDER - NSDCCPCAREPLAN_GEN_ALL_CORE_FT
PRINCIPAL DISCHARGE DIAGNOSIS  Diagnosis: VTE (venous thromboembolism)  Assessment and Plan of Treatment: You have a big clot in the blood vessels in your lungs, which probably came from the clots in the veins in both legs. The clot in your lung has not damaged your heart, and the oxygen in your blood has remained normal.   People who are not active are more likely to get clots. People with cancer are more likely to get clots, but imaging did not see active cancer in your body. Certain families are more likely to get clots; please make an appointment with Hematology (see phone in later pages) to discuss blood test results.   You will need life-long blood thinners ("warfarin") to prevent future clots and to prevent these clots from getting worse. The doctor will check your blood often to adjust the warfarin amount. Please do to stop your medicines without asking your doctor first.      SECONDARY DISCHARGE DIAGNOSES  Diagnosis: Thrombus  Assessment and Plan of Treatment: An ultrasound of your heart found a clot. The clot may be because your heart has difficulty pumping blood out.   You will need life-long blood thinners ("warfarin") to prevent future clots and to prevent these clots from getting worse. The doctor will check your blood often to adjust the warfarin amount. Please do to stop your medicines without asking your doctor first.    Diagnosis: Shortness of breath  Assessment and Plan of Treatment: Your difficulty breathing is probably because of the clot in your lungs, on top of your history of COPD and heart failure. Your oxygen level should be 88-92% because of COPD, so 100% is actually too high.     PRINCIPAL DISCHARGE DIAGNOSIS  Diagnosis: VTE (venous thromboembolism)  Assessment and Plan of Treatment: You have a big clot in the blood vessels in your lungs, which probably came from the clots in the veins in both legs. The clot in your lung has not damaged your heart, and the oxygen in your blood has remained normal.   People who are not active are more likely to get clots. People with cancer are more likely to get clots, but imaging did not see active cancer in your body. Certain families are more likely to get clots; please make an appointment with Hematology (see phone in later pages) to discuss blood test results.   You will need life-long blood thinners ("warfarin") to prevent future clots and to prevent these clots from getting worse. The doctor will check your blood often to adjust the warfarin amount. Please do to stop your medicines without asking your doctor first.      SECONDARY DISCHARGE DIAGNOSES  Diagnosis: Thrombus  Assessment and Plan of Treatment: An ultrasound of your heart found a clot. The clot may be because your heart has difficulty pumping blood out.   You will need life-long blood thinners ("warfarin") to prevent future clots and to prevent these clots from getting worse. The doctor will check your blood often to adjust the warfarin amount. Please do to stop your medicines without asking your doctor first.    Diagnosis: Shortness of breath  Assessment and Plan of Treatment: Your difficulty breathing is probably because of the clot in your lungs, on top of your history of COPD and heart failure. Your oxygen level should be 88-92% because of COPD, so 100% is actually too high.    Diagnosis: High alkaline phosphatase  Assessment and Plan of Treatment: Your alkaline phosphatase kept increasing. This is probably from a biliary problem, despite you not having a gallbladder. An ultrasound of your right upper abdomen was normal. Ask your regular doctor to check your alkaline phosphatase.       PRINCIPAL DISCHARGE DIAGNOSIS  Diagnosis: VTE (venous thromboembolism)  Assessment and Plan of Treatment: You have a big clot in the blood vessels in your lungs, which probably came from the clots in the veins in both legs. The clot in your lung has not damaged your heart, and the oxygen in your blood has remained normal.   People who are not active are more likely to get clots. People with cancer are more likely to get clots, but imaging did not see active cancer in your body. Certain families are more likely to get clots; please make an appointment with Hematology (see phone in later pages) to discuss blood test results.   You will need life-long blood thinners ("warfarin") to prevent future clots and to prevent these clots from getting worse. The doctor will check your blood often to adjust the warfarin amount. Please do to stop your medicines without asking your doctor first.      SECONDARY DISCHARGE DIAGNOSES  Diagnosis: Thrombus  Assessment and Plan of Treatment: An ultrasound of your heart found a clot. The clot may be because your heart has difficulty pumping blood out.   You will need life-long blood thinners ("warfarin") to prevent future clots and to prevent these clots from getting worse. The doctor will check your blood often to adjust the warfarin amount. Please do to stop your medicines without asking your doctor first.    Diagnosis: Shortness of breath  Assessment and Plan of Treatment: Your difficulty breathing is probably because of the clot in your lungs, on top of your history of COPD and heart failure. Your oxygen level should be 88-92% because of COPD, so 100% is actually too high.    Diagnosis: High alkaline phosphatase  Assessment and Plan of Treatment: Your alkaline phosphatase kept increasing. This is probably from a biliary problem, despite you not having a gallbladder. An ultrasound of your right upper abdomen was normal. Ask your regular doctor to check your alkaline phosphatase and bilirubin.

## 2021-08-27 NOTE — DISCHARGE NOTE PROVIDER - NSFTFHOMEHTHYNRD_GEN_ALL_CORE
Left patient a VM and sent a message through [email or web address].  Becca Dietz,     
Pt due for fasting lab appointment and ov for cholesterol and asthma for further refills.  Delaney Yang RN    
Yes

## 2021-08-27 NOTE — PROGRESS NOTE ADULT - ATTENDING COMMENTS
APLS work up sent  for CT a/p today r.o malignancy  [facility], cw heparin to coumadin bridge with INR now below 2 - dose 5mg today  not requiring O2   dipso home with home PT - has HHA CT neg for malignancy  hypercoagulable work up in process  INR coming up- dose coumadin 5mg and bridge  out-pt heme f.u  dispo home with home PT but awaiting PT re-eval

## 2021-08-27 NOTE — DISCHARGE NOTE PROVIDER - NSDCHHNEEDSERVICE_GEN_ALL_CORE
Medication teaching and assessment/Observation and assessment/Rehabilitation services/Teaching and training no discharge, no irritation, no pain, no redness, and no visual changes.

## 2021-08-27 NOTE — PROGRESS NOTE ADULT - PROBLEM SELECTOR PLAN 1
Saddle PE and b/l nonocclusive DVTs. Has b/o cancer. Didn't get J&J COVID vaccine. No cancer on CTAP. Direct Charles IgG+ and poly+, diluted TT 50.3H, silica clotting time S/C 0.75L, DRVVT inhibitor 55.9H. Elute Ab-.  - heparin bridge to warfarin because LV thrombus; start warfarin when INR<2  - appreciate Heme recs: no genetic/functional thrombophilia, agree w/ heparin gtt bridge to warfarin, outpt cancer screening  - f/u APLS labs (lupus anticoagulant, B2 glycoprotein, anticardiolipin IgM and IgG), HTLVI+II Ab, haptoglobin, LDH Saddle PE and b/l nonocclusive DVTs. Has b/o cancer. Didn't get J&J COVID vaccine. No cancer on CTAP. Direct Charles IgG+ and poly+, diluted TT 50.3H, silica clotting time S/C 0.75L, DRVVT inhibitor 55.9H, LDH 333H. Elute Ab-. Haptoglobin normal.  - heparin bridge to warfarin because LV thrombus; INR goal 2-3  - appreciate Heme recs: no genetic/functional thrombophilia, agree w/ heparin gtt bridge to warfarin, outpt cancer screening  - f/u APLS labs (lupus anticoagulant, B2 glycoprotein, anticardiolipin IgM and IgG), HTLVI+II Ab Saddle PE and b/l nonocclusive DVTs. Has b/o cancer. Didn't get J&J COVID vaccine. No cancer on CTAP. Direct Charles IgG+ and poly+, diluted TT 50.3H, silica clotting time S/C 0.75L, DRVVT inhibitor 55.9H, LDH 333H. Elute Ab-. Haptoglobin normal.  - heparin bridge to warfarin because LV thrombus; INR goal 2-3  - appreciate Heme recs: no genetic/functional thrombophilia, agree w/ heparin gtt bridge to warfarin, signed off  - f/u APLS labs (lupus anticoagulant, B2 glycoprotein, anticardiolipin IgM and IgG), HTLVI+II Ab  - f/u with Heme outpt at [facility] [phone]

## 2021-08-27 NOTE — PROGRESS NOTE ADULT - PROBLEM SELECTOR PLAN 2
Seen in LV on TTE 8/23. Maybe 2/2 low LVEF. No h/o afib/aflutter.  - heparin bridge to warfarin; start warfarin when INR<2 Seen in LV on TTE 8/23. Maybe 2/2 low LVEF. No h/o afib/aflutter.  - heparin bridge to warfarin; INR goal 2-3 Seen in LV on TTE 8/23. Maybe 2/2 low LVEF. No h/o afib/aflutter.  - heparin bridge to warfarin; INR goal 2-3  - f/u w/ PCP outpt to monitor INR because [facility] can't monitor INR

## 2021-08-27 NOTE — DISCHARGE NOTE PROVIDER - HOSPITAL COURSE
HPI:    66F with COPD/emphysema, DVT L leg 1994 with [place] IVC filter, HTN, HLD, infrarenal abd aneurysm, internal carotid aneurysm, depression, R breast cancer s/p chemo and radiation, cardiomyopathy, HFrEF with SICD, sickle cell trait, uterine fibroids s/p hysterectomy, s/p cholecystectomy, former smoker (quit 2 years ago).    Presents to ED with SOB [id number]. Describes it as pain in airways. Unable to describe when it happens and what exacerbates/alleviates it. Also endorses dry cough x3w, and no changes in sputum productions. Also endorses diarrhea for a few days, described as soft stools in the morning and sometimes in the PM after eating. Also endorses weight loss and chronic L knee pain. Denies fevers/chills, chest pain, palpitations, reflux, abd pain, nausea, emesis, leg swelling, and dysuria. Hasn't used alcohol since at least 10/2020.  Reports transferring between nursing/rehab facilities since her last hospital discharge in 10/2020, most recently leaving [facility] on 7/31/21 after ~4months. 6 months ago at a nursing rehab, she reports cutting out the meds that made her "sick" (ie nausea and emesis). Currently lives at home alone. Enjoys sitting in front of the TV all day. Has a walker that she doesn't use. Hired an aide who helps cook, wash her, but not with meds. PCP is Dr. Huyen Figueroa at [facility].     ED course:     BP 140s / 100s-110s.   DDimer~7400H. ProBNP~24215Y. HsenTnT 55H. HyperNa 147H. Cr 1.72H and eGFR 30-35L. Lactate and CBC unremarkable.   S/p furosemide 20mg IV x1  (21 Aug 2021 14:24)    Hospital course:    #Saddle PE and b/l nonocclusive DVTs. No evidence of RH strain per CTPA and TTE. No sustained hypoxia or hypotension. Sedentary lifestyle. Has h/o cancer w/o cancer on CTAP. Didn't get J&J COVID vaccine. Direct Charles IgG+ and poly+, diluted TT 50.3H, silica clotting time S/C 0.75L, DRVVT inhibitor 55.9H, LDH 333H. El[place] Ab-. Haptoglobin normal.  - heparin bridge to warfarin because LV thrombus; INR goal 2-3  - no genetic/functional thrombophilia w/u at this time per Heme  - f/u APLS labs (lupus anticoagulant, B2 glycoprotein, anticardiolipin IgM and IgG), HTLVI+II Ab    #LV thrombus seen on TTE 8/23. Maybe 2/2 low LVEF. No h/o afib/aflutter.  - heparin bridge to warfarin; INR goal 2-3    #SOB 2/2 saddle PE. B/l nonocclusive DVTs. DDimer~7400H. ProBNP~53283R->67537W. ABG pH 7.49H, pCO2 22L, SaO2 99.2H. SOB less likely 2/2 pulm acute infection or COPD exacerbation (no reported change in cough or sputum in the past month, and CXR unremarkable). Unlikely HFrEF exacerbation despite high proBNP (has been that elevated since 8/2020) and HsenTnT (likely 2/2 demand ischemia), given no JVD, peripheral edema, orthopnea, or other suggestive S/S.   - heparin bridge to warfarin because LV thrombus; INR goal 2-3  - no indication for catheter-directed thrombolysis per Card  - goal SpO2 88-92% for COPD    #Chronic HFrEF. LVEF 12% per TTE. ProBNP~62959R (51699P-63034Q since 8/2020), HsenTnT 55H likely 2/2 demand ischemia. QTc 524 8/21. Euvolemic on exam.  - has SICD  - home metoprolol succinate 25mg QD  - home sacubitril+valsartan 24+26mg BID.    #HTN. BP 140s / 100s-110s on admission.  - home metoprolol succinate 25mg QD  - hydralazine 5mg Q8H    #CKD. eGFR 30-35L and Cr 1.72H on admission, which is slightly above baseline per [employer].   - s/p furosemide 20 x1  - monitor Cr for JACQUELINE as last IV cont was 8/26.    #QTc>524 8/21.    #Hypernatremia. Na 147 on admission.    Patient is now medically ready for discharge with outpatient follow up.     HPI:    66F with COPD/emphysema, DVT L leg 1994 with [place] IVC filter, HTN, HLD, infrarenal abd aneurysm, internal carotid aneurysm, depression, R breast cancer s/p chemo and radiation, cardiomyopathy, HFrEF with SICD, sickle cell trait, uterine fibroids s/p hysterectomy, s/p cholecystectomy, former smoker (quit 2 years ago).    Presents to ED with SOB [id number]. Describes it as pain in airways. Unable to describe when it happens and what exacerbates/alleviates it. Also endorses dry cough x3w, and no changes in sputum productions. Also endorses diarrhea for a few days, described as soft stools in the morning and sometimes in the PM after eating. Also endorses weight loss and chronic L knee pain. Denies fevers/chills, chest pain, palpitations, reflux, abd pain, nausea, emesis, leg swelling, and dysuria. Hasn't used alcohol since at least 10/2020.  Reports transferring between nursing/rehab facilities since her last hospital discharge in 10/2020, most recently leaving [facility] on 7/31/21 after ~4months. 6 months ago at a nursing rehab, she reports cutting out the meds that made her "sick" (ie nausea and emesis). Currently lives at home alone. Enjoys sitting in front of the TV all day. Has a walker that she doesn't use. Hired an aide who helps cook, wash her, but not with meds. PCP is Dr. Huyen Figueroa at [facility].     ED course:     BP 140s / 100s-110s.   DDimer~7400H. ProBNP~80972O. HsenTnT 55H. HyperNa 147H. Cr 1.72H and eGFR 30-35L. Lactate and CBC unremarkable.   S/p furosemide 20mg IV x1  (21 Aug 2021 14:24)    Hospital course:    #Saddle PE and b/l nonocclusive DVTs. Has b/o cancer. Didn't get J&J COVID vaccine. No cancer on CTAP. Direct Charles IgG+ and poly+, diluted TT 50.3H, silica clotting time S/C 0.75L, DRVVT inhibitor 55.9H, LDH 333H. [place] Ab-. Anticardiolipin positive but Ig normal. Haptoglobin normal. HTLV Ab-. B2 glycoprotien negative.  - heparin bridge to warfarin because LV thrombus; INR goal 2-3; stop heparin 9/1-2 (INR >2 x4-5d)  - appreciate Heme recs: no genetic/functional thrombophilia, agree w/ heparin gtt bridge to warfarin, signed off  - f/u with Heme outpt at [facility] [phone].    #LV thrombus seen in LV on TTE 8/23. Maybe 2/2 low LVEF. No h/o afib/aflutter.  - heparin bridge to warfarin; INR goal 2-3  - f/u w/ PCP outpt to monitor INR because [facility] can't monitor INR.    #SOB 2/2 saddle PE. B/l nonocclusive DVTs. DDimer~7400H. ProBNP~79069A->13009G. ABG pH 7.49H, pCO2 22L, SaO2 99.2H. SOB less likely 2/2 pulm acute infection or COPD exacerbation (no reported change in cough or sputum in the past month, and CXR unremarkable). Unlikely HFrEF exacerbation despite high proBNP (has been that elevated since 8/2020) and HsenTnT (likely 2/2 demand ischemia), given no JVD, peripheral edema, orthopnea, or other suggestive S/S.   - heparin bridge to warfarin because LV thrombus; INR goal 2-3  - appreciate Card recs: no indication for catheter-directed thrombolysis  - goal SpO2 88-92% for COPD  - didn't meet criteria for home O2    #Chronic HFrEF. LVEF 12% per TTE. ProBNP~06983D (65611Q-14307W since 8/2020), HsenTnT 55H likely 2/2 demand ischemia. QTc 524 8/21. Euvolemic on exam.  - has SICD  - home metoprolol succinate 25mg QD  - home sacubitril+valsartan 24+26mg BID.    #HTN. BP 140s / 100s-110s on admission.  - home metoprolol succinate 25mg QD  - hydralazine 5mg Q8H    #CKD. eGFR 30-35L and Cr 1.72H on admission, which is slightly above baseline per [employer].   - s/p furosemide 20 x1  - monitor Cr for JACQUELINE as last IV cont was 8/26.    #QTc>524 8/21.    #High alkaline phosphatase. Likely hepatobiliary etiology given high GGT. H/o breast cancer w/o cancer on CTAP. S/p cholecystectomy. Normal U/S abd.    #Hypernatremia. Na 147 on admission.    Patient is now medically ready for discharge with outpatient follow up.     HPI:    66F with COPD/emphysema, DVT L leg 1994 with [place] IVC filter, HTN, HLD, infrarenal abd aneurysm, internal carotid aneurysm, depression, R breast cancer s/p chemo and radiation, cardiomyopathy, HFrEF with SICD, sickle cell trait, uterine fibroids s/p hysterectomy, s/p cholecystectomy, former smoker (quit 2 years ago).    Presents to ED with SOB [id number]. Describes it as pain in airways. Unable to describe when it happens and what exacerbates/alleviates it. Also endorses dry cough x3w, and no changes in sputum productions. Also endorses diarrhea for a few days, described as soft stools in the morning and sometimes in the PM after eating. Also endorses weight loss and chronic L knee pain. Denies fevers/chills, chest pain, palpitations, reflux, abd pain, nausea, emesis, leg swelling, and dysuria. Hasn't used alcohol since at least 10/2020.  Reports transferring between nursing/rehab facilities since her last hospital discharge in 10/2020, most recently leaving [facility] on 7/31/21 after ~4months. 6 months ago at a nursing rehab, she reports cutting out the meds that made her "sick" (ie nausea and emesis). Currently lives at home alone. Enjoys sitting in front of the TV all day. Has a walker that she doesn't use. Hired an aide who helps cook, wash her, but not with meds. PCP is Dr. Huyen Figueroa at [facility].     ED course:     BP 140s / 100s-110s.   DDimer~7400H. ProBNP~09872Y. HsenTnT 55H. HyperNa 147H. Cr 1.72H and eGFR 30-35L. Lactate and CBC unremarkable.   S/p furosemide 20mg IV x1  (21 Aug 2021 14:24)    Hospital course:    Patient had CTA which showed saddle PE and b/l nonocclusive DVTs. CTA  w/ no evidence of right heart strain. Echo performed with no right heart strain, although c/w LV apical thrombus. Interventional cardiology consulted, no intervention. Started on heparin gtt. Heme onc consulted for hypercoagulable workup. Heme recs: no genetic/functional thrombophilia, agree w/ heparin gtt bridge to warfarin. INR now stable 3.14, safe for discharge home with outpatient PCP followup and INR check scheduled for Tuesday 9/7. Patient will also need to follow up with Janis tang at [facility].

## 2021-08-27 NOTE — DISCHARGE NOTE PROVIDER - NSDCMRMEDTOKEN_GEN_ALL_CORE_FT
acetaminophen 325 mg oral tablet: 2 tab(s) orally every 6 hours, As Needed for Pain  anastrozole 1 mg oral tablet: 1 tab(s) orally once a day    **Per pharmacy  ([facility]), last filled 6/23/21 for 30 day supply  Entresto 24 mg-26 mg oral tablet: 1 tab(s) orally 2 times a day    **Per pharmacy ([facility]), last filled 7/9/21 for 30 day supply  metoprolol succinate 25 mg oral tablet, extended release: 1 tab(s) orally once a day    **Per [facility] pharmacy, metoprolol succinate is a new prescription filled on 8/17/21 for 90 day supply   acetaminophen 325 mg oral tablet: 2 tab(s) orally every 6 hours, As Needed for Pain  anastrozole 1 mg oral tablet: 1 tab(s) orally once a day    **Per pharmacy  ([facility]), last filled 6/23/21 for 30 day supply  Entresto 24 mg-26 mg oral tablet: 1 tab(s) orally 2 times a day    **Per pharmacy ([facility]), last filled 7/9/21 for 30 day supply  hydrALAZINE 10 mg oral tablet: 1.5 tab(s) orally every 8 hours   metoprolol succinate 25 mg oral tablet, extended release: 1 tab(s) orally once a day    **Per [facility] pharmacy, metoprolol succinate is a new prescription filled on 8/17/21 for 90 day supply  warfarin 3 mg oral tablet: 1 tab(s) orally once a day    acetaminophen 325 mg oral tablet: 2 tab(s) orally every 6 hours, As Needed for Pain  anastrozole 1 mg oral tablet: 1 tab(s) orally once a day    **Per pharmacy  ([facility]), last filled 6/23/21 for 30 day supply  Entresto 24 mg-26 mg oral tablet: 1 tab(s) orally 2 times a day    **Per pharmacy ([facility]), last filled 7/9/21 for 30 day supply  hydrALAZINE 10 mg oral tablet: 1.5 tab(s) orally every 8 hours   hydrALAZINE 10 mg oral tablet: 1.5 tab(s) orally 3 times a day   metoprolol succinate 25 mg oral tablet, extended release: 1 tab(s) orally once a day    **Per [facility] pharmacy, metoprolol succinate is a new prescription filled on 8/17/21 for 90 day supply  warfarin 3 mg oral tablet: 1 tab(s) orally once a day MDD:start on 9/2/21  warfarin 3 mg oral tablet: 1 tab(s) orally once a day

## 2021-08-27 NOTE — PROGRESS NOTE ADULT - SUBJECTIVE AND OBJECTIVE BOX
Resident: Bisi Velásquez, PGY1, Medicine, Pager [phone] ([facility]) [phone] ([facility])    24-Hour Events and Subjective:  - tele: sinus sage to 56  - denies chest, muscle, and bone pain  - SOB with comfort NC that I removed    Hospital Meds:  MEDICATIONS  (STANDING):  anastrozole 1 milliGRAM(s) Oral daily  heparin  Infusion.  Unit(s)/Hr (14 mL/Hr) IV Continuous <Continuous>  hydrALAZINE 5 milliGRAM(s) Oral every 8 hours  metoprolol succinate ER 25 milliGRAM(s) Oral daily  sacubitril 49 mG/valsartan 51 mG 1 Tablet(s) Oral two times a day  MEDICATIONS  (PRN):  acetaminophen   Tablet .. 650 milliGRAM(s) Oral every 6 hours PRN Mild Pain (1 - 3), Moderate Pain (4 - 6), Severe Pain (7 - 10)  aluminum hydroxide/magnesium hydroxide/simethicone Suspension 30 milliLiter(s) Oral every 6 hours PRN Dyspepsia  heparin   Injectable 6500 Unit(s) IV Push every 6 hours PRN For aPTT less than 40  heparin   Injectable 3000 Unit(s) IV Push every 6 hours PRN For aPTT between 40 - 57    Vitals:  T(F): 97.1 (27 Aug 2021 05:54), Max: 98.8 (26 Aug 2021 18:00)  HR: 76 (27 Aug 2021 05:54) (76 - 89)  BP: 136/86 (27 Aug 2021 05:54) (105/60 - 152/88)  RR: 17 (27 Aug 2021 05:54) (16 - 22)  SpO2: 96% (27 Aug 2021 05:54) (96% - 100%)  8/24 down to 91% while ambulating and 87% while at rest post ambulation.  8/25 down to 81% at rest that resolved on RA    I&O: 25 Aug 2021 07:01  -  26 Aug 2021 07:00  IN: 686 mL / OUT: 0 mL / NET: 686 mL    Physical Exam:  Con: NAD  HEENT: NCAT, no conjunctival injection, no scleral icterus, strabismus, moist oral mucosa  Neck: JVD  Resp: normal WOB at rest, CTAB  CV: RRR, physiologic S1 and S2, no murmurs, no rubs, no gallops, 1+ radial pulses  Abd: nondistended, normoactive bowel sounds, soft, RUQ tenderness, no rebound, no involuntary guarding, no organomegaly  Ext: no clubbing, warm hands, no edema  Neuro: alert, A&Ox3  Psych: appropriate mood and affect    Labs:                        11.5   6.63  )-----------( 223      ( 26 Aug 2021 07:44 )             36.3                11.6   7.47  )-----------( 233      ( 25 Aug 2021 05:15 )             36.2     PT/INR - ( 26 Aug 2021 07:44 )   PT: 17.2 sec;   INR: 1.54 ratio    PTT - ( 26 Aug 2021 07:44 )  PTT:36.0 sec    08-26  136  |  107  |  34<H>  ----------------------------<  144<H>  TNP   |  15<L>  |  1.44<H>  eGFR 43L  Ca    9.2      26 Aug 2021 07:44  Phos  3.7     08-26  Mg     2.30     08-26 08-25  144  |  108<H>  |  33<H>  ----------------------------<  162<H>  4.3   |  17<L>  |  1.43<H>  eGFR 44L  Ca    9.5      25 Aug 2021 05:15  Phos  3.0     08-25  Mg     2.40     08-25    TPro  8.0  /  Alb  3.6  /  TBili  0.8  /  DBili  x   /  AST  68<H>  /  ALT  22  /  AlkPhos  210<H>  08-26  TPro  7.6  /  Alb  3.9  /  TBili  0.8  /  DBili  x   /  AST  21  /  ALT  17  /  AlkPhos  217<H>  08-25    Studies and Radiology:     TTE 8/23: Mod-severe MR. Severe global LV systolic dysfunction. A filling defect is seen and is consistent with an LV apical thrombus. Decreased RV systolic function.     BLE Duplex 8/24:   R: Nonocclusive thrombus in the right popliteal and posterior tibial veins. Normal compressibility of the right common femoral and femoral veins. Right peroneal veins not visualized.  L: Nonocclusive thrombus in the left popliteal vein. Normal compressibility of the right common femoral and femoral veins. Posterior tibial and peroneal veins are patent.    CTAP 8/26: No evidence of a neoplastic process in the abdomen and pelvis. Significant atherosclerotic changes and thrombosed hematoma involving the visualized descending thoracic aorta and the abdominal aorta, which are both dilated measuring up to 5.4 cm, without significant interval change. Resident: Bisi Velásquez, PGY1, Medicine, Pager [phone] ([facility]) [phone] ([facility])    24-Hour Events and Subjective:  - tele: sinus, 7 beats vtach  - denies nausea and pain  - comfort [facility] Meds:  MEDICATIONS  (STANDING):  anastrozole 1 milliGRAM(s) Oral daily  heparin  Infusion.  Unit(s)/Hr (14 mL/Hr) IV Continuous <Continuous>  hydrALAZINE 5 milliGRAM(s) Oral every 8 hours  metoprolol succinate ER 25 milliGRAM(s) Oral daily  sacubitril 49 mG/valsartan 51 mG 1 Tablet(s) Oral two times a day  MEDICATIONS  (PRN):  acetaminophen   Tablet .. 650 milliGRAM(s) Oral every 6 hours PRN Mild Pain (1 - 3), Moderate Pain (4 - 6), Severe Pain (7 - 10)  aluminum hydroxide/magnesium hydroxide/simethicone Suspension 30 milliLiter(s) Oral every 6 hours PRN Dyspepsia  heparin   Injectable 6500 Unit(s) IV Push every 6 hours PRN For aPTT less than 40  heparin   Injectable 3000 Unit(s) IV Push every 6 hours PRN For aPTT between 40 - 57    Vitals:  T(F): 97.1 (27 Aug 2021 05:54), Max: 98.8 (26 Aug 2021 18:00)  HR: 76 (27 Aug 2021 05:54) (76 - 89)  BP: 136/86 (27 Aug 2021 05:54) (105/60 - 152/88)  RR: 17 (27 Aug 2021 05:54) (16 - 22)  SpO2: 96% (27 Aug 2021 05:54) (96% - 100%)  8/24 down to 91% while ambulating and 87% while at rest post ambulation.  8/25 down to 81% at rest that resolved on RA    I&O: 25 Aug 2021 07:01  -  26 Aug 2021 07:00  IN: 686 mL / OUT: 0 mL / NET: 686 mL    Physical Exam:  Con: NAD  HEENT: NCAT, no conjunctival injection, no scleral icterus, strabismus, moist oral mucosa  Neck: JVD  Resp: normal WOB at rest, CTAB  CV: RRR, physiologic S1 and S2, no murmurs, no rubs, no gallops, 1+ radial pulses  Abd: nondistended, normoactive bowel sounds, soft, RUQ tenderness, no rebound, no involuntary guarding, no organomegaly  Ext: no clubbing, warm hands, no edema  Neuro: alert, A&Ox3  Psych: appropriate mood and affect    Labs:                          11.4L  6.04  )-----------( 258      ( 27 Aug 2021 07:27 )             35.5                       11.5   6.63  )-----------( 223      ( 26 Aug 2021 07:44 )             36.3     PT/INR - ( 26 Aug 2021 07:44 )   PT: 17.2 sec;   INR: 1.54 ratio    PTT - ( 26 Aug 2021 07:44 )  PTT:36.0 sec    08-27  138  |  104  |  39<H>  ----------------------------<  133<H>  4.4   |  15<L>  |  1.59<H>  eGFR 39L  Ca    9.3      27 Aug 2021 07:27  Phos  4.1     08-27  Mg     2.30     08-27 08-26  136  |  107  |  34<H>  ----------------------------<  144<H>  TNP   |  15<L>  |  1.44<H>  eGFR 43L  Ca    9.2      26 Aug 2021 07:44  Phos  3.7     08-26  Mg     2.30     08-26    TPro  7.6  /  Alb  3.8  /  TBili  1.1  /  DBili  x   /  AST  22  /  ALT  16  /  AlkPhos  258<H>  08-27  TPro  8.0  /  Alb  3.6  /  TBili  0.8  /  DBili  x   /  AST  68<H>  /  ALT  22  /  AlkPhos  210<H>  08-26    Studies and Radiology:     TTE 8/23: Mod-severe MR. Severe global LV systolic dysfunction. A filling defect is seen and is consistent with an LV apical thrombus. Decreased RV systolic function.     BLE Duplex 8/24:   R: Nonocclusive thrombus in the right popliteal and posterior tibial veins. Normal compressibility of the right common femoral and femoral veins. Right peroneal veins not visualized.  L: Nonocclusive thrombus in the left popliteal vein. Normal compressibility of the right common femoral and femoral veins. Posterior tibial and peroneal veins are patent.    CTAP 8/26: No evidence of a neoplastic process in the abdomen and pelvis. Significant atherosclerotic changes and thrombosed hematoma involving the visualized descending thoracic aorta and the abdominal aorta, which are both dilated measuring up to 5.4 cm, without significant interval change.

## 2021-08-27 NOTE — DISCHARGE NOTE PROVIDER - NSFOLLOWUPCLINICSTOKEN_GEN_ALL_ED_FT
511696: ||09/07/2021||11\00\00||False; [id number]: ||09/07/2021||11\00\00||False;995838:2 weeks|| ||00\01||False;

## 2021-08-27 NOTE — PROGRESS NOTE ADULT - PROBLEM SELECTOR PLAN 4
LVEF 12% per TTE. ProBNP~34272L (56485J-64378J since 8/2020), HsenTnT 55H likely 2/2 demand ischemia. QTc 524 8/21. Euvolemic on exam.  - has SICD  - home metoprolol succinate 25mg QD  - home sacubitril+valsartan 24+26mg BID

## 2021-08-27 NOTE — CHART NOTE - NSCHARTNOTEFT_GEN_A_CORE
Hematology Chart Note    Recommended checking testing to r/o APLS; so far testing negative (LAC screen negative) but anticardiolipin and beta-2 glycoprotein antibodies are pending.    Ultimately, given patient's LV thrombus, she should be on warfarin regardless (avoid DOAC). Patient should be bridged to therapeutic INR prior to discharge.    CT imaging of the abdomen reviewed. Will sign off. Patient can be referred to hematology for outpatient follow up for hypercoagulable assessment as recommended in original consult note.    Please call back with any questions.    Rocky Lee MD, MPH  Hematology / Oncology Fellow, [facility]  p  Hematology Chart Note    Recommended checking testing to r/o APLS; so far testing negative (LAC screen negative) but anticardiolipin and beta-2 glycoprotein antibodies are pending.    Ultimately, given patient's LV thrombus, she should be on warfarin regardless (avoid DOAC). Patient should be bridged to therapeutic INR prior to discharge.    CT imaging of the abdomen reviewed. Will sign off. Patient can be referred to hematology for outpatient follow up for hypercoagulable assessment as recommended in original consult note; patient should continue to follow up with her PMD or her cardiologist post-discharge for INR monitoring, as this cannot be performed at [facility].    Please call back with any questions.    Rocky Lee MD, MPH  Hematology / Oncology Fellow, [facility]  p  Hematology Chart Note    Recommended checking testing to r/o APLS; so far testing negative (LAC screen negative) but anticardiolipin and beta-2 glycoprotein antibodies are pending.    Ultimately, given patient's LV thrombus, she should be on warfarin regardless (avoid DOAC). Patient should be bridged to therapeutic INR prior to discharge.    CT imaging of the abdomen reviewed. Will sign off. Patient can be referred to hematology for outpatient follow up for hypercoagulable assessment as recommended in original consult note; patient should continue to follow up with her PMD or her cardiologist post-discharge for INR monitoring, as this cannot be performed at [facility].    Will refer to [facility], who will call patient with appointment. Patient can also call [phone] (please provide number in discharge paperwork).    Please call back with any questions.    Rocky Lee MD, MPH  Hematology / Oncology Fellow, [id number]  p

## 2021-08-27 NOTE — PROGRESS NOTE ADULT - ASSESSMENT
66F with COPD/emphysema, DVT L leg 1994 with [place] IVC filter, HTN, HLD, thoracic aorta and infrarenal abd aortic dilation 2/2 thrombosed hematoma, internal carotid aneurysm, depression, R breast cancer s/p chemo and radiation, cardiomyopathy, CHF with SICD, sickle cell trait, uterine fibroids s/p hysterectomy, s/p cholecystectomy, and former smoker (quit 2 years ago).   Presented to ED with SOB [id number] and dry cough x3w. Found to have saddle embolism, LV apical thrombus, and b/l nonocclusive DVTs. Heparin bridge to warfarin.

## 2021-08-27 NOTE — PROGRESS NOTE ADULT - PROBLEM SELECTOR PLAN 6
eGFR 30-35L and Cr 1.72H on admission, which is slightly above baseline per AllScripts.   - s/p furosemide 20 x1 eGFR 30-35L and Cr 1.72H on admission, which is slightly above baseline per AllScripts.   - s/p furosemide 20 x1  - monitor Cr for JACQUELINE as last IV cont was 8/26

## 2021-08-27 NOTE — PROGRESS NOTE ADULT - PROBLEM SELECTOR PLAN 3
2/2 at least submassive saddle pulmonary embolus w/o evidence of RH strain per CTPA and TTE. B/l nonocclusive DVTs. DDimer~7400H. ProBNP~35565X->20370K. ABG pH 7.49H, pCO2 22L, SaO2 99.2H.  SOB less likely 2/2 pulm acute infection or COPD exacerbation (no reported change in cough or sputum in the past month, and CXR unremarkable). Unlikely HFrEF exacerbation despite high proBNP (has been that elevated since 8/2020) and HsenTnT (likely 2/2 demand ischemia), given no JVD, peripheral edema, orthopnea, or other suggestive S/S.   - heparin bridge to warfarin because LV thrombus; start warfarin when INR<2  - appreciate Card recs: no indication for catheter-directed thrombolysis  - goal SpO2 88-92% for COPD  - continuous pulse ox 2/2 at least submassive saddle pulmonary embolus w/o evidence of RH strain per CTPA and TTE. B/l nonocclusive DVTs. DDimer~7400H. ProBNP~32342E->03181S. ABG pH 7.49H, pCO2 22L, SaO2 99.2H.  SOB less likely 2/2 pulm acute infection or COPD exacerbation (no reported change in cough or sputum in the past month, and CXR unremarkable). Unlikely HFrEF exacerbation despite high proBNP (has been that elevated since 8/2020) and HsenTnT (likely 2/2 demand ischemia), given no JVD, peripheral edema, orthopnea, or other suggestive S/S.   - heparin bridge to warfarin because LV thrombus; INR goal 2-3  - appreciate Card recs: no indication for catheter-directed thrombolysis  - goal SpO2 88-92% for COPD  - continuous pulse ox

## 2021-08-27 NOTE — DISCHARGE NOTE PROVIDER - CARE PROVIDER_API CALL
Cuba Guerrier)  Cardiology; Internal Medicine  [address] [place] [address], [address]  [place] [address]  Phone: [phone]  Fax: [phone]  Follow Up Time:

## 2021-08-27 NOTE — DISCHARGE NOTE PROVIDER - NSFOLLOWUPCLINICS_GEN_ALL_ED_FT
[facility] - Primary Care  Primary Care  [address] [place] [address]  Phone: [phone]  Fax:   Scheduled Appointment: 09/07/2021 11:00     [facility] - Primary Care  Primary Care  [address] [place] [address]  Phone: [phone]  Fax:   Scheduled Appointment: 9/7/2021 11:00 AM    [facility]  Hematology/Oncology  [address] [place] [address]  Phone: [phone]  Fax:   Follow Up Time: 2 weeks

## 2021-08-28 LAB
ALBUMIN SERPL ELPH-MCNC: 3.9 G/DL — SIGNIFICANT CHANGE UP (ref 3.3–5)
ALP SERPL-CCNC: 308 U/L — HIGH (ref 40–120)
ALT FLD-CCNC: 17 U/L — SIGNIFICANT CHANGE UP (ref 4–33)
ANION GAP SERPL CALC-SCNC: 20 MMOL/L — HIGH (ref 7–14)
APTT BLD: 109.6 SEC — HIGH (ref 27–36.3)
APTT BLD: 44.3 SEC — HIGH (ref 27–36.3)
APTT BLD: 51 SEC — HIGH (ref 27–36.3)
AST SERPL-CCNC: 21 U/L — SIGNIFICANT CHANGE UP (ref 4–32)
BILIRUB SERPL-MCNC: 1.2 MG/DL — SIGNIFICANT CHANGE UP (ref 0.2–1.2)
BUN SERPL-MCNC: 37 MG/DL — HIGH (ref 7–23)
CALCIUM SERPL-MCNC: 9.5 MG/DL — SIGNIFICANT CHANGE UP (ref 8.4–10.5)
CHLORIDE SERPL-SCNC: 104 MMOL/L — SIGNIFICANT CHANGE UP (ref 98–107)
CO2 SERPL-SCNC: 16 MMOL/L — LOW (ref 22–31)
CREAT SERPL-MCNC: 1.45 MG/DL — HIGH (ref 0.5–1.3)
GLUCOSE SERPL-MCNC: 136 MG/DL — HIGH (ref 70–99)
HCT VFR BLD CALC: 37.2 % — SIGNIFICANT CHANGE UP (ref 34.5–45)
HGB BLD-MCNC: 11.7 G/DL — SIGNIFICANT CHANGE UP (ref 11.5–15.5)
INR BLD: 1.66 RATIO — HIGH (ref 0.88–1.16)
MAGNESIUM SERPL-MCNC: 2.3 MG/DL — SIGNIFICANT CHANGE UP (ref 1.6–2.6)
MCHC RBC-ENTMCNC: 30.2 PG — SIGNIFICANT CHANGE UP (ref 27–34)
MCHC RBC-ENTMCNC: 31.5 GM/DL — LOW (ref 32–36)
MCV RBC AUTO: 96.1 FL — SIGNIFICANT CHANGE UP (ref 80–100)
NRBC # BLD: 0 /100 WBCS — SIGNIFICANT CHANGE UP
NRBC # FLD: 0.02 K/UL — HIGH
PHOSPHATE SERPL-MCNC: 3.5 MG/DL — SIGNIFICANT CHANGE UP (ref 2.5–4.5)
PLATELET # BLD AUTO: 263 K/UL — SIGNIFICANT CHANGE UP (ref 150–400)
POTASSIUM SERPL-MCNC: 4.2 MMOL/L — SIGNIFICANT CHANGE UP (ref 3.5–5.3)
POTASSIUM SERPL-SCNC: 4.2 MMOL/L — SIGNIFICANT CHANGE UP (ref 3.5–5.3)
PROT SERPL-MCNC: 7.4 G/DL — SIGNIFICANT CHANGE UP (ref 6–8.3)
PROTHROM AB SERPL-ACNC: 18.5 SEC — HIGH (ref 10.6–13.6)
RBC # BLD: 3.87 M/UL — SIGNIFICANT CHANGE UP (ref 3.8–5.2)
RBC # FLD: 17.4 % — HIGH (ref 10.3–14.5)
SODIUM SERPL-SCNC: 140 MMOL/L — SIGNIFICANT CHANGE UP (ref 135–145)
WBC # BLD: 5.54 K/UL — SIGNIFICANT CHANGE UP (ref 3.8–10.5)
WBC # FLD AUTO: 5.54 K/UL — SIGNIFICANT CHANGE UP (ref 3.8–10.5)

## 2021-08-28 PROCEDURE — 93010 ELECTROCARDIOGRAM REPORT: CPT

## 2021-08-28 PROCEDURE — 99232 SBSQ HOSP IP/OBS MODERATE 35: CPT | Mod: GC

## 2021-08-28 RX ORDER — WARFARIN SODIUM 2.5 MG/1
7.5 TABLET ORAL AT BEDTIME
Refills: 0 | Status: DISCONTINUED | OUTPATIENT
Start: 2021-08-28 | End: 2021-08-28

## 2021-08-28 RX ORDER — WARFARIN SODIUM 2.5 MG/1
5 TABLET ORAL ONCE
Refills: 0 | Status: DISCONTINUED | OUTPATIENT
Start: 2021-08-28 | End: 2021-08-28

## 2021-08-28 RX ORDER — WARFARIN SODIUM 2.5 MG/1
7.5 TABLET ORAL ONCE
Refills: 0 | Status: COMPLETED | OUTPATIENT
Start: 2021-08-28 | End: 2021-08-28

## 2021-08-28 RX ORDER — HYDRALAZINE HCL 50 MG
15 TABLET ORAL EVERY 8 HOURS
Refills: 0 | Status: DISCONTINUED | OUTPATIENT
Start: 2021-08-28 | End: 2021-09-03

## 2021-08-28 RX ADMIN — Medication 15 MILLIGRAM(S): at 14:16

## 2021-08-28 RX ADMIN — HEPARIN SODIUM 700 UNIT(S)/HR: 5000 INJECTION INTRAVENOUS; SUBCUTANEOUS at 15:10

## 2021-08-28 RX ADMIN — HEPARIN SODIUM 3000 UNIT(S): 5000 INJECTION INTRAVENOUS; SUBCUTANEOUS at 08:04

## 2021-08-28 RX ADMIN — SACUBITRIL AND VALSARTAN 1 TABLET(S): 24; 26 TABLET, FILM COATED ORAL at 18:27

## 2021-08-28 RX ADMIN — SACUBITRIL AND VALSARTAN 1 TABLET(S): 24; 26 TABLET, FILM COATED ORAL at 06:44

## 2021-08-28 RX ADMIN — Medication 25 MILLIGRAM(S): at 06:45

## 2021-08-28 RX ADMIN — ANASTROZOLE 1 MILLIGRAM(S): 1 TABLET ORAL at 11:24

## 2021-08-28 RX ADMIN — WARFARIN SODIUM 7.5 MILLIGRAM(S): 2.5 TABLET ORAL at 18:27

## 2021-08-28 RX ADMIN — Medication 15 MILLIGRAM(S): at 21:24

## 2021-08-28 RX ADMIN — HEPARIN SODIUM 900 UNIT(S)/HR: 5000 INJECTION INTRAVENOUS; SUBCUTANEOUS at 08:04

## 2021-08-28 RX ADMIN — Medication 5 MILLIGRAM(S): at 08:04

## 2021-08-28 RX ADMIN — HEPARIN SODIUM 900 UNIT(S)/HR: 5000 INJECTION INTRAVENOUS; SUBCUTANEOUS at 23:47

## 2021-08-28 RX ADMIN — HEPARIN SODIUM 3000 UNIT(S): 5000 INJECTION INTRAVENOUS; SUBCUTANEOUS at 23:53

## 2021-08-28 NOTE — PROVIDER CONTACT NOTE (OTHER) - ACTION/TREATMENT ORDERED:
As ordered by HS #5, will continue to monitor pt
awaiting orders.
Provider notified. Provider to come to Patient bedside. No new orders at this time
As per provider Bisi Velásquez (#[id number]), administer hydralazine order once verified by pharmacy and no interventions needed for patient's complaint of SOB
EKG performed, due hydralazine given for BP, continue to monitor on tele
Pending.
provider notified. no new orders
As per HS #5 Kelsey, will continue to monitor pt.
Pending
As per HS #5, will continue to monitor pt.
As per provider Bisi Velásquez (#[id number]), no interventions needed for at this time for patient's BP and SOB. RN will continue to monitor.
Pending
awaiting orders.

## 2021-08-28 NOTE — PROGRESS NOTE ADULT - PROBLEM SELECTOR PLAN 5
BP 140s / 100s-110s on admission.  - home metoprolol succinate 25mg QD  - hydralazine 5mg Q8H BP 140s / 100s-110s on admission.  - home metoprolol succinate 25mg QD  - Incr hydral to 15 mg q8h

## 2021-08-28 NOTE — PROVIDER CONTACT NOTE (OTHER) - SITUATION
patient with 22 beats of V. tach on telemetry monitoring, patient c/o 4/10 dull chest pain that is chest pain she has been feeling intermittently for ~1 months. /112, otherwise VS stable

## 2021-08-28 NOTE — PROVIDER CONTACT NOTE (OTHER) - DATE AND TIME:
23-Aug-2021 12:04
26-Aug-2021
26-Aug-2021
21-Aug-2021 17:58
21-Aug-2021 23:52
26-Aug-2021
23-Aug-2021 01:56
21-Aug-2021 20:52
24-Aug-2021 09:55
25-Aug-2021 07:55
21-Aug-2021 19:12
25-Aug-2021 15:46
23-Aug-2021 10:35
23-Aug-2021 20:10
23-Aug-2021 22:35
28-Aug-2021 08:15

## 2021-08-28 NOTE — PROGRESS NOTE ADULT - PROBLEM SELECTOR PLAN 2
Seen in LV on TTE 8/23. Maybe 2/2 low LVEF. No h/o afib/aflutter.  - heparin bridge to warfarin; INR goal 2-3  - f/u w/ PCP outpt to monitor INR because Kyle can't monitor INR

## 2021-08-28 NOTE — PROGRESS NOTE ADULT - PROBLEM SELECTOR PLAN 10
- appreciate PT rec: home with home PT, reconsulted  - DASH/TLC diet  - consulted SW  - power of  Jaki [phone] or [phone] in [place]  - PCP/Alex Figueroa [phone]

## 2021-08-28 NOTE — PROVIDER CONTACT NOTE (OTHER) - REASON
Patient had 3 beats of V-tach
Patient had 4 beats of V-tach
Pt's SpO2 desaturated to 87% on room air while at rest
PT SOB
Patient had 5 beats of V-tach
Blood pressure 143/109
patient  with 22 beats of V. Tach and 4/10 dull chest pain
patient's BP is 134/107 and patient complains of shortness of breath with O2 saturation of 100% on room air
Blood pressure 135/102
PT SOB
Pt desaturated to 81% on room air
Pt's aPTT 44.7
patient's BP is 135/109 and patient complains of shortness of breath with O2 saturation of 100% on room air

## 2021-08-28 NOTE — PROVIDER CONTACT NOTE (OTHER) - RECOMMENDATIONS
As per provider Bisi Velásquez (#[id number]), no interventions needed for at this time for patient's BP and SOB. RN will continue to monitor.
Consider evaluation for BRYAN, BIPAP/CPAP at night and home oxygen. Continue to monitor Pt.
Continue to follow Heparin nomogram and monitor Pt.
Continue to monitor patient
EKG, continue to monitor on telemetry
team to come assess. Place on higher level of O2
As per provider Bisi Velásquez (#[id number]), administer hydralazine order once verified by pharmacy and no interventions needed for patient's complaint of SOB
PRN oxygen therapy to be ordered. Consider home O2 therapy with SPO2 of 87% while at rest. Continue to monitor Pt.
Continue to monitor pt
Continue to monitor pt.
team to come assess

## 2021-08-28 NOTE — PROGRESS NOTE ADULT - PROBLEM SELECTOR PLAN 1
Saddle PE and b/l nonocclusive DVTs. Has b/o cancer. Didn't get J&J COVID vaccine. No cancer on CTAP. Direct Charles IgG+ and poly+, diluted TT 50.3H, silica clotting time S/C 0.75L, DRVVT inhibitor 55.9H, LDH 333H. [place] Ab-. Haptoglobin normal.  - heparin bridge to warfarin because LV thrombus; INR goal 2-3  - appreciate Heme recs: no genetic/functional thrombophilia, agree w/ heparin gtt bridge to warfarin, signed off  - f/u APLS labs (lupus anticoagulant, B2 glycoprotein, anticardiolipin IgM and IgG), HTLVI+II Ab  - f/u with Heme outpt at [facility] [phone]

## 2021-08-28 NOTE — PROGRESS NOTE ADULT - PROBLEM SELECTOR PLAN 4
LVEF 12% per TTE. ProBNP~67021B (56642G-53051V since 8/2020), HsenTnT 55H likely 2/2 demand ischemia. QTc 524 8/21. Euvolemic on exam.  - has SICD  - home metoprolol succinate 25mg QD  - home sacubitril+valsartan 24+26mg BID

## 2021-08-28 NOTE — PROGRESS NOTE ADULT - PROBLEM SELECTOR PLAN 3
2/2 at least submassive saddle pulmonary embolus w/o evidence of RH strain per CTPA and TTE. B/l nonocclusive DVTs. DDimer~7400H. ProBNP~49818L->30329Y. ABG pH 7.49H, pCO2 22L, SaO2 99.2H.  SOB less likely 2/2 pulm acute infection or COPD exacerbation (no reported change in cough or sputum in the past month, and CXR unremarkable). Unlikely HFrEF exacerbation despite high proBNP (has been that elevated since 8/2020) and HsenTnT (likely 2/2 demand ischemia), given no JVD, peripheral edema, orthopnea, or other suggestive S/S.   - heparin bridge to warfarin because LV thrombus; INR goal 2-3  - appreciate Card recs: no indication for catheter-directed thrombolysis  - goal SpO2 88-92% for COPD  - continuous pulse ox

## 2021-08-28 NOTE — PROGRESS NOTE ADULT - ATTENDING COMMENTS
dose coumadin higher at 7.5 mg today as INR trended down  inc hydra to 15mg po q8 for elevated DBP - but hold parameter for SBP as systolic controlled  renal fx remains stable

## 2021-08-28 NOTE — PROGRESS NOTE ADULT - PROBLEM SELECTOR PLAN 6
eGFR 30-35L and Cr 1.72H on admission, which is slightly above baseline per AllScripts.   - s/p furosemide 20 x1  - monitor Cr for JACQUELINE as last IV cont was 8/26

## 2021-08-28 NOTE — PROVIDER CONTACT NOTE (OTHER) - BACKGROUND
Pt has history of COPD and emphysema. Pt presents to ED with SOB for one month.
Pt on Heparin infusion with history of critical aPTT
66 year old female admitted for shortness of breath
Patient admitted for shortness for breath with a history of hypertension
Admited for shortness of breath
Patient admitted for SOB and dry cough. Pt has pmh of DVT, breast cancer, and HTN,
Pt admitted with COPD/emphysema and is on a Heparin Infusion
66 year old female admitted with shortness of breath found to have saddle PE and BL DVT. PMH of breast cancer, COPD/emphysema, CHF and cardiomyopathy.
Patient admitted for SOB and dry cough. Pt has pmh of DVT, breast cancer, and HTN.
Patient admitted for shortness of breath
66 year old female admitted for shortness of breath
Admited for shortness of breath
Patient admitted for SOB and dry cough. Pt has pmh of breast cancer, DVT, and HTN.

## 2021-08-28 NOTE — PROGRESS NOTE ADULT - SUBJECTIVE AND OBJECTIVE BOX
PROGRESS NOTE:     Patient is a 66y old  Female who presents with a chief complaint of SOB (27 Aug 2021 16:20)      SUBJECTIVE / OVERNIGHT EVENTS:    ADDITIONAL REVIEW OF SYSTEMS:    MEDICATIONS  (STANDING):  anastrozole 1 milliGRAM(s) Oral daily  heparin  Infusion.  Unit(s)/Hr (14 mL/Hr) IV Continuous <Continuous>  hydrALAZINE 5 milliGRAM(s) Oral every 8 hours  metoprolol succinate ER 25 milliGRAM(s) Oral daily  sacubitril 49 mG/valsartan 51 mG 1 Tablet(s) Oral two times a day    MEDICATIONS  (PRN):  acetaminophen   Tablet .. 650 milliGRAM(s) Oral every 6 hours PRN Mild Pain (1 - 3), Moderate Pain (4 - 6), Severe Pain (7 - 10)  aluminum hydroxide/magnesium hydroxide/simethicone Suspension 30 milliLiter(s) Oral every 6 hours PRN Dyspepsia  heparin   Injectable 6500 Unit(s) IV Push every 6 hours PRN For aPTT less than 40  heparin   Injectable 3000 Unit(s) IV Push every 6 hours PRN For aPTT between 40 - 57      CAPILLARY BLOOD GLUCOSE        I&O's Summary      PHYSICAL EXAM:  Vital Signs Last 24 Hrs  T(C): 36.7 (28 Aug 2021 06:19), Max: 36.7 (27 Aug 2021 18:00)  T(F): 98 (28 Aug 2021 06:19), Max: 98 (27 Aug 2021 18:00)  HR: 86 (28 Aug 2021 06:19) (79 - 86)  BP: 156/94 (28 Aug 2021 06:19) (132/107 - 158/100)  BP(mean): --  RR: 18 (28 Aug 2021 06:19) (18 - 18)  SpO2: 99% (28 Aug 2021 06:19) (98% - 100%)    CONSTITUTIONAL: NAD, well-developed  CARDIOVASCULAR: Regular rate and rhythm. Normal S1 and S2. No murmurs.  RESPIRATORY: Normal respiratory effort, Lungs CTAB  EXTREMITIES: No EZEQUIEL, peripheral pulses intact.  ABDOMEN: Nontender to palpation, normoactive bowel sounds, no rebound/guarding; No hepatosplenomegaly  MUSCLOSKELETAL: no clubbing or cyanosis of digits; no joint swelling or tenderness to palpation  PSYCH: A+O to person, place, and time; affect appropriate    LABS:                        11.4   6.04  )-----------( 258      ( 27 Aug 2021 07:27 )             35.5     08-27    138  |  104  |  39<H>  ----------------------------<  133<H>  4.4   |  15<L>  |  1.59<H>    Ca    9.3      27 Aug 2021 07:27  Phos  4.1     08-27  Mg     2.30     08-27    TPro  7.6  /  Alb  3.8  /  TBili  1.1  /  DBili  x   /  AST  22  /  ALT  16  /  AlkPhos  258<H>  08-27    PT/INR - ( 27 Aug 2021 07:27 )   PT: 19.4 sec;   INR: 1.75 ratio         PTT - ( 27 Aug 2021 07:27 )  PTT:58.3 sec            RADIOLOGY & ADDITIONAL TESTS:  Results Reviewed:   Imaging Personally Reviewed:  Electrocardiogram Personally Reviewed:    COORDINATION OF CARE:  Care Discussed with Consultants/Other Providers [Y/N]:  Prior or Outpatient Records Reviewed [Y/N]:   PROGRESS NOTE:     Patient is a 66y old  Female who presents with a chief complaint of SOB (27 Aug 2021 16:20)      SUBJECTIVE / OVERNIGHT EVENTS:  Complains of shortness of breath but she notes O2 always 100%    ADDITIONAL REVIEW OF SYSTEMS:  No fevers, chest pain, shortness of breath, abdominal pain, lower extremity swelling or pain, dysuria, or constipation.    MEDICATIONS  (STANDING):  anastrozole 1 milliGRAM(s) Oral daily  heparin  Infusion.  Unit(s)/Hr (14 mL/Hr) IV Continuous <Continuous>  hydrALAZINE 5 milliGRAM(s) Oral every 8 hours  metoprolol succinate ER 25 milliGRAM(s) Oral daily  sacubitril 49 mG/valsartan 51 mG 1 Tablet(s) Oral two times a day    MEDICATIONS  (PRN):  acetaminophen   Tablet .. 650 milliGRAM(s) Oral every 6 hours PRN Mild Pain (1 - 3), Moderate Pain (4 - 6), Severe Pain (7 - 10)  aluminum hydroxide/magnesium hydroxide/simethicone Suspension 30 milliLiter(s) Oral every 6 hours PRN Dyspepsia  heparin   Injectable 6500 Unit(s) IV Push every 6 hours PRN For aPTT less than 40  heparin   Injectable 3000 Unit(s) IV Push every 6 hours PRN For aPTT between 40 - 57      CAPILLARY BLOOD GLUCOSE        I&O's Summary      PHYSICAL EXAM:  Vital Signs Last 24 Hrs  T(C): 36.7 (28 Aug 2021 06:19), Max: 36.7 (27 Aug 2021 18:00)  T(F): 98 (28 Aug 2021 06:19), Max: 98 (27 Aug 2021 18:00)  HR: 86 (28 Aug 2021 06:19) (79 - 86)  BP: 156/94 (28 Aug 2021 06:19) (132/107 - 158/100)  BP(mean): --  RR: 18 (28 Aug 2021 06:19) (18 - 18)  SpO2: 99% (28 Aug 2021 06:19) (98% - 100%)    CONSTITUTIONAL: NAD, well-developed  CARDIOVASCULAR: Regular rate and rhythm. Normal S1 and S2. No murmurs.  RESPIRATORY: Normal respiratory effort, Lungs CTAB. No incr wob.  EXTREMITIES: No EZEQUIEL, peripheral pulses intact.  ABDOMEN: Nontender to palpation, normoactive bowel sounds, no rebound/guarding; No hepatosplenomegaly  MUSCLOSKELETAL: no clubbing or cyanosis of digits; no joint swelling or tenderness to palpation  PSYCH: A+O to person, place, and time; affect appropriate    LABS:                        11.4   6.04  )-----------( 258      ( 27 Aug 2021 07:27 )             35.5     08-27    138  |  104  |  39<H>  ----------------------------<  133<H>  4.4   |  15<L>  |  1.59<H>    Ca    9.3      27 Aug 2021 07:27  Phos  4.1     08-27  Mg     2.30     08-27    TPro  7.6  /  Alb  3.8  /  TBili  1.1  /  DBili  x   /  AST  22  /  ALT  16  /  AlkPhos  258<H>  08-27    PT/INR - ( 27 Aug 2021 07:27 )   PT: 19.4 sec;   INR: 1.75 ratio         PTT - ( 27 Aug 2021 07:27 )  PTT:58.3 sec            RADIOLOGY & ADDITIONAL TESTS:  Results Reviewed: INR decreased  Imaging Personally Reviewed:  Electrocardiogram Personally Reviewed:    COORDINATION OF CARE:  Care Discussed with Consultants/Other Providers [Y/N]:  Prior or Outpatient Records Reviewed [Y/N]:

## 2021-08-28 NOTE — PROVIDER CONTACT NOTE (OTHER) - NAME OF MD/NP/PA/DO NOTIFIED:
HS #5 Kelsey Oh
HS5 Yonathan Olmos
[place], [address]
T5, Dr. Bisi Velásquez # [id number]
MD Anny Oh # [id number]
[place], [address]
Bisi Velásquez (#[id number])
T 5, Dr. Bisi Velásquez # [id number]
Team 5 Bisi [id number]
Bisi Velásquez (#[id number])
HS#5 Team MD Bisi Velásquez
HS #5 Anny Oh
HS #5 Anny Oh
HS#5 Bisi Velásquez
Team 5 Bisi Albrecht
HS#5 Bisi Velásquez

## 2021-08-28 NOTE — PROVIDER CONTACT NOTE (OTHER) - ASSESSMENT
Patient is A&Ox4, patient denies pain, chest pain, nausea, vomiting or dizziness. patient complains of shortness of breath but is satting at 100% on room air.
Patient is stable and asymptomatic. Patient denies chest pain.
Pt is AOx4, with intermittent confusion, MD Velásquez HS Team #5 aware
Patients vital signs are stable. Patient is asymptomatic and denies chest pain.
Patients vital signs are stable. Patient is asymptomatic and denies chest pain.
Patient is A&Ox4, patient denies pain, chest pain, nausea, vomiting or dizziness. patient complains of shortness of breath but is satting at 100% on room air.
Pt is AOX4 without signs or symptoms of distress
Pt is AOX4 without signs or symptoms of distress.
PT very SOB. RR 28 on 2 L Nasal Canula. O2 Sat 100%. /102. HR 73.Temp 97.9.
See flowsheet for VS.  Patient RR 30 complaints of SOB on 2 L
Patient complaining of chest pain 5/10. EKG completed.
A&Ox3, patient confused at times. NSR on telemetry monitoring. Breathing on room air, patient c/o dyspnea with exertion. C/o 4/10 dull chest pain, states chest pain has the same chest pain she has been intermittently experiencing for ~1 month. patient currently on heparin gtt.
Patient asymptomatic

## 2021-08-28 NOTE — PROGRESS NOTE ADULT - ASSESSMENT
66F with COPD/emphysema, DVT L leg 1994 with [place] IVC filter, HTN, HLD, thoracic aorta and infrarenal abd aortic dilation 2/2 thrombosed hematoma, internal carotid aneurysm, depression, R breast cancer s/p chemo and radiation, cardiomyopathy, CHF with SICD, sickle cell trait, uterine fibroids s/p hysterectomy, s/p cholecystectomy, and former smoker (quit 2 years ago).   Presented to ED with SOB [id number] and dry cough x3w. Found to have saddle embolism, LV apical thrombus, and b/l nonocclusive DVTs. Heparin bridge to warfarin.

## 2021-08-29 LAB
ALBUMIN SERPL ELPH-MCNC: 4 G/DL — SIGNIFICANT CHANGE UP (ref 3.3–5)
ALP SERPL-CCNC: 354 U/L — HIGH (ref 40–120)
ALT FLD-CCNC: 16 U/L — SIGNIFICANT CHANGE UP (ref 4–33)
ANION GAP SERPL CALC-SCNC: 19 MMOL/L — HIGH (ref 7–14)
APTT BLD: 100.9 SEC — HIGH (ref 27–36.3)
APTT BLD: 108.8 SEC — HIGH (ref 27–36.3)
APTT BLD: 39 SEC — HIGH (ref 27–36.3)
AST SERPL-CCNC: 23 U/L — SIGNIFICANT CHANGE UP (ref 4–32)
BILIRUB SERPL-MCNC: 1.1 MG/DL — SIGNIFICANT CHANGE UP (ref 0.2–1.2)
BUN SERPL-MCNC: 36 MG/DL — HIGH (ref 7–23)
CALCIUM SERPL-MCNC: 9.6 MG/DL — SIGNIFICANT CHANGE UP (ref 8.4–10.5)
CHLORIDE SERPL-SCNC: 105 MMOL/L — SIGNIFICANT CHANGE UP (ref 98–107)
CO2 SERPL-SCNC: 14 MMOL/L — LOW (ref 22–31)
CREAT SERPL-MCNC: 1.45 MG/DL — HIGH (ref 0.5–1.3)
GLUCOSE SERPL-MCNC: 137 MG/DL — HIGH (ref 70–99)
HCT VFR BLD CALC: 37.7 % — SIGNIFICANT CHANGE UP (ref 34.5–45)
HGB BLD-MCNC: 12.1 G/DL — SIGNIFICANT CHANGE UP (ref 11.5–15.5)
INR BLD: 2.04 RATIO — HIGH (ref 0.88–1.16)
MAGNESIUM SERPL-MCNC: 2.3 MG/DL — SIGNIFICANT CHANGE UP (ref 1.6–2.6)
MCHC RBC-ENTMCNC: 30.7 PG — SIGNIFICANT CHANGE UP (ref 27–34)
MCHC RBC-ENTMCNC: 32.1 GM/DL — SIGNIFICANT CHANGE UP (ref 32–36)
MCV RBC AUTO: 95.7 FL — SIGNIFICANT CHANGE UP (ref 80–100)
NRBC # BLD: 0 /100 WBCS — SIGNIFICANT CHANGE UP
NRBC # FLD: 0.02 K/UL — HIGH
PHOSPHATE SERPL-MCNC: 3.6 MG/DL — SIGNIFICANT CHANGE UP (ref 2.5–4.5)
PLATELET # BLD AUTO: 294 K/UL — SIGNIFICANT CHANGE UP (ref 150–400)
POTASSIUM SERPL-MCNC: 4.3 MMOL/L — SIGNIFICANT CHANGE UP (ref 3.5–5.3)
POTASSIUM SERPL-SCNC: 4.3 MMOL/L — SIGNIFICANT CHANGE UP (ref 3.5–5.3)
PROT SERPL-MCNC: 7.8 G/DL — SIGNIFICANT CHANGE UP (ref 6–8.3)
PROTHROM AB SERPL-ACNC: 22.7 SEC — HIGH (ref 10.6–13.6)
RBC # BLD: 3.94 M/UL — SIGNIFICANT CHANGE UP (ref 3.8–5.2)
RBC # FLD: 17.2 % — HIGH (ref 10.3–14.5)
SODIUM SERPL-SCNC: 138 MMOL/L — SIGNIFICANT CHANGE UP (ref 135–145)
WBC # BLD: 6.49 K/UL — SIGNIFICANT CHANGE UP (ref 3.8–10.5)
WBC # FLD AUTO: 6.49 K/UL — SIGNIFICANT CHANGE UP (ref 3.8–10.5)

## 2021-08-29 PROCEDURE — 99232 SBSQ HOSP IP/OBS MODERATE 35: CPT | Mod: GC

## 2021-08-29 PROCEDURE — 93010 ELECTROCARDIOGRAM REPORT: CPT

## 2021-08-29 RX ORDER — WARFARIN SODIUM 2.5 MG/1
5 TABLET ORAL ONCE
Refills: 0 | Status: COMPLETED | OUTPATIENT
Start: 2021-08-29 | End: 2021-08-29

## 2021-08-29 RX ORDER — WARFARIN SODIUM 2.5 MG/1
7.5 TABLET ORAL ONCE
Refills: 0 | Status: DISCONTINUED | OUTPATIENT
Start: 2021-08-29 | End: 2021-08-29

## 2021-08-29 RX ADMIN — HEPARIN SODIUM 1000 UNIT(S)/HR: 5000 INJECTION INTRAVENOUS; SUBCUTANEOUS at 17:04

## 2021-08-29 RX ADMIN — SACUBITRIL AND VALSARTAN 1 TABLET(S): 24; 26 TABLET, FILM COATED ORAL at 17:48

## 2021-08-29 RX ADMIN — HEPARIN SODIUM 6500 UNIT(S): 5000 INJECTION INTRAVENOUS; SUBCUTANEOUS at 17:05

## 2021-08-29 RX ADMIN — HEPARIN SODIUM 800 UNIT(S)/HR: 5000 INJECTION INTRAVENOUS; SUBCUTANEOUS at 23:54

## 2021-08-29 RX ADMIN — HEPARIN SODIUM 700 UNIT(S)/HR: 5000 INJECTION INTRAVENOUS; SUBCUTANEOUS at 08:45

## 2021-08-29 RX ADMIN — Medication 15 MILLIGRAM(S): at 22:45

## 2021-08-29 RX ADMIN — ANASTROZOLE 1 MILLIGRAM(S): 1 TABLET ORAL at 12:56

## 2021-08-29 RX ADMIN — WARFARIN SODIUM 5 MILLIGRAM(S): 2.5 TABLET ORAL at 17:47

## 2021-08-29 RX ADMIN — Medication 25 MILLIGRAM(S): at 05:11

## 2021-08-29 RX ADMIN — SACUBITRIL AND VALSARTAN 1 TABLET(S): 24; 26 TABLET, FILM COATED ORAL at 05:11

## 2021-08-29 RX ADMIN — Medication 15 MILLIGRAM(S): at 05:11

## 2021-08-29 RX ADMIN — Medication 15 MILLIGRAM(S): at 12:56

## 2021-08-29 NOTE — PROGRESS NOTE ADULT - PROBLEM SELECTOR PLAN 8
Na 147 on admission.  - monitor  - encourage PO intake Resolved. Na 147 on admission.   - encourage PO intake

## 2021-08-29 NOTE — PROGRESS NOTE ADULT - PROBLEM SELECTOR PLAN 4
LVEF 12% per TTE. ProBNP~92529T (01399E-04274W since 8/2020), HsenTnT 55H likely 2/2 demand ischemia. QTc 524 8/21. Euvolemic on exam.  - has SICD  - home metoprolol succinate 25mg QD  - home sacubitril+valsartan 24+26mg BID

## 2021-08-29 NOTE — PROGRESS NOTE ADULT - PROBLEM SELECTOR PLAN 3
2/2 at least submassive saddle pulmonary embolus w/o evidence of RH strain per CTPA and TTE. B/l nonocclusive DVTs. DDimer~7400H. ProBNP~96026H->06814R. ABG pH 7.49H, pCO2 22L, SaO2 99.2H.  SOB less likely 2/2 pulm acute infection or COPD exacerbation (no reported change in cough or sputum in the past month, and CXR unremarkable). Unlikely HFrEF exacerbation despite high proBNP (has been that elevated since 8/2020) and HsenTnT (likely 2/2 demand ischemia), given no JVD, peripheral edema, orthopnea, or other suggestive S/S.   - heparin bridge to warfarin because LV thrombus; INR goal 2-3  - appreciate Card recs: no indication for catheter-directed thrombolysis  - goal SpO2 88-92% for COPD  - continuous pulse ox

## 2021-08-29 NOTE — PROGRESS NOTE ADULT - PROBLEM SELECTOR PLAN 6
eGFR 30-35L and Cr 1.72H on admission, which is slightly above baseline per [employer].   - s/p furosemide 20 x1  - monitor Cr for JACQUELINE as last IV cont was 8/26 eGFR 30-35L and Cr 1.72H on admission, which is slightly above baseline per [employer].   - s/p furosemide 20 x1

## 2021-08-29 NOTE — PROGRESS NOTE ADULT - ASSESSMENT
66F with COPD/emphysema, DVT L leg 1994 with [place] IVC filter, HTN, HLD, thoracic aorta and infrarenal abd aortic dilation 2/2 thrombosed hematoma, internal carotid aneurysm, depression, R breast cancer s/p chemo and radiation, cardiomyopathy, CHF with SICD, sickle cell trait, uterine fibroids s/p hysterectomy, s/p cholecystectomy, and former smoker (quit 2 years ago).   Presented to ED with SOB [id number] and dry cough x3w. Found to have saddle embolism, LV apical thrombus, and b/l nonocclusive DVTs. Heparin bridge to warfarin.  66F with COPD/emphysema, DVT L leg 1994 with [place] IVC filter, HTN, HLD, thoracic aorta and infrarenal abd aortic dilation 2/2 thrombosed hematoma, internal carotid aneurysm, depression, R breast cancer s/p chemo and radiation, cardiomyopathy, CHF with SICD, sickle cell trait, uterine fibroids s/p hysterectomy, s/p cholecystectomy, and former smoker (quit 2 years ago).   Presented to ED with SOB [id number] and dry cough x3w. Found to have saddle embolism, LV apical thrombus, and b/l nonocclusive DVTs. Discharge home with PT pending therapeutic INR.

## 2021-08-29 NOTE — PROGRESS NOTE ADULT - SUBJECTIVE AND OBJECTIVE BOX
Resident: Bisi Velásquez, PGY1, Medicine, Pager [phone] (NS) [phone] ([facility])    24-Hour Events and Subjective:  - tele: sinus, 7 beats vtach  - denies nausea and pain  - comfort [facility] Meds:  MEDICATIONS  (STANDING):  anastrozole 1 milliGRAM(s) Oral daily  heparin  Infusion.  Unit(s)/Hr (14 mL/Hr) IV Continuous <Continuous>  hydrALAZINE 5 milliGRAM(s) Oral every 8 hours  metoprolol succinate ER 25 milliGRAM(s) Oral daily  sacubitril 49 mG/valsartan 51 mG 1 Tablet(s) Oral two times a day  MEDICATIONS  (PRN):  acetaminophen   Tablet .. 650 milliGRAM(s) Oral every 6 hours PRN Mild Pain (1 - 3), Moderate Pain (4 - 6), Severe Pain (7 - 10)  aluminum hydroxide/magnesium hydroxide/simethicone Suspension 30 milliLiter(s) Oral every 6 hours PRN Dyspepsia  heparin   Injectable 6500 Unit(s) IV Push every 6 hours PRN For aPTT less than 40  heparin   Injectable 3000 Unit(s) IV Push every 6 hours PRN For aPTT between 40 - 57    Vitals:  T(F): 97.1 (27 Aug 2021 05:54), Max: 98.8 (26 Aug 2021 18:00)  HR: 76 (27 Aug 2021 05:54) (76 - 89)  BP: 136/86 (27 Aug 2021 05:54) (105/60 - 152/88)  RR: 17 (27 Aug 2021 05:54) (16 - 22)  SpO2: 96% (27 Aug 2021 05:54) (96% - 100%)  8/24 down to 91% while ambulating and 87% while at rest post ambulation.  8/25 down to 81% at rest that resolved on RA    I&O: 25 Aug 2021 07:01  -  26 Aug 2021 07:00  IN: 686 mL / OUT: 0 mL / NET: 686 mL    Physical Exam:  Con: NAD  HEENT: NCAT, no conjunctival injection, no scleral icterus, strabismus, moist oral mucosa  Neck: JVD  Resp: normal WOB at rest, CTAB  CV: RRR, physiologic S1 and S2, no murmurs, no rubs, no gallops, 1+ radial pulses  Abd: nondistended, normoactive bowel sounds, soft, RUQ tenderness, no rebound, no involuntary guarding, no organomegaly  Ext: no clubbing, warm hands, no edema  Neuro: alert, A&Ox3  Psych: appropriate mood and affect    Labs:                          11.4L  6.04  )-----------( 258      ( 27 Aug 2021 07:27 )             35.5                       11.5   6.63  )-----------( 223      ( 26 Aug 2021 07:44 )             36.3     PT/INR - ( 26 Aug 2021 07:44 )   PT: 17.2 sec;   INR: 1.54 ratio    PTT - ( 26 Aug 2021 07:44 )  PTT:36.0 sec    08-27  138  |  104  |  39<H>  ----------------------------<  133<H>  4.4   |  15<L>  |  1.59<H>  eGFR 39L  Ca    9.3      27 Aug 2021 07:27  Phos  4.1     08-27  Mg     2.30     08-27 08-26  136  |  107  |  34<H>  ----------------------------<  144<H>  TNP   |  15<L>  |  1.44<H>  eGFR 43L  Ca    9.2      26 Aug 2021 07:44  Phos  3.7     08-26  Mg     2.30     08-26    TPro  7.6  /  Alb  3.8  /  TBili  1.1  /  DBili  x   /  AST  22  /  ALT  16  /  AlkPhos  258<H>  08-27  TPro  8.0  /  Alb  3.6  /  TBili  0.8  /  DBili  x   /  AST  68<H>  /  ALT  22  /  AlkPhos  210<H>  08-26    Studies and Radiology:     TTE 8/23: Mod-severe MR. Severe global LV systolic dysfunction. A filling defect is seen and is consistent with an LV apical thrombus. Decreased RV systolic function.     BLE Duplex 8/24:   R: Nonocclusive thrombus in the right popliteal and posterior tibial veins. Normal compressibility of the right common femoral and femoral veins. Right peroneal veins not visualized.  L: Nonocclusive thrombus in the left popliteal vein. Normal compressibility of the right common femoral and femoral veins. Posterior tibial and peroneal veins are patent.    CTAP 8/26: No evidence of a neoplastic process in the abdomen and pelvis. Significant atherosclerotic changes and thrombosed hematoma involving the visualized descending thoracic aorta and the abdominal aorta, which are both dilated measuring up to 5.4 cm, without significant interval change. Resident: Bisi Velásquez, PGY1, Medicine, Pager [phone] (NS) [phone] ([facility])    24-Hour Events and Subjective:  - tele: sinus, 7 beats vtach  - denies nausea and pain  - comfort [facility] Meds:  MEDICATIONS  (STANDING):  anastrozole 1 milliGRAM(s) Oral daily  heparin  Infusion.  Unit(s)/Hr (14 mL/Hr) IV Continuous <Continuous>  hydrALAZINE 15 milliGRAM(s) Oral every 8 hours  metoprolol succinate ER 25 milliGRAM(s) Oral daily  sacubitril 49 mG/valsartan 51 mG 1 Tablet(s) Oral two times a day  MEDICATIONS  (PRN):  acetaminophen   Tablet .. 650 milliGRAM(s) Oral every 6 hours PRN Mild Pain (1 - 3), Moderate Pain (4 - 6), Severe Pain (7 - 10)  aluminum hydroxide/magnesium hydroxide/simethicone Suspension 30 milliLiter(s) Oral every 6 hours PRN Dyspepsia  heparin   Injectable 6500 Unit(s) IV Push every 6 hours PRN For aPTT less than 40  heparin   Injectable 3000 Unit(s) IV Push every 6 hours PRN For aPTT between 40 - 57    Vitals:  T(F): 97.2 (29 Aug 2021 05:10), Max: 98.3 (28 Aug 2021 21:22)  HR: 84 (29 Aug 2021 05:10) (82 - 90)  BP: 140/106 (29 Aug 2021 05:10) (126/90 - 156/94)  RR: 20 (29 Aug 2021 05:10) (16 - 20)  SpO2: 99% (29 Aug 2021 05:10) (97% - 100%)  8/24 down to 91% while ambulating and 87% while at rest post ambulation.  8/25 down to 81% at rest that resolved on RA    I&O:     Physical Exam:  Con: NAD  HEENT: NCAT, no conjunctival injection, no scleral icterus, strabismus, moist oral mucosa  Neck: JVD  Resp: normal WOB at rest, CTAB  CV: RRR, physiologic S1 and S2, no murmurs, no rubs, no gallops, 1+ radial pulses  Abd: nondistended, normoactive bowel sounds, soft, RUQ tenderness, no rebound, no involuntary guarding, no organomegaly  Ext: no clubbing, warm hands, no edema  Neuro: alert, A&Ox3  Psych: appropriate mood and affect    Labs:                         11.7   5.54  )-----------( 263      ( 28 Aug 2021 07:04 )             37.2     PT/INR - ( 28 Aug 2021 07:04 )   PT: 18.5 sec;   INR: 1.66 ratio    PTT - ( 28 Aug 2021 22:54 )  PTT:44.3 sec           08-28  140  |  104  |  37<H>  ----------------------------<  136<H>  4.2   |  16<L>  |  1.45<H>  eGFR 43L  Ca    9.5      28 Aug 2021 07:04  Phos  3.5     08-28  Mg     2.30     08-28    TPro  7.4  /  Alb  3.9  /  TBili  1.2  /  DBili  x   /  AST  21  /  ALT  17  /  AlkPhos  308<H>  08-28  TPro  7.6  /  Alb  3.8  /  TBili  1.1  /  DBili  x   /  AST  22  /  ALT  16  /  AlkPhos  258<H>  08-27    Studies and Radiology:     TTE 8/23: Mod-severe MR. Severe global LV systolic dysfunction. A filling defect is seen and is consistent with an LV apical thrombus. Decreased RV systolic function.     BLE Duplex 8/24:   R: Nonocclusive thrombus in the right popliteal and posterior tibial veins. Normal compressibility of the right common femoral and femoral veins. Right peroneal veins not visualized.  L: Nonocclusive thrombus in the left popliteal vein. Normal compressibility of the right common femoral and femoral veins. Posterior tibial and peroneal veins are patent.    CTAP 8/26: No evidence of a neoplastic process in the abdomen and pelvis. Significant atherosclerotic changes and thrombosed hematoma involving the visualized descending thoracic aorta and the abdominal aorta, which are both dilated measuring up to 5.4 cm, without significant interval change. Resident: Bisi Velásquez, PGY1, Medicine, Pager [phone] ([facility]) [phone] ([facility])    24-Hour Events and Subjective:  - tele: sinus, 7 beats vtach  - denies nausea and pain  - comfort [facility] Meds:  MEDICATIONS  (STANDING):  anastrozole 1 milliGRAM(s) Oral daily  heparin  Infusion.  Unit(s)/Hr (14 mL/Hr) IV Continuous <Continuous>  hydrALAZINE 15 milliGRAM(s) Oral every 8 hours  metoprolol succinate ER 25 milliGRAM(s) Oral daily  sacubitril 49 mG/valsartan 51 mG 1 Tablet(s) Oral two times a day  MEDICATIONS  (PRN):  acetaminophen   Tablet .. 650 milliGRAM(s) Oral every 6 hours PRN Mild Pain (1 - 3), Moderate Pain (4 - 6), Severe Pain (7 - 10)  aluminum hydroxide/magnesium hydroxide/simethicone Suspension 30 milliLiter(s) Oral every 6 hours PRN Dyspepsia  heparin   Injectable 6500 Unit(s) IV Push every 6 hours PRN For aPTT less than 40  heparin   Injectable 3000 Unit(s) IV Push every 6 hours PRN For aPTT between 40 - 57    Vitals:  T(F): 97.2 (29 Aug 2021 05:10), Max: 98.3 (28 Aug 2021 21:22)  HR: 84 (29 Aug 2021 05:10) (82 - 90)  BP: 140/106 (29 Aug 2021 05:10) (126/90 - 156/94)  RR: 20 (29 Aug 2021 05:10) (16 - 20)  SpO2: 99% (29 Aug 2021 05:10) (97% - 100%)  8/24 down to 91% while ambulating and 87% while at rest post ambulation.  8/25 down to 81% at rest that resolved on RA    I&O:     Physical Exam:  Con: NAD  HEENT: NCAT, no conjunctival injection, no scleral icterus, strabismus, moist oral mucosa  Neck: JVD  Resp: normal WOB at rest, CTAB  CV: RRR, physiologic S1 and S2, no murmurs, no rubs, no gallops, 1+ radial pulses  Abd: nondistended, normoactive bowel sounds, soft, RUQ tenderness, no rebound, no involuntary guarding, no organomegaly  Ext: no clubbing, warm hands, no edema  Neuro: alert, A&Ox3  Psych: appropriate mood and affect    Labs:                         12.1   6.49  )-----------( 294      ( 29 Aug 2021 06:47 )             37.7                         11.7   5.54  )-----------( 263      ( 28 Aug 2021 07:04 )             37.2     PT/INR - ( 28 Aug 2021 07:04 )   PT: 18.5 sec;   INR: 1.66 ratio    PTT - ( 28 Aug 2021 22:54 )  PTT:44.3 sec           08-29  138  |  105  |  36<H>  ----------------------------<  137<H>  4.3   |  14<L>  |  1.45<H>  eGFR 43L  Ca    9.6      29 Aug 2021 06:47  Phos  3.6     08-29  Mg     2.30     08-29 08-28  140  |  104  |  37<H>  ----------------------------<  136<H>  4.2   |  16<L>  |  1.45<H>  eGFR 43L  Ca    9.5      28 Aug 2021 07:04  Phos  3.5     08-28  Mg     2.30     08-28    TPro  7.8  /  Alb  4.0  /  TBili  1.1  /  DBili  x   /  AST  23  /  ALT  16  /  AlkPhos  354<H>  08-29  TPro  7.4  /  Alb  3.9  /  TBili  1.2  /  DBili  x   /  AST  21  /  ALT  17  /  AlkPhos  308<H>  08-28    Studies and Radiology:     TTE 8/23: Mod-severe MR. Severe global LV systolic dysfunction. A filling defect is seen and is consistent with an LV apical thrombus. Decreased RV systolic function.     BLE Duplex 8/24:   R: Nonocclusive thrombus in the right popliteal and posterior tibial veins. Normal compressibility of the right common femoral and femoral veins. Right peroneal veins not visualized.  L: Nonocclusive thrombus in the left popliteal vein. Normal compressibility of the right common femoral and femoral veins. Posterior tibial and peroneal veins are patent.    CTAP 8/26: No evidence of a neoplastic process in the abdomen and pelvis. Significant atherosclerotic changes and thrombosed hematoma involving the visualized descending thoracic aorta and the abdominal aorta, which are both dilated measuring up to 5.4 cm, without significant interval change. Resident: Bisi Velásquez, PGY1, Medicine, Pager [phone] ([facility]) [phone] ([facility])    24-Hour Events and Subjective:  - tele: sinus with several beats vtach  - denies SOB with comfort NC in place  - denies BM/diarrhea overnight    Hospital Meds:  MEDICATIONS  (STANDING):  anastrozole 1 milliGRAM(s) Oral daily  heparin  Infusion.  Unit(s)/Hr (14 mL/Hr) IV Continuous <Continuous>  hydrALAZINE 15 milliGRAM(s) Oral every 8 hours  metoprolol succinate ER 25 milliGRAM(s) Oral daily  sacubitril 49 mG/valsartan 51 mG 1 Tablet(s) Oral two times a day  MEDICATIONS  (PRN):  acetaminophen   Tablet .. 650 milliGRAM(s) Oral every 6 hours PRN Mild Pain (1 - 3), Moderate Pain (4 - 6), Severe Pain (7 - 10)  aluminum hydroxide/magnesium hydroxide/simethicone Suspension 30 milliLiter(s) Oral every 6 hours PRN Dyspepsia  heparin   Injectable 6500 Unit(s) IV Push every 6 hours PRN For aPTT less than 40  heparin   Injectable 3000 Unit(s) IV Push every 6 hours PRN For aPTT between 40 - 57    Vitals:  T(F): 97.2 (29 Aug 2021 05:10), Max: 98.3 (28 Aug 2021 21:22)  HR: 84 (29 Aug 2021 05:10) (82 - 90)  BP: 140/106 (29 Aug 2021 05:10) (126/90 - 156/94)  RR: 20 (29 Aug 2021 05:10) (16 - 20)  SpO2: 99% (29 Aug 2021 05:10) (97% - 100%)  8/24 down to 91% while ambulating and 87% while at rest post ambulation.  8/25 down to 81% at rest that resolved on RA    I&O:     Physical Exam:  Con: NAD  HEENT: NCAT, no conjunctival injection, no scleral icterus, strabismus, moist oral mucosa  Neck: JVD  Resp: normal WOB at rest, CTAB  CV: RRR, physiologic S1 and S2, no murmurs, no rubs, no gallops, 1+ radial pulses  Abd: nondistended, normoactive bowel sounds, soft, RUQ tenderness, no rebound, no involuntary guarding, no organomegaly  Ext: no clubbing, warm hands, no edema  Neuro: alert, A&Ox3  Psych: appropriate mood and affect    Labs:                         12.1   6.49  )-----------( 294      ( 29 Aug 2021 06:47 )             37.7                         11.7   5.54  )-----------( 263      ( 28 Aug 2021 07:04 )             37.2     PT/INR - ( 28 Aug 2021 07:04 )   PT: 18.5 sec;   INR: 1.66 ratio    PTT - ( 28 Aug 2021 22:54 )  PTT:44.3 sec           08-29  138  |  105  |  36<H>  ----------------------------<  137<H>  4.3   |  14<L>  |  1.45<H>  eGFR 43L  Ca    9.6      29 Aug 2021 06:47  Phos  3.6     08-29  Mg     2.30     08-29 08-28  140  |  104  |  37<H>  ----------------------------<  136<H>  4.2   |  16<L>  |  1.45<H>  eGFR 43L  Ca    9.5      28 Aug 2021 07:04  Phos  3.5     08-28  Mg     2.30     08-28    TPro  7.8  /  Alb  4.0  /  TBili  1.1  /  DBili  x   /  AST  23  /  ALT  16  /  AlkPhos  354<H>  08-29  TPro  7.4  /  Alb  3.9  /  TBili  1.2  /  DBili  x   /  AST  21  /  ALT  17  /  AlkPhos  308<H>  08-28    Studies and Radiology:     TTE 8/23: Mod-severe MR. Severe global LV systolic dysfunction. A filling defect is seen and is consistent with an LV apical thrombus. Decreased RV systolic function.     BLE Duplex 8/24:   R: Nonocclusive thrombus in the right popliteal and posterior tibial veins. Normal compressibility of the right common femoral and femoral veins. Right peroneal veins not visualized.  L: Nonocclusive thrombus in the left popliteal vein. Normal compressibility of the right common femoral and femoral veins. Posterior tibial and peroneal veins are patent.    CTAP 8/26: No evidence of a neoplastic process in the abdomen and pelvis. Significant atherosclerotic changes and thrombosed hematoma involving the visualized descending thoracic aorta and the abdominal aorta, which are both dilated measuring up to 5.4 cm, without significant interval change.

## 2021-08-29 NOTE — PROGRESS NOTE ADULT - PROBLEM SELECTOR PLAN 1
Saddle PE and b/l nonocclusive DVTs. Has b/o cancer. Didn't get J&J COVID vaccine. No cancer on CTAP. Direct Charles IgG+ and poly+, diluted TT 50.3H, silica clotting time S/C 0.75L, DRVVT inhibitor 55.9H, LDH 333H. [place] Ab-. Haptoglobin normal.  - heparin bridge to warfarin because LV thrombus; INR goal 2-3  - appreciate Heme recs: no genetic/functional thrombophilia, agree w/ heparin gtt bridge to warfarin, signed off  - f/u APLS labs (lupus anticoagulant, B2 glycoprotein, anticardiolipin IgM and IgG), HTLVI+II Ab  - f/u with Heme outpt at [facility] [phone] Saddle PE and b/l nonocclusive DVTs. Has b/o cancer. Didn't get J&J COVID vaccine. No cancer on CTAP. Direct Charles IgG+ and poly+, diluted TT 50.3H, silica clotting time S/C 0.75L, DRVVT inhibitor 55.9H, LDH 333H. [place] Ab-. Haptoglobin normal. HTLV Ab-.  - heparin bridge to warfarin because LV thrombus; INR goal 2-3  - appreciate Heme recs: no genetic/functional thrombophilia, agree w/ heparin gtt bridge to warfarin, signed off  - f/u APLS labs (lupus anticoagulant, B2 glycoprotein, anticardiolipin IgM and IgG), HTLVI+II Ab  - f/u with Heme outpt at [facility] [phone]

## 2021-08-29 NOTE — PROGRESS NOTE ADULT - ATTENDING COMMENTS
dose coumadin higher at 7.5 mg today as INR trended down  inc hydra to 15mg po q8 for elevated DBP - but hold parameter for SBP as systolic controlled  renal fx remains stable INR now therapeutic, need overlap  dose coumadin 5 tonight and cw heparin gtt

## 2021-08-29 NOTE — PROGRESS NOTE ADULT - PROBLEM SELECTOR PLAN 10
- appreciate PT rec: home with home PT, reconsulted  - DASH/TLC diet  - consulted SW  - power of  Jaki [phone] or [phone] in [place]  - PCP/Alex Figueroa [phone]

## 2021-08-29 NOTE — CHART NOTE - NSCHARTNOTEFT_GEN_A_CORE
Patient had 4 beats of v tach on tele. Patient is currently asymptomatic, EKG ordered. Patient had 4 beats of v tach on tele. Patient is currently asymptomatic, EKG ordered.  Patient is AAOx2-3. Oriented to person, year, questionable for place.

## 2021-08-30 DIAGNOSIS — R74.8 ABNORMAL LEVELS OF OTHER SERUM ENZYMES: ICD-10-CM

## 2021-08-30 LAB
ALBUMIN SERPL ELPH-MCNC: 4 G/DL — SIGNIFICANT CHANGE UP (ref 3.3–5)
ALP SERPL-CCNC: 363 U/L — HIGH (ref 40–120)
ALT FLD-CCNC: 16 U/L — SIGNIFICANT CHANGE UP (ref 4–33)
ANION GAP SERPL CALC-SCNC: 16 MMOL/L — HIGH (ref 7–14)
APTT BLD: 43.3 SEC — HIGH (ref 27–36.3)
AST SERPL-CCNC: 19 U/L — SIGNIFICANT CHANGE UP (ref 4–32)
B2 GLYCOPROT1 AB SER QL: NEGATIVE — SIGNIFICANT CHANGE UP
BILIRUB SERPL-MCNC: 1.2 MG/DL — SIGNIFICANT CHANGE UP (ref 0.2–1.2)
BUN SERPL-MCNC: 34 MG/DL — HIGH (ref 7–23)
CALCIUM SERPL-MCNC: 9.7 MG/DL — SIGNIFICANT CHANGE UP (ref 8.4–10.5)
CARDIOLIPIN AB SER-ACNC: POSITIVE
CHLORIDE SERPL-SCNC: 109 MMOL/L — HIGH (ref 98–107)
CO2 SERPL-SCNC: 14 MMOL/L — LOW (ref 22–31)
CREAT SERPL-MCNC: 1.4 MG/DL — HIGH (ref 0.5–1.3)
GGT SERPL-CCNC: 437 U/L — HIGH (ref 5–36)
GLUCOSE SERPL-MCNC: 136 MG/DL — HIGH (ref 70–99)
HCT VFR BLD CALC: 36.8 % — SIGNIFICANT CHANGE UP (ref 34.5–45)
HGB BLD-MCNC: 11.7 G/DL — SIGNIFICANT CHANGE UP (ref 11.5–15.5)
INR BLD: 3.63 RATIO — HIGH (ref 0.88–1.16)
MAGNESIUM SERPL-MCNC: 2.3 MG/DL — SIGNIFICANT CHANGE UP (ref 1.6–2.6)
MCHC RBC-ENTMCNC: 30.2 PG — SIGNIFICANT CHANGE UP (ref 27–34)
MCHC RBC-ENTMCNC: 31.8 GM/DL — LOW (ref 32–36)
MCV RBC AUTO: 94.8 FL — SIGNIFICANT CHANGE UP (ref 80–100)
NRBC # BLD: 0 /100 WBCS — SIGNIFICANT CHANGE UP
NRBC # FLD: 0 K/UL — SIGNIFICANT CHANGE UP
PHOSPHATE SERPL-MCNC: 4 MG/DL — SIGNIFICANT CHANGE UP (ref 2.5–4.5)
PLATELET # BLD AUTO: 306 K/UL — SIGNIFICANT CHANGE UP (ref 150–400)
POTASSIUM SERPL-MCNC: 4.3 MMOL/L — SIGNIFICANT CHANGE UP (ref 3.5–5.3)
POTASSIUM SERPL-SCNC: 4.3 MMOL/L — SIGNIFICANT CHANGE UP (ref 3.5–5.3)
PROT SERPL-MCNC: 7.5 G/DL — SIGNIFICANT CHANGE UP (ref 6–8.3)
PROTHROM AB SERPL-ACNC: 38.9 SEC — HIGH (ref 10.6–13.6)
RBC # BLD: 3.88 M/UL — SIGNIFICANT CHANGE UP (ref 3.8–5.2)
RBC # FLD: 17.6 % — HIGH (ref 10.3–14.5)
SARS-COV-2 RNA SPEC QL NAA+PROBE: SIGNIFICANT CHANGE UP
SODIUM SERPL-SCNC: 139 MMOL/L — SIGNIFICANT CHANGE UP (ref 135–145)
WBC # BLD: 5.84 K/UL — SIGNIFICANT CHANGE UP (ref 3.8–10.5)
WBC # FLD AUTO: 5.84 K/UL — SIGNIFICANT CHANGE UP (ref 3.8–10.5)

## 2021-08-30 PROCEDURE — 99232 SBSQ HOSP IP/OBS MODERATE 35: CPT

## 2021-08-30 PROCEDURE — 76705 ECHO EXAM OF ABDOMEN: CPT | Mod: 26

## 2021-08-30 RX ADMIN — HEPARIN SODIUM 1000 UNIT(S)/HR: 5000 INJECTION INTRAVENOUS; SUBCUTANEOUS at 09:18

## 2021-08-30 RX ADMIN — Medication 15 MILLIGRAM(S): at 13:07

## 2021-08-30 RX ADMIN — SACUBITRIL AND VALSARTAN 1 TABLET(S): 24; 26 TABLET, FILM COATED ORAL at 05:16

## 2021-08-30 RX ADMIN — ANASTROZOLE 1 MILLIGRAM(S): 1 TABLET ORAL at 13:07

## 2021-08-30 RX ADMIN — Medication 0.5 MILLIGRAM(S): at 03:41

## 2021-08-30 RX ADMIN — Medication 15 MILLIGRAM(S): at 21:39

## 2021-08-30 RX ADMIN — Medication 25 MILLIGRAM(S): at 05:16

## 2021-08-30 RX ADMIN — HEPARIN SODIUM 3000 UNIT(S): 5000 INJECTION INTRAVENOUS; SUBCUTANEOUS at 09:17

## 2021-08-30 RX ADMIN — SACUBITRIL AND VALSARTAN 1 TABLET(S): 24; 26 TABLET, FILM COATED ORAL at 18:24

## 2021-08-30 NOTE — PROGRESS NOTE ADULT - PROBLEM SELECTOR PLAN 10
- appreciate PT rec: home with home PT, reconsulted  - DASH/TLC diet  - consulted SW  - power of  Jaki [phone] or [phone] in [place]  - PCP/Alex Figueroa [phone] No outpt oncologist besides PCP/Endo Huyen Figueroa [phone].  - home anastrozole

## 2021-08-30 NOTE — PROGRESS NOTE ADULT - ATTENDING COMMENTS
INR supra-therapeutic today, dc heparin gtt now day 5 of overlap   INRs having been unpredictable, suspect shell require 3-4 mg of coumadin  check INR jurgen and if o0k, dc home with coumadin dose to be decided  needs close out-pt INR monitoring and heme f.u

## 2021-08-30 NOTE — PROGRESS NOTE ADULT - PROBLEM SELECTOR PLAN 5
BP 140s / 100s-110s on admission.  - home metoprolol succinate 25mg QD  - increased hydralazine for dHTN

## 2021-08-30 NOTE — PROGRESS NOTE ADULT - PROBLEM SELECTOR PLAN 1
Saddle PE and b/l nonocclusive DVTs. Has b/o cancer. Didn't get J&J COVID vaccine. No cancer on CTAP. Direct Charles IgG+ and poly+, diluted TT 50.3H, silica clotting time S/C 0.75L, DRVVT inhibitor 55.9H, LDH 333H. [place] Ab-. Haptoglobin normal. HTLV Ab-.  - heparin bridge to warfarin because LV thrombus; INR goal 2-3  - appreciate Heme recs: no genetic/functional thrombophilia, agree w/ heparin gtt bridge to warfarin, signed off  - f/u APLS labs (lupus anticoagulant, B2 glycoprotein, anticardiolipin IgM and IgG), HTLVI+II Ab  - f/u with Heme outpt at [facility] [phone] Saddle PE and b/l nonocclusive DVTs. Has b/o cancer. Didn't get J&J COVID vaccine. No cancer on CTAP. Direct Charles IgG+ and poly+, diluted TT 50.3H, silica clotting time S/C 0.75L, DRVVT inhibitor 55.9H, LDH 333H. [place] Ab-. Haptoglobin normal. HTLV Ab-.  - heparin bridge to warfarin because LV thrombus; INR goal 2-3; stop heparin 9/1-2 (INR >2 x4-5d)  - appreciate Heme recs: no genetic/functional thrombophilia, agree w/ heparin gtt bridge to warfarin, signed off  - f/u APLS labs (lupus anticoagulant, B2 glycoprotein, anticardiolipin IgM and IgG), HTLVI+II Ab  - f/u with Heme outpt at [facility] [phone] Saddle PE and b/l nonocclusive DVTs. Has b/o cancer. Didn't get J&J COVID vaccine. No cancer on CTAP. Direct Charles IgG+ and poly+, diluted TT 50.3H, silica clotting time S/C 0.75L, DRVVT inhibitor 55.9H, LDH 333H. [place] Ab-. Anticardiolipin Ig normal. Haptoglobin normal. HTLV Ab-.  - heparin bridge to warfarin because LV thrombus; INR goal 2-3; stop heparin 9/1-2 (INR >2 x4-5d)  - appreciate Heme recs: no genetic/functional thrombophilia, agree w/ heparin gtt bridge to warfarin, signed off  - f/u APLS labs (lupus anticoagulant, B2 glycoprotein, anticardiolipin IgM and IgG), HTLVI+II Ab  - f/u with Heme outpt at [facility] [phone]

## 2021-08-30 NOTE — PROGRESS NOTE ADULT - PROBLEM SELECTOR PLAN 4
LVEF 12% per TTE. ProBNP~56761Q ([id number] since 8/2020), HsenTnT 55H likely 2/2 demand ischemia. QTc 524 8/21. Euvolemic on exam.  - has SICD  - home metoprolol succinate 25mg QD  - home sacubitril+valsartan 24+26mg BID

## 2021-08-30 NOTE — PROVIDER CONTACT NOTE (MEDICATION) - RECOMMENDATIONS
Continue to monitor for s/s of bleeding
Continue to follow Heparin nomogram as per orders. Consider Psych consult for Pt for anxiety and to resume medication regimen for hallucinations.

## 2021-08-30 NOTE — PROGRESS NOTE ADULT - SUBJECTIVE AND OBJECTIVE BOX
Resident: Bisi Velásquez, PGY1, Medicine, Pager [phone]) [phone] ([facility])    24-Hour Events and Subjective:  - tele: sinus with several beats vtach  - denies SOB with comfort NC in place  - denies BM/diarrhea overnight    Hospital Meds:  MEDICATIONS  (STANDING):  anastrozole 1 milliGRAM(s) Oral daily  heparin  Infusion.  Unit(s)/Hr (14 mL/Hr) IV Continuous <Continuous>  hydrALAZINE 15 milliGRAM(s) Oral every 8 hours  metoprolol succinate ER 25 milliGRAM(s) Oral daily  sacubitril 49 mG/valsartan 51 mG 1 Tablet(s) Oral two times a day  MEDICATIONS  (PRN):  acetaminophen   Tablet .. 650 milliGRAM(s) Oral every 6 hours PRN Mild Pain (1 - 3), Moderate Pain (4 - 6), Severe Pain (7 - 10)  aluminum hydroxide/magnesium hydroxide/simethicone Suspension 30 milliLiter(s) Oral every 6 hours PRN Dyspepsia  heparin   Injectable 6500 Unit(s) IV Push every 6 hours PRN For aPTT less than 40  heparin   Injectable 3000 Unit(s) IV Push every 6 hours PRN For aPTT between 40 - 57    Vitals:  T(F): 97 (30 Aug 2021 05:14), Max: 97.7 (29 Aug 2021 13:08)  HR: 86 (30 Aug 2021 05:14) (83 - 90)  BP: 113/91 (30 Aug 2021 05:14) (113/91 - 147/99)  RR: 21 (30 Aug 2021 05:14) (20 - 21)  SpO2: 98% (30 Aug 2021 05:14) (97% - 99%)  8/24 down to 91% while ambulating and 87% while at rest post ambulation.  8/25 down to 81% at rest that resolved on RA    I&O: 29 Aug 2021 07:01  -  30 Aug 2021 05:38  IN: 720 mL / OUT: 0 mL / NET: 720 mL    Physical Exam:  Con: NAD  HEENT: NCAT, no conjunctival injection, no scleral icterus, strabismus, moist oral mucosa  Neck: JVD  Resp: normal WOB at rest, CTAB  CV: RRR, physiologic S1 and S2, no murmurs, no rubs, no gallops, 1+ radial pulses  Abd: nondistended, normoactive bowel sounds, soft, RUQ tenderness, no rebound, no involuntary guarding, no organomegaly  Ext: no clubbing, warm hands, no edema  Neuro: alert, A&Ox3  Psych: appropriate mood and affect    Labs:                         12.1   6.49  )-----------( 294      ( 29 Aug 2021 06:47 )             37.7                         11.7   5.54  )-----------( 263      ( 28 Aug 2021 07:04 )             37.2     PT/INR - ( 28 Aug 2021 07:04 )   PT: 18.5 sec;   INR: 1.66 ratio    PTT - ( 28 Aug 2021 22:54 )  PTT:44.3 sec           08-29  138  |  105  |  36<H>  ----------------------------<  137<H>  4.3   |  14<L>  |  1.45<H>  eGFR 43L  Ca    9.6      29 Aug 2021 06:47  Phos  3.6     08-29  Mg     2.30     08-29 08-28  140  |  104  |  37<H>  ----------------------------<  136<H>  4.2   |  16<L>  |  1.45<H>  eGFR 43L  Ca    9.5      28 Aug 2021 07:04  Phos  3.5     08-28  Mg     2.30     08-28    TPro  7.8  /  Alb  4.0  /  TBili  1.1  /  DBili  x   /  AST  23  /  ALT  16  /  AlkPhos  354<H>  08-29  TPro  7.4  /  Alb  3.9  /  TBili  1.2  /  DBili  x   /  AST  21  /  ALT  17  /  AlkPhos  308<H>  08-28    Studies and Radiology:     TTE 8/23: Mod-severe MR. Severe global LV systolic dysfunction. A filling defect is seen and is consistent with an LV apical thrombus. Decreased RV systolic function.     BLE Duplex 8/24:   R: Nonocclusive thrombus in the right popliteal and posterior tibial veins. Normal compressibility of the right common femoral and femoral veins. Right peroneal veins not visualized.  L: Nonocclusive thrombus in the left popliteal vein. Normal compressibility of the right common femoral and femoral veins. Posterior tibial and peroneal veins are patent.    CTAP 8/26: No evidence of a neoplastic process in the abdomen and pelvis. Significant atherosclerotic changes and thrombosed hematoma involving the visualized descending thoracic aorta and the abdominal aorta, which are both dilated measuring up to 5.4 cm, without significant interval change. Resident: Bisi Velásquez, PGY1, Medicine, Pager [phone] (NS) [phone] ([facility])    24-Hour Events and Subjective:  - tele sinus  - denies SOB (no NC), nausea, pain, diarrhea  - pulling out IV and agitated -> lorazepam 0.5 x1    Hospital Meds:  MEDICATIONS  (STANDING):  anastrozole 1 milliGRAM(s) Oral daily  heparin  Infusion.  Unit(s)/Hr (14 mL/Hr) IV Continuous <Continuous>  hydrALAZINE 15 milliGRAM(s) Oral every 8 hours  metoprolol succinate ER 25 milliGRAM(s) Oral daily  sacubitril 49 mG/valsartan 51 mG 1 Tablet(s) Oral two times a day  MEDICATIONS  (PRN):  acetaminophen   Tablet .. 650 milliGRAM(s) Oral every 6 hours PRN Mild Pain (1 - 3), Moderate Pain (4 - 6), Severe Pain (7 - 10)  aluminum hydroxide/magnesium hydroxide/simethicone Suspension 30 milliLiter(s) Oral every 6 hours PRN Dyspepsia  heparin   Injectable 6500 Unit(s) IV Push every 6 hours PRN For aPTT less than 40  heparin   Injectable 3000 Unit(s) IV Push every 6 hours PRN For aPTT between 40 - 57    Vitals:  T(F): 97 (30 Aug 2021 05:14), Max: 97.7 (29 Aug 2021 13:08)  HR: 86 (30 Aug 2021 05:14) (83 - 90)  BP: 113/91 (30 Aug 2021 05:14) (113/91 - 147/99)  RR: 21 (30 Aug 2021 05:14) (20 - 21)  SpO2: 98% (30 Aug 2021 05:14) (97% - 99%)  8/24 down to 91% while ambulating and 87% while at rest post ambulation.  8/25 down to 81% at rest that resolved on RA    I&O: 29 Aug 2021 07:01  -  30 Aug 2021 05:38  IN: 720 mL / OUT: 0 mL / NET: 720 mL    Physical Exam:  Con: NAD  HEENT: NCAT, no conjunctival injection, no scleral icterus, strabismus, moist oral mucosa  Neck: JVD  Resp: normal WOB at rest, CTAB  CV: RRR, physiologic S1 and S2, no murmurs, no rubs, no gallops, 1+ radial pulses  Abd: nondistended, normoactive bowel sounds, soft, RUQ tenderness, no rebound, no involuntary guarding, no organomegaly  Ext: no clubbing, warm hands, no edema  Neuro: alert, A&Ox3  Psych: appropriate mood and affect    Labs:                         11.7   5.84  )-----------( 306      ( 30 Aug 2021 05:48 )             36.8                12.1   6.49  )-----------( 294      ( 29 Aug 2021 06:47 )             37.7      PT/INR - ( 30 Aug 2021 05:48 )   PT: 38.9 sec;   INR: 3.63H ratio    PTT - ( 29 Aug 2021 23:19 )  PTT:100.9 sec          08-30  139  |  109<H>  |  34<H>  ----------------------------<  136<H>  4.3   |  14<L>  |  1.40<H>  eGFR 45L  Ca    9.7      30 Aug 2021 05:48  Phos  4.0     08-30  Mg     2.30     08-30 08-29  138  |  105  |  36<H>  ----------------------------<  137<H>  4.3   |  14<L>  |  1.45<H>  eGFR 43L  Ca    9.6      29 Aug 2021 06:47  Phos  3.6     08-29  Mg     2.30     08-29    TPro  7.5  /  Alb  4.0  /  TBili  1.2  /  DBili  x   /  AST  19  /  ALT  16  /  AlkPhos  363<H>  08-30  TPro  7.8  /  Alb  4.0  /  TBili  1.1  /  DBili  x   /  AST  23  /  ALT  16  /  AlkPhos  354<H>  08-29    Studies and Radiology:     TTE 8/23: Mod-severe MR. Severe global LV systolic dysfunction. A filling defect is seen and is consistent with an LV apical thrombus. Decreased RV systolic function.     BLE Duplex 8/24:   R: Nonocclusive thrombus in the right popliteal and posterior tibial veins. Normal compressibility of the right common femoral and femoral veins. Right peroneal veins not visualized.  L: Nonocclusive thrombus in the left popliteal vein. Normal compressibility of the right common femoral and femoral veins. Posterior tibial and peroneal veins are patent.    CTAP 8/26: No evidence of a neoplastic process in the abdomen and pelvis. Significant atherosclerotic changes and thrombosed hematoma involving the visualized descending thoracic aorta and the abdominal aorta, which are both dilated measuring up to 5.4 cm, without significant interval change. Resident: Bisi Velásquez, PGY1, Medicine, Pager [phone] (NS) [phone] ([facility])    24-Hour Events and Subjective:  - tele sinus  - denies SOB (no NC), nausea, pain, diarrhea  - pulling out IV and agitated -> lorazepam 0.5 x1    Hospital Meds:  MEDICATIONS  (STANDING):  anastrozole 1 milliGRAM(s) Oral daily  heparin  Infusion.  Unit(s)/Hr (14 mL/Hr) IV Continuous <Continuous>  hydrALAZINE 15 milliGRAM(s) Oral every 8 hours  metoprolol succinate ER 25 milliGRAM(s) Oral daily  sacubitril 49 mG/valsartan 51 mG 1 Tablet(s) Oral two times a day  MEDICATIONS  (PRN):  acetaminophen   Tablet .. 650 milliGRAM(s) Oral every 6 hours PRN Mild Pain (1 - 3), Moderate Pain (4 - 6), Severe Pain (7 - 10)  aluminum hydroxide/magnesium hydroxide/simethicone Suspension 30 milliLiter(s) Oral every 6 hours PRN Dyspepsia  heparin   Injectable 6500 Unit(s) IV Push every 6 hours PRN For aPTT less than 40  heparin   Injectable 3000 Unit(s) IV Push every 6 hours PRN For aPTT between 40 - 57    Vitals:  T(F): 97 (30 Aug 2021 05:14), Max: 97.7 (29 Aug 2021 13:08)  HR: 86 (30 Aug 2021 05:14) (83 - 90)  BP: 113/91 (30 Aug 2021 05:14) (113/91 - 147/99)  RR: 21 (30 Aug 2021 05:14) (20 - 21)  SpO2: 98% (30 Aug 2021 05:14) (97% - 99%)  8/24 down to 91% while ambulating and 87% while at rest post ambulation.  8/25 down to 81% at rest that resolved on RA    I&O: 29 Aug 2021 07:01  -  30 Aug 2021 05:38  IN: 720 mL / OUT: 0 mL / NET: 720 mL    Physical Exam:  Con: NAD  HEENT: NCAT, no conjunctival injection, no scleral icterus, strabismus, moist oral mucosa  Neck: JVD  Resp: normal WOB at rest, CTAB  CV: RRR, physiologic S1 and S2, no murmurs, no rubs, no gallops, 1+ radial pulses  Abd: nondistended, normoactive bowel sounds, soft, RUQ tenderness, no rebound, no involuntary guarding, no organomegaly  Ext: no clubbing, warm hands, no edema  Neuro: alert, A&Ox3  Psych: appropriate mood and affect    Labs:                         11.7   5.84  )-----------( 306      ( 30 Aug 2021 05:48 )             36.8                12.1   6.49  )-----------( 294      ( 29 Aug 2021 06:47 )             37.7      PT/INR - ( 30 Aug 2021 05:48 )   PT: 38.9 sec;   INR: 3.63H ratio    PTT - ( 29 Aug 2021 23:19 )  PTT:100.9 sec          08-30  139  |  109<H>  |  34<H>  ----------------------------<  136<H>  4.3   |  14<L>  |  1.40<H>  eGFR 45L  Ca    9.7      30 Aug 2021 05:48  Phos  4.0     08-30  Mg     2.30     08-30 08-29  138  |  105  |  36<H>  ----------------------------<  137<H>  4.3   |  14<L>  |  1.45<H>  eGFR 43L  Ca    9.6      29 Aug 2021 06:47  Phos  3.6     08-29  Mg     2.30     08-29    TPro  7.5  /  Alb  4.0  /  TBili  1.2  /  DBili  x   /  AST  19  /  ALT  16  /  AlkPhos  363<H>  08-30  TPro  7.8  /  Alb  4.0  /  TBili  1.1  /  DBili  x   /  AST  23  /  ALT  16  /  AlkPhos  354<H>  08-29    Studies and Radiology:     TTE 8/23: Mod-severe MR. Severe global LV systolic dysfunction. A filling defect is seen and is consistent with an LV apical thrombus. Decreased RV systolic function.     BLE Duplex 8/24:   R: Nonocclusive thrombus in the right popliteal and posterior tibial veins. Normal compressibility of the right common femoral and femoral veins. Right peroneal veins not visualized.  L: Nonocclusive thrombus in the left popliteal vein. Normal compressibility of the right common femoral and femoral veins. Posterior tibial and peroneal veins are patent.    CTAP 8/26: No evidence of a neoplastic process in the abdomen and pelvis. Significant atherosclerotic changes and thrombosed hematoma involving the visualized descending thoracic aorta and the abdominal aorta, which are both dilated measuring up to 5.4 cm, without significant interval change.    US Abd: Dilated IVC and hepatic veins suggesting CHF.

## 2021-08-30 NOTE — PROGRESS NOTE ADULT - PROBLEM SELECTOR PLAN 9
No outpt oncologist besides PCP/Endo Huyen Figueroa [phone].  - home anastrozole Increasing. H/o breast cancer.  - f/u GGT Increasing. H/o breast cancer w/o cancer on CTAP.  - f/u GGT Increasing. Likely hepatobiliary etiology given high GGT. H/o breast cancer w/o cancer on CTAP.  - f/u RUQ U/S Increasing. Likely hepatobiliary etiology given high GGT. H/o breast cancer w/o cancer on CTAP. S/p cholecystectomy. Normal U/S abd.

## 2021-08-30 NOTE — PROVIDER CONTACT NOTE (MEDICATION) - SITUATION
Pt's aPTT was 43.3. PRN bolus given as per eMAR. New rate of Heparin infusion at 10gtt/hr
Pt has an aPTT result of 52.6. Heparin Bolus given as per eMar with new rate of 13gtt/hr.  As per Pt's sister, Pt is on medication regimen for hallucinations at home.

## 2021-08-30 NOTE — PROGRESS NOTE ADULT - PROBLEM SELECTOR PLAN 3
2/2 at least submassive saddle pulmonary embolus w/o evidence of RH strain per CTPA and TTE. B/l nonocclusive DVTs. DDimer~7400H. ProBNP~17469G->04326U. ABG pH 7.49H, pCO2 22L, SaO2 99.2H.  SOB less likely 2/2 pulm acute infection or COPD exacerbation (no reported change in cough or sputum in the past month, and CXR unremarkable). Unlikely HFrEF exacerbation despite high proBNP (has been that elevated since 8/2020) and HsenTnT (likely 2/2 demand ischemia), given no JVD, peripheral edema, orthopnea, or other suggestive S/S.   - heparin bridge to warfarin because LV thrombus; INR goal 2-3  - appreciate Card recs: no indication for catheter-directed thrombolysis  - goal SpO2 88-92% for COPD  - continuous pulse ox

## 2021-08-30 NOTE — PROVIDER CONTACT NOTE (MEDICATION) - ASSESSMENT
Pt is AOx4, with intermittent confusion and anxiety.
Pt A&Ox4, with intermittent confusion at baseline. Pt is without signs or symptoms of distress.

## 2021-08-30 NOTE — PROGRESS NOTE ADULT - PROBLEM SELECTOR PLAN 11
- appreciate PT rec: home with home PT, reconsulted  - DASH/TLC diet  - consulted SW  - power of  Jaki [phone] or [phone] in [place]  - PCP/Alex Figueroa [phone]

## 2021-08-30 NOTE — PROGRESS NOTE ADULT - ASSESSMENT
66F with COPD/emphysema, DVT L leg 1994 with [place] IVC filter, HTN, HLD, thoracic aorta and infrarenal abd aortic dilation 2/2 thrombosed hematoma, internal carotid aneurysm, depression, R breast cancer s/p chemo and radiation, cardiomyopathy, CHF with SICD, sickle cell trait, uterine fibroids s/p hysterectomy, s/p cholecystectomy, and former smoker (quit 2 years ago).   Presented to ED with SOB [id number] and dry cough x3w. Found to have saddle embolism, LV apical thrombus, and b/l nonocclusive DVTs. Discharge home with PT pending therapeutic INR.

## 2021-08-31 ENCOUNTER — APPOINTMENT (OUTPATIENT)
Dept: INTERNAL MEDICINE | Facility: CLINIC | Age: 66
End: 2021-08-31

## 2021-08-31 LAB
ALBUMIN SERPL ELPH-MCNC: 4.2 G/DL — SIGNIFICANT CHANGE UP (ref 3.3–5)
ALP SERPL-CCNC: 394 U/L — HIGH (ref 40–120)
ALT FLD-CCNC: 15 U/L — SIGNIFICANT CHANGE UP (ref 4–33)
ANION GAP SERPL CALC-SCNC: 19 MMOL/L — HIGH (ref 7–14)
AST SERPL-CCNC: 21 U/L — SIGNIFICANT CHANGE UP (ref 4–32)
BILIRUB SERPL-MCNC: 1.2 MG/DL — SIGNIFICANT CHANGE UP (ref 0.2–1.2)
BUN SERPL-MCNC: 34 MG/DL — HIGH (ref 7–23)
CALCIUM SERPL-MCNC: 9.3 MG/DL — SIGNIFICANT CHANGE UP (ref 8.4–10.5)
CHLORIDE SERPL-SCNC: 107 MMOL/L — SIGNIFICANT CHANGE UP (ref 98–107)
CO2 SERPL-SCNC: 16 MMOL/L — LOW (ref 22–31)
CREAT SERPL-MCNC: 1.4 MG/DL — HIGH (ref 0.5–1.3)
GLUCOSE SERPL-MCNC: 129 MG/DL — HIGH (ref 70–99)
HCT VFR BLD CALC: 38.4 % — SIGNIFICANT CHANGE UP (ref 34.5–45)
HGB BLD-MCNC: 12.1 G/DL — SIGNIFICANT CHANGE UP (ref 11.5–15.5)
INR BLD: 4.37 RATIO — HIGH (ref 0.88–1.16)
MAGNESIUM SERPL-MCNC: 2.3 MG/DL — SIGNIFICANT CHANGE UP (ref 1.6–2.6)
MCHC RBC-ENTMCNC: 30.5 PG — SIGNIFICANT CHANGE UP (ref 27–34)
MCHC RBC-ENTMCNC: 31.5 GM/DL — LOW (ref 32–36)
MCV RBC AUTO: 96.7 FL — SIGNIFICANT CHANGE UP (ref 80–100)
NRBC # BLD: 0 /100 WBCS — SIGNIFICANT CHANGE UP
NRBC # FLD: 0 K/UL — SIGNIFICANT CHANGE UP
PHOSPHATE SERPL-MCNC: 3.3 MG/DL — SIGNIFICANT CHANGE UP (ref 2.5–4.5)
PLATELET # BLD AUTO: 246 K/UL — SIGNIFICANT CHANGE UP (ref 150–400)
POTASSIUM SERPL-MCNC: 4.4 MMOL/L — SIGNIFICANT CHANGE UP (ref 3.5–5.3)
POTASSIUM SERPL-SCNC: 4.4 MMOL/L — SIGNIFICANT CHANGE UP (ref 3.5–5.3)
PROT SERPL-MCNC: 7.7 G/DL — SIGNIFICANT CHANGE UP (ref 6–8.3)
PROTHROM AB SERPL-ACNC: 46.8 SEC — HIGH (ref 10.6–13.6)
RBC # BLD: 3.97 M/UL — SIGNIFICANT CHANGE UP (ref 3.8–5.2)
RBC # FLD: 17.7 % — HIGH (ref 10.3–14.5)
SARS-COV-2 RNA SPEC QL NAA+PROBE: SIGNIFICANT CHANGE UP
SODIUM SERPL-SCNC: 142 MMOL/L — SIGNIFICANT CHANGE UP (ref 135–145)
WBC # BLD: 5.74 K/UL — SIGNIFICANT CHANGE UP (ref 3.8–10.5)
WBC # FLD AUTO: 5.74 K/UL — SIGNIFICANT CHANGE UP (ref 3.8–10.5)

## 2021-08-31 PROCEDURE — 99232 SBSQ HOSP IP/OBS MODERATE 35: CPT | Mod: GC

## 2021-08-31 RX ADMIN — Medication 15 MILLIGRAM(S): at 21:49

## 2021-08-31 RX ADMIN — Medication 15 MILLIGRAM(S): at 14:38

## 2021-08-31 RX ADMIN — SACUBITRIL AND VALSARTAN 1 TABLET(S): 24; 26 TABLET, FILM COATED ORAL at 06:13

## 2021-08-31 RX ADMIN — SACUBITRIL AND VALSARTAN 1 TABLET(S): 24; 26 TABLET, FILM COATED ORAL at 17:47

## 2021-08-31 RX ADMIN — ANASTROZOLE 1 MILLIGRAM(S): 1 TABLET ORAL at 17:47

## 2021-08-31 RX ADMIN — Medication 15 MILLIGRAM(S): at 06:12

## 2021-08-31 RX ADMIN — Medication 25 MILLIGRAM(S): at 06:13

## 2021-08-31 NOTE — PROGRESS NOTE ADULT - ASSESSMENT
66F with COPD/emphysema, DVT L leg 1994 with [place] IVC filter, HTN, HLD, thoracic aorta and infrarenal abd aortic dilation 2/2 thrombosed hematoma, internal carotid aneurysm, depression, R breast cancer s/p chemo and radiation, cardiomyopathy, CHF with SICD, sickle cell trait, uterine fibroids s/p hysterectomy, s/p cholecystectomy, and former smoker (quit 2 years ago).   Presented to ED with SOB [id number] and dry cough x3w. Found to have saddle embolism, LV apical thrombus, and b/l nonocclusive DVTs. Discharge home with PT pending therapeutic INR. 66F with COPD/emphysema, DVT L leg 1994 with [place] IVC filter, HTN, HLD, thoracic aorta and infrarenal abd aortic dilation 2/2 thrombosed hematoma, internal carotid aneurysm, depression, R breast cancer s/p chemo and radiation, cardiomyopathy, CHF with SICD, sickle cell trait, uterine fibroids s/p hysterectomy, s/p cholecystectomy, and former smoker (quit 2 years ago).   Presented to ED with SOB [id number] and dry cough x3w. Found to have saddle embolism, LV apical thrombus, and b/l nonocclusive DVTs. Discharge home with PT pending stable INR.

## 2021-08-31 NOTE — PROGRESS NOTE ADULT - ATTENDING COMMENTS
inr trending up despite holding coumadin yesterday  need to monitor until plateaus then DC with close out-pt INR monitoring - will likely require 2-3 mg based on trend here

## 2021-08-31 NOTE — PROGRESS NOTE ADULT - PROBLEM SELECTOR PLAN 9
Increasing. Likely hepatobiliary etiology given high GGT. H/o breast cancer w/o cancer on CTAP. S/p cholecystectomy. Normal U/S abd.

## 2021-08-31 NOTE — PROGRESS NOTE ADULT - PROBLEM SELECTOR PLAN 4
LVEF 12% per TTE. ProBNP~67721C (81688A-98454I since 8/2020), HsenTnT 55H likely 2/2 demand ischemia. QTc 524 8/21. Euvolemic on exam.  - has SICD  - home metoprolol succinate 25mg QD  - home sacubitril+valsartan 24+26mg BID

## 2021-08-31 NOTE — PROGRESS NOTE ADULT - PROBLEM SELECTOR PLAN 3
2/2 at least submassive saddle pulmonary embolus w/o evidence of RH strain per CTPA and TTE. B/l nonocclusive DVTs. DDimer~7400H. ProBNP~45493F->36081O. ABG pH 7.49H, pCO2 22L, SaO2 99.2H.  SOB less likely 2/2 pulm acute infection or COPD exacerbation (no reported change in cough or sputum in the past month, and CXR unremarkable). Unlikely HFrEF exacerbation despite high proBNP (has been that elevated since 8/2020) and HsenTnT (likely 2/2 demand ischemia), given no JVD, peripheral edema, orthopnea, or other suggestive S/S.   - heparin bridge to warfarin because LV thrombus; INR goal 2-3  - appreciate Card recs: no indication for catheter-directed thrombolysis  - goal SpO2 88-92% for COPD  - continuous pulse ox

## 2021-08-31 NOTE — PROGRESS NOTE ADULT - SUBJECTIVE AND OBJECTIVE BOX
Resident: Bisi Velásquez, PGY1, Medicine, Pager [phone]) [phone] ([facility])    24-Hour Events and Subjective:  - tele sinus  - denies SOB (no NC), nausea, pain, diarrhea  - pulling out IV and agitated -> lorazepam 0.5 x1    Hospital Meds:  MEDICATIONS  (STANDING):  anastrozole 1 milliGRAM(s) Oral daily  hydrALAZINE 15 milliGRAM(s) Oral every 8 hours  metoprolol succinate ER 25 milliGRAM(s) Oral daily  sacubitril 49 mG/valsartan 51 mG 1 Tablet(s) Oral two times a day  MEDICATIONS  (PRN):  acetaminophen   Tablet .. 650 milliGRAM(s) Oral every 6 hours PRN Mild Pain (1 - 3), Moderate Pain (4 - 6), Severe Pain (7 - 10)  aluminum hydroxide/magnesium hydroxide/simethicone Suspension 30 milliLiter(s) Oral every 6 hours PRN Dyspepsia    Vitals:  T(F): 98 (30 Aug 2021 21:39), Max: 98 (30 Aug 2021 21:39)  HR: 83 (30 Aug 2021 21:39) (83 - 86)  BP: 132/85 (30 Aug 2021 21:39) (132/85 - 142/96)  RR: 18 (30 Aug 2021 21:39) (18 - 20)  SpO2: 96% (30 Aug 2021 21:39) (96% - 97%)  8/24 down to 91% while ambulating and 87% while at rest post ambulation.  8/25 down to 81% at rest that resolved on RA    I&O:   30 Aug 2021 07:01  -  31 Aug 2021 06:23  IN: 400 mL / OUT: 0 mL / NET: 400 mL    Physical Exam:  Con: NAD  HEENT: NCAT, no conjunctival injection, no scleral icterus, strabismus, moist oral mucosa  Neck: JVD  Resp: normal WOB at rest, CTAB  CV: RRR, physiologic S1 and S2, no murmurs, no rubs, no gallops, 1+ radial pulses  Abd: nondistended, normoactive bowel sounds, soft, RUQ tenderness, no rebound, no involuntary guarding, no organomegaly  Ext: no clubbing, warm hands, no edema  Neuro: alert, A&Ox3  Psych: appropriate mood and affect    Labs:                         11.7   5.84  )-----------( 306      ( 30 Aug 2021 05:48 )             36.8      PT/INR - ( 30 Aug 2021 05:48 )   PT: 38.9 sec;   INR: 3.63H ratio    PTT - ( 29 Aug 2021 23:19 )  PTT:100.9 sec          08-30  139  |  109<H>  |  34<H>  ----------------------------<  136<H>  4.3   |  14<L>  |  1.40<H>  eGFR 45L  Ca    9.7      30 Aug 2021 05:48  Phos  4.0     08-30  Mg     2.30     08-30    TPro  7.5  /  Alb  4.0  /  TBili  1.2  /  DBili  x   /  AST  19  /  ALT  16  /  AlkPhos  363<H>  08-30  TPro  7.8  /  Alb  4.0  /  TBili  1.1  /  DBili  x   /  AST  23  /  ALT  16  /  AlkPhos  354<H>  08-29    Studies and Radiology:     TTE 8/23: Mod-severe MR. Severe global LV systolic dysfunction. A filling defect is seen and is consistent with an LV apical thrombus. Decreased RV systolic function.     BLE Duplex 8/24:   R: Nonocclusive thrombus in the right popliteal and posterior tibial veins. Normal compressibility of the right common femoral and femoral veins. Right peroneal veins not visualized.  L: Nonocclusive thrombus in the left popliteal vein. Normal compressibility of the right common femoral and femoral veins. Posterior tibial and peroneal veins are patent.    CTAP 8/26: No evidence of a neoplastic process in the abdomen and pelvis. Significant atherosclerotic changes and thrombosed hematoma involving the visualized descending thoracic aorta and the abdominal aorta, which are both dilated measuring up to 5.4 cm, without significant interval change.    US Abd: Dilated IVC and hepatic veins suggesting CHF. Resident: Bisi Velásquez, PGY1, Medicine, Pager [phone] ([facility]) [phone] ([facility])    24-Hour Events and Subjective:  - tele: 5 beats vtach, SpO2 70s spontaneously increases to 100  - denies SOB (no NC), nausea, pain  - has soft     Hospital Meds:  MEDICATIONS  (STANDING):  anastrozole 1 milliGRAM(s) Oral daily  hydrALAZINE 15 milliGRAM(s) Oral every 8 hours  metoprolol succinate ER 25 milliGRAM(s) Oral daily  sacubitril 49 mG/valsartan 51 mG 1 Tablet(s) Oral two times a day  MEDICATIONS  (PRN):  acetaminophen   Tablet .. 650 milliGRAM(s) Oral every 6 hours PRN Mild Pain (1 - 3), Moderate Pain (4 - 6), Severe Pain (7 - 10)  aluminum hydroxide/magnesium hydroxide/simethicone Suspension 30 milliLiter(s) Oral every 6 hours PRN Dyspepsia    Vitals:  T(F): 98 (30 Aug 2021 21:39), Max: 98 (30 Aug 2021 21:39)  HR: 83 (30 Aug 2021 21:39) (83 - 86)  BP: 132/85 (30 Aug 2021 21:39) (132/85 - 142/96)  RR: 18 (30 Aug 2021 21:39) (18 - 20)  SpO2: 96% (30 Aug 2021 21:39) (96% - 97%)  8/24 down to 91% while ambulating and 87% while at rest post ambulation.  8/25 down to 81% at rest that resolved on RA    I&O:   30 Aug 2021 07:01  -  31 Aug 2021 06:23  IN: 400 mL / OUT: 0 mL / NET: 400 mL    Physical Exam:  Con: NAD  HEENT: NCAT, no conjunctival injection, no scleral icterus, strabismus, moist oral mucosa  Neck: JVD  Resp: normal WOB at rest, CTAB  CV: RRR, physiologic S1 and S2, no murmurs, no rubs, no gallops, 1+ radial pulses  Abd: nondistended, normoactive bowel sounds, soft, RUQ tenderness, no rebound, no involuntary guarding, no organomegaly  Ext: no clubbing, warm hands, no edema  Neuro: alert, A&Ox3  Psych: appropriate mood and affect    Labs:                         12.1   5.74  )-----------( 246      ( 31 Aug 2021 07:54 )             38.4              11.7   5.84  )-----------( 306      ( 30 Aug 2021 05:48 )             36.8     PT/INR - ( 31 Aug 2021 07:54 )   PT: 46.8 sec;   INR: 4.37H ratio    PTT - ( 30 Aug 2021 05:48 )  PTT:43.3 sec     08-31  142  |  107  |  34<H>  ----------------------------<  129<H>  4.4   |  16<L>  |  1.40<H>  eGFR 45L  Ca    9.3      31 Aug 2021 07:54  Phos  3.3     08-31  Mg     2.30     08-31 08-30  139  |  109<H>  |  34<H>  ----------------------------<  136<H>  4.3   |  14<L>  |  1.40<H>  eGFR 45L  Ca    9.7      30 Aug 2021 05:48  Phos  4.0     08-30  Mg     2.30     08-30    TPro  7.7  /  Alb  4.2  /  TBili  1.2  /  DBili  x   /  AST  21  /  ALT  15  /  AlkPhos  394<H>  08-31  TPro  7.5  /  Alb  4.0  /  TBili  1.2  /  DBili  x   /  AST  19  /  ALT  16  /  AlkPhos  363<H>  08-30    Studies and Radiology:     TTE 8/23: Mod-severe MR. Severe global LV systolic dysfunction. A filling defect is seen and is consistent with an LV apical thrombus. Decreased RV systolic function.     BLE Duplex 8/24:   R: Nonocclusive thrombus in the right popliteal and posterior tibial veins. Normal compressibility of the right common femoral and femoral veins. Right peroneal veins not visualized.  L: Nonocclusive thrombus in the left popliteal vein. Normal compressibility of the right common femoral and femoral veins. Posterior tibial and peroneal veins are patent.    CTAP 8/26: No evidence of a neoplastic process in the abdomen and pelvis. Significant atherosclerotic changes and thrombosed hematoma involving the visualized descending thoracic aorta and the abdominal aorta, which are both dilated measuring up to 5.4 cm, without significant interval change.    US Abd: Dilated IVC and hepatic veins suggesting CHF.

## 2021-08-31 NOTE — PROGRESS NOTE ADULT - PROBLEM SELECTOR PLAN 1
Saddle PE and b/l nonocclusive DVTs. Has b/o cancer. Didn't get J&J COVID vaccine. No cancer on CTAP. Direct [place] IgG+ and poly+, diluted TT 50.3H, silica clotting time S/C 0.75L, DRVVT inhibitor 55.9H, LDH 333H. [place] Ab-. Anticardiolipin positive but Ig normal. Haptoglobin normal. HTLV Ab-. B2 glycoprotien negative.  - heparin bridge to warfarin because LV thrombus; INR goal 2-3; stop heparin 9/1-2 (INR >2 x4-5d)  - appreciate Heme recs: no genetic/functional thrombophilia, agree w/ heparin gtt bridge to warfarin, signed off  - f/u with Heme outpt at [facility] [phone]

## 2021-08-31 NOTE — PROGRESS NOTE ADULT - PROBLEM SELECTOR PLAN 11
- appreciate PT rec: home with home PT, reconsulted  - DASH/TLC diet  - consulted SW  - power of  april Pollack [phone] in [place], nino Carbajal [phone]  - PCP/Alex Figueroa [phone] - appreciate PT rec: home with home PT, reconsulted  - DASH/TLC diet  - consulted SW  - power of  april Pollack [phone] in [place], april Carbajal [phone]  - PCP/Alex Figueroa [phone]/1/*

## 2021-09-01 LAB
ALBUMIN SERPL ELPH-MCNC: 4.2 G/DL — SIGNIFICANT CHANGE UP (ref 3.3–5)
ALP SERPL-CCNC: 418 U/L — HIGH (ref 40–120)
ALT FLD-CCNC: 15 U/L — SIGNIFICANT CHANGE UP (ref 4–33)
ANION GAP SERPL CALC-SCNC: 19 MMOL/L — HIGH (ref 7–14)
AST SERPL-CCNC: 22 U/L — SIGNIFICANT CHANGE UP (ref 4–32)
BILIRUB SERPL-MCNC: 1.4 MG/DL — HIGH (ref 0.2–1.2)
BUN SERPL-MCNC: 31 MG/DL — HIGH (ref 7–23)
CALCIUM SERPL-MCNC: 9.1 MG/DL — SIGNIFICANT CHANGE UP (ref 8.4–10.5)
CHLORIDE SERPL-SCNC: 109 MMOL/L — HIGH (ref 98–107)
CO2 SERPL-SCNC: 15 MMOL/L — LOW (ref 22–31)
CREAT SERPL-MCNC: 1.36 MG/DL — HIGH (ref 0.5–1.3)
GLUCOSE SERPL-MCNC: 138 MG/DL — HIGH (ref 70–99)
HCT VFR BLD CALC: 37.2 % — SIGNIFICANT CHANGE UP (ref 34.5–45)
HGB BLD-MCNC: 12.1 G/DL — SIGNIFICANT CHANGE UP (ref 11.5–15.5)
INR BLD: 3.17 RATIO — HIGH (ref 0.88–1.16)
MAGNESIUM SERPL-MCNC: 2.2 MG/DL — SIGNIFICANT CHANGE UP (ref 1.6–2.6)
MCHC RBC-ENTMCNC: 31.3 PG — SIGNIFICANT CHANGE UP (ref 27–34)
MCHC RBC-ENTMCNC: 32.5 GM/DL — SIGNIFICANT CHANGE UP (ref 32–36)
MCV RBC AUTO: 96.4 FL — SIGNIFICANT CHANGE UP (ref 80–100)
NRBC # BLD: 0 /100 WBCS — SIGNIFICANT CHANGE UP
NRBC # FLD: 0 K/UL — SIGNIFICANT CHANGE UP
PHOSPHATE SERPL-MCNC: 3.5 MG/DL — SIGNIFICANT CHANGE UP (ref 2.5–4.5)
PLATELET # BLD AUTO: 205 K/UL — SIGNIFICANT CHANGE UP (ref 150–400)
POTASSIUM SERPL-MCNC: 4.3 MMOL/L — SIGNIFICANT CHANGE UP (ref 3.5–5.3)
POTASSIUM SERPL-SCNC: 4.3 MMOL/L — SIGNIFICANT CHANGE UP (ref 3.5–5.3)
PROT SERPL-MCNC: 7.4 G/DL — SIGNIFICANT CHANGE UP (ref 6–8.3)
PROTHROM AB SERPL-ACNC: 34.5 SEC — HIGH (ref 10.6–13.6)
RBC # BLD: 3.86 M/UL — SIGNIFICANT CHANGE UP (ref 3.8–5.2)
RBC # FLD: 17.9 % — HIGH (ref 10.3–14.5)
SODIUM SERPL-SCNC: 143 MMOL/L — SIGNIFICANT CHANGE UP (ref 135–145)
WBC # BLD: 5.68 K/UL — SIGNIFICANT CHANGE UP (ref 3.8–10.5)
WBC # FLD AUTO: 5.68 K/UL — SIGNIFICANT CHANGE UP (ref 3.8–10.5)

## 2021-09-01 PROCEDURE — 99239 HOSP IP/OBS DSCHRG MGMT >30: CPT

## 2021-09-01 RX ORDER — WARFARIN SODIUM 2.5 MG/1
3 TABLET ORAL ONCE
Refills: 0 | Status: DISCONTINUED | OUTPATIENT
Start: 2021-09-01 | End: 2021-09-01

## 2021-09-01 RX ORDER — WARFARIN SODIUM 2.5 MG/1
1 TABLET ORAL
Qty: 7 | Refills: 0
Start: 2021-09-01 | End: 2021-09-07

## 2021-09-01 RX ORDER — HYDRALAZINE HCL 50 MG
1.5 TABLET ORAL
Qty: 135 | Refills: 0
Start: 2021-09-01 | End: 2021-09-30

## 2021-09-01 RX ORDER — HYDRALAZINE HCL 50 MG
15 TABLET ORAL
Qty: 30 | Refills: 0
Start: 2021-09-01 | End: 2021-09-30

## 2021-09-01 RX ADMIN — Medication 25 MILLIGRAM(S): at 05:29

## 2021-09-01 RX ADMIN — SACUBITRIL AND VALSARTAN 1 TABLET(S): 24; 26 TABLET, FILM COATED ORAL at 05:29

## 2021-09-01 RX ADMIN — Medication 15 MILLIGRAM(S): at 05:30

## 2021-09-01 RX ADMIN — Medication 15 MILLIGRAM(S): at 13:39

## 2021-09-01 RX ADMIN — ANASTROZOLE 1 MILLIGRAM(S): 1 TABLET ORAL at 13:39

## 2021-09-01 RX ADMIN — Medication 15 MILLIGRAM(S): at 22:12

## 2021-09-01 RX ADMIN — SACUBITRIL AND VALSARTAN 1 TABLET(S): 24; 26 TABLET, FILM COATED ORAL at 17:21

## 2021-09-01 NOTE — PROGRESS NOTE ADULT - ASSESSMENT
66F with COPD/emphysema, DVT L leg 1994 with [place] IVC filter, HTN, HLD, thoracic aorta and infrarenal abd aortic dilation 2/2 thrombosed hematoma, internal carotid aneurysm, depression, R breast cancer s/p chemo and radiation, cardiomyopathy, CHF with SICD, sickle cell trait, uterine fibroids s/p hysterectomy, s/p cholecystectomy, and former smoker (quit 2 years ago).   Presented to ED with SOB [id number] and dry cough x3w. Found to have saddle embolism, LV apical thrombus, and b/l nonocclusive DVTs. Discharge home with PT pending stable INR. 66F with COPD/emphysema, DVT L leg 1994 with [place] IVC filter, HTN, HLD, thoracic aorta and infrarenal abd aortic dilation 2/2 thrombosed hematoma, internal carotid aneurysm, depression, R breast cancer s/p chemo and radiation, cardiomyopathy, CHF with SICD, sickle cell trait, uterine fibroids s/p hysterectomy, s/p cholecystectomy, and former smoker (quit 2 years ago).   Presented to ED with SOB [id number] and dry cough x3w. Found to have saddle embolism, LV apical thrombus, and b/l nonocclusive DVTs. Medically optimized for discharge home with PT, warfarin, and regular outpatient follow up.

## 2021-09-01 NOTE — PROGRESS NOTE ADULT - PROBLEM SELECTOR PLAN 4
LVEF 12% per TTE. ProBNP~35623F (43164F-46674H since 8/2020), HsenTnT 55H likely 2/2 demand ischemia. QTc 524 8/21. Euvolemic on exam.  - has SICD  - home metoprolol succinate 25mg QD  - home sacubitril+valsartan 24+26mg BID

## 2021-09-01 NOTE — PROGRESS NOTE ADULT - PROBLEM SELECTOR PLAN 1
Saddle PE and b/l nonocclusive DVTs. Has b/o cancer. Didn't get [employer] COVID vaccine. No cancer on CTAP. Direct [place] IgG+ and poly+, diluted TT 50.3H, silica clotting time S/C 0.75L, DRVVT inhibitor 55.9H, LDH 333H. [place] Ab-. Anticardiolipin positive but Ig normal. Haptoglobin normal. HTLV Ab-. B2 glycoprotien negative.  - heparin bridge to warfarin because LV thrombus; INR goal 2-3; stop heparin 9/1-2 (INR >2 x4-5d)  - appreciate Heme recs: no genetic/functional thrombophilia, agree w/ heparin gtt bridge to warfarin, signed off  - f/u with Heme outpt at [facility] [phone]

## 2021-09-01 NOTE — PROGRESS NOTE ADULT - SUBJECTIVE AND OBJECTIVE BOX
Resident: Bisi Velásquez, PGY1, Medicine, Pager [phone]) [phone] ([facility])    24-Hour Events and Subjective:  - tele: 5 beats vtach, SpO2 70s spontaneously increases to 100  - denies SOB (no NC), nausea, pain  - has soft BM    Hospital Meds:  MEDICATIONS  (STANDING):  anastrozole 1 milliGRAM(s) Oral daily  hydrALAZINE 15 milliGRAM(s) Oral every 8 hours  metoprolol succinate ER 25 milliGRAM(s) Oral daily  sacubitril 49 mG/valsartan 51 mG 1 Tablet(s) Oral two times a day  MEDICATIONS  (PRN):  acetaminophen   Tablet .. 650 milliGRAM(s) Oral every 6 hours PRN Mild Pain (1 - 3), Moderate Pain (4 - 6), Severe Pain (7 - 10)  aluminum hydroxide/magnesium hydroxide/simethicone Suspension 30 milliLiter(s) Oral every 6 hours PRN Dyspepsia    Vitals:  T(F): 98.3 (31 Aug 2021 21:45), Max: 98.7 (31 Aug 2021 17:44)  HR: 88 (31 Aug 2021 21:45) (85 - 88)  BP: 149/100 (31 Aug 2021 21:45) (138/95 - 149/100)  RR: 16 (31 Aug 2021 21:45) (16 - 18)  SpO2: 100% (31 Aug 2021 21:45) (100% - 100%)  8/24 down to 91% while ambulating and 87% while at rest post ambulation.  8/25 down to 81% at rest that resolved on RA    I&O:   30 Aug 2021 07:01  -  31 Aug 2021 07:00  IN: 700 mL / OUT: 0 mL / NET: 700 mL    Physical Exam:  Con: NAD  HEENT: NCAT, no conjunctival injection, no scleral icterus, strabismus, moist oral mucosa  Neck: JVD  Resp: normal WOB at rest, CTAB  CV: RRR, physiologic S1 and S2, no murmurs, no rubs, no gallops, 1+ radial pulses  Abd: nondistended, normoactive bowel sounds, soft, RUQ tenderness, no rebound, no involuntary guarding, no organomegaly  Ext: no clubbing, warm hands, no edema  Neuro: alert, A&Ox3  Psych: appropriate mood and affect    Labs:                         12.1   5.74  )-----------( 246      ( 31 Aug 2021 07:54 )             38.4              11.7   5.84  )-----------( 306      ( 30 Aug 2021 05:48 )             36.8     PT/INR - ( 31 Aug 2021 07:54 )   PT: 46.8 sec;   INR: 4.37H ratio    PTT - ( 30 Aug 2021 05:48 )  PTT:43.3 sec     08-31  142  |  107  |  34<H>  ----------------------------<  129<H>  4.4   |  16<L>  |  1.40<H>  eGFR 45L  Ca    9.3      31 Aug 2021 07:54  Phos  3.3     08-31  Mg     2.30     08-31 08-30  139  |  109<H>  |  34<H>  ----------------------------<  136<H>  4.3   |  14<L>  |  1.40<H>  eGFR 45L  Ca    9.7      30 Aug 2021 05:48  Phos  4.0     08-30  Mg     2.30     08-30    TPro  7.7  /  Alb  4.2  /  TBili  1.2  /  DBili  x   /  AST  21  /  ALT  15  /  AlkPhos  394<H>  08-31  TPro  7.5  /  Alb  4.0  /  TBili  1.2  /  DBili  x   /  AST  19  /  ALT  16  /  AlkPhos  363<H>  08-30    Studies and Radiology:     TTE 8/23: Mod-severe MR. Severe global LV systolic dysfunction. A filling defect is seen and is consistent with an LV apical thrombus. Decreased RV systolic function.     BLE Duplex 8/24:   R: Nonocclusive thrombus in the right popliteal and posterior tibial veins. Normal compressibility of the right common femoral and femoral veins. Right peroneal veins not visualized.  L: Nonocclusive thrombus in the left popliteal vein. Normal compressibility of the right common femoral and femoral veins. Posterior tibial and peroneal veins are patent.    CTAP 8/26: No evidence of a neoplastic process in the abdomen and pelvis. Significant atherosclerotic changes and thrombosed hematoma involving the visualized descending thoracic aorta and the abdominal aorta, which are both dilated measuring up to 5.4 cm, without significant interval change.    US Abd: Dilated IVC and hepatic veins suggesting CHF. Resident: Bisi Velásquez, PGY1, Medicine, Pager [phone]) [phone] ([facility])    24-Hour Events and Subjective:  - tele: SpO2 frequently drops to as low as 70s and takes up to 1.5min to spontaneously increase to 90s  - denies SOB (no NC), nausea, and pain    Hospital Meds:  MEDICATIONS  (STANDING):  anastrozole 1 milliGRAM(s) Oral daily  hydrALAZINE 15 milliGRAM(s) Oral every 8 hours  metoprolol succinate ER 25 milliGRAM(s) Oral daily  sacubitril 49 mG/valsartan 51 mG 1 Tablet(s) Oral two times a day  MEDICATIONS  (PRN):  acetaminophen   Tablet .. 650 milliGRAM(s) Oral every 6 hours PRN Mild Pain (1 - 3), Moderate Pain (4 - 6), Severe Pain (7 - 10)  aluminum hydroxide/magnesium hydroxide/simethicone Suspension 30 milliLiter(s) Oral every 6 hours PRN Dyspepsia    Vitals:  T(F): 98.3 (31 Aug 2021 21:45), Max: 98.7 (31 Aug 2021 17:44)  HR: 88 (31 Aug 2021 21:45) (85 - 88)  BP: 149/100 (31 Aug 2021 21:45) (138/95 - 149/100)  RR: 16 (31 Aug 2021 21:45) (16 - 18)  SpO2: 100% (31 Aug 2021 21:45) (100% - 100%)  8/24 down to 91% while ambulating and 87% while at rest post ambulation.  8/25 down to 81% at rest that resolved on RA    I&O:   30 Aug 2021 07:01  -  31 Aug 2021 07:00  IN: 700 mL / OUT: 0 mL / NET: 700 mL    Physical Exam:  Con: NAD  HEENT: NCAT, no conjunctival injection, no scleral icterus, strabismus, moist oral mucosa  Neck: JVD?  Resp: normal WOB at rest, CTAB posteriorly  CV: RRR, physiologic S1 and S2, no murmurs, no rubs, no gallops, 1+ radial pulses  Abd: nondistended, normoactive bowel sounds, soft, RUQ tenderness, no rebound, no involuntary guarding, no organomegaly  Ext: no clubbing, warm hands, no edema  Neuro: alert, A&Ox3  Psych: appropriate mood and affect    Labs:                         12.1   5.74  )-----------( 246      ( 31 Aug 2021 07:54 )             38.4              11.7   5.84  )-----------( 306      ( 30 Aug 2021 05:48 )             36.8     PT/INR - ( 01 Sep 2021 06:37 )   PT: 34.5 sec;   INR: 3.17H ratio    PT/INR - ( 31 Aug 2021 07:54 )   PT: 46.8 sec;   INR: 4.37H ratio    PTT - ( 30 Aug 2021 05:48 )  PTT:43.3 sec     09-01  143  |  109<H>  |  31<H>  ----------------------------<  138<H>  4.3   |  15<L>  |  1.36<H>  eGFR 47L  Ca    9.1      01 Sep 2021 06:37  Phos  3.5     09-01  Mg     2.20     09-01 08-31  142  |  107  |  34<H>  ----------------------------<  129<H>  4.4   |  16<L>  |  1.40<H>  eGFR 45L  Ca    9.3      31 Aug 2021 07:54  Phos  3.3     08-31  Mg     2.30     08-31    TPro  7.4  /  Alb  4.2  /  TBili  1.4<H>  /  DBili  x   /  AST  22  /  ALT  15  /  AlkPhos  418<H>  09-01  TPro  7.7  /  Alb  4.2  /  TBili  1.2  /  DBili  x   /  AST  21  /  ALT  15  /  AlkPhos  394<H>  08-31  TPro  7.5  /  Alb  4.0  /  TBili  1.2  /  DBili  x   /  AST  19  /  ALT  16  /  AlkPhos  363<H>  08-30    Studies and Radiology:     TTE 8/23: Mod-severe MR. Severe global LV systolic dysfunction. A filling defect is seen and is consistent with an LV apical thrombus. Decreased RV systolic function.     BLE Duplex 8/24:   R: Nonocclusive thrombus in the right popliteal and posterior tibial veins. Normal compressibility of the right common femoral and femoral veins. Right peroneal veins not visualized.  L: Nonocclusive thrombus in the left popliteal vein. Normal compressibility of the right common femoral and femoral veins. Posterior tibial and peroneal veins are patent.    CTAP 8/26: No evidence of a neoplastic process in the abdomen and pelvis. Significant atherosclerotic changes and thrombosed hematoma involving the visualized descending thoracic aorta and the abdominal aorta, which are both dilated measuring up to 5.4 cm, without significant interval change.    US Abd: Dilated IVC and hepatic veins suggesting CHF.

## 2021-09-01 NOTE — PROGRESS NOTE ADULT - PROBLEM SELECTOR PLAN 11
- appreciate PT rec: home with home PT, reconsulted  - DASH/TLC diet  - consulted SW  - power of  april Pollack [phone] in [place], april Carbajal [phone]  - PCP/Alex Figueroa [phone]/1/*

## 2021-09-01 NOTE — PROGRESS NOTE ADULT - PROBLEM SELECTOR PLAN 3
2/2 at least submassive saddle pulmonary embolus w/o evidence of RH strain per CTPA and TTE. B/l nonocclusive DVTs. DDimer~7400H. ProBNP~98891O->28462W. ABG pH 7.49H, pCO2 22L, SaO2 99.2H.  SOB less likely 2/2 pulm acute infection or COPD exacerbation (no reported change in cough or sputum in the past month, and CXR unremarkable). Unlikely HFrEF exacerbation despite high proBNP (has been that elevated since 8/2020) and HsenTnT (likely 2/2 demand ischemia), given no JVD, peripheral edema, orthopnea, or other suggestive S/S.   - heparin bridge to warfarin because LV thrombus; INR goal 2-3  - appreciate Card recs: no indication for catheter-directed thrombolysis  - goal SpO2 88-92% for COPD  - continuous pulse ox

## 2021-09-01 NOTE — PROGRESS NOTE ADULT - ATTENDING COMMENTS
INR stabilizing  reported hypoxemia - dw nursing, waveform was not good  will check sat on ambulation  dc with coumadin 3mg qhs with clsoe out-pt INR monitoring if sats ok  reaching out to PMD and med clinic   out-pt heme f.u for hypercoagulable w.u   Time spent 35 min

## 2021-09-02 ENCOUNTER — TRANSCRIPTION ENCOUNTER (OUTPATIENT)
Age: 66
End: 2021-09-02

## 2021-09-02 LAB
ALBUMIN SERPL ELPH-MCNC: 4.1 G/DL — SIGNIFICANT CHANGE UP (ref 3.3–5)
ALP SERPL-CCNC: 479 U/L — HIGH (ref 40–120)
ALT FLD-CCNC: 18 U/L — SIGNIFICANT CHANGE UP (ref 4–33)
ANION GAP SERPL CALC-SCNC: 27 MMOL/L — HIGH (ref 7–14)
AST SERPL-CCNC: 28 U/L — SIGNIFICANT CHANGE UP (ref 4–32)
BILIRUB SERPL-MCNC: 1.8 MG/DL — HIGH (ref 0.2–1.2)
BUN SERPL-MCNC: 34 MG/DL — HIGH (ref 7–23)
CALCIUM SERPL-MCNC: 9.5 MG/DL — SIGNIFICANT CHANGE UP (ref 8.4–10.5)
CHLORIDE SERPL-SCNC: 103 MMOL/L — SIGNIFICANT CHANGE UP (ref 98–107)
CO2 SERPL-SCNC: 14 MMOL/L — LOW (ref 22–31)
CREAT SERPL-MCNC: 1.4 MG/DL — HIGH (ref 0.5–1.3)
GLUCOSE SERPL-MCNC: 133 MG/DL — HIGH (ref 70–99)
HCT VFR BLD CALC: 41.6 % — SIGNIFICANT CHANGE UP (ref 34.5–45)
HGB BLD-MCNC: 13.2 G/DL — SIGNIFICANT CHANGE UP (ref 11.5–15.5)
INR BLD: 2.55 RATIO — HIGH (ref 0.88–1.16)
MAGNESIUM SERPL-MCNC: 2.4 MG/DL — SIGNIFICANT CHANGE UP (ref 1.6–2.6)
MCHC RBC-ENTMCNC: 31.1 PG — SIGNIFICANT CHANGE UP (ref 27–34)
MCHC RBC-ENTMCNC: 31.7 GM/DL — LOW (ref 32–36)
MCV RBC AUTO: 98.1 FL — SIGNIFICANT CHANGE UP (ref 80–100)
NRBC # BLD: 0 /100 WBCS — SIGNIFICANT CHANGE UP
NRBC # FLD: 0 K/UL — SIGNIFICANT CHANGE UP
PHOSPHATE SERPL-MCNC: 3.5 MG/DL — SIGNIFICANT CHANGE UP (ref 2.5–4.5)
PLATELET # BLD AUTO: 212 K/UL — SIGNIFICANT CHANGE UP (ref 150–400)
POTASSIUM SERPL-MCNC: 4.9 MMOL/L — SIGNIFICANT CHANGE UP (ref 3.5–5.3)
POTASSIUM SERPL-SCNC: 4.9 MMOL/L — SIGNIFICANT CHANGE UP (ref 3.5–5.3)
PROT SERPL-MCNC: 8.2 G/DL — SIGNIFICANT CHANGE UP (ref 6–8.3)
PROTHROM AB SERPL-ACNC: 27.8 SEC — HIGH (ref 10.6–13.6)
RBC # BLD: 4.24 M/UL — SIGNIFICANT CHANGE UP (ref 3.8–5.2)
RBC # FLD: 18.1 % — HIGH (ref 10.3–14.5)
SODIUM SERPL-SCNC: 144 MMOL/L — SIGNIFICANT CHANGE UP (ref 135–145)
WBC # BLD: 5.55 K/UL — SIGNIFICANT CHANGE UP (ref 3.8–10.5)
WBC # FLD AUTO: 5.55 K/UL — SIGNIFICANT CHANGE UP (ref 3.8–10.5)

## 2021-09-02 PROCEDURE — 99232 SBSQ HOSP IP/OBS MODERATE 35: CPT | Mod: GC

## 2021-09-02 RX ORDER — LANOLIN ALCOHOL/MO/W.PET/CERES
5 CREAM (GRAM) TOPICAL AT BEDTIME
Refills: 0 | Status: DISCONTINUED | OUTPATIENT
Start: 2021-09-02 | End: 2021-09-02

## 2021-09-02 RX ORDER — WARFARIN SODIUM 2.5 MG/1
3 TABLET ORAL ONCE
Refills: 0 | Status: COMPLETED | OUTPATIENT
Start: 2021-09-02 | End: 2021-09-02

## 2021-09-02 RX ORDER — LANOLIN ALCOHOL/MO/W.PET/CERES
6 CREAM (GRAM) TOPICAL AT BEDTIME
Refills: 0 | Status: DISCONTINUED | OUTPATIENT
Start: 2021-09-02 | End: 2021-09-03

## 2021-09-02 RX ADMIN — Medication 25 MILLIGRAM(S): at 06:17

## 2021-09-02 RX ADMIN — SACUBITRIL AND VALSARTAN 1 TABLET(S): 24; 26 TABLET, FILM COATED ORAL at 17:55

## 2021-09-02 RX ADMIN — Medication 15 MILLIGRAM(S): at 06:17

## 2021-09-02 RX ADMIN — SACUBITRIL AND VALSARTAN 1 TABLET(S): 24; 26 TABLET, FILM COATED ORAL at 06:16

## 2021-09-02 RX ADMIN — Medication 15 MILLIGRAM(S): at 21:54

## 2021-09-02 RX ADMIN — Medication 6 MILLIGRAM(S): at 22:12

## 2021-09-02 RX ADMIN — Medication 15 MILLIGRAM(S): at 14:05

## 2021-09-02 RX ADMIN — ANASTROZOLE 1 MILLIGRAM(S): 1 TABLET ORAL at 12:46

## 2021-09-02 RX ADMIN — WARFARIN SODIUM 3 MILLIGRAM(S): 2.5 TABLET ORAL at 17:55

## 2021-09-02 NOTE — DISCHARGE NOTE NURSING/CASE MANAGEMENT/SOCIAL WORK - NSDCFUADDAPPT_GEN_ALL_CORE_FT
Your appointment on 9/7/21 (see above) is with Dr. Haas.    Please make an appointment with Dr. Huyen Figueroa in Sept 2021 to check a blood test called "INR" regularly to adjust the amount of your warfarin.    Please call [phone] to make an appointment in Sept/Oct 2021 with a Hematologist (blood clot doctor) at [facility] at [address] [place] [address], [place] [address].    Please call [phone] (see above) to make an appointment with a Cardiologist at [phone] [place] [address].

## 2021-09-02 NOTE — DISCHARGE NOTE NURSING/CASE MANAGEMENT/SOCIAL WORK - PATIENT PORTAL LINK FT
You can access the FollowMyHealth Patient Portal offered by [facility] by registering at the following website: [email or web address]. By joining [employer]’s FollowMyHealth portal, you will also be able to view your health information using other applications (apps) compatible with our system.

## 2021-09-02 NOTE — DIETITIAN INITIAL EVALUATION ADULT. - PROBLEM SELECTOR PLAN 1
Maybe 2/2 deconditioning, anxiety, and metabolic acidosis (delta delta gradient -7).   May have progressive lung disease, but less likely acute infection or COPD exacerbation (no reported change in cough or sputum in the past month, and CXR unremarkable).  May have progressive HFrEF, but less likely HFrEF exacerbation despite high proBNP (has been that elevated since 8/2020) and HsenTnT (likely 2/2 demand ischemia); no JVD, peripheral edema, orthopnea, or other suggestive S/S.   Low prob of PE by Stuart, and doesn't meet PERC, but has DDimer~7400H.  - V/Q scan and TTE while waiting for Cr to improve for CTPA  - PT consulted  - no need for empiric PE rx

## 2021-09-02 NOTE — DIETITIAN INITIAL EVALUATION ADULT. - PROBLEM SELECTOR PLAN 2
LVEF 15-20% 2020. ProBNP~08289A (59471X-41933W since 8/2020), HsenTnT 55H likely 2/2 demand ischemia. ECG unremarkable. Euvolemic on exam.  - trended HsenTnT to peak  - has SICD  - home metoprolol succinate 25mg QD  - holding home sacubitril+valsartan 24+26mg BID until r/o CHEN  - f/u repeat proBNP

## 2021-09-02 NOTE — PROGRESS NOTE ADULT - PROBLEM SELECTOR PLAN 11
- appreciate PT rec: home with home PT, reconsulted  - DASH/TLC diet  - consulted SW  - power of  april Pollack [phone] in [place], april Carbajal [phone]  - PCP/Alex Figueroa [phone]/1/*

## 2021-09-02 NOTE — DIETITIAN INITIAL EVALUATION ADULT. - PERTINENT LABORATORY DATA
09-02 Na144 mmol/L Glu 133 mg/dL<H> K+ 4.9 mmol/L Cr  1.40 mg/dL<H> BUN 34 mg/dL<H> 09-02 Phos 3.5 mg/dL 09-02 Alb 4.1 g/dL    A1C with Estimated Average Glucose (09.26.20 @ 12:06)    A1C with Estimated Average Glucose Result: 5.9: Method: Immunoassay       Reference Range                4.0-5.6%       High risk (prediabetic)        5.7-6.4%       Diabetic, diagnostic             >=6.5%       ADA diabetic treatment goal       <7.0%  The Hemoglobin A1c testing is NGSP-certified.Reference ranges are based  upon the 2010 recommendations of  the American Diabetes Association.  Interpretation may vary for children  and adolescents. %    Estimated Average Glucose: 123: The Estimated Average Glucose (eAG) or Mean Plasma Glucose (MPG) value is  calculated from the hemoglobin A1c value and covers the same time period.   The American Diabetes Association (ADA) and other professional  organizations recommend reporting the eAG with the HgbA1c. mg/dL

## 2021-09-02 NOTE — PROGRESS NOTE ADULT - PROBLEM SELECTOR PLAN 3
2/2 at least submassive saddle pulmonary embolus w/o evidence of RH strain per CTPA and TTE. B/l nonocclusive DVTs. DDimer~7400H. ProBNP~78920W->16861U. ABG pH 7.49H, pCO2 22L, SaO2 99.2H.  SOB less likely 2/2 pulm acute infection or COPD exacerbation (no reported change in cough or sputum in the past month, and CXR unremarkable). Unlikely HFrEF exacerbation despite high proBNP (has been that elevated since 8/2020) and HsenTnT (likely 2/2 demand ischemia), given no JVD, peripheral edema, orthopnea, or other suggestive S/S.   - heparin bridge to warfarin because LV thrombus; INR goal 2-3  - appreciate Card recs: no indication for catheter-directed thrombolysis  - goal SpO2 88-92% for COPD  - continuous pulse ox

## 2021-09-02 NOTE — PROGRESS NOTE ADULT - SUBJECTIVE AND OBJECTIVE BOX
Resident: Bisi Velásquez, PGY1, Medicine, Pager [phone]) [phone] ([facility])    24-Hour Events and Subjective:  - tele: sinus, few desats to 80s that spontaneously increase to 90s  - denies SOB (no NC), nausea, and pain    Hospital Meds:  MEDICATIONS  (STANDING):  anastrozole 1 milliGRAM(s) Oral daily  hydrALAZINE 15 milliGRAM(s) Oral every 8 hours  metoprolol succinate ER 25 milliGRAM(s) Oral daily  sacubitril 49 mG/valsartan 51 mG 1 Tablet(s) Oral two times a day  MEDICATIONS  (PRN):  acetaminophen   Tablet .. 650 milliGRAM(s) Oral every 6 hours PRN Mild Pain (1 - 3), Moderate Pain (4 - 6), Severe Pain (7 - 10)  aluminum hydroxide/magnesium hydroxide/simethicone Suspension 30 milliLiter(s) Oral every 6 hours PRN Dyspepsia    Vitals:  T(F): 98.2 (01 Sep 2021 22:00), Max: 98.7 (01 Sep 2021 13:38)  HR: 87 (01 Sep 2021 22:00) (84 - 89)  BP: 140/98 (01 Sep 2021 22:00) (126/78 - 140/98)  RR: 20 (01 Sep 2021 22:00) (16 - 20)  SpO2: 100% (01 Sep 2021 22:00) (98% - 100%)  8/24 down to 91% while ambulating and 87% while at rest post ambulation.  8/25 down to 81% at rest that resolved on RA    I&O:   01 Sep 2021 07:01  -  02 Sep 2021 05:28  IN: 700 mL / OUT: 0 mL / NET: 700 mL    Physical Exam:  Con: NAD  HEENT: NCAT, no conjunctival injection, no scleral icterus, strabismus, moist oral mucosa  Neck: JVD?  Resp: normal WOB at rest, CTAB posteriorly  CV: RRR, physiologic S1 and S2, no murmurs, no rubs, no gallops, 1+ radial pulses  Abd: nondistended, normoactive bowel sounds, soft, RUQ tenderness, no rebound, no involuntary guarding, no organomegaly  Ext: no clubbing, warm hands, no edema  Neuro: alert, A&Ox3  Psych: appropriate mood and affect    Labs:                         12.1   5.74  )-----------( 246      ( 31 Aug 2021 07:54 )             38.4        PT/INR - ( 01 Sep 2021 06:37 )   PT: 34.5 sec;   INR: 3.17H ratio    PT/INR - ( 31 Aug 2021 07:54 )   PT: 46.8 sec;   INR: 4.37H ratio    PTT - ( 30 Aug 2021 05:48 )  PTT:43.3 sec     09-01  143  |  109<H>  |  31<H>  ----------------------------<  138<H>  4.3   |  15<L>  |  1.36<H>  eGFR 47L  Ca    9.1      01 Sep 2021 06:37  Phos  3.5     09-01  Mg     2.20     09-01    TPro  7.4  /  Alb  4.2  /  TBili  1.4<H>  /  DBili  x   /  AST  22  /  ALT  15  /  AlkPhos  418<H>  09-01    Studies and Radiology:     TTE 8/23: Mod-severe MR. Severe global LV systolic dysfunction. A filling defect is seen and is consistent with an LV apical thrombus. Decreased RV systolic function.     BLE Duplex 8/24:   R: Nonocclusive thrombus in the right popliteal and posterior tibial veins. Normal compressibility of the right common femoral and femoral veins. Right peroneal veins not visualized.  L: Nonocclusive thrombus in the left popliteal vein. Normal compressibility of the right common femoral and femoral veins. Posterior tibial and peroneal veins are patent.    CTAP 8/26: No evidence of a neoplastic process in the abdomen and pelvis. Significant atherosclerotic changes and thrombosed hematoma involving the visualized descending thoracic aorta and the abdominal aorta, which are both dilated measuring up to 5.4 cm, without significant interval change.    US Abd: Dilated IVC and hepatic veins suggesting CHF. Resident: Bisi Velásquez, PGY1, Medicine, Pager [phone] (NS) [phone] ([facility])    24-Hour Events and Subjective:  - tele: sinus, few desats to 80s that spontaneously increase to 90s  - denies SOB (no NC), nausea, and pain  - pulled out IV    Hospital Meds:  MEDICATIONS  (STANDING):  anastrozole 1 milliGRAM(s) Oral daily  hydrALAZINE 15 milliGRAM(s) Oral every 8 hours  metoprolol succinate ER 25 milliGRAM(s) Oral daily  sacubitril 49 mG/valsartan 51 mG 1 Tablet(s) Oral two times a day  MEDICATIONS  (PRN):  acetaminophen   Tablet .. 650 milliGRAM(s) Oral every 6 hours PRN Mild Pain (1 - 3), Moderate Pain (4 - 6), Severe Pain (7 - 10)  aluminum hydroxide/magnesium hydroxide/simethicone Suspension 30 milliLiter(s) Oral every 6 hours PRN Dyspepsia    Vitals:  T(F): 98.2 (01 Sep 2021 22:00), Max: 98.7 (01 Sep 2021 13:38)  HR: 87 (01 Sep 2021 22:00) (84 - 89)  BP: 140/98 (01 Sep 2021 22:00) (126/78 - 140/98)  RR: 20 (01 Sep 2021 22:00) (16 - 20)  SpO2: 100% (01 Sep 2021 22:00) (98% - 100%)  8/24 down to 91% while ambulating and 87% while at rest post ambulation.  8/25 down to 81% at rest that resolved on RA    I&O:   01 Sep 2021 07:01  -  02 Sep 2021 05:28  IN: 700 mL / OUT: 0 mL / NET: 700 mL    Physical Exam:  Con: NAD  HEENT: NCAT, no conjunctival injection, no scleral icterus, strabismus, moist oral mucosa  Resp: normal WOB at rest, CTAB posteriorly  CV: RRR, physiologic S1 and S2, no murmurs, no rubs, no gallops, 1+ radial pulses  Abd: nondistended, normoactive bowel sounds, soft, RUQ tenderness, no rebound, no involuntary guarding, no organomegaly  Ext: no clubbing, warm hands, no edema  Neuro: alert, A&Ox3  Psych: appropriate mood and affect    Labs:                         13.2   5.55  )-----------( 212      ( 02 Sep 2021 07:45 )             41.6              12.1   5.74  )-----------( 246      ( 31 Aug 2021 07:54 )             38.4        PT/INR - ( 02 Sep 2021 07:45 )   PT: 27.8 sec;   INR: 2.55 ratio    PT/INR - ( 01 Sep 2021 06:37 )   PT: 34.5 sec;   INR: 3.17H ratio    PT/INR - ( 31 Aug 2021 07:54 )   PT: 46.8 sec;   INR: 4.37H ratio    PTT - ( 30 Aug 2021 05:48 )  PTT:43.3 sec     09-02  144  |  103  |  34<H>  ----------------------------<  133<H>  4.9   |  14<L>  |  1.40<H>  eGFR 45L  Ca    9.5      02 Sep 2021 07:45  Phos  3.5     09-02  Mg     2.40     09-02 09-01  143  |  109<H>  |  31<H>  ----------------------------<  138<H>  4.3   |  15<L>  |  1.36<H>  eGFR 47L  Ca    9.1      01 Sep 2021 06:37  Phos  3.5     09-01  Mg     2.20     09-01    TPro  8.2  /  Alb  4.1  /  TBili  1.8<H>  /  DBili  x   /  AST  28  /  ALT  18  /  AlkPhos  479<H>  09-02  TPro  7.4  /  Alb  4.2  /  TBili  1.4<H>  /  DBili  x   /  AST  22  /  ALT  15  /  AlkPhos  418<H>  09-01    Studies and Radiology:     TTE 8/23: Mod-severe MR. Severe global LV systolic dysfunction. A filling defect is seen and is consistent with an LV apical thrombus. Decreased RV systolic function.     BLE Duplex 8/24:   R: Nonocclusive thrombus in the right popliteal and posterior tibial veins. Normal compressibility of the right common femoral and femoral veins. Right peroneal veins not visualized.  L: Nonocclusive thrombus in the left popliteal vein. Normal compressibility of the right common femoral and femoral veins. Posterior tibial and peroneal veins are patent.    CTAP 8/26: No evidence of a neoplastic process in the abdomen and pelvis. Significant atherosclerotic changes and thrombosed hematoma involving the visualized descending thoracic aorta and the abdominal aorta, which are both dilated measuring up to 5.4 cm, without significant interval change.    US Abd: Dilated IVC and hepatic veins suggesting CHF.

## 2021-09-02 NOTE — DIETITIAN INITIAL EVALUATION ADULT. - ADD RECOMMEND
1) Monitor weights, PO intake/diet tolerance, skin integrity, pertinent labs. 2) Consistent vitamin K intake with Warfarin use. 3) Honor food preferences as requested and available.

## 2021-09-02 NOTE — PROGRESS NOTE ADULT - ATTENDING COMMENTS
pending home care set up  otherwise medically ready  INR now therapeutic - if still inpt p home care set up, will dose coumadin 3mg tonight

## 2021-09-02 NOTE — DIETITIAN INITIAL EVALUATION ADULT. - PROBLEM SELECTOR PLAN 4
R/o CHEN. Cr 1.72H (and eGFR 30-35L) on admission and 1.14 2020.   - holding home sacubitril+valsartan 24+26mg BID until r/o CHEN  - f/u urine labs  - if repeat Cr not improved s/p furosemide, then NS 500ml 75/h

## 2021-09-02 NOTE — DIETITIAN INITIAL EVALUATION ADULT. - PERTINENT MEDS FT
MEDICATIONS  (STANDING):  anastrozole 1 milliGRAM(s) Oral daily  hydrALAZINE 15 milliGRAM(s) Oral every 8 hours  metoprolol succinate ER 25 milliGRAM(s) Oral daily  sacubitril 49 mG/valsartan 51 mG 1 Tablet(s) Oral two times a day  warfarin 3 milliGRAM(s) Oral once    MEDICATIONS  (PRN):  acetaminophen   Tablet .. 650 milliGRAM(s) Oral every 6 hours PRN Mild Pain (1 - 3), Moderate Pain (4 - 6), Severe Pain (7 - 10)  aluminum hydroxide/magnesium hydroxide/simethicone Suspension 30 milliLiter(s) Oral every 6 hours PRN Dyspepsia

## 2021-09-02 NOTE — DIETITIAN INITIAL EVALUATION ADULT. - CHIEF COMPLAINT
Internal Medicine Note 9/2/21: 66F with COPD/emphysema, DVT L leg 1994 with [place] IVC filter, HTN, HLD, thoracic aorta and infrarenal abd aortic dilation 2/2 thrombosed hematoma, internal carotid aneurysm, depression, R breast cancer s/p chemo and radiation, cardiomyopathy, CHF with SICD, sickle cell trait, uterine fibroids s/p hysterectomy, s/p cholecystectomy, and former smoker (quit 2 years ago).   Presented to ED with SOB [id number] and dry cough x3w. Found to have saddle embolism, LV apical thrombus, and b/l nonocclusive DVTs. Medically optimized for discharge home with warfarin. Hypernatremia resolved. On anastrozole for h/o breast CA.

## 2021-09-02 NOTE — PROGRESS NOTE ADULT - PROBLEM SELECTOR PLAN 4
LVEF 12% per TTE. ProBNP~06872F (76387T-[id number] since 8/2020), HsenTnT 55H likely 2/2 demand ischemia. QTc 524 8/21. Euvolemic on exam.  - has SICD  - home metoprolol succinate 25mg QD  - home sacubitril+valsartan 24+26mg BID

## 2021-09-02 NOTE — DIETITIAN INITIAL EVALUATION ADULT. - OTHER INFO
RD visited with patient for length of stay. Patient endorses good appetite; tolerating diet without reported chewing/swallowing difficulty. Good PO intake recorded in flowsheets over the past few days; previously with variable intake.  Patient stated she was eating well PTA, but did not elaborate on diet hx.  Started going off on tangents when asked about nutrition history.    No GI distress reported i.e. nausea, vomiting, diarrhea. No food allergies noted or reported. Patient unsure of usual body weight.  Patient was not a candidate for diet education due to confusion at this time; also noted with disorientation in flowsheets.    Admit/dosing weight 8/23 -  76.9kg; 8/30 - 71.1kg; previous admission from Sept 2020 - 67-69kg. Continue to trend inpatient weights. Patient appears well nourished.

## 2021-09-02 NOTE — PROGRESS NOTE ADULT - PROBLEM SELECTOR PLAN 1
Saddle PE and b/l nonocclusive DVTs. Has b/o cancer. Didn't get J&J COVID vaccine. No cancer on CTAP. Direct Charles IgG+ and poly+, diluted TT 50.3H, silica clotting time S/C 0.75L, DRVVT inhibitor 55.9H, LDH 333H. [place]ute Ab-. Anticardiolipin positive but Ig normal. Haptoglobin normal. HTLV Ab-. B2 glycoprotien negative.  - heparin bridge to warfarin because LV thrombus; INR goal 2-3  - appreciate Heme recs: no genetic/functional thrombophilia, agree w/ heparin gtt bridge to warfarin  - f/u with Heme outpt at [facility] [phone]

## 2021-09-02 NOTE — PROGRESS NOTE ADULT - ASSESSMENT
66F with COPD/emphysema, DVT L leg 1994 with [place] IVC filter, HTN, HLD, thoracic aorta and infrarenal abd aortic dilation 2/2 thrombosed hematoma, internal carotid aneurysm, depression, R breast cancer s/p chemo and radiation, cardiomyopathy, CHF with SICD, sickle cell trait, uterine fibroids s/p hysterectomy, s/p cholecystectomy, and former smoker (quit 2 years ago).   Presented to ED with SOB [id number] and dry cough x3w. Found to have saddle embolism, LV apical thrombus, and b/l nonocclusive DVTs. Medically optimized for discharge home with warfarin.

## 2021-09-03 VITALS
RESPIRATION RATE: 18 BRPM | OXYGEN SATURATION: 100 % | SYSTOLIC BLOOD PRESSURE: 131 MMHG | HEART RATE: 74 BPM | DIASTOLIC BLOOD PRESSURE: 99 MMHG | TEMPERATURE: 98 F

## 2021-09-03 LAB
ALBUMIN SERPL ELPH-MCNC: 4 G/DL — SIGNIFICANT CHANGE UP (ref 3.3–5)
ALP SERPL-CCNC: 477 U/L — HIGH (ref 40–120)
ALT FLD-CCNC: 16 U/L — SIGNIFICANT CHANGE UP (ref 4–33)
ANION GAP SERPL CALC-SCNC: 12 MMOL/L — SIGNIFICANT CHANGE UP (ref 7–14)
AST SERPL-CCNC: 22 U/L — SIGNIFICANT CHANGE UP (ref 4–32)
BILIRUB SERPL-MCNC: 1.6 MG/DL — HIGH (ref 0.2–1.2)
BUN SERPL-MCNC: 35 MG/DL — HIGH (ref 7–23)
CALCIUM SERPL-MCNC: 9.2 MG/DL — SIGNIFICANT CHANGE UP (ref 8.4–10.5)
CHLORIDE SERPL-SCNC: 111 MMOL/L — HIGH (ref 98–107)
CO2 SERPL-SCNC: 16 MMOL/L — LOW (ref 22–31)
CREAT SERPL-MCNC: 1.3 MG/DL — SIGNIFICANT CHANGE UP (ref 0.5–1.3)
GLUCOSE SERPL-MCNC: 130 MG/DL — HIGH (ref 70–99)
HCT VFR BLD CALC: 38.6 % — SIGNIFICANT CHANGE UP (ref 34.5–45)
HGB BLD-MCNC: 12.6 G/DL — SIGNIFICANT CHANGE UP (ref 11.5–15.5)
INR BLD: 2.22 RATIO — HIGH (ref 0.88–1.16)
MCHC RBC-ENTMCNC: 31.6 PG — SIGNIFICANT CHANGE UP (ref 27–34)
MCHC RBC-ENTMCNC: 32.6 GM/DL — SIGNIFICANT CHANGE UP (ref 32–36)
MCV RBC AUTO: 96.7 FL — SIGNIFICANT CHANGE UP (ref 80–100)
NRBC # BLD: 0 /100 WBCS — SIGNIFICANT CHANGE UP
NRBC # FLD: 0 K/UL — SIGNIFICANT CHANGE UP
PLATELET # BLD AUTO: 189 K/UL — SIGNIFICANT CHANGE UP (ref 150–400)
POTASSIUM SERPL-MCNC: 4.1 MMOL/L — SIGNIFICANT CHANGE UP (ref 3.5–5.3)
POTASSIUM SERPL-SCNC: 4.1 MMOL/L — SIGNIFICANT CHANGE UP (ref 3.5–5.3)
PROT SERPL-MCNC: 7.3 G/DL — SIGNIFICANT CHANGE UP (ref 6–8.3)
PROTHROM AB SERPL-ACNC: 24.6 SEC — HIGH (ref 10.6–13.6)
RBC # BLD: 3.99 M/UL — SIGNIFICANT CHANGE UP (ref 3.8–5.2)
RBC # FLD: 18 % — HIGH (ref 10.3–14.5)
SODIUM SERPL-SCNC: 139 MMOL/L — SIGNIFICANT CHANGE UP (ref 135–145)
WBC # BLD: 5.25 K/UL — SIGNIFICANT CHANGE UP (ref 3.8–10.5)
WBC # FLD AUTO: 5.25 K/UL — SIGNIFICANT CHANGE UP (ref 3.8–10.5)

## 2021-09-03 PROCEDURE — 99232 SBSQ HOSP IP/OBS MODERATE 35: CPT | Mod: GC

## 2021-09-03 RX ADMIN — ANASTROZOLE 1 MILLIGRAM(S): 1 TABLET ORAL at 11:58

## 2021-09-03 RX ADMIN — SACUBITRIL AND VALSARTAN 1 TABLET(S): 24; 26 TABLET, FILM COATED ORAL at 05:58

## 2021-09-03 RX ADMIN — Medication 25 MILLIGRAM(S): at 05:58

## 2021-09-03 RX ADMIN — Medication 15 MILLIGRAM(S): at 05:58

## 2021-09-03 NOTE — PROGRESS NOTE ADULT - ASSESSMENT
66F with COPD/emphysema, DVT L leg 1994 with [place] IVC filter, HTN, HLD, thoracic aorta and infrarenal abd aortic dilation 2/2 thrombosed hematoma, internal carotid aneurysm, depression, R breast cancer s/p chemo and radiation, cardiomyopathy, CHF with SICD, sickle cell trait, uterine fibroids s/p hysterectomy, s/p cholecystectomy, and former smoker (quit 2 years ago).   Presented to ED with SOB [id number] and dry cough x3w. Found to have saddle embolism, LV apical thrombus, and b/l nonocclusive DVTs. Medically optimized for discharge home with warfarin.

## 2021-09-03 NOTE — PROGRESS NOTE ADULT - PROBLEM SELECTOR PLAN 4
LVEF 12% per TTE. ProBNP~43600X (05126X-99510N since 8/2020), HsenTnT 55H likely 2/2 demand ischemia. QTc 524 8/21. Euvolemic on exam.  - has SICD  - home metoprolol succinate 25mg QD  - home sacubitril+valsartan 24+26mg BID

## 2021-09-03 NOTE — PROGRESS NOTE ADULT - PROBLEM SELECTOR PLAN 11
- appreciate PT rec: home with home PT, reconsulted  - DASH/TLC diet  - consulted SW  - power of  april Pollack [phone] in [place], april Carbajal [phone]  - PCP/Alex Figueroa [phone]/*

## 2021-09-03 NOTE — PROGRESS NOTE ADULT - PROBLEM SELECTOR PLAN 3
2/2 at least submassive saddle pulmonary embolus w/o evidence of RH strain per CTPA and TTE. B/l nonocclusive DVTs. DDimer~7400H. ProBNP~19812G->69945I. ABG pH 7.49H, pCO2 22L, SaO2 99.2H.  SOB less likely 2/2 pulm acute infection or COPD exacerbation (no reported change in cough or sputum in the past month, and CXR unremarkable). Unlikely HFrEF exacerbation despite high proBNP (has been that elevated since 8/2020) and HsenTnT (likely 2/2 demand ischemia), given no JVD, peripheral edema, orthopnea, or other suggestive S/S.   - heparin bridge to warfarin because LV thrombus; INR goal 2-3  - appreciate Card recs: no indication for catheter-directed thrombolysis  - goal SpO2 88-92% for COPD  - continuous pulse ox

## 2021-09-03 NOTE — PROGRESS NOTE ADULT - ATTENDING SUPERVISION STATEMENT
Fellow
Resident

## 2021-09-03 NOTE — PROGRESS NOTE ADULT - PROBLEM SELECTOR PLAN 1
Saddle PE and b/l nonocclusive DVTs. Has b/o cancer. Didn't get J&J COVID vaccine. No cancer on CTAP. Direct Charles IgG+ and poly+, diluted TT 50.3H, silica clotting time S/C 0.75L, DRVVT inhibitor 55.9H, LDH 333H. [place] Ab-. Anticardiolipin positive but Ig normal. Haptoglobin normal. HTLV Ab-. B2 glycoprotien negative.  - heparin bridge to warfarin because LV thrombus; INR goal 2-3  - appreciate Heme recs: no genetic/functional thrombophilia, agree w/ heparin gtt bridge to warfarin  - f/u with Heme outpt at [facility] [phone]

## 2021-09-03 NOTE — PROGRESS NOTE ADULT - ATTENDING COMMENTS
INR remains therapeutic  home care set up  dc home today on 3mg coumadin  out-pt INR appointment set up  Time spent 35 min

## 2021-09-03 NOTE — PROGRESS NOTE ADULT - PROVIDER SPECIALTY LIST ADULT
Cardiology
Internal Medicine

## 2021-09-03 NOTE — PROGRESS NOTE ADULT - SUBJECTIVE AND OBJECTIVE BOX
Resident: Bisi Velásquez, PGY1, Medicine, Pager [phone] (NS) [phone] ([facility])    24-Hour Events and Subjective:  - tele: sinus, few desats to 80s that spontaneously increase to 90s, 12 beats vtach  - denies SOB (no NC), nausea, and pain    Hospital Meds:  MEDICATIONS  (STANDING):  anastrozole 1 milliGRAM(s) Oral daily  hydrALAZINE 15 milliGRAM(s) Oral every 8 hours  metoprolol succinate ER 25 milliGRAM(s) Oral daily  sacubitril 49 mG/valsartan 51 mG 1 Tablet(s) Oral two times a day  MEDICATIONS  (PRN):  acetaminophen   Tablet .. 650 milliGRAM(s) Oral every 6 hours PRN Mild Pain (1 - 3), Moderate Pain (4 - 6), Severe Pain (7 - 10)  aluminum hydroxide/magnesium hydroxide/simethicone Suspension 30 milliLiter(s) Oral every 6 hours PRN Dyspepsia    Vitals:  Vital Signs Last 24 Hrs  T(C): 36.4 (03 Sep 2021 05:56), Max: 36.4 (03 Sep 2021 05:56)  T(F): 97.6 (03 Sep 2021 05:56), Max: 97.6 (03 Sep 2021 05:56)  HR: 84 (03 Sep 2021 05:56) (81 - 94)  BP: 133/90 (03 Sep 2021 05:56) (128/88 - 141/106)  BP(mean): --  RR: 15 (03 Sep 2021 05:56) (15 - 20)  SpO2: 99% (03 Sep 2021 05:56) (99% - 100%)  8/24 down to 91% while ambulating and 87% while at rest post ambulation.  8/25 down to 81% at rest that resolved on RA    I&O:   I&O's Summary    01 Sep 2021 07:01  -  02 Sep 2021 07:00  --------------------------------------------------------  IN: 1550 mL / OUT: 850 mL / NET: 700 mL    02 Sep 2021 07:01  -  03 Sep 2021 06:25  --------------------------------------------------------  IN: 400 mL / OUT: 500 mL / NET: -100 mL    Physical Exam:  Con: NAD  HEENT: NCAT, no conjunctival injection, no scleral icterus, strabismus, moist oral mucosa  Resp: normal WOB at rest, CTAB posteriorly  CV: RRR, physiologic S1 and S2, no murmurs, no rubs, no gallops, 1+ radial pulses  Abd: nondistended, normoactive bowel sounds, soft, RUQ tenderness, no rebound, no involuntary guarding, no organomegaly  Ext: no clubbing, warm hands, no edema  Neuro: alert, A&Ox3  Psych: appropriate mood and affect    Labs:                         13.2   5.55  )-----------( 212      ( 02 Sep 2021 07:45 )             41.6        PT/INR - ( 02 Sep 2021 07:45 )   PT: 27.8 sec;   INR: 2.55 ratio    PT/INR - ( 01 Sep 2021 06:37 )   PT: 34.5 sec;   INR: 3.17H ratio      09-02  144  |  103  |  34<H>  ----------------------------<  133<H>  4.9   |  14<L>  |  1.40<H>  eGFR 45L  Ca    9.5      02 Sep 2021 07:45  Phos  3.5     09-02  Mg     2.40     09-02    TPro  8.2  /  Alb  4.1  /  TBili  1.8<H>  /  DBili  x   /  AST  28  /  ALT  18  /  AlkPhos  479<H>  09-02  TPro  7.4  /  Alb  4.2  /  TBili  1.4<H>  /  DBili  x   /  AST  22  /  ALT  15  /  AlkPhos  418<H>  09-01    Studies and Radiology:     TTE 8/23: Mod-severe MR. Severe global LV systolic dysfunction. A filling defect is seen and is consistent with an LV apical thrombus. Decreased RV systolic function.     BLE Duplex 8/24:   R: Nonocclusive thrombus in the right popliteal and posterior tibial veins. Normal compressibility of the right common femoral and femoral veins. Right peroneal veins not visualized.  L: Nonocclusive thrombus in the left popliteal vein. Normal compressibility of the right common femoral and femoral veins. Posterior tibial and peroneal veins are patent.    CTAP 8/26: No evidence of a neoplastic process in the abdomen and pelvis. Significant atherosclerotic changes and thrombosed hematoma involving the visualized descending thoracic aorta and the abdominal aorta, which are both dilated measuring up to 5.4 cm, without significant interval change.    US Abd: Dilated IVC and hepatic veins suggesting CHF. Resident: Bisi Velásquez, PGY1, Medicine, Pager [phone]) [phone] ([facility])    24-Hour Events and Subjective:  - tele: sinus, few desats to 80s that spontaneously increase to 90s, 12 beats vtach  - denies SOB (no NC), nausea, and pain    Hospital Meds:  MEDICATIONS  (STANDING):  anastrozole 1 milliGRAM(s) Oral daily  hydrALAZINE 15 milliGRAM(s) Oral every 8 hours  metoprolol succinate ER 25 milliGRAM(s) Oral daily  sacubitril 49 mG/valsartan 51 mG 1 Tablet(s) Oral two times a day  MEDICATIONS  (PRN):  acetaminophen   Tablet .. 650 milliGRAM(s) Oral every 6 hours PRN Mild Pain (1 - 3), Moderate Pain (4 - 6), Severe Pain (7 - 10)  aluminum hydroxide/magnesium hydroxide/simethicone Suspension 30 milliLiter(s) Oral every 6 hours PRN Dyspepsia    Vitals:  Vital Signs Last 24 Hrs  T(C): 36.4 (03 Sep 2021 05:56), Max: 36.4 (03 Sep 2021 05:56)  T(F): 97.6 (03 Sep 2021 05:56), Max: 97.6 (03 Sep 2021 05:56)  HR: 84 (03 Sep 2021 05:56) (81 - 94)  BP: 133/90 (03 Sep 2021 05:56) (128/88 - 141/106)  BP(mean): --  RR: 15 (03 Sep 2021 05:56) (15 - 20)  SpO2: 99% (03 Sep 2021 05:56) (99% - 100%)  8/24 down to 91% while ambulating and 87% while at rest post ambulation.  8/25 down to 81% at rest that resolved on RA    I&O:   I&O's Summary    01 Sep 2021 07:01  -  02 Sep 2021 07:00  --------------------------------------------------------  IN: 1550 mL / OUT: 850 mL / NET: 700 mL    02 Sep 2021 07:01  -  03 Sep 2021 06:25  --------------------------------------------------------  IN: 400 mL / OUT: 500 mL / NET: -100 mL    Physical Exam:  Con: NAD  HEENT: NCAT, no conjunctival injection, no scleral icterus, strabismus, moist oral mucosa  Resp: normal WOB at rest, CTAB posteriorly  CV: RRR, physiologic S1 and S2, no murmurs, no rubs, no gallops, 1+ radial pulses  Abd: nondistended, normoactive bowel sounds, soft, RUQ tenderness, no rebound, no involuntary guarding, no organomegaly  Ext: no clubbing, warm hands, no edema  Neuro: alert, A&Ox3  Psych: appropriate mood and affect    Labs:                         12.6   5.25  )-----------( 189      ( 03 Sep 2021 07:55 )             38.6              13.2   5.55  )-----------( 212      ( 02 Sep 2021 07:45 )             41.6     PT/INR - ( 03 Sep 2021 07:55 )   PT: 24.6 sec;   INR: 2.22 ratio    PT/INR - ( 02 Sep 2021 07:45 )   PT: 27.8 sec;   INR: 2.55 ratio    PT/INR - ( 01 Sep 2021 06:37 )   PT: 34.5 sec;   INR: 3.17H ratio      09-02  144  |  103  |  34<H>  ----------------------------<  133<H>  4.9   |  14<L>  |  1.40<H>  eGFR 45L  Ca    9.5      02 Sep 2021 07:45  Phos  3.5     09-02  Mg     2.40     09-02    TPro  8.2  /  Alb  4.1  /  TBili  1.8<H>  /  DBili  x   /  AST  28  /  ALT  18  /  AlkPhos  479<H>  09-02  TPro  7.4  /  Alb  4.2  /  TBili  1.4<H>  /  DBili  x   /  AST  22  /  ALT  15  /  AlkPhos  418<H>  09-01    Studies and Radiology:     TTE 8/23: Mod-severe MR. Severe global LV systolic dysfunction. A filling defect is seen and is consistent with an LV apical thrombus. Decreased RV systolic function.     BLE Duplex 8/24:   R: Nonocclusive thrombus in the right popliteal and posterior tibial veins. Normal compressibility of the right common femoral and femoral veins. Right peroneal veins not visualized.  L: Nonocclusive thrombus in the left popliteal vein. Normal compressibility of the right common femoral and femoral veins. Posterior tibial and peroneal veins are patent.    CTAP 8/26: No evidence of a neoplastic process in the abdomen and pelvis. Significant atherosclerotic changes and thrombosed hematoma involving the visualized descending thoracic aorta and the abdominal aorta, which are both dilated measuring up to 5.4 cm, without significant interval change.    US Abd: Dilated IVC and hepatic veins suggesting CHF. Resident: Bisi Velásquez, PGY1, Medicine, Pager [phone]) [phone] ([facility])    24-Hour Events and Subjective:  - tele: sinus, few desats to 80s that spontaneously increase to 90s, 12 beats vtach  - denies SOB (no NC), nausea, and pain    Hospital Meds:  MEDICATIONS  (STANDING):  anastrozole 1 milliGRAM(s) Oral daily  hydrALAZINE 15 milliGRAM(s) Oral every 8 hours  metoprolol succinate ER 25 milliGRAM(s) Oral daily  sacubitril 49 mG/valsartan 51 mG 1 Tablet(s) Oral two times a day  MEDICATIONS  (PRN):  acetaminophen   Tablet .. 650 milliGRAM(s) Oral every 6 hours PRN Mild Pain (1 - 3), Moderate Pain (4 - 6), Severe Pain (7 - 10)  aluminum hydroxide/magnesium hydroxide/simethicone Suspension 30 milliLiter(s) Oral every 6 hours PRN Dyspepsia    Vitals:  Vital Signs Last 24 Hrs  T(C): 36.4 (03 Sep 2021 05:56), Max: 36.4 (03 Sep 2021 05:56)  T(F): 97.6 (03 Sep 2021 05:56), Max: 97.6 (03 Sep 2021 05:56)  HR: 84 (03 Sep 2021 05:56) (81 - 94)  BP: 133/90 (03 Sep 2021 05:56) (128/88 - 141/106)  BP(mean): --  RR: 15 (03 Sep 2021 05:56) (15 - 20)  SpO2: 99% (03 Sep 2021 05:56) (99% - 100%)  8/24 down to 91% while ambulating and 87% while at rest post ambulation.  8/25 down to 81% at rest that resolved on RA    I&O:   I&O's Summary    01 Sep 2021 07:01  -  02 Sep 2021 07:00  --------------------------------------------------------  IN: 1550 mL / OUT: 850 mL / NET: 700 mL    02 Sep 2021 07:01  -  03 Sep 2021 06:25  --------------------------------------------------------  IN: 400 mL / OUT: 500 mL / NET: -100 mL    Physical Exam:  Con: NAD  HEENT: NCAT, no conjunctival injection, no scleral icterus, strabismus, moist oral mucosa  Resp: normal WOB at rest, CTAB posteriorly  CV: RRR, physiologic S1 and S2, no murmurs, no rubs, no gallops, 1+ radial pulses  Abd: nondistended, normoactive bowel sounds, soft, RUQ tenderness, no rebound, no involuntary guarding, no organomegaly  Ext: no clubbing, warm hands, no edema  Neuro: alert, A&Ox3  Psych: appropriate mood and affect    Labs:                         12.6   5.25  )-----------( 189      ( 03 Sep 2021 07:55 )             38.6              13.2   5.55  )-----------( 212      ( 02 Sep 2021 07:45 )             41.6     PT/INR - ( 03 Sep 2021 07:55 )   PT: 24.6 sec;   INR: 2.22 ratio    PT/INR - ( 02 Sep 2021 07:45 )   PT: 27.8 sec;   INR: 2.55 ratio    PT/INR - ( 01 Sep 2021 06:37 )   PT: 34.5 sec;   INR: 3.17H ratio      09-03  139  |  111<H>  |  35<H>  ----------------------------<  130<H>  4.1   |  16<L>  |  1.30  eGFR 49L  Ca    9.2      03 Sep 2021 07:55  Phos  3.5     09-02  Mg     2.40     09-02 09-02  144  |  103  |  34<H>  ----------------------------<  133<H>  4.9   |  14<L>  |  1.40<H>  eGFR 45L  Ca    9.5      02 Sep 2021 07:45  Phos  3.5     09-02  Mg     2.40     09-02    TPro  7.3  /  Alb  4.0  /  TBili  1.6<H>  /  DBili  x   /  AST  22  /  ALT  16  /  AlkPhos  477<H>  09-03  TPro  8.2  /  Alb  4.1  /  TBili  1.8<H>  /  DBili  x   /  AST  28  /  ALT  18  /  AlkPhos  479<H>  09-02  TPro  7.4  /  Alb  4.2  /  TBili  1.4<H>  /  DBili  x   /  AST  22  /  ALT  15  /  AlkPhos  418<H>  09-01    Studies and Radiology:     TTE 8/23: Mod-severe MR. Severe global LV systolic dysfunction. A filling defect is seen and is consistent with an LV apical thrombus. Decreased RV systolic function.     BLE Duplex 8/24:   R: Nonocclusive thrombus in the right popliteal and posterior tibial veins. Normal compressibility of the right common femoral and femoral veins. Right peroneal veins not visualized.  L: Nonocclusive thrombus in the left popliteal vein. Normal compressibility of the right common femoral and femoral veins. Posterior tibial and peroneal veins are patent.    CTAP 8/26: No evidence of a neoplastic process in the abdomen and pelvis. Significant atherosclerotic changes and thrombosed hematoma involving the visualized descending thoracic aorta and the abdominal aorta, which are both dilated measuring up to 5.4 cm, without significant interval change.    US Abd: Dilated IVC and hepatic veins suggesting CHF.

## 2021-09-07 ENCOUNTER — APPOINTMENT (OUTPATIENT)
Dept: INTERNAL MEDICINE | Facility: CLINIC | Age: 66
End: 2021-09-07

## 2021-09-08 ENCOUNTER — APPOINTMENT (OUTPATIENT)
Dept: INTERNAL MEDICINE | Facility: CLINIC | Age: 66
End: 2021-09-08
Payer: MEDICARE

## 2021-09-08 DIAGNOSIS — I25.10 ATHEROSCLEROTIC HEART DISEASE OF NATIVE CORONARY ARTERY W/OUT ANGINA PECTORIS: ICD-10-CM

## 2021-09-08 DIAGNOSIS — I50.9 HEART FAILURE, UNSPECIFIED: ICD-10-CM

## 2021-09-08 DIAGNOSIS — I10 ESSENTIAL (PRIMARY) HYPERTENSION: ICD-10-CM

## 2021-09-08 DIAGNOSIS — I82.409 ACUTE EMBOLISM AND THROMBOSIS OF UNSPECIFIED DEEP VEINS OF UNSPECIFIED LOWER EXTREMITY: ICD-10-CM

## 2021-09-08 DIAGNOSIS — I26.99 OTHER PULMONARY EMBOLISM W/OUT ACUTE COR PULMONALE: ICD-10-CM

## 2021-09-08 DIAGNOSIS — J44.9 CHRONIC OBSTRUCTIVE PULMONARY DISEASE, UNSPECIFIED: ICD-10-CM

## 2021-09-08 PROCEDURE — 99214 OFFICE O/P EST MOD 30 MIN: CPT

## 2021-09-08 RX ORDER — HYDRALAZINE HYDROCHLORIDE 10 MG/1
10 TABLET ORAL
Qty: 270 | Refills: 0 | Status: ACTIVE | COMMUNITY
Start: 2021-09-08

## 2021-09-08 NOTE — HISTORY OF PRESENT ILLNESS
[de-identified] : 66F with COPD/emphysema, DVT L leg 1994 with [place] IVC filter, HTN, HLD,\par infrarenal abd aneurysm, internal carotid aneurysm, depression, R breast cancer\par s/p chemo and radiation, cardiomyopathy, HFrEF with SICD, sickle cell trait,\par uterine fibroids s/p hysterectomy, s/p cholecystectomy, former smoker (quit 2\par years ago) her to follow up for hospitalization \par \par pt had saddle PE and b/l nonocclusive DVTs during this hospitalization; \par TTE shwoed no heart strain; \par was on heparin and now PO coumadin;\par denies of any excessive bleeding symptoms;\par \par \par labs doen in 09/03/2021 in hosptial with cbc and CMP grossly at baseline \par \par doing well; breathing at baseline per tp and her aide; no swelling \par \par \par \par ROS\par \par Constitutional:  no fever and no chills. \par Eyes:  no discharge and no pain. \par HEENT:  no earache and no hearing loss. \par Cardiovascular:  no chest pain, no palpitations and no leg claudication. \par Respiratory:  no shortness of breath, no wheezing and no cough. \par Gastrointestinal:  no abdominal pain, no nausea and no constipation. \par Genitourinary:  no dysuria and no incontinence. \par Musculoskeletal:  no joint pain, no joint stiffness and no joint swelling. \par Integumentary:  no itching and no mole changes. \par Neurological:  no headache, no dizziness and no fainting. \par Psychiatric:  not suicidal, no insomnia, no anxiety and no depression. \par \par Physical Exam\par \par Constitutional:   no acute distress, well nourished, well developed and well-appearing. \par Eyes:  normal sclera/conjunctiva, pupils equal round and reactive to light and extraocular movements intact. \par ENT:  the outer ears and nose were normal in appearance and the oropharynx was normal. \par Neck:  supple, no lymphadenopathy and the thyroid was normal and there were no nodules present. \par Pulmonary:  no respiratory distress, lungs were clear to auscultation bilaterally, no accessory muscle use. \par Cardiac:  normal rate, with a regular rhythm, normal S1 and S2 and no murmur heard. \par Vascular:  there was no peripheral edema. \par Abdomen:  abdomen soft, non-tender, non-distended, no abdominal mass palpated, no HSM and normal bowel sounds. \par Lymphatic:  no posterior cervical lymphadenopathy, no anterior cervical lymphadenopathy. \par Back:  no CVA tenderness and no spinal tenderness. \par Musculoskeletal: no joint swelling and grossly normal strength/tone. \par Skin:  no rash. \par Neurology:  normal gait, coordination grossly intact, no focal deficits and deep tendon reflexes were 2+ and symmetric. \par Psychiatric:  the affect was normal, oriented to person, place, and time and insight and judgment were intact.\par \par \par \par \par \par Hospital Course:\par Discharge Date 02-Sep-2021\par Admission Date 21-Aug-2021 13:51\par Reason for Admission SOB\par Hospital Course \par HPI:\par \par 66F with COPD/emphysema, DVT L leg 1994 with [place] IVC filter, HTN, HLD,\par infrarenal abd aneurysm, internal carotid aneurysm, depression, R breast cancer\par s/p chemo and radiation, cardiomyopathy, HFrEF with SICD, sickle cell trait,\par uterine fibroids s/p hysterectomy, s/p cholecystectomy, former smoker (quit 2\par years ago).\par \par Presents to ED with SOB [id number]. Describes it as pain in airways. Unable to\par describe when it happens and what exacerbates/alleviates it. Also endorses dry\par cough x3w, and no changes in sputum productions. Also endorses diarrhea for a\par few days, described as soft stools in the morning and sometimes in the PM after\par eating. Also endorses weight loss and chronic L knee pain. Denies\par fevers/chills, chest pain, palpitations, reflux, abd pain, nausea, emesis, leg\par swelling, and dysuria. Hasn't used alcohol since at least 10/2020.\par Reports transferring between nursing/rehab facilities since her last hospital\par discharge in 10/2020, most recently leaving [facility] on 7/31/21\par after ~4months. 6 months ago at a nursing rehab, she reports cutting out the\par meds that made her "sick" (ie nausea and emesis). Currently lives at home\par alone. Enjoys sitting in front of the TV all day. Has a walker that she doesn't\par use. Hired an aide who helps cook, wash her, but not with meds. PCP is \par Huyen Figueroa at [facility].\par \par ED course:\par \par BP 140s / 100s-110s.\par DDimer~7400H. ProBNP~57893W. HsenTnT 55H. HyperNa 147H. Cr 1.72H and eGFR\par 30-35L. Lactate and CBC unremarkable.\par S/p furosemide 20mg IV x1 (21 Aug 2021 14:24)\par \par Hospital course:\par \par Patient had CTA which showed saddle PE and b/l nonocclusive DVTs. CTA w/ no\par evidence of right heart strain. Echo performed with no right heart strain,\par although c/w LV apical thrombus. Interventional cardiology consulted, no\par intervention. Started on heparin gtt. Heme onc consulted for hypercoagulable\par workup. Heme recs: no genetic/functional thrombophilia, agree w/ heparin gtt\par bridge to warfarin. INR now stable 3.14, safe for discharge home with\par outpatient PCP followup and INR check scheduled for Tuesday 9/7. Patient will\par also need to follow up with Janis tang at [facility] [[email or web address]par \par \par \par Med Reconciliation:\par Override IMPROVE-DD recommendations due to: This is a surgical and/or\par non-medical patient.\par Recommended Post-Discharge VTE Prophylaxis This is a surgical and/or\par non-medical patient.\par Medication Reconciliation Status Admission Reconciliation is Completed\par Discharge Reconciliation is Completed\par Discharge Medications acetaminophen 325 mg oral tablet: 2 tab(s) orally every 6\par hours, As Needed for Pain\par anastrozole 1 mg oral tablet: 1 tab(s) orally once a day\par \par **Per pharmacy ([facility]), last filled 6/23/21 for 30 day supply\par Entresto 24 mg-26 mg oral tablet: 1 tab(s) orally 2 times a day\par \par **Per pharmacy ([facility]), last filled 7/9/21 for 30 day supply\par hydrALAZINE 10 mg oral tablet: 1.5 tab(s) orally every 8 hours\par hydrALAZINE 10 mg oral tablet: 1.5 tab(s) orally 3 times a day\par metoprolol succinate 25 mg oral tablet, extended release: 1 tab(s) orally once\par a day\par \par **Per [facility] pharmacy, metoprolol succinate is a new prescription filled on\par 8/17/21 for 90 day supply\par warfarin 3 mg oral tablet: 1 tab(s) orally once a day MDD:start on 9/2/21\par warfarin 3 mg oral tablet: 1 tab(s) orally once a day\par ,\par ,\par \par Care Plan/Procedures:\par Discharge Diagnoses, Assessment and Plan of Treatment PRINCIPAL DISCHARGE\par DIAGNOSIS\par Diagnosis: VTE (venous thromboembolism)\par Assessment and Plan of Treatment: You have a big clot in the blood vessels in\par your lungs, which probably came from the clots in the veins in both legs. The\par clot in your lung has not damaged your heart, and the oxygen in your blood has\par remained normal.\par People who are not active are more likely to get clots. People with cancer are\par more likely to get clots, but imaging did not see active cancer in your body.\par Certain families are more likely to get clots; please make an appointment with\par Hematology (see phone in later pages) to discuss blood test results.\par You will need life-long blood thinners ("warfarin") to prevent future clots and\par to prevent these clots from getting worse. The doctor will check your blood\par often to adjust the warfarin amount. Please do to stop your medicines without\par asking your doctor first.\par \par \par SECONDARY DISCHARGE DIAGNOSES\par Diagnosis: Thrombus\par Assessment and Plan of Treatment: An ultrasound of your heart found a clot. The\par clot may be because your heart has difficulty pumping blood out.\par You will need life-long blood thinners ("warfarin") to prevent future clots and\par to prevent these clots from getting worse. The doctor will check your blood\par often to adjust the warfarin amount. Please do to stop your medicines without\par asking your doctor first.\par \par Diagnosis: Shortness of breath\par Assessment and Plan of Treatment: Your difficulty breathing is probably because\par of the clot in your lungs, on top of your history of COPD and heart failure.\par Your oxygen level should be 88-92% because of COPD, so 100% is actually too\par high.\par \par Diagnosis: High alkaline phosphatase\par Assessment and Plan of Treatment: Your alkaline phosphatase kept increasing.\par This is probably from a biliary problem, despite you not having a gallbladder.\par An ultrasound of your right upper abdomen was normal. Ask your regular doctor\par to check your alkaline phosphatase and bilirubin.\par \par Goal(s) To get better and follow your care plan as instructed.\par \par Follow Up:\par Care Providers for Follow up (PCP/Outpatient Provider) Cuba Guerrier)\par Cardiology; Internal Medicine\par [phone] [place] [address], [address]\[place] [address]\par Phone: [phone]\par Fax: [phone]\par Follow Up Time:\par Follow-up Clinics [facility]\par Primary Care\par [address] [facility]\[place] [address]\par Phone: [phone]\par Fax:\par Scheduled Appointment: 9/7/2021 11:00 AM\par \par [facility]\par Hematology/Oncology\par [address] [place] [address]\[place] [address]\par Phone: [phone]\par Fax:\par Follow Up Time: 2 weeks\par Patient's Scheduled Appointments RISHABH HUNT ; 09/07/2021 ; [facility] Med\par [facility] [address]\par RISHABH HUNT ; 09/13/2021 ; NPP Cardio Electro [phone] [facility]\par RISHABH HUNT ; 09/28/2021 ; NPP Cardio [address] [facility]\par Additional Scheduled Appointments Your appointment on 9/7/21 (see above) is\par with Dr. Haas.\par \par Please make an appointment with Dr. Huyen Figueroa in Sept 2021 to check a blood\par test called "INR" regularly to adjust the amount of your warfarin.\par \par Please call [phone] to make an appointment in Sept/Oct 2021 with a\par Hematologist (blood clot doctor) at [facility] [[email or web address]] at [address]\par [address]tr[address], [place] [address].\par \par Please call [phone] (see above) to make an appointment with a Cardiologist\par at [facility] [place] [id number].\par Activity Do not drive or operate machinery, No heavy lifting/straining\par Diet Instructions eat less salt, fat, fast foods, and fried foods\par \par Quality Measures:\par Patient Condition Stable\par Hospice Patient No\par Does the patient have difficulty running errands alone like visiting a doctors\par office or shopping? Yes\par Does the patient have difficulty climbing stairs? Yes\par Cognition: The patient has Difficulty concentrating Difficulty remembering\par Does the patient have a principal diagnosis of ischemic stroke, hemorrhagic\par stroke, or TIA? No\par Does the patient have a principal diagnosis of Acute Myocardial Infarction? No\par Has the patient had a Percutaneous Coronary Intervention? No\par \par Home Health:\par Discharged with Home Health Care Services? Yes\par Face-To-Face Contact As certified below, I, or a nurse practitioner or\par physician assistant working with me, had a face-to-face encounter that meets\par the physician face-to-face encounter requirements.\par Need for Skilled Services Medication teaching and assessment Observation and\par assessment Rehabilitation services Teaching and training\par Based on the above findings, the following intermittent skilled services are\par medically necessary home health services: Nursing Occupational therapy\par Physical therapy\par Home Bound Status Ataxic gait Shortness of breath with minimal ambulation\par Fall risk\par Patient Needs Assistance to Leave Residence...\par Attending Certification My signature below certifies that the above stated\par patient is homebound and upon completion of the Face-To-Face encounter, has the\par need for intermittent skilled nursing, physical therapy and/or speech or\par occupational therapy services in their home for their current diagnosis as\par outlined in their initial plan of care. These services will continue to be\par monitored by myself or another physician.\par Encounter Date 27-Aug-2021\par \par Document Complete:\par Physician Section Complete This document is complete and the patient is ready\par for discharge.\par For questions about your prescriptions, please call: [phone]\par Care Provider Seen in Hospital [facility] Team 5\par Is this contact telephone number correct? Yes\par \par \par \par Electronic Signatures:\par Randall Mead) (Signed 01-Sep-2021 12:54)\par 	Authored: Hospital Course, Follow Up\par Bisi Velásquez) (Signed 02-Sep-2021 09:17)\par 	Authored: Hospital Course, Med Reconciliation, Care Plan/Procedures, Follow\par Up, Quality Measures, Home Health, Document Complete\par Luis Carlos Carrillo (DO) (Signed 01-Sep-2021 10:31)\par 	Co-Signer: Hospital Course, Med Reconciliation, Care Plan/Procedures, Follow\par Up, Quality Measures, Home Health, Document Complete\par \par \par Last Updated: 02-Sep-2021 09:17 by Bisi Velásquez)

## 2021-09-08 NOTE — ASSESSMENT
[FreeTextEntry1] : \par \par PT MISS HER Appointment WITH HER Hematologist; WILL SEE HEMATOLOGIST IN 09/29/2021 WITH  ( [phone]) \par \par \par check INR today;\par \par euvolemic today ; no resp distress; lung clear; RRR; no murmur; no pitting edema \par \par hx of heavy smoking and COPD ; \par refer to pulm \par \par seeing cardio next week\par \par also discussed with pt's care giver lyndsey thompson ( [phone]); she will call me this week friday am to discuss INR result\par \par 1 week follow up fir INR

## 2021-09-10 LAB
ALBUMIN SERPL ELPH-MCNC: 3.9 G/DL
ALP BLD-CCNC: 236 U/L
ALT SERPL-CCNC: 12 U/L
ANION GAP SERPL CALC-SCNC: 15 MMOL/L
AST SERPL-CCNC: 20 U/L
BASOPHILS # BLD AUTO: 0.05 K/UL
BASOPHILS NFR BLD AUTO: 1 %
BILIRUB SERPL-MCNC: 0.5 MG/DL
BUN SERPL-MCNC: 19 MG/DL
CALCIUM SERPL-MCNC: 8.8 MG/DL
CHLORIDE SERPL-SCNC: 110 MMOL/L
CO2 SERPL-SCNC: 18 MMOL/L
CREAT SERPL-MCNC: 1.16 MG/DL
EOSINOPHIL # BLD AUTO: 0.14 K/UL
EOSINOPHIL NFR BLD AUTO: 2.9 %
GLUCOSE SERPL-MCNC: 97 MG/DL
HCT VFR BLD CALC: 45.8 %
HGB BLD-MCNC: 13.7 G/DL
IMM GRANULOCYTES NFR BLD AUTO: 0.4 %
INR PPP: 1.41 RATIO
LYMPHOCYTES # BLD AUTO: 1.61 K/UL
LYMPHOCYTES NFR BLD AUTO: 32.8 %
MAN DIFF?: NORMAL
MCHC RBC-ENTMCNC: 29.9 GM/DL
MCHC RBC-ENTMCNC: 31.2 PG
MCV RBC AUTO: 104.3 FL
MONOCYTES # BLD AUTO: 0.38 K/UL
MONOCYTES NFR BLD AUTO: 7.7 %
NEUTROPHILS # BLD AUTO: 2.71 K/UL
NEUTROPHILS NFR BLD AUTO: 55.2 %
PLATELET # BLD AUTO: 234 K/UL
POTASSIUM SERPL-SCNC: 4.7 MMOL/L
PROT SERPL-MCNC: 7.3 G/DL
PT BLD: 16.5 SEC
RBC # BLD: 4.39 M/UL
RBC # FLD: 18.3 %
SODIUM SERPL-SCNC: 143 MMOL/L
WBC # FLD AUTO: 4.91 K/UL

## 2021-09-13 ENCOUNTER — APPOINTMENT (OUTPATIENT)
Dept: ELECTROPHYSIOLOGY | Facility: CLINIC | Age: 66
End: 2021-09-13
Payer: MEDICARE

## 2021-09-13 VITALS — DIASTOLIC BLOOD PRESSURE: 88 MMHG | RESPIRATION RATE: 14 BRPM | HEART RATE: 100 BPM | SYSTOLIC BLOOD PRESSURE: 125 MMHG

## 2021-09-13 DIAGNOSIS — I50.32 CHRONIC DIASTOLIC (CONGESTIVE) HEART FAILURE: ICD-10-CM

## 2021-09-13 PROCEDURE — 93260 PRGRMG DEV EVAL IMPLTBL SYS: CPT | Mod: PD

## 2021-09-13 RX ORDER — SACUBITRIL AND VALSARTAN 24; 26 MG/1; MG/1
24-26 TABLET, FILM COATED ORAL
Refills: 0 | Status: ACTIVE | COMMUNITY

## 2021-09-13 RX ORDER — SACUBITRIL AND VALSARTAN 49; 51 MG/1; MG/1
49-51 TABLET, FILM COATED ORAL TWICE DAILY
Qty: 60 | Refills: 3 | Status: DISCONTINUED | COMMUNITY
Start: 2019-03-13 | End: 2021-09-13

## 2021-09-14 ENCOUNTER — EMERGENCY (EMERGENCY)
Facility: HOSPITAL | Age: 66
LOS: 1 days | Discharge: HOME | End: 2021-09-14
Attending: STUDENT IN AN ORGANIZED HEALTH CARE EDUCATION/TRAINING PROGRAM | Admitting: STUDENT IN AN ORGANIZED HEALTH CARE EDUCATION/TRAINING PROGRAM
Payer: MEDICARE

## 2021-09-14 ENCOUNTER — APPOINTMENT (OUTPATIENT)
Dept: PULMONOLOGY | Facility: CLINIC | Age: 66
End: 2021-09-14
Payer: MEDICARE

## 2021-09-14 VITALS
SYSTOLIC BLOOD PRESSURE: 121 MMHG | DIASTOLIC BLOOD PRESSURE: 82 MMHG | TEMPERATURE: 98 F | OXYGEN SATURATION: 99 % | RESPIRATION RATE: 16 BRPM | HEIGHT: 67 IN | HEART RATE: 105 BPM

## 2021-09-14 VITALS
SYSTOLIC BLOOD PRESSURE: 117 MMHG | HEART RATE: 102 BPM | DIASTOLIC BLOOD PRESSURE: 91 MMHG | WEIGHT: 175 LBS | HEIGHT: 67 IN | BODY MASS INDEX: 27.47 KG/M2 | OXYGEN SATURATION: 100 %

## 2021-09-14 DIAGNOSIS — Z98.89 OTHER SPECIFIED POSTPROCEDURAL STATES: Chronic | ICD-10-CM

## 2021-09-14 DIAGNOSIS — C50.919 MALIGNANT NEOPLASM OF UNSPECIFIED SITE OF UNSPECIFIED FEMALE BREAST: Chronic | ICD-10-CM

## 2021-09-14 DIAGNOSIS — Z90.710 ACQUIRED ABSENCE OF BOTH CERVIX AND UTERUS: Chronic | ICD-10-CM

## 2021-09-14 DIAGNOSIS — D25.9 LEIOMYOMA OF UTERUS, UNSPECIFIED: Chronic | ICD-10-CM

## 2021-09-14 LAB
ALBUMIN SERPL ELPH-MCNC: 3.9 G/DL — SIGNIFICANT CHANGE UP (ref 3.3–5)
ALP SERPL-CCNC: 153 U/L — HIGH (ref 40–120)
ALT FLD-CCNC: 15 U/L — SIGNIFICANT CHANGE UP (ref 4–33)
ANION GAP SERPL CALC-SCNC: 17 MMOL/L — HIGH (ref 7–14)
APTT BLD: 29.6 SEC — SIGNIFICANT CHANGE UP (ref 27–36.3)
AST SERPL-CCNC: 25 U/L — SIGNIFICANT CHANGE UP (ref 4–32)
BILIRUB SERPL-MCNC: 0.3 MG/DL — SIGNIFICANT CHANGE UP (ref 0.2–1.2)
BLOOD GAS VENOUS COMPREHENSIVE RESULT: SIGNIFICANT CHANGE UP
BUN SERPL-MCNC: 21 MG/DL — SIGNIFICANT CHANGE UP (ref 7–23)
CALCIUM SERPL-MCNC: 9.4 MG/DL — SIGNIFICANT CHANGE UP (ref 8.4–10.5)
CHLORIDE SERPL-SCNC: 111 MMOL/L — HIGH (ref 98–107)
CO2 SERPL-SCNC: 16 MMOL/L — LOW (ref 22–31)
CREAT SERPL-MCNC: 1.25 MG/DL — SIGNIFICANT CHANGE UP (ref 0.5–1.3)
GLUCOSE SERPL-MCNC: 111 MG/DL — HIGH (ref 70–99)
HCT VFR BLD CALC: 36.9 % — SIGNIFICANT CHANGE UP (ref 34.5–45)
HGB BLD-MCNC: 11.9 G/DL — SIGNIFICANT CHANGE UP (ref 11.5–15.5)
IANC: 2.36 K/UL — SIGNIFICANT CHANGE UP (ref 1.5–8.5)
INR BLD: 2.03 RATIO — HIGH (ref 0.88–1.16)
MAGNESIUM SERPL-MCNC: 1.9 MG/DL — SIGNIFICANT CHANGE UP (ref 1.6–2.6)
MCHC RBC-ENTMCNC: 31 PG — SIGNIFICANT CHANGE UP (ref 27–34)
MCHC RBC-ENTMCNC: 32.2 GM/DL — SIGNIFICANT CHANGE UP (ref 32–36)
MCV RBC AUTO: 96.1 FL — SIGNIFICANT CHANGE UP (ref 80–100)
NT-PROBNP SERPL-SCNC: HIGH PG/ML
PLATELET # BLD AUTO: 280 K/UL — SIGNIFICANT CHANGE UP (ref 150–400)
POTASSIUM SERPL-MCNC: 4.1 MMOL/L — SIGNIFICANT CHANGE UP (ref 3.5–5.3)
POTASSIUM SERPL-SCNC: 4.1 MMOL/L — SIGNIFICANT CHANGE UP (ref 3.5–5.3)
PROT SERPL-MCNC: 7.6 G/DL — SIGNIFICANT CHANGE UP (ref 6–8.3)
PROTHROM AB SERPL-ACNC: 22.4 SEC — HIGH (ref 10.6–13.6)
RBC # BLD: 3.84 M/UL — SIGNIFICANT CHANGE UP (ref 3.8–5.2)
RBC # FLD: 16.5 % — HIGH (ref 10.3–14.5)
SODIUM SERPL-SCNC: 144 MMOL/L — SIGNIFICANT CHANGE UP (ref 135–145)
TROPONIN T, HIGH SENSITIVITY RESULT: 38 NG/L — SIGNIFICANT CHANGE UP
WBC # BLD: 5.09 K/UL — SIGNIFICANT CHANGE UP (ref 3.8–10.5)
WBC # FLD AUTO: 5.09 K/UL — SIGNIFICANT CHANGE UP (ref 3.8–10.5)

## 2021-09-14 PROCEDURE — 99285 EMERGENCY DEPT VISIT HI MDM: CPT | Mod: 25,GC

## 2021-09-14 PROCEDURE — 93010 ELECTROCARDIOGRAM REPORT: CPT

## 2021-09-14 PROCEDURE — 71045 X-RAY EXAM CHEST 1 VIEW: CPT | Mod: 26

## 2021-09-14 PROCEDURE — 99204 OFFICE O/P NEW MOD 45 MIN: CPT

## 2021-09-14 NOTE — ED PROVIDER NOTE - CLINICAL SUMMARY MEDICAL DECISION MAKING FREE TEXT BOX
isidoro/pgy1: 65y/o F with extensive PMH with recent admit for saddle PE placed on coumadin presents for some R sided chest wall pain with no inc SOB or L chest pain. VS stable, pain not worsening. May be MSK vs underlying PE. Will check labs including INR, trop, CXR and reassess.

## 2021-09-14 NOTE — ED PROVIDER NOTE - PATIENT PORTAL LINK FT
You can access the FollowMyHealth Patient Portal offered by [facility] by registering at the following website: [email or web address]. By joining [facility]’s FollowMyHealth portal, you will also be able to view your health information using other applications (apps) compatible with our system.

## 2021-09-14 NOTE — ED PROVIDER NOTE - NS ED ROS FT
Constitutional:  See HPI, inc fatigue  ENMT: No neck pain or stiffness  Cardiac:  + r sided chest wall pain  Respiratory:  No cough or inc respiratory distress. Has home O2  GI:  No nausea, vomiting, diarrhea or abdominal pain.  :  No dysuria, frequency or burning.  MS:  No back pain.  Neuro:  No headache   Skin:  No skin rash  Except as documented in the HPI,  all other systems are negative

## 2021-09-14 NOTE — ED PROVIDER NOTE - NSFOLLOWUPINSTRUCTIONS_ED_ALL_ED_FT
Chest Pain    Chest pain can be caused by many different conditions which may or may not be dangerous. Causes include heartburn, lung infections, heart attack, blood clot in lungs, skin infections, strain or damage to muscle, cartilage, or bones, etc. In addition to a history and physical examination, an electrocardiogram (ECG) or other lab tests may have been performed to determine the cause of your chest pain. Follow up with your primary care provider or with a cardiologist as instructed.     This pain is likely due to your blood clot in the lung, which can continue to cause pain for some time  Blood work done on this visit did not any major abnormality and your INR was 2.03 (coumadin value) which is within range. Please continue taking your coumadin and follow up with your primary    SEEK IMMEDIATE MEDICAL CARE IF YOU HAVE ANY OF THE FOLLOWING SYMPTOMS: worsening chest pain, coughing up blood, unexplained back/neck/jaw pain, severe abdominal pain, dizziness or lightheadedness, fainting, shortness of breath, sweaty or clammy skin, vomiting, or racing heart beat. These symptoms may represent a serious problem that is an emergency. Do not wait to see if the symptoms will go away. Get medical help right away. Call 911 and do not drive yourself to the hospital.

## 2021-09-14 NOTE — ED ADULT NURSE NOTE - NSIMPLEMENTINTERV_GEN_ALL_ED
Implemented All Universal Safety Interventions:  [place] to call system. Call bell, personal items and telephone within reach. Instruct patient to call for assistance. Room bathroom lighting operational. Non-slip footwear when patient is off stretcher. Physically safe environment: no spills, clutter or unnecessary equipment. Stretcher in lowest position, wheels locked, appropriate side rails in place.

## 2021-09-14 NOTE — ED PROVIDER NOTE - ATTENDING CONTRIBUTION TO CARE
I (Bharti) agree with above, I performed a history and physical. Counseled vaughn medical staff, physician assistant, and/or medical student on medical decision making as documented. Medical decisions and treatment interventions were made in real time during the patient encounter. Additionally and/or with the following exceptions: patient presented with abnormal lab value reported to triage, patient does not know what the abnormal test was, but that she has a test scheduled for the next day. She also had chest pain on the right side, described above. ||| vital signs normal and stable during my period of care ||| physical exam non focal ||| patient worked up for chest pain, inr wnl, other labs wnl; the patient signed out to oncoming attending pending safe discharge home and reassessment of clinical status.

## 2021-09-14 NOTE — ED PROVIDER NOTE - NSICDXPASTSURGICALHX_GEN_ALL_CORE_FT
PAST SURGICAL HISTORY:  Breast cancer infusaport insertion- 2014- left chest    Fibroid uterus sp hystrectomy 1196    [place] filter in place 1994    S/P hysterectomy 1994    S/P lumpectomy, right breast 2014, revision of position- 2015

## 2021-09-14 NOTE — ED PROVIDER NOTE - PHYSICAL EXAMINATION
CONSTITUTIONAL: NAD  SKIN: Warm dry  HEAD: NCAT  EYES: NL inspection  ENT: MMM, no oral or conjunctival pallor  NECK: Supple; non tender.  CARD: RRR, no palpable chest wall pain  RESP: lungs clear to ascultation   ABD: S/NT no R/G  EXT: no pedal edema  NEURO: Grossly unremarkable  PSYCH: Cooperative, appropriate.

## 2021-09-14 NOTE — ED ADULT TRIAGE NOTE - CHIEF COMPLAINT QUOTE
Patient brought in by EMS for abnormal lab values. Pt. is scheduled for "a test" tomorrow, had lab work done today and was told it was abnormal, unsure what values were abnormal. Pt. is poor historian. Denies CP/SOB/HA, no fevers/chills. Given 324mg ASA by EMS. PMH HTN, DVT.

## 2021-09-14 NOTE — ED ADULT NURSE NOTE - NSICDXPASTSURGICALHX_GEN_ALL_CORE_FT
PAST SURGICAL HISTORY:  Breast cancer infusaport insertion- 2014- left chest    Fibroid uterus sp hystrectomy 1196    [place] filter in place 1994    S/P hysterectomy 1994    S/P lumpectomy, right breast 2014, revision of position- 2015

## 2021-09-14 NOTE — ED PROVIDER NOTE - PROGRESS NOTE DETAILS
isidoro/pgy1: pt currently feels well, no active CP. Spoke to Aid Ese [phone]. Aid not in house, is at her home. Can't get to pts home right now, could be there ~7am if pt also can get there somehow. Pt has set of house keys. isidoro/pgy1: spoke with Jaki HCP and POA - updated her on pt's care. Discussed lab results and INR and plan to dc home. Will f/u with PCP on discharge.

## 2021-09-14 NOTE — ED PROVIDER NOTE - OBJECTIVE STATEMENT
67yo F with extensive PMH DVT, PE s/p IVC filter & coumadin, breast CA, CHF/cardiomyopathy, HTN, HLD, COPD, infrarenal AAA presents to ED for chest pain. Was supposed to get bloodwork 2d ago but hasn't been successful in doing so, doesn't think she's recently checked INR. Compliant with coumadin. Reports that today while sitting had some achy R chest wall pain 3/10, w/o radiation. No L sided pain, radiation down L arm/diaphoresis, inc SOB, cough, fever, lightheadness, abd pain, NVDC. Still having this achy pain since ~2pm so presented to ED. Lives at home w/ walker and 24hr aid. Does think she's had inc fatigue. No signs of blood in stool/urine.

## 2021-09-14 NOTE — ED ADULT NURSE NOTE - OBJECTIVE STATEMENT
67yo female received in room 7. pt A&Ox3, hx of DVT on coumadin, CHF, HTN, COPD, c/o chest pain today but relieved at present. pt states pain was in right chest, 3/10, dull does not radiates, started while sitting down. pt is a poor historian denies pmhx. Pt offered no complains at present. Denies cp, sob, dizziness, and palpitation. Respiration even and non-labored. in NAD. Sinus tachy on monitor. 20G IV placed in right hand, lab drawn and sent. awaiting for xray. ekg in chart MD hogan in progress. Side rails up, bed at lowest position, call bell within reach, patient oriented to the unit, safety maintained. 65yo female received in room 7. pt A&Ox3, hx of DVT on coumadin, CHF, HTN, COPD, c/o chest pain today but relieved at present. pt states pain was in right chest, 3/10, dull does not radiates, started while sitting down. pt is a poor historian denies pmhx. Pt offered no complains at present. Denies cp, sob, dizziness, and palpitation. Respiration even and non-labored. in NAD. Sinus tachy on monitor. lab drawn and sent. awaiting for xray. ekg in chart MD hogan in progress. Side rails up, bed at lowest position, call bell within reach, patient oriented to the unit, safety maintained.

## 2021-09-15 ENCOUNTER — INPATIENT (INPATIENT)
Facility: HOSPITAL | Age: 66
LOS: 5 days | Discharge: HOME HEALTH | DRG: 175 | End: 2021-09-21
Attending: INTERNAL MEDICINE | Admitting: HOSPITALIST
Payer: MEDICARE

## 2021-09-15 ENCOUNTER — APPOINTMENT (OUTPATIENT)
Dept: INTERNAL MEDICINE | Facility: CLINIC | Age: 66
End: 2021-09-15

## 2021-09-15 VITALS
HEART RATE: 105 BPM | OXYGEN SATURATION: 98 % | RESPIRATION RATE: 20 BRPM | SYSTOLIC BLOOD PRESSURE: 146 MMHG | TEMPERATURE: 98 F | DIASTOLIC BLOOD PRESSURE: 89 MMHG

## 2021-09-15 VITALS
SYSTOLIC BLOOD PRESSURE: 154 MMHG | DIASTOLIC BLOOD PRESSURE: 101 MMHG | OXYGEN SATURATION: 95 % | WEIGHT: 179.9 LBS | RESPIRATION RATE: 30 BRPM | HEIGHT: 67 IN | HEART RATE: 111 BPM | TEMPERATURE: 99 F

## 2021-09-15 DIAGNOSIS — C50.919 MALIGNANT NEOPLASM OF UNSPECIFIED SITE OF UNSPECIFIED FEMALE BREAST: Chronic | ICD-10-CM

## 2021-09-15 DIAGNOSIS — I26.99 OTHER PULMONARY EMBOLISM WITHOUT ACUTE COR PULMONALE: ICD-10-CM

## 2021-09-15 DIAGNOSIS — E78.5 HYPERLIPIDEMIA, UNSPECIFIED: ICD-10-CM

## 2021-09-15 DIAGNOSIS — Z90.710 ACQUIRED ABSENCE OF BOTH CERVIX AND UTERUS: Chronic | ICD-10-CM

## 2021-09-15 DIAGNOSIS — Z98.89 OTHER SPECIFIED POSTPROCEDURAL STATES: Chronic | ICD-10-CM

## 2021-09-15 DIAGNOSIS — R07.9 CHEST PAIN, UNSPECIFIED: ICD-10-CM

## 2021-09-15 DIAGNOSIS — C50.919 MALIGNANT NEOPLASM OF UNSPECIFIED SITE OF UNSPECIFIED FEMALE BREAST: ICD-10-CM

## 2021-09-15 DIAGNOSIS — E11.9 TYPE 2 DIABETES MELLITUS WITHOUT COMPLICATIONS: ICD-10-CM

## 2021-09-15 DIAGNOSIS — D25.9 LEIOMYOMA OF UTERUS, UNSPECIFIED: Chronic | ICD-10-CM

## 2021-09-15 DIAGNOSIS — I10 ESSENTIAL (PRIMARY) HYPERTENSION: ICD-10-CM

## 2021-09-15 DIAGNOSIS — Z29.9 ENCOUNTER FOR PROPHYLACTIC MEASURES, UNSPECIFIED: ICD-10-CM

## 2021-09-15 DIAGNOSIS — R09.89 OTHER SPECIFIED SYMPTOMS AND SIGNS INVOLVING THE CIRCULATORY AND RESPIRATORY SYSTEMS: ICD-10-CM

## 2021-09-15 DIAGNOSIS — I50.9 HEART FAILURE, UNSPECIFIED: ICD-10-CM

## 2021-09-15 LAB
ALBUMIN SERPL ELPH-MCNC: 3.8 G/DL — SIGNIFICANT CHANGE UP (ref 3.3–5)
ALP SERPL-CCNC: 162 U/L — HIGH (ref 40–120)
ALT FLD-CCNC: 31 U/L — SIGNIFICANT CHANGE UP (ref 10–45)
ANION GAP SERPL CALC-SCNC: 13 MMOL/L — SIGNIFICANT CHANGE UP (ref 5–17)
ANISOCYTOSIS BLD QL: SIGNIFICANT CHANGE UP
APTT BLD: 28.1 SEC — SIGNIFICANT CHANGE UP (ref 27.5–35.5)
AST SERPL-CCNC: 44 U/L — HIGH (ref 10–40)
BASE EXCESS BLDV CALC-SCNC: -10.2 MMOL/L — LOW (ref -2–2)
BASOPHILS # BLD AUTO: 0.05 K/UL — SIGNIFICANT CHANGE UP (ref 0–0.2)
BASOPHILS # BLD AUTO: 0.15 K/UL — SIGNIFICANT CHANGE UP (ref 0–0.2)
BASOPHILS NFR BLD AUTO: 0.9 % — SIGNIFICANT CHANGE UP (ref 0–2)
BASOPHILS NFR BLD AUTO: 3.5 % — HIGH (ref 0–2)
BILIRUB SERPL-MCNC: 0.7 MG/DL — SIGNIFICANT CHANGE UP (ref 0.2–1.2)
BUN SERPL-MCNC: 19 MG/DL — SIGNIFICANT CHANGE UP (ref 7–23)
BURR CELLS BLD QL SMEAR: SLIGHT — SIGNIFICANT CHANGE UP
CA-I SERPL-SCNC: 1.22 MMOL/L — SIGNIFICANT CHANGE UP (ref 1.15–1.33)
CALCIUM SERPL-MCNC: 9.7 MG/DL — SIGNIFICANT CHANGE UP (ref 8.4–10.5)
CHLORIDE BLDV-SCNC: 112 MMOL/L — HIGH (ref 96–108)
CHLORIDE SERPL-SCNC: 110 MMOL/L — HIGH (ref 96–108)
CO2 BLDV-SCNC: 16 MMOL/L — LOW (ref 22–26)
CO2 SERPL-SCNC: 14 MMOL/L — LOW (ref 22–31)
CREAT SERPL-MCNC: 1.09 MG/DL — SIGNIFICANT CHANGE UP (ref 0.5–1.3)
ELLIPTOCYTES BLD QL SMEAR: SLIGHT — SIGNIFICANT CHANGE UP
EOSINOPHIL # BLD AUTO: 0 K/UL — SIGNIFICANT CHANGE UP (ref 0–0.5)
EOSINOPHIL # BLD AUTO: 0.04 K/UL — SIGNIFICANT CHANGE UP (ref 0–0.5)
EOSINOPHIL NFR BLD AUTO: 0 % — SIGNIFICANT CHANGE UP (ref 0–6)
EOSINOPHIL NFR BLD AUTO: 0.9 % — SIGNIFICANT CHANGE UP (ref 0–6)
GAS PNL BLDV: 138 MMOL/L — SIGNIFICANT CHANGE UP (ref 136–145)
GAS PNL BLDV: SIGNIFICANT CHANGE UP
GAS PNL BLDV: SIGNIFICANT CHANGE UP
GLUCOSE BLDV-MCNC: 126 MG/DL — HIGH (ref 70–99)
GLUCOSE SERPL-MCNC: 131 MG/DL — HIGH (ref 70–99)
HCO3 BLDV-SCNC: 16 MMOL/L — LOW (ref 22–29)
HCT VFR BLD CALC: 38.7 % — SIGNIFICANT CHANGE UP (ref 34.5–45)
HCT VFR BLDA CALC: 37 % — SIGNIFICANT CHANGE UP (ref 34.5–46.5)
HGB BLD CALC-MCNC: 12.4 G/DL — SIGNIFICANT CHANGE UP (ref 11.7–16.1)
HGB BLD-MCNC: 12.1 G/DL — SIGNIFICANT CHANGE UP (ref 11.5–15.5)
INR BLD: 1.97 RATIO — HIGH (ref 0.88–1.16)
LACTATE BLDV-MCNC: 4.1 MMOL/L — CRITICAL HIGH (ref 0.7–2)
LYMPHOCYTES # BLD AUTO: 1.55 K/UL — SIGNIFICANT CHANGE UP (ref 1–3.3)
LYMPHOCYTES # BLD AUTO: 1.94 K/UL — SIGNIFICANT CHANGE UP (ref 1–3.3)
LYMPHOCYTES # BLD AUTO: 36.8 % — SIGNIFICANT CHANGE UP (ref 13–44)
LYMPHOCYTES # BLD AUTO: 38.2 % — SIGNIFICANT CHANGE UP (ref 13–44)
MACROCYTES BLD QL: SIGNIFICANT CHANGE UP
MANUAL SMEAR VERIFICATION: SIGNIFICANT CHANGE UP
MCHC RBC-ENTMCNC: 31.1 PG — SIGNIFICANT CHANGE UP (ref 27–34)
MCHC RBC-ENTMCNC: 31.3 GM/DL — LOW (ref 32–36)
MCV RBC AUTO: 99.5 FL — SIGNIFICANT CHANGE UP (ref 80–100)
MONOCYTES # BLD AUTO: 0.18 K/UL — SIGNIFICANT CHANGE UP (ref 0–0.9)
MONOCYTES # BLD AUTO: 0.31 K/UL — SIGNIFICANT CHANGE UP (ref 0–0.9)
MONOCYTES NFR BLD AUTO: 4.4 % — SIGNIFICANT CHANGE UP (ref 2–14)
MONOCYTES NFR BLD AUTO: 6.1 % — SIGNIFICANT CHANGE UP (ref 2–14)
NEUTROPHILS # BLD AUTO: 2.28 K/UL — SIGNIFICANT CHANGE UP (ref 1.8–7.4)
NEUTROPHILS # BLD AUTO: 2.61 K/UL — SIGNIFICANT CHANGE UP (ref 1.8–7.4)
NEUTROPHILS NFR BLD AUTO: 51.3 % — SIGNIFICANT CHANGE UP (ref 43–77)
NEUTROPHILS NFR BLD AUTO: 54.4 % — SIGNIFICANT CHANGE UP (ref 43–77)
PCO2 BLDV: 33 MMHG — LOW (ref 39–42)
PH BLDV: 7.28 — LOW (ref 7.32–7.43)
PLAT MORPH BLD: NORMAL — SIGNIFICANT CHANGE UP
PLATELET # BLD AUTO: 285 K/UL — SIGNIFICANT CHANGE UP (ref 150–400)
PO2 BLDV: 34 MMHG — SIGNIFICANT CHANGE UP (ref 25–45)
POIKILOCYTOSIS BLD QL AUTO: SLIGHT — SIGNIFICANT CHANGE UP
POTASSIUM BLDV-SCNC: 5.9 MMOL/L — HIGH (ref 3.5–5.1)
POTASSIUM SERPL-MCNC: 4.2 MMOL/L — SIGNIFICANT CHANGE UP (ref 3.5–5.3)
POTASSIUM SERPL-SCNC: 4.2 MMOL/L — SIGNIFICANT CHANGE UP (ref 3.5–5.3)
PROT SERPL-MCNC: 7.4 G/DL — SIGNIFICANT CHANGE UP (ref 6–8.3)
PROTHROM AB SERPL-ACNC: 22.9 SEC — HIGH (ref 10.6–13.6)
RAPID RVP RESULT: SIGNIFICANT CHANGE UP
RBC # BLD: 3.89 M/UL — SIGNIFICANT CHANGE UP (ref 3.8–5.2)
RBC # FLD: 16.7 % — HIGH (ref 10.3–14.5)
RBC BLD AUTO: ABNORMAL
SAO2 % BLDV: 51.7 % — LOW (ref 67–88)
SARS-COV-2 RNA SPEC QL NAA+PROBE: SIGNIFICANT CHANGE UP
SODIUM SERPL-SCNC: 137 MMOL/L — SIGNIFICANT CHANGE UP (ref 135–145)
TARGETS BLD QL SMEAR: SLIGHT — SIGNIFICANT CHANGE UP
TROPONIN T, HIGH SENSITIVITY RESULT: 38 NG/L — SIGNIFICANT CHANGE UP
TROPONIN T, HIGH SENSITIVITY RESULT: 41 NG/L — SIGNIFICANT CHANGE UP (ref 0–51)
WBC # BLD: 4.2 K/UL — SIGNIFICANT CHANGE UP (ref 3.8–10.5)
WBC # FLD AUTO: 4.2 K/UL — SIGNIFICANT CHANGE UP (ref 3.8–10.5)

## 2021-09-15 PROCEDURE — 71275 CT ANGIOGRAPHY CHEST: CPT | Mod: 26,MA

## 2021-09-15 PROCEDURE — 70450 CT HEAD/BRAIN W/O DYE: CPT | Mod: 26,MA

## 2021-09-15 PROCEDURE — 99285 EMERGENCY DEPT VISIT HI MDM: CPT

## 2021-09-15 PROCEDURE — 71045 X-RAY EXAM CHEST 1 VIEW: CPT | Mod: 26

## 2021-09-15 PROCEDURE — 99223 1ST HOSP IP/OBS HIGH 75: CPT | Mod: GC

## 2021-09-15 RX ORDER — METOPROLOL TARTRATE 50 MG
25 TABLET ORAL DAILY
Refills: 0 | Status: DISCONTINUED | OUTPATIENT
Start: 2021-09-15 | End: 2021-09-21

## 2021-09-15 RX ORDER — ACETAMINOPHEN 500 MG
650 TABLET ORAL ONCE
Refills: 0 | Status: COMPLETED | OUTPATIENT
Start: 2021-09-15 | End: 2021-09-15

## 2021-09-15 RX ORDER — WARFARIN SODIUM 2.5 MG/1
4 TABLET ORAL ONCE
Refills: 0 | Status: DISCONTINUED | OUTPATIENT
Start: 2021-09-15 | End: 2021-09-15

## 2021-09-15 RX ORDER — WARFARIN SODIUM 2.5 MG/1
4 TABLET ORAL AT BEDTIME
Refills: 0 | Status: DISCONTINUED | OUTPATIENT
Start: 2021-09-15 | End: 2021-09-16

## 2021-09-15 RX ORDER — ALBUTEROL 90 UG/1
2 AEROSOL, METERED ORAL ONCE
Refills: 0 | Status: COMPLETED | OUTPATIENT
Start: 2021-09-15 | End: 2021-09-15

## 2021-09-15 RX ORDER — FAMOTIDINE 10 MG/ML
20 INJECTION INTRAVENOUS ONCE
Refills: 0 | Status: COMPLETED | OUTPATIENT
Start: 2021-09-15 | End: 2021-09-15

## 2021-09-15 RX ORDER — INFLUENZA VIRUS VACCINE 15; 15; 15; 15 UG/.5ML; UG/.5ML; UG/.5ML; UG/.5ML
0.5 SUSPENSION INTRAMUSCULAR ONCE
Refills: 0 | Status: DISCONTINUED | OUTPATIENT
Start: 2021-09-15 | End: 2021-09-21

## 2021-09-15 RX ORDER — HYDRALAZINE HCL 50 MG
10 TABLET ORAL EVERY 8 HOURS
Refills: 0 | Status: DISCONTINUED | OUTPATIENT
Start: 2021-09-15 | End: 2021-09-21

## 2021-09-15 RX ORDER — SODIUM CHLORIDE 9 MG/ML
1000 INJECTION, SOLUTION INTRAVENOUS
Refills: 0 | Status: DISCONTINUED | OUTPATIENT
Start: 2021-09-15 | End: 2021-09-16

## 2021-09-15 RX ORDER — SACUBITRIL AND VALSARTAN 24; 26 MG/1; MG/1
1 TABLET, FILM COATED ORAL
Refills: 0 | Status: DISCONTINUED | OUTPATIENT
Start: 2021-09-15 | End: 2021-09-21

## 2021-09-15 RX ORDER — MIDAZOLAM HYDROCHLORIDE 1 MG/ML
2.5 INJECTION, SOLUTION INTRAMUSCULAR; INTRAVENOUS ONCE
Refills: 0 | Status: DISCONTINUED | OUTPATIENT
Start: 2021-09-15 | End: 2021-09-15

## 2021-09-15 RX ORDER — HYDRALAZINE HCL 50 MG
15 TABLET ORAL ONCE
Refills: 0 | Status: COMPLETED | OUTPATIENT
Start: 2021-09-15 | End: 2021-09-15

## 2021-09-15 RX ORDER — ANASTROZOLE 1 MG/1
1 TABLET ORAL DAILY
Refills: 0 | Status: DISCONTINUED | OUTPATIENT
Start: 2021-09-15 | End: 2021-09-21

## 2021-09-15 RX ORDER — ACETAMINOPHEN 500 MG
650 TABLET ORAL EVERY 6 HOURS
Refills: 0 | Status: DISCONTINUED | OUTPATIENT
Start: 2021-09-15 | End: 2021-09-21

## 2021-09-15 RX ORDER — IPRATROPIUM/ALBUTEROL SULFATE 18-103MCG
3 AEROSOL WITH ADAPTER (GRAM) INHALATION ONCE
Refills: 0 | Status: COMPLETED | OUTPATIENT
Start: 2021-09-15 | End: 2021-09-15

## 2021-09-15 RX ORDER — HYDRALAZINE HCL 50 MG
5 TABLET ORAL ONCE
Refills: 0 | Status: COMPLETED | OUTPATIENT
Start: 2021-09-15 | End: 2021-09-15

## 2021-09-15 RX ORDER — METOPROLOL TARTRATE 50 MG
25 TABLET ORAL ONCE
Refills: 0 | Status: COMPLETED | OUTPATIENT
Start: 2021-09-15 | End: 2021-09-15

## 2021-09-15 RX ADMIN — MIDAZOLAM HYDROCHLORIDE 2.5 MILLIGRAM(S): 1 INJECTION, SOLUTION INTRAMUSCULAR; INTRAVENOUS at 19:05

## 2021-09-15 RX ADMIN — Medication 25 MILLIGRAM(S): at 22:31

## 2021-09-15 RX ADMIN — Medication 3 MILLILITER(S): at 01:16

## 2021-09-15 RX ADMIN — Medication 25 MILLIGRAM(S): at 06:18

## 2021-09-15 RX ADMIN — SACUBITRIL AND VALSARTAN 1 TABLET(S): 24; 26 TABLET, FILM COATED ORAL at 22:31

## 2021-09-15 RX ADMIN — WARFARIN SODIUM 4 MILLIGRAM(S): 2.5 TABLET ORAL at 22:31

## 2021-09-15 RX ADMIN — SODIUM CHLORIDE 75 MILLILITER(S): 9 INJECTION, SOLUTION INTRAVENOUS at 20:09

## 2021-09-15 RX ADMIN — Medication 15 MILLIGRAM(S): at 06:18

## 2021-09-15 RX ADMIN — Medication 650 MILLIGRAM(S): at 07:38

## 2021-09-15 RX ADMIN — FAMOTIDINE 20 MILLIGRAM(S): 10 INJECTION INTRAVENOUS at 07:38

## 2021-09-15 RX ADMIN — Medication 5 MILLIGRAM(S): at 20:09

## 2021-09-15 RX ADMIN — ANASTROZOLE 1 MILLIGRAM(S): 1 TABLET ORAL at 22:31

## 2021-09-15 RX ADMIN — Medication 30 MILLILITER(S): at 06:14

## 2021-09-15 RX ADMIN — Medication 650 MILLIGRAM(S): at 04:23

## 2021-09-15 RX ADMIN — ALBUTEROL 2 PUFF(S): 90 AEROSOL, METERED ORAL at 04:23

## 2021-09-15 NOTE — H&P ADULT - ASSESSMENT
66Fwith PMHx infrarenal AAA, cerebral aneurysm, R breast cancer (s/p chemo/XRT), CAD (ASCVD 43%), HTN, CHF/cardiomyopathy, (EF 12% on 8/23/21, s/p ICD), COPD, CVA, DVT (L leg 1994), T2DM (A1c 6.9), PE (s/p IVC filter 9/2020), fibroids (s/p hysterectomy), s/p cholecystectomy, saddle PE (w/ hospital admission 8/21 - 9/3/21 at [facility]) without acute cor pulmonale (on warfarin), obesity (class I), p/w chest pain, found to have stable saddle PE on CTA. Chest pain likely 2/2 PE vs COPD exacerbation vs acute on chronic HF.

## 2021-09-15 NOTE — H&P ADULT - PROBLEM SELECTOR PLAN 1
Etiologies include COPD exacerbation vs acute HF exacerbation vs PE   - minimal supplemental O2 requirements Etiologies include COPD exacerbation vs acute HF exacerbation vs PE   - CTA showed stable saddle embolism with resolving additional emboli bilaterally  - minimal supplemental O2 requirements Etiologies include COPD exacerbation vs acute HF exacerbation vs PE   - CTA showed stable saddle embolism with resolving additional emboli bilaterally - continuing warfarin at increased dose due to subtherapeutic INR 1.97  - minimal supplemental O2 requirements - wean NC as tolerated to keep SpO2 > 92% Etiologies include COPD exacerbation vs acute HF exacerbation vs PE   - CTA showed stable saddle embolism with resolving additional emboli bilaterally - continuing warfarin at increased dose due to subtherapeutic INR 1.97  - minimal supplemental O2 requirements - wean NC as tolerated to keep SpO2 > 92%  - MICU recommendations appreciated

## 2021-09-15 NOTE — H&P ADULT - NSHPREVIEWOFSYSTEMS_GEN_ALL_CORE
REVIEW OF SYSTEMS:  CONSTITUTIONAL: No fever, weight loss, or fatigue  EYES: No eye pain, visual disturbances, or discharge  ENT:  No difficulty hearing, tinnitus, vertigo; No sinus or throat pain  NECK: No pain or stiffness  BREASTS: No pain, masses, or nipple discharge  RESPIRATORY: No cough, wheezing, chills or hemoptysis; No shortness of breath  CARDIOVASCULAR: No chest pain, palpitations, dizziness, or leg swelling  GASTROINTESTINAL: No abdominal or epigastric pain. No nausea, vomiting, or hematemesis; No diarrhea or constipation. No melena or hematochezia.  GENITOURINARY: No dysuria, frequency, hematuria, or incontinence  NEUROLOGICAL: No headaches, memory loss, loss of strength, numbness, or tremors  SKIN: No itching, burning, rashes, or lesions   LYMPH NODES: No enlarged glands  ENDOCRINE: No heat or cold intolerance; No hair loss  MUSCULOSKELETAL: No joint pain or swelling; No muscle, back, or extremity pain  PSYCHIATRIC: No depression, anxiety, mood swings, or difficulty sleeping  HEME/LYMPH: No easy bruising, or bleeding gums  ALLERGY AND IMMUNOLOGIC: No hives or eczema REVIEW OF SYSTEMS:  CONSTITUTIONAL: (+) dizziness, fatigue. No fever, weight loss  EYES: No eye pain, visual disturbances, or discharge  ENT:  No difficulty hearing, tinnitus, vertigo; No sinus or throat pain  NECK: No pain or stiffness  BREASTS: No pain, masses, or nipple discharge  RESPIRATORY: (+) cough. No wheezing, chills or hemoptysis; No shortness of breath  CARDIOVASCULAR: (+) chest pain. No palpitations, dizziness, or leg swelling  GASTROINTESTINAL: No abdominal or epigastric pain. No nausea, vomiting, or hematemesis; No diarrhea or constipation. No melena or hematochezia.  GENITOURINARY: No dysuria, frequency, hematuria, or incontinence  NEUROLOGICAL: No headaches, memory loss, loss of strength, numbness, or tremors  SKIN: No itching, burning, rashes, or lesions   LYMPH NODES: No enlarged glands  ENDOCRINE: No heat or cold intolerance; No hair loss  MUSCULOSKELETAL: No joint pain or swelling; No muscle, back, or extremity pain  PSYCHIATRIC: No depression, anxiety, mood swings, or difficulty sleeping  HEME/LYMPH: No easy bruising, or bleeding gums  ALLERGY AND IMMUNOLOGIC: No hives or eczema

## 2021-09-15 NOTE — CONSULT NOTE ADULT - CONSULT REQUESTED DATE/TIME
15-Sep-2021 18:47 1.5 Mm Punch Excision Text: A 1.5 mm punch biopsy was used to excise the lesion to the level of the subcutaneous fat.  Blunt dissection was used to free the lesion from the surrounding tissues and the lesion was removed.

## 2021-09-15 NOTE — ED PROVIDER NOTE - OBJECTIVE STATEMENT
66 y.o f with pmhx breast cancer and PE on coumadin, abdominal aneurysm, cardiomyopathy, DVT, presents by [facility] EMS after she called for feeling short of breath. Patient was seen earlier at [facility] and discharged after evaluation with same complaints. Still feels short of breath. patient also has some urinary incontinence which is chronic and has not changed recently. no fever. productive cough clear sputum and diarrhea.

## 2021-09-15 NOTE — H&P ADULT - ATTENDING COMMENTS
66F PMHx infrarenal AAA, cerebral aneurysm, R breast cancer (s/p chemo/XRT), CAD (ASCVD 43%), HTN, CHF/cardiomyopathy, (EF 12% on 8/23/21, s/p ICD), COPD, CVA, DVT (L leg 1994), T2DM (A1c 6.9), PE (s/p IVC filter 9/2020), fibroids (s/p hysterectomy), s/p cholecystectomy, saddle PE (w/ hospital admission 8/21 - 9/3/21 at [facility]) without acute cor pulmonale (on warfarin), obesity (class I), p/w chest pain, found to have stable saddle PE on CTA. Concern for some degree of CHF vs cor pulmonale 2/2 PE given elevated BNP. Pt also with some degree of delirium although still AAOx3. Note baseline CT head read. MICU consult appreciated  -Will cont. home coumadin dose for now  -Trend PT/INR daily  -TTE  -Cont. 1:1 overnight  -Kamini Mulligan and HCP (Cousin) Jaki to participate in D/C planning  -Cont. other home meds  -Cardiology consult in AM  -Strict I/Os and daily weights  -NPO until speech/swallow  -DVT PPx, pt on coumadin

## 2021-09-15 NOTE — H&P ADULT - NSHPLABSRESULTS_GEN_ALL_CORE
LABS:      The Labs were reviewed by me   The Radiology was reviewed by me    EKG tracing reviewed by me    09-15    137  |  110<H>  |  19  ----------------------------<  131<H>  4.2   |  14<L>  |  1.09  09-14    144  |  111<H>  |  21  ----------------------------<  111<H>  4.1   |  16<L>  |  1.25    Ca    9.7      15 Sep 2021 16:37  Ca    9.4      14 Sep 2021 22:51  Mg     1.90     09-14    TPro  7.4  /  Alb  3.8  /  TBili  0.7  /  DBili  x   /  AST  44<H>  /  ALT  31  /  AlkPhos  162<H>  09-15  TPro  7.6  /  Alb  3.9  /  TBili  0.3  /  DBili  x   /  AST  25  /  ALT  15  /  AlkPhos  153<H>  09-14    Magnesium, Serum: 1.90 mg/dL (09-14-21 @ 22:51)        PT/INR - ( 15 Sep 2021 16:37 )   PT: 22.9 sec;   INR: 1.97 ratio         PTT - ( 15 Sep 2021 16:37 )  PTT:28.1 sec                                        12.1   4.20  )-----------( 285      ( 15 Sep 2021 16:37 )             38.7                         11.9   5.09  )-----------( 280      ( 14 Sep 2021 22:51 )             36.9     CAPILLARY BLOOD GLUCOSE            Blood Gas Venous - Lactate: 1.3 mmol/L (09-14-21 @ 22:51)      RADIOLOGY & ADDITIONAL TESTS:    Imaging Personally Reviewed:  [ ] YES  [ ] NO LABS:      The Labs were reviewed by me   The Radiology was reviewed by me    EKG tracing reviewed by me    09-15    137  |  110<H>  |  19  ----------------------------<  131<H>  4.2   |  14<L>  |  1.09  09-14    144  |  111<H>  |  21  ----------------------------<  111<H>  4.1   |  16<L>  |  1.25    Ca    9.7      15 Sep 2021 16:37  Ca    9.4      14 Sep 2021 22:51  Mg     1.90     09-14    TPro  7.4  /  Alb  3.8  /  TBili  0.7  /  DBili  x   /  AST  44<H>  /  ALT  31  /  AlkPhos  162<H>  09-15  TPro  7.6  /  Alb  3.9  /  TBili  0.3  /  DBili  x   /  AST  25  /  ALT  15  /  AlkPhos  153<H>  09-14    Magnesium, Serum: 1.90 mg/dL (09-14-21 @ 22:51)        PT/INR - ( 15 Sep 2021 16:37 )   PT: 22.9 sec;   INR: 1.97 ratio         PTT - ( 15 Sep 2021 16:37 )  PTT:28.1 sec                                        12.1   4.20  )-----------( 285      ( 15 Sep 2021 16:37 )             38.7                         11.9   5.09  )-----------( 280      ( 14 Sep 2021 22:51 )             36.9     CAPILLARY BLOOD GLUCOSE            Blood Gas Venous - Lactate: 1.3 mmol/L (09-14-21 @ 22:51)      RADIOLOGY & ADDITIONAL TESTS:  < from: CT Angio Chest PE Protocol w/ IV Cont (09.15.21 @ 17:40) >    IMPRESSION:    Central pulmonary embolus similar to 08/22/2021 with additional emboli bilaterally, decreased in prominence. Cardiomegaly. No evidence right heart strain.    Findings were discussed with Dr. Luis EDWARDS [phone] 9/15/2021 5:59 PM by Dr. Stanley with read back confirmation.      < end of copied text >    < from: CT Head No Cont (09.15.21 @ 17:38) >    IMPRESSION:   moderate periventricular and deep white matter ischemia with old infarctions in the RIGHT frontal and parietal lobes. Tiny old lacunar infarction in the RIGHT caudate nucleus. Stable 1.6 cm RIGHT supraclinoid aneurysm.      < end of copied text >      Imaging Personally Reviewed:  [ ] YES  [ ] NO LABS:      The Labs were reviewed by me   The Radiology was reviewed by me    EKG tracing reviewed by me    09-15    137  |  110<H>  |  19  ----------------------------<  131<H>  4.2   |  14<L>  |  1.09  09-14    144  |  111<H>  |  21  ----------------------------<  111<H>  4.1   |  16<L>  |  1.25    Ca    9.7      15 Sep 2021 16:37  Ca    9.4      14 Sep 2021 22:51  Mg     1.90     09-14    TPro  7.4  /  Alb  3.8  /  TBili  0.7  /  DBili  x   /  AST  44<H>  /  ALT  31  /  AlkPhos  162<H>  09-15  TPro  7.6  /  Alb  3.9  /  TBili  0.3  /  DBili  x   /  AST  25  /  ALT  15  /  AlkPhos  153<H>  09-14    Magnesium, Serum: 1.90 mg/dL (09-14-21 @ 22:51)    PT/INR - ( 15 Sep 2021 16:37 )   PT: 22.9 sec;   INR: 1.97 ratio       PTT - ( 15 Sep 2021 16:37 )  PTT:28.1 sec                                    12.1   4.20  )-----------( 285      ( 15 Sep 2021 16:37 )             38.7                         11.9   5.09  )-----------( 280      ( 14 Sep 2021 22:51 )             36.9     CAPILLARY BLOOD GLUCOSE  Blood Gas Venous - Lactate: 1.3 mmol/L (09-14-21 @ 22:51)    RADIOLOGY & ADDITIONAL TESTS:  < from: CT Angio Chest PE Protocol w/ IV Cont (09.15.21 @ 17:40) >    IMPRESSION:  Central pulmonary embolus similar to 08/22/2021 with additional emboli bilaterally, decreased in prominence. Cardiomegaly. No evidence right heart strain.    Findings were discussed with Dr. Luis EDWARDS [phone] 9/15/2021 5:59 PM by Dr. Stanley with read back confirmation.  < end of copied text >  < from: CT Head No Cont (09.15.21 @ 17:38) >    IMPRESSION:   moderate periventricular and deep white matter ischemia with old infarctions in the RIGHT frontal and parietal lobes. Tiny old lacunar infarction in the RIGHT caudate nucleus. Stable 1.6 cm RIGHT supraclinoid aneurysm.    < end of copied text >    Imaging Personally Reviewed:  [ ] YES  [ ] NO

## 2021-09-15 NOTE — H&P ADULT - PROBLEM SELECTOR PLAN 2
NICM (catheterization 02/2019 showed dRPL occlusion without ability to intervene). Echo (8/23/21) EF 12% with LV apical thrombus, follows with Dr. Read. s/p ICD (10/2019). Plan to start spironolactone shortly.  - c/w home entresto 24-25 mg qd, metoprolol succinate 25 mg qd NICM (catheterization 02/2019 showed dRPL occlusion without ability to intervene). Echo (8/23/21) EF 12% with LV apical thrombus, follows with Dr. Read. s/p ICD (10/2019). Plan to start spironolactone shortly.  - c/w home entresto 24-25 mg qd, metoprolol succinate 25 mg qd  - ordered TTE NICM (catheterization 02/2019 showed dRPL occlusion without ability to intervene). Echo (8/23/21) EF 12% with LV apical thrombus, follows with Dr. Read. s/p ICD (10/2019). Plan to start spironolactone shortly.  - c/w home entresto 24-25 mg qd, metoprolol succinate 25 mg qd  - ordered TTE  - cardiology consult in am

## 2021-09-15 NOTE — ED PROVIDER NOTE - NSICDXPASTSURGICALHX_GEN_ALL_CORE_FT
PAST SURGICAL HISTORY:  Breast cancer infusaport insertion- 2014- left chest    Fibroid uterus sp hystrectomy 1196    [place] filter in place 1994    S/P hysterectomy 1994    S/P lumpectomy, right breast 2014, revision of position- 2015

## 2021-09-15 NOTE — CONSULT NOTE ADULT - ASSESSMENT
66F with COPD/emphysema, DVT L leg 1994 with [place] IVC filter, saddle PE dx 8/2021 with no acute cor pulmonale, HTN, HLD, infrarenal abd aneurysm, internal carotid aneurysm, depression, R breast cancer s/p chemo and radiation, cardiomyopathy, HFrEF with ICD, presents to [facility]  ED for shortness of breath. MICU consulted by ED for shortness of breath and PE on CT scan. Patient seen and examined with stable hemodynamics.     Recs/Plan:     #Shortness of breath   -etiologies include COPD exacerbation, acute HF exacerbation, PE clot burden, or other etiologies    -patient speaking full sentences w/ minimal nasal cannula supplemental O2 needed   -care per primary team     #Submassive PE  -spoke to radiology reading room at 6:28pm stating that PE clot burden is either unchanged or better from previous scan performed in august   -chronic submassive PE without acute cor pulmonale   -follow up official read   -care per primary team       spoke to ED physician about patient. Does not require Medical ICU level of care at this time

## 2021-09-15 NOTE — ED PROVIDER NOTE - CLINICAL SUMMARY MEDICAL DECISION MAKING FREE TEXT BOX
ATTG: : ATTG: : short of breath with dx cancer and recent ED visit with elevated Pro bnp, concerns include but not limited to HF, PE, infection will check labs, check cta chest (although on ac and lower risk), check cardiac work up, and re eval for dispo

## 2021-09-15 NOTE — CONSULT NOTE ADULT - ATTENDING COMMENTS
66F Hx COPD/Emphysema, Lt LExt DVT 1994 s/p IVC filter, Known Chronic Saddle PE 8/2021 without Acute Cor Pulmonale on Coumadin, Infrarenal AAA, Internal Carotid Aneurysm, Rt Breast CA s/p CXT/XRT, Non-Ischemic CM with EF 15% on SICD p/w ED for SOB and Atypical Rt Chest Pain similar to [facility]-ED presentation yesterday.   - A&O x 3 generalized moaning    - Minimal O2 Supplement on 3Li NCO2 SpO2 100%   - Hemodynamically stable without Acute Distress   - Chronic Heart Failure with known Non-Isch CM on SICD   - CTA reviewed with Radiology - Unchanged Chronic Saddle PE   - No indication for Urgent Systemic TPA for Intraarterial TPA   - Continue routine Anticoagulation Protocol for Chronic PE     Patient seen and examined with ICU Resident/Fellow at bedside after lab data, medical records and radiology reports reviewed. I have read and agreeable in general with resident's Documented Note, Assessment and Management Plan which reflected my opinions from bedside round and discussion.

## 2021-09-15 NOTE — H&P ADULT - NSICDXPASTSURGICALHX_GEN_ALL_CORE_FT
PAST SURGICAL HISTORY:  Breast cancer infusaport insertion- 2014- left chest    Fibroid uterus sp hystrectomy 1196    [place] filter in place 1994    S/P hysterectomy 1994    S/P lumpectomy, right breast 2014, revision of position- 2015

## 2021-09-15 NOTE — ED ADULT NURSE REASSESSMENT NOTE - NS ED NURSE REASSESS COMMENT FT1
pt c/o abdominal pain and short of breath. states "I just feel awful". pt sat up on bed, states feel better after sitting up. MD Kruger made aware.

## 2021-09-15 NOTE — PROVIDER CONTACT NOTE (OTHER) - ASSESSMENT
Called pt's HHA Ese, at [phone], confirmed that she is pt's HHA, however she is not at pt's house at this time.  She reports she can meet pt in the morning at her residence and that pt has a key to her apartment.  Pt reports she has a key and is comfortable going home in the AM.

## 2021-09-15 NOTE — ED ADULT NURSE REASSESSMENT NOTE - NS ED NURSE REASSESS COMMENT FT1
pt still c/o abdominal pain but states improved since before, 4/10, states she does not need any pain medication. Vs repeated as noted. EMS by bedside for transport pt still c/o abdominal pain but states improved since before, requesting pain medication. MD Dias made aware. Vs repeated as noted. EMS by bedside for transport pt still c/o abdominal pain but states improved since before, requesting pain medication. Pt offered no new complains at present. Denies cp, sob, and palpitation. Respiration even and non-labored. in NAD. NSR on monitor. MD Dias made aware. Vs repeated as noted. EMS by bedside for transport

## 2021-09-15 NOTE — ED PROVIDER NOTE - PHYSICAL EXAMINATION
Gen.  mild resp distress  HEENT:  perrl eomi  Lungs:  b/l bs  CVS: S1S2   Abd;  soft non tender no distention no guarding  Ext: b/l lower ext pitting edema 3+  Neuro: aaox3 no focal deficits  MSK: strength 5/5 b/l upper and lower ext movement.

## 2021-09-15 NOTE — CONSULT NOTE ADULT - SUBJECTIVE AND OBJECTIVE BOX
CHIEF COMPLAINT: shortness of breath    HPI: 67yo F with extensive PMH DVT, saddle PE with no acute cor pulmonale s/p IVC filter & coumadin, breast CA, CHF/cardiomyopathy, HTN, HLD, COPD, infrarenal AAA presents to ED for chest pain. Went to [facility] yesterday for similar complaints, was discharged.  Reports that today while sitting had some achy R chest wall pain 3/10, w/o radiation. No L sided pain, radiation down L arm/diaphoresis, inc SOB, cough, fever, lightheadedness abd pain. MICU consulted for saddle PE found on CT obtained on 9/15/21.     At time of exam, patient confused. Hemodynamically stable with BP noted to be SBP 150s. Oxygenation status for % on 2L NC. Protecting airway.     PAST MEDICAL & SURGICAL HISTORY:  Hypertension    Breast cancer  right-s/p chemo and radiation    Hypercholesteremia    DVT (deep venous thrombosis)  Left leg in 1994 after hysterectomy    Sickle cell trait    Cardiomyopathy    Abdominal aneurysm  infrarenal- wached by Vascular , Dr. Gutierrez    Internal carotid aneurysm    Knee effusion, left  arthrocentesis- 05/25/16    Fibroid uterus  sp hystrectomy 1196    S/P lumpectomy, right breast  2014, revision of position- 2015    [place] filter in place  1994    S/P hysterectomy  1994    Breast cancer  infusaport insertion- 2014- left chest        FAMILY HISTORY:  Family history of hypertension    Family history of diabetes mellitus    Family history of renal stone    Family history of glioblastoma    Family history of liver failure    History of hypertension  mother    Family history of glioblastoma (Sibling)    Family history of renal failure  uncle    Family history of other condition  please open box and see additional medical family history    Family history of glioblastoma        Allergies    Advil (Hives; Vomiting)    Intolerances        HOME MEDICATIONS:    REVIEW OF SYSTEMS:  CONSTITUTIONAL: No weakness, fevers or chills  EYES/ENT: No visual changes;  No vertigo or throat pain   NECK: No pain or stiffness  RESPIRATORY: No cough, wheezing, hemoptysis; +shortness of breath  CARDIOVASCULAR: No chest pain or palpitations  GASTROINTESTINAL: No abdominal or epigastric pain. No nausea, vomiting, or hematemesis; No diarrhea or constipation. No melena or hematochezia.  GENITOURINARY: No dysuria, frequency or hematuria  NEUROLOGICAL: No flaccid paralysis  SKIN: No itching, rashes  MSK: no joint deformities  PSYCH: no SI/HI       OBJECTIVE:  ICU Vital Signs Last 24 Hrs  T(C): 37.1 (15 Sep 2021 15:19), Max: 37.1 (15 Sep 2021 15:19)  T(F): 98.7 (15 Sep 2021 15:19), Max: 98.7 (15 Sep 2021 15:19)  HR: 110 (15 Sep 2021 15:26) (101 - 111)  BP: 139/100 (15 Sep 2021 15:26) (121/82 - 154/101)  BP(mean): --  ABP: --  ABP(mean): --  RR: 28 (15 Sep 2021 15:26) (16 - 30)  SpO2: 95% (15 Sep 2021 15:26) (95% - 100%)        CAPILLARY BLOOD GLUCOSE          PHYSICAL EXAM:  GENERAL: lying in bed with eyes closed and NC in place   HEAD:  Atraumatic, Normocephalic  EYES: EOMI, blink spontaneously  ENT: Moist mucous membranes  NECK: Supple, No JVD  CHEST/LUNG: Clear to auscultation bilaterally; No rales, rhonchi, wheezing, or rubs. Unlabored respirations with nasal cannula in place   HEART: tachycardic, no murmurs heard   ABDOMEN: Bowel sounds present; Soft, Nontender, Nondistended. No hepatomegaly  EXTREMITIES:  2+ edema of shins   MSK: moving all 4 extremities spontaneously   SKIN: No rashes or lesions    HOSPITAL MEDICATIONS:  MEDICATIONS  (STANDING):  midazolam Injectable 2.5 milliGRAM(s) IV Push Once    MEDICATIONS  (PRN):      LABS:                        12.1   4.20  )-----------( 285      ( 15 Sep 2021 16:37 )             38.7     09-15    137  |  110<H>  |  19  ----------------------------<  131<H>  4.2   |  14<L>  |  1.09    Ca    9.7      15 Sep 2021 16:37  Mg     1.90     09-14    TPro  7.4  /  Alb  3.8  /  TBili  0.7  /  DBili  x   /  AST  44<H>  /  ALT  31  /  AlkPhos  162<H>  09-15    PT/INR - ( 15 Sep 2021 16:37 )   PT: 22.9 sec;   INR: 1.97 ratio         PTT - ( 15 Sep 2021 16:37 )  PTT:28.1 sec      Venous Blood Gas:  09-14 @ 22:51  7.40/31/65/19/93.5  VBG Lactate: 1.3          RADIOLOGY:  called CT reading room @6:28PM to go over scan     EKG: Sinus tachycardia

## 2021-09-15 NOTE — ED PROVIDER NOTE - ATTENDING CONTRIBUTION TO CARE
66 y.o f with pmhx breast cancer and PE on coumadin, abdominal aneurysm, cardiomyopathy, DVT, presents by [facility] EMS after she called for feeling short of breath. Patient was seen earlier at [facility] and discharged after evaluation with same complaints. Still feels short of breath. patient also has some urinary incontinence which is chronic and has not changed recently. no fever. productive cough clear sputum and diarrhea.   Gen.  mild resp distress  HEENT:  perrl eomi  Lungs:  b/l bs  CVS: S1S2   Abd;  soft non tender no distention no guarding  Ext: b/l lower ext pitting edema 3+  Neuro: aaox3 no focal deficits  MSK: strength 5/5 b/l upper and lower ext movement.

## 2021-09-15 NOTE — ED ADULT NURSE NOTE - NSIMPLEMENTINTERV_GEN_ALL_ED
Implemented All Universal Safety Interventions:  [place] to call system. Call bell, personal items and telephone within reach. Instruct patient to call for assistance. Room bathroom lighting operational. Non-slip footwear when patient is off stretcher. Physically safe environment: no spills, clutter or unnecessary equipment. Stretcher in lowest position, wheels locked, appropriate side rails in place.

## 2021-09-15 NOTE — H&P ADULT - NSHPPHYSICALEXAM_GEN_ALL_CORE
T(C): 36.3 (09-15-21 @ 18:50), Max: 37.1 (09-15-21 @ 15:19)  HR: 110 (09-15-21 @ 18:50) (101 - 114)  BP: 151/122 (09-15-21 @ 18:50) (121/82 - 169/135)  RR: 20 (09-15-21 @ 18:50) (16 - 30)  SpO2: 100% (09-15-21 @ 18:50) (92% - 100%)  Wt(kg): --Vital Signs Last 24 Hrs  T(C): 36.3 (15 Sep 2021 18:50), Max: 37.1 (15 Sep 2021 15:19)  T(F): 97.4 (15 Sep 2021 18:50), Max: 98.7 (15 Sep 2021 15:19)  HR: 110 (15 Sep 2021 18:50) (101 - 114)  BP: 151/122 (15 Sep 2021 18:50) (121/82 - 169/135)  BP(mean): --  RR: 20 (15 Sep 2021 18:50) (16 - 30)  SpO2: 100% (15 Sep 2021 18:50) (92% - 100%)    PHYSICAL EXAM:  GENERAL: NAD, well-groomed, well-developed  HEAD:  Atraumatic, Normocephalic  EYES: EOMI, PERRLA, conjunctiva and sclera clear  ENMT: No tonsillar erythema, exudates, or enlargement; Moist mucous membranes, Good dentition, No lesions  NECK: Supple, No JVD, Normal thyroid  NERVOUS SYSTEM:  Alert & Oriented X3, Good concentration; Motor Strength 5/5 B/L upper and lower extremities; DTRs 2+ intact and symmetric  CHEST/LUNG: Clear to percussion bilaterally; No rales, rhonchi, wheezing, or rubs  HEART: Regular rate and rhythm; No murmurs, rubs, or gallops  ABDOMEN: Soft, Nontender, Nondistended; Bowel sounds present  EXTREMITIES:  2+ Peripheral Pulses, No clubbing, cyanosis, or edema  LYMPH: No lymphadenopathy noted  SKIN: No rashes or lesions    Consultant(s) Notes Reviewed:  [x ] YES  [ ] NO  Care Discussed with Consultants/Other Providers [ x] YES  [ ] NO T(C): 36.3 (09-15-21 @ 18:50), Max: 37.1 (09-15-21 @ 15:19)  HR: 110 (09-15-21 @ 18:50) (101 - 114)  BP: 151/122 (09-15-21 @ 18:50) (121/82 - 169/135)  RR: 20 (09-15-21 @ 18:50) (16 - 30)  SpO2: 100% (09-15-21 @ 18:50) (92% - 100%)  Wt(kg): --Vital Signs Last 24 Hrs  T(C): 36.3 (15 Sep 2021 18:50), Max: 37.1 (15 Sep 2021 15:19)  T(F): 97.4 (15 Sep 2021 18:50), Max: 98.7 (15 Sep 2021 15:19)  HR: 110 (15 Sep 2021 18:50) (101 - 114)  BP: 151/122 (15 Sep 2021 18:50) (121/82 - 169/135)  BP(mean): --  RR: 20 (15 Sep 2021 18:50) (16 - 30)  SpO2: 100% (15 Sep 2021 18:50) (92% - 100%)    PHYSICAL EXAM:  GENERAL: Patient appears agitated but answers questions appropriately. Sometimes, is tangential.  HEAD:  Atraumatic, Normocephalic  EYES: EOMI, PERRLA, conjunctiva and sclera clear  ENMT: (+) dry mucous membranes. No tonsillar erythema, exudates, or enlargement  NECK: Supple, No JVD, Normal thyroid  NERVOUS SYSTEM:  Alert & Oriented X2 (not to date), not following commands  CHEST/LUNG: Clear to percussion bilaterally; (+) Cough non productive  HEART: Regular rate and rhythm; No murmurs, rubs, or gallops  ABDOMEN: Soft, Nontender, (+) distended; Bowel sounds present  EXTREMITIES:  2+ Peripheral Pulses, No clubbing, cyanosis, or edema  LYMPH: No lymphadenopathy noted  SKIN: No rashes or lesions    Consultant(s) Notes Reviewed:  [x ] YES  [ ] NO  Care Discussed with Consultants/Other Providers [ x] YES  [ ] NO

## 2021-09-15 NOTE — H&P ADULT - NSHPSOCIALHISTORY_GEN_ALL_CORE
Smoker, trying to quit. Smoker, trying to quit. Smokes 1 PPD. Lives alone. No HCP cited. Smoker, trying to quit. Smokes 1 PPD. Lives alone. No HCP cited. Walks with a walker and has 24 hour aid.

## 2021-09-15 NOTE — ED ADULT NURSE NOTE - OBJECTIVE STATEMENT
Pt is a 67 y/o female pmhx of Breast CA (on oral chemo), HTN, PE (on Warfarin) presents to the ED BIBA from home complaining of SOB & diarrhea. Pt says she has had these symptoms for a month but they have recently worsened, was seen at [facility] today and was d/c. Pt is a poor historian, presents alert & oriented x 2-3 (aware of year, disoriented to day), calm, able to follow commands, speech clear. Breathing slightly tachypneic, respiratory rate of 25-30, sating 95% on RA, lungs CTA b/l. On cardiac monitor, sinus tach. Abdomen soft & nondistended. Strength 5/5 x 4 extremities, ambulates without assist. Strong peripheral pulses noted b/l, no edema noted. Skin warm, clean, dry & intact. Denies chest pain, abdominal pain, back pain, n/v, fever, chills, changes in vision. Call bell within reach, bed in lowest position, side rails up, wheels locked.

## 2021-09-15 NOTE — H&P ADULT - PROBLEM SELECTOR PLAN 8
DVT ppx: warfarin  Diet: NPO pending S&S due to AMS  Dispo: pending PT eval  Patient currently requiring 1:1 due to agitation    ***************************************************************  Alea Boyce, PGY2  Internal Medicine   pager: NS: [phone] [facility]: [phone]  *************************************************************** DVT ppx: warfarin  Diet: NPO pending S&S due to AMS  Dispo: pending PT eval  Patient currently requiring 1:1 due to agitation - added seroquel 25 mg at bedtime    ***************************************************************  Alea Boyce, PGY2  Internal Medicine   pager: NS: [phone] [facility]: [phone]  *************************************************************** DVT ppx: warfarin  Diet: NPO pending S&S due to AMS  Dispo: pending PT eval  Patient currently requiring 1:1 due to agitation - consider adding seroquel after getting QTc    ***************************************************************  Alea Boyce, PGY2  Internal Medicine   pager: NS: [phone] [facility]: [phone]  ***************************************************************

## 2021-09-15 NOTE — ED PROVIDER NOTE - CARE PLAN
1 Principal Discharge DX:	CHF exacerbation  Secondary Diagnosis:	Normal anion gap metabolic acidosis  Secondary Diagnosis:	Diarrhea

## 2021-09-15 NOTE — H&P ADULT - PROBLEM SELECTOR PLAN 4
Recent admission at [facility] for saddle embolism (9/3 discharge) on warfarin  - keep on hep ggt Recent admission at [facility] for saddle embolism (9/3 discharge) on warfarin  - Currently continues to have submassive PE without cor pulmonale  - MICU spoke with radiology and scan is unchanged or better  - f/u official read of CTA  - keep on hep ggt Recent admission at [facility] for saddle embolism (9/3 discharge) on warfarin  - Currently continues to have submassive PE without cor pulmonale  - MICU spoke with radiology and scan is unchanged or better  - f/u official read of CTA  - keep warfarin and increase dose from 3 mg to 4mg with INR 1.97  - low threshold to start hep ggt  - daily INR

## 2021-09-15 NOTE — ED ADULT NURSE REASSESSMENT NOTE - NS ED NURSE REASSESS COMMENT FT1
pt medicated with 2.5mg versed IVP for anxiousness and restlessness and inability to tolerate ct while lying still and flat, pt tolerated well. continuous cardiac monitoring with continuous pulse ox and bp monitoring while pt in ct scan with pts pulse ox remaining above 97% on 2 L nasal cannula, pt awake but drowsy while in ct scan with full recovery to baseline mental status post ct scan and back to ED treatment room with 1:1 at bedside for pts risk for elopement at trying numerous times to leave ED although ot unsteady and disoriented. pts denies any current complaints of pain or discomfort. remains on continuous tele monitor. tba. safety and fall precautions continuously maintained. 1:1 at bedside

## 2021-09-16 LAB
ALBUMIN SERPL ELPH-MCNC: 3.5 G/DL — SIGNIFICANT CHANGE UP (ref 3.3–5)
ALP SERPL-CCNC: 149 U/L — HIGH (ref 40–120)
ALT FLD-CCNC: 31 U/L — SIGNIFICANT CHANGE UP (ref 10–45)
ANION GAP SERPL CALC-SCNC: 14 MMOL/L — SIGNIFICANT CHANGE UP (ref 5–17)
ANISOCYTOSIS BLD QL: SLIGHT — SIGNIFICANT CHANGE UP
AST SERPL-CCNC: 38 U/L — SIGNIFICANT CHANGE UP (ref 10–40)
BASE EXCESS BLDV CALC-SCNC: -7.7 MMOL/L — LOW (ref -2–2)
BASOPHILS # BLD AUTO: 0.05 K/UL — SIGNIFICANT CHANGE UP (ref 0–0.2)
BASOPHILS NFR BLD AUTO: 0.9 % — SIGNIFICANT CHANGE UP (ref 0–2)
BILIRUB SERPL-MCNC: 1.1 MG/DL — SIGNIFICANT CHANGE UP (ref 0.2–1.2)
BUN SERPL-MCNC: 22 MG/DL — SIGNIFICANT CHANGE UP (ref 7–23)
BURR CELLS BLD QL SMEAR: PRESENT — SIGNIFICANT CHANGE UP
CA-I SERPL-SCNC: 1.24 MMOL/L — SIGNIFICANT CHANGE UP (ref 1.15–1.33)
CALCIUM SERPL-MCNC: 9.1 MG/DL — SIGNIFICANT CHANGE UP (ref 8.4–10.5)
CHLORIDE BLDV-SCNC: 114 MMOL/L — HIGH (ref 96–108)
CHLORIDE SERPL-SCNC: 114 MMOL/L — HIGH (ref 96–108)
CO2 BLDV-SCNC: 17 MMOL/L — LOW (ref 22–26)
CO2 SERPL-SCNC: 14 MMOL/L — LOW (ref 22–31)
CREAT SERPL-MCNC: 1.1 MG/DL — SIGNIFICANT CHANGE UP (ref 0.5–1.3)
ELLIPTOCYTES BLD QL SMEAR: SLIGHT — SIGNIFICANT CHANGE UP
EOSINOPHIL # BLD AUTO: 0.09 K/UL — SIGNIFICANT CHANGE UP (ref 0–0.5)
EOSINOPHIL NFR BLD AUTO: 1.7 % — SIGNIFICANT CHANGE UP (ref 0–6)
GAS PNL BLDV: 140 MMOL/L — SIGNIFICANT CHANGE UP (ref 136–145)
GAS PNL BLDV: SIGNIFICANT CHANGE UP
GAS PNL BLDV: SIGNIFICANT CHANGE UP
GIANT PLATELETS BLD QL SMEAR: PRESENT — SIGNIFICANT CHANGE UP
GLUCOSE BLDV-MCNC: 121 MG/DL — HIGH (ref 70–99)
GLUCOSE SERPL-MCNC: 132 MG/DL — HIGH (ref 70–99)
HCO3 BLDV-SCNC: 16 MMOL/L — LOW (ref 22–29)
HCT VFR BLD CALC: 34.9 % — SIGNIFICANT CHANGE UP (ref 34.5–45)
HCT VFR BLDA CALC: 36 % — SIGNIFICANT CHANGE UP (ref 34.5–46.5)
HGB BLD CALC-MCNC: 11.9 G/DL — SIGNIFICANT CHANGE UP (ref 11.7–16.1)
HGB BLD-MCNC: 10.9 G/DL — LOW (ref 11.5–15.5)
INR BLD: 2.17 RATIO — HIGH (ref 0.88–1.16)
LACTATE BLDV-MCNC: 2.1 MMOL/L — HIGH (ref 0.7–2)
LYMPHOCYTES # BLD AUTO: 1.67 K/UL — SIGNIFICANT CHANGE UP (ref 1–3.3)
LYMPHOCYTES # BLD AUTO: 33 % — SIGNIFICANT CHANGE UP (ref 13–44)
MACROCYTES BLD QL: SLIGHT — SIGNIFICANT CHANGE UP
MAGNESIUM SERPL-MCNC: 1.9 MG/DL — SIGNIFICANT CHANGE UP (ref 1.6–2.6)
MANUAL SMEAR VERIFICATION: SIGNIFICANT CHANGE UP
MCHC RBC-ENTMCNC: 30.4 PG — SIGNIFICANT CHANGE UP (ref 27–34)
MCHC RBC-ENTMCNC: 31.2 GM/DL — LOW (ref 32–36)
MCV RBC AUTO: 97.2 FL — SIGNIFICANT CHANGE UP (ref 80–100)
MONOCYTES # BLD AUTO: 0.09 K/UL — SIGNIFICANT CHANGE UP (ref 0–0.9)
MONOCYTES NFR BLD AUTO: 1.7 % — LOW (ref 2–14)
NEUTROPHILS # BLD AUTO: 3.12 K/UL — SIGNIFICANT CHANGE UP (ref 1.8–7.4)
NEUTROPHILS NFR BLD AUTO: 60.9 % — SIGNIFICANT CHANGE UP (ref 43–77)
NEUTS BAND # BLD: 0.9 % — SIGNIFICANT CHANGE UP (ref 0–8)
PCO2 BLDV: 27 MMHG — LOW (ref 39–42)
PH BLDV: 7.38 — SIGNIFICANT CHANGE UP (ref 7.32–7.43)
PHOSPHATE SERPL-MCNC: 3.4 MG/DL — SIGNIFICANT CHANGE UP (ref 2.5–4.5)
PLAT MORPH BLD: NORMAL — SIGNIFICANT CHANGE UP
PLATELET # BLD AUTO: 235 K/UL — SIGNIFICANT CHANGE UP (ref 150–400)
PO2 BLDV: 66 MMHG — HIGH (ref 25–45)
POIKILOCYTOSIS BLD QL AUTO: SLIGHT — SIGNIFICANT CHANGE UP
POTASSIUM BLDV-SCNC: 4.6 MMOL/L — SIGNIFICANT CHANGE UP (ref 3.5–5.1)
POTASSIUM SERPL-MCNC: 4.5 MMOL/L — SIGNIFICANT CHANGE UP (ref 3.5–5.3)
POTASSIUM SERPL-SCNC: 4.5 MMOL/L — SIGNIFICANT CHANGE UP (ref 3.5–5.3)
PROT SERPL-MCNC: 7 G/DL — SIGNIFICANT CHANGE UP (ref 6–8.3)
PROTHROM AB SERPL-ACNC: 25.1 SEC — HIGH (ref 10.6–13.6)
RBC # BLD: 3.59 M/UL — LOW (ref 3.8–5.2)
RBC # FLD: 16.4 % — HIGH (ref 10.3–14.5)
RBC BLD AUTO: ABNORMAL
SAO2 % BLDV: 94.3 % — HIGH (ref 67–88)
SODIUM SERPL-SCNC: 142 MMOL/L — SIGNIFICANT CHANGE UP (ref 135–145)
VARIANT LYMPHS # BLD: 0.9 % — SIGNIFICANT CHANGE UP (ref 0–6)
WBC # BLD: 5.05 K/UL — SIGNIFICANT CHANGE UP (ref 3.8–10.5)
WBC # FLD AUTO: 5.05 K/UL — SIGNIFICANT CHANGE UP (ref 3.8–10.5)

## 2021-09-16 PROCEDURE — 93306 TTE W/DOPPLER COMPLETE: CPT | Mod: 26

## 2021-09-16 PROCEDURE — 99233 SBSQ HOSP IP/OBS HIGH 50: CPT | Mod: GC

## 2021-09-16 RX ORDER — WARFARIN SODIUM 2.5 MG/1
4 TABLET ORAL ONCE
Refills: 0 | Status: COMPLETED | OUTPATIENT
Start: 2021-09-16 | End: 2021-09-16

## 2021-09-16 RX ORDER — MAGNESIUM SULFATE 500 MG/ML
2 VIAL (ML) INJECTION ONCE
Refills: 0 | Status: COMPLETED | OUTPATIENT
Start: 2021-09-16 | End: 2021-09-16

## 2021-09-16 RX ADMIN — SACUBITRIL AND VALSARTAN 1 TABLET(S): 24; 26 TABLET, FILM COATED ORAL at 05:35

## 2021-09-16 RX ADMIN — Medication 10 MILLIGRAM(S): at 05:35

## 2021-09-16 RX ADMIN — Medication 10 MILLIGRAM(S): at 21:08

## 2021-09-16 RX ADMIN — Medication 10 MILLIGRAM(S): at 13:27

## 2021-09-16 RX ADMIN — Medication 25 MILLIGRAM(S): at 05:36

## 2021-09-16 RX ADMIN — Medication 50 GRAM(S): at 20:54

## 2021-09-16 RX ADMIN — SACUBITRIL AND VALSARTAN 1 TABLET(S): 24; 26 TABLET, FILM COATED ORAL at 18:05

## 2021-09-16 RX ADMIN — ANASTROZOLE 1 MILLIGRAM(S): 1 TABLET ORAL at 13:27

## 2021-09-16 RX ADMIN — WARFARIN SODIUM 4 MILLIGRAM(S): 2.5 TABLET ORAL at 21:08

## 2021-09-16 NOTE — SWALLOW BEDSIDE ASSESSMENT ADULT - ADDITIONAL RECOMMENDATIONS
Maintain good oral hygiene.  This service to f/u for re-assessment to determine candidacy for PO intake.

## 2021-09-16 NOTE — SWALLOW BEDSIDE ASSESSMENT ADULT - PHARYNGEAL PHASE
e/o stridor with c/o SOB - self resolved; RN Holly present and aware/Delayed cough post oral intake Within functional limits

## 2021-09-16 NOTE — PROGRESS NOTE ADULT - SUBJECTIVE AND OBJECTIVE BOX
***************************************************************  Nakul Hodges, PGY1  Internal Medicine   pager: [phone]  ***************************************************************        MARILEE RISHABH  66y  Female      Patient is a 66y old  Female who presents with a chief complaint of Chest pain (15 Sep 2021 19:01)      INTERVAL HPI/OVERNIGHT EVENTS:       REVIEW OF SYSTEMS:  CONSTITUTIONAL: No fever, weight loss, or fatigue  EYES: No eye pain, visual disturbances, or discharge  ENMT:  No difficulty hearing, tinnitus, vertigo; No sinus or throat pain  NECK: No pain or stiffness  BREASTS: No pain, masses, or nipple discharge  RESPIRATORY: No cough, wheezing, chills or hemoptysis; No shortness of breath  CARDIOVASCULAR: No chest pain, palpitations, dizziness, or leg swelling  GASTROINTESTINAL: No abdominal or epigastric pain. No nausea, vomiting, or hematemesis; No diarrhea or constipation. No melena or hematochezia.  GENITOURINARY: No dysuria, frequency, hematuria, or incontinence  NEUROLOGICAL: No headaches, memory loss, loss of strength, numbness, or tremors  SKIN: No itching, burning, rashes, or lesions   LYMPH NODES: No enlarged glands  ENDOCRINE: No heat or cold intolerance; No hair loss  MUSCULOSKELETAL: No joint pain or swelling; No muscle, back, or extremity pain  PSYCHIATRIC: No depression, anxiety, mood swings, or difficulty sleeping  HEME/LYMPH: No easy bruising, or bleeding gums  ALLERY AND IMMUNOLOGIC: No hives or eczema  FAMILY HISTORY:  Family history of hypertension    Family history of diabetes mellitus    Family history of renal stone    Family history of glioblastoma    Family history of liver failure    History of hypertension  mother    Family history of glioblastoma (Sibling)    Family history of renal failure  uncle    Family history of other condition  please open box and see additional medical family history    Family history of glioblastoma      T(C): 36.7 (09-16-21 @ 05:23), Max: 37.1 (09-15-21 @ 15:19)  HR: 101 (09-16-21 @ 05:23) (101 - 114)  BP: 126/99 (09-16-21 @ 05:25) (126/99 - 169/135)  RR: 20 (09-16-21 @ 05:23) (18 - 30)  SpO2: 100% (09-16-21 @ 05:23) (92% - 100%)  Wt(kg): --Vital Signs Last 24 Hrs  T(C): 36.7 (16 Sep 2021 05:23), Max: 37.1 (15 Sep 2021 15:19)  T(F): 98 (16 Sep 2021 05:23), Max: 98.7 (15 Sep 2021 15:19)  HR: 101 (16 Sep 2021 05:23) (101 - 114)  BP: 126/99 (16 Sep 2021 05:25) (126/99 - 169/135)  BP(mean): --  RR: 20 (16 Sep 2021 05:23) (18 - 30)  SpO2: 100% (16 Sep 2021 05:23) (92% - 100%)  Advil (Hives; Vomiting)      PHYSICAL EXAM:  GENERAL: NAD, laying comfortably in bed  HEAD:  Atraumatic, Normocephalic  EYES: EOMI, PERRLA, conjunctiva and sclera clear  ENMT: No tonsillar erythema, exudates, or enlargement; Moist mucous membranes  NECK: Supple, No JVD  NERVOUS SYSTEM:  Alert & Oriented X3, Good concentration; Motor Strength 5/5 B/L upper and lower extremities;   CHEST/LUNG: Clear to auscultation bilaterally; No rales, rhonchi, wheezing, or rubs  HEART: Regular rate and rhythm; No murmurs, rubs, or gallops  ABDOMEN: Soft, Nontender, Nondistended; Bowel sounds present  EXTREMITIES:  No clubbing, cyanosis, or edema  LYMPH: No lymphadenopathy noted  SKIN: No rashes or lesions        LABS:                            12.1   4.20  )-----------( 285      ( 15 Sep 2021 16:37 )             38.7       09-15    137  |  110<H>  |  19  ----------------------------<  131<H>  4.2   |  14<L>  |  1.09    Ca    9.7      15 Sep 2021 16:37  Mg     1.90     09-14    TPro  7.4  /  Alb  3.8  /  TBili  0.7  /  DBili  x   /  AST  44<H>  /  ALT  31  /  AlkPhos  162<H>  09-15                  PT/INR - ( 15 Sep 2021 16:37 )   PT: 22.9 sec;   INR: 1.97 ratio         PTT - ( 15 Sep 2021 16:37 )  PTT:28.1 sec          CAPILLARY BLOOD GLUCOSE                    RADIOLOGY & ADDITIONAL TESTS:      acetaminophen   Tablet .. 650 milliGRAM(s) Oral every 6 hours PRN  anastrozole 1 milliGRAM(s) Oral daily  hydrALAZINE 10 milliGRAM(s) Oral every 8 hours  influenza   Vaccine 0.5 milliLiter(s) IntraMuscular once  lactated ringers. 1000 milliLiter(s) IV Continuous <Continuous>  metoprolol succinate ER 25 milliGRAM(s) Oral daily  sacubitril 24 mG/valsartan 26 mG 1 Tablet(s) Oral two times a day  warfarin 4 milliGRAM(s) Oral at bedtime      HEALTH ISSUES - PROBLEM Dx:  Chest pain    Decompensated heart failure    Breast cancer    Pulmonary embolism    DM (diabetes mellitus)    Hypertension    Hyperlipidemia    Need for prophylactic measure    Pulmonary thromboembolism    Suspected deep vein thrombosis (DVT)           ***************************************************************  Nakul Hodges, PGY1  Internal Medicine   pager: [phone]  ***************************************************************        RISHABH HUNT  66y  Female      Patient is a 66y old  Female who presents with a chief complaint of Chest pain (15 Sep 2021 19:01)      INTERVAL HPI/OVERNIGHT EVENTS: No acute events overnight. Pt seen at bedside, confused, goes off on tangents but able to answer questions. Reports no current CP, SOB, n/v/c/d, fever, chills, dysuria.      REVIEW OF SYSTEMS:  CONSTITUTIONAL: No fever, weight loss, or fatigue  EYES: No eye pain, visual disturbances, or discharge  ENMT:  No difficulty hearing, tinnitus, vertigo; No sinus or throat pain  NECK: No pain or stiffness  BREASTS: No pain, masses, or nipple discharge  RESPIRATORY: No cough, wheezing, chills or hemoptysis; No shortness of breath  CARDIOVASCULAR: No chest pain, palpitations, dizziness, or leg swelling  GASTROINTESTINAL: No abdominal or epigastric pain. No nausea, vomiting, or hematemesis; No diarrhea or constipation. No melena or hematochezia.  GENITOURINARY: No dysuria, frequency, hematuria, or incontinence  NEUROLOGICAL: No headaches, memory loss, loss of strength, numbness, or tremors  SKIN: No itching, burning, rashes, or lesions   LYMPH NODES: No enlarged glands  ENDOCRINE: No heat or cold intolerance; No hair loss  MUSCULOSKELETAL: No joint pain or swelling; No muscle, back, or extremity pain  PSYCHIATRIC: No depression, anxiety, mood swings, or difficulty sleeping  HEME/LYMPH: No easy bruising, or bleeding gums  ALLERY AND IMMUNOLOGIC: No hives or eczema  FAMILY HISTORY:  Family history of hypertension    Family history of diabetes mellitus    Family history of renal stone    Family history of glioblastoma    Family history of liver failure    History of hypertension  mother    Family history of glioblastoma (Sibling)    Family history of renal failure  uncle    Family history of other condition  please open box and see additional medical family history    Family history of glioblastoma      T(C): 36.7 (09-16-21 @ 05:23), Max: 37.1 (09-15-21 @ 15:19)  HR: 101 (09-16-21 @ 05:23) (101 - 114)  BP: 126/99 (09-16-21 @ 05:25) (126/99 - 169/135)  RR: 20 (09-16-21 @ 05:23) (18 - 30)  SpO2: 100% (09-16-21 @ 05:23) (92% - 100%)  Wt(kg): --Vital Signs Last 24 Hrs  T(C): 36.7 (16 Sep 2021 05:23), Max: 37.1 (15 Sep 2021 15:19)  T(F): 98 (16 Sep 2021 05:23), Max: 98.7 (15 Sep 2021 15:19)  HR: 101 (16 Sep 2021 05:23) (101 - 114)  BP: 126/99 (16 Sep 2021 05:25) (126/99 - 169/135)  BP(mean): --  RR: 20 (16 Sep 2021 05:23) (18 - 30)  SpO2: 100% (16 Sep 2021 05:23) (92% - 100%)  Advil (Hives; Vomiting)      PHYSICAL EXAM:  GENERAL: NAD, laying comfortably in bed  HEAD:  Atraumatic, Normocephalic  EYES: EOMI, PERRLA, conjunctiva and sclera clear  ENMT: No tonsillar erythema, exudates, or enlargement;   NECK: Supple, No JVD  NERVOUS SYSTEM:  Alert & Oriented X3, Good concentration;   CHEST/LUNG: Clear to auscultation bilaterally; No rales, rhonchi, wheezing, or rubs  HEART: Regular rate and rhythm; No murmurs, rubs, or gallops  ABDOMEN: Soft, Nontender, Nondistended; Bowel sounds present  EXTREMITIES:  No clubbing, cyanosis, or edema  LYMPH: No lymphadenopathy noted  SKIN: No rashes or lesions        LABS:                            12.1   4.20  )-----------( 285      ( 15 Sep 2021 16:37 )             38.7       09-15    137  |  110<H>  |  19  ----------------------------<  131<H>  4.2   |  14<L>  |  1.09    Ca    9.7      15 Sep 2021 16:37  Mg     1.90     09-14    TPro  7.4  /  Alb  3.8  /  TBili  0.7  /  DBili  x   /  AST  44<H>  /  ALT  31  /  AlkPhos  162<H>  09-15                  PT/INR - ( 15 Sep 2021 16:37 )   PT: 22.9 sec;   INR: 1.97 ratio         PTT - ( 15 Sep 2021 16:37 )  PTT:28.1 sec          CAPILLARY BLOOD GLUCOSE                    RADIOLOGY & ADDITIONAL TESTS:      acetaminophen   Tablet .. 650 milliGRAM(s) Oral every 6 hours PRN  anastrozole 1 milliGRAM(s) Oral daily  hydrALAZINE 10 milliGRAM(s) Oral every 8 hours  influenza   Vaccine 0.5 milliLiter(s) IntraMuscular once  lactated ringers. 1000 milliLiter(s) IV Continuous <Continuous>  metoprolol succinate ER 25 milliGRAM(s) Oral daily  sacubitril 24 mG/valsartan 26 mG 1 Tablet(s) Oral two times a day  warfarin 4 milliGRAM(s) Oral at bedtime      HEALTH ISSUES - PROBLEM Dx:  Chest pain    Decompensated heart failure    Breast cancer    Pulmonary embolism    DM (diabetes mellitus)    Hypertension    Hyperlipidemia    Need for prophylactic measure    Pulmonary thromboembolism    Suspected deep vein thrombosis (DVT)

## 2021-09-16 NOTE — PHYSICAL THERAPY INITIAL EVALUATION ADULT - ADDITIONAL COMMENTS
? reliability of social hx 2/2 pt poor historian. PT attempted to contact daughter via telephone, however, received no answer, voicemail not set up.

## 2021-09-16 NOTE — PHYSICAL THERAPY INITIAL EVALUATION ADULT - GENERAL OBSERVATIONS, REHAB EVAL
Pt received supine on stretcher A & O  to person only. Pt confused, disoriented to place, time, situation. Pt with 3LO2 via NC, (+)PIV, 1:1 at b/s.

## 2021-09-16 NOTE — SWALLOW BEDSIDE ASSESSMENT ADULT - SWALLOW EVAL: DIAGNOSIS
PMHx infrarenal AAA, cerebral aneurysm, R breast cancer (s/p chemo/XRT), CAD, HTN, CHF, COPD, CVA, DVT, T2DM, PE (s/p IVC filter 9/2020), fibroids (s/p hysterectomy), s/p cholecystectomy, saddle PE (w/ hospital admission 8/21-9/3/21 at [facility]), obesity, p/w chest pain likely 2/2 PE vs COPD exacerbation vs acute on chronic HF. Patient presents with suspected oropharyngeal dysphagia superimposed upon reduced coordination of respiration and deglutition. The swallow is marked by trace loss of bolus from L labial margin, delayed coughing post intake of solids and thin liquids, followed by e/o stridor and pt c/o SOB. Respiratory symptoms self resolved - RN Holly present and aware.

## 2021-09-16 NOTE — SWALLOW BEDSIDE ASSESSMENT ADULT - SWALLOW EVAL: ORAL MUSCULATURE
slight L facial weakness (hx of R CVA)/anomalies present slight L facial weakness (?old R CVA on imaging - MD Vaystl informed of clinical findings)/anomalies present

## 2021-09-16 NOTE — SWALLOW BEDSIDE ASSESSMENT ADULT - H & P REVIEW
66Fwith PMHx infrarenal AAA, cerebral aneurysm, R breast cancer (s/p chemo/XRT), CAD (ASCVD 43%), HTN, CHF/cardiomyopathy, (EF 12% on 8/23/21, s/p ICD), COPD, CVA, DVT (L leg 1994), T2DM (A1c 6.9), PE (s/p IVC filter 9/2020), fibroids (s/p hysterectomy), s/p cholecystectomy, saddle PE (w/ hospital admission 8/21 - 9/3/21 at [facility]) without acute cor pulmonale (on warfarin), obesity (class I), p/w chest pain. She went to [facility] yesterday for similar complaints and was discharged. Chest pain is worse with breathing, with associated nonproductive cough. She also endorses dizziness and fatigue. She denies SOB, abdominal pain, urinary symptoms, headache. She is confused but HD stable.  She was recently hospitalized at [facility] (discharged 9/3) for a saddle embolism and discharged on warfarin. Chest pain likely 2/2 PE vs COPD exacerbation vs acute on chronic HF./yes
catarino tape

## 2021-09-16 NOTE — PHYSICAL THERAPY INITIAL EVALUATION ADULT - PERTINENT HX OF CURRENT PROBLEM, REHAB EVAL
66Fwith PMHx infrarenal AAA, cerebral aneurysm, R breast cancer (s/p chemo/XRT), CAD (ASCVD 43%), HTN, CHF/cardiomyopathy, (EF 12% on 8/23/21, s/p ICD), COPD, CVA, DVT (L leg 1994), T2DM (A1c 6.9), PE (s/p IVC filter 9/2020), fibroids (s/p hysterectomy), s/p cholecystectomy, saddle PE (w/ hospital admission 8/21 - 9/3/21 at [facility]) without acute cor pulmonale (on warfarin),

## 2021-09-16 NOTE — PROGRESS NOTE ADULT - ATTENDING COMMENTS
66Fwith PMHx  with very complexed medical history including : infrarenal AAA, cerebral aneurysm, R breast cancer (s/p chemo/XRT), CAD (ASCVD 43%), HTN, CHF/cardiomyopathy, (EF 12% on 8/23/21, s/p ICD), COPD, CVA, DVT (L leg 1994), T2DM (A1c 6.9), PE (s/p IVC filter 9/2020), saddle PE (w/ hospital admission 8/21 - 9/3/21 at [facility]) without acute cor pulmonale (on warfarin), obesity (class I), LV thrombus Seen in LV on TTE 8/23.,  p/w chest pain. She went to [facility] one day prior to admission for similar complaints and was discharged. Chest pain is worse with breathing, with associated nonproductive cough. She also endorses dizziness and fatigue. She was recently hospitalized at [facility] (discharged 9/3) for a saddle embolism and discharged on warfarin.  It is reported that patient has not checked her INR but was 2.03 on admission.  No clear etiology for her Chest pain at this point but it is most likely sec to recent PE with residual pain, no major delta troponin change , no evidence for ACS.  Pt was evaluated by MICU and is not considered to be a candidate for intensive care monitoring .  cont coumadin and monitor INR .          Florecita Chung   Hospitalist   [phone]

## 2021-09-16 NOTE — PROGRESS NOTE ADULT - PROBLEM SELECTOR PLAN 1
Etiologies include PE vs COPD exacerbation vs acute HF exacerbation   - CTA showed stable saddle embolism with resolving additional emboli bilaterally - continuing warfarin at increased dose due to subtherapeutic INR 1.97  - minimal supplemental O2 requirements - wean NC as tolerated to keep SpO2 > 92%  - MICU recommendations appreciated  - likely pain from b/l PE; did not endorse pain while receiving O2  - new TTE ordered

## 2021-09-16 NOTE — PROGRESS NOTE ADULT - PROBLEM SELECTOR PLAN 2
NICM (catheterization 02/2019 showed dRPL occlusion without ability to intervene). Echo (8/23/21) EF 12% with LV apical thrombus, follows with Dr. Read. s/p ICD (10/2019). Plan to start spironolactone shortly.  - c/w home entresto 24-25 mg qd, metoprolol succinate 25 mg qd  - ordered TTE  - will consider cardiology consult for GDMT  - QTc prolonged

## 2021-09-16 NOTE — SWALLOW BEDSIDE ASSESSMENT ADULT - SLP PERTINENT HISTORY OF CURRENT PROBLEM
CT head: IMPRESSION:   moderate periventricular and deep white matter ischemia with old infarctions in the RIGHT frontal and parietal lobes. Tiny old lacunar infarction in the RIGHT caudate nucleus. Stable 1.6 cm RIGHT supraclinoid aneurysm. CT chest: no evidence of PNA.

## 2021-09-16 NOTE — SWALLOW BEDSIDE ASSESSMENT ADULT - SLP GENERAL OBSERVATIONS
Patient encountered awake and alert, positioned upright in stretcher with assist from PCA, +3L/NC, A&Ox3, vocal quality mildly hoarse. Able to follow simple commands and make wants/needs known. +Confused -> required redirection throughout assessment.

## 2021-09-16 NOTE — PHYSICAL THERAPY INITIAL EVALUATION ADULT - DIAGNOSIS, PT EVAL
Impaired mobility/function secondary to generalized weakness, decreased balance, decreased cognition, decreased endurance/activity tolerance.

## 2021-09-16 NOTE — PROGRESS NOTE ADULT - PROBLEM SELECTOR PLAN 4
Recent admission at [facility] for saddle embolism (9/3 discharge) on warfarin  - Currently continues to have submassive PE without cor pulmonale  - MICU spoke with radiology and scan is unchanged or better  - f/u official read of CTA  - keep warfarin and increase dose from 3 mg to 4mg with INR 1.97  - low threshold to start hep gtt  - daily INR

## 2021-09-16 NOTE — SWALLOW BEDSIDE ASSESSMENT ADULT - ASR SWALLOW RECOMMEND DIAG
Will f/u to determine candidacy for subjective diet advancement vs need for instrumental swallow study

## 2021-09-16 NOTE — PHYSICAL THERAPY INITIAL EVALUATION ADULT - PRECAUTIONS/LIMITATIONS, REHAB EVAL
obesity (class I), p/w chest pain. She went to [facility] yesterday for similar complaints and was discharged. Chest pain is worse with breathing, with associated nonproductive cough. She also endorses dizziness and fatigue. She denies SOB, abdominal pain, urinary symptoms, headache. She is confused but HD stable.  She was recently hospitalized at [facility] (discharged 9/3) for a saddle embolism and discharged on warfarin. obesity (class I), p/w chest pain. She went to [facility] yesterday for similar complaints and was discharged. Chest pain is worse with breathing, with associated nonproductive cough. She also endorses dizziness and fatigue. She denies SOB, abdominal pain, urinary symptoms, headache. She is confused but HD stable.  She was recently hospitalized at [facility] (discharged 9/3) for a saddle embolism and discharged on warfarin.  Chest pain likely 2/2 PE vs COPD exacerbation vs acute on chronic HF. obesity (class I), p/w chest pain. She went to [facility] yesterday for similar complaints and was discharged. Chest pain is worse with breathing, with associated nonproductive cough. She also endorses dizziness and fatigue. She denies SOB, abdominal pain, urinary symptoms, headache. She is confused but HD stable.  She was recently hospitalized at [facility] (discharged 9/3) for a saddle embolism and discharged on warfarin.  Chest pain likely 2/2 PE vs COPD exacerbation vs acute on chronic HF./fall precautions

## 2021-09-17 DIAGNOSIS — I50.22 CHRONIC SYSTOLIC (CONGESTIVE) HEART FAILURE: ICD-10-CM

## 2021-09-17 DIAGNOSIS — R13.10 DYSPHAGIA, UNSPECIFIED: ICD-10-CM

## 2021-09-17 DIAGNOSIS — E78.5 HYPERLIPIDEMIA, UNSPECIFIED: ICD-10-CM

## 2021-09-17 LAB
ALBUMIN SERPL ELPH-MCNC: 3.8 G/DL — SIGNIFICANT CHANGE UP (ref 3.3–5)
ALP SERPL-CCNC: 155 U/L — HIGH (ref 40–120)
ALT FLD-CCNC: 33 U/L — SIGNIFICANT CHANGE UP (ref 10–45)
ANION GAP SERPL CALC-SCNC: 19 MMOL/L — HIGH (ref 5–17)
ANISOCYTOSIS BLD QL: SIGNIFICANT CHANGE UP
APTT BLD: 27.3 SEC — LOW (ref 27.5–35.5)
AST SERPL-CCNC: 36 U/L — SIGNIFICANT CHANGE UP (ref 10–40)
BASE EXCESS BLDV CALC-SCNC: -8.8 MMOL/L — LOW (ref -2–2)
BASOPHILS # BLD AUTO: 0 K/UL — SIGNIFICANT CHANGE UP (ref 0–0.2)
BASOPHILS NFR BLD AUTO: 0 % — SIGNIFICANT CHANGE UP (ref 0–2)
BILIRUB SERPL-MCNC: 1.2 MG/DL — SIGNIFICANT CHANGE UP (ref 0.2–1.2)
BUN SERPL-MCNC: 24 MG/DL — HIGH (ref 7–23)
BURR CELLS BLD QL SMEAR: PRESENT — SIGNIFICANT CHANGE UP
CA-I SERPL-SCNC: 1.23 MMOL/L — SIGNIFICANT CHANGE UP (ref 1.15–1.33)
CALCIUM SERPL-MCNC: 9.7 MG/DL — SIGNIFICANT CHANGE UP (ref 8.4–10.5)
CHLORIDE BLDV-SCNC: 111 MMOL/L — HIGH (ref 96–108)
CHLORIDE SERPL-SCNC: 110 MMOL/L — HIGH (ref 96–108)
CO2 BLDV-SCNC: 16 MMOL/L — LOW (ref 22–26)
CO2 SERPL-SCNC: 13 MMOL/L — LOW (ref 22–31)
COVID-19 SPIKE DOMAIN AB INTERP: POSITIVE
COVID-19 SPIKE DOMAIN ANTIBODY RESULT: >250 U/ML — HIGH
CREAT SERPL-MCNC: 1.22 MG/DL — SIGNIFICANT CHANGE UP (ref 0.5–1.3)
CRP SERPL-MCNC: 39 MG/L — HIGH (ref 0–4)
DACRYOCYTES BLD QL SMEAR: SLIGHT — SIGNIFICANT CHANGE UP
ELLIPTOCYTES BLD QL SMEAR: SLIGHT — SIGNIFICANT CHANGE UP
EOSINOPHIL # BLD AUTO: 0 K/UL — SIGNIFICANT CHANGE UP (ref 0–0.5)
EOSINOPHIL NFR BLD AUTO: 0 % — SIGNIFICANT CHANGE UP (ref 0–6)
ERYTHROCYTE [SEDIMENTATION RATE] IN BLOOD: 56 MM/HR — HIGH (ref 0–20)
GAS PNL BLDV: 141 MMOL/L — SIGNIFICANT CHANGE UP (ref 136–145)
GAS PNL BLDV: SIGNIFICANT CHANGE UP
GAS PNL BLDV: SIGNIFICANT CHANGE UP
GLUCOSE BLDC GLUCOMTR-MCNC: 107 MG/DL — HIGH (ref 70–99)
GLUCOSE BLDC GLUCOMTR-MCNC: 107 MG/DL — HIGH (ref 70–99)
GLUCOSE BLDC GLUCOMTR-MCNC: 113 MG/DL — HIGH (ref 70–99)
GLUCOSE BLDV-MCNC: 125 MG/DL — HIGH (ref 70–99)
GLUCOSE SERPL-MCNC: 128 MG/DL — HIGH (ref 70–99)
HCO3 BLDV-SCNC: 16 MMOL/L — LOW (ref 22–29)
HCT VFR BLD CALC: 37.3 % — SIGNIFICANT CHANGE UP (ref 34.5–45)
HCT VFR BLDA CALC: 35 % — SIGNIFICANT CHANGE UP (ref 34.5–46.5)
HGB BLD CALC-MCNC: 11.8 G/DL — SIGNIFICANT CHANGE UP (ref 11.7–16.1)
HGB BLD-MCNC: 11.2 G/DL — LOW (ref 11.5–15.5)
INR BLD: 2.05 RATIO — HIGH (ref 0.88–1.16)
LACTATE BLDV-MCNC: 2.7 MMOL/L — HIGH (ref 0.7–2)
LYMPHOCYTES # BLD AUTO: 1.77 K/UL — SIGNIFICANT CHANGE UP (ref 1–3.3)
LYMPHOCYTES # BLD AUTO: 34.5 % — SIGNIFICANT CHANGE UP (ref 13–44)
MACROCYTES BLD QL: SIGNIFICANT CHANGE UP
MAGNESIUM SERPL-MCNC: 2.5 MG/DL — SIGNIFICANT CHANGE UP (ref 1.6–2.6)
MANUAL SMEAR VERIFICATION: SIGNIFICANT CHANGE UP
MCHC RBC-ENTMCNC: 30 GM/DL — LOW (ref 32–36)
MCHC RBC-ENTMCNC: 30.1 PG — SIGNIFICANT CHANGE UP (ref 27–34)
MCV RBC AUTO: 100.3 FL — HIGH (ref 80–100)
MONOCYTES # BLD AUTO: 0.36 K/UL — SIGNIFICANT CHANGE UP (ref 0–0.9)
MONOCYTES NFR BLD AUTO: 7.1 % — SIGNIFICANT CHANGE UP (ref 2–14)
NEUTROPHILS # BLD AUTO: 3 K/UL — SIGNIFICANT CHANGE UP (ref 1.8–7.4)
NEUTROPHILS NFR BLD AUTO: 58.4 % — SIGNIFICANT CHANGE UP (ref 43–77)
PCO2 BLDV: 28 MMHG — LOW (ref 39–42)
PH BLDV: 7.35 — SIGNIFICANT CHANGE UP (ref 7.32–7.43)
PHOSPHATE SERPL-MCNC: 3.4 MG/DL — SIGNIFICANT CHANGE UP (ref 2.5–4.5)
PLAT MORPH BLD: NORMAL — SIGNIFICANT CHANGE UP
PLATELET # BLD AUTO: 287 K/UL — SIGNIFICANT CHANGE UP (ref 150–400)
PO2 BLDV: 67 MMHG — HIGH (ref 25–45)
POIKILOCYTOSIS BLD QL AUTO: SLIGHT — SIGNIFICANT CHANGE UP
POLYCHROMASIA BLD QL SMEAR: SLIGHT — SIGNIFICANT CHANGE UP
POTASSIUM BLDV-SCNC: 4.5 MMOL/L — SIGNIFICANT CHANGE UP (ref 3.5–5.1)
POTASSIUM SERPL-MCNC: 4.5 MMOL/L — SIGNIFICANT CHANGE UP (ref 3.5–5.3)
POTASSIUM SERPL-SCNC: 4.5 MMOL/L — SIGNIFICANT CHANGE UP (ref 3.5–5.3)
PROT SERPL-MCNC: 7.5 G/DL — SIGNIFICANT CHANGE UP (ref 6–8.3)
PROTHROM AB SERPL-ACNC: 23.8 SEC — HIGH (ref 10.6–13.6)
RBC # BLD: 3.72 M/UL — LOW (ref 3.8–5.2)
RBC # FLD: 16.6 % — HIGH (ref 10.3–14.5)
RBC BLD AUTO: ABNORMAL
SAO2 % BLDV: 93 % — HIGH (ref 67–88)
SARS-COV-2 IGG+IGM SERPL QL IA: >250 U/ML — HIGH
SARS-COV-2 IGG+IGM SERPL QL IA: POSITIVE
SODIUM SERPL-SCNC: 142 MMOL/L — SIGNIFICANT CHANGE UP (ref 135–145)
T PALLIDUM AB TITR SER: NEGATIVE — SIGNIFICANT CHANGE UP
TSH SERPL-MCNC: 5.58 UIU/ML — HIGH (ref 0.27–4.2)
WBC # BLD: 5.14 K/UL — SIGNIFICANT CHANGE UP (ref 3.8–10.5)
WBC # FLD AUTO: 5.14 K/UL — SIGNIFICANT CHANGE UP (ref 3.8–10.5)

## 2021-09-17 PROCEDURE — 93010 ELECTROCARDIOGRAM REPORT: CPT

## 2021-09-17 PROCEDURE — 99233 SBSQ HOSP IP/OBS HIGH 50: CPT | Mod: GC

## 2021-09-17 RX ORDER — SODIUM CHLORIDE 9 MG/ML
1000 INJECTION, SOLUTION INTRAVENOUS
Refills: 0 | Status: DISCONTINUED | OUTPATIENT
Start: 2021-09-17 | End: 2021-09-18

## 2021-09-17 RX ORDER — ATORVASTATIN CALCIUM 80 MG/1
40 TABLET, FILM COATED ORAL AT BEDTIME
Refills: 0 | Status: DISCONTINUED | OUTPATIENT
Start: 2021-09-17 | End: 2021-09-21

## 2021-09-17 RX ORDER — WARFARIN SODIUM 2.5 MG/1
4 TABLET ORAL ONCE
Refills: 0 | Status: COMPLETED | OUTPATIENT
Start: 2021-09-17 | End: 2021-09-17

## 2021-09-17 RX ORDER — LANOLIN ALCOHOL/MO/W.PET/CERES
3 CREAM (GRAM) TOPICAL AT BEDTIME
Refills: 0 | Status: DISCONTINUED | OUTPATIENT
Start: 2021-09-17 | End: 2021-09-21

## 2021-09-17 RX ADMIN — SODIUM CHLORIDE 30 MILLILITER(S): 9 INJECTION, SOLUTION INTRAVENOUS at 11:34

## 2021-09-17 RX ADMIN — SACUBITRIL AND VALSARTAN 1 TABLET(S): 24; 26 TABLET, FILM COATED ORAL at 17:13

## 2021-09-17 RX ADMIN — ANASTROZOLE 1 MILLIGRAM(S): 1 TABLET ORAL at 11:32

## 2021-09-17 RX ADMIN — SACUBITRIL AND VALSARTAN 1 TABLET(S): 24; 26 TABLET, FILM COATED ORAL at 06:26

## 2021-09-17 RX ADMIN — WARFARIN SODIUM 4 MILLIGRAM(S): 2.5 TABLET ORAL at 21:32

## 2021-09-17 RX ADMIN — Medication 10 MILLIGRAM(S): at 05:08

## 2021-09-17 RX ADMIN — Medication 25 MILLIGRAM(S): at 05:09

## 2021-09-17 RX ADMIN — Medication 3 MILLIGRAM(S): at 21:32

## 2021-09-17 RX ADMIN — Medication 10 MILLIGRAM(S): at 17:13

## 2021-09-17 RX ADMIN — ATORVASTATIN CALCIUM 40 MILLIGRAM(S): 80 TABLET, FILM COATED ORAL at 21:32

## 2021-09-17 RX ADMIN — Medication 10 MILLIGRAM(S): at 21:32

## 2021-09-17 RX ADMIN — Medication 3 MILLIGRAM(S): at 00:56

## 2021-09-17 NOTE — PROGRESS NOTE ADULT - PROBLEM SELECTOR PLAN 9
DVT ppx: warfarin  Diet: NPO pending S&S due to AMS  Dispo: pending PT eval Previously on atorvastatin, plan to restart outpatient

## 2021-09-17 NOTE — PROGRESS NOTE ADULT - PROBLEM SELECTOR PLAN 3
s/p chemo/XRT  - c/w home anastrozole 1 mg qd NICM (catheterization 02/2019 showed dRPL occlusion without ability to intervene). Echo (8/23/21) EF 12% with LV apical thrombus, follows with Dr. Read. s/p ICD (10/2019). Plan to start spironolactone shortly.  - c/w home entresto 24-25 mg qd, metoprolol succinate 25 mg qd  - new TTE EF 25%, improved from previous EF 12%  - will consider cardiology consult for GDMT  - QTc prolonged

## 2021-09-17 NOTE — PROGRESS NOTE ADULT - PROBLEM SELECTOR PLAN 2
NICM (catheterization 02/2019 showed dRPL occlusion without ability to intervene). Echo (8/23/21) EF 12% with LV apical thrombus, follows with Dr. Read. s/p ICD (10/2019). Plan to start spironolactone shortly.  - c/w home entresto 24-25 mg qd, metoprolol succinate 25 mg qd  - ordered TTE  - will consider cardiology consult for GDMT  - QTc prolonged NICM (catheterization 02/2019 showed dRPL occlusion without ability to intervene). Echo (8/23/21) EF 12% with LV apical thrombus, follows with Dr. Read. s/p ICD (10/2019). Plan to start spironolactone shortly.  - c/w home entresto 24-25 mg qd, metoprolol succinate 25 mg qd  - new TTE EF 25%, improved from previous EF 12%  - will consider cardiology consult for GDMT  - QTc prolonged

## 2021-09-17 NOTE — PROGRESS NOTE ADULT - SUBJECTIVE AND OBJECTIVE BOX
***************************************************************  Nakul Hodges, PGY1  Internal Medicine   pager: [phone]  ***************************************************************        MARILEE RISHABH  66y  Female      Patient is a 66y old  Female who presents with a chief complaint of Chest pain (16 Sep 2021 06:59)      INTERVAL HPI/OVERNIGHT EVENTS:       REVIEW OF SYSTEMS:  CONSTITUTIONAL: No fever, weight loss, or fatigue  EYES: No eye pain, visual disturbances, or discharge  ENMT:  No difficulty hearing, tinnitus, vertigo; No sinus or throat pain  NECK: No pain or stiffness  BREASTS: No pain, masses, or nipple discharge  RESPIRATORY: No cough, wheezing, chills or hemoptysis; No shortness of breath  CARDIOVASCULAR: No chest pain, palpitations, dizziness, or leg swelling  GASTROINTESTINAL: No abdominal or epigastric pain. No nausea, vomiting, or hematemesis; No diarrhea or constipation. No melena or hematochezia.  GENITOURINARY: No dysuria, frequency, hematuria, or incontinence  NEUROLOGICAL: No headaches, memory loss, loss of strength, numbness, or tremors  SKIN: No itching, burning, rashes, or lesions   LYMPH NODES: No enlarged glands  ENDOCRINE: No heat or cold intolerance; No hair loss  MUSCULOSKELETAL: No joint pain or swelling; No muscle, back, or extremity pain  PSYCHIATRIC: No depression, anxiety, mood swings, or difficulty sleeping  HEME/LYMPH: No easy bruising, or bleeding gums  ALLERY AND IMMUNOLOGIC: No hives or eczema  FAMILY HISTORY:  Family history of hypertension    Family history of diabetes mellitus    Family history of renal stone    Family history of glioblastoma    Family history of liver failure    History of hypertension  mother    Family history of glioblastoma (Sibling)    Family history of renal failure  uncle    Family history of other condition  please open box and see additional medical family history    Family history of glioblastoma      T(C): 36.4 (09-17-21 @ 04:15), Max: 36.9 (09-16-21 @ 16:35)  HR: 93 (09-17-21 @ 04:15) (92 - 102)  BP: 124/87 (09-17-21 @ 04:15) (120/87 - 130/107)  RR: 18 (09-17-21 @ 04:15) (18 - 20)  SpO2: 100% (09-17-21 @ 04:15) (97% - 100%)  Wt(kg): --Vital Signs Last 24 Hrs  T(C): 36.4 (17 Sep 2021 04:15), Max: 36.9 (16 Sep 2021 16:35)  T(F): 97.6 (17 Sep 2021 04:15), Max: 98.4 (16 Sep 2021 16:35)  HR: 93 (17 Sep 2021 04:15) (92 - 102)  BP: 124/87 (17 Sep 2021 04:15) (120/87 - 130/107)  BP(mean): --  RR: 18 (17 Sep 2021 04:15) (18 - 20)  SpO2: 100% (17 Sep 2021 04:15) (97% - 100%)  Advil (Hives; Vomiting)      PHYSICAL EXAM:  GENERAL: NAD, laying comfortably in bed  HEAD:  Atraumatic, Normocephalic  EYES: EOMI, PERRLA, conjunctiva and sclera clear  ENMT: No tonsillar erythema, exudates, or enlargement; Moist mucous membranes  NECK: Supple, No JVD  NERVOUS SYSTEM:  Alert & Oriented X3, Good concentration; Motor Strength 5/5 B/L upper and lower extremities;   CHEST/LUNG: Clear to auscultation bilaterally; No rales, rhonchi, wheezing, or rubs  HEART: Regular rate and rhythm; No murmurs, rubs, or gallops  ABDOMEN: Soft, Nontender, Nondistended; Bowel sounds present  EXTREMITIES:  No clubbing, cyanosis, or edema  LYMPH: No lymphadenopathy noted  SKIN: No rashes or lesions        LABS:                            11.2   5.14  )-----------( 287      ( 17 Sep 2021 06:44 )             37.3       09-16    142  |  114<H>  |  22  ----------------------------<  132<H>  4.5   |  14<L>  |  1.10    Ca    9.1      16 Sep 2021 17:01  Phos  3.4     09-16  Mg     1.9     09-16    TPro  7.0  /  Alb  3.5  /  TBili  1.1  /  DBili  x   /  AST  38  /  ALT  31  /  AlkPhos  149<H>  09-16                  PT/INR - ( 16 Sep 2021 17:01 )   PT: 25.1 sec;   INR: 2.17 ratio         PTT - ( 15 Sep 2021 16:37 )  PTT:28.1 sec          CAPILLARY BLOOD GLUCOSE                    RADIOLOGY & ADDITIONAL TESTS:      acetaminophen   Tablet .. 650 milliGRAM(s) Oral every 6 hours PRN  anastrozole 1 milliGRAM(s) Oral daily  hydrALAZINE 10 milliGRAM(s) Oral every 8 hours  influenza   Vaccine 0.5 milliLiter(s) IntraMuscular once  melatonin 3 milliGRAM(s) Oral at bedtime  metoprolol succinate ER 25 milliGRAM(s) Oral daily  sacubitril 24 mG/valsartan 26 mG 1 Tablet(s) Oral two times a day      HEALTH ISSUES - PROBLEM Dx:  Chest pain    Decompensated heart failure    Breast cancer    Pulmonary embolism    DM (diabetes mellitus)    Hypertension    Hyperlipidemia    Need for prophylactic measure    Pulmonary thromboembolism    Suspected deep vein thrombosis (DVT)           ***************************************************************  Nakul Hodges, PGY1  Internal Medicine   pager: [phone]  ***************************************************************        RISHABH HUTN  66y  Female      Patient is a 66y old  Female who presents with a chief complaint of Chest pain (16 Sep 2021 06:59)      INTERVAL HPI/OVERNIGHT EVENTS: No acute events overnight, no events on tele. Pt seen at bedside, tired and sleepy, but reports no complaints. Reports that her chest does not hurt, was not on NC at time of examination. Denied fever, chills, CP, SOB, n/v/c/d, dysuria.      REVIEW OF SYSTEMS:  CONSTITUTIONAL: No fever, weight loss, or fatigue  EYES: No eye pain, visual disturbances, or discharge  ENMT:  No difficulty hearing, tinnitus, vertigo; No sinus or throat pain  NECK: No pain or stiffness  BREASTS: No pain, masses, or nipple discharge  RESPIRATORY: No cough, wheezing, chills or hemoptysis; No shortness of breath  CARDIOVASCULAR: No chest pain, palpitations, dizziness, or leg swelling  GASTROINTESTINAL: No abdominal or epigastric pain. No nausea, vomiting, or hematemesis; No diarrhea or constipation. No melena or hematochezia.  GENITOURINARY: No dysuria, frequency, hematuria, or incontinence  NEUROLOGICAL: No headaches, memory loss, loss of strength, numbness, or tremors  SKIN: No itching, burning, rashes, or lesions   LYMPH NODES: No enlarged glands  ENDOCRINE: No heat or cold intolerance; No hair loss  MUSCULOSKELETAL: No joint pain or swelling; No muscle, back, or extremity pain  PSYCHIATRIC: No depression, anxiety, mood swings, or difficulty sleeping  HEME/LYMPH: No easy bruising, or bleeding gums  ALLERY AND IMMUNOLOGIC: No hives or eczema  FAMILY HISTORY:  Family history of hypertension    Family history of diabetes mellitus    Family history of renal stone    Family history of glioblastoma    Family history of liver failure    History of hypertension  mother    Family history of glioblastoma (Sibling)    Family history of renal failure  uncle    Family history of other condition  please open box and see additional medical family history    Family history of glioblastoma      T(C): 36.4 (09-17-21 @ 04:15), Max: 36.9 (09-16-21 @ 16:35)  HR: 93 (09-17-21 @ 04:15) (92 - 102)  BP: 124/87 (09-17-21 @ 04:15) (120/87 - 130/107)  RR: 18 (09-17-21 @ 04:15) (18 - 20)  SpO2: 100% (09-17-21 @ 04:15) (97% - 100%)  Wt(kg): --Vital Signs Last 24 Hrs  T(C): 36.4 (17 Sep 2021 04:15), Max: 36.9 (16 Sep 2021 16:35)  T(F): 97.6 (17 Sep 2021 04:15), Max: 98.4 (16 Sep 2021 16:35)  HR: 93 (17 Sep 2021 04:15) (92 - 102)  BP: 124/87 (17 Sep 2021 04:15) (120/87 - 130/107)  BP(mean): --  RR: 18 (17 Sep 2021 04:15) (18 - 20)  SpO2: 100% (17 Sep 2021 04:15) (97% - 100%)  Advil (Hives; Vomiting)      PHYSICAL EXAM:  GENERAL: NAD, laying comfortably in bed  HEAD:  Atraumatic, Normocephalic  EYES: EOMI, PERRLA, conjunctiva and sclera clear  ENMT: No tonsillar erythema, exudates, or enlargement; Moist mucous membranes  NECK: Supple,   NERVOUS SYSTEM:  Alert & Oriented X3, Good concentration;  CHEST/LUNG: Clear to auscultation bilaterally; No rales, rhonchi, wheezing, or rubs  HEART: Regular rate and rhythm; No murmurs, rubs, or gallops  ABDOMEN: Soft, Nontender, Nondistended; Bowel sounds present  EXTREMITIES:  No clubbing, cyanosis, or edema  LYMPH: No lymphadenopathy noted  SKIN: No rashes or lesions        LABS:                            11.2   5.14  )-----------( 287      ( 17 Sep 2021 06:44 )             37.3       09-16    142  |  114<H>  |  22  ----------------------------<  132<H>  4.5   |  14<L>  |  1.10    Ca    9.1      16 Sep 2021 17:01  Phos  3.4     09-16  Mg     1.9     09-16    TPro  7.0  /  Alb  3.5  /  TBili  1.1  /  DBili  x   /  AST  38  /  ALT  31  /  AlkPhos  149<H>  09-16                  PT/INR - ( 16 Sep 2021 17:01 )   PT: 25.1 sec;   INR: 2.17 ratio         PTT - ( 15 Sep 2021 16:37 )  PTT:28.1 sec          CAPILLARY BLOOD GLUCOSE                    RADIOLOGY & ADDITIONAL TESTS:      acetaminophen   Tablet .. 650 milliGRAM(s) Oral every 6 hours PRN  anastrozole 1 milliGRAM(s) Oral daily  hydrALAZINE 10 milliGRAM(s) Oral every 8 hours  influenza   Vaccine 0.5 milliLiter(s) IntraMuscular once  melatonin 3 milliGRAM(s) Oral at bedtime  metoprolol succinate ER 25 milliGRAM(s) Oral daily  sacubitril 24 mG/valsartan 26 mG 1 Tablet(s) Oral two times a day      HEALTH ISSUES - PROBLEM Dx:  Chest pain    Decompensated heart failure    Breast cancer    Pulmonary embolism    DM (diabetes mellitus)    Hypertension    Hyperlipidemia    Need for prophylactic measure    Pulmonary thromboembolism    Suspected deep vein thrombosis (DVT)

## 2021-09-17 NOTE — PROGRESS NOTE ADULT - ATTENDING COMMENTS
66Fwith PMHx  with very complexed medical history including : infrarenal AAA, cerebral aneurysm, R breast cancer (s/p chemo/XRT), CAD (ASCVD 43%), HTN, CHF/cardiomyopathy, (EF 12% on 8/23/21, s/p ICD), COPD, CVA, DVT (L leg 1994), T2DM (A1c 6.9), PE (s/p IVC filter 9/2020), saddle PE (w/ hospital admission 8/21 - 9/3/21 at [facility]) without acute cor pulmonale (on warfarin), obesity (class I), LV thrombus Seen in LV on TTE 8/23.,  p/w chest pain. She went to [facility] one day prior to admission for similar complaints and was discharged. Chest pain is worse with breathing, with associated nonproductive cough. She also endorses dizziness and fatigue. She was recently hospitalized at [facility] (discharged 9/3) for a saddle embolism and discharged on warfarin.  It is reported that patient has not checked her INR but was 2.03 on admission.  No clear etiology for her Chest pain at this point but it is most likely sec to recent PE with residual pain, no major delta troponin change , no evidence for ACS.  Pt was evaluated by MICU and is not considered to be a candidate for intensive care monitoring .   Patient will be continued on the coumadin for the LV thrombus, recent PE and INR is to be monitored with coumadin dose adjustment.  Of note, patient is AAOX3 but quite confused at time , so collateral was obtained from family who reported cognitive decline for the past year, so we initiate dementia work up ,but she is noted to have multiinfarct in the brain CT could be vascular dementia .  No agitation.  She is awaiting on MBS s/p Speech and swallow edison Suarezre   Hospitalist   [phone] .

## 2021-09-17 NOTE — PROGRESS NOTE ADULT - PROBLEM SELECTOR PLAN 7
Previously on atorvastatin, plan to restart outpatient c/w home entresto and metoprolol Not on any home meds, A1C 5.9 (2020)

## 2021-09-17 NOTE — PROGRESS NOTE ADULT - PROBLEM SELECTOR PLAN 1
Etiologies include PE vs COPD exacerbation vs acute HF exacerbation   - CTA showed stable saddle embolism with resolving additional emboli bilaterally - continuing warfarin at increased dose due to subtherapeutic INR 1.97  - minimal supplemental O2 requirements - wean NC as tolerated to keep SpO2 > 92%  - MICU recommendations appreciated  - likely pain from b/l PE; did not endorse pain while receiving O2  - new TTE ordered Etiologies include PE vs COPD exacerbation vs acute HF exacerbation   - CTA showed stable saddle embolism with resolving additional emboli bilaterally - continuing warfarin at increased dose due to subtherapeutic INR 1.97  - minimal supplemental O2 requirements - wean NC as tolerated to keep SpO2 > 92%  - MICU recommendations appreciated  - likely pain from b/l PE; did not endorse pain while receiving O2  - new TTE EF 25%, improved from previous EF 12%  -pt reports CP resolved

## 2021-09-17 NOTE — PROGRESS NOTE ADULT - PROBLEM SELECTOR PLAN 8
DVT ppx: warfarin  Diet: NPO pending S&S due to AMS  Dispo: pending PT eval Previously on atorvastatin, plan to restart outpatient c/w home entresto and metoprolol

## 2021-09-17 NOTE — PROGRESS NOTE ADULT - PROBLEM SELECTOR PLAN 6
c/w home entresto and metoprolol Not on any home meds, A1C 5.9 (2020) -appreciate speech and swallow recs, pt currently at risk for aspiration, NPO for now  -MBS scheduled for AM

## 2021-09-17 NOTE — PROGRESS NOTE ADULT - PROBLEM SELECTOR PLAN 5
Not on any home meds, A1C 5.9 (2020)  - Repeat A1c -appreciate speech and swallow recs, pt currently at risk for aspiration, NPO for now  -MBS scheduled for AM Recent admission at [facility] for saddle embolism (9/3 discharge) on warfarin  - Currently continues to have submassive PE without cor pulmonale  - CTA showed b/l PEs with additional emboli bilaterally compared to old CTA  - increased warfarin dose from 3 mg to 4mg with INR 1.97 -> 2.05  - daily INR

## 2021-09-18 LAB
ALBUMIN SERPL ELPH-MCNC: 3.9 G/DL — SIGNIFICANT CHANGE UP (ref 3.3–5)
ALP SERPL-CCNC: 162 U/L — HIGH (ref 40–120)
ALT FLD-CCNC: 33 U/L — SIGNIFICANT CHANGE UP (ref 10–45)
ANION GAP SERPL CALC-SCNC: 16 MMOL/L — SIGNIFICANT CHANGE UP (ref 5–17)
APTT BLD: 26.4 SEC — LOW (ref 27.5–35.5)
AST SERPL-CCNC: 35 U/L — SIGNIFICANT CHANGE UP (ref 10–40)
BILIRUB SERPL-MCNC: 0.9 MG/DL — SIGNIFICANT CHANGE UP (ref 0.2–1.2)
BUN SERPL-MCNC: 26 MG/DL — HIGH (ref 7–23)
CALCIUM SERPL-MCNC: 9.5 MG/DL — SIGNIFICANT CHANGE UP (ref 8.4–10.5)
CHLORIDE SERPL-SCNC: 109 MMOL/L — HIGH (ref 96–108)
CO2 SERPL-SCNC: 15 MMOL/L — LOW (ref 22–31)
CREAT SERPL-MCNC: 1.33 MG/DL — HIGH (ref 0.5–1.3)
GLUCOSE BLDC GLUCOMTR-MCNC: 113 MG/DL — HIGH (ref 70–99)
GLUCOSE BLDC GLUCOMTR-MCNC: 125 MG/DL — HIGH (ref 70–99)
GLUCOSE SERPL-MCNC: 120 MG/DL — HIGH (ref 70–99)
HCT VFR BLD CALC: 37.5 % — SIGNIFICANT CHANGE UP (ref 34.5–45)
HGB BLD-MCNC: 11.7 G/DL — SIGNIFICANT CHANGE UP (ref 11.5–15.5)
INR BLD: 2.34 RATIO — HIGH (ref 0.88–1.16)
MAGNESIUM SERPL-MCNC: 2.2 MG/DL — SIGNIFICANT CHANGE UP (ref 1.6–2.6)
MCHC RBC-ENTMCNC: 30.5 PG — SIGNIFICANT CHANGE UP (ref 27–34)
MCHC RBC-ENTMCNC: 31.2 GM/DL — LOW (ref 32–36)
MCV RBC AUTO: 97.9 FL — SIGNIFICANT CHANGE UP (ref 80–100)
NRBC # BLD: 0 /100 WBCS — SIGNIFICANT CHANGE UP (ref 0–0)
PHOSPHATE SERPL-MCNC: 3.6 MG/DL — SIGNIFICANT CHANGE UP (ref 2.5–4.5)
PLATELET # BLD AUTO: 261 K/UL — SIGNIFICANT CHANGE UP (ref 150–400)
POTASSIUM SERPL-MCNC: 4.2 MMOL/L — SIGNIFICANT CHANGE UP (ref 3.5–5.3)
POTASSIUM SERPL-SCNC: 4.2 MMOL/L — SIGNIFICANT CHANGE UP (ref 3.5–5.3)
PROT SERPL-MCNC: 7.5 G/DL — SIGNIFICANT CHANGE UP (ref 6–8.3)
PROTHROM AB SERPL-ACNC: 27 SEC — HIGH (ref 10.6–13.6)
RBC # BLD: 3.83 M/UL — SIGNIFICANT CHANGE UP (ref 3.8–5.2)
RBC # FLD: 16.6 % — HIGH (ref 10.3–14.5)
SODIUM SERPL-SCNC: 140 MMOL/L — SIGNIFICANT CHANGE UP (ref 135–145)
T3 SERPL-MCNC: 76 NG/DL — LOW (ref 80–200)
T4 FREE SERPL-MCNC: 1.5 NG/DL — SIGNIFICANT CHANGE UP (ref 0.9–1.8)
WBC # BLD: 4.65 K/UL — SIGNIFICANT CHANGE UP (ref 3.8–10.5)
WBC # FLD AUTO: 4.65 K/UL — SIGNIFICANT CHANGE UP (ref 3.8–10.5)

## 2021-09-18 PROCEDURE — 99232 SBSQ HOSP IP/OBS MODERATE 35: CPT | Mod: GC

## 2021-09-18 PROCEDURE — 74230 X-RAY XM SWLNG FUNCJ C+: CPT | Mod: 26

## 2021-09-18 RX ORDER — WARFARIN SODIUM 2.5 MG/1
4 TABLET ORAL ONCE
Refills: 0 | Status: COMPLETED | OUTPATIENT
Start: 2021-09-18 | End: 2021-09-18

## 2021-09-18 RX ADMIN — Medication 10 MILLIGRAM(S): at 21:42

## 2021-09-18 RX ADMIN — SACUBITRIL AND VALSARTAN 1 TABLET(S): 24; 26 TABLET, FILM COATED ORAL at 05:03

## 2021-09-18 RX ADMIN — SACUBITRIL AND VALSARTAN 1 TABLET(S): 24; 26 TABLET, FILM COATED ORAL at 17:20

## 2021-09-18 RX ADMIN — ATORVASTATIN CALCIUM 40 MILLIGRAM(S): 80 TABLET, FILM COATED ORAL at 21:43

## 2021-09-18 RX ADMIN — Medication 25 MILLIGRAM(S): at 05:04

## 2021-09-18 RX ADMIN — ANASTROZOLE 1 MILLIGRAM(S): 1 TABLET ORAL at 12:10

## 2021-09-18 RX ADMIN — Medication 10 MILLIGRAM(S): at 14:45

## 2021-09-18 RX ADMIN — Medication 10 MILLIGRAM(S): at 05:03

## 2021-09-18 RX ADMIN — Medication 3 MILLIGRAM(S): at 21:42

## 2021-09-18 NOTE — SWALLOW VFSS/MBS ASSESSMENT ADULT - DIAGNOSTIC IMPRESSIONS
65 y/o F with the above PMH presenting with chest pain, found to have stable saddle PE on CTA, chest pain likely 2/2 PE vs COPD exacerbation vs acute on chronic HF. Pt was seen today for Modified Barium Swallow Study (MBS) which revealed a generally functional jarvis-pharyngeal swallow mechanism for a regular texture diet and thin liquids. No penetration or aspiration observed with thin liquids (cup or straw), puree textures, soft solids, or hard solids. Timely mastication of soft/hard solid. Timely trigger of pharyngeal swallow. Disorders: reduced hyolaryngeal elevation and excursion.

## 2021-09-18 NOTE — PROGRESS NOTE ADULT - PROBLEM SELECTOR PLAN 10
- DVT ppx: warfarin  - Diet: NPO pending S&S due to AMS  - Dispo: pending PT eval - DVT ppx: warfarin  - Diet: NPO pending S&S due to AMS  - Dispo: pending PT eval; needs HHA reintiated

## 2021-09-18 NOTE — PROGRESS NOTE ADULT - PROBLEM SELECTOR PLAN 5
Recent admission at [facility] for saddle embolism (9/3 discharge) on warfarin  - Currently continues to have submassive PE without cor pulmonale  - CTA showed b/l PEs with additional emboli bilaterally compared to old CTA  - increased warfarin dose from 3 mg to 4mg with INR 1.97 -> 2.05  - daily INR

## 2021-09-18 NOTE — SWALLOW VFSS/MBS ASSESSMENT ADULT - COMMENTS
IMAGING:  CXR: 9/15/21  IMPRESSION:  Clear lungs.    CT HEAD: 9/15/21  IMPRESSION: Moderate periventricular and deep white matter ischemia with old infarctions in the RIGHT frontal and parietal lobes. Tiny old lacunar infarction in the RIGHT caudate nucleus. Stable 1.6 cm RIGHT supraclinoid aneurysm.    Dysphagia Hx:  Initial bedside swallow evaluation completed 9/16/21 with recommendations for NPO. Re-evaluation of 9/17/21 revealed pt with an oral and suspected pharyngeal dysphagia. Patient accepted PO trials of ice chips x2. Prolonged mastication and oral stage across both trials, with suspected piecemeal deglutition. Patient with overt s/s penetration/aspiration as seen by slight change in vocal quality x1/2 trials, with slight increase in respiratory effort following po trials. Patient further endorsed feeling akin to "suffocating" following PO trials similar to the feeling she was having in the ED, further trials deferred. Recommendations at that time: Continue NPO and MBS.

## 2021-09-18 NOTE — PROGRESS NOTE ADULT - PROBLEM SELECTOR PLAN 1
Etiologies include PE vs COPD exacerbation vs acute HF exacerbation   - CTA showed stable saddle embolism with resolving additional emboli bilaterally - continuing warfarin at increased dose due to subtherapeutic INR 1.97  - minimal supplemental O2 requirements - wean NC as tolerated to keep SpO2 > 92%  - MICU recommendations appreciated  - likely pain from b/l PE; did not endorse pain while receiving O2  - new TTE EF 25%, improved from previous EF 12%  -pt reports CP resolved Now Resolved; Etiologies include PE vs COPD exacerbation vs acute HF exacerbation   - CTA showed stable saddle embolism with resolving additional emboli bilaterally - continuing warfarin at increased dose due to subtherapeutic INR 1.97  - minimal supplemental O2 requirements - wean NC as tolerated to keep SpO2 > 92%  - MICU recommendations appreciated  - likely pain from b/l PE; did not endorse pain while receiving O2  - new TTE EF 25%, improved from previous EF 12%

## 2021-09-18 NOTE — SWALLOW VFSS/MBS ASSESSMENT ADULT - SLP GENERAL OBSERVATIONS
Pt was received in radiology suite in [facility]. +NC (3L). She was AAOx3 to self, year, and general location. Unable to recall reason for hospital admission. Pt pleasantly confused throughout evaluation. Frequent tangental off topic utterances noted. She was able to follow basic commands and answer simple questions.

## 2021-09-18 NOTE — SWALLOW VFSS/MBS ASSESSMENT ADULT - SLP PERTINENT HISTORY OF CURRENT PROBLEM
65 y/o F with PMH of infrarenal AAA, cerebral aneurysm, R breast cancer (s/p chemo/XRT), CAD (ASCVD 43%), HTN, CHF/cardiomyopathy, (EF 12% on 8/23/21, s/p ICD), COPD, CVA, DVT (L leg 1994), T2DM (A1c 6.9), PE (s/p IVC filter 9/2020), fibroids (s/p hysterectomy), s/p cholecystectomy, saddle PE (w/ hospital admission 8/21 - 9/3/21 at [facility]) without acute cor pulmonale (on warfarin), obesity (class I), p/w chest pain. She went to [facility] yesterday for similar complaints and was discharged. Chest pain is worse with breathing, with associated nonproductive cough. She also endorses dizziness and fatigue. She denies SOB, abdominal pain, urinary symptoms, headache. She is confused but HD stable.  She was recently hospitalized at [facility] (discharged 9/3) for a saddle embolism and discharged on warfarin. Chest pain likely 2/2 PE vs COPD exacerbation vs acute on chronic HF.

## 2021-09-18 NOTE — PROGRESS NOTE ADULT - SUBJECTIVE AND OBJECTIVE BOX
Patient is a 66y old  Female who presents with a chief complaint of Chest pain (17 Sep 2021 07:09)      SUBJECTIVE / OVERNIGHT EVENTS: No acute events overnight.     MEDICATIONS  (STANDING):  anastrozole 1 milliGRAM(s) Oral daily  atorvastatin 40 milliGRAM(s) Oral at bedtime  dextrose 5%. 1000 milliLiter(s) (30 mL/Hr) IV Continuous <Continuous>  hydrALAZINE 10 milliGRAM(s) Oral every 8 hours  influenza   Vaccine 0.5 milliLiter(s) IntraMuscular once  melatonin 3 milliGRAM(s) Oral at bedtime  metoprolol succinate ER 25 milliGRAM(s) Oral daily  sacubitril 24 mG/valsartan 26 mG 1 Tablet(s) Oral two times a day    MEDICATIONS  (PRN):  acetaminophen   Tablet .. 650 milliGRAM(s) Oral every 6 hours PRN Mild Pain (1 - 3), Moderate Pain (4 - 6)      Vital Signs Last 24 Hrs  T(C): 36.7 (18 Sep 2021 07:54), Max: 36.8 (17 Sep 2021 19:52)  T(F): 98 (18 Sep 2021 07:54), Max: 98.2 (17 Sep 2021 19:52)  HR: 86 (18 Sep 2021 07:54) (69 - 91)  BP: 139/95 (18 Sep 2021 07:54) (129/90 - 144/87)  BP(mean): --  RR: 18 (18 Sep 2021 07:54) (18 - 20)  SpO2: 99% (18 Sep 2021 07:54) (96% - 100%)  CAPILLARY BLOOD GLUCOSE      POCT Blood Glucose.: 113 mg/dL (18 Sep 2021 06:46)  POCT Blood Glucose.: 113 mg/dL (17 Sep 2021 23:50)  POCT Blood Glucose.: 107 mg/dL (17 Sep 2021 11:26)  POCT Blood Glucose.: 107 mg/dL (17 Sep 2021 09:11)    I&O's Summary    17 Sep 2021 07:01  -  18 Sep 2021 07:00  --------------------------------------------------------  IN: 0 mL / OUT: 1 mL / NET: -1 mL        PHYSICAL EXAM:  GENERAL: NAD, well-developed  HEAD:  Atraumatic, Normocephalic  EYES: EOMI, PERRLA, conjunctiva and sclera clear  NECK: Supple, No JVD  CHEST/LUNG: Clear to auscultation bilaterally; No wheeze  HEART: Regular rate and rhythm; No murmurs, rubs, or gallops  ABDOMEN: Soft, Nontender, Nondistended; Bowel sounds present  EXTREMITIES:  2+ Peripheral Pulses, No clubbing, cyanosis, or edema  PSYCH: AAOx3  NEUROLOGY: non-focal  SKIN: No rashes or lesions    LABS:                        11.2   5.14  )-----------( 287      ( 17 Sep 2021 06:44 )             37.3     09-17    142  |  110<H>  |  24<H>  ----------------------------<  128<H>  4.5   |  13<L>  |  1.22    Ca    9.7      17 Sep 2021 06:44  Phos  3.4     09-17  Mg     2.5     09-17    TPro  7.5  /  Alb  3.8  /  TBili  1.2  /  DBili  x   /  AST  36  /  ALT  33  /  AlkPhos  155<H>  09-17    PT/INR - ( 17 Sep 2021 06:44 )   PT: 23.8 sec;   INR: 2.05 ratio         PTT - ( 17 Sep 2021 06:44 )  PTT:27.3 sec               Patient is a 66y old  Female who presents with a chief complaint of Chest pain (17 Sep 2021 07:09)      SUBJECTIVE / OVERNIGHT EVENTS: No acute events overnight. No acute complaints. passed MBS study, excited to eat real food. Denies any chest pain, fevers, chills, SOB or headaches     MEDICATIONS  (STANDING):  anastrozole 1 milliGRAM(s) Oral daily  atorvastatin 40 milliGRAM(s) Oral at bedtime  dextrose 5%. 1000 milliLiter(s) (30 mL/Hr) IV Continuous <Continuous>  hydrALAZINE 10 milliGRAM(s) Oral every 8 hours  influenza   Vaccine 0.5 milliLiter(s) IntraMuscular once  melatonin 3 milliGRAM(s) Oral at bedtime  metoprolol succinate ER 25 milliGRAM(s) Oral daily  sacubitril 24 mG/valsartan 26 mG 1 Tablet(s) Oral two times a day    MEDICATIONS  (PRN):  acetaminophen   Tablet .. 650 milliGRAM(s) Oral every 6 hours PRN Mild Pain (1 - 3), Moderate Pain (4 - 6)      Vital Signs Last 24 Hrs  T(C): 36.7 (18 Sep 2021 07:54), Max: 36.8 (17 Sep 2021 19:52)  T(F): 98 (18 Sep 2021 07:54), Max: 98.2 (17 Sep 2021 19:52)  HR: 86 (18 Sep 2021 07:54) (69 - 91)  BP: 139/95 (18 Sep 2021 07:54) (129/90 - 144/87)  BP(mean): --  RR: 18 (18 Sep 2021 07:54) (18 - 20)  SpO2: 99% (18 Sep 2021 07:54) (96% - 100%)  CAPILLARY BLOOD GLUCOSE      POCT Blood Glucose.: 113 mg/dL (18 Sep 2021 06:46)  POCT Blood Glucose.: 113 mg/dL (17 Sep 2021 23:50)  POCT Blood Glucose.: 107 mg/dL (17 Sep 2021 11:26)  POCT Blood Glucose.: 107 mg/dL (17 Sep 2021 09:11)    I&O's Summary    17 Sep 2021 07:01  -  18 Sep 2021 07:00  --------------------------------------------------------  IN: 0 mL / OUT: 1 mL / NET: -1 mL        PHYSICAL EXAM:  GENERAL: NAD, well-developed  HEAD:  Atraumatic, Normocephalic  EYES: EOMI, PERRLA, conjunctiva and sclera clear  NECK: Supple, No JVD  CHEST/LUNG: Clear to auscultation bilaterally; No wheeze  HEART: Regular rate and rhythm; No murmurs, rubs, or gallops  ABDOMEN: Soft, Nontender, Nondistended; Bowel sounds present  EXTREMITIES:  2+ Peripheral Pulses, No clubbing, cyanosis, or edema  PSYCH: AAOx3  NEUROLOGY: non-focal  SKIN: No rashes or lesions    LABS:                        11.2   5.14  )-----------( 287      ( 17 Sep 2021 06:44 )             37.3     09-17    142  |  110<H>  |  24<H>  ----------------------------<  128<H>  4.5   |  13<L>  |  1.22    Ca    9.7      17 Sep 2021 06:44  Phos  3.4     09-17  Mg     2.5     09-17    TPro  7.5  /  Alb  3.8  /  TBili  1.2  /  DBili  x   /  AST  36  /  ALT  33  /  AlkPhos  155<H>  09-17    PT/INR - ( 17 Sep 2021 06:44 )   PT: 23.8 sec;   INR: 2.05 ratio         PTT - ( 17 Sep 2021 06:44 )  PTT:27.3 sec

## 2021-09-18 NOTE — PROGRESS NOTE ADULT - PROBLEM SELECTOR PLAN 2
NICM (catheterization 02/2019 showed dRPL occlusion without ability to intervene). Echo (8/23/21) EF 12% with LV apical thrombus, follows with Dr. Read. s/p ICD (10/2019). Plan to start spironolactone shortly.  - c/w home entresto 24-25 mg qd, metoprolol succinate 25 mg qd  - new TTE EF 25%, improved from previous EF 12%  - will consider cardiology consult for GDMT  - QTc prolonged

## 2021-09-18 NOTE — PROGRESS NOTE ADULT - ATTENDING COMMENTS
Rebecca Mcmanus MD  Division of Hospital Medicine  [facility]   Pager: [phone]    Patient seen and examined today with Team 6 Resident. Agree with above findings, assessment, and plan with the following additions/exceptions:    #Chest Pain  #Dyspnea  #Elevated Cr  #PE  #Chronic Systolic HF  #Dysphagia    Plan:  - chest pain/dyspnea likely 2/2 to her known PE. CT Angio chest with unchanged prior PE  - Cr uptrending, could be 2/2 to JACQUELINE as did receive contrast study on 9/15. will monitor closely  - INR therapeutic, dose coumadin 4mg tonight  - passed MBS, diet advanced  - c/w metoprolol, entresto, and hydralazine for her known chronic systolic HF    Rest as detailed in note above.    Plan discussed with patient and Team 6 resident Dr. Ann.

## 2021-09-18 NOTE — PROGRESS NOTE ADULT - PROBLEM SELECTOR PLAN 3
- NICM (catheterization 02/2019 showed dRPL occlusion without ability to intervene). Echo (8/23/21) EF 12% with LV apical thrombus, follows with Dr. Read. s/p ICD (10/2019). Plan to start spironolactone shortly.  - c/w home entresto 24-25 mg qd, metoprolol succinate 25 mg qd  - new TTE EF 25%, improved from previous EF 12%  - will consider cardiology consult for GDMT  - QTc prolonged

## 2021-09-18 NOTE — PROGRESS NOTE ADULT - PROBLEM SELECTOR PLAN 6
- appreciate speech and swallow recs, pt currently at risk for aspiration, noted to have pharyngeal dysphagia,   - NPO for now  - MBS scheduled for AM to assess - appreciate speech and swallow recs, pt currently at risk for aspiration, noted to have pharyngeal dysphagia,   - passed MBS study, started on Regular Diet per Speech Recs

## 2021-09-19 ENCOUNTER — TRANSCRIPTION ENCOUNTER (OUTPATIENT)
Age: 66
End: 2021-09-19

## 2021-09-19 DIAGNOSIS — E03.9 HYPOTHYROIDISM, UNSPECIFIED: ICD-10-CM

## 2021-09-19 LAB
ALBUMIN SERPL ELPH-MCNC: 3.8 G/DL — SIGNIFICANT CHANGE UP (ref 3.3–5)
ALP SERPL-CCNC: 170 U/L — HIGH (ref 40–120)
ALT FLD-CCNC: 31 U/L — SIGNIFICANT CHANGE UP (ref 10–45)
ANION GAP SERPL CALC-SCNC: 18 MMOL/L — HIGH (ref 5–17)
APTT BLD: 26.8 SEC — LOW (ref 27.5–35.5)
AST SERPL-CCNC: 32 U/L — SIGNIFICANT CHANGE UP (ref 10–40)
BILIRUB SERPL-MCNC: 0.9 MG/DL — SIGNIFICANT CHANGE UP (ref 0.2–1.2)
BUN SERPL-MCNC: 26 MG/DL — HIGH (ref 7–23)
CALCIUM SERPL-MCNC: 9.4 MG/DL — SIGNIFICANT CHANGE UP (ref 8.4–10.5)
CHLORIDE SERPL-SCNC: 112 MMOL/L — HIGH (ref 96–108)
CO2 SERPL-SCNC: 14 MMOL/L — LOW (ref 22–31)
CREAT SERPL-MCNC: 1.19 MG/DL — SIGNIFICANT CHANGE UP (ref 0.5–1.3)
GLUCOSE SERPL-MCNC: 130 MG/DL — HIGH (ref 70–99)
HCT VFR BLD CALC: 37 % — SIGNIFICANT CHANGE UP (ref 34.5–45)
HGB BLD-MCNC: 11.7 G/DL — SIGNIFICANT CHANGE UP (ref 11.5–15.5)
INR BLD: 2.87 RATIO — HIGH (ref 0.88–1.16)
MAGNESIUM SERPL-MCNC: 1.9 MG/DL — SIGNIFICANT CHANGE UP (ref 1.6–2.6)
MCHC RBC-ENTMCNC: 31.4 PG — SIGNIFICANT CHANGE UP (ref 27–34)
MCHC RBC-ENTMCNC: 31.6 GM/DL — LOW (ref 32–36)
MCV RBC AUTO: 99.2 FL — SIGNIFICANT CHANGE UP (ref 80–100)
NRBC # BLD: 0 /100 WBCS — SIGNIFICANT CHANGE UP (ref 0–0)
PHOSPHATE SERPL-MCNC: 3.6 MG/DL — SIGNIFICANT CHANGE UP (ref 2.5–4.5)
PLATELET # BLD AUTO: 276 K/UL — SIGNIFICANT CHANGE UP (ref 150–400)
POTASSIUM SERPL-MCNC: 4.2 MMOL/L — SIGNIFICANT CHANGE UP (ref 3.5–5.3)
POTASSIUM SERPL-SCNC: 4.2 MMOL/L — SIGNIFICANT CHANGE UP (ref 3.5–5.3)
PROT SERPL-MCNC: 7 G/DL — SIGNIFICANT CHANGE UP (ref 6–8.3)
PROTHROM AB SERPL-ACNC: 32.8 SEC — HIGH (ref 10.6–13.6)
RBC # BLD: 3.73 M/UL — LOW (ref 3.8–5.2)
RBC # FLD: 16.4 % — HIGH (ref 10.3–14.5)
SARS-COV-2 RNA SPEC QL NAA+PROBE: SIGNIFICANT CHANGE UP
SODIUM SERPL-SCNC: 144 MMOL/L — SIGNIFICANT CHANGE UP (ref 135–145)
WBC # BLD: 4.65 K/UL — SIGNIFICANT CHANGE UP (ref 3.8–10.5)
WBC # FLD AUTO: 4.65 K/UL — SIGNIFICANT CHANGE UP (ref 3.8–10.5)

## 2021-09-19 PROCEDURE — 99232 SBSQ HOSP IP/OBS MODERATE 35: CPT | Mod: GC

## 2021-09-19 RX ORDER — MAGNESIUM SULFATE 500 MG/ML
1 VIAL (ML) INJECTION ONCE
Refills: 0 | Status: COMPLETED | OUTPATIENT
Start: 2021-09-19 | End: 2021-09-19

## 2021-09-19 RX ORDER — WARFARIN SODIUM 2.5 MG/1
3 TABLET ORAL ONCE
Refills: 0 | Status: COMPLETED | OUTPATIENT
Start: 2021-09-19 | End: 2021-09-19

## 2021-09-19 RX ORDER — LEVOTHYROXINE SODIUM 125 MCG
25 TABLET ORAL DAILY
Refills: 0 | Status: DISCONTINUED | OUTPATIENT
Start: 2021-09-19 | End: 2021-09-21

## 2021-09-19 RX ADMIN — Medication 25 MILLIGRAM(S): at 05:02

## 2021-09-19 RX ADMIN — ATORVASTATIN CALCIUM 40 MILLIGRAM(S): 80 TABLET, FILM COATED ORAL at 21:46

## 2021-09-19 RX ADMIN — Medication 10 MILLIGRAM(S): at 21:46

## 2021-09-19 RX ADMIN — Medication 100 GRAM(S): at 12:32

## 2021-09-19 RX ADMIN — SACUBITRIL AND VALSARTAN 1 TABLET(S): 24; 26 TABLET, FILM COATED ORAL at 17:29

## 2021-09-19 RX ADMIN — Medication 10 MILLIGRAM(S): at 14:12

## 2021-09-19 RX ADMIN — Medication 10 MILLIGRAM(S): at 05:02

## 2021-09-19 RX ADMIN — WARFARIN SODIUM 3 MILLIGRAM(S): 2.5 TABLET ORAL at 21:46

## 2021-09-19 RX ADMIN — WARFARIN SODIUM 4 MILLIGRAM(S): 2.5 TABLET ORAL at 00:30

## 2021-09-19 RX ADMIN — ANASTROZOLE 1 MILLIGRAM(S): 1 TABLET ORAL at 12:32

## 2021-09-19 RX ADMIN — Medication 3 MILLIGRAM(S): at 21:46

## 2021-09-19 RX ADMIN — SACUBITRIL AND VALSARTAN 1 TABLET(S): 24; 26 TABLET, FILM COATED ORAL at 05:02

## 2021-09-19 NOTE — DISCHARGE NOTE PROVIDER - NSDCCPCAREPLAN_GEN_ALL_CORE_FT
PRINCIPAL DISCHARGE DIAGNOSIS  Diagnosis: Chest pain  Assessment and Plan of Treatment: You were admitted to the Medicine service because you were having chest pain. The chest pain is most likely due to the chronic blood clots that were found in the blood vessels in your lungs. You are on warfarin 3 mg daily to thin your blood which will prevent the blood clots from getting bigger. Please follow up with your primary care doctor and Cardiologist and please have your bloodwork done to check your INR levels regularly.      SECONDARY DISCHARGE DIAGNOSES  Diagnosis: Decompensated heart failure  Assessment and Plan of Treatment: You had a diagnosis of Decompensated Heart Failure. Based on your recent echocardiogram, your heart function has improved compared to your previous echocardiogram. Please follow up with your Cardiologist to make sure that you are on the correct medication regimen.

## 2021-09-19 NOTE — PROGRESS NOTE ADULT - PROBLEM SELECTOR PLAN 5
Recent admission at [facility] for saddle embolism (9/3 discharge) on warfarin  - Currently continues to have submassive PE without cor pulmonale  - CTA showed b/l PEs with additional emboli bilaterally compared to old CTA  - increased warfarin dose from 3 mg to 4mg with INR 1.97 -> 2.05  - daily INR s/p chemo/XRT  - c/w home anastrozole 1 mg qd

## 2021-09-19 NOTE — DISCHARGE NOTE PROVIDER - NSFOLLOWUPCLINICS_GEN_ALL_ED_FT
[facility]  Neurology  [address] [place] [address] [address] [place] [address]  Phone: [phone]  Fax:

## 2021-09-19 NOTE — PROGRESS NOTE ADULT - PROBLEM SELECTOR PLAN 11
- DVT ppx: warfarin  - Diet: NPO pending S&S due to AMS  - Dispo: pending PT eval; needs HHA reintiated

## 2021-09-19 NOTE — PROGRESS NOTE ADULT - PROBLEM SELECTOR PLAN 1
Now Resolved; Etiologies include PE vs COPD exacerbation vs acute HF exacerbation   - CTA showed stable saddle embolism with resolving additional emboli bilaterally - continuing warfarin at increased dose due to subtherapeutic INR 1.97  - minimal supplemental O2 requirements - wean NC as tolerated to keep SpO2 > 92%  - MICU recommendations appreciated  - likely pain from b/l PE; did not endorse pain while receiving O2  - new TTE EF 25%, improved from previous EF 12%

## 2021-09-19 NOTE — PROGRESS NOTE ADULT - PROBLEM SELECTOR PLAN 4
s/p chemo/XRT  - c/w home anastrozole 1 mg qd -TSH elevated at 5.58, T4 normal at 1.5, T3 total mildly low at 76  -trial of synthroid 25 mg daily  -outpatient TFTs

## 2021-09-19 NOTE — DISCHARGE NOTE PROVIDER - NSDCFUSCHEDAPPT_GEN_ALL_CORE_FT
RISHABH HUNT ; 09/22/2021 ; NP Med [address]  RISHABH HUNT ; 09/28/2021 ; \[facility] [place] [facility]\"" Cardio [address] [facility]  RISHABH HUNT ; 09/29/2021 ; \[facility] [place] [facility]\"" Med GenInt [address] [facility] RISHABH HUNT ; 09/22/2021 ; [facility] Med [address]  RISHABH HNUT ; 09/28/2021 ; [facility] Cardio [address] [facility]  RISHABH HUNT ; 09/29/2021 ; [facility] Med GenInt [address] [facility]  RISHABH HUNT ; 12/20/2021 ; [facility] Cardio Electro [phone]

## 2021-09-19 NOTE — DISCHARGE NOTE PROVIDER - HOSPITAL COURSE
66 F with PMHx infrarenal AAA, cerebral aneurysm, R breast cancer (s/p chemo/XRT), CAD (ASCVD 43%), HTN, CHF/cardiomyopathy, (EF 12% on 8/23/21, s/p ICD), COPD, CVA, DVT (L leg 1994), T2DM (A1c 6.9), PE (s/p IVC filter 9/2020), fibroids (s/p hysterectomy), s/p cholecystectomy, saddle PE (w/ hospital admission 8/21 - 9/3/21 at [facility]) without acute cor pulmonale (on warfarin), obesity (class I), p/w chest pain. She went to [facility] yesterday for similar complaints and was discharged. Chest pain is worse with breathing, with associated nonproductive cough. She also endorses dizziness and fatigue. She denies SOB, abdominal pain, urinary symptoms, headache. She is confused but HD stable.  She was recently hospitalized at [facility] (discharged 9/3) for a saddle embolism and discharged on warfarin.      ED:  VS: afebrile, , /104, RR 20, SpO2 99% on 3L NC  Labs: BNP 18,742, , AST 44, pH VBG 7/4, SCr 1.09, Lactate 4.1  CTA: Saddle PE similar to 8/22/21 with additional resolving emboli bilaterally, stable atelectasis LL lobe  CTH: old infarcts R frontal and parietal lobes, stable 1.6 cm supraclinoid aneurysm  s/p hydralazine 5 mg x 1 and midazolam, versed     66 F with PMHx infrarenal AAA, cerebral aneurysm, R breast cancer (s/p chemo/XRT), CAD (ASCVD 43%), HTN, CHF/cardiomyopathy, (EF 12% on 8/23/21, s/p ICD), COPD, CVA, DVT (L leg 1994), T2DM (A1c 6.9), PE (s/p IVC filter 9/2020), fibroids (s/p hysterectomy), s/p cholecystectomy, saddle PE (w/ hospital admission 8/21 - 9/3/21 at [facility]) without acute cor pulmonale (on warfarin), obesity (class I), p/w chest pain. She went to [facility] yesterday for similar complaints and was discharged. Chest pain is worse with breathing, with associated nonproductive cough. She also endorses dizziness and fatigue. She denies SOB, abdominal pain, urinary symptoms, headache. She is confused but HD stable.  She was recently hospitalized at [facility] (discharged 9/3) for a saddle embolism and discharged on warfarin.      ED:  VS: afebrile, , /104, RR 20, SpO2 99% on 3L NC  Labs: BNP 18,742, , AST 44, pH VBG 7/4, SCr 1.09, Lactate 4.1  CTA: Saddle PE similar to 8/22/21 with additional resolving emboli bilaterally, stable atelectasis LL lobe  CTH: old infarcts R frontal and parietal lobes, stable 1.6 cm supraclinoid aneurysm  s/p hydralazine 5 mg x 1 and midazolam, versed    Patient was taken off 3L NC on medicine floors. Pt reported that her CP has resolved and was feeling fine.    Pt's TSH came back elevated at 5.58, total T3 slightly decreased at 76, given pt's deteriorating mental status, started trial of synthroid 25 mg.    Patient deemed medically stable for discharge.

## 2021-09-19 NOTE — DISCHARGE NOTE PROVIDER - NSDCMRMEDTOKEN_GEN_ALL_CORE_FT
acetaminophen 325 mg oral tablet: 2 tab(s) orally every 6 hours, As Needed for Pain  anastrozole 1 mg oral tablet: 1 tab(s) orally once a day  note: per pharmacy  ([facility]), last filled 6/23/21 for 30 day supply  Entresto 24 mg-26 mg oral tablet: 1 tab(s) orally 2 times a day  note: per pharmacy ([facility]), last filled 7/9/21 for 30 day supply  hydrALAZINE 10 mg oral tablet: 1.5 tab(s) orally every 8 hours   metoprolol succinate 25 mg oral tablet, extended release: 1 tab(s) orally once a day  warfarin 3 mg oral tablet: 1 tab(s) orally once a day  note: dispensed as a 7 day supply on 9/10/21   anastrozole 1 mg oral tablet: 1 tab(s) orally once a day  note: per pharmacy  ([facility]), last filled 6/23/21 for 30 day supply  Entresto 24 mg-26 mg oral tablet: 1 tab(s) orally 2 times a day  note: per pharmacy ([facility]), last filled 7/9/21 for 30 day supply  hydrALAZINE 10 mg oral tablet: 1.5 tab(s) orally every 8 hours   levothyroxine 25 mcg (0.025 mg) oral tablet: 1 tab(s) orally once a day  Lipitor 40 mg oral tablet: 1 tab(s) orally once a day (at bedtime)  metoprolol succinate 25 mg oral tablet, extended release: 1 tab(s) orally once a day  warfarin 3 mg oral tablet: 1 tab(s) orally once a day  note: dispensed as a 7 day supply on 9/10/21   anastrozole 1 mg oral tablet: 1 tab(s) orally once a day  note: per pharmacy  ([facility]), last filled 6/23/21 for 30 day supply  Entresto 24 mg-26 mg oral tablet: 1 tab(s) orally 2 times a day  note: per pharmacy ([facility]), last filled 7/9/21 for 30 day supply  hydrALAZINE 10 mg oral tablet: 1.5 tab(s) orally every 8 hours   levothyroxine 25 mcg (0.025 mg) oral tablet: 1 tab(s) orally once a day  Lipitor 40 mg oral tablet: 1 tab(s) orally once a day (at bedtime)  metoprolol succinate 25 mg oral tablet, extended release: 1 tab(s) orally once a day  warfarin 3 mg oral tablet: 1 tab(s) orally once a day   anastrozole 1 mg oral tablet: 1 tab(s) orally once a day  note: per pharmacy  ([facility]), last filled 6/23/21 for 30 day supply  Entresto 24 mg-26 mg oral tablet: 1 tab(s) orally 2 times a day  note: per pharmacy ([facility]), last filled 7/9/21 for 30 day supply  hydrALAZINE 10 mg oral tablet: 1.5 tab(s) orally every 8 hours   levothyroxine 25 mcg (0.025 mg) oral tablet: 1 tab(s) orally once a day  Lipitor 40 mg oral tablet: 1 tab(s) orally once a day (at bedtime)  metoprolol succinate 25 mg oral tablet, extended release: 1 tab(s) orally once a day  warfarin 2 mg oral tablet: 1 tab(s) orally every other day (at bedtime). Alternate with 3 mg. If taking 2 mg today, take 3 mg tomorrow.  warfarin 3 mg oral tablet: 1 tab(s) orally every other day (at bedtime)   Alternate with 2 mg. If taking 2 mg today, take 3 mg tomorrow.

## 2021-09-19 NOTE — PROGRESS NOTE ADULT - PROBLEM SELECTOR PLAN 10
- DVT ppx: warfarin  - Diet: NPO pending S&S due to AMS  - Dispo: pending PT eval; needs HHA reintiated - c/w atorvastatin

## 2021-09-19 NOTE — DISCHARGE NOTE PROVIDER - CARE PROVIDERS DIRECT ADDRESSES
[email or web address]

## 2021-09-19 NOTE — PROGRESS NOTE ADULT - SUBJECTIVE AND OBJECTIVE BOX
***************************************************************  Nakul Hodges, PGY1  Internal Medicine   pager: [phone]  ***************************************************************        MARILEE RISHABH  66y  Female      Patient is a 66y old  Female who presents with a chief complaint of Chest pain (18 Sep 2021 08:00)      INTERVAL HPI/OVERNIGHT EVENTS:       REVIEW OF SYSTEMS:  CONSTITUTIONAL: No fever, weight loss, or fatigue  EYES: No eye pain, visual disturbances, or discharge  ENMT:  No difficulty hearing, tinnitus, vertigo; No sinus or throat pain  NECK: No pain or stiffness  BREASTS: No pain, masses, or nipple discharge  RESPIRATORY: No cough, wheezing, chills or hemoptysis; No shortness of breath  CARDIOVASCULAR: No chest pain, palpitations, dizziness, or leg swelling  GASTROINTESTINAL: No abdominal or epigastric pain. No nausea, vomiting, or hematemesis; No diarrhea or constipation. No melena or hematochezia.  GENITOURINARY: No dysuria, frequency, hematuria, or incontinence  NEUROLOGICAL: No headaches, memory loss, loss of strength, numbness, or tremors  SKIN: No itching, burning, rashes, or lesions   LYMPH NODES: No enlarged glands  ENDOCRINE: No heat or cold intolerance; No hair loss  MUSCULOSKELETAL: No joint pain or swelling; No muscle, back, or extremity pain  PSYCHIATRIC: No depression, anxiety, mood swings, or difficulty sleeping  HEME/LYMPH: No easy bruising, or bleeding gums  ALLERY AND IMMUNOLOGIC: No hives or eczema  FAMILY HISTORY:  Family history of hypertension    Family history of diabetes mellitus    Family history of renal stone    Family history of glioblastoma    Family history of liver failure    History of hypertension  mother    Family history of glioblastoma (Sibling)    Family history of renal failure  uncle    Family history of other condition  please open box and see additional medical family history    Family history of glioblastoma      T(C): 36.4 (09-19-21 @ 04:00), Max: 36.7 (09-18-21 @ 07:54)  HR: 93 (09-19-21 @ 04:00) (86 - 95)  BP: 131/87 (09-19-21 @ 04:00) (129/93 - 139/95)  RR: 18 (09-19-21 @ 04:00) (18 - 18)  SpO2: 99% (09-19-21 @ 04:00) (95% - 99%)  Wt(kg): --Vital Signs Last 24 Hrs  T(C): 36.4 (19 Sep 2021 04:00), Max: 36.7 (18 Sep 2021 07:54)  T(F): 97.5 (19 Sep 2021 04:00), Max: 98.1 (18 Sep 2021 21:00)  HR: 93 (19 Sep 2021 04:00) (86 - 95)  BP: 131/87 (19 Sep 2021 04:00) (129/93 - 139/95)  BP(mean): --  RR: 18 (19 Sep 2021 04:00) (18 - 18)  SpO2: 99% (19 Sep 2021 04:00) (95% - 99%)  Advil (Hives; Vomiting)      PHYSICAL EXAM:  GENERAL: NAD, laying comfortably in bed  HEAD:  Atraumatic, Normocephalic  EYES: EOMI, PERRLA, conjunctiva and sclera clear  ENMT: No tonsillar erythema, exudates, or enlargement; Moist mucous membranes  NECK: Supple, No JVD  NERVOUS SYSTEM:  Alert & Oriented X3, Good concentration; Motor Strength 5/5 B/L upper and lower extremities;   CHEST/LUNG: Clear to auscultation bilaterally; No rales, rhonchi, wheezing, or rubs  HEART: Regular rate and rhythm; No murmurs, rubs, or gallops  ABDOMEN: Soft, Nontender, Nondistended; Bowel sounds present  EXTREMITIES:  No clubbing, cyanosis, or edema  LYMPH: No lymphadenopathy noted  SKIN: No rashes or lesions        LABS:                            11.7   4.65  )-----------( 261      ( 18 Sep 2021 12:15 )             37.5       09-18    140  |  109<H>  |  26<H>  ----------------------------<  120<H>  4.2   |  15<L>  |  1.33<H>    Ca    9.5      18 Sep 2021 12:15  Phos  3.6     09-18  Mg     2.2     09-18    TPro  7.5  /  Alb  3.9  /  TBili  0.9  /  DBili  x   /  AST  35  /  ALT  33  /  AlkPhos  162<H>  09-18                  PT/INR - ( 18 Sep 2021 12:15 )   PT: 27.0 sec;   INR: 2.34 ratio         PTT - ( 18 Sep 2021 12:15 )  PTT:26.4 sec          CAPILLARY BLOOD GLUCOSE      POCT Blood Glucose.: 125 mg/dL (18 Sep 2021 11:35)                RADIOLOGY & ADDITIONAL TESTS:      acetaminophen   Tablet .. 650 milliGRAM(s) Oral every 6 hours PRN  anastrozole 1 milliGRAM(s) Oral daily  atorvastatin 40 milliGRAM(s) Oral at bedtime  hydrALAZINE 10 milliGRAM(s) Oral every 8 hours  influenza   Vaccine 0.5 milliLiter(s) IntraMuscular once  melatonin 3 milliGRAM(s) Oral at bedtime  metoprolol succinate ER 25 milliGRAM(s) Oral daily  sacubitril 24 mG/valsartan 26 mG 1 Tablet(s) Oral two times a day      HEALTH ISSUES - PROBLEM Dx:  Chest pain    Decompensated heart failure    Breast cancer    Pulmonary embolism    Dysphagia    DM (diabetes mellitus)    Hypertension    Hyperlipidemia    Need for prophylactic measure    Pulmonary thromboembolism    Suspected deep vein thrombosis (DVT)    Chronic systolic congestive heart failure           ***************************************************************  Nakul Hodges, PGY1  Internal Medicine   pager: [phone]  ***************************************************************        RISHABH HUNT  66y  Female      Patient is a 66y old  Female who presents with a chief complaint of Chest pain (18 Sep 2021 08:00)      INTERVAL HPI/OVERNIGHT EVENTS: Pt had 10 wide complex beats overnight, asymptomatic, no CP/SOB, vitals stable, /93. Pt seen at bedside this morning, reports no complaints. Chest pain has much improved, pt describes having "loose stools" but reports that it is not diarrhea. Denies fever, CP, SOB, n/v/c, dysuria, abd pain.       REVIEW OF SYSTEMS:  CONSTITUTIONAL: No fever, weight loss, or fatigue  EYES: No eye pain, visual disturbances, or discharge  ENMT:  No difficulty hearing, tinnitus, vertigo; No sinus or throat pain  NECK: No pain or stiffness  BREASTS: No pain, masses, or nipple discharge  RESPIRATORY: No cough, wheezing, chills or hemoptysis; No shortness of breath  CARDIOVASCULAR: No chest pain, palpitations, dizziness, or leg swelling  GASTROINTESTINAL: No abdominal or epigastric pain. No nausea, vomiting, or hematemesis; No diarrhea or constipation. No melena or hematochezia.  GENITOURINARY: No dysuria, frequency, hematuria, or incontinence  NEUROLOGICAL: No headaches, memory loss, loss of strength, numbness, or tremors  SKIN: No itching, burning, rashes, or lesions   LYMPH NODES: No enlarged glands  ENDOCRINE: No heat or cold intolerance; No hair loss  MUSCULOSKELETAL: No joint pain or swelling; No muscle, back, or extremity pain  PSYCHIATRIC: No depression, anxiety, mood swings, or difficulty sleeping  HEME/LYMPH: No easy bruising, or bleeding gums  ALLERY AND IMMUNOLOGIC: No hives or eczema  FAMILY HISTORY:  Family history of hypertension    Family history of diabetes mellitus    Family history of renal stone    Family history of glioblastoma    Family history of liver failure    History of hypertension  mother    Family history of glioblastoma (Sibling)    Family history of renal failure  uncle    Family history of other condition  please open box and see additional medical family history    Family history of glioblastoma      T(C): 36.4 (09-19-21 @ 04:00), Max: 36.7 (09-18-21 @ 07:54)  HR: 93 (09-19-21 @ 04:00) (86 - 95)  BP: 131/87 (09-19-21 @ 04:00) (129/93 - 139/95)  RR: 18 (09-19-21 @ 04:00) (18 - 18)  SpO2: 99% (09-19-21 @ 04:00) (95% - 99%)  Wt(kg): --Vital Signs Last 24 Hrs  T(C): 36.4 (19 Sep 2021 04:00), Max: 36.7 (18 Sep 2021 07:54)  T(F): 97.5 (19 Sep 2021 04:00), Max: 98.1 (18 Sep 2021 21:00)  HR: 93 (19 Sep 2021 04:00) (86 - 95)  BP: 131/87 (19 Sep 2021 04:00) (129/93 - 139/95)  BP(mean): --  RR: 18 (19 Sep 2021 04:00) (18 - 18)  SpO2: 99% (19 Sep 2021 04:00) (95% - 99%)  Advil (Hives; Vomiting)      PHYSICAL EXAM:  GENERAL: NAD, laying comfortably in bed  HEAD:  Atraumatic, Normocephalic  EYES: EOMI, PERRLA, conjunctiva and sclera clear  ENMT: No tonsillar erythema, exudates, or enlargement; Moist mucous membranes  NECK: Supple  NERVOUS SYSTEM:  Alert & Oriented X3, Good concentration; Motor Strength 5/5 B/L upper and lower extremities;   CHEST/LUNG: Clear to auscultation bilaterally; No rales, rhonchi, wheezing, or rubs  HEART: Regular rate and rhythm; No murmurs, rubs, or gallops  ABDOMEN: Soft, Nontender, Nondistended; Bowel sounds present  EXTREMITIES:  No clubbing, cyanosis, or edema  LYMPH: No lymphadenopathy noted  SKIN: No rashes or lesions        LABS:                            11.7   4.65  )-----------( 261      ( 18 Sep 2021 12:15 )             37.5       09-18    140  |  109<H>  |  26<H>  ----------------------------<  120<H>  4.2   |  15<L>  |  1.33<H>    Ca    9.5      18 Sep 2021 12:15  Phos  3.6     09-18  Mg     2.2     09-18    TPro  7.5  /  Alb  3.9  /  TBili  0.9  /  DBili  x   /  AST  35  /  ALT  33  /  AlkPhos  162<H>  09-18                  PT/INR - ( 18 Sep 2021 12:15 )   PT: 27.0 sec;   INR: 2.34 ratio         PTT - ( 18 Sep 2021 12:15 )  PTT:26.4 sec          CAPILLARY BLOOD GLUCOSE      POCT Blood Glucose.: 125 mg/dL (18 Sep 2021 11:35)                RADIOLOGY & ADDITIONAL TESTS:      acetaminophen   Tablet .. 650 milliGRAM(s) Oral every 6 hours PRN  anastrozole 1 milliGRAM(s) Oral daily  atorvastatin 40 milliGRAM(s) Oral at bedtime  hydrALAZINE 10 milliGRAM(s) Oral every 8 hours  influenza   Vaccine 0.5 milliLiter(s) IntraMuscular once  melatonin 3 milliGRAM(s) Oral at bedtime  metoprolol succinate ER 25 milliGRAM(s) Oral daily  sacubitril 24 mG/valsartan 26 mG 1 Tablet(s) Oral two times a day      HEALTH ISSUES - PROBLEM Dx:  Chest pain    Decompensated heart failure    Breast cancer    Pulmonary embolism    Dysphagia    DM (diabetes mellitus)    Hypertension    Hyperlipidemia    Need for prophylactic measure    Pulmonary thromboembolism    Suspected deep vein thrombosis (DVT)    Chronic systolic congestive heart failure

## 2021-09-19 NOTE — PROGRESS NOTE ADULT - PROBLEM SELECTOR PLAN 7
- Not on any home meds, A1C 5.9 (2020) - appreciate speech and swallow recs, pt currently at risk for aspiration, noted to have pharyngeal dysphagia,   - passed MBS study, started on Regular Diet per Speech Recs

## 2021-09-19 NOTE — DISCHARGE NOTE PROVIDER - NSDCCPTREATMENT_GEN_ALL_CORE_FT
PRINCIPAL PROCEDURE  Procedure: CTA chest including image postprocessing  Findings and Treatment: IMPRESSION:  Central pulmonary embolus similar to 08/22/2021 with additional emboli bilaterally, decreased in prominence. Cardiomegaly. No evidence right heart strain.        SECONDARY PROCEDURE  Procedure: Follow-up echocardiogram  Findings and Treatment: Conclusions:  Severe left ventricular enlargement.  Severely reduced left ventricular systolic function.  Diffuse hypokinesis.  Severe functional mitral regurgitation.

## 2021-09-19 NOTE — PROGRESS NOTE ADULT - ATTENDING COMMENTS
Rebecca Mcmanus MD  Division of Hospital Medicine  [facility]   Pager: [phone]    Patient seen and examined today with Team 6 Resident and Intern. Agree with above findings, assessment, and plan with the following additions/exceptions:    #Chest Pain  #Dyspnea  #Elevated Cr, resolved  #PE  #Chronic Systolic HF  #Dysphagia    Plan:  - chest pain/dyspnea likely 2/2 to her known PE. CT Angio chest with unchanged prior PE  - Cr improved today without intervention  - INR therapeutic, dose coumadin 3mg tonight - will need to confirm has INR check/follow-up  - passed MBS, diet advanced  - c/w metoprolol, entresto, and hydralazine for her known chronic systolic HF  - started on low dose levothyroxine. will need repeat TFTs as outpatient  - had 10 beats NSVT, ensure K>4 and Mg>2. can consider uptitrating beta blocker if persists.    Rest as detailed in note above.    Plan discussed with patient and Team 6 resident Dr. Hodges.

## 2021-09-19 NOTE — DISCHARGE NOTE PROVIDER - PROVIDER TOKENS
PROVIDER:[TOKEN:[[id number]],SCHEDULEDAPPT:[09/28/2021],SCHEDULEDAPPTTIME:[01:00 PM],ESTABLISHEDPATIENT:[T]],PROVIDER:[TOKEN:[[id number]],SCHEDULEDAPPT:[09/29/2021],SCHEDULEDAPPTTIME:[02:20 PM],ESTABLISHEDPATIENT:[T]],PROVIDER:[[id number]],SCHEDULEDAPPT:[09/22/2021],SCHEDULEDAPPTTIME:[01:00 PM],ESTABLISHEDPATIENT:[T]] PROVIDER:[TOKEN:[[id number]],SCHEDULEDAPPT:[09/28/2021],SCHEDULEDAPPTTIME:[01:00 PM],ESTABLISHEDPATIENT:[T]],PROVIDER:[TOKEN:[[id number]],SCHEDULEDAPPT:[09/29/2021],SCHEDULEDAPPTTIME:[02:20 PM],ESTABLISHEDPATIENT:[T]],PROVIDER:[[id number]],SCHEDULEDAPPT:[09/22/2021],SCHEDULEDAPPTTIME:[01:00 PM],ESTABLISHEDPATIENT:[T]],PROVIDER:[TOKEN:[[id number]],FOLLOWUP:[2 weeks]]

## 2021-09-19 NOTE — PROGRESS NOTE ADULT - PROBLEM SELECTOR PLAN 6
- appreciate speech and swallow recs, pt currently at risk for aspiration, noted to have pharyngeal dysphagia,   - passed MBS study, started on Regular Diet per Speech Recs Recent admission at [facility] for saddle embolism (9/3 discharge) on warfarin  - Currently continues to have submassive PE without cor pulmonale  - CTA showed b/l PEs with additional emboli bilaterally compared to old CTA  - increased warfarin dose from 3 mg to 4mg with INR 1.97 -> 2.05  - daily INR

## 2021-09-20 ENCOUNTER — TRANSCRIPTION ENCOUNTER (OUTPATIENT)
Age: 66
End: 2021-09-20

## 2021-09-20 LAB
ALBUMIN SERPL ELPH-MCNC: 3.9 G/DL — SIGNIFICANT CHANGE UP (ref 3.3–5)
ALP SERPL-CCNC: 192 U/L — HIGH (ref 40–120)
ALT FLD-CCNC: 33 U/L — SIGNIFICANT CHANGE UP (ref 10–45)
ANION GAP SERPL CALC-SCNC: 19 MMOL/L — HIGH (ref 5–17)
ANISOCYTOSIS BLD QL: SIGNIFICANT CHANGE UP
APTT BLD: 29.2 SEC — SIGNIFICANT CHANGE UP (ref 27.5–35.5)
AST SERPL-CCNC: 32 U/L — SIGNIFICANT CHANGE UP (ref 10–40)
BASOPHILS # BLD AUTO: 0.12 K/UL — SIGNIFICANT CHANGE UP (ref 0–0.2)
BASOPHILS NFR BLD AUTO: 2.7 % — HIGH (ref 0–2)
BILIRUB SERPL-MCNC: 0.9 MG/DL — SIGNIFICANT CHANGE UP (ref 0.2–1.2)
BUN SERPL-MCNC: 26 MG/DL — HIGH (ref 7–23)
CALCIUM SERPL-MCNC: 9.3 MG/DL — SIGNIFICANT CHANGE UP (ref 8.4–10.5)
CHLORIDE SERPL-SCNC: 109 MMOL/L — HIGH (ref 96–108)
CO2 SERPL-SCNC: 15 MMOL/L — LOW (ref 22–31)
CREAT SERPL-MCNC: 1.33 MG/DL — HIGH (ref 0.5–1.3)
EOSINOPHIL # BLD AUTO: 0.12 K/UL — SIGNIFICANT CHANGE UP (ref 0–0.5)
EOSINOPHIL NFR BLD AUTO: 2.6 % — SIGNIFICANT CHANGE UP (ref 0–6)
GIANT PLATELETS BLD QL SMEAR: PRESENT — SIGNIFICANT CHANGE UP
GLUCOSE SERPL-MCNC: 118 MG/DL — HIGH (ref 70–99)
HCT VFR BLD CALC: 38.8 % — SIGNIFICANT CHANGE UP (ref 34.5–45)
HGB BLD-MCNC: 12 G/DL — SIGNIFICANT CHANGE UP (ref 11.5–15.5)
INR BLD: 3.22 RATIO — HIGH (ref 0.88–1.16)
LYMPHOCYTES # BLD AUTO: 1.14 K/UL — SIGNIFICANT CHANGE UP (ref 1–3.3)
LYMPHOCYTES # BLD AUTO: 24.8 % — SIGNIFICANT CHANGE UP (ref 13–44)
MACROCYTES BLD QL: SIGNIFICANT CHANGE UP
MAGNESIUM SERPL-MCNC: 2 MG/DL — SIGNIFICANT CHANGE UP (ref 1.6–2.6)
MANUAL SMEAR VERIFICATION: SIGNIFICANT CHANGE UP
MCHC RBC-ENTMCNC: 30.9 GM/DL — LOW (ref 32–36)
MCHC RBC-ENTMCNC: 31.4 PG — SIGNIFICANT CHANGE UP (ref 27–34)
MCV RBC AUTO: 101.6 FL — HIGH (ref 80–100)
MONOCYTES # BLD AUTO: 0.2 K/UL — SIGNIFICANT CHANGE UP (ref 0–0.9)
MONOCYTES NFR BLD AUTO: 4.4 % — SIGNIFICANT CHANGE UP (ref 2–14)
NEUTROPHILS # BLD AUTO: 2.97 K/UL — SIGNIFICANT CHANGE UP (ref 1.8–7.4)
NEUTROPHILS NFR BLD AUTO: 64.6 % — SIGNIFICANT CHANGE UP (ref 43–77)
NRBC # BLD: 2 /100 — HIGH (ref 0–0)
PHOSPHATE SERPL-MCNC: 3.4 MG/DL — SIGNIFICANT CHANGE UP (ref 2.5–4.5)
PLAT MORPH BLD: NORMAL — SIGNIFICANT CHANGE UP
PLATELET # BLD AUTO: 237 K/UL — SIGNIFICANT CHANGE UP (ref 150–400)
POIKILOCYTOSIS BLD QL AUTO: SIGNIFICANT CHANGE UP
POLYCHROMASIA BLD QL SMEAR: SLIGHT — SIGNIFICANT CHANGE UP
POTASSIUM SERPL-MCNC: 4.2 MMOL/L — SIGNIFICANT CHANGE UP (ref 3.5–5.3)
POTASSIUM SERPL-SCNC: 4.2 MMOL/L — SIGNIFICANT CHANGE UP (ref 3.5–5.3)
PROT SERPL-MCNC: 7.2 G/DL — SIGNIFICANT CHANGE UP (ref 6–8.3)
PROTHROM AB SERPL-ACNC: 36.5 SEC — HIGH (ref 10.6–13.6)
RBC # BLD: 3.82 M/UL — SIGNIFICANT CHANGE UP (ref 3.8–5.2)
RBC # FLD: 17 % — HIGH (ref 10.3–14.5)
RBC BLD AUTO: ABNORMAL
SODIUM SERPL-SCNC: 143 MMOL/L — SIGNIFICANT CHANGE UP (ref 135–145)
VARIANT LYMPHS # BLD: 0.9 % — SIGNIFICANT CHANGE UP (ref 0–6)
WBC # BLD: 4.6 K/UL — SIGNIFICANT CHANGE UP (ref 3.8–10.5)
WBC # FLD AUTO: 4.6 K/UL — SIGNIFICANT CHANGE UP (ref 3.8–10.5)

## 2021-09-20 PROCEDURE — 99233 SBSQ HOSP IP/OBS HIGH 50: CPT | Mod: GC

## 2021-09-20 RX ORDER — LEVOTHYROXINE SODIUM 125 MCG
1 TABLET ORAL
Qty: 0 | Refills: 0 | DISCHARGE
Start: 2021-09-20

## 2021-09-20 RX ORDER — ATORVASTATIN CALCIUM 80 MG/1
1 TABLET, FILM COATED ORAL
Qty: 0 | Refills: 0 | DISCHARGE
Start: 2021-09-20

## 2021-09-20 RX ORDER — WARFARIN SODIUM 2.5 MG/1
2 TABLET ORAL ONCE
Refills: 0 | Status: COMPLETED | OUTPATIENT
Start: 2021-09-20 | End: 2021-09-20

## 2021-09-20 RX ORDER — ACETAMINOPHEN 500 MG
2 TABLET ORAL
Qty: 0 | Refills: 0 | DISCHARGE

## 2021-09-20 RX ADMIN — Medication 25 MILLIGRAM(S): at 05:10

## 2021-09-20 RX ADMIN — Medication 10 MILLIGRAM(S): at 05:10

## 2021-09-20 RX ADMIN — Medication 10 MILLIGRAM(S): at 13:28

## 2021-09-20 RX ADMIN — Medication 3 MILLIGRAM(S): at 22:45

## 2021-09-20 RX ADMIN — Medication 650 MILLIGRAM(S): at 14:59

## 2021-09-20 RX ADMIN — Medication 10 MILLIGRAM(S): at 22:45

## 2021-09-20 RX ADMIN — Medication 25 MICROGRAM(S): at 07:44

## 2021-09-20 RX ADMIN — Medication 650 MILLIGRAM(S): at 14:58

## 2021-09-20 RX ADMIN — SACUBITRIL AND VALSARTAN 1 TABLET(S): 24; 26 TABLET, FILM COATED ORAL at 05:10

## 2021-09-20 RX ADMIN — ATORVASTATIN CALCIUM 40 MILLIGRAM(S): 80 TABLET, FILM COATED ORAL at 22:45

## 2021-09-20 RX ADMIN — ANASTROZOLE 1 MILLIGRAM(S): 1 TABLET ORAL at 13:28

## 2021-09-20 RX ADMIN — WARFARIN SODIUM 2 MILLIGRAM(S): 2.5 TABLET ORAL at 22:50

## 2021-09-20 RX ADMIN — SACUBITRIL AND VALSARTAN 1 TABLET(S): 24; 26 TABLET, FILM COATED ORAL at 17:56

## 2021-09-20 NOTE — PROGRESS NOTE ADULT - SUBJECTIVE AND OBJECTIVE BOX
***************************************************************  Nakul Hodges, PGY1  Internal Medicine   pager: [phone]  ***************************************************************        MARILEE RISHABH  66y  Female      Patient is a 66y old  Female who presents with a chief complaint of Chest pain (19 Sep 2021 08:42)      INTERVAL HPI/OVERNIGHT EVENTS:       REVIEW OF SYSTEMS:  CONSTITUTIONAL: No fever, weight loss, or fatigue  EYES: No eye pain, visual disturbances, or discharge  ENMT:  No difficulty hearing, tinnitus, vertigo; No sinus or throat pain  NECK: No pain or stiffness  BREASTS: No pain, masses, or nipple discharge  RESPIRATORY: No cough, wheezing, chills or hemoptysis; No shortness of breath  CARDIOVASCULAR: No chest pain, palpitations, dizziness, or leg swelling  GASTROINTESTINAL: No abdominal or epigastric pain. No nausea, vomiting, or hematemesis; No diarrhea or constipation. No melena or hematochezia.  GENITOURINARY: No dysuria, frequency, hematuria, or incontinence  NEUROLOGICAL: No headaches, memory loss, loss of strength, numbness, or tremors  SKIN: No itching, burning, rashes, or lesions   LYMPH NODES: No enlarged glands  ENDOCRINE: No heat or cold intolerance; No hair loss  MUSCULOSKELETAL: No joint pain or swelling; No muscle, back, or extremity pain  PSYCHIATRIC: No depression, anxiety, mood swings, or difficulty sleeping  HEME/LYMPH: No easy bruising, or bleeding gums  ALLERY AND IMMUNOLOGIC: No hives or eczema  FAMILY HISTORY:  Family history of hypertension    Family history of diabetes mellitus    Family history of renal stone    Family history of glioblastoma    Family history of liver failure    History of hypertension  mother    Family history of glioblastoma (Sibling)    Family history of renal failure  uncle    Family history of other condition  please open box and see additional medical family history    Family history of glioblastoma      T(C): 36.7 (09-20-21 @ 03:45), Max: 36.8 (09-19-21 @ 11:26)  HR: 92 (09-20-21 @ 06:45) (86 - 98)  BP: 135/98 (09-20-21 @ 06:45) (118/84 - 158/115)  RR: 18 (09-20-21 @ 06:45) (18 - 18)  SpO2: 97% (09-20-21 @ 06:45) (94% - 98%)  Wt(kg): --Vital Signs Last 24 Hrs  T(C): 36.7 (20 Sep 2021 03:45), Max: 36.8 (19 Sep 2021 11:26)  T(F): 98.1 (20 Sep 2021 03:45), Max: 98.3 (19 Sep 2021 11:26)  HR: 92 (20 Sep 2021 06:45) (86 - 98)  BP: 135/98 (20 Sep 2021 06:45) (118/84 - 158/115)  BP(mean): --  RR: 18 (20 Sep 2021 06:45) (18 - 18)  SpO2: 97% (20 Sep 2021 06:45) (94% - 98%)  Advil (Hives; Vomiting)      PHYSICAL EXAM:  GENERAL: NAD, laying comfortably in bed  HEAD:  Atraumatic, Normocephalic  EYES: EOMI, PERRLA, conjunctiva and sclera clear  ENMT: No tonsillar erythema, exudates, or enlargement; Moist mucous membranes  NECK: Supple, No JVD  NERVOUS SYSTEM:  Alert & Oriented X3, Good concentration; Motor Strength 5/5 B/L upper and lower extremities;   CHEST/LUNG: Clear to auscultation bilaterally; No rales, rhonchi, wheezing, or rubs  HEART: Regular rate and rhythm; No murmurs, rubs, or gallops  ABDOMEN: Soft, Nontender, Nondistended; Bowel sounds present  EXTREMITIES:  No clubbing, cyanosis, or edema  LYMPH: No lymphadenopathy noted  SKIN: No rashes or lesions        LABS:                            11.7   4.65  )-----------( 276      ( 19 Sep 2021 10:50 )             37.0       09-19    144  |  112<H>  |  26<H>  ----------------------------<  130<H>  4.2   |  14<L>  |  1.19    Ca    9.4      19 Sep 2021 10:50  Phos  3.6     09-19  Mg     1.9     09-19    TPro  7.0  /  Alb  3.8  /  TBili  0.9  /  DBili  x   /  AST  32  /  ALT  31  /  AlkPhos  170<H>  09-19                  PT/INR - ( 19 Sep 2021 10:50 )   PT: 32.8 sec;   INR: 2.87 ratio         PTT - ( 19 Sep 2021 10:50 )  PTT:26.8 sec          CAPILLARY BLOOD GLUCOSE      POCT Blood Glucose.: 125 mg/dL (18 Sep 2021 11:35)                RADIOLOGY & ADDITIONAL TESTS:      acetaminophen   Tablet .. 650 milliGRAM(s) Oral every 6 hours PRN  anastrozole 1 milliGRAM(s) Oral daily  atorvastatin 40 milliGRAM(s) Oral at bedtime  hydrALAZINE 10 milliGRAM(s) Oral every 8 hours  influenza   Vaccine 0.5 milliLiter(s) IntraMuscular once  levothyroxine 25 MICROGram(s) Oral daily  melatonin 3 milliGRAM(s) Oral at bedtime  metoprolol succinate ER 25 milliGRAM(s) Oral daily  sacubitril 24 mG/valsartan 26 mG 1 Tablet(s) Oral two times a day      HEALTH ISSUES - PROBLEM Dx:  Chest pain    Chronic systolic congestive heart failure    Decompensated heart failure    Hypothyroidism    Breast cancer    Pulmonary embolism    Dysphagia    DM (diabetes mellitus)    Hypertension    Hyperlipidemia    Need for prophylactic measure    Pulmonary thromboembolism    Suspected deep vein thrombosis (DVT)           ***************************************************************  Nakul Hodges, PGY1  Internal Medicine   pager: [phone]  ***************************************************************        RISHABH HUNT  66y  Female      Patient is a 66y old  Female who presents with a chief complaint of Chest pain (19 Sep 2021 08:42)      INTERVAL HPI/OVERNIGHT EVENTS: No acute events overnight. Pt seen at bedside, no complaints. Denied fever, chills, CP, SOB, n/v/c/d, dysuria.      REVIEW OF SYSTEMS:  CONSTITUTIONAL: No fever, weight loss, or fatigue  EYES: No eye pain, visual disturbances, or discharge  ENMT:  No difficulty hearing, tinnitus, vertigo; No sinus or throat pain  NECK: No pain or stiffness  BREASTS: No pain, masses, or nipple discharge  RESPIRATORY: No cough, wheezing, chills or hemoptysis; No shortness of breath  CARDIOVASCULAR: No chest pain, palpitations, dizziness, or leg swelling  GASTROINTESTINAL: No abdominal or epigastric pain. No nausea, vomiting, or hematemesis; No diarrhea or constipation. No melena or hematochezia.  GENITOURINARY: No dysuria, frequency, hematuria, or incontinence  NEUROLOGICAL: No headaches, memory loss, loss of strength, numbness, or tremors  SKIN: No itching, burning, rashes, or lesions   LYMPH NODES: No enlarged glands  ENDOCRINE: No heat or cold intolerance; No hair loss  MUSCULOSKELETAL: No joint pain or swelling; No muscle, back, or extremity pain  PSYCHIATRIC: No depression, anxiety, mood swings, or difficulty sleeping  HEME/LYMPH: No easy bruising, or bleeding gums  ALLERY AND IMMUNOLOGIC: No hives or eczema  FAMILY HISTORY:  Family history of hypertension    Family history of diabetes mellitus    Family history of renal stone    Family history of glioblastoma    Family history of liver failure    History of hypertension  mother    Family history of glioblastoma (Sibling)    Family history of renal failure  uncle    Family history of other condition  please open box and see additional medical family history    Family history of glioblastoma      T(C): 36.7 (09-20-21 @ 03:45), Max: 36.8 (09-19-21 @ 11:26)  HR: 92 (09-20-21 @ 06:45) (86 - 98)  BP: 135/98 (09-20-21 @ 06:45) (118/84 - 158/115)  RR: 18 (09-20-21 @ 06:45) (18 - 18)  SpO2: 97% (09-20-21 @ 06:45) (94% - 98%)  Wt(kg): --Vital Signs Last 24 Hrs  T(C): 36.7 (20 Sep 2021 03:45), Max: 36.8 (19 Sep 2021 11:26)  T(F): 98.1 (20 Sep 2021 03:45), Max: 98.3 (19 Sep 2021 11:26)  HR: 92 (20 Sep 2021 06:45) (86 - 98)  BP: 135/98 (20 Sep 2021 06:45) (118/84 - 158/115)  BP(mean): --  RR: 18 (20 Sep 2021 06:45) (18 - 18)  SpO2: 97% (20 Sep 2021 06:45) (94% - 98%)  Advil (Hives; Vomiting)      PHYSICAL EXAM:  GENERAL: NAD, laying comfortably in bed  HEAD:  Atraumatic, Normocephalic  EYES: EOMI, PERRLA, conjunctiva and sclera clear  ENMT: No tonsillar erythema, exudates, or enlargement; Moist mucous membranes  NECK: Supple, No JVD  NERVOUS SYSTEM:  Alert & Oriented X3, Good concentration; Motor Strength 5/5 B/L upper and lower extremities;   CHEST/LUNG: Clear to auscultation bilaterally; No rales, rhonchi, wheezing, or rubs  HEART: Regular rate and rhythm; No murmurs, rubs, or gallops  ABDOMEN: Soft, Nontender, Nondistended; Bowel sounds present  EXTREMITIES:  No clubbing, cyanosis, or edema  LYMPH: No lymphadenopathy noted  SKIN: No rashes or lesions        LABS:                            11.7   4.65  )-----------( 276      ( 19 Sep 2021 10:50 )             37.0       09-19    144  |  112<H>  |  26<H>  ----------------------------<  130<H>  4.2   |  14<L>  |  1.19    Ca    9.4      19 Sep 2021 10:50  Phos  3.6     09-19  Mg     1.9     09-19    TPro  7.0  /  Alb  3.8  /  TBili  0.9  /  DBili  x   /  AST  32  /  ALT  31  /  AlkPhos  170<H>  09-19                  PT/INR - ( 19 Sep 2021 10:50 )   PT: 32.8 sec;   INR: 2.87 ratio         PTT - ( 19 Sep 2021 10:50 )  PTT:26.8 sec          CAPILLARY BLOOD GLUCOSE      POCT Blood Glucose.: 125 mg/dL (18 Sep 2021 11:35)                RADIOLOGY & ADDITIONAL TESTS:      acetaminophen   Tablet .. 650 milliGRAM(s) Oral every 6 hours PRN  anastrozole 1 milliGRAM(s) Oral daily  atorvastatin 40 milliGRAM(s) Oral at bedtime  hydrALAZINE 10 milliGRAM(s) Oral every 8 hours  influenza   Vaccine 0.5 milliLiter(s) IntraMuscular once  levothyroxine 25 MICROGram(s) Oral daily  melatonin 3 milliGRAM(s) Oral at bedtime  metoprolol succinate ER 25 milliGRAM(s) Oral daily  sacubitril 24 mG/valsartan 26 mG 1 Tablet(s) Oral two times a day      HEALTH ISSUES - PROBLEM Dx:  Chest pain    Chronic systolic congestive heart failure    Decompensated heart failure    Hypothyroidism    Breast cancer    Pulmonary embolism    Dysphagia    DM (diabetes mellitus)    Hypertension    Hyperlipidemia    Need for prophylactic measure    Pulmonary thromboembolism    Suspected deep vein thrombosis (DVT)

## 2021-09-20 NOTE — PROVIDER CONTACT NOTE (OTHER) - BACKGROUND
Patient admitted with heart failure   DX: B/L PEs
Pt is
pt admitted for CHF exacerbation
Patient admit diagnosis: heart failure
Patient admitted with heart failure  DX : B/L PEs
Pt admitted for CHF exacerbation
Pt admitted with dx. PE

## 2021-09-20 NOTE — DISCHARGE NOTE NURSING/CASE MANAGEMENT/SOCIAL WORK - PATIENT PORTAL LINK FT
You can access the FollowMyHealth Patient Portal offered by [facility] by registering at the following website: [email or web address]. By joining [facility]’s FollowMyHealth portal, you will also be able to view your health information using other applications (apps) compatible with our system.

## 2021-09-20 NOTE — PROGRESS NOTE ADULT - PROBLEM SELECTOR PLAN 11
- DVT ppx: warfarin  - Diet: NPO pending S&S due to AMS  - Dispo: pending PT eval; needs HHA reintiated - DVT ppx: warfarin  - Diet: regular diet  - Dispo:  HHA reintiated, d/c tomorrow

## 2021-09-20 NOTE — PROVIDER CONTACT NOTE (OTHER) - NAME OF MD/NP/PA/DO NOTIFIED:
Nakul HUMPHREY
MD Nakul Hodges
Team 6 MD Cralton
Team 6 MD Hodges
Team 6 MD Mcghee
Team 6 MD guillen
Vahe Nguyen
Team 6 MD Mcghee

## 2021-09-20 NOTE — PROGRESS NOTE ADULT - ATTENDING COMMENTS
Patient was seen , sitting on the bed and offers no complaints.  She confirms to have home aid 24hours/7 days a week.  She is s/p MBS and now tolerates regular diet.  NO chest pain, no SOB.  Pt is noted to have PAT, WCT as times with no reported complaints.  Pt has AICD, electrolytes are within range .  Patient is on Metoprolol XL 25mg oral daily which can be increased as needed .  She will need to F/up with her Cardiologist , neurologist, PCP .  She will need to follow up with her PCP due to cognitive decline, she will need Help at home with her ADL.  Her INR is 3.22 and with no evidence of bleed , pt was on 3 mg at home and received 4 mg of warfarin X3 days and 3 mg X1 days .  Will give her coumadin 2 mg X1 and then restart her home dose of 3 mg .  repeat INR in 2 days at home .  DC time 45mns.  She will need repeat TSH in 6 weeks.              Florecita hCung  Hospitalist

## 2021-09-20 NOTE — PROVIDER CONTACT NOTE (OTHER) - RECOMMENDATIONS
Team 6 MD to be notified
MD made aware.
Team 6 MD to be notified.
Notify MD
Team 6 MD to be notified.

## 2021-09-20 NOTE — PROGRESS NOTE ADULT - PROBLEM SELECTOR PLAN 6
Recent admission at [facility] for saddle embolism (9/3 discharge) on warfarin  - Currently continues to have submassive PE without cor pulmonale  - CTA showed b/l PEs with additional emboli bilaterally compared to old CTA  - increased warfarin dose from 3 mg to 4mg with INR 1.97 -> 2.05  - daily INR Recent admission at [facility] for saddle embolism (9/3 discharge) on warfarin  - Currently continues to have submassive PE without cor pulmonale  - CTA showed b/l PEs with additional emboli bilaterally compared to old CTA  - decreased warfarin dose from 3 mg to 2 mg with INR  2.87-> 3.22  - daily INR

## 2021-09-20 NOTE — PROVIDER CONTACT NOTE (OTHER) - SITUATION
Tele even  10 beats of wide complex
Diastolic BP high, BP 140s over 100s
Pt w/ elevated bp 129/103mmHg
17 beats WCT
Pt had 17 runs of Vtach;
8 beats of wide complex tachycardia
Pt complaint of SOB
Tele event PAT X 3.1 sec HR up to 140

## 2021-09-20 NOTE — PROVIDER CONTACT NOTE (OTHER) - REASON
17 beats WCT
Tele Event
Tele event 8 Wide complex tachycardia
Diastolic BP high, BP 140s over 100s
Pt complaint of SOB
Tele event  10 beats of wide complex
Tele event PAT X 3.1 sec HR up to 140
Elevated BP

## 2021-09-20 NOTE — PROVIDER CONTACT NOTE (OTHER) - ASSESSMENT
Patient A&O3 Confused. Denies CP, SOB, Discomfort. /112 Teamp: 98.3  HR: 97  O2 sat 98 on RA. Patient A&O 1-2. Confused. Denies CP, SOB, Discomfort. /112 Teamp: 98.3  HR: 97  O2 sat 98 on RA.

## 2021-09-20 NOTE — PROGRESS NOTE ADULT - PROBLEM SELECTOR PLAN 7
- appreciate speech and swallow recs, pt currently at risk for aspiration, noted to have pharyngeal dysphagia,   - passed MBS study, started on Regular Diet per Speech Recs

## 2021-09-20 NOTE — PROVIDER CONTACT NOTE (OTHER) - DATE AND TIME:
20-Sep-2021 12:41
19-Sep-2021 08:24
20-Sep-2021 04:00
20-Sep-2021 15:18
16-Sep-2021 18:26
18-Sep-2021 21:08
20-Sep-2021 03:00
16-Sep-2021 13:52

## 2021-09-20 NOTE — PROVIDER CONTACT NOTE (OTHER) - ASSESSMENT
Patient AO 1-2. Confused. Patient able to make needs known. Denies CP, SOB, discomfort. BP: 158/115   HR: 87  RR 18  Spo2 96 on RA, Temp 98.6.

## 2021-09-20 NOTE — HISTORY OF PRESENT ILLNESS
[de-identified] : 66F with COPD/emphysema, DVT L leg 1994 with [place] IVC filter, HTN, HLD,\par infrarenal abd aneurysm, internal carotid aneurysm, depression, R breast cancer\par s/p chemo and radiation, cardiomyopathy, HFrEF with SICD, sickle cell trait,\par uterine fibroids s/p hysterectomy, s/p cholecystectomy, former smoker (quit 2\par years ago) her to follow up for hospitalization \par \par pt had saddle PE and b/l nonocclusive DVTs during this hospitalization; \par TTE shwoed no heart strain; \par was on heparin and now PO coumadin;\par denies of any excessive bleeding symptoms;\par \par \par labs doen in 09/03/2021 in hosptial with cbc and CMP grossly at baseline \par \par doing well; breathing at baseline per tp and her aide; no swelling \par \par \par \par ROS\par \par Constitutional:  no fever and no chills. \par Eyes:  no discharge and no pain. \par HEENT:  no earache and no hearing loss. \par Cardiovascular:  no chest pain, no palpitations and no leg claudication. \par Respiratory:  no shortness of breath, no wheezing and no cough. \par Gastrointestinal:  no abdominal pain, no nausea and no constipation. \par Genitourinary:  no dysuria and no incontinence. \par Musculoskeletal:  no joint pain, no joint stiffness and no joint swelling. \par Integumentary:  no itching and no mole changes. \par Neurological:  no headache, no dizziness and no fainting. \par Psychiatric:  not suicidal, no insomnia, no anxiety and no depression. \par \par Physical Exam\par \par Constitutional:   no acute distress, well nourished, well developed and well-appearing. \par Eyes:  normal sclera/conjunctiva, pupils equal round and reactive to light and extraocular movements intact. \par ENT:  the outer ears and nose were normal in appearance and the oropharynx was normal. \par Neck:  supple, no lymphadenopathy and the thyroid was normal and there were no nodules present. \par Pulmonary:  no respiratory distress, lungs were clear to auscultation bilaterally, no accessory muscle use. \par Cardiac:  normal rate, with a regular rhythm, normal S1 and S2 and no murmur heard. \par Vascular:  there was no peripheral edema. \par Abdomen:  abdomen soft, non-tender, non-distended, no abdominal mass palpated, no HSM and normal bowel sounds. \par Lymphatic:  no posterior cervical lymphadenopathy, no anterior cervical lymphadenopathy. \par Back:  no CVA tenderness and no spinal tenderness. \par Musculoskeletal: no joint swelling and grossly normal strength/tone. \par Skin:  no rash. \par Neurology:  normal gait, coordination grossly intact, no focal deficits and deep tendon reflexes were 2+ and symmetric. \par Psychiatric:  the affect was normal, oriented to person, place, and time and insight and judgment were intact.\par \par \par \par \par \par Hospital Course:\par Discharge Date 02-Sep-2021\par Admission Date 21-Aug-2021 13:51\par Reason for Admission SOB\par Hospital Course \par HPI:\par \par 66F with COPD/emphysema, DVT L leg 1994 with [place] IVC filter, HTN, HLD,\par infrarenal abd aneurysm, internal carotid aneurysm, depression, R breast cancer\par s/p chemo and radiation, cardiomyopathy, HFrEF with SICD, sickle cell trait,\par uterine fibroids s/p hysterectomy, s/p cholecystectomy, former smoker (quit 2\par years ago).\par \par Presents to ED with SOB [id number]. Describes it as pain in airways. Unable to\par describe when it happens and what exacerbates/alleviates it. Also endorses dry\par cough x3w, and no changes in sputum productions. Also endorses diarrhea for a\par few days, described as soft stools in the morning and sometimes in the PM after\par eating. Also endorses weight loss and chronic L knee pain. Denies\par fevers/chills, chest pain, palpitations, reflux, abd pain, nausea, emesis, leg\par swelling, and dysuria. Hasn't used alcohol since at least 10/2020.\par Reports transferring between nursing/rehab facilities since her last hospital\par discharge in 10/2020, most recently leaving [facility] on 7/31/21\par after ~4months. 6 months ago at a nursing rehab, she reports cutting out the\par meds that made her "sick" (ie nausea and emesis). Currently lives at home\par alone. Enjoys sitting in front of the TV all day. Has a walker that she doesn't\par use. Hired an aide who helps cook, wash her, but not with meds. PCP is \par Huyen Figueroa at [facility].\par \par ED course:\par \par BP 140s / 100s-110s.\par DDimer~7400H. ProBNP~58355J. HsenTnT 55H. HyperNa 147H. Cr 1.72H and eGFR\par 30-35L. Lactate and CBC unremarkable.\par S/p furosemide 20mg IV x1 (21 Aug 2021 14:24)\par \par Hospital course:\par \par Patient had CTA which showed saddle PE and b/l nonocclusive DVTs. CTA w/ no\par evidence of right heart strain. Echo performed with no right heart strain,\par although c/w LV apical thrombus. Interventional cardiology consulted, no\par intervention. Started on heparin gtt. Heme onc consulted for hypercoagulable\par workup. Heme recs: no genetic/functional thrombophilia, agree w/ heparin gtt\par bridge to warfarin. INR now stable 3.14, safe for discharge home with\par outpatient PCP followup and INR check scheduled for Tuesday 9/7. Patient will\par also need to follow up with Janis tang at [facility].\par \par \par \par Med Reconciliation:\par Override IMPROVE-DD recommendations due to: This is a surgical and/or\par non-medical patient.\par Recommended Post-Discharge VTE Prophylaxis This is a surgical and/or\par non-medical patient.\par Medication Reconciliation Status Admission Reconciliation is Completed\par Discharge Reconciliation is Completed\par Discharge Medications acetaminophen 325 mg oral tablet: 2 tab(s) orally every 6\par hours, As Needed for Pain\par anastrozole 1 mg oral tablet: 1 tab(s) orally once a day\par \par **Per pharmacy ([facility]), last filled 6/23/21 for 30 day supply\par Entresto 24 mg-26 mg oral tablet: 1 tab(s) orally 2 times a day\par \par **Per pharmacy ([facility]), last filled 7/9/21 for 30 day supply\par hydrALAZINE 10 mg oral tablet: 1.5 tab(s) orally every 8 hours\par hydrALAZINE 10 mg oral tablet: 1.5 tab(s) orally 3 times a day\par metoprolol succinate 25 mg oral tablet, extended release: 1 tab(s) orally once\par a day\par \par **Per [facility] pharmacy, metoprolol succinate is a new prescription filled on\par 8/17/21 for 90 day supply\par warfarin 3 mg oral tablet: 1 tab(s) orally once a day MDD:start on 9/2/21\par warfarin 3 mg oral tablet: 1 tab(s) orally once a day\par ,\par ,\par \par Care Plan/Procedures:\par Discharge Diagnoses, Assessment and Plan of Treatment PRINCIPAL DISCHARGE\par DIAGNOSIS\par Diagnosis: VTE (venous thromboembolism)\par Assessment and Plan of Treatment: You have a big clot in the blood vessels in\par your lungs, which probably came from the clots in the veins in both legs. The\par clot in your lung has not damaged your heart, and the oxygen in your blood has\par remained normal.\par People who are not active are more likely to get clots. People with cancer are\par more likely to get clots, but imaging did not see active cancer in your body.\par Certain families are more likely to get clots; please make an appointment with\par Hematology (see phone in later pages) to discuss blood test results.\par You will need life-long blood thinners ("warfarin") to prevent future clots and\par to prevent these clots from getting worse. The doctor will check your blood\par often to adjust the warfarin amount. Please do to stop your medicines without\par asking your doctor first.\par \par \par SECONDARY DISCHARGE DIAGNOSES\par Diagnosis: Thrombus\par Assessment and Plan of Treatment: An ultrasound of your heart found a clot. The\par clot may be because your heart has difficulty pumping blood out.\par You will need life-long blood thinners ("warfarin") to prevent future clots and\par to prevent these clots from getting worse. The doctor will check your blood\par often to adjust the warfarin amount. Please do to stop your medicines without\par asking your doctor first.\par \par Diagnosis: Shortness of breath\par Assessment and Plan of Treatment: Your difficulty breathing is probably because\par of the clot in your lungs, on top of your history of COPD and heart failure.\par Your oxygen level should be 88-92% because of COPD, so 100% is actually too\par high.\par \par Diagnosis: High alkaline phosphatase\par Assessment and Plan of Treatment: Your alkaline phosphatase kept increasing.\par This is probably from a biliary problem, despite you not having a gallbladder.\par An ultrasound of your right upper abdomen was normal. Ask your regular doctor\par to check your alkaline phosphatase and bilirubin.\par \par Goal(s) To get better and follow your care plan as instructed.\par \par Follow Up:\par Care Providers for Follow up (PCP/Outpatient Provider) Cuba Guerrier)\par Cardiology; Internal Medicine\par [phone] [place] [address], [address]\[place] [address]\par Phone: [phone]\par Fax: [phone]\par Follow Up Time:\par Follow-up Clinics [facility] - Primary Care\par Primary Care\par [address] [facility]\[place] [address]\par Phone: [phone]\par Fax:\par Scheduled Appointment: 9/7/2021 11:00 AM\par \par [facility]\par Hematology/Oncology\par [address] [place] [address]\[place] [address]\par Phone: [phone]\par Fax:\par Follow Up Time: 2 weeks\par Patient's Scheduled Appointments RISHABH HUNT ; 09/07/2021 ; NPP Med\par [facility] [address]\par RISHABH HUNT ; 09/13/2021 ; NPP Cardio Electro [address] [facility]\par RISHABH HUNT ; 09/28/2021 ; NPP Cardio [address] [facility]\par Additional Scheduled Appointments Your appointment on 9/7/21 (see above) is\par with Dr. Haas.\par \par Please make an appointment with Dr. Huyen Figueroa in Sept 2021 to check a blood\par test called "INR" regularly to adjust the amount of your warfarin.\par \par Please call [phone] to make an appointment in Sept/Oct 2021 with a\par Hematologist (blood clot doctor) at [facility] at [address]\par Entr[address], [place] [address].\par \par Please call [phone] (see above) to make an appointment with a Cardiologist\par at [facility] [place] [id number].\par Activity Do not drive or operate machinery, No heavy lifting/straining\par Diet Instructions eat less salt, fat, fast foods, and fried foods\par \par Quality Measures:\par Patient Condition Stable\par Hospice Patient No\par Does the patient have difficulty running errands alone like visiting a doctors\par office or shopping? Yes\par Does the patient have difficulty climbing stairs? Yes\par Cognition: The patient has Difficulty concentrating Difficulty remembering\par Does the patient have a principal diagnosis of ischemic stroke, hemorrhagic\par stroke, or TIA? No\par Does the patient have a principal diagnosis of Acute Myocardial Infarction? No\par Has the patient had a Percutaneous Coronary Intervention? No\par \par Home Health:\par Discharged with Home Health Care Services? Yes\par Face-To-Face Contact As certified below, I, or a nurse practitioner or\par physician assistant working with me, had a face-to-face encounter that meets\par the physician face-to-face encounter requirements.\par Need for Skilled Services Medication teaching and assessment Observation and\par assessment Rehabilitation services Teaching and training\par Based on the above findings, the following intermittent skilled services are\par medically necessary home health services: Nursing Occupational therapy\par Physical therapy\par Home Bound Status Ataxic gait Shortness of breath with minimal ambulation\par Fall risk\par Patient Needs Assistance to Leave Residence...\par Attending Certification My signature below certifies that the above stated\par patient is homebound and upon completion of the Face-To-Face encounter, has the\par need for intermittent skilled nursing, physical therapy and/or speech or\par occupational therapy services in their home for their current diagnosis as\par outlined in their initial plan of care. These services will continue to be\par monitored by myself or another physician.\par Encounter Date 27-Aug-2021\par \par Document Complete:\par Physician Section Complete This document is complete and the patient is ready\par for discharge.\par For questions about your prescriptions, please call: [phone]\par Care Provider Seen in Hospital [facility] Team 5\par Is this contact telephone number correct? Yes\par \par \par \par Electronic Signatures:\par Randall Mead) (Signed 01-Sep-2021 12:54)\par 	Authored: Hospital Course, Follow Up\par Bisi Velásquez) (Signed 02-Sep-2021 09:17)\par 	Authored: Hospital Course, Med Reconciliation, Care Plan/Procedures, Follow\par Up, Quality Measures, Home Health, Document Complete\par Luis Carlos Carrillo (DO) (Signed 01-Sep-2021 10:31)\par 	Co-Signer: Hospital Course, Med Reconciliation, Care Plan/Procedures, Follow\par Up, Quality Measures, Home Health, Document Complete\par \par \par Last Updated: 02-Sep-2021 09:17 by Bisi Velásquez)

## 2021-09-20 NOTE — PROGRESS NOTE ADULT - PROBLEM SELECTOR PLAN 4
-TSH elevated at 5.58, T4 normal at 1.5, T3 total mildly low at 76  -trial of synthroid 25 mg daily  -outpatient TFTs

## 2021-09-20 NOTE — PROVIDER CONTACT NOTE (OTHER) - ACTION/TREATMENT ORDERED:
provider aware; continue to monitor;
Continue to monitor
MD will assess pt at bedside
Team 6 MD Mcghee Notified and attended to the patient at the bedside.  Orders received to give AM medications hydralazine, metropolol and entresto. Will continue to monitor.
Team 6 MD Mcghee notified. Orders pending. Will continue to monitor.
MD aware; continue to monitor
MD made aware, continue to monitor pt closely.
Team 6 MD Goff notified. Continue monitoring.

## 2021-09-21 VITALS
RESPIRATION RATE: 18 BRPM | DIASTOLIC BLOOD PRESSURE: 97 MMHG | SYSTOLIC BLOOD PRESSURE: 138 MMHG | OXYGEN SATURATION: 98 % | TEMPERATURE: 98 F | HEART RATE: 91 BPM

## 2021-09-21 LAB
ANION GAP SERPL CALC-SCNC: 21 MMOL/L — HIGH (ref 5–17)
BUN SERPL-MCNC: 30 MG/DL — HIGH (ref 7–23)
CALCIUM SERPL-MCNC: 9.8 MG/DL — SIGNIFICANT CHANGE UP (ref 8.4–10.5)
CHLORIDE SERPL-SCNC: 109 MMOL/L — HIGH (ref 96–108)
CO2 SERPL-SCNC: 12 MMOL/L — LOW (ref 22–31)
CREAT SERPL-MCNC: 1.34 MG/DL — HIGH (ref 0.5–1.3)
GLUCOSE SERPL-MCNC: 117 MG/DL — HIGH (ref 70–99)
HCT VFR BLD CALC: 40.7 % — SIGNIFICANT CHANGE UP (ref 34.5–45)
HGB BLD-MCNC: 12.6 G/DL — SIGNIFICANT CHANGE UP (ref 11.5–15.5)
MAGNESIUM SERPL-MCNC: 1.9 MG/DL — SIGNIFICANT CHANGE UP (ref 1.6–2.6)
MCHC RBC-ENTMCNC: 30.3 PG — SIGNIFICANT CHANGE UP (ref 27–34)
MCHC RBC-ENTMCNC: 31 GM/DL — LOW (ref 32–36)
MCV RBC AUTO: 97.8 FL — SIGNIFICANT CHANGE UP (ref 80–100)
NRBC # BLD: 0 /100 WBCS — SIGNIFICANT CHANGE UP (ref 0–0)
PHOSPHATE SERPL-MCNC: 3.8 MG/DL — SIGNIFICANT CHANGE UP (ref 2.5–4.5)
PLATELET # BLD AUTO: 288 K/UL — SIGNIFICANT CHANGE UP (ref 150–400)
POTASSIUM SERPL-MCNC: 4.5 MMOL/L — SIGNIFICANT CHANGE UP (ref 3.5–5.3)
POTASSIUM SERPL-SCNC: 4.5 MMOL/L — SIGNIFICANT CHANGE UP (ref 3.5–5.3)
RBC # BLD: 4.16 M/UL — SIGNIFICANT CHANGE UP (ref 3.8–5.2)
RBC # FLD: 17.2 % — HIGH (ref 10.3–14.5)
SODIUM SERPL-SCNC: 142 MMOL/L — SIGNIFICANT CHANGE UP (ref 135–145)
WBC # BLD: 5.52 K/UL — SIGNIFICANT CHANGE UP (ref 3.8–10.5)
WBC # FLD AUTO: 5.52 K/UL — SIGNIFICANT CHANGE UP (ref 3.8–10.5)

## 2021-09-21 PROCEDURE — 0225U NFCT DS DNA&RNA 21 SARSCOV2: CPT

## 2021-09-21 PROCEDURE — 71045 X-RAY EXAM CHEST 1 VIEW: CPT

## 2021-09-21 PROCEDURE — 80053 COMPREHEN METABOLIC PANEL: CPT

## 2021-09-21 PROCEDURE — 82565 ASSAY OF CREATININE: CPT

## 2021-09-21 PROCEDURE — 85014 HEMATOCRIT: CPT

## 2021-09-21 PROCEDURE — 82435 ASSAY OF BLOOD CHLORIDE: CPT

## 2021-09-21 PROCEDURE — 86769 SARS-COV-2 COVID-19 ANTIBODY: CPT

## 2021-09-21 PROCEDURE — U0005: CPT

## 2021-09-21 PROCEDURE — U0003: CPT

## 2021-09-21 PROCEDURE — 74230 X-RAY XM SWLNG FUNCJ C+: CPT

## 2021-09-21 PROCEDURE — 99285 EMERGENCY DEPT VISIT HI MDM: CPT

## 2021-09-21 PROCEDURE — 82803 BLOOD GASES ANY COMBINATION: CPT

## 2021-09-21 PROCEDURE — 84295 ASSAY OF SERUM SODIUM: CPT

## 2021-09-21 PROCEDURE — 86140 C-REACTIVE PROTEIN: CPT

## 2021-09-21 PROCEDURE — 83605 ASSAY OF LACTIC ACID: CPT

## 2021-09-21 PROCEDURE — 97530 THERAPEUTIC ACTIVITIES: CPT

## 2021-09-21 PROCEDURE — 86780 TREPONEMA PALLIDUM: CPT

## 2021-09-21 PROCEDURE — 82947 ASSAY GLUCOSE BLOOD QUANT: CPT

## 2021-09-21 PROCEDURE — 85652 RBC SED RATE AUTOMATED: CPT

## 2021-09-21 PROCEDURE — 92611 MOTION FLUOROSCOPY/SWALLOW: CPT

## 2021-09-21 PROCEDURE — 85027 COMPLETE CBC AUTOMATED: CPT

## 2021-09-21 PROCEDURE — 85610 PROTHROMBIN TIME: CPT

## 2021-09-21 PROCEDURE — 97116 GAIT TRAINING THERAPY: CPT

## 2021-09-21 PROCEDURE — 84439 ASSAY OF FREE THYROXINE: CPT

## 2021-09-21 PROCEDURE — 84132 ASSAY OF SERUM POTASSIUM: CPT

## 2021-09-21 PROCEDURE — 99283 EMERGENCY DEPT VISIT LOW MDM: CPT

## 2021-09-21 PROCEDURE — 82962 GLUCOSE BLOOD TEST: CPT

## 2021-09-21 PROCEDURE — 84484 ASSAY OF TROPONIN QUANT: CPT

## 2021-09-21 PROCEDURE — 83880 ASSAY OF NATRIURETIC PEPTIDE: CPT

## 2021-09-21 PROCEDURE — 85025 COMPLETE CBC W/AUTO DIFF WBC: CPT

## 2021-09-21 PROCEDURE — 84443 ASSAY THYROID STIM HORMONE: CPT

## 2021-09-21 PROCEDURE — 93306 TTE W/DOPPLER COMPLETE: CPT

## 2021-09-21 PROCEDURE — 99239 HOSP IP/OBS DSCHRG MGMT >30: CPT

## 2021-09-21 PROCEDURE — 70450 CT HEAD/BRAIN W/O DYE: CPT | Mod: MA

## 2021-09-21 PROCEDURE — 92610 EVALUATE SWALLOWING FUNCTION: CPT

## 2021-09-21 PROCEDURE — 71275 CT ANGIOGRAPHY CHEST: CPT | Mod: MA

## 2021-09-21 PROCEDURE — 80048 BASIC METABOLIC PNL TOTAL CA: CPT

## 2021-09-21 PROCEDURE — 85018 HEMOGLOBIN: CPT

## 2021-09-21 PROCEDURE — 84480 ASSAY TRIIODOTHYRONINE (T3): CPT

## 2021-09-21 PROCEDURE — 84100 ASSAY OF PHOSPHORUS: CPT

## 2021-09-21 PROCEDURE — 92526 ORAL FUNCTION THERAPY: CPT

## 2021-09-21 PROCEDURE — 93005 ELECTROCARDIOGRAM TRACING: CPT

## 2021-09-21 PROCEDURE — 85730 THROMBOPLASTIN TIME PARTIAL: CPT

## 2021-09-21 PROCEDURE — 82330 ASSAY OF CALCIUM: CPT

## 2021-09-21 PROCEDURE — 97162 PT EVAL MOD COMPLEX 30 MIN: CPT

## 2021-09-21 PROCEDURE — 83735 ASSAY OF MAGNESIUM: CPT

## 2021-09-21 RX ORDER — WARFARIN SODIUM 2.5 MG/1
1 TABLET ORAL
Qty: 4 | Refills: 0
Start: 2021-09-21 | End: 2021-09-27

## 2021-09-21 RX ORDER — WARFARIN SODIUM 2.5 MG/1
1 TABLET ORAL
Qty: 7 | Refills: 0
Start: 2021-09-21 | End: 2021-09-27

## 2021-09-21 RX ADMIN — Medication 25 MILLIGRAM(S): at 05:23

## 2021-09-21 RX ADMIN — Medication 10 MILLIGRAM(S): at 05:22

## 2021-09-21 RX ADMIN — SACUBITRIL AND VALSARTAN 1 TABLET(S): 24; 26 TABLET, FILM COATED ORAL at 05:23

## 2021-09-21 RX ADMIN — Medication 25 MICROGRAM(S): at 05:22

## 2021-09-21 NOTE — PROGRESS NOTE ADULT - PROBLEM SELECTOR PLAN 2
NICM (catheterization 02/2019 showed dRPL occlusion without ability to intervene). Echo (8/23/21) EF 12% with LV apical thrombus, follows with Dr. Read. s/p ICD (10/2019). Plan to start spironolactone shortly.  - c/w home entresto 24-25 mg qd, metoprolol succinate 25 mg qd  - new TTE EF 25%, improved from previous EF 12%  - will consider cardiology consult for GDMT  - QTc prolonged NICM (catheterization 02/2019 showed dRPL occlusion without ability to intervene). Echo (8/23/21) EF 12% with LV apical thrombus, follows with Dr. Read. s/p ICD (10/2019). Plan to start spironolactone shortly.  - c/w home entresto 24-25 mg qd, metoprolol succinate 25 mg qd  - new TTE EF 25%, improved from previous EF 12%  - outpt cardio for follow up  - QTc prolonged

## 2021-09-21 NOTE — CHART NOTE - NSCHARTNOTEFT_GEN_A_CORE
Called by nursing as patient was noted to have PAT for 3.1s up to 140bpm and 8 beats of wide complex. Patient's diastolic bp was also elevated with BP measurement of 148/113. Went to assess patient. Patient was AOx2 to person and time. She thought that she was in her apartment and that several strangers (likely PCAs and nurses) kept coming into her house and bothering her. Patient is 67yo and has been in hospital for 5+ days now. Possible delirium. Attempted to reorient patient as she was very agitated. Instructed nursing to administer BP medication 30min-1hr earlier than scheduled. Will follow up BP measurement in 1 hr. Patient denied chest pain, shortness of breath. She was sating well on 3L NC, which she has been on all night, with no increased work of breathing.     Yamil Mcghee  PGY1
RD CHF education chart note    Patient was visited by RD for CHF education. The pt is confused, inappropriate for diet education. Pt also currently is being discharged home with HHA. Placed phone call to HCP Jaki (pt's cousin) to notify her of nutrition information placed in pt's discharge bag with her paper work, as pt should be following a low sodium diet at home. Pt's cousin does not live with pt, pt has live-in HHA. Jaki is aware if she or HHA should have questions, they may contact RD.    Provided handouts on heart failure nutrition therapy, reading heart healthy nutrition labels, heart healthy shopping tips and low sodium food list. RD contact information left with patient for any future questioning.    Tanya Jha RD, CDN. Pager: [phone]
66Fwith PMHx infrarenal AAA, cerebral aneurysm, R breast cancer (s/p chemo/XRT), CAD (ASCVD 43%), HTN, CHF/cardiomyopathy, (EF 12% on 8/23/21, s/p ICD), COPD, CVA, DVT (L leg 1994), T2DM (A1c 6.9), PE (s/p IVC filter 9/2020), fibroids (s/p hysterectomy), s/p cholecystectomy, saddle PE (w/ hospital admission 8/21 - 9/3/21 at [facility]) without acute cor pulmonale (on warfarin), obesity (class I), p/w chest pain. She went to [facility] yesterday for similar complaints and was discharged. Chest pain is worse with breathing, with associated nonproductive cough. She also endorses dizziness and fatigue. She denies SOB, abdominal pain, urinary symptoms, headache. She is confused but HD stable.  She was recently hospitalized at [facility] (discharged 9/3) for a saddle embolism and discharged on warfarin. Chest pain likely 2/2 PE vs COPD exacerbation vs acute on chronic HF.  CT head: IMPRESSION:   moderate periventricular and deep white matter ischemia with old infarctions in the RIGHT frontal and parietal lobes. Tiny old lacunar infarction in the RIGHT caudate nucleus. Stable 1.6 cm RIGHT supraclinoid aneurysm. CT chest: no evidence of PNA.    This patient is known to this service, seen for initial bedside swallow evaluation 9/16 with recommendations for NPO. Patient was encountered sitting upright in bed. Oral motor examination notable for left sided facial weakness (buccal and labial) with mild dysarthria noted and harsh vocal quality with intermittent dysphonia. Patient endorsed that her speech and voice were at baseline. Patient AA0x2. Patient accepted PO trials of ice chips x2. Prolonged mastication and oral stage across both trials, with suspected piecemeal deglutition. Patient with overt s/s penetration/aspiration as seen by slight change in vocal quality x1/2 trials, with slight increase in respiratory effort following po trials. Patient further endorsed feeling akin to "suffocating" following PO trials similar to the feeling she was having in the ED, further trials deferred.     Impressions: Patient with an oral and suspected pharyngeal dysphagia.     Recommendations:  1. NPO, with non-oral nutrition/hydration/medications.   2. Maintain/Continue good oral care  3. MBS for objective assessment of swallow function  4. This service to continue to follow  5. Strict aspiration and reflux precautions for secretions and enteral feeds if initiated!    D/w: Team 6 Nakul ([phone])    Jennifer España MS CCC-SLP, CBIS #[id number]  Speech Language Pathologist

## 2021-09-21 NOTE — PROGRESS NOTE ADULT - PROBLEM SELECTOR PLAN 1
Now Resolved; Etiologies include PE vs COPD exacerbation vs acute HF exacerbation   - CTA showed stable saddle embolism with resolving additional emboli bilaterally - continuing warfarin at increased dose due to subtherapeutic INR 1.97  - minimal supplemental O2 requirements - wean NC as tolerated to keep SpO2 > 92%  - MICU recommendations appreciated  - likely pain from b/l PE; did not endorse pain while receiving O2  - new TTE EF 25%, improved from previous EF 12% Now Resolved; Etiologies include PE vs COPD exacerbation vs acute HF exacerbation   - CTA showed stable saddle embolism with resolving additional emboli bilaterally - continuing warfarin at increased dose due to subtherapeutic INR 1.97  - minimal supplemental O2 requirements - wean NC as tolerated to keep SpO2 > 92%  - MICU recommendations appreciated  - likely pain from b/l PE; did not endorse pain while receiving O2  - new TTE EF 25%, improved from previous EF 12%  - RESOLVED

## 2021-09-21 NOTE — PROGRESS NOTE ADULT - ATTENDING COMMENTS
Plan is to DC to home today with 24 hours/7days/week HHA.  She will need to have close monitoring of the INR at home ( needs to be done on 9/22) , she is to f/up with PCP, Neuro, Cardio once DC .  CLose monitoring with HHA at home           Florecita Chung   Hospitalist  [phone] Plan is to DC to home today with 24 hours/7days/week HHA.  She will need to have close monitoring of the INR at home ( needs to be done on 9/22) , she is to f/up with PCP, Neuro, Cardio once DC .  CLose monitoring with HHA at home .  DC time 40mns          Florecita Chung   Hospitalist  [phone]

## 2021-09-21 NOTE — PROGRESS NOTE ADULT - ASSESSMENT
66Fwith PMHx infrarenal AAA, cerebral aneurysm, R breast cancer (s/p chemo/XRT), CAD (ASCVD 43%), HTN, CHF/cardiomyopathy, (EF 12% on 8/23/21, s/p ICD), COPD, CVA, DVT (L leg 1994), T2DM (A1c 6.9), PE (s/p IVC filter 9/2020), fibroids (s/p hysterectomy), s/p cholecystectomy, saddle PE (w/ hospital admission 8/21 - 9/3/21 at [facility]) without acute cor pulmonale (on warfarin), obesity (class I), p/w chest pain, found to have stable saddle PE on CTA. Chest pain likely 2/2 PE vs COPD exacerbation vs acute on chronic HF. 
66Fwith PMHx infrarenal AAA, cerebral aneurysm, R breast cancer (s/p chemo/XRT), CAD (ASCVD 43%), HTN, CHF/cardiomyopathy, (EF 12% on 8/23/21, s/p ICD), COPD, CVA, DVT (L leg 1994), T2DM (A1c 6.9), PE (s/p IVC filter 9/2020), fibroids (s/p hysterectomy), s/p cholecystectomy, saddle PE (w/ hospital admission 8/21 - 9/3/21 at [facility]) without acute cor pulmonale (on warfarin), obesity (class I), p/w chest pain, found to have stable saddle PE on CTA. Chest pain likely 2/2 PE vs COPD exacerbation vs acute on chronic HF. 
66Fwith PMHx infrarenal AAA, cerebral aneurysm, R breast cancer (s/p chemo/XRT), CAD (ASCVD 43%), HTN, CHF/cardiomyopathy, (EF 12% on 8/23/21, s/p ICD), COPD, CVA, DVT (L leg 1994), T2DM (A1c 6.9), PE (s/p IVC filter 9/2020), fibroids (s/p hysterectomy), s/p cholecystectomy, saddle PE (w/ hospital admission 8/21 - 9/3/21 at [facility]) without acute cor pulmonale (on warfarin), obesity (class I), p/w chest pain, found to have stable saddle PE on CTA. Chest pain likely 2/2 PE vs COPD exacerbation vs acute on chronic HF. 
66Fwith PMHx infrarenal AAA, cerebral aneurysm, R breast cancer (s/p chemo/XRT), CAD (ASCVD 43%), HTN, CHF/cardiomyopathy, (EF 12% on 8/23/21, s/p ICD), COPD, CVA, DVT (L leg 1994), T2DM (A1c 6.9), PE (s/p IVC filter 9/2020), fibroids (s/p hysterectomy), s/p cholecystectomy, saddle PE (w/ hospital admission 8/21 - 9/3/21 at [facility]) without acute cor pulmonale (on warfarin), obesity (class I), p/w chest pain, found to have stable saddle PE on CTA. Chest pain likely 2/2 PE vs COPD exacerbation vs acute on chronic HF. 
66Fwith PMHx infrarenal AAA, cerebral aneurysm, R breast cancer (s/p chemo/XRT), CAD (ASCVD 43%), HTN, CHF/cardiomyopathy, (EF 12% on 8/23/21, s/p ICD), COPD, CVA, DVT (L leg 1994), T2DM (A1c 6.9), PE (s/p IVC filter 9/2020), fibroids (s/p hysterectomy), s/p cholecystectomy, saddle PE (w/ hospital admission 8/21 - 9/3/21 at [facility]) without acute cor pulmonale (on warfarin), obesity (class I), p/w chest pain, found to have stable saddle PE on CTA. Chest pain likely 2/2 PE vs COPD exacerbation vs acute on chronic HF. 
66Fwith PMHx infrarenal AAA, cerebral aneurysm, R breast cancer (s/p chemo/XRT), CAD (ASCVD 43%), HTN, CHF/cardiomyopathy, (EF 12% on 8/23/21, s/p ICD), COPD, CVA, DVT (L leg 1994), T2DM (A1c 6.9), PE (s/p IVC filter 9/2020), fibroids (s/p hysterectomy), s/p cholecystectomy, saddle PE (w/ hospital admission 8/21 - 9/3/21 at [facility]) without acute cor pulmonale (on warfarin), obesity (class I), p/w chest pain, found to have stable saddle PE on CTA. Chest pain likely 2/2 PE vs COPD exacerbation vs acute on chronic HF.

## 2021-09-21 NOTE — PROGRESS NOTE ADULT - SUBJECTIVE AND OBJECTIVE BOX
***************************************************************  Nakul Hodges, PGY1  Internal Medicine   pager: [phone]  ***************************************************************        MARILEE RISHABH  66y  Female      Patient is a 66y old  Female who presents with a chief complaint of Chest pain (20 Sep 2021 07:16)      INTERVAL HPI/OVERNIGHT EVENTS:       REVIEW OF SYSTEMS:  CONSTITUTIONAL: No fever, weight loss, or fatigue  EYES: No eye pain, visual disturbances, or discharge  ENMT:  No difficulty hearing, tinnitus, vertigo; No sinus or throat pain  NECK: No pain or stiffness  BREASTS: No pain, masses, or nipple discharge  RESPIRATORY: No cough, wheezing, chills or hemoptysis; No shortness of breath  CARDIOVASCULAR: No chest pain, palpitations, dizziness, or leg swelling  GASTROINTESTINAL: No abdominal or epigastric pain. No nausea, vomiting, or hematemesis; No diarrhea or constipation. No melena or hematochezia.  GENITOURINARY: No dysuria, frequency, hematuria, or incontinence  NEUROLOGICAL: No headaches, memory loss, loss of strength, numbness, or tremors  SKIN: No itching, burning, rashes, or lesions   LYMPH NODES: No enlarged glands  ENDOCRINE: No heat or cold intolerance; No hair loss  MUSCULOSKELETAL: No joint pain or swelling; No muscle, back, or extremity pain  PSYCHIATRIC: No depression, anxiety, mood swings, or difficulty sleeping  HEME/LYMPH: No easy bruising, or bleeding gums  ALLERY AND IMMUNOLOGIC: No hives or eczema  FAMILY HISTORY:  Family history of hypertension    Family history of diabetes mellitus    Family history of renal stone    Family history of glioblastoma    Family history of liver failure    History of hypertension  mother    Family history of glioblastoma (Sibling)    Family history of renal failure  uncle    Family history of other condition  please open box and see additional medical family history    Family history of glioblastoma      T(C): 36.4 (09-21-21 @ 04:07), Max: 36.8 (09-20-21 @ 12:27)  HR: 98 (09-21-21 @ 04:07) (92 - 98)  BP: 148/98 (09-21-21 @ 04:55) (142/105 - 148/98)  RR: 18 (09-21-21 @ 04:07) (18 - 21)  SpO2: 99% (09-21-21 @ 04:07) (98% - 100%)  Wt(kg): --Vital Signs Last 24 Hrs  T(C): 36.4 (21 Sep 2021 04:07), Max: 36.8 (20 Sep 2021 12:27)  T(F): 97.5 (21 Sep 2021 04:07), Max: 98.3 (20 Sep 2021 12:27)  HR: 98 (21 Sep 2021 04:07) (92 - 98)  BP: 148/98 (21 Sep 2021 04:55) (142/105 - 148/98)  BP(mean): --  RR: 18 (21 Sep 2021 04:07) (18 - 21)  SpO2: 99% (21 Sep 2021 04:07) (98% - 100%)  Advil (Hives; Vomiting)      PHYSICAL EXAM:  GENERAL: NAD, laying comfortably in bed  HEAD:  Atraumatic, Normocephalic  EYES: EOMI, PERRLA, conjunctiva and sclera clear  ENMT: No tonsillar erythema, exudates, or enlargement; Moist mucous membranes  NECK: Supple, No JVD  NERVOUS SYSTEM:  Alert & Oriented X3, Good concentration; Motor Strength 5/5 B/L upper and lower extremities;   CHEST/LUNG: Clear to auscultation bilaterally; No rales, rhonchi, wheezing, or rubs  HEART: Regular rate and rhythm; No murmurs, rubs, or gallops  ABDOMEN: Soft, Nontender, Nondistended; Bowel sounds present  EXTREMITIES:  No clubbing, cyanosis, or edema  LYMPH: No lymphadenopathy noted  SKIN: No rashes or lesions        LABS:                            12.6   5.52  )-----------( 288      ( 21 Sep 2021 05:48 )             40.7       09-21    142  |  109<H>  |  30<H>  ----------------------------<  117<H>  4.5   |  12<L>  |  1.34<H>    Ca    9.8      21 Sep 2021 05:48  Phos  3.8     09-21  Mg     1.9     09-21    TPro  7.2  /  Alb  3.9  /  TBili  0.9  /  DBili  x   /  AST  32  /  ALT  33  /  AlkPhos  192<H>  09-20                  PT/INR - ( 20 Sep 2021 11:18 )   PT: 36.5 sec;   INR: 3.22 ratio         PTT - ( 20 Sep 2021 11:18 )  PTT:29.2 sec          CAPILLARY BLOOD GLUCOSE                    RADIOLOGY & ADDITIONAL TESTS:      acetaminophen   Tablet .. 650 milliGRAM(s) Oral every 6 hours PRN  anastrozole 1 milliGRAM(s) Oral daily  atorvastatin 40 milliGRAM(s) Oral at bedtime  hydrALAZINE 10 milliGRAM(s) Oral every 8 hours  influenza   Vaccine 0.5 milliLiter(s) IntraMuscular once  levothyroxine 25 MICROGram(s) Oral daily  melatonin 3 milliGRAM(s) Oral at bedtime  metoprolol succinate ER 25 milliGRAM(s) Oral daily  sacubitril 24 mG/valsartan 26 mG 1 Tablet(s) Oral two times a day      HEALTH ISSUES - PROBLEM Dx:  Chest pain    Decompensated heart failure    Breast cancer    Pulmonary embolism    DM (diabetes mellitus)    Hypertension    Hyperlipidemia    Need for prophylactic measure    Pulmonary thromboembolism    Suspected deep vein thrombosis (DVT)    Dysphagia    Chronic systolic congestive heart failure    Hypothyroidism           ***************************************************************  Nakul Hodges, PGY1  Internal Medicine   pager: [phone]  ***************************************************************        RISHABH HUNT  66y  Female      Patient is a 66y old  Female who presents with a chief complaint of Chest pain (20 Sep 2021 07:16)      INTERVAL HPI/OVERNIGHT EVENTS: No acute events overnight. Pt seen at bedside, no complaints. Denied fever, chills, CP, SOB, n/v/c/d, dysuria.      REVIEW OF SYSTEMS:  CONSTITUTIONAL: No fever, weight loss, or fatigue  EYES: No eye pain, visual disturbances, or discharge  ENMT:  No difficulty hearing, tinnitus, vertigo; No sinus or throat pain  NECK: No pain or stiffness  BREASTS: No pain, masses, or nipple discharge  RESPIRATORY: No cough, wheezing, chills or hemoptysis; No shortness of breath  CARDIOVASCULAR: No chest pain, palpitations, dizziness, or leg swelling  GASTROINTESTINAL: No abdominal or epigastric pain. No nausea, vomiting, or hematemesis; No diarrhea or constipation. No melena or hematochezia.  GENITOURINARY: No dysuria, frequency, hematuria, or incontinence  NEUROLOGICAL: No headaches, memory loss, loss of strength, numbness, or tremors  SKIN: No itching, burning, rashes, or lesions   LYMPH NODES: No enlarged glands  ENDOCRINE: No heat or cold intolerance; No hair loss  MUSCULOSKELETAL: No joint pain or swelling; No muscle, back, or extremity pain  PSYCHIATRIC: No depression, anxiety, mood swings, or difficulty sleeping  HEME/LYMPH: No easy bruising, or bleeding gums  ALLERY AND IMMUNOLOGIC: No hives or eczema  FAMILY HISTORY:  Family history of hypertension    Family history of diabetes mellitus    Family history of renal stone    Family history of glioblastoma    Family history of liver failure    History of hypertension  mother    Family history of glioblastoma (Sibling)    Family history of renal failure  uncle    Family history of other condition  please open box and see additional medical family history    Family history of glioblastoma      T(C): 36.4 (09-21-21 @ 04:07), Max: 36.8 (09-20-21 @ 12:27)  HR: 98 (09-21-21 @ 04:07) (92 - 98)  BP: 148/98 (09-21-21 @ 04:55) (142/105 - 148/98)  RR: 18 (09-21-21 @ 04:07) (18 - 21)  SpO2: 99% (09-21-21 @ 04:07) (98% - 100%)  Wt(kg): --Vital Signs Last 24 Hrs  T(C): 36.4 (21 Sep 2021 04:07), Max: 36.8 (20 Sep 2021 12:27)  T(F): 97.5 (21 Sep 2021 04:07), Max: 98.3 (20 Sep 2021 12:27)  HR: 98 (21 Sep 2021 04:07) (92 - 98)  BP: 148/98 (21 Sep 2021 04:55) (142/105 - 148/98)  BP(mean): --  RR: 18 (21 Sep 2021 04:07) (18 - 21)  SpO2: 99% (21 Sep 2021 04:07) (98% - 100%)  Advil (Hives; Vomiting)      PHYSICAL EXAM:  GENERAL: NAD, laying comfortably in bed  HEAD:  Atraumatic, Normocephalic  EYES: EOMI, PERRLA, conjunctiva and sclera clear  ENMT: No tonsillar erythema, exudates, or enlargement; Moist mucous membranes  NECK: Supple, No JVD  NERVOUS SYSTEM:  Alert & Oriented X3; Motor Strength grossly intact;   CHEST/LUNG: Clear to auscultation bilaterally; No rales, rhonchi, wheezing, or rubs  HEART: Regular rate and rhythm; No murmurs, rubs, or gallops  ABDOMEN: Soft, Nontender, Nondistended; Bowel sounds present  EXTREMITIES:  No clubbing, cyanosis, or edema  LYMPH: No lymphadenopathy noted  SKIN: No rashes or lesions        LABS:                            12.6   5.52  )-----------( 288      ( 21 Sep 2021 05:48 )             40.7       09-21    142  |  109<H>  |  30<H>  ----------------------------<  117<H>  4.5   |  12<L>  |  1.34<H>    Ca    9.8      21 Sep 2021 05:48  Phos  3.8     09-21  Mg     1.9     09-21    TPro  7.2  /  Alb  3.9  /  TBili  0.9  /  DBili  x   /  AST  32  /  ALT  33  /  AlkPhos  192<H>  09-20                  PT/INR - ( 20 Sep 2021 11:18 )   PT: 36.5 sec;   INR: 3.22 ratio         PTT - ( 20 Sep 2021 11:18 )  PTT:29.2 sec          CAPILLARY BLOOD GLUCOSE                    RADIOLOGY & ADDITIONAL TESTS:      acetaminophen   Tablet .. 650 milliGRAM(s) Oral every 6 hours PRN  anastrozole 1 milliGRAM(s) Oral daily  atorvastatin 40 milliGRAM(s) Oral at bedtime  hydrALAZINE 10 milliGRAM(s) Oral every 8 hours  influenza   Vaccine 0.5 milliLiter(s) IntraMuscular once  levothyroxine 25 MICROGram(s) Oral daily  melatonin 3 milliGRAM(s) Oral at bedtime  metoprolol succinate ER 25 milliGRAM(s) Oral daily  sacubitril 24 mG/valsartan 26 mG 1 Tablet(s) Oral two times a day      HEALTH ISSUES - PROBLEM Dx:  Chest pain    Decompensated heart failure    Breast cancer    Pulmonary embolism    DM (diabetes mellitus)    Hypertension    Hyperlipidemia    Need for prophylactic measure    Pulmonary thromboembolism    Suspected deep vein thrombosis (DVT)    Dysphagia    Chronic systolic congestive heart failure    Hypothyroidism           ***************************************************************  Nakul Hodges, PGY1  Internal Medicine   pager: [phone]  ***************************************************************        RISHABH HUNT  66y  Female      Patient is a 66y old  Female who presents with a chief complaint of Chest pain (20 Sep 2021 07:16)      INTERVAL HPI/OVERNIGHT EVENTS: No acute events overnight. Pt seen at bedside, no complaints. Denied fever, chills, CP, SOB, n/v/c/d, dysuria.      REVIEW OF SYSTEMS:  CONSTITUTIONAL: No fever, weight loss, or fatigue  NECK: No pain or stiffness  RESPIRATORY: No cough, wheezing, chills or hemoptysis; No shortness of breath  CARDIOVASCULAR: No chest pain, palpitations, dizziness, or leg swelling  GASTROINTESTINAL: No abdominal or epigastric pain. No nausea, vomiting, or hematemesis; No diarrhea or constipation. No melena or hematochezia.  GENITOURINARY: No dysuria, frequency, hematuria, or incontinence  NEUROLOGICAL:pos confusion at time , not new   ENDOCRINE: No heat or cold intolerance; No hair loss  MUSCULOSKELETAL: No joint pain or swelling; No muscle, back, or extremity pain  PSYCHIATRIC: No depression, anxiety, mood swings, or difficulty sleeping  HEME/LYMPH: No easy bruising, or bleeding gums    FAMILY HISTORY:  Family history of hypertension    Family history of diabetes mellitus    Family history of renal stone    Family history of glioblastoma    Family history of liver failure    History of hypertension  mother    Family history of glioblastoma (Sibling)    Family history of renal failure  uncle    Family history of other condition  please open box and see additional medical family history    Family history of glioblastoma      T(C): 36.4 (09-21-21 @ 04:07), Max: 36.8 (09-20-21 @ 12:27)  HR: 98 (09-21-21 @ 04:07) (92 - 98)  BP: 148/98 (09-21-21 @ 04:55) (142/105 - 148/98)  RR: 18 (09-21-21 @ 04:07) (18 - 21)  SpO2: 99% (09-21-21 @ 04:07) (98% - 100%)  Wt(kg): --Vital Signs Last 24 Hrs  T(C): 36.4 (21 Sep 2021 04:07), Max: 36.8 (20 Sep 2021 12:27)  T(F): 97.5 (21 Sep 2021 04:07), Max: 98.3 (20 Sep 2021 12:27)  HR: 98 (21 Sep 2021 04:07) (92 - 98)  BP: 148/98 (21 Sep 2021 04:55) (142/105 - 148/98)  BP(mean): --  RR: 18 (21 Sep 2021 04:07) (18 - 21)  SpO2: 99% (21 Sep 2021 04:07) (98% - 100%)  Advil (Hives; Vomiting)      PHYSICAL EXAM:  GENERAL: NAD, laying comfortably in bed  HEAD:  Atraumatic, Normocephalic  EYES: conjunctiva and sclera clear  ENMT: No tonsillar erythema, exudates, or enlargement; Moist mucous membranes  NECK: Supple, No JVD  NERVOUS SYSTEM:  Alert & Oriented X3 bt confused at times; Motor Strength grossly intact;   CHEST/LUNG: Clear to auscultation bilaterally; No rales, rhonchi, wheezing, or rubs  HEART: Regular rate and rhythm; No murmurs, rubs, or gallops  ABDOMEN: Soft, Nontender, Nondistended; Bowel sounds present  EXTREMITIES:  No clubbing, cyanosis, or edema  LYMPH: No lymphadenopathy noted          LABS:                            12.6   5.52  )-----------( 288      ( 21 Sep 2021 05:48 )             40.7       09-21    142  |  109<H>  |  30<H>  ----------------------------<  117<H>  4.5   |  12<L>  |  1.34<H>    Ca    9.8      21 Sep 2021 05:48  Phos  3.8     09-21  Mg     1.9     09-21    TPro  7.2  /  Alb  3.9  /  TBili  0.9  /  DBili  x   /  AST  32  /  ALT  33  /  AlkPhos  192<H>  09-20                  PT/INR - ( 20 Sep 2021 11:18 )   PT: 36.5 sec;   INR: 3.22 ratio         PTT - ( 20 Sep 2021 11:18 )  PTT:29.2 sec          CAPILLARY BLOOD GLUCOSE                    RADIOLOGY & ADDITIONAL TESTS:      acetaminophen   Tablet .. 650 milliGRAM(s) Oral every 6 hours PRN  anastrozole 1 milliGRAM(s) Oral daily  atorvastatin 40 milliGRAM(s) Oral at bedtime  hydrALAZINE 10 milliGRAM(s) Oral every 8 hours  influenza   Vaccine 0.5 milliLiter(s) IntraMuscular once  levothyroxine 25 MICROGram(s) Oral daily  melatonin 3 milliGRAM(s) Oral at bedtime  metoprolol succinate ER 25 milliGRAM(s) Oral daily  sacubitril 24 mG/valsartan 26 mG 1 Tablet(s) Oral two times a day      HEALTH ISSUES - PROBLEM Dx:  Chest pain    Decompensated heart failure    Breast cancer    Pulmonary embolism    DM (diabetes mellitus)    Hypertension    Hyperlipidemia    Need for prophylactic measure    Pulmonary thromboembolism    Suspected deep vein thrombosis (DVT)    Dysphagia    Chronic systolic congestive heart failure    Hypothyroidism

## 2021-09-21 NOTE — PROGRESS NOTE ADULT - PROBLEM SELECTOR PLAN 6
Recent admission at [facility] for saddle embolism (9/3 discharge) on warfarin  - Currently continues to have submassive PE without cor pulmonale  - CTA showed b/l PEs with additional emboli bilaterally compared to old CTA  - decreased warfarin dose from 3 mg to 2 mg with INR  2.87-> 3.22  - daily INR

## 2021-09-21 NOTE — PROGRESS NOTE ADULT - PROBLEM SELECTOR PLAN 11
- DVT ppx: warfarin  - Diet: regular diet  - Dispo:  HHA reintiated, d/c tomorrow - DVT ppx: warfarin  - Diet: regular diet  - Dispo:  HHA reintiated, d/c today

## 2021-09-21 NOTE — PROGRESS NOTE ADULT - PROBLEM SELECTOR PLAN 3
- NICM (catheterization 02/2019 showed dRPL occlusion without ability to intervene). Echo (8/23/21) EF 12% with LV apical thrombus, follows with Dr. Read. s/p ICD (10/2019). Plan to start spironolactone shortly.  - c/w home entresto 24-25 mg qd, metoprolol succinate 25 mg qd  - new TTE EF 25%, improved from previous EF 12%  - will consider cardiology consult for GDMT  - QTc prolonged - NICM (catheterization 02/2019 showed dRPL occlusion without ability to intervene). Echo (8/23/21) EF 12% with LV apical thrombus, follows with Dr. Read. s/p ICD (10/2019). Plan to start spironolactone shortly.  - c/w home entresto 24-25 mg qd, metoprolol succinate 25 mg qd  - new TTE EF 25%, improved from previous EF 12%  - oupt cardiology consult  - QTc prolonged

## 2021-09-22 ENCOUNTER — APPOINTMENT (OUTPATIENT)
Dept: INTERNAL MEDICINE | Facility: CLINIC | Age: 66
End: 2021-09-22

## 2021-09-22 NOTE — ASSESSMENT
[FreeTextEntry1] : 66F with acute SOB and chest pain on top of chronic LOZA.\par O2 sat at rest 100% on supplemental O2 2L\par CHest pain and tachypnea.\par Subtherapeutic inr with known significant clot burden both PE and DVTs\par Concerned about evolving thromboembolic disease vs decompensated CHF\par Recommend ED visit at this time. Pt declined ED and/or labs and reports she will go tomorrow AM.\par Explained risks of having subtherapeutic INR with known VTE - discussed with pt's niece and pt. They expressed understanding.\par

## 2021-09-22 NOTE — HISTORY OF PRESENT ILLNESS
[TextBox_4] : 66F recently hospitalized with new saddle PE and bilateral DVTs, sent home on coumadin. Has a h/o NICM with sCHF. H/o breast ca currently on hormonal therapy. She reports since discharge from the hospital last week increased chest pain and LOZA, unable to lay down, has to sleep in a chair. Pt very sedentary at baseline. Reports consistent compliance with meds but INR significantly sub therapeutic from this week.

## 2021-09-22 NOTE — PHYSICAL EXAM
[No Acute Distress] : no acute distress [Normal Oropharynx] : normal oropharynx [Normal Appearance] : normal appearance [No Neck Mass] : no neck mass [No Resp Distress] : no resp distress [Clear to Auscultation Bilaterally] : clear to auscultation bilaterally [No Abnormalities] : no abnormalities [Benign] : benign [Normal Gait] : normal gait [No Clubbing] : no clubbing [No Cyanosis] : no cyanosis [FROM] : FROM [1+ Pitting] : 1+ pitting [Normal Color/ Pigmentation] : normal color/ pigmentation [No Focal Deficits] : no focal deficits [Oriented x3] : oriented x3 [Normal Affect] : normal affect [TextBox_44] : mild JVD [TextBox_54] : irregular tachy

## 2021-09-22 NOTE — REVIEW OF SYSTEMS
[Cough] : cough [Dyspnea] : dyspnea [SOB on Exertion] : sob on exertion [Chest Discomfort] : chest discomfort [Orthopnea] : orthopnea [Negative] : Psychiatric

## 2021-09-24 RX ORDER — WARFARIN 3 MG/1
3 TABLET ORAL
Qty: 90 | Refills: 1 | Status: ACTIVE | COMMUNITY
Start: 2021-09-01 | End: 1900-01-01

## 2021-09-25 ENCOUNTER — INPATIENT (INPATIENT)
Facility: HOSPITAL | Age: 66
LOS: 10 days | Discharge: HOME | DRG: 291 | End: 2021-10-06
Attending: INTERNAL MEDICINE | Admitting: INTERNAL MEDICINE
Payer: MEDICARE

## 2021-09-25 VITALS
DIASTOLIC BLOOD PRESSURE: 99 MMHG | HEIGHT: 67 IN | SYSTOLIC BLOOD PRESSURE: 150 MMHG | OXYGEN SATURATION: 99 % | TEMPERATURE: 98 F | WEIGHT: 186.07 LBS | RESPIRATION RATE: 18 BRPM | HEART RATE: 94 BPM

## 2021-09-25 DIAGNOSIS — C50.919 MALIGNANT NEOPLASM OF UNSPECIFIED SITE OF UNSPECIFIED FEMALE BREAST: Chronic | ICD-10-CM

## 2021-09-25 DIAGNOSIS — C50.919 MALIGNANT NEOPLASM OF UNSPECIFIED SITE OF UNSPECIFIED FEMALE BREAST: ICD-10-CM

## 2021-09-25 DIAGNOSIS — E78.5 HYPERLIPIDEMIA, UNSPECIFIED: ICD-10-CM

## 2021-09-25 DIAGNOSIS — D25.9 LEIOMYOMA OF UTERUS, UNSPECIFIED: Chronic | ICD-10-CM

## 2021-09-25 DIAGNOSIS — R79.1 ABNORMAL COAGULATION PROFILE: ICD-10-CM

## 2021-09-25 DIAGNOSIS — F32.89 OTHER SPECIFIED DEPRESSIVE EPISODES: ICD-10-CM

## 2021-09-25 DIAGNOSIS — Z29.9 ENCOUNTER FOR PROPHYLACTIC MEASURES, UNSPECIFIED: ICD-10-CM

## 2021-09-25 DIAGNOSIS — R06.02 SHORTNESS OF BREATH: ICD-10-CM

## 2021-09-25 DIAGNOSIS — E11.9 TYPE 2 DIABETES MELLITUS WITHOUT COMPLICATIONS: ICD-10-CM

## 2021-09-25 DIAGNOSIS — Z98.89 OTHER SPECIFIED POSTPROCEDURAL STATES: Chronic | ICD-10-CM

## 2021-09-25 DIAGNOSIS — J44.1 CHRONIC OBSTRUCTIVE PULMONARY DISEASE WITH (ACUTE) EXACERBATION: ICD-10-CM

## 2021-09-25 DIAGNOSIS — Z90.710 ACQUIRED ABSENCE OF BOTH CERVIX AND UTERUS: Chronic | ICD-10-CM

## 2021-09-25 DIAGNOSIS — I26.02 SADDLE EMBOLUS OF PULMONARY ARTERY WITH ACUTE COR PULMONALE: ICD-10-CM

## 2021-09-25 DIAGNOSIS — N17.9 ACUTE KIDNEY FAILURE, UNSPECIFIED: ICD-10-CM

## 2021-09-25 DIAGNOSIS — I50.22 CHRONIC SYSTOLIC (CONGESTIVE) HEART FAILURE: ICD-10-CM

## 2021-09-25 DIAGNOSIS — I10 ESSENTIAL (PRIMARY) HYPERTENSION: ICD-10-CM

## 2021-09-25 LAB
ALBUMIN SERPL ELPH-MCNC: 3.4 G/DL — LOW (ref 3.5–5)
ALP SERPL-CCNC: 328 U/L — HIGH (ref 40–120)
ALT FLD-CCNC: 40 U/L DA — SIGNIFICANT CHANGE UP (ref 10–60)
ANION GAP SERPL CALC-SCNC: 20 MMOL/L — HIGH (ref 5–17)
APTT BLD: 37.4 SEC — HIGH (ref 27.5–35.5)
AST SERPL-CCNC: 42 U/L — HIGH (ref 10–40)
BASE EXCESS BLDV CALC-SCNC: -15.3 MMOL/L — SIGNIFICANT CHANGE UP
BASOPHILS # BLD AUTO: 0.04 K/UL — SIGNIFICANT CHANGE UP (ref 0–0.2)
BASOPHILS NFR BLD AUTO: 0.7 % — SIGNIFICANT CHANGE UP (ref 0–2)
BILIRUB SERPL-MCNC: 1.8 MG/DL — HIGH (ref 0.2–1.2)
BUN SERPL-MCNC: 37 MG/DL — HIGH (ref 7–18)
CALCIUM SERPL-MCNC: 9.3 MG/DL — SIGNIFICANT CHANGE UP (ref 8.4–10.5)
CHLORIDE SERPL-SCNC: 116 MMOL/L — HIGH (ref 96–108)
CK MB BLD-MCNC: 1.5 % — SIGNIFICANT CHANGE UP (ref 0–3.5)
CK MB CFR SERPL CALC: 3.2 NG/ML — SIGNIFICANT CHANGE UP (ref 0–3.6)
CK SERPL-CCNC: 220 U/L — HIGH (ref 21–215)
CO2 SERPL-SCNC: 11 MMOL/L — LOW (ref 22–31)
CREAT SERPL-MCNC: 1.91 MG/DL — HIGH (ref 0.5–1.3)
EOSINOPHIL # BLD AUTO: 0.02 K/UL — SIGNIFICANT CHANGE UP (ref 0–0.5)
EOSINOPHIL NFR BLD AUTO: 0.3 % — SIGNIFICANT CHANGE UP (ref 0–6)
GLUCOSE BLDC GLUCOMTR-MCNC: 112 MG/DL — HIGH (ref 70–99)
GLUCOSE SERPL-MCNC: 109 MG/DL — HIGH (ref 70–99)
HCO3 BLDV-SCNC: 10 MMOL/L — CRITICAL LOW (ref 22–29)
HCT VFR BLD CALC: 40.5 % — SIGNIFICANT CHANGE UP (ref 34.5–45)
HGB BLD-MCNC: 12.6 G/DL — SIGNIFICANT CHANGE UP (ref 11.5–15.5)
HOROWITZ INDEX BLDV+IHG-RTO: 21 — SIGNIFICANT CHANGE UP
IMM GRANULOCYTES NFR BLD AUTO: 0.5 % — SIGNIFICANT CHANGE UP (ref 0–1.5)
INR BLD: 7.02 RATIO — CRITICAL HIGH (ref 0.88–1.16)
LYMPHOCYTES # BLD AUTO: 1.99 K/UL — SIGNIFICANT CHANGE UP (ref 1–3.3)
LYMPHOCYTES # BLD AUTO: 34.1 % — SIGNIFICANT CHANGE UP (ref 13–44)
MAGNESIUM SERPL-MCNC: 1.9 MG/DL — SIGNIFICANT CHANGE UP (ref 1.6–2.6)
MCHC RBC-ENTMCNC: 30.8 PG — SIGNIFICANT CHANGE UP (ref 27–34)
MCHC RBC-ENTMCNC: 31.1 GM/DL — LOW (ref 32–36)
MCV RBC AUTO: 99 FL — SIGNIFICANT CHANGE UP (ref 80–100)
MONOCYTES # BLD AUTO: 0.42 K/UL — SIGNIFICANT CHANGE UP (ref 0–0.9)
MONOCYTES NFR BLD AUTO: 7.2 % — SIGNIFICANT CHANGE UP (ref 2–14)
NEUTROPHILS # BLD AUTO: 3.34 K/UL — SIGNIFICANT CHANGE UP (ref 1.8–7.4)
NEUTROPHILS NFR BLD AUTO: 57.2 % — SIGNIFICANT CHANGE UP (ref 43–77)
NRBC # BLD: 0 /100 WBCS — SIGNIFICANT CHANGE UP (ref 0–0)
NT-PROBNP SERPL-SCNC: HIGH PG/ML (ref 0–125)
PCO2 BLDV: 24 MMHG — LOW (ref 39–42)
PH BLDV: 7.24 — LOW (ref 7.32–7.43)
PLATELET # BLD AUTO: 266 K/UL — SIGNIFICANT CHANGE UP (ref 150–400)
PO2 BLDV: 59 MMHG — SIGNIFICANT CHANGE UP
POTASSIUM SERPL-MCNC: 4.5 MMOL/L — SIGNIFICANT CHANGE UP (ref 3.5–5.3)
POTASSIUM SERPL-SCNC: 4.5 MMOL/L — SIGNIFICANT CHANGE UP (ref 3.5–5.3)
PROT SERPL-MCNC: 7.7 G/DL — SIGNIFICANT CHANGE UP (ref 6–8.3)
PROTHROM AB SERPL-ACNC: 76.1 SEC — HIGH (ref 10.6–13.6)
RAPID RVP RESULT: SIGNIFICANT CHANGE UP
RBC # BLD: 4.09 M/UL — SIGNIFICANT CHANGE UP (ref 3.8–5.2)
RBC # FLD: 17.7 % — HIGH (ref 10.3–14.5)
SAO2 % BLDV: 84.2 % — SIGNIFICANT CHANGE UP
SARS-COV-2 RNA SPEC QL NAA+PROBE: SIGNIFICANT CHANGE UP
SARS-COV-2 RNA SPEC QL NAA+PROBE: SIGNIFICANT CHANGE UP
SODIUM SERPL-SCNC: 147 MMOL/L — HIGH (ref 135–145)
TROPONIN I SERPL-MCNC: 0.16 NG/ML — HIGH (ref 0–0.04)
TROPONIN I SERPL-MCNC: 0.16 NG/ML — HIGH (ref 0–0.04)
WBC # BLD: 5.84 K/UL — SIGNIFICANT CHANGE UP (ref 3.8–10.5)
WBC # FLD AUTO: 5.84 K/UL — SIGNIFICANT CHANGE UP (ref 3.8–10.5)

## 2021-09-25 PROCEDURE — 71045 X-RAY EXAM CHEST 1 VIEW: CPT | Mod: 26

## 2021-09-25 PROCEDURE — 99285 EMERGENCY DEPT VISIT HI MDM: CPT | Mod: CS

## 2021-09-25 RX ORDER — LANOLIN ALCOHOL/MO/W.PET/CERES
3 CREAM (GRAM) TOPICAL AT BEDTIME
Refills: 0 | Status: DISCONTINUED | OUTPATIENT
Start: 2021-09-25 | End: 2021-10-06

## 2021-09-25 RX ORDER — ONDANSETRON 8 MG/1
4 TABLET, FILM COATED ORAL EVERY 8 HOURS
Refills: 0 | Status: DISCONTINUED | OUTPATIENT
Start: 2021-09-25 | End: 2021-10-06

## 2021-09-25 RX ORDER — ATORVASTATIN CALCIUM 80 MG/1
40 TABLET, FILM COATED ORAL AT BEDTIME
Refills: 0 | Status: DISCONTINUED | OUTPATIENT
Start: 2021-09-25 | End: 2021-10-06

## 2021-09-25 RX ORDER — LEVOTHYROXINE SODIUM 125 MCG
25 TABLET ORAL DAILY
Refills: 0 | Status: DISCONTINUED | OUTPATIENT
Start: 2021-09-25 | End: 2021-10-06

## 2021-09-25 RX ORDER — INFLUENZA VIRUS VACCINE 15; 15; 15; 15 UG/.5ML; UG/.5ML; UG/.5ML; UG/.5ML
0.5 SUSPENSION INTRAMUSCULAR ONCE
Refills: 0 | Status: DISCONTINUED | OUTPATIENT
Start: 2021-09-25 | End: 2021-10-06

## 2021-09-25 RX ORDER — ACETAMINOPHEN 500 MG
650 TABLET ORAL EVERY 6 HOURS
Refills: 0 | Status: DISCONTINUED | OUTPATIENT
Start: 2021-09-25 | End: 2021-10-06

## 2021-09-25 RX ORDER — METOPROLOL TARTRATE 50 MG
25 TABLET ORAL DAILY
Refills: 0 | Status: DISCONTINUED | OUTPATIENT
Start: 2021-09-25 | End: 2021-10-06

## 2021-09-25 RX ORDER — INSULIN LISPRO 100/ML
VIAL (ML) SUBCUTANEOUS
Refills: 0 | Status: DISCONTINUED | OUTPATIENT
Start: 2021-09-25 | End: 2021-10-06

## 2021-09-25 RX ORDER — IPRATROPIUM/ALBUTEROL SULFATE 18-103MCG
3 AEROSOL WITH ADAPTER (GRAM) INHALATION ONCE
Refills: 0 | Status: COMPLETED | OUTPATIENT
Start: 2021-09-25 | End: 2021-09-25

## 2021-09-25 RX ORDER — ANASTROZOLE 1 MG/1
1 TABLET ORAL DAILY
Refills: 0 | Status: DISCONTINUED | OUTPATIENT
Start: 2021-09-25 | End: 2021-09-27

## 2021-09-25 RX ORDER — ALBUTEROL 90 UG/1
AEROSOL, METERED ORAL
Refills: 0 | Status: DISCONTINUED | OUTPATIENT
Start: 2021-09-25 | End: 2021-10-06

## 2021-09-25 RX ORDER — IPRATROPIUM/ALBUTEROL SULFATE 18-103MCG
3 AEROSOL WITH ADAPTER (GRAM) INHALATION EVERY 6 HOURS
Refills: 0 | Status: DISCONTINUED | OUTPATIENT
Start: 2021-09-25 | End: 2021-09-26

## 2021-09-25 RX ORDER — PHYTONADIONE (VIT K1) 5 MG
5 TABLET ORAL ONCE
Refills: 0 | Status: DISCONTINUED | OUTPATIENT
Start: 2021-09-25 | End: 2021-09-25

## 2021-09-25 RX ORDER — HYDRALAZINE HCL 50 MG
15 TABLET ORAL EVERY 8 HOURS
Refills: 0 | Status: DISCONTINUED | OUTPATIENT
Start: 2021-09-25 | End: 2021-09-27

## 2021-09-25 RX ORDER — PANTOPRAZOLE SODIUM 20 MG/1
40 TABLET, DELAYED RELEASE ORAL
Refills: 0 | Status: DISCONTINUED | OUTPATIENT
Start: 2021-09-25 | End: 2021-10-06

## 2021-09-25 RX ORDER — DEXTROSE 50 % IN WATER 50 %
25 SYRINGE (ML) INTRAVENOUS ONCE
Refills: 0 | Status: DISCONTINUED | OUTPATIENT
Start: 2021-09-25 | End: 2021-10-06

## 2021-09-25 RX ADMIN — Medication 15 MILLIGRAM(S): at 22:56

## 2021-09-25 RX ADMIN — Medication 40 MILLIGRAM(S): at 22:57

## 2021-09-25 RX ADMIN — Medication 40 MILLIGRAM(S): at 15:34

## 2021-09-25 RX ADMIN — Medication 3 MILLILITER(S): at 15:34

## 2021-09-25 RX ADMIN — ATORVASTATIN CALCIUM 40 MILLIGRAM(S): 80 TABLET, FILM COATED ORAL at 22:57

## 2021-09-25 RX ADMIN — Medication 3 MILLILITER(S): at 15:30

## 2021-09-25 NOTE — ED ADULT TRIAGE NOTE - PRO INTERPRETER NEED 2
NOTIFICATION RETURN TO WORK / SCHOOL 
 
2/28/2019 8:41 AM 
 
Mr. Dot Pearson [facility] [id number] To Whom It May Concern: 
 
Dot Pearson is currently under the care of [address] [place] [address]. He will continue with light duty x 3 weeks. If there are questions or concerns please have the patient contact our office. Sincerely, Sydnie Maurer MD 
 
                                
 

English

## 2021-09-25 NOTE — H&P ADULT - PROBLEM SELECTOR PLAN 5
Pt has a history of DVT/PE w/ IVC filter and repeat saddle PE in 9/2020 now on Coumadin  Last CT chest w/ IV con shows continued saddle embolus  Saturating well on RA   Preload dependent, to avoid dehydration

## 2021-09-25 NOTE — H&P ADULT - HISTORY OF PRESENT ILLNESS
Patient is a 66 F from home (lives alone w/ HHA), baseline AA0x3, independently ambulating, with PMHx of CAD, T2DM (last A1c 5.9%), HTN, HFrEF/cardiomyopathy (EF 25% on 9/16/21, s/p SICD), COPD (no prior intubations), CVA, DVT (L leg 1994)/PE (s/p IVC filter 9/2020, and repeat saddle PE at [facility] 8/21-9/3 now on Coumadin), infrarenal AAA, cerebral aneurysm, Atypical Depression, R breast cancer (s/p chemo/XRT), Class I Obesity, PSHx of hysterectomy (for fibroids), and cholecystectomy presenting to the ED with worsening shortness of breath x 4 days. Patient has been admitted twice to [facility] and [facility] in the past month for similar complaints of chest pain/SOB. She endorses that she does not know what medications she is supposed to be taking but is compliant with whatever her HHA gives her. She reports that the symptoms started with a nonproductive cough, and progressed to shortness of breath with ADL's. She has PND and orthopnea at baseline with usage of multiple pillows at night to fall asleep, but reports that this shortness of breath is worse than baseline. She has some associated loss of appetite, nausea, and headaches, but denies fevers, myalgias, chest pain, V/D, lower extremity swelling, dysuria, increased frequency of urination, hematuria or other sites of active bleeding. Of note, she is AA0x3 on interview, but has her eyes closed and an abnormal flat affect while reporting "hearing voices of her dead mother and sister" and a desire to " join them soon".  She denies active SI/HI. As per records, she has history of transferring b/w multiple nursing/rehab facilities, with the [facility] recent being [facility] 7[facility] after ~4months.  Patient is a 66 F from home (lives alone w/ HHA), baseline AA0x3, independently ambulating, with PMHx of CAD, T2DM (last A1c 5.9%), HTN, HFrEF/cardiomyopathy (EF 25% on 9/16/21, s/p SICD), COPD (no prior intubations), CVA, DVT (L leg 1994)/PE (s/p IVC filter 9/2020, and repeat saddle PE at [facility] 8/21-9/3 now on Coumadin), infrarenal AAA, cerebral aneurysm, Atypical Depression, R breast cancer (s/p chemo/XRT), Class I Obesity, PSHx of hysterectomy (for fibroids), and cholecystectomy presenting to the ED with worsening shortness of breath x 4 days. Patient has been admitted twice to [facility] and [facility] in the past month for similar complaints of chest pain/SOB. She endorses that she does not know what medications she is supposed to be taking but is compliant with whatever her HHA gives her. She reports that the symptoms started with a nonproductive cough, and progressed to shortness of breath with ADL's. She has PND and orthopnea at baseline with usage of multiple pillows at night to fall asleep, but reports that this shortness of breath is worse than baseline. She has some associated loss of appetite, nausea, and headaches, but denies fevers, myalgias, chest pain, V/D, lower extremity swelling, dysuria, increased frequency of urination, hematuria or other sites of active bleeding. Of note, she is AA0x3 on interview, but has her eyes closed and an abnormal flat affect while reporting "hearing voices of her dead mother and sister" and a desire to " join them soon".  She denies active SI/HI. As per records, she has history of transferring b/w multiple nursing/rehab facilities, with [facility] after ~4months.

## 2021-09-25 NOTE — ED ADULT TRIAGE NOTE - PAIN: PRESENCE, MLM
Last four GAD7 Assessments       GAD7 2/7/2019   GAD7 Score 20   Feeling nervous, anxious or on edge Nearly every day   Not being able to stop or control worrying Nearly every day   Worrying too much about different things Nearly every day   Trouble relaxing Nearly every day   Being so restless that it's hard to sit still More than half the days   Becoming easily annoyed or irritable Nearly every day   Feeling afraid as if something awful might happen Nearly every day   Ability to handle work, home and other people Very difficult           Recent PHQ 2/9 Score    PHQ 2:  Date Adult PHQ 2 Score   2/7/2019 1       PHQ 9:              denies pain/discomfort

## 2021-09-25 NOTE — H&P ADULT - PROBLEM SELECTOR PLAN 2
Pt p/w Pt p/w BUN/Cr of 37/1.91, eGFR of 27, normally in 40's (CKD3-4)  Possibly prerenal   F/u UA, Urine lytes   Will hold any IVF (Caution as CHF) pending urine   F/u BMP

## 2021-09-25 NOTE — H&P ADULT - PROBLEM SELECTOR PLAN 7
Last A1c 5.9%  Not on any apparent home meds   C/w HSS, Accuchecks ACHS as on steroids  DASH/DM diet

## 2021-09-25 NOTE — H&P ADULT - PROBLEM SELECTOR PLAN 8
Pt was evaluated by Dr. Moya in prior admissions  was previously on seroquel but no pyschoactive meds now   Endorses auditory hallucinations   No HI/SI  Consider constant observation if worsening and possible psych reconsult

## 2021-09-25 NOTE — H&P ADULT - PROBLEM SELECTOR PLAN 3
Pt p/w INR 7.02, no active bleeding  On Coumadin 3mg/2mg alternating  Will hold Coumadin, recheck in AM and dose accordingly  Stat Vitamin K if any signs of active bleed  Fall precautions

## 2021-09-25 NOTE — H&P ADULT - ASSESSMENT
Patient is a 66 F from home (lives alone w/ HHA), baseline AA0x3, independently ambulating, with PMHx of CAD, T2DM (last A1c 5.9%), HTN, HFrEF/cardiomyopathy (EF 25% on 9/16/21, s/p SICD), COPD (no prior intubations), CVA, DVT (L leg 1994)/PE (s/p IVC filter 9/2020, and repeat saddle PE at [facility] 8/21-9/3 now on Coumadin), infrarenal AAA, cerebral aneurysm, Atypical Depression, R breast cancer (s/p chemo/XRT), Class I Obesity, PSHx of hysterectomy (for fibroids), and cholecystectomy presenting to the ED with worsening shortness of breath x 4 days; admitted for likely COPD exacerbation.     In the ED,  VS: temp 97.6F, HR 94bpm, /99mmHg, RR 18, 95% on RA   Labs significant for Na 147, Cl 116, Bicarb 11, BUN/Sr Cr 37/1.91  Trop 0.161  ECG: NSR, left atrial enlargement  CXR with no signs of fluid overload, SICD in place on left chest  Echo 9/16/21: Severe LV enlargement, severely reduced LVSystolic Fx, diffuse hypokinesis, severe functional MR   Patient is a 66 F from home (lives alone w/ HHA), baseline AA0x3, independently ambulating, with PMHx of CAD, T2DM (last A1c 5.9%), HTN, HFrEF/cardiomyopathy (EF 25% on 9/16/21, s/p SICD), COPD (no prior intubations), CVA, DVT (L leg 1994)/PE (s/p IVC filter 9/2020, and repeat saddle PE at [facility] 8/21-9/3 now on Coumadin), infrarenal AAA, cerebral aneurysm, Atypical Depression, R breast cancer (s/p chemo/XRT), Class I Obesity, PSHx of hysterectomy (for fibroids), and cholecystectomy presenting to the ED with worsening shortness of breath x 4 days; admitted for likely COPD exacerbation.     In the ED,  VS: temp 97.6F, HR 94bpm, /99mmHg, RR 18, 95% on RA   Labs significant for Na 147, Cl 116, Bicarb 11, BUN/Sr Cr 37/1.91  Trop 0.161  ECG: NSR, left atrial enlargement  CXR with no signs of fluid overload, SICD in place on left chest  Echo 9/16/21: Severe LV enlargement, severely reduced LVSystolic Fx, diffuse hypokinesis, severe functional MR  s/p Solumedrol 125mg, duonebs x 3 in the ED

## 2021-09-25 NOTE — ED CLERICAL - NS ED CLERK NOTE PRE-ARRIVAL INFORMATION; ADDITIONAL PRE-ARRIVAL INFORMATION
This patient is enrolled in the Heart Failure STARS readmission reduction initiative and has active care navigation. This patient can be followed up by the care navigation team within 24 hours.  To arrange close follow-up or to obtain additional clinical information, please call the contact number above. For patients followed by the NS cardiology heart failure team, please call the on call the HF office ([phone]) to speak with the HF NP during general business hours, otherwise call the cardiomyopathy attending ([phone]) for ALL PATIENTS PRIOR to decision for admission or observation.  Consider CDU for management of CHF exacerbations per guidelines

## 2021-09-25 NOTE — ED ADULT TRIAGE NOTE - CHIEF COMPLAINT QUOTE
pt states " I feel sob " , " sob since yesterday , not complaint with ans questions in triageon asking , pt is sating ' how many times are u going to keep asking me ?'

## 2021-09-25 NOTE — H&P ADULT - CONVERSATION DETAILS
Patient and her POA (Patient's Niece Mrs. Jaki Collazo) were updated about her current plan of care. When questioned about her thoughts on GOC status, the patient deferred to her POA. Mrs. Collazo was explained about patient's multiple hospital admissions in the past few months and reported that the patient had previously expressed the wish for resuscitation and trial of intubation but no feeding tube. Prior MOLST from 2020 signed and in Alpha.

## 2021-09-25 NOTE — H&P ADULT - PROBLEM SELECTOR PROBLEM 2
Chronic HFrEF (heart failure with reduced ejection fraction) Acute kidney injury superimposed on CKD

## 2021-09-25 NOTE — H&P ADULT - PROBLEM SELECTOR PLAN 6
C/w Statin for coexisting HLD C/w home meds while holding Entresto for CHEN  C/w Statin for coexisting HLD  DASH/DM diet

## 2021-09-25 NOTE — H&P ADULT - PROBLEM SELECTOR PLAN 10
Coumadin to be held as INR supratherapeutic   Protonix Qd   IMPROVE VTE Individual Risk Assessment  RISK                                                                Points  [x  ] Previous VTE                                                  3  [ x ] Thrombophilia                                               2  [  ] Lower limb paralysis                                      2        (unable to hold up >15 seconds)    [  ] Current Cancer                                              2         (within 6 months)  [ x ] Immobilization > 24 hrs                                1  [  ] ICU/CCU stay > 24 hours                              1  [ x ] Age > 60                                                      1  IMPROVE VTE Score _____7____ Coumadin to be held as INR supratherapeutic   Protonix Qd   multiple recent hospital admissions: Pt eval for possible placement  IMPROVE VTE Individual Risk Assessment  RISK                                                                Points  [x  ] Previous VTE                                                  3  [ x ] Thrombophilia                                               2  [  ] Lower limb paralysis                                      2        (unable to hold up >15 seconds)    [  ] Current Cancer                                              2         (within 6 months)  [ x ] Immobilization > 24 hrs                                1  [  ] ICU/CCU stay > 24 hours                              1  [ x ] Age > 60                                                      1  IMPROVE VTE Score _____7____

## 2021-09-25 NOTE — ED PROVIDER NOTE - CLINICAL SUMMARY MEDICAL DECISION MAKING FREE TEXT BOX
66F presenting with shortness of breath and chest pain. not hypoxic on RA. unable to get CTA 2/2 elevated Fercho. patient's health care proxy updated on status and all results. will admit. may need placement.

## 2021-09-25 NOTE — H&P ADULT - PROBLEM SELECTOR PLAN 4
Patient w/ hx of HFrEF/CM w/ SICD   Echo 9/16/21: Severe LV enlargement, severely reduced LVSystolic Fx, diffuse hypokinesis, severe functional MR  Saddle PE diagnosed in 9/2020, w/o cor pulmonale   PND/orthopnea at baseline, AHA class 3-4 HF   ECG: NSR, left atrial enlargement  Trop 0.161  not currently on diuretics (possibly due to intolerance vs MR?)- will hold here due to CHEN  Hold Entresto due to CHEN, C/w Toprol,    Likely minor trop elevation due to demand -> trend till peak, Stat EKG if any chest pain Patient w/ hx of HFrEF/CM w/ SICD   Echo 9/16/21: Severe LV enlargement, severely reduced LVSystolic Fx, diffuse hypokinesis, severe functional MR  Saddle PE diagnosed in 9/2020, w/o cor pulmonale   PND/orthopnea at baseline, AHA class 3-4 HF   ECG: NSR, left atrial enlargement  Trop 0.161  not currently on diuretics (possibly due to intolerance vs MR?)- will hold here due to CHEN  Hold Entresto due to CHEN, C/w Toprol,    Likely minor trop elevation due to demand -> trend till peak, Stat EKG if any chest pain  Cardio Dr. Osman

## 2021-09-25 NOTE — H&P ADULT - NSHPPHYSICALEXAM_GEN_ALL_CORE
GENERAL APPEARANCE: Obese AAF, AA0x3, in NAD on RA   THROAT: Oral cavity and pharynx normal. No inflammation, swelling, exudate, or lesions.  CARDIAC: Normal S1 and S2. No S3, S4 or murmurs. SICD + Rhythm is regular. There is trace peripheral edema, but NO cyanosis or pallor.  LUNGS: Prolonged expiratory wheezes in all zones  without rales, rhonchi, or diminished breath sounds.  ABDOMEN: Positive bowel sounds. Soft, nondistended, nontender. No guarding or rebound. No masses.  EXTREMITIES: No significant deformity or joint abnormality. No edema. Peripheral pulses intact. No varicosities.  NEUROLOGICAL: CN II-XII intact. Strength and sensation symmetric and intact throughout. Reflexes 2+ throughout.   SKIN: No skin breakdown, lesions or rashes noted GENERAL APPEARANCE: Obese AAF, AA0x3, in NAD on RA   THROAT: Oral cavity and pharynx normal. No inflammation, swelling, exudate, or lesions.  CARDIAC: Normal S1 and S2. No S3, S4 or murmurs. SICD + Rhythm is regular. There is trace peripheral edema, but NO cyanosis or pallor.  LUNGS: Prolonged expiratory wheezes in all zones  without rales, rhonchi, or diminished breath sounds.  ABDOMEN: Positive bowel sounds. Soft, nondistended, nontender. No guarding or rebound. No masses.  EXTREMITIES: No significant deformity or joint abnormality. trace edema. Peripheral pulses intact. No varicosities.  NEUROLOGICAL: CN II-XII intact. Strength and sensation symmetric and intact throughout. Reflexes 2+ throughout.   SKIN: Extremities cool, No skin breakdown, lesions or rashes noted

## 2021-09-25 NOTE — ED PROVIDER NOTE - OBJECTIVE STATEMENT
66F, pmh of infrarenal AAA, cerebral aneurysm, R breast cancer (s/p chemo/XRT), CAD, HTN, CHF/cardiomyopathy, COPD, CVA, DVT, T2DM, PE (s/p IVC filter 9/2020), fibroids (s/p hysterectomy), s/p cholecystectomy, saddle PE (w/ hospital admission 8/21 - 9/3/21 at [facility]) without acute cor pulmonale (on warfarin), presenting with shortness of breath. patient reports worsening shortness of breath and chest pain for the past several weeks. was recently discharged from [facility] for similar symptoms. no fever, nausea, vomiting, abdominal pain, pain or burning with urination.

## 2021-09-25 NOTE — ED PROVIDER NOTE - NSICDXPASTSURGICALHX_GEN_ALL_CORE_FT
PAST SURGICAL HISTORY:  Breast cancer infusaport insertion- 2014- left chest    Fibroid uterus sp hystrectomy 1196    [place] filter in place 1994    S/P hysterectomy 1994    S/P lumpectomy, right breast 2014, revision of position- 2015

## 2021-09-25 NOTE — H&P ADULT - PROBLEM SELECTOR PROBLEM 4
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

## 2021-09-25 NOTE — H&P ADULT - NSICDXPASTSURGICALHX_GEN_ALL_CORE_FT
PAST SURGICAL HISTORY:  Breast cancer infusaport insertion- 2014- left chest    Fibroid uterus sp hystrectomy 1196    [place] filter in place 1994    S/P hysterectomy 1994    S/P lumpectomy, right breast 2014, revision of position- 2015

## 2021-09-25 NOTE — H&P ADULT - PROBLEM SELECTOR PLAN 1
Pt p/w Pt p/w prolonged end expiratory wheezes   Hx of COPD, not on home 02, no prior intubations  GOLD 2 by criteria  Labs not significant for superimposed infection  CXR non focal, does not appear to be fluid overloaded o/e or imaging  C/w Solumedrol 40 Q8h x 1 day and taper down   S/p Duoneb x 3, c/w Albuterol MDI Q6h   F/u RVP  Pulm Consult Dr. Mendoza Pt p/w prolonged end expiratory wheezes   Hx of COPD, not on home 02, no prior intubations  GOLD 2 by criteria  Labs not significant for superimposed infection  CXR non focal, does not appear to be fluid overloaded o/e or imaging  C/w Solumedrol 40 Q8h x 1 day and taper down   PPI ppx, sliding scale coverage   S/p Duoneb x 3, c/w Albuterol MDI Q6h   F/u RVP  Pulm Consult Dr. Mendoza

## 2021-09-26 LAB
ALBUMIN SERPL ELPH-MCNC: 3.4 G/DL — LOW (ref 3.5–5)
ALP SERPL-CCNC: 314 U/L — HIGH (ref 40–120)
ALT FLD-CCNC: 41 U/L DA — SIGNIFICANT CHANGE UP (ref 10–60)
ANION GAP SERPL CALC-SCNC: 17 MMOL/L — SIGNIFICANT CHANGE UP (ref 5–17)
APTT BLD: 33.2 SEC — SIGNIFICANT CHANGE UP (ref 27.5–35.5)
AST SERPL-CCNC: 43 U/L — HIGH (ref 10–40)
BASOPHILS # BLD AUTO: 0.01 K/UL — SIGNIFICANT CHANGE UP (ref 0–0.2)
BASOPHILS NFR BLD AUTO: 0.2 % — SIGNIFICANT CHANGE UP (ref 0–2)
BILIRUB SERPL-MCNC: 2.1 MG/DL — HIGH (ref 0.2–1.2)
BUN SERPL-MCNC: 42 MG/DL — HIGH (ref 7–18)
CALCIUM SERPL-MCNC: 9.4 MG/DL — SIGNIFICANT CHANGE UP (ref 8.4–10.5)
CHLORIDE SERPL-SCNC: 115 MMOL/L — HIGH (ref 96–108)
CO2 SERPL-SCNC: 15 MMOL/L — LOW (ref 22–31)
COVID-19 SPIKE DOMAIN AB INTERP: POSITIVE
COVID-19 SPIKE DOMAIN ANTIBODY RESULT: >250 U/ML — HIGH
CREAT ?TM UR-MCNC: 184 MG/DL — SIGNIFICANT CHANGE UP
CREAT SERPL-MCNC: 1.6 MG/DL — HIGH (ref 0.5–1.3)
EOSINOPHIL # BLD AUTO: 0 K/UL — SIGNIFICANT CHANGE UP (ref 0–0.5)
EOSINOPHIL NFR BLD AUTO: 0 % — SIGNIFICANT CHANGE UP (ref 0–6)
GLUCOSE BLDC GLUCOMTR-MCNC: 162 MG/DL — HIGH (ref 70–99)
GLUCOSE BLDC GLUCOMTR-MCNC: 190 MG/DL — HIGH (ref 70–99)
GLUCOSE BLDC GLUCOMTR-MCNC: 198 MG/DL — HIGH (ref 70–99)
GLUCOSE BLDC GLUCOMTR-MCNC: 232 MG/DL — HIGH (ref 70–99)
GLUCOSE SERPL-MCNC: 184 MG/DL — HIGH (ref 70–99)
HCT VFR BLD CALC: 39.1 % — SIGNIFICANT CHANGE UP (ref 34.5–45)
HGB BLD-MCNC: 12.4 G/DL — SIGNIFICANT CHANGE UP (ref 11.5–15.5)
IMM GRANULOCYTES NFR BLD AUTO: 0.8 % — SIGNIFICANT CHANGE UP (ref 0–1.5)
INR BLD: 4.4 RATIO — HIGH (ref 0.88–1.16)
LYMPHOCYTES # BLD AUTO: 1.11 K/UL — SIGNIFICANT CHANGE UP (ref 1–3.3)
LYMPHOCYTES # BLD AUTO: 22.2 % — SIGNIFICANT CHANGE UP (ref 13–44)
MCHC RBC-ENTMCNC: 30.9 PG — SIGNIFICANT CHANGE UP (ref 27–34)
MCHC RBC-ENTMCNC: 31.7 GM/DL — LOW (ref 32–36)
MCV RBC AUTO: 97.5 FL — SIGNIFICANT CHANGE UP (ref 80–100)
MONOCYTES # BLD AUTO: 0.16 K/UL — SIGNIFICANT CHANGE UP (ref 0–0.9)
MONOCYTES NFR BLD AUTO: 3.2 % — SIGNIFICANT CHANGE UP (ref 2–14)
NEUTROPHILS # BLD AUTO: 3.67 K/UL — SIGNIFICANT CHANGE UP (ref 1.8–7.4)
NEUTROPHILS NFR BLD AUTO: 73.6 % — SIGNIFICANT CHANGE UP (ref 43–77)
NRBC # BLD: 0 /100 WBCS — SIGNIFICANT CHANGE UP (ref 0–0)
OSMOLALITY UR: 775 MOS/KG — SIGNIFICANT CHANGE UP (ref 50–1200)
PLATELET # BLD AUTO: 269 K/UL — SIGNIFICANT CHANGE UP (ref 150–400)
POTASSIUM SERPL-MCNC: 4.4 MMOL/L — SIGNIFICANT CHANGE UP (ref 3.5–5.3)
POTASSIUM SERPL-SCNC: 4.4 MMOL/L — SIGNIFICANT CHANGE UP (ref 3.5–5.3)
PROT SERPL-MCNC: 7.6 G/DL — SIGNIFICANT CHANGE UP (ref 6–8.3)
PROTHROM AB SERPL-ACNC: 48.8 SEC — HIGH (ref 10.6–13.6)
RBC # BLD: 4.01 M/UL — SIGNIFICANT CHANGE UP (ref 3.8–5.2)
RBC # FLD: 17.8 % — HIGH (ref 10.3–14.5)
SARS-COV-2 IGG+IGM SERPL QL IA: >250 U/ML — HIGH
SARS-COV-2 IGG+IGM SERPL QL IA: POSITIVE
SODIUM SERPL-SCNC: 147 MMOL/L — HIGH (ref 135–145)
SODIUM UR-SCNC: 25 MMOL/L — SIGNIFICANT CHANGE UP
UUN UR-MCNC: 1245 MG/DL — SIGNIFICANT CHANGE UP
WBC # BLD: 4.99 K/UL — SIGNIFICANT CHANGE UP (ref 3.8–10.5)
WBC # FLD AUTO: 4.99 K/UL — SIGNIFICANT CHANGE UP (ref 3.8–10.5)

## 2021-09-26 RX ORDER — ALBUTEROL 90 UG/1
1 AEROSOL, METERED ORAL
Refills: 0 | Status: DISCONTINUED | OUTPATIENT
Start: 2021-09-26 | End: 2021-10-06

## 2021-09-26 RX ADMIN — Medication 15 MILLIGRAM(S): at 05:54

## 2021-09-26 RX ADMIN — Medication 2: at 17:01

## 2021-09-26 RX ADMIN — ATORVASTATIN CALCIUM 40 MILLIGRAM(S): 80 TABLET, FILM COATED ORAL at 21:35

## 2021-09-26 RX ADMIN — Medication 40 MILLIGRAM(S): at 05:52

## 2021-09-26 RX ADMIN — Medication 15 MILLIGRAM(S): at 21:35

## 2021-09-26 RX ADMIN — ANASTROZOLE 1 MILLIGRAM(S): 1 TABLET ORAL at 14:06

## 2021-09-26 RX ADMIN — Medication 1 APPLICATORFUL: at 22:40

## 2021-09-26 RX ADMIN — Medication 25 MICROGRAM(S): at 05:53

## 2021-09-26 RX ADMIN — Medication 15 MILLIGRAM(S): at 14:07

## 2021-09-26 RX ADMIN — ALBUTEROL 1 PUFF(S): 90 AEROSOL, METERED ORAL at 21:36

## 2021-09-26 RX ADMIN — Medication 40 MILLIGRAM(S): at 14:07

## 2021-09-26 RX ADMIN — Medication 25 MILLIGRAM(S): at 05:53

## 2021-09-26 RX ADMIN — ALBUTEROL 1 PUFF(S): 90 AEROSOL, METERED ORAL at 14:07

## 2021-09-26 RX ADMIN — PANTOPRAZOLE SODIUM 40 MILLIGRAM(S): 20 TABLET, DELAYED RELEASE ORAL at 05:52

## 2021-09-26 RX ADMIN — Medication 1: at 21:35

## 2021-09-26 NOTE — CHART NOTE - NSCHARTNOTEFT_GEN_A_CORE
EVENT: Notified by RN regarding patient c/o vaginal itching    HPI:   66 year old female from home, H/U CVA/HTN/CHF(LVEF 25%)/A FIB/SICD/CAD/DM/CKD/COPD/DVT/PE/IVC filter/breast CA/cerebral aneurysm, admitted for worsening SOB, COPD exacerbation/CHF/CHEN/CKD, elevated BNP, supertherapeutic INR, coumadin on hold.     Patient c/o vaginal itching and irritation, no report of painful urination    OBJECTIVE:  Vital Signs Last 24 Hrs  T(C): 36.2 (26 Sep 2021 21:11), Max: 36.6 (25 Sep 2021 22:06)  T(F): 97.2 (26 Sep 2021 21:11), Max: 97.9 (25 Sep 2021 22:06)  HR: 75 (26 Sep 2021 21:11) (75 - 87)  BP: 120/77 (26 Sep 2021 21:11) (119/76 - 143/86)  BP(mean): 81 (26 Sep 2021 21:11) (81 - 101)  RR: 16 (26 Sep 2021 21:11) (16 - 18)  SpO2: 99% (26 Sep 2021 21:11) (92% - 100%)    MEDS:  acetaminophen   Tablet .. 650 milliGRAM(s) Oral every 6 hours PRN  ALBUTerol    90 MICROgram(s) HFA Inhaler      ALBUTerol    90 MICROgram(s) HFA Inhaler 1 Puff(s) Inhalation two times a day  aluminum hydroxide/magnesium hydroxide/simethicone Suspension 30 milliLiter(s) Oral every 4 hours PRN  anastrozole 1 milliGRAM(s) Oral daily  atorvastatin 40 milliGRAM(s) Oral at bedtime  clotrimazole 1% Vaginal Cream 1 Applicatorful Vaginal at bedtime  dextrose 50% Injectable 25 Gram(s) IV Push once  hydrALAZINE 15 milliGRAM(s) Oral every 8 hours  influenza   Vaccine 0.5 milliLiter(s) IntraMuscular once  insulin lispro (ADMELOG) corrective regimen sliding scale   SubCutaneous Before meals and at bedtime  levothyroxine 25 MICROGram(s) Oral daily  melatonin 3 milliGRAM(s) Oral at bedtime PRN  metoprolol succinate ER 25 milliGRAM(s) Oral daily  ondansetron Injectable 4 milliGRAM(s) IV Push every 8 hours PRN  pantoprazole    Tablet 40 milliGRAM(s) Oral before breakfast    LABS:                        12.4   4.99  )-----------( 269      ( 26 Sep 2021 07:04 )             39.1   CARDIAC MARKERS ( 25 Sep 2021 21:13 )  0.161 ng/mL / x     / 220 U/L / x     / 3.2 ng/mL  CARDIAC MARKERS ( 25 Sep 2021 13:05 )  0.161 ng/mL / x     / x     / x     / x        09-26    147<H>  |  115<H>  |  42<H>  ----------------------------<  184<H>  4.4   |  15<L>  |  1.60<H>    Ca    9.4      26 Sep 2021 07:04  Mg     1.9     09-25    TPro  7.6  /  Alb  3.4<L>  /  TBili  2.1<H>  /  DBili  x   /  AST  43<H>  /  ALT  41  /  AlkPhos  314<H>  09-26        PROBLEM: Vaginitis   PLAN:   clotrimazole 1% Vaginal Cream 1 Applicatorful Vaginal at bedtime x 7 days ordered   Perineal care - keep patient clean and dry  Supportive care  FOLLOW UP / RESULT: Reassess for effectiveness of above intervention

## 2021-09-26 NOTE — PROGRESS NOTE ADULT - SUBJECTIVE AND OBJECTIVE BOX
Patient is a 66y old  Female who presents with a chief complaint of Shortness of Breath (25 Sep 2021 18:12)/COPD exacerbation/CHF, continue steroid/HHN treatment, ECHO, pulmonary cardiology consult      INTERVAL HPI/OVERNIGHT EVENTS:  T(C): 36.2 (09-26-21 @ 05:48), Max: 36.6 (09-25-21 @ 15:50)  HR: 84 (09-26-21 @ 05:48) (84 - 94)  BP: 143/86 (09-26-21 @ 05:48) (131/86 - 150/99)  RR: 18 (09-26-21 @ 05:48) (18 - 18)  SpO2: 100% (09-26-21 @ 05:48) (98% - 100%)  Wt(kg): --    LABS:                        12.4   4.99  )-----------( 269      ( 26 Sep 2021 07:04 )             39.1     09-26    147<H>  |  115<H>  |  42<H>  ----------------------------<  184<H>  4.4   |  15<L>  |  1.60<H>    Ca    9.4      26 Sep 2021 07:04  Mg     1.9     09-25    TPro  7.6  /  Alb  3.4<L>  /  TBili  2.1<H>  /  DBili  x   /  AST  43<H>  /  ALT  41  /  AlkPhos  314<H>  09-26    PT/INR - ( 26 Sep 2021 07:04 )   PT: 48.8 sec;   INR: 4.40 ratio         PTT - ( 26 Sep 2021 07:04 )  PTT:33.2 sec    CAPILLARY BLOOD GLUCOSE      POCT Blood Glucose.: 162 mg/dL (26 Sep 2021 07:54)  POCT Blood Glucose.: 112 mg/dL (25 Sep 2021 22:59)  POCT Blood Glucose.: 97 mg/dL (25 Sep 2021 13:09)        RADIOLOGY & ADDITIONAL TESTS:    Consultant(s) Notes Reviewed:  [x ] YES  [ ] NO    PHYSICAL EXAM:  GENERAL: well built, well nourished  HEAD:  Atraumatic, Normocephalic  EYES: EOMI, PERRLA, conjunctiva and sclera clear  ENT: No tonsillar erythema, exudates, or enlargement; Moist mucous membranes, Good dentition, No lesions  NECK: Supple, No JVD, Normal thyroid, no enlarged nodes  NERVOUS SYSTEM:  Alert & Oriented X3, Good concentration; Motor Strength 5/5 B/L upper and lower extremities; DTRs 2+ intact and symmetric, sensory intact  CHEST/LUNG: B/L good air entry; No rales, rhonchi, or wheezing  HEART: S1S2 irregular rate controlled  ABDOMEN: Soft, Nontender, Nondistended; Bowel sounds present  EXTREMITIES:  2+ Peripheral Pulses, No clubbing, cyanosis, or edema  LYMPH: No lymphadenopathy noted  SKIN: No rashes or lesions    Care Discussed with Consultants/Other Providers [ x] YES  [ ] NO

## 2021-09-26 NOTE — PROGRESS NOTE ADULT - ASSESSMENT
66 yr old female from home, H/U CVA/HTN/CHF(LVEF 25%)/A FIB/SICD/CAD/DM/CKD/COPD/DVT/PE/IVC filter/breast CA/cerebral aneurysm, admit hospital for worsening SOB, COPD exacerbation/CHF/CHEN/CKD, elevated BNP, supertherapeutic INR, coumadin on hold, F/U INR am, steroid HHN 2 6hr, F/U ECHO, troponin, cardiology/pulmonary consult, F/U lab AM.

## 2021-09-27 DIAGNOSIS — Z79.01 LONG TERM (CURRENT) USE OF ANTICOAGULANTS: ICD-10-CM

## 2021-09-27 DIAGNOSIS — E78.5 HYPERLIPIDEMIA, UNSPECIFIED: ICD-10-CM

## 2021-09-27 DIAGNOSIS — Z02.9 ENCOUNTER FOR ADMINISTRATIVE EXAMINATIONS, UNSPECIFIED: ICD-10-CM

## 2021-09-27 DIAGNOSIS — I50.23 ACUTE ON CHRONIC SYSTOLIC (CONGESTIVE) HEART FAILURE: ICD-10-CM

## 2021-09-27 LAB
ANION GAP SERPL CALC-SCNC: 14 MMOL/L — SIGNIFICANT CHANGE UP (ref 5–17)
APTT BLD: 32.5 SEC — SIGNIFICANT CHANGE UP (ref 27.5–35.5)
BUN SERPL-MCNC: 59 MG/DL — HIGH (ref 7–18)
CALCIUM SERPL-MCNC: 9.1 MG/DL — SIGNIFICANT CHANGE UP (ref 8.4–10.5)
CHLORIDE SERPL-SCNC: 111 MMOL/L — HIGH (ref 96–108)
CK MB BLD-MCNC: 3.2 % — SIGNIFICANT CHANGE UP (ref 0–3.5)
CK MB CFR SERPL CALC: 11.9 NG/ML — HIGH (ref 0–3.6)
CK SERPL-CCNC: 373 U/L — HIGH (ref 21–215)
CO2 SERPL-SCNC: 14 MMOL/L — LOW (ref 22–31)
CREAT SERPL-MCNC: 2.52 MG/DL — HIGH (ref 0.5–1.3)
GLUCOSE BLDC GLUCOMTR-MCNC: 166 MG/DL — HIGH (ref 70–99)
GLUCOSE BLDC GLUCOMTR-MCNC: 173 MG/DL — HIGH (ref 70–99)
GLUCOSE BLDC GLUCOMTR-MCNC: 212 MG/DL — HIGH (ref 70–99)
GLUCOSE BLDC GLUCOMTR-MCNC: 220 MG/DL — HIGH (ref 70–99)
GLUCOSE SERPL-MCNC: 188 MG/DL — HIGH (ref 70–99)
HCT VFR BLD CALC: 39.9 % — SIGNIFICANT CHANGE UP (ref 34.5–45)
HGB BLD-MCNC: 13 G/DL — SIGNIFICANT CHANGE UP (ref 11.5–15.5)
INR BLD: 7.54 RATIO — CRITICAL HIGH (ref 0.88–1.16)
MAGNESIUM SERPL-MCNC: 2.1 MG/DL — SIGNIFICANT CHANGE UP (ref 1.6–2.6)
MCHC RBC-ENTMCNC: 31.8 PG — SIGNIFICANT CHANGE UP (ref 27–34)
MCHC RBC-ENTMCNC: 32.6 GM/DL — SIGNIFICANT CHANGE UP (ref 32–36)
MCV RBC AUTO: 97.6 FL — SIGNIFICANT CHANGE UP (ref 80–100)
NRBC # BLD: 0 /100 WBCS — SIGNIFICANT CHANGE UP (ref 0–0)
PHOSPHATE SERPL-MCNC: 3.7 MG/DL — SIGNIFICANT CHANGE UP (ref 2.5–4.5)
PLATELET # BLD AUTO: 282 K/UL — SIGNIFICANT CHANGE UP (ref 150–400)
POTASSIUM SERPL-MCNC: 4.8 MMOL/L — SIGNIFICANT CHANGE UP (ref 3.5–5.3)
POTASSIUM SERPL-SCNC: 4.8 MMOL/L — SIGNIFICANT CHANGE UP (ref 3.5–5.3)
PROTHROM AB SERPL-ACNC: 81.5 SEC — HIGH (ref 10.6–13.6)
RBC # BLD: 4.09 M/UL — SIGNIFICANT CHANGE UP (ref 3.8–5.2)
RBC # FLD: 17.9 % — HIGH (ref 10.3–14.5)
SODIUM SERPL-SCNC: 139 MMOL/L — SIGNIFICANT CHANGE UP (ref 135–145)
TROPONIN I SERPL-MCNC: 0.14 NG/ML — HIGH (ref 0–0.04)
WBC # BLD: 9.83 K/UL — SIGNIFICANT CHANGE UP (ref 3.8–10.5)
WBC # FLD AUTO: 9.83 K/UL — SIGNIFICANT CHANGE UP (ref 3.8–10.5)

## 2021-09-27 PROCEDURE — 99233 SBSQ HOSP IP/OBS HIGH 50: CPT | Mod: GC

## 2021-09-27 PROCEDURE — 99223 1ST HOSP IP/OBS HIGH 75: CPT

## 2021-09-27 RX ORDER — HYDRALAZINE HCL 50 MG
10 TABLET ORAL EVERY 8 HOURS
Refills: 0 | Status: DISCONTINUED | OUTPATIENT
Start: 2021-09-27 | End: 2021-09-27

## 2021-09-27 RX ORDER — HYDRALAZINE HCL 50 MG
10 TABLET ORAL EVERY 8 HOURS
Refills: 0 | Status: DISCONTINUED | OUTPATIENT
Start: 2021-09-27 | End: 2021-10-06

## 2021-09-27 RX ORDER — ISOSORBIDE MONONITRATE 60 MG/1
30 TABLET, EXTENDED RELEASE ORAL DAILY
Refills: 0 | Status: DISCONTINUED | OUTPATIENT
Start: 2021-09-27 | End: 2021-10-06

## 2021-09-27 RX ORDER — ANASTROZOLE 1 MG/1
1 TABLET ORAL DAILY
Refills: 0 | Status: DISCONTINUED | OUTPATIENT
Start: 2021-09-27 | End: 2021-10-06

## 2021-09-27 RX ADMIN — ALBUTEROL 1 PUFF(S): 90 AEROSOL, METERED ORAL at 12:28

## 2021-09-27 RX ADMIN — Medication 1: at 12:26

## 2021-09-27 RX ADMIN — Medication 40 MILLIGRAM(S): at 07:59

## 2021-09-27 RX ADMIN — Medication 10 MILLIGRAM(S): at 13:50

## 2021-09-27 RX ADMIN — ALBUTEROL 1 PUFF(S): 90 AEROSOL, METERED ORAL at 22:00

## 2021-09-27 RX ADMIN — ISOSORBIDE MONONITRATE 30 MILLIGRAM(S): 60 TABLET, EXTENDED RELEASE ORAL at 13:50

## 2021-09-27 RX ADMIN — Medication 10 MILLIGRAM(S): at 22:00

## 2021-09-27 RX ADMIN — Medication 2: at 17:15

## 2021-09-27 RX ADMIN — ANASTROZOLE 1 MILLIGRAM(S): 1 TABLET ORAL at 13:50

## 2021-09-27 RX ADMIN — Medication 25 MICROGRAM(S): at 06:10

## 2021-09-27 RX ADMIN — Medication 2: at 22:00

## 2021-09-27 RX ADMIN — Medication 1: at 07:59

## 2021-09-27 RX ADMIN — Medication 1 APPLICATORFUL: at 22:00

## 2021-09-27 RX ADMIN — ATORVASTATIN CALCIUM 40 MILLIGRAM(S): 80 TABLET, FILM COATED ORAL at 22:00

## 2021-09-27 RX ADMIN — PANTOPRAZOLE SODIUM 40 MILLIGRAM(S): 20 TABLET, DELAYED RELEASE ORAL at 06:09

## 2021-09-27 RX ADMIN — Medication 25 MILLIGRAM(S): at 06:10

## 2021-09-27 RX ADMIN — Medication 15 MILLIGRAM(S): at 06:09

## 2021-09-27 NOTE — PROGRESS NOTE ADULT - PROBLEM SELECTOR PROBLEM 6
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

## 2021-09-27 NOTE — PROGRESS NOTE ADULT - PROBLEM SELECTOR PLAN 8
Last A1c 5.9%  Not on any apparent home meds   C/w HSS, Accuchecks ACHS as on steroids  DASH/DM diet A1c 5.9  Not on any home meds   C/w HSS, Accuchecks ACHS as on steroids  DASH/DM diet

## 2021-09-27 NOTE — CONSULT NOTE ADULT - ASSESSMENT
66-year-old female former smoker with DM, HTN, HLD, NICM (EF 31% by MUGA 07/2019) s/p SubQ ICD, PE s/p IVC filter and LV thrombus on warfarin who presented ot the ER with dyspnea.     1. Dyspnea: Multifactorial, likely combination of dyspnea in setting fo recent saddle PE as well as acute on chronic systolic HF exacerbation. Since patient came with supertherapeutic INR, there is unlikely to be progression of PE.     2. Chronic, systolic HF exacerbation (NICM):  -Patient is on metoprolol ER 25mg  -She is also on hydralazine 15mg PO Q8H. This is contraindicated in the setting of CAD without concomitant Imdur. Therefore, would change hydralazine to 10mg PO Q8H and add on Imdur ER 30mg  -Holding off Entresto/Spironolactone due to CHEN  -Recommend nephrology input  -Patient has SubQ ICD, last checked June 1.2021, battery at 81%, no events noted    3. HTN: Well controlled on metoprolol, hydralazine; make changes as per above with addition of Imdur.    4. HLD: Previously on atorvastatin, holding off for now to improve medication compliance.     ***Note that this is a preliminary note and any recommendations should NOT be carried out until this note is finalized. *** 66-year-old female former smoker with DM, HTN, HLD, NICM (EF 31% by MUGA 07/2019) s/p SubQ ICD, PE s/p IVC filter and LV thrombus on warfarin who presented ot the ER with dyspnea.     1. Dyspnea: Multifactorial, likely combination of dyspnea in setting fo recent saddle PE as well as acute on chronic systolic HF exacerbation. Since patient came with supertherapeutic INR, there is unlikely to be progression of PE.   -Patient's pulmonologist is Dr. Cohen    2. Chronic, systolic HF exacerbation (NICM):  -Patient is on metoprolol ER 25mg  -She is also on hydralazine 15mg PO Q8H. This is contraindicated in the setting of CAD without concomitant Imdur. Therefore, would change hydralazine to 10mg PO Q8H and add on Imdur ER 30mg  -Holding off Entresto/Spironolactone due to CHEN  -Recommend nephrology input  -Patient has SubQ ICD, last checked June 1.2021, battery at 81%, no events noted    3. HTN: Well controlled on metoprolol, hydralazine; make changes as per above with addition of Imdur.    4. HLD: Previously on atorvastatin, holding off for now to improve medication compliance.     ***Note that this is a preliminary note and any recommendations should NOT be carried out until this note is finalized. *** 66-year-old female former smoker with DM, HTN, HLD, NICM (EF 31% by MUGA 07/2019) s/p SubQ ICD, PE s/p IVC filter and LV thrombus on warfarin who presented ot the ER with dyspnea.     1. Dyspnea: Multifactorial, likely combination of dyspnea in setting of recent saddle PE as well as acute on chronic systolic HF exacerbation. Since patient came with supertherapeutic INR, there is unlikely to be progression of PE.   -Patient's pulmonologist is Dr. Cohen    2. Chronic, systolic HF exacerbation (NICM):  -Patient is on metoprolol ER 25mg  -She is also on hydralazine 15mg PO Q8H. This is contraindicated in the setting of CAD without concomitant Imdur. Therefore, would change hydralazine to 10mg PO Q8H and add on Imdur ER 30mg  -Holding off Entresto/Spironolactone due to CHEN  -Recommend nephrology input  -Patient has SubQ ICD, last checked June 1.2021, battery at 81%, no events noted  -OP follow up with me in the office after discharge    3. HTN: Well controlled on metoprolol, hydralazine; make changes as per above with addition of Imdur.    4. HLD: Previously on atorvastatin, holding off for now to improve medication compliance.     66-year-old female former smoker with DM, HTN, HLD, NICM (EF 31% by MUGA 07/2019) s/p SubQ ICD, PE s/p IVC filter and LV thrombus on warfarin who presented ot the ER with dyspnea.     1. Dyspnea: Multifactorial, likely combination of dyspnea in setting of recent saddle PE as well as acute on chronic systolic HF exacerbation. Since patient came with supertherapeutic INR, there is unlikely to be progression of PE.   -Patient's pulmonologist is Dr. Cohen    2. Chronic, systolic HF exacerbation (NICM):  -Patient is on metoprolol ER 25mg  -She is also on hydralazine 15mg PO Q8H. This is contraindicated in the setting of CAD without concomitant Imdur. Therefore, would change hydralazine to 10mg PO Q8H and add on Imdur ER 30mg  -Holding off Entresto/Spironolactone due to CHNE  -Her BNP of 12,000 is improved compared to 18,000 2 weeks ago, and may be close to baseline. She appears fairly euvolemic on exam.   -Consider nephrology input   -Patient has SubQ ICD, last checked June 1.2021, battery at 81%, no events noted  -OP follow up with me in the office after discharge    3. HTN: Well controlled on metoprolol, hydralazine; make changes as per above with addition of Imdur.    4. HLD: Previously on atorvastatin, holding off for now to improve medication compliance.

## 2021-09-27 NOTE — PROGRESS NOTE ADULT - SUBJECTIVE AND OBJECTIVE BOX
Patient is a 66y old  Female who presents with a chief complaint of Shortness of Breath (27 Sep 2021 08:19)    INTERVAL HPI/OVERNIGHT EVENTS: no new complaitns. stated, " I am bored"     REVIEW OF SYSTEMS:  CONSTITUTIONAL: No fever, chills  ENMT:  No difficulty hearing, no change in vision  NECK: No pain or stiffness  RESPIRATORY: No cough, SOB  CARDIOVASCULAR: No chest pain, palpitations  GASTROINTESTINAL: No abdominal pain. No nausea, vomiting, or diarrhea  GENITOURINARY: No dysuria  NEUROLOGICAL: No HA  SKIN: No itching, burning, rashes, or lesions   LYMPH NODES: No enlarged glands  ENDOCRINE: No heat or cold intolerance; No hair loss  MUSCULOSKELETAL: No joint pain or swelling; No muscle, back, or extremity pain  PSYCHIATRIC: No depression, anxiety  HEME/LYMPH: No easy bruising, or bleeding gums    T(C): 36.3 (09-27-21 @ 05:26), Max: 36.6 (09-26-21 @ 13:18)  HR: 81 (09-27-21 @ 05:26) (75 - 81)  BP: 120/89 (09-27-21 @ 05:26) (119/76 - 125/86)  RR: 16 (09-27-21 @ 05:26) (16 - 16)  SpO2: 100% (09-27-21 @ 05:26) (92% - 100%)  Wt(kg): --Vital Signs Last 24 Hrs  T(C): 36.3 (27 Sep 2021 05:26), Max: 36.6 (26 Sep 2021 13:18)  T(F): 97.3 (27 Sep 2021 05:26), Max: 97.8 (26 Sep 2021 13:18)  HR: 81 (27 Sep 2021 05:26) (75 - 81)  BP: 120/89 (27 Sep 2021 05:26) (119/76 - 125/86)  BP(mean): 81 (26 Sep 2021 21:11) (81 - 81)  RR: 16 (27 Sep 2021 05:26) (16 - 16)  SpO2: 100% (27 Sep 2021 05:26) (92% - 100%)    MEDICATIONS  (STANDING):  ALBUTerol    90 MICROgram(s) HFA Inhaler      ALBUTerol    90 MICROgram(s) HFA Inhaler 1 Puff(s) Inhalation two times a day  anastrozole 1 milliGRAM(s) Oral daily  atorvastatin 40 milliGRAM(s) Oral at bedtime  clotrimazole 1% Vaginal Cream 1 Applicatorful Vaginal at bedtime  dextrose 50% Injectable 25 Gram(s) IV Push once  hydrALAZINE 15 milliGRAM(s) Oral every 8 hours  influenza   Vaccine 0.5 milliLiter(s) IntraMuscular once  insulin lispro (ADMELOG) corrective regimen sliding scale   SubCutaneous Before meals and at bedtime  levothyroxine 25 MICROGram(s) Oral daily  metoprolol succinate ER 25 milliGRAM(s) Oral daily  pantoprazole    Tablet 40 milliGRAM(s) Oral before breakfast  predniSONE   Tablet 40 milliGRAM(s) Oral daily    MEDICATIONS  (PRN):  acetaminophen   Tablet .. 650 milliGRAM(s) Oral every 6 hours PRN Temp greater or equal to 38.5C (101.3F), Mild Pain (1 - 3)  aluminum hydroxide/magnesium hydroxide/simethicone Suspension 30 milliLiter(s) Oral every 4 hours PRN Dyspepsia  melatonin 3 milliGRAM(s) Oral at bedtime PRN Insomnia  ondansetron Injectable 4 milliGRAM(s) IV Push every 8 hours PRN Nausea and/or Vomiting      PHYSICAL EXAM:  GENERAL: NAD  EYES: clear conjunctiva; EOMI  ENMT: Moist mucous membranes  NECK: Supple, No JVD, Normal thyroid  CHEST/LUNG: Clear to auscultation bilaterally; No rales, rhonchi, wheezing, or rubs  HEART: S1, S2, Regular rate and rhythm  ABDOMEN: Soft, Nontender, Nondistended; Bowel sounds present  NEURO: Alert & Oriented X3  EXTREMITIES: No LE edema, no calf tenderness  LYMPH: No lymphadenopathy noted  SKIN: No rashes or lesions    Consultant(s) Notes Reviewed:  [x ] YES  [ ] NO  Care Discussed with Consultants/Other Providers [ x] YES  [ ] NO    LABS:                        13.0   9.83  )-----------( 282      ( 27 Sep 2021 07:18 )             39.9     09-27    139  |  111<H>  |  59<H>  ----------------------------<  188<H>  4.8   |  14<L>  |  2.52<H>    Ca    9.1      27 Sep 2021 07:18  Phos  3.7     09-27  Mg     2.1     09-27    TPro  7.6  /  Alb  3.4<L>  /  TBili  2.1<H>  /  DBili  x   /  AST  43<H>  /  ALT  41  /  AlkPhos  314<H>  09-26    PT/INR - ( 27 Sep 2021 07:18 )   PT: 81.5 sec;   INR: 7.54 ratio         PTT - ( 27 Sep 2021 07:18 )  PTT:32.5 sec  CAPILLARY BLOOD GLUCOSE    POCT Blood Glucose.: 166 mg/dL (27 Sep 2021 07:45)  POCT Blood Glucose.: 190 mg/dL (26 Sep 2021 21:25)  POCT Blood Glucose.: 232 mg/dL (26 Sep 2021 16:30)  POCT Blood Glucose.: 198 mg/dL (26 Sep 2021 11:15)    RADIOLOGY & ADDITIONAL TESTS:    Imaging Personally Reviewed:  [x ] YES  [ ] NO  < from: Xray Chest 1 View- PORTABLE-Urgent (Xray Chest 1 View- PORTABLE-Urgent .) (09.25.21 @ 15:25) >  EXAM:  XR CHEST PORTABLE URGENT 1V                            PROCEDURE DATE:  09/25/2021          INTERPRETATION:  Chest radiograph (one view)     CPT 53983    CLINICAL INFORMATION: Short of breath.    TECHNIQUE:  Single frontal view of the chest was obtained.    FINDINGS:  Prior study dated 9/15/2021 was available for review.    The lungs demonstrate small left pleural effusion. The heart is prominent in size. External defibrillator is again identified.      IMPRESSION: Small left pleural effusion. Prominent cardiac silhouette.    --- End of Report ---

## 2021-09-27 NOTE — PROGRESS NOTE ADULT - PROBLEM SELECTOR PLAN 5
Pt has a history of DVT/PE w/ IVC filter and repeat saddle PE in 9/2020, on Coumadin  Last CT chest w/ IV con shows continued saddle embolus  Saturating well on RA

## 2021-09-27 NOTE — PROGRESS NOTE ADULT - PROBLEM SELECTOR PLAN 2
Pt p/w BUN/Cr of 37/1.91, eGFR of 27, normally in 40's (CKD3-4)  Possibly prerenal   F/u UA, Urine lytes   Will hold any IVF (Caution as CHF) pending urine   F/u BMP - EF noted from 16th, severe diastolic heart failure  - Chest X-ray with small pleural effusion  -C/w metoprolol ER 25mg  -C/w Hydralazine 10mg PO Q8H and add on Imdur ER 30mg- card recs  -Holding off Entresto/Spironolactone due to CHEN  -Patient has SubQ ICD, last checked June 1.2021, battery at 81%, no events noted  - cardiology Dr Goodrich

## 2021-09-27 NOTE — PROGRESS NOTE ADULT - PROBLEM SELECTOR PLAN 9
Pt was evaluated by Dr. Moya in prior admissions  was previously on seroquel but no pyschoactive meds now   Endorses auditory hallucinations   No HI/SI  Consider constant observation if worsening and possible psych reconsult Pt was evaluated by Dr. Moya in prior admissions  was previously on Seroquel but no psychoactive meds now   c/w monitoring behavorial needs

## 2021-09-27 NOTE — PROGRESS NOTE ADULT - ASSESSMENT
Patient is a 66 F from home (lives alone w/ HHA), baseline AA0x3, independently ambulating, with PMHx of CAD, T2DM (last A1c 5.9%), HTN, HFrEF/cardiomyopathy (EF 25% on 9/16/21, s/p SICD), COPD (no prior intubations), CVA, DVT (L leg 1994)/PE (s/p IVC filter 9/2020, and repeat saddle PE at [facility] 8/21-9/3 now on Coumadin), infrarenal AAA, cerebral aneurysm, Atypical Depression, R breast cancer (s/p chemo/XRT), Class I Obesity, PSHx of hysterectomy (for fibroids), and cholecystectomy presenting to the ED with worsening shortness of breath x 4 days, admitted for likely COPD exacerbation with acute and chronic heart failure. X-ray significant for Small left pleural effusion. Echo 9/16/21: Severe LV enlargement, severely reduced LV Systolic function with EF of 25% , diffuse hypokinesis, severe functional MR. Started on solumedrol IV transitioned to PO today   Patient is a 66 F from home (lives alone w/ HHA), baseline AA0x3, independently ambulating, with PMHx of CAD, T2DM (last A1c 5.9%), HTN, HFrEF/cardiomyopathy (EF 25% on 9/16/21, s/p SICD), COPD (no prior intubations), CVA, DVT (L leg 1994)/PE (s/p IVC filter 9/2020, and repeat saddle PE at [facility] 8/21-9/3 now on Coumadin), infrarenal AAA, cerebral aneurysm, Atypical Depression, R breast cancer (s/p chemo/XRT), Class I Obesity, PSHx of hysterectomy (for fibroids), and cholecystectomy presenting to the ED with worsening shortness of breath x 4 days, admitted for likely COPD exacerbation with acute and chronic heart failure. X-ray significant for Small left pleural effusion. Echo 9/16/21: Severe LV enlargement, severely reduced LV Systolic function with EF of 25% , diffuse hypokinesis, severe functional MR. Started on solumedrol IV transitioned to PO today, cardiology and pulmonology on board.

## 2021-09-27 NOTE — PROGRESS NOTE ADULT - PROBLEM SELECTOR PLAN 6
Pt has a history of DVT/PE w/ IVC filter and repeat saddle PE in 9/2020 now on Coumadin  Last CT chest w/ IV con shows continued saddle embolus  Saturating well on RA   Preload dependent, to avoid dehydration Pt p/w BUN/Cr of 37/1.91, eGFR of 27, normally in 40's (CKD3-4)  Possibly prerenal   Will hold any IVF (Caution as CHF) pending urine   trend BMP

## 2021-09-27 NOTE — PROGRESS NOTE ADULT - PROBLEM SELECTOR PLAN 4
Patient w/ hx of HFrEF/CM w/ SICD   Echo 9/16/21: Severe LV enlargement, severely reduced LVSystolic Fx, diffuse hypokinesis, severe functional MR  Saddle PE diagnosed in 9/2020, w/o cor pulmonale   PND/orthopnea at baseline, AHA class 3-4 HF   ECG: NSR, left atrial enlargement  Trop 0.161  not currently on diuretics (possibly due to intolerance vs MR?)- will hold here due to CHEN  Hold Entresto due to CHEN, C/w Toprol,    Likely minor trop elevation due to demand -> trend till peak, Stat EKG if any chest pain  Cardio Dr. Osman Pt p/w INR 7.02, no active bleeding, On Coumadin 3mg/2mg alternating  hold Coumadin,   recheck INR in am

## 2021-09-27 NOTE — PROGRESS NOTE ADULT - PROBLEM SELECTOR PLAN 1
P/w shortness of breath with wheezing, resolved, pt not on oxygen at home  CXR noted  as above  saturating well in RA  S/p Solumedrol   Started on prednisone 40mg po today  C/w Albuterol MDI Q6h   Pulm Consult Dr. Mendoza P/w shortness of breath with wheezing, resolved, pt not on oxygen at home  CXR noted  as above  saturating well in RA  S/p Solumedrol   Started on prednisone 40mg po today  C/w Albuterol MDI Q6h   Pulm Dr Castillo

## 2021-09-27 NOTE — PROGRESS NOTE ADULT - PROBLEM SELECTOR PROBLEM 5
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

## 2021-09-27 NOTE — PROGRESS NOTE ADULT - ATTENDING COMMENTS
Patient seen and examined this morning around 11.45 AM  .   Patient transferred to hospitalist Service as per Cardiology who informed patient belonging to Faculty practice being followed by Cardiology Dr. Doshi, Pulmonary Dr. Cohen and Primary Delmi Paz Ma at [address] [place] [address]    66 F from home (lives alone w/ HHA), baseline AA0x3, independently ambulating, with PMHx of CAD, T2DM (last A1c 5.9%), HTN, HFrEF/cardiomyopathy (EF 25% on 9/16/21, s/p SICD), COPD (no prior intubations), CVA, DVT (L leg 1994)/PE (s/p IVC filter 9/2020, and repeat saddle PE at [facility] 8/21-9/3 now on Coumadin), infrarenal AAA, cerebral aneurysm, Atypical Depression, R breast cancer (s/p chemo/XRT), Class I Obesity, PSHx of hysterectomy (for fibroids), and cholecystectomy presenting to the ED with worsening shortness of breath x 4 days, admitted for suspected COPD exacerbation with acute and chronic heart failure. X-ray significant for Small left pleural effusion. Echo 9/16/21: Severe LV enlargement, severely reduced LV Systolic function with EF of 25% , diffuse hypokinesis, severe functional MR. Started on solumedrol IV transitioned to PO today, cardiology and pulmonology on board.    Informed by Dr. Doshi of patient with significant Heart failure; patient was sleepy but arousable at time of my visit; Did not wanted to eat lunch as she was not hungry.     Vital Signs Last 24 Hrs  T(C): 36.3 (27 Sep 2021 13:14), Max: 36.3 (27 Sep 2021 05:26)  T(F): 97.4 (27 Sep 2021 13:14), Max: 97.4 (27 Sep 2021 13:14)  HR: 76 (27 Sep 2021 13:14) (75 - 81)  BP: 127/89 (27 Sep 2021 13:14) (120/77 - 132/97)  BP(mean): 81 (26 Sep 2021 21:11) (81 - 81)  RR: 17 (27 Sep 2021 13:14) (16 - 17)  SpO2: 99% (27 Sep 2021 13:14) (99% - 100%) Patient seen and examined this morning around 11.45 AM  .   Patient transferred to hospitalist Service as per Cardiology who informed patient belonging to [facility] practice being followed by Cardiology Dr. Doshi, Pulmonary Dr. Cohen and Primary DrDelmi Salomon Ma at [address] [place] [address]    66 F from home (lives alone w/ HHA), baseline AA0x3, independently ambulating, with PMHx of CAD, T2DM (last A1c 5.9%), HTN, HFrEF/cardiomyopathy (EF 25% on 9/16/21, s/p SICD), COPD (no prior intubations), CVA, DVT (L leg 1994)/PE (s/p IVC filter 9/2020, and repeat saddle PE at [facility] 8/21-9/3 now on Coumadin), infrarenal AAA, cerebral aneurysm, Atypical Depression, R breast cancer (s/p chemo/XRT), Class I Obesity, PSHx of hysterectomy (for fibroids), and cholecystectomy presenting to the ED with worsening shortness of breath x 4 days, admitted for suspected COPD exacerbation with acute and chronic heart failure. X-ray significant for Small left pleural effusion. Echo 9/16/21: Severe LV enlargement, severely reduced LV Systolic function with EF of 25% , diffuse hypokinesis, severe functional MR. Started on solumedrol IV transitioned to PO today, cardiology and pulmonology on board.    Informed by Dr. Doshi of patient with significant Heart failure; patient was sleepy but arousable at time of my visit; Did not wanted to eat lunch as she was not hungry. Not in distress. No new complaints    Vital Signs Last 24 Hrs  T(C): 36.3 (27 Sep 2021 13:14), Max: 36.3 (27 Sep 2021 05:26)  T(F): 97.4 (27 Sep 2021 13:14), Max: 97.4 (27 Sep 2021 13:14)  HR: 76 (27 Sep 2021 13:14) (75 - 81)  BP: 127/89 (27 Sep 2021 13:14) (120/77 - 132/97)  BP(mean): 81 (26 Sep 2021 21:11) (81 - 81)  RR: 17 (27 Sep 2021 13:14) (16 - 17)  SpO2: 99% (27 Sep 2021 13:14) (99% - 100%)    P/E:  Neuro: AAO x3; No focal deficits  CVS: S1S2 present, regular  Resp: BLAE+, No wheeze or Rhonchi  GI: Soft, BS+, NT  Extr: No edema or calf tenderness    Labs:                        13.0   9.83  )-----------( 282      ( 27 Sep 2021 07:18 )             39.9   09-27    139  |  111<H>  |  59<H>  ----------------------------<  188<H>  4.8   |  14<L>  |  2.52<H>    Ca    9.1      27 Sep 2021 07:18  Phos  3.7     09-27  Mg     2.1     09-27    TPro  7.6  /  Alb  3.4<L>  /  TBili  2.1<H>  /  DBili  x   /  AST  43<H>  /  ALT  41  /  AlkPhos  314<H>  09-26    Transthoracic Echocardiogram (09.16.21 @ 18:29)     Conclusions:  Severe left ventricular enlargement.  Severely reduced left ventricular systolic function.  Diffuse hypokinesis.  Severe functional mitral regurgitation.    D/D:  Acute on Chronic Systolic Heart failure likely  Less likely COPD Exacerbation  CHEN   Hx Saddle PE and LV thrombus with supra therapeutic INR  Hx Atypical Depression  Hx DM not on meds  Hx Breast Ca on Arimidex    Plan:  Switch Solumedrol to oral Prednisone and taper off   Pulmonary Consult d/w Dr. Cohen doubt COPD Exacerbation  d/w Cardio consult and follow up appreciated; Hold Entresto due to CHEN  Agree with adding Imdur to Hydralazine but at pan 10 mg q 8 hrs  No evidence of active bleeding; will not reverse INR; monitor daily; HOLD COUMAIN UNTIL INR LES THAN 3; RESUME AT LOW DOSE  Rest as per PGY 1 note above    I will be away 9/28/21 to 10/3/21; Dr. Garcia/ Hospitalist Colleague to assume care

## 2021-09-27 NOTE — CONSULT NOTE ADULT - SUBJECTIVE AND OBJECTIVE BOX
CHIEF COMPLAINT:     HPI: 66-year-old female former smoker with DM, HTN, HLD, breast CA s/p chemo/XRT, and NICM (catheterization 02/2019 showed dRPL occlusion without ability to intervene), EF 31% by MUGA 7/2019, and s/p [employer] SubQ ICD 10/2019. Patient developed a PE and was started on Eliquis, and IVC filter was also placed. In August, patient presented to [facility] with dyspnea and was found to have a saddle PE. An LV thrombus was also noted and patient was changed over to warfarin. Patient now presents with dyspnea. she reports     PAST MEDICAL & SURGICAL HISTORY:  As above, also Knee effusion, left: arthrocentesis- 05/25/16, Internal carotid aneurysm, Abdominal aneurysm: infrarenal- followed by Vascular , Dr. Gutierrez, Sickle cell trait, DVT (deep venous thrombosis): Left leg in 1994 after hysterectomy  S/P hysterectomy: 1994, [place] filter in place: 1994  S/P lumpectomy, right breast: 2014, revision of position- 2015      Allergies    Advil (Hives; Vomiting)      MEDICATIONS  (STANDING):  ALBUTerol    90 MICROgram(s) HFA Inhaler      ALBUTerol    90 MICROgram(s) HFA Inhaler 1 Puff(s) Inhalation two times a day  anastrozole 1 milliGRAM(s) Oral daily  atorvastatin 40 milliGRAM(s) Oral at bedtime  clotrimazole 1% Vaginal Cream 1 Applicatorful Vaginal at bedtime  dextrose 50% Injectable 25 Gram(s) IV Push once  hydrALAZINE 15 milliGRAM(s) Oral every 8 hours  influenza   Vaccine 0.5 milliLiter(s) IntraMuscular once  insulin lispro (ADMELOG) corrective regimen sliding scale   SubCutaneous Before meals and at bedtime  levothyroxine 25 MICROGram(s) Oral daily  methylPREDNISolone sodium succinate Injectable 40 milliGRAM(s) IV Push every 8 hours  metoprolol succinate ER 25 milliGRAM(s) Oral daily  pantoprazole    Tablet 40 milliGRAM(s) Oral before breakfast    MEDICATIONS  (PRN):  acetaminophen   Tablet .. 650 milliGRAM(s) Oral every 6 hours PRN Temp greater or equal to 38.5C (101.3F), Mild Pain (1 - 3)  aluminum hydroxide/magnesium hydroxide/simethicone Suspension 30 milliLiter(s) Oral every 4 hours PRN Dyspepsia  melatonin 3 milliGRAM(s) Oral at bedtime PRN Insomnia  ondansetron Injectable 4 milliGRAM(s) IV Push every 8 hours PRN Nausea and/or Vomiting      FAMILY HISTORY:  Family history of hypertension    Family history of diabetes mellitus      SOCIAL HISTORY:  Smoking- Former smoker   Alcohol- Denies  Illicit Drug use- Denies    REVIEW OF SYSTEMS:  Constitutional: [ ] fever, [ ]weight loss,  [ ]fatigue  Eyes: [ ] visual changes  Respiratory: [ ]shortness of breath;  [ ] cough, [ ]wheezing, [ ]chills, [ ]hemoptysis  Cardiovascular: [ ] chest pain, [ ]palpitations, [ ]dizziness,  [ ]leg swelling [ ]syncope  Gastrointestinal: [ ] abdominal pain, [ ]nausea, [ ]vomiting,  [ ]diarrhea   Genitourinary: [ ] dysuria, [ ] hematuria  Neurologic: [ ] headaches [ ] tremors  [ ] weakness [ ] lightheadedness  Skin: [ ] itching, [ ]burning, [ ] rashes  Endocrine: [ ] heat or cold intolerance  Musculoskeletal: [ ] joint pain or swelling; [ ] muscle, back, or extremity pain  Psychiatric: [ ] depression, [ ]anxiety, [ ]mood swings, or [ ]difficulty sleeping  Hematologic: [ ] easy bruising, [ ] bleeding gums       [ x] All others negative	  [ ] Unable to obtain    Vital Signs Last 24 Hrs  T(C): 36.3 (27 Sep 2021 05:26), Max: 36.6 (26 Sep 2021 13:18)  T(F): 97.3 (27 Sep 2021 05:26), Max: 97.8 (26 Sep 2021 13:18)  HR: 81 (27 Sep 2021 05:26) (75 - 81)  BP: 120/89 (27 Sep 2021 05:26) (119/76 - 125/86)  BP(mean): 81 (26 Sep 2021 21:11) (81 - 81)  RR: 16 (27 Sep 2021 05:26) (16 - 16)  SpO2: 100% (27 Sep 2021 05:26) (92% - 100%)  I&O's Summary      PHYSICAL EXAM:  General: No acute distress  HEENT: EOMI, PERRL  Neck: Supple, No JVD  Lungs: Clear to auscultation bilaterally; No rales or wheezing  Heart: Regular rate and rhythm; No murmurs, rubs, or gallops  Abdomen: Nontender, bowel sounds present  Extremities: No clubbing, cyanosis, or edema  Nervous system:  Alert & Oriented X3, no focal deficits  Psychiatric: Normal affect  Skin: No rashes or lesions      LABS:  09-27    139  |  111<H>  |  59<H>  ----------------------------<  188<H>  4.8   |  14<L>  |  2.52<H>    Ca    9.1      27 Sep 2021 07:18  Phos  3.7     09-27  Mg     2.1     09-27    TPro  7.6  /  Alb  3.4<L>  /  TBili  2.1<H>  /  DBili  x   /  AST  43<H>  /  ALT  41  /  AlkPhos  314<H>  09-26    Creatinine Trend: 2.52<--, 1.60<--, 1.91<--, 1.34<--, 1.33<--, 1.19<--                        13.0   9.83  )-----------( 282      ( 27 Sep 2021 07:18 )             39.9     PT/INR - ( 26 Sep 2021 07:04 )   PT: 48.8 sec;   INR: 4.40 ratio         PTT - ( 26 Sep 2021 07:04 )  PTT:33.2 sec    Lipid Panel:   Cardiac Enzymes: CARDIAC MARKERS ( 27 Sep 2021 07:18 )  0.142 ng/mL / x     / 373 U/L / x     / 11.9 ng/mL  CARDIAC MARKERS ( 25 Sep 2021 21:13 )  0.161 ng/mL / x     / 220 U/L / x     / 3.2 ng/mL  CARDIAC MARKERS ( 25 Sep 2021 13:05 )  0.161 ng/mL / x     / x     / x     / x        Serum Pro-Brain Natriuretic Peptide: 11387 pg/mL (09-25-21 @ 21:13)    RADIOLOGY: < from: Xray Chest 1 View- PORTABLE-Urgent (Xray Chest 1 View- PORTABLE-Urgent .) (09.25.21 @ 15:25) >  IMPRESSION: Small left pleural effusion. Prominent cardiac silhouette.    < end of copied text >    < from: CT Angio Chest PE Protocol w/ IV Cont (09.15.21 @ 17:40) >  IMPRESSION:    Central pulmonary embolus similar to 08/22/2021 with additional emboli bilaterally, decreased in prominence. Cardiomegaly. No evidence right heart strain.    < end of copied text >      ECG [my interpretation]:    TELEMETRY:    ECHO: < from: TTE with Doppler (w/Cont) (08.23.21 @ 15:49) >  CONCLUSIONS:  1. Mitral annular calcification, otherwise normal mitral  valve. Moderate-severe mitral regurgitation.  2. Normal left ventricular internal dimensions and wall  thicknesses.  3. Severe global left ventricular systolic dysfunction.  Endocardial visualization enhanced with intravenous  injection of echo contrast (Definity).  A filling defect is  seen and is consistent with an LV apical thrombus.  4.Normal right ventricular size with decreased right  ventricular systolic function.  5. Inadequate tricuspid regurgitation Doppler envelope  precludes estimation of RVSP.    < end of copied text >    CATHETERIZATION:    < from: Cardiac Cath Lab - Adult (02.22.19 @ 17:02) >  LM:   --  LM: Angiography showed mild atherosclerosis with no flow limiting  lesions.  LAD:   --  LAD: Angiography showed mild atherosclerosis with no flow  limiting lesions.  CX:   --  Circumflex: Angiography showed mild atherosclerosis with no flow  limiting lesions.  RCA:   --  Mid RCA: There was a tubular 20 % stenosis at a site with no  prior intervention. The lesion was eccentric. There was ALTAGRACIA grade 3 flow  through the vessel (brisk flow).  --  RPDA: Angiography showed mild atherosclerosis with no flow limiting  lesions.  --  RPLS: Angiography showed mild atherosclerosis with no flow limiting  lesions.  --  RPL1: Therewas a 100 % stenosis. There was ALTAGRACIA grade 0 flow through  the vessel (no flow) and a small vascular territory distal to the lesion.  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 It does not appear amenable to  intervention.    < end of copied text >   CHIEF COMPLAINT:     HPI: 66-year-old female former smoker with DM, HTN, HLD, breast CA s/p chemo/XRT, and NICM (catheterization 02/2019 showed dRPL occlusion without ability to intervene), EF 31% by MUGA 7/2019, and s/p [employer] SubQ ICD 10/2019. Patient developed a PE and was started on Eliquis, and IVC filter was also placed. In August, patient presented to [facility] with dyspnea and was found to have a saddle PE. An LV thrombus was also noted and patient was changed over to warfarin. Patient now presents with dyspnea. She reports this started a few days ago. There was no associated chest pain, palpitations, or syncope. Reports compliance with all medications, denies adverse effects. States her dyspnea has almost gone today.     PAST MEDICAL & SURGICAL HISTORY:  As above, also Knee effusion, left: arthrocentesis- 05/25/16, Internal carotid aneurysm, Abdominal aneurysm: infrarenal- followed by Vascular , Dr. Gutierrez, Sickle cell trait, DVT (deep venous thrombosis): Left leg in 1994 after hysterectomy  S/P hysterectomy: 1994, [place] filter in place: 1994  S/P lumpectomy, right breast: 2014, revision of position- 2015      Allergies    Advil (Hives; Vomiting)      MEDICATIONS  (STANDING):  ALBUTerol    90 MICROgram(s) HFA Inhaler      ALBUTerol    90 MICROgram(s) HFA Inhaler 1 Puff(s) Inhalation two times a day  anastrozole 1 milliGRAM(s) Oral daily  atorvastatin 40 milliGRAM(s) Oral at bedtime  clotrimazole 1% Vaginal Cream 1 Applicatorful Vaginal at bedtime  dextrose 50% Injectable 25 Gram(s) IV Push once  hydrALAZINE 15 milliGRAM(s) Oral every 8 hours  influenza   Vaccine 0.5 milliLiter(s) IntraMuscular once  insulin lispro (ADMELOG) corrective regimen sliding scale   SubCutaneous Before meals and at bedtime  levothyroxine 25 MICROGram(s) Oral daily  methylPREDNISolone sodium succinate Injectable 40 milliGRAM(s) IV Push every 8 hours  metoprolol succinate ER 25 milliGRAM(s) Oral daily  pantoprazole    Tablet 40 milliGRAM(s) Oral before breakfast    MEDICATIONS  (PRN):  acetaminophen   Tablet .. 650 milliGRAM(s) Oral every 6 hours PRN Temp greater or equal to 38.5C (101.3F), Mild Pain (1 - 3)  aluminum hydroxide/magnesium hydroxide/simethicone Suspension 30 milliLiter(s) Oral every 4 hours PRN Dyspepsia  melatonin 3 milliGRAM(s) Oral at bedtime PRN Insomnia  ondansetron Injectable 4 milliGRAM(s) IV Push every 8 hours PRN Nausea and/or Vomiting      FAMILY HISTORY:  Family history of hypertension    Family history of diabetes mellitus      SOCIAL HISTORY:  Smoking- Former smoker   Alcohol- Denies  Illicit Drug use- Denies    REVIEW OF SYSTEMS:  Constitutional: [ ] fever, [ ]weight loss,  [ ]fatigue  Eyes: [ ] visual changes  Respiratory: [ ]shortness of breath;  [ ] cough, [ ]wheezing, [ ]chills, [ ]hemoptysis  Cardiovascular: [ ] chest pain, [ ]palpitations, [ ]dizziness,  [ ]leg swelling [ ]syncope  Gastrointestinal: [ ] abdominal pain, [ ]nausea, [ ]vomiting,  [ ]diarrhea   Genitourinary: [ ] dysuria, [ ] hematuria  Neurologic: [ ] headaches [ ] tremors  [ ] weakness [ ] lightheadedness  Skin: [ ] itching, [ ]burning, [ ] rashes  Endocrine: [ ] heat or cold intolerance  Musculoskeletal: [ ] joint pain or swelling; [ ] muscle, back, or extremity pain  Psychiatric: [ ] depression, [ ]anxiety, [ ]mood swings, or [ ]difficulty sleeping  Hematologic: [ ] easy bruising, [ ] bleeding gums       [ x] All others negative	  [ ] Unable to obtain    Vital Signs Last 24 Hrs  T(C): 36.3 (27 Sep 2021 05:26), Max: 36.6 (26 Sep 2021 13:18)  T(F): 97.3 (27 Sep 2021 05:26), Max: 97.8 (26 Sep 2021 13:18)  HR: 81 (27 Sep 2021 05:26) (75 - 81)  BP: 120/89 (27 Sep 2021 05:26) (119/76 - 125/86)  BP(mean): 81 (26 Sep 2021 21:11) (81 - 81)  RR: 16 (27 Sep 2021 05:26) (16 - 16)  SpO2: 100% (27 Sep 2021 05:26) (92% - 100%)  I&O's Summary      PHYSICAL EXAM:  General: No acute distress  HEENT: EOMI, PERRL  Neck: Supple, No JVD  Lungs: Clear to auscultation bilaterally; No rales or wheezing  Heart: Regular rate and rhythm; No murmurs, rubs, or gallops  Abdomen: Nontender, bowel sounds present  Extremities: No clubbing, cyanosis, or edema  Nervous system:  Alert & Oriented X3, no focal deficits  Psychiatric: Normal affect  Skin: No rashes or lesions      LABS:  09-27    139  |  111<H>  |  59<H>  ----------------------------<  188<H>  4.8   |  14<L>  |  2.52<H>    Ca    9.1      27 Sep 2021 07:18  Phos  3.7     09-27  Mg     2.1     09-27    TPro  7.6  /  Alb  3.4<L>  /  TBili  2.1<H>  /  DBili  x   /  AST  43<H>  /  ALT  41  /  AlkPhos  314<H>  09-26    Creatinine Trend: 2.52<--, 1.60<--, 1.91<--, 1.34<--, 1.33<--, 1.19<--                        13.0   9.83  )-----------( 282      ( 27 Sep 2021 07:18 )             39.9     PT/INR - ( 26 Sep 2021 07:04 )   PT: 48.8 sec;   INR: 4.40 ratio         PTT - ( 26 Sep 2021 07:04 )  PTT:33.2 sec    Lipid Panel:   Cardiac Enzymes: CARDIAC MARKERS ( 27 Sep 2021 07:18 )  0.142 ng/mL / x     / 373 U/L / x     / 11.9 ng/mL  CARDIAC MARKERS ( 25 Sep 2021 21:13 )  0.161 ng/mL / x     / 220 U/L / x     / 3.2 ng/mL  CARDIAC MARKERS ( 25 Sep 2021 13:05 )  0.161 ng/mL / x     / x     / x     / x        Serum Pro-Brain Natriuretic Peptide: 10173 pg/mL (09-25-21 @ 21:13)    RADIOLOGY: < from: Xray Chest 1 View- PORTABLE-Urgent (Xray Chest 1 View- PORTABLE-Urgent .) (09.25.21 @ 15:25) >  IMPRESSION: Small left pleural effusion. Prominent cardiac silhouette.    < end of copied text >    < from: CT Angio Chest PE Protocol w/ IV Cont (09.15.21 @ 17:40) >  IMPRESSION:    Central pulmonary embolus similar to 08/22/2021 with additional emboli bilaterally, decreased in prominence. Cardiomegaly. No evidence right heart strain.    < end of copied text >      ECG [my interpretation]: EKG 9/25: Sinus rhythm, LAFB, cannot rule out anterior infarct of unknown chronicity     TELEMETRY: n/a    ECHO: < from: TTE with Doppler (w/Cont) (08.23.21 @ 15:49) >  CONCLUSIONS:  1. Mitral annular calcification, otherwise normal mitral  valve. Moderate-severe mitral regurgitation.  2. Normal left ventricular internal dimensions and wall  thicknesses.  3. Severe global left ventricular systolic dysfunction.  Endocardial visualization enhanced with intravenous  injection of echo contrast (Definity).  A filling defect is  seen and is consistent with an LV apical thrombus.  4.Normal right ventricular size with decreased right  ventricular systolic function.  5. Inadequate tricuspid regurgitation Doppler envelope  precludes estimation of RVSP.    < end of copied text >    CATHETERIZATION:    < from: Cardiac Cath Lab - Adult (02.22.19 @ 17:02) >  LM:   --  LM: Angiography showed mild atherosclerosis with no flow limiting  lesions.  LAD:   --  LAD: Angiography showed mild atherosclerosis with no flow  limiting lesions.  CX:   --  Circumflex: Angiography showed mild atherosclerosis with no flow  limiting lesions.  RCA:   --  Mid RCA: There was a tubular 20 % stenosis at a site with no  prior intervention. The lesion was eccentric. There was ALTAGRACIA grade 3 flow  through the vessel (brisk flow).  --  RPDA: Angiography showed mild atherosclerosis with no flow limiting  lesions.  --  RPLS: Angiography showed mild atherosclerosis with no flow limiting  lesions.  --  RPL1: Therewas a 100 % stenosis. There was ALTAGRACIA grade 0 flow through  the vessel (no flow) and a small vascular territory distal to the lesion.  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 It does not appear amenable to  intervention.    < end of copied text >

## 2021-09-27 NOTE — PROGRESS NOTE ADULT - PROBLEM SELECTOR PLAN 11
Coumadin to be held as INR supratherapeutic   Protonix Qd   multiple recent hospital admissions: Pt eval for possible placement  IMPROVE VTE Individual Risk Assessment  RISK                                                                Points  [x  ] Previous VTE                                                  3  [ x ] Thrombophilia                                               2  [  ] Lower limb paralysis                                      2        (unable to hold up >15 seconds)    [  ] Current Cancer                                              2         (within 6 months)  [ x ] Immobilization > 24 hrs                                1  [  ] ICU/CCU stay > 24 hours                              1  [ x ] Age > 60                                                      1  IMPROVE VTE Score _____7____ DVT PPX: Coumadin to be held as INR supratherapeutic   GI Protonix Qd

## 2021-09-27 NOTE — PROGRESS NOTE ADULT - PROBLEM SELECTOR PLAN 3
Pt p/w INR 7.02, no active bleeding  On Coumadin 3mg/2mg alternating  Will hold Coumadin, recheck in AM and dose accordingly  Stat Vitamin K if any signs of active bleed  Fall precautions Patient w/ hx of HFrEF/CM w/ SICD   Echo 9/16/21: Severe LV enlargement, severely reduced LVSystolic Fx, diffuse hypokinesis, severe functional MR  Saddle PE diagnosed in 9/2020, w/o cor pulmonale   rest plan as above

## 2021-09-27 NOTE — PROGRESS NOTE ADULT - PROBLEM SELECTOR PLAN 10
R sided breast cancer (s/p chemo/XRT)  Pt on Arimidex  To continue R sided breast cancer (s/p chemo/XRT)  Pt on Arimidex  C/w home meds above

## 2021-09-27 NOTE — PROGRESS NOTE ADULT - PROBLEM SELECTOR PLAN 7
C/w home meds while holding Entresto for CHEN  C/w Statin for coexisting HLD  DASH/DM diet Well controlled on metoprolol, hydralazine  C/w home meds while holding Entresto for CHEN  C/w Statin for coexisting HLD  DASH/DM diet  start on Imdur today- card recs  Card Dr Goodrich

## 2021-09-28 ENCOUNTER — APPOINTMENT (OUTPATIENT)
Dept: CARDIOLOGY | Facility: CLINIC | Age: 66
End: 2021-09-28

## 2021-09-28 LAB
ANION GAP SERPL CALC-SCNC: 15 MMOL/L — SIGNIFICANT CHANGE UP (ref 5–17)
BASE EXCESS BLDA CALC-SCNC: -9.9 MMOL/L — LOW (ref -2–3)
BUN SERPL-MCNC: 76 MG/DL — HIGH (ref 7–18)
CALCIUM SERPL-MCNC: 8.9 MG/DL — SIGNIFICANT CHANGE UP (ref 8.4–10.5)
CHLORIDE SERPL-SCNC: 111 MMOL/L — HIGH (ref 96–108)
CO2 SERPL-SCNC: 13 MMOL/L — LOW (ref 22–31)
CREAT SERPL-MCNC: 2.32 MG/DL — HIGH (ref 0.5–1.3)
GLUCOSE BLDC GLUCOMTR-MCNC: 194 MG/DL — HIGH (ref 70–99)
GLUCOSE BLDC GLUCOMTR-MCNC: 201 MG/DL — HIGH (ref 70–99)
GLUCOSE BLDC GLUCOMTR-MCNC: 229 MG/DL — HIGH (ref 70–99)
GLUCOSE BLDC GLUCOMTR-MCNC: 240 MG/DL — HIGH (ref 70–99)
GLUCOSE SERPL-MCNC: 187 MG/DL — HIGH (ref 70–99)
HCO3 BLDA-SCNC: 14 MMOL/L — LOW (ref 21–28)
HCT VFR BLD CALC: 39.4 % — SIGNIFICANT CHANGE UP (ref 34.5–45)
HGB BLD-MCNC: 13 G/DL — SIGNIFICANT CHANGE UP (ref 11.5–15.5)
HOROWITZ INDEX BLDA+IHG-RTO: SIGNIFICANT CHANGE UP
INR BLD: 4.93 RATIO — HIGH (ref 0.88–1.16)
MCHC RBC-ENTMCNC: 31.3 PG — SIGNIFICANT CHANGE UP (ref 27–34)
MCHC RBC-ENTMCNC: 33 GM/DL — SIGNIFICANT CHANGE UP (ref 32–36)
MCV RBC AUTO: 94.9 FL — SIGNIFICANT CHANGE UP (ref 80–100)
NRBC # BLD: 0 /100 WBCS — SIGNIFICANT CHANGE UP (ref 0–0)
PCO2 BLDA: 24 MMHG — LOW (ref 32–35)
PH BLDA: 7.36 — SIGNIFICANT CHANGE UP (ref 7.35–7.45)
PLATELET # BLD AUTO: 282 K/UL — SIGNIFICANT CHANGE UP (ref 150–400)
PO2 BLDA: 57 MMHG — LOW (ref 83–108)
POTASSIUM SERPL-MCNC: 4.8 MMOL/L — SIGNIFICANT CHANGE UP (ref 3.5–5.3)
POTASSIUM SERPL-SCNC: 4.8 MMOL/L — SIGNIFICANT CHANGE UP (ref 3.5–5.3)
PROTHROM AB SERPL-ACNC: 54.4 SEC — HIGH (ref 10.6–13.6)
RBC # BLD: 4.15 M/UL — SIGNIFICANT CHANGE UP (ref 3.8–5.2)
RBC # FLD: 17.8 % — HIGH (ref 10.3–14.5)
SAO2 % BLDA: 88 % — SIGNIFICANT CHANGE UP
SODIUM SERPL-SCNC: 139 MMOL/L — SIGNIFICANT CHANGE UP (ref 135–145)
WBC # BLD: 8.27 K/UL — SIGNIFICANT CHANGE UP (ref 3.8–10.5)
WBC # FLD AUTO: 8.27 K/UL — SIGNIFICANT CHANGE UP (ref 3.8–10.5)

## 2021-09-28 PROCEDURE — 99232 SBSQ HOSP IP/OBS MODERATE 35: CPT

## 2021-09-28 PROCEDURE — 99223 1ST HOSP IP/OBS HIGH 75: CPT

## 2021-09-28 PROCEDURE — 99233 SBSQ HOSP IP/OBS HIGH 50: CPT | Mod: GC

## 2021-09-28 RX ORDER — QUETIAPINE FUMARATE 200 MG/1
25 TABLET, FILM COATED ORAL
Refills: 0 | Status: DISCONTINUED | OUTPATIENT
Start: 2021-09-28 | End: 2021-09-29

## 2021-09-28 RX ORDER — NYSTATIN CREAM 100000 [USP'U]/G
1 CREAM TOPICAL EVERY 12 HOURS
Refills: 0 | Status: DISCONTINUED | OUTPATIENT
Start: 2021-09-28 | End: 2021-10-06

## 2021-09-28 RX ORDER — LORATADINE 10 MG/1
10 TABLET ORAL DAILY
Refills: 0 | Status: DISCONTINUED | OUTPATIENT
Start: 2021-09-28 | End: 2021-10-06

## 2021-09-28 RX ADMIN — Medication 25 MILLIGRAM(S): at 05:39

## 2021-09-28 RX ADMIN — Medication 2: at 11:51

## 2021-09-28 RX ADMIN — Medication 2: at 16:59

## 2021-09-28 RX ADMIN — ALBUTEROL 1 PUFF(S): 90 AEROSOL, METERED ORAL at 21:41

## 2021-09-28 RX ADMIN — Medication 1: at 21:41

## 2021-09-28 RX ADMIN — ATORVASTATIN CALCIUM 40 MILLIGRAM(S): 80 TABLET, FILM COATED ORAL at 21:41

## 2021-09-28 RX ADMIN — ANASTROZOLE 1 MILLIGRAM(S): 1 TABLET ORAL at 11:51

## 2021-09-28 RX ADMIN — Medication 25 MICROGRAM(S): at 05:39

## 2021-09-28 RX ADMIN — Medication 40 MILLIGRAM(S): at 05:39

## 2021-09-28 RX ADMIN — Medication 2: at 08:06

## 2021-09-28 RX ADMIN — ALBUTEROL 1 PUFF(S): 90 AEROSOL, METERED ORAL at 09:56

## 2021-09-28 RX ADMIN — NYSTATIN CREAM 1 APPLICATION(S): 100000 CREAM TOPICAL at 17:00

## 2021-09-28 RX ADMIN — PANTOPRAZOLE SODIUM 40 MILLIGRAM(S): 20 TABLET, DELAYED RELEASE ORAL at 05:39

## 2021-09-28 RX ADMIN — Medication 10 MILLIGRAM(S): at 21:41

## 2021-09-28 RX ADMIN — Medication 10 MILLIGRAM(S): at 13:02

## 2021-09-28 RX ADMIN — ISOSORBIDE MONONITRATE 30 MILLIGRAM(S): 60 TABLET, EXTENDED RELEASE ORAL at 11:51

## 2021-09-28 RX ADMIN — Medication 10 MILLIGRAM(S): at 05:39

## 2021-09-28 RX ADMIN — LORATADINE 10 MILLIGRAM(S): 10 TABLET ORAL at 11:51

## 2021-09-28 RX ADMIN — QUETIAPINE FUMARATE 25 MILLIGRAM(S): 200 TABLET, FILM COATED ORAL at 17:36

## 2021-09-28 NOTE — PROGRESS NOTE ADULT - PROBLEM SELECTOR PLAN 4
Pt p/w INR 7.02, no active bleeding, On Coumadin 3mg/2mg alternating  hold Coumadin,   recheck INR in am Pt p/w INR 7.02, no active bleeding, On Coumadin 3mg/2mg alternating  Hold Coumadin; Goal INR 2-3  Recheck INR in AM

## 2021-09-28 NOTE — PROGRESS NOTE ADULT - PROBLEM SELECTOR PLAN 3
Patient w/ hx of HFrEF/CM w/ SICD   Echo 9/16/21: Severe LV enlargement, severely reduced LVSystolic Fx, diffuse hypokinesis, severe functional MR  Saddle PE diagnosed in 9/2020, w/o cor pulmonale   rest plan as above Patient w/ hx of HFrEF/CM w/ SICD   Echo 9/16/21: Severe LV enlargement, severely reduced LVSystolic Fx, diffuse hypokinesis, severe functional MR  Saddle PE diagnosed in 9/2020, w/o cor pulmonale   C/w plan as above

## 2021-09-28 NOTE — PROGRESS NOTE ADULT - PROBLEM SELECTOR PLAN 7
Well controlled on metoprolol, hydralazine  C/w home meds while holding Entresto for CHEN  C/w Statin for coexisting HLD  DASH/DM diet  start on Imdur today- card recs  Card Dr Goodrich Well controlled on metoprolol, hydralazine  C/w home meds while holding Entresto for CHEN  C/w Statin for coexisting HLD  DASH/DM diet  C/w hydralazine/Imdur today  Card Dr Goodrich

## 2021-09-28 NOTE — PROGRESS NOTE ADULT - SUBJECTIVE AND OBJECTIVE BOX
PGY-2 Progress Note discussed with attending    PAGER #: [[phone]] TILL 5:00 PM  PLEASE CONTACT ON CALL TEAM:  - On Call Team (Please refer to Taryn) FROM 5:00 PM - 8:30PM  - Nightfloat Team FROM 8:30 -7:30 AM    CHIEF COMPLAINT & BRIEF HOSPITAL COURSE:    INTERVAL HPI/OVERNIGHT EVENTS:       REVIEW OF SYSTEMS:  CONSTITUTIONAL: No fever, weight loss, or fatigue  RESPIRATORY: No cough, wheezing, chills or hemoptysis; No shortness of breath  CARDIOVASCULAR: No chest pain, palpitations, dizziness, or leg swelling  GASTROINTESTINAL: No abdominal pain. No nausea, vomiting, or hematemesis; No diarrhea or constipation. No melena or hematochezia.  GENITOURINARY: No dysuria or hematuria, urinary frequency  NEUROLOGICAL: No headaches, memory loss, loss of strength, numbness, or tremors  SKIN: No itching, burning, rashes, or lesions     MEDICATIONS  (STANDING):  ALBUTerol    90 MICROgram(s) HFA Inhaler      ALBUTerol    90 MICROgram(s) HFA Inhaler 1 Puff(s) Inhalation two times a day  anastrozole 1 milliGRAM(s) Oral daily  atorvastatin 40 milliGRAM(s) Oral at bedtime  dextrose 50% Injectable 25 Gram(s) IV Push once  hydrALAZINE 10 milliGRAM(s) Oral every 8 hours  influenza   Vaccine 0.5 milliLiter(s) IntraMuscular once  insulin lispro (ADMELOG) corrective regimen sliding scale   SubCutaneous Before meals and at bedtime  isosorbide   mononitrate ER Tablet (IMDUR) 30 milliGRAM(s) Oral daily  levothyroxine 25 MICROGram(s) Oral daily  loratadine 10 milliGRAM(s) Oral daily  metoprolol succinate ER 25 milliGRAM(s) Oral daily  nystatin Ointment 1 Application(s) Topical every 12 hours  pantoprazole    Tablet 40 milliGRAM(s) Oral before breakfast  predniSONE   Tablet 40 milliGRAM(s) Oral daily    MEDICATIONS  (PRN):  acetaminophen   Tablet .. 650 milliGRAM(s) Oral every 6 hours PRN Temp greater or equal to 38.5C (101.3F), Mild Pain (1 - 3)  aluminum hydroxide/magnesium hydroxide/simethicone Suspension 30 milliLiter(s) Oral every 4 hours PRN Dyspepsia  melatonin 3 milliGRAM(s) Oral at bedtime PRN Insomnia  ondansetron Injectable 4 milliGRAM(s) IV Push every 8 hours PRN Nausea and/or Vomiting      Vital Signs Last 24 Hrs  T(C): 36.4 (28 Sep 2021 04:58), Max: 36.7 (27 Sep 2021 20:32)  T(F): 97.5 (28 Sep 2021 04:58), Max: 98.1 (27 Sep 2021 20:32)  HR: 79 (28 Sep 2021 04:58) (76 - 80)  BP: 127/90 (28 Sep 2021 04:58) (127/89 - 130/74)  BP(mean): --  RR: 19 (28 Sep 2021 04:58) (17 - 19)  SpO2: 100% (28 Sep 2021 04:58) (98% - 100%)    PHYSICAL EXAMINATION:  GENERAL: NAD, well built  HEAD:  Atraumatic, Normocephalic  EYES:  conjunctiva and sclera clear  NECK: Supple, No JVD, Normal thyroid  CHEST/LUNG: Clear to auscultation. Clear to percussion bilaterally; No rales, rhonchi, wheezing, or rubs  HEART: Regular rate and rhythm; No murmurs, rubs, or gallops  ABDOMEN: Soft, Nontender, Nondistended; Bowel sounds present  NERVOUS SYSTEM:  Alert & Oriented X3,    EXTREMITIES:  2+ Peripheral Pulses, No clubbing, cyanosis, or edema  SKIN: warm dry                          13.0   8.27  )-----------( 282      ( 28 Sep 2021 07:30 )             39.4     09-28    139  |  111<H>  |  76<H>  ----------------------------<  187<H>  4.8   |  13<L>  |  2.32<H>    Ca    8.9      28 Sep 2021 07:30  Phos  3.7     09-27  Mg     2.1     09-27        CARDIAC MARKERS ( 27 Sep 2021 07:18 )  0.142 ng/mL / x     / 373 U/L / x     / 11.9 ng/mL      PT/INR - ( 28 Sep 2021 07:30 )   PT: 54.4 sec;   INR: 4.93 ratio         PTT - ( 27 Sep 2021 07:18 )  PTT:32.5 sec    CAPILLARY BLOOD GLUCOSE      RADIOLOGY & ADDITIONAL TESTS:                   PGY-2 Progress Note discussed with attending    PAGER #: [[phone]] TILL 5:00 PM  PLEASE CONTACT ON CALL TEAM:  - On Call Team (Please refer to Taryn) FROM 5:00 PM - 8:30PM  - Nightfloat Team FROM 8:30 -7:30 AM    CHIEF COMPLAINT & BRIEF HOSPITAL COURSE:  Patient is a 66 F from home (lives alone w/ HHA), baseline AA0x3, independently ambulating, with PMHx of CAD, T2DM (last A1c 5.9%), HTN, HFrEF/cardiomyopathy (EF 25% on 9/16/21, s/p SICD), COPD (no prior intubations), CVA, DVT (L leg 1994)/PE (s/p IVC filter 9/2020, and repeat saddle PE at [facility] 8/21-9/3 now on Coumadin), infrarenal AAA, cerebral aneurysm, Atypical Depression, R breast cancer (s/p chemo/XRT), Class I Obesity, PSHx of hysterectomy (for fibroids), and cholecystectomy presenting to the ED with worsening shortness of breath x 4 days. Patient has been admitted twice to [facility] and [facility] in the past month for similar complaints of chest pain/SOB. She endorses that she does not know what medications she is supposed to be taking but is compliant with whatever her A gives her. She reports that the symptoms started with a nonproductive cough, and progressed to shortness of breath with ADL's. She has PND and orthopnea at baseline with usage of multiple pillows at night to fall asleep, but reports that this shortness of breath is worse than baseline. She has some associated loss of appetite, nausea, and headaches, but denies fevers, myalgias, chest pain, V/D, lower extremity swelling, dysuria, increased frequency of urination, hematuria or other sites of active bleeding.     She was admitted for COPD exacerbation and started on appropriate treatment with solumedrol taper and inhaled bronchodilators. No antibiotics were started as pt did not have a productive cough with yellow sputum. She was found to be euvolemic, and her entresto was held due to CHEN on admission from her baseline. Her worsening respiratory status was thought to be likely multifactorial 2/2 to CHF, COPD, and chronic PE with pulmonary HTN. Her warfarin was also found to be supratherapeutic and her warfarin was held on admission; restarted with a goal of 2-3.     INTERVAL HPI/OVERNIGHT EVENTS:   Patient was examined while she was lying in bed, Aox2-3, easily distractable, but reoriented with good insight into her condition. She is otherwise responding appropriately to questions, in NAD. She has normal bowel and bladder movements, and endorses itching of the genital area with some irritation of the perianal area. Started on loratidine and Clotrimazole.     REVIEW OF SYSTEMS:  CONSTITUTIONAL: Fatigue + No fever, weight loss  RESPIRATORY: No cough, wheezing, chills or hemoptysis; No shortness of breath  CARDIOVASCULAR: No chest pain, palpitations, dizziness, or leg swelling  GASTROINTESTINAL: No abdominal pain. No nausea, vomiting, or hematemesis; No diarrhea or constipation. No melena or hematochezia.  GENITOURINARY: Vaginal itching + No dysuria or hematuria, urinary frequency  NEUROLOGICAL: No headaches, memory loss, loss of strength, numbness, or tremors  SKIN: Vaginal itching + No burning, rashes, or lesions     MEDICATIONS  (STANDING):  ALBUTerol    90 MICROgram(s) HFA Inhaler      ALBUTerol    90 MICROgram(s) HFA Inhaler 1 Puff(s) Inhalation two times a day  anastrozole 1 milliGRAM(s) Oral daily  atorvastatin 40 milliGRAM(s) Oral at bedtime  dextrose 50% Injectable 25 Gram(s) IV Push once  hydrALAZINE 10 milliGRAM(s) Oral every 8 hours  influenza   Vaccine 0.5 milliLiter(s) IntraMuscular once  insulin lispro (ADMELOG) corrective regimen sliding scale   SubCutaneous Before meals and at bedtime  isosorbide   mononitrate ER Tablet (IMDUR) 30 milliGRAM(s) Oral daily  levothyroxine 25 MICROGram(s) Oral daily  loratadine 10 milliGRAM(s) Oral daily  metoprolol succinate ER 25 milliGRAM(s) Oral daily  nystatin Ointment 1 Application(s) Topical every 12 hours  pantoprazole    Tablet 40 milliGRAM(s) Oral before breakfast  predniSONE   Tablet 40 milliGRAM(s) Oral daily    MEDICATIONS  (PRN):  acetaminophen   Tablet .. 650 milliGRAM(s) Oral every 6 hours PRN Temp greater or equal to 38.5C (101.3F), Mild Pain (1 - 3)  aluminum hydroxide/magnesium hydroxide/simethicone Suspension 30 milliLiter(s) Oral every 4 hours PRN Dyspepsia  melatonin 3 milliGRAM(s) Oral at bedtime PRN Insomnia  ondansetron Injectable 4 milliGRAM(s) IV Push every 8 hours PRN Nausea and/or Vomiting      Vital Signs Last 24 Hrs  T(C): 36.4 (28 Sep 2021 04:58), Max: 36.7 (27 Sep 2021 20:32)  T(F): 97.5 (28 Sep 2021 04:58), Max: 98.1 (27 Sep 2021 20:32)  HR: 79 (28 Sep 2021 04:58) (76 - 80)  BP: 127/90 (28 Sep 2021 04:58) (127/89 - 130/74)  BP(mean): --  RR: 19 (28 Sep 2021 04:58) (17 - 19)  SpO2: 100% (28 Sep 2021 04:58) (98% - 100%)    PHYSICAL EXAMINATION:  GENERAL: NAD, Overweight AAF, in NAD, confused, but able to be reoriented   HEAD:  Atraumatic, Normocephalic  EYES:  conjunctiva and sclera clear  NECK: Supple, No JVD, Normal thyroid  CHEST/LUNG: Clear to auscultation. Clear to percussion bilaterally; No rales, rhonchi, wheezing, or rubs  HEART: Regular rate and rhythm; No murmurs, rubs, or gallops  ABDOMEN: Soft, Nontender, Nondistended; Bowel sounds present  NERVOUS SYSTEM:  Alert & Oriented X3, bilateral sensory and motor exam wnl    EXTREMITIES:  2+ Peripheral Pulses, No clubbing, cyanosis, or edema  SKIN: warm dry, Genital exam with excoriations, but no discharge or beefy red erythema                           13.0   8.27  )-----------( 282      ( 28 Sep 2021 07:30 )             39.4     09-28    139  |  111<H>  |  76<H>  ----------------------------<  187<H>  4.8   |  13<L>  |  2.32<H>    Ca    8.9      28 Sep 2021 07:30  Phos  3.7     09-27  Mg     2.1     09-27        CARDIAC MARKERS ( 27 Sep 2021 07:18 )  0.142 ng/mL / x     / 373 U/L / x     / 11.9 ng/mL      PT/INR - ( 28 Sep 2021 07:30 )   PT: 54.4 sec;   INR: 4.93 ratio         PTT - ( 27 Sep 2021 07:18 )  PTT:32.5 sec    CAPILLARY BLOOD GLUCOSE      RADIOLOGY & ADDITIONAL TESTS:

## 2021-09-28 NOTE — PROGRESS NOTE ADULT - ASSESSMENT
66-year-old female former smoker with DM, HTN, HLD, NICM (EF 29% by echo 09/2021) s/p SubQ ICD, PE s/p IVC filter and LV thrombus on warfarin who presented ot the ER with dyspnea.     1. Dyspnea: Multifactorial, likely combination of dyspnea in setting of recent saddle PE as well as acute on chronic systolic HF exacerbation. Since patient came with supertherapeutic INR, there is unlikely to be progression of PE.   -Currently improved    2. Chronic, systolic HF exacerbation (NICM):  -Patient is on metoprolol ER 25mg, hydralazine 10mg PO TID and Imdur ER 30mg  -Holding off Entresto/Spironolactone due to CHEN  -Her BNP of 12,000 is improved compared to 18,000 2 weeks ago, and may be close to baseline. She appears fairly euvolemic on exam.   -Creatinine starting to improve   -Patient has SubQ ICD, last checked June 1.2021, battery at 81%, no events noted  -OP follow up with me in the office after discharge    3. HTN: Well controlled on metoprolol, hydralazine     4. HLD: On atorvastatin     ***Note that this is a preliminary note and any recommendations should NOT be carried out until this note is finalized. *** 66-year-old female former smoker with DM, HTN, HLD, NICM (EF 29% by echo 09/2021) s/p SubQ ICD, PE s/p IVC filter and LV thrombus on warfarin who presented ot the ER with dyspnea.     1. Dyspnea: Seems to have improved to baseline at this point. She has chronic non-ischemic systolic HF (and recent saddle PE) so there will likely continue to be a degree of exertional dyspnea.     2. Chronic, systolic HF exacerbation (NICM):  -Patient is on metoprolol ER 25mg, hydralazine 10mg PO TID and Imdur ER 30mg  -Holding off Entresto/Spironolactone due to CHEN, resume when renal function normalizes and/or on outpatient basis.   -Her BNP of 12,000 is improved compared to 18,000 2 weeks ago, and may be close to baseline. She appears fairly euvolemic on exam.   -Creatinine starting to improve   -Patient has SubQ ICD, last checked June 1.2021, battery at 81%, no events noted  -OP follow up with me in the office after discharge    3. HTN: Well controlled on metoprolol, hydralazine     4. HLD: On atorvastatin

## 2021-09-28 NOTE — CONSULT NOTE ADULT - SUBJECTIVE AND OBJECTIVE BOX
CHIEF COMPLAINT: SOB    HPI: 66-year-old female former smoker with DM, HTN, HLD, breast CA s/p chemo/XRT, and NICM (catheterization 02/2019 showed dRPL occlusion without ability to intervene), EF 31% by MUGA 7/2019, and s/p [employer] SubQ ICD 10/2019. Patient developed a PE and was started on Eliquis, and IVC filter was also placed. In August, patient presented to [facility] with dyspnea and was found to have a saddle PE. An LV thrombus was also noted and patient was changed over to warfarin. Patient now presents with dyspnea. She reports this started a few days ago. There was no associated chest pain, palpitations, or syncope. Reports compliance with all medications, denies adverse effects. States her dyspnea has almost gone today.     PAST MEDICAL & SURGICAL HISTORY:  As above, also Knee effusion, left: arthrocentesis- 05/25/16, Internal carotid aneurysm, Abdominal aneurysm: infrarenal- followed by Vascular , Dr. Gutierrez, Sickle cell trait, DVT (deep venous thrombosis): Left leg in 1994 after hysterectomy  S/P hysterectomy: 1994, [place] filter in place: 1994  S/P lumpectomy, right breast: 2014, revision of position- 2015      Allergies    Advil (Hives; Vomiting)      MEDICATIONS  (STANDING):  ALBUTerol    90 MICROgram(s) HFA Inhaler      ALBUTerol    90 MICROgram(s) HFA Inhaler 1 Puff(s) Inhalation two times a day  anastrozole 1 milliGRAM(s) Oral daily  atorvastatin 40 milliGRAM(s) Oral at bedtime  clotrimazole 1% Vaginal Cream 1 Applicatorful Vaginal at bedtime  dextrose 50% Injectable 25 Gram(s) IV Push once  hydrALAZINE 15 milliGRAM(s) Oral every 8 hours  influenza   Vaccine 0.5 milliLiter(s) IntraMuscular once  insulin lispro (ADMELOG) corrective regimen sliding scale   SubCutaneous Before meals and at bedtime  levothyroxine 25 MICROGram(s) Oral daily  methylPREDNISolone sodium succinate Injectable 40 milliGRAM(s) IV Push every 8 hours  metoprolol succinate ER 25 milliGRAM(s) Oral daily  pantoprazole    Tablet 40 milliGRAM(s) Oral before breakfast    MEDICATIONS  (PRN):  acetaminophen   Tablet .. 650 milliGRAM(s) Oral every 6 hours PRN Temp greater or equal to 38.5C (101.3F), Mild Pain (1 - 3)  aluminum hydroxide/magnesium hydroxide/simethicone Suspension 30 milliLiter(s) Oral every 4 hours PRN Dyspepsia  melatonin 3 milliGRAM(s) Oral at bedtime PRN Insomnia  ondansetron Injectable 4 milliGRAM(s) IV Push every 8 hours PRN Nausea and/or Vomiting      FAMILY HISTORY:  Family history of hypertension    Family history of diabetes mellitus      SOCIAL HISTORY:  Smoking- Former smoker   Alcohol- Denies  Illicit Drug use- Denies    REVIEW OF SYSTEMS:  Constitutional: [ ] fever, [ ]weight loss,  [ ]fatigue  Eyes: [ ] visual changes  Respiratory: [ ]shortness of breath;  [ ] cough, [ ]wheezing, [ ]chills, [ ]hemoptysis  Cardiovascular: [ ] chest pain, [ ]palpitations, [ ]dizziness,  [ ]leg swelling [ ]syncope  Gastrointestinal: [ ] abdominal pain, [ ]nausea, [ ]vomiting,  [ ]diarrhea   Genitourinary: [ ] dysuria, [ ] hematuria  Neurologic: [ ] headaches [ ] tremors  [ ] weakness [ ] lightheadedness  Skin: [ ] itching, [ ]burning, [ ] rashes  Endocrine: [ ] heat or cold intolerance  Musculoskeletal: [ ] joint pain or swelling; [ ] muscle, back, or extremity pain  Psychiatric: [ ] depression, [ ]anxiety, [ ]mood swings, or [ ]difficulty sleeping  Hematologic: [ ] easy bruising, [ ] bleeding gums       [ x] All others negative	  [ ] Unable to obtain    Vital Signs Last 24 Hrs  T(C): 36.3 (27 Sep 2021 05:26), Max: 36.6 (26 Sep 2021 13:18)  T(F): 97.3 (27 Sep 2021 05:26), Max: 97.8 (26 Sep 2021 13:18)  HR: 81 (27 Sep 2021 05:26) (75 - 81)  BP: 120/89 (27 Sep 2021 05:26) (119/76 - 125/86)  BP(mean): 81 (26 Sep 2021 21:11) (81 - 81)  RR: 16 (27 Sep 2021 05:26) (16 - 16)  SpO2: 100% (27 Sep 2021 05:26) (92% - 100%)  I&O's Summary      PHYSICAL EXAM:  General: No acute distress  HEENT: EOMI, PERRL  Neck: Supple, No JVD  Lungs: Clear to auscultation bilaterally; No rales or wheezing  Heart: Regular rate and rhythm; No murmurs, rubs, or gallops  Abdomen: Nontender, bowel sounds present  Extremities: No clubbing, cyanosis, or edema  Nervous system:  Alert & Oriented X3, no focal deficits  Psychiatric: Normal affect  Skin: No rashes or lesions      LABS:  09-27    139  |  111<H>  |  59<H>  ----------------------------<  188<H>  4.8   |  14<L>  |  2.52<H>    Ca    9.1      27 Sep 2021 07:18  Phos  3.7     09-27  Mg     2.1     09-27    TPro  7.6  /  Alb  3.4<L>  /  TBili  2.1<H>  /  DBili  x   /  AST  43<H>  /  ALT  41  /  AlkPhos  314<H>  09-26    Creatinine Trend: 2.52<--, 1.60<--, 1.91<--, 1.34<--, 1.33<--, 1.19<--                        13.0   9.83  )-----------( 282      ( 27 Sep 2021 07:18 )             39.9     PT/INR - ( 26 Sep 2021 07:04 )   PT: 48.8 sec;   INR: 4.40 ratio         PTT - ( 26 Sep 2021 07:04 )  PTT:33.2 sec    Lipid Panel:   Cardiac Enzymes: CARDIAC MARKERS ( 27 Sep 2021 07:18 )  0.142 ng/mL / x     / 373 U/L / x     / 11.9 ng/mL  CARDIAC MARKERS ( 25 Sep 2021 21:13 )  0.161 ng/mL / x     / 220 U/L / x     / 3.2 ng/mL  CARDIAC MARKERS ( 25 Sep 2021 13:05 )  0.161 ng/mL / x     / x     / x     / x        Serum Pro-Brain Natriuretic Peptide: [id number] pg/mL (09-25-21 @ 21:13)    RADIOLOGY: < from: Xray Chest 1 View- PORTABLE-Urgent (Xray Chest 1 View- PORTABLE-Urgent .) (09.25.21 @ 15:25) >  IMPRESSION: Small left pleural effusion. Prominent cardiac silhouette.    < end of copied text >    < from: CT Angio Chest PE Protocol w/ IV Cont (09.15.21 @ 17:40) >  IMPRESSION:    Central pulmonary embolus similar to 08/22/2021 with additional emboli bilaterally, decreased in prominence. Cardiomegaly. No evidence right heart strain.    < end of copied text >      ECG [my interpretation]: EKG 9/25: Sinus rhythm, LAFB, cannot rule out anterior infarct of unknown chronicity     TELEMETRY: n/a    ECHO: < from: TTE with Doppler (w/Cont) (08.23.21 @ 15:49) >  CONCLUSIONS:  1. Mitral annular calcification, otherwise normal mitral  valve. Moderate-severe mitral regurgitation.  2. Normal left ventricular internal dimensions and wall  thicknesses.  3. Severe global left ventricular systolic dysfunction.  Endocardial visualization enhanced with intravenous  injection of echo contrast (Definity).  A filling defect is  seen and is consistent with an LV apical thrombus.  4.Normal right ventricular size with decreased right  ventricular systolic function.  5. Inadequate tricuspid regurgitation Doppler envelope  precludes estimation of RVSP.    < end of copied text >    CATHETERIZATION:    < from: Cardiac Cath Lab - Adult (02.22.19 @ 17:02) >  LM:   --  LM: Angiography showed mild atherosclerosis with no flow limiting  lesions.  LAD:   --  LAD: Angiography showed mild atherosclerosis with no flow  limiting lesions.  CX:   --  Circumflex: Angiography showed mild atherosclerosis with no flow  limiting lesions.  RCA:   --  Mid RCA: There was a tubular 20 % stenosis at a site with no  prior intervention. The lesion was eccentric. There was ALTAGRACIA grade 3 flow  through the vessel (brisk flow).  --  RPDA: Angiography showed mild atherosclerosis with no flow limiting  lesions.  --  RPLS: Angiography showed mild atherosclerosis with no flow limiting  lesions.  --  RPL1: Therewas a 100 % stenosis. There was ALTAGRACIA grade 0 flow through  the vessel (no flow) and a small vascular territory distal to the lesion.  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 It does not appear amenable to  intervention.    < end of copied text >   CHIEF COMPLAINT: SOB    HPI: 66-year-old female former smoker with DM, HTN, HLD, breast CA s/p chemo/XRT, and NICM (catheterization 02/2019 showed dRPL occlusion without ability to intervene), EF 31% by MUGA 7/2019, and s/p [employer] SubQ ICD 10/2019. Patient developed a PE and was started on Eliquis, and IVC filter was also placed. In August, patient presented to [facility] with dyspnea and was found to have a saddle PE. An LV thrombus was also noted and patient was changed over to warfarin. Patient now presents with dyspnea. She reports this started a few days ago. There was no associated chest pain, palpitations, or syncope. Reports compliance with all medications, denies adverse effects. States her dyspnea has almost gone today.     PAST MEDICAL & SURGICAL HISTORY:  As above, also Knee effusion, left: arthrocentesis- 05/25/16, Internal carotid aneurysm, Abdominal aneurysm: infrarenal- followed by Vascular , Dr. Gutierrez, Sickle cell trait, DVT (deep venous thrombosis): Left leg in 1994 after hysterectomy  S/P hysterectomy: 1994, [place] filter in place: 1994  S/P lumpectomy, right breast: 2014, revision of position- 2015      Allergies    Advil (Hives; Vomiting)      MEDICATIONS  (STANDING):  ALBUTerol    90 MICROgram(s) HFA Inhaler      ALBUTerol    90 MICROgram(s) HFA Inhaler 1 Puff(s) Inhalation two times a day  anastrozole 1 milliGRAM(s) Oral daily  atorvastatin 40 milliGRAM(s) Oral at bedtime  clotrimazole 1% Vaginal Cream 1 Applicatorful Vaginal at bedtime  dextrose 50% Injectable 25 Gram(s) IV Push once  hydrALAZINE 15 milliGRAM(s) Oral every 8 hours  influenza   Vaccine 0.5 milliLiter(s) IntraMuscular once  insulin lispro (ADMELOG) corrective regimen sliding scale   SubCutaneous Before meals and at bedtime  levothyroxine 25 MICROGram(s) Oral daily  methylPREDNISolone sodium succinate Injectable 40 milliGRAM(s) IV Push every 8 hours  metoprolol succinate ER 25 milliGRAM(s) Oral daily  pantoprazole    Tablet 40 milliGRAM(s) Oral before breakfast    MEDICATIONS  (PRN):  acetaminophen   Tablet .. 650 milliGRAM(s) Oral every 6 hours PRN Temp greater or equal to 38.5C (101.3F), Mild Pain (1 - 3)  aluminum hydroxide/magnesium hydroxide/simethicone Suspension 30 milliLiter(s) Oral every 4 hours PRN Dyspepsia  melatonin 3 milliGRAM(s) Oral at bedtime PRN Insomnia  ondansetron Injectable 4 milliGRAM(s) IV Push every 8 hours PRN Nausea and/or Vomiting      FAMILY HISTORY:  Family history of hypertension    Family history of diabetes mellitus      SOCIAL HISTORY:  Smoking- Former smoker   Alcohol- Denies  Illicit Drug use- Denies    REVIEW OF SYSTEMS:  Constitutional: [ ] fever, [ ]weight loss,  [ ]fatigue  Eyes: [ ] visual changes  Respiratory: [ ]shortness of breath;  [ ] cough, [ ]wheezing, [ ]chills, [ ]hemoptysis  Cardiovascular: [ ] chest pain, [ ]palpitations, [ ]dizziness,  [ ]leg swelling [ ]syncope  Gastrointestinal: [ ] abdominal pain, [ ]nausea, [ ]vomiting,  [ ]diarrhea   Genitourinary: [ ] dysuria, [ ] hematuria  Neurologic: [ ] headaches [ ] tremors  [ ] weakness [ ] lightheadedness  Skin: [ ] itching, [ ]burning, [ ] rashes  Endocrine: [ ] heat or cold intolerance  Musculoskeletal: [ ] joint pain or swelling; [ ] muscle, back, or extremity pain  Psychiatric: [ ] depression, [ ]anxiety, [ ]mood swings, or [ ]difficulty sleeping  Hematologic: [ ] easy bruising, [ ] bleeding gums       [ x] All others negative	  [ ] Unable to obtain    Vital Signs Last 24 Hrs  T(C): 36.3 (27 Sep 2021 05:26), Max: 36.6 (26 Sep 2021 13:18)  T(F): 97.3 (27 Sep 2021 05:26), Max: 97.8 (26 Sep 2021 13:18)  HR: 81 (27 Sep 2021 05:26) (75 - 81)  BP: 120/89 (27 Sep 2021 05:26) (119/76 - 125/86)  BP(mean): 81 (26 Sep 2021 21:11) (81 - 81)  RR: 16 (27 Sep 2021 05:26) (16 - 16)  SpO2: 100% (27 Sep 2021 05:26) (92% - 100%)  I&O's Summary      PHYSICAL EXAM:  General: No acute distress  HEENT: EOMI, PERRL  Neck: Supple, No JVD  Lungs: Clear to auscultation bilaterally; No rales or wheezing  Heart: Regular rate and rhythm; No murmurs, rubs, or gallops  Abdomen: Nontender, bowel sounds present  Extremities: No clubbing, cyanosis, or edema  Nervous system:  Alert & Oriented X3, no focal deficits  Psychiatric: Normal affect  Skin: No rashes or lesions      LABS:  09-27    139  |  111<H>  |  59<H>  ----------------------------<  188<H>  4.8   |  14<L>  |  2.52<H>    Ca    9.1      27 Sep 2021 07:18  Phos  3.7     09-27  Mg     2.1     09-27    TPro  7.6  /  Alb  3.4<L>  /  TBili  2.1<H>  /  DBili  x   /  AST  43<H>  /  ALT  41  /  AlkPhos  314<H>  09-26    Creatinine Trend: 2.52<--, 1.60<--, 1.91<--, 1.34<--, 1.33<--, 1.19<--                        13.0   9.83  )-----------( 282      ( 27 Sep 2021 07:18 )             39.9     PT/INR - ( 26 Sep 2021 07:04 )   PT: 48.8 sec;   INR: 4.40 ratio         PTT - ( 26 Sep 2021 07:04 )  PTT:33.2 sec    Lipid Panel:   Cardiac Enzymes: CARDIAC MARKERS ( 27 Sep 2021 07:18 )  0.142 ng/mL / x     / 373 U/L / x     / 11.9 ng/mL  CARDIAC MARKERS ( 25 Sep 2021 21:13 )  0.161 ng/mL / x     / 220 U/L / x     / 3.2 ng/mL  CARDIAC MARKERS ( 25 Sep 2021 13:05 )  0.161 ng/mL / x     / x     / x     / x        Serum Pro-Brain Natriuretic Peptide: [id number] pg/mL (09-25-21 @ 21:13)    RADIOLOGY: < from: Xray Chest 1 View- PORTABLE-Urgent (Xray Chest 1 View- PORTABLE-Urgent .) (09.25.21 @ 15:25) >  IMPRESSION: Small left pleural effusion. Prominent cardiac silhouette.    < end of copied text >    < from: CT Angio Chest PE Protocol w/ IV Cont (09.15.21 @ 17:40) >  IMPRESSION:    Central pulmonary embolus similar to 08/22/2021 with additional emboli bilaterally, decreased in prominence. Cardiomegaly. No evidence right heart strain.    < end of copied text >      ECG [my interpretation]: EKG 9/25: Sinus rhythm, LAFB, cannot rule out anterior infarct of unknown chronicity     TELEMETRY: n/a    ECHO: < from: TTE with Doppler (w/Cont) (08.23.21 @ 15:49) >  CONCLUSIONS:  1. Mitral annular calcification, otherwise normal mitral  valve. Moderate-severe mitral regurgitation.  2. Normal left ventricular internal dimensions and wall  thicknesses.  3. Severe global left ventricular systolic dysfunction.  Endocardial visualization enhanced with intravenous  injection of echo contrast (Definity).  A filling defect is  seen and is consistent with an LV apical thrombus.  4.Normal right ventricular size with decreased right  ventricular systolic function.  5. Inadequate tricuspid regurgitation Doppler envelope  precludes estimation of RVSP.

## 2021-09-28 NOTE — PROGRESS NOTE ADULT - SUBJECTIVE AND OBJECTIVE BOX
PRESENTING CC: shortness of breath    SUBJ:     In summary, this is a 66-year-old female former smoker with DM, HTN, HLD, NICM (EF 29% by echo 09/2021) s/p SubQ ICD, PE s/p IVC filter and LV thrombus on warfarin who presented to the ER with dyspnea.     Overnight, there were no acute events. Patient     ------------------------  PMH: As above, also Knee effusion, left: arthrocentesis- 05/25/16, Internal carotid aneurysm, Abdominal aneurysm: infrarenal- followed by Vascular , Dr. Gutierrez, Sickle cell trait, DVT (deep venous thrombosis): Left leg in 1994 after hysterectomy  S/P hysterectomy: 1994, [place] filter in place: 1994  S/P lumpectomy, right breast: 2014, revision of position- 2015    Allergies    Advil (Hives; Vomiting)    MEDICATIONS  (STANDING):  ALBUTerol    90 MICROgram(s) HFA Inhaler      ALBUTerol    90 MICROgram(s) HFA Inhaler 1 Puff(s) Inhalation two times a day  anastrozole 1 milliGRAM(s) Oral daily  atorvastatin 40 milliGRAM(s) Oral at bedtime  clotrimazole 1% Vaginal Cream 1 Applicatorful Vaginal at bedtime  dextrose 50% Injectable 25 Gram(s) IV Push once  hydrALAZINE 10 milliGRAM(s) Oral every 8 hours  influenza   Vaccine 0.5 milliLiter(s) IntraMuscular once  insulin lispro (ADMELOG) corrective regimen sliding scale   SubCutaneous Before meals and at bedtime  isosorbide   mononitrate ER Tablet (IMDUR) 30 milliGRAM(s) Oral daily  levothyroxine 25 MICROGram(s) Oral daily  metoprolol succinate ER 25 milliGRAM(s) Oral daily  pantoprazole    Tablet 40 milliGRAM(s) Oral before breakfast  predniSONE   Tablet 40 milliGRAM(s) Oral daily    MEDICATIONS  (PRN):  acetaminophen   Tablet .. 650 milliGRAM(s) Oral every 6 hours PRN Temp greater or equal to 38.5C (101.3F), Mild Pain (1 - 3)  aluminum hydroxide/magnesium hydroxide/simethicone Suspension 30 milliLiter(s) Oral every 4 hours PRN Dyspepsia  melatonin 3 milliGRAM(s) Oral at bedtime PRN Insomnia  ondansetron Injectable 4 milliGRAM(s) IV Push every 8 hours PRN Nausea and/or Vomiting      FAMILY HISTORY:  Family history of hypertension    Family history of diabetes mellitus    Family history of renal stone    Family history of glioblastoma    Family history of liver failure    History of hypertension  mother    Family history of glioblastoma (Sibling)    Family history of renal failure  uncle    Family history of other condition  please open box and see additional medical family history    Family history of glioblastoma      Reviewed; no change from my prior note    SOCIAL HISTORY:  Reviewed, no change from my prior note    REVIEW OF SYSTEMS:  Constitutional: [ ] fever, [ ]weight loss,  [ ]fatigue  Eyes: [ ] visual changes  Respiratory: [ ]shortness of breath;  [ ] cough, [ ]wheezing, [ ]chills, [ ]hemoptysis  Cardiovascular: [ ] chest pain, [ ]palpitations, [ ]dizziness,  [ ]leg swelling [ ]syncope  Gastrointestinal: [ ] abdominal pain, [ ]nausea, [ ]vomiting,  [ ]diarrhea   Genitourinary: [ ] dysuria, [ ] hematuria  Neurologic: [ ] headaches [ ] tremors [ ] weakness [ ] lightheadedness  Skin: [ ] itching, [ ]burning, [ ] rashes  Endocrine: [ ] heat or cold intolerance  Musculoskeletal: [ ] joint pain or swelling; [ ] muscle, back, or extremity pain  Psychiatric: [ ] depression, [ ]anxiety, [ ]mood swings, or [ ]difficulty sleeping  Hematologic: [ ] easy bruising, [ ] bleeding gums    [x] All remaining systems negative except as per above.   [  ] Unable to obtain    Vital Signs Last 24 Hrs  T(C): 36.4 (28 Sep 2021 04:58), Max: 36.7 (27 Sep 2021 20:32)  T(F): 97.5 (28 Sep 2021 04:58), Max: 98.1 (27 Sep 2021 20:32)  HR: 79 (28 Sep 2021 04:58) (76 - 80)  BP: 127/90 (28 Sep 2021 04:58) (127/89 - 132/97)  BP(mean): --  RR: 19 (28 Sep 2021 04:58) (17 - 19)  SpO2: 100% (28 Sep 2021 04:58) (98% - 100%)  I&O's Summary      PHYSICAL EXAM:  General: No acute distress  HEENT: EOMI, PERRL  Neck: Supple, No JVD  Lungs: Clear to auscultation bilaterally; No rales or wheezing  Heart: Regular rate and rhythm; No murmurs, rubs, or gallops  Abdomen: Nontender, bowel sounds present  Extremities: No clubbing, cyanosis, or edema  Nervous system:  Alert & Oriented X3, no focal deficits  Psychiatric: Normal affect  Skin: No rashes or lesions    LABS:  09-28    139  |  111<H>  |  76<H>  ----------------------------<  187<H>  4.8   |  13<L>  |  2.32<H>    Ca    8.9      28 Sep 2021 07:30  Phos  3.7     09-27  Mg     2.1     09-27      Creatinine Trend: 2.32<--, 2.52<--, 1.60<--, 1.91<--, 1.34<--, 1.33<--                        13.0   8.27  )-----------( 282      ( 28 Sep 2021 07:30 )             39.4     PT/INR - ( 28 Sep 2021 07:30 )   PT: 54.4 sec;   INR: 4.93 ratio         PTT - ( 27 Sep 2021 07:18 )  PTT:32.5 sec  Lipid Panel:   Cardiac Enzymes: CARDIAC MARKERS ( 27 Sep 2021 07:18 )  0.142 ng/mL / x     / 373 U/L / x     / 11.9 ng/mL                    PRESENTING CC: shortness of breath    SUBJ:     In summary, this is a 66-year-old female former smoker with DM, HTN, HLD, NICM (EF 29% by echo 09/2021) s/p SubQ ICD, PE s/p IVC filter and LV thrombus on warfarin who presented to the ER with dyspnea.     Overnight, there were no acute events. Patient reports her dyspnea feels at baseline at this point, though states she has not ambulated around enough to see how her exertional symptoms are. Complains of perirectal pruritus.     ------------------------  PMH: As above, also Knee effusion, left: arthrocentesis- 05/25/16, Internal carotid aneurysm, Abdominal aneurysm: infrarenal- followed by Vascular , Dr. Gutierrez, Sickle cell trait, DVT (deep venous thrombosis): Left leg in 1994 after hysterectomy  S/P hysterectomy: 1994, [place] filter in place: 1994  S/P lumpectomy, right breast: 2014, revision of position- 2015    Allergies    Advil (Hives; Vomiting)    MEDICATIONS  (STANDING):  ALBUTerol    90 MICROgram(s) HFA Inhaler      ALBUTerol    90 MICROgram(s) HFA Inhaler 1 Puff(s) Inhalation two times a day  anastrozole 1 milliGRAM(s) Oral daily  atorvastatin 40 milliGRAM(s) Oral at bedtime  clotrimazole 1% Vaginal Cream 1 Applicatorful Vaginal at bedtime  dextrose 50% Injectable 25 Gram(s) IV Push once  hydrALAZINE 10 milliGRAM(s) Oral every 8 hours  influenza   Vaccine 0.5 milliLiter(s) IntraMuscular once  insulin lispro (ADMELOG) corrective regimen sliding scale   SubCutaneous Before meals and at bedtime  isosorbide   mononitrate ER Tablet (IMDUR) 30 milliGRAM(s) Oral daily  levothyroxine 25 MICROGram(s) Oral daily  metoprolol succinate ER 25 milliGRAM(s) Oral daily  pantoprazole    Tablet 40 milliGRAM(s) Oral before breakfast  predniSONE   Tablet 40 milliGRAM(s) Oral daily    MEDICATIONS  (PRN):  acetaminophen   Tablet .. 650 milliGRAM(s) Oral every 6 hours PRN Temp greater or equal to 38.5C (101.3F), Mild Pain (1 - 3)  aluminum hydroxide/magnesium hydroxide/simethicone Suspension 30 milliLiter(s) Oral every 4 hours PRN Dyspepsia  melatonin 3 milliGRAM(s) Oral at bedtime PRN Insomnia  ondansetron Injectable 4 milliGRAM(s) IV Push every 8 hours PRN Nausea and/or Vomiting      FAMILY HISTORY:  Family history of hypertension    Family history of diabetes mellitus    Family history of renal stone    Family history of glioblastoma    Family history of liver failure    History of hypertension  mother    Family history of glioblastoma (Sibling)    Family history of renal failure  uncle    Family history of other condition  please open box and see additional medical family history    Family history of glioblastoma      Reviewed; no change from my prior note    SOCIAL HISTORY:  Reviewed, no change from my prior note    REVIEW OF SYSTEMS:  Constitutional: [ ] fever, [ ]weight loss,  [ ]fatigue  Eyes: [ ] visual changes  Respiratory: [ ]shortness of breath;  [ ] cough, [ ]wheezing, [ ]chills, [ ]hemoptysis  Cardiovascular: [ ] chest pain, [ ]palpitations, [ ]dizziness,  [ ]leg swelling [ ]syncope  Gastrointestinal: [ ] abdominal pain, [ ]nausea, [ ]vomiting,  [ ]diarrhea   Genitourinary: [ ] dysuria, [ ] hematuria  Neurologic: [ ] headaches [ ] tremors [ ] weakness [ ] lightheadedness  Skin: [ ] itching, [ ]burning, [ ] rashes  Endocrine: [ ] heat or cold intolerance  Musculoskeletal: [ ] joint pain or swelling; [ ] muscle, back, or extremity pain  Psychiatric: [ ] depression, [ ]anxiety, [ ]mood swings, or [ ]difficulty sleeping  Hematologic: [ ] easy bruising, [ ] bleeding gums    [x] All remaining systems negative except as per above.   [  ] Unable to obtain    Vital Signs Last 24 Hrs  T(C): 36.4 (28 Sep 2021 04:58), Max: 36.7 (27 Sep 2021 20:32)  T(F): 97.5 (28 Sep 2021 04:58), Max: 98.1 (27 Sep 2021 20:32)  HR: 79 (28 Sep 2021 04:58) (76 - 80)  BP: 127/90 (28 Sep 2021 04:58) (127/89 - 132/97)  BP(mean): --  RR: 19 (28 Sep 2021 04:58) (17 - 19)  SpO2: 100% (28 Sep 2021 04:58) (98% - 100%)  I&O's Summary      PHYSICAL EXAM:  General: No acute distress  HEENT: EOMI, PERRL  Neck: Supple, No JVD  Lungs: Clear to auscultation bilaterally; No rales or wheezing  Heart: Regular rate and rhythm; No murmurs, rubs, or gallops  Abdomen: Nontender, bowel sounds present  Extremities: No clubbing, cyanosis, or edema  Nervous system:  Alert & Oriented X3, no focal deficits  Psychiatric: Normal affect  Skin: No rashes or lesions    LABS:  09-28    139  |  111<H>  |  76<H>  ----------------------------<  187<H>  4.8   |  13<L>  |  2.32<H>    Ca    8.9      28 Sep 2021 07:30  Phos  3.7     09-27  Mg     2.1     09-27      Creatinine Trend: 2.32<--, 2.52<--, 1.60<--, 1.91<--, 1.34<--, 1.33<--                        13.0   8.27  )-----------( 282      ( 28 Sep 2021 07:30 )             39.4     PT/INR - ( 28 Sep 2021 07:30 )   PT: 54.4 sec;   INR: 4.93 ratio         PTT - ( 27 Sep 2021 07:18 )  PTT:32.5 sec  Lipid Panel:   Cardiac Enzymes: CARDIAC MARKERS ( 27 Sep 2021 07:18 )  0.142 ng/mL / x     / 373 U/L / x     / 11.9 ng/mL

## 2021-09-28 NOTE — CONSULT NOTE ADULT - ASSESSMENT
66-year-old female former smoker with DM, HTN, HLD, NICM (EF 31% by MUGA 07/2019) s/p SubQ ICD, PE s/p IVC filter and LV thrombus on warfarin who presented ot the ER with dyspnea.     1. Dyspnea: Multifactorial, likely combination of dyspnea in setting of recent saddle PE as well as acute on chronic systolic HF exacerbation. Since patient came with supertherapeutic INR, there is unlikely to be progression of PE.   -Patient's pulmonologist is Dr. Cohen    2. Chronic, systolic HF exacerbation (NICM):  -Patient is on metoprolol ER 25mg  -She is also on hydralazine 15mg PO Q8H. This is contraindicated in the setting of CAD without concomitant Imdur. Therefore, would change hydralazine to 10mg PO Q8H and add on Imdur ER 30mg  -Holding off Entresto/Spironolactone due to CHEN  -Her BNP of 12,000 is improved compared to 18,000 2 weeks ago, and may be close to baseline. She appears fairly euvolemic on exam.   -Consider nephrology input   -Patient has SubQ ICD, last checked June 1.2021, battery at 81%, no events noted  -OP follow up with me in the office after discharge    3. HTN: Well controlled on metoprolol, hydralazine; make changes as per above with addition of Imdur.    4. HLD: Previously on atorvastatin, holding off for now to improve medication compliance.     66-year-old female former smoker with DM, HTN, HLD, NICM (EF 31% by MUGA 07/2019) s/p SubQ ICD, PE s/p IVC filter and LV thrombus on warfarin who presented ot the ER with dyspnea.     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 Start Spiriva, cont prn albuterol. d/c symbicort and prednisone  -  BNP elevated but has been higher in past, clinically does not look volume overloaded. cardiac management as per cards  -  acute kidney injury - ?cardiorenal, recommend check VBG to assess for metabolic acidosis as pt ability to compensate via respiratory is limited and can get easily dyspneic.

## 2021-09-28 NOTE — PROGRESS NOTE ADULT - PROBLEM SELECTOR PLAN 2
- EF noted from 16th, severe diastolic heart failure  - Chest X-ray with small pleural effusion  -C/w metoprolol ER 25mg  -C/w Hydralazine 10mg PO Q8H and add on Imdur ER 30mg- card recs  -Holding off Entresto/Spironolactone due to CHEN  -Patient has SubQ ICD, last checked June 1.2021, battery at 81%, no events noted  - cardiology Dr Goodrich EF noted from 9/16 with severe combined heart failure  Chest X-ray as above   C/w metoprolol ER 25mg  C/w Hydralazine 10mg PO Q8H and Imdur ER 30mg  Holding off Entresto/Spironolactone due to CHEN  Patient has SubQ ICD, last checked June 1.2021, battery at 81%, no events noted  Cardiology Dr Goodrich

## 2021-09-28 NOTE — PROGRESS NOTE ADULT - ATTENDING COMMENTS
Patient was seen and examined at bedside today. Intermittently confused but gets reoriented on her own  Reports some SOB but improved from yesterday    Vitals noted, saturating well on RA    P/E:  NAD, laying comfortably on bed   AAOx2, Patient was seen and examined at bedside today. Intermittently incoherent but gets reoriented on her own  Reports some SOB but improved from yesterday    Vitals noted, saturating well on RA    P/E:  NAD, laying comfortably on bed   AAOx2, no focal deficit, intermittently incoherent   Lungs Clear to auscultation anterolaterally, poor effort   S1S2 WNL, + murmur  Abd soft, non tender, BS present   BL LE no edema or Patient was seen and examined at bedside today. Intermittently incoherent but gets reoriented on her own  Reports some SOB but improved from yesterday    Vitals noted, saturating well on RA    P/E:  NAD, laying comfortably on bed   AAOx2, no focal deficit, intermittently incoherent   Lungs Clear to auscultation anterolaterally, poor effort   S1S2 WNL, + murmur  Abd soft, non tender, BS present   BL LE no edema or tenderness    labs noted     A/P:  Acute on chronic hypoxic respiratory failure possibly multifactorial due to recent PE, acute on chronic systolic HF exacerbation, mild COPD exacerbation   Acute metabolic encephalopathy due to uremia, metabolic acidosis, hospital acquired delirium  CHEN concern for ATN (possibly cardiorenal syndrome)  Metabolic acidosis   Supratherapeutic INR  Recent saddle PE on Warfarin  LV apical thrombus  Infrarenal AAA, cerebral aneurysm  Functional MR  CAD  Dementia   Atypical depression  HTN    Plan:  ABG noted, will cont NC O2 (patient uses home oxygen)  Consider NaNco3 tabs if acidosis worsens   Will cont to hold Entresto  BP stable with new regimen  Appreciate consult from Dr. Cohen, discontinued Steroid  Cont to monitor renal function, possibly cardiorenal syndrome, will monitor Off IVF, not on diuretics now due to euvolemic status   Cont to hold warfarin, daily INR  Appreciate cardiology consult   As per patient's niece and POA, patient was showing some forgetfulness at home. Gets delirious when admitted to hospital. Patient stopped taking Seroquel. Will restart 25mg BID for now  Patient gets home visit by RN arranged by Dr. Huyen Figueroa to monitor INR, will rearrange that  Fall precaution

## 2021-09-28 NOTE — PROGRESS NOTE ADULT - PROBLEM SELECTOR PLAN 1
P/w shortness of breath with wheezing, resolved, pt not on oxygen at home  CXR noted  as above  saturating well in RA  S/p Solumedrol   Started on prednisone 40mg po today  C/w Albuterol MDI Q6h   Pulm Dr Castillo Pt p/w prolonged end expiratory wheezes   Hx of COPD, not on home 02, no prior intubations  GOLD 2 by criteria; not started on Abx   Labs not significant for superimposed infection  CXR non focal, does not appear to be fluid overloaded o/e or imaging  C/w Solumedrol 40 Q8h, tapered to oral prednisone   PPI ppx, sliding scale coverage   S/p Duoneb x 3, c/w Albuterol PRN   RVP negative   Pulm Consult Dr. Cohen

## 2021-09-28 NOTE — PROGRESS NOTE ADULT - PROBLEM SELECTOR PLAN 12
- supratherapuetic INR  - trend INR  - PT eval  - taper prednisone, f/u OP with Dr Castillo - supratherapuetic INR  - trend INR  - PT eval  - taper prednisone, f/u OP with Dr Cohen - supratherapuetic INR  - trend INR  - PT eval: home with home PT   - taper prednisone, f/u OP with Dr Cohen

## 2021-09-28 NOTE — PROGRESS NOTE ADULT - PROBLEM SELECTOR PLAN 6
Pt p/w BUN/Cr of 37/1.91, eGFR of 27, normally in 40's (CKD3-4)  Possibly prerenal   Will hold any IVF (Caution as CHF) pending urine   trend BMP Pt p/w BUN/Cr of 37/1.91, eGFR of 27, normally in 40's (CKD3-4)  FeNa 0.3%, Prerenal  Will hold off IVF (Caution as CHF) - s/p 1L   trend BMP  Nephro Dr. Johnson  Consider IVF 50ml/hrx10 hours if BMP does not improve in AM

## 2021-09-28 NOTE — PROGRESS NOTE ADULT - ASSESSMENT
Patient is a 66 F from home (lives alone w/ HHA), baseline AA0x3, independently ambulating, with PMHx of CAD, T2DM (last A1c 5.9%), HTN, HFrEF/cardiomyopathy (EF 25% on 9/16/21, s/p SICD), COPD (no prior intubations), CVA, DVT (L leg 1994)/PE (s/p IVC filter 9/2020, and repeat saddle PE at [facility] 8/21-9/3 now on Coumadin), infrarenal AAA, cerebral aneurysm, Atypical Depression, R breast cancer (s/p chemo/XRT), Class I Obesity, PSHx of hysterectomy (for fibroids), and cholecystectomy presenting to the ED with worsening shortness of breath x 4 days, admitted for likely COPD exacerbation with acute and chronic heart failure. X-ray significant for Small left pleural effusion. Echo 9/16/21: Severe LV enlargement, severely reduced LV Systolic function with EF of 25% , diffuse hypokinesis, severe functional MR. Started on solumedrol IV transitioned to PO today, cardiology and pulmonology on board.   Patient is a 66 F from home (lives alone w/ HHA), baseline AA0x3, independently ambulating, with PMHx of CAD, T2DM (last A1c 5.9%), HTN, HFrEF/cardiomyopathy (EF 25% on 9/16/21, s/p SICD), COPD (no prior intubations), CVA, DVT (L leg 1994)/PE (s/p IVC filter 9/2020, and repeat saddle PE at [facility] 8/21-9/3 now on Coumadin), infrarenal AAA, cerebral aneurysm, Atypical Depression, R breast cancer (s/p chemo/XRT), Class I Obesity, PSHx of hysterectomy (for fibroids), and cholecystectomy presenting to the ED with worsening shortness of breath x 4 days, admitted for likely COPD exacerbation with acute and chronic heart failure. X-ray significant for Small left pleural effusion. Echo 9/16/21: Severe LV enlargement, severely reduced LV Systolic function with EF of 25% , diffuse hypokinesis, severe functional MR. Admitted for COPD exacerbation vs multifactorial dyspnea (COPD, CHF, chronic PE).

## 2021-09-29 ENCOUNTER — APPOINTMENT (OUTPATIENT)
Dept: INTERNAL MEDICINE | Facility: CLINIC | Age: 66
End: 2021-09-29

## 2021-09-29 DIAGNOSIS — R41.82 ALTERED MENTAL STATUS, UNSPECIFIED: ICD-10-CM

## 2021-09-29 DIAGNOSIS — J43.9 EMPHYSEMA, UNSPECIFIED: ICD-10-CM

## 2021-09-29 LAB
ANION GAP SERPL CALC-SCNC: 11 MMOL/L — SIGNIFICANT CHANGE UP (ref 5–17)
APTT BLD: 30.4 SEC — SIGNIFICANT CHANGE UP (ref 27.5–35.5)
BUN SERPL-MCNC: 78 MG/DL — HIGH (ref 7–18)
CALCIUM SERPL-MCNC: 8.7 MG/DL — SIGNIFICANT CHANGE UP (ref 8.4–10.5)
CHLORIDE SERPL-SCNC: 111 MMOL/L — HIGH (ref 96–108)
CO2 SERPL-SCNC: 18 MMOL/L — LOW (ref 22–31)
CREAT SERPL-MCNC: 2.32 MG/DL — HIGH (ref 0.5–1.3)
GLUCOSE BLDC GLUCOMTR-MCNC: 140 MG/DL — HIGH (ref 70–99)
GLUCOSE BLDC GLUCOMTR-MCNC: 150 MG/DL — HIGH (ref 70–99)
GLUCOSE BLDC GLUCOMTR-MCNC: 173 MG/DL — HIGH (ref 70–99)
GLUCOSE BLDC GLUCOMTR-MCNC: 202 MG/DL — HIGH (ref 70–99)
GLUCOSE SERPL-MCNC: 211 MG/DL — HIGH (ref 70–99)
HCT VFR BLD CALC: 38.9 % — SIGNIFICANT CHANGE UP (ref 34.5–45)
HGB BLD-MCNC: 12.5 G/DL — SIGNIFICANT CHANGE UP (ref 11.5–15.5)
INR BLD: 3.49 RATIO — HIGH (ref 0.88–1.16)
MCHC RBC-ENTMCNC: 30.7 PG — SIGNIFICANT CHANGE UP (ref 27–34)
MCHC RBC-ENTMCNC: 32.1 GM/DL — SIGNIFICANT CHANGE UP (ref 32–36)
MCV RBC AUTO: 95.6 FL — SIGNIFICANT CHANGE UP (ref 80–100)
NRBC # BLD: 0 /100 WBCS — SIGNIFICANT CHANGE UP (ref 0–0)
PLATELET # BLD AUTO: 220 K/UL — SIGNIFICANT CHANGE UP (ref 150–400)
POTASSIUM SERPL-MCNC: 4.6 MMOL/L — SIGNIFICANT CHANGE UP (ref 3.5–5.3)
POTASSIUM SERPL-SCNC: 4.6 MMOL/L — SIGNIFICANT CHANGE UP (ref 3.5–5.3)
PROTHROM AB SERPL-ACNC: 39.1 SEC — HIGH (ref 10.6–13.6)
RBC # BLD: 4.07 M/UL — SIGNIFICANT CHANGE UP (ref 3.8–5.2)
RBC # FLD: 17.9 % — HIGH (ref 10.3–14.5)
SODIUM SERPL-SCNC: 140 MMOL/L — SIGNIFICANT CHANGE UP (ref 135–145)
WBC # BLD: 8 K/UL — SIGNIFICANT CHANGE UP (ref 3.8–10.5)
WBC # FLD AUTO: 8 K/UL — SIGNIFICANT CHANGE UP (ref 3.8–10.5)

## 2021-09-29 PROCEDURE — 99233 SBSQ HOSP IP/OBS HIGH 50: CPT | Mod: GC

## 2021-09-29 PROCEDURE — 70450 CT HEAD/BRAIN W/O DYE: CPT | Mod: 26

## 2021-09-29 PROCEDURE — 99232 SBSQ HOSP IP/OBS MODERATE 35: CPT

## 2021-09-29 RX ORDER — SODIUM CHLORIDE 9 MG/ML
250 INJECTION INTRAMUSCULAR; INTRAVENOUS; SUBCUTANEOUS
Refills: 0 | Status: DISCONTINUED | OUTPATIENT
Start: 2021-09-29 | End: 2021-10-01

## 2021-09-29 RX ORDER — WARFARIN SODIUM 2.5 MG/1
2 TABLET ORAL ONCE
Refills: 0 | Status: COMPLETED | OUTPATIENT
Start: 2021-09-29 | End: 2021-09-29

## 2021-09-29 RX ADMIN — Medication 25 MICROGRAM(S): at 05:20

## 2021-09-29 RX ADMIN — PANTOPRAZOLE SODIUM 40 MILLIGRAM(S): 20 TABLET, DELAYED RELEASE ORAL at 05:20

## 2021-09-29 RX ADMIN — ANASTROZOLE 1 MILLIGRAM(S): 1 TABLET ORAL at 11:30

## 2021-09-29 RX ADMIN — Medication 2: at 07:55

## 2021-09-29 RX ADMIN — LORATADINE 10 MILLIGRAM(S): 10 TABLET ORAL at 11:30

## 2021-09-29 RX ADMIN — Medication 25 MILLIGRAM(S): at 05:20

## 2021-09-29 RX ADMIN — ALBUTEROL 1 PUFF(S): 90 AEROSOL, METERED ORAL at 21:51

## 2021-09-29 RX ADMIN — Medication 1: at 17:21

## 2021-09-29 RX ADMIN — Medication 10 MILLIGRAM(S): at 05:20

## 2021-09-29 RX ADMIN — ALBUTEROL 1 PUFF(S): 90 AEROSOL, METERED ORAL at 09:56

## 2021-09-29 RX ADMIN — ISOSORBIDE MONONITRATE 30 MILLIGRAM(S): 60 TABLET, EXTENDED RELEASE ORAL at 11:30

## 2021-09-29 RX ADMIN — NYSTATIN CREAM 1 APPLICATION(S): 100000 CREAM TOPICAL at 05:19

## 2021-09-29 RX ADMIN — QUETIAPINE FUMARATE 25 MILLIGRAM(S): 200 TABLET, FILM COATED ORAL at 05:20

## 2021-09-29 RX ADMIN — Medication 10 MILLIGRAM(S): at 21:51

## 2021-09-29 RX ADMIN — Medication 10 MILLIGRAM(S): at 14:27

## 2021-09-29 RX ADMIN — WARFARIN SODIUM 2 MILLIGRAM(S): 2.5 TABLET ORAL at 21:52

## 2021-09-29 RX ADMIN — NYSTATIN CREAM 1 APPLICATION(S): 100000 CREAM TOPICAL at 17:21

## 2021-09-29 RX ADMIN — SODIUM CHLORIDE 50 MILLILITER(S): 9 INJECTION INTRAMUSCULAR; INTRAVENOUS; SUBCUTANEOUS at 19:28

## 2021-09-29 RX ADMIN — ATORVASTATIN CALCIUM 40 MILLIGRAM(S): 80 TABLET, FILM COATED ORAL at 21:53

## 2021-09-29 NOTE — PROGRESS NOTE ADULT - PROBLEM SELECTOR PLAN 5
Pt has a history of DVT/PE w/ IVC filter and repeat saddle PE in 9/2020, on Coumadin  Last CT chest w/ IV con shows continued saddle embolus  Saturating well on RA Pt p/w BUN/Cr of 37/1.91, eGFR of 27, normally in 40's (CKD3-4)  FeNa 0.3%, Prerenal  Will hold off IVF (Caution as CHF) - s/p 1L   trend BMP  Nephro Dr. Johnson  Consider IVF 50ml/hrx10 hours if BMP does not improve in AM

## 2021-09-29 NOTE — PROGRESS NOTE ADULT - PROBLEM SELECTOR PLAN 1
Pt p/w prolonged end expiratory wheezes   Hx of COPD, not on home 02, no prior intubations  GOLD 2 by criteria; not started on Abx   Labs not significant for superimposed infection  CXR non focal, does not appear to be fluid overloaded o/e or imaging  C/w Solumedrol 40 Q8h, tapered to oral prednisone   PPI ppx, sliding scale coverage   S/p Duoneb x 3, c/w Albuterol PRN   RVP negative   Pulm Consult Dr. Cohen Patient baseline AA0x2-3, now lethargic and AA0x1  Started on Seroquel yesterday; held   CTH negative for acute CVA   Metabolic Acidosis improving, likely uremic   Check bladder scan, place primafit

## 2021-09-29 NOTE — PROGRESS NOTE ADULT - SUBJECTIVE AND OBJECTIVE BOX
PGY-2 Progress Note discussed with attending    PAGER #: [[phone]] TILL 5:00 PM  PLEASE CONTACT ON CALL TEAM:  - On Call Team (Please refer to Taryn) FROM 5:00 PM - 8:30PM  - Nightfloat Team FROM 8:30 -7:30 AM    CHIEF COMPLAINT & BRIEF HOSPITAL COURSE:    INTERVAL HPI/OVERNIGHT EVENTS:       REVIEW OF SYSTEMS:  CONSTITUTIONAL: No fever, weight loss, or fatigue  RESPIRATORY: No cough, wheezing, chills or hemoptysis; No shortness of breath  CARDIOVASCULAR: No chest pain, palpitations, dizziness, or leg swelling  GASTROINTESTINAL: No abdominal pain. No nausea, vomiting, or hematemesis; No diarrhea or constipation. No melena or hematochezia.  GENITOURINARY: No dysuria or hematuria, urinary frequency  NEUROLOGICAL: No headaches, memory loss, loss of strength, numbness, or tremors  SKIN: No itching, burning, rashes, or lesions     MEDICATIONS  (STANDING):  ALBUTerol    90 MICROgram(s) HFA Inhaler      ALBUTerol    90 MICROgram(s) HFA Inhaler 1 Puff(s) Inhalation two times a day  anastrozole 1 milliGRAM(s) Oral daily  atorvastatin 40 milliGRAM(s) Oral at bedtime  dextrose 50% Injectable 25 Gram(s) IV Push once  hydrALAZINE 10 milliGRAM(s) Oral every 8 hours  influenza   Vaccine 0.5 milliLiter(s) IntraMuscular once  insulin lispro (ADMELOG) corrective regimen sliding scale   SubCutaneous Before meals and at bedtime  isosorbide   mononitrate ER Tablet (IMDUR) 30 milliGRAM(s) Oral daily  levothyroxine 25 MICROGram(s) Oral daily  loratadine 10 milliGRAM(s) Oral daily  metoprolol succinate ER 25 milliGRAM(s) Oral daily  nystatin Ointment 1 Application(s) Topical every 12 hours  pantoprazole    Tablet 40 milliGRAM(s) Oral before breakfast  QUEtiapine 25 milliGRAM(s) Oral two times a day  warfarin 2 milliGRAM(s) Oral once    MEDICATIONS  (PRN):  acetaminophen   Tablet .. 650 milliGRAM(s) Oral every 6 hours PRN Temp greater or equal to 38.5C (101.3F), Mild Pain (1 - 3)  aluminum hydroxide/magnesium hydroxide/simethicone Suspension 30 milliLiter(s) Oral every 4 hours PRN Dyspepsia  melatonin 3 milliGRAM(s) Oral at bedtime PRN Insomnia  ondansetron Injectable 4 milliGRAM(s) IV Push every 8 hours PRN Nausea and/or Vomiting      Vital Signs Last 24 Hrs  T(C): 36.3 (29 Sep 2021 05:09), Max: 36.8 (28 Sep 2021 20:20)  T(F): 97.3 (29 Sep 2021 05:09), Max: 98.3 (28 Sep 2021 20:20)  HR: 76 (29 Sep 2021 05:09) (70 - 82)  BP: 127/90 (29 Sep 2021 05:09) (125/87 - 137/104)  BP(mean): 108 (28 Sep 2021 12:29) (108 - 108)  RR: 20 (29 Sep 2021 05:09) (20 - 20)  SpO2: 100% (29 Sep 2021 05:09) (100% - 100%)    PHYSICAL EXAMINATION:  GENERAL: NAD, well built  HEAD:  Atraumatic, Normocephalic  EYES:  conjunctiva and sclera clear  NECK: Supple, No JVD, Normal thyroid  CHEST/LUNG: Clear to auscultation. Clear to percussion bilaterally; No rales, rhonchi, wheezing, or rubs  HEART: Regular rate and rhythm; No murmurs, rubs, or gallops  ABDOMEN: Soft, Nontender, Nondistended; Bowel sounds present  NERVOUS SYSTEM:  Alert & Oriented X3,    EXTREMITIES:  2+ Peripheral Pulses, No clubbing, cyanosis, or edema  SKIN: warm dry                          12.5   8.00  )-----------( 220      ( 29 Sep 2021 07:19 )             38.9     09-29    140  |  111<H>  |  78<H>  ----------------------------<  211<H>  4.6   |  18<L>  |  2.32<H>    Ca    8.7      29 Sep 2021 07:20            PT/INR - ( 29 Sep 2021 07:20 )   PT: 39.1 sec;   INR: 3.49 ratio         PTT - ( 29 Sep 2021 07:20 )  PTT:30.4 sec    CAPILLARY BLOOD GLUCOSE      RADIOLOGY & ADDITIONAL TESTS:                   PGY-2 Progress Note discussed with attending    PAGER #: [[phone]] TILL 5:00 PM  PLEASE CONTACT ON CALL TEAM:  - On Call Team (Please refer to Taryn) FROM 5:00 PM - 8:30PM  - Nightfloat Team FROM 8:30 -7:30 AM    CHIEF COMPLAINT & BRIEF HOSPITAL COURSE:  Patient is a 66 F from home (lives alone w/ HHA), baseline AA0x3, independently ambulating, with PMHx of CAD, T2DM (last A1c 5.9%), HTN, HFrEF/cardiomyopathy (EF 25% on 9/16/21, s/p SICD), COPD (no prior intubations), CVA, DVT (L leg 1994)/PE (s/p IVC filter 9/2020, and repeat saddle PE at [facility] 8/21-9/3 now on Coumadin), infrarenal AAA, cerebral aneurysm, Atypical Depression, R breast cancer (s/p chemo/XRT), Class I Obesity, PSHx of hysterectomy (for fibroids), and cholecystectomy presenting to the ED with worsening shortness of breath x 4 days; admitted for HFrEF, COPD exacerbation, and CTPEH.      INTERVAL HPI/OVERNIGHT EVENTS:   Patient was examined while she was lying in bed, Aox1, lethargic after receiving Seroquel started yesterday. Her responding appropriately to questions, in NAD. She has normal bowel and bladder movements, and denies any other acute complaints.     REVIEW OF SYSTEMS:  CONSTITUTIONAL: Fatigue + No fever, weight loss  RESPIRATORY: No cough, wheezing, chills or hemoptysis; No shortness of breath  CARDIOVASCULAR: No chest pain, palpitations, dizziness, or leg swelling  GASTROINTESTINAL: No abdominal pain. No nausea, vomiting, or hematemesis; No diarrhea or constipation. No melena or hematochezia.  GENITOURINARY: Vaginal itching + No dysuria or hematuria, urinary frequency  NEUROLOGICAL: No headaches, memory loss, loss of strength, numbness, or tremors  SKIN: Vaginal itching + No burning, rashes, or lesions     MEDICATIONS  (STANDING):  ALBUTerol    90 MICROgram(s) HFA Inhaler      ALBUTerol    90 MICROgram(s) HFA Inhaler 1 Puff(s) Inhalation two times a day  anastrozole 1 milliGRAM(s) Oral daily  atorvastatin 40 milliGRAM(s) Oral at bedtime  dextrose 50% Injectable 25 Gram(s) IV Push once  hydrALAZINE 10 milliGRAM(s) Oral every 8 hours  influenza   Vaccine 0.5 milliLiter(s) IntraMuscular once  insulin lispro (ADMELOG) corrective regimen sliding scale   SubCutaneous Before meals and at bedtime  isosorbide   mononitrate ER Tablet (IMDUR) 30 milliGRAM(s) Oral daily  levothyroxine 25 MICROGram(s) Oral daily  loratadine 10 milliGRAM(s) Oral daily  metoprolol succinate ER 25 milliGRAM(s) Oral daily  nystatin Ointment 1 Application(s) Topical every 12 hours  pantoprazole    Tablet 40 milliGRAM(s) Oral before breakfast  QUEtiapine 25 milliGRAM(s) Oral two times a day  warfarin 2 milliGRAM(s) Oral once    MEDICATIONS  (PRN):  acetaminophen   Tablet .. 650 milliGRAM(s) Oral every 6 hours PRN Temp greater or equal to 38.5C (101.3F), Mild Pain (1 - 3)  aluminum hydroxide/magnesium hydroxide/simethicone Suspension 30 milliLiter(s) Oral every 4 hours PRN Dyspepsia  melatonin 3 milliGRAM(s) Oral at bedtime PRN Insomnia  ondansetron Injectable 4 milliGRAM(s) IV Push every 8 hours PRN Nausea and/or Vomiting      Vital Signs Last 24 Hrs  T(C): 36.3 (29 Sep 2021 05:09), Max: 36.8 (28 Sep 2021 20:20)  T(F): 97.3 (29 Sep 2021 05:09), Max: 98.3 (28 Sep 2021 20:20)  HR: 76 (29 Sep 2021 05:09) (70 - 82)  BP: 127/90 (29 Sep 2021 05:09) (125/87 - 137/104)  BP(mean): 108 (28 Sep 2021 12:29) (108 - 108)  RR: 20 (29 Sep 2021 05:09) (20 - 20)  SpO2: 100% (29 Sep 2021 05:09) (100% - 100%)    PHYSICAL EXAMINATION:  GENERAL: NAD, Overweight AAF, in NAD, confused, but able to be reoriented   HEAD:  Atraumatic, Normocephalic  EYES:  conjunctiva and sclera clear  NECK: Supple, No JVD, Normal thyroid  CHEST/LUNG: Clear to auscultation. Clear to percussion bilaterally; No rales, rhonchi, wheezing, or rubs  HEART: Regular rate and rhythm; No murmurs, rubs, or gallops  ABDOMEN: Soft, Nontender, Nondistended; Bowel sounds present  NERVOUS SYSTEM:  Alert & Oriented X3, bilateral sensory and motor exam wnl    EXTREMITIES:  2+ Peripheral Pulses, No clubbing, cyanosis, or edema  SKIN: warm dry, Genital exam with excoriations, but no discharge or beefy red erythema                           12.5   8.00  )-----------( 220      ( 29 Sep 2021 07:19 )             38.9     09-29    140  |  111<H>  |  78<H>  ----------------------------<  211<H>  4.6   |  18<L>  |  2.32<H>    Ca    8.7      29 Sep 2021 07:20            PT/INR - ( 29 Sep 2021 07:20 )   PT: 39.1 sec;   INR: 3.49 ratio         PTT - ( 29 Sep 2021 07:20 )  PTT:30.4 sec    CAPILLARY BLOOD GLUCOSE      RADIOLOGY & ADDITIONAL TESTS:

## 2021-09-29 NOTE — PROGRESS NOTE ADULT - PROBLEM SELECTOR PLAN 6
Pt p/w BUN/Cr of 37/1.91, eGFR of 27, normally in 40's (CKD3-4)  FeNa 0.3%, Prerenal  Will hold off IVF (Caution as CHF) - s/p 1L   trend BMP  Nephro Dr. Johnson  Consider IVF 50ml/hrx10 hours if BMP does not improve in AM Well controlled on metoprolol, hydralazine  C/w home meds while holding Entresto for CHEN  C/w Statin for coexisting HLD  DASH/DM diet  C/w hydralazine/Imdur today  Card Dr Goodrich

## 2021-09-29 NOTE — PROGRESS NOTE ADULT - ATTENDING COMMENTS
Patient was seen and examined at bedside today  Refuses to be interviewed "I am sleepy"    Vitals noted, saturating well on RA    P/E:  NAD, laying comfortably on bed   AAOx2, no focal deficit, able to follow commands   Lungs Clear to auscultation anterolaterally, poor effort   S1S2 WNL, + murmur  Abd soft, non tender, BS present   BL LE no edema or tenderness    labs noted     A/P:  Acute on chronic hypoxic respiratory failure possibly multifactorial due to recent PE, acute on chronic systolic HF exacerbation, mild COPD exacerbation   Acute metabolic encephalopathy due to uremia, metabolic acidosis, hospital acquired delirium  CHEN concern for ATN (possibly cardiorenal syndrome)  Metabolic acidosis   Supratherapeutic INR  Recent saddle PE on Warfarin  LV apical thrombus  Infrarenal AAA, cerebral aneurysm  Functional MR  CAD  Dementia   Atypical depression  HTN    Plan:  REnal function stable now, nephro consult appreciated  Appreciate consult from Dr. Cohen, discontinued Steroid  CT head neg for any acute events  Warfarin 2mg, daily INR  Appreciate cardiology consult   Spoke with Dr. Huyen Figueroa to monitor INR, will rearrange that  Fall precaution Patient was seen and examined at bedside today  Refuses to be interviewed "I am sleepy"    Vitals noted, saturating well on RA    P/E:  NAD, laying comfortably on bed   AAOx2, no focal deficit, able to follow commands   Lungs Clear to auscultation anterolaterally, poor effort   S1S2 WNL, + murmur  Abd soft, non tender, BS present   BL LE no edema or tenderness    labs noted     A/P:  Acute on chronic hypoxic respiratory failure possibly multifactorial due to recent PE, acute on chronic systolic HF exacerbation, mild COPD exacerbation   Acute metabolic encephalopathy due to uremia, metabolic acidosis, hospital acquired delirium  CHEN concern for ATN (possibly cardiorenal syndrome)  Metabolic acidosis   Supratherapeutic INR  Recent saddle PE on Warfarin  LV apical thrombus  Infrarenal AAA, cerebral aneurysm  Functional MR  CAD  Dementia   Atypical depression  HTN    Plan:  Renal function stable now, nephro consult appreciated  Appreciate consult from Dr. Cohen, discontinued Steroid  CT head neg for any acute events  Warfarin 2mg, daily INR  Appreciate cardiology consult   Spoke with Dr. Huyen Figueroa, will arrange NP visit at home to monitor INR  Fall precaution

## 2021-09-29 NOTE — PROGRESS NOTE ADULT - ASSESSMENT
66-year-old female former smoker with DM, HTN, HLD, NICM (EF 31% by MUGA 07/2019) s/p SubQ ICD, PE s/p IVC filter and LV thrombus on warfarin who presented ot the ER with dyspnea.     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 Start Spiriva, cont prn albuterol. d/c symbicort and prednisone  -  BNP elevated but has been higher in past, clinically does not look volume overloaded. cardiac management as per cards  -  acute kidney injury - stable/peaked, bicarb improved    Thank you for this consult, will follow with you

## 2021-09-29 NOTE — CONSULT NOTE ADULT - SUBJECTIVE AND OBJECTIVE BOX
[facility] NEPHROLOGY- CONSULTATION NOTE    Patient is a 65yo Female with COPD, CAD, T2DM,  HTN, HFrEF/cardiomyopathy (EF 25% on 9/16/21, s/p ICD),  CVA, DVT (L leg 1994)/PE (s/p IVC filter 9/2020, and repeat saddle PE at [facility] 8/21-9/3 now on Coumadin), infrarenal AAA, cerebral aneurysm, Atypical Depression, R breast cancer (s/p chemo/XRT), p/w SOB  a/w COPD exacerbation. Nephrology consulted for Elevated serum creatinine.    Pt poor historian; ?confused. Pt states she still smokes and then states she quit 2-3 years ago. Pt angry and states she just wants to go home and to stop keeping her here.     She denies any h/o kidney disease. Pt unsure if she recently had a CT with IV contrast. Patient denies any NSAID use,, hepatitis or blood transfusions. Pt c/o SOB with dry cough. Denie sany chest pain, n/v/d, urinary complaints or LE edema      PAST MEDICAL & SURGICAL HISTORY:  Hypertension    Breast cancer  right-s/p chemo and radiation    Hypercholesteremia    DVT (deep venous thrombosis)  Left leg in 1994 after hysterectomy    Sickle cell trait    Cardiomyopathy    Abdominal aneurysm  infrarenal- wached by Vascular , Dr. Gutierrez    Internal carotid aneurysm    Knee effusion, left  arthrocentesis- 05/25/16    Chronic HFrEF (heart failure with reduced ejection fraction)    Fibroid uterus  sp hystrectomy 1196    S/P lumpectomy, right breast  2014, revision of position- 2015    Hester filter in place  1994    S/P hysterectomy  1994    Breast cancer  infusaport insertion- 2014- left chest      Advil (Hives; Vomiting)    Home Medications Reviewed  Hospital Medications:   MEDICATIONS  (STANDING):  ALBUTerol    90 MICROgram(s) HFA Inhaler      ALBUTerol    90 MICROgram(s) HFA Inhaler 1 Puff(s) Inhalation two times a day  anastrozole 1 milliGRAM(s) Oral daily  atorvastatin 40 milliGRAM(s) Oral at bedtime  dextrose 50% Injectable 25 Gram(s) IV Push once  hydrALAZINE 10 milliGRAM(s) Oral every 8 hours  influenza   Vaccine 0.5 milliLiter(s) IntraMuscular once  insulin lispro (ADMELOG) corrective regimen sliding scale   SubCutaneous Before meals and at bedtime  isosorbide   mononitrate ER Tablet (IMDUR) 30 milliGRAM(s) Oral daily  levothyroxine 25 MICROGram(s) Oral daily  loratadine 10 milliGRAM(s) Oral daily  metoprolol succinate ER 25 milliGRAM(s) Oral daily  nystatin Ointment 1 Application(s) Topical every 12 hours  pantoprazole    Tablet 40 milliGRAM(s) Oral before breakfast  warfarin 2 milliGRAM(s) Oral once    SOCIAL HISTORY: Unclear if she quit smoking  Denies ETOh, or drug use  FAMILY HISTORY:  Family history of hypertension    Family history of diabetes mellitus    Family history of renal stone    Family history of glioblastoma    Family history of liver failure    History of hypertension  mother    Family history of glioblastoma (Sibling)    Family history of renal failure  uncle    Family history of other condition  please open box and see additional medical family history    Family history of glioblastoma        REVIEW OF SYSTEMS:  Gen: no changes in weight  HEENT: no rhinorrhea  Neck: no sore throat  Cards: no chest pain  Resp: +dyspnea with dry cough  GI: no nausea or vomiting or diarrhea  : no dysuria or hematuria  Vascular: no LE edema  Derm: no rashes  Neuro: no numbness/tingling  All other review of systems is negative unless indicated above.    VITALS:  T(F): 97.3 (09-29-21 @ 05:09), Max: 98.3 (09-28-21 @ 20:20)  HR: 86 (09-29-21 @ 12:37)  BP: 117/77 (09-29-21 @ 12:37)  RR: 19 (09-29-21 @ 12:37)  SpO2: 100% (09-29-21 @ 12:37)  Wt(kg): --      PHYSICAL EXAM:  Gen: NAD, upset  HEENT: MMM  Neck: no JVD  Cards: RRR, +S1/S2,   Resp: CTA ant  GI: soft, NT/ND, NABS  Extremities: no LE edema B/L  Derm: no rashes  Neuro: Confused at times    LABS:  09-29    140  |  111<H>  |  78<H>  ----------------------------<  211<H>  4.6   |  18<L>  |  2.32<H>    Ca    8.7      29 Sep 2021 07:20      Creatinine Trend: 2.32 <--, 2.32 <--, 2.52 <--, 1.60 <--, 1.91 <--                        12.5   8.00  )-----------( 220      ( 29 Sep 2021 07:19 )             38.9     Urine Studies:    Sodium, Random Urine: 25 mmol/L (09-26 @ 05:34)  Creatinine, Random Urine: 184 mg/dL (09-26 @ 05:34)  Osmolality, Random Urine: 775 mos/kg (09-26 @ 05:34)    RADIOLOGY & ADDITIONAL STUDIES:      < from: CT Head No Cont (09.29.21 @ 12:29) >  IMPRESSION:    No hydrocephalus, acute intracranial hemorrhage, mass effect, or brain edema.  Redemonstration of chronic right MCA territory infarct.      < end of copied text >      < from: Xray Chest 1 View- PORTABLE-Urgent (Xray Chest 1 View- PORTABLE-Urgent .) (09.25.21 @ 15:25) >    EXAM:  XR CHEST PORTABLE URGENT 1V                            PROCEDURE DATE:  09/25/2021          INTERPRETATION:  Chest radiograph (one view)     CPT 64473    CLINICAL INFORMATION: Short of breath.    TECHNIQUE:  Single frontal view of the chest was obtained.    FINDINGS:  Prior study dated 9/15/2021 was available for review.    The lungs demonstrate small left pleural effusion. The heart is prominent in size. External defibrillator is again identified.      IMPRESSION: Small left pleural effusion. Prominent cardiac silhouette.    --- End of Report ---        < end of copied text >

## 2021-09-29 NOTE — PROGRESS NOTE ADULT - SUBJECTIVE AND OBJECTIVE BOX
Pt is comfortable at rest, NAD, not labored, speaks in complete sentences.    REVIEW OF SYSTEMS:  Constitutional: [ ] fever, [ ]weight loss,  [ ]fatigue  Eyes: [ ] visual changes  Respiratory: [ ]shortness of breath;  [ ] cough, [ ]wheezing, [ ]chills, [ ]hemoptysis  Cardiovascular: [ ] chest pain, [ ]palpitations, [ ]dizziness,  [ ]leg swelling [ ]syncope  Gastrointestinal: [ ] abdominal pain, [ ]nausea, [ ]vomiting,  [ ]diarrhea   Genitourinary: [ ] dysuria, [ ] hematuria  Neurologic: [ ] headaches [ ] tremors  [ ] weakness [ ] lightheadedness  Skin: [ ] itching, [ ]burning, [ ] rashes  Endocrine: [ ] heat or cold intolerance  Musculoskeletal: [ ] joint pain or swelling; [ ] muscle, back, or extremity pain  Psychiatric: [ ] depression, [ ]anxiety, [ ]mood swings, or [ ]difficulty sleeping  Hematologic: [ ] easy bruising, [ ] bleeding gums       [ x] All others negative	  [ ] Unable to obtain    Vital Signs Last 24 Hrs  T(C): 36.3 (29 Sep 2021 05:09), Max: 36.8 (28 Sep 2021 20:20)  T(F): 97.3 (29 Sep 2021 05:09), Max: 98.3 (28 Sep 2021 20:20)  HR: 86 (29 Sep 2021 12:37) (76 - 86)  BP: 117/77 (29 Sep 2021 12:37) (117/77 - 137/104)  BP(mean): --  RR: 19 (29 Sep 2021 12:37) (19 - 20)  SpO2: 100% (29 Sep 2021 12:37) (100% - 100%)      PHYSICAL EXAM:  General: No acute distress  HEENT: EOMI, PERRL  Neck: Supple, No JVD  Lungs: Clear to auscultation bilaterally; No rales or wheezing  Heart: Regular rate and rhythm; No murmurs, rubs, or gallops  Abdomen: Nontender, bowel sounds present  Extremities: No clubbing, cyanosis, or edema  Nervous system:  Alert & Oriented X3, no focal deficits  Psychiatric: Normal affect  Skin: No rashes or lesions                            12.5   8.00  )-----------( 220      ( 29 Sep 2021 07:19 )             38.9   09-29    140  |  111<H>  |  78<H>  ----------------------------<  211<H>  4.6   |  18<L>  |  2.32<H>    Ca    8.7      29 Sep 2021 07:20

## 2021-09-29 NOTE — PROGRESS NOTE ADULT - PROBLEM SELECTOR PLAN 2
EF noted from 9/16 with severe combined heart failure  Chest X-ray as above   C/w metoprolol ER 25mg  C/w Hydralazine 10mg PO Q8H and Imdur ER 30mg  Holding off Entresto/Spironolactone due to CHEN  Patient has SubQ ICD, last checked June 1.2021, battery at 81%, no events noted  Cardiology Dr Goodrich Patient w/ hx of HFrEF/CM w/ SICD   Echo 9/16/21: Severe LV enlargement, severely reduced LVSystolic Fx, diffuse hypokinesis, severe functional MR  Saddle PE diagnosed in 9/2020, w/o cor pulmonale   C/w plan as above Pt p/w prolonged end expiratory wheezes   Hx of COPD, not on home 02, no prior intubations  GOLD 2 by criteria; not started on Abx   Labs not significant for superimposed infection  CXR non focal, does not appear to be fluid overloaded o/e or imaging  C/w Solumedrol 40 Q8h, tapered to oral prednisone   PPI ppx, sliding scale coverage   S/p Duoneb x 3, c/w Albuterol PRN   RVP negative   Pulm Consult Dr. Cohen

## 2021-09-29 NOTE — PROGRESS NOTE ADULT - PROBLEM SELECTOR PLAN 7
Well controlled on metoprolol, hydralazine  C/w home meds while holding Entresto for CHEN  C/w Statin for coexisting HLD  DASH/DM diet  C/w hydralazine/Imdur today  Card Dr Goodrich A1c 5.9  Not on any home meds   C/w HSS, Accuchecks ACHS as on steroids  DASH/DM diet Well controlled on metoprolol, hydralazine  C/w home meds while holding Entresto for CHEN  C/w Statin for coexisting HLD  DASH/DM diet  C/w hydralazine/Imdur  Card Dr Goodrich

## 2021-09-29 NOTE — PROGRESS NOTE ADULT - ASSESSMENT
Patient is a 66 F from home (lives alone w/ HHA), baseline AA0x3, independently ambulating, with PMHx of CAD, T2DM (last A1c 5.9%), HTN, HFrEF/cardiomyopathy (EF 25% on 9/16/21, s/p SICD), COPD (no prior intubations), CVA, DVT (L leg 1994)/PE (s/p IVC filter 9/2020, and repeat saddle PE at [facility] 8/21-9/3 now on Coumadin), infrarenal AAA, cerebral aneurysm, Atypical Depression, R breast cancer (s/p chemo/XRT), Class I Obesity, PSHx of hysterectomy (for fibroids), and cholecystectomy presenting to the ED with worsening shortness of breath x 4 days, admitted for likely COPD exacerbation with acute and chronic heart failure. X-ray significant for Small left pleural effusion. Echo 9/16/21: Severe LV enlargement, severely reduced LV Systolic function with EF of 25% , diffuse hypokinesis, severe functional MR. Admitted for COPD exacerbation vs multifactorial dyspnea (COPD, CHF, chronic PE).

## 2021-09-29 NOTE — PROGRESS NOTE ADULT - PROBLEM SELECTOR PLAN 12
- supratherapuetic INR  - trend INR  - PT eval: home with home PT   - taper prednisone, f/u OP with Dr Cohen

## 2021-09-29 NOTE — PROGRESS NOTE ADULT - PROBLEM SELECTOR PLAN 3
Patient w/ hx of HFrEF/CM w/ SICD   Echo 9/16/21: Severe LV enlargement, severely reduced LVSystolic Fx, diffuse hypokinesis, severe functional MR  Saddle PE diagnosed in 9/2020, w/o cor pulmonale   C/w plan as above Pt p/w INR 7.02, no active bleeding, On Coumadin 3mg/2mg alternating  Hold Coumadin; Goal INR 2-3  Recheck INR in AM

## 2021-09-29 NOTE — PROGRESS NOTE ADULT - PROBLEM SELECTOR PROBLEM 2
Acute on chronic systolic congestive heart failure Chronic HFrEF (heart failure with reduced ejection fraction) COPD with emphysema

## 2021-09-29 NOTE — PROGRESS NOTE ADULT - PROBLEM SELECTOR PLAN 8
A1c 5.9  Not on any home meds   C/w HSS, Accuchecks ACHS as on steroids  DASH/DM diet DVT PPX: Coumadin to be held as INR supratherapeutic   GI Protonix Qd

## 2021-09-29 NOTE — CONSULT NOTE ADULT - ASSESSMENT
Patient is a 67yo Female with COPD, CAD, T2DM,  HTN, HFrEF/cardiomyopathy (EF 25% on 9/16/21, s/p ICD),  CVA, DVT (L leg 1994)/PE (s/p IVC filter 9/2020, and repeat saddle PE at [facility] 8/21-9/3 now on Coumadin), infrarenal AAA, cerebral aneurysm, Atypical Depression, R breast cancer (s/p chemo/XRT), p/w SOB  a/w COPD exacerbation. Nephrology consulted for Elevated serum creatinine.    1. CHEN- unclear etiology ? hemodynamically mediated. No documented hypotension/ no NSAID or diuretic use. Continue to hold Entresto. Fe Na 0.15%. Repeat UA and urine lytes. Will give trial of IVF; NS @ 50cc/hr x 5 hrs.   Pt on PPI check CBC with diff to r/o  peripheral Eos; AIN. Check renal US. Strict I/Os. Avoid nephrotoxins/ NSAIDs/ RCA. Monitor BMP.  2. CKD-3a- makeda SCr 1-1.3. CKD in the setting of DM/HTN/ HF. Defer secondary w/u as an outpt. Avoid nephrotoxins  3. HTN 2/2 CKD- BP acceptable on Hydralazine 10mg PO q8hrs, titrate as needed. Monitor BP  4. Metabolic Acidosis- AG improved from 15-->11 today; pt  with mild resp alkalosis. Check serum lactate. Serum Co2 improving. Denies diarrhea. Repeat ABG.       [facility] NEPHROLOGY  Charly Kerns M.D.  Joshua Gil D.O.  Alysha Johnson M.D.  Tammy Kirk, SHAYE, ANP-C  [phone]    [address] [place] [id number]

## 2021-09-30 LAB
ANION GAP SERPL CALC-SCNC: 12 MMOL/L — SIGNIFICANT CHANGE UP (ref 5–17)
APTT BLD: 30.7 SEC — SIGNIFICANT CHANGE UP (ref 27.5–35.5)
BASE EXCESS BLDA CALC-SCNC: -5.9 MMOL/L — LOW (ref -2–3)
BASOPHILS # BLD AUTO: 0 K/UL — SIGNIFICANT CHANGE UP (ref 0–0.2)
BASOPHILS NFR BLD AUTO: 0 % — SIGNIFICANT CHANGE UP (ref 0–2)
BLOOD GAS COMMENTS ARTERIAL: SIGNIFICANT CHANGE UP
BUN SERPL-MCNC: 66 MG/DL — HIGH (ref 7–18)
CALCIUM SERPL-MCNC: 8.7 MG/DL — SIGNIFICANT CHANGE UP (ref 8.4–10.5)
CHLORIDE SERPL-SCNC: 115 MMOL/L — HIGH (ref 96–108)
CO2 SERPL-SCNC: 17 MMOL/L — LOW (ref 22–31)
CREAT SERPL-MCNC: 1.98 MG/DL — HIGH (ref 0.5–1.3)
EOSINOPHIL # BLD AUTO: 0.14 K/UL — SIGNIFICANT CHANGE UP (ref 0–0.5)
EOSINOPHIL NFR BLD AUTO: 2.3 % — SIGNIFICANT CHANGE UP (ref 0–6)
GLUCOSE BLDC GLUCOMTR-MCNC: 121 MG/DL — HIGH (ref 70–99)
GLUCOSE BLDC GLUCOMTR-MCNC: 129 MG/DL — HIGH (ref 70–99)
GLUCOSE BLDC GLUCOMTR-MCNC: 132 MG/DL — HIGH (ref 70–99)
GLUCOSE BLDC GLUCOMTR-MCNC: 136 MG/DL — HIGH (ref 70–99)
GLUCOSE SERPL-MCNC: 136 MG/DL — HIGH (ref 70–99)
HCO3 BLDA-SCNC: 19 MMOL/L — LOW (ref 21–28)
HCT VFR BLD CALC: 40.8 % — SIGNIFICANT CHANGE UP (ref 34.5–45)
HGB BLD-MCNC: 13.2 G/DL — SIGNIFICANT CHANGE UP (ref 11.5–15.5)
HOROWITZ INDEX BLDA+IHG-RTO: 21 — SIGNIFICANT CHANGE UP
IMM GRANULOCYTES NFR BLD AUTO: 0.3 % — SIGNIFICANT CHANGE UP (ref 0–1.5)
INR BLD: 2.18 RATIO — HIGH (ref 0.88–1.16)
LACTATE SERPL-SCNC: 1.5 MMOL/L — SIGNIFICANT CHANGE UP (ref 0.7–2)
LYMPHOCYTES # BLD AUTO: 1.52 K/UL — SIGNIFICANT CHANGE UP (ref 1–3.3)
LYMPHOCYTES # BLD AUTO: 25.1 % — SIGNIFICANT CHANGE UP (ref 13–44)
MCHC RBC-ENTMCNC: 30.4 PG — SIGNIFICANT CHANGE UP (ref 27–34)
MCHC RBC-ENTMCNC: 32.4 GM/DL — SIGNIFICANT CHANGE UP (ref 32–36)
MCV RBC AUTO: 94 FL — SIGNIFICANT CHANGE UP (ref 80–100)
MONOCYTES # BLD AUTO: 0.37 K/UL — SIGNIFICANT CHANGE UP (ref 0–0.9)
MONOCYTES NFR BLD AUTO: 6.1 % — SIGNIFICANT CHANGE UP (ref 2–14)
NEUTROPHILS # BLD AUTO: 4.01 K/UL — SIGNIFICANT CHANGE UP (ref 1.8–7.4)
NEUTROPHILS NFR BLD AUTO: 66.2 % — SIGNIFICANT CHANGE UP (ref 43–77)
NRBC # BLD: 0 /100 WBCS — SIGNIFICANT CHANGE UP (ref 0–0)
PCO2 BLDA: 33 MMHG — SIGNIFICANT CHANGE UP (ref 32–35)
PH BLDA: 7.36 — SIGNIFICANT CHANGE UP (ref 7.35–7.45)
PLATELET # BLD AUTO: 242 K/UL — SIGNIFICANT CHANGE UP (ref 150–400)
PO2 BLDA: 64 MMHG — LOW (ref 83–108)
POTASSIUM SERPL-MCNC: 4.4 MMOL/L — SIGNIFICANT CHANGE UP (ref 3.5–5.3)
POTASSIUM SERPL-SCNC: 4.4 MMOL/L — SIGNIFICANT CHANGE UP (ref 3.5–5.3)
PROTHROM AB SERPL-ACNC: 25 SEC — HIGH (ref 10.6–13.6)
RBC # BLD: 4.34 M/UL — SIGNIFICANT CHANGE UP (ref 3.8–5.2)
RBC # FLD: 17.9 % — HIGH (ref 10.3–14.5)
SAO2 % BLDA: 90 % — SIGNIFICANT CHANGE UP
SODIUM SERPL-SCNC: 144 MMOL/L — SIGNIFICANT CHANGE UP (ref 135–145)
WBC # BLD: 6.06 K/UL — SIGNIFICANT CHANGE UP (ref 3.8–10.5)
WBC # FLD AUTO: 6.06 K/UL — SIGNIFICANT CHANGE UP (ref 3.8–10.5)

## 2021-09-30 PROCEDURE — 76775 US EXAM ABDO BACK WALL LIM: CPT | Mod: 26

## 2021-09-30 PROCEDURE — 99233 SBSQ HOSP IP/OBS HIGH 50: CPT | Mod: GC

## 2021-09-30 PROCEDURE — 99232 SBSQ HOSP IP/OBS MODERATE 35: CPT

## 2021-09-30 RX ORDER — WARFARIN SODIUM 2.5 MG/1
2 TABLET ORAL ONCE
Refills: 0 | Status: COMPLETED | OUTPATIENT
Start: 2021-09-30 | End: 2021-09-30

## 2021-09-30 RX ORDER — LORATADINE 10 MG/1
10 TABLET ORAL ONCE
Refills: 0 | Status: COMPLETED | OUTPATIENT
Start: 2021-09-30 | End: 2021-09-30

## 2021-09-30 RX ADMIN — ANASTROZOLE 1 MILLIGRAM(S): 1 TABLET ORAL at 11:18

## 2021-09-30 RX ADMIN — Medication 10 MILLIGRAM(S): at 13:34

## 2021-09-30 RX ADMIN — Medication 10 MILLIGRAM(S): at 05:11

## 2021-09-30 RX ADMIN — NYSTATIN CREAM 1 APPLICATION(S): 100000 CREAM TOPICAL at 17:56

## 2021-09-30 RX ADMIN — ALBUTEROL 1 PUFF(S): 90 AEROSOL, METERED ORAL at 21:38

## 2021-09-30 RX ADMIN — Medication 25 MICROGRAM(S): at 05:12

## 2021-09-30 RX ADMIN — WARFARIN SODIUM 2 MILLIGRAM(S): 2.5 TABLET ORAL at 21:38

## 2021-09-30 RX ADMIN — Medication 10 MILLIGRAM(S): at 21:38

## 2021-09-30 RX ADMIN — ALBUTEROL 1 PUFF(S): 90 AEROSOL, METERED ORAL at 12:19

## 2021-09-30 RX ADMIN — NYSTATIN CREAM 1 APPLICATION(S): 100000 CREAM TOPICAL at 05:12

## 2021-09-30 RX ADMIN — Medication 25 MILLIGRAM(S): at 05:11

## 2021-09-30 RX ADMIN — ATORVASTATIN CALCIUM 40 MILLIGRAM(S): 80 TABLET, FILM COATED ORAL at 21:38

## 2021-09-30 RX ADMIN — LORATADINE 10 MILLIGRAM(S): 10 TABLET ORAL at 11:18

## 2021-09-30 RX ADMIN — PANTOPRAZOLE SODIUM 40 MILLIGRAM(S): 20 TABLET, DELAYED RELEASE ORAL at 05:15

## 2021-09-30 RX ADMIN — ISOSORBIDE MONONITRATE 30 MILLIGRAM(S): 60 TABLET, EXTENDED RELEASE ORAL at 11:18

## 2021-09-30 NOTE — PROGRESS NOTE ADULT - SUBJECTIVE AND OBJECTIVE BOX
[facility] NEPHROLOGY- PROGRESS NOTE    Patient is a 65yo Female with COPD, CAD, T2DM,  HTN, HFrEF/cardiomyopathy (EF 25% on 9/16/21, s/p ICD),  CVA, DVT (L leg 1994)/PE (s/p IVC filter 9/2020, and repeat saddle PE at [facility] 8/21-9/3 now on Coumadin), infrarenal AAA, cerebral aneurysm, Atypical Depression, R breast cancer (s/p chemo/XRT), p/w SOB  a/w COPD exacerbation. Nephrology consulted for Elevated serum creatinine.    Hospital Medications: Medications reviewed.  REVIEW OF SYSTEMS:  CONSTITUTIONAL: No fevers or chills +itching  RESPIRATORY: + shortness of breath with dry cough  CARDIOVASCULAR: No chest pain.  GASTROINTESTINAL: No nausea, vomiting, diarrhea or abdominal pain.   VASCULAR: No bilateral lower extremity edema.     VITALS:  T(F): 97.9 (09-30-21 @ 05:20), Max: 97.9 (09-30-21 @ 05:20)  HR: 92 (09-30-21 @ 11:20)  BP: 140/111 (09-30-21 @ 11:20)  RR: 18 (09-30-21 @ 05:20)  SpO2: 98% (09-30-21 @ 05:20)  Wt(kg): --  Height (cm): 170.2 (09-25 @ 12:32), 170.2 (09-15 @ 15:19), 170.2 (09-14 @ 20:23)  Weight (kg): 84.4 (09-25 @ 12:32), 81.6 (09-15 @ 15:19)  BMI (kg/m2): 29.1 (09-25 @ 12:32), 28.2 (09-15 @ 15:19)  BSA (m2): 1.96 (09-25 @ 12:32), 1.93 (09-15 @ 15:19)    09-29 @ 07:01  -  09-30 @ 07:00  --------------------------------------------------------  IN: 250 mL / OUT: 0 mL / NET: 250 mL      PHYSICAL EXAM:  Gen: NAD, upset  HEENT: MMM  Neck: no JVD  Cards: RRR, +S1/S2,   Resp: CTA ant  GI: soft, NT/ND, NABS  Extremities: no LE edema B/L      LABS:  09-30    144  |  115<H>  |  66<H>  ----------------------------<  136<H>  4.4   |  17<L>  |  1.98<H>    Ca    8.7      30 Sep 2021 07:29      Creatinine Trend: 1.98 <--, 2.32 <--, 2.32 <--, 2.52 <--, 1.60 <--, 1.91 <--                        13.2   6.06  )-----------( 242      ( 30 Sep 2021 07:29 )             40.8     Urine Studies:    Sodium, Random Urine: 25 mmol/L (09-26 @ 05:34)  Creatinine, Random Urine: 184 mg/dL (09-26 @ 05:34)  Osmolality, Random Urine: 775 mos/kg (09-26 @ 05:34)    RADIOLOGY & ADDITIONAL STUDIES:    < from: US Renal (09.30.21 @ 11:12) >    EXAM:  US KIDNEY(S)                            PROCEDURE DATE:  09/30/2021          INTERPRETATION:  CLINICAL INFORMATION: 66 years  Female with mireille    r/o retention.    COMPARISON: CT abdomen and pelvis 8/26/2021 and abdominal ultrasound 9/25/2020    TECHNIQUE: Sonography of the kidneys and bladder.    FINDINGS:    Right kidney: 10.1 cm. No hydronephrosis or calculus. Stable 1.5 x 1.5 x 1.9 cm upper pole angiomyolipoma.    Left kidney: 10.3 cm. No renal mass, hydronephrosis or calculi.    Urinary bladder: Within normal limits. 119 cc volume. Patient declined to void.    IMPRESSION:    Stable right renal angiomyolipoma.    < end of copied text >

## 2021-09-30 NOTE — PROGRESS NOTE ADULT - PROBLEM SELECTOR PLAN 3
Patient w/ hx of HFrEF/CM w/ SICD   Echo 9/16/21: Severe LV enlargement, severely reduced LVSystolic Fx, diffuse hypokinesis, severe functional MR  Saddle PE diagnosed in 9/2020, w/o cor pulmonale   C/w plan as above

## 2021-09-30 NOTE — PROGRESS NOTE ADULT - PROBLEM SELECTOR PLAN 4
Pt p/w INR 7.02, no active bleeding, On Coumadin 3mg/2mg alternating  Restart Coumadin 2mg; Goal INR 2-3  Recheck INR in AM Pt p/w INR 7.02, no active bleeding, On Coumadin 3mg/2mg alternating  C/w Coumadin 2mg; Goal INR 2-3  Recheck INR in AM

## 2021-09-30 NOTE — PROGRESS NOTE ADULT - SUBJECTIVE AND OBJECTIVE BOX
Pt is comfortable at rest, NAD, not labored, speaks in complete sentences.    REVIEW OF SYSTEMS:  Constitutional: [ ] fever, [ ]weight loss,  [ ]fatigue  Eyes: [ ] visual changes  Respiratory: [ ]shortness of breath;  [ ] cough, [ ]wheezing, [ ]chills, [ ]hemoptysis  Cardiovascular: [ ] chest pain, [ ]palpitations, [ ]dizziness,  [ ]leg swelling [ ]syncope  Gastrointestinal: [ ] abdominal pain, [ ]nausea, [ ]vomiting,  [ ]diarrhea   Genitourinary: [ ] dysuria, [ ] hematuria  Neurologic: [ ] headaches [ ] tremors  [ ] weakness [ ] lightheadedness  Skin: [ ] itching, [ ]burning, [ ] rashes  Endocrine: [ ] heat or cold intolerance  Musculoskeletal: [ ] joint pain or swelling; [ ] muscle, back, or extremity pain  Psychiatric: [ ] depression, [ ]anxiety, [ ]mood swings, or [ ]difficulty sleeping  Hematologic: [ ] easy bruising, [ ] bleeding gums       [ x] All others negative	  [ ] Unable to obtain    Vital Signs Last 24 Hrs  T(C): 36.6 (30 Sep 2021 13:27), Max: 36.6 (30 Sep 2021 05:20)  T(F): 97.9 (30 Sep 2021 13:27), Max: 97.9 (30 Sep 2021 05:20)  HR: 85 (30 Sep 2021 13:36) (83 - 93)  BP: 140/90 (30 Sep 2021 13:36) (114/74 - 156/110)  BP(mean): --  RR: 17 (30 Sep 2021 13:27) (17 - 20)  SpO2: 100% (30 Sep 2021 13:27) (98% - 100%)      PHYSICAL EXAM:  General: No acute distress  HEENT: EOMI, PERRL  Neck: Supple, No JVD  Lungs: Clear to auscultation bilaterally; No rales or wheezing  Heart: Regular rate and rhythm; No murmurs, rubs, or gallops  Abdomen: Nontender, bowel sounds present  Extremities: No clubbing, cyanosis, or edema  Nervous system:  Alert & Oriented X3, no focal deficits  Psychiatric: Normal affect  Skin: No rashes or lesions                            13.2   6.06  )-----------( 242      ( 30 Sep 2021 07:29 )             40.8   09-30    144  |  115<H>  |  66<H>  ----------------------------<  136<H>  4.4   |  17<L>  |  1.98<H>    Ca    8.7      30 Sep 2021 07:29

## 2021-09-30 NOTE — PROGRESS NOTE ADULT - PROBLEM SELECTOR PLAN 6
Pt p/w BUN/Cr of 37/1.91, eGFR of 27, normally in 40's (CKD3-4)  FeNa 0.3%, Prerenal  Will hold off IVF (Caution as CHF) - s/p 1L   trend BMP  Nephro Dr. Johnson  Consider IVF 50ml/hrx10 hours if BMP does not improve in AM Pt p/w BUN/Cr of 37/1.91, eGFR of 27, normally in 40's (CKD3-4)  FeNa 0.3%, Prerenal  trend BMP  Nephro Dr. Johnson  S/p IVF 50ml/hrx10 hours BMP slightly improved; recheck ABG

## 2021-09-30 NOTE — PROGRESS NOTE ADULT - ASSESSMENT
Patient is a 67yo Female with COPD, CAD, T2DM,  HTN, HFrEF/cardiomyopathy (EF 25% on 9/16/21, s/p ICD),  CVA, DVT (L leg 1994)/PE (s/p IVC filter 9/2020, and repeat saddle PE at [facility] 8/21-9/3 now on Coumadin), infrarenal AAA, cerebral aneurysm, Atypical Depression, R breast cancer (s/p chemo/XRT), p/w SOB  a/w COPD exacerbation. Nephrology consulted for Elevated serum creatinine.    1. CHEN- unclear etiology ? hemodynamically mediated. No documented hypotension/ no NSAID or diuretic use. Continue to hold Entresto. Fe Na 0.15%. Repeat UA and urine lytes. Mild improvement in renal function post  NS @ 50cc/hr x 5 hrs. Encourage PO fluid intake   Pt on PPI with no peripheral Eos; low suspicion of AIN. Renal US with no hydro. Strict I/Os. Avoid nephrotoxins/ NSAIDs/ RCA. Monitor BMP.  2. CKD-3a- makeda SCr 1-1.3. CKD in the setting of DM/HTN/ HF. Defer secondary w/u as an outpt. Avoid nephrotoxins  3. HTN 2/2 CKD- BP elevated. c/w Hydralazine 10mg PO q8hrs. If BP remains elevated, consider increasing Metoprolol to 50mg PO qd. Monitor BP  4. Metabolic Acidosis- AG improved from 15-->12 today; pt with mild resp alkalosis. Serum lactate wnl. Serum Co2 overall improving. Denies diarrhea. Will avoid worsening hyperchloremia with further NS IVF. Repeat ABG.       [facility] NEPHROLOGY  Charly Kerns M.D.  Joshua Gil D.O.  Alysha Johnson M.D.  Tammy Kirk, MSN, ANP-C  [phone]    [address] [place] [address]

## 2021-09-30 NOTE — PROGRESS NOTE ADULT - ASSESSMENT
66-year-old female former smoker with DM, HTN, HLD, NICM (EF 31% by MUGA 07/2019) s/p SubQ ICD, PE s/p IVC filter and LV thrombus on warfarin who presented ot the ER with dyspnea.     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 Start Spiriva, cont prn albuterol.   -  BNP elevated but has been higher in past, clinically does not look volume overloaded. cardiac management as per cards  -  acute kidney injury - improving    Thank you for this consult, will follow with you

## 2021-09-30 NOTE — PROGRESS NOTE ADULT - PROBLEM SELECTOR PLAN 1
Patient baseline AA0x2-3, now lethargic and AA0x1  Started on Seroquel yesterday; held   CTH negative for acute CVA   Metabolic Acidosis improving, likely uremic   Check bladder scan, place primafit Patient baseline AA0x2-3, now lethargic and AA0x1  Started on Seroquel yesterday; continue to hold   CTH negative for acute CVA   Metabolic Acidosis improving, likely uremic   Bladder scan negative; C/w primafit

## 2021-09-30 NOTE — PROGRESS NOTE ADULT - ASSESSMENT
Patient is a 66 F from home (lives alone w/ HHA), baseline AA0x3, independently ambulating, with PMHx of CAD, T2DM (last A1c 5.9%), HTN, HFrEF/cardiomyopathy (EF 25% on 9/16/21, s/p SICD), COPD (no prior intubations), CVA, DVT (L leg 1994)/PE (s/p IVC filter 9/2020, and repeat saddle PE at [facility] 8/21-9/3 now on Coumadin), infrarenal AAA, cerebral aneurysm, Atypical Depression, R breast cancer (s/p chemo/XRT), Class I Obesity, PSHx of hysterectomy (for fibroids), and cholecystectomy presenting to the ED with worsening shortness of breath x 4 days, admitted for likely COPD exacerbation with acute and chronic heart failure. X-ray significant for Small left pleural effusion. Echo 9/16/21: Severe LV enlargement, severely reduced LV Systolic function with EF of 25% , diffuse hypokinesis, severe functional MR. Admitted for COPD exacerbation vs multifactorial dyspnea (COPD, CHF, chronic PE).

## 2021-09-30 NOTE — PROGRESS NOTE ADULT - SUBJECTIVE AND OBJECTIVE BOX
PGY-2 Progress Note discussed with attending    PAGER #: [[phone]] TILL 5:00 PM  PLEASE CONTACT ON CALL TEAM:  - On Call Team (Please refer to Taryn) FROM 5:00 PM - 8:30PM  - Nightfloat Team FROM 8:30 -7:30 AM    CHIEF COMPLAINT & BRIEF HOSPITAL COURSE:    INTERVAL HPI/OVERNIGHT EVENTS:       REVIEW OF SYSTEMS:  CONSTITUTIONAL: No fever, weight loss, or fatigue  RESPIRATORY: No cough, wheezing, chills or hemoptysis; No shortness of breath  CARDIOVASCULAR: No chest pain, palpitations, dizziness, or leg swelling  GASTROINTESTINAL: No abdominal pain. No nausea, vomiting, or hematemesis; No diarrhea or constipation. No melena or hematochezia.  GENITOURINARY: No dysuria or hematuria, urinary frequency  NEUROLOGICAL: No headaches, memory loss, loss of strength, numbness, or tremors  SKIN: No itching, burning, rashes, or lesions     MEDICATIONS  (STANDING):  ALBUTerol    90 MICROgram(s) HFA Inhaler      ALBUTerol    90 MICROgram(s) HFA Inhaler 1 Puff(s) Inhalation two times a day  anastrozole 1 milliGRAM(s) Oral daily  atorvastatin 40 milliGRAM(s) Oral at bedtime  dextrose 50% Injectable 25 Gram(s) IV Push once  hydrALAZINE 10 milliGRAM(s) Oral every 8 hours  influenza   Vaccine 0.5 milliLiter(s) IntraMuscular once  insulin lispro (ADMELOG) corrective regimen sliding scale   SubCutaneous Before meals and at bedtime  isosorbide   mononitrate ER Tablet (IMDUR) 30 milliGRAM(s) Oral daily  levothyroxine 25 MICROGram(s) Oral daily  loratadine 10 milliGRAM(s) Oral daily  loratadine 10 milliGRAM(s) Oral once  metoprolol succinate ER 25 milliGRAM(s) Oral daily  nystatin Ointment 1 Application(s) Topical every 12 hours  pantoprazole    Tablet 40 milliGRAM(s) Oral before breakfast  sodium chloride 0.9%. 250 milliLiter(s) (50 mL/Hr) IV Continuous <Continuous>  warfarin 2 milliGRAM(s) Oral once    MEDICATIONS  (PRN):  acetaminophen   Tablet .. 650 milliGRAM(s) Oral every 6 hours PRN Temp greater or equal to 38.5C (101.3F), Mild Pain (1 - 3)  aluminum hydroxide/magnesium hydroxide/simethicone Suspension 30 milliLiter(s) Oral every 4 hours PRN Dyspepsia  melatonin 3 milliGRAM(s) Oral at bedtime PRN Insomnia  ondansetron Injectable 4 milliGRAM(s) IV Push every 8 hours PRN Nausea and/or Vomiting      Vital Signs Last 24 Hrs  T(C): 36.6 (30 Sep 2021 05:20), Max: 36.6 (30 Sep 2021 05:20)  T(F): 97.9 (30 Sep 2021 05:20), Max: 97.9 (30 Sep 2021 05:20)  HR: 93 (30 Sep 2021 05:20) (86 - 93)  BP: 156/110 (30 Sep 2021 05:20) (114/74 - 156/110)  BP(mean): --  RR: 18 (30 Sep 2021 05:20) (18 - 20)  SpO2: 98% (30 Sep 2021 05:20) (98% - 100%)    PHYSICAL EXAMINATION:  GENERAL: NAD, well built  HEAD:  Atraumatic, Normocephalic  EYES:  conjunctiva and sclera clear  NECK: Supple, No JVD, Normal thyroid  CHEST/LUNG: Clear to auscultation. Clear to percussion bilaterally; No rales, rhonchi, wheezing, or rubs  HEART: Regular rate and rhythm; No murmurs, rubs, or gallops  ABDOMEN: Soft, Nontender, Nondistended; Bowel sounds present  NERVOUS SYSTEM:  Alert & Oriented X3,    EXTREMITIES:  2+ Peripheral Pulses, No clubbing, cyanosis, or edema  SKIN: warm dry                          13.2   6.06  )-----------( 242      ( 30 Sep 2021 07:29 )             40.8     09-30    144  |  115<H>  |  66<H>  ----------------------------<  136<H>  4.4   |  17<L>  |  1.98<H>    Ca    8.7      30 Sep 2021 07:29            PT/INR - ( 30 Sep 2021 07:29 )   PT: 25.0 sec;   INR: 2.18 ratio         PTT - ( 30 Sep 2021 07:29 )  PTT:30.7 sec    CAPILLARY BLOOD GLUCOSE      RADIOLOGY & ADDITIONAL TESTS:                   PGY-2 Progress Note discussed with attending    PAGER #: [[phone]] TILL 5:00 PM  PLEASE CONTACT ON CALL TEAM:  - On Call Team (Please refer to Taryn) FROM 5:00 PM - 8:30PM  - Nightfloat Team FROM 8:30 -7:30 AM    CHIEF COMPLAINT & BRIEF HOSPITAL COURSE:  Patient is a 66 F from home (lives alone w/ HHA), baseline AA0x3, independently ambulating, with PMHx of CAD, T2DM (last A1c 5.9%), HTN, HFrEF/cardiomyopathy (EF 25% on 9/16/21, s/p SICD), COPD (no prior intubations), CVA, DVT (L leg 1994)/PE (s/p IVC filter 9/2020, and repeat saddle PE at [facility] 8/21-9/3 now on Coumadin), infrarenal AAA, cerebral aneurysm, Atypical Depression, R breast cancer (s/p chemo/XRT), Class I Obesity, PSHx of hysterectomy (for fibroids), and cholecystectomy presenting to the ED with worsening shortness of breath x 4 days; admitted for HFrEF, COPD exacerbation, and CTPEH.     INTERVAL HPI/OVERNIGHT EVENTS:   Patient was examined while she was lying in bed, Aox3, responding appropriately to questions, in NAD. She has normal bowel and bladder movements, and denies any other acute complaints.     REVIEW OF SYSTEMS:  CONSTITUTIONAL: No fever, weight loss, or fatigue  RESPIRATORY: No cough, wheezing, chills or hemoptysis; No shortness of breath  CARDIOVASCULAR: No chest pain, palpitations, dizziness, or leg swelling  GASTROINTESTINAL: No abdominal pain. No nausea, vomiting, or hematemesis; No diarrhea or constipation. No melena or hematochezia.  GENITOURINARY: No dysuria or hematuria, urinary frequency  NEUROLOGICAL: No headaches, memory loss, loss of strength, numbness, or tremors  SKIN: No itching, burning, rashes, or lesions     MEDICATIONS  (STANDING):  ALBUTerol    90 MICROgram(s) HFA Inhaler      ALBUTerol    90 MICROgram(s) HFA Inhaler 1 Puff(s) Inhalation two times a day  anastrozole 1 milliGRAM(s) Oral daily  atorvastatin 40 milliGRAM(s) Oral at bedtime  dextrose 50% Injectable 25 Gram(s) IV Push once  hydrALAZINE 10 milliGRAM(s) Oral every 8 hours  influenza   Vaccine 0.5 milliLiter(s) IntraMuscular once  insulin lispro (ADMELOG) corrective regimen sliding scale   SubCutaneous Before meals and at bedtime  isosorbide   mononitrate ER Tablet (IMDUR) 30 milliGRAM(s) Oral daily  levothyroxine 25 MICROGram(s) Oral daily  loratadine 10 milliGRAM(s) Oral daily  loratadine 10 milliGRAM(s) Oral once  metoprolol succinate ER 25 milliGRAM(s) Oral daily  nystatin Ointment 1 Application(s) Topical every 12 hours  pantoprazole    Tablet 40 milliGRAM(s) Oral before breakfast  sodium chloride 0.9%. 250 milliLiter(s) (50 mL/Hr) IV Continuous <Continuous>  warfarin 2 milliGRAM(s) Oral once    MEDICATIONS  (PRN):  acetaminophen   Tablet .. 650 milliGRAM(s) Oral every 6 hours PRN Temp greater or equal to 38.5C (101.3F), Mild Pain (1 - 3)  aluminum hydroxide/magnesium hydroxide/simethicone Suspension 30 milliLiter(s) Oral every 4 hours PRN Dyspepsia  melatonin 3 milliGRAM(s) Oral at bedtime PRN Insomnia  ondansetron Injectable 4 milliGRAM(s) IV Push every 8 hours PRN Nausea and/or Vomiting      Vital Signs Last 24 Hrs  T(C): 36.6 (30 Sep 2021 05:20), Max: 36.6 (30 Sep 2021 05:20)  T(F): 97.9 (30 Sep 2021 05:20), Max: 97.9 (30 Sep 2021 05:20)  HR: 93 (30 Sep 2021 05:20) (86 - 93)  BP: 156/110 (30 Sep 2021 05:20) (114/74 - 156/110)  BP(mean): --  RR: 18 (30 Sep 2021 05:20) (18 - 20)  SpO2: 98% (30 Sep 2021 05:20) (98% - 100%)    PHYSICAL EXAMINATION:  GENERAL: NAD, well built  HEAD:  Atraumatic, Normocephalic  EYES:  conjunctiva and sclera clear  NECK: Supple, No JVD, Normal thyroid  CHEST/LUNG: Clear to auscultation. Clear to percussion bilaterally; No rales, rhonchi, wheezing, or rubs  HEART: Regular rate and rhythm; No murmurs, rubs, or gallops  ABDOMEN: Soft, Nontender, Nondistended; Bowel sounds present  NERVOUS SYSTEM:  Alert & Oriented X3,    EXTREMITIES:  2+ Peripheral Pulses, No clubbing, cyanosis, or edema  SKIN: warm dry                          13.2   6.06  )-----------( 242      ( 30 Sep 2021 07:29 )             40.8     09-30    144  |  115<H>  |  66<H>  ----------------------------<  136<H>  4.4   |  17<L>  |  1.98<H>    Ca    8.7      30 Sep 2021 07:29            PT/INR - ( 30 Sep 2021 07:29 )   PT: 25.0 sec;   INR: 2.18 ratio         PTT - ( 30 Sep 2021 07:29 )  PTT:30.7 sec    CAPILLARY BLOOD GLUCOSE      RADIOLOGY & ADDITIONAL TESTS:                   PGY-2 Progress Note discussed with attending    PAGER #: [[phone]] TILL 5:00 PM  PLEASE CONTACT ON CALL TEAM:  - On Call Team (Please refer to Taryn) FROM 5:00 PM - 8:30PM  - Nightfloat Team FROM 8:30 -7:30 AM    CHIEF COMPLAINT & BRIEF HOSPITAL COURSE:  Patient is a 66 F from home (lives alone w/ HHA), baseline AA0x3, independently ambulating, with PMHx of CAD, T2DM (last A1c 5.9%), HTN, HFrEF/cardiomyopathy (EF 25% on 9/16/21, s/p SICD), COPD (no prior intubations), CVA, DVT (L leg 1994)/PE (s/p IVC filter 9/2020, and repeat saddle PE at [facility] 8/21-9/3 now on Coumadin), infrarenal AAA, cerebral aneurysm, Atypical Depression, R breast cancer (s/p chemo/XRT), Class I Obesity, PSHx of hysterectomy (for fibroids), and cholecystectomy presenting to the ED with worsening shortness of breath x 4 days; admitted for HFrEF, COPD exacerbation, and CTPEH.     INTERVAL HPI/OVERNIGHT EVENTS:   Patient was examined while she was lying in bed, Aox3, responding appropriately to questions, in NAD. She has normal bowel and bladder movements, and denies any other acute complaints.     REVIEW OF SYSTEMS:  CONSTITUTIONAL: Fatigue + No fever, weight loss  RESPIRATORY: No cough, wheezing, chills or hemoptysis; No shortness of breath  CARDIOVASCULAR: No chest pain, palpitations, dizziness, or leg swelling  GASTROINTESTINAL: No abdominal pain. No nausea, vomiting, or hematemesis; No diarrhea or constipation. No melena or hematochezia.  GENITOURINARY: Vaginal itching + No dysuria or hematuria, urinary frequency  NEUROLOGICAL: No headaches, memory loss, loss of strength, numbness, or tremors  SKIN: Vaginal itching + No burning, rashes, or lesions       MEDICATIONS  (STANDING):  ALBUTerol    90 MICROgram(s) HFA Inhaler      ALBUTerol    90 MICROgram(s) HFA Inhaler 1 Puff(s) Inhalation two times a day  anastrozole 1 milliGRAM(s) Oral daily  atorvastatin 40 milliGRAM(s) Oral at bedtime  dextrose 50% Injectable 25 Gram(s) IV Push once  hydrALAZINE 10 milliGRAM(s) Oral every 8 hours  influenza   Vaccine 0.5 milliLiter(s) IntraMuscular once  insulin lispro (ADMELOG) corrective regimen sliding scale   SubCutaneous Before meals and at bedtime  isosorbide   mononitrate ER Tablet (IMDUR) 30 milliGRAM(s) Oral daily  levothyroxine 25 MICROGram(s) Oral daily  loratadine 10 milliGRAM(s) Oral daily  loratadine 10 milliGRAM(s) Oral once  metoprolol succinate ER 25 milliGRAM(s) Oral daily  nystatin Ointment 1 Application(s) Topical every 12 hours  pantoprazole    Tablet 40 milliGRAM(s) Oral before breakfast  sodium chloride 0.9%. 250 milliLiter(s) (50 mL/Hr) IV Continuous <Continuous>  warfarin 2 milliGRAM(s) Oral once    MEDICATIONS  (PRN):  acetaminophen   Tablet .. 650 milliGRAM(s) Oral every 6 hours PRN Temp greater or equal to 38.5C (101.3F), Mild Pain (1 - 3)  aluminum hydroxide/magnesium hydroxide/simethicone Suspension 30 milliLiter(s) Oral every 4 hours PRN Dyspepsia  melatonin 3 milliGRAM(s) Oral at bedtime PRN Insomnia  ondansetron Injectable 4 milliGRAM(s) IV Push every 8 hours PRN Nausea and/or Vomiting      Vital Signs Last 24 Hrs  T(C): 36.6 (30 Sep 2021 05:20), Max: 36.6 (30 Sep 2021 05:20)  T(F): 97.9 (30 Sep 2021 05:20), Max: 97.9 (30 Sep 2021 05:20)  HR: 93 (30 Sep 2021 05:20) (86 - 93)  BP: 156/110 (30 Sep 2021 05:20) (114/74 - 156/110)  BP(mean): --  RR: 18 (30 Sep 2021 05:20) (18 - 20)  SpO2: 98% (30 Sep 2021 05:20) (98% - 100%)    PHYSICAL EXAMINATION:  GENERAL: NAD, Overweight AAF, in NAD, confused, but able to be reoriented   HEAD:  Atraumatic, Normocephalic  EYES:  conjunctiva and sclera clear  NECK: Supple, No JVD, Normal thyroid  CHEST/LUNG: Clear to auscultation. Clear to percussion bilaterally; No rales, rhonchi, wheezing, or rubs  HEART: Regular rate and rhythm; No murmurs, rubs, or gallops  ABDOMEN: Soft, Nontender, Nondistended; Bowel sounds present  NERVOUS SYSTEM:  Alert & Oriented X3, bilateral sensory and motor exam wnl    EXTREMITIES:  2+ Peripheral Pulses, No clubbing, cyanosis, or edema  SKIN: warm dry, Genital exam with excoriations, but no discharge or beefy red erythema                           13.2   6.06  )-----------( 242      ( 30 Sep 2021 07:29 )             40.8     09-30    144  |  115<H>  |  66<H>  ----------------------------<  136<H>  4.4   |  17<L>  |  1.98<H>    Ca    8.7      30 Sep 2021 07:29            PT/INR - ( 30 Sep 2021 07:29 )   PT: 25.0 sec;   INR: 2.18 ratio         PTT - ( 30 Sep 2021 07:29 )  PTT:30.7 sec    CAPILLARY BLOOD GLUCOSE      RADIOLOGY & ADDITIONAL TESTS:

## 2021-09-30 NOTE — PROGRESS NOTE ADULT - ATTENDING COMMENTS
Patient was seen and examined at bedside today.  At her baseline mental status  Denies any complains    Vitals noted    P/E:  NAD, laying comfortably on bed   AAOx2, no focal deficit  Lungs Clear to auscultation anterolaterally  S1S2 WNL, + murmur  Abd soft, non tender, BS present   BL LE no edema or tenderness    labs noted     A/P:  Acute on chronic hypoxic respiratory failure possibly multifactorial due to recent PE, acute on chronic systolic HF exacerbation, mild COPD exacerbation   Acute metabolic encephalopathy due to uremia, metabolic acidosis, hospital acquired delirium  CHEN concern for ATN (possibly cardiorenal syndrome)  Metabolic acidosis   Supratherapeutic INR  Recent saddle PE on Warfarin  LV apical thrombus  Infrarenal AAA, cerebral aneurysm  Functional MR  CAD  Dementia   Atypical depression  HTN    Plan:  Cont NC O2   Renal function and acidosis improving  Will cont to hold Entresto  Appreciate consult from Dr. Cohen  Warfarin 2mg tonight  Appreciate cardiology consult   Hold Seroquel  Patient gets home visit by RN arranged by Dr. Huyen Figueroa to monitor INR, will rearrange that  Fall precaution Patient was seen and examined at bedside today  At her baseline mental status  Denies any complains    Vitals noted    P/E:  NAD, laying comfortably on bed   AAOx2, no focal deficit  Lungs Clear to auscultation anterolaterally  S1S2 WNL, + murmur  Abd soft, non tender, BS present   BL LE no edema or tenderness    labs noted     A/P:  Acute on chronic hypoxic respiratory failure possibly multifactorial due to recent PE, acute on chronic systolic HF exacerbation, mild COPD exacerbation   Acute metabolic encephalopathy due to uremia, metabolic acidosis, hospital acquired delirium  CHEN concern for ATN (possibly cardiorenal syndrome)  Metabolic acidosis   Supratherapeutic INR  Recent saddle PE on Warfarin  LV apical thrombus  Infrarenal AAA, cerebral aneurysm  Functional MR  CAD  Dementia   Atypical depression  HTN    Plan:  Cont NC O2   Renal function and acidosis improving  Will cont to hold Entresto  Appreciate consult from Dr. Cohen  Warfarin 2mg tonight  Appreciate cardiology consult   Hold Seroquel  Patient gets home visit by RN arranged by Dr. Huyen Figueroa to monitor INR, will rearrange that  Fall precaution  Discussed above plan with Ms Santana, discussed about discharge plan

## 2021-09-30 NOTE — PROGRESS NOTE ADULT - PROBLEM SELECTOR PLAN 2
Pt p/w prolonged end expiratory wheezes   Hx of COPD, not on home 02, no prior intubations  GOLD 2 by criteria; not started on Abx   Labs not significant for superimposed infection  CXR non focal, does not appear to be fluid overloaded o/e or imaging  C/w Solumedrol 40 Q8h, tapered to oral prednisone   PPI ppx, sliding scale coverage   S/p Duoneb x 3, c/w Albuterol PRN   RVP negative   Pulm Consult Dr. Cohen

## 2021-09-30 NOTE — PROGRESS NOTE ADULT - PROBLEM SELECTOR PLAN 7
Well controlled on metoprolol, hydralazine  C/w home meds while holding Entresto for CHEN  C/w Statin for coexisting HLD  DASH/DM diet  C/w hydralazine/Imdur  Card Dr Goodrich

## 2021-10-01 LAB
ANION GAP SERPL CALC-SCNC: 10 MMOL/L — SIGNIFICANT CHANGE UP (ref 5–17)
APTT BLD: 29.8 SEC — SIGNIFICANT CHANGE UP (ref 27.5–35.5)
BUN SERPL-MCNC: 52 MG/DL — HIGH (ref 7–18)
CALCIUM SERPL-MCNC: 9 MG/DL — SIGNIFICANT CHANGE UP (ref 8.4–10.5)
CHLORIDE SERPL-SCNC: 116 MMOL/L — HIGH (ref 96–108)
CO2 SERPL-SCNC: 20 MMOL/L — LOW (ref 22–31)
CREAT SERPL-MCNC: 1.58 MG/DL — HIGH (ref 0.5–1.3)
GLUCOSE BLDC GLUCOMTR-MCNC: 134 MG/DL — HIGH (ref 70–99)
GLUCOSE BLDC GLUCOMTR-MCNC: 144 MG/DL — HIGH (ref 70–99)
GLUCOSE BLDC GLUCOMTR-MCNC: 149 MG/DL — HIGH (ref 70–99)
GLUCOSE BLDC GLUCOMTR-MCNC: 172 MG/DL — HIGH (ref 70–99)
GLUCOSE SERPL-MCNC: 144 MG/DL — HIGH (ref 70–99)
HCT VFR BLD CALC: 40.4 % — SIGNIFICANT CHANGE UP (ref 34.5–45)
HGB BLD-MCNC: 13.1 G/DL — SIGNIFICANT CHANGE UP (ref 11.5–15.5)
INR BLD: 1.56 RATIO — HIGH (ref 0.88–1.16)
MCHC RBC-ENTMCNC: 30.8 PG — SIGNIFICANT CHANGE UP (ref 27–34)
MCHC RBC-ENTMCNC: 32.4 GM/DL — SIGNIFICANT CHANGE UP (ref 32–36)
MCV RBC AUTO: 95.1 FL — SIGNIFICANT CHANGE UP (ref 80–100)
NRBC # BLD: 0 /100 WBCS — SIGNIFICANT CHANGE UP (ref 0–0)
PLATELET # BLD AUTO: 242 K/UL — SIGNIFICANT CHANGE UP (ref 150–400)
POTASSIUM SERPL-MCNC: 4.5 MMOL/L — SIGNIFICANT CHANGE UP (ref 3.5–5.3)
POTASSIUM SERPL-SCNC: 4.5 MMOL/L — SIGNIFICANT CHANGE UP (ref 3.5–5.3)
PROTHROM AB SERPL-ACNC: 18.2 SEC — HIGH (ref 10.6–13.6)
RBC # BLD: 4.25 M/UL — SIGNIFICANT CHANGE UP (ref 3.8–5.2)
RBC # FLD: 17.8 % — HIGH (ref 10.3–14.5)
SODIUM SERPL-SCNC: 146 MMOL/L — HIGH (ref 135–145)
WBC # BLD: 5.24 K/UL — SIGNIFICANT CHANGE UP (ref 3.8–10.5)
WBC # FLD AUTO: 5.24 K/UL — SIGNIFICANT CHANGE UP (ref 3.8–10.5)

## 2021-10-01 PROCEDURE — 99233 SBSQ HOSP IP/OBS HIGH 50: CPT | Mod: GC

## 2021-10-01 PROCEDURE — 99232 SBSQ HOSP IP/OBS MODERATE 35: CPT

## 2021-10-01 RX ORDER — WARFARIN SODIUM 2.5 MG/1
3 TABLET ORAL ONCE
Refills: 0 | Status: COMPLETED | OUTPATIENT
Start: 2021-10-01 | End: 2021-10-01

## 2021-10-01 RX ORDER — WARFARIN SODIUM 2.5 MG/1
2 TABLET ORAL ONCE
Refills: 0 | Status: DISCONTINUED | OUTPATIENT
Start: 2021-10-01 | End: 2021-10-01

## 2021-10-01 RX ADMIN — LORATADINE 10 MILLIGRAM(S): 10 TABLET ORAL at 12:14

## 2021-10-01 RX ADMIN — NYSTATIN CREAM 1 APPLICATION(S): 100000 CREAM TOPICAL at 18:28

## 2021-10-01 RX ADMIN — ALBUTEROL 1 PUFF(S): 90 AEROSOL, METERED ORAL at 21:25

## 2021-10-01 RX ADMIN — Medication 10 MILLIGRAM(S): at 05:18

## 2021-10-01 RX ADMIN — Medication 10 MILLIGRAM(S): at 21:24

## 2021-10-01 RX ADMIN — Medication 1: at 21:24

## 2021-10-01 RX ADMIN — Medication 10 MILLIGRAM(S): at 14:02

## 2021-10-01 RX ADMIN — ANASTROZOLE 1 MILLIGRAM(S): 1 TABLET ORAL at 12:14

## 2021-10-01 RX ADMIN — PANTOPRAZOLE SODIUM 40 MILLIGRAM(S): 20 TABLET, DELAYED RELEASE ORAL at 05:18

## 2021-10-01 RX ADMIN — Medication 25 MILLIGRAM(S): at 05:18

## 2021-10-01 RX ADMIN — ATORVASTATIN CALCIUM 40 MILLIGRAM(S): 80 TABLET, FILM COATED ORAL at 21:24

## 2021-10-01 RX ADMIN — NYSTATIN CREAM 1 APPLICATION(S): 100000 CREAM TOPICAL at 05:19

## 2021-10-01 RX ADMIN — Medication 25 MICROGRAM(S): at 05:18

## 2021-10-01 RX ADMIN — ISOSORBIDE MONONITRATE 30 MILLIGRAM(S): 60 TABLET, EXTENDED RELEASE ORAL at 12:14

## 2021-10-01 RX ADMIN — WARFARIN SODIUM 3 MILLIGRAM(S): 2.5 TABLET ORAL at 21:24

## 2021-10-01 RX ADMIN — ALBUTEROL 1 PUFF(S): 90 AEROSOL, METERED ORAL at 12:14

## 2021-10-01 NOTE — DIETITIAN INITIAL EVALUATION ADULT. - PROBLEM SELECTOR PLAN 10
Coumadin to be held as INR supratherapeutic   Protonix Qd   multiple recent hospital admissions: Pt eval for possible placement  IMPROVE VTE Individual Risk Assessment  RISK                                                                Points  [x  ] Previous VTE                                                  3  [ x ] Thrombophilia                                               2  [  ] Lower limb paralysis                                      2        (unable to hold up >15 seconds)    [  ] Current Cancer                                              2         (within 6 months)  [ x ] Immobilization > 24 hrs                                1  [  ] ICU/CCU stay > 24 hours                              1  [ x ] Age > 60                                                      1  IMPROVE VTE Score _____7____

## 2021-10-01 NOTE — PROGRESS NOTE ADULT - ASSESSMENT
Patient is a 66 F from home (lives alone w/ HHA), baseline AA0x3, independently ambulating, with PMHx of CAD, T2DM (last A1c 5.9%), HTN, HFrEF/cardiomyopathy (EF 25% on 9/16/21, s/p SICD), COPD (no prior intubations), CVA, DVT (L leg 1994)/PE (s/p IVC filter 9/2020, and repeat saddle PE at [facility] 8/21-9/3 now on Coumadin), infrarenal AAA, cerebral aneurysm, Atypical Depression, R breast cancer (s/p chemo/XRT), Class I Obesity, PSHx of hysterectomy (for fibroids), and cholecystectomy presenting to the ED with worsening shortness of breath x 4 days, admitted for likely COPD exacerbation with acute and chronic heart failure. X-ray significant for Small left pleural effusion. Echo 9/16/21: Severe LV enlargement, severely reduced LV Systolic function with EF of 25% , diffuse hypokinesis, severe functional MR. Admitted for COPD exacerbation vs multifactorial dyspnea (COPD, CHF, chronic PE).

## 2021-10-01 NOTE — PROGRESS NOTE ADULT - ASSESSMENT
66-year-old female former smoker with DM, HTN, HLD, NICM (EF 31% by MUGA 07/2019) s/p SubQ ICD, PE s/p IVC filter and LV thrombus on warfarin who presented ot the ER with dyspnea.     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 Cont spiriva and albuterol  -  BNP elevated but has been higher in past, clinically does not look volume overloaded. cardiac management as per cards  -  acute kidney injury - improving    Thank you for this consult, will follow with you

## 2021-10-01 NOTE — PROGRESS NOTE ADULT - PROBLEM SELECTOR PLAN 6
Pt p/w BUN/Cr of 37/1.91, eGFR of 27, normally in 40's (CKD3-4)  FeNa 0.3%, Prerenal  trend BMP  Nephro Dr. Johnson  S/p IVF 50ml/hrx10 hours BMP slightly improved; recheck ABG

## 2021-10-01 NOTE — DIETITIAN INITIAL EVALUATION ADULT. - PROBLEM SELECTOR PLAN 2
Pt p/w BUN/Cr of 37/1.91, eGFR of 27, normally in 40's (CKD3-4)  Possibly prerenal   F/u UA, Urine lytes   Will hold any IVF (Caution as CHF) pending urine   F/u BMP

## 2021-10-01 NOTE — PROGRESS NOTE ADULT - SUBJECTIVE AND OBJECTIVE BOX
PGY-2 Progress Note discussed with attending    PAGER #: [[phone]] TILL 5:00 PM  PLEASE CONTACT ON CALL TEAM:  - On Call Team (Please refer to Taryn) FROM 5:00 PM - 8:30PM  - Nightfloat Team FROM 8:30 -7:30 AM    CHIEF COMPLAINT & BRIEF HOSPITAL COURSE:  Patient is a 66 F from home (lives alone w/ HHA), baseline AA0x3, independently ambulating, with PMHx of CAD, T2DM (last A1c 5.9%), HTN, HFrEF/cardiomyopathy (EF 25% on 9/16/21, s/p SICD), COPD (no prior intubations), CVA, DVT (L leg 1994)/PE (s/p IVC filter 9/2020, and repeat saddle PE at [facility] 8/21-9/3 now on Coumadin), infrarenal AAA, cerebral aneurysm, Atypical Depression, R breast cancer (s/p chemo/XRT), Class I Obesity, PSHx of hysterectomy (for fibroids), and cholecystectomy presenting to the ED with worsening shortness of breath x 4 days; admitted for HFrEF, COPD exacerbation, and CTPEH.     INTERVAL HPI/OVERNIGHT EVENTS:   Patient was examined while she was lying in bed, Aox2-3, not responding appropriately to questions, in NAD but itchy. She has normal bowel and bladder movements, and denies any other acute complaints. She is actively hallucinating o/e.     REVIEW OF SYSTEMS:  CONSTITUTIONAL: Fatigue + No fever, weight loss  RESPIRATORY: No cough, wheezing, chills or hemoptysis; No shortness of breath  CARDIOVASCULAR: No chest pain, palpitations, dizziness, or leg swelling  GASTROINTESTINAL: No abdominal pain. No nausea, vomiting, or hematemesis; No diarrhea or constipation. No melena or hematochezia.  GENITOURINARY: Vaginal itching + No dysuria or hematuria, urinary frequency  NEUROLOGICAL: No headaches, memory loss, loss of strength, numbness, or tremors  SKIN: Vaginal itching + No burning, rashes, or lesions       MEDICATIONS  (STANDING):  ALBUTerol    90 MICROgram(s) HFA Inhaler      ALBUTerol    90 MICROgram(s) HFA Inhaler 1 Puff(s) Inhalation two times a day  anastrozole 1 milliGRAM(s) Oral daily  atorvastatin 40 milliGRAM(s) Oral at bedtime  dextrose 50% Injectable 25 Gram(s) IV Push once  hydrALAZINE 10 milliGRAM(s) Oral every 8 hours  influenza   Vaccine 0.5 milliLiter(s) IntraMuscular once  insulin lispro (ADMELOG) corrective regimen sliding scale   SubCutaneous Before meals and at bedtime  isosorbide   mononitrate ER Tablet (IMDUR) 30 milliGRAM(s) Oral daily  levothyroxine 25 MICROGram(s) Oral daily  loratadine 10 milliGRAM(s) Oral daily  metoprolol succinate ER 25 milliGRAM(s) Oral daily  nystatin Ointment 1 Application(s) Topical every 12 hours  pantoprazole    Tablet 40 milliGRAM(s) Oral before breakfast  sodium chloride 0.9%. 250 milliLiter(s) (50 mL/Hr) IV Continuous <Continuous>  warfarin 3 milliGRAM(s) Oral once    MEDICATIONS  (PRN):  acetaminophen   Tablet .. 650 milliGRAM(s) Oral every 6 hours PRN Temp greater or equal to 38.5C (101.3F), Mild Pain (1 - 3)  aluminum hydroxide/magnesium hydroxide/simethicone Suspension 30 milliLiter(s) Oral every 4 hours PRN Dyspepsia  melatonin 3 milliGRAM(s) Oral at bedtime PRN Insomnia  ondansetron Injectable 4 milliGRAM(s) IV Push every 8 hours PRN Nausea and/or Vomiting      Vital Signs Last 24 Hrs  T(C): 36.2 (01 Oct 2021 13:50), Max: 36.7 (30 Sep 2021 20:19)  T(F): 97.2 (01 Oct 2021 13:50), Max: 98.1 (01 Oct 2021 05:19)  HR: 97 (01 Oct 2021 14:57) (88 - 106)  BP: 147/107 (01 Oct 2021 13:50) (132/85 - 165/114)  BP(mean): --  RR: 16 (01 Oct 2021 13:50) (16 - 18)  SpO2: 94% (01 Oct 2021 14:57) (91% - 100%)    PHYSICAL EXAMINATION:  GENERAL: NAD, Overweight AAF, in NAD, confused, but able to be reoriented   HEAD:  Atraumatic, Normocephalic  EYES:  conjunctiva and sclera clear  NECK: Supple, No JVD, Normal thyroid  CHEST/LUNG: Clear to auscultation. Clear to percussion bilaterally; No rales, rhonchi, wheezing, or rubs  HEART: Regular rate and rhythm; No murmurs, rubs, or gallops  ABDOMEN: Soft, Nontender, Nondistended; Bowel sounds present  NERVOUS SYSTEM:  Alert & Oriented X3, bilateral sensory and motor exam wnl    EXTREMITIES:  2+ Peripheral Pulses, No clubbing, cyanosis, or edema  SKIN: warm dry, Genital exam with excoriations, but no discharge or beefy red erythema                           13.1   5.24  )-----------( 242      ( 01 Oct 2021 07:41 )             40.4     10-01    146<H>  |  116<H>  |  52<H>  ----------------------------<  144<H>  4.5   |  20<L>  |  1.58<H>    Ca    9.0      01 Oct 2021 07:41            PT/INR - ( 01 Oct 2021 07:41 )   PT: 18.2 sec;   INR: 1.56 ratio         PTT - ( 01 Oct 2021 07:41 )  PTT:29.8 sec    CAPILLARY BLOOD GLUCOSE      RADIOLOGY & ADDITIONAL TESTS:

## 2021-10-01 NOTE — DIETITIAN INITIAL EVALUATION ADULT. - PERTINENT LABORATORY DATA
10-01 Na146 mmol/L<H> Glu 144 mg/dL<H> K+ 4.5 mmol/L Cr  1.58 mg/dL<H> BUN 52 mg/dL<H> 09-27 Phos 3.7 mg/dL 09-26 Alb 3.4 g/dL<L>

## 2021-10-01 NOTE — DIETITIAN INITIAL EVALUATION ADULT. - PROBLEM SELECTOR PLAN 4
Patient w/ hx of HFrEF/CM w/ SICD   Echo 9/16/21: Severe LV enlargement, severely reduced LVSystolic Fx, diffuse hypokinesis, severe functional MR  Saddle PE diagnosed in 9/2020, w/o cor pulmonale   PND/orthopnea at baseline, AHA class 3-4 HF   ECG: NSR, left atrial enlargement  Trop 0.161  not currently on diuretics (possibly due to intolerance vs MR?)- will hold here due to CHEN  Hold Entresto due to CHEN, C/w Toprol,    Likely minor trop elevation due to demand -> trend till peak, Stat EKG if any chest pain  Cardio Dr. Osman

## 2021-10-01 NOTE — PROGRESS NOTE ADULT - SUBJECTIVE AND OBJECTIVE BOX
[facility] NEPHROLOGY- PROGRESS NOTE    Patient is a 65yo Female with COPD, CAD, T2DM,  HTN, HFrEF/cardiomyopathy (EF 25% on 9/16/21, s/p ICD),  CVA, DVT (L leg 1994)/PE (s/p IVC filter 9/2020, and repeat saddle PE at [facility] 8/21-9/3 now on Coumadin), infrarenal AAA, cerebral aneurysm, Atypical Depression, R breast cancer (s/p chemo/XRT), p/w SOB  a/w COPD exacerbation. Nephrology consulted for Elevated serum creatinine.    Hospital Medications: Medications reviewed.  REVIEW OF SYSTEMS:  CONSTITUTIONAL: No fevers or chills   RESPIRATORY: + shortness of breath resolved but c/o dry cough  CARDIOVASCULAR: No chest pain.  GASTROINTESTINAL: No nausea, vomiting, diarrhea or abdominal pain.   VASCULAR: No bilateral lower extremity edema.     VITALS:  T(F): 98.1 (10-01-21 @ 05:19), Max: 98.1 (10-01-21 @ 05:19)  HR: 92 (10-01-21 @ 12:19)  BP: 143/100 (10-01-21 @ 12:19)  RR: 17 (10-01-21 @ 05:19)  SpO2: 100% (10-01-21 @ 05:19)  Wt(kg): --      PHYSICAL EXAM:  Gen: NAD, confused AO x1  HEENT: MMM  Neck: no JVD  Cards: RRR, +S1/S2,   Resp: CTA ant  GI: soft, NT/ND, NABS  Extremities: no LE edema B/L    LABS:  10-01    146<H>  |  116<H>  |  52<H>  ----------------------------<  144<H>  4.5   |  20<L>  |  1.58<H>    Ca    9.0      01 Oct 2021 07:41      Creatinine Trend: 1.58 <--, 1.98 <--, 2.32 <--, 2.32 <--, 2.52 <--, 1.60 <--, 1.91 <--                        13.1   5.24  )-----------( 242      ( 01 Oct 2021 07:41 )             40.4     Urine Studies:    Sodium, Random Urine: 25 mmol/L (09-26 @ 05:34)  Creatinine, Random Urine: 184 mg/dL (09-26 @ 05:34)  Osmolality, Random Urine: 775 mos/kg (09-26 @ 05:34)           [facility] NEPHROLOGY- PROGRESS NOTE    Patient is a 65yo Female with COPD, CAD, T2DM,  HTN, HFrEF/cardiomyopathy (EF 25% on 9/16/21, s/p ICD),  CVA, DVT (L leg 1994)/PE (s/p IVC filter 9/2020, and repeat saddle PE at [facility] 8/21-9/3 now on Coumadin), infrarenal AAA, cerebral aneurysm, Atypical Depression, R breast cancer (s/p chemo/XRT), p/w SOB  a/w COPD exacerbation. Nephrology consulted for Elevated serum creatinine.    Hospital Medications: Medications reviewed.  REVIEW OF SYSTEMS:  CONSTITUTIONAL: No fevers or chills   RESPIRATORY: + shortness of breath resolved but c/o dry cough  CARDIOVASCULAR: No chest pain.  GASTROINTESTINAL: No nausea, vomiting, diarrhea or abdominal pain.   VASCULAR: No bilateral lower extremity edema.     VITALS:  T(F): 98.1 (10-01-21 @ 05:19), Max: 98.1 (10-01-21 @ 05:19)  HR: 92 (10-01-21 @ 12:19)  BP: 143/100 (10-01-21 @ 12:19)  RR: 17 (10-01-21 @ 05:19)  SpO2: 100% (10-01-21 @ 05:19)  Wt(kg): --      PHYSICAL EXAM:  Gen: NAD, confused AO x1  HEENT: MMM  Neck: no JVD  Cards: RRR, +S1/S2,   Resp: CTA ant  GI: soft, NT/ND, NABS  Extremities: trace LLE edema/ no RLE edema    LABS:  10-01    146<H>  |  116<H>  |  52<H>  ----------------------------<  144<H>  4.5   |  20<L>  |  1.58<H>    Ca    9.0      01 Oct 2021 07:41      Creatinine Trend: 1.58 <--, 1.98 <--, 2.32 <--, 2.32 <--, 2.52 <--, 1.60 <--, 1.91 <--                        13.1   5.24  )-----------( 242      ( 01 Oct 2021 07:41 )             40.4     Urine Studies:    Sodium, Random Urine: 25 mmol/L (09-26 @ 05:34)  Creatinine, Random Urine: 184 mg/dL (09-26 @ 05:34)  Osmolality, Random Urine: 775 mos/kg (09-26 @ 05:34)

## 2021-10-01 NOTE — DIETITIAN INITIAL EVALUATION ADULT. - PROBLEM SELECTOR PLAN 8
Pt was evaluated by Dr. oMya in prior admissions  was previously on seroquel but no pyschoactive meds now   Endorses auditory hallucinations   No HI/SI  Consider constant observation if worsening and possible psych reconsult

## 2021-10-01 NOTE — PROGRESS NOTE ADULT - PROBLEM SELECTOR PLAN 1
Patient baseline AA0x2-3, now lethargic and AA0x1  Started on Seroquel yesterday; continue to hold   CTH negative for acute CVA   Metabolic Acidosis improving, likely uremic   Bladder scan negative; C/w primafit

## 2021-10-01 NOTE — PROGRESS NOTE ADULT - PROBLEM SELECTOR PLAN 4
Pt p/w INR 7.02, no active bleeding, On Coumadin 3mg/2mg alternating  C/w Coumadin 2mg; Goal INR 2-3  Continues to be out of range   Recheck INR in AM

## 2021-10-01 NOTE — PROGRESS NOTE ADULT - ASSESSMENT
Patient is a 67yo Female with COPD, CAD, T2DM,  HTN, HFrEF/cardiomyopathy (EF 25% on 9/16/21, s/p ICD),  CVA, DVT (L leg 1994)/PE (s/p IVC filter 9/2020, and repeat saddle PE at [facility] 8/21-9/3 now on Coumadin), infrarenal AAA, cerebral aneurysm, Atypical Depression, R breast cancer (s/p chemo/XRT), p/w SOB  a/w COPD exacerbation. Nephrology consulted for Elevated serum creatinine.    1. CHEN- unclear etiology ? hemodynamically mediated. No documented hypotension/ no NSAID or diuretic use. Continue to hold Entresto. Fe Na 0.15%. Improvement in renal function post  NS @ 50cc/hr x 5 hrs. Encourage PO fluid intake   Pt on PPI with no peripheral Eos; low suspicion of AIN. Renal US with no hydro. Strict I/Os. Avoid nephrotoxins/ NSAIDs/ RCA. Monitor BMP.  2. CKD-3a- makeda SCr 1-1.3. CKD in the setting of DM/HTN/ HF. Defer secondary w/u as an outpt. Avoid nephrotoxins  3. HTN 2/2 CKD- BP borderline. c/w Hydralazine 10mg PO q8hrs. If BP remains elevated, consider increasing Metoprolol to 50mg PO qd. Monitor BP  4. Metabolic Acidosis- AG resolved. Mild resp alkalosis resolved. Serum lactate wnl. Serum Co2 overall improving. Denies diarrhea. Will avoid worsening hyperchloremia with further NS IVF. Monitor ph/CO2.        [facility] NEPHROLOGY  Charly Kerns M.D.  Joshua Gil D.O.  Alysha Johnson M.D.  Tammy Kirk, MSN, ANP-C  [phone]    [address] [place] [address]

## 2021-10-01 NOTE — PROGRESS NOTE ADULT - ATTENDING COMMENTS
Patient was seen and examined at bedside today  Confused today  Denies any new complains    Vitals noted    P/E:  NAD, laying comfortably on bed   AAOx2, no focal deficit  Lungs Clear to auscultation anterolaterally  S1S2 WNL, + murmur  Abd soft, non tender, BS present   BL LE no edema or tenderness    labs noted     A/P:  Acute on chronic hypoxic respiratory failure possibly multifactorial due to recent PE, acute on chronic systolic HF exacerbation, mild COPD exacerbation   Acute metabolic encephalopathy due to uremia, metabolic acidosis, hospital acquired delirium and worsening dementia  CHEN concern for ATN (possibly cardiorenal syndrome)  Metabolic acidosis   Supratherapeutic INR  Recent saddle PE on Warfarin  LV apical thrombus  Infrarenal AAA, cerebral aneurysm  Functional MR  CAD  Dementia   Atypical depression  HTN    Plan:  Cont NC O2   Renal function and acidosis improving  Will cont to hold Entresto  Warfarin 3mg tonight  Hold Seroquel, psych consult  Repeat consult  Patient gets home visit by RN arranged by Dr. Huyen Figueroa to monitor INR, will rearrange that  Fall precaution

## 2021-10-01 NOTE — DIETITIAN INITIAL EVALUATION ADULT. - PROBLEM SELECTOR PLAN 1
Pt p/w prolonged end expiratory wheezes   Hx of COPD, not on home 02, no prior intubations  GOLD 2 by criteria  Labs not significant for superimposed infection  CXR non focal, does not appear to be fluid overloaded o/e or imaging  C/w Solumedrol 40 Q8h x 1 day and taper down   PPI ppx, sliding scale coverage   S/p Duoneb x 3, c/w Albuterol MDI Q6h   F/u RVP  Pulm Consult Dr. Mendoza

## 2021-10-01 NOTE — PROGRESS NOTE ADULT - SUBJECTIVE AND OBJECTIVE BOX
Pt is comfortable at rest, NAD, not labored, speaks in complete sentences.    REVIEW OF SYSTEMS:  Constitutional: [ ] fever, [ ]weight loss,  [ ]fatigue  Eyes: [ ] visual changes  Respiratory: [ ]shortness of breath;  [ ] cough, [ ]wheezing, [ ]chills, [ ]hemoptysis  Cardiovascular: [ ] chest pain, [ ]palpitations, [ ]dizziness,  [ ]leg swelling [ ]syncope  Gastrointestinal: [ ] abdominal pain, [ ]nausea, [ ]vomiting,  [ ]diarrhea   Genitourinary: [ ] dysuria, [ ] hematuria  Neurologic: [ ] headaches [ ] tremors  [ ] weakness [ ] lightheadedness  Skin: [ ] itching, [ ]burning, [ ] rashes  Endocrine: [ ] heat or cold intolerance  Musculoskeletal: [ ] joint pain or swelling; [ ] muscle, back, or extremity pain  Psychiatric: [ ] depression, [ ]anxiety, [ ]mood swings, or [ ]difficulty sleeping  Hematologic: [ ] easy bruising, [ ] bleeding gums       [ x] All others negative	  [ ] Unable to obtain    Vital Signs Last 24 Hrs  T(C): 36.2 (01 Oct 2021 13:50), Max: 36.7 (30 Sep 2021 20:19)  T(F): 97.2 (01 Oct 2021 13:50), Max: 98.1 (01 Oct 2021 05:19)  HR: 97 (01 Oct 2021 14:57) (88 - 106)  BP: 147/107 (01 Oct 2021 13:50) (132/85 - 165/114)  BP(mean): --  RR: 16 (01 Oct 2021 13:50) (16 - 18)  SpO2: 94% (01 Oct 2021 14:57) (91% - 100%)    PHYSICAL EXAM:  General: No acute distress  HEENT: EOMI, PERRL  Neck: Supple, No JVD  Lungs: Clear to auscultation bilaterally; No rales or wheezing  Heart: Regular rate and rhythm; No murmurs, rubs, or gallops  Abdomen: Nontender, bowel sounds present  Extremities: No clubbing, cyanosis, or edema  Nervous system:  Alert & Oriented X3, no focal deficits  Psychiatric: Normal affect  Skin: No rashes or lesions                                       13.1   5.24  )-----------( 242      ( 01 Oct 2021 07:41 )             40.4   10-01    146<H>  |  116<H>  |  52<H>  ----------------------------<  144<H>  4.5   |  20<L>  |  1.58<H>    Ca    9.0      01 Oct 2021 07:41

## 2021-10-02 LAB
ANION GAP SERPL CALC-SCNC: 13 MMOL/L — SIGNIFICANT CHANGE UP (ref 5–17)
APTT BLD: 29.3 SEC — SIGNIFICANT CHANGE UP (ref 27.5–35.5)
BUN SERPL-MCNC: 48 MG/DL — HIGH (ref 7–18)
CALCIUM SERPL-MCNC: 9.2 MG/DL — SIGNIFICANT CHANGE UP (ref 8.4–10.5)
CHLORIDE SERPL-SCNC: 114 MMOL/L — HIGH (ref 96–108)
CO2 SERPL-SCNC: 18 MMOL/L — LOW (ref 22–31)
CREAT SERPL-MCNC: 1.56 MG/DL — HIGH (ref 0.5–1.3)
GLUCOSE BLDC GLUCOMTR-MCNC: 118 MG/DL — HIGH (ref 70–99)
GLUCOSE BLDC GLUCOMTR-MCNC: 155 MG/DL — HIGH (ref 70–99)
GLUCOSE BLDC GLUCOMTR-MCNC: 158 MG/DL — HIGH (ref 70–99)
GLUCOSE BLDC GLUCOMTR-MCNC: 160 MG/DL — HIGH (ref 70–99)
GLUCOSE SERPL-MCNC: 181 MG/DL — HIGH (ref 70–99)
HCT VFR BLD CALC: 39.9 % — SIGNIFICANT CHANGE UP (ref 34.5–45)
HGB BLD-MCNC: 12.9 G/DL — SIGNIFICANT CHANGE UP (ref 11.5–15.5)
INR BLD: 1.54 RATIO — HIGH (ref 0.88–1.16)
MCHC RBC-ENTMCNC: 31.1 PG — SIGNIFICANT CHANGE UP (ref 27–34)
MCHC RBC-ENTMCNC: 32.3 GM/DL — SIGNIFICANT CHANGE UP (ref 32–36)
MCV RBC AUTO: 96.1 FL — SIGNIFICANT CHANGE UP (ref 80–100)
NRBC # BLD: 0 /100 WBCS — SIGNIFICANT CHANGE UP (ref 0–0)
PLATELET # BLD AUTO: 226 K/UL — SIGNIFICANT CHANGE UP (ref 150–400)
POTASSIUM SERPL-MCNC: 4.3 MMOL/L — SIGNIFICANT CHANGE UP (ref 3.5–5.3)
POTASSIUM SERPL-SCNC: 4.3 MMOL/L — SIGNIFICANT CHANGE UP (ref 3.5–5.3)
PROTHROM AB SERPL-ACNC: 18 SEC — HIGH (ref 10.6–13.6)
RBC # BLD: 4.15 M/UL — SIGNIFICANT CHANGE UP (ref 3.8–5.2)
RBC # FLD: 18.2 % — HIGH (ref 10.3–14.5)
SARS-COV-2 RNA SPEC QL NAA+PROBE: SIGNIFICANT CHANGE UP
SODIUM SERPL-SCNC: 145 MMOL/L — SIGNIFICANT CHANGE UP (ref 135–145)
WBC # BLD: 6.61 K/UL — SIGNIFICANT CHANGE UP (ref 3.8–10.5)
WBC # FLD AUTO: 6.61 K/UL — SIGNIFICANT CHANGE UP (ref 3.8–10.5)

## 2021-10-02 PROCEDURE — 99233 SBSQ HOSP IP/OBS HIGH 50: CPT | Mod: GC

## 2021-10-02 RX ORDER — WARFARIN SODIUM 2.5 MG/1
4 TABLET ORAL ONCE
Refills: 0 | Status: COMPLETED | OUTPATIENT
Start: 2021-10-02 | End: 2021-10-02

## 2021-10-02 RX ORDER — WARFARIN SODIUM 2.5 MG/1
4 TABLET ORAL ONCE
Refills: 0 | Status: DISCONTINUED | OUTPATIENT
Start: 2021-10-02 | End: 2021-10-02

## 2021-10-02 RX ADMIN — ALBUTEROL 1 PUFF(S): 90 AEROSOL, METERED ORAL at 21:13

## 2021-10-02 RX ADMIN — PANTOPRAZOLE SODIUM 40 MILLIGRAM(S): 20 TABLET, DELAYED RELEASE ORAL at 05:58

## 2021-10-02 RX ADMIN — ANASTROZOLE 1 MILLIGRAM(S): 1 TABLET ORAL at 11:25

## 2021-10-02 RX ADMIN — Medication 10 MILLIGRAM(S): at 21:13

## 2021-10-02 RX ADMIN — Medication 1: at 08:02

## 2021-10-02 RX ADMIN — Medication 10 MILLIGRAM(S): at 05:58

## 2021-10-02 RX ADMIN — NYSTATIN CREAM 1 APPLICATION(S): 100000 CREAM TOPICAL at 17:04

## 2021-10-02 RX ADMIN — Medication 10 MILLIGRAM(S): at 13:04

## 2021-10-02 RX ADMIN — WARFARIN SODIUM 4 MILLIGRAM(S): 2.5 TABLET ORAL at 22:34

## 2021-10-02 RX ADMIN — Medication 1: at 11:24

## 2021-10-02 RX ADMIN — Medication 25 MICROGRAM(S): at 05:58

## 2021-10-02 RX ADMIN — LORATADINE 10 MILLIGRAM(S): 10 TABLET ORAL at 11:25

## 2021-10-02 RX ADMIN — Medication 3 MILLIGRAM(S): at 22:34

## 2021-10-02 RX ADMIN — Medication 1: at 21:13

## 2021-10-02 RX ADMIN — ATORVASTATIN CALCIUM 40 MILLIGRAM(S): 80 TABLET, FILM COATED ORAL at 21:13

## 2021-10-02 RX ADMIN — Medication 25 MILLIGRAM(S): at 05:58

## 2021-10-02 RX ADMIN — ALBUTEROL 1 PUFF(S): 90 AEROSOL, METERED ORAL at 11:25

## 2021-10-02 RX ADMIN — NYSTATIN CREAM 1 APPLICATION(S): 100000 CREAM TOPICAL at 06:10

## 2021-10-02 RX ADMIN — ISOSORBIDE MONONITRATE 30 MILLIGRAM(S): 60 TABLET, EXTENDED RELEASE ORAL at 11:25

## 2021-10-02 NOTE — PROGRESS NOTE ADULT - PROBLEM SELECTOR PLAN 6
Concernfor ATN  Pt p/w BUN/Cr of 37/1.91, eGFR of 27, normally in 40's (CKD3-4)  FeNa 0.3%, Prerenal  trend BMP daily  Nephro Dr. Johnson

## 2021-10-02 NOTE — PROGRESS NOTE ADULT - SUBJECTIVE AND OBJECTIVE BOX
[facility] NEPHROLOGY- PROGRESS NOTE    Patient is a 67yo Female with COPD, CAD, T2DM,  HTN, HFrEF/cardiomyopathy (EF 25% on 9/16/21, s/p ICD),  CVA, DVT (L leg 1994)/PE (s/p IVC filter 9/2020, and repeat saddle PE at [facility] 8/21-9/3 now on Coumadin), infrarenal AAA, cerebral aneurysm, Atypical Depression, R breast cancer (s/p chemo/XRT), p/w SOB  a/w COPD exacerbation. Nephrology consulted for Elevated serum creatinine.    Hospital Medications: Medications reviewed.  REVIEW OF SYSTEMS:  CONSTITUTIONAL: No fevers or chills   RESPIRATORY: NO SOB. + cough  CARDIOVASCULAR: No chest pain.  GASTROINTESTINAL: No nausea, vomiting, diarrhea or abdominal pain.   VASCULAR: No bilateral lower extremity edema.     VITALS:  T(F): 97.8 (10-02-21 @ 05:10), Max: 98.3 (10-01-21 @ 20:56)  HR: 106 (10-02-21 @ 06:59)  BP: 145/97 (10-02-21 @ 06:59)  RR: 18 (10-02-21 @ 05:10)  SpO2: 99% (10-02-21 @ 05:10)    Vital Signs Last 24 Hrs  T(C): 36.6 (02 Oct 2021 05:10), Max: 36.8 (01 Oct 2021 20:56)  T(F): 97.8 (02 Oct 2021 05:10), Max: 98.3 (01 Oct 2021 20:56)  HR: 106 (02 Oct 2021 06:59) (92 - 125)  BP: 145/97 (02 Oct 2021 06:59) (143/100 - 165/114)  BP(mean): --  RR: 18 (02 Oct 2021 05:10) (16 - 18)  SpO2: 99% (02 Oct 2021 05:10) (91% - 99%)      PHYSICAL EXAM:  Gen: NAD, confused AO x1  HEENT: MMM  Neck: no JVD  Cards: RRR, +S1/S2,   Resp: CTA ant  GI: soft, NT/ND, NABS  Extremities: trace LLE edema/ no RLE edema    LABS: pending today  10-01    146<H>  |  116<H>  |  52<H>  ----------------------------<  144<H>  4.5   |  20<L>  |  1.58<H>    Ca    9.0      01 Oct 2021 07:41      Creatinine Trend: 1.58 <--, 1.98 <--, 2.32 <--, 2.32 <--, 2.52 <--, 1.60 <--, 1.91 <--                        13.1   5.24  )-----------( 242      ( 01 Oct 2021 07:41 )             40.4     Urine Studies:    Sodium, Random Urine: 25 mmol/L (09-26 @ 05:34)  Creatinine, Random Urine: 184 mg/dL (09-26 @ 05:34)  Osmolality, Random Urine: 775 mos/kg (09-26 @ 05:34)

## 2021-10-02 NOTE — PROGRESS NOTE ADULT - ASSESSMENT
Patient is a 65yo Female with COPD, CAD, T2DM,  HTN, HFrEF/cardiomyopathy (EF 25% on 9/16/21, s/p ICD),  CVA, DVT (L leg 1994)/PE (s/p IVC filter 9/2020, and repeat saddle PE at [facility] 8/21-9/3 now on Coumadin), infrarenal AAA, cerebral aneurysm, Atypical Depression, R breast cancer (s/p chemo/XRT), p/w SOB  a/w COPD exacerbation. Nephrology consulted for Elevated serum creatinine.    1. CHEN- unclear etiology ? hemodynamically mediated. No documented hypotension/ no NSAID or diuretic use. Hold Entresto still. Fe Na 0.15%. Improvement in renal function post  NS @ 50cc/hr x 5 hrs. Encourage PO fluid intake for another day, but would not give further IVF unless renal fxn significantly worsens per labs today.  Pt on PPI with no peripheral Eos; low suspicion of AIN. Renal US with no hydro. Strict I/Os. Avoid nephrotoxins/ NSAIDs/ RCA. Monitor BMP.    2. CKD-3a- makeda SCr 1-1.3. CKD in the setting of DM/HTN/ HF. Defer secondary w/u as an outpt. Avoid nephrotoxins  3. HTN 2/2 CKD- BP borderline. c/w Hydralazine 10mg PO q8hrs. If BP remains elevated, consider increasing Metoprolol to 50mg PO qd. Monitor BP  4. Metabolic Acidosis- AG resolved. Mild resp alkalosis resolved. Serum lactate wnl. Serum Co2 overall improving. Denies diarrhea. Will avoid worsening hyperchloremia with further NS IVF. Monitor ph/CO2.        [facility] NEPHROLOGY  Charly Kerns M.D.  MADHAV AnO.  Alysha Johnson M.D.  Tammy Kirk, MSN, ANP-C  [phone]    [address] [place] [address]   Patient is a 67yo Female with COPD, CAD, T2DM,  HTN, HFrEF/cardiomyopathy (EF 25% on 9/16/21, s/p ICD),  CVA, DVT (L leg 1994)/PE (s/p IVC filter 9/2020, and repeat saddle PE at [facility] 8/21-9/3 now on Coumadin), infrarenal AAA, cerebral aneurysm, Atypical Depression, R breast cancer (s/p chemo/XRT), p/w SOB  a/w COPD exacerbation. Nephrology consulted for Elevated serum creatinine.    1. CHEN- unclear etiology ? hemodynamically mediated. No documented hypotension/ no NSAID or diuretic use. Hold Entresto still. Fe Na 0.15%. Improvement in renal function post  NS @ 50cc/hr x 5 hrs. Encourage PO fluid intake for another day, but would not give further IVF unless renal fxn significantly worsens per labs today.  Pt on PPI with no peripheral Eos; low suspicion of AIN. Renal US with no hydro. Strict I/Os. Avoid nephrotoxins/ NSAIDs/ RCA. Monitor BMP.    2. CKD-3a- makeda SCr 1-1.3. CKD in the setting of DM/HTN/ HF. Defer secondary w/u as an outpt. Avoid nephrotoxins  3. HTN 2/2 CKD- BP borderline. c/w Hydralazine 10mg PO q8hrs for now.  IF renal fxn continues to improve, would restart ENTRESTO (slowly). If BP remains elevated, can consider increasing Metoprolol to 50mg PO qd. Monitor BP  4. Metabolic Acidosis- AG resolved. Mild resp alkalosis resolved. Serum lactate wnl. Serum Co2 overall improving. Denies diarrhea. Will avoid worsening hyperchloremia with further NS IVF. Monitor ph/CO2.        [facility] NEPHROLOGY  Charly Kerns M.D.  MADHAV AnO.  Alysha Johnson M.D.  Tammy Kirk, MSN, ANP-C  [phone]    [address] [place] [address]

## 2021-10-02 NOTE — PROGRESS NOTE ADULT - PROBLEM SELECTOR PLAN 2
Pt p/w prolonged end expiratory wheezes   Hx of COPD, not on home 02, no prior intubations  GOLD 2 by criteria; not started on Abx   Labs not significant for superimposed infection  CXR non focal, does not appear to be fluid overloaded o/e or imaging  C/w Solumedrol 40 Q8h, tapered to oral prednisone- discontinued   PPI ppx, sliding scale coverage   S/p Duoneb x 3, c/w Albuterol PRN   RVP negative   Pulm Consult Dr. Cohen

## 2021-10-02 NOTE — PROGRESS NOTE ADULT - PROBLEM SELECTOR PLAN 4
Pt p/w INR 7.02, no active bleeding, On Coumadin 3mg/2mg alternating  C/w Coumadin 2mg; Goal INR 2-3  Continues to be subtherapeutic  Warfarin 4mg today  Recheck INR in AM

## 2021-10-02 NOTE — PROGRESS NOTE ADULT - PROBLEM SELECTOR PLAN 3
Patient w/ hx of HFrEF/CM w/ SICD   Echo 9/16/21: Severe LV enlargement, severely reduced LV Systolic Fx, diffuse hypokinesis, severe functional MR  Saddle PE diagnosed in 9/2020, w/o cor pulmonale   C/w plan as above

## 2021-10-02 NOTE — PROGRESS NOTE ADULT - SUBJECTIVE AND OBJECTIVE BOX
Patient is a 66y old  Female who presents with a chief complaint of Shortness of Breath (02 Oct 2021 08:47)    Patient was seen and examined at bedside   AAOx2 today, denies any new complains, wants to go home. Asked HHA to be arranged     INTERVAL HPI/OVERNIGHT EVENTS:  T(C): 36.8 (10-02-21 @ 12:09), Max: 36.8 (10-01-21 @ 20:56)  HR: 92 (10-02-21 @ 12:09) (91 - 125)  BP: 133/90 (10-02-21 @ 12:09) (119/92 - 155/115)  RR: 19 (10-02-21 @ 12:09) (18 - 19)  SpO2: 100% (10-02-21 @ 12:09) (98% - 100%)  Wt(kg): --  I&O's Summary      REVIEW OF SYSTEMS: denies fever, chills, SOB, palpitations, chest pain, abdominal pain, nausea, vomiting, diarrhea, constipation, dizziness    MEDICATIONS  (STANDING):  ALBUTerol    90 MICROgram(s) HFA Inhaler      ALBUTerol    90 MICROgram(s) HFA Inhaler 1 Puff(s) Inhalation two times a day  anastrozole 1 milliGRAM(s) Oral daily  atorvastatin 40 milliGRAM(s) Oral at bedtime  dextrose 50% Injectable 25 Gram(s) IV Push once  hydrALAZINE 10 milliGRAM(s) Oral every 8 hours  influenza   Vaccine 0.5 milliLiter(s) IntraMuscular once  insulin lispro (ADMELOG) corrective regimen sliding scale   SubCutaneous Before meals and at bedtime  isosorbide   mononitrate ER Tablet (IMDUR) 30 milliGRAM(s) Oral daily  levothyroxine 25 MICROGram(s) Oral daily  loratadine 10 milliGRAM(s) Oral daily  metoprolol succinate ER 25 milliGRAM(s) Oral daily  nystatin Ointment 1 Application(s) Topical every 12 hours  pantoprazole    Tablet 40 milliGRAM(s) Oral before breakfast  warfarin 4 milliGRAM(s) Oral once    MEDICATIONS  (PRN):  acetaminophen   Tablet .. 650 milliGRAM(s) Oral every 6 hours PRN Temp greater or equal to 38.5C (101.3F), Mild Pain (1 - 3)  aluminum hydroxide/magnesium hydroxide/simethicone Suspension 30 milliLiter(s) Oral every 4 hours PRN Dyspepsia  melatonin 3 milliGRAM(s) Oral at bedtime PRN Insomnia  ondansetron Injectable 4 milliGRAM(s) IV Push every 8 hours PRN Nausea and/or Vomiting      PHYSICAL EXAM:  GENERAL: NAD, comfortable with 2L O2  HEAD:  Atraumatic, Normocephalic  NERVOUS SYSTEM:  Alert & Oriented X2, no focal deficit   CHEST/LUNG: Clear to auscultation bilaterally; No rales, rhonchi, wheezing, or rubs  HEART: Regular rate and rhythm; No murmurs, rubs, or gallops  ABDOMEN: Soft, Nontender, Nondistended; Bowel sounds present  EXTREMITIES:  2+ Peripheral Pulses, No clubbing, cyanosis, or edema  SKIN: No rashes or lesions    LABS:                        12.9   6.61  )-----------( 226      ( 02 Oct 2021 08:54 )             39.9     10-02    145  |  114<H>  |  48<H>  ----------------------------<  181<H>  4.3   |  18<L>  |  1.56<H>    Ca    9.2      02 Oct 2021 08:54      PT/INR - ( 02 Oct 2021 08:54 )   PT: 18.0 sec;   INR: 1.54 ratio         PTT - ( 02 Oct 2021 08:54 )  PTT:29.3 sec    CAPILLARY BLOOD GLUCOSE      POCT Blood Glucose.: 118 mg/dL (02 Oct 2021 16:25)  POCT Blood Glucose.: 155 mg/dL (02 Oct 2021 11:20)  POCT Blood Glucose.: 160 mg/dL (02 Oct 2021 07:39)  POCT Blood Glucose.: 172 mg/dL (01 Oct 2021 21:11)

## 2021-10-02 NOTE — PROGRESS NOTE ADULT - PROBLEM SELECTOR PLAN 1
Patient baseline AA0x2-3, at baseline today  CTH negative for acute CVA   Metabolic Acidosis improving, likely uremic   Bladder scan negative; C/w primafit

## 2021-10-02 NOTE — PROGRESS NOTE ADULT - ASSESSMENT
Patient is a 66 F from home (lives alone w/ HHA), baseline AA0x2-3, independently ambulating, with PMHx of CAD, T2DM (last A1c 5.9%), HTN, HFrEF/cardiomyopathy (EF 25% on 9/16/21, s/p SICD), COPD (no prior intubations), CVA, DVT (L leg 1994)/PE (s/p IVC filter 9/2020, and repeat saddle PE at [facility] 8/21-9/3 now on Coumadin), infrarenal AAA, cerebral aneurysm, Atypical Depression, R breast cancer (s/p chemo/XRT), Class I Obesity, PSHx of hysterectomy (for fibroids), and cholecystectomy presenting to the ED with worsening shortness of breath x 4 days, admitted for likely COPD exacerbation with acute and chronic heart failure. X-ray significant for Small left pleural effusion. Echo 9/16/21: Severe LV enlargement, severely reduced LV Systolic function with EF of 25% , diffuse hypokinesis, severe functional MR. Admitted for COPD exacerbation vs multifactorial dyspnea (COPD, CHF, chronic PE).

## 2021-10-03 DIAGNOSIS — F32.89 OTHER SPECIFIED DEPRESSIVE EPISODES: ICD-10-CM

## 2021-10-03 LAB
ANION GAP SERPL CALC-SCNC: 13 MMOL/L — SIGNIFICANT CHANGE UP (ref 5–17)
BUN SERPL-MCNC: 43 MG/DL — HIGH (ref 7–18)
CALCIUM SERPL-MCNC: 9.2 MG/DL — SIGNIFICANT CHANGE UP (ref 8.4–10.5)
CHLORIDE SERPL-SCNC: 113 MMOL/L — HIGH (ref 96–108)
CO2 SERPL-SCNC: 16 MMOL/L — LOW (ref 22–31)
CREAT SERPL-MCNC: 1.48 MG/DL — HIGH (ref 0.5–1.3)
GLUCOSE BLDC GLUCOMTR-MCNC: 137 MG/DL — HIGH (ref 70–99)
GLUCOSE BLDC GLUCOMTR-MCNC: 138 MG/DL — HIGH (ref 70–99)
GLUCOSE BLDC GLUCOMTR-MCNC: 145 MG/DL — HIGH (ref 70–99)
GLUCOSE BLDC GLUCOMTR-MCNC: 159 MG/DL — HIGH (ref 70–99)
GLUCOSE SERPL-MCNC: 185 MG/DL — HIGH (ref 70–99)
HCT VFR BLD CALC: 41.3 % — SIGNIFICANT CHANGE UP (ref 34.5–45)
HGB BLD-MCNC: 13.3 G/DL — SIGNIFICANT CHANGE UP (ref 11.5–15.5)
INR BLD: 1.83 RATIO — HIGH (ref 0.88–1.16)
MCHC RBC-ENTMCNC: 30.7 PG — SIGNIFICANT CHANGE UP (ref 27–34)
MCHC RBC-ENTMCNC: 32.2 GM/DL — SIGNIFICANT CHANGE UP (ref 32–36)
MCV RBC AUTO: 95.4 FL — SIGNIFICANT CHANGE UP (ref 80–100)
NRBC # BLD: 0 /100 WBCS — SIGNIFICANT CHANGE UP (ref 0–0)
PLATELET # BLD AUTO: 242 K/UL — SIGNIFICANT CHANGE UP (ref 150–400)
POTASSIUM SERPL-MCNC: 4.3 MMOL/L — SIGNIFICANT CHANGE UP (ref 3.5–5.3)
POTASSIUM SERPL-SCNC: 4.3 MMOL/L — SIGNIFICANT CHANGE UP (ref 3.5–5.3)
PROTHROM AB SERPL-ACNC: 21.2 SEC — HIGH (ref 10.6–13.6)
RBC # BLD: 4.33 M/UL — SIGNIFICANT CHANGE UP (ref 3.8–5.2)
RBC # FLD: 17.7 % — HIGH (ref 10.3–14.5)
SODIUM SERPL-SCNC: 142 MMOL/L — SIGNIFICANT CHANGE UP (ref 135–145)
WBC # BLD: 7.25 K/UL — SIGNIFICANT CHANGE UP (ref 3.8–10.5)
WBC # FLD AUTO: 7.25 K/UL — SIGNIFICANT CHANGE UP (ref 3.8–10.5)

## 2021-10-03 PROCEDURE — 99233 SBSQ HOSP IP/OBS HIGH 50: CPT | Mod: GC

## 2021-10-03 RX ORDER — WARFARIN SODIUM 2.5 MG/1
3 TABLET ORAL
Refills: 0 | Status: COMPLETED | OUTPATIENT
Start: 2021-10-03 | End: 2021-10-03

## 2021-10-03 RX ADMIN — Medication 1: at 07:41

## 2021-10-03 RX ADMIN — ALBUTEROL 1 PUFF(S): 90 AEROSOL, METERED ORAL at 22:38

## 2021-10-03 RX ADMIN — ANASTROZOLE 1 MILLIGRAM(S): 1 TABLET ORAL at 11:10

## 2021-10-03 RX ADMIN — Medication 10 MILLIGRAM(S): at 22:37

## 2021-10-03 RX ADMIN — LORATADINE 10 MILLIGRAM(S): 10 TABLET ORAL at 11:10

## 2021-10-03 RX ADMIN — WARFARIN SODIUM 3 MILLIGRAM(S): 2.5 TABLET ORAL at 22:37

## 2021-10-03 RX ADMIN — NYSTATIN CREAM 1 APPLICATION(S): 100000 CREAM TOPICAL at 17:00

## 2021-10-03 RX ADMIN — Medication 3 MILLIGRAM(S): at 22:38

## 2021-10-03 RX ADMIN — Medication 10 MILLIGRAM(S): at 05:33

## 2021-10-03 RX ADMIN — ATORVASTATIN CALCIUM 40 MILLIGRAM(S): 80 TABLET, FILM COATED ORAL at 22:38

## 2021-10-03 RX ADMIN — PANTOPRAZOLE SODIUM 40 MILLIGRAM(S): 20 TABLET, DELAYED RELEASE ORAL at 05:33

## 2021-10-03 RX ADMIN — ISOSORBIDE MONONITRATE 30 MILLIGRAM(S): 60 TABLET, EXTENDED RELEASE ORAL at 11:09

## 2021-10-03 RX ADMIN — NYSTATIN CREAM 1 APPLICATION(S): 100000 CREAM TOPICAL at 05:32

## 2021-10-03 RX ADMIN — Medication 10 MILLIGRAM(S): at 13:10

## 2021-10-03 RX ADMIN — Medication 25 MICROGRAM(S): at 05:33

## 2021-10-03 RX ADMIN — Medication 25 MILLIGRAM(S): at 05:33

## 2021-10-03 RX ADMIN — ALBUTEROL 1 PUFF(S): 90 AEROSOL, METERED ORAL at 09:01

## 2021-10-03 NOTE — PROGRESS NOTE ADULT - SUBJECTIVE AND OBJECTIVE BOX
Patient is a 66y old  Female who presents with a chief complaint of Shortness of Breath (03 Oct 2021 09:10)    Patient was seen and examined at bedside today  More alert and oriented today, AAOx2  Reports she is nervous and wants to go home    INTERVAL HPI/OVERNIGHT EVENTS:  T(C): 36.2 (10-03-21 @ 12:14), Max: 36.5 (10-03-21 @ 05:05)  HR: 90 (10-03-21 @ 13:08) (88 - 105)  BP: 134/99 (10-03-21 @ 13:08) (127/92 - 148/106)  RR: 18 (10-03-21 @ 13:08) (18 - 19)  SpO2: 100% (10-03-21 @ 13:08) (96% - 100%)  Wt(kg): --  I&O's Summary      REVIEW OF SYSTEMS: denies fever, chills, SOB, palpitations, chest pain, abdominal pain, nausea, vomiting, diarrhea, constipation, dizziness    MEDICATIONS  (STANDING):  ALBUTerol    90 MICROgram(s) HFA Inhaler      ALBUTerol    90 MICROgram(s) HFA Inhaler 1 Puff(s) Inhalation two times a day  anastrozole 1 milliGRAM(s) Oral daily  atorvastatin 40 milliGRAM(s) Oral at bedtime  dextrose 50% Injectable 25 Gram(s) IV Push once  hydrALAZINE 10 milliGRAM(s) Oral every 8 hours  influenza   Vaccine 0.5 milliLiter(s) IntraMuscular once  insulin lispro (ADMELOG) corrective regimen sliding scale   SubCutaneous Before meals and at bedtime  isosorbide   mononitrate ER Tablet (IMDUR) 30 milliGRAM(s) Oral daily  levothyroxine 25 MICROGram(s) Oral daily  loratadine 10 milliGRAM(s) Oral daily  metoprolol succinate ER 25 milliGRAM(s) Oral daily  nystatin Ointment 1 Application(s) Topical every 12 hours  pantoprazole    Tablet 40 milliGRAM(s) Oral before breakfast  warfarin 3 milliGRAM(s) Oral <User Schedule>    MEDICATIONS  (PRN):  acetaminophen   Tablet .. 650 milliGRAM(s) Oral every 6 hours PRN Temp greater or equal to 38.5C (101.3F), Mild Pain (1 - 3)  aluminum hydroxide/magnesium hydroxide/simethicone Suspension 30 milliLiter(s) Oral every 4 hours PRN Dyspepsia  melatonin 3 milliGRAM(s) Oral at bedtime PRN Insomnia  ondansetron Injectable 4 milliGRAM(s) IV Push every 8 hours PRN Nausea and/or Vomiting      PHYSICAL EXAM:  GENERAL: NAD, comfortable on 2l NC  HEAD:  Atraumatic, Normocephalic  NERVOUS SYSTEM:  Alert & Oriented X2, no focal deficit  CHEST/LUNG: Clear to auscultation anterolaterally  HEART: Regular rate and rhythm; tachycardic  ABDOMEN: Soft, Nontender, Nondistended; Bowel sounds present  EXTREMITIES:  2+ Peripheral Pulses, No clubbing, cyanosis, or edema  LYMPH: No lymphadenopathy noted  SKIN: No rashes or lesions    LABS:                        13.3   7.25  )-----------( 242      ( 03 Oct 2021 07:06 )             41.3     10-03    142  |  113<H>  |  43<H>  ----------------------------<  185<H>  4.3   |  16<L>  |  1.48<H>    Ca    9.2      03 Oct 2021 07:06      PT/INR - ( 03 Oct 2021 07:06 )   PT: 21.2 sec;   INR: 1.83 ratio         PTT - ( 02 Oct 2021 08:54 )  PTT:29.3 sec    CAPILLARY BLOOD GLUCOSE      POCT Blood Glucose.: 137 mg/dL (03 Oct 2021 11:11)  POCT Blood Glucose.: 159 mg/dL (03 Oct 2021 07:20)  POCT Blood Glucose.: 158 mg/dL (02 Oct 2021 21:02)

## 2021-10-03 NOTE — PROGRESS NOTE ADULT - SUBJECTIVE AND OBJECTIVE BOX
[facility] NEPHROLOGY- PROGRESS NOTE    Patient is a 67yo Female with COPD, CAD, T2DM,  HTN, HFrEF/cardiomyopathy (EF 25% on 9/16/21, s/p ICD),  CVA, DVT (L leg 1994)/PE (s/p IVC filter 9/2020, and repeat saddle PE at [facility] 8/21-9/3 now on Coumadin), infrarenal AAA, cerebral aneurysm, Atypical Depression, R breast cancer (s/p chemo/XRT), p/w SOB  a/w COPD exacerbation. Nephrology consulted for Elevated serum creatinine.    Hospital Medications: Medications reviewed.  REVIEW OF SYSTEMS:  CONSTITUTIONAL: No fevers or chills   RESPIRATORY: NO SOB. + cough  CARDIOVASCULAR: No chest pain.  GASTROINTESTINAL: No nausea, vomiting, diarrhea or abdominal pain.   VASCULAR: No bilateral lower extremity edema.     VITALS:  T(F): 97.7 (10-03-21 @ 05:05), Max: 98.2 (10-02-21 @ 12:09)  HR: 105 (10-03-21 @ 05:05)  BP: 148/106 (10-03-21 @ 05:05)  RR: 18 (10-03-21 @ 05:05)  SpO2: 100% (10-03-21 @ 05:05)    Vital Signs Last 24 Hrs  T(C): 36.5 (03 Oct 2021 05:05), Max: 36.8 (02 Oct 2021 12:09)  T(F): 97.7 (03 Oct 2021 05:05), Max: 98.2 (02 Oct 2021 12:09)  HR: 105 (03 Oct 2021 05:05) (91 - 105)  BP: 148/106 (03 Oct 2021 05:05) (119/92 - 148/106)  RR: 18 (03 Oct 2021 05:05) (18 - 19)  SpO2: 100% (03 Oct 2021 05:05) (96% - 100%)      PHYSICAL EXAM:  Gen: NAD, confused AO x1  HEENT: MMM  Neck: no JVD  Cards: RRR, +S1/S2,   Resp: CTA ant  GI: soft, NT/ND, NABS  Extremities: trace LLE edema/ no RLE edema    LABS: pending today  10-01    146<H>  |  116<H>  |  52<H>  ----------------------------<  144<H>  4.5   |  20<L>  |  1.58<H>    Ca    9.0      01 Oct 2021 07:41      Creatinine Trend: 1.58 <--, 1.98 <--, 2.32 <--, 2.32 <--, 2.52 <--, 1.60 <--, 1.91 <--                        13.1   5.24  )-----------( 242      ( 01 Oct 2021 07:41 )             40.4     Urine Studies:    Sodium, Random Urine: 25 mmol/L (09-26 @ 05:34)  Creatinine, Random Urine: 184 mg/dL (09-26 @ 05:34)  Osmolality, Random Urine: 775 mos/kg (09-26 @ 05:34)

## 2021-10-03 NOTE — PROGRESS NOTE ADULT - ASSESSMENT
Patient is a 67yo Female with COPD, CAD, T2DM,  HTN, HFrEF/cardiomyopathy (EF 25% on 9/16/21, s/p ICD),  CVA, DVT (L leg 1994)/PE (s/p IVC filter 9/2020, and repeat saddle PE at [facility] 8/21-9/3 now on Coumadin), infrarenal AAA, cerebral aneurysm, Atypical Depression, R breast cancer (s/p chemo/XRT), p/w SOB  a/w COPD exacerbation. Nephrology consulted for Elevated serum creatinine.    1. CHEN- unclear etiology ? hemodynamically mediated. No documented hypotension/ no NSAID or diuretic use. Hold Entresto still. Fe Na 0.15%. Improvement in renal function post  NS @ 50cc/hr x 5 hrs. Renal fxn improving. Continue PO fluid intake, but would not give further IVF unless renal fxn significantly worsens per labs today. Keep off diuretics for now.  Pt on PPI with no peripheral Eos; low suspicion of AIN. Renal US with no hydro. Strict I/Os. Avoid nephrotoxins/ NSAIDs/ RCA. Monitor BMP.    2. CKD-3a- makeda SCr 1-1.3. CKD in the setting of DM/HTN/ HF. Defer secondary w/u as an outpt. Avoid nephrotoxins  3. HTN 2/2 CKD- BP borderline. c/w Hydralazine 10mg PO q8hrs for now.  IF renal fxn continues to improve, would restart ENTRESTO (slowly). If BP remains elevated, can consider increasing Metoprolol to 50mg PO qd. Monitor BP  4. Metabolic Acidosis- AG resolved. Mild resp alkalosis resolved. Serum lactate wnl. Serum Co2 overall improving. Denies diarrhea. Will avoid worsening hyperchloremia with further NS IVF. Monitor ph/CO2.        [facility] NEPHROLOGY  Charly Kerns M.D.  Joshua Gil D.O.  Alysha Johnson M.D.  Tammy Kirk, MSN, ANP-C  [phone]    [address] [id number]

## 2021-10-03 NOTE — PROGRESS NOTE ADULT - PROBLEM SELECTOR PLAN 2
patient was on Seroquel before, stopped herself  Had intermittent episodes of confusions in hospital, when seroquel restarted patient was very sedated  Dr. Moya consulted  As per HCP, she gets these episodes in hospital setting but usually at home she is better   Patient denies any SI or HI, admits she is often confused

## 2021-10-03 NOTE — PROGRESS NOTE ADULT - PROBLEM SELECTOR PLAN 1
Pt p/w INR 7.02, no active bleeding, On Coumadin 3mg/2mg alternating  C/w Coumadin 2mg; Goal INR 2-3  Continues to be subtherapeutic  Will give Warfarin 3mg today  Recheck INR in AM

## 2021-10-03 NOTE — PROGRESS NOTE ADULT - PROBLEM SELECTOR PLAN 6
Pt has a history of DVT/PE w/ IVC filter and repeat saddle PE in 9/2020, on Coumadin  Last CT chest w/ IV con shows continued saddle embolus  Dose Warfarin as per daily INR

## 2021-10-03 NOTE — PROGRESS NOTE ADULT - PROBLEM SELECTOR PLAN 4
Pt p/w prolonged end expiratory wheezes   Hx of COPD, not on home 02, no prior intubations  GOLD 2 by criteria; not started on Abx   Labs not significant for superimposed infection  CXR non focal, does not appear to be fluid overloaded o/e or imaging  Steroid discontinued with improvement of symptoms   c/w Albuterol PRN   RVP negative   Pulm Consult Dr. Cohen

## 2021-10-03 NOTE — PROGRESS NOTE ADULT - ASSESSMENT
Patient is a 66 F from home (lives alone w/ HHA), baseline AA0x2-3, independently ambulating, with PMHx of CAD, T2DM (last A1c 5.9%), HTN, HFrEF/cardiomyopathy (EF 25% on 9/16/21, s/p SICD), COPD (no prior intubations), CVA, DVT (L leg 1994)/PE (s/p IVC filter 9/2020, and repeat saddle PE at [facility] 8/21-9/3 now on Coumadin), infrarenal AAA, cerebral aneurysm, Atypical Depression, R breast cancer (s/p chemo/XRT), Class I Obesity, PSHx of hysterectomy (for fibroids), and cholecystectomy presenting to the ED with worsening shortness of breath x 4 days, admitted for likely COPD exacerbation with acute and chronic heart failure. X-ray significant for Small left pleural effusion. Echo 9/16/21: Severe LV enlargement, severely reduced LV Systolic function with EF of 25% , diffuse hypokinesis, severe functional MR. Admitted for COPD exacerbation vs multifactorial dyspnea (COPD, CHF, chronic PE).

## 2021-10-03 NOTE — PROGRESS NOTE ADULT - PROBLEM SELECTOR PLAN 7
Concern for ATN  Improving   trend BMP daily  Nephro Dr. Johnson  Bladder scan negative; C/w primafit

## 2021-10-03 NOTE — PROGRESS NOTE ADULT - PROBLEM SELECTOR PLAN 10
Patient lives alone with 24x7 private hire [employer], who helps patient with medications also.   PT eval home with home PT  Spoke with Ms Greer (HCP and POA) last week. She is concerned about patient's functional status at home. Reports she does not use the walker to walk, hence has high risk of fall  Discussed the above active issues and importance of regular follow up with specialist clinics. Ms Greer reports HHA can help with them.   Will request repeat PT eval.   Spoke with Dr. Edni Figueroa (Office number [phone]) who has arranged home visit by NP to check her INR at home if she gets discharged to home. Please inform Dr. Figueroa about her discharge plan.

## 2021-10-03 NOTE — PROGRESS NOTE ADULT - PROBLEM SELECTOR PLAN 3
Patient baseline AA0x2-3, at baseline today  CTH negative for acute CVA   Metabolic Acidosis improving, likely combination of uremia, acidosis and atypical depression

## 2021-10-03 NOTE — CHART NOTE - NSCHARTNOTEFT_GEN_A_CORE
Called by the nurse that patient is seeing things that is not there. Patient is saying that she cannot breathe but she is breathing well with 100% O2 saturation on room air.    Quetiapine seems to have been discontinued due to prolonged (QTc 489)  May need telepsych consult in the AM.

## 2021-10-03 NOTE — PROGRESS NOTE ADULT - PROBLEM SELECTOR PLAN 5
Patient w/ hx of HFrEF/CM w/ SICD   Echo 9/16/21: Severe LV enlargement, severely reduced LV Systolic Fx, diffuse hypokinesis, severe functional MR  Saddle PE diagnosed in 9/2020, w/o cor pulmonale   Dr. Doshi on board  Adjusted medications, will cont the same- metoprolol, hydralazine and imdur  Cont to hold Entresto for CHEN

## 2021-10-04 DIAGNOSIS — Z51.5 ENCOUNTER FOR PALLIATIVE CARE: ICD-10-CM

## 2021-10-04 DIAGNOSIS — R53.81 OTHER MALAISE: ICD-10-CM

## 2021-10-04 DIAGNOSIS — F05 DELIRIUM DUE TO KNOWN PHYSIOLOGICAL CONDITION: ICD-10-CM

## 2021-10-04 LAB
ANION GAP SERPL CALC-SCNC: 12 MMOL/L — SIGNIFICANT CHANGE UP (ref 5–17)
APTT BLD: 25.9 SEC — LOW (ref 27.5–35.5)
BUN SERPL-MCNC: 38 MG/DL — HIGH (ref 7–18)
CALCIUM SERPL-MCNC: 8.7 MG/DL — SIGNIFICANT CHANGE UP (ref 8.4–10.5)
CHLORIDE SERPL-SCNC: 114 MMOL/L — HIGH (ref 96–108)
CO2 SERPL-SCNC: 18 MMOL/L — LOW (ref 22–31)
CREAT SERPL-MCNC: 1.35 MG/DL — HIGH (ref 0.5–1.3)
GLUCOSE BLDC GLUCOMTR-MCNC: 109 MG/DL — HIGH (ref 70–99)
GLUCOSE BLDC GLUCOMTR-MCNC: 119 MG/DL — HIGH (ref 70–99)
GLUCOSE BLDC GLUCOMTR-MCNC: 157 MG/DL — HIGH (ref 70–99)
GLUCOSE BLDC GLUCOMTR-MCNC: 173 MG/DL — HIGH (ref 70–99)
GLUCOSE SERPL-MCNC: 118 MG/DL — HIGH (ref 70–99)
HCT VFR BLD CALC: 36.8 % — SIGNIFICANT CHANGE UP (ref 34.5–45)
HGB BLD-MCNC: 11.8 G/DL — SIGNIFICANT CHANGE UP (ref 11.5–15.5)
INR BLD: 1.66 RATIO — HIGH (ref 0.88–1.16)
MCHC RBC-ENTMCNC: 30.9 PG — SIGNIFICANT CHANGE UP (ref 27–34)
MCHC RBC-ENTMCNC: 32.1 GM/DL — SIGNIFICANT CHANGE UP (ref 32–36)
MCV RBC AUTO: 96.3 FL — SIGNIFICANT CHANGE UP (ref 80–100)
NRBC # BLD: 0 /100 WBCS — SIGNIFICANT CHANGE UP (ref 0–0)
PLATELET # BLD AUTO: 198 K/UL — SIGNIFICANT CHANGE UP (ref 150–400)
POTASSIUM SERPL-MCNC: 3.9 MMOL/L — SIGNIFICANT CHANGE UP (ref 3.5–5.3)
POTASSIUM SERPL-SCNC: 3.9 MMOL/L — SIGNIFICANT CHANGE UP (ref 3.5–5.3)
PROTHROM AB SERPL-ACNC: 19.3 SEC — HIGH (ref 10.6–13.6)
RBC # BLD: 3.82 M/UL — SIGNIFICANT CHANGE UP (ref 3.8–5.2)
RBC # FLD: 18 % — HIGH (ref 10.3–14.5)
SODIUM SERPL-SCNC: 144 MMOL/L — SIGNIFICANT CHANGE UP (ref 135–145)
WBC # BLD: 6.28 K/UL — SIGNIFICANT CHANGE UP (ref 3.8–10.5)
WBC # FLD AUTO: 6.28 K/UL — SIGNIFICANT CHANGE UP (ref 3.8–10.5)

## 2021-10-04 PROCEDURE — 99232 SBSQ HOSP IP/OBS MODERATE 35: CPT

## 2021-10-04 PROCEDURE — 99233 SBSQ HOSP IP/OBS HIGH 50: CPT | Mod: GC

## 2021-10-04 PROCEDURE — 99223 1ST HOSP IP/OBS HIGH 75: CPT

## 2021-10-04 RX ORDER — WARFARIN SODIUM 2.5 MG/1
3 TABLET ORAL ONCE
Refills: 0 | Status: COMPLETED | OUTPATIENT
Start: 2021-10-04 | End: 2021-10-04

## 2021-10-04 RX ORDER — ARIPIPRAZOLE 15 MG/1
2 TABLET ORAL DAILY
Refills: 0 | Status: DISCONTINUED | OUTPATIENT
Start: 2021-10-04 | End: 2021-10-06

## 2021-10-04 RX ORDER — ENOXAPARIN SODIUM 100 MG/ML
80 INJECTION SUBCUTANEOUS EVERY 12 HOURS
Refills: 0 | Status: DISCONTINUED | OUTPATIENT
Start: 2021-10-04 | End: 2021-10-06

## 2021-10-04 RX ADMIN — ALBUTEROL 1 PUFF(S): 90 AEROSOL, METERED ORAL at 21:51

## 2021-10-04 RX ADMIN — NYSTATIN CREAM 1 APPLICATION(S): 100000 CREAM TOPICAL at 05:57

## 2021-10-04 RX ADMIN — NYSTATIN CREAM 1 APPLICATION(S): 100000 CREAM TOPICAL at 17:41

## 2021-10-04 RX ADMIN — Medication 1: at 21:52

## 2021-10-04 RX ADMIN — Medication 10 MILLIGRAM(S): at 05:57

## 2021-10-04 RX ADMIN — ALBUTEROL 1 PUFF(S): 90 AEROSOL, METERED ORAL at 12:06

## 2021-10-04 RX ADMIN — PANTOPRAZOLE SODIUM 40 MILLIGRAM(S): 20 TABLET, DELAYED RELEASE ORAL at 05:57

## 2021-10-04 RX ADMIN — Medication 25 MILLIGRAM(S): at 05:57

## 2021-10-04 RX ADMIN — Medication 3 MILLIGRAM(S): at 21:54

## 2021-10-04 RX ADMIN — ATORVASTATIN CALCIUM 40 MILLIGRAM(S): 80 TABLET, FILM COATED ORAL at 21:51

## 2021-10-04 RX ADMIN — ARIPIPRAZOLE 2 MILLIGRAM(S): 15 TABLET ORAL at 12:11

## 2021-10-04 RX ADMIN — Medication 10 MILLIGRAM(S): at 21:51

## 2021-10-04 RX ADMIN — Medication 1: at 16:57

## 2021-10-04 RX ADMIN — LORATADINE 10 MILLIGRAM(S): 10 TABLET ORAL at 12:06

## 2021-10-04 RX ADMIN — ISOSORBIDE MONONITRATE 30 MILLIGRAM(S): 60 TABLET, EXTENDED RELEASE ORAL at 12:06

## 2021-10-04 RX ADMIN — WARFARIN SODIUM 3 MILLIGRAM(S): 2.5 TABLET ORAL at 21:51

## 2021-10-04 RX ADMIN — ENOXAPARIN SODIUM 80 MILLIGRAM(S): 100 INJECTION SUBCUTANEOUS at 17:19

## 2021-10-04 RX ADMIN — ANASTROZOLE 1 MILLIGRAM(S): 1 TABLET ORAL at 12:06

## 2021-10-04 RX ADMIN — Medication 25 MICROGRAM(S): at 05:57

## 2021-10-04 NOTE — PROGRESS NOTE ADULT - ASSESSMENT
66-year-old female former smoker with DM, HTN, HLD, NICM (EF 31% by MUGA 07/2019) s/p SubQ ICD, PE s/p IVC filter and LV thrombus on warfarin who presented ot the ER with dyspnea.     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 Cont spiriva and albuterol  -  BNP elevated but has been higher in past, clinically does not look volume overloaded. cardiac management as per cards  -  acute kidney injury - improving    Thank you for this consult, will follow with you

## 2021-10-04 NOTE — BH CONSULTATION LIAISON ASSESSMENT NOTE - NSBHCHARTREVIEWLAB_PSY_A_CORE FT
11.8   6.28  )-----------( 198      ( 04 Oct 2021 07:14 )             36.8   10-04    144  |  114<H>  |  38<H>  ----------------------------<  118<H>  3.9   |  18<L>  |  1.35<H>    Ca    8.7      04 Oct 2021 07:14

## 2021-10-04 NOTE — PROGRESS NOTE ADULT - ASSESSMENT
Patient is a 67yo Female with COPD, CAD, T2DM,  HTN, HFrEF/cardiomyopathy (EF 25% on 9/16/21, s/p ICD),  CVA, DVT (L leg 1994)/PE (s/p IVC filter 9/2020, and repeat saddle PE at [facility] 8/21-9/3 now on Coumadin), infrarenal AAA, cerebral aneurysm, Atypical Depression, R breast cancer (s/p chemo/XRT), p/w SOB  a/w COPD exacerbation. Nephrology consulted for Elevated serum creatinine.    1. CHEN- unclear etiology ? hemodynamically mediated. No documented hypotension/ no NSAID or diuretic use. Hold Entresto still. Fe Na 0.15%. Improvement in renal function post  NS @ 50cc/hr x 5 hrs. Renal fxn improving. Continue PO fluid intake, but would not give further IVF unless renal fxn significantly worsens per labs today. Keep off diuretics for now.  Pt on PPI with no peripheral Eos; low suspicion of AIN. Renal US with no hydro. Strict I/Os. Avoid nephrotoxins/ NSAIDs/ RCA. Monitor BMP.    2. CKD-3a- makeda SCr 1-1.3. CKD in the setting of DM/HTN/ HF. Defer secondary w/u as an outpt. Avoid nephrotoxins  3. HTN 2/2 CKD- BP borderline. c/w Hydralazine 10mg PO q8hrs for now.  IF renal fxn continues to improve, would restart ENTRESTO (slowly). If BP remains elevated, can consider increasing Metoprolol to 50mg PO qd. Monitor BP  4. Metabolic Acidosis- AG resolved. Mild resp alkalosis resolved. Serum lactate wnl. Serum Co2 overall improving. Denies diarrhea. Will avoid worsening hyperchloremia with further NS IVF. Monitor ph/CO2.        [facility] NEPHROLOGY  Charly Kerns M.D.  Joshua Gil D.O.  Alysha Johnson M.D.  Tammy Kirk, MSN, ANP-C  [phone]    [address] [id number]

## 2021-10-04 NOTE — BH CONSULTATION LIAISON ASSESSMENT NOTE - RISK ASSESSMENT
Risk factors: Older age, lives alone, multiple medical problems, delirium. Protective factors: Absent h/o SI/SA/SIB, stable domicile, supportive cousin/HCP, attachment to independence, help seeking. Acute risk level appears low at the time of assessment, but chronic risk may be mildly elevated due to above risk factors.

## 2021-10-04 NOTE — BH CONSULTATION LIAISON ASSESSMENT NOTE - NSBHCONSULTRECOMMENDOTHER_PSY_A_CORE FT
1. For management of acute delirium, recommend restarting Abilify 2 mg qd, to be titrated for effect  2. C/w home Wellbutrin  mg qd  3. No indication for other standing or PRN psychotropic medications at this time  4. Medical management as directed by primary team  5. Pt is NOT yet psych cleared for DC, as her delirium needs to be better managed prior to dispo  6. Psychiatry will continue to follow  7. Case d/w Dr. Hernadez of primary team    Danyelle Moya MD  Director, Consultation-Liaison Psychiatry Service  [phone]

## 2021-10-04 NOTE — PROGRESS NOTE ADULT - SUBJECTIVE AND OBJECTIVE BOX
[facility] NEPHROLOGY- PROGRESS NOTE    Patient is a 67yo Female with COPD, CAD, T2DM,  HTN, HFrEF/cardiomyopathy (EF 25% on 9/16/21, s/p ICD),  CVA, DVT (L leg 1994)/PE (s/p IVC filter 9/2020, and repeat saddle PE at [facility] 8/21-9/3 now on Coumadin), infrarenal AAA, cerebral aneurysm, Atypical Depression, R breast cancer (s/p chemo/XRT), p/w SOB  a/w COPD exacerbation. Nephrology consulted for Elevated serum creatinine.    Hospital Medications: Medications reviewed.  REVIEW OF SYSTEMS:  CONSTITUTIONAL: No fevers or chills   RESPIRATORY: NO SOB. + cough  CARDIOVASCULAR: No chest pain.  GASTROINTESTINAL: No nausea, vomiting, diarrhea or abdominal pain.   VASCULAR: No bilateral lower extremity edema.     VITALS:  T(F): 98.6 (10-04-21 @ 14:04), Max: 98.6 (10-04-21 @ 14:04)  HR: 83 (10-04-21 @ 14:04)  BP: 115/76 (10-04-21 @ 14:04)  RR: 17 (10-04-21 @ 14:04)  SpO2: 100% (10-04-21 @ 14:04)  Wt(kg): --      PHYSICAL EXAM:  Gen: NAD,   HEENT: MMM  Neck: no JVD  Cards: RRR, +S1/S2,   Resp: CTA ant  GI: soft, NT/ND, NABS  Extremities:  no LE edema    LABS:  10-04    144  |  114<H>  |  38<H>  ----------------------------<  118<H>  3.9   |  18<L>  |  1.35<H>    Ca    8.7      04 Oct 2021 07:14      Creatinine Trend: 1.35 <--, 1.48 <--, 1.56 <--, 1.58 <--, 1.98 <--, 2.32 <--, 2.32 <--                        11.8   6.28  )-----------( 198      ( 04 Oct 2021 07:14 )             36.8     Urine Studies:

## 2021-10-04 NOTE — BH CONSULTATION LIAISON ASSESSMENT NOTE - CURRENT MEDICATION
MEDICATIONS  (STANDING):  ALBUTerol    90 MICROgram(s) HFA Inhaler      ALBUTerol    90 MICROgram(s) HFA Inhaler 1 Puff(s) Inhalation two times a day  anastrozole 1 milliGRAM(s) Oral daily  ARIPiprazole 2 milliGRAM(s) Oral daily  atorvastatin 40 milliGRAM(s) Oral at bedtime  dextrose 50% Injectable 25 Gram(s) IV Push once  enoxaparin Injectable 80 milliGRAM(s) SubCutaneous every 12 hours  hydrALAZINE 10 milliGRAM(s) Oral every 8 hours  influenza   Vaccine 0.5 milliLiter(s) IntraMuscular once  insulin lispro (ADMELOG) corrective regimen sliding scale   SubCutaneous Before meals and at bedtime  isosorbide   mononitrate ER Tablet (IMDUR) 30 milliGRAM(s) Oral daily  levothyroxine 25 MICROGram(s) Oral daily  loratadine 10 milliGRAM(s) Oral daily  metoprolol succinate ER 25 milliGRAM(s) Oral daily  nystatin Ointment 1 Application(s) Topical every 12 hours  pantoprazole    Tablet 40 milliGRAM(s) Oral before breakfast  warfarin 3 milliGRAM(s) Oral once    MEDICATIONS  (PRN):  acetaminophen   Tablet .. 650 milliGRAM(s) Oral every 6 hours PRN Temp greater or equal to 38.5C (101.3F), Mild Pain (1 - 3)  aluminum hydroxide/magnesium hydroxide/simethicone Suspension 30 milliLiter(s) Oral every 4 hours PRN Dyspepsia  melatonin 3 milliGRAM(s) Oral at bedtime PRN Insomnia  ondansetron Injectable 4 milliGRAM(s) IV Push every 8 hours PRN Nausea and/or Vomiting

## 2021-10-04 NOTE — BH CONSULTATION LIAISON ASSESSMENT NOTE - NSBHCHARTREVIEWVS_PSY_A_CORE FT
Vital Signs Last 24 Hrs  T(C): 37 (04 Oct 2021 14:04), Max: 37 (04 Oct 2021 14:04)  T(F): 98.6 (04 Oct 2021 14:04), Max: 98.6 (04 Oct 2021 14:04)  HR: 83 (04 Oct 2021 14:04) (61 - 93)  BP: 115/76 (04 Oct 2021 14:04) (115/76 - 150/93)  BP(mean): --  RR: 17 (04 Oct 2021 14:04) (17 - 18)  SpO2: 100% (04 Oct 2021 14:04) (100% - 100%)

## 2021-10-04 NOTE — BH CONSULTATION LIAISON ASSESSMENT NOTE - NSICDXPASTSURGICALHX_GEN_ALL_CORE_FT
PAST SURGICAL HISTORY:  Breast cancer infusaport insertion- 2014- left chest    Fibroid uterus sp hystrectomy 1196    [place] filter in place 1994    S/P hysterectomy 1994    S/P lumpectomy, right breast 2014, revision of position- 2015

## 2021-10-04 NOTE — BH CONSULTATION LIAISON ASSESSMENT NOTE - NSBHCONSULTWORKUP_PSY_A_CORE
"  Pelvic Health Physical Therapy   Treatment Note     Name: Sheree Lomeli  Clinic Number: [id number]    Therapy Diagnosis:   Encounter Diagnoses   Name Primary?    Weakness Yes    Other lack of coordination     Pelvic floor dysfunction      Physician: Julia Burgos NP    Visit Date: 11/23/2020    Physician Orders: PT Eval and Treat   Medical Diagnosis from Referral: chronic constipation, pelvic floor weakness  Evaluation Date: 10/5/2020  Authorization Period Expiration: 12/31/2020  Plan of Care Expiration: 12-  Visit # / Visits authorized:4/ 20  Cancelled Visits: 0  No Show Visits: 0    Time In:  1037  Time Out:  1130  Total Billable Time:  53 minutes    Precautions: Standard    Subjective     Pt reports: She reports 11 years ago she used to wake up in the middle of the night with a hot flash and searing tailbone pain that would radiate up her spine.  She reports that once the hot flashes stopped after 2-3 years.      She reports less coccyx pain since her last treatment. She is no longer having the fiery, hot, pain.  Has had 4 BM today.  "My tailbone is feeling much better."      BM daily: takes 1 magnesium tablet and 1 stool softener and fiber with coffee in the morning and is having a BM daily.  No straining.      She is sitting with weight equal.    If does not have a BM she has back pain.  Has to plan to get up 2 hours in advance when traveling to make sure she has a BM.  She would like to be able to not have to worry if she is going to have pain if she does not have a BM.    She was compliant with home exercise program.  Response to previous treatment: no issues reported  Functional change: less coccyx pain.  Pain: 1/10  Location:   And coccyx.        Objective   RECTAL PELVIC FLOOR EXAM    EXTERNAL ASSESSMENT  Anus: WNL  Skin condition: WNL   Scarring: none  Sensation: WNL   Pain: none  Voluntary contraction: visible lift  Voluntary relaxation: visible drop  Bearing down: visible " drop  Discharge: none       INTERNAL ASSESSMENT  EAS tone: WNL   Impaction: none   Pain: tender areas noted as follows: left coccygeus and levator ani  Sensation: able to localize pressure appropriately   Muscle Bulk: WNL  Muscle Power: 2/5     Quality of contraction: Improved relaxation time.  Able to fully relax now.   Specificity: WNL  Coordination: tends to hold breath during PFM contration   Comments: coccyx appears to be in a neutral position but very hypermobile.     Therapeutic Exercise to develop  flexibility for 25 minutes including:   Groin stretch 3x30 sec   Piriformis stretch 3x30 sec gilmar  Happy Baby 3x30 sec   Child's post 3x30 sec   Clamshells x 15    Kegel edu and practice.  Increased time spent on edu on proper technique.  Pt improves with practice and verbal cues.        Sheree received the following manual therapy techniques: to develop flexibility and extensibility for 40 minutes including: trigger point/myofascial release of coccygeous and levator ani groups Gilmar   Pt consents to intrarectal treatment of PFM today.       Soft Tissue Palpation Assessment to determine the necessity for Functional Dry Needling.  Discussed the purpose, mechanism, indications, and risks of dry needling with pt. Pt was cleared of all precautions and contraindications, and pt signed consent form for dry needling performance today by a qualified dry needling PT.     Functional Dry Needling performed to L3-5 multifidi.  60mm needle inserted to a bony backdrop.      Patient demonstrated appropriate response to Functional Dry Needling. Patient with improved overall tissue mobility with decreased tissue tension and trigger points upon palpation of treated muscle groups after Functional Dry Needling.      Sheree participated in neuromuscular re-education activities to develop Coordination, Control and Down training for 00 minutes including: pelvic floor relaxation/bulging training  Pt consents to internal rectal pelvic floor  treatment: Worked on pelvic floor muscle relaxation and coordination training using intravaginal tapping directly over muscle bellies and then asking the patient to soften the muscles where she feels the tap.  Also worked on diaphragmatic breathing to coordinate pelvic floor muscle relaxation.  Pt reports a decrease in pain to direct muscle palpation after relaxing and softening her pelvic muscles.      Therapeutic Activity to improve dynamic functional performance, coordination and education for 13 minutes. Including: edu on pathology and anatomy and the impact on pelvic floor muscle function.  Edu on the role of pelvic floor in constipation.        Home Exercises Provided and Patient Education Provided     Education provided:   - anatomy/physiology of pelvic floor  Discussed progression of plan of care with patient; educated pt in activity modification; reviewed HEP with pt. Pt demonstrated and verbalized understanding of all instruction and was provided with a handout of HEP (see Patient Instructions).      Written Home Exercises Provided: yes.  Exercises were reviewed and Sheree was able to demonstrate them prior to the end of the session.  Sheree demonstrated good  understanding of the education provided.     See EMR under Patient Instructions for exercises provided 10/26/2020.    Assessment      Pt consents to intrarectal assessment and  treatment of PFM today. She is noted to have improved resting muscle tone and reports less pain with palpation around her coccyx.  Her coccyx is in a more neutral position today. Trigger point/myofascial release of coccygeous and levator ani groups Gilmar was performed.     Soft Tissue Palpation Assessment to determine the necessity for Functional Dry Needling.  Discussed the purpose, mechanism, indications, and risks of dry needling with pt. Pt was cleared of all precautions and contraindications, and pt signed consent form for dry needling performance today by a qualified dry  "needling PT. Functional Dry Needling performed to L3-5 multifidi.  Patient demonstrated appropriate response to Functional Dry Needling. Patient with improved overall tissue mobility with decreased tissue tension and trigger points upon palpation of treated muscle groups after Functional Dry Needling.    Sheree is progressing well towards her goals.   Pt prognosis is Excellent.     Pt will continue to benefit from skilled outpatient physical therapy to address the deficits listed in the problem list box on initial evaluation, provide pt/family education and to maximize pt's level of independence in the home and community environment.     Pt's spiritual, cultural and educational needs considered and pt agreeable to plan of care and goals.     Anticipated barriers to physical therapy: none    Goals:   Short Term Goals: 4 weeks   Pt to perform "the knack" prior to coughing, laughing or sneezing to decrease risk of incontinence.  Pt to report being able to sneeze without incontinence demonstrating improved pelvic floor strength and coordination to improve confidence in social situations  Pt to demonstrate proper diaphragmatic breathing to help with calming the nervous system in order to decrease pain and to improve abdominal wall musculature extensibility.   Pt to report minimal pain with palpation of abdominal wall due to improvement in soft tissue mobility from moderate to minimal restrictions.  Pt to report a decrease in pain to no more than 3 at it's worst with direct pelvic floor muscle provocation.          Long Term Goals: 12 weeks   Pt to be discharged with home plan for carry over after discharge.   Pt to report being able to have a comfortable vaginal exam without significant increase in pelvic pain.  Pt to report a decrease in pain to no more than 2 at it's worst with direct pelvic floor muscle provocation.    Pt to report an improved ability to tolerate sitting without a significant increase in reported pain " levels of her coccyx to allow her to better participate in social activities.     Plan     Plan of care Certification: 10/5/2020 to 12-.     Outpatient Physical Therapy 2 times weekly for 12 weeks to include the following interventions: therapeutic exercises, therapeutic activity, neuromuscular re-education, gait training, manual therapy, modalities PRN, patient/family education, dry needling and self care/home management    Laurita Zepeda, PT      no

## 2021-10-04 NOTE — CONSULT NOTE ADULT - PROBLEM SELECTOR RECOMMENDATION 5
GOC discussion as above. Pt defers to HCP for decisionmaking, HCP wishes to discuss further w patient and PMD once patient is discharged. Remains FULL CODE for now.

## 2021-10-04 NOTE — PROGRESS NOTE ADULT - SUBJECTIVE AND OBJECTIVE BOX
Pt is comfortable at rest, NAD, not labored, speaks in complete sentences.    REVIEW OF SYSTEMS:  Constitutional: [ ] fever, [ ]weight loss,  [ ]fatigue  Eyes: [ ] visual changes  Respiratory: [ ]shortness of breath;  [ ] cough, [ ]wheezing, [ ]chills, [ ]hemoptysis  Cardiovascular: [ ] chest pain, [ ]palpitations, [ ]dizziness,  [ ]leg swelling [ ]syncope  Gastrointestinal: [ ] abdominal pain, [ ]nausea, [ ]vomiting,  [ ]diarrhea   Genitourinary: [ ] dysuria, [ ] hematuria  Neurologic: [ ] headaches [ ] tremors  [ ] weakness [ ] lightheadedness  Skin: [ ] itching, [ ]burning, [ ] rashes  Endocrine: [ ] heat or cold intolerance  Musculoskeletal: [ ] joint pain or swelling; [ ] muscle, back, or extremity pain  Psychiatric: [ ] depression, [ ]anxiety, [ ]mood swings, or [ ]difficulty sleeping  Hematologic: [ ] easy bruising, [ ] bleeding gums       [ x] All others negative	  [ ] Unable to obtain    Vital Signs Last 24 Hrs  T(C): 36.5 (04 Oct 2021 05:24), Max: 36.6 (03 Oct 2021 20:13)  T(F): 97.7 (04 Oct 2021 05:24), Max: 97.9 (03 Oct 2021 20:13)  HR: 61 (04 Oct 2021 05:24) (61 - 90)  BP: 150/93 (04 Oct 2021 05:24) (115/78 - 150/93)  BP(mean): --  RR: 18 (04 Oct 2021 05:24) (18 - 18)  SpO2: 100% (04 Oct 2021 05:24) (100% - 100%)    PHYSICAL EXAM:  General: No acute distress  HEENT: EOMI, PERRL  Neck: Supple, No JVD  Lungs: Clear to auscultation bilaterally; No rales or wheezing  Heart: Regular rate and rhythm; No murmurs, rubs, or gallops  Abdomen: Nontender, bowel sounds present  Extremities: No clubbing, cyanosis, or edema  Nervous system:  Alert & Oriented X3, no focal deficits  Psychiatric: Normal affect  Skin: No rashes or lesions                                       11.8   6.28  )-----------( 198      ( 04 Oct 2021 07:14 )             36.8   10-04    144  |  114<H>  |  38<H>  ----------------------------<  118<H>  3.9   |  18<L>  |  1.35<H>    Ca    8.7      04 Oct 2021 07:14

## 2021-10-04 NOTE — CONSULT NOTE ADULT - CONVERSATION DETAILS
Spoke with patient regarding current condition, goals of care. Her main goal is to go home. Seems to have limited insight into condition. She defers decisionmaking to her cousin/HCP Jaki Santana. Spoke with Jaki by phone regarding current condition, high risk for complications given comorbidities. Discussed risks/benefits of LST such as CPR/intubation in the context of her comorbidites, debilitated state, readmissions. She expressed that she is unsure of what the patient would want, wishes to discuss w her and her PMD further prior to making any decision. Has not been able to see her PMD in some time because of recurrent hospitalizations.  She has noted functional decline and she is not the best at taking her medications. She lives in [place] so it is difficult to keep track of her situation at home. She inquired about whether the patient is appropriate for hospice, educated on hospice philosophy, all questions answered, patient is hospice eligible. For now, she wishes to f/u w her PMD Dr Figueroa who has arranged for labwork to be done at home and wishes to have a better conversation with patient about her wishes once she is back at home. Support provided.

## 2021-10-04 NOTE — PROGRESS NOTE ADULT - ATTENDING COMMENTS
Patient was seen and examined at bedside today      CC: Worsening shortness of Breath    Vital Signs Last 24 Hrs  T(C): 37 (04 Oct 2021 14:04), Max: 37 (04 Oct 2021 14:04)  T(F): 98.6 (04 Oct 2021 14:04), Max: 98.6 (04 Oct 2021 14:04)  HR: 83 (04 Oct 2021 14:04) (61 - 93)  BP: 115/76 (04 Oct 2021 14:04) (115/76 - 150/93)  RR: 17 (04 Oct 2021 14:04) (17 - 18)  SpO2: 100% (04 Oct 2021 14:04) (100% - 100%)    P/E:  NAD, laying comfortably on bed   AAOx2, no focal deficit  Lungs Clear to auscultation anterolaterally  CVS: S1S2 WNL, + murmur  Abd soft, non tender, BS present   BL LE no edema or tenderness    Labs:                        11.8   6.28  )-----------( 198      ( 04 Oct 2021 07:14 )             36.8   10-04    144  |  114<H>  |  38<H>  ----------------------------<  118<H>  3.9   |  18<L>  |  1.35<H>    Ca    8.7      04 Oct 2021 07:14           A/P:  Acute on chronic hypoxic respiratory failure possibly multifactorial due to recent PE, acute on chronic systolic HF exacerbation, mild COPD exacerbation   Acute metabolic encephalopathy due to uremia, metabolic acidosis, hospital acquired delirium and worsening dementia  CHEN concern for ATN (possibly cardiorenal syndrome)  Metabolic acidosis   Supratherapeutic INR  Recent saddle PE on Warfarin  LV apical thrombus  Infrarenal AAA, cerebral aneurysm  Functional MR  CAD  Dementia   Atypical depression  HTN    Plan:  Cont NC O2   Renal function and acidosis improving  Will cont to hold Entresto  Warfarin 3mg tonight  Hold Seroquel, psych consult  Repeat consult  Patient gets home visit by RN arranged by Dr. Huyen Figueroa to monitor INR, will rearrange that  Fall precaution Patient was seen and examined at bedside today      CC: Worsening shortness of Breath    Vital Signs Last 24 Hrs  T(C): 37 (04 Oct 2021 14:04), Max: 37 (04 Oct 2021 14:04)  T(F): 98.6 (04 Oct 2021 14:04), Max: 98.6 (04 Oct 2021 14:04)  HR: 83 (04 Oct 2021 14:04) (61 - 93)  BP: 115/76 (04 Oct 2021 14:04) (115/76 - 150/93)  RR: 17 (04 Oct 2021 14:04) (17 - 18)  SpO2: 100% (04 Oct 2021 14:04) (100% - 100%)    P/E:  NAD, laying comfortably on bed   AAOx2, no focal deficit  Lungs Clear to auscultation anterolaterally  CVS: S1S2 WNL, + murmur  Abd soft, non tender, BS present   BL LE no edema or tenderness    Labs:                        11.8   6.28  )-----------( 198      ( 04 Oct 2021 07:14 )             36.8   10-04    144  |  114<H>  |  38<H>  ----------------------------<  118<H>  3.9   |  18<L>  |  1.35<H>    Ca    8.7      04 Oct 2021 07:14           A/P:  Delirium with visual hallucinations   Acute on chronic hypoxic respiratory failure possibly multifactorial due to recent PE, acute on chronic systolic HF exacerbation in the setting of COPD   and Severe Pulmonary HTN  Acute metabolic encephalopathy due to uremia, metabolic acidosis, hospital acquired delirium and worsening dementia improved  CHEN concern for ATN (possibly cardiorenal syndrome) improved  Metabolic acidosis   Supra therapeutic INR on admission now with subtherapeutic INR  Recent saddle PE on Warfarin  LV apical thrombus  Infrarenal AAA, cerebral aneurysm  Functional MR  CAD  Dementia   Atypical depression  HTN    Plan:  Cont supplemental oxygen as needed;   Renal function and acidosis improving  Will cont to hold Entresto  Warfarin 3mg tonight  Hold Seroquel, psych consult  Repeat consult  Patient gets home visit by RN arranged by Dr. Huyen Figueroa to monitor INR, will rearrange that  Fall precaution Patient was seen and examined at bedside today      CC: Worsening shortness of Breath    Vital Signs Last 24 Hrs  T(C): 37 (04 Oct 2021 14:04), Max: 37 (04 Oct 2021 14:04)  T(F): 98.6 (04 Oct 2021 14:04), Max: 98.6 (04 Oct 2021 14:04)  HR: 83 (04 Oct 2021 14:04) (61 - 93)  BP: 115/76 (04 Oct 2021 14:04) (115/76 - 150/93)  RR: 17 (04 Oct 2021 14:04) (17 - 18)  SpO2: 100% (04 Oct 2021 14:04) (100% - 100%)    P/E:  NAD, laying comfortably on bed   AAOx2, no focal deficit  Lungs Clear to auscultation anterolaterally  CVS: S1S2 WNL, + murmur  Abd soft, non tender, BS present   BL LE no edema or tenderness    Labs:                        11.8   6.28  )-----------( 198      ( 04 Oct 2021 07:14 )             36.8   10-04    144  |  114<H>  |  38<H>  ----------------------------<  118<H>  3.9   |  18<L>  |  1.35<H>    Ca    8.7      04 Oct 2021 07:14           A/P:  Delirium with visual hallucinations   Acute on chronic hypoxic respiratory failure possibly multifactorial due to recent PE, acute on chronic systolic HF exacerbation in the setting of COPD   and Severe Pulmonary HTN  Acute metabolic encephalopathy due to uremia, metabolic acidosis, hospital acquired delirium and worsening dementia improved  CHEN concern for ATN (possibly cardiorenal syndrome) improved  Metabolic acidosis   Supra therapeutic INR on admission now with subtherapeutic INR  Recent saddle PE on Warfarin  LV apical thrombus  Infrarenal AAA, cerebral aneurysm  Functional MR  CAD  Dementia   Atypical depression  HTN    Plan:  Psych consult requested this morning and appreciated; d/w Dr. Moya; Resume Abilify which worked well in the past (off x few months); Psych f/u;   Cont supplemental oxygen as needed;   Renal function and acidosis improving  Will cont to hold Entresto  Warfarin 3mg tonight; Start Lovenox therapeutic until INR more than 2.0  Patient gets home visit by RN arranged by Dr. Huyen Figueroa to monitor INR, will rearrange that on discharge; NOTIFY DR. FIGUEROA ON DISCHARGE  Fall precaution  Patient has 24 hr [employer] P[employer] hire  will need resumption  Palliative consult appreciated; d/w Dr. Evans; spoke with POA who is not ready  Discussed with Patient GOC; has poor insight into discussion as she is not able to comprehend and unable to contribute a well judged wishes.   D/C Plan in 48 hrs if remain stable with INR therapeutic     Discussed with Patient  Discussed with PGY1/2 and MS3/4

## 2021-10-04 NOTE — BH CONSULTATION LIAISON ASSESSMENT NOTE - SUMMARY
66F, single and living alone in [facility] with 24/7 A coverage, with PHx of delirium during past admissions to this hospital, known to writer from consults for delirium in 9/2020 and 10/2020 during previous admissions, formerly taking Abilify 2 mg qd for delirium but not currently, and MHx of 50 pack year smoking with DM, HTN, HLD, CVA, breast CA s/p chemo/XRT, CHF with EF 15-20% a/o 9/2020, G3-4 DD s/p defibrillator, presumed PE on Coumadin and has IVC filter, SHx of lap linda 9/28/20, sent from home on 9/25 c/o shortness of breath and altered mental status and admitted for management of CHF exacerbation and COPD. Psych consulted for delirium (pt recently confused, reporting VH). On exam, pt presents pleasant, cooperative and well-oriented but endorses non-distressing VH of unknown persons which appears very c/w acute encephalopathy/delirium. We recommend restarting Abilify 2 mg qd, which pt took with good effect for delirium during prior adm in 10/2020. Pt does not appear to present an acute risk of harm to self or others at the time of assessment, and does not appear to be in need of admission to IP psych at the time of assessment.

## 2021-10-04 NOTE — PROGRESS NOTE ADULT - ASSESSMENT
Patient is a 66 F from home (lives alone w/ HHA), baseline AA0x2-3, independently ambulating, with PMHx of CAD, T2DM (last A1c 5.9%), HTN, HFrEF/cardiomyopathy (EF 25% on 9/16/21, s/p SICD), COPD (no prior intubations), CVA, DVT (L leg 1994)/PE (s/p IVC filter 9/2020, and repeat saddle PE at [facility] 8/21-9/3 now on Coumadin), infrarenal AAA, cerebral aneurysm, Atypical Depression, R breast cancer (s/p chemo/XRT), Class I Obesity, PSHx of hysterectomy (for fibroids), and cholecystectomy presenting to the ED with worsening shortness of breath x 4 days, admitted for likely COPD exacerbation with acute and chronic heart failure. X-ray significant for Small left pleural effusion. Echo 9/16/21: Severe LV enlargement, severely reduced LV Systolic function with EF of 25% , diffuse hypokinesis, severe functional MR. Admitted for COPD exacerbation vs multifactorial dyspnea (COPD, CHF, chronic PE).

## 2021-10-04 NOTE — BH CONSULTATION LIAISON ASSESSMENT NOTE - NSICDXPASTMEDICALHX_GEN_ALL_CORE_FT
PAST MEDICAL HISTORY:  Abdominal aneurysm infrarenal- wached by Vascular , Dr. Gutierrez    Breast cancer right-s/p chemo and radiation    Cardiomyopathy     Chronic HFrEF (heart failure with reduced ejection fraction)     DVT (deep venous thrombosis) Left leg in 1994 after hysterectomy    Hypercholesteremia     Hypertension     Internal carotid aneurysm     Knee effusion, left arthrocentesis- 05/25/16    Sickle cell trait

## 2021-10-04 NOTE — BH CONSULTATION LIAISON ASSESSMENT NOTE - HPI (INCLUDE ILLNESS QUALITY, SEVERITY, DURATION, TIMING, CONTEXT, MODIFYING FACTORS, ASSOCIATED SIGNS AND SYMPTOMS)
66F, single and living alone in [facility] with 24/7 A coverage, with PHx of delirium during past admissions to this hospital, known to writer from consults for delirium in 9/2020 and 10/2020 during previous admissions, formerly taking Abilify 2 mg qd for delirium but not currently, and MHx of 50 pack year smoking with DM, HTN, HLD, CVA, breast CA s/p chemo/XRT, CHF with EF 15-20% a/o 9/2020, G3-4 DD s/p defibrillator, presumed PE on Coumadin and has IVC filter, SHx of lap linda 9/28/20, sent from home on 9/25 c/o shortness of breath and altered mental status and admitted for management of CHF exacerbation and COPD. Psych consulted for delirium (pt recently confused, reporting VH). Pt seen in her room for interview, found lying in bed awake and alert. Pt describes mood as "all right" and denies SI/HI/AVH. When asked about reports that she has been having VH, pt readily endorses this: "I'm seeing people I don't even know, standing at the foot of my bed commenting on things." Pt describes the commentary as "neutral" and denies that the unknown persons are saying anything derogatory about her. MD reminds pt of previous consults for delirium in 2020 and expresses opinion that pt is experiencing recurrence of delirium i/s/o multiple medical issues (COPD, CHF exacerbation). Pt agrees this is probably true. MD recommends pt resume taking Abilify 2 mg qd, which she received for delirium during previous admission with good effect. Pt agrees to take the medication.

## 2021-10-04 NOTE — PROGRESS NOTE ADULT - PROBLEM SELECTOR PLAN 1
Pt p/w INR 7.02, no active bleeding, On Coumadin 3mg/2mg alternating  C/w Coumadin 2mg; Goal INR 2-3  Continues to be subtherapeutic  Will give Warfarin 3mg today  Recheck INR in AM Pt p/w INR 7.02, no active bleeding, On Coumadin 3mg/2mg alternating  C/w Coumadin 3mg; Goal INR 2-3  Continues to be subtherapeutic  Recheck INR in AM

## 2021-10-04 NOTE — PROGRESS NOTE ADULT - PROBLEM SELECTOR PLAN 2
patient was on Seroquel before, stopped herself  Had intermittent episodes of confusions in hospital, when seroquel restarted patient was very sedated  Dr. Moya consulted  As per HCP, she gets these episodes in hospital setting but usually at home she is better   Patient denies any SI or HI, admits she is often confused patient was on Seroquel before, stopped herself  Had intermittent episodes of confusions in hospital, when seroquel restarted patient was very sedated  As per HCP, she gets these episodes in hospital setting but usually at home she is better   Patient denies any SI or HI, admits she is often confused  C/w Abilify 2mg Qd

## 2021-10-04 NOTE — PROGRESS NOTE ADULT - SUBJECTIVE AND OBJECTIVE BOX
PGY-2 Progress Note discussed with attending    PAGER #: [[phone]] TILL 5:00 PM  PLEASE CONTACT ON CALL TEAM:  - On Call Team (Please refer to Taryn) FROM 5:00 PM - 8:30PM  - Nightfloat Team FROM 8:30 -7:30 AM    CHIEF COMPLAINT & BRIEF HOSPITAL COURSE:    INTERVAL HPI/OVERNIGHT EVENTS:       REVIEW OF SYSTEMS:  CONSTITUTIONAL: No fever, weight loss, or fatigue  RESPIRATORY: No cough, wheezing, chills or hemoptysis; No shortness of breath  CARDIOVASCULAR: No chest pain, palpitations, dizziness, or leg swelling  GASTROINTESTINAL: No abdominal pain. No nausea, vomiting, or hematemesis; No diarrhea or constipation. No melena or hematochezia.  GENITOURINARY: No dysuria or hematuria, urinary frequency  NEUROLOGICAL: No headaches, memory loss, loss of strength, numbness, or tremors  SKIN: No itching, burning, rashes, or lesions     MEDICATIONS  (STANDING):  ALBUTerol    90 MICROgram(s) HFA Inhaler      ALBUTerol    90 MICROgram(s) HFA Inhaler 1 Puff(s) Inhalation two times a day  anastrozole 1 milliGRAM(s) Oral daily  ARIPiprazole 2 milliGRAM(s) Oral daily  atorvastatin 40 milliGRAM(s) Oral at bedtime  dextrose 50% Injectable 25 Gram(s) IV Push once  enoxaparin Injectable 80 milliGRAM(s) SubCutaneous every 12 hours  hydrALAZINE 10 milliGRAM(s) Oral every 8 hours  influenza   Vaccine 0.5 milliLiter(s) IntraMuscular once  insulin lispro (ADMELOG) corrective regimen sliding scale   SubCutaneous Before meals and at bedtime  isosorbide   mononitrate ER Tablet (IMDUR) 30 milliGRAM(s) Oral daily  levothyroxine 25 MICROGram(s) Oral daily  loratadine 10 milliGRAM(s) Oral daily  metoprolol succinate ER 25 milliGRAM(s) Oral daily  nystatin Ointment 1 Application(s) Topical every 12 hours  pantoprazole    Tablet 40 milliGRAM(s) Oral before breakfast  warfarin 3 milliGRAM(s) Oral once    MEDICATIONS  (PRN):  acetaminophen   Tablet .. 650 milliGRAM(s) Oral every 6 hours PRN Temp greater or equal to 38.5C (101.3F), Mild Pain (1 - 3)  aluminum hydroxide/magnesium hydroxide/simethicone Suspension 30 milliLiter(s) Oral every 4 hours PRN Dyspepsia  melatonin 3 milliGRAM(s) Oral at bedtime PRN Insomnia  ondansetron Injectable 4 milliGRAM(s) IV Push every 8 hours PRN Nausea and/or Vomiting      Vital Signs Last 24 Hrs  T(C): 36.5 (04 Oct 2021 05:24), Max: 36.6 (03 Oct 2021 20:13)  T(F): 97.7 (04 Oct 2021 05:24), Max: 97.9 (03 Oct 2021 20:13)  HR: 93 (04 Oct 2021 12:08) (61 - 93)  BP: 131/98 (04 Oct 2021 12:08) (115/78 - 150/93)  BP(mean): --  RR: 18 (04 Oct 2021 05:24) (18 - 18)  SpO2: 100% (04 Oct 2021 05:24) (100% - 100%)    PHYSICAL EXAMINATION:  GENERAL: NAD, well built  HEAD:  Atraumatic, Normocephalic  EYES:  conjunctiva and sclera clear  NECK: Supple, No JVD, Normal thyroid  CHEST/LUNG: Clear to auscultation. Clear to percussion bilaterally; No rales, rhonchi, wheezing, or rubs  HEART: Regular rate and rhythm; No murmurs, rubs, or gallops  ABDOMEN: Soft, Nontender, Nondistended; Bowel sounds present  NERVOUS SYSTEM:  Alert & Oriented X3,    EXTREMITIES:  2+ Peripheral Pulses, No clubbing, cyanosis, or edema  SKIN: warm dry                          11.8   6.28  )-----------( 198      ( 04 Oct 2021 07:14 )             36.8     10-04    144  |  114<H>  |  38<H>  ----------------------------<  118<H>  3.9   |  18<L>  |  1.35<H>    Ca    8.7      04 Oct 2021 07:14            PT/INR - ( 04 Oct 2021 07:14 )   PT: 19.3 sec;   INR: 1.66 ratio         PTT - ( 04 Oct 2021 07:14 )  PTT:25.9 sec    CAPILLARY BLOOD GLUCOSE      RADIOLOGY & ADDITIONAL TESTS:                   PGY-2 Progress Note discussed with attending    PAGER #: [[phone]] TILL 5:00 PM  PLEASE CONTACT ON CALL TEAM:  - On Call Team (Please refer to Taryn) FROM 5:00 PM - 8:30PM  - Nightfloat Team FROM 8:30 -7:30 AM    CHIEF COMPLAINT & BRIEF HOSPITAL COURSE:  Patient is a 66 F from home (lives alone w/ HHA), baseline AA0x3, independently ambulating, with PMHx of CAD, T2DM (last A1c 5.9%), HTN, HFrEF/cardiomyopathy (EF 25% on 9/16/21, s/p SICD), COPD (no prior intubations), CVA, DVT (L leg 1994)/PE (s/p IVC filter 9/2020, and repeat saddle PE at [facility] 8/21-9/3 now on Coumadin), infrarenal AAA, cerebral aneurysm, Atypical Depression, R breast cancer (s/p chemo/XRT), Class I Obesity, PSHx of hysterectomy (for fibroids), and cholecystectomy presenting to the ED with worsening shortness of breath x 4 days; admitted for HFrEF, COPD exacerbation, and CTPEH.     INTERVAL HPI/OVERNIGHT EVENTS:   Patient was examined while she was lying in bed, Aox2-3, responding appropriately to questions, in NAD. She has normal bowel and bladder movements, and denies any other acute complaints. She was seen by psych and started on abilify for hallucinations/ delirium. INR is now subtherapeutic.     REVIEW OF SYSTEMS:  CONSTITUTIONAL: Fatigue + No fever, weight loss  RESPIRATORY: No cough, wheezing, chills or hemoptysis; No shortness of breath  CARDIOVASCULAR: No chest pain, palpitations, dizziness, or leg swelling  GASTROINTESTINAL: No abdominal pain. No nausea, vomiting, or hematemesis; No diarrhea or constipation. No melena or hematochezia.  GENITOURINARY: No dysuria or hematuria, urinary frequency  NEUROLOGICAL: No headaches, memory loss, loss of strength, numbness, or tremors  SKIN: No burning, rashes, or lesions     MEDICATIONS  (STANDING):  ALBUTerol    90 MICROgram(s) HFA Inhaler      ALBUTerol    90 MICROgram(s) HFA Inhaler 1 Puff(s) Inhalation two times a day  anastrozole 1 milliGRAM(s) Oral daily  ARIPiprazole 2 milliGRAM(s) Oral daily  atorvastatin 40 milliGRAM(s) Oral at bedtime  dextrose 50% Injectable 25 Gram(s) IV Push once  enoxaparin Injectable 80 milliGRAM(s) SubCutaneous every 12 hours  hydrALAZINE 10 milliGRAM(s) Oral every 8 hours  influenza   Vaccine 0.5 milliLiter(s) IntraMuscular once  insulin lispro (ADMELOG) corrective regimen sliding scale   SubCutaneous Before meals and at bedtime  isosorbide   mononitrate ER Tablet (IMDUR) 30 milliGRAM(s) Oral daily  levothyroxine 25 MICROGram(s) Oral daily  loratadine 10 milliGRAM(s) Oral daily  metoprolol succinate ER 25 milliGRAM(s) Oral daily  nystatin Ointment 1 Application(s) Topical every 12 hours  pantoprazole    Tablet 40 milliGRAM(s) Oral before breakfast  warfarin 3 milliGRAM(s) Oral once    MEDICATIONS  (PRN):  acetaminophen   Tablet .. 650 milliGRAM(s) Oral every 6 hours PRN Temp greater or equal to 38.5C (101.3F), Mild Pain (1 - 3)  aluminum hydroxide/magnesium hydroxide/simethicone Suspension 30 milliLiter(s) Oral every 4 hours PRN Dyspepsia  melatonin 3 milliGRAM(s) Oral at bedtime PRN Insomnia  ondansetron Injectable 4 milliGRAM(s) IV Push every 8 hours PRN Nausea and/or Vomiting      Vital Signs Last 24 Hrs  T(C): 36.5 (04 Oct 2021 05:24), Max: 36.6 (03 Oct 2021 20:13)  T(F): 97.7 (04 Oct 2021 05:24), Max: 97.9 (03 Oct 2021 20:13)  HR: 93 (04 Oct 2021 12:08) (61 - 93)  BP: 131/98 (04 Oct 2021 12:08) (115/78 - 150/93)  BP(mean): --  RR: 18 (04 Oct 2021 05:24) (18 - 18)  SpO2: 100% (04 Oct 2021 05:24) (100% - 100%)    PHYSICAL EXAMINATION:  GENERAL: NAD, Overweight AAF, in NAD, confused, but able to be reoriented   HEAD:  Atraumatic, Normocephalic  EYES:  conjunctiva and sclera clear  NECK: Supple, No JVD, Normal thyroid  CHEST/LUNG: Clear to auscultation. Clear to percussion bilaterally; No rales, rhonchi, wheezing, or rubs  HEART: Regular rate and rhythm; No murmurs, rubs, or gallops  ABDOMEN: Soft, Nontender, Nondistended; Bowel sounds present  NERVOUS SYSTEM:  Alert & Oriented X3, bilateral sensory and motor exam wnl    EXTREMITIES:  2+ Peripheral Pulses, No clubbing, cyanosis, or edema  SKIN: warm dry, Genital exam with excoriations, but no discharge or beefy red erythema                             11.8   6.28  )-----------( 198      ( 04 Oct 2021 07:14 )             36.8     10-04    144  |  114<H>  |  38<H>  ----------------------------<  118<H>  3.9   |  18<L>  |  1.35<H>    Ca    8.7      04 Oct 2021 07:14            PT/INR - ( 04 Oct 2021 07:14 )   PT: 19.3 sec;   INR: 1.66 ratio         PTT - ( 04 Oct 2021 07:14 )  PTT:25.9 sec    CAPILLARY BLOOD GLUCOSE      RADIOLOGY & ADDITIONAL TESTS:

## 2021-10-04 NOTE — CONSULT NOTE ADULT - SUBJECTIVE AND OBJECTIVE BOX
HPI:  Patient is a 66 F from home (lives alone w/ HHA), baseline AA0x3, independently ambulating, with PMHx of CAD, T2DM (last A1c 5.9%), HTN, HFrEF/cardiomyopathy (EF 25% on 9/16/21, s/p SICD), COPD (no prior intubations), CVA, DVT (L leg 1994)/PE (s/p IVC filter 9/2020, and repeat saddle PE at [facility] 8/21-9/3 now on Coumadin), infrarenal AAA, cerebral aneurysm, Atypical Depression, R breast cancer (s/p chemo/XRT), Class I Obesity, PSHx of hysterectomy (for fibroids), and cholecystectomy presenting to the ED with worsening shortness of breath x 4 days. Patient has been admitted twice to [facility] and [facility] in the past month for similar complaints of chest pain/SOB. She endorses that she does not know what medications she is supposed to be taking but is compliant with whatever her HHA gives her. She reports that the symptoms started with a nonproductive cough, and progressed to shortness of breath with ADL's. She has PND and orthopnea at baseline with usage of multiple pillows at night to fall asleep, but reports that this shortness of breath is worse than baseline. She has some associated loss of appetite, nausea, and headaches, but denies fevers, myalgias, chest pain, V/D, lower extremity swelling, dysuria, increased frequency of urination, hematuria or other sites of active bleeding. Of note, she is AA0x3 on interview, but has her eyes closed and an abnormal flat affect while reporting "hearing voices of her dead mother and sister" and a desire to " join them soon".  She denies active SI/HI. As per records, she has history of transferring b/w multiple nursing/rehab facilities, with the most recent being [facility] on 7/31/21 after ~4months.  (25 Sep 2021 18:12)      Interval history: no complaints, asking to go home.     PAST MEDICAL & SURGICAL HISTORY:  Hypertension    Breast cancer  right-s/p chemo and radiation    Hypercholesteremia    DVT (deep venous thrombosis)  Left leg in 1994 after hysterectomy    Sickle cell trait    Cardiomyopathy    Abdominal aneurysm  infrarenal- wached by Vascular , Dr. Gutierrez    Internal carotid aneurysm    Knee effusion, left  arthrocentesis- 05/25/16    Chronic HFrEF (heart failure with reduced ejection fraction)    Fibroid uterus  sp hystrectomy 1196    S/P lumpectomy, right breast  2014, revision of position- 2015    Gilson filter in place  1994    S/P hysterectomy  1994    Breast cancer  infusaport insertion- 2014- left chest        SOCIAL HISTORY:  has cousin  Admitted from:  home     Substance abuse history:   denies           Tobacco hx:   former smoker quit 2019               Alcohol hx:   denies             Surrogate/HCP/Guardian:   Jaki Collazo cousin/HCP         Phone#: [phone]    FAMILY HISTORY:  Family history of hypertension    Family history of diabetes mellitus    Family history of renal stone    Family history of glioblastoma    Family history of liver failure    History of hypertension  mother    Family history of glioblastoma (Sibling)    Family history of renal failure  uncle    Family history of other condition  please open box and see additional medical family history    Family history of glioblastoma      Baseline ADLs (prior to admission): alert, ambulatory w RW    Allergies    Advil (Hives; Vomiting)    Intolerances      Present Symptoms:   Dyspnea: denies  Nausea/Vomiting: denies  Anxiety: denies  Depressed denies  Fatigue: denies  Loss of appetite: denies  Pain:            denies                    location:    Constipation/diarrhea: denies        Review of Systems:  All others negative      MEDICATIONS  (STANDING):  ALBUTerol    90 MICROgram(s) HFA Inhaler      ALBUTerol    90 MICROgram(s) HFA Inhaler 1 Puff(s) Inhalation two times a day  anastrozole 1 milliGRAM(s) Oral daily  ARIPiprazole 2 milliGRAM(s) Oral daily  atorvastatin 40 milliGRAM(s) Oral at bedtime  dextrose 50% Injectable 25 Gram(s) IV Push once  enoxaparin Injectable 80 milliGRAM(s) SubCutaneous every 12 hours  hydrALAZINE 10 milliGRAM(s) Oral every 8 hours  influenza   Vaccine 0.5 milliLiter(s) IntraMuscular once  insulin lispro (ADMELOG) corrective regimen sliding scale   SubCutaneous Before meals and at bedtime  isosorbide   mononitrate ER Tablet (IMDUR) 30 milliGRAM(s) Oral daily  levothyroxine 25 MICROGram(s) Oral daily  loratadine 10 milliGRAM(s) Oral daily  metoprolol succinate ER 25 milliGRAM(s) Oral daily  nystatin Ointment 1 Application(s) Topical every 12 hours  pantoprazole    Tablet 40 milliGRAM(s) Oral before breakfast  warfarin 3 milliGRAM(s) Oral once    MEDICATIONS  (PRN):  acetaminophen   Tablet .. 650 milliGRAM(s) Oral every 6 hours PRN Temp greater or equal to 38.5C (101.3F), Mild Pain (1 - 3)  aluminum hydroxide/magnesium hydroxide/simethicone Suspension 30 milliLiter(s) Oral every 4 hours PRN Dyspepsia  melatonin 3 milliGRAM(s) Oral at bedtime PRN Insomnia  ondansetron Injectable 4 milliGRAM(s) IV Push every 8 hours PRN Nausea and/or Vomiting      PHYSICAL EXAM:    Vital Signs Last 24 Hrs  T(C): 37 (04 Oct 2021 14:04), Max: 37 (04 Oct 2021 14:04)  T(F): 98.6 (04 Oct 2021 14:04), Max: 98.6 (04 Oct 2021 14:04)  HR: 83 (04 Oct 2021 14:04) (61 - 93)  BP: 115/76 (04 Oct 2021 14:04) (115/76 - 150/93)  BP(mean): --  RR: 17 (04 Oct 2021 14:04) (17 - 18)  SpO2: 100% (04 Oct 2021 14:04) (100% - 100%)     Karnofsky Performance Score/Palliative Performance Status Version2: 50    %     GENERAL: alert, NAD  HEENT: Atraumatic, oropharynx clear, neck supple  CHEST/LUNG: unlabored  HEART: Regular rate and rhythm    ABDOMEN: Soft, Nontender, Nondistended   MUSCULOSKELETAL:  No  edema   NERVOUS SYSTEM:  Alert & Oriented X2,  follows commands  SKIN: No rashes or lesions noted  Oral intake: fair       LABS:                        11.8   6.28  )-----------( 198      ( 04 Oct 2021 07:14 )             36.8     10-04    144  |  114<H>  |  38<H>  ----------------------------<  118<H>  3.9   |  18<L>  |  1.35<H>    Ca    8.7      04 Oct 2021 07:14          RADIOLOGY & ADDITIONAL STUDIES:  < from: CT Head No Cont (09.29.21 @ 12:29) >  EXAM:  CT BRAIN                            PROCEDURE DATE:  09/29/2021          INTERPRETATION:  Noncontrast CT of the brain.    CLINICAL INDICATION:  Supratherapeutic INR, AMS    TECHNIQUE : Axial CT scanning of the brain was obtained from the skull base to the vertex without the administration of intravenous contrast. Sagittal and coronal reformats were provided.    COMPARISON: CT brain 9/15/2021    FINDINGS:    Redemonstration of chronic right MCA territory infarct.    Similar minimal rightward midline shift on the basis of volume loss.    No acute intracranial hemorrhage or brain edema.    Mild white matter microvascular ischemic disease.    Redemonstration of peripherally calcified right paraclinoid aneurysm.    The visualized paranasal sinuses and mastoid air cells are clear.    IMPRESSION:    No hydrocephalus, acute intracranial hemorrhage, mass effect, or brain edema.  Redemonstration of chronic right MCA territory infarct.            --- End of Report ---            LEIGHTON ROSAS MD; Attending Radiologist  This document has been electronically signed. Sep 29 2021  1:20PM    < end of copied text >  < from: Transthoracic Echocardiogram (09.16.21 @ 18:29) >  Patient name: RISHABH HUNT  YOB: 1955   Age: 66 (F)   MR#: [id number]  Study Date: 9/16/2021  [facility] Rosemarie Goodwin RDCS  Study quality: Technically fair  Referring Physician: Destinee Renee MD  Blood Pressure: 120/87 mmHg  Height: 170 cm  Weight: 81 kg  BSA: 1.9 m2  ------------------------------------------------------------------------  PROCEDURE: Transthoracic echocardiogram with 2-D, M-Mode  and complete spectral and color flow Doppler.  INDICATION: Chest pain, unspecified (R07.9)  ------------------------------------------------------------------------  Dimensions:    Normal Values:  LA:     3.8    2.0 - 4.0 cm  Ao:     3.1    2.0 - 3.8 cm  SEPTUM: 0.9    0.6 - 1.2 cm  PWT:    1.1    0.6 - 1.1 cm  LVIDd:  6.0    3.0 - 5.6 cm  LVIDs:  5.2    1.8 - 4.0 cm  Derived variables:  LVMI: 135 g/m2  RWT: 0.39  EF (Visual Estimate): 25 %  Doppler Peak Velocity (m/sec): AoV=1.0  ------------------------------------------------------------------------  Observations:  Mitral Valve: Mitral annular, chordal, and leaflet  calcification. Severe functional mitral regurgitation.  Aortic Valve/Aorta: Calcified aortic valve with normal  opening. Minimal aortic regurgitation.  Normal aortic root size.  Left Atrium: Severe left atrial enlargement.  Left Ventricle: Severe left ventricular enlargement.  Severely reduced left ventricular systolic function.  Diffuse hypokinesis.  Left ventricular filling pressure is severely elevated.  Right Heart: Mild right atrial enlargement.  Right ventricular enlargement with decreased right  ventricular systolic function.  Normal appearing tricuspid valve leaflets. Moderate  tricuspid regurgitation.  Normal pulmonic valve. Mild pulmonic regurgitation.  Pericardium/Pleura: Normal pericardium with no pericardial  effusion.  Bilateral pleural effusions.  Hemodynamic: Estimated right atrial pressure is severely  elevated.  Pulmonary hypertension is lilely present, but cannot be  quanitifed accurately from this study.  ------------------------------------------------------------------------  Conclusions:  Severe left ventricular enlargement.  Severely reduced left ventricular systolic function.  Diffuse hypokinesis.  Severe functional mitral regurgitation.  ------------------------------------------------------------------------  Confirmed on  9/17/2021 - 09:10:52 by Piotr Connell M.D.  -----------------------------------------    < end of copied text >    ADVANCE DIRECTIVES:

## 2021-10-05 ENCOUNTER — TRANSCRIPTION ENCOUNTER (OUTPATIENT)
Age: 66
End: 2021-10-05

## 2021-10-05 LAB
ANION GAP SERPL CALC-SCNC: 11 MMOL/L — SIGNIFICANT CHANGE UP (ref 5–17)
BUN SERPL-MCNC: 34 MG/DL — HIGH (ref 7–18)
CALCIUM SERPL-MCNC: 9 MG/DL — SIGNIFICANT CHANGE UP (ref 8.4–10.5)
CHLORIDE SERPL-SCNC: 114 MMOL/L — HIGH (ref 96–108)
CO2 SERPL-SCNC: 19 MMOL/L — LOW (ref 22–31)
CREAT SERPL-MCNC: 1.29 MG/DL — SIGNIFICANT CHANGE UP (ref 0.5–1.3)
GLUCOSE BLDC GLUCOMTR-MCNC: 118 MG/DL — HIGH (ref 70–99)
GLUCOSE BLDC GLUCOMTR-MCNC: 125 MG/DL — HIGH (ref 70–99)
GLUCOSE BLDC GLUCOMTR-MCNC: 134 MG/DL — HIGH (ref 70–99)
GLUCOSE BLDC GLUCOMTR-MCNC: 147 MG/DL — HIGH (ref 70–99)
GLUCOSE SERPL-MCNC: 140 MG/DL — HIGH (ref 70–99)
HCT VFR BLD CALC: 38.7 % — SIGNIFICANT CHANGE UP (ref 34.5–45)
HGB BLD-MCNC: 12.6 G/DL — SIGNIFICANT CHANGE UP (ref 11.5–15.5)
INR BLD: 1.88 RATIO — HIGH (ref 0.88–1.16)
MCHC RBC-ENTMCNC: 31 PG — SIGNIFICANT CHANGE UP (ref 27–34)
MCHC RBC-ENTMCNC: 32.6 GM/DL — SIGNIFICANT CHANGE UP (ref 32–36)
MCV RBC AUTO: 95.3 FL — SIGNIFICANT CHANGE UP (ref 80–100)
NRBC # BLD: 0 /100 WBCS — SIGNIFICANT CHANGE UP (ref 0–0)
PLATELET # BLD AUTO: 221 K/UL — SIGNIFICANT CHANGE UP (ref 150–400)
POTASSIUM SERPL-MCNC: 3.6 MMOL/L — SIGNIFICANT CHANGE UP (ref 3.5–5.3)
POTASSIUM SERPL-SCNC: 3.6 MMOL/L — SIGNIFICANT CHANGE UP (ref 3.5–5.3)
PROTHROM AB SERPL-ACNC: 21.7 SEC — HIGH (ref 10.6–13.6)
RBC # BLD: 4.06 M/UL — SIGNIFICANT CHANGE UP (ref 3.8–5.2)
RBC # FLD: 17.7 % — HIGH (ref 10.3–14.5)
SODIUM SERPL-SCNC: 144 MMOL/L — SIGNIFICANT CHANGE UP (ref 135–145)
WBC # BLD: 5.51 K/UL — SIGNIFICANT CHANGE UP (ref 3.8–10.5)
WBC # FLD AUTO: 5.51 K/UL — SIGNIFICANT CHANGE UP (ref 3.8–10.5)

## 2021-10-05 PROCEDURE — 99233 SBSQ HOSP IP/OBS HIGH 50: CPT | Mod: GC

## 2021-10-05 PROCEDURE — 99232 SBSQ HOSP IP/OBS MODERATE 35: CPT

## 2021-10-05 RX ORDER — WARFARIN SODIUM 2.5 MG/1
4 TABLET ORAL ONCE
Refills: 0 | Status: COMPLETED | OUTPATIENT
Start: 2021-10-05 | End: 2021-10-05

## 2021-10-05 RX ORDER — WARFARIN SODIUM 2.5 MG/1
3 TABLET ORAL ONCE
Refills: 0 | Status: DISCONTINUED | OUTPATIENT
Start: 2021-10-05 | End: 2021-10-05

## 2021-10-05 RX ORDER — SACUBITRIL AND VALSARTAN 24; 26 MG/1; MG/1
1 TABLET, FILM COATED ORAL
Refills: 0 | Status: DISCONTINUED | OUTPATIENT
Start: 2021-10-05 | End: 2021-10-06

## 2021-10-05 RX ADMIN — ISOSORBIDE MONONITRATE 30 MILLIGRAM(S): 60 TABLET, EXTENDED RELEASE ORAL at 11:20

## 2021-10-05 RX ADMIN — LORATADINE 10 MILLIGRAM(S): 10 TABLET ORAL at 11:20

## 2021-10-05 RX ADMIN — Medication 25 MILLIGRAM(S): at 05:59

## 2021-10-05 RX ADMIN — WARFARIN SODIUM 4 MILLIGRAM(S): 2.5 TABLET ORAL at 21:12

## 2021-10-05 RX ADMIN — NYSTATIN CREAM 1 APPLICATION(S): 100000 CREAM TOPICAL at 17:56

## 2021-10-05 RX ADMIN — ENOXAPARIN SODIUM 80 MILLIGRAM(S): 100 INJECTION SUBCUTANEOUS at 17:56

## 2021-10-05 RX ADMIN — ARIPIPRAZOLE 2 MILLIGRAM(S): 15 TABLET ORAL at 11:19

## 2021-10-05 RX ADMIN — NYSTATIN CREAM 1 APPLICATION(S): 100000 CREAM TOPICAL at 05:59

## 2021-10-05 RX ADMIN — ALBUTEROL 1 PUFF(S): 90 AEROSOL, METERED ORAL at 21:12

## 2021-10-05 RX ADMIN — Medication 10 MILLIGRAM(S): at 21:12

## 2021-10-05 RX ADMIN — Medication 25 MICROGRAM(S): at 05:59

## 2021-10-05 RX ADMIN — ANASTROZOLE 1 MILLIGRAM(S): 1 TABLET ORAL at 11:20

## 2021-10-05 RX ADMIN — Medication 10 MILLIGRAM(S): at 05:59

## 2021-10-05 RX ADMIN — ENOXAPARIN SODIUM 80 MILLIGRAM(S): 100 INJECTION SUBCUTANEOUS at 05:59

## 2021-10-05 RX ADMIN — ATORVASTATIN CALCIUM 40 MILLIGRAM(S): 80 TABLET, FILM COATED ORAL at 21:12

## 2021-10-05 RX ADMIN — PANTOPRAZOLE SODIUM 40 MILLIGRAM(S): 20 TABLET, DELAYED RELEASE ORAL at 05:59

## 2021-10-05 RX ADMIN — ALBUTEROL 1 PUFF(S): 90 AEROSOL, METERED ORAL at 11:20

## 2021-10-05 RX ADMIN — SACUBITRIL AND VALSARTAN 1 TABLET(S): 24; 26 TABLET, FILM COATED ORAL at 17:56

## 2021-10-05 NOTE — DISCHARGE NOTE PROVIDER - NSDCMRMEDTOKEN_GEN_ALL_CORE_FT
anastrozole 1 mg oral tablet: 1 tab(s) orally once a day  note: per pharmacy  ([facility]), last filled 6/23/21 for 30 day supply  Entresto 24 mg-26 mg oral tablet: 1 tab(s) orally 2 times a day  note: per pharmacy ([facility]), last filled 7/9/21 for 30 day supply  hydrALAZINE 10 mg oral tablet: 1.5 tab(s) orally every 8 hours   levothyroxine 25 mcg (0.025 mg) oral tablet: 1 tab(s) orally once a day  Lipitor 40 mg oral tablet: 1 tab(s) orally once a day (at bedtime)  metoprolol succinate 25 mg oral tablet, extended release: 1 tab(s) orally once a day  warfarin 2 mg oral tablet: 1 tab(s) orally every other day (at bedtime). Alternate with 3 mg. If taking 2 mg today, take 3 mg tomorrow.  warfarin 3 mg oral tablet: 1 tab(s) orally every other day (at bedtime)   Alternate with 2 mg. If taking 2 mg today, take 3 mg tomorrow.   albuterol 90 mcg/inh inhalation aerosol: 1 inhaler(s) inhaled every 6 hours, As Needed  anastrozole 1 mg oral tablet: 1 tab(s) orally once a day  note: per pharmacy  ([facility]), last filled 6/23/21 for 30 day supply  ARIPiprazole 2 mg oral tablet: 1 tab(s) orally once a day  Entresto 24 mg-26 mg oral tablet: 1 tab(s) orally 2 times a day  note: per pharmacy ([facility]), last filled 7/9/21 for 30 day supply  famotidine 20 mg oral tablet: 1 tab(s) orally once a day   hydrALAZINE 10 mg oral tablet: 1 tab(s) orally every 8 hours   isosorbide mononitrate 30 mg oral tablet, extended release: 1 tab(s) orally once a day  levothyroxine 25 mcg (0.025 mg) oral tablet: 1 tab(s) orally once a day  Lipitor 40 mg oral tablet: 1 tab(s) orally once a day (at bedtime)  metoprolol succinate 25 mg oral tablet, extended release: 1 tab(s) orally once a day  Metoprolol Succinate ER 50 mg oral tablet, extended release: 1 tab(s) orally once a day   nystatin 100,000 units/g topical ointment: 1 application topically every 12 hours  warfarin 2.5 mg oral tablet: 1 tab(s) orally once a day (at bedtime)    albuterol 90 mcg/inh inhalation aerosol: 1 inhaler(s) inhaled every 6 hours, As Needed  anastrozole 1 mg oral tablet: 1 tab(s) orally once a day  note: per pharmacy  ([facility]), last filled 6/23/21 for 30 day supply  ARIPiprazole 2 mg oral tablet: 1 tab(s) orally once a day  Entresto 24 mg-26 mg oral tablet: 1 tab(s) orally 2 times a day  note: per pharmacy ([facility]), last filled 7/9/21 for 30 day supply  famotidine 20 mg oral tablet: 1 tab(s) orally once a day   hydrALAZINE 10 mg oral tablet: 1 tab(s) orally every 8 hours   isosorbide mononitrate 30 mg oral tablet, extended release: 1 tab(s) orally once a day  levothyroxine 25 mcg (0.025 mg) oral tablet: 1 tab(s) orally once a day  Lipitor 40 mg oral tablet: 1 tab(s) orally once a day (at bedtime)  Metoprolol Succinate ER 50 mg oral tablet, extended release: 1 tab(s) orally once a day   nystatin 100,000 units/g topical ointment: 1 application topically every 12 hours  warfarin 2.5 mg oral tablet: 1 tab(s) orally once a day (at bedtime)

## 2021-10-05 NOTE — BH CONSULTATION LIAISON PROGRESS NOTE - CURRENT MEDICATION
MEDICATIONS  (STANDING):  ALBUTerol    90 MICROgram(s) HFA Inhaler      ALBUTerol    90 MICROgram(s) HFA Inhaler 1 Puff(s) Inhalation two times a day  anastrozole 1 milliGRAM(s) Oral daily  ARIPiprazole 2 milliGRAM(s) Oral daily  atorvastatin 40 milliGRAM(s) Oral at bedtime  dextrose 50% Injectable 25 Gram(s) IV Push once  enoxaparin Injectable 80 milliGRAM(s) SubCutaneous every 12 hours  hydrALAZINE 10 milliGRAM(s) Oral every 8 hours  influenza   Vaccine 0.5 milliLiter(s) IntraMuscular once  insulin lispro (ADMELOG) corrective regimen sliding scale   SubCutaneous Before meals and at bedtime  isosorbide   mononitrate ER Tablet (IMDUR) 30 milliGRAM(s) Oral daily  levothyroxine 25 MICROGram(s) Oral daily  loratadine 10 milliGRAM(s) Oral daily  metoprolol succinate ER 25 milliGRAM(s) Oral daily  nystatin Ointment 1 Application(s) Topical every 12 hours  pantoprazole    Tablet 40 milliGRAM(s) Oral before breakfast  warfarin 4 milliGRAM(s) Oral once    MEDICATIONS  (PRN):  acetaminophen   Tablet .. 650 milliGRAM(s) Oral every 6 hours PRN Temp greater or equal to 38.5C (101.3F), Mild Pain (1 - 3)  aluminum hydroxide/magnesium hydroxide/simethicone Suspension 30 milliLiter(s) Oral every 4 hours PRN Dyspepsia  melatonin 3 milliGRAM(s) Oral at bedtime PRN Insomnia  ondansetron Injectable 4 milliGRAM(s) IV Push every 8 hours PRN Nausea and/or Vomiting

## 2021-10-05 NOTE — PROGRESS NOTE ADULT - ATTENDING COMMENTS
Patient was seen and examined at bedside today      CC: Worsening shortness of Breath    Vital Signs Last 24 Hrs  T(C): 36.7 (05 Oct 2021 14:10), Max: 36.7 (05 Oct 2021 14:10)  T(F): 98.1 (05 Oct 2021 14:10), Max: 98.1 (05 Oct 2021 14:10)  HR: 98 (05 Oct 2021 18:01) (90 - 99)  BP: 146/104 (05 Oct 2021 18:01) (109/71 - 146/104)  BP(mean): 84 (05 Oct 2021 14:10) (84 - 86)  RR: 18 (05 Oct 2021 14:10) (17 - 18)  SpO2: 99% (05 Oct 2021 14:10) (99% - 100%)    P/E:  NAD, laying comfortably on bed   AAOx2, no focal deficit  Lungs Clear to auscultation anterolaterally  CVS: S1S2 WNL, + murmur  Abd soft, non tender, BS present   BL LE no edema or tenderness    Labs:                         12.6   5.51  )-----------( 221      ( 05 Oct 2021 08:00 )             38.7   10-05    144  |  114<H>  |  34<H>  ----------------------------<  140<H>  3.6   |  19<L>  |  1.29    Ca    9.0      05 Oct 2021 08:00        A/P:  Delirium with visual hallucinations improved  Acute on chronic hypoxic respiratory failure possibly multifactorial due to recent PE, acute on chronic systolic HF exacerbation in the setting of COPD   and Severe Pulmonary HTN  Acute metabolic encephalopathy due to uremia, metabolic acidosis, hospital acquired delirium and worsening dementia improved  CHEN concern for ATN (possibly cardiorenal syndrome) improved  Metabolic acidosis stable  Supra therapeutic INR on admission now with subtherapeutic INR  Recent saddle PE on Warfarin  LV apical thrombus  Infrarenal AAA, cerebral aneurysm  Functional MR  CAD  Dementia   Atypical depression  HTN    Plan:  Psych follow up appreciated;  d/w Dr. Moya; Continue Abilify ; mood stable and improved; d/w patient   No need for supplemental oxygen; 100% Room air; Please document saturation on room air with ambulation  Renal function and acidosis improving  Resume Entresto as BP elevated;   Warfarin  4 mg tonight; Continue Lovenox therapeutic until INR more than 2.0  Patient gets home visit by RN/ NP arranged by Dr. Huyen Figueroa to monitor INR, will rearrange that on discharge; NOTIFY DR. 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 Evans; spoke with POA who is not ready  Discussed with Patient GO; has poor insight into discussion as she is not able to comprehend and unable to contribute a well judged wishes.   D/C Plan in 48 hrs if remain stable with INR therapeutic     Discussed with Patient  Discussed with PGY1/2 and MS3/4 Patient was seen and examined at bedside today      CC: Worsening shortness of Breath    Much improved; not in distress; taken off O2 saturating 100%RA;     Vital Signs Last 24 Hrs  T(C): 36.7 (05 Oct 2021 14:10), Max: 36.7 (05 Oct 2021 14:10)  T(F): 98.1 (05 Oct 2021 14:10), Max: 98.1 (05 Oct 2021 14:10)  HR: 98 (05 Oct 2021 18:01) (90 - 99)  BP: 146/104 (05 Oct 2021 18:01) (109/71 - 146/104)  BP(mean): 84 (05 Oct 2021 14:10) (84 - 86)  RR: 18 (05 Oct 2021 14:10) (17 - 18)  SpO2: 99% (05 Oct 2021 14:10) (99% - 100%)    P/E:  NAD, laying comfortably on bed   AAOx2, no focal deficit  Lungs Clear to auscultation anterolaterally  CVS: S1S2 WNL, + murmur  Abd soft, non tender, BS present   BL LE no edema or tenderness    Labs:                         12.6   5.51  )-----------( 221      ( 05 Oct 2021 08:00 )             38.7   10-05    144  |  114<H>  |  34<H>  ----------------------------<  140<H>  3.6   |  19<L>  |  1.29    Ca    9.0      05 Oct 2021 08:00        A/P:  Delirium with visual hallucinations improved  Acute on chronic hypoxic respiratory failure possibly multifactorial due to recent PE, acute on chronic systolic HF exacerbation in the setting of COPD   and Severe Pulmonary HTN  Acute metabolic encephalopathy due to uremia, metabolic acidosis, hospital acquired delirium and worsening dementia improved  CHEN concern for ATN (possibly cardiorenal syndrome) improved  Metabolic acidosis stable  Supra therapeutic INR on admission now with subtherapeutic INR  Recent saddle PE on Warfarin  LV apical thrombus  Infrarenal AAA, cerebral aneurysm  Functional MR  CAD  Dementia   Atypical depression  HTN    Plan:  Psych follow up appreciated;  d/w Dr. Moya; Continue Abilify ; mood stable and improved; d/w patient   No need for supplemental oxygen; 100% Room air; Please document saturation on room air with ambulation  Renal function and acidosis improving  Resume Entresto as BP elevated;   Warfarin  4 mg tonight; Continue Lovenox therapeutic until INR more than 2.0  Patient gets home visit by RN/ NP arranged by Dr. Huyen Figueroa to monitor INR, will rearrange that on discharge; NOTIFY DR. 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 Evans; spoke with POA who is not ready  Discussed with Patient GOC; has poor insight into discussion as she is not able to comprehend and unable to contribute a well judged wishes.   D/C Plan in 48 hrs if remain stable with INR therapeutic     Discussed with Patient  Discussed with PGY1/2 and MS3/4

## 2021-10-05 NOTE — CHART NOTE - NSCHARTNOTEFT_GEN_A_CORE
Patient has persistently elevated diastolic BP (110-113) on at least 3 occasions during the night shift. We examined her at bedside and she was a bit confused and agitated. We tried to calm her down by talking to her. We asked the nurse to give Imdur now and recheck BP at a later time.

## 2021-10-05 NOTE — DISCHARGE NOTE PROVIDER - NSDCCPCAREPLAN_GEN_ALL_CORE_FT
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  Assessment and Plan of Treatment:     Diagnosis: Mixed origin delirium  Assessment and Plan of Treatment:     Diagnosis: HTN (hypertension)  Assessment and Plan of Treatment:     Diagnosis: Acute kidney injury superimposed on CKD  Assessment and Plan of Treatment:     Diagnosis: T2DM (type 2 diabetes mellitus)  Assessment and Plan of Treatment:     Diagnosis: Altered mental status  Assessment and Plan of Treatment:      PRINCIPAL DISCHARGE DIAGNOSIS  Diagnosis: Chronic HFrEF (heart failure with reduced ejection fraction)  Assessment and Plan of Treatment: You came in with a history of heart failure with reduced ejection fraction which means that your heart is not pumping well enough to allow blood to flow normally. Your last echocardiogram showed a poor forward flow (ejection fraction 25%), and you reported that you were taking all your medications on time without missing doses. The chest x-ray on admission, as well as other labs were not significant for an active worsening of HF, and you were monitored, while continuing on your medications. A cardiologist assessed you and you did not have any abnormal rhythms. You are optimized on heart failure medications while on ENTRESTO, HYDRALAZINE/IMDUR, and TOPROL; please continue these on discharge.         SECONDARY DISCHARGE DIAGNOSES  Diagnosis: COPD with emphysema  Assessment and Plan of Treatment: You came in with worsening SOB and cough due to worsening constriction of your lower airways in your lungs. This was causing your oxygen levels to fall below normal and cause weakness, and loss of your ability to do normal activities. You were kept on nasal canula oxygen to assist in your breathing, which greatly improved and you were able to be weaned off to room aie. We also started inhaled bronchodilators, and oral steroids which were tapered off. Prior to discharge, you were evaluated by PT, and found to be able to go home with home PT. You will be discharged on Albuterol PRN. Please seek medical attention if you develop worsening cough, fevers, or SOB.      Diagnosis: Supratherapeutic INR  Assessment and Plan of Treatment: You were admitted with INR > 7, and your warfarin was held until this INR was back within therapeutic levels. You were briefly bridged with lovenox while subtherapeutic, and this was discontinued. PLEASE CONTINUE COUMADIN 2.5mg DAILY at nighttime.  YOUR PCP DR. RIVERA HAS SET UP BI-WEKKLY INR CHECKS VIA [facility].       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 For this, your blood thinner medication was transitioned to COUMADIN. You were admitted with a very elevated INR and this normalized to levels between 2-3 during this visit. YOU WERE PUT ON HOME FOLLOW UP WITH NURSING CARE FOR ROUTINE INR CHECKS. DO NOT discontinue this medication by yourself as it can lead to clots/ strokes. Please seek medical attention if you develop blood in your stools, urine, vomit, or bleeding from other sites. This is contributing to your chronic shortness of breath, but you did not require supplemental o2 on discharge.  PLEASE CONTINUE COUMADIN 2.5mg DAILY at nighttime.       Diagnosis: Mixed origin delirium  Assessment and Plan of Treatment: You have a history of dementia which is an irreversible loss of brain matter due to chronic blood loss or age related changes to the brain. You also developed delirium during this hospital visit for which you were started on ABILIFY 2mg DAILY. Unfortunately there is no cure or standard way to prevent the progression of dementia other than controlling risk factors such as your diet, and keeping your blood sugars and fats within a normal range. You are recommended to continue this medication. Psychiatry was consulted and provided recs.       Diagnosis: HTN (hypertension)  Assessment and Plan of Treatment: You have high blood pressure, for which you have been started on medications including HYDRALAZINE, IMDUR, TOPROL, ENTRESTO. It is important to continue to take your medications on time,  monitor your blood pressure at home, keep a log of your home readings and follow up with your Primary Care Physician. As per AHA/ACC guidelines, it is important to adhere to a DASH Diet of fresh fruits, vegetables, lean meats such as poultry and fish, and whole wheat carbs. Uncontrolled BP may result in organ damage to your eyes, brain, heart, and kidneys by causing strokes, heart attacks, kidney failure, and bleeds in your eye. YOUR TOPROL WAS INCREASED TO 50mg due to elevated pressure which you should continue.       Diagnosis: Acute kidney injury superimposed on CKD  Assessment and Plan of Treatment: You were found to have a worsening of your kidney function at baseline, which we diagnosed by looking at your blood work values including BUN/ serum creratinine. Your baseline Serum Creatinine was found to be around 1.2- 1.7; certain BP medications including entresto were held and then restarted as your kidney function improved. You were treated with a small amount of IV hydration, and your renal function improved back to your baseline. We held nephrotoxic medications such as NSAID pain killers, and were cautious about the use of IV contrast if such scans were needed. You are advised to continue to stay well hydrated, and to seek medical attention if you have painful/burning urination, blood in urine, or increasing inability to control the flow of urine. You were started on your home BP meds.       Diagnosis: T2DM (type 2 diabetes mellitus)  Assessment and Plan of Treatment: You have Type 2 diabetes and were found to have a Hemoglobin A1c of 5.9% which shows a pretty well controlled glucose levels in your body over the past 3 months. However, a normal HbA1c is 5.5%. You are advised to eat foods that are low glycemic index which include beans and complex carbs, and to avoid high GI foods including white rice and potatoes. Uncontrolled sugars can lead to blindness, kidney failure, and contribute to diseases such as heart attacks and strokes. Your were not started on any medications at this hospital visit, and you are advised to keep your Primary Care Physician appointments in order to monitor and change medications as necessary. No new medications started for DM at this visit.       PRINCIPAL DISCHARGE DIAGNOSIS  Diagnosis: Chronic HFrEF (heart failure with reduced ejection fraction)  Assessment and Plan of Treatment: You presented to hospital with worsening shortness of breath and wheezing. You have a history of heart failure with reduced ejection fraction which means that your heart is not pumping well enough to allow blood to flow normally. Your last echocardiogram showed a poor forward flow (ejection fraction 25%), and you reported that you were taking all your medications on time without missing doses. Chest X- Ray was clear. You probably had a mild exacerbation of your heart failure   A cardiologist assessed you and you did not have any abnormal rhythms. You are optimized on heart failure medications while on ENTRESTO, HYDRALAZINE/IMDUR, and TOPROL; please continue these on discharge.         SECONDARY DISCHARGE DIAGNOSES  Diagnosis: COPD with emphysema  Assessment and Plan of Treatment: You came in with worsening SOB and cough and has history of COPD buut also with low reserve in your lungs from prior known pulmonary hypertension and undelying saddle pulmonary embolism. This was causing your oxygen levels to fall below normal and cause weakness, and loss of your ability to do normal activities. You were kept on nasal canula oxygen to assist in your breathing, which greatly improved and you were able to be weaned off to room air. We also started inhaled bronchodilators, and oral steroids which were tapered off. Prior to discharge, you were evaluated by PT, and found to be able to go home with home PT. You will be discharged on Albuterol PRN. Please seek medical attention if you develop worsening cough, fevers, or SOB.  PLEASE FOLLOW UP WITH LUNG SPECIALIST DR. 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 For this, your blood thinner medication was transitioned to COUMADIN recently. You were admitted with a very elevated INR around 7.0 and we held your coumadin for few days and this normalized to levels between 2-3 during this visit. YOU WERE PUT ON HOME FOLLOW UP WITH NURSING CARE FOR ROUTINE INR CHECKS. WE SPOKE WITH YOUR PCP DR. SUKH RIVERA WHO HAS REQUESTED FOR BI WEEKLY BLOOD DRAWS FOR INR AT HOME.   DO NOT STOP COUMADIN by yourself as it can lead to clots/ strokes.   Also it is improtant to keep your INR checked and dose of coumadin adjusted to prevent ABNORMAL RISE WHICH CAN CAUSE BLeEDING;   TARGET INR 2 TO 3;   Please seek medical attention if you develop blood in your stools, urine, vomit, or bleeding from other sites. This is contributing to your chronic shortness of breath, but you did not require supplemental o2 on discharge.  PLEASE CONTINUE COUMADIN 2.5mg DAILY at nighttime.    Diagnosis: Supratherapeutic INR  Assessment and Plan of Treatment: You were admitted with INR > 7, and your warfarin was held until this INR was back within therapeutic levels. You were briefly bridged with lovenox while INR dropped below 2.o after being held for few days, and this was discontinued.   PLEASE CONTINUE COUMADIN 2.5mg DAILY at nighttime.  YOUR PCP DR. RIVERA HAS SET UP BI-WEKKLY INR CHECKS VIA [facility].       Diagnosis: HTN (hypertension)  Assessment and Plan of Treatment: You have high blood pressure, for which you have been started on medications including HYDRALAZINE, IMDUR, TOPROL, ENTRESTO. It is important to continue to take your medications on time,  monitor your blood pressure at home, keep a log of your home readings and follow up with your Primary Care Physician. As per AHA/ACC guidelines, it is important to adhere to a DASH Diet of fresh fruits, vegetables, lean meats such as poultry and fish, and whole wheat carbs. Uncontrolled BP may result in organ damage to your eyes, brain, heart, and kidneys by causing strokes, heart attacks, kidney failure, and bleeds in your eye. YOUR TOPROL WAS INCREASED TO 50mg due to elevated pressure which you should continue.       Diagnosis: T2DM (type 2 diabetes mellitus)  Assessment and Plan of Treatment: You have Type 2 diabetes and were found to have a Hemoglobin A1c of 5.9% which shows a pretty well controlled glucose levels in your body over the past 3 months. However, a normal HbA1c is 5.5%. You are advised to eat foods that are low glycemic index which include beans and complex carbs, and to avoid high GI foods including white rice and potatoes. Uncontrolled sugars can lead to blindness, kidney failure, and contribute to diseases such as heart attacks and strokes. Your were not started on any medications at this hospital visit, and you are advised to keep your Primary Care Physician appointments in order to monitor and change medications as necessary. No new medications started for DM at this visit.      Diagnosis: Acute kidney injury superimposed on CKD  Assessment and Plan of Treatment: You were found to have a worsening of your kidney function at baseline, which we diagnosed by looking at your blood work values including BUN/ serum creratinine. Your baseline Serum Creatinine was found to be around 1.2- 1.7 suggesting that you may have Chronic Kidney Disease (CKD-3).  certain BP medications including entresto were held and then restarted as your kidney function improved. You were treated with a small amount of IV hydration, and your renal function improved back to your baseline. We held nephrotoxic medications such as NSAID pain killers, and were cautious about the use of IV contrast if such scans were needed. You are advised to continue to stay well hydrated, and to seek medical attention if you have painful/burning urination, blood in urine, or increasing inability to control the flow of urine. You were started on your home BP meds.       Diagnosis: Mixed origin delirium  Assessment and Plan of Treatment: You have a history of dementia which is an irreversible loss of brain matter due to chronic blood loss or age related changes to the brain.   You have a prior history of similar complaints and known to Psychiatrist Dr. Moya. You also developed delirium during this hospital visit for which you were started on ABILIFY 2mg DAILY. Unfortunately there is no cure or standard way to prevent the progression of dementia other than controlling risk factors such as your diet, and keeping your blood sugars and fats within a normal range. You are recommended to continue this medication. Psychiatry was consulted and provided recs.

## 2021-10-05 NOTE — DISCHARGE NOTE PROVIDER - PROVIDER TOKENS
PROVIDER:[TOKEN:[[id number]],FOLLOWUP:[Routine],ESTABLISHEDPATIENT:[T]],PROVIDER:[TOKEN:[[id number]],FOLLOWUP:[Routine],ESTABLISHEDPATIENT:[T]],PROVIDER:[TOKEN:[[id number]],FOLLOWUP:[Routine],ESTABLISHEDPATIENT:[T]] PROVIDER:[TOKEN:[[id number]],FOLLOWUP:[Routine],ESTABLISHEDPATIENT:[T]],PROVIDER:[TOKEN:[[id number]],FOLLOWUP:[Routine],ESTABLISHEDPATIENT:[T]],PROVIDER:[TOKEN:[[id number]],FOLLOWUP:[Routine],ESTABLISHEDPATIENT:[T]],FREE:[LAST:[[employer]],FIRST:[Home Draw],PHONE:[(   )    -],FAX:[(   )    -],ADDRESS:[[employer]   Home Draw],FOLLOWUP:[1-3 days]]

## 2021-10-05 NOTE — DISCHARGE NOTE PROVIDER - HOSPITAL COURSE
Patient is a 66 F from home (lives alone w/ HHA), baseline AA0x3, independently ambulating, with PMHx of CAD, T2DM (last A1c 5.9%), HTN, HFrEF/cardiomyopathy (EF 25% on 9/16/21, s/p SICD), COPD (no prior intubations), CVA, DVT (L leg 1994)/PE (s/p IVC filter 9/2020, and repeat saddle PE at [facility] 8/21-9/3 now on Coumadin), infrarenal AAA, cerebral aneurysm, Atypical Depression, R breast cancer (s/p chemo/XRT), Class I Obesity, PSHx of hysterectomy (for fibroids), and cholecystectomy presenting to the ED with worsening shortness of breath x 4 days. Patient has been admitted twice to [facility] and [facility] in the past month for similar complaints of chest pain/SOB. She endorses that she does not know what medications she is supposed to be taking but is compliant with whatever her HHA gives her. She reports that the symptoms started with a nonproductive cough, and progressed to shortness of breath with ADL's. She has PND and orthopnea at baseline with usage of multiple pillows at night to fall asleep, but reports that this shortness of breath is worse than baseline. She has some associated loss of appetite, nausea, and headaches, but denies fevers, myalgias, chest pain, V/D, lower extremity swelling, dysuria, increased frequency of urination, hematuria or other sites of active bleeding.     She was admitted for COPD exacerbation and started on appropriate treatment with solumedrol taper and inhaled bronchodilators. No antibiotics were started as pt did not have a productive cough with yellow sputum. She was found to be euvolemic, and her entresto was held due to CHEN on admission from her baseline. Her worsening respiratory status was thought to be likely multifactorial 2/2 to CHF, COPD, and chronic PE with pulmonary HTN. Her warfarin was also found to be supratherapeutic and her warfarin was held on admission; restarted with a goal of 2-3. Patient is a 66 F from home (lives alone w/ HHA), baseline AA0x3, independently ambulating, with PMHx of CAD, T2DM (last A1c 5.9%), HTN, HFrEF/cardiomyopathy (EF 25% on 9/16/21, s/p SICD), COPD (no prior intubations), CVA, DVT (L leg 1994)/PE (s/p IVC filter 9/2020, and repeat saddle PE at [facility] 8/21-9/3 now on Coumadin), infrarenal AAA, cerebral aneurysm, Atypical Depression, R breast cancer (s/p chemo/XRT), Class I Obesity, CKD3-A, PSHx of hysterectomy (for fibroids),and cholecystectomy presenting to the ED with worsening shortness of breath x 4 days. Patient has been admitted twice to [facility] and [facility] in the past month for similar complaints of chest pain/SOB. She endorses that she does not know what medications she is supposed to be taking but is compliant with whatever her HHA gives her. She reports that the symptoms started with a nonproductive cough, and progressed to shortness of breath with ADL's. She has PND and orthopnea at baseline with usage of multiple pillows at night to fall asleep, but reports that this shortness of breath is worse than baseline. She has some associated loss of appetite, nausea, and headaches, but denies fevers, myalgias, chest pain, V/D, lower extremity swelling, dysuria, increased frequency of urination, hematuria or other sites of active bleeding.     She was admitted for COPD exacerbation and started on appropriate treatment with solumedrol taper and inhaled bronchodilators. No antibiotics were started as pt did not have a productive cough with yellow sputum. CXR was done and revealed no signs of superimposed bacterial infection. She was found to be euvolemic, and her Entresto was held due to CHEN on admission from her baseline. Her worsening respiratory status was thought to be likely multifactorial 2/2 to CHF, COPD, and chronic PE with pulmonary HTN. Her warfarin was also found to be supratherapeutic and her warfarin was held on admission; restarted with a goal of 2-3. Nephro was consulted regarding CHEN, considering hemodynamic changes, held nephrotoxins, and initially held IVF due to risk of worsening CHF. Patient started on gentle hydration 50cc for short duration due to persistent CHEN and responded appropriately over time, with baseline Scr 1.0-1.3. Pulmonology on board regarding chronic PE, and COPD and as pt w/ chronic stable emphysema, discontinued Symbicort and steroids; started on Spiriva and PRN albuterol. Patient's respiratory status improved throughout her stay, and she was weaned off to saturating well on room air. Patient's respiratory status continually improving, continued on PRN albuterol. Patient complained of some vaginal itchiness during stay, prescribed nystatin topical cream. Itchiness resolved. Throughout hospital stay patient had waxing and waning mental status, and reported some active hallucinations, no SI or HI. Likely componenet of atypical depression. Psych consulted, patient known to be on seroquel on the past, non-compliant w medication. Started on seroquel, but due to excessive sedation started on abilify, with good tolerance. Patient with no further active hallucinations rest of stay. HCP confirmed that she has waxing/ waning mental status especially while hospitalized. Patient was started on Lovenox in addition to continued coumadin due to subtherapeutic INR, which was eventually normalized. Patient to be discharged with 2.5mg coumadin daily, to be closely followed up, counselled on the importance of taking medication daily and getting blood checks. Patient counselled on importance of follow up with doctors. Given patient's improved clinical status and current hemodynamic stability, decision was made to discharge after discussing with attending physician. Please refer to patient's complete medical chart with documents for a full hospital course, for this is only a brief summary.

## 2021-10-05 NOTE — BH CONSULTATION LIAISON PROGRESS NOTE - NSBHCHARTREVIEWLAB_PSY_A_CORE FT
12.6   5.51  )-----------( 221      ( 05 Oct 2021 08:00 )             38.7   10-05    144  |  114<H>  |  34<H>  ----------------------------<  140<H>  3.6   |  19<L>  |  1.29    Ca    9.0      05 Oct 2021 08:00

## 2021-10-05 NOTE — PROGRESS NOTE ADULT - PROBLEM SELECTOR PLAN 6
Pt has a history of DVT/PE w/ IVC filter and repeat saddle PE in 9/2020, on Coumadin  Last CT chest w/ IV con shows continued saddle embolus  Dose Warfarin as per daily INR Pt has a history of DVT/PE w/ IVC filter and repeat saddle PE in 9/2020, on Coumadin  Last CT chest w/ IV con shows continued saddle embolus  Dose Warfarin as per daily INR, w/ LMWH while subtherapeutic

## 2021-10-05 NOTE — PROGRESS NOTE ADULT - PROBLEM SELECTOR PLAN 5
Patient w/ hx of HFrEF/CM w/ SICD   Echo 9/16/21: Severe LV enlargement, severely reduced LV Systolic Fx, diffuse hypokinesis, severe functional MR  Saddle PE diagnosed in 9/2020, w/o cor pulmonale   Dr. oDshi on board  Adjusted medications, will cont the same- metoprolol, hydralazine and imdur  Cont to hold Entresto for CHEN Patient w/ hx of HFrEF/CM w/ SICD   Echo 9/16/21: Severe LV enlargement, severely reduced LV Systolic Fx, diffuse hypokinesis, severe functional MR  Saddle PE diagnosed in 9/2020, w/o cor pulmonale   Dr. Doshi on board  Adjusted medications, will cont the same- metoprolol, hydralazine and imdur  Will restart Entresto as CHEN resolved and BP now elevated

## 2021-10-05 NOTE — DISCHARGE NOTE PROVIDER - NSDCHHNEEDSERVICE_GEN_ALL_CORE
Rehabilitation services/Teaching and training Medication teaching and assessment/Observation and assessment/Rehabilitation services/Teaching and training

## 2021-10-05 NOTE — PROGRESS NOTE ADULT - PROBLEM SELECTOR PROBLEM 3
Patient returned to the [facility] ED Department on 1/22/18.  Patient was notified of Shiga toxin result.    Goran Orozco, RN  [facility] RN  Lung Nodule and ED Lab Result F/u RN  Epic pool (ED late result f/u RN): [id number]  FV INCIDENTAL RADIOLOGY F/U NURSES: [id number]  # [phone]     Altered mental status

## 2021-10-05 NOTE — BH CONSULTATION LIAISON PROGRESS NOTE - NSBHASSESSMENTFT_PSY_ALL_CORE
66F, single and living alone in [facility] with 24/7 A coverage, with PHx of delirium during past admissions to this hospital, known to writer from consults for delirium in 9/2020 and 10/2020 during previous admissions, formerly taking Abilify 2 mg qd for delirium but not currently, and MHx of 50 pack year smoking with DM, HTN, HLD, CVA, breast CA s/p chemo/XRT, CHF with EF 15-20% a/o 9/2020, G3-4 DD s/p defibrillator, presumed PE on Coumadin and has IVC filter, SHx of lap linda 9/28/20, sent from home on 9/25 c/o shortness of breath and altered mental status and admitted for management of CHF exacerbation and COPD. Psych consulted for delirium (pt recently confused, reporting VH). On initial exam, pt presents pleasant, cooperative and well-oriented but endorses non-distressing VH of unknown persons which appears very c/w acute encephalopathy/delirium. We recommended restarting Abilify 2 mg qd, which pt took with good effect for delirium during prior adm in 10/2020. On f/u today, pt reports the VH have fully resolved and describes Abilify as helpful, so we recommend continuing the medication. Pt does not appear to present an acute risk of harm to self or others at the time of assessment, and does not appear to be in need of admission to IP psych at the time of assessment.

## 2021-10-05 NOTE — PROGRESS NOTE ADULT - SUBJECTIVE AND OBJECTIVE BOX
PGY-2 Progress Note discussed with attending    PAGER #: [[phone]] TILL 5:00 PM  PLEASE CONTACT ON CALL TEAM:  - On Call Team (Please refer to Taryn) FROM 5:00 PM - 8:30PM  - Nightfloat Team FROM 8:30 -7:30 AM    CHIEF COMPLAINT & BRIEF HOSPITAL COURSE:    INTERVAL HPI/OVERNIGHT EVENTS:       REVIEW OF SYSTEMS:  CONSTITUTIONAL: No fever, weight loss, or fatigue  RESPIRATORY: No cough, wheezing, chills or hemoptysis; No shortness of breath  CARDIOVASCULAR: No chest pain, palpitations, dizziness, or leg swelling  GASTROINTESTINAL: No abdominal pain. No nausea, vomiting, or hematemesis; No diarrhea or constipation. No melena or hematochezia.  GENITOURINARY: No dysuria or hematuria, urinary frequency  NEUROLOGICAL: No headaches, memory loss, loss of strength, numbness, or tremors  SKIN: No itching, burning, rashes, or lesions     MEDICATIONS  (STANDING):  ALBUTerol    90 MICROgram(s) HFA Inhaler      ALBUTerol    90 MICROgram(s) HFA Inhaler 1 Puff(s) Inhalation two times a day  anastrozole 1 milliGRAM(s) Oral daily  ARIPiprazole 2 milliGRAM(s) Oral daily  atorvastatin 40 milliGRAM(s) Oral at bedtime  dextrose 50% Injectable 25 Gram(s) IV Push once  enoxaparin Injectable 80 milliGRAM(s) SubCutaneous every 12 hours  hydrALAZINE 10 milliGRAM(s) Oral every 8 hours  influenza   Vaccine 0.5 milliLiter(s) IntraMuscular once  insulin lispro (ADMELOG) corrective regimen sliding scale   SubCutaneous Before meals and at bedtime  isosorbide   mononitrate ER Tablet (IMDUR) 30 milliGRAM(s) Oral daily  levothyroxine 25 MICROGram(s) Oral daily  loratadine 10 milliGRAM(s) Oral daily  metoprolol succinate ER 25 milliGRAM(s) Oral daily  nystatin Ointment 1 Application(s) Topical every 12 hours  pantoprazole    Tablet 40 milliGRAM(s) Oral before breakfast  warfarin 3 milliGRAM(s) Oral once    MEDICATIONS  (PRN):  acetaminophen   Tablet .. 650 milliGRAM(s) Oral every 6 hours PRN Temp greater or equal to 38.5C (101.3F), Mild Pain (1 - 3)  aluminum hydroxide/magnesium hydroxide/simethicone Suspension 30 milliLiter(s) Oral every 4 hours PRN Dyspepsia  melatonin 3 milliGRAM(s) Oral at bedtime PRN Insomnia  ondansetron Injectable 4 milliGRAM(s) IV Push every 8 hours PRN Nausea and/or Vomiting      Vital Signs Last 24 Hrs  T(C): 36.4 (05 Oct 2021 05:05), Max: 37 (04 Oct 2021 14:04)  T(F): 97.5 (05 Oct 2021 05:05), Max: 98.6 (04 Oct 2021 14:04)  HR: 96 (05 Oct 2021 05:05) (83 - 99)  BP: 140/102 (05 Oct 2021 05:05) (115/76 - 141/113)  BP(mean): --  RR: 18 (05 Oct 2021 05:05) (17 - 18)  SpO2: 100% (05 Oct 2021 05:05) (100% - 100%)    PHYSICAL EXAMINATION:  GENERAL: NAD, well built  HEAD:  Atraumatic, Normocephalic  EYES:  conjunctiva and sclera clear  NECK: Supple, No JVD, Normal thyroid  CHEST/LUNG: Clear to auscultation. Clear to percussion bilaterally; No rales, rhonchi, wheezing, or rubs  HEART: Regular rate and rhythm; No murmurs, rubs, or gallops  ABDOMEN: Soft, Nontender, Nondistended; Bowel sounds present  NERVOUS SYSTEM:  Alert & Oriented X3,    EXTREMITIES:  2+ Peripheral Pulses, No clubbing, cyanosis, or edema  SKIN: warm dry                          12.6   5.51  )-----------( 221      ( 05 Oct 2021 08:00 )             38.7     10-05    144  |  114<H>  |  34<H>  ----------------------------<  140<H>  3.6   |  19<L>  |  1.29    Ca    9.0      05 Oct 2021 08:00            PT/INR - ( 05 Oct 2021 08:00 )   PT: 21.7 sec;   INR: 1.88 ratio         PTT - ( 04 Oct 2021 07:14 )  PTT:25.9 sec    CAPILLARY BLOOD GLUCOSE      RADIOLOGY & ADDITIONAL TESTS:                   PGY-2 Progress Note discussed with attending    PAGER #: [[phone]] TILL 5:00 PM  PLEASE CONTACT ON CALL TEAM:  - On Call Team (Please refer to Taryn) FROM 5:00 PM - 8:30PM  - Nightfloat Team FROM 8:30 -7:30 AM    CHIEF COMPLAINT & BRIEF HOSPITAL COURSE:  Patient is a 66 F from home (lives alone w/ HHA), baseline AA0x3, independently ambulating, with PMHx of CAD, T2DM (last A1c 5.9%), HTN, HFrEF/cardiomyopathy (EF 25% on 9/16/21, s/p SICD), COPD (no prior intubations), CVA, DVT (L leg 1994)/PE (s/p IVC filter 9/2020, and repeat saddle PE at [facility] 8/21-9/3 now on Coumadin), infrarenal AAA, cerebral aneurysm, Atypical Depression, R breast cancer (s/p chemo/XRT), Class I Obesity, PSHx of hysterectomy (for fibroids), and cholecystectomy presenting to the ED with worsening shortness of breath x 4 days; admitted for HFrEF, COPD exacerbation, and CTPEH.       INTERVAL HPI/OVERNIGHT EVENTS:   Patient was examined while she was lying in bed, Aox1-2, responding appropriately to questions, in NAD. She has normal bowel and bladder movements, and denies any other acute complaints. INR continues to be subtherapeutic, will be given Coumadin 4mg today. Mental status continues to wax and wane. Otherwise medically stable for discharge once INR stabilizes to goal of 2-3.      REVIEW OF SYSTEMS:  CONSTITUTIONAL: Fatigue + No fever, weight loss  RESPIRATORY: No cough, wheezing, chills or hemoptysis; No shortness of breath  CARDIOVASCULAR: No chest pain, palpitations, dizziness, or leg swelling  GASTROINTESTINAL: No abdominal pain. No nausea, vomiting, or hematemesis; No diarrhea or constipation. No melena or hematochezia.  GENITOURINARY: No dysuria or hematuria, urinary frequency  NEUROLOGICAL: No headaches, memory loss, loss of strength, numbness, or tremors  SKIN: No burning, rashes, or lesions     MEDICATIONS  (STANDING):  ALBUTerol    90 MICROgram(s) HFA Inhaler      ALBUTerol    90 MICROgram(s) HFA Inhaler 1 Puff(s) Inhalation two times a day  anastrozole 1 milliGRAM(s) Oral daily  ARIPiprazole 2 milliGRAM(s) Oral daily  atorvastatin 40 milliGRAM(s) Oral at bedtime  dextrose 50% Injectable 25 Gram(s) IV Push once  enoxaparin Injectable 80 milliGRAM(s) SubCutaneous every 12 hours  hydrALAZINE 10 milliGRAM(s) Oral every 8 hours  influenza   Vaccine 0.5 milliLiter(s) IntraMuscular once  insulin lispro (ADMELOG) corrective regimen sliding scale   SubCutaneous Before meals and at bedtime  isosorbide   mononitrate ER Tablet (IMDUR) 30 milliGRAM(s) Oral daily  levothyroxine 25 MICROGram(s) Oral daily  loratadine 10 milliGRAM(s) Oral daily  metoprolol succinate ER 25 milliGRAM(s) Oral daily  nystatin Ointment 1 Application(s) Topical every 12 hours  pantoprazole    Tablet 40 milliGRAM(s) Oral before breakfast  warfarin 3 milliGRAM(s) Oral once    MEDICATIONS  (PRN):  acetaminophen   Tablet .. 650 milliGRAM(s) Oral every 6 hours PRN Temp greater or equal to 38.5C (101.3F), Mild Pain (1 - 3)  aluminum hydroxide/magnesium hydroxide/simethicone Suspension 30 milliLiter(s) Oral every 4 hours PRN Dyspepsia  melatonin 3 milliGRAM(s) Oral at bedtime PRN Insomnia  ondansetron Injectable 4 milliGRAM(s) IV Push every 8 hours PRN Nausea and/or Vomiting      Vital Signs Last 24 Hrs  T(C): 36.4 (05 Oct 2021 05:05), Max: 37 (04 Oct 2021 14:04)  T(F): 97.5 (05 Oct 2021 05:05), Max: 98.6 (04 Oct 2021 14:04)  HR: 96 (05 Oct 2021 05:05) (83 - 99)  BP: 140/102 (05 Oct 2021 05:05) (115/76 - 141/113)  BP(mean): --  RR: 18 (05 Oct 2021 05:05) (17 - 18)  SpO2: 100% (05 Oct 2021 05:05) (100% - 100%)    PHYSICAL EXAMINATION:  GENERAL: NAD, Overweight AAF, in NAD, confused, but able to be reoriented   HEAD:  Atraumatic, Normocephalic  EYES:  conjunctiva and sclera clear  NECK: Supple, No JVD, Normal thyroid  CHEST/LUNG: Clear to auscultation. Clear to percussion bilaterally; No rales, rhonchi, wheezing, or rubs  HEART: Regular rate and rhythm; No murmurs, rubs, or gallops  ABDOMEN: Soft, Nontender, Nondistended; Bowel sounds present  NERVOUS SYSTEM:  Alert & Oriented X3, bilateral sensory and motor exam wnl    EXTREMITIES:  2+ Peripheral Pulses, No clubbing, cyanosis, or edema  SKIN: warm dry, Genital exam with excoriations, but no discharge or beefy red erythema                           12.6   5.51  )-----------( 221      ( 05 Oct 2021 08:00 )             38.7     10-05    144  |  114<H>  |  34<H>  ----------------------------<  140<H>  3.6   |  19<L>  |  1.29    Ca    9.0      05 Oct 2021 08:00            PT/INR - ( 05 Oct 2021 08:00 )   PT: 21.7 sec;   INR: 1.88 ratio         PTT - ( 04 Oct 2021 07:14 )  PTT:25.9 sec    CAPILLARY BLOOD GLUCOSE      RADIOLOGY & ADDITIONAL TESTS:

## 2021-10-05 NOTE — DISCHARGE NOTE PROVIDER - CARE PROVIDER_API CALL
Nakul Doshi)  Cardiovascular Disease; Internal Medicine  [address] [place] [address], [address] [place] [address]  Phone: [phone]  Fax: [phone]  Established Patient  Follow Up Time: Routine    Monique Cohen)  Critical Care Medicine; Pulmonary [place] [address]  Phone: [phone]  Fax: [phone]  Established Patient  Follow Up Time: Routine    Huyen Figueroa)  Internal Medicine  [address] [place] [address], [address]rd [place] [address]  Phone: [phone]  Fax: [phone]  Established Patient  Follow Up Time: Routine   Nakul Doshi)  Cardiovascular Disease; Internal Medicine  [address] [place] [address], [address]  [place] [address]  Phone: [phone]  Fax: [phone]  Established Patient  Follow Up Time: Routine    Monique Cohen)  Critical Care Medicine; Pulmonary Disease  [place] [address]  Phone: [phone]  Fax: [phone]  Established Patient  Follow Up Time: Routine    Huyen Figueroa)  Internal Medicine  [address] [place] [address], [address] [place] [address]  Phone: [phone]  Fax: [phone]  Established Patient  Follow Up Time: Routine    [facility], Home Draw  [facility]   Home Draw  Phone: (   )    -  Fax: (   )    -  Follow Up Time: 1-3 days

## 2021-10-05 NOTE — BH CONSULTATION LIAISON PROGRESS NOTE - NSBHCHARTREVIEWVS_PSY_A_CORE FT
Vital Signs Last 24 Hrs  T(C): 36.7 (05 Oct 2021 14:10), Max: 36.7 (05 Oct 2021 14:10)  T(F): 98.1 (05 Oct 2021 14:10), Max: 98.1 (05 Oct 2021 14:10)  HR: 92 (05 Oct 2021 14:10) (90 - 99)  BP: 109/71 (05 Oct 2021 14:10) (109/71 - 141/113)  BP(mean): 84 (05 Oct 2021 14:10) (84 - 86)  RR: 18 (05 Oct 2021 14:10) (17 - 18)  SpO2: 99% (05 Oct 2021 14:10) (99% - 100%)

## 2021-10-05 NOTE — PROGRESS NOTE ADULT - ASSESSMENT
Patient is a 67yo Female with COPD, CAD, T2DM,  HTN, HFrEF/cardiomyopathy (EF 25% on 9/16/21, s/p ICD),  CVA, DVT (L leg 1994)/PE (s/p IVC filter 9/2020, and repeat saddle PE at [facility] 8/21-9/3 now on Coumadin), infrarenal AAA, cerebral aneurysm, Atypical Depression, R breast cancer (s/p chemo/XRT), p/w SOB  a/w COPD exacerbation. Nephrology consulted for Elevated serum creatinine.    1. CHEN- unclear etiology ? hemodynamically mediated. No documented hypotension/ no NSAID or diuretic use.  Fe Na 0.15%. Improvement in renal function post  NS @ 50cc/hr x 5 hrs. Renal fxn improving. Would not give further IVF unless renal fxn significantly worsens. Keep off diuretics for now.  Pt on PPI with no peripheral Eos; low suspicion of AIN. Renal US with no hydro. Strict I/Os. Avoid nephrotoxins/ NSAIDs/ RCA. Monitor BMP.    2. CKD-3a- makeda SCr 1-1.3. CKD in the setting of DM/HTN/ HF. Renal function back to baselineDefer secondary w/u as an outpt. Avoid nephrotoxins  3. HTN 2/2 CKD- BP elevated. c/w Hydralazine 10mg PO q8hrs for now.  Would restart ENTRESTO (slowly). If BP remains elevated, can consider increasing Metoprolol to 50mg PO qd. Monitor BP  4. Metabolic Acidosis- AG resolved. Mild resp alkalosis resolved. Serum lactate wnl. Serum Co2 overall improving. Denies diarrhea. Will avoid worsening hyperchloremia with further NS IVF. Monitor ph/CO2.        [facility] NEPHROLOGY  Charly Kerns M.D.  Joshua Gil D.O.  Alysha Johnson M.D.  Tammy Kirk, MSN, ANP-C  [phone]    [address] [place] [address]

## 2021-10-05 NOTE — DISCHARGE NOTE PROVIDER - CARE PROVIDERS DIRECT ADDRESSES
,[email or web address] [email or web address],DirectAddress_Unknown

## 2021-10-05 NOTE — PROGRESS NOTE ADULT - PROBLEM SELECTOR PLAN 1
Pt p/w INR 7.02, no active bleeding, On Coumadin 3mg/2mg alternating  C/w Coumadin 3mg; Goal INR 2-3  Continues to be subtherapeutic  Recheck INR in AM Pt p/w INR 7.02, no active bleeding, On Coumadin 3mg/2mg alternating  C/w Coumadin 3mg; Goal INR 2-3  Continues to be subtherapeutic  On concomitant LMWH while subtherapeutic   Recheck INR in AM

## 2021-10-05 NOTE — BH CONSULTATION LIAISON PROGRESS NOTE - NSBHFUPINTERVALHXFT_PSY_A_CORE
Per chart, pt had several episodes of elevated diastolic BP O/N, which were addressed with medication, but no behavioral incidents were reported. Pt seen in her room at midday, sitting in chair by her bed eating lunch. Pt describes mood as "better" and denies SI/HI/AVH, noting that VH she was experiencing recently have completely resolved. Pt says she considers Abilify helpful and would like to continue taking it. Pt states that she would very much like to be DC'd home as soon as possible.

## 2021-10-05 NOTE — PROGRESS NOTE ADULT - ASSESSMENT
66-year-old female former smoker with DM, HTN, HLD, NICM (EF 31% by MUGA 07/2019) s/p SubQ ICD, PE s/p IVC filter and LV thrombus on warfarin who presented to the ER with dyspnea.     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 Cont Spiriva and albuterol  - BNP elevated but has been higher in past, clinically does not look volume overloaded. cardiac management as per cards  - acute kidney injury - improving    Thank you for this consult, will follow with you

## 2021-10-05 NOTE — PROGRESS NOTE ADULT - ASSESSMENT
Patient is a 66 F from home (lives alone w/ HHA), baseline AA0x2-3, independently ambulating, with PMHx of CAD, T2DM (last A1c 5.9%), HTN, HFrEF/cardiomyopathy (EF 25% on 9/16/21, s/p SICD), COPD (no prior intubations), CVA, DVT (L leg 1994)/PE (s/p IVC filter 9/2020, and repeat saddle PE at [facility] 8/21-9/3 now on Coumadin), infrarenal AAA, cerebral aneurysm, Atypical Depression, R breast cancer (s/p chemo/XRT), Class I Obesity, PSHx of hysterectomy (for fibroids), and cholecystectomy presenting to the ED with worsening shortness of breath x 4 days, admitted for likely COPD exacerbation with acute and chronic heart failure. X-ray significant for Small left pleural effusion. Echo 9/16/21: Severe LV enlargement, severely reduced LV Systolic function with EF of 25% , diffuse hypokinesis, severe functional MR. Admitted for COPD exacerbation vs multifactorial dyspnea (COPD, CHF, chronic PE).

## 2021-10-05 NOTE — DISCHARGE NOTE PROVIDER - NSDCFUADDINST_GEN_ALL_CORE_FT
FOLLOW UP WITH PCP, CARDIOLOGIST AND PULMONARY AS ADVISED ABOVE  FOLOW UP INR IN 2-3 DAYS; ADJUST COUMADIN  PLEASE MONITOR MEDICATION DOSING BY HOME HEALTH AIDE  FOLLOW UP BLOOD WORK FOR BMP IN ONE WEEK TO CHECK KIDNEY FUNCTIONS.

## 2021-10-05 NOTE — BH CONSULTATION LIAISON PROGRESS NOTE - NSBHCONSULTRECOMMENDOTHER_PSY_A_CORE FT
1. For management of acute delirium, recommend c/w Abilify 2 mg qd  2. C/w home Wellbutrin  mg qd  3. No indication for other standing or PRN psychotropic medications at this time  4. Medical management as directed by primary team  5. Pt is psych cleared for DC as of today's assessment  6. Psychiatry is signing off. Reconsult if additional issues arise as inpatient  7. Case d/w Dr. Gerardo of primary team    Danyelle Moya MD  Director, Consultation-Liaison Psychiatry Service  [phone]

## 2021-10-05 NOTE — PROGRESS NOTE ADULT - SUBJECTIVE AND OBJECTIVE BOX
Pt is comfortable at rest, NAD, not labored, speaks in complete sentences.    REVIEW OF SYSTEMS:  Constitutional: [ ] fever, [ ]weight loss,  [ ]fatigue  Eyes: [ ] visual changes  Respiratory: [ ]shortness of breath;  [ ] cough, [ ]wheezing, [ ]chills, [ ]hemoptysis  Cardiovascular: [ ] chest pain, [ ]palpitations, [ ]dizziness,  [ ]leg swelling [ ]syncope  Gastrointestinal: [ ] abdominal pain, [ ]nausea, [ ]vomiting,  [ ]diarrhea   Genitourinary: [ ] dysuria, [ ] hematuria  Neurologic: [ ] headaches [ ] tremors  [ ] weakness [ ] lightheadedness  Skin: [ ] itching, [ ]burning, [ ] rashes  Endocrine: [ ] heat or cold intolerance  Musculoskeletal: [ ] joint pain or swelling; [ ] muscle, back, or extremity pain  Psychiatric: [ ] depression, [ ]anxiety, [ ]mood swings, or [ ]difficulty sleeping  Hematologic: [ ] easy bruising, [ ] bleeding gums       [ x] All others negative	  [ ] Unable to obtain    Vital Signs Last 24 Hrs  T(C): 36.4 (05 Oct 2021 05:05), Max: 37 (04 Oct 2021 14:04)  T(F): 97.5 (05 Oct 2021 05:05), Max: 98.6 (04 Oct 2021 14:04)  HR: 90 (05 Oct 2021 10:40) (83 - 99)  BP: 119/77 (05 Oct 2021 10:40) (115/76 - 141/113)  BP(mean): 86 (05 Oct 2021 10:40) (86 - 86)  RR: 18 (05 Oct 2021 10:40) (17 - 18)  SpO2: 100% (05 Oct 2021 10:40) (100% - 100%)    PHYSICAL EXAM:  General: No acute distress  HEENT: EOMI, PERRL  Neck: Supple, No JVD  Lungs: Clear to auscultation bilaterally; No rales or wheezing  Heart: Regular rate and rhythm; No murmurs, rubs, or gallops  Abdomen: Nontender, bowel sounds present  Extremities: No clubbing, cyanosis, or edema  Nervous system:  Alert & Oriented X3, no focal deficits  Psychiatric: Normal affect  Skin: No rashes or lesions                                   12.6   5.51  )-----------( 221      ( 05 Oct 2021 08:00 )             38.7   10-05    144  |  114<H>  |  34<H>  ----------------------------<  140<H>  3.6   |  19<L>  |  1.29    Ca    9.0      05 Oct 2021 08:00

## 2021-10-05 NOTE — PROGRESS NOTE ADULT - SUBJECTIVE AND OBJECTIVE BOX
[facility] NEPHROLOGY- PROGRESS NOTE    Patient is a 67yo Female with COPD, CAD, T2DM,  HTN, HFrEF/cardiomyopathy (EF 25% on 9/16/21, s/p ICD),  CVA, DVT (L leg 1994)/PE (s/p IVC filter 9/2020, and repeat saddle PE at [facility] 8/21-9/3 now on Coumadin), infrarenal AAA, cerebral aneurysm, Atypical Depression, R breast cancer (s/p chemo/XRT), p/w SOB  a/w COPD exacerbation. Nephrology consulted for Elevated serum creatinine.    Hospital Medications: Medications reviewed.  REVIEW OF SYSTEMS:  CONSTITUTIONAL: No fevers or chills   RESPIRATORY: NO SOB. + cough improving  CARDIOVASCULAR: No chest pain.  GASTROINTESTINAL: No nausea, vomiting, diarrhea or abdominal pain.   VASCULAR: No bilateral lower extremity edema.     VITALS:  T(F): 97.5 (10-05-21 @ 05:05), Max: 98.6 (10-04-21 @ 14:04)  HR: 91 (10-05-21 @ 11:22)  BP: 127/91 (10-05-21 @ 11:22)  RR: 18 (10-05-21 @ 10:40)  SpO2: 100% (10-05-21 @ 10:40)  Wt(kg): --      PHYSICAL EXAM:  Gen: NAD,   HEENT: MMM  Neck: no JVD  Cards: RRR, +S1/S2,   Resp: CTA ant  GI: soft, NT/ND, NABS  Extremities:  no LE edema    LABS:  10-05    144  |  114<H>  |  34<H>  ----------------------------<  140<H>  3.6   |  19<L>  |  1.29    Ca    9.0      05 Oct 2021 08:00      Creatinine Trend: 1.29 <--, 1.35 <--, 1.48 <--, 1.56 <--, 1.58 <--, 1.98 <--, 2.32 <--                        12.6   5.51  )-----------( 221      ( 05 Oct 2021 08:00 )             38.7     Urine Studies:

## 2021-10-05 NOTE — PROGRESS NOTE ADULT - PROBLEM SELECTOR PLAN 2
patient was on Seroquel before, stopped herself  Had intermittent episodes of confusions in hospital, when seroquel restarted patient was very sedated  As per HCP, she gets these episodes in hospital setting but usually at home she is better   Patient denies any SI or HI, admits she is often confused  C/w Abilify 2mg Qd patient was on Seroquel before, stopped herself  Had intermittent episodes of confusions in hospital, when Seroquel restarted patient was very sedated  As per HCP, she gets these episodes in hospital setting but usually at home she is better   Patient denies any SI or HI, admits she is often confused  C/w Abilify 2mg Qd

## 2021-10-05 NOTE — PROGRESS NOTE ADULT - PROBLEM SELECTOR PLAN 7
Concern for ATN  Improving   trend BMP daily  Nephro Dr. Johnson  Bladder scan negative; C/w primafit Resolved; back to baseline   will restart Entresto   Trend BMP daily  Nephro Dr. Johnson  Bladder scan negative; C/w primafit

## 2021-10-05 NOTE — BH CONSULTATION LIAISON PROGRESS NOTE - NSBHMSEHYG_PSY_A_CORE
Continue to wear brace and take meloxicam  PT ordered  Referral to Dr. Gatica with sports medicine  Call for any issues or concerns   Fair

## 2021-10-05 NOTE — PROGRESS NOTE ADULT - PROBLEM SELECTOR PLAN 8
Well controlled on metoprolol, hydralazine  C/w home meds while holding Entresto for CHEN  C/w Statin for coexisting HLD  DASH/DM diet  C/w hydralazine/Imdur  Card Dr Goodrich Well controlled on metoprolol, hydralazine  Will restart Entresto as CHEN resolved; C/w hydralazine/Imdur  C/w Statin for coexisting HLD  DASH/DM diet  Cards Dr Goodrich

## 2021-10-06 ENCOUNTER — INPATIENT (INPATIENT)
Facility: HOSPITAL | Age: 66
LOS: 9 days | Discharge: HOME | DRG: 291 | End: 2021-10-16
Attending: STUDENT IN AN ORGANIZED HEALTH CARE EDUCATION/TRAINING PROGRAM | Admitting: STUDENT IN AN ORGANIZED HEALTH CARE EDUCATION/TRAINING PROGRAM
Payer: MEDICARE

## 2021-10-06 ENCOUNTER — NON-APPOINTMENT (OUTPATIENT)
Age: 66
End: 2021-10-06

## 2021-10-06 ENCOUNTER — TRANSCRIPTION ENCOUNTER (OUTPATIENT)
Age: 66
End: 2021-10-06

## 2021-10-06 VITALS
HEIGHT: 67 IN | SYSTOLIC BLOOD PRESSURE: 125 MMHG | RESPIRATION RATE: 41 BRPM | OXYGEN SATURATION: 100 % | DIASTOLIC BLOOD PRESSURE: 109 MMHG | HEART RATE: 112 BPM

## 2021-10-06 VITALS
TEMPERATURE: 98 F | HEART RATE: 93 BPM | RESPIRATION RATE: 17 BRPM | SYSTOLIC BLOOD PRESSURE: 137 MMHG | OXYGEN SATURATION: 98 % | DIASTOLIC BLOOD PRESSURE: 94 MMHG

## 2021-10-06 DIAGNOSIS — Z98.89 OTHER SPECIFIED POSTPROCEDURAL STATES: Chronic | ICD-10-CM

## 2021-10-06 DIAGNOSIS — C50.919 MALIGNANT NEOPLASM OF UNSPECIFIED SITE OF UNSPECIFIED FEMALE BREAST: Chronic | ICD-10-CM

## 2021-10-06 DIAGNOSIS — D25.9 LEIOMYOMA OF UTERUS, UNSPECIFIED: Chronic | ICD-10-CM

## 2021-10-06 DIAGNOSIS — Z90.710 ACQUIRED ABSENCE OF BOTH CERVIX AND UTERUS: Chronic | ICD-10-CM

## 2021-10-06 LAB
ANION GAP SERPL CALC-SCNC: 12 MMOL/L — SIGNIFICANT CHANGE UP (ref 5–17)
BUN SERPL-MCNC: 30 MG/DL — HIGH (ref 7–18)
CALCIUM SERPL-MCNC: 9.1 MG/DL — SIGNIFICANT CHANGE UP (ref 8.4–10.5)
CHLORIDE SERPL-SCNC: 113 MMOL/L — HIGH (ref 96–108)
CO2 SERPL-SCNC: 19 MMOL/L — LOW (ref 22–31)
CREAT SERPL-MCNC: 1.36 MG/DL — HIGH (ref 0.5–1.3)
GLUCOSE BLDC GLUCOMTR-MCNC: 110 MG/DL — HIGH (ref 70–99)
GLUCOSE BLDC GLUCOMTR-MCNC: 111 MG/DL — HIGH (ref 70–99)
GLUCOSE BLDC GLUCOMTR-MCNC: 131 MG/DL — HIGH (ref 70–99)
GLUCOSE SERPL-MCNC: 143 MG/DL — HIGH (ref 70–99)
HCT VFR BLD CALC: 40.9 % — SIGNIFICANT CHANGE UP (ref 34.5–45)
HGB BLD-MCNC: 13.2 G/DL — SIGNIFICANT CHANGE UP (ref 11.5–15.5)
INR BLD: 1.98 RATIO — HIGH (ref 0.88–1.16)
MCHC RBC-ENTMCNC: 30.8 PG — SIGNIFICANT CHANGE UP (ref 27–34)
MCHC RBC-ENTMCNC: 32.3 GM/DL — SIGNIFICANT CHANGE UP (ref 32–36)
MCV RBC AUTO: 95.3 FL — SIGNIFICANT CHANGE UP (ref 80–100)
NRBC # BLD: 0 /100 WBCS — SIGNIFICANT CHANGE UP (ref 0–0)
PLATELET # BLD AUTO: 253 K/UL — SIGNIFICANT CHANGE UP (ref 150–400)
POTASSIUM SERPL-MCNC: 3.9 MMOL/L — SIGNIFICANT CHANGE UP (ref 3.5–5.3)
POTASSIUM SERPL-SCNC: 3.9 MMOL/L — SIGNIFICANT CHANGE UP (ref 3.5–5.3)
PROTHROM AB SERPL-ACNC: 22.8 SEC — HIGH (ref 10.6–13.6)
RBC # BLD: 4.29 M/UL — SIGNIFICANT CHANGE UP (ref 3.8–5.2)
RBC # FLD: 17.9 % — HIGH (ref 10.3–14.5)
SODIUM SERPL-SCNC: 144 MMOL/L — SIGNIFICANT CHANGE UP (ref 135–145)
WBC # BLD: 5.84 K/UL — SIGNIFICANT CHANGE UP (ref 3.8–10.5)
WBC # FLD AUTO: 5.84 K/UL — SIGNIFICANT CHANGE UP (ref 3.8–10.5)

## 2021-10-06 PROCEDURE — 99284 EMERGENCY DEPT VISIT MOD MDM: CPT | Mod: CS

## 2021-10-06 PROCEDURE — 99239 HOSP IP/OBS DSCHRG MGMT >30: CPT

## 2021-10-06 PROCEDURE — 99232 SBSQ HOSP IP/OBS MODERATE 35: CPT

## 2021-10-06 PROCEDURE — 93010 ELECTROCARDIOGRAM REPORT: CPT

## 2021-10-06 RX ORDER — ALBUTEROL 90 UG/1
1 AEROSOL, METERED ORAL
Qty: 1 | Refills: 0
Start: 2021-10-06 | End: 2021-11-04

## 2021-10-06 RX ORDER — METOPROLOL TARTRATE 50 MG
1 TABLET ORAL
Qty: 0 | Refills: 0 | DISCHARGE

## 2021-10-06 RX ORDER — METOPROLOL TARTRATE 50 MG
1 TABLET ORAL
Qty: 30 | Refills: 0
Start: 2021-10-06 | End: 2021-11-04

## 2021-10-06 RX ORDER — ATORVASTATIN CALCIUM 80 MG/1
1 TABLET, FILM COATED ORAL
Qty: 30 | Refills: 0
Start: 2021-10-06 | End: 2021-11-04

## 2021-10-06 RX ORDER — SACUBITRIL AND VALSARTAN 24; 26 MG/1; MG/1
1 TABLET, FILM COATED ORAL
Qty: 60 | Refills: 0
Start: 2021-10-06 | End: 2021-11-04

## 2021-10-06 RX ORDER — ISOSORBIDE MONONITRATE 60 MG/1
1 TABLET, EXTENDED RELEASE ORAL
Qty: 30 | Refills: 0
Start: 2021-10-06 | End: 2021-11-04

## 2021-10-06 RX ORDER — WARFARIN SODIUM 2.5 MG/1
1 TABLET ORAL
Qty: 90 | Refills: 0
Start: 2021-10-06 | End: 2022-01-03

## 2021-10-06 RX ORDER — FUROSEMIDE 40 MG
40 TABLET ORAL ONCE
Refills: 0 | Status: COMPLETED | OUTPATIENT
Start: 2021-10-06 | End: 2021-10-06

## 2021-10-06 RX ORDER — WARFARIN SODIUM 2.5 MG/1
4 TABLET ORAL ONCE
Refills: 0 | Status: DISCONTINUED | OUTPATIENT
Start: 2021-10-06 | End: 2021-10-06

## 2021-10-06 RX ORDER — ARIPIPRAZOLE 15 MG/1
1 TABLET ORAL
Qty: 30 | Refills: 0
Start: 2021-10-06 | End: 2021-11-04

## 2021-10-06 RX ORDER — HYDRALAZINE HCL 50 MG
1 TABLET ORAL
Qty: 90 | Refills: 0
Start: 2021-10-06 | End: 2021-11-04

## 2021-10-06 RX ORDER — SACUBITRIL AND VALSARTAN 24; 26 MG/1; MG/1
1 TABLET, FILM COATED ORAL
Qty: 0 | Refills: 0 | DISCHARGE

## 2021-10-06 RX ORDER — METOPROLOL TARTRATE 50 MG
25 TABLET ORAL ONCE
Refills: 0 | Status: COMPLETED | OUTPATIENT
Start: 2021-10-06 | End: 2021-10-06

## 2021-10-06 RX ORDER — FAMOTIDINE 10 MG/ML
1 INJECTION INTRAVENOUS
Qty: 30 | Refills: 0
Start: 2021-10-06 | End: 2021-11-04

## 2021-10-06 RX ORDER — METOPROLOL TARTRATE 50 MG
50 TABLET ORAL DAILY
Refills: 0 | Status: DISCONTINUED | OUTPATIENT
Start: 2021-10-07 | End: 2021-10-06

## 2021-10-06 RX ORDER — LEVOTHYROXINE SODIUM 125 MCG
1 TABLET ORAL
Qty: 30 | Refills: 0
Start: 2021-10-06 | End: 2021-11-04

## 2021-10-06 RX ORDER — NYSTATIN CREAM 100000 [USP'U]/G
1 CREAM TOPICAL
Qty: 60 | Refills: 0
Start: 2021-10-06 | End: 2021-11-04

## 2021-10-06 RX ADMIN — ANASTROZOLE 1 MILLIGRAM(S): 1 TABLET ORAL at 11:42

## 2021-10-06 RX ADMIN — NYSTATIN CREAM 1 APPLICATION(S): 100000 CREAM TOPICAL at 06:07

## 2021-10-06 RX ADMIN — ISOSORBIDE MONONITRATE 30 MILLIGRAM(S): 60 TABLET, EXTENDED RELEASE ORAL at 11:39

## 2021-10-06 RX ADMIN — SACUBITRIL AND VALSARTAN 1 TABLET(S): 24; 26 TABLET, FILM COATED ORAL at 17:22

## 2021-10-06 RX ADMIN — ENOXAPARIN SODIUM 80 MILLIGRAM(S): 100 INJECTION SUBCUTANEOUS at 06:07

## 2021-10-06 RX ADMIN — Medication 25 MILLIGRAM(S): at 11:38

## 2021-10-06 RX ADMIN — SACUBITRIL AND VALSARTAN 1 TABLET(S): 24; 26 TABLET, FILM COATED ORAL at 06:06

## 2021-10-06 RX ADMIN — LORATADINE 10 MILLIGRAM(S): 10 TABLET ORAL at 11:39

## 2021-10-06 RX ADMIN — ALBUTEROL 1 PUFF(S): 90 AEROSOL, METERED ORAL at 11:39

## 2021-10-06 RX ADMIN — Medication 10 MILLIGRAM(S): at 06:06

## 2021-10-06 RX ADMIN — Medication 25 MILLIGRAM(S): at 06:06

## 2021-10-06 RX ADMIN — ARIPIPRAZOLE 2 MILLIGRAM(S): 15 TABLET ORAL at 11:39

## 2021-10-06 RX ADMIN — Medication 10 MILLIGRAM(S): at 14:28

## 2021-10-06 RX ADMIN — NYSTATIN CREAM 1 APPLICATION(S): 100000 CREAM TOPICAL at 17:22

## 2021-10-06 RX ADMIN — Medication 25 MICROGRAM(S): at 06:07

## 2021-10-06 RX ADMIN — PANTOPRAZOLE SODIUM 40 MILLIGRAM(S): 20 TABLET, DELAYED RELEASE ORAL at 06:07

## 2021-10-06 NOTE — PROGRESS NOTE ADULT - PROBLEM SELECTOR PLAN 3
- Patient baseline AA0x2-3, at baseline today  - CTH negative for acute CVA   - Metabolic Acidosis slowly improving, likely combination of uremia, acidosis and atypical depression - Patient was on Seroquel before, stopped herself  - Had intermittent episodes of confusions in hospital, when Seroquel restarted patient was very sedated, seroquel d/c  - As per HCP, she gets these episodes in hospital setting but usually at home she is better   - Patient denies any SI or HI, admits she is often confused  - C/w Abilify 2mg Qd

## 2021-10-06 NOTE — PROGRESS NOTE ADULT - PROVIDER SPECIALTY LIST ADULT
Nephrology
Pulmonology
Pulmonology
Internal Medicine
Nephrology
Pulmonology
Cardiology
Internal Medicine

## 2021-10-06 NOTE — CHART NOTE - NSCHARTNOTEFT_GEN_A_CORE
Pt is a 66 year old female who is enrolled in the [facility] readmission program . Pt was brought to the ED by EMS as a notification for respiratory distress. Pt is waiting to be seen by the Physician. Disposition unknown at this time .  Case referred to On call [place] tor[facility] for f/u  .

## 2021-10-06 NOTE — PROGRESS NOTE ADULT - SUBJECTIVE AND OBJECTIVE BOX
Patient is a 66y old  Female who presents with a chief complaint of Shortness of Breath (05 Oct 2021 15:34)      INTERVAL HPI/OVERNIGHT EVENTS: Patient seen and examined at bedside. No overnight events occurred. Patient reports she is feeling better. Patient AAOx2, responding to questions. She has normal bowel and bladder movements, INR continues to be subtherapeutic, will be given Coumadin ???? . Patient with continuing waxing and waning mental status, denies active hallucinations. Reports no other complaints at the time.        MEDICATIONS  (STANDING):  ALBUTerol    90 MICROgram(s) HFA Inhaler      ALBUTerol    90 MICROgram(s) HFA Inhaler 1 Puff(s) Inhalation two times a day  anastrozole 1 milliGRAM(s) Oral daily  ARIPiprazole 2 milliGRAM(s) Oral daily  atorvastatin 40 milliGRAM(s) Oral at bedtime  dextrose 50% Injectable 25 Gram(s) IV Push once  enoxaparin Injectable 80 milliGRAM(s) SubCutaneous every 12 hours  hydrALAZINE 10 milliGRAM(s) Oral every 8 hours  influenza   Vaccine 0.5 milliLiter(s) IntraMuscular once  insulin lispro (ADMELOG) corrective regimen sliding scale   SubCutaneous Before meals and at bedtime  isosorbide   mononitrate ER Tablet (IMDUR) 30 milliGRAM(s) Oral daily  levothyroxine 25 MICROGram(s) Oral daily  loratadine 10 milliGRAM(s) Oral daily  metoprolol succinate ER 25 milliGRAM(s) Oral daily  nystatin Ointment 1 Application(s) Topical every 12 hours  pantoprazole    Tablet 40 milliGRAM(s) Oral before breakfast  sacubitril 24 mG/valsartan 26 mG 1 Tablet(s) Oral two times a day    MEDICATIONS  (PRN):  acetaminophen   Tablet .. 650 milliGRAM(s) Oral every 6 hours PRN Temp greater or equal to 38.5C (101.3F), Mild Pain (1 - 3)  aluminum hydroxide/magnesium hydroxide/simethicone Suspension 30 milliLiter(s) Oral every 4 hours PRN Dyspepsia  melatonin 3 milliGRAM(s) Oral at bedtime PRN Insomnia  ondansetron Injectable 4 milliGRAM(s) IV Push every 8 hours PRN Nausea and/or Vomiting      Allergies    Advil (Hives; Vomiting)    Intolerances      CONSTITUTIONAL: Admits to fatigue, No fever, weight loss  RESPIRATORY: No cough, wheezing, chills or hemoptysis; No shortness of breath  CARDIOVASCULAR: No chest pain, palpitations, dizziness, or leg swelling  GASTROINTESTINAL: No abdominal pain. No nausea, vomiting, or hematemesis; No diarrhea or constipation. No melena or hematochezia.  GENITOURINARY: No dysuria or hematuria, urinary frequency  NEUROLOGICAL: No headaches, memory loss, loss of strength, numbness, or tremors  SKIN: No burning, rashes, or lesions       Vital Signs Last 24 Hrs  T(C): 36.3 (06 Oct 2021 05:24), Max: 36.9 (05 Oct 2021 20:13)  T(F): 97.4 (06 Oct 2021 05:24), Max: 98.4 (05 Oct 2021 20:13)  HR: 99 (06 Oct 2021 05:24) (90 - 99)  BP: 135/100 (06 Oct 2021 05:24) (109/71 - 146/104)  BP(mean): 84 (05 Oct 2021 14:10) (84 - 86)  RR: 18 (06 Oct 2021 05:24) (18 - 18)  SpO2: 94% (06 Oct 2021 05:24) (94% - 100%)    PHYSICAL EXAM:  GENERAL: elderly female resting in bed, NAD  HEENT:  anicteric, moist mucous membranes  CHEST/LUNG:  CTA b/l, no rales, wheezes, or rhonchi  HEART:  RRR, S1, S2, No m/r/g  ABDOMEN:  BS+, soft, nontender, nondistended  EXTREMITIES: no edema, cyanosis, or calf tenderness  NERVOUS SYSTEM: AAOx2, complaining of her brother hunting bears, answers questions and follows commands appropriately    LABS:                        13.2   5.84  )-----------( 253      ( 06 Oct 2021 06:47 )             40.9     CBC Full  -  ( 06 Oct 2021 06:47 )  WBC Count : 5.84 K/uL  Hemoglobin : 13.2 g/dL  Hematocrit : 40.9 %  Platelet Count - Automated : 253 K/uL  Mean Cell Volume : 95.3 fl  Mean Cell Hemoglobin : 30.8 pg  Mean Cell Hemoglobin Concentration : 32.3 gm/dL  Auto Neutrophil # : x  Auto Lymphocyte # : x  Auto Monocyte # : x  Auto Eosinophil # : x  Auto Basophil # : x  Auto Neutrophil % : x  Auto Lymphocyte % : x  Auto Monocyte % : x  Auto Eosinophil % : x  Auto Basophil % : x    06 Oct 2021 06:47    144    |  113    |  30     ----------------------------<  143    3.9     |  19     |  1.36     Ca    9.1        06 Oct 2021 06:47      PT/INR - ( 06 Oct 2021 06:47 )   PT: 22.8 sec;   INR: 1.98 ratio             CAPILLARY BLOOD GLUCOSE      POCT Blood Glucose.: 111 mg/dL (06 Oct 2021 07:45)  POCT Blood Glucose.: 134 mg/dL (05 Oct 2021 20:58)  POCT Blood Glucose.: 147 mg/dL (05 Oct 2021 16:33)  POCT Blood Glucose.: 118 mg/dL (05 Oct 2021 11:20)          RADIOLOGY & ADDITIONAL TESTS:    Personally reviewed.     Consultant(s) Notes Reviewed:  [x] YES  [ ] NO

## 2021-10-06 NOTE — PROGRESS NOTE ADULT - ASSESSMENT
Patient is a 66 F from home (lives alone w/ HHA), baseline AA0x2-3, independently ambulating, with PMHx of CAD, T2DM (last A1c 5.9%), HTN, HFrEF/cardiomyopathy (EF 25% on 9/16/21, s/p SICD), COPD (no prior intubations), CVA, DVT (L leg 1994)/PE (s/p IVC filter 9/2020, and repeat saddle PE at [facility] 8/21-9/3 now on Coumadin), infrarenal AAA, cerebral aneurysm, Atypical Depression, R breast cancer (s/p chemo/XRT), Class I Obesity, PSHx of hysterectomy (for fibroids), and cholecystectomy presenting to the ED with worsening shortness of breath x 4 days, admitted for likely COPD exacerbation with acute and chronic heart failure. X-ray significant for Small left pleural effusion. Echo 9/16/21: Severe LV enlargement, severely reduced LV Systolic function with EF of 25% , diffuse hypokinesis, severe functional MR. Admitted for COPD exacerbation vs multifactorial dyspnea (COPD, CHF, chronic PE).

## 2021-10-06 NOTE — DISCHARGE NOTE NURSING/CASE MANAGEMENT/SOCIAL WORK - PATIENT PORTAL LINK FT
You can access the FollowMyHealth Patient Portal offered by [facility] by registering at the following website: [email or web address]. By joining [facility]’s FollowMyHealth portal, you will also be able to view your health information using other applications (apps) compatible with our system.

## 2021-10-06 NOTE — PROGRESS NOTE ADULT - PROBLEM SELECTOR PLAN 2
- Patient was on Seroquel before, stopped herself  - Had intermittent episodes of confusions in hospital, when Seroquel restarted patient was very sedated, seroquel d/c  - As per HCP, she gets these episodes in hospital setting but usually at home she is better   - Patient denies any SI or HI, admits she is often confused  - C/w Abilify 2mg Qd - Elevated diastolic pressure, on metoprolol, hydralazine/imdur, entresto, will give lopressor additional 25mg dose and re-assess  - C/w Statin for coexisting HLD  - DASH/DM diet  - Cards Dr Doshi following

## 2021-10-06 NOTE — PROGRESS NOTE ADULT - SUBJECTIVE AND OBJECTIVE BOX
Pt is comfortable at rest, NAD, not labored, speaks in complete sentences.    REVIEW OF SYSTEMS:  Constitutional: [ ] fever, [ ]weight loss,  [ ]fatigue  Eyes: [ ] visual changes  Respiratory: [ ]shortness of breath;  [ ] cough, [ ]wheezing, [ ]chills, [ ]hemoptysis  Cardiovascular: [ ] chest pain, [ ]palpitations, [ ]dizziness,  [ ]leg swelling [ ]syncope  Gastrointestinal: [ ] abdominal pain, [ ]nausea, [ ]vomiting,  [ ]diarrhea   Genitourinary: [ ] dysuria, [ ] hematuria  Neurologic: [ ] headaches [ ] tremors  [ ] weakness [ ] lightheadedness  Skin: [ ] itching, [ ]burning, [ ] rashes  Endocrine: [ ] heat or cold intolerance  Musculoskeletal: [ ] joint pain or swelling; [ ] muscle, back, or extremity pain  Psychiatric: [ ] depression, [ ]anxiety, [ ]mood swings, or [ ]difficulty sleeping  Hematologic: [ ] easy bruising, [ ] bleeding gums       [ x] All others negative	  [ ] Unable to obtain    Vital Signs Last 24 Hrs  T(C): 36.6 (06 Oct 2021 13:46), Max: 36.9 (05 Oct 2021 20:13)  T(F): 97.8 (06 Oct 2021 13:46), Max: 98.4 (05 Oct 2021 20:13)  HR: 93 (06 Oct 2021 13:46) (90 - 99)  BP: 137/94 (06 Oct 2021 13:46) (125/90 - 146/104)  BP(mean): --  RR: 17 (06 Oct 2021 13:46) (17 - 18)  SpO2: 98% (06 Oct 2021 13:46) (94% - 100%)    PHYSICAL EXAM:  General: No acute distress  HEENT: EOMI, PERRL  Neck: Supple, No JVD  Lungs: Clear to auscultation bilaterally; No rales or wheezing  Heart: Regular rate and rhythm; No murmurs, rubs, or gallops  Abdomen: Nontender, bowel sounds present  Extremities: No clubbing, cyanosis, or edema  Nervous system:  Alert & Oriented X3, no focal deficits  Psychiatric: Normal affect  Skin: No rashes or lesions                                    13.2   5.84  )-----------( 253      ( 06 Oct 2021 06:47 )             40.9   10-06    144  |  113<H>  |  30<H>  ----------------------------<  143<H>  3.9   |  19<L>  |  1.36<H>    Ca    9.1      06 Oct 2021 06:47

## 2021-10-06 NOTE — PHARMACOTHERAPY INTERVENTION NOTE - COMMENTS
Patient identified by [facility] for Heart Failure. Medication list reviewed for appropriateness. No additional interventions at this time.

## 2021-10-06 NOTE — PROGRESS NOTE ADULT - PROBLEM SELECTOR PLAN 7
- Resolved; back to around baseline, Cr today 1.36  - Trend BMP daily  - Nephro Dr. Johnson  - C/w primafit - Resolved; back to around baseline kidney function, Cr today 1.36, likely CKD 3  - Trend BMP daily  - Nephro Dr. Johnson, following  - C/w primafit

## 2021-10-06 NOTE — PROGRESS NOTE ADULT - ATTENDING COMMENTS
Patient was seen and examined at bedside today    CC: Worsening shortness of Breath    Patient doing well; improved mood; no delirium or hallucinations noted; no supplemental oxygen needed last 24 hrs. Denies any new complaints; able to ambulate to restroom with assistance with no distress on ambulation as per RN; saturating well    Vital Signs Last 24 Hrs: reviewed as above    P/E:  NAD, laying comfortably on bed   AAOx2, no focal deficit  Lungs Clear to auscultation anterolaterally  CVS: S1S2 WNL, + murmur  Abd soft, non tender, BS present   BL LE no edema or tenderness    Labs:                        13.2   5.84  )-----------( 253      ( 06 Oct 2021 06:47 )             40.9   10-06    144  |  113<H>  |  30<H>  ----------------------------<  143<H>  3.9   |  19<L>  |  1.36<H>    Ca    9.1      06 Oct 2021 06:47      A/P:    Acute on chronic hypoxic respiratory failure possibly multifactorial due to recent PE, acute on chronic systolic HF exacerbation in the setting of COPD   and Severe Pulmonary HTN  Delirium with visual hallucinations improved  Acute metabolic encephalopathy due to uremia, metabolic acidosis, hospital acquired delirium and worsening dementia improved  CHEN concern for ATN (possibly cardiorenal syndrome) improved  Metabolic acidosis stable  Supra therapeutic INR on admission s/p subtherapeutic INR now therapeutic  Recent saddle PE on Warfarin  LV apical thrombus  Infrarenal AAA, cerebral aneurysm  Functional MR  CAD  Dementia   Atypical depression  HTN    Plan:  Psych follow up; Continue Abilify ; mood stable and improved; d/w patient   No need for supplemental oxygen;   Renal function and acidosis improving  Resume Entresto as BP elevated; Also increased Toprol XL to 50 mg daily; sta dose given 25 mg this morning with good control of BP  Warfarin  4 mg tonight; Continue Lovenox therapeutic until INR more than 2.0  Patient gets home visit by RN/ NP arranged by Dr. Huyen Figueroa to monitor INR; NOTIFIED DR. FIGUEROA. She has requested for Biweekly blood draws for INR checks at home through [facility] draw and will follow up INR to adjust dose; d/w CM Narissa  Fall precaution  Patient has 24 hr HHA P[employer] hire  resumed; ; d/w    Palliative consult spoke with POA who is not ready for Advance directives at this time; FULL CODE  D/C home today    Discussed with Patient  Discussed with PGY1/2 and MS3/4/ RN and CM    See discharge instructions for details

## 2021-10-06 NOTE — PROGRESS NOTE ADULT - SUBJECTIVE AND OBJECTIVE BOX
[facility] NEPHROLOGY- PROGRESS NOTE    Patient is a 65yo Female with COPD, CAD, T2DM,  HTN, HFrEF/cardiomyopathy (EF 25% on 9/16/21, s/p ICD),  CVA, DVT (L leg 1994)/PE (s/p IVC filter 9/2020, and repeat saddle PE at [facility] 8/21-9/3 now on Coumadin), infrarenal AAA, cerebral aneurysm, Atypical Depression, R breast cancer (s/p chemo/XRT), p/w SOB  a/w COPD exacerbation. Nephrology consulted for Elevated serum creatinine.    Hospital Medications: Medications reviewed.  REVIEW OF SYSTEMS:  CONSTITUTIONAL: No fevers or chills   RESPIRATORY: NO SOB. + cough resolved  CARDIOVASCULAR: No chest pain.  GASTROINTESTINAL: No nausea, vomiting, diarrhea or abdominal pain.   VASCULAR: No bilateral lower extremity edema.     VITALS:  T(F): 97.8 (10-06-21 @ 13:46), Max: 98.4 (10-05-21 @ 20:13)  HR: 93 (10-06-21 @ 13:46)  BP: 137/94 (10-06-21 @ 13:46)  RR: 17 (10-06-21 @ 13:46)  SpO2: 98% (10-06-21 @ 13:46)  Wt(kg): --    PHYSICAL EXAM:  Gen: NAD,   HEENT: MMM  Neck: no JVD  Cards: RRR, +S1/S2,   Resp: CTA ant  GI: soft, NT/ND, NABS  Extremities:  no LE edema    LABS:  10-06    144  |  113<H>  |  30<H>  ----------------------------<  143<H>  3.9   |  19<L>  |  1.36<H>    Ca    9.1      06 Oct 2021 06:47      Creatinine Trend: 1.36 <--, 1.29 <--, 1.35 <--, 1.48 <--, 1.56 <--, 1.58 <--, 1.98 <--                        13.2   5.84  )-----------( 253      ( 06 Oct 2021 06:47 )             40.9     Urine Studies:

## 2021-10-06 NOTE — PROGRESS NOTE ADULT - PROBLEM SELECTOR PLAN 6
- Pt has a history of DVT/PE w/ IVC filter and repeat saddle PE in 9/2020, on Coumadin  - Last CT chest w/ IV con shows continued saddle embolus  - Dose Warfarin as per daily INR, w/ LMWH while subtherapeutic - Pt has a history of DVT/PE w/ IVC filter and repeat saddle PE in 9/2020, on Coumadin  - Last CT chest w/ IV con shows continued saddle embolus  - Dose Warfarin as per daily INR, w/ LMWH while borderline/ subtherapeutic - Patient baseline AA0x2-3, at baseline today  - CTH negative for acute CVA   - Metabolic Acidosis slowly improving, likely combination of uremia, acidosis and atypical depression

## 2021-10-06 NOTE — DISCHARGE NOTE NURSING/CASE MANAGEMENT/SOCIAL WORK - NSDCPEFALRISK_GEN_ALL_CORE
Patient given discharge instructions. Patient was able to verbalize understanding of instructions. Questions answered  Patient wheeled out of ED in stable condition.
For information on Fall & injury Prevention, visit [email or web address]

## 2021-10-06 NOTE — PROGRESS NOTE ADULT - ASSESSMENT
66-year-old female former smoker with DM, HTN, HLD, NICM (EF 31% by MUGA 07/2019) s/p SubQ ICD, PE s/p IVC filter and LV thrombus on warfarin who presented to the ER with dyspnea.     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 Cont Spiriva and albuterol      Thank you for this consult, will follow with you

## 2021-10-06 NOTE — PROGRESS NOTE ADULT - PROBLEM SELECTOR PLAN 8
- Elevated diastolic pressure, stable, on metoprolol, hydralazine/imdur, entresto  - C/w Statin for coexisting HLD  - DASH/DM diet  - Cards Dr Doshi - Elevated diastolic pressure, stable, on metoprolol, hydralazine/imdur, entresto  - C/w Statin for coexisting HLD  - DASH/DM diet  - Cards Dr Doshi following - Elevated diastolic pressure, stable, on metoprolol, hydralazine/imdur, entresto, will give lopressor additional 25mg dose and re-assess  - C/w Statin for coexisting HLD  - DASH/DM diet  - Cards Dr Doshi following - Pt has a history of DVT/PE w/ IVC filter and repeat saddle PE in 9/2020, on Coumadin  - Last CT chest w/ IV con shows continued saddle embolus  - Dose Warfarin as per daily INR, w/ LMWH while borderline/ subtherapeutic

## 2021-10-06 NOTE — PROGRESS NOTE ADULT - PROBLEM SELECTOR PLAN 5
- Patient w/ hx of HFrEF/CM w/ SICD   - Echo 9/16/21: Severe LV enlargement, severely reduced LV Systolic Fx, diffuse hypokinesis, severe functional MR  - Saddle PE diagnosed in 9/2020, w/o cor pulmonale   - Dr. Doshi on board  - Adjusted medications, will cont the same- metoprolol, hydralazine and imdur  - Will restart Entresto as CHEN resolved and BP now elevated - Patient w/ hx of HFrEF/CM w/ SICD   - Echo 9/16/21: Severe LV enlargement, severely reduced LV Systolic Fx, diffuse hypokinesis, severe functional MR  - Saddle PE diagnosed in 9/2020, w/o cor pulmonale   - Dr. Doshi on board  - Adjusted medications, will cont the same- metoprolol, hydralazine and imdur, entresto

## 2021-10-06 NOTE — PROGRESS NOTE ADULT - ASSESSMENT
PA denied. Plan states patient must try and fail Atrovent HFA used in combination with Symbicort or Incruse Ellipta used in combination with Symbicort. Patient is a 67yo Female with COPD, CAD, T2DM,  HTN, HFrEF/cardiomyopathy (EF 25% on 9/16/21, s/p ICD),  CVA, DVT (L leg 1994)/PE (s/p IVC filter 9/2020, and repeat saddle PE at [facility] 8/21-9/3 now on Coumadin), infrarenal AAA, cerebral aneurysm, Atypical Depression, R breast cancer (s/p chemo/XRT), p/w SOB  a/w COPD exacerbation. Nephrology consulted for Elevated serum creatinine.    1. CHEN- unclear etiology ? hemodynamically mediated. No documented hypotension/ no NSAID or diuretic use.  Fe Na 0.15%. Improvement in renal function post  NS @ 50cc/hr x 5 hrs. CHEN resolved. Would not give further IVF unless renal fxn significantly worsens. Keep off diuretics for now.  Pt on PPI with no peripheral Eos; low suspicion of AIN. Renal US with no hydro. Strict I/Os. Avoid nephrotoxins/ NSAIDs/ RCA. Monitor BMP.  2. CKD-3a- makeda SCr 1-1.3. CKD in the setting of DM/HTN/ HF. Renal function back to baseline. Defer secondary w/u as an outpt. Avoid nephrotoxins  3. HTN 2/2 CKD- BP elevated, for which Metoprolol was increased to 50mg PO qd. Pt restarted on Entresto; will monitor renal function. Monitor BP  4. Metabolic Acidosis- AG resolved. Mild resp alkalosis resolved. Serum lactate wnl. Serum Co2 overall improving. Denies diarrhea. Will avoid worsening hyperchloremia with further NS IVF. Monitor ph/CO2.        [facility] NEPHROLOGY  Charly Kerns M.D.  Joshua Gil D.O.  Alysha Johnson M.D.  Tammy Kirk, SHAYE, ANP-C  [phone]    [address] [place] [address]

## 2021-10-06 NOTE — PROGRESS NOTE ADULT - PROBLEM SELECTOR PLAN 1
- Pt p/w INR 7.02, no active bleeding, On Coumadin 3mg/2mg alternating, todays INR 1.98  - C/w Coumadin 3mg; Goal INR 2-3  - Continues to be subtherapeutic  - On concomitant LMWH while subtherapeutic   - Continue to follow INR - Pt p/w INR 7.02, no active bleeding, On Coumadin 3mg/2mg alternating, today's INR 1.98, at around therapeutic goal  - Will give coumadin 4mg today, to be d/c on 2.5 mg daily, goal INR 2-3  - On concomitant LMWH while sub/borderline therapeutic  - Continue to follow INR

## 2021-10-06 NOTE — PROGRESS NOTE ADULT - PROBLEM SELECTOR PLAN 4
- Pt p/w prolonged end expiratory wheezes, improving respiratory status, no longer on O2, saturating well  - Hx of COPD, not on home 02, no prior intubations  - GOLD 2 by criteria; not started on Abx   - Labs not significant for superimposed infection  - CXR non focal, does not appear to be fluid overloaded o/e or imaging  - Steroid discontinued with improvement of symptoms   - C/w Albuterol PRN   - RVP negative   - Pulm Consult Dr. Cohen, following - Improving respiratory status, no longer on O2, saturating well on RA  - Hx of COPD, not on home 02, no prior intubations  - GOLD 2 by criteria; not started on Abx   - Labs not significant for superimposed infection, RVP -  - CXR non focal, does not appear to be fluid overloaded o/e or imaging  - C/w Albuterol PRN   - Pulm Consult Dr. Cohen, following

## 2021-10-07 ENCOUNTER — NON-APPOINTMENT (OUTPATIENT)
Age: 66
End: 2021-10-07

## 2021-10-07 DIAGNOSIS — I50.23 ACUTE ON CHRONIC SYSTOLIC (CONGESTIVE) HEART FAILURE: ICD-10-CM

## 2021-10-07 DIAGNOSIS — I82.90 ACUTE EMBOLISM AND THROMBOSIS OF UNSPECIFIED VEIN: ICD-10-CM

## 2021-10-07 DIAGNOSIS — I25.10 ATHEROSCLEROTIC HEART DISEASE OF NATIVE CORONARY ARTERY WITHOUT ANGINA PECTORIS: ICD-10-CM

## 2021-10-07 DIAGNOSIS — R06.00 DYSPNEA, UNSPECIFIED: ICD-10-CM

## 2021-10-07 DIAGNOSIS — I10 ESSENTIAL (PRIMARY) HYPERTENSION: ICD-10-CM

## 2021-10-07 DIAGNOSIS — N17.9 ACUTE KIDNEY FAILURE, UNSPECIFIED: ICD-10-CM

## 2021-10-07 DIAGNOSIS — R06.02 SHORTNESS OF BREATH: ICD-10-CM

## 2021-10-07 DIAGNOSIS — E03.9 HYPOTHYROIDISM, UNSPECIFIED: ICD-10-CM

## 2021-10-07 DIAGNOSIS — E11.9 TYPE 2 DIABETES MELLITUS WITHOUT COMPLICATIONS: ICD-10-CM

## 2021-10-07 DIAGNOSIS — J44.9 CHRONIC OBSTRUCTIVE PULMONARY DISEASE, UNSPECIFIED: ICD-10-CM

## 2021-10-07 DIAGNOSIS — Z29.9 ENCOUNTER FOR PROPHYLACTIC MEASURES, UNSPECIFIED: ICD-10-CM

## 2021-10-07 PROBLEM — I50.22 CHRONIC SYSTOLIC (CONGESTIVE) HEART FAILURE: Chronic | Status: ACTIVE | Noted: 2021-09-28

## 2021-10-07 LAB
ALBUMIN SERPL ELPH-MCNC: 3.2 G/DL — LOW (ref 3.5–5)
ALP SERPL-CCNC: 309 U/L — HIGH (ref 40–120)
ALT FLD-CCNC: 39 U/L DA — SIGNIFICANT CHANGE UP (ref 10–60)
ANION GAP SERPL CALC-SCNC: 15 MMOL/L — SIGNIFICANT CHANGE UP (ref 5–17)
ANION GAP SERPL CALC-SCNC: 16 MMOL/L — SIGNIFICANT CHANGE UP (ref 5–17)
APPEARANCE UR: CLEAR — SIGNIFICANT CHANGE UP
APTT BLD: 32.1 SEC — SIGNIFICANT CHANGE UP (ref 27.5–35.5)
AST SERPL-CCNC: 40 U/L — SIGNIFICANT CHANGE UP (ref 10–40)
BASE EXCESS BLDV CALC-SCNC: -12.4 MMOL/L — SIGNIFICANT CHANGE UP
BASOPHILS # BLD AUTO: 0.04 K/UL — SIGNIFICANT CHANGE UP (ref 0–0.2)
BASOPHILS NFR BLD AUTO: 0.7 % — SIGNIFICANT CHANGE UP (ref 0–2)
BILIRUB SERPL-MCNC: 1.4 MG/DL — HIGH (ref 0.2–1.2)
BILIRUB UR-MCNC: NEGATIVE — SIGNIFICANT CHANGE UP
BUN SERPL-MCNC: 32 MG/DL — HIGH (ref 7–18)
BUN SERPL-MCNC: 34 MG/DL — HIGH (ref 7–18)
CALCIUM SERPL-MCNC: 9.1 MG/DL — SIGNIFICANT CHANGE UP (ref 8.4–10.5)
CALCIUM SERPL-MCNC: 9.8 MG/DL — SIGNIFICANT CHANGE UP (ref 8.4–10.5)
CHLORIDE SERPL-SCNC: 111 MMOL/L — HIGH (ref 96–108)
CHLORIDE SERPL-SCNC: 111 MMOL/L — HIGH (ref 96–108)
CO2 SERPL-SCNC: 17 MMOL/L — LOW (ref 22–31)
CO2 SERPL-SCNC: 18 MMOL/L — LOW (ref 22–31)
COLOR SPEC: YELLOW — SIGNIFICANT CHANGE UP
CREAT ?TM UR-MCNC: 45 MG/DL — SIGNIFICANT CHANGE UP
CREAT SERPL-MCNC: 1.83 MG/DL — HIGH (ref 0.5–1.3)
CREAT SERPL-MCNC: 1.98 MG/DL — HIGH (ref 0.5–1.3)
DIFF PNL FLD: ABNORMAL
EOSINOPHIL # BLD AUTO: 0.03 K/UL — SIGNIFICANT CHANGE UP (ref 0–0.5)
EOSINOPHIL NFR BLD AUTO: 0.5 % — SIGNIFICANT CHANGE UP (ref 0–6)
GLUCOSE BLDC GLUCOMTR-MCNC: 181 MG/DL — HIGH (ref 70–99)
GLUCOSE BLDC GLUCOMTR-MCNC: 195 MG/DL — HIGH (ref 70–99)
GLUCOSE BLDC GLUCOMTR-MCNC: 195 MG/DL — HIGH (ref 70–99)
GLUCOSE SERPL-MCNC: 199 MG/DL — HIGH (ref 70–99)
GLUCOSE SERPL-MCNC: 207 MG/DL — HIGH (ref 70–99)
GLUCOSE UR QL: NEGATIVE — SIGNIFICANT CHANGE UP
HCO3 BLDV-SCNC: 15 MMOL/L — LOW (ref 22–29)
HCT VFR BLD CALC: 43 % — SIGNIFICANT CHANGE UP (ref 34.5–45)
HCT VFR BLD CALC: 46.5 % — HIGH (ref 34.5–45)
HGB BLD-MCNC: 13.5 G/DL — SIGNIFICANT CHANGE UP (ref 11.5–15.5)
HGB BLD-MCNC: 14.6 G/DL — SIGNIFICANT CHANGE UP (ref 11.5–15.5)
HOROWITZ INDEX BLDV+IHG-RTO: 40 — SIGNIFICANT CHANGE UP
IMM GRANULOCYTES NFR BLD AUTO: 0.7 % — SIGNIFICANT CHANGE UP (ref 0–1.5)
INR BLD: 2.24 RATIO — HIGH (ref 0.88–1.16)
KETONES UR-MCNC: NEGATIVE — SIGNIFICANT CHANGE UP
LACTATE SERPL-SCNC: 1.5 MMOL/L — SIGNIFICANT CHANGE UP (ref 0.7–2)
LEUKOCYTE ESTERASE UR-ACNC: NEGATIVE — SIGNIFICANT CHANGE UP
LYMPHOCYTES # BLD AUTO: 1.56 K/UL — SIGNIFICANT CHANGE UP (ref 1–3.3)
LYMPHOCYTES # BLD AUTO: 28.1 % — SIGNIFICANT CHANGE UP (ref 13–44)
MAGNESIUM SERPL-MCNC: 1.6 MG/DL — SIGNIFICANT CHANGE UP (ref 1.6–2.6)
MAGNESIUM SERPL-MCNC: 1.7 MG/DL — SIGNIFICANT CHANGE UP (ref 1.6–2.6)
MCHC RBC-ENTMCNC: 31.1 PG — SIGNIFICANT CHANGE UP (ref 27–34)
MCHC RBC-ENTMCNC: 31.3 PG — SIGNIFICANT CHANGE UP (ref 27–34)
MCHC RBC-ENTMCNC: 31.4 GM/DL — LOW (ref 32–36)
MCHC RBC-ENTMCNC: 31.4 GM/DL — LOW (ref 32–36)
MCV RBC AUTO: 98.9 FL — SIGNIFICANT CHANGE UP (ref 80–100)
MCV RBC AUTO: 99.8 FL — SIGNIFICANT CHANGE UP (ref 80–100)
MONOCYTES # BLD AUTO: 0.3 K/UL — SIGNIFICANT CHANGE UP (ref 0–0.9)
MONOCYTES NFR BLD AUTO: 5.4 % — SIGNIFICANT CHANGE UP (ref 2–14)
NEUTROPHILS # BLD AUTO: 3.59 K/UL — SIGNIFICANT CHANGE UP (ref 1.8–7.4)
NEUTROPHILS NFR BLD AUTO: 64.6 % — SIGNIFICANT CHANGE UP (ref 43–77)
NITRITE UR-MCNC: NEGATIVE — SIGNIFICANT CHANGE UP
NRBC # BLD: 0 /100 WBCS — SIGNIFICANT CHANGE UP (ref 0–0)
NRBC # BLD: 0 /100 WBCS — SIGNIFICANT CHANGE UP (ref 0–0)
NT-PROBNP SERPL-SCNC: HIGH PG/ML (ref 0–125)
OSMOLALITY UR: 364 MOS/KG — SIGNIFICANT CHANGE UP (ref 50–1200)
PCO2 BLDV: 38 MMHG — LOW (ref 39–42)
PH BLDV: 7.2 — LOW (ref 7.32–7.43)
PH UR: 5 — SIGNIFICANT CHANGE UP (ref 5–8)
PHOSPHATE SERPL-MCNC: 4.2 MG/DL — SIGNIFICANT CHANGE UP (ref 2.5–4.5)
PLATELET # BLD AUTO: 239 K/UL — SIGNIFICANT CHANGE UP (ref 150–400)
PLATELET # BLD AUTO: 262 K/UL — SIGNIFICANT CHANGE UP (ref 150–400)
PO2 BLDV: 45 MMHG — SIGNIFICANT CHANGE UP
POTASSIUM SERPL-MCNC: 4 MMOL/L — SIGNIFICANT CHANGE UP (ref 3.5–5.3)
POTASSIUM SERPL-MCNC: 4.7 MMOL/L — SIGNIFICANT CHANGE UP (ref 3.5–5.3)
POTASSIUM SERPL-SCNC: 4 MMOL/L — SIGNIFICANT CHANGE UP (ref 3.5–5.3)
POTASSIUM SERPL-SCNC: 4.7 MMOL/L — SIGNIFICANT CHANGE UP (ref 3.5–5.3)
PROT SERPL-MCNC: 7.4 G/DL — SIGNIFICANT CHANGE UP (ref 6–8.3)
PROT UR-MCNC: 30 MG/DL
PROTHROM AB SERPL-ACNC: 25.7 SEC — HIGH (ref 10.6–13.6)
RBC # BLD: 4.31 M/UL — SIGNIFICANT CHANGE UP (ref 3.8–5.2)
RBC # BLD: 4.7 M/UL — SIGNIFICANT CHANGE UP (ref 3.8–5.2)
RBC # FLD: 18.4 % — HIGH (ref 10.3–14.5)
RBC # FLD: 18.4 % — HIGH (ref 10.3–14.5)
SAO2 % BLDV: 61.4 % — SIGNIFICANT CHANGE UP
SARS-COV-2 RNA SPEC QL NAA+PROBE: SIGNIFICANT CHANGE UP
SODIUM SERPL-SCNC: 144 MMOL/L — SIGNIFICANT CHANGE UP (ref 135–145)
SODIUM SERPL-SCNC: 144 MMOL/L — SIGNIFICANT CHANGE UP (ref 135–145)
SODIUM UR-SCNC: 101 MMOL/L — SIGNIFICANT CHANGE UP
SP GR SPEC: 1.01 — SIGNIFICANT CHANGE UP (ref 1.01–1.02)
TROPONIN I SERPL-MCNC: 0.14 NG/ML — HIGH (ref 0–0.04)
TROPONIN I SERPL-MCNC: 0.15 NG/ML — HIGH (ref 0–0.04)
TROPONIN I SERPL-MCNC: 0.17 NG/ML — HIGH (ref 0–0.04)
TROPONIN I SERPL-MCNC: 0.17 NG/ML — HIGH (ref 0–0.04)
UROBILINOGEN FLD QL: NEGATIVE — SIGNIFICANT CHANGE UP
WBC # BLD: 5.56 K/UL — SIGNIFICANT CHANGE UP (ref 3.8–10.5)
WBC # BLD: 5.68 K/UL — SIGNIFICANT CHANGE UP (ref 3.8–10.5)
WBC # FLD AUTO: 5.56 K/UL — SIGNIFICANT CHANGE UP (ref 3.8–10.5)
WBC # FLD AUTO: 5.68 K/UL — SIGNIFICANT CHANGE UP (ref 3.8–10.5)

## 2021-10-07 PROCEDURE — 99223 1ST HOSP IP/OBS HIGH 75: CPT | Mod: GC

## 2021-10-07 PROCEDURE — 71045 X-RAY EXAM CHEST 1 VIEW: CPT | Mod: 26

## 2021-10-07 PROCEDURE — 99223 1ST HOSP IP/OBS HIGH 75: CPT

## 2021-10-07 RX ORDER — PANTOPRAZOLE SODIUM 20 MG/1
40 TABLET, DELAYED RELEASE ORAL
Refills: 0 | Status: DISCONTINUED | OUTPATIENT
Start: 2021-10-07 | End: 2021-10-16

## 2021-10-07 RX ORDER — ALBUTEROL 90 UG/1
2 AEROSOL, METERED ORAL EVERY 6 HOURS
Refills: 0 | Status: DISCONTINUED | OUTPATIENT
Start: 2021-10-07 | End: 2021-10-16

## 2021-10-07 RX ORDER — INSULIN LISPRO 100/ML
VIAL (ML) SUBCUTANEOUS
Refills: 0 | Status: DISCONTINUED | OUTPATIENT
Start: 2021-10-07 | End: 2021-10-16

## 2021-10-07 RX ORDER — LEVOTHYROXINE SODIUM 125 MCG
25 TABLET ORAL DAILY
Refills: 0 | Status: DISCONTINUED | OUTPATIENT
Start: 2021-10-07 | End: 2021-10-16

## 2021-10-07 RX ORDER — FUROSEMIDE 40 MG
40 TABLET ORAL ONCE
Refills: 0 | Status: COMPLETED | OUTPATIENT
Start: 2021-10-07 | End: 2021-10-07

## 2021-10-07 RX ORDER — SACUBITRIL AND VALSARTAN 24; 26 MG/1; MG/1
1 TABLET, FILM COATED ORAL
Refills: 0 | Status: DISCONTINUED | OUTPATIENT
Start: 2021-10-07 | End: 2021-10-07

## 2021-10-07 RX ORDER — ANASTROZOLE 1 MG/1
1 TABLET ORAL DAILY
Refills: 0 | Status: DISCONTINUED | OUTPATIENT
Start: 2021-10-07 | End: 2021-10-16

## 2021-10-07 RX ORDER — ARIPIPRAZOLE 15 MG/1
2 TABLET ORAL DAILY
Refills: 0 | Status: DISCONTINUED | OUTPATIENT
Start: 2021-10-07 | End: 2021-10-16

## 2021-10-07 RX ORDER — ATORVASTATIN CALCIUM 80 MG/1
40 TABLET, FILM COATED ORAL AT BEDTIME
Refills: 0 | Status: DISCONTINUED | OUTPATIENT
Start: 2021-10-07 | End: 2021-10-16

## 2021-10-07 RX ORDER — INSULIN LISPRO 100/ML
VIAL (ML) SUBCUTANEOUS AT BEDTIME
Refills: 0 | Status: DISCONTINUED | OUTPATIENT
Start: 2021-10-07 | End: 2021-10-16

## 2021-10-07 RX ORDER — HYDRALAZINE HCL 50 MG
5 TABLET ORAL ONCE
Refills: 0 | Status: DISCONTINUED | OUTPATIENT
Start: 2021-10-07 | End: 2021-10-07

## 2021-10-07 RX ORDER — METOPROLOL TARTRATE 50 MG
12.5 TABLET ORAL
Refills: 0 | Status: DISCONTINUED | OUTPATIENT
Start: 2021-10-07 | End: 2021-10-10

## 2021-10-07 RX ORDER — ISOSORBIDE MONONITRATE 60 MG/1
30 TABLET, EXTENDED RELEASE ORAL DAILY
Refills: 0 | Status: DISCONTINUED | OUTPATIENT
Start: 2021-10-07 | End: 2021-10-16

## 2021-10-07 RX ORDER — WARFARIN SODIUM 2.5 MG/1
2.5 TABLET ORAL AT BEDTIME
Refills: 0 | Status: DISCONTINUED | OUTPATIENT
Start: 2021-10-07 | End: 2021-10-10

## 2021-10-07 RX ORDER — HYDRALAZINE HCL 50 MG
10 TABLET ORAL EVERY 8 HOURS
Refills: 0 | Status: DISCONTINUED | OUTPATIENT
Start: 2021-10-07 | End: 2021-10-08

## 2021-10-07 RX ADMIN — Medication 12.5 MILLIGRAM(S): at 17:01

## 2021-10-07 RX ADMIN — ISOSORBIDE MONONITRATE 30 MILLIGRAM(S): 60 TABLET, EXTENDED RELEASE ORAL at 12:49

## 2021-10-07 RX ADMIN — ATORVASTATIN CALCIUM 40 MILLIGRAM(S): 80 TABLET, FILM COATED ORAL at 21:16

## 2021-10-07 RX ADMIN — ARIPIPRAZOLE 2 MILLIGRAM(S): 15 TABLET ORAL at 12:49

## 2021-10-07 RX ADMIN — WARFARIN SODIUM 2.5 MILLIGRAM(S): 2.5 TABLET ORAL at 21:16

## 2021-10-07 RX ADMIN — Medication 40 MILLIGRAM(S): at 00:44

## 2021-10-07 RX ADMIN — Medication 40 MILLIGRAM(S): at 12:49

## 2021-10-07 RX ADMIN — Medication 1: at 17:01

## 2021-10-07 RX ADMIN — Medication 10 MILLIGRAM(S): at 15:47

## 2021-10-07 RX ADMIN — Medication 125 MILLIGRAM(S): at 00:44

## 2021-10-07 RX ADMIN — Medication 10 MILLIGRAM(S): at 21:16

## 2021-10-07 RX ADMIN — ANASTROZOLE 1 MILLIGRAM(S): 1 TABLET ORAL at 12:49

## 2021-10-07 NOTE — H&P ADULT - NSICDXPASTSURGICALHX_GEN_ALL_CORE_FT
PAST SURGICAL HISTORY:  Breast cancer infusaport insertion- 2014- left chest    Fibroid uterus sp hystrectomy 1196    Orrum filter in place 1994    S/P hysterectomy 1994    S/P lumpectomy, right breast 2014, revision of position- 2015

## 2021-10-07 NOTE — CONSULT NOTE ADULT - ASSESSMENT
66-year-old female former smoker with DM, HTN, HLD, NICM (EF 29% by echo 09/2021) s/p SubQ ICD, PE s/p IVC filter and LV thrombus on warfarin who presented ot the ER with dyspnea.     1. Dyspnea: Seems to have improved to baseline at this point. She has chronic non-ischemic systolic HF (and recent saddle PE) so there will likely continue to be a degree of exertional dyspnea.     2. Chronic, systolic HF exacerbation (NICM):  -Patient is on metoprolol ER 25mg, hydralazine 10mg PO TID and Imdur ER 30mg  -Holding off Entresto/Spironolactone due to CHEN, resume when renal function normalizes and/or on outpatient basis.   -Her BNP of 37,000 worsened compared to 12,000 two weeks ago, uncertain etiology  -Patient has SubQ ICD, last checked June 1.2021, battery at 81%, no events noted  -OP follow up with me in the office after discharge    3. HTN: Well controlled on metoprolol, hydralazine     4. HLD: On atorvastatin     5. LV thrombus: Continue warfarin, target INR 2-3    ***Note that this is a preliminary note and any recommendations should NOT be carried out until this note is finalized. *** 66-year-old female former smoker with DM, HTN, HLD, NICM (EF 29% by echo 09/2021) s/p SubQ ICD, PE s/p IVC filter and LV thrombus on warfarin who presented ot the ER with dyspnea.     1. Dyspnea: Seems to have improved to baseline at this point. She has chronic non-ischemic systolic HF (and recent saddle PE) so there will likely continue to be a degree of exertional dyspnea.     2. Chronic, systolic HF exacerbation (NICM):  -Patient is on metoprolol ER 25mg, hydralazine 10mg PO TID and Imdur ER 30mg  -Holding off Entresto/Spironolactone due to CHEN, resume when renal function normalizes and/or on outpatient basis.   -Her BNP of 37,000 worsened compared to 12,000 two weeks ago, uncertain etiology   -s/p IV furosemide, can continue today and possibly switch to PO by tomorrow depending on how patient feels and how much she puts out  -Patient has SubQ ICD, last checked June 1.2021, battery at 81%, no events noted  -OP follow up with me in the office after discharge    3. HTN: Well controlled on metoprolol, hydralazine     4. HLD: On atorvastatin     5. LV thrombus: Continue warfarin, target INR 2-3    ***Note that this is a preliminary note and any recommendations should NOT be carried out until this note is finalized. *** 66-year-old female former smoker with DM, HTN, HLD, NICM (EF 29% by echo 09/2021) s/p SubQ ICD, PE s/p IVC filter and LV thrombus on warfarin who presented ot the ER with dyspnea, possibly in setting of acute-on-chronic systolic HF exacerbation given elevated pro-BNP.     1. Chronic, systolic HF exacerbation (NICM):  -Patient is on metoprolol ER 25mg, hydralazine 10mg PO TID and Imdur ER 30mg  -Holding off Entresto/Spironolactone due to CHEN, resume when renal function normalizes and/or on outpatient basis.    -Her BNP of 37,000 worsened compared to 12,000 two weeks ago, uncertain etiology. Possible gradual fluid accumulation over this hospitalization since Entresto was held; however it was resumed a few days prior to discharge so etiology still remains unclear.    -s/p IV furosemide, can continue today and possibly switch to PO by tomorrow depending on how patient feels and how much she puts out  -Patient has SubQ ICD, last checked June 1.2021, battery at 81%, no events noted  -If no events over 24 hours, can discontinue telemetry monitoring  -OP follow up with me in the office after discharge    2. HTN: Well controlled on metoprolol, hydralazine     3. HLD: On atorvastatin     4. LV thrombus: Continue warfarin, target INR 2-3

## 2021-10-07 NOTE — H&P ADULT - NSHPREVIEWOFSYSTEMS_GEN_ALL_CORE
REVIEW OF SYSTEMS:  CONSTITUTIONAL: No fever, weight loss, or fatigue  RESPIRATORY: No cough, wheezing, chills or hemoptysis; shortness of breath+  CARDIOVASCULAR: No chest pain, palpitations, dizziness, or leg swelling  GASTROINTESTINAL: No abdominal pain. No nausea, vomiting, or hematemesis; No diarrhea or constipation. No melena or hematochezia.  GENITOURINARY: No dysuria or hematuria, urinary frequency  NEUROLOGICAL: No headaches, memory loss, loss of strength, numbness, or tremors  SKIN: No itching, burning, rashes, or lesions

## 2021-10-07 NOTE — H&P ADULT - NSHPPHYSICALEXAM_GEN_ALL_CORE
Outpatient Psychiatry Evaluation^   10/15/19   Visit start time: 0900  Visit end time:  1000  ^  Source of history:  I based this report upon my review of Gracia Whittington's  electronic medical record and my interview of Gracia Whittington.      Identifying information:   Gracia Whittington is a 16 year old female who is a senior at [employer] seen for outpatient evaluation on 10/15/19.    Chief Complaint: \"Panic attacks\", per patient     ^History of Present Illness/Presenting Problem:   Patient was interviewed individually as well as in the company of her mother.  Patient presents today for evaluation of panic attacks which have been occurring with increasing frequency since her senior year of high school began.  She describes her panic attacks as lasting from 5-15 minutes marked by significant anxiety, fears that she will lose control, diaphoresis, tachycardia, shaking, and shortness of breath.  She has a history of experiencing approximately 1 panic attack per month for the past several years though since her senior year has started, she has been experiencing them two to 3 times per week.  She states that the fear is related to feeling “stuck at school” and the number of people around her caused her anxiety to escalate.  States that she has avoided going to concerts with friends due to similar fears of not being able to escape and being surrounded by people.  She denies worrying that other people are judging her or that she is going to embarrass herself.  Initially when these panic attacks began she would just leave the classroom, though more recently she has been going to the office and then often going home.  She states that she had school 5 times so far this year due to this anxiety.  She also describes decreased appetite related to this increase in anxiety where for approximately 2 months she was only eating about a granola bar per day.  Approximately 1 week ago she weighed herself realizing that she  had lost about 20 lb, “freaked out”, and has increased her intake back to normal levels in response.  She states that this weight loss was unintentional related to decreased appetite from anxiety and adamantly denies having a distorted body image.    The patient describes a longstanding history of “constant” worrying that is most notable when she is by herself and particularly when she is trying to fall asleep at night.  She states that she is about “worst case scenarios”.  Amid her missed school days, the patient is noticing that she is much more easily overwhelmed, related to fears that she will not catch up with her school work.  This fear is out of proportion to the actual threat as she has a 3.4 GPA and is a “high achiever” according to mom.  Associated with this anxiety is persistently decreased energy which the patient attempts to ameliorate by drinking a double shot espresso every morning.  She also describes increased irritability which manifests as frustration with herself and crying spells.  She adamantly denied any yelling or physical aggression toward others.  Due to the increased worrying at nighttime, she has a history of initial insomnia which is being effectively treated with trazodone 25 mg q.h.s..    The patient currently takes venlafaxine 75 mg once daily which she finds has effectively treated her historical depressive symptoms of persistently depressed mood, decreased energy, decreased appetite, low motivation, feelings of worthlessness.  She states her last depressive episode was when she was 14.  During that time, she states that she would engaged in superficial cutting on her forearms to cope.  She states that she has not engaged in that behavior in over a year now.  She reports that she may have mild depressive symptoms now but had difficulty  the symptoms from her anxiety.    Psychiatric ROS: Patient and family deny that the patient has experienced current or past: suicidal  ideation, obsessions, compulsions, inattention, hyperactivity, phobias, decreased need for sleep, periods of markedly increased energy, auditory or visual hallucinations, or homicidal ideation.     ^Strengths (minimum of two):  Smart and driven, per mom  ^  Past Psychiatric History:   Patient has a history of major depressive disorder and generalized anxiety disorder which was being treated by Dr. Cantrell until her departure in April of 2019. Past medication trials included Zoloft and amitriptyline which, per the patient, exacerbated her depressive symptoms.  She denies having a history of psychiatric hospitalizations or suicidality.  Current medications include venlafaxine and trazodone.  Patient adamantly denies any current or past substance use.    Family History:  parent denies that there is a known family history of mental illness, substance use, or suicidality.   Family History   Problem Relation Age of Onset   • Cancer, Ovarian Maternal Aunt    • Cancer Mother         skin   • Asthma Sister    • Hypertension Maternal Grandmother    • Hypertension Maternal Grandfather           Developmental History:   Mom states that the patient was born full term weighing 5 lb 8 oz following an uncomplicated pregnancy.  Parent denies any use of alcohol, drugs, nicotine, or marijuana while pregnant. Per parent, pt met milestones on time.     Social History:  Patient lives with mom, stepdad, and stepdad son (-4 years).  Patient is a senior at [employer] where she has a 3.4 GPA.  She does not have an IEP or a 504 plan.  Patient states and parent/guardian corroborates that the patient does not have a history of abuse, neglect, being bullied, or other trauma.  Patient denies and parent/guardian corroborates that the patient does not have a history of legal issues.    Past Medical History:   Past Medical History:   Diagnosis Date   • Allergic rhinitis, cause unspecified    • Anxiety    • Chronic headaches    • Depressive  disorder    • Dizziness 11/6/2014   • History of dysmenorrhea    • Migraine with aura, without mention of intractable migraine without mention of status migrainosus 11/6/2014   • Mood changes 12/21/2014   • Motor and vocal tic disorder 2/2/2015   • Sleep walking        History reviewed. No pertinent surgical history.    OBJECTIVE:  ALLERGIES:  ALLERGIES:   Allergen Reactions   • Elavil [Amitriptyline] DEPRESSION   • Imitrex [Sumatriptan Succinate] Other (See Comments)     Difficulty breathing   • Sertraline DEPRESSION   • Topamax Other (See Comments)     Mood issue       Recent Lab Study Results:  Lab Results   Component Value Date    WBC 7.6 09/30/2019    RBC 4.28 09/30/2019    HGB 12.5 09/30/2019    HCT 38.2 09/30/2019     09/30/2019    SEG 76 09/30/2019    PMON 5 09/30/2019    PEOS 0 09/30/2019    PBASO 0 09/30/2019    ANEUT 5.8 09/30/2019    ALYMS 1.4 09/30/2019    MAURIZIO 0.3 09/30/2019    AEOS 0.0 (L) 09/30/2019    ABASO 0.0 09/30/2019     Lab Results   Component Value Date    SODIUM 141 09/30/2019    POTASSIUM 4.0 09/30/2019    CHLORIDE 103 09/30/2019    CO2 27 09/30/2019    GLUCOSE 82 09/30/2019    BUN 11 09/30/2019    CREATININE 0.69 09/30/2019    CALCIUM 9.5 09/30/2019    ALBUMIN 4.3 09/30/2019    BILIRUBIN 0.5 09/30/2019    ALKPT 84 09/30/2019    AST 20 09/30/2019    GPT 26 09/30/2019     No results for input(s): TSH, T4FREE, T3FREE in the last 72 hours.    Medications:  Current Outpatient Medications   Medication Sig   • venlafaxine XR (EFFEXOR XR) 75 MG 24 hr capsule Take 1 capsule by mouth daily. Indications: Generalized Anxiety Disorder, Panic Disorder Take with 37.5mg tablet   • venlafaxine XR (EFFEXOR XR) 37.5 MG 24 hr capsule Take 1 capsule by mouth daily. Take with 75mg tablet   • drospirenone-ethinyl estradiol (SANAM) 3-0.02 MG per tablet Take 1 tablet by mouth daily. Indications: Birth Control Treatment, dysmenorrhea   • omeprazole 20 MG tablet Take 1 tablet by mouth daily.   • traZODone  (DESYREL) 50 MG tablet Take 1/2 to two tabs PO at hs PRN for sleep   • Multiple Vitamins-Minerals (MULTIVITAMIN PO)    • diclofenac (VOLTAREN) 50 MG EC tablet Take 1 tablet by mouth 3 times daily as needed ([place]).     Current Facility-Administered Medications   Medication   • hydrOXYzine (ATARAX) tablet 10 mg       Medical Multi-System Review of Symptoms:  A complete, 10-point review of systems was conducted and is negative apart from as follows or as mentioned in HPI.    VITALS:  Visit Vitals  LMP 10/03/2019 (Approximate)     Ht Readings from Last 1 Encounters:   10/10/19 5' 3.3\" (1.608 m) (36 %, Z= -0.36)*     * Growth percentiles are based on [facility] (Girls, 2-20 Years) data.     Wt Readings from Last 1 Encounters:   10/10/19 44.3 kg (3 %, Z= -1.85)*     * Growth percentiles are based on [facility] (Girls, 2-20 Years) data.       Exam:  HEENT: PERRLA, no visible lesions of the ears and nose  Respiratory: normal respiratory effort  Skin: warm and dry      ^Mental Status Examination:  Gait:  Normal.    Posture: Normal.  Appearance:  Well-groomed, fair eye contact, appears stated age.   Hygiene: Within normal limits  Distress level: Mild  Behavior: calm, pleasant, interactive and cooperative   Motor: No tics, tremors, or psychomotor agitation/ retardation.  Speech:  Normal rate, tone, and volume.   Language:  No abnormality noted.    Mood: \"anxious\"  Affect:  euthymic, mood congruent, mildly anxious  Thought Process:  Linear, logical, goal-directed.    Thought Content: No overt delusions or abnormality noted. Denied having a desire to harm self or others.  Perception: Does not appear to be responding to internal stimuli. No hallucinations. Not responding to internal stimuli.  No intrusive recollections.  Consciousness: Awake and alert.   Orientation: Oriented to person, place and time.   Memory: Good, able to demonstrate accurate historical recall for recent and more remote events and discussions.  Attention:   good  Concentration: good  Fund of Knowledge:  Consistent with age and education.   Insight:  Fair for age  Judgment:  fair based on appropriate recent decisions.     Patient's ability/willingness to solve problems:  Good    Risk Assessment:  - Static Risk Factors:  Age  - Modifiable Risk Factors:  Anxiety    - Protective Factors:  patient denies and parent/guardian corroborates that the patient does not have a history of suicidal ideation or substance use; patient denies having access to guns, patient is actively engaged in treatment patient is future-oriented and hopeful  - Imminent Risk Factors:  none  - Overall Risk:    low    [place] ePDMP record reviewed on 10/15/19 and no suspicious activity was noted.     Assessment/Medical Decision Making and Narrative Plan:  Gracia Whittington is a 16 year old who presented on 10/15/2019 for evaluation and treatment of an exacerbation of anxiety.  Patient meets criteria for panic disorder with agoraphobia as she experiences frequent panic attacks and avoidance of situations in which she has had or fears she may have a panic attack.  She also meets criteria for generalized anxiety disorder given her history of frequent and excessive worrying associated with initial insomnia, decreased energy, feeling of restlessness, and irritability.  She meets criteria for major depressive disorder in full remission based on her history of experiencing daily persistently depressed mood, mild anhedonia, decreased energy, decreased appetite, and difficulty concentrating.  We discussed that the gold standard treatment for her panic disorder is a combination of medication with exposure therapy so referred her for a therapy, will increase her dose of venlafaxine, and will provide a 1 month course of hydroxyzine which can take in the morning before school to prevent anxiety in the service of improving the patient's school attendance while we are waiting for the increased dose venlafaxine to take  full effect.  Discussed with patient and parent that the expectation is that in 1-2 months the patient will no longer take the hydroxyzine.  Patient and parent are in agreement with this plan.  As the patient will turn 17 in a couple of weeks, will work toward transferring the patient to the adult psychiatric provider in the clinic.  ED Diagnosis   1. Panic disorder with agoraphobia  SERVICE TO BEHAVIORAL HEALTH    PSYCH DIA EVAL W/MED SRVCS   2. Generalized anxiety disorder  venlafaxine XR (EFFEXOR XR) 75 MG 24 hr capsule    venlafaxine XR (EFFEXOR XR) 37.5 MG 24 hr capsule    hydrOXYzine (ATARAX) tablet 10 mg    SERVICE TO BEHAVIORAL HEALTH    PSYCH DIA EVAL W/MED SRVCS   3. Major depressive disorder with single episode, in full remission (CMS/[facility])  SERVICE TO BEHAVIORAL HEALTH    PSYCH DIA EVAL W/MED SRVCS      ^  1.Based on the available information provided during today's evaluation and based on chart review, the patient does not pose any imminent danger of harming self or others; therefore, the patient can continue to be followed as an outpatient.     2. Diagnostic impression, prognosis and treatment were discussed with the patient and parent(s)/guardian(s).    3. Medication:  Increase venlafaxine ER from 75 mg to 112.5 mg once daily  Continue trazodone at 25 mg q.h.s. p.r.n. for initial insomnia  Start hydroxyzine 10 mg once daily p.r.n. for school related anxiety.    The possible side effects, benefits, indications, and alternatives (including no treatment) of pharmacotherapy were discussed with the parent/guardian, and at the end of the discussion, they denied having further questions or concerns.   4. Therapy:  Referred for exposure therapy to treat panic disorder    5. Other treatment approaches discussed:  Cessation of caffeine use as her caffeine use is contributing to anxiety    6. NORA's on file for this patient:  None    7.Parent(s)/Guardian(s) has author's contact information. They were informed  that they should only direct non-emergent, non-urgent concerns to the author. Parent(s)/Guardian(s) agreed to go to the nearest emergency department if they are concerned that the patient is a danger to self or others.    8. Follow-up in approximately 1 month with new provider    Fide Humphrey MD  10/15/19     The medical opinions expressed in this report are based only on information available at this time and are subject to change if any additional information becomes available.    ICU Vital Signs Last 24 Hrs  T(C): 36.9 (07 Oct 2021 10:25), Max: 37.2 (07 Oct 2021 06:13)  T(F): 98.4 (07 Oct 2021 10:25), Max: 99 (07 Oct 2021 06:13)  HR: 95 (07 Oct 2021 10:25) (86 - 112)  BP: 142/99 (07 Oct 2021 10:25) (125/109 - 155/104)  RR: 19 (07 Oct 2021 10:25) (17 - 41)  SpO2: 98% (07 Oct 2021 10:25) (98% - 100%)    GENERAL: NAD, obese  HEAD:  Atraumatic, Normocephalic  EYES:  conjunctiva and sclera clear  NECK: Supple, No JVD, Normal thyroid  CHEST/LUNG: Clear to auscultation. Clear to percussion bilaterally; No rales, rhonchi, wheezing, or rubs  HEART: Regular rate and rhythm; No murmurs, rubs, or gallops  ABDOMEN: Soft, Nontender, Nondistended; Bowel sounds present, no pain or masses on palpation   NERVOUS SYSTEM:  Alert & Oriented X3, no neuro deficits   EXTREMITIES:  2+ Peripheral Pulses, No clubbing, cyanosis, or edema  : Mauro in place   SKIN: warm, dry

## 2021-10-07 NOTE — ED ADULT NURSE NOTE - NSIMPLEMENTINTERV_GEN_ALL_ED
Implemented All Fall Risk Interventions:  [place] to call system. Call bell, personal items and telephone within reach. Instruct patient to call for assistance. Room bathroom lighting operational. Non-slip footwear when patient is off stretcher. Physically safe environment: no spills, clutter or unnecessary equipment. Stretcher in lowest position, wheels locked, appropriate side rails in place. Provide visual cue, wrist band, yellow gown, etc. Monitor gait and stability. Monitor for mental status changes and reorient to person, place, and time. Review medications for side effects contributing to fall risk. Reinforce activity limits and safety measures with patient and family.

## 2021-10-07 NOTE — H&P ADULT - PROBLEM SELECTOR PLAN 1
- Patient readmitted for acute dyspnea, previously treated for COPD exacerbation   - On admission, patient with sob, hypoxic requiring NRBM, CXR compared to previous one noted to have no significant changes, Pro BNP 37k from 12k, Cr 1.9 from 1.3  - Dyspnea could be acute on chronic CHF vs symptoms from PE on coumadin vs worsening COPD  - Will give Lasix IV today and transition to po as feasible  - EKG sinus tachycardia, no ST changes, trop x 1 neagtive   - Last Echo 9/2021 showed Severe left ventricular enlargement. Severely reduced left ventricular systolic function.  Diffuse hypokinesis. Severe functional mitral regurgitation.  - Hold Entresto for now due to CHEN, c/w hydralazine, Imdur, BB, statin   - Strict I & O, Daily weight, Restrict fluids to 1 L  - Tele monitor  - Cardio Dr Doshi consulted

## 2021-10-07 NOTE — CHART NOTE - NSCHARTNOTEFT_GEN_A_CORE
On Call SW consult requested as a sign off from the evening SW. Pt is a 66 year old with significant PMH. Pt is enrolled the [facility] readmission program.  Pt was brought in by EMS due to Respiratory distress. SW spoke with ED Attending and pt will be admitted.   The day SW shift will follow-up as needed.

## 2021-10-07 NOTE — H&P ADULT - ATTENDING COMMENTS
Patient seen and examined around 11.30 AM. Discussed with ARISTEO Horn    (Patient known to me from recent admission discharged yesterday).    CC: Respiratory distress at home    66 year old F from home (lives alone with 24 hour HHA), baseline AAOx2, ambulates independently wioth some assistance, hasPMHx of CAD, T2DM (last A1c 5.9%), Hypothyroidism, HTN, HFrEF with recent exacerbation, cardiomyopathy (EF 25% on 9/16/21, s/p SQ AICD), COPD (no prior intubations), CVA, DVT (L leg 1994)/PE (s/p IVC filter 9/2020, and repeat saddle PE at [facility] 8/21-9/3 now on Coumadin), infrarenal AAA, cerebral aneurysm, atypical Depression, Right breast cancer (s/p chemo/XRT), Class I Obesity, CKD3-A, PSHx of hysterectomy (for fibroids),and cholecystectomy presented to the ED with worsening shortness of breath s/p discharge on 10/6/21 after being treated for CHF exacerbation. Patient denies chest pain, palpitations, dizziness, headache, fever, chills, cough, GI or  symptoms.  Of note, Pro BNP on admission is 37k from 12k, Cr 1.9 from 1.3, CXR about the same compared to 9/25/21    Vital Signs Last 24 Hrs  T(C): 36.6 (07 Oct 2021 11:55), Max: 37.2 (07 Oct 2021 06:13)  T(F): 97.8 (07 Oct 2021 11:55), Max: 99 (07 Oct 2021 06:13)  HR: 99 (07 Oct 2021 11:55) (86 - 112)  BP: 116/84 (07 Oct 2021 11:55) (116/84 - 155/104)  RR: 18 (07 Oct 2021 11:55) (18 - 41)  SpO2: 98% (07 Oct 2021 11:55) (98% - 100%)    P/E:  Psych: AAO x3; at baseline mood  Neuro: No focal deficits  CVS: S1S2 present, regular, no edema  Resp: BLAE+, No wheeze or Rhonchi; Decreased Breath sounds at right base  GI: Soft, BS+, NT  Extr: No edema or calf tenderness B./L LE    Labs:                        14.6   5.68  )-----------( 239      ( 07 Oct 2021 10:26 )             46.5   10-07    144  |  111<H>  |  34<H>  ----------------------------<  199<H>  4.7   |  18<L>  |  1.83<H>    Ca    9.8      07 Oct 2021 10:26  Phos  4.2     10-07  Mg     1.6     10-07    TPro  7.4  /  Alb  3.2<L>  /  TBili  1.4<H>  /  DBili  x   /  AST  40  /  ALT  39  /  AlkPhos  309<H>  10-07    PT/INR - ( 07 Oct 2021 00:17 )   PT: 25.7 sec;   INR: 2.24 ratio    PTT - ( 07 Oct 2021 00:17 )  PTT:32.1 sec    Serum Pro-Brain Natriuretic Peptide (10.07.21 @ 00:17) Serum Pro-Brain Natriuretic Peptide: 10217:  Serum Pro-Brain Natriuretic Peptide (09.25.21 @ 21:13) Serum Pro-Brain Natriuretic Peptide: 55110:     D/D:  Acute Hypoxic Resp. failure suspect mild CHF exacerbation  etiology unclear   CHEN suspect due to Entresto vs Fluid overload on CKD 3   COPD stable  Anxiety disorder with Atypical depression   Type 2 DM  Hx DVT and saddle PE on coumadin    Plan:  Tele x 24 hrs  given rise in BNP and transient symptoms concern1 for mild fluid overload not clinically apparent will give a dose of Lasix 40 mg IV once  Hold Entresto for now and will not resume may have contributed to CHEN as he got it last 2 days prior to Creatinine rise from 1.38 AM to 1.98 overnight. d/w Cardiology Dr. Doshi. consult appreciated  Continue Imdur/ Coumadin 4 mg tonight as patient most likely has missed Coumadin last ngiht  IF BP ELEVATED INCREASE HYDRALAZINE TO 25 MG Q 8 HRS AND IF NEEDED CAN FURTHER TITRATE UP TO 50 MG Q 8 HRS  Palliative Consult was done last admission and extensive d/w HCP who wishes FULL CODE; Patient does not have insight into her complex medical issues.  Rest as per PGY2 MAR above; d/w Dr. Horn  Discussed with patient findings and plan of care  Discussed with RN Patient seen and examined around 11.30 AM. Discussed with ARISTEO Horn    (Patient known to me from recent admission discharged yesterday).    CC: Respiratory distress at home    66 year old F from home (lives alone with 24 hour HHA), baseline AAOx2, ambulates independently wioth some assistance, hasPMHx of CAD, T2DM (last A1c 5.9%), Hypothyroidism, HTN, HFrEF with recent exacerbation, cardiomyopathy (EF 25% on 9/16/21, s/p SQ AICD), COPD (no prior intubations), CVA, DVT (L leg 1994)/PE (s/p IVC filter 9/2020, and repeat saddle PE at [facility] 8/21-9/3 now on Coumadin), infrarenal AAA, cerebral aneurysm, atypical Depression, Right breast cancer (s/p chemo/XRT), Class I Obesity, CKD3-A, PSHx of hysterectomy (for fibroids),and cholecystectomy presented to the ED with worsening shortness of breath s/p discharge on 10/6/21 after being treated for CHF exacerbation. Patient denies chest pain, palpitations, dizziness, headache, fever, chills, cough, GI or  symptoms.  Of note, Pro BNP on admission is 37k from 12k, Cr 1.9 from 1.3, CXR about the same compared to 9/25/21    Vital Signs Last 24 Hrs  T(C): 36.6 (07 Oct 2021 11:55), Max: 37.2 (07 Oct 2021 06:13)  T(F): 97.8 (07 Oct 2021 11:55), Max: 99 (07 Oct 2021 06:13)  HR: 99 (07 Oct 2021 11:55) (86 - 112)  BP: 116/84 (07 Oct 2021 11:55) (116/84 - 155/104)  RR: 18 (07 Oct 2021 11:55) (18 - 41)  SpO2: 98% (07 Oct 2021 11:55) (98% - 100%)    P/E:  Psych: AAO x3; at baseline mood  Neuro: No focal deficits  CVS: S1S2 present, regular, no edema  Resp: BLAE+, No wheeze or Rhonchi; Decreased Breath sounds at right base  GI: Soft, BS+, NT  Extr: No edema or calf tenderness B./L LE    Labs:                        14.6   5.68  )-----------( 239      ( 07 Oct 2021 10:26 )             46.5   10-07    144  |  111<H>  |  34<H>  ----------------------------<  199<H>  4.7   |  18<L>  |  1.83<H>    Ca    9.8      07 Oct 2021 10:26  Phos  4.2     10-07  Mg     1.6     10-07    TPro  7.4  /  Alb  3.2<L>  /  TBili  1.4<H>  /  DBili  x   /  AST  40  /  ALT  39  /  AlkPhos  309<H>  10-07    PT/INR - ( 07 Oct 2021 00:17 )   PT: 25.7 sec;   INR: 2.24 ratio    PTT - ( 07 Oct 2021 00:17 )  PTT:32.1 sec    Serum Pro-Brain Natriuretic Peptide (10.07.21 @ 00:17) Serum Pro-Brain Natriuretic Peptide: 93854:  Serum Pro-Brain Natriuretic Peptide (09.25.21 @ 21:13) Serum Pro-Brain Natriuretic Peptide: 99813:     D/D:  Acute Hypoxic Resp. failure suspect mild CHF exacerbation (Acute on Chronic systolic)   CHEN suspect due to Entresto vs Fluid overload on CKD 3   COPD stable  Anxiety disorder with Atypical depression   Type 2 DM  Hx DVT and saddle PE on coumadin    Plan:  Tele x 24 hrs  given rise in BNP and transient symptoms concern1 for mild fluid overload not clinically apparent will give a dose of Lasix 40 mg IV once  Hold Entresto for now and will not resume may have contributed to CHEN as he got it last 2 days prior to Creatinine rise from 1.38 AM to 1.98 overnight. d/w Cardiology Dr. Doshi. consult appreciated  Continue Imdur/ Coumadin 4 mg tonight as patient most likely has missed Coumadin last night  IF BP ELEVATED INCREASE HYDRALAZINE TO 25 MG Q 8 HRS AND IF NEEDED CAN FURTHER TITRATE UP TO 50 MG Q 8 HRS  Palliative Consult was done last admission and extensive d/w HCP who wishes FULL CODE; Patient does not have insight into her complex medical issues.  Rest as per PGY2 MAR above; d/w Dr. Horn  Discussed with patient findings and plan of care  Discussed with RN

## 2021-10-07 NOTE — H&P ADULT - PROBLEM SELECTOR PLAN 6
- Patient has hx of DM diet controlled at home  - Started on HSS  - Fingerstick before meals and at bedtime  - DASH diet with consistent carb  - Target -180

## 2021-10-07 NOTE — H&P ADULT - HISTORY OF PRESENT ILLNESS
66 year old F from home (lives alone w/ HHA), baseline AAOx3, ambulates independently, hasPMHx of CAD, T2DM (last A1c 5.9%), Hypothyroidism, HTN, HFrEF, cardiomyopathy (EF 25% on 9/16/21, s/p AICD), COPD (no prior intubations), CVA, DVT (L leg 1994)/PE (s/p IVC filter 9/2020, and repeat saddle PE at [facility] 8/21-9/3 now on Coumadin), infrarenal AAA, cerebral aneurysm, atypical Depression, R breast cancer (s/p chemo/XRT), Class I Obesity, CKD3-A, PSHx of hysterectomy (for fibroids),and cholecystectomy presented to the ED with worsening shortness of breath s/p discharge on 10/6/21 after being treated for COPD exacerbation. Patient denies chest pain, palpitations, dizziness, headache, fever, chills, cough, GI or  symptoms.  Of note, Pro BNP on admission is 37k from 12k, Cr 1.9 from 1.3, CXR about the same compared to 9/25/21

## 2021-10-07 NOTE — H&P ADULT - ASSESSMENT
66 year old F hasPMHx of CAD, T2DM (last A1c 5.9%), Hypothyroidism, HTN, HFrEF, cardiomyopathy (EF 25% on 9/16/21, s/p AICD), COPD (no prior intubations), CVA, DVT (L leg 1994)/PE (s/p IVC filter 9/2020, and repeat saddle PE at [facility] 8/21-9/3 now on Coumadin), infrarenal AAA, cerebral aneurysm, atypical Depression, R breast cancer (s/p chemo/XRT), Class I Obesity, CKD3-A presented to the ED with worsening shortness of breath s/p discharge on 10/6/21 after being treated for COPD exacerbation. Patient admitted for dyspnea

## 2021-10-07 NOTE — ED PROVIDER NOTE - OBJECTIVE STATEMENT
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 Pt was discharged today from this hospital for a COPD/CHF exacerbation. While at home worsening SOB and EMS called. as per EMS o2 on RA was 88% and 100% on NRB. hx limited from patient.

## 2021-10-07 NOTE — ED PROVIDER NOTE - NSICDXPASTSURGICALHX_GEN_ALL_CORE_FT
PAST SURGICAL HISTORY:  Breast cancer infusaport insertion- 2014- left chest    Fibroid uterus sp hystrectomy 1196    [place] filter in place 1994    S/P hysterectomy 1994    S/P lumpectomy, right breast 2014, revision of position- 2015

## 2021-10-07 NOTE — ED ADULT NURSE NOTE - OBJECTIVE STATEMENT
the patient is  a 66  yr old Female brought by notification for SOB. pt arrived with o2 sat 93% on RA . 12 lead ekg done . cardiac monitoring started . iv started in right Ac  sono guided by WANDY madrigal . blood sent for C/S , lactate , and everything ordered . de cath inserted  . BIPP 12/6 100 % applied . o 2 sat  100 % so  oxygen down to 40 %. vbg sent

## 2021-10-07 NOTE — H&P ADULT - PROBLEM SELECTOR PLAN 10
IMPROVE VTE Individual Risk Assessment  RISK                                                                Points  [  ] Previous VTE                                                  3  [  ] Thrombophilia                                               2  [  ] Lower limb paralysis                                      2        (unable to hold up >15 seconds)    [  ] Current Cancer                                              2         (within 6 months)  [  ] Immobilization > 24 hrs                                1  [  ] ICU/CCU stay > 24 hours                              1  [  ] Age > 60                                                      1  IMPROVE VTE Score _____2____    PPI for GI prophylaxis  C/w coumadin full AC

## 2021-10-07 NOTE — H&P ADULT - PROBLEM SELECTOR PLAN 3
- On admission, patient with Cr 1.9, baseline 1.3  - CHEN multifactorial -- prerenal in the setting of acute on chronic CHF vs recent start of ENTRESTO  - F/u urinalysis, urine lytes (urine sodium, urine creatinine, urine urea)  - Monitor I/O's daily  - Avoid nephrotoxins, NSAIDS, ACEI and ARBS  - Hold Entresto for now   - Monitor BMP daily

## 2021-10-07 NOTE — H&P ADULT - PROBLEM SELECTOR PLAN 7
- Patient has history of Hypertension on metoprolol, Entresto, hydralazine, Imdur at home   - C/w metoprolol, hydralazine and Imdur with parameters  - Hold Entresto due to CHEN  - DASH diet  - Monitor BP and adjust meds as needed

## 2021-10-07 NOTE — H&P ADULT - PROBLEM SELECTOR PLAN 4
- Patient has hx of DVT and most recent PE on coumadin   - Monitor INR target 2-4  - Order Coumadin one dose every night   - C/w home meds

## 2021-10-07 NOTE — CONSULT NOTE ADULT - SUBJECTIVE AND OBJECTIVE BOX
CHIEF COMPLAINT: Shortness of breath    HPI: 66-year-old female former smoker with DM, HTN, HLD, breast CA s/p chemo/XRT, and NICM (catheterization 02/2019 showed dRPL occlusion without ability to intervene), EF 31% by MUGA 7/2019, and s/p [employer] SubQ ICD 10/2019. Patient developed a PE and was started on Eliquis, and IVC filter was also placed. In August, patient presented to [facility] with dyspnea and was found to have a saddle PE. An LV thrombus was also noted and patient was changed over to warfarin. Patient was admitted for chronic HF exacerbation, discharged home, and then came back to the Er several hours later complaining of dyspnea. She          PAST MEDICAL & SURGICAL HISTORY:  As above, also Knee effusion, left: arthrocentesis- 05/25/16, Internal carotid aneurysm, Abdominal aneurysm: infrarenal- followed by Vascular , Dr. Gutierrez, Sickle cell trait, DVT (deep venous thrombosis): Left leg in 1994 after hysterectomy  S/P hysterectomy: 1994, [place] filter in place: 1994  S/P lumpectomy, right breast: 2014, revision of position- 2015    Allergies    Advil (Hives; Vomiting)    MEDICATIONS  (STANDING):  anastrozole 1 milliGRAM(s) Oral daily  ARIPiprazole 2 milliGRAM(s) Oral daily  atorvastatin 40 milliGRAM(s) Oral at bedtime  hydrALAZINE 10 milliGRAM(s) Oral every 8 hours  insulin lispro (ADMELOG) corrective regimen sliding scale   SubCutaneous three times a day before meals  insulin lispro (ADMELOG) corrective regimen sliding scale   SubCutaneous at bedtime  isosorbide   mononitrate ER Tablet (IMDUR) 30 milliGRAM(s) Oral daily  levothyroxine 25 MICROGram(s) Oral daily  metoprolol tartrate 12.5 milliGRAM(s) Oral two times a day  pantoprazole    Tablet 40 milliGRAM(s) Oral before breakfast  sacubitril 24 mG/valsartan 26 mG 1 Tablet(s) Oral two times a day  warfarin 2.5 milliGRAM(s) Oral at bedtime    MEDICATIONS  (PRN):  ALBUTerol    90 MICROgram(s) HFA Inhaler 2 Puff(s) Inhalation every 6 hours PRN Shortness of Breath and/or Wheezing      FAMILY HISTORY:  Family history of hypertension    Family history of diabetes mellitus    Family history of renal stone    Family history of glioblastoma    Family history of liver failure    History of hypertension  mother    Family history of glioblastoma (Sibling)    Family history of renal failure  uncle    Family history of other condition  please open box and see additional medical family history    Family history of glioblastoma      No family history of premature coronary artery disease or sudden cardiac death    SOCIAL HISTORY:  Smoking-  Alcohol-  Illicit Drug use-    REVIEW OF SYSTEMS:  Constitutional: [ ] fever, [ ]weight loss,  [ ]fatigue  Eyes: [ ] visual changes  Respiratory: [ ]shortness of breath;  [ ] cough, [ ]wheezing, [ ]chills, [ ]hemoptysis  Cardiovascular: [ ] chest pain, [ ]palpitations, [ ]dizziness,  [ ]leg swelling [ ]syncope  Gastrointestinal: [ ] abdominal pain, [ ]nausea, [ ]vomiting,  [ ]diarrhea   Genitourinary: [ ] dysuria, [ ] hematuria  Neurologic: [ ] headaches [ ] tremors  [ ] weakness [ ] lightheadedness  Skin: [ ] itching, [ ]burning, [ ] rashes  Endocrine: [ ] heat or cold intolerance  Musculoskeletal: [ ] joint pain or swelling; [ ] muscle, back, or extremity pain  Psychiatric: [ ] depression, [ ]anxiety, [ ]mood swings, or [ ]difficulty sleeping  Hematologic: [ ] easy bruising, [ ] bleeding gums       [ x] All others negative	  [ ] Unable to obtain    Vital Signs Last 24 Hrs  T(C): 37.1 (07 Oct 2021 07:05), Max: 37.2 (07 Oct 2021 06:13)  T(F): 98.7 (07 Oct 2021 07:05), Max: 99 (07 Oct 2021 06:13)  HR: 101 (07 Oct 2021 07:05) (86 - 112)  BP: 138/114 (07 Oct 2021 07:05) (125/109 - 155/104)  BP(mean): --  RR: 21 (07 Oct 2021 07:05) (17 - 41)  SpO2: 100% (07 Oct 2021 07:05) (98% - 100%)  I&O's Summary      PHYSICAL EXAM:  General: No acute distress  HEENT: EOMI, PERRL  Neck: Supple, No JVD  Lungs: Clear to auscultation bilaterally; No rales or wheezing  Heart: Regular rate and rhythm; No murmurs, rubs, or gallops  Abdomen: Nontender, bowel sounds present  Extremities: No clubbing, cyanosis, or edema  Nervous system:  Alert & Oriented X3, no focal deficits  Psychiatric: Normal affect  Skin: No rashes or lesions      LABS:  10-07    144  |  111<H>  |  32<H>  ----------------------------<  207<H>  4.0   |  17<L>  |  1.98<H>    Ca    9.1      07 Oct 2021 00:17  Mg     1.7     10-07    TPro  7.4  /  Alb  3.2<L>  /  TBili  1.4<H>  /  DBili  x   /  AST  40  /  ALT  39  /  AlkPhos  309<H>  10-07    Creatinine Trend: 1.98<--, 1.36<--, 1.29<--, 1.35<--, 1.48<--, 1.56<--                        13.5   5.56  )-----------( 262      ( 07 Oct 2021 00:17 )             43.0     PT/INR - ( 07 Oct 2021 00:17 )   PT: 25.7 sec;   INR: 2.24 ratio         PTT - ( 07 Oct 2021 00:17 )  PTT:32.1 sec    Lipid Panel:   Cardiac Enzymes: CARDIAC MARKERS ( 07 Oct 2021 00:17 )  0.172 ng/mL / x     / x     / x     / x        Serum Pro-Brain Natriuretic Peptide: 61648 pg/mL (10-07-21 @ 00:17)     ECG [my interpretation]:    TELEMETRY:    CXR: Shows clear lungs, small L pleural effusion, not significantly changed from prior from 9/25    ECHO: < from: Transthoracic Echocardiogram (09.16.21 @ 18:29) >  Conclusions:  Severe left ventricular enlargement.  Severely reduced left ventricular systolic function.  Diffuse hypokinesis.  Severe functional mitral regurgitation.    < end of copied text >      STRESS TEST:    CATHETERIZATION: CHIEF COMPLAINT: Shortness of breath    HPI: 66-year-old female former smoker with DM, HTN, HLD, breast CA s/p chemo/XRT, and NICM (catheterization 02/2019 showed dRPL occlusion without ability to intervene), EF 31% by MUGA 7/2019, and s/p [employer] SubQ ICD 10/2019. Patient developed a PE and was started on Eliquis, and IVC filter was also placed. In August, patient presented to [facility] with dyspnea and was found to have a saddle PE. An LV thrombus was also noted and patient was changed over to warfarin. Patient was admitted for chronic HF exacerbation, discharged home, and then came back to the Er several hours later complaining of dyspnea. She          PAST MEDICAL & SURGICAL HISTORY:  As above, also Knee effusion, left: arthrocentesis- 05/25/16, Internal carotid aneurysm, Abdominal aneurysm: infrarenal- followed by Vascular , Dr. Gutierrez, Sickle cell trait, DVT (deep venous thrombosis): Left leg in 1994 after hysterectomy  S/P hysterectomy: 1994, [place] filter in place: 1994  S/P lumpectomy, right breast: 2014, revision of position- 2015    Allergies    Advil (Hives; Vomiting)    MEDICATIONS  (STANDING):  anastrozole 1 milliGRAM(s) Oral daily  ARIPiprazole 2 milliGRAM(s) Oral daily  atorvastatin 40 milliGRAM(s) Oral at bedtime  hydrALAZINE 10 milliGRAM(s) Oral every 8 hours  insulin lispro (ADMELOG) corrective regimen sliding scale   SubCutaneous three times a day before meals  insulin lispro (ADMELOG) corrective regimen sliding scale   SubCutaneous at bedtime  isosorbide   mononitrate ER Tablet (IMDUR) 30 milliGRAM(s) Oral daily  levothyroxine 25 MICROGram(s) Oral daily  metoprolol tartrate 12.5 milliGRAM(s) Oral two times a day  pantoprazole    Tablet 40 milliGRAM(s) Oral before breakfast  sacubitril 24 mG/valsartan 26 mG 1 Tablet(s) Oral two times a day  warfarin 2.5 milliGRAM(s) Oral at bedtime    MEDICATIONS  (PRN):  ALBUTerol    90 MICROgram(s) HFA Inhaler 2 Puff(s) Inhalation every 6 hours PRN Shortness of Breath and/or Wheezing      FAMILY HISTORY:  Family history of hypertension    Family history of diabetes mellitus    Family history of renal stone    Family history of glioblastoma    Family history of liver failure    History of hypertension  mother    Family history of glioblastoma (Sibling)    Family history of renal failure  uncle    Family history of other condition  please open box and see additional medical family history    Family history of glioblastoma      No family history of premature coronary artery disease or sudden cardiac death    SOCIAL HISTORY:  Smoking-  Alcohol-  Illicit Drug use-    REVIEW OF SYSTEMS:  Constitutional: [ ] fever, [ ]weight loss,  [ ]fatigue  Eyes: [ ] visual changes  Respiratory: [ ]shortness of breath;  [ ] cough, [ ]wheezing, [ ]chills, [ ]hemoptysis  Cardiovascular: [ ] chest pain, [ ]palpitations, [ ]dizziness,  [ ]leg swelling [ ]syncope  Gastrointestinal: [ ] abdominal pain, [ ]nausea, [ ]vomiting,  [ ]diarrhea   Genitourinary: [ ] dysuria, [ ] hematuria  Neurologic: [ ] headaches [ ] tremors  [ ] weakness [ ] lightheadedness  Skin: [ ] itching, [ ]burning, [ ] rashes  Endocrine: [ ] heat or cold intolerance  Musculoskeletal: [ ] joint pain or swelling; [ ] muscle, back, or extremity pain  Psychiatric: [ ] depression, [ ]anxiety, [ ]mood swings, or [ ]difficulty sleeping  Hematologic: [ ] easy bruising, [ ] bleeding gums       [ x] All others negative	  [ ] Unable to obtain    Vital Signs Last 24 Hrs  T(C): 37.1 (07 Oct 2021 07:05), Max: 37.2 (07 Oct 2021 06:13)  T(F): 98.7 (07 Oct 2021 07:05), Max: 99 (07 Oct 2021 06:13)  HR: 101 (07 Oct 2021 07:05) (86 - 112)  BP: 138/114 (07 Oct 2021 07:05) (125/109 - 155/104)  BP(mean): --  RR: 21 (07 Oct 2021 07:05) (17 - 41)  SpO2: 100% (07 Oct 2021 07:05) (98% - 100%)  I&O's Summary      PHYSICAL EXAM:  General: No acute distress  HEENT: EOMI, PERRL  Neck: Supple, No JVD  Lungs: Clear to auscultation bilaterally; No rales or wheezing  Heart: Regular rate and rhythm; No murmurs, rubs, or gallops  Abdomen: Nontender, bowel sounds present  Extremities: No clubbing, cyanosis, or edema  Nervous system:  Alert & Oriented X3, no focal deficits  Psychiatric: Normal affect  Skin: No rashes or lesions      LABS:  10-07    144  |  111<H>  |  32<H>  ----------------------------<  207<H>  4.0   |  17<L>  |  1.98<H>    Ca    9.1      07 Oct 2021 00:17  Mg     1.7     10-07    TPro  7.4  /  Alb  3.2<L>  /  TBili  1.4<H>  /  DBili  x   /  AST  40  /  ALT  39  /  AlkPhos  309<H>  10-07    Creatinine Trend: 1.98<--, 1.36<--, 1.29<--, 1.35<--, 1.48<--, 1.56<--                        13.5   5.56  )-----------( 262      ( 07 Oct 2021 00:17 )             43.0     PT/INR - ( 07 Oct 2021 00:17 )   PT: 25.7 sec;   INR: 2.24 ratio         PTT - ( 07 Oct 2021 00:17 )  PTT:32.1 sec    Cardiac Enzymes: CARDIAC MARKERS ( 07 Oct 2021 00:17 )  0.172 ng/mL / x     / x     / x     / x        Serum Pro-Brain Natriuretic Peptide: 51335 pg/mL (10-07-21 @ 00:17)     ECG [my interpretation]: 10/6/21 @ 23:24: Sinus tachycardia at 107 bpm, normal axis, poor R-wave progression; unchanged from prior    TELEMETRY:    CXR: Shows clear lungs, small L pleural effusion, not significantly changed from prior from 9/25    ECHO: < from: Transthoracic Echocardiogram (09.16.21 @ 18:29) >  Conclusions:  Severe left ventricular enlargement.  Severely reduced left ventricular systolic function.  Diffuse hypokinesis.  Severe functional mitral regurgitation.    < end of copied text >    CATHETERIZATION: < from: Cardiac Cath Lab - Adult (02.22.19 @ 17:02) >  VENTRICLES: EF estimated was 30 %.  CORONARY VESSELS: The coronary circulation is right dominant.  LM:   --  LM: Angiography showed mild atherosclerosis with no flow limiting  lesions.  LAD:   --  LAD: Angiography showed mild atherosclerosis with no flow  limiting lesions.  CX:   --  Circumflex: Angiography showed mild atherosclerosis with no flow  limiting lesions.  RCA:   --  Mid RCA: There was a tubular 20 % stenosis at a site with no  prior intervention. The lesion was eccentric. There was ALTAGRACIA grade 3 flow  through the vessel (brisk flow).  --  RPDA: Angiography showed mild atherosclerosis with no flow limiting  lesions.  --  RPLS: Angiography showed mild atherosclerosis with no flow limiting  lesions.  --  RPL1: Therewas a 100 % stenosis. There was ALTAGRACIA grade 0 flow through  the vessel (no flow) and a small vascular territory distal to the lesion.  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 It does not appear amenable to  intervention.    < end of copied text >   CHIEF COMPLAINT: Shortness of breath    HPI: 66-year-old female former smoker with DM, HTN, HLD, breast CA s/p chemo/XRT, and NICM (catheterization 02/2019 showed dRPL occlusion without ability to intervene), EF 31% by MUGA 7/2019, and s/p [employer] SubQ ICD 10/2019. Patient developed a PE and was started on Eliquis, and IVC filter was also placed. In August, patient presented to [facility] with dyspnea and was found to have a saddle PE. An LV thrombus was also noted and patient was changed over to warfarin. Patient was admitted for chronic HF exacerbation, discharged home, and then came back to the ER several hours later complaining of dyspnea. She reports that she felt anxious as soon as she came home and did not find her PRN anxiety medication. She complained of no chest pain, palpitations or LH. Since she got home, she did not eat anything at all, and she took all her PM medications at the hospital before going home. She received Iv furosemide in the Er and reports that she feels better currently, though not still at baseline.                       PAST MEDICAL & SURGICAL HISTORY:  As above, also Knee effusion, left: arthrocentesis- 05/25/16, Internal carotid aneurysm, Abdominal aneurysm: infrarenal- followed by Vascular , Dr. Gutierrez, Sickle cell trait, DVT (deep venous thrombosis): Left leg in 1994 after hysterectomy  S/P hysterectomy: 1994, [place] filter in place: 1994  S/P lumpectomy, right breast: 2014, revision of position- 2015    Allergies    Advil (Hives; Vomiting)    MEDICATIONS  (STANDING):  anastrozole 1 milliGRAM(s) Oral daily  ARIPiprazole 2 milliGRAM(s) Oral daily  atorvastatin 40 milliGRAM(s) Oral at bedtime  hydrALAZINE 10 milliGRAM(s) Oral every 8 hours  insulin lispro (ADMELOG) corrective regimen sliding scale   SubCutaneous three times a day before meals  insulin lispro (ADMELOG) corrective regimen sliding scale   SubCutaneous at bedtime  isosorbide   mononitrate ER Tablet (IMDUR) 30 milliGRAM(s) Oral daily  levothyroxine 25 MICROGram(s) Oral daily  metoprolol tartrate 12.5 milliGRAM(s) Oral two times a day  pantoprazole    Tablet 40 milliGRAM(s) Oral before breakfast  sacubitril 24 mG/valsartan 26 mG 1 Tablet(s) Oral two times a day  warfarin 2.5 milliGRAM(s) Oral at bedtime    MEDICATIONS  (PRN):  ALBUTerol    90 MICROgram(s) HFA Inhaler 2 Puff(s) Inhalation every 6 hours PRN Shortness of Breath and/or Wheezing      FAMILY HISTORY:  Family history of hypertension    Family history of diabetes mellitus    Family history of renal stone    Family history of glioblastoma    Family history of liver failure    History of hypertension  mother    Family history of glioblastoma (Sibling)    Family history of renal failure  uncle    Family history of other condition  please open box and see additional medical family history    Family history of glioblastoma      No family history of premature coronary artery disease or sudden cardiac death    SOCIAL HISTORY:  Smoking-  Alcohol-  Illicit Drug use-    REVIEW OF SYSTEMS:  Constitutional: [ ] fever, [ ]weight loss,  [ ]fatigue  Eyes: [ ] visual changes  Respiratory: [ ]shortness of breath;  [ ] cough, [ ]wheezing, [ ]chills, [ ]hemoptysis  Cardiovascular: [ ] chest pain, [ ]palpitations, [ ]dizziness,  [ ]leg swelling [ ]syncope  Gastrointestinal: [ ] abdominal pain, [ ]nausea, [ ]vomiting,  [ ]diarrhea   Genitourinary: [ ] dysuria, [ ] hematuria  Neurologic: [ ] headaches [ ] tremors  [ ] weakness [ ] lightheadedness  Skin: [ ] itching, [ ]burning, [ ] rashes  Endocrine: [ ] heat or cold intolerance  Musculoskeletal: [ ] joint pain or swelling; [ ] muscle, back, or extremity pain  Psychiatric: [ ] depression, [ ]anxiety, [ ]mood swings, or [ ]difficulty sleeping  Hematologic: [ ] easy bruising, [ ] bleeding gums       [ x] All others negative	  [ ] Unable to obtain    Vital Signs Last 24 Hrs  T(C): 37.1 (07 Oct 2021 07:05), Max: 37.2 (07 Oct 2021 06:13)  T(F): 98.7 (07 Oct 2021 07:05), Max: 99 (07 Oct 2021 06:13)  HR: 101 (07 Oct 2021 07:05) (86 - 112)  BP: 138/114 (07 Oct 2021 07:05) (125/109 - 155/104)  BP(mean): --  RR: 21 (07 Oct 2021 07:05) (17 - 41)  SpO2: 100% (07 Oct 2021 07:05) (98% - 100%)  I&O's Summary      PHYSICAL EXAM:  General: No acute distress  HEENT: EOMI, PERRL  Neck: Supple, No JVD  Lungs: Clear to auscultation bilaterally; No rales or wheezing  Heart: Regular rate and rhythm; No murmurs, rubs, or gallops  Abdomen: Nontender, bowel sounds present  Extremities: No clubbing, cyanosis, or edema  Nervous system:  Alert & Oriented X3, no focal deficits  Psychiatric: Normal affect  Skin: No rashes or lesions      LABS:  10-07    144  |  111<H>  |  32<H>  ----------------------------<  207<H>  4.0   |  17<L>  |  1.98<H>    Ca    9.1      07 Oct 2021 00:17  Mg     1.7     10-07    TPro  7.4  /  Alb  3.2<L>  /  TBili  1.4<H>  /  DBili  x   /  AST  40  /  ALT  39  /  AlkPhos  309<H>  10-07    Creatinine Trend: 1.98<--, 1.36<--, 1.29<--, 1.35<--, 1.48<--, 1.56<--                        13.5   5.56  )-----------( 262      ( 07 Oct 2021 00:17 )             43.0     PT/INR - ( 07 Oct 2021 00:17 )   PT: 25.7 sec;   INR: 2.24 ratio         PTT - ( 07 Oct 2021 00:17 )  PTT:32.1 sec    Cardiac Enzymes: CARDIAC MARKERS ( 07 Oct 2021 00:17 )  0.172 ng/mL / x     / x     / x     / x        Serum Pro-Brain Natriuretic Peptide: 95811 pg/mL (10-07-21 @ 00:17)     ECG [my interpretation]: 10/6/21 @ 23:24: Sinus tachycardia at 107 bpm, normal axis, poor R-wave progression; unchanged from prior    CXR: Shows clear lungs, small L pleural effusion, not significantly changed from prior from 9/25    ECHO: < from: Transthoracic Echocardiogram (09.16.21 @ 18:29) >  Conclusions:  Severe left ventricular enlargement.  Severely reduced left ventricular systolic function.  Diffuse hypokinesis.  Severe functional mitral regurgitation.    < end of copied text >    CATHETERIZATION: < from: Cardiac Cath Lab - Adult (02.22.19 @ 17:02) >  VENTRICLES: EF estimated was 30 %.  CORONARY VESSELS: The coronary circulation is right dominant.  LM:   --  LM: Angiography showed mild atherosclerosis with no flow limiting  lesions.  LAD:   --  LAD: Angiography showed mild atherosclerosis with no flow  limiting lesions.  CX:   --  Circumflex: Angiography showed mild atherosclerosis with no flow  limiting lesions.  RCA:   --  Mid RCA: There was a tubular 20 % stenosis at a site with no  prior intervention. The lesion was eccentric. There was ALTAGRACIA grade 3 flow  through the vessel (brisk flow).  --  RPDA: Angiography showed mild atherosclerosis with no flow limiting  lesions.  --  RPLS: Angiography showed mild atherosclerosis with no flow limiting  lesions.  --  RPL1: Therewas a 100 % stenosis. There was ALTAGRACIA grade 0 flow through  the vessel (no flow) and a small vascular territory distal to the lesion.  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 It does not appear amenable to  intervention.    < end of copied text >

## 2021-10-08 ENCOUNTER — TRANSCRIPTION ENCOUNTER (OUTPATIENT)
Age: 66
End: 2021-10-08

## 2021-10-08 LAB
A1C WITH ESTIMATED AVERAGE GLUCOSE RESULT: 6.4 % — HIGH (ref 4–5.6)
ANION GAP SERPL CALC-SCNC: 14 MMOL/L — SIGNIFICANT CHANGE UP (ref 5–17)
APTT BLD: 26 SEC — LOW (ref 27.5–35.5)
BUN SERPL-MCNC: 43 MG/DL — HIGH (ref 7–18)
CALCIUM SERPL-MCNC: 9 MG/DL — SIGNIFICANT CHANGE UP (ref 8.4–10.5)
CHLORIDE SERPL-SCNC: 111 MMOL/L — HIGH (ref 96–108)
CO2 SERPL-SCNC: 19 MMOL/L — LOW (ref 22–31)
COVID-19 SPIKE DOMAIN AB INTERP: POSITIVE
COVID-19 SPIKE DOMAIN ANTIBODY RESULT: >250 U/ML — HIGH
CREAT SERPL-MCNC: 2.31 MG/DL — HIGH (ref 0.5–1.3)
CULTURE RESULTS: SIGNIFICANT CHANGE UP
ESTIMATED AVERAGE GLUCOSE: 137 MG/DL — HIGH (ref 68–114)
GLUCOSE BLDC GLUCOMTR-MCNC: 171 MG/DL — HIGH (ref 70–99)
GLUCOSE BLDC GLUCOMTR-MCNC: 178 MG/DL — HIGH (ref 70–99)
GLUCOSE BLDC GLUCOMTR-MCNC: 182 MG/DL — HIGH (ref 70–99)
GLUCOSE SERPL-MCNC: 193 MG/DL — HIGH (ref 70–99)
HCT VFR BLD CALC: 39.9 % — SIGNIFICANT CHANGE UP (ref 34.5–45)
HGB BLD-MCNC: 13 G/DL — SIGNIFICANT CHANGE UP (ref 11.5–15.5)
INR BLD: 2.49 RATIO — HIGH (ref 0.88–1.16)
MAGNESIUM SERPL-MCNC: 1.6 MG/DL — SIGNIFICANT CHANGE UP (ref 1.6–2.6)
MCHC RBC-ENTMCNC: 31 PG — SIGNIFICANT CHANGE UP (ref 27–34)
MCHC RBC-ENTMCNC: 32.6 GM/DL — SIGNIFICANT CHANGE UP (ref 32–36)
MCV RBC AUTO: 95.2 FL — SIGNIFICANT CHANGE UP (ref 80–100)
NRBC # BLD: 0 /100 WBCS — SIGNIFICANT CHANGE UP (ref 0–0)
NT-PROBNP SERPL-SCNC: HIGH PG/ML (ref 0–125)
PHOSPHATE SERPL-MCNC: 3.4 MG/DL — SIGNIFICANT CHANGE UP (ref 2.5–4.5)
PLATELET # BLD AUTO: 223 K/UL — SIGNIFICANT CHANGE UP (ref 150–400)
POTASSIUM SERPL-MCNC: 4.1 MMOL/L — SIGNIFICANT CHANGE UP (ref 3.5–5.3)
POTASSIUM SERPL-SCNC: 4.1 MMOL/L — SIGNIFICANT CHANGE UP (ref 3.5–5.3)
PROTHROM AB SERPL-ACNC: 28.4 SEC — HIGH (ref 10.6–13.6)
RBC # BLD: 4.19 M/UL — SIGNIFICANT CHANGE UP (ref 3.8–5.2)
RBC # FLD: 18.6 % — HIGH (ref 10.3–14.5)
SARS-COV-2 IGG+IGM SERPL QL IA: >250 U/ML — HIGH
SARS-COV-2 IGG+IGM SERPL QL IA: POSITIVE
SODIUM SERPL-SCNC: 144 MMOL/L — SIGNIFICANT CHANGE UP (ref 135–145)
SPECIMEN SOURCE: SIGNIFICANT CHANGE UP
UUN UR-MCNC: 255 MG/DL — SIGNIFICANT CHANGE UP
WBC # BLD: 10.07 K/UL — SIGNIFICANT CHANGE UP (ref 3.8–10.5)
WBC # FLD AUTO: 10.07 K/UL — SIGNIFICANT CHANGE UP (ref 3.8–10.5)

## 2021-10-08 PROCEDURE — 99233 SBSQ HOSP IP/OBS HIGH 50: CPT | Mod: GC

## 2021-10-08 RX ORDER — HYDRALAZINE HCL 50 MG
25 TABLET ORAL EVERY 8 HOURS
Refills: 0 | Status: DISCONTINUED | OUTPATIENT
Start: 2021-10-08 | End: 2021-10-10

## 2021-10-08 RX ADMIN — PANTOPRAZOLE SODIUM 40 MILLIGRAM(S): 20 TABLET, DELAYED RELEASE ORAL at 07:13

## 2021-10-08 RX ADMIN — ARIPIPRAZOLE 2 MILLIGRAM(S): 15 TABLET ORAL at 11:46

## 2021-10-08 RX ADMIN — Medication 12.5 MILLIGRAM(S): at 05:37

## 2021-10-08 RX ADMIN — Medication 25 MILLIGRAM(S): at 14:24

## 2021-10-08 RX ADMIN — WARFARIN SODIUM 2.5 MILLIGRAM(S): 2.5 TABLET ORAL at 21:36

## 2021-10-08 RX ADMIN — Medication 25 MICROGRAM(S): at 05:37

## 2021-10-08 RX ADMIN — Medication 10 MILLIGRAM(S): at 05:37

## 2021-10-08 RX ADMIN — ATORVASTATIN CALCIUM 40 MILLIGRAM(S): 80 TABLET, FILM COATED ORAL at 21:36

## 2021-10-08 RX ADMIN — Medication 25 MILLIGRAM(S): at 21:36

## 2021-10-08 RX ADMIN — Medication 12.5 MILLIGRAM(S): at 18:36

## 2021-10-08 RX ADMIN — Medication 1: at 11:29

## 2021-10-08 RX ADMIN — ANASTROZOLE 1 MILLIGRAM(S): 1 TABLET ORAL at 11:46

## 2021-10-08 RX ADMIN — ISOSORBIDE MONONITRATE 30 MILLIGRAM(S): 60 TABLET, EXTENDED RELEASE ORAL at 11:46

## 2021-10-08 NOTE — PROGRESS NOTE ADULT - PROBLEM SELECTOR PLAN 6
- Patient has hx of DM diet controlled at home  - Started on HSS  - Fingerstick before meals and at bedtime  - DASH diet with consistent carb  - Target -180 - Patient has hx of DM diet controlled at home  - Started on HSS  - Fingerstick before meals and at bedtime  - DASH diet with consistent carb  - Target -180  - Glucose on target as of now.

## 2021-10-08 NOTE — PROGRESS NOTE ADULT - PROBLEM SELECTOR PLAN 3
- On admission, patient with Cr 1.9, baseline 1.3  - CHEN multifactorial -- prerenal in the setting of acute on chronic CHF vs recent start of ENTRESTO  - F/u urinalysis, urine lytes (urine sodium, urine creatinine, urine urea)  - Monitor I/O's daily  - Avoid nephrotoxins, NSAIDS, ACEI and ARBS  - Hold Entresto for now   - Monitor BMP daily - On admission, patient with Cr 1.9, baseline 1.3  - CHEN multifactorial -- prerenal in the setting of acute on chronic CHF vs recent start of ENTRESTO  - Monitor I/O's daily  - Avoid nephrotoxins, NSAIDS, ACEI and ARBS  - Hold Entresto for now  - FENA 3%  - Cr 2.3 today. Continue to monitor - On admission, patient with Cr 1.9, baseline 1.3  - CHEN multifactorial -- prerenal in the setting of acute on chronic CHF vs recent start of ENTRESTO  - Monitor I/O's daily  - Avoid nephrotoxins, NSAIDS, ACEI and ARBS  - Hold Entresto for now  - FENA 3%. Intrinsic.  - Cr 2.3 today. Continue to monitor

## 2021-10-08 NOTE — DISCHARGE NOTE PROVIDER - HOSPITAL COURSE
66 year old F from home (lives alone w/ HHA), baseline AAOx3, ambulates independently, hasPMHx of CAD, T2DM (last A1c 5.9%), Hypothyroidism, HTN, HFrEF, cardiomyopathy (EF 25% on 9/16/21, s/p AICD), COPD (no prior intubations), CVA, DVT (L leg 1994)/PE (s/p IVC filter 9/2020, and repeat saddle PE at [facility] 8/21-9/3 now on Coumadin), infrarenal AAA, cerebral aneurysm, atypical Depression, R breast cancer (s/p chemo/XRT), Class I Obesity, CKD3-A, PSHx of hysterectomy (for fibroids),and cholecystectomy presented to the ED with worsening shortness of breath s/p discharge on 10/6/21 after being treated for COPD exacerbation. Patient denies chest pain, palpitations, dizziness, headache, fever, chills, cough, GI or  symptoms.  Of note, Pro BNP on admission is 37k from 12k, Cr 1.9 from 1.3, CXR about the same compared to 9/25/21    Patient's Entresto was held. Cardiologist Dr. Doshi was consulted. Telemetry monitoring was unremarkable and was discontinued.   66 year old F from home (lives alone w/ HHA), baseline AAOx2-3, ambulates independently, with PMHx of CAD, T2DM (last A1c 6.4%), Hypothyroidism, HTN, HFrEF, cardiomyopathy (EF 25% on 9/16/21, s/p AICD), COPD (no prior intubations), CVA, DVT (L leg 1994)/PE (s/p IVC filter 9/2020, and repeat saddle PE at [facility] 8/21-9/3 now on Coumadin), infrarenal AAA, cerebral aneurysm, atypical Depression, R breast cancer (s/p chemo/XRT), Class I Obesity, CKD3-A, PSHx of hysterectomy (for fibroids),and cholecystectomy presented to the ED with worsening shortness of breath s/p discharge on 10/6/21 after being treated for COPD exacerbation. Patient denies chest pain, palpitations, dizziness, headache, fever, chills, cough, GI or  symptoms. Of note, Pro BNP on admission is 37k from 12k, Cr 1.9 from 1.3, CXR about the same compared to 9/25/21. Patient required NRBM mask briefly while in ED, throughout hospital course titrated O2 requirements to nasal cannula, and to off oxygen. Patient saturating well on room air, does not require oxygen. Patient has anxiety component contributing to patient's SOB. Due to patient's CHEN on admission, entresto was held.  Cardiologist Dr. Doshi was consulted. Held entresto. spironlactone. Unclear etiology of elevated BNP, possible gradual fluid accumulation. Started on lasix for gentle diuresis. Patient received two doses and then d/c, clinicallu euvolemic. Patient was started on telemetry monitoring,  Telemetry monitoring was unremarkable and was discontinued. Patient CHEN trended down. PT consulted, recommended home w home PT. Patient received gentle hydration D5 w 1 amp bicarb 60cc/hr for 24h during stay due to persistently low bicarbonate level. Bicarbonate level improved. Patient reporting some persistent SOB despite saturating 100% on RA. Decision made to initiate hospice conversation regarding care so patient could have advanced care at home and home O2 even though patient medically does not require it. Palliative consulted, Dr. Mcmanus. Dr. Gerardo, Dr. Mcmanus, and patient's WILLIAMSAJaki had conversation surrounding GOC. Came to decision for patient to be DNR/DNI, and to be discharged with home hospice services. Patient developed thromboytopenia during hospital stay, no bleeding from any sights. Patient clinically stable for discharge.      Given patient's improved clinical status and current hemodynamic stability, decision was made to discharge the patient.  Patient is stable for discharge per attending and is advised to follow up with PCP as outpatient  Please refer to patient's complete medical chart with documents for a full hospital course, for this is only a brief summary. Patient is a 66 year old F from home (lives alone w/ HHA), baseline AAOx2-3, ambulates independently, with PMHx of CAD, T2DM (last A1c 6.4%), Hypothyroidism, HTN, HFrEF, cardiomyopathy (EF 25% on 9/16/21, s/p AICD), COPD (no prior intubations), CVA, DVT (L leg 1994)/PE (s/p IVC filter 9/2020, and repeat saddle PE at [facility] 8/21-9/3 now on Coumadin), infrarenal AAA, cerebral aneurysm, atypical Depression, R breast cancer (s/p chemo/XRT), Class I Obesity, CKD3-A, PSHx of hysterectomy (for fibroids),and cholecystectomy presented to the ED with worsening shortness of breath s/p discharge on 10/6/21 after being treated for COPD exacerbation. Patient denies chest pain, palpitations, dizziness, headache, fever, chills, cough, GI or  symptoms. Of note, Pro BNP on admission is 37k from 12k, Cr 1.9 from 1.3, CXR about the same compared to 9/25/21. Patient required NRBM mask briefly while in ED, throughout hospital course titrated O2 requirements to nasal cannula, and to off oxygen. Patient saturating well on room air, does not require oxygen. Patient has anxiety component contributing to patient's SOB. Due to patient's CHEN on admission, entresto was held.    Cardiologist Dr. Doshi was consulted. Held entresto, spironlactone. Unclear etiology of elevated BNP, possible gradual fluid accumulation. Started on lasix for gentle diuresis. Patient received two doses and then d/c, clinicallu euvolemic. Patient was started on telemetry monitoring,  Telemetry monitoring was unremarkable and was discontinued. Patient CHEN trended down. PT consulted, recommended home w home PT. Patient received gentle hydration D5 w 1 amp bicarb 60cc/hr for 24h during stay due to persistently low bicarbonate level. Bicarbonate level improved. Patient reporting some persistent SOB despite saturating 100% on RA. Decision made to initiate hospice conversation regarding care so patient could have advanced care at home and home O2 even though patient medically does not require it. Palliative consulted, Dr. Mcmanus. Dr. Gerardo, Dr. Mcmanus, and patient's Jaki ARCHER had conversation surrounding GOC. Came to decision for patient to be DNR/DNI, and to be discharged with home hospice services. Patient developed thromboytopenia during hospital stay, no bleeding from any sights. Patient clinically stable for discharge.    Given patient's improved clinical status and current hemodynamic stability, decision was made to discharge the patient.  Patient is stable for discharge per attending and is advised to follow up with PCP as outpatient  Please refer to patient's complete medical chart with documents for a full hospital course, for this is only a brief summary.

## 2021-10-08 NOTE — PROGRESS NOTE ADULT - SUBJECTIVE AND OBJECTIVE BOX
PGY-1 Progress Note discussed with attending    PAGER #: [[phone]] TILL 5:00 PM  PLEASE CONTACT ON CALL TEAM:  - On Call Team (Please refer to Taryn) FROM 5:00 PM - 8:30PM  - Nightfloat Team FROM 8:30 -7:30 AM    CHIEF COMPLAINT & BRIEF HOSPITAL COURSE:    INTERVAL HPI/OVERNIGHT EVENTS:   Patient seen and examined at bedside. No acute events overnight.    REVIEW OF SYSTEMS:  CONSTITUTIONAL: No fever, weight loss, or fatigue  RESPIRATORY: No cough, wheezing, chills or hemoptysis; No shortness of breath  CARDIOVASCULAR: No chest pain, palpitations, dizziness, or leg swelling  GASTROINTESTINAL: No abdominal pain. No nausea, vomiting, or hematemesis; No diarrhea or constipation. No melena or hematochezia.  GENITOURINARY: No dysuria or hematuria, urinary frequency  NEUROLOGICAL: No headaches, memory loss, loss of strength, numbness, or tremors  SKIN: No itching, burning, rashes, or lesions     ALBUTerol    90 MICROgram(s) HFA Inhaler 2 Puff(s) Inhalation every 6 hours PRN  anastrozole 1 milliGRAM(s) Oral daily  ARIPiprazole 2 milliGRAM(s) Oral daily  atorvastatin 40 milliGRAM(s) Oral at bedtime  hydrALAZINE 10 milliGRAM(s) Oral every 8 hours  insulin lispro (ADMELOG) corrective regimen sliding scale   SubCutaneous three times a day before meals  insulin lispro (ADMELOG) corrective regimen sliding scale   SubCutaneous at bedtime  isosorbide   mononitrate ER Tablet (IMDUR) 30 milliGRAM(s) Oral daily  levothyroxine 25 MICROGram(s) Oral daily  metoprolol tartrate 12.5 milliGRAM(s) Oral two times a day  pantoprazole    Tablet 40 milliGRAM(s) Oral before breakfast  warfarin 2.5 milliGRAM(s) Oral at bedtime      Vital Signs Last 24 Hrs  T(C): 36.4 (08 Oct 2021 05:00), Max: 36.9 (07 Oct 2021 10:25)  T(F): 97.6 (08 Oct 2021 05:00), Max: 98.4 (07 Oct 2021 10:25)  HR: 100 (08 Oct 2021 05:00) (90 - 103)  BP: 126/81 (08 Oct 2021 05:00) (116/84 - 142/99)  BP(mean): --  RR: 20 (08 Oct 2021 05:00) (18 - 20)  SpO2: 99% (08 Oct 2021 05:00) (98% - 100%)    PHYSICAL EXAMINATION:  GENERAL: NAD  HEAD:  Atraumatic, Normocephalic  EYES:  conjunctiva and sclera clear  NECK: Supple, No JVD, Normal thyroid  CHEST/LUNG: Clear to auscultation; No rales, rhonchi, wheezing, or rubs  HEART: Regular rate and rhythm; No murmurs, rubs, or gallops  ABDOMEN: Soft, Nontender, Nondistended; Bowel sounds present  NERVOUS SYSTEM:  Alert & Oriented X3,   EXTREMITIES:  2+ Peripheral Pulses, No clubbing, cyanosis, or edema  SKIN: warm dry                          13.0   10.07 )-----------( 223      ( 08 Oct 2021 06:49 )             39.9     10-08    144  |  111<H>  |  x   ----------------------------<  193<H>  4.1   |  x   |  x     Ca    9.0      08 Oct 2021 06:49  Phos  4.2     10-07  Mg     1.6     10-07    TPro  7.4  /  Alb  3.2<L>  /  TBili  1.4<H>  /  DBili  x   /  AST  40  /  ALT  39  /  AlkPhos  309<H>  10-07    LIVER FUNCTIONS - ( 07 Oct 2021 00:17 )  Alb: 3.2 g/dL / Pro: 7.4 g/dL / ALK PHOS: 309 U/L / ALT: 39 U/L DA / AST: 40 U/L / GGT: x           CARDIAC MARKERS ( 07 Oct 2021 18:11 )  0.140 ng/mL / x     / x     / x     / x      CARDIAC MARKERS ( 07 Oct 2021 16:57 )  0.148 ng/mL / x     / x     / x     / x      CARDIAC MARKERS ( 07 Oct 2021 10:26 )  0.175 ng/mL / x     / x     / x     / x      CARDIAC MARKERS ( 07 Oct 2021 00:17 )  0.172 ng/mL / x     / x     / x     / x          PT/INR - ( 07 Oct 2021 00:17 )   PT: 25.7 sec;   INR: 2.24 ratio         PTT - ( 07 Oct 2021 00:17 )  PTT:32.1 sec    CAPILLARY BLOOD GLUCOSE      RADIOLOGY & ADDITIONAL TESTS:           PGY-1 Progress Note discussed with attending    PAGER #: [[phone]] TILL 5:00 PM  PLEASE CONTACT ON CALL TEAM:  - On Call Team (Please refer to Taryn) FROM 5:00 PM - 8:30PM  - Nightfloat Team FROM 8:30 -7:30 AM    CHIEF COMPLAINT & BRIEF HOSPITAL COURSE:    INTERVAL HPI/OVERNIGHT EVENTS:   Patient seen and examined at bedside. No acute events overnight. Patient is on room air and does not complain of chest pain or SOB.     REVIEW OF SYSTEMS:  CONSTITUTIONAL: No fever, weight loss, or fatigue  RESPIRATORY: No cough, wheezing, chills or hemoptysis; No shortness of breath  CARDIOVASCULAR: No chest pain, palpitations, dizziness, or leg swelling  GASTROINTESTINAL: No abdominal pain. No nausea, vomiting, or hematemesis; No diarrhea or constipation. No melena or hematochezia.  GENITOURINARY: No dysuria or hematuria, urinary frequency  NEUROLOGICAL: No headaches, memory loss, loss of strength, numbness, or tremors  SKIN: No itching, burning, rashes, or lesions     ALBUTerol    90 MICROgram(s) HFA Inhaler 2 Puff(s) Inhalation every 6 hours PRN  anastrozole 1 milliGRAM(s) Oral daily  ARIPiprazole 2 milliGRAM(s) Oral daily  atorvastatin 40 milliGRAM(s) Oral at bedtime  hydrALAZINE 10 milliGRAM(s) Oral every 8 hours  insulin lispro (ADMELOG) corrective regimen sliding scale   SubCutaneous three times a day before meals  insulin lispro (ADMELOG) corrective regimen sliding scale   SubCutaneous at bedtime  isosorbide   mononitrate ER Tablet (IMDUR) 30 milliGRAM(s) Oral daily  levothyroxine 25 MICROGram(s) Oral daily  metoprolol tartrate 12.5 milliGRAM(s) Oral two times a day  pantoprazole    Tablet 40 milliGRAM(s) Oral before breakfast  warfarin 2.5 milliGRAM(s) Oral at bedtime      Vital Signs Last 24 Hrs  T(C): 36.4 (08 Oct 2021 05:00), Max: 36.9 (07 Oct 2021 10:25)  T(F): 97.6 (08 Oct 2021 05:00), Max: 98.4 (07 Oct 2021 10:25)  HR: 100 (08 Oct 2021 05:00) (90 - 103)  BP: 126/81 (08 Oct 2021 05:00) (116/84 - 142/99)  BP(mean): --  RR: 20 (08 Oct 2021 05:00) (18 - 20)  SpO2: 99% (08 Oct 2021 05:00) (98% - 100%)    PHYSICAL EXAMINATION:  GENERAL: NAD  HEAD:  Atraumatic, Normocephalic  EYES:  conjunctiva and sclera clear  NECK: Supple, No JVD, Normal thyroid  CHEST/LUNG: Clear to auscultation; No rales, rhonchi, wheezing, or rubs  HEART: Regular rate and rhythm; No murmurs, rubs, or gallops  ABDOMEN: Soft, Nontender, Nondistended; Bowel sounds present  NERVOUS SYSTEM:  Alert & Oriented X3,   EXTREMITIES: Trace pitting edema. 2+ Peripheral Pulses, No clubbing, cyanosis, or edema  SKIN: warm dry                          13.0   10.07 )-----------( 223      ( 08 Oct 2021 06:49 )             39.9     10-08    144  |  111<H>  |  x   ----------------------------<  193<H>  4.1   |  x   |  x     Ca    9.0      08 Oct 2021 06:49  Phos  4.2     10-07  Mg     1.6     10-07    TPro  7.4  /  Alb  3.2<L>  /  TBili  1.4<H>  /  DBili  x   /  AST  40  /  ALT  39  /  AlkPhos  309<H>  10-07    LIVER FUNCTIONS - ( 07 Oct 2021 00:17 )  Alb: 3.2 g/dL / Pro: 7.4 g/dL / ALK PHOS: 309 U/L / ALT: 39 U/L DA / AST: 40 U/L / GGT: x           CARDIAC MARKERS ( 07 Oct 2021 18:11 )  0.140 ng/mL / x     / x     / x     / x      CARDIAC MARKERS ( 07 Oct 2021 16:57 )  0.148 ng/mL / x     / x     / x     / x      CARDIAC MARKERS ( 07 Oct 2021 10:26 )  0.175 ng/mL / x     / x     / x     / x      CARDIAC MARKERS ( 07 Oct 2021 00:17 )  0.172 ng/mL / x     / x     / x     / x          PT/INR - ( 07 Oct 2021 00:17 )   PT: 25.7 sec;   INR: 2.24 ratio         PTT - ( 07 Oct 2021 00:17 )  PTT:32.1 sec    CAPILLARY BLOOD GLUCOSE      RADIOLOGY & ADDITIONAL TESTS:

## 2021-10-08 NOTE — PHYSICAL THERAPY INITIAL EVALUATION ADULT - DIAGNOSIS, PT EVAL
weakness; impaired balance, unsteady gait; impaired pulmonary status; shortness of breath with minimal activity

## 2021-10-08 NOTE — DISCHARGE NOTE PROVIDER - NSDCPNSUBOBJ_GEN_ALL_CORE
Patient is a 66y old  Female who presents with a chief complaint of DYSPNEA (15 Oct 2021 10:08)    Patient was seen and examined at bedside   Complains of left knee pain somehow improved from last night, wants to go home     INTERVAL HPI/OVERNIGHT EVENTS:  T(C): 36.6 (10-16-21 @ 05:20), Max: 36.6 (10-15-21 @ 20:34)  HR: 89 (10-16-21 @ 05:20) (79 - 89)  BP: 143/102 (10-16-21 @ 05:20) (103/76 - 148/95)  RR: 18 (10-16-21 @ 05:20) (16 - 18)  SpO2: 96% (10-16-21 @ 05:20) (96% - 100%)  Wt(kg): --  I&O's Summary      REVIEW OF SYSTEMS: denies fever, chills, SOB, palpitations, chest pain, abdominal pain, nausea, vomiting, diarrhea, constipation, dizziness    MEDICATIONS  (STANDING):  anastrozole 1 milliGRAM(s) Oral daily  ARIPiprazole 2 milliGRAM(s) Oral daily  atorvastatin 40 milliGRAM(s) Oral at bedtime  hydrALAZINE 50 milliGRAM(s) Oral every 8 hours  insulin lispro (ADMELOG) corrective regimen sliding scale   SubCutaneous three times a day before meals  insulin lispro (ADMELOG) corrective regimen sliding scale   SubCutaneous at bedtime  isosorbide   mononitrate ER Tablet (IMDUR) 30 milliGRAM(s) Oral daily  levothyroxine 25 MICROGram(s) Oral daily  metoprolol tartrate 25 milliGRAM(s) Oral two times a day  pantoprazole    Tablet 40 milliGRAM(s) Oral before breakfast  sodium chloride 0.45% 1000 milliLiter(s) (60 mL/Hr) IV Continuous <Continuous>    MEDICATIONS  (PRN):  acetaminophen   Tablet .. 650 milliGRAM(s) Oral every 4 hours PRN Mild Pain (1 - 3), Moderate Pain (4 - 6)  ALBUTerol    90 MICROgram(s) HFA Inhaler 2 Puff(s) Inhalation every 6 hours PRN Shortness of Breath and/or Wheezing  guaiFENesin Oral Liquid (Sugar-Free) 100 milliGRAM(s) Oral every 6 hours PRN Cough  lidocaine   4% Patch 1 Patch Transdermal once PRN Pain      PHYSICAL EXAM:  GENERAL: NAD  HEAD:  Atraumatic, Normocephalic  NERVOUS SYSTEM:  Alert & Oriented X2, no new focal deficit  CHEST/LUNG: Clear to auscultation bilaterally; No rales, rhonchi, wheezing, or rubs  HEART: Regular rate and rhythm; No murmurs, rubs, or gallops  ABDOMEN: Soft, Nontender, Nondistended; Bowel sounds present  EXTREMITIES:  Left knee swollen, tender, no erythema or warmth, 2+ Peripheral Pulses, No clubbing, cyanosis, or edema  SKIN: No rashes or lesions    LABS:                        13.3   6.50  )-----------( 94       ( 15 Oct 2021 07:38 )             41.5     10-15    147<H>  |  113<H>  |  44<H>  ----------------------------<  168<H>  4.8   |  25  |  1.93<H>    Ca    9.0      15 Oct 2021 07:38      PT/INR - ( 15 Oct 2021 07:38 )   PT: 25.1 sec;   INR: 2.19 ratio       CAPILLARY BLOOD GLUCOSE    POCT Blood Glucose.: 160 mg/dL (16 Oct 2021 07:49)  POCT Blood Glucose.: 130 mg/dL (15 Oct 2021 21:20)  POCT Blood Glucose.: 146 mg/dL (15 Oct 2021 16:52)  POCT Blood Glucose.: 178 mg/dL (15 Oct 2021 11:55)    A/P:  Left knee pain  Acute on chronic systolic HF  COPD  Chronic saddle embolus   CHEN on CKD  Dementia with behavioral disturbance     Plan:  The knee is swollen, mildly tender, improved with tylenol. Not warm or red. Possibly due to pseudogout. Will cont Tylenol and Lidocaine patch on discharge   Held Entresto  Home O2 arranged   Warfarin 2mg on discharge   HCP opted for home hospice, patient will be taken care of hospice physician. Outpatient follow up with Dr. Cohen and Dr. Doshi if patient's functional status improved     Patient is a 66y old  Female who presents with a chief complaint of DYSPNEA (15 Oct 2021 10:08)    Patient was seen and examined at bedside   Complains of left knee pain somehow improved from last night, wants to go home     INTERVAL HPI/OVERNIGHT EVENTS:  T(C): 36.6 (10-16-21 @ 05:20), Max: 36.6 (10-15-21 @ 20:34)  HR: 89 (10-16-21 @ 05:20) (79 - 89)  BP: 143/102 (10-16-21 @ 05:20) (103/76 - 148/95)  RR: 18 (10-16-21 @ 05:20) (16 - 18)  SpO2: 96% (10-16-21 @ 05:20) (96% - 100%)  Wt(kg): --  I&O's Summary      REVIEW OF SYSTEMS: denies fever, chills, SOB, palpitations, chest pain, abdominal pain, nausea, vomiting, diarrhea, constipation, dizziness    MEDICATIONS  (STANDING):  anastrozole 1 milliGRAM(s) Oral daily  ARIPiprazole 2 milliGRAM(s) Oral daily  atorvastatin 40 milliGRAM(s) Oral at bedtime  hydrALAZINE 50 milliGRAM(s) Oral every 8 hours  insulin lispro (ADMELOG) corrective regimen sliding scale   SubCutaneous three times a day before meals  insulin lispro (ADMELOG) corrective regimen sliding scale   SubCutaneous at bedtime  isosorbide   mononitrate ER Tablet (IMDUR) 30 milliGRAM(s) Oral daily  levothyroxine 25 MICROGram(s) Oral daily  metoprolol tartrate 25 milliGRAM(s) Oral two times a day  pantoprazole    Tablet 40 milliGRAM(s) Oral before breakfast  sodium chloride 0.45% 1000 milliLiter(s) (60 mL/Hr) IV Continuous <Continuous>    MEDICATIONS  (PRN):  acetaminophen   Tablet .. 650 milliGRAM(s) Oral every 4 hours PRN Mild Pain (1 - 3), Moderate Pain (4 - 6)  ALBUTerol    90 MICROgram(s) HFA Inhaler 2 Puff(s) Inhalation every 6 hours PRN Shortness of Breath and/or Wheezing  guaiFENesin Oral Liquid (Sugar-Free) 100 milliGRAM(s) Oral every 6 hours PRN Cough  lidocaine   4% Patch 1 Patch Transdermal once PRN Pain      PHYSICAL EXAM:  GENERAL: NAD  HEAD:  Atraumatic, Normocephalic  NERVOUS SYSTEM:  Alert & Oriented X2, no new focal deficit  CHEST/LUNG: Clear to auscultation bilaterally; No rales, rhonchi, wheezing, or rubs  HEART: Regular rate and rhythm; No murmurs, rubs, or gallops  ABDOMEN: Soft, Nontender, Nondistended; Bowel sounds present  EXTREMITIES:  Left knee swollen, tender, no erythema or warmth, 2+ Peripheral Pulses, No clubbing, cyanosis, or edema  SKIN: No rashes or lesions    LABS:                        13.3   6.50  )-----------( 94       ( 15 Oct 2021 07:38 )             41.5     10-15    147<H>  |  113<H>  |  44<H>  ----------------------------<  168<H>  4.8   |  25  |  1.93<H>    Ca    9.0      15 Oct 2021 07:38      PT/INR - ( 15 Oct 2021 07:38 )   PT: 25.1 sec;   INR: 2.19 ratio       CAPILLARY BLOOD GLUCOSE    POCT Blood Glucose.: 160 mg/dL (16 Oct 2021 07:49)  POCT Blood Glucose.: 130 mg/dL (15 Oct 2021 21:20)  POCT Blood Glucose.: 146 mg/dL (15 Oct 2021 16:52)  POCT Blood Glucose.: 178 mg/dL (15 Oct 2021 11:55)    A/P:  Left knee pain  Acute on chronic systolic HF  COPD  Chronic saddle embolus   CHEN on CKD  Dementia with behavioral disturbance     Plan:  The knee is swollen, mildly tender, improved with tylenol. Not warm or red. Possibly due to pseudogout. Will cont Tylenol and Lidocaine patch on discharge   Held Entresto  Home O2 arranged   Warfarin 2mg on discharge   HCP opted for home hospice, patient will be taken care of hospice physician. Outpatient follow up with Dr. Cohen and Dr. Doshi if patient's functional status improved  Unable to reach Ms Jaki Mcmanus today Patient is a 66y old  Female who presents with a chief complaint of DYSPNEA (15 Oct 2021 10:08)    Patient was seen and examined at bedside   Complains of left knee pain somehow improved from last night, wants to go home     INTERVAL HPI/OVERNIGHT EVENTS:  T(C): 36.6 (10-16-21 @ 05:20), Max: 36.6 (10-15-21 @ 20:34)  HR: 89 (10-16-21 @ 05:20) (79 - 89)  BP: 143/102 (10-16-21 @ 05:20) (103/76 - 148/95)  RR: 18 (10-16-21 @ 05:20) (16 - 18)  SpO2: 96% (10-16-21 @ 05:20) (96% - 100%)  Wt(kg): --  I&O's Summary      REVIEW OF SYSTEMS: denies fever, chills, SOB, palpitations, chest pain, abdominal pain, nausea, vomiting, diarrhea, constipation, dizziness    MEDICATIONS  (STANDING):  anastrozole 1 milliGRAM(s) Oral daily  ARIPiprazole 2 milliGRAM(s) Oral daily  atorvastatin 40 milliGRAM(s) Oral at bedtime  hydrALAZINE 50 milliGRAM(s) Oral every 8 hours  insulin lispro (ADMELOG) corrective regimen sliding scale   SubCutaneous three times a day before meals  insulin lispro (ADMELOG) corrective regimen sliding scale   SubCutaneous at bedtime  isosorbide   mononitrate ER Tablet (IMDUR) 30 milliGRAM(s) Oral daily  levothyroxine 25 MICROGram(s) Oral daily  metoprolol tartrate 25 milliGRAM(s) Oral two times a day  pantoprazole    Tablet 40 milliGRAM(s) Oral before breakfast  sodium chloride 0.45% 1000 milliLiter(s) (60 mL/Hr) IV Continuous <Continuous>    MEDICATIONS  (PRN):  acetaminophen   Tablet .. 650 milliGRAM(s) Oral every 4 hours PRN Mild Pain (1 - 3), Moderate Pain (4 - 6)  ALBUTerol    90 MICROgram(s) HFA Inhaler 2 Puff(s) Inhalation every 6 hours PRN Shortness of Breath and/or Wheezing  guaiFENesin Oral Liquid (Sugar-Free) 100 milliGRAM(s) Oral every 6 hours PRN Cough  lidocaine   4% Patch 1 Patch Transdermal once PRN Pain      PHYSICAL EXAM:  GENERAL: NAD  HEAD:  Atraumatic, Normocephalic  NERVOUS SYSTEM:  Alert & Oriented X2, no new focal deficit  CHEST/LUNG: Clear to auscultation bilaterally; No rales, rhonchi, wheezing, or rubs  HEART: Regular rate and rhythm; No murmurs, rubs, or gallops  ABDOMEN: Soft, Nontender, Nondistended; Bowel sounds present  EXTREMITIES:  Left knee swollen, tender, no erythema or warmth, 2+ Peripheral Pulses, No clubbing, cyanosis, or edema  SKIN: No rashes or lesions    LABS:                        13.3   6.50  )-----------( 94       ( 15 Oct 2021 07:38 )             41.5     10-15    147<H>  |  113<H>  |  44<H>  ----------------------------<  168<H>  4.8   |  25  |  1.93<H>    Ca    9.0      15 Oct 2021 07:38      PT/INR - ( 15 Oct 2021 07:38 )   PT: 25.1 sec;   INR: 2.19 ratio       CAPILLARY BLOOD GLUCOSE    POCT Blood Glucose.: 160 mg/dL (16 Oct 2021 07:49)  POCT Blood Glucose.: 130 mg/dL (15 Oct 2021 21:20)  POCT Blood Glucose.: 146 mg/dL (15 Oct 2021 16:52)  POCT Blood Glucose.: 178 mg/dL (15 Oct 2021 11:55)    A/P:  Left knee pain  Acute on chronic systolic HF  COPD  Chronic saddle embolus   CHEN on CKD  Dementia with behavioral disturbance     Plan:  The knee is swollen, mildly tender, improved with tylenol. Not warm or red. Possibly due to pseudogout. Will cont Tylenol and Lidocaine patch on discharge   Held Entresto  Home O2 arranged   Warfarin 2mg on discharge   HCP opted for home hospice, patient will be taken care of hospice physician. Outpatient follow up with Dr. Cohen and Dr. Doshi if patient's functional status improved  Left VM for Ms Jaki Mcmanus today

## 2021-10-08 NOTE — DISCHARGE NOTE PROVIDER - NSDCCAREPROVSEEN_GEN_ALL_CORE_FT
Carolyn, Mathieu Matos, Flo Marcos, Katie Horn, Gladis Hernadez, Gina Doshi, Nakul Gerardo, Graham Sarmiento, Ranjana Cannon Akin, Graham Garcia, Rosemarie Doshi, Nakul Hernadez, Isa Pineda

## 2021-10-08 NOTE — PROGRESS NOTE ADULT - ATTENDING COMMENTS
Patient seen and examined earlier this afternoon with PGY1;     CC: Respiratory distress at home    66 year old F from home (lives alone with 24 hour HHA), baseline AAOx2, ambulates independently with some assistance, has PMHx of CAD, T2DM (last A1c 5.9%), Hypothyroidism, HTN, HFrEF with recent exacerbation, cardiomyopathy (EF 25% on 9/16/21, s/p SQ AICD), COPD (no prior intubations), CVA, DVT (L leg 1994)/PE (s/p IVC filter 9/2020, and repeat saddle PE at [facility] 8/21-9/3 now on Coumadin), infrarenal AAA, cerebral aneurysm, atypical Depression, Right breast cancer (s/p chemo/XRT), Class I Obesity, CKD3-A, PSHx of hysterectomy (for fibroids),and cholecystectomy presented to the ED with worsening shortness of breath s/p discharge on 10/6/21 after being treated for CHF exacerbation. Noted to have increased BNP from baseline and mild worsening of kidney functions from discharge     Patient was OOB to chair, not on oxygen supplement, not in any distress  Patient denies chest pain, palpitations, dizziness, headache, fever, chills, cough, GI or  symptoms.    Vital Signs Last 24 Hrs  T(C): 36.7 (08 Oct 2021 15:48), Max: 36.9 (08 Oct 2021 11:07)  T(F): 98 (08 Oct 2021 15:48), Max: 98.4 (08 Oct 2021 11:07)  HR: 85 (08 Oct 2021 15:48) (77 - 100)  BP: 129/93 (08 Oct 2021 15:48) (112/77 - 131/99)  BP(mean): 101 (08 Oct 2021 12:34) (101 - 103)  RR: 18 (08 Oct 2021 15:48) (18 - 20)  SpO2: 97% (08 Oct 2021 15:48) (97% - 100%)    P/E:  Psych: AAO x3; at baseline mood  Neuro: No focal deficits  CVS: S1S2 present, regular, no edema  Resp: BLAE+, No wheeze or Rhonchi; Decreased Breath sounds at right base  GI: Soft, BS+, NT  Extr: No edema or calf tenderness B./L LE    Labs:                        13.0   10.07 )-----------( 223      ( 08 Oct 2021 06:49 )             39.9   10-08    144  |  111<H>  |  43<H>  ----------------------------<  193<H>  4.1   |  19<L>  |  2.31<H>    Ca    9.0      08 Oct 2021 06:49  Phos  3.4     10-08  Mg     1.6     10-08    TPro  7.4  /  Alb  3.2<L>  /  TBili  1.4<H>  /  DBili  x   /  AST  40  /  ALT  39  /  AlkPhos  309<H>  10-07    Serum Pro-Brain Natriuretic Peptide (10.08.21 @ 06:49) Serum Pro-Brain Natriuretic Peptide: 87006  Serum Pro-Brain Natriuretic Peptide (10.07.21 @ 00:17) Serum Pro-Brain Natriuretic Peptide: 51429:  Serum Pro-Brain Natriuretic Peptide (09.25.21 @ 21:13) Serum Pro-Brain Natriuretic Peptide: 02391:     D/D:  Acute Hypoxic Resp. failure suspect mild CHF exacerbation (Acute on Chronic systolic)   CHEN suspect due to likely Entresto vs less likely Fluid overload    CKD 3   COPD stable  Anxiety disorder with Atypical depression   Type 2 DM  Hx DVT and saddle PE on coumadin    Plan:  cardio follow up noted; D/C Terlemetry  Hold Lasix for now until renal funcrion improve  Hold Entresto for now and will not resume may have contributed to CHEN as he got it last 2 days prior to Creatinine rise from 1.38 AM to 1.98 overnight.   Continue Imdur/ Coumadin 2.5 mg tonight   Increased Hydralazine to 25 mg q 8 hrs; if BP elevated increase to 50 mg q 8 hrs; Continue Metoprolol  Palliative Consult was done last admission and extensive d/w HCP who wishes FULL CODE; Patient does not have insight into her complex medical issues.      Discussed with patient findings and plan of care; update HCP  Discussed with PGY1 Dr. Sarmiento and with RN

## 2021-10-08 NOTE — PROGRESS NOTE ADULT - ASSESSMENT
66 year old F hasPMHx of CAD, T2DM (last A1c 5.9%), Hypothyroidism, HTN, HFrEF, cardiomyopathy (EF 25% on 9/16/21, s/p AICD), COPD (no prior intubations), CVA, DVT (L leg 1994)/PE (s/p IVC filter 9/2020, and repeat saddle PE at [facility] 8/21-9/3 now on Coumadin), infrarenal AAA, cerebral aneurysm, atypical Depression, R breast cancer (s/p chemo/XRT), Class I Obesity, CKD3-A presented to the ED with worsening shortness of breath s/p discharge on 10/6/21 after being treated for COPD exacerbation. Patient admitted for dyspnea

## 2021-10-08 NOTE — DISCHARGE NOTE PROVIDER - NSDCMRMEDTOKEN_GEN_ALL_CORE_FT
albuterol 90 mcg/inh inhalation aerosol: 1 inhaler(s) inhaled every 6 hours, As Needed  anastrozole 1 mg oral tablet: 1 tab(s) orally once a day  note: per pharmacy  ([facility]), last filled 6/23/21 for 30 day supply  ARIPiprazole 2 mg oral tablet: 1 tab(s) orally once a day  Entresto 24 mg-26 mg oral tablet: 1 tab(s) orally 2 times a day  note: per pharmacy ([facility]), last filled 7/9/21 for 30 day supply  famotidine 20 mg oral tablet: 1 tab(s) orally once a day   hydrALAZINE 10 mg oral tablet: 1 tab(s) orally every 8 hours   isosorbide mononitrate 30 mg oral tablet, extended release: 1 tab(s) orally once a day  levothyroxine 25 mcg (0.025 mg) oral tablet: 1 tab(s) orally once a day  Lipitor 40 mg oral tablet: 1 tab(s) orally once a day (at bedtime)  Metoprolol Succinate ER 50 mg oral tablet, extended release: 1 tab(s) orally once a day   nystatin 100,000 units/g topical ointment: 1 application topically every 12 hours  warfarin 2.5 mg oral tablet: 1 tab(s) orally once a day (at bedtime)    albuterol 90 mcg/inh inhalation aerosol: 1 inhaler(s) inhaled every 6 hours, As Needed  anastrozole 1 mg oral tablet: 1 tab(s) orally once a day  ARIPiprazole 2 mg oral tablet: 1 tab(s) orally once a day  atorvastatin 40 mg oral tablet: 1 tab(s) orally once a day (at bedtime)  famotidine 20 mg oral tablet: 1 tab(s) orally once a day   hydrALAZINE 50 mg oral tablet: 1 tab(s) orally every 8 hours  isosorbide mononitrate 30 mg oral tablet, extended release: 1 tab(s) orally once a day  levothyroxine 25 mcg (0.025 mg) oral tablet: 1 tab(s) orally once a day  metoprolol tartrate 25 mg oral tablet: 1 tab(s) orally 2 times a day  nystatin 100,000 units/g topical ointment: 1 application topically every 12 hours  warfarin 2 mg oral tablet: 1 tab(s) orally once   acetaminophen 325 mg oral tablet: 2 tab(s) orally every 4 hours, As needed, Mild Pain (1 - 3), Moderate Pain (4 - 6)  albuterol 90 mcg/inh inhalation aerosol: 1 inhaler(s) inhaled every 6 hours, As Needed  anastrozole 1 mg oral tablet: 1 tab(s) orally once a day  ARIPiprazole 2 mg oral tablet: 1 tab(s) orally once a day  atorvastatin 40 mg oral tablet: 1 tab(s) orally once a day (at bedtime)  famotidine 20 mg oral tablet: 1 tab(s) orally once a day   hydrALAZINE 50 mg oral tablet: 1 tab(s) orally every 8 hours  isosorbide mononitrate 30 mg oral tablet, extended release: 1 tab(s) orally once a day  levothyroxine 25 mcg (0.025 mg) oral tablet: 1 tab(s) orally once a day  lidocaine 4% patch: Apply topically to affected area once a day, As Needed  metoprolol tartrate 25 mg oral tablet: 1 tab(s) orally 2 times a day  nystatin 100,000 units/g topical ointment: 1 application topically every 12 hours  warfarin 2 mg oral tablet: 1 tab(s) orally once   acetaminophen 325 mg oral tablet: 2 tab(s) orally every 4 hours, As needed, Mild Pain (1 - 3), Moderate Pain (4 - 6)  albuterol 90 mcg/inh inhalation aerosol: 1 inhaler(s) inhaled every 6 hours, As Needed  anastrozole 1 mg oral tablet: 1 tab(s) orally once a day  ARIPiprazole 2 mg oral tablet: 1 tab(s) orally once a day  atorvastatin 40 mg oral tablet: 1 tab(s) orally once a day (at bedtime)  famotidine 20 mg oral tablet: 1 tab(s) orally once a day   hydrALAZINE 50 mg oral tablet: 1 tab(s) orally every 8 hours  isosorbide mononitrate 30 mg oral tablet, extended release: 1 tab(s) orally once a day  levothyroxine 25 mcg (0.025 mg) oral tablet: 1 tab(s) orally once a day  metoprolol tartrate 25 mg oral tablet: 1 tab(s) orally 2 times a day  nystatin 100,000 units/g topical ointment: 1 application topically every 12 hours  warfarin 2 mg oral tablet: 1 tab(s) orally once

## 2021-10-08 NOTE — PROGRESS NOTE ADULT - PROBLEM SELECTOR PLAN 1
- Patient readmitted for acute dyspnea, previously treated for COPD exacerbation   - On admission, patient with sob, hypoxic requiring NRBM, CXR compared to previous one noted to have no significant changes, Pro BNP 37k from 12k, Cr 1.9 from 1.3  - Dyspnea could be acute on chronic CHF vs symptoms from PE on coumadin vs worsening COPD  - Will give Lasix IV today and transition to po as feasible  - EKG sinus tachycardia, no ST changes, trop x 1 neagtive   - Last Echo 9/2021 showed Severe left ventricular enlargement. Severely reduced left ventricular systolic function.  Diffuse hypokinesis. Severe functional mitral regurgitation.  - Hold Entresto for now due to CHEN, c/w hydralazine, Imdur, BB, statin   - Strict I & O, Daily weight, Restrict fluids to 1 L  - Tele monitor  - Cardio Dr Doshi consulted - Patient readmitted for acute dyspnea, previously treated for COPD exacerbation   - On admission, patient with sob, hypoxic requiring NRBM, CXR compared to previous one noted to have no significant changes, Pro BNP 37k from 12k, Cr 1.9 from 1.3  - Dyspnea could be acute on chronic CHF vs symptoms from PE on coumadin vs worsening COPD  - Will give Lasix IV today and transition to po as feasible  - EKG sinus tachycardia, no ST changes, trop x 1 neagtive   - Last Echo 9/2021 showed Severe left ventricular enlargement. Severely reduced left ventricular systolic function.  Diffuse hypokinesis. Severe functional mitral regurgitation.  - Hold Entresto for now due to CHEN, c/w hydralazine, Imdur, BB, statin   - Strict I & O, Daily weight, Restrict fluids to 1 L  - Tele showed PVCs.  - Cardio Dr Doshi consulted = d/c tele

## 2021-10-08 NOTE — DISCHARGE NOTE PROVIDER - NSDCFUSCHEDAPPT_GEN_ALL_CORE_FT
RISHABH HUNT ; 10/12/2021 ; [facility] [address]  RISHABH HUNT ; 12/20/2021 ; [facility] Card[facility] [phone] RISHABH HUNT ; 12/20/2021 ; [facility] Card[facility] [phone]

## 2021-10-08 NOTE — DISCHARGE NOTE PROVIDER - NSDCCPCAREPLAN_GEN_ALL_CORE_FT
PRINCIPAL DISCHARGE DIAGNOSIS  Diagnosis: Chronic HFrEF (heart failure with reduced ejection fraction)  Assessment and Plan of Treatment:       SECONDARY DISCHARGE DIAGNOSES  Diagnosis: Chronic obstructive pulmonary disease (COPD)  Assessment and Plan of Treatment:     Diagnosis: Chronic saddle pulmonary embolism  Assessment and Plan of Treatment:     Diagnosis: HTN (hypertension)  Assessment and Plan of Treatment:     Diagnosis: T2DM (type 2 diabetes mellitus)  Assessment and Plan of Treatment:     Diagnosis: Acute kidney injury superimposed on CKD  Assessment and Plan of Treatment:     Diagnosis: Mixed origin delirium  Assessment and Plan of Treatment:      PRINCIPAL DISCHARGE DIAGNOSIS  Diagnosis: Chronic HFrEF (heart failure with reduced ejection fraction)  Assessment and Plan of Treatment: You presented to hospital with worsening shortness of breath and wheezing. You have a history of heart failure with reduced ejection fraction which means that your heart is not pumping well enough to allow blood to flow normally. Your last echocardiogram showed a poor forward flow (ejection fraction 25%), and you reported that you were taking all your medications on time without missing doses. Chest X- Ray was clear. You probably had a mild exacerbation of your heart failure   A cardiologist assessed you and you did not have any abnormal rhythms. You are optimized on heart failure medications while on HYDRALAZINE, IMDUR, and TOPROL; please continue these on discharge.      SECONDARY DISCHARGE DIAGNOSES  Diagnosis: Chronic obstructive pulmonary disease (COPD)  Assessment and Plan of Treatment: You came in with worsening SOB and cough and has history of COPD buut also with low reserve in your lungs from prior known pulmonary hypertension and undelying saddle pulmonary embolism. This was causing your oxygen levels to fall below normal and cause weakness, and loss of your ability to do normal activities. You were kept on nasal canula oxygen to assist in your breathing, which greatly improved and you were able to be weaned off to room air. We also started inhaled bronchodilators, and oral steroids which were tapered off. Prior to discharge, you were evaluated by PT, and found to be able to go home with home PT. You will be discharged on Albuterol PRN. Please seek medical attention if you develop worsening cough, fevers, or SOB.  PLEASE FOLLOW UP WITH LUNG SPECIALIST DR. GUTIERREZ AS OUTPATIENT AT [address] [place] [address]    Diagnosis: Chronic saddle pulmonary embolism  Assessment and Plan of Treatment: You have a history of multipel blood clots while on anticoagulation therapy and with an IVC filter. For this, your blood thinner medication was transitioned to COUMADIN recently. You were admitted with a very elevated INR around 7.0 and we held your coumadin for few days and this normalized to levels between 2-3 during this visit. YOU WERE PUT ON HOME FOLLOW UP WITH NURSING CARE FOR ROUTINE INR CHECKS. WE SPOKE WITH YOUR PCP DR. SUKH RIVERA WHO HAS REQUESTED FOR BI WEEKLY BLOOD DRAWS FOR INR AT HOME.   DO NOT STOP COUMADIN by yourself as it can lead to clots/ strokes.   Also it is improtant to keep your INR checked and dose of coumadin adjusted to prevent ABNORMAL RISE WHICH CAN CAUSE BLeEDING;   TARGET INR 2 TO 3;   Please seek medical attention if you develop blood in your stools, urine, vomit, or bleeding from other sites. This is contributing to your chronic shortness of breath, but you did not require supplemental o2 on discharge.  PLEASE CONTINUE COUMADIN 2mg DAILY at nighttime.    Diagnosis: HTN (hypertension)  Assessment and Plan of Treatment: You have high blood pressure, for which you have been started on medications including HYDRALAZINE, IMDUR, TOPROL. It is important to continue to take your medications on time,  monitor your blood pressure at home, keep a log of your home readings and follow up with your Primary Care Physician. As per AHA/ACC guidelines, it is important to adhere to a DASH Diet of fresh fruits, vegetables, lean meats such as poultry and fish, and whole wheat carbs. Uncontrolled BP may result in organ damage to your eyes, brain, heart, and kidneys by causing strokes, heart attacks, kidney failure, and bleeds in your eye. YOUR TOPROL IS CONTINUED ON 50 mg due to elevated pressure which you should continue.    Diagnosis: T2DM (type 2 diabetes mellitus)  Assessment and Plan of Treatment: You have Type 2 diabetes and were found to have a Hemoglobin A1c of 5.9% which shows a pretty well controlled glucose levels in your body over the past 3 months. However, a normal HbA1c is 5.5%. You are advised to eat foods that are low glycemic index which include beans and complex carbs, and to avoid high GI foods including white rice and potatoes. Uncontrolled sugars can lead to blindness, kidney failure, and contribute to diseases such as heart attacks and strokes. Your were not started on any medications at this hospital visit, and you are advised to keep your Primary Care Physician appointments in order to monitor and change medications as necessary. No new medications started for DM at this visit.    Diagnosis: Acute kidney injury superimposed on CKD  Assessment and Plan of Treatment: You were found to have a worsening of your kidney function at baseline, which we diagnosed by looking at your blood work values including BUN/ serum creratinine. Your baseline Serum Creatinine was found to be around 1.2- 1.7 suggesting that you may have Chronic Kidney Disease (CKD-3).  certain BP medications including entresto were held and then restarted as your kidney function improved. You were treated with a small amount of IV hydration, and your renal function improved back to your baseline. We held nephrotoxic medications such as NSAID pain killers, and were cautious about the use of IV contrast if such scans were needed. You are advised to continue to stay well hydrated, and to seek medical attention if you have painful/burning urination, blood in urine, or increasing inability to control the flow of urine. You were started on your home BP meds.    Diagnosis: Mixed origin delirium  Assessment and Plan of Treatment: You have a history of dementia which is an irreversible loss of brain matter due to chronic blood loss or age related changes to the brain.   You have a prior history of similar complaints and known to Psychiatrist Dr. Moya. You also developed delirium during this hospital visit for which you were started on ABILIFY 2mg DAILY. Unfortunately there is no cure or standard way to prevent the progression of dementia other than controlling risk factors such as your diet, and keeping your blood sugars and fats within a normal range. You are recommended to continue this medication. Psychiatry was consulted and provided recs.     PRINCIPAL DISCHARGE DIAGNOSIS  Diagnosis: Chronic HFrEF (heart failure with reduced ejection fraction)  Assessment and Plan of Treatment: You presented to hospital with worsening shortness of breath and wheezing. You have a history of heart failure with reduced ejection fraction which means that your heart is not pumping well enough to allow blood to flow normally. Your last echocardiogram showed a poor forward flow (ejection fraction 25%), and you reported that you were taking all your medications on time without missing doses. Chest X- Ray was clear. You probably had a mild exacerbation of your heart failure   A cardiologist assessed you and you did not have any abnormal rhythms. You are optimized on heart failure medications while on HYDRALAZINE, IMDUR, and TOPROL; please continue these on discharge.      SECONDARY DISCHARGE DIAGNOSES  Diagnosis: Chronic obstructive pulmonary disease (COPD)  Assessment and Plan of Treatment: You came in with worsening SOB and cough and has history of COPD buut also with low reserve in your lungs from prior known pulmonary hypertension and undelying saddle pulmonary embolism. This was causing your oxygen levels to fall below normal and cause weakness, and loss of your ability to do normal activities. You were kept on nasal canula oxygen to assist in your breathing, which greatly improved and you were able to be weaned off to room air. We also started inhaled bronchodilators, and oral steroids which were tapered off. Prior to discharge, you were evaluated by PT, and found to be able to go home with home PT. You will be discharged on Albuterol PRN. Please seek medical attention if you develop worsening cough, fevers, or SOB.  PLEASE FOLLOW UP WITH LUNG SPECIALIST DR. GUTIERREZ AS OUTPATIENT AT [address] [place] [address]    Diagnosis: Chronic saddle pulmonary embolism  Assessment and Plan of Treatment: You have a history of multiple blood clots while on anticoagulation therapy and with an IVC filter. For this, your blood thinner medication was transitioned to COUMADIN recently. You were admitted with a very elevated INR around 7.0 and we held your coumadin for few days and this normalized to levels between 2-3 during this visit. YOU WERE PUT ON HOME FOLLOW UP WITH NURSING CARE FOR ROUTINE INR CHECKS. WE SPOKE WITH YOUR PCP DR. SUKH RIVERA WHO HAS REQUESTED FOR BI WEEKLY BLOOD DRAWS FOR INR AT HOME.   DO NOT STOP COUMADIN by yourself as it can lead to clots/ strokes.   Also it is improtant to keep your INR checked and dose of coumadin adjusted to prevent ABNORMAL RISE WHICH CAN CAUSE BLEEDING;   TARGET INR 2 TO 3;   Please seek medical attention if you develop blood in your stools, urine, vomit, or bleeding from other sites. This is contributing to your chronic shortness of breath, but you did not require supplemental o2 on discharge.  PLEASE CONTINUE COUMADIN 2mg DAILY at nighttime.    Diagnosis: HTN (hypertension)  Assessment and Plan of Treatment: You have high blood pressure, for which you have been started on medications including HYDRALAZINE, IMDUR, TOPROL. It is important to continue to take your medications on time,  monitor your blood pressure at home, keep a log of your home readings and follow up with your Primary Care Physician. As per AHA/ACC guidelines, it is important to adhere to a DASH Diet of fresh fruits, vegetables, lean meats such as poultry and fish, and whole wheat carbs. Uncontrolled BP may result in organ damage to your eyes, brain, heart, and kidneys by causing strokes, heart attacks, kidney failure, and bleeds in your eye. YOUR TOPROL IS CONTINUED ON 50 mg due to elevated pressure which you should continue.    Diagnosis: T2DM (type 2 diabetes mellitus)  Assessment and Plan of Treatment: You have Type 2 diabetes and were found to have a Hemoglobin A1c of 5.9% which shows a pretty well controlled glucose levels in your body over the past 3 months. However, a normal HbA1c is 5.5%. You are advised to eat foods that are low glycemic index which include beans and complex carbs, and to avoid high GI foods including white rice and potatoes. Uncontrolled sugars can lead to blindness, kidney failure, and contribute to diseases such as heart attacks and strokes. Your were not started on any medications at this hospital visit, and you are advised to keep your Primary Care Physician appointments in order to monitor and change medications as necessary. No new medications started for DM at this visit.    Diagnosis: Acute kidney injury superimposed on CKD  Assessment and Plan of Treatment: You were found to have a worsening of your kidney function at baseline, which we diagnosed by looking at your blood work values including BUN/ serum creratinine. Your baseline Serum Creatinine was found to be around 1.2- 1.7 suggesting that you may have Chronic Kidney Disease (CKD-3).  certain BP medications including entresto were held and then restarted as your kidney function improved. You were treated with a small amount of IV hydration, and your renal function improved back to your baseline. We held nephrotoxic medications such as NSAID pain killers, and were cautious about the use of IV contrast if such scans were needed. You are advised to continue to stay well hydrated, and to seek medical attention if you have painful/burning urination, blood in urine, or increasing inability to control the flow of urine. You were started on your home BP meds.    Diagnosis: Mixed origin delirium  Assessment and Plan of Treatment: You have a history of dementia which is an irreversible loss of brain matter due to chronic blood loss or age related changes to the brain.   You have a prior history of similar complaints and known to Psychiatrist Dr. Moya. You also developed delirium during this hospital visit for which you were started on ABILIFY 2mg DAILY. Unfortunately there is no cure or standard way to prevent the progression of dementia other than controlling risk factors such as your diet, and keeping your blood sugars and fats within a normal range. You are recommended to continue this medication. Psychiatry was consulted and provided recs.     PRINCIPAL DISCHARGE DIAGNOSIS  Diagnosis: Chronic HFrEF (heart failure with reduced ejection fraction)  Assessment and Plan of Treatment: You presented to hospital with worsening shortness of breath and wheezing. You have a history of heart failure with reduced ejection fraction which means that your heart is not pumping well enough to allow blood to flow normally. Your last echocardiogram showed a poor forward flow (ejection fraction 25%), and you reported that you were taking all your medications on time without missing doses. Chest X- Ray was clear. You probably had a mild exacerbation of your heart failure   A cardiologist assessed you and you did not have any abnormal rhythms. You are optimized on heart failure medications while on HYDRALAZINE, IMDUR, and TOPROL; please continue these on discharge.      SECONDARY DISCHARGE DIAGNOSES  Diagnosis: Chronic obstructive pulmonary disease (COPD)  Assessment and Plan of Treatment: You came in with worsening SOB and cough and has history of COPD buut also with low reserve in your lungs from prior known pulmonary hypertension and undelying saddle pulmonary embolism. This was causing your oxygen levels to fall below normal and cause weakness, and loss of your ability to do normal activities. You were kept on nasal canula oxygen to assist in your breathing, which greatly improved and you were able to be weaned off to room air. We also started inhaled bronchodilators, and oral steroids which were tapered off. Prior to discharge, you were evaluated by PT, and found to be able to go home with home PT. You will be discharged on Albuterol PRN. Please seek medical attention if you develop worsening cough, fevers, or SOB.  PLEASE FOLLOW UP WITH LUNG SPECIALIST DR. GUTIERREZ AS OUTPATIENT AT [address] [place] [address]    Diagnosis: Chronic saddle pulmonary embolism  Assessment and Plan of Treatment: You have a history of multiple blood clots while on anticoagulation therapy and with an IVC filter. For this, your blood thinner medication was transitioned to COUMADIN recently. You were admitted with a very elevated INR around 7.0 and we held your coumadin for few days and this normalized to levels between 2-3 during this visit. YOU WERE PUT ON HOME FOLLOW UP WITH NURSING CARE FOR ROUTINE INR CHECKS. WE SPOKE WITH YOUR PCP DR. SUKH RIVERA WHO HAS REQUESTED FOR BI WEEKLY BLOOD DRAWS FOR INR AT HOME.   DO NOT STOP COUMADIN by yourself as it can lead to clots/ strokes.   Also it is improtant to keep your INR checked and dose of coumadin adjusted to prevent ABNORMAL RISE WHICH CAN CAUSE BLEEDING;   TARGET INR 2 TO 3;   Please seek medical attention if you develop blood in your stools, urine, vomit, or bleeding from other sites. This is contributing to your chronic shortness of breath, but you did not require supplemental o2 on discharge.  PLEASE CONTINUE COUMADIN 2mg DAILY at nighttime.    Diagnosis: HTN (hypertension)  Assessment and Plan of Treatment: You have high blood pressure, for which you have been started on medications including HYDRALAZINE, IMDUR, TOPROL. It is important to continue to take your medications on time,  monitor your blood pressure at home, keep a log of your home readings and follow up with your Primary Care Physician. As per AHA/ACC guidelines, it is important to adhere to a DASH Diet of fresh fruits, vegetables, lean meats such as poultry and fish, and whole wheat carbs. Uncontrolled BP may result in organ damage to your eyes, brain, heart, and kidneys by causing strokes, heart attacks, kidney failure, and bleeds in your eye. YOUR TOPROL IS CONTINUED ON 50 mg due to elevated pressure which you should continue.    Diagnosis: T2DM (type 2 diabetes mellitus)  Assessment and Plan of Treatment: You have Type 2 diabetes and were found to have a Hemoglobin A1c of 5.9% which shows a pretty well controlled glucose levels in your body over the past 3 months. However, a normal HbA1c is 5.5%. You are advised to eat foods that are low glycemic index which include beans and complex carbs, and to avoid high GI foods including white rice and potatoes. Uncontrolled sugars can lead to blindness, kidney failure, and contribute to diseases such as heart attacks and strokes. Your were not started on any medications at this hospital visit, and you are advised to keep your Primary Care Physician appointments in order to monitor and change medications as necessary. No new medications started for DM at this visit.    Diagnosis: Acute kidney injury superimposed on CKD  Assessment and Plan of Treatment: You were found to have a worsening of your kidney function at baseline, which we diagnosed by looking at your blood work values including BUN/ serum creratinine. Your baseline Serum Creatinine was found to be around 1.2- 1.7 suggesting that you may have Chronic Kidney Disease (CKD-3).  certain BP medications including entresto were held and then restarted as your kidney function improved. You were treated with a small amount of IV hydration, and your renal function improved back to your baseline. We held nephrotoxic medications such as NSAID pain killers, and were cautious about the use of IV contrast if such scans were needed. You are advised to continue to stay well hydrated, and to seek medical attention if you have painful/burning urination, blood in urine, or increasing inability to control the flow of urine. You were started on your home BP meds.    Diagnosis: Mixed origin delirium  Assessment and Plan of Treatment: You have a history of dementia which is an irreversible loss of brain matter due to chronic blood loss or age related changes to the brain.   You have a prior history of similar complaints and known to Psychiatrist Dr. Moya. You also developed delirium during this hospital visit for which you were started on ABILIFY 2mg DAILY. Unfortunately there is no cure or standard way to prevent the progression of dementia other than controlling risk factors such as your diet, and keeping your blood sugars and fats within a normal range. You are recommended to continue this medication. Psychiatry was consulted and provided recs.    Diagnosis: Pain in left knee  Assessment and Plan of Treatment: You had knee pain and swelling. There was no redness or erythema. Possibly due to pseudogout given chronic medical condition. Please continue Tylenol and Lidocaine patch.

## 2021-10-09 LAB
ALBUMIN SERPL ELPH-MCNC: 3.1 G/DL — LOW (ref 3.5–5)
ALP SERPL-CCNC: 291 U/L — HIGH (ref 40–120)
ALT FLD-CCNC: 29 U/L DA — SIGNIFICANT CHANGE UP (ref 10–60)
ANION GAP SERPL CALC-SCNC: 11 MMOL/L — SIGNIFICANT CHANGE UP (ref 5–17)
APTT BLD: 26.3 SEC — LOW (ref 27.5–35.5)
AST SERPL-CCNC: 25 U/L — SIGNIFICANT CHANGE UP (ref 10–40)
BILIRUB SERPL-MCNC: 1.1 MG/DL — SIGNIFICANT CHANGE UP (ref 0.2–1.2)
BUN SERPL-MCNC: 46 MG/DL — HIGH (ref 7–18)
CALCIUM SERPL-MCNC: 8.7 MG/DL — SIGNIFICANT CHANGE UP (ref 8.4–10.5)
CHLORIDE SERPL-SCNC: 114 MMOL/L — HIGH (ref 96–108)
CO2 SERPL-SCNC: 19 MMOL/L — LOW (ref 22–31)
CREAT SERPL-MCNC: 1.68 MG/DL — HIGH (ref 0.5–1.3)
GLUCOSE BLDC GLUCOMTR-MCNC: 123 MG/DL — HIGH (ref 70–99)
GLUCOSE BLDC GLUCOMTR-MCNC: 142 MG/DL — HIGH (ref 70–99)
GLUCOSE BLDC GLUCOMTR-MCNC: 146 MG/DL — HIGH (ref 70–99)
GLUCOSE BLDC GLUCOMTR-MCNC: 176 MG/DL — HIGH (ref 70–99)
GLUCOSE BLDC GLUCOMTR-MCNC: 213 MG/DL — HIGH (ref 70–99)
GLUCOSE SERPL-MCNC: 148 MG/DL — HIGH (ref 70–99)
HCT VFR BLD CALC: 39.1 % — SIGNIFICANT CHANGE UP (ref 34.5–45)
HGB BLD-MCNC: 12.6 G/DL — SIGNIFICANT CHANGE UP (ref 11.5–15.5)
INR BLD: 2.4 RATIO — HIGH (ref 0.88–1.16)
MAGNESIUM SERPL-MCNC: 1.6 MG/DL — SIGNIFICANT CHANGE UP (ref 1.6–2.6)
MCHC RBC-ENTMCNC: 30.6 PG — SIGNIFICANT CHANGE UP (ref 27–34)
MCHC RBC-ENTMCNC: 32.2 GM/DL — SIGNIFICANT CHANGE UP (ref 32–36)
MCV RBC AUTO: 94.9 FL — SIGNIFICANT CHANGE UP (ref 80–100)
NRBC # BLD: 0 /100 WBCS — SIGNIFICANT CHANGE UP (ref 0–0)
PHOSPHATE SERPL-MCNC: 2.6 MG/DL — SIGNIFICANT CHANGE UP (ref 2.5–4.5)
PLATELET # BLD AUTO: 165 K/UL — SIGNIFICANT CHANGE UP (ref 150–400)
POTASSIUM SERPL-MCNC: 3.6 MMOL/L — SIGNIFICANT CHANGE UP (ref 3.5–5.3)
POTASSIUM SERPL-SCNC: 3.6 MMOL/L — SIGNIFICANT CHANGE UP (ref 3.5–5.3)
PROT SERPL-MCNC: 6.6 G/DL — SIGNIFICANT CHANGE UP (ref 6–8.3)
PROTHROM AB SERPL-ACNC: 27.4 SEC — HIGH (ref 10.6–13.6)
RBC # BLD: 4.12 M/UL — SIGNIFICANT CHANGE UP (ref 3.8–5.2)
RBC # FLD: 18 % — HIGH (ref 10.3–14.5)
SODIUM SERPL-SCNC: 144 MMOL/L — SIGNIFICANT CHANGE UP (ref 135–145)
WBC # BLD: 7.25 K/UL — SIGNIFICANT CHANGE UP (ref 3.8–10.5)
WBC # FLD AUTO: 7.25 K/UL — SIGNIFICANT CHANGE UP (ref 3.8–10.5)

## 2021-10-09 PROCEDURE — 99233 SBSQ HOSP IP/OBS HIGH 50: CPT | Mod: GC

## 2021-10-09 RX ADMIN — Medication 25 MILLIGRAM(S): at 13:13

## 2021-10-09 RX ADMIN — WARFARIN SODIUM 2.5 MILLIGRAM(S): 2.5 TABLET ORAL at 21:49

## 2021-10-09 RX ADMIN — ISOSORBIDE MONONITRATE 30 MILLIGRAM(S): 60 TABLET, EXTENDED RELEASE ORAL at 13:14

## 2021-10-09 RX ADMIN — ANASTROZOLE 1 MILLIGRAM(S): 1 TABLET ORAL at 13:14

## 2021-10-09 RX ADMIN — Medication 25 MILLIGRAM(S): at 21:49

## 2021-10-09 RX ADMIN — ARIPIPRAZOLE 2 MILLIGRAM(S): 15 TABLET ORAL at 13:13

## 2021-10-09 RX ADMIN — Medication 25 MICROGRAM(S): at 05:30

## 2021-10-09 RX ADMIN — Medication 12.5 MILLIGRAM(S): at 05:31

## 2021-10-09 RX ADMIN — ATORVASTATIN CALCIUM 40 MILLIGRAM(S): 80 TABLET, FILM COATED ORAL at 21:49

## 2021-10-09 RX ADMIN — Medication 12.5 MILLIGRAM(S): at 18:31

## 2021-10-09 RX ADMIN — PANTOPRAZOLE SODIUM 40 MILLIGRAM(S): 20 TABLET, DELAYED RELEASE ORAL at 05:31

## 2021-10-09 NOTE — PROGRESS NOTE ADULT - SUBJECTIVE AND OBJECTIVE BOX
Patient is a 66y old  Female who presents with a chief complaint of DYSPNEA (08 Oct 2021 15:29)      INTERVAL HPI/OVERNIGHT EVENTS: Patient seen and examined at bedside. No overnight events occurred. Patient reports she is feeling better, reports that her breathing has significantly improved. Reports no SOB or cough. Patient reports that she is ambulating, and PT saw her yesterday. Reports she has bowel movements and is urinating without difficulty. Patient denies any active hallucinations, SI, or HI. Patient has no complaints at this time.      MEDICATIONS  (STANDING):  anastrozole 1 milliGRAM(s) Oral daily  ARIPiprazole 2 milliGRAM(s) Oral daily  atorvastatin 40 milliGRAM(s) Oral at bedtime  hydrALAZINE 25 milliGRAM(s) Oral every 8 hours  insulin lispro (ADMELOG) corrective regimen sliding scale   SubCutaneous three times a day before meals  insulin lispro (ADMELOG) corrective regimen sliding scale   SubCutaneous at bedtime  isosorbide   mononitrate ER Tablet (IMDUR) 30 milliGRAM(s) Oral daily  levothyroxine 25 MICROGram(s) Oral daily  metoprolol tartrate 12.5 milliGRAM(s) Oral two times a day  pantoprazole    Tablet 40 milliGRAM(s) Oral before breakfast  warfarin 2.5 milliGRAM(s) Oral at bedtime    MEDICATIONS  (PRN):  ALBUTerol    90 MICROgram(s) HFA Inhaler 2 Puff(s) Inhalation every 6 hours PRN Shortness of Breath and/or Wheezing      Allergies    Advil (Hives; Vomiting)    Intolerances        REVIEW OF SYSTEMS:  CONSTITUTIONAL: Admits to fatigue, No fever or chills  HEENT:  No headache, no sore throat  RESPIRATORY: No cough, wheezing, or shortness of breath  CARDIOVASCULAR: No chest pain, palpitations  GASTROINTESTINAL: No abd pain, nausea, vomiting, or diarrhea  GENITOURINARY: No dysuria, frequency, or hematuria  NEUROLOGICAL: no focal weakness or dizziness  MUSCULOSKELETAL: no myalgias     Vital Signs Last 24 Hrs  T(C): 36.5 (09 Oct 2021 05:23), Max: 36.9 (08 Oct 2021 11:07)  T(F): 97.7 (09 Oct 2021 05:23), Max: 98.4 (08 Oct 2021 11:07)  HR: 94 (09 Oct 2021 05:23) (80 - 97)  BP: 119/89 (09 Oct 2021 05:23) (112/77 - 131/97)  BP(mean): 101 (08 Oct 2021 12:34) (101 - 103)  RR: 18 (09 Oct 2021 05:23) (18 - 18)  SpO2: 100% (09 Oct 2021 05:23) (97% - 100%)    PHYSICAL EXAM:  GENERAL: elderly female resting in bed, NAD  HEENT:  anicteric, moist mucous membranes  CHEST/LUNG:  CTA b/l, no rales, wheezes, or rhonchi  HEART:  RRR, S1, S2, No m/r/g  ABDOMEN:  BS+, mild TTP in epigastric area, soft, nondistended  EXTREMITIES: no edema, cyanosis, or calf tenderness  NERVOUS SYSTEM: AAOx2, baseline confusion, answers questions and follows commands appropriately    LABS:                        12.6   7.25  )-----------( 165      ( 09 Oct 2021 06:58 )             39.1     CBC Full  -  ( 09 Oct 2021 06:58 )  WBC Count : 7.25 K/uL  Hemoglobin : 12.6 g/dL  Hematocrit : 39.1 %  Platelet Count - Automated : 165 K/uL  Mean Cell Volume : 94.9 fl  Mean Cell Hemoglobin : 30.6 pg  Mean Cell Hemoglobin Concentration : 32.2 gm/dL  Auto Neutrophil # : x  Auto Lymphocyte # : x  Auto Monocyte # : x  Auto Eosinophil # : x  Auto Basophil # : x  Auto Neutrophil % : x  Auto Lymphocyte % : x  Auto Monocyte % : x  Auto Eosinophil % : x  Auto Basophil % : x    09 Oct 2021 06:58    144    |  114    |  46     ----------------------------<  148    3.6     |  19     |  1.68     Ca    8.7        09 Oct 2021 06:58  Phos  2.6       09 Oct 2021 06:58  Mg     1.6       09 Oct 2021 06:58    TPro  6.6    /  Alb  3.1    /  TBili  1.1    /  DBili  x      /  AST  25     /  ALT  29     /  AlkPhos  291    09 Oct 2021 06:58    PT/INR - ( 09 Oct 2021 06:58 )   PT: 27.4 sec;   INR: 2.40 ratio         PTT - ( 09 Oct 2021 06:58 )  PTT:26.3 sec    CAPILLARY BLOOD GLUCOSE      POCT Blood Glucose.: 146 mg/dL (09 Oct 2021 08:15)  POCT Blood Glucose.: 182 mg/dL (08 Oct 2021 21:26)  POCT Blood Glucose.: 178 mg/dL (08 Oct 2021 11:25)        Culture - Urine (collected 10-07-21 @ 06:42)  Source: Clean Catch Clean Catch (Midstream)  Final Report (10-08-21 @ 23:37):    <10,000 CFU/mL Normal Urogenital Maggie    Culture - Blood (collected 10-07-21 @ 06:33)  Source: .Blood Blood-Peripheral  Preliminary Report (10-08-21 @ 07:01):    No growth to date.    Culture - Blood (collected 10-07-21 @ 06:27)  Source: .Blood Blood-Peripheral  Preliminary Report (10-08-21 @ 07:01):    No growth to date.        RADIOLOGY & ADDITIONAL TESTS:    Personally reviewed.     Consultant(s) Notes Reviewed:  [x] YES  [ ] NO     Patient is a 66y old  Female who presents with a chief complaint of DYSPNEA (08 Oct 2021 15:29)      INTERVAL HPI/OVERNIGHT EVENTS: Patient seen and examined at bedside. No overnight events occurred. Patient reports she is feeling better, reports that her breathing has significantly improved. Reports no SOB or cough. Patient reports that she is ambulating, and PT saw her yesterday. Reports she has bowel movements and is urinating without difficulty. Patient denies any active hallucinations, SI, or HI. Patient has no complaints at this time.      MEDICATIONS  (STANDING):  anastrozole 1 milliGRAM(s) Oral daily  ARIPiprazole 2 milliGRAM(s) Oral daily  atorvastatin 40 milliGRAM(s) Oral at bedtime  hydrALAZINE 25 milliGRAM(s) Oral every 8 hours  insulin lispro (ADMELOG) corrective regimen sliding scale   SubCutaneous three times a day before meals  insulin lispro (ADMELOG) corrective regimen sliding scale   SubCutaneous at bedtime  isosorbide   mononitrate ER Tablet (IMDUR) 30 milliGRAM(s) Oral daily  levothyroxine 25 MICROGram(s) Oral daily  metoprolol tartrate 12.5 milliGRAM(s) Oral two times a day  pantoprazole    Tablet 40 milliGRAM(s) Oral before breakfast  warfarin 2.5 milliGRAM(s) Oral at bedtime    MEDICATIONS  (PRN):  ALBUTerol    90 MICROgram(s) HFA Inhaler 2 Puff(s) Inhalation every 6 hours PRN Shortness of Breath and/or Wheezing      Allergies    Advil (Hives; Vomiting)    Intolerances        REVIEW OF SYSTEMS:  CONSTITUTIONAL: Admits to fatigue, No fever or chills  HEENT:  No headache, no sore throat  RESPIRATORY: Admits to mild nonproductive cough, Denies wheezing, or shortness of breath  CARDIOVASCULAR: No chest pain, palpitations  GASTROINTESTINAL: No abd pain, nausea, vomiting, or diarrhea  GENITOURINARY: No dysuria, frequency, or hematuria  NEUROLOGICAL: no focal weakness or dizziness  MUSCULOSKELETAL: no myalgias     Vital Signs Last 24 Hrs  T(C): 36.5 (09 Oct 2021 05:23), Max: 36.9 (08 Oct 2021 11:07)  T(F): 97.7 (09 Oct 2021 05:23), Max: 98.4 (08 Oct 2021 11:07)  HR: 94 (09 Oct 2021 05:23) (80 - 97)  BP: 119/89 (09 Oct 2021 05:23) (112/77 - 131/97)  BP(mean): 101 (08 Oct 2021 12:34) (101 - 103)  RR: 18 (09 Oct 2021 05:23) (18 - 18)  SpO2: 100% (09 Oct 2021 05:23) (97% - 100%)    PHYSICAL EXAM:  GENERAL: elderly female resting in bed, NAD  HEENT:  anicteric, moist mucous membranes  CHEST/LUNG:  CTA b/l, no rales, wheezes, or rhonchi  HEART:  RRR, S1, S2, No m/r/g  ABDOMEN:  BS+, mild TTP in epigastric area, soft, nondistended  EXTREMITIES: no edema, cyanosis, or calf tenderness  NERVOUS SYSTEM: AAOx2, baseline confusion, answers questions and follows commands appropriately    LABS:                        12.6   7.25  )-----------( 165      ( 09 Oct 2021 06:58 )             39.1     CBC Full  -  ( 09 Oct 2021 06:58 )  WBC Count : 7.25 K/uL  Hemoglobin : 12.6 g/dL  Hematocrit : 39.1 %  Platelet Count - Automated : 165 K/uL  Mean Cell Volume : 94.9 fl  Mean Cell Hemoglobin : 30.6 pg  Mean Cell Hemoglobin Concentration : 32.2 gm/dL  Auto Neutrophil # : x  Auto Lymphocyte # : x  Auto Monocyte # : x  Auto Eosinophil # : x  Auto Basophil # : x  Auto Neutrophil % : x  Auto Lymphocyte % : x  Auto Monocyte % : x  Auto Eosinophil % : x  Auto Basophil % : x    09 Oct 2021 06:58    144    |  114    |  46     ----------------------------<  148    3.6     |  19     |  1.68     Ca    8.7        09 Oct 2021 06:58  Phos  2.6       09 Oct 2021 06:58  Mg     1.6       09 Oct 2021 06:58    TPro  6.6    /  Alb  3.1    /  TBili  1.1    /  DBili  x      /  AST  25     /  ALT  29     /  AlkPhos  291    09 Oct 2021 06:58    PT/INR - ( 09 Oct 2021 06:58 )   PT: 27.4 sec;   INR: 2.40 ratio         PTT - ( 09 Oct 2021 06:58 )  PTT:26.3 sec    CAPILLARY BLOOD GLUCOSE      POCT Blood Glucose.: 146 mg/dL (09 Oct 2021 08:15)  POCT Blood Glucose.: 182 mg/dL (08 Oct 2021 21:26)  POCT Blood Glucose.: 178 mg/dL (08 Oct 2021 11:25)        Culture - Urine (collected 10-07-21 @ 06:42)  Source: Clean Catch Clean Catch (Midstream)  Final Report (10-08-21 @ 23:37):    <10,000 CFU/mL Normal Urogenital Maggie    Culture - Blood (collected 10-07-21 @ 06:33)  Source: .Blood Blood-Peripheral  Preliminary Report (10-08-21 @ 07:01):    No growth to date.    Culture - Blood (collected 10-07-21 @ 06:27)  Source: .Blood Blood-Peripheral  Preliminary Report (10-08-21 @ 07:01):    No growth to date.        RADIOLOGY & ADDITIONAL TESTS:    Personally reviewed.     Consultant(s) Notes Reviewed:  [x] YES  [ ] NO

## 2021-10-09 NOTE — PROGRESS NOTE ADULT - ATTENDING COMMENTS
Patient seen and examined earlier this afternoon with PGY1;     CC: Respiratory distress at home    66 year old F from home (lives alone with 24 hour HHA), baseline AAOx2, ambulates independently with some assistance, has PMHx of CAD, T2DM (last A1c 5.9%), Hypothyroidism, HTN, HFrEF with recent exacerbation, cardiomyopathy (EF 25% on 9/16/21, s/p SQ AICD), COPD (no prior intubations), CVA, DVT (L leg 1994)/PE (s/p IVC filter 9/2020, and repeat saddle PE at [facility] 8/21-9/3 now on Coumadin), infrarenal AAA, cerebral aneurysm, atypical Depression, Right breast cancer (s/p chemo/XRT), Class I Obesity, CKD3-A, PSHx of hysterectomy (for fibroids),and cholecystectomy presented to the ED with worsening shortness of breath s/p discharge on 10/6/21 after being treated for CHF exacerbation. Noted to have increased BNP from baseline and mild worsening of kidney functions from discharge     Patient was OOB to chair, not on oxygen supplement, not in any distress  Patient denies chest pain, palpitations, dizziness, headache, fever, chills, cough, GI or  symptoms.    Vital Signs Last 24 Hrs  T(C): 36.6 (09 Oct 2021 14:18), Max: 36.6 (09 Oct 2021 13:21)  T(F): 97.8 (09 Oct 2021 14:18), Max: 97.8 (09 Oct 2021 13:21)  HR: 84 (09 Oct 2021 14:18) (84 - 95)  BP: 128/89 (09 Oct 2021 14:18) (119/89 - 136/100)  RR: 17 (09 Oct 2021 14:18) (17 - 18)  SpO2: 98% (09 Oct 2021 14:18) (98% - 100%)    P/E:  Psych: AAO x3; at baseline mood  Neuro: No focal deficits  CVS: S1S2 present, regular, no edema  Resp: BLAE+, No wheeze or Rhonchi; Decreased Breath sounds at right base  GI: Soft, BS+, NT  Extr: No edema or calf tenderness B./L LE    Labs:                        12.6   7.25  )-----------( 165      ( 09 Oct 2021 06:58 )             39.1   10-09    144  |  114<H>  |  46<H>  ----------------------------<  148<H>  3.6   |  19<L>  |  1.68<H>    Ca    8.7      09 Oct 2021 06:58  Phos  2.6     10-09  Mg     1.6     10-09    TPro  6.6  /  Alb  3.1<L>  /  TBili  1.1  /  DBili  x   /  AST  25  /  ALT  29  /  AlkPhos  291<H>  10-09      D/D:  Acute Hypoxic Resp. failure suspect mild CHF exacerbation (Acute on Chronic systolic)   CHEN suspect due to likely Entresto vs less likely Fluid overload    CKD 3   COPD stable  Anxiety disorder with Atypical depression   Type 2 DM  Hx DVT and saddle PE on coumadin    Plan:  Hold Lasix for now until renal function improve  Hold Entresto for now and will not resume may have contributed to CHEN as he got it last 2 days prior to Creatinine rise from 1.38 AM to 1.98 overnight.   Continue Imdur/ Coumadin 2.5 mg tonight ; INR AM therapeutic at present  Increased Hydralazine to 25 mg q 8 hrs; if BP elevated increase to 50 mg q 8 hrs; Continue Metoprolol; currently well controlled  Palliative Consult was done last admission and extensive d/w HCP who wishes FULL CODE; Patient does not have insight into her complex medical issues.      Discussed with patient findings and plan of care; update HCP  Discussed with PGY1 Dr. Sarmiento and with RN Patient seen and examined earlier this afternoon with PGY1;     CC: Respiratory distress at home    66 year old F from home (lives alone with 24 hour HHA), baseline AAOx2, ambulates independently with some assistance, has PMHx of CAD, T2DM (last A1c 5.9%), Hypothyroidism, HTN, HFrEF with recent exacerbation, cardiomyopathy (EF 25% on 9/16/21, s/p SQ AICD), COPD (no prior intubations), CVA, DVT (L leg 1994)/PE (s/p IVC filter 9/2020, and repeat saddle PE at [facility] 8/21-9/3 now on Coumadin), infrarenal AAA, cerebral aneurysm, atypical Depression, Right breast cancer (s/p chemo/XRT), Class I Obesity, CKD3-A, PSHx of hysterectomy (for fibroids),and cholecystectomy presented to the ED with worsening shortness of breath s/p discharge on 10/6/21 after being treated for CHF exacerbation. Noted to have increased BNP from baseline and mild worsening of kidney functions from discharge     Patient noted to be in bed, not on oxygen supplement, 100% saturation not in any distress  Patient does complain of intermittent SOB without any distress likely related to Anxiety    Vital Signs Last 24 Hrs  T(C): 36.6 (09 Oct 2021 14:18), Max: 36.6 (09 Oct 2021 13:21)  T(F): 97.8 (09 Oct 2021 14:18), Max: 97.8 (09 Oct 2021 13:21)  HR: 84 (09 Oct 2021 14:18) (84 - 95)  BP: 128/89 (09 Oct 2021 14:18) (119/89 - 136/100)  RR: 17 (09 Oct 2021 14:18) (17 - 18)  SpO2: 98% (09 Oct 2021 14:18) (98% - 100%)    P/E: as above  Psych: AAO x2; at baseline mood  Neuro: No focal deficits  CVS: S1S2 present, regular, no edema  Resp: BLAE+, No wheeze or Rhonchi; Decreased Breath sounds at right base  GI: Soft, BS+, NT  Extr: No edema or calf tenderness B./L LE    Labs:                      12.6   7.25  )-----------( 165      ( 09 Oct 2021 06:58 )             39.1   10-09    144  |  114<H>  |  46<H>  ----------------------------<  148<H>  3.6   |  19<L>  |  1.68<H>    Ca    8.7      09 Oct 2021 06:58  Phos  2.6     10-09  Mg     1.6     10-09    TPro  6.6  /  Alb  3.1<L>  /  TBili  1.1  /  DBili  x   /  AST  25  /  ALT  29  /  AlkPhos  291<H>  10-09      D/D:  Acute Hypoxic Resp. failure suspect mild CHF exacerbation (Acute on Chronic systolic)  resolved hypoxia  CHEN suspect due to likely Entresto vs less likely Fluid overload  resolving well   CKD 3   COPD stable  Anxiety disorder with Atypical depression   Type 2 DM  Hx DVT and saddle PE on coumadin    Plan:  Hold Lasix for now until renal function improve  Hold Entresto for now and will not resume; may have contributed to CHEN as he got it last 2 days prior to Creatinine rise from 1.38 AM to 1.98 overnight.   Monitor renal fn closely  Continue Imdur/ Coumadin 2.5 mg tonight ; INR AM therapeutic at present  Increased Hydralazine to 25 mg q 8 hrs; if BP elevated increase to 50 mg q 8 hrs; Continue Metoprolol; currently well controlled  Palliative Consult was done last admission and extensive d/w HCP who wishes FULL CODE; Patient does not have insight into her complex medical issues.    Discussed with patient findings and plan of care; update HCP  Discussed with PGY1 Dr. Desai/ PGY2 Dr. Hernadez and RN  D/C Plan possibly Monday if remain stable

## 2021-10-09 NOTE — PROGRESS NOTE ADULT - PROBLEM SELECTOR PLAN 6
- Patient has hx of DM diet controlled at home, at target 140-180  - Started on HSS  - Fingerstick before meals and at bedtime  - DASH diet with consistent carb  - Target -180

## 2021-10-09 NOTE — PROGRESS NOTE ADULT - PROBLEM SELECTOR PLAN 3
- On admission, patient with Cr 1.9, baseline 1.3, currently 1.68, recovering function  - CHEN multifactorial -- prerenal in the setting of acute on chronic CHF vs recent start of ENTRESTO  - Monitor I/O's daily  - Avoid nephrotoxins, NSAIDS, ACEI and ARBS  - Off Entresto for now  - Continue to monitor BMP

## 2021-10-09 NOTE — PROGRESS NOTE ADULT - ASSESSMENT
66 year old F w/ PMhx of CAD, T2DM (last A1c 5.9%), Hypothyroidism, HTN, HFrEF, cardiomyopathy (EF 25% on 9/16/21, s/p AICD (81% battery) ), COPD (no prior intubations), CVA, DVT (L leg 1994)/PE (s/p IVC filter 9/2020, and repeat saddle PE at [facility] 8/21-9/3 now on Coumadin), infrarenal AAA, cerebral aneurysm, atypical Depression, R breast cancer (s/p chemo/XRT), Class I Obesity, CKD3-A presented to the ED with worsening shortness of breath s/p discharge on 10/6/21 after being treated for COPD exacerbation. Patient admitted for dyspnea.         66 year old F w/ PMhx of CAD, T2DM (last A1c 6.4%), Hypothyroidism, HTN, HFrEF, cardiomyopathy (EF 25% on 9/16/21, s/p AICD (81% battery) ), COPD (no prior intubations), CVA, DVT (L leg 1994)/PE (s/p IVC filter 9/2020, and repeat saddle PE at [facility] 8/21-9/3 now on Coumadin), infrarenal AAA, cerebral aneurysm, atypical Depression, R breast cancer (s/p chemo/XRT), Class I Obesity, CKD3-A presented to the ED with worsening shortness of breath s/p discharge on 10/6/21 after being treated for COPD exacerbation. Patient admitted for dyspnea.

## 2021-10-09 NOTE — PROGRESS NOTE ADULT - PROBLEM SELECTOR PLAN 1
- Patient readmitted for acute dyspnea, previously treated for COPD exacerbation, breathing significantly improved, saturating well on RA  - On admission, patient with sob, hypoxic requiring NRBM, CXR compared to previous one noted to have no significant changes, Pro BNP 37k from 12k unclear etiology, Cr 1.9 from 1.3  - Dyspnea could be acute on chronic CHF vs symptoms from PE on coumadin vs worsening COPD  - EKG sinus tachycardia, no ST changes, trop x 1 negative   -  Echo 9/2021 showed Severe left ventricular enlargement. Severely reduced left ventricular systolic function.  Diffuse hypokinesis. Severe functional mitral regurgitation.  -  Entresto held due to CHEN, c/w hydralazine, Imdur, BB, statin   - Strict I & O, Daily weight, Restrict fluids to 1 L  - No events on tele, off tele - Patient readmitted for acute dyspnea, previously treated for COPD exacerbation, breathing significantly improved, saturating well on RA, slight cough, add PRN Robitussin  - On admission, patient with sob, hypoxic requiring NRBM, CXR compared to previous one noted to have no significant changes, Pro BNP 37k from 12k unclear etiology, Cr 1.9 from 1.3  - Dyspnea could be acute on chronic CHF vs symptoms from PE on coumadin vs worsening COPD  - EKG sinus tachycardia, no ST changes, trop x 1 negative   -  Echo 9/2021 showed Severe left ventricular enlargement. Severely reduced left ventricular systolic function.  Diffuse hypokinesis. Severe functional mitral regurgitation.  -  Entresto held due to CHEN, c/w hydralazine, Imdur, BB, statin   - Strict I & O, Daily weight, Restrict fluids to 1 L  - No events on tele, off tele

## 2021-10-10 LAB
ALBUMIN SERPL ELPH-MCNC: 3.3 G/DL — LOW (ref 3.5–5)
ALP SERPL-CCNC: 377 U/L — HIGH (ref 40–120)
ALT FLD-CCNC: 35 U/L DA — SIGNIFICANT CHANGE UP (ref 10–60)
ANION GAP SERPL CALC-SCNC: 16 MMOL/L — SIGNIFICANT CHANGE UP (ref 5–17)
APTT BLD: 26 SEC — LOW (ref 27.5–35.5)
AST SERPL-CCNC: 29 U/L — SIGNIFICANT CHANGE UP (ref 10–40)
BILIRUB SERPL-MCNC: 1.7 MG/DL — HIGH (ref 0.2–1.2)
BUN SERPL-MCNC: 50 MG/DL — HIGH (ref 7–18)
CALCIUM SERPL-MCNC: 9.5 MG/DL — SIGNIFICANT CHANGE UP (ref 8.4–10.5)
CHLORIDE SERPL-SCNC: 114 MMOL/L — HIGH (ref 96–108)
CO2 SERPL-SCNC: 15 MMOL/L — LOW (ref 22–31)
CREAT SERPL-MCNC: 2.02 MG/DL — HIGH (ref 0.5–1.3)
GLUCOSE BLDC GLUCOMTR-MCNC: 145 MG/DL — HIGH (ref 70–99)
GLUCOSE BLDC GLUCOMTR-MCNC: 176 MG/DL — HIGH (ref 70–99)
GLUCOSE BLDC GLUCOMTR-MCNC: 179 MG/DL — HIGH (ref 70–99)
GLUCOSE BLDC GLUCOMTR-MCNC: 185 MG/DL — HIGH (ref 70–99)
GLUCOSE SERPL-MCNC: 168 MG/DL — HIGH (ref 70–99)
HCT VFR BLD CALC: 44 % — SIGNIFICANT CHANGE UP (ref 34.5–45)
HGB BLD-MCNC: 13.9 G/DL — SIGNIFICANT CHANGE UP (ref 11.5–15.5)
INR BLD: 2.92 RATIO — HIGH (ref 0.88–1.16)
MAGNESIUM SERPL-MCNC: 1.7 MG/DL — SIGNIFICANT CHANGE UP (ref 1.6–2.6)
MCHC RBC-ENTMCNC: 30.5 PG — SIGNIFICANT CHANGE UP (ref 27–34)
MCHC RBC-ENTMCNC: 31.6 GM/DL — LOW (ref 32–36)
MCV RBC AUTO: 96.7 FL — SIGNIFICANT CHANGE UP (ref 80–100)
NRBC # BLD: 0 /100 WBCS — SIGNIFICANT CHANGE UP (ref 0–0)
PHOSPHATE SERPL-MCNC: 3.3 MG/DL — SIGNIFICANT CHANGE UP (ref 2.5–4.5)
PLATELET # BLD AUTO: 127 K/UL — LOW (ref 150–400)
POTASSIUM SERPL-MCNC: 4.5 MMOL/L — SIGNIFICANT CHANGE UP (ref 3.5–5.3)
POTASSIUM SERPL-SCNC: 4.5 MMOL/L — SIGNIFICANT CHANGE UP (ref 3.5–5.3)
PROT SERPL-MCNC: 7.3 G/DL — SIGNIFICANT CHANGE UP (ref 6–8.3)
PROTHROM AB SERPL-ACNC: 33 SEC — HIGH (ref 10.6–13.6)
RBC # BLD: 4.55 M/UL — SIGNIFICANT CHANGE UP (ref 3.8–5.2)
RBC # FLD: 18.1 % — HIGH (ref 10.3–14.5)
SODIUM SERPL-SCNC: 145 MMOL/L — SIGNIFICANT CHANGE UP (ref 135–145)
WBC # BLD: 7.71 K/UL — SIGNIFICANT CHANGE UP (ref 3.8–10.5)
WBC # FLD AUTO: 7.71 K/UL — SIGNIFICANT CHANGE UP (ref 3.8–10.5)

## 2021-10-10 PROCEDURE — 99233 SBSQ HOSP IP/OBS HIGH 50: CPT

## 2021-10-10 RX ORDER — SODIUM CHLORIDE 9 MG/ML
1000 INJECTION, SOLUTION INTRAVENOUS
Refills: 0 | Status: DISCONTINUED | OUTPATIENT
Start: 2021-10-10 | End: 2021-10-11

## 2021-10-10 RX ORDER — METOPROLOL TARTRATE 50 MG
25 TABLET ORAL
Refills: 0 | Status: DISCONTINUED | OUTPATIENT
Start: 2021-10-10 | End: 2021-10-16

## 2021-10-10 RX ORDER — HYDRALAZINE HCL 50 MG
50 TABLET ORAL EVERY 8 HOURS
Refills: 0 | Status: DISCONTINUED | OUTPATIENT
Start: 2021-10-10 | End: 2021-10-16

## 2021-10-10 RX ORDER — METOPROLOL TARTRATE 50 MG
12.5 TABLET ORAL ONCE
Refills: 0 | Status: COMPLETED | OUTPATIENT
Start: 2021-10-10 | End: 2021-10-10

## 2021-10-10 RX ORDER — WARFARIN SODIUM 2.5 MG/1
1 TABLET ORAL DAILY
Refills: 0 | Status: DISCONTINUED | OUTPATIENT
Start: 2021-10-10 | End: 2021-10-11

## 2021-10-10 RX ADMIN — Medication 25 MICROGRAM(S): at 06:32

## 2021-10-10 RX ADMIN — SODIUM CHLORIDE 60 MILLILITER(S): 9 INJECTION, SOLUTION INTRAVENOUS at 21:54

## 2021-10-10 RX ADMIN — Medication 25 MILLIGRAM(S): at 06:32

## 2021-10-10 RX ADMIN — Medication 12.5 MILLIGRAM(S): at 18:58

## 2021-10-10 RX ADMIN — Medication 25 MILLIGRAM(S): at 16:15

## 2021-10-10 RX ADMIN — Medication 1: at 12:12

## 2021-10-10 RX ADMIN — PANTOPRAZOLE SODIUM 40 MILLIGRAM(S): 20 TABLET, DELAYED RELEASE ORAL at 06:32

## 2021-10-10 RX ADMIN — WARFARIN SODIUM 1 MILLIGRAM(S): 2.5 TABLET ORAL at 21:53

## 2021-10-10 RX ADMIN — Medication 12.5 MILLIGRAM(S): at 06:33

## 2021-10-10 RX ADMIN — Medication 50 MILLIGRAM(S): at 21:53

## 2021-10-10 RX ADMIN — ATORVASTATIN CALCIUM 40 MILLIGRAM(S): 80 TABLET, FILM COATED ORAL at 21:54

## 2021-10-10 RX ADMIN — ARIPIPRAZOLE 2 MILLIGRAM(S): 15 TABLET ORAL at 12:14

## 2021-10-10 RX ADMIN — Medication 12.5 MILLIGRAM(S): at 19:47

## 2021-10-10 RX ADMIN — ISOSORBIDE MONONITRATE 30 MILLIGRAM(S): 60 TABLET, EXTENDED RELEASE ORAL at 12:14

## 2021-10-10 RX ADMIN — ANASTROZOLE 1 MILLIGRAM(S): 1 TABLET ORAL at 12:14

## 2021-10-10 NOTE — PROGRESS NOTE ADULT - ASSESSMENT
D/D:  CHEN suspect due to likely Entresto vs less likely Fluid overload  resolving well     Metabolic acidosis  Acute Hypoxic Resp. failure suspect mild CHF exacerbation (Acute on Chronic systolic)  resolved hypoxia  CKD 3   COPD stable  Anxiety disorder with Atypical depression   Type 2 DM  Hx DVT and saddle PE on coumadin    Plan:  Hold Lasix for now until renal function improv; gentle hydration with Biacrabonate (1/2 NS with 44 meq NaHCO3) 60cc/hr x 12 hrs  Hold Entresto for now and will not resume; may have contributed to CHEN  Monitor renal fn closely  Continue Imdur/ Coumadin 1 mg tonight ; INR AM therapeutic at present but rising  Increased Hydralazine to 50 mg q 8 hrs;Continue Metoprolol; increase to 25 mg q 12 hrs  Palliative Consult was done last admission and extensive d/w HCP who wishes FULL CODE; Patient does not have insight into her complex medical issues.    Discussed with patient findings and plan of care;   D/C Plan possibly Monday if remain stable D/D:  CHEN suspect due to likely Entresto vs less likely Fluid overload  resolving well   Metabolic acidosis  Acute Hypoxic Resp. failure suspect mild CHF exacerbation (Acute on Chronic systolic)  resolved hypoxia  CKD 3   COPD stable  Anxiety disorder with Atypical depression   Type 2 DM  Hx DVT and saddle PE on coumadin    Plan:  Hold Lasix for now until renal function improv; gentle hydration with Biacrabonate (1/2 NS with 50 meq NaHCO3) 60cc/hr x 12 hrs  Hold Entresto for now and will not resume; may have contributed to CHEN  Monitor renal fn closely  Continue Imdur/ Coumadin 1 mg tonight ; INR AM therapeutic at present but rising  Increased Hydralazine to 50 mg q 8 hrs;Continue Metoprolol; increase to 25 mg q 12 hrs  Palliative Consult was done last admission and extensive d/w HCP who wishes FULL CODE; Patient does not have insight into her complex medical issues.    Discussed with patient findings and plan of care;   D/C Plan possibly Monday into Tuesday if remain stable and renal function; will d/w HCP possible high risk of readmission given patient intermittent complain of SOB without actually any distress.

## 2021-10-10 NOTE — PROGRESS NOTE ADULT - ATTENDING COMMENTS
Patient seen and examined earlier this afternoon with PGY1;     CC: Respiratory distress at home    66 year old F from home (lives alone with 24 hour HHA), baseline AAOx2, ambulates independently with some assistance, has PMHx of CAD, T2DM (last A1c 5.9%), Hypothyroidism, HTN, HFrEF with recent exacerbation, cardiomyopathy (EF 25% on 9/16/21, s/p SQ AICD), COPD (no prior intubations), CVA, DVT (L leg 1994)/PE (s/p IVC filter 9/2020, and repeat saddle PE at [facility] 8/21-9/3 now on Coumadin), infrarenal AAA, cerebral aneurysm, atypical Depression, Right breast cancer (s/p chemo/XRT), Class I Obesity, CKD3-A, PSHx of hysterectomy (for fibroids),and cholecystectomy presented to the ED with worsening shortness of breath s/p discharge on 10/6/21 after being treated for CHF exacerbation. Noted to have increased BNP from baseline and mild worsening of kidney functions from discharge     Patient noted to be in bed, not on oxygen supplement, 100% saturation not in any distress  Patient does complain of intermittent SOB without any distress likely related to Anxiety    Vital Signs Last 24 Hrs  T(C): 36.6 (10 Oct 2021 14:25), Max: 36.7 (09 Oct 2021 19:55)  T(F): 97.8 (10 Oct 2021 14:25), Max: 98 (09 Oct 2021 19:55)  HR: 91 (10 Oct 2021 14:25) (87 - 93)  BP: 142/98 (10 Oct 2021 14:25) (128/95 - 142/98)  RR: 16 (10 Oct 2021 14:25) (16 - 17)  SpO2: 96% (10 Oct 2021 14:25) (96% - 99%)    P/E: as above  Psych: AAO x2; at baseline mood  Neuro: No focal deficits  CVS: S1S2 present, regular, no edema  Resp: BLAE+, No wheeze or Rhonchi; Decreased Breath sounds at right base  GI: Soft, BS+, NT  Extr: No edema or calf tenderness B./L LE    Labs:                                 13.9   7.71  )-----------( 127      ( 10 Oct 2021 06:56 )             44.0   10-10    145  |  114<H>  |  50<H>  ----------------------------<  168<H>  4.5   |  15<L>  |  2.02<H>    Ca    9.5      10 Oct 2021 06:56  Phos  3.3     10-10  Mg     1.7     10-10    TPro  7.3  /  Alb  3.3<L>  /  TBili  1.7<H>  /  DBili  x   /  AST  29  /  ALT  35  /  AlkPhos  377<H>  10-10        D/D:  Acute Hypoxic Resp. failure suspect mild CHF exacerbation (Acute on Chronic systolic)  resolved hypoxia  CHEN suspect due to likely Entresto vs less likely Fluid overload  resolving well   CKD 3   COPD stable  Anxiety disorder with Atypical depression   Type 2 DM  Hx DVT and saddle PE on coumadin    Plan:  Hold Lasix for now until renal function improve  Hold Entresto for now and will not resume; may have contributed to CHEN as he got it last 2 days prior to Creatinine rise from 1.38 AM to 1.98 overnight.   Monitor renal fn closely  Continue Imdur/ Coumadin 2.5 mg tonight ; INR AM therapeutic at present  Increased Hydralazine to 25 mg q 8 hrs; if BP elevated increase to 50 mg q 8 hrs; Continue Metoprolol; currently well controlled  Palliative Consult was done last admission and extensive d/w HCP who wishes FULL CODE; Patient does not have insight into her complex medical issues.    Discussed with patient findings and plan of care; update HCP  Discussed with PGY1 Dr. Desai/ PGY2 Dr. Hernadez and RN  D/C Plan possibly Monday if remain stable

## 2021-10-10 NOTE — PROGRESS NOTE ADULT - SUBJECTIVE AND OBJECTIVE BOX
Patient seen and examined this morning    CC: Respiratory distress at home    66 year old F from home (lives alone with 24 hour HHA), baseline AAOx2, ambulates independently with some assistance, has PMHx of CAD, T2DM (last A1c 5.9%), Hypothyroidism, HTN, HFrEF with recent exacerbation, cardiomyopathy (EF 25% on 9/16/21, s/p SQ AICD), COPD (no prior intubations), CVA, DVT (L leg 1994)/PE (s/p IVC filter 9/2020, and repeat saddle PE at [facility] 8/21-9/3 now on Coumadin), infrarenal AAA, cerebral aneurysm, atypical Depression, Right breast cancer (s/p chemo/XRT), Class I Obesity, CKD3-A, PSHx of hysterectomy (for fibroids),and cholecystectomy presented to the ED with worsening shortness of breath s/p discharge on 10/6/21 after being treated for CHF exacerbation. Noted to have increased BNP from baseline and mild worsening of kidney functions from discharge     Patient noted to be in bed, not on oxygen supplement, 100% saturation not in any distress  Patient does complain of intermittent SOB without any distress likely related to Anxiety    Vital Signs Last 24 Hrs  T(C): 36.6 (10 Oct 2021 14:25), Max: 36.7 (09 Oct 2021 19:55)  T(F): 97.8 (10 Oct 2021 14:25), Max: 98 (09 Oct 2021 19:55)  HR: 91 (10 Oct 2021 14:25) (87 - 93)  BP: 142/98 (10 Oct 2021 14:25) (128/95 - 142/98)  BP(mean): --  RR: 16 (10 Oct 2021 14:25) (16 - 17)  SpO2: 96% (10 Oct 2021 14:25) (96% - 99%)    P/E: as above  Psych: AAO x2; at baseline mood  Neuro: No focal deficits  CVS: S1S2 present, regular, no edema  Resp: BLAE+, No wheeze or Rhonchi; Decreased Breath sounds at right base  GI: Soft, BS+, NT  Extr: No edema or calf tenderness B./L LE    Labs:                                 13.9   7.71  )-----------( 127      ( 10 Oct 2021 06:56 )             44.0   10-10    145  |  114<H>  |  50<H>  ----------------------------<  168<H>  4.5   |  15<L>  |  2.02<H>    Ca    9.5      10 Oct 2021 06:56  Phos  3.3     10-10  Mg     1.7     10-10    TPro  7.3  /  Alb  3.3<L>  /  TBili  1.7<H>  /  DBili  x   /  AST  29  /  ALT  35  /  AlkPhos  377<H>  10-10 Patient seen and examined this morning    CC: Respiratory distress at home    66 year old F from home (lives alone with 24 hour HHA), baseline AAOx2, ambulates independently with some assistance, has PMHx of CAD, T2DM (last A1c 5.9%), Hypothyroidism, HTN, HFrEF with recent exacerbation, cardiomyopathy (EF 25% on 9/16/21, s/p SQ AICD), COPD (no prior intubations), CVA, DVT (L leg 1994)/PE (s/p IVC filter 9/2020, and repeat saddle PE at [facility] 8/21-9/3 now on Coumadin), infrarenal AAA, cerebral aneurysm, atypical Depression, Right breast cancer (s/p chemo/XRT), Class I Obesity, CKD3-A, PSHx of hysterectomy (for fibroids),and cholecystectomy presented to the ED with worsening shortness of breath s/p discharge on 10/6/21 after being treated for CHF exacerbation. Noted to have increased BNP from baseline and mild worsening of kidney functions from discharge     Patient noted to be in bed, not on oxygen supplement, 100% saturation not in any distress  Patient was little more drowsy today but easily arousable.  No ne complaints    Vital Signs Last 24 Hrs  T(C): 36.6 (10 Oct 2021 14:25), Max: 36.7 (09 Oct 2021 19:55)  T(F): 97.8 (10 Oct 2021 14:25), Max: 98 (09 Oct 2021 19:55)  HR: 91 (10 Oct 2021 14:25) (87 - 93)  BP: 142/98 (10 Oct 2021 14:25) (128/95 - 142/98)  RR: 16 (10 Oct 2021 14:25) (16 - 17)  SpO2: 96% (10 Oct 2021 14:25) (96% - 99%)    P/E:  Psych: AAO x2; at baseline mood  Neuro: No focal deficits  CVS: S1S2 present, regular, no edema  Resp: BLAE+, No wheeze or Rhonchi; Decreased Breath sounds at right base  GI: Soft, BS+, NT  Extr: No edema or calf tenderness B./L LE    Labs:                               13.9   7.71  )-----------( 127      ( 10 Oct 2021 06:56 )             44.0   10-10    145  |  114<H>  |  50<H>  ----------------------------<  168<H>  4.5   |  15<L>  |  2.02<H>    Ca    9.5      10 Oct 2021 06:56  Phos  3.3     10-10  Mg     1.7     10-10    TPro  7.3  /  Alb  3.3<L>  /  TBili  1.7<H>  /  DBili  x   /  AST  29  /  ALT  35  /  AlkPhos  377<H>  10-10

## 2021-10-11 DIAGNOSIS — R53.81 OTHER MALAISE: ICD-10-CM

## 2021-10-11 DIAGNOSIS — Z51.5 ENCOUNTER FOR PALLIATIVE CARE: ICD-10-CM

## 2021-10-11 LAB
ALBUMIN SERPL ELPH-MCNC: 3.2 G/DL — LOW (ref 3.5–5)
ALP SERPL-CCNC: 390 U/L — HIGH (ref 40–120)
ALT FLD-CCNC: 31 U/L DA — SIGNIFICANT CHANGE UP (ref 10–60)
ANION GAP SERPL CALC-SCNC: 16 MMOL/L — SIGNIFICANT CHANGE UP (ref 5–17)
APTT BLD: 27.3 SEC — LOW (ref 27.5–35.5)
AST SERPL-CCNC: 28 U/L — SIGNIFICANT CHANGE UP (ref 10–40)
BILIRUB SERPL-MCNC: 1.9 MG/DL — HIGH (ref 0.2–1.2)
BUN SERPL-MCNC: 51 MG/DL — HIGH (ref 7–18)
CALCIUM SERPL-MCNC: 8.9 MG/DL — SIGNIFICANT CHANGE UP (ref 8.4–10.5)
CHLORIDE SERPL-SCNC: 112 MMOL/L — HIGH (ref 96–108)
CO2 SERPL-SCNC: 16 MMOL/L — LOW (ref 22–31)
CREAT SERPL-MCNC: 1.94 MG/DL — HIGH (ref 0.5–1.3)
GLUCOSE BLDC GLUCOMTR-MCNC: 146 MG/DL — HIGH (ref 70–99)
GLUCOSE BLDC GLUCOMTR-MCNC: 153 MG/DL — HIGH (ref 70–99)
GLUCOSE BLDC GLUCOMTR-MCNC: 159 MG/DL — HIGH (ref 70–99)
GLUCOSE BLDC GLUCOMTR-MCNC: 165 MG/DL — HIGH (ref 70–99)
GLUCOSE SERPL-MCNC: 189 MG/DL — HIGH (ref 70–99)
HCT VFR BLD CALC: 41.4 % — SIGNIFICANT CHANGE UP (ref 34.5–45)
HGB BLD-MCNC: 13.4 G/DL — SIGNIFICANT CHANGE UP (ref 11.5–15.5)
INR BLD: 3.73 RATIO — HIGH (ref 0.88–1.16)
MAGNESIUM SERPL-MCNC: 1.7 MG/DL — SIGNIFICANT CHANGE UP (ref 1.6–2.6)
MCHC RBC-ENTMCNC: 30.7 PG — SIGNIFICANT CHANGE UP (ref 27–34)
MCHC RBC-ENTMCNC: 32.4 GM/DL — SIGNIFICANT CHANGE UP (ref 32–36)
MCV RBC AUTO: 95 FL — SIGNIFICANT CHANGE UP (ref 80–100)
NRBC # BLD: 0 /100 WBCS — SIGNIFICANT CHANGE UP (ref 0–0)
PHOSPHATE SERPL-MCNC: 3.4 MG/DL — SIGNIFICANT CHANGE UP (ref 2.5–4.5)
PLATELET # BLD AUTO: 104 K/UL — LOW (ref 150–400)
POTASSIUM SERPL-MCNC: 4.1 MMOL/L — SIGNIFICANT CHANGE UP (ref 3.5–5.3)
POTASSIUM SERPL-SCNC: 4.1 MMOL/L — SIGNIFICANT CHANGE UP (ref 3.5–5.3)
PROT SERPL-MCNC: 6.9 G/DL — SIGNIFICANT CHANGE UP (ref 6–8.3)
PROTHROM AB SERPL-ACNC: 41.7 SEC — HIGH (ref 10.6–13.6)
RBC # BLD: 4.36 M/UL — SIGNIFICANT CHANGE UP (ref 3.8–5.2)
RBC # FLD: 17.9 % — HIGH (ref 10.3–14.5)
SODIUM SERPL-SCNC: 144 MMOL/L — SIGNIFICANT CHANGE UP (ref 135–145)
WBC # BLD: 6.73 K/UL — SIGNIFICANT CHANGE UP (ref 3.8–10.5)
WBC # FLD AUTO: 6.73 K/UL — SIGNIFICANT CHANGE UP (ref 3.8–10.5)

## 2021-10-11 PROCEDURE — 99223 1ST HOSP IP/OBS HIGH 75: CPT

## 2021-10-11 PROCEDURE — 99233 SBSQ HOSP IP/OBS HIGH 50: CPT | Mod: GC

## 2021-10-11 PROCEDURE — 99497 ADVNCD CARE PLAN 30 MIN: CPT | Mod: 25

## 2021-10-11 PROCEDURE — 99497 ADVNCD CARE PLAN 30 MIN: CPT

## 2021-10-11 RX ORDER — SODIUM CHLORIDE 9 MG/ML
1000 INJECTION, SOLUTION INTRAVENOUS
Refills: 0 | Status: DISCONTINUED | OUTPATIENT
Start: 2021-10-11 | End: 2021-10-16

## 2021-10-11 RX ORDER — WARFARIN SODIUM 2.5 MG/1
2 TABLET ORAL ONCE
Refills: 0 | Status: COMPLETED | OUTPATIENT
Start: 2021-10-11 | End: 2021-10-11

## 2021-10-11 RX ADMIN — ANASTROZOLE 1 MILLIGRAM(S): 1 TABLET ORAL at 12:32

## 2021-10-11 RX ADMIN — PANTOPRAZOLE SODIUM 40 MILLIGRAM(S): 20 TABLET, DELAYED RELEASE ORAL at 06:37

## 2021-10-11 RX ADMIN — Medication 25 MICROGRAM(S): at 06:37

## 2021-10-11 RX ADMIN — Medication 50 MILLIGRAM(S): at 06:37

## 2021-10-11 RX ADMIN — Medication 25 MILLIGRAM(S): at 17:20

## 2021-10-11 RX ADMIN — ARIPIPRAZOLE 2 MILLIGRAM(S): 15 TABLET ORAL at 12:32

## 2021-10-11 RX ADMIN — Medication 1: at 12:32

## 2021-10-11 RX ADMIN — WARFARIN SODIUM 2 MILLIGRAM(S): 2.5 TABLET ORAL at 22:09

## 2021-10-11 RX ADMIN — Medication 25 MILLIGRAM(S): at 06:37

## 2021-10-11 RX ADMIN — ATORVASTATIN CALCIUM 40 MILLIGRAM(S): 80 TABLET, FILM COATED ORAL at 22:09

## 2021-10-11 RX ADMIN — Medication 50 MILLIGRAM(S): at 22:09

## 2021-10-11 RX ADMIN — SODIUM CHLORIDE 60 MILLILITER(S): 9 INJECTION, SOLUTION INTRAVENOUS at 12:34

## 2021-10-11 RX ADMIN — ISOSORBIDE MONONITRATE 30 MILLIGRAM(S): 60 TABLET, EXTENDED RELEASE ORAL at 12:32

## 2021-10-11 RX ADMIN — Medication 50 MILLIGRAM(S): at 13:36

## 2021-10-11 RX ADMIN — Medication 1: at 08:19

## 2021-10-11 NOTE — CONSULT NOTE ADULT - TREATMENT GUIDELINE COMMENT
still wants care, limited and tailored to her needs - ok with bloodwork like INR monitoring and ED visits (only if absolutely necessary) but rather care be delivered at home.

## 2021-10-11 NOTE — GOALS OF CARE CONVERSATION - ADVANCED CARE PLANNING - CONVERSATION DETAILS
Spoke with HCP Jaki Mcmanus HCP at length this afternoon; reviewed patients multiple comorbidities including advanced CHF with COPD in the setting of decreased lung reserve with recent saddle PE. I also reviewed with her with her underlying anxiety she does c/o intermittent shortness of breath without true hypoxia and request for constant oxygen and Patient does not qualify for Home oxygen as she saturates above 97% at rest and even with ambulation she stays above 92-93%. Given her multiple comorbidities and advanced underlying disease I have suggested to HCP that Patient may be a candidate for Home Hospice which will help her to be comfortable in her home setting and she would be able to get more services including home oxygen as well as symptom management for Dyspnea with PRN Morphine and VNS service at home. I also encouraged her to discuss with her trusted people around her who can help her making right decision. In my opinion, DNR would be appropriate to provide comfort to the patient and avoid undue harm.     Discussed with Palliative care Dr. Mcmanus to discuss with HCP if patient will qualify for Home Hospice. HCP open to the discussion.  Dr. Mcmanus met with Patient who although does not have full insight into her medical comorbidities was able to convey that she would not want to be kept on a machine. Dr. Mcmanus also spoke with HCP Jaki who agrees with patient wishes.

## 2021-10-11 NOTE — CONSULT NOTE ADULT - DECISION MAKER
Pt states that he is feeling better, and is taking oral fluids well.
Pt to ER with c/o URI symptoms for the past few days, and fever, body aches with N/V starting today. Pt states that he feels dehydrated and has been unable to keep any fluids down today.
Health Care Proxy

## 2021-10-11 NOTE — CONSULT NOTE ADULT - SUBJECTIVE AND OBJECTIVE BOX
Consult to: Discuss complex medical decision making related to goals of care    [facility] Geriatric and Palliative Consult Service:  Dr. Breanna Mcmanus: cell ([phone])  Dr. Isa Evans: cell ([phone])   Shanice Blackburn NP: cell ([phone])  Adri Chung NP: cell ([phone])   Bradley Zuniga LSW: cell ([phone])     HPI:  66 year old F from home (lives alone w/ HHA), baseline AAOx3, ambulates independently, hasPMHx of CAD, T2DM (last A1c 5.9%), Hypothyroidism, HTN, HFrEF, cardiomyopathy (EF 25% on 9/16/21, s/p AICD), COPD (no prior intubations), CVA, DVT (L leg 1994)/PE (s/p IVC filter 9/2020, and repeat saddle PE at [facility] 8/21-9/3 now on Coumadin), infrarenal AAA, cerebral aneurysm, atypical Depression, R breast cancer (s/p chemo/XRT), Class I Obesity, CKD3-A, PSHx of hysterectomy (for fibroids),and cholecystectomy presented to the ED with worsening shortness of breath s/p discharge on 10/6/21 after being treated for COPD exacerbation. Patient denies chest pain, palpitations, dizziness, headache, fever, chills, cough, GI or  symptoms.  Of note, Pro BNP on admission is 37k from 12k, Cr 1.9 from 1.3, CXR about the same compared to 9/25/21   (07 Oct 2021 08:55)      PAST MEDICAL & SURGICAL HISTORY:  Hypertension    Breast cancer  right-s/p chemo and radiation    Hypercholesteremia    DVT (deep venous thrombosis)  Left leg in 1994 after hysterectomy    Sickle cell trait    Cardiomyopathy    Abdominal aneurysm  infrarenal- wached by Vascular , Dr. Gutierrez    Internal carotid aneurysm    Knee effusion, left  arthrocentesis- 05/25/16    Chronic HFrEF (heart failure with reduced ejection fraction)    Fibroid uterus  sp hystrectomy 1196    S/P lumpectomy, right breast  2014, revision of position- 2015    Gilson filter in place  1994    S/P hysterectomy  1994    Breast cancer  infusaport insertion- 2014- left chest        SOCIAL HISTORY:    Admitted from:  home  (with HHA)           assisted living          [facility]   Substance abuse history:              Tobacco hx:                  Alcohol hx:              Home Opioid hx:  Church:                                    Preferred Language:    FAMILY HISTORY:  Family history of hypertension    Family history of diabetes mellitus    Family history of renal stone    Family history of glioblastoma    Family history of liver failure    History of hypertension  mother    Family history of glioblastoma (Sibling)    Family history of renal failure  uncle    Family history of other condition  please open box and see additional medical family history    Family history of glioblastoma     unable to obtain from patient due to poor mentation, family unable to give information  Baseline ADLs (prior to admission):    Allergies    Advil (Hives; Vomiting)    Intolerances      Present Symptoms:   Pain:  Fatigue:  Nausea:  Lack of Appetite:   SOB:  Depression:  Anxiety:  Review of Systems: [All others negative or Unable to obtain due to poor mentation]    MEDICATIONS  (STANDING):  anastrozole 1 milliGRAM(s) Oral daily  ARIPiprazole 2 milliGRAM(s) Oral daily  atorvastatin 40 milliGRAM(s) Oral at bedtime  hydrALAZINE 50 milliGRAM(s) Oral every 8 hours  insulin lispro (ADMELOG) corrective regimen sliding scale   SubCutaneous three times a day before meals  insulin lispro (ADMELOG) corrective regimen sliding scale   SubCutaneous at bedtime  isosorbide   mononitrate ER Tablet (IMDUR) 30 milliGRAM(s) Oral daily  levothyroxine 25 MICROGram(s) Oral daily  metoprolol tartrate 25 milliGRAM(s) Oral two times a day  pantoprazole    Tablet 40 milliGRAM(s) Oral before breakfast  sodium chloride 0.45% 1000 milliLiter(s) (60 mL/Hr) IV Continuous <Continuous>  warfarin 2 milliGRAM(s) Oral once    MEDICATIONS  (PRN):  ALBUTerol    90 MICROgram(s) HFA Inhaler 2 Puff(s) Inhalation every 6 hours PRN Shortness of Breath and/or Wheezing  guaiFENesin Oral Liquid (Sugar-Free) 100 milliGRAM(s) Oral every 6 hours PRN Cough      PHYSICAL EXAM:  Vital Signs Last 24 Hrs  T(C): 36.5 (11 Oct 2021 06:32), Max: 36.8 (10 Oct 2021 20:01)  T(F): 97.7 (11 Oct 2021 06:32), Max: 98.3 (10 Oct 2021 20:01)  HR: 83 (11 Oct 2021 06:32) (83 - 99)  BP: 132/96 (11 Oct 2021 06:32) (132/96 - 142/98)  BP(mean): --  RR: 18 (11 Oct 2021 06:32) (16 - 18)  SpO2: 100% (11 Oct 2021 06:32) (96% - 100%)    General: alert  oriented x ____    lethargic distressed cachexia  nonverbal  unarousable verbal    Palliative Performance Scale/Karnofsky Score:  ECOG Performance:    HEENT: no abnormal lesion, dry mouth  ET tube/trach oral lesions:  Lungs: tachypnea/labored breathing  excessive secretions  CV: RRR, S1S2, tachycardia  GI: soft non distended non tender  incontinent               PEG/NG/OG tube  constipation  last BM:   : incontinent  oliguria/anuria  de  Musculoskeletal: weakness  edema   ambulatory with assistance    bedbound/wheelchair bound  Skin: no abnormal skin lesions, poor skin turgor, pressure ulcer stage:   Neuro: no deficits, cognitive impairment dsyphagia/dysarthria paresis  Oral intake ability: unable/only mouth care, minimal moderate full capability    LABS:                        13.4   6.73  )-----------( 104      ( 11 Oct 2021 06:41 )             41.4     10-11    144  |  112<H>  |  51<H>  ----------------------------<  189<H>  4.1   |  16<L>  |  1.94<H>    Ca    8.9      11 Oct 2021 06:41  Phos  3.4     10-11  Mg     1.7     10-11    TPro  6.9  /  Alb  3.2<L>  /  TBili  1.9<H>  /  DBili  x   /  AST  28  /  ALT  31  /  AlkPhos  390<H>  10-11        RADIOLOGY & ADDITIONAL STUDIES:     Consult to: Discuss complex medical decision making related to goals of care    [facility] Geriatric and Palliative Consult Service:  Dr. Breanna Mcmanus: cell ([phone])  Dr. Isa Evans: cell ([phone])   Shanice Blackburn NP: cell ([phone])  Adri Chung NP: cell ([phone])   Bradley Zuniga LSW: cell ([phone])     HPI:  66 year old F from home (lives alone w/ HHA), baseline AAOx3, ambulates independently, hasPMHx of CAD, T2DM (last A1c 5.9%), Hypothyroidism, HTN, HFrEF, cardiomyopathy (EF 25% on 9/16/21, s/p AICD), COPD (no prior intubations), CVA, DVT (L leg 1994)/PE (s/p IVC filter 9/2020, and repeat saddle PE at [facility] 8/21-9/3 now on Coumadin), infrarenal AAA, cerebral aneurysm, atypical Depression, R breast cancer (s/p chemo/XRT), Class I Obesity, CKD3-A, PSHx of hysterectomy (for fibroids),and cholecystectomy presented to the ED with worsening shortness of breath s/p discharge on 10/6/21 after being treated for COPD exacerbation. Patient denies chest pain, palpitations, dizziness, headache, fever, chills, cough, GI or  symptoms.  Of note, Pro BNP on admission is 37k from 12k, Cr 1.9 from 1.3, CXR about the same compared to 9/25/21   (07 Oct 2021 08:55)      PAST MEDICAL & SURGICAL HISTORY:  Hypertension    Breast cancer  right-s/p chemo and radiation    Hypercholesteremia    DVT (deep venous thrombosis)  Left leg in 1994 after hysterectomy    Sickle cell trait    Cardiomyopathy    Abdominal aneurysm  infrarenal- wached by Vascular , Dr. Gutierrez    Internal carotid aneurysm    Knee effusion, left  arthrocentesis- 05/25/16    Chronic HFrEF (heart failure with reduced ejection fraction)    Fibroid uterus  sp hystrectomy 1196    S/P lumpectomy, right breast  2014, revision of position- 2015    Gilson filter in place  1994    S/P hysterectomy  1994    Breast cancer  infusaport insertion- 2014- left chest        SOCIAL HISTORY:    Admitted from:  home  (with 24/7 private [facility])   Substance abuse history:    none          Tobacco hx:  none                Alcohol hx:  none            Home Opioid hx: none  Church: [employer]    FAMILY HISTORY:  Family history of hypertension    Family history of diabetes mellitus    Family history of renal stone    Family history of glioblastoma    Family history of liver failure    History of hypertension  mother    Family history of glioblastoma (Sibling)    Family history of renal failure  uncle    Family history of other condition  please open box and see additional medical family history    Family history of glioblastoma     unable to obtain from patient due to poor mentation, family unable to give information  Baseline ADLs (prior to admission):    Allergies    Advil (Hives; Vomiting)    Intolerances      Present Symptoms:   Pain: none  Fatigue: moderate  Nausea: none  Lack of Appetite: mild   SOB: severe with exertion, very mild at rest  Depression: none  Anxiety: only with SOB   Review of Systems: [All others negative ]    MEDICATIONS  (STANDING):  anastrozole 1 milliGRAM(s) Oral daily  ARIPiprazole 2 milliGRAM(s) Oral daily  atorvastatin 40 milliGRAM(s) Oral at bedtime  hydrALAZINE 50 milliGRAM(s) Oral every 8 hours  insulin lispro (ADMELOG) corrective regimen sliding scale   SubCutaneous three times a day before meals  insulin lispro (ADMELOG) corrective regimen sliding scale   SubCutaneous at bedtime  isosorbide   mononitrate ER Tablet (IMDUR) 30 milliGRAM(s) Oral daily  levothyroxine 25 MICROGram(s) Oral daily  metoprolol tartrate 25 milliGRAM(s) Oral two times a day  pantoprazole    Tablet 40 milliGRAM(s) Oral before breakfast  sodium chloride 0.45% 1000 milliLiter(s) (60 mL/Hr) IV Continuous <Continuous>  warfarin 2 milliGRAM(s) Oral once    MEDICATIONS  (PRN):  ALBUTerol    90 MICROgram(s) HFA Inhaler 2 Puff(s) Inhalation every 6 hours PRN Shortness of Breath and/or Wheezing  guaiFENesin Oral Liquid (Sugar-Free) 100 milliGRAM(s) Oral every 6 hours PRN Cough      PHYSICAL EXAM:  Vital Signs Last 24 Hrs  T(C): 36.5 (11 Oct 2021 06:32), Max: 36.8 (10 Oct 2021 20:01)  T(F): 97.7 (11 Oct 2021 06:32), Max: 98.3 (10 Oct 2021 20:01)  HR: 83 (11 Oct 2021 06:32) (83 - 99)  BP: 132/96 (11 Oct 2021 06:32) (132/96 - 142/98)  BP(mean): --  RR: 18 (11 Oct 2021 06:32) (16 - 18)  SpO2: 100% (11 Oct 2021 06:32) (96% - 100%)    General: alert  oriented x 2-3  verbal, needs some redirection    Palliative Performance Scale/Karnofsky Score: 30%    HEENT: no abnormal lesion, dry mouth    Lungs: mild tachypnea/labored breathing, increased on exertion  CV: RRR, S1S2,   GI: soft non distended non tender  incontinent  : incontinent   Musculoskeletal: weakness  edema mild x4   ambulatory with assistance but mostly bedbound/wheelchair bound  Skin: no abnormal skin lesions, poor skin turgor  Neuro: mild cognitive impairment dsyphagia  Oral intake ability: moderate capability    LABS:                        13.4   6.73  )-----------( 104      ( 11 Oct 2021 06:41 )             41.4     10-11    144  |  112<H>  |  51<H>  ----------------------------<  189<H>  4.1   |  16<L>  |  1.94<H>    Ca    8.9      11 Oct 2021 06:41  Phos  3.4     10-11  Mg     1.7     10-11    TPro  6.9  /  Alb  3.2<L>  /  TBili  1.9<H>  /  DBili  x   /  AST  28  /  ALT  31  /  AlkPhos  390<H>  10-11        RADIOLOGY & ADDITIONAL STUDIES:< from: Transthoracic Echocardiogram (09.16.21 @ 18:29) >  Patient name: RISHABH HUNT  YOB: 1955   Age: 66 (F)   MR#: [id number]  Study Date: 9/16/2021  [facility] RDCS  Study quality: Technically fair  Referring Physician: Destinee Renee MD  Blood Pressure: 120/87 mmHg  Height: 170 cm  Weight: 81 kg  BSA: 1.9 m2  ------------------------------------------------------------------------  PROCEDURE: Transthoracic echocardiogram with 2-D, M-Mode  and complete spectral and color flow Doppler.  INDICATION: Chest pain, unspecified (R07.9)  ------------------------------------------------------------------------  Dimensions:    Normal Values:  LA:     3.8    2.0 - 4.0 cm  Ao:     3.1    2.0 - 3.8 cm  SEPTUM: 0.9    0.6 - 1.2 cm  PWT:    1.1    0.6 - 1.1 cm  LVIDd:  6.0    3.0 - 5.6 cm  LVIDs:  5.2    1.8 - 4.0 cm  Derived variables:  LVMI: 135 g/m2  RWT: 0.39  EF (Visual Estimate): 25 %  Doppler Peak Velocity (m/sec): AoV=1.0  ------------------------------------------------------------------------  Observations:  Mitral Valve: Mitral annular, chordal, and leaflet  calcification. Severe functional mitral regurgitation.  Aortic Valve/Aorta: Calcified aortic valve with normal  opening. Minimal aortic regurgitation.  Normal aortic root size.  Left Atrium: Severe left atrial enlargement.  Left Ventricle: Severe left ventricular enlargement.  Severely reduced left ventricular systolic function.  Diffuse hypokinesis.  Left ventricular filling pressure is severely elevated.  Right Heart: Mild right atrial enlargement.  Right ventricular enlargement with decreased right  ventricular systolic function.  Normal appearing tricuspid valve leaflets. Moderate  tricuspid regurgitation.  Normal pulmonic valve. Mild pulmonic regurgitation.  Pericardium/Pleura: Normal pericardium with no pericardial  effusion.  Bilateral pleural effusions.  Hemodynamic: Estimated right atrial pressure is severely  elevated.  Pulmonary hypertension is lilely present, but cannot be  quanitifed accurately from this study.  ------------------------------------------------------------------------  Conclusions:  Severe left ventricular enlargement.  Severely reduced left ventricular systolic function.  Diffuse hypokinesis.  Severe functional mitral regurgitation.    < end of copied text >    < from: Xray Chest 1 View- PORTABLE-Urgent (Xray Chest 1 View- PORTABLE-Urgent .) (10.07.21 @ 00:17) >  EXAM:  XR CHEST PORTABLE URGENT 1V                            PROCEDURE DATE:  10/07/2021          INTERPRETATION:  Portable chest radiograph    CLINICAL INFORMATION: Dyspnea, shortness of breath.    TECHNIQUE:  Portable  AP view of the chest.    COMPARISON: 9/25/2021 chest available for review.    FINDINGS:    The lungs show LEFT retrocardiac airspace consolidation and/or effusion obscuring LEFT asthmatic contour. No pneumothorax.    The  heart is enlarged in transverse diameter. No hilar mass.  Subcutaneous implantable cardioverter defibrillator in place.       Visualized osseous structures are intact.    IMPRESSION:   Cardiomegaly..  LEFT retrocardiac airspace consolidation and/or effusion.    < end of copied text >

## 2021-10-11 NOTE — PROGRESS NOTE ADULT - SUBJECTIVE AND OBJECTIVE BOX
Patient is a 66y old  Female who presents with a chief complaint of DYSPNEA (10 Oct 2021 18:46)      INTERVAL HPI/OVERNIGHT EVENTS: Patient seen and examined at bedside. Patient was on 2L NC overnight and yesterday, patient does not require oxygen, saturates well on room air. Patient reports she breathes well while in the hospital but when she got home after discharge last admission she felt short of breath. Reports some mild chest pain which has been occurring for the past week but later denies she has any chest pain. Patient reports she wants to go home. Denies any active hallucinations, SI, or HI. Patient has no other complaints at this time.     MEDICATIONS  (STANDING):  anastrozole 1 milliGRAM(s) Oral daily  ARIPiprazole 2 milliGRAM(s) Oral daily  atorvastatin 40 milliGRAM(s) Oral at bedtime  hydrALAZINE 50 milliGRAM(s) Oral every 8 hours  insulin lispro (ADMELOG) corrective regimen sliding scale   SubCutaneous three times a day before meals  insulin lispro (ADMELOG) corrective regimen sliding scale   SubCutaneous at bedtime  isosorbide   mononitrate ER Tablet (IMDUR) 30 milliGRAM(s) Oral daily  levothyroxine 25 MICROGram(s) Oral daily  metoprolol tartrate 25 milliGRAM(s) Oral two times a day  pantoprazole    Tablet 40 milliGRAM(s) Oral before breakfast  sodium chloride 0.45% 1000 milliLiter(s) (60 mL/Hr) IV Continuous <Continuous>  warfarin 2 milliGRAM(s) Oral once    MEDICATIONS  (PRN):  ALBUTerol    90 MICROgram(s) HFA Inhaler 2 Puff(s) Inhalation every 6 hours PRN Shortness of Breath and/or Wheezing  guaiFENesin Oral Liquid (Sugar-Free) 100 milliGRAM(s) Oral every 6 hours PRN Cough      Allergies    Advil (Hives; Vomiting)    Intolerances        REVIEW OF SYSTEMS:  CONSTITUTIONAL: Admits to fatigue, No fever or chills  HEENT:  No headache, no sore throat  RESPIRATORY: Admits to some mild SOB,  No cough, wheezing  CARDIOVASCULAR: Admits to mild chest pain, Denies palpitations  GASTROINTESTINAL: No abd pain, nausea, vomiting, or diarrhea  GENITOURINARY: No dysuria, frequency, or hematuria  NEUROLOGICAL: no focal weakness or dizziness  MUSCULOSKELETAL: no myalgias     Vital Signs Last 24 Hrs  T(C): 36.5 (11 Oct 2021 06:32), Max: 36.8 (10 Oct 2021 20:01)  T(F): 97.7 (11 Oct 2021 06:32), Max: 98.3 (10 Oct 2021 20:01)  HR: 83 (11 Oct 2021 06:32) (83 - 99)  BP: 132/96 (11 Oct 2021 06:32) (132/96 - 142/98)  BP(mean): --  RR: 18 (11 Oct 2021 06:32) (16 - 18)  SpO2: 100% (11 Oct 2021 06:32) (96% - 100%)    PHYSICAL EXAM:  GENERAL: elderly female resting in bed, NAD  HEENT:  anicteric, moist mucous membranes  CHEST/LUNG:  CTA b/l, no rales, wheezes, or rhonchi, No accessory muscle use  HEART:  RRR, S1, S2, No m/r/g  ABDOMEN:  BS+, soft, nontender, nondistended  EXTREMITIES: no edema, cyanosis, or calf tenderness  NERVOUS SYSTEM:  Appears confused, AACx1, does not answer questions and follow commands appropriately    LABS:                        13.4   6.73  )-----------( 104      ( 11 Oct 2021 06:41 )             41.4     CBC Full  -  ( 11 Oct 2021 06:41 )  WBC Count : 6.73 K/uL  Hemoglobin : 13.4 g/dL  Hematocrit : 41.4 %  Platelet Count - Automated : 104 K/uL  Mean Cell Volume : 95.0 fl  Mean Cell Hemoglobin : 30.7 pg  Mean Cell Hemoglobin Concentration : 32.4 gm/dL  Auto Neutrophil # : x  Auto Lymphocyte # : x  Auto Monocyte # : x  Auto Eosinophil # : x  Auto Basophil # : x  Auto Neutrophil % : x  Auto Lymphocyte % : x  Auto Monocyte % : x  Auto Eosinophil % : x  Auto Basophil % : x    11 Oct 2021 06:41    144    |  112    |  51     ----------------------------<  189    4.1     |  16     |  1.94     Ca    8.9        11 Oct 2021 06:41  Phos  3.4       11 Oct 2021 06:41  Mg     1.7       11 Oct 2021 06:41    TPro  6.9    /  Alb  3.2    /  TBili  1.9    /  DBili  x      /  AST  28     /  ALT  31     /  AlkPhos  390    11 Oct 2021 06:41    PT/INR - ( 11 Oct 2021 06:41 )   PT: 41.7 sec;   INR: 3.73 ratio         PTT - ( 11 Oct 2021 06:41 )  PTT:27.3 sec    CAPILLARY BLOOD GLUCOSE      POCT Blood Glucose.: 153 mg/dL (11 Oct 2021 07:32)  POCT Blood Glucose.: 185 mg/dL (10 Oct 2021 21:19)  POCT Blood Glucose.: 179 mg/dL (10 Oct 2021 16:34)  POCT Blood Glucose.: 176 mg/dL (10 Oct 2021 11:25)        Culture - Urine (collected 10-07-21 @ 06:42)  Source: Clean Catch Clean Catch (Midstream)  Final Report (10-08-21 @ 23:37):    <10,000 CFU/mL Normal Urogenital Maggie    Culture - Blood (collected 10-07-21 @ 06:33)  Source: .Blood Blood-Peripheral  Preliminary Report (10-08-21 @ 07:01):    No growth to date.    Culture - Blood (collected 10-07-21 @ 06:27)  Source: .Blood Blood-Peripheral  Preliminary Report (10-08-21 @ 07:01):    No growth to date.        RADIOLOGY & ADDITIONAL TESTS:    Personally reviewed.     Consultant(s) Notes Reviewed:  [x] YES  [ ] NO     Patient is a 66y old  Female who presents with a chief complaint of DYSPNEA (10 Oct 2021 18:46)      INTERVAL HPI/OVERNIGHT EVENTS: Patient seen and examined at bedside. Patient was on 2L NC overnight and yesterday, patient does not require oxygen, saturates well on room air. Patient reports she breathes well while in the hospital but when she got home after discharge last admission she felt short of breath. Patient reports she wants to go home. Denies any active hallucinations, SI, or HI. Patient has no other complaints at this time.     MEDICATIONS  (STANDING):  anastrozole 1 milliGRAM(s) Oral daily  ARIPiprazole 2 milliGRAM(s) Oral daily  atorvastatin 40 milliGRAM(s) Oral at bedtime  hydrALAZINE 50 milliGRAM(s) Oral every 8 hours  insulin lispro (ADMELOG) corrective regimen sliding scale   SubCutaneous three times a day before meals  insulin lispro (ADMELOG) corrective regimen sliding scale   SubCutaneous at bedtime  isosorbide   mononitrate ER Tablet (IMDUR) 30 milliGRAM(s) Oral daily  levothyroxine 25 MICROGram(s) Oral daily  metoprolol tartrate 25 milliGRAM(s) Oral two times a day  pantoprazole    Tablet 40 milliGRAM(s) Oral before breakfast  sodium chloride 0.45% 1000 milliLiter(s) (60 mL/Hr) IV Continuous <Continuous>  warfarin 2 milliGRAM(s) Oral once    MEDICATIONS  (PRN):  ALBUTerol    90 MICROgram(s) HFA Inhaler 2 Puff(s) Inhalation every 6 hours PRN Shortness of Breath and/or Wheezing  guaiFENesin Oral Liquid (Sugar-Free) 100 milliGRAM(s) Oral every 6 hours PRN Cough      Allergies    Advil (Hives; Vomiting)    Intolerances        REVIEW OF SYSTEMS:  CONSTITUTIONAL: Admits to fatigue, No fever or chills  HEENT:  No headache, no sore throat  RESPIRATORY: Admits to some mild SOB,  No cough, wheezing  CARDIOVASCULAR: Admits to mild chest pain, Denies palpitations  GASTROINTESTINAL: No abd pain, nausea, vomiting, or diarrhea  GENITOURINARY: No dysuria, frequency, or hematuria  NEUROLOGICAL: no focal weakness or dizziness  MUSCULOSKELETAL: no myalgias     Vital Signs Last 24 Hrs  T(C): 36.5 (11 Oct 2021 06:32), Max: 36.8 (10 Oct 2021 20:01)  T(F): 97.7 (11 Oct 2021 06:32), Max: 98.3 (10 Oct 2021 20:01)  HR: 83 (11 Oct 2021 06:32) (83 - 99)  BP: 132/96 (11 Oct 2021 06:32) (132/96 - 142/98)  BP(mean): --  RR: 18 (11 Oct 2021 06:32) (16 - 18)  SpO2: 100% (11 Oct 2021 06:32) (96% - 100%)    PHYSICAL EXAM:  GENERAL: elderly female resting in bed, NAD  HEENT:  anicteric, moist mucous membranes  CHEST/LUNG:  CTA b/l, no rales, wheezes, or rhonchi, No accessory muscle use  HEART:  RRR, S1, S2, No m/r/g  ABDOMEN:  BS+, soft, nontender, nondistended  EXTREMITIES: no edema, cyanosis, or calf tenderness  NERVOUS SYSTEM:  Appears confused, AACx1, does not answer questions and follow commands appropriately    LABS:                        13.4   6.73  )-----------( 104      ( 11 Oct 2021 06:41 )             41.4     CBC Full  -  ( 11 Oct 2021 06:41 )  WBC Count : 6.73 K/uL  Hemoglobin : 13.4 g/dL  Hematocrit : 41.4 %  Platelet Count - Automated : 104 K/uL  Mean Cell Volume : 95.0 fl  Mean Cell Hemoglobin : 30.7 pg  Mean Cell Hemoglobin Concentration : 32.4 gm/dL  Auto Neutrophil # : x  Auto Lymphocyte # : x  Auto Monocyte # : x  Auto Eosinophil # : x  Auto Basophil # : x  Auto Neutrophil % : x  Auto Lymphocyte % : x  Auto Monocyte % : x  Auto Eosinophil % : x  Auto Basophil % : x    11 Oct 2021 06:41    144    |  112    |  51     ----------------------------<  189    4.1     |  16     |  1.94     Ca    8.9        11 Oct 2021 06:41  Phos  3.4       11 Oct 2021 06:41  Mg     1.7       11 Oct 2021 06:41    TPro  6.9    /  Alb  3.2    /  TBili  1.9    /  DBili  x      /  AST  28     /  ALT  31     /  AlkPhos  390    11 Oct 2021 06:41    PT/INR - ( 11 Oct 2021 06:41 )   PT: 41.7 sec;   INR: 3.73 ratio         PTT - ( 11 Oct 2021 06:41 )  PTT:27.3 sec    CAPILLARY BLOOD GLUCOSE      POCT Blood Glucose.: 153 mg/dL (11 Oct 2021 07:32)  POCT Blood Glucose.: 185 mg/dL (10 Oct 2021 21:19)  POCT Blood Glucose.: 179 mg/dL (10 Oct 2021 16:34)  POCT Blood Glucose.: 176 mg/dL (10 Oct 2021 11:25)        Culture - Urine (collected 10-07-21 @ 06:42)  Source: Clean Catch Clean Catch (Midstream)  Final Report (10-08-21 @ 23:37):    <10,000 CFU/mL Normal Urogenital Maggie    Culture - Blood (collected 10-07-21 @ 06:33)  Source: .Blood Blood-Peripheral  Preliminary Report (10-08-21 @ 07:01):    No growth to date.    Culture - Blood (collected 10-07-21 @ 06:27)  Source: .Blood Blood-Peripheral  Preliminary Report (10-08-21 @ 07:01):    No growth to date.        RADIOLOGY & ADDITIONAL TESTS:    Personally reviewed.     Consultant(s) Notes Reviewed:  [x] YES  [ ] NO

## 2021-10-11 NOTE — PROGRESS NOTE ADULT - PROBLEM SELECTOR PLAN 1
- Patient readmitted for acute dyspnea, previously treated for COPD exacerbation, pt reports some SOB, was on NC 2L overnight, does not require oxygen, saturating well on RA  - On admission, patient with sob, hypoxic requiring NRBM, CXR compared to previous one noted to have no significant changes, Pro BNP 37k from 12k unclear etiology, Cr 1.9 from 1.3  - Entresto held due to CHEN, c/w hydralazine, Imdur, BB, statin   - Hydralazine increased from 25mg q 8 to 50mg q8, pressures stable currently  - Strict I & O, Daily weight, Restrict fluids to 1 L - Patient readmitted for acute dyspnea, previously treated for COPD exacerbation, pt reports some SOB, was on NC 2L overnight, does not require oxygen, saturating 98%, well on RA  - On admission, patient with sob, hypoxic requiring NRBM, CXR compared to previous one noted to have no significant changes, Pro BNP 37k from 12k unclear etiology, Cr 1.9 from 1.3  - Entresto held due to CHEN, c/w hydralazine, Imdur, BB, statin   - Hydralazine increased from 25mg q 8 to 50mg q8, pressures stable currently  - Strict I & O, Daily weight, Restrict fluids to 1 L - Patient readmitted for acute dyspnea, previously treated for COPD exacerbation, pt reports some SOB, was on NC 2L overnight, does not require oxygen, saturating 98%, well on RA  - On admission, patient with sob, hypoxic requiring NRBM, CXR compared to previous one noted to have no significant changes, Pro BNP 37k from 12k unclear etiology, Cr 1.9 from 1.3  - Entresto held due to CHEN, c/w hydralazine, Imdur, BB, statin   - Hydralazine increased from 25mg q 8 to 50mg q8, pressures stable currently  - Strict I & O, Daily weight

## 2021-10-11 NOTE — CONSULT NOTE ADULT - PROBLEM SELECTOR RECOMMENDATION 9
subjective and usually on exertion, clinically does not qualify for home oxygen as she has normal oxygenation.   patient does find relief with nasal cannula oxygen, can be given if on home hospice  for now, to minimize significant exertion  can give NC if worsening dyspnea  patient on abilify for baseline mild anxiety   s/p IVC filter last year but patient on coumadin bc of still repeat Saddle PE

## 2021-10-11 NOTE — PROGRESS NOTE ADULT - ASSESSMENT
66 year old F w/ PMhx of CAD, T2DM (last A1c 6.4%), Hypothyroidism, HTN, HFrEF, cardiomyopathy (EF 25% on 9/16/21, s/p AICD (81% battery) ), COPD (no prior intubations), CVA, DVT (L leg 1994)/PE (s/p IVC filter 9/2020, and repeat saddle PE at [facility] 8/21-9/3 now on Coumadin), infrarenal AAA, cerebral aneurysm, atypical Depression, R breast cancer (s/p chemo/XRT), Class I Obesity, CKD3-A presented to the ED with worsening shortness of breath s/p discharge on 10/6/21 after being treated for COPD exacerbation. Patient admitted for dyspnea.

## 2021-10-11 NOTE — CONSULT NOTE ADULT - PROBLEM SELECTOR RECOMMENDATION 2
EF 25 %, s/p recent AICD, Cardiomyopathy, CKD3-4, hx of right MCA infarct  with increasing symptom burden and debility, patient qualifies for hospice at home    HCP requested nutrition consult as patient tends to eat sweet and salty foods at home (patient or the aides will order food without regard)

## 2021-10-11 NOTE — PROGRESS NOTE ADULT - PROBLEM SELECTOR PLAN 3
- On admission, patient with Cr 1.9, baseline 1.3, currently 1.94  - CHEN multifactorial -- prerenal in the setting of acute on chronic CHF vs recent start of ENTRESTO  - Monitor I/O's daily  - Avoid nephrotoxins, NSAIDS, ACEI and ARBS  - Off Entresto  - Continue to monitor BMP - On admission, patient with Cr 1.9, reported baseline 1.3, likely higher, currently 1.94  - CHEN multifactorial -- prerenal in the setting of acute on chronic CHF vs recent start of ENTRESTO  - Monitor I/O's daily  - Avoid nephrotoxins, NSAIDS, ACEI and ARBS  - Off Entresto  - Continue to monitor BMP - On admission, patient with Cr 1.9, reported baseline 1.3, likely higher, currently 1.94, on D5 w 1 amp bicarb, 60cc/hr.  - CHEN multifactorial -- prerenal in the setting of acute on chronic CHF vs recent start of ENTRESTO  - Monitor I/O's daily  - Avoid nephrotoxins, NSAIDS, ACEI and ARBS  - Off Entresto  - Continue to monitor BMP

## 2021-10-11 NOTE — PROGRESS NOTE ADULT - PROBLEM SELECTOR PLAN 6
- Patient has hx of DM diet controlled at home, at target 140-180  - Started on HSS  - Fingerstick before meals and at bedtime  - DASH diet with consistent carb  - Target -180 Paramedian Forehead Flap Division And Inset Text: Division and inset of the paramedian forehead flap was performed to achieve optimal aesthetic result, restore normal anatomic appearance and avoid distortion of normal anatomy, expedite and facilitate wound healing, achieve optimal functional result and because linear closure either not possible or would produce suboptimal result. The patient was prepped and draped in the usual manner. The pedicle was infiltrated with local anesthesia. The pedicle was sectioned with a #15 blade. The pedicle was de-bulked and trimmed to match the shape of the defect. Hemostasis was achieved. The flap donor site and free margin of the flap were secured with deep buried sutures and the wound edges were re-approximated.

## 2021-10-11 NOTE — PROGRESS NOTE ADULT - TIME BILLING
Spoke with HCP Jaki Mcmanus HCP at length this afternoon; reviewed patients multiple comorbidities including advanced CHF with COPD in the setting of decreased lung reserve with recent saddle PE. I also reviewed with her with her underlying anxiety she does c/o intermittent shortness of breath without true hypoxia and request for constant oxygen and Patient does not qualify for Home oxygen as she saturates above 97% at rest and even with ambulation she stays above 92-93%. Given her multiple comorbidities and advanced underlying disease I have suggested to HCP that Patient may be a candidate for Home Hospice which will help her to be comfortable in her home setting and she would be able to get more services including home oxygen as well as symptom management for Dyspnea with PRN Morphine and VNS service at home. I also encouraged her to discuss with her trusted people around her who can help her making right decision. In my opinion, DNR would be appropriate to provide comfort to the patient and avoid undue harm.     Discussed with Palliative care Dr. Mcmanus to discuss with HCP if patient will qualify for Home Hospice. HCP open to the discussion.  Dr. Mcmanus met with Patient who although does not have full insight into her medical comorbidities was able to convey that she would not want to be kept on a machine. Dr. Mcmanus also spoke with HCP Jaki who agrees with patient wishes.     Discussed with patient findings and plan of care; Spoke with HCP Jaki Mcmnaus HCP at length this afternoon; reviewed patients multiple comorbidities including advanced CHF with COPD in the setting of decreased lung reserve with recent saddle PE. I also reviewed with her with her underlying anxiety she does c/o intermittent shortness of breath without true hypoxia and request for constant oxygen and Patient does not qualify for Home oxygen as she saturates above 97% at rest and even with ambulation she stays above 92-93%. Given her multiple comorbidities and advanced underlying disease I have suggested to HCP that Patient may be a candidate for Home Hospice which will help her to be comfortable in her home setting and she would be able to get more services including home oxygen as well as symptom management for Dyspnea with PRN Morphine and VNS service at home. I also encouraged her to discuss with her trusted people around her who can help her making right decision. In my opinion, DNR would be appropriate to provide comfort to the patient and avoid undue harm.     Discussed with Palliative care Dr. Mcmanus to discuss with HCP if patient will qualify for Home Hospice. HCP open to the discussion.  Dr. Mcmanus met with Patient who although does not have full insight into her medical comorbidities was able to convey that she would not want to be kept on a machine. Dr. Mcmanus also spoke with HCP Jaki who agrees with patient wishes and agrees with a DNR in place but would want to continue conservative treatment for CHF, COPD and anticoagulation and INR checks.

## 2021-10-11 NOTE — CONSULT NOTE ADULT - CONVERSATION DETAILS
spoke at length with patient who is aware she is in the hospital but not sure why she came in, except that she knows she can get very SOB and is most concerned about when she does get SOB, "and there's nothing no one can do" - does admit that oxygen helps her. She does acknowledge that she is slowing become more debilitated and requiring more help at home. She states she has 24/7 care but agrees that having a home visiting medical team would be ideal. "It's hard to get to Dr. Figueroa" who is her PCP. Agrees that as she is progressing in her chronic illnesses she is preferring to remain home.   When asked about her preferences to care at end of life, she does want a natural death - "when it's my time, I want to just go" while still continuing efforts to help her feel better with more care at home. She agreed that she doesn't "want machines" and agrees that resuscitative measures are not what she wants. She wanted me to tell her cousin/HCP Jaki all of this information. I reassured her.     Spoke with Jaki/HCP on the phone who lives [place]. Explained patient's conditions and how her clinical illnesses (CHF/COPD,CAD) with her increasing debility and symptom burden qualify her for a home hospice program. After explaining hospice services and of a hospice agency that has a respiratory program, she agreed this would be in her best interest as hospice can tend to her symptom needs and medical care including providing home oxygen, respiratory therapist, INR monitoring, etc.   Also agreed to honor patient's wishes for DNR/DNI and no feeding tube as this is also in the patient's best interest.     Support, counseling and contact info given

## 2021-10-11 NOTE — CONSULT NOTE ADULT - PROBLEM SELECTOR RECOMMENDATION 3
stable at this time, no hx of intubation, no clinical need for oxygen  still with saddle PE without right heart strain - on coumadin, s/p IVC filter last year

## 2021-10-11 NOTE — CONSULT NOTE ADULT - PROBLEM SELECTOR RECOMMENDATION 5
patient qualifies for home hospice, prognosis likely months  patient on Arimedex from breast cancer in remission

## 2021-10-11 NOTE — PROGRESS NOTE ADULT - ATTENDING COMMENTS
Patient seen and examined earlier this afternoon with PGY1;     CC: Respiratory distress at home    66 year old F from home (lives alone with 24 hour HHA), baseline AAOx2, ambulates independently with some assistance, has PMHx of CAD, T2DM (last A1c 5.9%), Hypothyroidism, HTN, HFrEF with recent exacerbation, cardiomyopathy (EF 25% on 9/16/21, s/p SQ AICD), COPD (no prior intubations), CVA, DVT (L leg 1994)/PE (s/p IVC filter 9/2020, and repeat saddle PE at [facility] 8/21-9/3 now on Coumadin), infrarenal AAA, cerebral aneurysm, atypical Depression, Right breast cancer (s/p chemo/XRT), Class I Obesity, CKD3-A, PSHx of hysterectomy (for fibroids),and cholecystectomy presented to the ED with worsening shortness of breath s/p discharge on 10/6/21 after being treated for CHF exacerbation. Noted to have increased BNP from baseline and mild worsening of kidney functions from discharge     Patient doing better today; OOB to chair;   Patient does complain of intermittent SOB without any distress likely related to Anxiety    Vital Signs Last 24 Hrs  T(C): 36.3 (11 Oct 2021 13:38), Max: 36.8 (10 Oct 2021 20:01)  T(F): 97.3 (11 Oct 2021 13:38), Max: 98.3 (10 Oct 2021 20:01)  HR: 74 (11 Oct 2021 13:38) (74 - 99)  BP: 131/96 (11 Oct 2021 13:38) (131/96 - 138/92)  RR: 18 (11 Oct 2021 13:38) (17 - 18)  SpO2: 99% (11 Oct 2021 13:38) (99% - 100%)    P/E: as above  Psych: AAO x2; at baseline mood  Neuro: No focal deficits  CVS: S1S2 present, regular, no edema  Resp: BLAE+, No wheeze or Rhonchi; Decreased Breath sounds at right base  GI: Soft, BS+, NT  Extr: No edema or calf tenderness B./L LE    Labs:                     Discussed with patient findings and plan of care; update HCP  Discussed with PGY1 Dr. Desai/ PGY2 Dr. Hernadez and RN  D/C Plan possibly Monday if remain stable Patient seen and examined earlier this afternoon with PGY1;     CC: Respiratory distress at home    66 year old F from home (lives alone with 24 hour HHA), baseline AAOx2, ambulates independently with some assistance, has PMHx of CAD, T2DM (last A1c 5.9%), Hypothyroidism, HTN, HFrEF with recent exacerbation, cardiomyopathy (EF 25% on 9/16/21, s/p SQ AICD), COPD (no prior intubations), CVA, DVT (L leg 1994)/PE (s/p IVC filter 9/2020, and repeat saddle PE at [facility] 8/21-9/3 now on Coumadin), infrarenal AAA, cerebral aneurysm, atypical Depression, Right breast cancer (s/p chemo/XRT), Class I Obesity, CKD3-A, PSHx of hysterectomy (for fibroids),and cholecystectomy presented to the ED with worsening shortness of breath s/p discharge on 10/6/21 after being treated for CHF exacerbation. Noted to have increased BNP from baseline and mild worsening of kidney functions from discharge     Patient doing better today; OOB to chair;   Patient does complain of intermittent SOB without any distress likely related to Anxiety    Vital Signs Last 24 Hrs  T(C): 36.3 (11 Oct 2021 13:38), Max: 36.8 (10 Oct 2021 20:01)  T(F): 97.3 (11 Oct 2021 13:38), Max: 98.3 (10 Oct 2021 20:01)  HR: 74 (11 Oct 2021 13:38) (74 - 99)  BP: 131/96 (11 Oct 2021 13:38) (131/96 - 138/92)  RR: 18 (11 Oct 2021 13:38) (17 - 18)  SpO2: 99% (11 Oct 2021 13:38) (99% - 100%)    P/E: as above  Psych: AAO x2; at baseline mood  Neuro: No focal deficits  CVS: S1S2 present, regular, no edema  Resp: BLAE+, No wheeze or Rhonchi; Decreased Breath sounds at right base  GI: Soft, BS+, NT  Extr: No edema or calf tenderness B./L LE    Labs:                                 13.4   6.73  )-----------( 104      ( 11 Oct 2021 06:41 )             41.4     10-11    144  |  112<H>  |  51<H>  ----------------------------<  189<H>  4.1   |  16<L>  |  1.94<H>    Ca    8.9      11 Oct 2021 06:41  Phos  3.4     10-11  Mg     1.7     10-11    TPro  6.9  /  Alb  3.2<L>  /  TBili  1.9<H>  /  DBili  x   /  AST  28  /  ALT  31  /  AlkPhos  390<H>  10-11        D/D:  CHEN suspect due to likely Entresto vs less likely Fluid overload  resolving well   Metabolic acidosis  Acute Hypoxic Resp. failure suspect mild CHF exacerbation (Acute on Chronic systolic)  resolved hypoxia  CKD 3   COPD stable  Anxiety disorder with Atypical depression   Type 2 DM  Hx DVT and saddle PE on coumadin    Plan:  Hold Lasix for now until renal function improv; gentle hydration with Biacrabonate (1/2 NS with 50 meq NaHCO3) 60cc/hr x 12 hrs  Hold Entresto for now and will not resume; may have contributed to CHEN  Monitor renal fn closely  Continue Imdur/ Coumadin 1 mg tonight ; INR AM therapeutic at present but rising  Increased Hydralazine to 50 mg q 8 hrs;Continue Metoprolol; increase to 25 mg q 12 hrs  Palliative Consult was done last admission and extensive d/w HCP who wishes FULL CODE; Patient does not have insight into her complex medical issues.    Discussed with patient findings and plan of care;   D/C Plan possibly Monday into Tuesday if remain stable and renal function; will d/w HCP possible high risk of readmission given patient intermittent complain of SOB without actually any distress.       Discussed with patient findings and plan of care; update HCP  Discussed with PGY1 Dr. Desai/ PGY2 Dr. Hernadez and RN  D/C Plan possibly Monday if remain stable Patient seen and examined earlier this afternoon with Housestaff    CC: Respiratory distress at home    66 year old F from home (lives alone with 24 hour HHA private hire), baseline AAOx2, ambulates independently with some assistance, has PMH of CAD, T2DM (last A1c 5.9%), Hypothyroidism, HTN, HFrEF with recent exacerbation, cardiomyopathy (EF 25% on 9/16/21, s/p SQ AICD), COPD (no prior intubations), CVA, DVT (L leg 1994)/PE (s/p IVC filter 9/2020, and repeat saddle PE at [facility] 8/21-9/3 now on Coumadin), infrarenal AAA, cerebral aneurysm, atypical Depression, Right breast cancer (s/p chemo/XRT), Class I Obesity, CKD3-A, PSHx of hysterectomy (for fibroids),and cholecystectomy presented to the ED with worsening shortness of breath s/p discharge on 10/6/21 after being treated for CHF exacerbation. Noted to have increased BNP from baseline and mild worsening of kidney functions from discharge     Patient doing better today; OOB to chair;   Patient does complain of intermittent SOB without any distress likely related to Anxiety  denies fever, chills, chest pain, nausea, vomiting, diarrhea, constipation, dizziness    Vital Signs Last 24 Hrs  T(C): 36.3 (11 Oct 2021 13:38), Max: 36.8 (10 Oct 2021 20:01)  T(F): 97.3 (11 Oct 2021 13:38), Max: 98.3 (10 Oct 2021 20:01)  HR: 74 (11 Oct 2021 13:38) (74 - 99)  BP: 131/96 (11 Oct 2021 13:38) (131/96 - 138/92)  RR: 18 (11 Oct 2021 13:38) (17 - 18)  SpO2: 99% (11 Oct 2021 13:38) (99% - 100%)    P/E: as above  elderly female, comfortable, NAD OOB to chair,  HEEN: visual deficit right eye  Psych: AAO x2; at baseline mood with improved hallucinations  Neuro: No gross focal deficits  CVS: S1S2 present, regular, no edema  Resp: BLAE+, No wheeze or Rhonchi; Decreased Breath sounds at right base  GI: Soft, BS+, NT  Extr: No edema or calf tenderness B./L LE  : deferred  All Other ROS negative    Labs:                               13.4   6.73  )-----------( 104      ( 11 Oct 2021 06:41 )             41.4     10-11    144  |  112<H>  |  51<H>  ----------------------------<  189<H>  4.1   |  16<L>  |  1.94<H>    Ca    8.9      11 Oct 2021 06:41  Phos  3.4     10-11  Mg     1.7     10-11    TPro  6.9  /  Alb  3.2<L>  /  TBili  1.9<H>  /  DBili  x   /  AST  28  /  ALT  31  /  AlkPhos  390<H>  10-11    D/D:  Acute Hypoxic Resp. failure suspect mild CHF exacerbation (Acute on Chronic systolic)  resolved hypoxia  CHEN suspect due to likely Entresto vs less likely Fluid overload  resolving well   Metabolic acidosis with CKD 3  COPD stable  Anxiety disorder with Atypical depression   Type 2 DM  Hx DVT and saddle PE on coumadin    Plan:  Hold Lasix for now until renal function improves or evidence of fluid overload;   gentle hydration with Bicarbonate (1/2 NS with 50 meq NaHCO3) 60cc/hr x 12 more hrs; BMP AM  Hold Entresto for now and will not resume; may have contributed to CHEN  Monitor renal fn closely  Continue Imdur/ Coumadin 2.5 mg tonight ; INR AM therapeutic at present but rising  Increased Hydralazine to 50 mg q 8 hrs; Continue Metoprolol; increase to 25 mg q 12 hrs; switch to Toprol XL 25 mg daily   if BP elevated increase to 50 mg q 12 hrs; D/C on Toprol XL 50 mg daily.   Palliative Consult was done last admission and extensive d/w HCP who wishes FULL CODE; Patient does not have insight into her complex medical issues.    D/C Plan possibly Monday into Tuesday if remain stable and renal function; will d/w HCP possible high risk of readmission given patient intermittent complain of SOB without actually any distress.       Discussed with patient findings and plan of care; update HCP  Discussed with PGY1 Dr. Desai/ PGY2 Dr. Hernadez and RN  D/C Plan possibly Monday if remain stable Patient seen and examined earlier this afternoon with Housestaff    CC: Respiratory distress at home    66 year old F from home (lives alone with 24 hour HHA private hire), baseline AAOx2, ambulates independently with some assistance, has PMH of CAD, T2DM (last A1c 5.9%), Hypothyroidism, HTN, HFrEF with recent exacerbation, cardiomyopathy (EF 25% on 9/16/21, s/p SQ AICD), COPD (no prior intubations), CVA, DVT (L leg 1994)/PE (s/p IVC filter 9/2020, and repeat saddle PE at [facility] 8/21-9/3 now on Coumadin), infrarenal AAA, cerebral aneurysm, atypical Depression, Right breast cancer (s/p chemo/XRT), Class I Obesity, CKD3-A, PSHx of hysterectomy (for fibroids),and cholecystectomy presented to the ED with worsening shortness of breath s/p discharge on 10/6/21 after being treated for CHF exacerbation. Noted to have increased BNP from baseline and mild worsening of kidney functions from discharge     Patient doing better today; OOB to chair;   Patient does complain of intermittent SOB without any distress likely related to Anxiety  denies fever, chills, chest pain, nausea, vomiting, diarrhea, constipation, dizziness    Vital Signs Last 24 Hrs  T(C): 36.3 (11 Oct 2021 13:38), Max: 36.8 (10 Oct 2021 20:01)  T(F): 97.3 (11 Oct 2021 13:38), Max: 98.3 (10 Oct 2021 20:01)  HR: 74 (11 Oct 2021 13:38) (74 - 99)  BP: 131/96 (11 Oct 2021 13:38) (131/96 - 138/92)  RR: 18 (11 Oct 2021 13:38) (17 - 18)  SpO2: 99% (11 Oct 2021 13:38) (99% - 100%)    P/E: as above  elderly female, comfortable, NAD OOB to chair,  HEEN: visual deficit right eye  Psych: AAO x2; at baseline mood with improved hallucinations  Neuro: No gross focal deficits  CVS: S1S2 present, regular, no edema  Resp: BLAE+, No wheeze or Rhonchi; Decreased Breath sounds at right base  GI: Soft, BS+, NT  Extr: No edema or calf tenderness B./L LE  : deferred  All Other ROS negative    Labs:                               13.4   6.73  )-----------( 104      ( 11 Oct 2021 06:41 )             41.4     10-11    144  |  112<H>  |  51<H>  ----------------------------<  189<H>  4.1   |  16<L>  |  1.94<H>    Ca    8.9      11 Oct 2021 06:41  Phos  3.4     10-11  Mg     1.7     10-11    TPro  6.9  /  Alb  3.2<L>  /  TBili  1.9<H>  /  DBili  x   /  AST  28  /  ALT  31  /  AlkPhos  390<H>  10-11    D/D:  Acute Hypoxic Resp. failure suspect mild CHF exacerbation (Acute on Chronic systolic)  resolved hypoxia  CHEN suspect due to likely Entresto vs less likely Fluid overload  resolving well   Metabolic acidosis with CKD 3  COPD stable  Anxiety disorder with Atypical depression   Type 2 DM  Hx DVT and saddle PE on coumadin    Plan:  Hold Lasix for now until renal function improves or evidence of fluid overload;   gentle hydration with Bicarbonate (1/2 NS with 50 meq NaHCO3) 60cc/hr x 12 more hrs; BMP AM  Hold Entresto for now and will not resume; may have contributed to CHEN  Monitor renal fn closely  Continue Imdur/ Coumadin 2.5 mg tonight ; INR AM therapeutic at present but rising  Increased Hydralazine to 50 mg q 8 hrs with better control of BP;  Continue Metoprolol; switch to Toprol XL 25 mg daily   if BP elevated  D/C on Toprol XL 50 mg daily.   Palliative Consult was done last admission and extensive d/w HCP who wishes FULL CODE; Patient does not have full insight into her complex medical issues.    consult AM to assist HCP in transitioning to Hospice     Discussed with patient findings and plan of care; updated HCP; see below and GO note  Discussed with PGY1 / PGY2 Dr. Hernadez/ MS3/4 and RN    I will be away 10/12/21 onwards; Hospitalist colleague to assume care

## 2021-10-12 ENCOUNTER — APPOINTMENT (OUTPATIENT)
Dept: INTERNAL MEDICINE | Facility: CLINIC | Age: 66
End: 2021-10-12

## 2021-10-12 DIAGNOSIS — D69.6 THROMBOCYTOPENIA, UNSPECIFIED: ICD-10-CM

## 2021-10-12 LAB
ANION GAP SERPL CALC-SCNC: 12 MMOL/L — SIGNIFICANT CHANGE UP (ref 5–17)
APTT BLD: 28.5 SEC — SIGNIFICANT CHANGE UP (ref 27.5–35.5)
BUN SERPL-MCNC: 54 MG/DL — HIGH (ref 7–18)
CALCIUM SERPL-MCNC: 9.1 MG/DL — SIGNIFICANT CHANGE UP (ref 8.4–10.5)
CHLORIDE SERPL-SCNC: 114 MMOL/L — HIGH (ref 96–108)
CO2 SERPL-SCNC: 20 MMOL/L — LOW (ref 22–31)
CREAT SERPL-MCNC: 1.77 MG/DL — HIGH (ref 0.5–1.3)
CULTURE RESULTS: SIGNIFICANT CHANGE UP
CULTURE RESULTS: SIGNIFICANT CHANGE UP
GLUCOSE BLDC GLUCOMTR-MCNC: 151 MG/DL — HIGH (ref 70–99)
GLUCOSE BLDC GLUCOMTR-MCNC: 153 MG/DL — HIGH (ref 70–99)
GLUCOSE BLDC GLUCOMTR-MCNC: 166 MG/DL — HIGH (ref 70–99)
GLUCOSE BLDC GLUCOMTR-MCNC: 179 MG/DL — HIGH (ref 70–99)
GLUCOSE SERPL-MCNC: 161 MG/DL — HIGH (ref 70–99)
HCT VFR BLD CALC: 40.1 % — SIGNIFICANT CHANGE UP (ref 34.5–45)
HGB BLD-MCNC: 13.2 G/DL — SIGNIFICANT CHANGE UP (ref 11.5–15.5)
INR BLD: 3.11 RATIO — HIGH (ref 0.88–1.16)
MCHC RBC-ENTMCNC: 31.2 PG — SIGNIFICANT CHANGE UP (ref 27–34)
MCHC RBC-ENTMCNC: 32.9 GM/DL — SIGNIFICANT CHANGE UP (ref 32–36)
MCV RBC AUTO: 94.8 FL — SIGNIFICANT CHANGE UP (ref 80–100)
NRBC # BLD: 0 /100 WBCS — SIGNIFICANT CHANGE UP (ref 0–0)
PLATELET # BLD AUTO: 90 K/UL — LOW (ref 150–400)
POTASSIUM SERPL-MCNC: 4 MMOL/L — SIGNIFICANT CHANGE UP (ref 3.5–5.3)
POTASSIUM SERPL-SCNC: 4 MMOL/L — SIGNIFICANT CHANGE UP (ref 3.5–5.3)
PROTHROM AB SERPL-ACNC: 35.1 SEC — HIGH (ref 10.6–13.6)
RBC # BLD: 4.23 M/UL — SIGNIFICANT CHANGE UP (ref 3.8–5.2)
RBC # FLD: 18 % — HIGH (ref 10.3–14.5)
SODIUM SERPL-SCNC: 146 MMOL/L — HIGH (ref 135–145)
SPECIMEN SOURCE: SIGNIFICANT CHANGE UP
SPECIMEN SOURCE: SIGNIFICANT CHANGE UP
WBC # BLD: 6.18 K/UL — SIGNIFICANT CHANGE UP (ref 3.8–10.5)
WBC # FLD AUTO: 6.18 K/UL — SIGNIFICANT CHANGE UP (ref 3.8–10.5)

## 2021-10-12 PROCEDURE — 99232 SBSQ HOSP IP/OBS MODERATE 35: CPT | Mod: GC

## 2021-10-12 RX ADMIN — Medication 25 MILLIGRAM(S): at 05:27

## 2021-10-12 RX ADMIN — ISOSORBIDE MONONITRATE 30 MILLIGRAM(S): 60 TABLET, EXTENDED RELEASE ORAL at 11:44

## 2021-10-12 RX ADMIN — ARIPIPRAZOLE 2 MILLIGRAM(S): 15 TABLET ORAL at 11:44

## 2021-10-12 RX ADMIN — Medication 1: at 17:13

## 2021-10-12 RX ADMIN — ANASTROZOLE 1 MILLIGRAM(S): 1 TABLET ORAL at 11:45

## 2021-10-12 RX ADMIN — Medication 1: at 11:43

## 2021-10-12 RX ADMIN — PANTOPRAZOLE SODIUM 40 MILLIGRAM(S): 20 TABLET, DELAYED RELEASE ORAL at 05:27

## 2021-10-12 RX ADMIN — Medication 25 MILLIGRAM(S): at 17:13

## 2021-10-12 RX ADMIN — Medication 50 MILLIGRAM(S): at 14:13

## 2021-10-12 RX ADMIN — ATORVASTATIN CALCIUM 40 MILLIGRAM(S): 80 TABLET, FILM COATED ORAL at 21:16

## 2021-10-12 RX ADMIN — Medication 50 MILLIGRAM(S): at 21:16

## 2021-10-12 RX ADMIN — Medication 25 MICROGRAM(S): at 05:27

## 2021-10-12 RX ADMIN — Medication 50 MILLIGRAM(S): at 05:27

## 2021-10-12 NOTE — PROGRESS NOTE ADULT - PROBLEM SELECTOR PLAN 5
- Patient has hx of DVT and most recent PE on coumadin, INR 3.11, was on 2mg yesterday, will hold off on dose today  - Monitor INR target 2-4

## 2021-10-12 NOTE — PROGRESS NOTE ADULT - PROBLEM SELECTOR PLAN 1
- Patient readmitted for acute dyspnea, previously treated for COPD exacerbation, pt reports some SOB, did not require oxygen yesterday, 100% on RA  - On admission, patient with sob, hypoxic requiring NRBM, CXR compared to previous one noted to have no significant changes, Pro BNP 37k from 12k unclear etiology potentially 2/2 to gradual fluid accumulation, Cr 1.9 from 1.3  - Entresto held due to CHEN, c/w hydralazine, Imdur, BB, statin   - Hydralazine increased from 25mg q 8 to 50mg q8, pressures stable currently  - Strict I & O, Daily weight

## 2021-10-12 NOTE — PROGRESS NOTE ADULT - ATTENDING COMMENTS
Patient seen and examined on bedside.    Vital Signs Last 24 Hrs  T(C): 36.2 (12 Oct 2021 14:27), Max: 36.4 (11 Oct 2021 20:09)  T(F): 97.2 (12 Oct 2021 14:27), Max: 97.6 (11 Oct 2021 20:09)  HR: 77 (12 Oct 2021 14:27) (74 - 83)  BP: 126/89 (12 Oct 2021 14:27) (121/80 - 126/94)  BP(mean): --  RR: 16 (12 Oct 2021 14:27) (16 - 18)  SpO2: 95% (12 Oct 2021 14:27) (95% - 100%)    Labs and imaging studies noted.    Assessment and plan:    complete note to follow.. Patient seen and examined on bedside. No new overnight issues.    Vital Signs Last 24 Hrs  T(C): 36.2 (12 Oct 2021 14:27), Max: 36.4 (11 Oct 2021 20:09)  T(F): 97.2 (12 Oct 2021 14:27), Max: 97.6 (11 Oct 2021 20:09)  HR: 77 (12 Oct 2021 14:27) (74 - 83)  BP: 126/89 (12 Oct 2021 14:27) (121/80 - 126/94)  BP(mean): --  RR: 16 (12 Oct 2021 14:27) (16 - 18)  SpO2: 95% (12 Oct 2021 14:27) (95% - 100%)    Labs and imaging studies noted.    Assessment and plan:  D/D:  CHEN suspect due to likely Entresto vs less likely Fluid overload  resolving well   Metabolic acidosis  Acute Hypoxic Resp. failure suspect mild CHF exacerbation (Acute on Chronic systolic)  resolved hypoxia  CKD 3   COPD stable  Anxiety disorder with Atypical depression   Type 2 DM  Hx DVT and saddle PE on coumadin    Plan:  Hold Lasix for now until renal function improv; gentle hydration with Biacrabonate (1/2 NS with 50 meq NaHCO3) 60cc/hr x 12 hrs  Hold Entresto for now and will not resume; may have contributed to CHEN  Monitor renal fn closely  Continue Imdur/ Coumadin 1 mg tonight ; INR AM therapeutic at present but rising  Increased Hydralazine to 50 mg q 8 hrs;Continue Metoprolol; increase to 25 mg q 12 hrs  Palliative Consult was done last admission and extensive d/w HCP who wished for FULL CODE; Patient does not have insight into her complex medical issues. Palliative care team consulted this time again, discussed with HCP,patient DNR/ DNI,  no feeding tube.

## 2021-10-12 NOTE — PROGRESS NOTE ADULT - PROBLEM SELECTOR PLAN 3
- Downtrending platelet count, 165->127->104->90  - potentially 2/2 to increased hydralazine causing isolated thrombocytopenia ????recently, change back to 25mg - Downtrending platelet count, 165->127->104->90  - Continue to monitor, no active s/s of bleeding

## 2021-10-12 NOTE — PROGRESS NOTE ADULT - SUBJECTIVE AND OBJECTIVE BOX
Patient is a 66y old  Female who presents with a chief complaint of DYSPNEA (11 Oct 2021 13:15)      INTERVAL HPI/OVERNIGHT EVENTS: Patient seen and examined at bedside. Patient did not require oxygen yesterday. Discussion was had w PATTY Pollack, Dr. Mcmanus, and Dr. Gerardo surrounding [facility]. Likely plan for home hospice if patient required home O2 requirements and higher level of care. Patient is DNR/DNI. Patient is complaining of some SOB today, reports its comes and goes. Saturating 100% on RA. Reports no pain. Reports urinating and passing stool without difficulty. Patient has no other complaints at this time.    MEDICATIONS  (STANDING):  anastrozole 1 milliGRAM(s) Oral daily  ARIPiprazole 2 milliGRAM(s) Oral daily  atorvastatin 40 milliGRAM(s) Oral at bedtime  hydrALAZINE 50 milliGRAM(s) Oral every 8 hours  insulin lispro (ADMELOG) corrective regimen sliding scale   SubCutaneous three times a day before meals  insulin lispro (ADMELOG) corrective regimen sliding scale   SubCutaneous at bedtime  isosorbide   mononitrate ER Tablet (IMDUR) 30 milliGRAM(s) Oral daily  levothyroxine 25 MICROGram(s) Oral daily  metoprolol tartrate 25 milliGRAM(s) Oral two times a day  pantoprazole    Tablet 40 milliGRAM(s) Oral before breakfast  sodium chloride 0.45% 1000 milliLiter(s) (60 mL/Hr) IV Continuous <Continuous>    MEDICATIONS  (PRN):  ALBUTerol    90 MICROgram(s) HFA Inhaler 2 Puff(s) Inhalation every 6 hours PRN Shortness of Breath and/or Wheezing  guaiFENesin Oral Liquid (Sugar-Free) 100 milliGRAM(s) Oral every 6 hours PRN Cough      Allergies    Advil (Hives; Vomiting)    Intolerances        REVIEW OF SYSTEMS:  CONSTITUTIONAL: No fever or chills  HEENT:  No headache, no sore throat  RESPIRATORY: Admits to SOB, No cough, or wheezing  CARDIOVASCULAR: No chest pain, palpitations  GASTROINTESTINAL: No abd pain, nausea, vomiting, or diarrhea  GENITOURINARY: No dysuria, frequency, or hematuria  NEUROLOGICAL: no focal weakness or dizziness  MUSCULOSKELETAL: no myalgias     Vital Signs Last 24 Hrs  T(C): 36.4 (12 Oct 2021 05:15), Max: 36.4 (11 Oct 2021 20:09)  T(F): 97.6 (12 Oct 2021 05:15), Max: 97.6 (11 Oct 2021 20:09)  HR: 83 (12 Oct 2021 05:15) (74 - 83)  BP: 126/94 (12 Oct 2021 05:15) (121/80 - 131/96)  BP(mean): --  RR: 18 (12 Oct 2021 05:15) (18 - 18)  SpO2: 100% (12 Oct 2021 05:15) (98% - 100%)    PHYSICAL EXAM:  GENERAL: elderly female resting in bed, sitting up, lethargic, NAD  HEENT:  anicteric, moist mucous membranes  CHEST/LUNG:  CTA b/l, no rales, wheezes, or rhonchi  HEART:  RRR, S1, S2, No m/r/g  ABDOMEN: mild TTP in epigastric area,  BS+, soft, nondistended  EXTREMITIES: no edema, cyanosis, or calf tenderness  NERVOUS SYSTEM:  baseline mental level, answers questions and follows commands appropriately    LABS:                        13.2   6.18  )-----------( 90       ( 12 Oct 2021 08:29 )             40.1     CBC Full  -  ( 12 Oct 2021 08:29 )  WBC Count : 6.18 K/uL  Hemoglobin : 13.2 g/dL  Hematocrit : 40.1 %  Platelet Count - Automated : 90 K/uL  Mean Cell Volume : 94.8 fl  Mean Cell Hemoglobin : 31.2 pg  Mean Cell Hemoglobin Concentration : 32.9 gm/dL  Auto Neutrophil # : x  Auto Lymphocyte # : x  Auto Monocyte # : x  Auto Eosinophil # : x  Auto Basophil # : x  Auto Neutrophil % : x  Auto Lymphocyte % : x  Auto Monocyte % : x  Auto Eosinophil % : x  Auto Basophil % : x    12 Oct 2021 08:29    146    |  114    |  54     ----------------------------<  161    4.0     |  20     |  1.77     Ca    9.1        12 Oct 2021 08:29      PT/INR - ( 12 Oct 2021 08:29 )   PT: 35.1 sec;   INR: 3.11 ratio         PTT - ( 12 Oct 2021 08:29 )  PTT:28.5 sec    CAPILLARY BLOOD GLUCOSE      POCT Blood Glucose.: 151 mg/dL (12 Oct 2021 07:41)  POCT Blood Glucose.: 165 mg/dL (11 Oct 2021 21:06)  POCT Blood Glucose.: 146 mg/dL (11 Oct 2021 16:29)  POCT Blood Glucose.: 159 mg/dL (11 Oct 2021 11:40)        Culture - Urine (collected 10-07-21 @ 06:42)  Source: Clean Catch Clean Catch (Midstream)  Final Report (10-08-21 @ 23:37):    <10,000 CFU/mL Normal Urogenital Maggie    Culture - Blood (collected 10-07-21 @ 06:33)  Source: .Blood Blood-Peripheral  Final Report (10-12-21 @ 07:01):    No Growth Final    Culture - Blood (collected 10-07-21 @ 06:27)  Source: .Blood Blood-Peripheral  Final Report (10-12-21 @ 07:01):    No Growth Final        RADIOLOGY & ADDITIONAL TESTS:    Personally reviewed.     Consultant(s) Notes Reviewed:  [x] YES  [ ] NO

## 2021-10-12 NOTE — PROGRESS NOTE ADULT - ASSESSMENT
66 year old F w/ PMhx of CAD, T2DM (last A1c 6.4%), Hypothyroidism, HTN, HFrEF, cardiomyopathy (EF 25% on 9/16/21, s/p AICD (81% battery) ), COPD (no prior intubations), CVA, DVT (L leg 1994)/PE (s/p IVC filter 9/2020, and repeat saddle PE at [facility] 8/21-9/3 now on Coumadin), infrarenal AAA, cerebral aneurysm, atypical Depression, R breast cancer (s/p chemo/XRT), Class I Obesity, CKD3-A presented to the ED with worsening shortness of breath s/p discharge on 10/6/21 after being treated for COPD exacerbation. Patient admitted for dyspnea.

## 2021-10-12 NOTE — PROGRESS NOTE ADULT - PROBLEM SELECTOR PLAN 8
- Patient has history of Hypertension on metoprolol, Entresto, hydralazine, Imdur at home , stable pressures  - C/w metoprolol, hydralazine?? and Imdur with parameters  - Hold Entresto due to CHEN  - DASH diet - Patient has history of Hypertension on metoprolol, Entresto, hydralazine, Imdur at home , stable pressures  - C/w metoprolol, hydralazine, and Imdur with parameters  - Hold Entresto due to CHEN  - DASH diet

## 2021-10-13 LAB
ANION GAP SERPL CALC-SCNC: 15 MMOL/L — SIGNIFICANT CHANGE UP (ref 5–17)
APTT BLD: 27.7 SEC — SIGNIFICANT CHANGE UP (ref 27.5–35.5)
BUN SERPL-MCNC: 50 MG/DL — HIGH (ref 7–18)
CALCIUM SERPL-MCNC: 9.1 MG/DL — SIGNIFICANT CHANGE UP (ref 8.4–10.5)
CHLORIDE SERPL-SCNC: 112 MMOL/L — HIGH (ref 96–108)
CO2 SERPL-SCNC: 19 MMOL/L — LOW (ref 22–31)
CREAT SERPL-MCNC: 1.72 MG/DL — HIGH (ref 0.5–1.3)
GLUCOSE BLDC GLUCOMTR-MCNC: 124 MG/DL — HIGH (ref 70–99)
GLUCOSE BLDC GLUCOMTR-MCNC: 138 MG/DL — HIGH (ref 70–99)
GLUCOSE BLDC GLUCOMTR-MCNC: 171 MG/DL — HIGH (ref 70–99)
GLUCOSE BLDC GLUCOMTR-MCNC: 176 MG/DL — HIGH (ref 70–99)
GLUCOSE SERPL-MCNC: 170 MG/DL — HIGH (ref 70–99)
HCT VFR BLD CALC: 40.5 % — SIGNIFICANT CHANGE UP (ref 34.5–45)
HGB BLD-MCNC: 13.3 G/DL — SIGNIFICANT CHANGE UP (ref 11.5–15.5)
INR BLD: 2.95 RATIO — HIGH (ref 0.88–1.16)
MCHC RBC-ENTMCNC: 31 PG — SIGNIFICANT CHANGE UP (ref 27–34)
MCHC RBC-ENTMCNC: 32.8 GM/DL — SIGNIFICANT CHANGE UP (ref 32–36)
MCV RBC AUTO: 94.4 FL — SIGNIFICANT CHANGE UP (ref 80–100)
NRBC # BLD: 0 /100 WBCS — SIGNIFICANT CHANGE UP (ref 0–0)
PLATELET # BLD AUTO: 96 K/UL — LOW (ref 150–400)
POTASSIUM SERPL-MCNC: 3.9 MMOL/L — SIGNIFICANT CHANGE UP (ref 3.5–5.3)
POTASSIUM SERPL-SCNC: 3.9 MMOL/L — SIGNIFICANT CHANGE UP (ref 3.5–5.3)
PROTHROM AB SERPL-ACNC: 33.3 SEC — HIGH (ref 10.6–13.6)
RBC # BLD: 4.29 M/UL — SIGNIFICANT CHANGE UP (ref 3.8–5.2)
RBC # FLD: 18.1 % — HIGH (ref 10.3–14.5)
SARS-COV-2 RNA SPEC QL NAA+PROBE: SIGNIFICANT CHANGE UP
SODIUM SERPL-SCNC: 146 MMOL/L — HIGH (ref 135–145)
WBC # BLD: 5.92 K/UL — SIGNIFICANT CHANGE UP (ref 3.8–10.5)
WBC # FLD AUTO: 5.92 K/UL — SIGNIFICANT CHANGE UP (ref 3.8–10.5)

## 2021-10-13 RX ORDER — WARFARIN SODIUM 2.5 MG/1
2 TABLET ORAL ONCE
Refills: 0 | Status: COMPLETED | OUTPATIENT
Start: 2021-10-13 | End: 2021-10-13

## 2021-10-13 RX ADMIN — ISOSORBIDE MONONITRATE 30 MILLIGRAM(S): 60 TABLET, EXTENDED RELEASE ORAL at 12:14

## 2021-10-13 RX ADMIN — Medication 50 MILLIGRAM(S): at 17:00

## 2021-10-13 RX ADMIN — Medication 25 MILLIGRAM(S): at 17:00

## 2021-10-13 RX ADMIN — ANASTROZOLE 1 MILLIGRAM(S): 1 TABLET ORAL at 12:14

## 2021-10-13 RX ADMIN — ATORVASTATIN CALCIUM 40 MILLIGRAM(S): 80 TABLET, FILM COATED ORAL at 22:17

## 2021-10-13 RX ADMIN — Medication 50 MILLIGRAM(S): at 05:42

## 2021-10-13 RX ADMIN — Medication 25 MICROGRAM(S): at 05:42

## 2021-10-13 RX ADMIN — ARIPIPRAZOLE 2 MILLIGRAM(S): 15 TABLET ORAL at 12:13

## 2021-10-13 RX ADMIN — WARFARIN SODIUM 2 MILLIGRAM(S): 2.5 TABLET ORAL at 22:17

## 2021-10-13 RX ADMIN — Medication 25 MILLIGRAM(S): at 05:42

## 2021-10-13 RX ADMIN — PANTOPRAZOLE SODIUM 40 MILLIGRAM(S): 20 TABLET, DELAYED RELEASE ORAL at 05:42

## 2021-10-13 RX ADMIN — Medication 1: at 12:13

## 2021-10-13 NOTE — PROGRESS NOTE ADULT - PROBLEM SELECTOR PLAN 5
- Patient has hx of DVT and most recent PE on coumadin, INR 2.95, no coumadin yesterday, ______ today  - Monitor INR target 2-4 - Patient has hx of DVT and most recent PE on coumadin, INR 2.95, no coumadin yesterday, 2mg today  - Monitor INR target 2-4

## 2021-10-13 NOTE — CHART NOTE - NSCHARTNOTEFT_GEN_A_CORE
Focus note: Consult ordered 10/11 AM and canceled 10/13 PM (+LOS). Before cancelation, pt visited, asleep. Noted that pt is for home hospice. Diet order: Consistent CHO (no snack), DASH/ TLC, 1000ml fluid restriction. (A1c 6.4), Pt with CHF, COPD. Discussed with PGYs (they reported HCP difficult to reach). Recommendation: least restrictive diet needed. May add PO supplement if appropriate. MD to monitor. RD available.

## 2021-10-13 NOTE — PROGRESS NOTE ADULT - ASSESSMENT
66 year old F w/ PMhx of CAD, T2DM (last A1c 6.4%), Hypothyroidism, HTN, HFrEF, cardiomyopathy (EF 25% on 9/16/21, s/p AICD (81% battery) ), COPD (no prior intubations), CVA, DVT (L leg 1994)/PE (s/p IVC filter 9/2020, and repeat saddle PE at [facility] 8/21-9/3 now on Coumadin), infrarenal AAA, cerebral aneurysm, atypical Depression, R breast cancer (s/p chemo/XRT), Class I Obesity, CKD3-A presented to the ED with worsening shortness of breath s/p discharge on 10/6/21 after being treated for COPD exacerbation. Patient admitted for dyspnea.

## 2021-10-13 NOTE — PROGRESS NOTE ADULT - SUBJECTIVE AND OBJECTIVE BOX
Patient is a 66y old  Female who presents with a chief complaint of DYSPNEA (12 Oct 2021 10:21)      INTERVAL HPI/OVERNIGHT EVENTS: Patient seen and examined at bedside. Patient put on 2L NC overnight. Patient about same baseline level. Reports some SOB, no pain. Reports no bleeding from any sites. Reports she is eating and hydrating. Patient has no other complaints at this time.     MEDICATIONS  (STANDING):  anastrozole 1 milliGRAM(s) Oral daily  ARIPiprazole 2 milliGRAM(s) Oral daily  atorvastatin 40 milliGRAM(s) Oral at bedtime  hydrALAZINE 50 milliGRAM(s) Oral every 8 hours  insulin lispro (ADMELOG) corrective regimen sliding scale   SubCutaneous three times a day before meals  insulin lispro (ADMELOG) corrective regimen sliding scale   SubCutaneous at bedtime  isosorbide   mononitrate ER Tablet (IMDUR) 30 milliGRAM(s) Oral daily  levothyroxine 25 MICROGram(s) Oral daily  metoprolol tartrate 25 milliGRAM(s) Oral two times a day  pantoprazole    Tablet 40 milliGRAM(s) Oral before breakfast  sodium chloride 0.45% 1000 milliLiter(s) (60 mL/Hr) IV Continuous <Continuous>    MEDICATIONS  (PRN):  ALBUTerol    90 MICROgram(s) HFA Inhaler 2 Puff(s) Inhalation every 6 hours PRN Shortness of Breath and/or Wheezing  guaiFENesin Oral Liquid (Sugar-Free) 100 milliGRAM(s) Oral every 6 hours PRN Cough      Allergies    Advil (Hives; Vomiting)    Intolerances        REVIEW OF SYSTEMS:  CONSTITUTIONAL: Admits to general fatigue, No fever or chills  HEENT:  No headache, no sore throat  RESPIRATORY: Admits to mild SOB, No cough, or wheezing  CARDIOVASCULAR: No chest pain, palpitations  GASTROINTESTINAL: No abd pain, nausea, vomiting, or diarrhea  GENITOURINARY: No dysuria, frequency, or hematuria  NEUROLOGICAL: no focal weakness or dizziness  MUSCULOSKELETAL: no myalgias     Vital Signs Last 24 Hrs  T(C): 36.3 (13 Oct 2021 05:29), Max: 36.7 (12 Oct 2021 20:55)  T(F): 97.3 (13 Oct 2021 05:29), Max: 98.1 (12 Oct 2021 20:55)  HR: 88 (13 Oct 2021 05:29) (77 - 88)  BP: 128/102 (13 Oct 2021 05:29) (126/89 - 133/96)  BP(mean): --  RR: 18 (13 Oct 2021 05:29) (16 - 18)  SpO2: 99% (13 Oct 2021 05:29) (95% - 100%)    PHYSICAL EXAM:  GENERAL: elderly female sat up in bed, lethargic, NAD  HEENT:  anicteric, moist mucous membranes  CHEST/LUNG:  CTA b/l, no rales, wheezes, or rhonchi  HEART:  RRR, S1, S2, No m/r/g  ABDOMEN:  BS+, soft, nontender, nondistended  EXTREMITIES: no edema, cyanosis, or calf tenderness  NERVOUS SYSTEM: baseline mental status, answers questions and follows commands appropriately    LABS:                        13.3   5.92  )-----------( 96       ( 13 Oct 2021 07:10 )             40.5     CBC Full  -  ( 13 Oct 2021 07:10 )  WBC Count : 5.92 K/uL  Hemoglobin : 13.3 g/dL  Hematocrit : 40.5 %  Platelet Count - Automated : 96 K/uL  Mean Cell Volume : 94.4 fl  Mean Cell Hemoglobin : 31.0 pg  Mean Cell Hemoglobin Concentration : 32.8 gm/dL  Auto Neutrophil # : x  Auto Lymphocyte # : x  Auto Monocyte # : x  Auto Eosinophil # : x  Auto Basophil # : x  Auto Neutrophil % : x  Auto Lymphocyte % : x  Auto Monocyte % : x  Auto Eosinophil % : x  Auto Basophil % : x    13 Oct 2021 07:10    146    |  112    |  50     ----------------------------<  170    3.9     |  19     |  1.72     Ca    9.1        13 Oct 2021 07:10      PT/INR - ( 13 Oct 2021 07:10 )   PT: 33.3 sec;   INR: 2.95 ratio         PTT - ( 13 Oct 2021 07:10 )  PTT:27.7 sec    CAPILLARY BLOOD GLUCOSE      POCT Blood Glucose.: 138 mg/dL (13 Oct 2021 07:44)  POCT Blood Glucose.: 153 mg/dL (12 Oct 2021 21:11)  POCT Blood Glucose.: 179 mg/dL (12 Oct 2021 16:36)  POCT Blood Glucose.: 166 mg/dL (12 Oct 2021 11:32)        Culture - Urine (collected 10-07-21 @ 06:42)  Source: Clean Catch Clean Catch (Midstream)  Final Report (10-08-21 @ 23:37):    <10,000 CFU/mL Normal Urogenital Maggie    Culture - Blood (collected 10-07-21 @ 06:33)  Source: .Blood Blood-Peripheral  Final Report (10-12-21 @ 07:01):    No Growth Final    Culture - Blood (collected 10-07-21 @ 06:27)  Source: .Blood Blood-Peripheral  Final Report (10-12-21 @ 07:01):    No Growth Final        RADIOLOGY & ADDITIONAL TESTS:    Personally reviewed.     Consultant(s) Notes Reviewed:  [x] YES  [ ] NO

## 2021-10-13 NOTE — PROGRESS NOTE ADULT - PROBLEM SELECTOR PLAN 8
- Patient has history of Hypertension on metoprolol, Entresto, hydralazine, Imdur at home , stable pressures  - C/w metoprolol, hydralazine, and Imdur with parameters  - Hold Entresto due to CHEN  - DASH diet

## 2021-10-13 NOTE — PROGRESS NOTE ADULT - ATTENDING COMMENTS
Patient seen and examined at bedside.      Vital Signs Last 24 Hrs  T(C): 36.5 (13 Oct 2021 14:05), Max: 36.7 (12 Oct 2021 20:55)  T(F): 97.7 (13 Oct 2021 14:05), Max: 98.1 (12 Oct 2021 20:55)  HR: 88 (13 Oct 2021 05:29) (84 - 88)  BP: 135/96 (13 Oct 2021 14:05) (128/102 - 135/96)  BP(mean): --  RR: 18 (13 Oct 2021 14:05) (18 - 18)  SpO2: 99% (13 Oct 2021 14:05) (99% - 100%)    Labs and imaging studies noted      Assessment and plan:    complete note to follow... Patient seen and examined at bedside.  Pending home hospice arrangements. DWIGHT STOVALL on board      Vital Signs Last 24 Hrs  T(C): 36.5 (13 Oct 2021 14:05), Max: 36.7 (12 Oct 2021 20:55)  T(F): 97.7 (13 Oct 2021 14:05), Max: 98.1 (12 Oct 2021 20:55)  HR: 88 (13 Oct 2021 05:29) (84 - 88)  BP: 135/96 (13 Oct 2021 14:05) (128/102 - 135/96)  BP(mean): --  RR: 18 (13 Oct 2021 14:05) (18 - 18)  SpO2: 99% (13 Oct 2021 14:05) (99% - 100%)    Labs and imaging studies noted    Assessment and plan:  Assessment and plan:  D/D:  CHEN suspect due to likely Entresto vs less likely Fluid overload  resolving well   Metabolic acidosis  Acute Hypoxic Resp. failure suspect mild CHF exacerbation (Acute on Chronic systolic)  resolved hypoxia  CKD 3   COPD stable  Anxiety disorder with Atypical depression   Type 2 DM  Hx DVT and saddle PE on coumadin    Plan:  Hold Lasix for now until renal function improv; gentle hydration with Biacrabonate (1/2 NS with 50 meq NaHCO3) 60cc/hr x 12 hrs  Hold Entresto for now and will not resume; may have contributed to CHEN  Monitor renal fn closely  Continue Imdur/ Coumadin 1 mg, Hydralazine 50 mg q 8 hrs;Continue Metoprolol 25 mg q 12 hrs  Palliative Consult was done last admission and extensive d/w HCP who wished for FULL CODE; Patient does not have insight into her complex medical issues. Palliative care team consulted this time again, discussed with HCP,patient DNR/ DNI,  no feeding tube.   Pending home hospice arrangements. DWIGHT STOVALL on board

## 2021-10-13 NOTE — PROGRESS NOTE ADULT - PROBLEM SELECTOR PLAN 1
- Patient readmitted for acute dyspnea, previously treated for COPD exacerbation, pt reports some SOB, was on NC 2L briefly overnight, saturating well on RA currently   - On admission, patient with sob, hypoxic requiring NRBM, CXR compared to previous one noted to have no significant changes, Pro BNP 37k from 12k unclear etiology potentially 2/2 to gradual fluid accumulation, Cr 1.9 from 1.3  - Entresto held due to CHEN, c/w hydralazine, Imdur, BB, statin

## 2021-10-14 LAB
ANION GAP SERPL CALC-SCNC: 10 MMOL/L — SIGNIFICANT CHANGE UP (ref 5–17)
BUN SERPL-MCNC: 47 MG/DL — HIGH (ref 7–18)
CALCIUM SERPL-MCNC: 8.9 MG/DL — SIGNIFICANT CHANGE UP (ref 8.4–10.5)
CHLORIDE SERPL-SCNC: 113 MMOL/L — HIGH (ref 96–108)
CO2 SERPL-SCNC: 23 MMOL/L — SIGNIFICANT CHANGE UP (ref 22–31)
CREAT SERPL-MCNC: 1.88 MG/DL — HIGH (ref 0.5–1.3)
GLUCOSE BLDC GLUCOMTR-MCNC: 134 MG/DL — HIGH (ref 70–99)
GLUCOSE BLDC GLUCOMTR-MCNC: 160 MG/DL — HIGH (ref 70–99)
GLUCOSE BLDC GLUCOMTR-MCNC: 164 MG/DL — HIGH (ref 70–99)
GLUCOSE BLDC GLUCOMTR-MCNC: 182 MG/DL — HIGH (ref 70–99)
GLUCOSE SERPL-MCNC: 163 MG/DL — HIGH (ref 70–99)
HCT VFR BLD CALC: 40.1 % — SIGNIFICANT CHANGE UP (ref 34.5–45)
HGB BLD-MCNC: 13.4 G/DL — SIGNIFICANT CHANGE UP (ref 11.5–15.5)
INR BLD: 2.73 RATIO — HIGH (ref 0.88–1.16)
MCHC RBC-ENTMCNC: 31.1 PG — SIGNIFICANT CHANGE UP (ref 27–34)
MCHC RBC-ENTMCNC: 33.4 GM/DL — SIGNIFICANT CHANGE UP (ref 32–36)
MCV RBC AUTO: 93 FL — SIGNIFICANT CHANGE UP (ref 80–100)
NRBC # BLD: 0 /100 WBCS — SIGNIFICANT CHANGE UP (ref 0–0)
PLATELET # BLD AUTO: 77 K/UL — LOW (ref 150–400)
POTASSIUM SERPL-MCNC: 3.9 MMOL/L — SIGNIFICANT CHANGE UP (ref 3.5–5.3)
POTASSIUM SERPL-SCNC: 3.9 MMOL/L — SIGNIFICANT CHANGE UP (ref 3.5–5.3)
PROTHROM AB SERPL-ACNC: 31 SEC — HIGH (ref 10.6–13.6)
RBC # BLD: 4.31 M/UL — SIGNIFICANT CHANGE UP (ref 3.8–5.2)
RBC # FLD: 17.9 % — HIGH (ref 10.3–14.5)
SODIUM SERPL-SCNC: 146 MMOL/L — HIGH (ref 135–145)
WBC # BLD: 5.56 K/UL — SIGNIFICANT CHANGE UP (ref 3.8–10.5)
WBC # FLD AUTO: 5.56 K/UL — SIGNIFICANT CHANGE UP (ref 3.8–10.5)

## 2021-10-14 PROCEDURE — 99232 SBSQ HOSP IP/OBS MODERATE 35: CPT | Mod: GC

## 2021-10-14 RX ORDER — WARFARIN SODIUM 2.5 MG/1
2 TABLET ORAL ONCE
Refills: 0 | Status: COMPLETED | OUTPATIENT
Start: 2021-10-14 | End: 2021-10-14

## 2021-10-14 RX ADMIN — Medication 50 MILLIGRAM(S): at 13:31

## 2021-10-14 RX ADMIN — Medication 50 MILLIGRAM(S): at 21:24

## 2021-10-14 RX ADMIN — Medication 25 MILLIGRAM(S): at 18:34

## 2021-10-14 RX ADMIN — Medication 25 MICROGRAM(S): at 05:48

## 2021-10-14 RX ADMIN — WARFARIN SODIUM 2 MILLIGRAM(S): 2.5 TABLET ORAL at 21:24

## 2021-10-14 RX ADMIN — ISOSORBIDE MONONITRATE 30 MILLIGRAM(S): 60 TABLET, EXTENDED RELEASE ORAL at 12:57

## 2021-10-14 RX ADMIN — Medication 50 MILLIGRAM(S): at 05:48

## 2021-10-14 RX ADMIN — Medication 1: at 16:43

## 2021-10-14 RX ADMIN — Medication 1: at 11:47

## 2021-10-14 RX ADMIN — PANTOPRAZOLE SODIUM 40 MILLIGRAM(S): 20 TABLET, DELAYED RELEASE ORAL at 05:48

## 2021-10-14 RX ADMIN — ANASTROZOLE 1 MILLIGRAM(S): 1 TABLET ORAL at 12:57

## 2021-10-14 RX ADMIN — Medication 25 MILLIGRAM(S): at 05:48

## 2021-10-14 RX ADMIN — ATORVASTATIN CALCIUM 40 MILLIGRAM(S): 80 TABLET, FILM COATED ORAL at 21:25

## 2021-10-14 RX ADMIN — ARIPIPRAZOLE 2 MILLIGRAM(S): 15 TABLET ORAL at 12:57

## 2021-10-14 NOTE — PROGRESS NOTE ADULT - PROBLEM SELECTOR PLAN 3
- Platelet count today______  - Continue to monitor, no active s/s of bleeding - Platelet count today 77k  - Continue to monitor, no active s/s of bleeding

## 2021-10-14 NOTE — PROGRESS NOTE ADULT - SUBJECTIVE AND OBJECTIVE BOX
Patient is a 66y old  Female who presents with a chief complaint of DYSPNEA (13 Oct 2021 08:39)      INTERVAL HPI/OVERNIGHT EVENTS: Patient seen and examined at bedside. No overnight events occurred. Patient did not require oxygen overnight. Currently reports no SOB. Patient reports no complaints, reports that she wants to go home. Pending placement.      MEDICATIONS  (STANDING):  anastrozole 1 milliGRAM(s) Oral daily  ARIPiprazole 2 milliGRAM(s) Oral daily  atorvastatin 40 milliGRAM(s) Oral at bedtime  hydrALAZINE 50 milliGRAM(s) Oral every 8 hours  insulin lispro (ADMELOG) corrective regimen sliding scale   SubCutaneous three times a day before meals  insulin lispro (ADMELOG) corrective regimen sliding scale   SubCutaneous at bedtime  isosorbide   mononitrate ER Tablet (IMDUR) 30 milliGRAM(s) Oral daily  levothyroxine 25 MICROGram(s) Oral daily  metoprolol tartrate 25 milliGRAM(s) Oral two times a day  pantoprazole    Tablet 40 milliGRAM(s) Oral before breakfast  sodium chloride 0.45% 1000 milliLiter(s) (60 mL/Hr) IV Continuous <Continuous>  warfarin 2 milliGRAM(s) Oral once    MEDICATIONS  (PRN):  ALBUTerol    90 MICROgram(s) HFA Inhaler 2 Puff(s) Inhalation every 6 hours PRN Shortness of Breath and/or Wheezing  guaiFENesin Oral Liquid (Sugar-Free) 100 milliGRAM(s) Oral every 6 hours PRN Cough      Allergies    Advil (Hives; Vomiting)    Intolerances        REVIEW OF SYSTEMS:  CONSTITUTIONAL: No fever or chills  HEENT:  No headache, no sore throat  RESPIRATORY: No cough, wheezing, or shortness of breath  CARDIOVASCULAR: No chest pain, palpitations  GASTROINTESTINAL: No abd pain, nausea, vomiting, or diarrhea  GENITOURINARY: No dysuria, frequency, or hematuria  NEUROLOGICAL: no focal weakness or dizziness  MUSCULOSKELETAL: no myalgias     Vital Signs Last 24 Hrs  T(C): 36.3 (14 Oct 2021 05:37), Max: 36.5 (13 Oct 2021 14:05)  T(F): 97.3 (14 Oct 2021 05:37), Max: 97.7 (13 Oct 2021 14:05)  HR: 84 (14 Oct 2021 05:37) (76 - 84)  BP: 133/92 (14 Oct 2021 05:37) (115/88 - 135/96)  BP(mean): --  RR: 18 (14 Oct 2021 05:37) (18 - 18)  SpO2: 97% (14 Oct 2021 05:37) (97% - 100%)    PHYSICAL EXAM:  GENERAL: elderly female resting in bed, NAD  HEENT:  anicteric, moist mucous membranes  CHEST/LUNG:  CTA b/l, no rales, wheezes, or rhonchi  HEART:  RRR, S1, S2, No m/r/g  ABDOMEN: Mild TTP in epigastric area, BS+, soft, nondistended  EXTREMITIES: no edema, cyanosis, or calf tenderness  NERVOUS SYSTEM: Around baseline mental status, answers questions, does not follow commands appropriately    LABS:                        13.4   5.56  )-----------( x        ( 14 Oct 2021 08:01 )             40.1     CBC Full  -  ( 14 Oct 2021 08:01 )  WBC Count : 5.56 K/uL  Hemoglobin : 13.4 g/dL  Hematocrit : 40.1 %  Platelet Count - Automated : x  Mean Cell Volume : 93.0 fl  Mean Cell Hemoglobin : 31.1 pg  Mean Cell Hemoglobin Concentration : 33.4 gm/dL  Auto Neutrophil # : x  Auto Lymphocyte # : x  Auto Monocyte # : x  Auto Eosinophil # : x  Auto Basophil # : x  Auto Neutrophil % : x  Auto Lymphocyte % : x  Auto Monocyte % : x  Auto Eosinophil % : x  Auto Basophil % : x    14 Oct 2021 08:01    146    |  113    |  47     ----------------------------<  163    3.9     |  23     |  1.88     Ca    8.9        14 Oct 2021 08:01      PT/INR - ( 14 Oct 2021 08:01 )   PT: 31.0 sec;   INR: 2.73 ratio         PTT - ( 13 Oct 2021 07:10 )  PTT:27.7 sec    CAPILLARY BLOOD GLUCOSE      POCT Blood Glucose.: 134 mg/dL (14 Oct 2021 07:31)  POCT Blood Glucose.: 171 mg/dL (13 Oct 2021 20:59)  POCT Blood Glucose.: 124 mg/dL (13 Oct 2021 16:26)  POCT Blood Glucose.: 176 mg/dL (13 Oct 2021 11:48)          RADIOLOGY & ADDITIONAL TESTS:    Personally reviewed.     Consultant(s) Notes Reviewed:  [x] YES  [ ] NO

## 2021-10-14 NOTE — PROGRESS NOTE ADULT - PROBLEM SELECTOR PLAN 1
- Patient readmitted for acute dyspnea, previously treated for COPD exacerbation, did not require O2 yesterday, denies SOB currently  - On admission, patient with sob, hypoxic requiring NRBM, CXR compared to previous one noted to have no significant changes, Pro BNP 37k from 12k unclear etiology potentially 2/2 to gradual fluid accumulation, Cr 1.9 from 1.3  - Entresto held due to CHEN, c/w hydralazine, Imdur, BB, statin

## 2021-10-14 NOTE — PROGRESS NOTE ADULT - ATTENDING COMMENTS
Patient was seen and examined at bedside   Denies any complains     Vitals stable     P/E: as above; no epigastric tenderness on my exam    labs noted     A/P:  Stable now, cont to hold Entresto, cont Warfarin 2mg   Waiting for discharge home with home hospice

## 2021-10-14 NOTE — PROGRESS NOTE ADULT - ASSESSMENT
66 year old F w/ PMhx of CAD, T2DM (last A1c 6.4%), Hypothyroidism, HTN, HFrEF, cardiomyopathy (EF 25% on 9/16/21, s/p AICD (81% battery) ), COPD (no prior intubations), CVA, DVT (L leg 1994)/PE (s/p IVC filter 9/2020, and repeat saddle PE at [facility] 8/21-9/3 now on Coumadin), infrarenal AAA, cerebral aneurysm, atypical Depression, R breast cancer (s/p chemo/XRT), Class I Obesity, CKD3-A presented to the ED with worsening shortness of breath s/p discharge on 10/6/21 after being treated for COPD exacerbation. Patient admitted for dyspnea.

## 2021-10-14 NOTE — PROGRESS NOTE ADULT - PROBLEM SELECTOR PLAN 5
- Patient has hx of DVT and most recent PE on coumadin, INR 2.73, 2mg yesterday, today_______  - Monitor INR target 2-4 - Patient has hx of DVT and most recent PE on coumadin, INR 2.73, will continue 2 mg coumadin today  - Monitor INR target 2-4

## 2021-10-15 LAB
ANION GAP SERPL CALC-SCNC: 9 MMOL/L — SIGNIFICANT CHANGE UP (ref 5–17)
BUN SERPL-MCNC: 44 MG/DL — HIGH (ref 7–18)
CALCIUM SERPL-MCNC: 9 MG/DL — SIGNIFICANT CHANGE UP (ref 8.4–10.5)
CHLORIDE SERPL-SCNC: 113 MMOL/L — HIGH (ref 96–108)
CO2 SERPL-SCNC: 25 MMOL/L — SIGNIFICANT CHANGE UP (ref 22–31)
CREAT SERPL-MCNC: 1.93 MG/DL — HIGH (ref 0.5–1.3)
GLUCOSE BLDC GLUCOMTR-MCNC: 130 MG/DL — HIGH (ref 70–99)
GLUCOSE BLDC GLUCOMTR-MCNC: 145 MG/DL — HIGH (ref 70–99)
GLUCOSE BLDC GLUCOMTR-MCNC: 146 MG/DL — HIGH (ref 70–99)
GLUCOSE BLDC GLUCOMTR-MCNC: 178 MG/DL — HIGH (ref 70–99)
GLUCOSE SERPL-MCNC: 168 MG/DL — HIGH (ref 70–99)
HCT VFR BLD CALC: 41.5 % — SIGNIFICANT CHANGE UP (ref 34.5–45)
HGB BLD-MCNC: 13.3 G/DL — SIGNIFICANT CHANGE UP (ref 11.5–15.5)
INR BLD: 2.19 RATIO — HIGH (ref 0.88–1.16)
MCHC RBC-ENTMCNC: 30.4 PG — SIGNIFICANT CHANGE UP (ref 27–34)
MCHC RBC-ENTMCNC: 32 GM/DL — SIGNIFICANT CHANGE UP (ref 32–36)
MCV RBC AUTO: 95 FL — SIGNIFICANT CHANGE UP (ref 80–100)
NRBC # BLD: 0 /100 WBCS — SIGNIFICANT CHANGE UP (ref 0–0)
PLATELET # BLD AUTO: 94 K/UL — LOW (ref 150–400)
POTASSIUM SERPL-MCNC: 4.8 MMOL/L — SIGNIFICANT CHANGE UP (ref 3.5–5.3)
POTASSIUM SERPL-SCNC: 4.8 MMOL/L — SIGNIFICANT CHANGE UP (ref 3.5–5.3)
PROTHROM AB SERPL-ACNC: 25.1 SEC — HIGH (ref 10.6–13.6)
RBC # BLD: 4.37 M/UL — SIGNIFICANT CHANGE UP (ref 3.8–5.2)
RBC # FLD: 17.8 % — HIGH (ref 10.3–14.5)
SODIUM SERPL-SCNC: 147 MMOL/L — HIGH (ref 135–145)
WBC # BLD: 6.5 K/UL — SIGNIFICANT CHANGE UP (ref 3.8–10.5)
WBC # FLD AUTO: 6.5 K/UL — SIGNIFICANT CHANGE UP (ref 3.8–10.5)

## 2021-10-15 PROCEDURE — 99232 SBSQ HOSP IP/OBS MODERATE 35: CPT | Mod: GC

## 2021-10-15 RX ORDER — METOPROLOL TARTRATE 50 MG
1 TABLET ORAL
Qty: 0 | Refills: 0 | DISCHARGE
Start: 2021-10-15

## 2021-10-15 RX ORDER — ARIPIPRAZOLE 15 MG/1
1 TABLET ORAL
Qty: 0 | Refills: 0 | DISCHARGE
Start: 2021-10-15

## 2021-10-15 RX ORDER — ANASTROZOLE 1 MG/1
1 TABLET ORAL
Qty: 0 | Refills: 0 | DISCHARGE

## 2021-10-15 RX ORDER — ATORVASTATIN CALCIUM 80 MG/1
1 TABLET, FILM COATED ORAL
Qty: 0 | Refills: 0 | DISCHARGE
Start: 2021-10-15

## 2021-10-15 RX ORDER — WARFARIN SODIUM 2.5 MG/1
2 TABLET ORAL ONCE
Refills: 0 | Status: COMPLETED | OUTPATIENT
Start: 2021-10-15 | End: 2021-10-15

## 2021-10-15 RX ORDER — HYDRALAZINE HCL 50 MG
1 TABLET ORAL
Qty: 0 | Refills: 0 | DISCHARGE
Start: 2021-10-15

## 2021-10-15 RX ORDER — WARFARIN SODIUM 2.5 MG/1
1 TABLET ORAL
Qty: 0 | Refills: 0 | DISCHARGE
Start: 2021-10-15

## 2021-10-15 RX ORDER — ISOSORBIDE MONONITRATE 60 MG/1
1 TABLET, EXTENDED RELEASE ORAL
Qty: 0 | Refills: 0 | DISCHARGE
Start: 2021-10-15

## 2021-10-15 RX ORDER — ANASTROZOLE 1 MG/1
1 TABLET ORAL
Qty: 0 | Refills: 0 | DISCHARGE
Start: 2021-10-15

## 2021-10-15 RX ORDER — LEVOTHYROXINE SODIUM 125 MCG
1 TABLET ORAL
Qty: 0 | Refills: 0 | DISCHARGE
Start: 2021-10-15

## 2021-10-15 RX ADMIN — WARFARIN SODIUM 2 MILLIGRAM(S): 2.5 TABLET ORAL at 21:28

## 2021-10-15 RX ADMIN — Medication 25 MICROGRAM(S): at 05:21

## 2021-10-15 RX ADMIN — ARIPIPRAZOLE 2 MILLIGRAM(S): 15 TABLET ORAL at 13:54

## 2021-10-15 RX ADMIN — Medication 1: at 12:04

## 2021-10-15 RX ADMIN — Medication 50 MILLIGRAM(S): at 21:27

## 2021-10-15 RX ADMIN — PANTOPRAZOLE SODIUM 40 MILLIGRAM(S): 20 TABLET, DELAYED RELEASE ORAL at 05:21

## 2021-10-15 RX ADMIN — ANASTROZOLE 1 MILLIGRAM(S): 1 TABLET ORAL at 13:54

## 2021-10-15 RX ADMIN — ATORVASTATIN CALCIUM 40 MILLIGRAM(S): 80 TABLET, FILM COATED ORAL at 21:27

## 2021-10-15 RX ADMIN — Medication 25 MILLIGRAM(S): at 05:21

## 2021-10-15 RX ADMIN — Medication 50 MILLIGRAM(S): at 05:21

## 2021-10-15 RX ADMIN — ISOSORBIDE MONONITRATE 30 MILLIGRAM(S): 60 TABLET, EXTENDED RELEASE ORAL at 13:54

## 2021-10-15 RX ADMIN — Medication 50 MILLIGRAM(S): at 13:54

## 2021-10-15 RX ADMIN — Medication 25 MILLIGRAM(S): at 17:46

## 2021-10-15 NOTE — PROGRESS NOTE ADULT - PROBLEM SELECTOR PROBLEM 10
CAD (coronary artery disease)
Prophylactic measure
CAD (coronary artery disease)
Prophylactic measure
Prophylactic measure

## 2021-10-15 NOTE — PROGRESS NOTE ADULT - ATTENDING COMMENTS
Patient was seen and examined at bedside   Reports feeling tired, denies any other complains     Vitals stable     P/E:   Lungs CTABL    Labs noted     A/P:  Cont to hold Entresto on discharge   Cont Coumarin 2mg   Stable to be discharged tomorrow with home hospice

## 2021-10-15 NOTE — PROGRESS NOTE ADULT - PROVIDER SPECIALTY LIST ADULT
Hospitalist
Internal Medicine

## 2021-10-15 NOTE — PROGRESS NOTE ADULT - PROBLEM SELECTOR PLAN 5
- Patient has hx of DVT and most recent PE on coumadin, INR 2.19, will continue 2 mg coumadin today  - Monitor INR target 2-4

## 2021-10-15 NOTE — PROGRESS NOTE ADULT - PROBLEM SELECTOR PROBLEM 6
Diabetes mellitus
Chronic obstructive pulmonary disease (COPD)

## 2021-10-15 NOTE — PROGRESS NOTE ADULT - PROBLEM SELECTOR PLAN 2
- Recovering function, currently Scr, 1.93, also with persistent hyperNa (147), can start gentle IVF for short duration   - Previous CHEN multifactorial -- prerenal in the setting of acute on chronic CHF vs recent start of ENTRESTO  - Monitor I/O's daily  - Avoid nephrotoxins, NSAIDS, ACEI and ARBS  - Off Entresto  - Continue to monitor BMP - Recovering function, currently Scr, 1.93, also with persistent hyperNa (147), ecnourage oral hydration   - Previous CHEN multifactorial -- prerenal in the setting of acute on chronic CHF vs recent start of ENTRESTO  - Monitor I/O's daily  - Avoid nephrotoxins, NSAIDS, ACEI and ARBS  - Off Entresto  - Continue to monitor BMP - Recovering function, currently Scr, 1.93, also with persistent hyperNa (147), encourage oral hydration   - Previous CHEN multifactorial -- prerenal in the setting of acute on chronic CHF vs recent start of ENTRESTO  - Monitor I/O's daily  - Avoid nephrotoxins, NSAIDS, ACEI and ARBS  - Off Entresto  - Continue to monitor BMP

## 2021-10-15 NOTE — PROGRESS NOTE ADULT - ASSESSMENT
66 year old F w/ PMhx of CAD, T2DM (last A1c 6.4%), Hypothyroidism, HTN, HFrEF, cardiomyopathy (EF 25% on 9/16/21, s/p AICD (81% battery) ), COPD (no prior intubations), CVA, DVT (L leg 1994)/PE (s/p IVC filter 9/2020, and repeat saddle PE at [facility] 8/21-9/3 now on Coumadin), infrarenal AAA, cerebral aneurysm, atypical Depression, R breast cancer (s/p chemo/XRT), Class I Obesity, CKD3-A presented to the ED with worsening shortness of breath s/p discharge on 10/6/21 after being treated for COPD exacerbation. Patient admitted for dyspnea.

## 2021-10-15 NOTE — PROGRESS NOTE ADULT - PROBLEM SELECTOR PROBLEM 7
Diabetes mellitus
Diabetes mellitus
Hypertension
Diabetes mellitus
Diabetes mellitus

## 2021-10-15 NOTE — PROGRESS NOTE ADULT - PROBLEM SELECTOR PLAN 4
- Recovering function, currently Scr, 1.72  - Previous CHEN multifactorial -- prerenal in the setting of acute on chronic CHF vs recent start of ENTRESTO  - Monitor I/O's daily  - Avoid nephrotoxins, NSAIDS, ACEI and ARBS  - Off Entresto  - Continue to monitor BMP
- Patient has hx of DVT and most recent PE on coumadin   - Monitor INR target 2-4  - Order Coumadin one dose every night   - C/w home meds
- Patient has hx of DVT and most recent PE on coumadin, INR 2.40, continue coumadin 2.5mg qd at bedtime  - Monitor INR target 2-4  - C/w home meds
- Recovering function, currently Scr, 1.88  - Previous CHEN multifactorial -- prerenal in the setting of acute on chronic CHF vs recent start of ENTRESTO  - Monitor I/O's daily  - Avoid nephrotoxins, NSAIDS, ACEI and ARBS  - Off Entresto  - Continue to monitor BMP
- Patient has hx of DVT and most recent PE on coumadin, INR 3.73, was on 1mg coumadin yday, will order 2mg coumadin today, continue to monitor  - Monitor INR target 2-4
- Recovering function, currently Scr, 1.77, had gentle hydration yesterday, around baseline function  - CHEN multifactorial -- prerenal in the setting of acute on chronic CHF vs recent start of ENTRESTO  - Monitor I/O's daily  - Avoid nephrotoxins, NSAIDS, ACEI and ARBS  - Off Entresto  - Continue to monitor BMP
- Platelet count today 94k  - Continue to monitor, no active s/s of bleeding

## 2021-10-15 NOTE — PROGRESS NOTE ADULT - PROBLEM SELECTOR PROBLEM 3
Thrombocytopenia
Acute on chronic systolic congestive heart failure
Acute kidney injury superimposed on CKD
Thrombocytopenia
Acute kidney injury superimposed on CKD
Acute kidney injury superimposed on CKD
Thrombocytopenia

## 2021-10-15 NOTE — PROGRESS NOTE ADULT - PROBLEM SELECTOR PLAN 10
- Patient has hx of CAD on BB, Statin at home   - C/w home meds
IMPROVE VTE Individual Risk Assessment  RISK                                                                Points  [  ] Previous VTE                                                  3  [  ] Thrombophilia                                               2  [  ] Lower limb paralysis                                      2        (unable to hold up >15 seconds)    [  ] Current Cancer                                              2         (within 6 months)  [  ] Immobilization > 24 hrs                                1  [  ] ICU/CCU stay > 24 hours                              1  [  ] Age > 60                                                      1  IMPROVE VTE Score _____2____    PPI for GI prophylaxis  C/w coumadin full AC
- Patient has hx of CAD on BB, Statin at home   - C/w home meds
- Patient has hx of CAD on BB, Statin at home   - C/w home meds
IMPROVE VTE Individual Risk Assessment  RISK                                                                Points  [  ] Previous VTE                                                  3  [  ] Thrombophilia                                               2  [  ] Lower limb paralysis                                      2        (unable to hold up >15 seconds)    [  ] Current Cancer                                              2         (within 6 months)  [  ] Immobilization > 24 hrs                                1  [  ] ICU/CCU stay > 24 hours                              1  [  ] Age > 60                                                      1  IMPROVE VTE Score _____2____    PPI for GI prophylaxis  C/w coumadin full AC
- Patient has hx of CAD on BB, Statin at home   - C/w home meds
IMPROVE VTE Individual Risk Assessment  RISK                                                                Points  [  ] Previous VTE                                                  3  [  ] Thrombophilia                                               2  [  ] Lower limb paralysis                                      2        (unable to hold up >15 seconds)    [  ] Current Cancer                                              2         (within 6 months)  [  ] Immobilization > 24 hrs                                1  [  ] ICU/CCU stay > 24 hours                              1  [  ] Age > 60                                                      1  IMPROVE VTE Score _____2____    PPI for GI prophylaxis  C/w coumadin full AC

## 2021-10-15 NOTE — PROGRESS NOTE ADULT - SUBJECTIVE AND OBJECTIVE BOX
Patient is a 66y old  Female who presents with a chief complaint of DYSPNEA (14 Oct 2021 09:03)      INTERVAL HPI/OVERNIGHT EVENTS: Patient seen and examined at bedside. Patient did not require oxygen yesterday. Reports that she is feeling well, denies any SOB, LOZA. Reports she has good appetite, and is using the bathroom without difficulty. Reports she is urinating and passing stool. Denies any other complaints.    MEDICATIONS  (STANDING):  anastrozole 1 milliGRAM(s) Oral daily  ARIPiprazole 2 milliGRAM(s) Oral daily  atorvastatin 40 milliGRAM(s) Oral at bedtime  hydrALAZINE 50 milliGRAM(s) Oral every 8 hours  insulin lispro (ADMELOG) corrective regimen sliding scale   SubCutaneous three times a day before meals  insulin lispro (ADMELOG) corrective regimen sliding scale   SubCutaneous at bedtime  isosorbide   mononitrate ER Tablet (IMDUR) 30 milliGRAM(s) Oral daily  levothyroxine 25 MICROGram(s) Oral daily  metoprolol tartrate 25 milliGRAM(s) Oral two times a day  pantoprazole    Tablet 40 milliGRAM(s) Oral before breakfast  sodium chloride 0.45% 1000 milliLiter(s) (60 mL/Hr) IV Continuous <Continuous>    MEDICATIONS  (PRN):  ALBUTerol    90 MICROgram(s) HFA Inhaler 2 Puff(s) Inhalation every 6 hours PRN Shortness of Breath and/or Wheezing  guaiFENesin Oral Liquid (Sugar-Free) 100 milliGRAM(s) Oral every 6 hours PRN Cough      Allergies    Advil (Hives; Vomiting)    Intolerances        REVIEW OF SYSTEMS:  CONSTITUTIONAL: No fever or chills  HEENT:  No headache, no sore throat  RESPIRATORY: No cough, wheezing, or shortness of breath  CARDIOVASCULAR: No chest pain, palpitations  GASTROINTESTINAL: No abd pain, nausea, vomiting, or diarrhea  GENITOURINARY: No dysuria, frequency, or hematuria  NEUROLOGICAL: no focal weakness or dizziness  MUSCULOSKELETAL: no myalgias     Vital Signs Last 24 Hrs  T(C): 36.4 (15 Oct 2021 05:28), Max: 36.7 (14 Oct 2021 14:21)  T(F): 97.5 (15 Oct 2021 05:28), Max: 98 (14 Oct 2021 14:21)  HR: 88 (15 Oct 2021 05:28) (74 - 88)  BP: 142/105 (15 Oct 2021 05:28) (119/79 - 142/105)  BP(mean): --  RR: 17 (15 Oct 2021 05:28) (17 - 18)  SpO2: 100% (15 Oct 2021 05:28) (97% - 100%)    PHYSICAL EXAM:  GENERAL: elderly female resting in bed, comfortable, NAD  HEENT:  anicteric, moist mucous membranes  CHEST/LUNG:  CTA b/l, no rales, wheezes, or rhonchi  HEART:  RRR, S1, S2, No m/r/g  ABDOMEN:  BS+, soft, nontender, nondistended  EXTREMITIES: no edema, cyanosis, or calf tenderness  NERVOUS SYSTEM: AAOx2, answers questions and follows commands appropriately    LABS:                        13.3   6.50  )-----------( 94       ( 15 Oct 2021 07:38 )             41.5     CBC Full  -  ( 15 Oct 2021 07:38 )  WBC Count : 6.50 K/uL  Hemoglobin : 13.3 g/dL  Hematocrit : 41.5 %  Platelet Count - Automated : 94 K/uL  Mean Cell Volume : 95.0 fl  Mean Cell Hemoglobin : 30.4 pg  Mean Cell Hemoglobin Concentration : 32.0 gm/dL  Auto Neutrophil # : x  Auto Lymphocyte # : x  Auto Monocyte # : x  Auto Eosinophil # : x  Auto Basophil # : x  Auto Neutrophil % : x  Auto Lymphocyte % : x  Auto Monocyte % : x  Auto Eosinophil % : x  Auto Basophil % : x    15 Oct 2021 07:38    147    |  113    |  44     ----------------------------<  168    4.8     |  25     |  1.93     Ca    9.0        15 Oct 2021 07:38      PT/INR - ( 15 Oct 2021 07:38 )   PT: 25.1 sec;   INR: 2.19 ratio             CAPILLARY BLOOD GLUCOSE      POCT Blood Glucose.: 145 mg/dL (15 Oct 2021 07:59)  POCT Blood Glucose.: 160 mg/dL (14 Oct 2021 21:16)  POCT Blood Glucose.: 164 mg/dL (14 Oct 2021 16:27)  POCT Blood Glucose.: 182 mg/dL (14 Oct 2021 11:34)          RADIOLOGY & ADDITIONAL TESTS:    Personally reviewed.     Consultant(s) Notes Reviewed:  [x] YES  [ ] NO

## 2021-10-15 NOTE — PROGRESS NOTE ADULT - PROBLEM SELECTOR PLAN 1
- Patient readmitted for acute dyspnea, previously treated for COPD exacerbation, did not require O2 yesterday, denies SOB currently  - On admission, patient with sob, hypoxic requiring NRBM, CXR compared to previous one noted to have no significant changes, Pro BNP 37k from 12k unclear etiology potentially 2/2 to gradual fluid accumulation, Cr 1.9 from 1.3  - Entresto held due to CHEN, c/w hydralazine, Imdur, BB, statin - Patient readmitted for acute dyspnea, previously treated for COPD exacerbation, did not require O2 yesterday, denies SOB currently  - Pro BNP 37k from 12k unclear etiology potentially 2/2 to gradual fluid accumulation, Cr 1.9 from 1.3  - possibly due to volume overload  - Entresto held due to CHEN, c/w hydralazine, Imdur, BB, statin

## 2021-10-15 NOTE — PROGRESS NOTE ADULT - PROBLEM SELECTOR PLAN 8
- Patient has history of Hypertension on metoprolol, Entresto, hydralazine, Imdur at home, stable pressures___  - C/w metoprolol, hydralazine, and Imdur with parameters  - Hold Entresto due to CHEN  - DASH diet

## 2021-10-15 NOTE — PROGRESS NOTE ADULT - PROBLEM SELECTOR PROBLEM 4
Thrombocytopenia
VTE (venous thromboembolism)
Acute kidney injury superimposed on CKD

## 2021-10-15 NOTE — PROGRESS NOTE ADULT - PROBLEM SELECTOR PLAN 9
- Patient has hx of hypothyroidism on synthroid at home  - C/w synthroid 25mg qd
- Patient has hx of hypothyroidism on synthroid at home  - C/w synthroid 25mg qd
- Patient has hx of CAD on BB, Statin at home   - C/w home meds
- Patient has hx of hypothyroidism on synthroid at home  - C/w synthroid 25mg qd
- Patient has hx of CAD on BB, Statin at home   - C/w home meds
- Patient has hx of hypothyroidism on synthroid at home  - C/w synthroid 25mg qd
- Patient has hx of CAD on BB, Statin at home   - C/w home meds

## 2021-10-15 NOTE — PROGRESS NOTE ADULT - PROBLEM SELECTOR PROBLEM 5
Chronic obstructive pulmonary disease (COPD)
VTE (venous thromboembolism)
VTE (venous thromboembolism)
Chronic obstructive pulmonary disease (COPD)
Chronic obstructive pulmonary disease (COPD)
VTE (venous thromboembolism)
VTE (venous thromboembolism)

## 2021-10-15 NOTE — PROGRESS NOTE ADULT - PROBLEM SELECTOR PLAN 7
- Patient has hx of DM diet controlled at home, at target 140-180  - Started on HSS  - Fingerstick before meals and at bedtime  - DASH diet with consistent carb  - Target -180
- Patient has history of Hypertension on metoprolol, Entresto, hydralazine, Imdur at home , stable pressures  - C/w metoprolol, hydralazine and Imdur with parameters  - Hold Entresto due to CHEN  - DASH diet
- Patient has history of Hypertension on metoprolol, Entresto, hydralazine, Imdur at home , stable pressures  - C/w metoprolol, hydralazine and Imdur with parameters  - Hold Entresto due to CHEN  - DASH diet
- Patient has history of Hypertension on metoprolol, Entresto, hydralazine, Imdur at home   - C/w metoprolol, hydralazine and Imdur with parameters  - Hold Entresto due to CHEN  - DASH diet  - Monitor BP and adjust meds as needed
- Patient has hx of DM diet controlled at home, at target 140-180  - Started on HSS  - Fingerstick before meals and at bedtime  - DASH diet with consistent carb  - Target -180

## 2021-10-15 NOTE — PROGRESS NOTE ADULT - PROBLEM SELECTOR PROBLEM 2
Acute on chronic systolic congestive heart failure
Acute kidney injury superimposed on CKD
Acute on chronic systolic congestive heart failure

## 2021-10-15 NOTE — PROGRESS NOTE ADULT - PROBLEM SELECTOR PROBLEM 9
CAD (coronary artery disease)
Hypothyroidism
Hypothyroidism
CAD (coronary artery disease)
Hypothyroidism
Hypothyroidism
CAD (coronary artery disease)

## 2021-10-16 ENCOUNTER — TRANSCRIPTION ENCOUNTER (OUTPATIENT)
Age: 66
End: 2021-10-16

## 2021-10-16 VITALS
TEMPERATURE: 98 F | DIASTOLIC BLOOD PRESSURE: 81 MMHG | RESPIRATION RATE: 18 BRPM | OXYGEN SATURATION: 99 % | SYSTOLIC BLOOD PRESSURE: 125 MMHG | HEART RATE: 79 BPM

## 2021-10-16 DIAGNOSIS — M25.462 EFFUSION, LEFT KNEE: ICD-10-CM

## 2021-10-16 LAB
GLUCOSE BLDC GLUCOMTR-MCNC: 149 MG/DL — HIGH (ref 70–99)
GLUCOSE BLDC GLUCOMTR-MCNC: 160 MG/DL — HIGH (ref 70–99)

## 2021-10-16 PROCEDURE — 99233 SBSQ HOSP IP/OBS HIGH 50: CPT

## 2021-10-16 RX ORDER — ACETAMINOPHEN 500 MG
1000 TABLET ORAL ONCE
Refills: 0 | Status: DISCONTINUED | OUTPATIENT
Start: 2021-10-16 | End: 2021-10-16

## 2021-10-16 RX ORDER — LIDOCAINE 4 G/100G
1 CREAM TOPICAL
Qty: 0 | Refills: 0 | DISCHARGE

## 2021-10-16 RX ORDER — HYDROMORPHONE HYDROCHLORIDE 2 MG/ML
1 INJECTION INTRAMUSCULAR; INTRAVENOUS; SUBCUTANEOUS ONCE
Refills: 0 | Status: DISCONTINUED | OUTPATIENT
Start: 2021-10-16 | End: 2021-10-16

## 2021-10-16 RX ORDER — LIDOCAINE 4 G/100G
1 CREAM TOPICAL
Qty: 7 | Refills: 0
Start: 2021-10-16 | End: 2021-10-22

## 2021-10-16 RX ORDER — ACETAMINOPHEN 500 MG
650 TABLET ORAL ONCE
Refills: 0 | Status: DISCONTINUED | OUTPATIENT
Start: 2021-10-16 | End: 2021-10-16

## 2021-10-16 RX ORDER — ACETAMINOPHEN 500 MG
650 TABLET ORAL EVERY 4 HOURS
Refills: 0 | Status: DISCONTINUED | OUTPATIENT
Start: 2021-10-16 | End: 2021-10-16

## 2021-10-16 RX ORDER — ACETAMINOPHEN 500 MG
2 TABLET ORAL
Qty: 0 | Refills: 0 | DISCHARGE
Start: 2021-10-16

## 2021-10-16 RX ORDER — ACETAMINOPHEN 500 MG
650 TABLET ORAL ONCE
Refills: 0 | Status: COMPLETED | OUTPATIENT
Start: 2021-10-16 | End: 2021-10-16

## 2021-10-16 RX ORDER — LIDOCAINE 4 G/100G
1 CREAM TOPICAL ONCE
Refills: 0 | Status: COMPLETED | OUTPATIENT
Start: 2021-10-16 | End: 2021-10-16

## 2021-10-16 RX ADMIN — PANTOPRAZOLE SODIUM 40 MILLIGRAM(S): 20 TABLET, DELAYED RELEASE ORAL at 05:16

## 2021-10-16 RX ADMIN — Medication 650 MILLIGRAM(S): at 12:25

## 2021-10-16 RX ADMIN — ANASTROZOLE 1 MILLIGRAM(S): 1 TABLET ORAL at 12:25

## 2021-10-16 RX ADMIN — ARIPIPRAZOLE 2 MILLIGRAM(S): 15 TABLET ORAL at 12:25

## 2021-10-16 RX ADMIN — LIDOCAINE 1 PATCH: 4 CREAM TOPICAL at 12:25

## 2021-10-16 RX ADMIN — Medication 50 MILLIGRAM(S): at 05:16

## 2021-10-16 RX ADMIN — Medication 25 MICROGRAM(S): at 05:15

## 2021-10-16 RX ADMIN — Medication 25 MILLIGRAM(S): at 05:15

## 2021-10-16 RX ADMIN — Medication 1: at 07:55

## 2021-10-16 RX ADMIN — Medication 650 MILLIGRAM(S): at 07:15

## 2021-10-16 RX ADMIN — Medication 650 MILLIGRAM(S): at 06:16

## 2021-10-16 RX ADMIN — Medication 50 MILLIGRAM(S): at 14:09

## 2021-10-16 RX ADMIN — ISOSORBIDE MONONITRATE 30 MILLIGRAM(S): 60 TABLET, EXTENDED RELEASE ORAL at 14:09

## 2021-10-16 NOTE — CHART NOTE - NSCHARTNOTEFT_GEN_A_CORE
Patient has left knee swelling and pain. We ordered Tylenol 1000 mg IV STAT. Primary team can follow-up and reassess. Patient has left knee swelling and pain. We ordered Tylenol 650 mg PO STAT. Primary team can follow-up and reassess.

## 2021-10-16 NOTE — DISCHARGE NOTE NURSING/CASE MANAGEMENT/SOCIAL WORK - PATIENT PORTAL LINK FT
You can access the FollowMyHealth Patient Portal offered by [facility] by registering at the following website: [email or web address]. By joining [employer]’s FollowMyHealth portal, you will also be able to view your health information using other applications (apps) compatible with our system.

## 2021-10-21 ENCOUNTER — NON-APPOINTMENT (OUTPATIENT)
Age: 66
End: 2021-10-21

## 2021-10-26 PROCEDURE — 85025 COMPLETE CBC W/AUTO DIFF WBC: CPT

## 2021-10-26 PROCEDURE — 84300 ASSAY OF URINE SODIUM: CPT

## 2021-10-26 PROCEDURE — 36415 COLL VENOUS BLD VENIPUNCTURE: CPT

## 2021-10-26 PROCEDURE — 85610 PROTHROMBIN TIME: CPT

## 2021-10-26 PROCEDURE — 83735 ASSAY OF MAGNESIUM: CPT

## 2021-10-26 PROCEDURE — 0225U NFCT DS DNA&RNA 21 SARSCOV2: CPT

## 2021-10-26 PROCEDURE — 93005 ELECTROCARDIOGRAM TRACING: CPT

## 2021-10-26 PROCEDURE — 99285 EMERGENCY DEPT VISIT HI MDM: CPT

## 2021-10-26 PROCEDURE — 83880 ASSAY OF NATRIURETIC PEPTIDE: CPT

## 2021-10-26 PROCEDURE — 82962 GLUCOSE BLOOD TEST: CPT

## 2021-10-26 PROCEDURE — 83605 ASSAY OF LACTIC ACID: CPT

## 2021-10-26 PROCEDURE — 83935 ASSAY OF URINE OSMOLALITY: CPT

## 2021-10-26 PROCEDURE — 82570 ASSAY OF URINE CREATININE: CPT

## 2021-10-26 PROCEDURE — 82803 BLOOD GASES ANY COMBINATION: CPT

## 2021-10-26 PROCEDURE — 82550 ASSAY OF CK (CPK): CPT

## 2021-10-26 PROCEDURE — 86769 SARS-COV-2 COVID-19 ANTIBODY: CPT

## 2021-10-26 PROCEDURE — 85730 THROMBOPLASTIN TIME PARTIAL: CPT

## 2021-10-26 PROCEDURE — 94640 AIRWAY INHALATION TREATMENT: CPT

## 2021-10-26 PROCEDURE — 80048 BASIC METABOLIC PNL TOTAL CA: CPT

## 2021-10-26 PROCEDURE — 80053 COMPREHEN METABOLIC PANEL: CPT

## 2021-10-26 PROCEDURE — 84484 ASSAY OF TROPONIN QUANT: CPT

## 2021-10-26 PROCEDURE — 84100 ASSAY OF PHOSPHORUS: CPT

## 2021-10-26 PROCEDURE — 76775 US EXAM ABDO BACK WALL LIM: CPT

## 2021-10-26 PROCEDURE — 85027 COMPLETE CBC AUTOMATED: CPT

## 2021-10-26 PROCEDURE — 87635 SARS-COV-2 COVID-19 AMP PRB: CPT

## 2021-10-26 PROCEDURE — 70450 CT HEAD/BRAIN W/O DYE: CPT

## 2021-10-26 PROCEDURE — 84540 ASSAY OF URINE/UREA-N: CPT

## 2021-10-26 PROCEDURE — 71045 X-RAY EXAM CHEST 1 VIEW: CPT

## 2021-10-26 PROCEDURE — 82553 CREATINE MB FRACTION: CPT

## 2021-11-03 ENCOUNTER — EMERGENCY (EMERGENCY)
Facility: HOSPITAL | Age: 66
LOS: 1 days | Discharge: HOME | End: 2021-11-03
Attending: STUDENT IN AN ORGANIZED HEALTH CARE EDUCATION/TRAINING PROGRAM
Payer: MEDICARE

## 2021-11-03 VITALS
WEIGHT: 184.97 LBS | RESPIRATION RATE: 20 BRPM | TEMPERATURE: 99 F | OXYGEN SATURATION: 99 % | HEART RATE: 101 BPM | DIASTOLIC BLOOD PRESSURE: 78 MMHG | SYSTOLIC BLOOD PRESSURE: 135 MMHG | HEIGHT: 67 IN

## 2021-11-03 DIAGNOSIS — D25.9 LEIOMYOMA OF UTERUS, UNSPECIFIED: Chronic | ICD-10-CM

## 2021-11-03 DIAGNOSIS — Z98.89 OTHER SPECIFIED POSTPROCEDURAL STATES: Chronic | ICD-10-CM

## 2021-11-03 DIAGNOSIS — Z90.710 ACQUIRED ABSENCE OF BOTH CERVIX AND UTERUS: Chronic | ICD-10-CM

## 2021-11-03 DIAGNOSIS — C50.919 MALIGNANT NEOPLASM OF UNSPECIFIED SITE OF UNSPECIFIED FEMALE BREAST: Chronic | ICD-10-CM

## 2021-11-03 LAB
ALBUMIN SERPL ELPH-MCNC: 1.5 G/DL — LOW (ref 3.5–5)
ALP SERPL-CCNC: 156 U/L — HIGH (ref 40–120)
ALT FLD-CCNC: 9 U/L DA — LOW (ref 10–60)
ANION GAP SERPL CALC-SCNC: 3 MMOL/L — LOW (ref 5–17)
AST SERPL-CCNC: 25 U/L — SIGNIFICANT CHANGE UP (ref 10–40)
BASE EXCESS BLDV CALC-SCNC: 5 MMOL/L — SIGNIFICANT CHANGE UP
BASOPHILS # BLD AUTO: 0.13 K/UL — SIGNIFICANT CHANGE UP (ref 0–0.2)
BASOPHILS NFR BLD AUTO: 1 % — SIGNIFICANT CHANGE UP (ref 0–2)
BILIRUB SERPL-MCNC: 1.3 MG/DL — HIGH (ref 0.2–1.2)
BUN SERPL-MCNC: 18 MG/DL — SIGNIFICANT CHANGE UP (ref 7–18)
CALCIUM SERPL-MCNC: 8 MG/DL — LOW (ref 8.4–10.5)
CHLORIDE SERPL-SCNC: 133 MMOL/L — HIGH (ref 96–108)
CO2 SERPL-SCNC: 29 MMOL/L — SIGNIFICANT CHANGE UP (ref 22–31)
CREAT SERPL-MCNC: 1.15 MG/DL — SIGNIFICANT CHANGE UP (ref 0.5–1.3)
EOSINOPHIL # BLD AUTO: 0 K/UL — SIGNIFICANT CHANGE UP (ref 0–0.5)
EOSINOPHIL NFR BLD AUTO: 0 % — SIGNIFICANT CHANGE UP (ref 0–6)
GLUCOSE SERPL-MCNC: 118 MG/DL — HIGH (ref 70–99)
HCO3 BLDV-SCNC: 31 MMOL/L — HIGH (ref 22–29)
HCT VFR BLD CALC: 40.6 % — SIGNIFICANT CHANGE UP (ref 34.5–45)
HGB BLD-MCNC: 12.2 G/DL — SIGNIFICANT CHANGE UP (ref 11.5–15.5)
HOROWITZ INDEX BLDV+IHG-RTO: 21 — SIGNIFICANT CHANGE UP
HYPOCHROMIA BLD QL: SLIGHT — SIGNIFICANT CHANGE UP
HYPOSEGMENTATION: PRESENT — SIGNIFICANT CHANGE UP
LACTATE SERPL-SCNC: 1.1 MMOL/L — SIGNIFICANT CHANGE UP (ref 0.7–2)
LYMPHOCYTES # BLD AUTO: 0.26 K/UL — LOW (ref 1–3.3)
LYMPHOCYTES # BLD AUTO: 2 % — LOW (ref 13–44)
MANUAL SMEAR VERIFICATION: SIGNIFICANT CHANGE UP
MCHC RBC-ENTMCNC: 29 PG — SIGNIFICANT CHANGE UP (ref 27–34)
MCHC RBC-ENTMCNC: 30 GM/DL — LOW (ref 32–36)
MCV RBC AUTO: 96.4 FL — SIGNIFICANT CHANGE UP (ref 80–100)
MONOCYTES # BLD AUTO: 0.13 K/UL — SIGNIFICANT CHANGE UP (ref 0–0.9)
MONOCYTES NFR BLD AUTO: 1 % — LOW (ref 2–14)
NEUTROPHILS # BLD AUTO: 11.91 K/UL — HIGH (ref 1.8–7.4)
NEUTROPHILS NFR BLD AUTO: 89 % — HIGH (ref 43–77)
NEUTS BAND # BLD: 2 % — SIGNIFICANT CHANGE UP (ref 0–8)
NEUTS VAC BLD QL SMEAR: SLIGHT — SIGNIFICANT CHANGE UP
NRBC # BLD: 0 /100 — SIGNIFICANT CHANGE UP (ref 0–0)
NT-PROBNP SERPL-SCNC: HIGH PG/ML (ref 0–125)
OVALOCYTES BLD QL SMEAR: SLIGHT — SIGNIFICANT CHANGE UP
PCO2 BLDV: 48 MMHG — HIGH (ref 39–42)
PH BLDV: 7.42 — SIGNIFICANT CHANGE UP (ref 7.32–7.43)
PLAT MORPH BLD: NORMAL — SIGNIFICANT CHANGE UP
PLATELET # BLD AUTO: 203 K/UL — SIGNIFICANT CHANGE UP (ref 150–400)
PO2 BLDV: 145 MMHG — SIGNIFICANT CHANGE UP
POIKILOCYTOSIS BLD QL AUTO: SLIGHT — SIGNIFICANT CHANGE UP
POTASSIUM SERPL-MCNC: 3 MMOL/L — LOW (ref 3.5–5.3)
POTASSIUM SERPL-SCNC: 3 MMOL/L — LOW (ref 3.5–5.3)
PROT SERPL-MCNC: 6.5 G/DL — SIGNIFICANT CHANGE UP (ref 6–8.3)
RBC # BLD: 4.21 M/UL — SIGNIFICANT CHANGE UP (ref 3.8–5.2)
RBC # FLD: 17.7 % — HIGH (ref 10.3–14.5)
RBC BLD AUTO: ABNORMAL
SAO2 % BLDV: 100 % — SIGNIFICANT CHANGE UP
SARS-COV-2 RNA SPEC QL NAA+PROBE: SIGNIFICANT CHANGE UP
SODIUM SERPL-SCNC: 165 MMOL/L — CRITICAL HIGH (ref 135–145)
TROPONIN I, HIGH SENSITIVITY RESULT: 2127 NG/L — HIGH
VARIANT LYMPHS # BLD: 5 % — SIGNIFICANT CHANGE UP (ref 0–6)
WBC # BLD: 13.09 K/UL — HIGH (ref 3.8–10.5)
WBC # FLD AUTO: 13.09 K/UL — HIGH (ref 3.8–10.5)

## 2021-11-03 PROCEDURE — 82962 GLUCOSE BLOOD TEST: CPT

## 2021-11-03 PROCEDURE — 99284 EMERGENCY DEPT VISIT MOD MDM: CPT | Mod: CS

## 2021-11-03 PROCEDURE — 71045 X-RAY EXAM CHEST 1 VIEW: CPT | Mod: 26

## 2021-11-03 PROCEDURE — 87635 SARS-COV-2 COVID-19 AMP PRB: CPT

## 2021-11-03 PROCEDURE — 87150 DNA/RNA AMPLIFIED PROBE: CPT

## 2021-11-03 PROCEDURE — 93010 ELECTROCARDIOGRAM REPORT: CPT

## 2021-11-03 PROCEDURE — 82803 BLOOD GASES ANY COMBINATION: CPT

## 2021-11-03 PROCEDURE — 83880 ASSAY OF NATRIURETIC PEPTIDE: CPT

## 2021-11-03 PROCEDURE — 84484 ASSAY OF TROPONIN QUANT: CPT

## 2021-11-03 PROCEDURE — 36415 COLL VENOUS BLD VENIPUNCTURE: CPT

## 2021-11-03 PROCEDURE — 93005 ELECTROCARDIOGRAM TRACING: CPT

## 2021-11-03 PROCEDURE — 87186 SC STD MICRODIL/AGAR DIL: CPT

## 2021-11-03 PROCEDURE — 87077 CULTURE AEROBIC IDENTIFY: CPT

## 2021-11-03 PROCEDURE — 87040 BLOOD CULTURE FOR BACTERIA: CPT

## 2021-11-03 PROCEDURE — 80053 COMPREHEN METABOLIC PANEL: CPT

## 2021-11-03 PROCEDURE — 83605 ASSAY OF LACTIC ACID: CPT

## 2021-11-03 PROCEDURE — 99284 EMERGENCY DEPT VISIT MOD MDM: CPT | Mod: 25

## 2021-11-03 PROCEDURE — 85025 COMPLETE CBC W/AUTO DIFF WBC: CPT

## 2021-11-03 PROCEDURE — 71045 X-RAY EXAM CHEST 1 VIEW: CPT

## 2021-11-03 RX ORDER — SODIUM CHLORIDE 9 MG/ML
1000 INJECTION INTRAMUSCULAR; INTRAVENOUS; SUBCUTANEOUS ONCE
Refills: 0 | Status: DISCONTINUED | OUTPATIENT
Start: 2021-11-03 | End: 2021-11-03

## 2021-11-03 RX ORDER — POTASSIUM CHLORIDE 20 MEQ
20 PACKET (EA) ORAL
Refills: 0 | Status: DISCONTINUED | OUTPATIENT
Start: 2021-11-03 | End: 2021-11-03

## 2021-11-03 NOTE — ED PROVIDER NOTE - PATIENT PORTAL LINK FT
You can access the FollowMyHealth Patient Portal offered by [facility] by registering at the following website: [email or web address]. By joining [employer]’s FollowMyHealth portal, you will also be able to view your health information using other applications (apps) compatible with our system.

## 2021-11-03 NOTE — ED PROVIDER NOTE - CLINICAL SUMMARY MEDICAL DECISION MAKING FREE TEXT BOX
66 year old F w/ PMhx of CAD, T2DM (last A1c 6.4%), Hypothyroidism, HTN, HFrEF, cardiomyopathy (EF 25% on 9/16/21, s/p AICD (81% battery) ), COPD (no prior intubations), CVA, DVT (L leg 1994)/PE (s/p IVC filter 9/2020, and repeat saddle PE at [facility] 8/21-9/3 now on Coumadin), infrarenal AAA, cerebral aneurysm, atypical Depression, R breast cancer (s/p chemo/XRT), Class I Obesity, CKD3-a, brought in for shortness of breath, not In extremus currently.  Spoke to SW to clarify - pt is confirmed comfort care. I was unable to reach HCP Jaki.  Patient's labs showed several abnormalities (hyeprnatremia, hyponatremia, troponinemia) but given pt's GOCs, will not treat her for these findings.

## 2021-11-03 NOTE — ED ADULT NURSE NOTE - NS PRO AD PATIENT TYPE ON CHART
Health Care Proxy (HCP)/Do Not Resuscitate (DNR)/Medical Orders for Life-Sustaining Treatment (MOLST)
[FreeTextEntry1] : Patient does not have any symptoms \par Tick will be sent for analysis \par Will contact patient with results

## 2021-11-03 NOTE — ED ADULT NURSE NOTE - OBJECTIVE STATEMENT
BIB EMS for SOB x today, Patient noted lethargic, none verbal at baseline. placed on cardiac monitor and N/C 4 lit/min.

## 2021-11-03 NOTE — ED PROVIDER NOTE - NSICDXPASTSURGICALHX_GEN_ALL_CORE_FT
PAST SURGICAL HISTORY:  Breast cancer infusaport insertion- 2014- left chest    Fibroid uterus sp hystrectomy 1196    [place] filter in place 1994    S/P hysterectomy 1994    S/P lumpectomy, right breast 2014, revision of position- 2015

## 2021-11-03 NOTE — ED PROVIDER NOTE - OBJECTIVE STATEMENT
66 year old F w/ PMhx of CAD, T2DM (last A1c 6.4%), Hypothyroidism, HTN, HFrEF, cardiomyopathy (EF 25% on 9/16/21, s/p AICD (81% battery) ), COPD (no prior intubations), CVA, DVT (L leg 1994)/PE (s/p IVC filter 9/2020, and repeat saddle PE at [facility] 8/21-9/3 now on Coumadin), infrarenal AAA, cerebral aneurysm, atypical Depression, R breast cancer (s/p chemo/XRT), Class I Obesity, CKD3-A - now on [facility] as of discharge from hospital in Oct 2021, brought in for shortness of breath earlier today. Now on home O2 4L, not appearing SOB. History limited as pt is nonverbal and HHA is not at bedside. Attempted to reach HCP Jaki without response.

## 2021-11-04 VITALS
SYSTOLIC BLOOD PRESSURE: 126 MMHG | DIASTOLIC BLOOD PRESSURE: 77 MMHG | HEART RATE: 95 BPM | RESPIRATION RATE: 18 BRPM | OXYGEN SATURATION: 100 %

## 2021-11-04 LAB
-  K. PNEUMONIAE GROUP: SIGNIFICANT CHANGE UP
GRAM STN FLD: SIGNIFICANT CHANGE UP
METHOD TYPE: SIGNIFICANT CHANGE UP
SPECIMEN SOURCE: SIGNIFICANT CHANGE UP
SPECIMEN SOURCE: SIGNIFICANT CHANGE UP

## 2021-11-04 NOTE — CHART NOTE - NSCHARTNOTEFT_GEN_A_CORE
Consult for clarification of hospice status.    Pt is a 66year old female who is enrolled in the [facility] read[facility] program. Pt was brought to the ED for SOB. Pt was seen at bedside, she appeared alert but non verbal. Ebenezer reached out to Aline ( [phone]), [facility] Hosp[facility] liaison,  who confirmed that Pt is known to [facility]. She reports that Pt's emergency contact / HCP Jaki, ([phone])  has been refusing the nurse to administer morphine to Pt. Aline was notified that Pt was d/c ready and she would continue to be on 02 4L as per Physician. Ebenezer outreached Jaki (HCP) and Sil Pt's cousin ( [phone]) to notify them  of Pt's d/c to home with [facility]. Message and call back number was left on their voice mails respectively . Also, ebenezer outreached Pt's aide, Radha Fraser at () informing her of Pt's d/c and requesting her to pick Pt up from the ED. Radha agrees to pick Pt up from the ED.     Ebenezer received a call back from Jaki, she was informed that Pt was discharge ready to home and would continue with [facility] hosp[facility] Care service. Jaki reports that she lives in [place] and she has not seen the Pt in months/ years. Jaki was encouraged to reach out to the  ED physician re clinical concerns.      At  about 11pm a call was received from Radha who reported that another aide, Ambar ( [phone] ) would  be picking Pt up. Ebenezer outreached Ambar who confirmed arrangement. An ambulance was arranged by the ED  .     Dr Mcmanus and ED physician were updated.

## 2021-11-04 NOTE — CHART NOTE - NSCHARTNOTEFT_GEN_A_CORE
Consult for clarification of hospice status.    Pt is a 66year old female who is enrolled in the [facility]. Pt was brought to the ED for SOB. Pt was seen at bedside, she appeared alert but non verbal. Ebenezer reached out to Aline ( [phone]), S Hospice liaison,  who confirmed that Pt is known to [facility]. She reports that Pt's emergency contact / HCP Jaki, ([phone])  has been refusing the nurse to administer morphine to Pt. Aline was notified that Pt was d/c ready and she would continue to be on 02 4L as per Physician. Ebenezer outreached Jaki (HCP) and Sil Pt's cousin ( [phone]) to notify them  of Pt's d/c to home with [facility]. Message and call back number was left on their voice mails respectively . Also, ebenezer outreached Pt's aide, Radha Fraser at () informing her of Pt's d/c and requesting her to pick Pt up from the ED. Radha agrees to pick Pt up from the ED.     Ebenezer received a call back from Jaki, she was informed that Pt was discharge ready to home and would continue with [facility] hosp[facility] Care service. Jaki reports that she lives in [place] and she has not seen the Pt in months/ years. Jaki was encouraged to reach out to the  ED physician re clinical concerns.      At  about 11pm a call was received by Radah who reported that another aide, Ambar ( [phone] ) would  be picking Pt up. Ebenezer outreached Ambar who confirmed arrangement. An ambulance was arranged by the ED  .     Dr Mcmanus and ED physician were updated.

## 2021-11-04 NOTE — ED POST DISCHARGE NOTE - ADDITIONAL DOCUMENTATION
spoke to Aline at [facility]  and informed her of the blood culture  she will reach out to the hospice team   report faxed to

## 2021-11-05 NOTE — PHARMACOTHERAPY INTERVENTION NOTE - COMMENTS
[facility] came positive for Klebsiella; no antibiotics on board; not able to find the pt; pt was transferred, record not updated

## 2021-11-06 LAB
-  AMIKACIN: SIGNIFICANT CHANGE UP
-  AMPICILLIN/SULBACTAM: SIGNIFICANT CHANGE UP
-  AMPICILLIN: SIGNIFICANT CHANGE UP
-  AZTREONAM: SIGNIFICANT CHANGE UP
-  CEFAZOLIN: SIGNIFICANT CHANGE UP
-  CEFEPIME: SIGNIFICANT CHANGE UP
-  CEFOXITIN: SIGNIFICANT CHANGE UP
-  CEFTRIAXONE: SIGNIFICANT CHANGE UP
-  CIPROFLOXACIN: SIGNIFICANT CHANGE UP
-  ERTAPENEM: SIGNIFICANT CHANGE UP
-  GENTAMICIN: SIGNIFICANT CHANGE UP
-  IMIPENEM: SIGNIFICANT CHANGE UP
-  LEVOFLOXACIN: SIGNIFICANT CHANGE UP
-  MEROPENEM: SIGNIFICANT CHANGE UP
-  PIPERACILLIN/TAZOBACTAM: SIGNIFICANT CHANGE UP
-  TOBRAMYCIN: SIGNIFICANT CHANGE UP
-  TRIMETHOPRIM/SULFAMETHOXAZOLE: SIGNIFICANT CHANGE UP
CULTURE RESULTS: SIGNIFICANT CHANGE UP
CULTURE RESULTS: SIGNIFICANT CHANGE UP
METHOD TYPE: SIGNIFICANT CHANGE UP
ORGANISM # SPEC MICROSCOPIC CNT: SIGNIFICANT CHANGE UP
SPECIMEN SOURCE: SIGNIFICANT CHANGE UP
SPECIMEN SOURCE: SIGNIFICANT CHANGE UP
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
## 2021-11-11 NOTE — PHYSICAL THERAPY INITIAL EVALUATION ADULT - HEALTH SCREEN CRITERIA
Results for orders placed or performed in visit on 11/11/21   AMB POC HEMOGLOBIN (HGB)   Result Value Ref Range    Hemoglobin (POC) 13.7 G/DL yes

## 2021-12-10 NOTE — PROGRESS NOTE ADULT - PROBLEM/PLAN-5
regular rate and rhythm , S1, S2 normal ,  , no murmurs, rubs, gallops , no edema
DISPLAY PLAN FREE TEXT

## 2021-12-20 ENCOUNTER — APPOINTMENT (OUTPATIENT)
Dept: ELECTROPHYSIOLOGY | Facility: CLINIC | Age: 66
End: 2021-12-20

## 2021-12-30 PROCEDURE — 83735 ASSAY OF MAGNESIUM: CPT

## 2021-12-30 PROCEDURE — 85025 COMPLETE CBC W/AUTO DIFF WBC: CPT

## 2021-12-30 PROCEDURE — 84300 ASSAY OF URINE SODIUM: CPT

## 2021-12-30 PROCEDURE — 84540 ASSAY OF URINE/UREA-N: CPT

## 2021-12-30 PROCEDURE — 71045 X-RAY EXAM CHEST 1 VIEW: CPT

## 2021-12-30 PROCEDURE — 93005 ELECTROCARDIOGRAM TRACING: CPT

## 2021-12-30 PROCEDURE — 86769 SARS-COV-2 COVID-19 ANTIBODY: CPT

## 2021-12-30 PROCEDURE — 84484 ASSAY OF TROPONIN QUANT: CPT

## 2021-12-30 PROCEDURE — 99285 EMERGENCY DEPT VISIT HI MDM: CPT | Mod: 25

## 2021-12-30 PROCEDURE — 83935 ASSAY OF URINE OSMOLALITY: CPT

## 2021-12-30 PROCEDURE — 82803 BLOOD GASES ANY COMBINATION: CPT

## 2021-12-30 PROCEDURE — 97110 THERAPEUTIC EXERCISES: CPT

## 2021-12-30 PROCEDURE — 94660 CPAP INITIATION&MGMT: CPT

## 2021-12-30 PROCEDURE — 86900 BLOOD TYPING SEROLOGIC ABO: CPT

## 2021-12-30 PROCEDURE — 87086 URINE CULTURE/COLONY COUNT: CPT

## 2021-12-30 PROCEDURE — 85610 PROTHROMBIN TIME: CPT

## 2021-12-30 PROCEDURE — 82962 GLUCOSE BLOOD TEST: CPT

## 2021-12-30 PROCEDURE — 96374 THER/PROPH/DIAG INJ IV PUSH: CPT

## 2021-12-30 PROCEDURE — 86901 BLOOD TYPING SEROLOGIC RH(D): CPT

## 2021-12-30 PROCEDURE — 82570 ASSAY OF URINE CREATININE: CPT

## 2021-12-30 PROCEDURE — 80048 BASIC METABOLIC PNL TOTAL CA: CPT

## 2021-12-30 PROCEDURE — 96375 TX/PRO/DX INJ NEW DRUG ADDON: CPT

## 2021-12-30 PROCEDURE — 81001 URINALYSIS AUTO W/SCOPE: CPT

## 2021-12-30 PROCEDURE — 87635 SARS-COV-2 COVID-19 AMP PRB: CPT

## 2021-12-30 PROCEDURE — 83036 HEMOGLOBIN GLYCOSYLATED A1C: CPT

## 2021-12-30 PROCEDURE — 80053 COMPREHEN METABOLIC PANEL: CPT

## 2021-12-30 PROCEDURE — 86850 RBC ANTIBODY SCREEN: CPT

## 2021-12-30 PROCEDURE — 83880 ASSAY OF NATRIURETIC PEPTIDE: CPT

## 2021-12-30 PROCEDURE — 87040 BLOOD CULTURE FOR BACTERIA: CPT

## 2021-12-30 PROCEDURE — 84100 ASSAY OF PHOSPHORUS: CPT

## 2021-12-30 PROCEDURE — 83605 ASSAY OF LACTIC ACID: CPT

## 2021-12-30 PROCEDURE — 85027 COMPLETE CBC AUTOMATED: CPT

## 2021-12-30 PROCEDURE — 85730 THROMBOPLASTIN TIME PARTIAL: CPT

## 2021-12-30 PROCEDURE — 36415 COLL VENOUS BLD VENIPUNCTURE: CPT

## 2021-12-30 PROCEDURE — 97530 THERAPEUTIC ACTIVITIES: CPT

## 2021-12-30 PROCEDURE — 97162 PT EVAL MOD COMPLEX 30 MIN: CPT

## 2022-02-16 NOTE — PHYSICAL THERAPY INITIAL EVALUATION ADULT - PATIENT/FAMILY AGREES WITH PLAN
yes You can access the FollowMyHealth Patient Portal offered by [facility] by registering at the following website: [email or web address]. By joining [employer]’s FollowMyHealth portal, you will also be able to view your health information using other applications (apps) compatible with our system.

## 2022-03-18 NOTE — ED ADULT NURSE NOTE - OBJECTIVE STATEMENT
[Calcium (dietary)] : calcium from their regular diet [Regular Dental Follow-Up] : regular dental follow-up [FreeTextEntry1] : No significant health change. No interval surgeries, fractures, health change, or change in medications.\par \par Pt has a long h/o GERD treated with proton pump inhibitors. Because of this a BMD was performed which showed T score of -1.1 in the spine and -1.6 in the femoral neck. No h/o fx. She has no other unusual risk factors for osteoporosis. No fhx of osteoporosis. Menopausal at age 48 with no plan for HRT. Occ hot flashes. She has a diet rich in dairy foods which has increased since being told of low bone density. Repeat BMD in 2014 and 2016 are stable with osteopenia in the hip and spine and normal density in the proximal radius. BMD 11/2018 indicated sl decrease in the hip and spine though this is still consistent with osteopenia. BMD 1/2020 indicates worsening osteopenia in spine, stable osteopenia in hip although total hip is drifting down, and normal proximal radius although this is trending downwards also. Pt started ibandronate 2/2020 but d/c after one dose due to severe aches and pain. \par Restarted medicine which she is taking correctly and tolerating well.  She does have some upper GI symptoms him not related to timing of dose.\par  . Last DDS within past 6 months. No ONJ. \par History of subcentimeter thyroid nodule.  Low no local neck changes.  No obvious symptoms of hyperthyroidism or hypothyroidism other than fatigue which is stable.\par  [Amenorrhea] : no past or present history of amenorrhea [Disordered Eating] : no past or present history of disordered eating [Taking Steroids] : no past or present history of taking steroids [Kidney Stones] : no history of kidney stones [High Fall Risk] : no fall risk [Family History of Osteoporosis] : no family history of osteoporosis [Family History of Breast Cancer] : no family history of breast cancer [Family History of Hip Fracture] : no family history of hip fracture [Hyperparathyroidism] : no hyperparathyroidism [History of Radiation Therapy] : no history of radiation therapy [History of Blood Clots] : no history of blood clots pt is here c/o sob -

## 2022-08-02 NOTE — ED PROVIDER NOTE - COVID-19 RESULT DATE/TIME
Occupational Therapy  Visit Type: treatment  Precautions:  Medical precautions:  fall risk; standard precautions.    Lines:     Basic: indwelling urinary catheter and capped IV (ostomy)      Lines in chart and on patient reviewed, precautions maintained throughout session.  Vision:     Current vision: wears glasses only for reading (Pt reports that she needs new reading glasses- has noticed changes in vision recently)  Safety Measures: bed alarm, chair alarm and bed rails (call light in reach)    SUBJECTIVE  Patient agreed to participate in therapy this date.  \"They said I can't get a knee replacement until I am 59 years old\" pt states    Patient / Family Goal: return to previous functional status    OBJECTIVE   Level of consciousness: alert    Oriented to person, place, time and situation     Arousal alertness: appropriate responses to stimuli    Affect/Behavior: alert, calm and cooperative  Patient activity tolerance: 1 to 1 activity to rest  Functional Communication/Cognition    Overall status:  Within functional limits    Commands: follows one step commands with increased time.  Bed mobility:      Repositioning in bed: supervision    Supine to long sit: supervision    Supine to sit: supervision  Training completed:      Education details: patient safety    Supervision for safety due to weakness       Transfers:    Assistive devices: gait belt and 2-wheeled walker    Sit to stand: contact guard/touching/steadying assist    Stand to sit: contact guard/touching/steadying assist    Training completed:    Tasks: sit to stand, stand to sit and toilet    Education details: patient safety    CGA for safety due to weakness  Functional Ambulation:    Assistance:contact guard/touching/steadying assist   Assistive device:gait belt and 2-wheeled walker    Distance (ft): 15;25      Education details: patient safety  Details/Comments: CGA for safety due to R knee pain and weakness. Patient's knee derian once towards end of  session with mod assist from writer for steadying support while patient compensates onto LLE. Patient close to chair when this had happened and able to continue ambulation a few steps to chair.   Activities of Daily Living (ADLs):  Lower Body Dressing:     Footwear assistance: supervision    Footwear position: edge of bed    Assist needed for: don/doff left sock and don/doff right sock  Toilet transfer:     Assist: moderate assist    Device: gait belt and 2-wheeled walker    Equipment: grab bar use  Activities of daily living training:   Patient completes toilet transfer onto standard height toilet while requiring mod assist to stand for force production due to weakness and RLE pain. Able to don/doff socks sitting edge of bed.       Interventions    Training provided: ADL training, activity tolerance, functional ambulation, transfer training, safety training, compensatory techniques and body mechanics  Skilled input: verbal instruction/cues  Verbal Consent: Writer verbally educated and received verbal consent for hand placement, positioning of patient, and techniques to be performed today from patient for therapist position for techniques as described above and how they are pertinent to the patient's plan of care.        ASSESSMENT  Impairments: activity tolerance, balance, bed mobility, coordination/proprioception, decreased insight into deficits and strength  Functional Limitations: bed mobility, functional mobility, eating, grooming, bathing, toileting, functional transfers, dressing and showering    Occupational therapy treatment session focused on progressing functional mobility, light ADL and discharge planning. Patient is currently functioning at a supervision to mod assist level for ADL's and supervision to CGA for functional transfers/mobility using 2ww. One knee buckle present during session requiring mod assist to correct. Patient very motivated to improve functionally and is agreeable for IRP consult  however understands that if she does not get accepted she will plan to discharge to a [facility] when medically appropriate.      Recommended Consults: OT: Neurology consult  Discharge Recommendations:  Recommendation for Discharge Location: OT WI: Intensive therapy/oversight of PMR physician      PT/OT Mobility Equipment for Discharge: assess need for 2ww  PT/OT ADL Equipment for Discharge: Pt has shower chair. continue to assess  OT Identified Barriers to Discharge: weakness, tremors, balance/safety, fall risk, and medical status       Progress: improving as expected    • Skilled therapy is required to address these limitations in attempt to maximize the patient's independence.    Patient at End of Session:   Location: in chair  Safety measures: alarm system in place/re-engaged, lines intact and call light within reach  Handoff to: nurse    PLAN  Suggestions for next session as indicated: Standing cares at sink, progress home setting transfers/mobility, BUE AROM exercises      Frequency Comments: M-F 8/2                Agreement to plan and goals: patient agrees with goals and treatment plan      GOALS  Review Date: 8/6/2022  Long Term Goals: (to be met by time of discharge from hospital)  Grooming: Patient will complete grooming tasks modified independent.  Upper body dressing: Patient will complete upper body dressing modified independent.  Lower body dressing: Patient will complete lower body dressing modified independent.  Toileting: Patient will complete toileting modified independent.  Bathing: Patient will complete bathingmodified independent Toilet transfer: Patient will complete toilet transfer with modified independent.   Tub/shower transfer: Patient will complete tub/shower transfer with modified independent.   Home setting transfer: Patient will complete home setting transfers with modified independent.   Item retrieval: Patient will complete item retrieval modified independent.         Documented in the  chart in the following areas: Pain. Assessment. Plan.      Therapy procedure time and total treatment time can be found documented on the Time Entry flowsheet   31-Aug-2021 05:35

## 2022-08-08 NOTE — ED PROVIDER NOTE - NS ED MD EM SELECTION
[address] Comprehensive Normal vision: sees adequately in most situations; can see medication labels, newsprint

## 2022-08-17 NOTE — DISCHARGE NOTE PROVIDER - CARE PROVIDER_API CALL
[facility] CARDIOTHORACIC SURGERY CONSULT     Danielito Chu MRN# [id number]   YOB: 1957 Age: 61 year old      Date of Admission:  (Not on file)    Primary care provider: Bashir Hines         Chief Complaint:   Heart failure - evaluate for LVAD     History of Present Illness: Danielito Chu is a 61 year old male with chronic heart failure.  He was recently admitted to the hospital and had change of his medications.  Since discharge he has continued to feel weak, short of breath, and unsteady on his feet.  He is having difficulty managing his diuretic.  He is very concerned about an upcoming trip to [place].    He is seen today alone in clinic.           Assessment and Plan:   Danielito Chu is a 61 year old male with heart failure  1) Recommend admission to the hospital for right heart cath and medical optimization  2) We briefly discussed LVAD implant, anticipated operative course, anticipated post-operative course, risks, benefits and alternatives.   3) Please call with questions or concerns.     Thank you for the opportunity to participate in the care of this patient.           Andrei Silva MD  Cardiothoracic Surgery  [phone]                Past Medical History:     Past Medical History:   Diagnosis Date     Acute systolic congestive heart failure (H) 4/5/2017     Diabetes (H)      HTN (hypertension)      Hyperlipidemia      Mitral regurgitation      Nocturnal oxygen desaturation 3/29/2018     Nonischemic cardiomyopathy (H) 4/5/2017     SHIRLEY (obstructive sleep apnea)      Pacemaker 04/07/2017    ICD 4/7/17               Past Surgical History:     Past Surgical History:   Procedure Laterality Date     IMPLANT IMPLANTABLE CARDIOVERTER DEFIBRILLATOR                 Social History:     Social History     Social History     Marital status: Single     Spouse name: N/A     Number of children: N/A     Years of education: N/A     Occupational History     Not on file.     Social History  Main Topics     Smoking status: Former Smoker     Smokeless tobacco: Never Used      Comment: quit 3/2017     Alcohol use Not on file      Comment: social     Drug use: Not on file     Sexual activity: Not on file     Other Topics Concern     Not on file     Social History Narrative               Family History:     Family History   Problem Relation Age of Onset     Heart Failure Father       at age 86     Heart Failure Paternal Uncle       at 66      Myocardial Infarction Paternal Uncle       at age 62     Coronary Artery Disease Mother       during CABG at age 41              Allergies:    No Known Allergies            Medications:     No current facility-administered medications for this visit.      Current Outpatient Prescriptions   Medication     [DISCONTINUED] torsemide (DEMADEX) 20 MG tablet     Facility-Administered Medications Ordered in Other Visits   Medication     artificial saliva (BIOTENE DRY MOUTHWASH) liquid 10 mL     [START ON 2018] aspirin EC tablet 81 mg     [START ON 2018] atorvastatin (LIPITOR) tablet 10 mg     [START ON 2018] buPROPion (WELLBUTRIN XL) 24 hr tablet 150 mg     Continuing ACE inhibitor/ARB/ARNI from home medication list OR ACE inhibitor/ARB/ARNI order already placed during this visit     Continuing beta blocker from home medication list OR beta blocker order already placed during this visit     [START ON 2018] digoxin (LANOXIN) tablet 250 mcg     HOLD nitroGLYcerin IF     [START ON 2018] lisinopril (PRINIVIL/ZESTRIL) tablet 5 mg     [START ON 2018] thiamine tablet 100 mg             Review of Systems:   A 10 point ROS was performed and is negative other than HPI.          Physical Exam:      Temp:  [97.8  F (36.6  C)] 97.8  F (36.6  C)  Pulse:  [] 100  Heart Rate:  [89] 89  BP: (102-117)/(78) 117/78  SpO2:  [98 %] 98 %    Gen: NAD, resting comfortably in bed  Lungs: CTAB, non-labored breathing  CV: regular rhythm, normal  rate  Abd: soft, not tender, not distended  Ext: motor, sensation, pulses intact  Neuro: AOx3    Labs:  Lab Results   Component Value Date    WBC 7.7 11/15/2018     Lab Results   Component Value Date    RBC 5.13 11/15/2018     Lab Results   Component Value Date    HGB 16.2 11/15/2018     Lab Results   Component Value Date    HCT 49.1 11/15/2018     No components found for: MCT  Lab Results   Component Value Date    MCV 96 11/15/2018     Lab Results   Component Value Date    MCH 31.6 11/15/2018     Lab Results   Component Value Date    MCHC 33.0 11/15/2018     Lab Results   Component Value Date    RDW 16.2 11/15/2018     Lab Results   Component Value Date     11/15/2018     Last Comprehensive Metabolic Panel:  Sodium   Date Value Ref Range Status   11/15/2018 136 133 - 144 mmol/L Final     Potassium   Date Value Ref Range Status   11/15/2018 4.1 3.4 - 5.3 mmol/L Final     Chloride   Date Value Ref Range Status   11/15/2018 104 94 - 109 mmol/L Final     Carbon Dioxide   Date Value Ref Range Status   11/15/2018 22 20 - 32 mmol/L Final     Anion Gap   Date Value Ref Range Status   11/15/2018 10 3 - 14 mmol/L Final     Glucose   Date Value Ref Range Status   11/15/2018 146 (H) 70 - 99 mg/dL Final     Urea Nitrogen   Date Value Ref Range Status   11/15/2018 18 7 - 30 mg/dL Final     Creatinine   Date Value Ref Range Status   11/15/2018 1.01 0.66 - 1.25 mg/dL Final     GFR Estimate   Date Value Ref Range Status   11/15/2018 75 >60 mL/min/1.7m2 Final     Comment:     Non  GFR Calc     Calcium   Date Value Ref Range Status   11/15/2018 8.1 (L) 8.5 - 10.1 mg/dL Final       Imaging:  Transthoracic echocardiogram (10/19/18):  Interpretation Summary  Severe left ventricular dilation is present. Severe diffuse hypokinesis is  present. Severely reduced left ventricular systolic function, EF is 18%.  Global right ventricular function is mildly reduced.  Mild aortic stenosis is present, mean gradient across  the aortic valve is 10  mmHg. Mild aortic insufficiency is present. Mild to moderate tricuspid  insufficiency is present.  Estimated pulmonary artery systolic pressure is 29 mmHg plus right atrial  pressure. Estimated mean right atrial pressure is 15 mmHg (significantly  elevated).  No pericardial effusion is present.  _____________________________________________________________________________  __        Left Ventricle  Severe left ventricular dilation is present. EDVi = 151 mL/m2. Severe diffuse  hypokinesis is present. Severely reduced left ventricular systolic function,  EF is 18%.     Right Ventricle  Mild right ventricular dilation is present. Global right ventricular function  is mildly reduced. A pacemaker lead is noted in the right ventricle.     Atria  Severe biatrial enlargement is present.     Mitral Valve  Mild mitral annular calcification is present. Mild mitral insufficiency is  present.        Aortic Valve  Mild aortic insufficiency is present. Mild aortic stenosis is present. The  peak aortic velocity is 2.1 m/sec. The mean gradient across the aortic valve  is10 mmHg.     Tricuspid Valve  Mild to moderate tricuspid insufficiency is present. Estimated pulmonary  artery systolic pressure is 29 mmHg plus right atrial pressure.     Pulmonic Valve  The pulmonic valve is normal.     Vessels  Dilation of the inferior vena cava is present with abnormal respiratory  variation in diameter. Estimated mean right atrial pressure is 15 mmHg  (significantly elevated).     Pericardium  No pericardial effusion is present.        Compared to Previous Study  This study was compared with the study from 10.17.18, LV function is  unchanged .  _____________________________________________________________________________  __  MMode/2D Measurements & Calculations     LA Volume (BP): 97.7 ml  LA Volume Index (BP): 47.9 ml/m2        Doppler Measurements & Calculations  MV E max jose: 196.5 cm/sec  MV A max jose: 63.1  cm/sec  MV E/A: 3.1  MV dec slope: 701.0 cm/sec2  Ao V2 max: 209.0 cm/sec  Ao max P.0 mmHg  Ao V2 mean: 151.0 cm/sec  Ao mean PG: 10.0 mmHg  Ao V2 VTI: 35.0 cm  AI P1/2t: 342.1 msec  LV V1 max P.2 mmHg  LV V1 max: 73.4 cm/sec  LV V1 VTI: 11.6 cm  TR max amadeo: 230.8 cm/sec  TR max P.5 mmHg     AV Amadeo Ratio (DI): 0.35  E/E' av.9  Lateral E/e': 40.3  Medial E/e': 41.5       Coronary angiogram (4/3/17):  CORONARY ANGIOGRAM:  1. Both coronary arteries arise from their respective cusps normally.  2. Right dominant.  3. Left Main (LM) large caliber vessel with no angiographic evidence of disease.  4. Left Anterior Descending (LAD): is a type 3 vessel which gives rise to septal perforates and four diagonal vessels all without angiographic evidence of disease.  6. Right Coronary Artery (RCA): large caliber vessel which gives rise to a posterior descending artery and a posterolateral branch system all without angiographic evidence of disease.    Right heart cath (10/26/18):  HEMODYNAMICS:  BSA 2 m2  1. HR 90 bpm  2. /64/85 mmHg  3. RA 9/8/7   4. RV 43/9  5. PA 43/23/35   6. PCW 23/32/21   7. PA sat 59.2%   8. PCW sat 91.8%  9. Hgb 17.5 g/dL   10. Timmy CO 3.2   11. Timmy CI 1.6   12. TD CO 4.1   13. TD CI 2.0   14. PVR 3.4   15. SVR 1950    CT Chest (10/19/18):  IMPRESSION:  1. Cardiomegaly. CT findings concerning for mild pulmonary vascular  congestion.  2. Borderline enlarged thoracic, abdominal and pelvic lymph nodes,  likely reactive, nonspecific.  3. CT findings concerning for hepatic intrinsic parenchymal disease.  4. Colonic diverticulosis. No associated CT findings of acute  diverticulitis.  5. Prominent collateral vessels in the abdomen and pelvis,  nonspecific.       Bilateral Carotid Arterial Duplex (10/19/18):  Study Result    US CAROTID BILATERAL 10/23/2018 5:20 PM      History:  Potential Heart Transplant Recipient Evaluation. Assess  vasculopathy, Pre transplant/Vad eval;       Comparison  Study: None     Findings: Grayscale, color, and spectral ultrasound performed.     Right carotid peak velocities systolic/diastolic:  CCA mid:    82/18 cm/s  ICA proximal:  44/10 cm/s  ICA mid:    48/16 cm/s  ICA distal:    49/12cm/s  ICA/CCA:              0.60  ECA:      67/5 cm/s  vertebral:    19/10 cm/s      Trace calcifications at the carotid bulb.     Left carotid peak velocities systolic/diastolic:  CCA mid:    92/14 cm/s  ICA proximal:  57/28 cm/s  ICA mid:    58/21 cm/s  ICA distal:    56/20 cm/s  ICA/CCA:             0.64  ECA:      96/8 cm/s  vertebral:                 26/13     Trace calcifications at the carotid bulb         Impression:  1. Right internal carotid artery: Less than 50% stenosis by velocity  criteria.  2. Right internal carotid artery: Less than 50% stenosis by velocity  criteria.  3. Antegrade flow of the vertebral arteries.            Pulmonary function testing (10/24/18):  FVC: 3.21L (79%)  FEV1: 2.47L (78%)  DLCO: 21.65mL/min/mmHg (78%)         Nakul Doshi)  Cardiovascular Disease; Internal Medicine  [address] [facility]  [place] [address]  Phone: [phone]  Fax: [phone]  Established Patient  Scheduled Appointment: 09/28/2021 01:00 PM    Keshia Rutherford)  Medicine  Gen [address], [address]  [place] [address]  Phone: [phone]  Fax: [phone]  Established Patient  Scheduled Appointment: 09/29/2021 02:20 PM    Huyen Figueroa)  Internal Medicine  [address] [facility], [address]rd [place] [address]  Phone: [phone]  Fax: [phone]  Established Patient  Scheduled Appointment: 09/22/2021 01:00 PM   Yes Nakul Doshi (MD)  Cardiovascular Disease; Internal Medicine  [address] [facility]  [place] [address]  Phone: [phone]  Fax: [phone]  Established Patient  Scheduled Appointment: 09/28/2021 01:00 PM    Keshia Rutherford)  Medicine  Gen Intrnl Medicine  [address] [facility], [address]  [place] [address]  Phone: [phone]  Fax: [phone]  Established Patient  Scheduled Appointment: 09/29/2021 02:20 PM    Huyen Figueroa)  Internal Medicine  [address] [facility], [address]rd [place] [address]  Phone: [phone]  Fax: [phone]  Established Patient  Scheduled Appointment: 09/22/2021 01:00 PM    Jayesh Singh; PhD)  Neurology  [address] [facility], [address]  [place] [address]  Phone: [phone]  Fax: [phone]  Follow Up Time: 2 weeks

## 2022-09-15 NOTE — PATIENT PROFILE ADULT - FUNCTIONAL SCREEN CURRENT LEVEL: COMMUNICATION, MLM
Vancomycin Dosing by Pharmacy - Daily Note   Vancomycin Therapy Day:  3  Indication: SSTI    Allergies:  Patient has no known allergies. Actual Weight:    Wt Readings from Last 1 Encounters:   09/15/22 201 lb 11.5 oz (91.5 kg)       Labs/Ancillary Data  Estimated Creatinine Clearance: 227 mL/min (based on SCr of 0.4 mg/dL). Recent Labs     09/13/22  1715 09/13/22  1745 09/14/22  0728 09/15/22  0715   CREATININE  --  0.42* <0.40* <0.40*   BUN  --  6 8 11   WBC 12.6*  --  6.5  --      Procalcitonin   Date Value Ref Range Status   08/29/2022 0.20 (H) <0.09 ng/mL Final     Comment:           Suspected Sepsis:  <0.50 ng/mL     Low likelihood of sepsis. 0.50-2.00 ng/mL     Increased likelihood of sepsis. Antibiotics encouraged. >2.00 ng/mL     High risk of sepsis/shock. Antibiotics strongly encouraged. Suspected Lower Resp Tract Infections:  <0.24 ng/mL     Low likelihood of bacterial infection. >0.24 ng/mL     Increased likelihood of bacterial infection. Antibiotics encouraged. With successful antibiotic therapy, PCT levels should decrease rapidly. (Half-life of 24 to   36 hours.)        Procalcitonin values from samples collected within the first 6 hours of systemic infection   may still be low. Retesting may be indicated. Values from day 1 and day 4 can be entered into the Change in Procalcitonin Calculator   ([email or web address]-[email or web address]. [employer]) to determine the patient's Mortality Risk Prognosis        In healthy neonates, plasma Procalcitonin (PCT) concentrations increase gradually after   birth, reaching peak values at about 24 hours of age then decrease to normal values below   0.5 ng/mL by 48-72 hours of age.          Intake/Output Summary (Last 24 hours) at 9/15/2022 0812  Last data filed at 9/15/2022 9054  Gross per 24 hour   Intake 2756.79 ml   Output 1800 ml   Net 956.79 ml     Temp: 97.2 F    Culture Date / Source  /  Results  None  Recent vancomycin administrations vancomycin (VANCOCIN) 1,250 mg in dextrose 5 % 250 mL IVPB (ADDAVIAL) (mg) 1,250 mg New Bag 09/14/22 2324     1,250 mg New Bag  1130    vancomycin (VANCOCIN) 2,500 mg in dextrose 5 % 500 mL IVPB (mg) 2,500 mg New Bag 09/13/22 2017                    Vancomycin Concentrations:   TROUGH:  No results for input(s): VANCOTROUGH in the last 72 hours. RANDOM:    Recent Labs     09/15/22  0539   VANCORANDOM 13.2       MRSA Nasal Swab: . PLAN     Random level low at 13.2 mcg/ml, increase vanco dose to 1500mg IV every 12 hrs. Random level in 2 days. Pharmacy will continue to monitor patient and adjust therapy as indicated      Vancomycin Target Concentration Parameters  Treatment  Population Target AUC/NIK Target Trough   Invasive MRSA Infection (bacteremia, pneumonia, meningitis, endocarditis, osteomyelitis)  Sepsis (undifferentiated) 400-600 N/A   Infection due to non-MRSA pathogen  Empiric treatment of non-invasive MRSA infection  (SSTI, UTI) <500 10-15 mg/L   CrCl < 29 mL/min  Rapidly fluctuating serum creatinine   KRYSTA N/A < 15 mg/L     Renal replacement therapy is dosed by levels, per hospital protocol. Abbreviations  * Pauc: probability that AUC is >400 (efficacy); Pconc: probability that Ctrough is above 20 ?g/mL (toxicity); Tox: Probability of nephrotoxicity, based on Kade et al. Clin Infect Dis 2009. Thank you for the consult. Pharmacy will continue to follow. Vipul Kemp. [address] [facility] 0 = understands/communicates without difficulty

## 2022-10-14 NOTE — H&P ADULT - PROBLEM SELECTOR PROBLEM 6
Problem: Potential for Falls  Goal: Patient will remain free of falls  Description: INTERVENTIONS:  - Educate patient/family on patient safety including physical limitations  - Instruct patient to call for assistance with activity   - Consult OT/PT to assist with strengthening/mobility   - Keep Call bell within reach  - Keep bed low and locked with side rails adjusted as appropriate  - Keep care items and personal belongings within reach  - Initiate and maintain comfort rounds  - Make Fall Risk Sign visible to staff  - Offer Toileting every  Hours, in advance of need  - Initiate/Maintain alarm  - Obtain necessary fall risk management equipment:   - Apply yellow socks and bracelet for high fall risk patients  - Consider moving patient to room near nurses station  Outcome: Progressing     Problem: PAIN - ADULT  Goal: Verbalizes/displays adequate comfort level or baseline comfort level  Description: Interventions:  - Encourage patient to monitor pain and request assistance  - Assess pain using appropriate pain scale  - Administer analgesics based on type and severity of pain and evaluate response  - Implement non-pharmacological measures as appropriate and evaluate response  - Consider cultural and social influences on pain and pain management  - Notify physician/advanced practitioner if interventions unsuccessful or patient reports new pain  Outcome: Progressing     Problem: INFECTION - ADULT  Goal: Absence or prevention of progression during hospitalization  Description: INTERVENTIONS:  - Assess and monitor for signs and symptoms of infection  - Monitor lab/diagnostic results  - Monitor all insertion sites, i e  indwelling lines, tubes, and drains  - Monitor endotracheal if appropriate and nasal secretions for changes in amount and color  - [place] appropriate cooling/warming therapies per order  - Administer medications as ordered  - Instruct and encourage patient and family to use good hand hygiene technique  - Identify and instruct in appropriate isolation precautions for identified infection/condition  Outcome: Progressing  Goal: Absence of fever/infection during neutropenic period  Description: INTERVENTIONS:  - Monitor WBC    Outcome: Progressing     Problem: SAFETY ADULT  Goal: Maintain or return to baseline ADL function  Description: INTERVENTIONS:  -  Assess patient's ability to carry out ADLs; assess patient's baseline for ADL function and identify physical deficits which impact ability to perform ADLs (bathing, care of mouth/teeth, toileting, grooming, dressing, etc )  - Assess/evaluate cause of self-care deficits   - Assess range of motion  - Assess patient's mobility; develop plan if impaired  - Assess patient's need for assistive devices and provide as appropriate  - Encourage maximum independence but intervene and supervise when necessary  - Involve family in performance of ADLs  - Assess for home care needs following discharge   - Consider OT consult to assist with ADL evaluation and planning for discharge  - Provide patient education as appropriate  Outcome: Progressing  Goal: Maintains/Returns to pre admission functional level  Description: INTERVENTIONS:  - Perform BMAT or MOVE assessment daily    - Set and communicate daily mobility goal to care team and patient/family/caregiver  - Collaborate with rehabilitation services on mobility goals if consulted  - Perform Range of Motion  times a day  - Reposition patient every  hours    - Dangle patient  times a day  - Stand patient  times a day  - Ambulate patient  times a day  - Out of bed to chair  times a day   - Out of bed for meals  times a day  - Out of bed for toileting  - Record patient progress and toleration of activity level   Outcome: Progressing     Problem: DISCHARGE PLANNING  Goal: Discharge to home or other facility with appropriate resources  Description: INTERVENTIONS:  - Identify barriers to discharge w/patient and caregiver  - Arrange for needed discharge resources and transportation as appropriate  - Identify discharge learning needs (meds, wound care, etc )  - Arrange for interpretive services to assist at discharge as needed  - Refer to Case Management Department for coordinating discharge planning if the patient needs post-hospital services based on physician/advanced practitioner order or complex needs related to functional status, cognitive ability, or social support system  Outcome: Progressing     Problem: Knowledge Deficit  Goal: Patient/family/caregiver demonstrates understanding of disease process, treatment plan, medications, and discharge instructions  Description: Complete learning assessment and assess knowledge base    Interventions:  - Provide teaching at level of understanding  - Provide teaching via preferred learning methods  Outcome: Progressing     Problem: MUSCULOSKELETAL - ADULT  Goal: Maintain or return mobility to safest level of function  Description: INTERVENTIONS:  - Assess patient's ability to carry out ADLs; assess patient's baseline for ADL function and identify physical deficits which impact ability to perform ADLs (bathing, care of mouth/teeth, toileting, grooming, dressing, etc )  - Assess/evaluate cause of self-care deficits   - Assess range of motion  - Assess patient's mobility  - Assess patient's need for assistive devices and provide as appropriate  - Encourage maximum independence but intervene and supervise when necessary  - Involve family in performance of ADLs  - Assess for home care needs following discharge   - Consider OT consult to assist with ADL evaluation and planning for discharge  - Provide patient education as appropriate  Outcome: Progressing  Goal: Maintain proper alignment of affected body part  Description: INTERVENTIONS:  - Support, maintain and protect limb and body alignment  - Provide patient/ family with appropriate education  Outcome: Progressing     Problem: MOBILITY - ADULT  Goal: Maintain or return to baseline ADL function  Description: INTERVENTIONS:  -  Assess patient's ability to carry out ADLs; assess patient's baseline for ADL function and identify physical deficits which impact ability to perform ADLs (bathing, care of mouth/teeth, toileting, grooming, dressing, etc )  - Assess/evaluate cause of self-care deficits   - Assess range of motion  - Assess patient's mobility; develop plan if impaired  - Assess patient's need for assistive devices and provide as appropriate  - Encourage maximum independence but intervene and supervise when necessary  - Involve family in performance of ADLs  - Assess for home care needs following discharge   - Consider OT consult to assist with ADL evaluation and planning for discharge  - Provide patient education as appropriate  Outcome: Progressing  Goal: Maintains/Returns to pre admission functional level  Description: INTERVENTIONS:  - Perform BMAT or MOVE assessment daily    - Set and communicate daily mobility goal to care team and patient/family/caregiver  - Collaborate with rehabilitation services on mobility goals if consulted  - Perform Range of Motion  times a day  - Reposition patient every  hours    - Dangle patient  times a day  - Stand patient times a day  - Ambulate patient  times a day  - Out of bed to chair  times a day   - Out of bed for meal times a day  - Out of bed for toileting  - Record patient progress and toleration of activity level   Outcome: Progressing     Problem: Prexisting or High Potential for Compromised Skin Integrity  Goal: Skin integrity is maintained or improved  Description: INTERVENTIONS:  - Identify patients at risk for skin breakdown  - Assess and monitor skin integrity  - Assess and monitor nutrition and hydration status  - Monitor labs   - Assess for incontinence   - Turn and reposition patient  - Assist with mobility/ambulation  - Relieve pressure over bony prominences  - Avoid friction and shearing  - Provide appropriate hygiene as needed including keeping skin clean and dry  - Evaluate need for skin moisturizer/barrier cream  - Collaborate with interdisciplinary team   - Patient/family teaching  - Consider wound care consult   Outcome: Progressing     Problem: Nutrition/Hydration-ADULT  Goal: Nutrient/Hydration intake appropriate for improving, restoring or maintaining nutritional needs  Description: Monitor and assess patient's nutrition/hydration status for malnutrition  Collaborate with interdisciplinary team and initiate plan and interventions as ordered  Monitor patient's weight and dietary intake as ordered or per policy  Utilize nutrition screening tool and intervene as necessary  Determine patient's food preferences and provide high-protein, high-caloric foods as appropriate       INTERVENTIONS:  - Monitor oral intake, urinary output, labs, and treatment plans  - Assess nutrition and hydration status and recommend course of action  - Evaluate amount of meals eaten  - Assist patient with eating if necessary   - Allow adequate time for meals  - Recommend/ encourage appropriate diets, oral nutritional supplements, and vitamin/mineral supplements  - Order, calculate, and assess calorie counts as needed  - Assess need for intravenous fluids  - Provide nutrition/hydration education as appropriate  - Include patient/family/caregiver in decisions related to nutrition  Outcome: Progressing Diabetes mellitus

## 2022-11-11 NOTE — ED PROVIDER NOTE - TIMING
Pt has appointment scheduled 11/15/2022.    Spoke with Mom, she is okay keeping it. Appointment extended to discuss issues.     PCP made aware. Mom verbalized understanding and is in agreement with the plan. No further concerns or questions. Will call back if needed.      sudden onset

## 2022-11-15 NOTE — PATIENT PROFILE ADULT - DEAF OR HARD OF HEARING?
Patient states was in for labs on Monday 11/14/22 has not received results for the RPR test if nurse could call back    no

## 2022-12-28 NOTE — PHYSICAL THERAPY INITIAL EVALUATION ADULT - FUNCTIONAL LIMITATIONS, PT EVAL
Interventional Radiology    HPI: 77y Female with uncontrolled HTN s/p RLQ renal transplant found to have renal artery stenosis on duplex US.     Allergies: penicillin (Rash)    Medications (Abx/Cardiac/Anticoagulation/Blood Products)      Data:  160  59  T(C): 36.4  HR: 64  BP: 188/77  RR: 15  SpO2: 100%    Exam  General: No acute distress  Chest: Non labored breathing  Abdomen: Non-distended  Extremities: No swelling, warm      Imaging: US duplex reviewed    Plan: 77y Female presents for transplant renal artery angiogram with possible stent placement  -Risks/Benefits/alternatives explained with the patient and/or healthcare proxy and witnessed informed consent obtained.    self-care/home management/community/leisure

## 2023-01-16 NOTE — PROGRESS NOTE BEHAVIORAL HEALTH - NSBHCHARTREVIEWLAB_PSY_A_CORE FT
PROGRESS NOTE       PATIENT PROBLEM LIST:  Patient Active Problem List   Diagnosis Code    Gastroesophageal reflux disease with esophagitis K21.00    Vertebral artery occlusion I65.09    Dizziness R42    Severe aortic stenosis I35.0    1st degree AV block I44.0    History of biliary duct stent placement Z98.890    Choledocholithiasis K80.50    Pancreatic cyst K86.2    Acute GI bleeding K92.2    Chronic cholecystitis D63.7    Acute diastolic CHF (congestive heart failure) ([facility]) I50.31    Acute on chronic diastolic (congestive) heart failure ([facility]) I50.33    Moderate protein-calorie malnutrition ([facility]) E44.0       SUBJECTIVE:  Ivy Obregon states he feels somewhat better but appears extremely weak. His daughter is at his bedside. REVIEW OF SYSTEMS:  General ROS: negative for - fatigue, malaise,  weight gain or weight loss  Psychological ROS: negative for - anxiety , depression  Ophthalmic ROS: negative for - decreased vision or visual distortion. ENT ROS: negative  Allergy and Immunology ROS: negative  Hematological and Lymphatic ROS: negative  Endocrine: no heat or cold intolerance and no polyphagia, polydipsia, or polyuria  Respiratory ROS: positive for - shortness of breath  Cardiovascular ROS: positive for - dyspnea on exertion and irregular heartbeat. Gastrointestinal ROS: no abdominal pain, change in bowel habits, or black or bloody stools  Genito-Urinary ROS: no nocturia, dysuria, trouble voiding, frequency or hematuria  Musculoskeletal ROS: negative for- myalgias, arthralgias, or claudication  Neurological ROS: no TIA or stroke symptoms otherwise no significant change in symptoms or problems since yesterday as documented in previous progress notes.     SCHEDULED MEDICATIONS:   dilTIAZem  120 mg Oral Daily    bumetanide  0.5 mg Oral BID    potassium chloride  10 mEq Oral BID WC    fluticasone  1 spray Each Nostril Daily    aspirin  81 mg Oral Daily    ocuvite-lutein  1 capsule Oral Daily    tamsulosin  0.4 mg Oral Daily    sodium chloride flush  5-40 mL IntraVENous 2 times per day    enoxaparin  40 mg SubCUTAneous Daily    pantoprazole  40 mg Oral QAM AC       VITAL SIGNS:                                                                                                                          BP (!) 98/59   Pulse 74   Temp 98.4 °F (36.9 °C) (Temporal)   Resp 20   Ht 5' 8\" (1.727 m)   Wt 150 lb 1.6 oz (68.1 kg)   SpO2 98%   BMI 22.82 kg/m²   Patient Vitals for the past 96 hrs (Last 3 readings):   Weight   01/15/23 0229 150 lb 1.6 oz (68.1 kg)   01/14/23 0600 153 lb 3.5 oz (69.5 kg)   01/13/23 0400 147 lb 0.8 oz (66.7 kg)     OBJECTIVE:    HEENT: PERRL, EOM  Intact; sclera non-icteric, conjunctiva pink. Carotids are brisk in upstroke with normal contour. No carotid bruits. Normal jugular venous pulsation at 45°. No palpable cervical nor supraclavicular nodes. Thyroid not palpable. Trachea midline. Chest: Even excursion  Lungs: Decreased right base no expiratory wheezes or rhonchi, no decreased tactile fremitus without inspiratory rales. Heart: Irregular, irregular rhythm; S1 > S2, no gallop or murmur. No clicks, rub, palpable thrills   or heaves. PMI nondisplaced, 5th intercostal space MCL. Abdomen: Soft, nontender, nondistended,  mildly protuberant, no masses or organomegaly. Bowel sounds active. Extremities: Without clubbing, cyanosis or edema. Pulses present 3+ upper extermities bilaterally; present 1+ DP  bilaterally and present 1+ PT bilaterally.      Data:   Scheduled Meds: Reviewed  Continuous Infusions:    sodium chloride         Intake/Output Summary (Last 24 hours) at 1/16/2023 0242  Last data filed at 1/15/2023 2208  Gross per 24 hour   Intake 730 ml   Output 0 ml   Net 730 ml     CBC:   Recent Labs     01/14/23  1123   WBC 6.8   HGB 11.4*   HCT 36.1*        BMP:  Recent Labs     01/13/23  0531 01/14/23  0430 01/15/23  0439    139 139   K 3.8 3.3* 3.8    100 102   CO2 27 31* 29 BUN 44* 42* 41*   CREATININE 1.0 1.0 1.1   LABGLOM >60 >60 59     ABGs: No results found for: PH, PO2, PCO2  INR: No results for input(s): INR in the last 72 hours. PRO-BNP:   Lab Results   Component Value Date    PROBNP 2,973 (H) 01/08/2023    PROBNP 1,232 (H) 08/07/2022      TSH:   Lab Results   Component Value Date    TSH 2.460 12/09/2022      Cardiac Injury Profile: No results for input(s): TROPHS in the last 72 hours. Lipid Profile:   Lab Results   Component Value Date/Time    TRIG 70 12/09/2022 12:30 PM    HDL 43 12/09/2022 12:30 PM    LDLCALC 76 12/09/2022 12:30 PM    CHOL 133 12/09/2022 12:30 PM      Hemoglobin A1C: No components found for: HGBA1C      RAD:   No results found. EKG: See Report  Echo: 1/11/2023  Summary  Left ventricle grossly normal in size. Proximal septal thickening. Normal LV segmental wall motion. Estimated left ventricular ejection fraction is 72±5%. Estimated wedge pressure is 19 mmHg as per [place] formula. There is doppler evidence of stage II diastolic dysfunction. The LAESV Index is >34 ml/m2. Normal right ventricular size and function. TAPSE is low normal  Mild mitral regurgitation is present. TAVR prosthetic valve. No doppler evidence of aortic regurgitation. Moderate to severe tricuspid regurgitation. RVSP is 43 mmHg. Physiologic and/or trace pulmonic regurgitation present. Technically fair quality study. Technically difficult study due to patient''s body habitus. Compared to prior echo, changes noted. Suggest clinical correlation. Consider SBE prophylaxis.     IMPRESSIONS:  Patient Active Problem List   Diagnosis Code    Gastroesophageal reflux disease with esophagitis K21.00    Vertebral artery occlusion I65.09    Dizziness R42    Severe aortic stenosis I35.0    1st degree AV block I44.0    History of biliary duct stent placement Z98.890    Choledocholithiasis K80.50    Pancreatic cyst K86.2    Acute GI bleeding K92.2    Chronic cholecystitis K81.1 Acute diastolic CHF (congestive heart failure) ([facility]) I50.31    Acute on chronic diastolic (congestive) heart failure ([facility]) I50.33    Moderate protein-calorie malnutrition ([facility]) E44.0       RECOMMENDATIONS:  Will initiate diltiazem with hope that this will avoid any bronchospasm which he demonstrated yesterday and will switch to oral diuretic. Hopefully this will stabilize and and would asked that physical therapy initiate ambulation and that we reinitiate anticoagulation in the presence of his underlying to have TAVR    I have spent more than 25 minutes face to face with Darryl Osorio and reviewing notes and laboratory data, with greater than 50% of this time instructing and counseling the patient and his daughter face to face regarding my findings and recommendations and I have answered all questions as posed to me by Mr. Stanton's daughter    Kalia Mariposa, DO FACP,FACC,FSCAI      NOTE:  This report was transcribed using voice recognition software.   Every effort was made to ensure accuracy; however, inadvertent computerized transcription errors may be present 13.1   7.85  )-----------( 203      ( 21 Oct 2020 06:31 )             40.0     10-21    145  |  105  |  32<H>  ----------------------------<  123<H>  3.0<L>   |  34<H>  |  1.38<H>    Ca    9.3      21 Oct 2020 06:31  Phos  3.3     10-21  Mg     2.1     10-21    TPro  7.1  /  Alb  2.9<L>  /  TBili  1.5<H>  /  DBili  x   /  AST  82<H>  /  ALT  57  /  AlkPhos  414<H>  10-21

## 2023-01-17 NOTE — ED ADULT TRIAGE NOTE - NS ED NURSE BANDS TYPE
New patient, chief co mplaint tender area thigh new problem. Sore when presses on it. Also notes red spots face minor problem, and wanted growths back checked, minor pr oblem. OBJECTIVE: some telangiectaias face,   verrucous plaques back, flesh colored 1cm papule left thigh tender on palpation ASSESSMENT; rosacea, sebs, li toribio lipoma PLAN: Realistic expectations,cost, and technique discussed.   of KTP, reassurance  , sunscreen, make 1/2 hr apt excision Shante Cast M.D.    Name band;

## 2023-02-06 NOTE — ED ADULT NURSE NOTE - ALCOHOL PRE SCREEN (AUDIT - C)
[address]  [place] [id number]  Phone: [phone]  Fax: Amna Pack [phone], APRN - CNP        February 6, 2023    [phone] [id number]      Dear Jagdeep Araujo:    Saline nose spray 1 spray each nostril twice daily  Children's Claritin 10mg Daily (antihistamine)  Children's Ibuprofen 3 times a day as needed (antiinflammatory)   Warm tea with 1tbsp honey (soothes the throat)  Increase water intake  Rest      If you have any questions or concerns, please don't hesitate to call.     Sincerely,          Noemi Sol, ISABEL - CNP Statement Selected

## 2023-02-07 NOTE — CONSULT NOTE ADULT - PROBLEM SELECTOR PROBLEM 7
Palliative care encounter Oral Minoxidil Counseling- I discussed with the patient the risks of oral minoxidil including but not limited to shortness of breath, swelling of the feet or ankles, dizziness, lightheadedness, unwanted hair growth and allergic reaction.  The patient verbalized understanding of the proper use and possible adverse effects of oral minoxidil.  All of the patient's questions and concerns were addressed.

## 2023-03-08 NOTE — SWALLOW BEDSIDE ASSESSMENT ADULT - SWALLOW EVAL: RECOMMENDED DIET
150/mod/ recurrent decelerations into 90s 150/mod/ recurrent late decelerations into 90s NPO, with non-oral nutrition/hydration/medications.

## 2023-04-24 NOTE — ED ADULT NURSE NOTE - NS ED NOTE  TALK SOMEONE YN
No Advancement Flap (Double) Text: The defect edges were debeveled with a #15 scalpel blade. Given the location of the defect and the proximity to free margins a double advancement flap was deemed most appropriate. Using a sterile surgical marker, the appropriate advancement flaps were drawn incorporating the defect and placing the expected incisions within the relaxed skin tension lines where possible. The area thus outlined was incised deep to adipose tissue with a #15 scalpel blade. The skin margins were undermined to an appropriate distance in all directions utilizing iris scissors. Following this, the designed flaps were advanced and carried over into the primary defect and sutured into place.

## 2023-04-26 NOTE — SWALLOW BEDSIDE ASSESSMENT ADULT - ASPIRATION PRECAUTIONS
Case Management Assessment  Initial Evaluation    Date/Time of Evaluation: 4/26/2023 12:14 PM  Assessment Completed by: Jack Garcia RN    If patient is discharged prior to next notation, then this note serves as note for discharge by case management. Patient Name: Bethany Hollingsworth                   YOB: 1963  Diagnosis: Acute pyelonephritis [N10]  Immunocompromised ([facility] [address] [D84.9]  Sepsis ([facility] [address] [A41.9]  Sepsis, due to unspecified organism, unspecified whether acute organ dysfunction present [facility]) [A41.9]                   Date / Time: 4/25/2023  8:05 PM    Patient Admission Status: Inpatient   Readmission Risk (Low < 19, Mod (19-27), High > 27): Readmission Risk Score: 16.6    Current PCP: Reji Russ MD  PCP verified by CM? Yes [facility])    Chart Reviewed: Yes      History Provided by: Patient  Patient Orientation: Alert and Oriented    Patient Cognition: Alert    Hospitalization in the last 30 days (Readmission):  No    If yes, Readmission Assessment in CM Navigator will be completed. Advance Directives:      Code Status: Full Code   Patient's Primary Decision Maker is: Named in Scanned ACP Document    Primary Decision Maker: Clifford Poole - Spouse - [phone]    Secondary Decision Maker: Karina Bai - Child - [phone]    Discharge Planning:    Patient lives with: Alone Type of Home: House  Primary Care Giver: Self  Patient Support Systems include: Spouse/Significant Other   Current Financial resources:    Current community resources:    Current services prior to admission: None            Current DME:              Type of Home Care services:  None    ADLS  Prior functional level: Independent in ADLs/IADLs  Current functional level: Independent in ADLs/IADLs      Family can provide assistance at DC: Yes  Would you like Case Management to discuss the discharge plan with any other family members/significant others, and if so, who?  Yes (pts  [place] or for secretions/yes

## 2023-05-09 NOTE — H&P ADULT - HISTORY OF PRESENT ILLNESS
***************************************************************  Alea Austen, PGY2  Internal Medicine   pager: NS: [phone] [facility]: [phone]  ***************************************************************    RISHABH HUNT  66y  Female      Patient is a 66y old  Female who presents with a chief complaint of PE (15 Sep 2021 18:46)      HPI:  66Fwith PMHx AAA, cerebral aneurysm, breast cancer, CAD (ASCVD 43%), HTN, CHF (EF 12% on 8/23/21, s/p ICD), COPD, CVA, DVT, T2DM (A1c 6.9), PE (s/p IVC filter 9/2020), obesity (class I), p/w chest pain. She was recently hospitalized at [facility] (discharged 9/3) for a saddle embolism     ED:   You can access the FollowMyHealth Patient Portal offered by [facility] by registering at the following website: [email or web address]. By joining [employer]’s FollowMyHealth portal, you will also be able to view your health information using other applications (apps) compatible with our system. ***************************************************************  Alea Boyce, PGY2  Internal Medicine   pager: NS: [phone] [facility]: [id number]  ***************************************************************    RISHABH HUNT  66y  Female      Patient is a 66y old  Female who presents with a chief complaint of chest pain (15 Sep 2021 18:46)      HPI:  66Fwith PMHx infrarenal AAA, cerebral aneurysm, breast cancer, CAD (ASCVD 43%), HTN, CHF/cardiomyopathy, (EF 12% on 8/23/21, s/p ICD), COPD, CVA, DVT, T2DM (A1c 6.9), PE (s/p IVC filter 9/2020), saddle PE with no acute cor pulmonale (on warfarin), obesity (class I), p/w chest pain. She went to [facility] yesterday for similar complaints and was discharged. Today,, while she was sitting she had R chest wall pain 3/10, SOB, cough, fever, lightheadedness, abdominal pain. She is confused but HD stable.  She was recently hospitalized at [facility] (discharged 9/3) for a saddle embolism and discharged on warfarin.      ED:   ***************************************************************  Alea Austen, PGY2  Internal Medicine   pager: NS: [phone] [facility]: [phone]  ***************************************************************    RISHABH HUNT  66y  Female      Patient is a 66y old  Female who presents with a chief complaint of chest pain (15 Sep 2021 18:46)      HPI:  66Fwith PMHx infrarenal AAA, cerebral aneurysm, R breast cancer (s/p chemo/XRT), CAD (ASCVD 43%), HTN, CHF/cardiomyopathy, (EF 12% on 8/23/21, s/p ICD), COPD, CVA, DVT (L leg 1994), T2DM (A1c 6.9), PE (s/p IVC filter 9/2020), fibroids (s/p hysterectomy), s/p cholecystectomy, saddle PE (w/ hospital admission 8/21 - 9/3/21 at [facility]) without acute cor pulmonale (on warfarin), obesity (class I), p/w chest pain. She went to [facility] yesterday for similar complaints and was discharged. Chest pain is worse with breathing, with associated nonproductive cough. She also endorses dizziness and fatigue. She denies SOB, abdominal pain, urinary symptoms, headache. She is confused but HD stable.  She was recently hospitalized at [facility] (discharged 9/3) for a saddle embolism and discharged on warfarin.      ED:  VS: afebrile, , /104, RR 20, SpO2 99% on 3L NC  Labs: BNP 18,742, , AST 44, pH VBG 7/4, SCr 1.09  CTA: Saddle PE similar to 8/22/21 with additional resolving emboli bilaterally, stable atelectasis LL lobe  CTH: old infarcts R frontal and parietal lobes, stable 1.6 cm supraclinoid aneurysm ***************************************************************  Alea Austen, PGY2  Internal Medicine   pager: NS: [phone] [facility]: [phone]  ***************************************************************    RISHABH HUNT  66y  Female      Patient is a 66y old  Female who presents with a chief complaint of chest pain (15 Sep 2021 18:46)      HPI:  66Fwith PMHx infrarenal AAA, cerebral aneurysm, R breast cancer (s/p chemo/XRT), CAD (ASCVD 43%), HTN, CHF/cardiomyopathy, (EF 12% on 8/23/21, s/p ICD), COPD, CVA, DVT (L leg 1994), T2DM (A1c 6.9), PE (s/p IVC filter 9/2020), fibroids (s/p hysterectomy), s/p cholecystectomy, saddle PE (w/ hospital admission 8/21 - 9/3/21 at [facility]) without acute cor pulmonale (on warfarin), obesity (class I), p/w chest pain. She went to [facility] yesterday for similar complaints and was discharged. Chest pain is worse with breathing, with associated nonproductive cough. She also endorses dizziness and fatigue. She denies SOB, abdominal pain, urinary symptoms, headache. She is confused but HD stable.  She was recently hospitalized at [facility] (discharged 9/3) for a saddle embolism and discharged on warfarin.      ED:  VS: afebrile, , /104, RR 20, SpO2 99% on 3L NC  Labs: BNP 18,742, , AST 44, pH VBG 7/4, SCr 1.09  CTA: Saddle PE similar to 8/22/21 with additional resolving emboli bilaterally, stable atelectasis LL lobe  CTH: old infarcts R frontal and parietal lobes, stable 1.6 cm supraclinoid aneurysm  s/p hydralazine 5 mg x 1 and midazolam  ***************************************************************  Alea Austen, PGY2  Internal Medicine   pager: NS: [phone] [facility]: [phone]  ***************************************************************    RISHABH HUNT  66y  Female      Patient is a 66y old  Female who presents with a chief complaint of chest pain (15 Sep 2021 18:46)      HPI:  66Fwith PMHx infrarenal AAA, cerebral aneurysm, R breast cancer (s/p chemo/XRT), CAD (ASCVD 43%), HTN, CHF/cardiomyopathy, (EF 12% on 8/23/21, s/p ICD), COPD, CVA, DVT (L leg 1994), T2DM (A1c 6.9), PE (s/p IVC filter 9/2020), fibroids (s/p hysterectomy), s/p cholecystectomy, saddle PE (w/ hospital admission 8/21 - 9/3/21 at [facility]) without acute cor pulmonale (on warfarin), obesity (class I), p/w chest pain. She went to [facility] yesterday for similar complaints and was discharged. Chest pain is worse with breathing, with associated nonproductive cough. She also endorses dizziness and fatigue. She denies SOB, abdominal pain, urinary symptoms, headache. She is confused but HD stable.  She was recently hospitalized at [facility] (discharged 9/3) for a saddle embolism and discharged on warfarin.      ED:  VS: afebrile, , /104, RR 20, SpO2 99% on 3L NC  Labs: BNP 18,742, , AST 44, pH VBG 7/4, SCr 1.09, Lactate 4.1  CTA: Saddle PE similar to 8/22/21 with additional resolving emboli bilaterally, stable atelectasis LL lobe  CTH: old infarcts R frontal and parietal lobes, stable 1.6 cm supraclinoid aneurysm  s/p hydralazine 5 mg x 1 and midazolam  ***************************************************************  Alea Austen, PGY2  Internal Medicine   pager: NS: [phone] [facility]: [phone]  ***************************************************************  RISHABH HUNT  66y  Female    Patient is a 66y old  Female who presents with a chief complaint of chest pain (15 Sep 2021 18:46)    HPI:  66Fwith PMHx infrarenal AAA, cerebral aneurysm, R breast cancer (s/p chemo/XRT), CAD (ASCVD 43%), HTN, CHF/cardiomyopathy, (EF 12% on 8/23/21, s/p ICD), COPD, CVA, DVT (L leg 1994), T2DM (A1c 6.9), PE (s/p IVC filter 9/2020), fibroids (s/p hysterectomy), s/p cholecystectomy, saddle PE (w/ hospital admission 8/21 - 9/3/21 at [facility]) without acute cor pulmonale (on warfarin), obesity (class I), p/w chest pain. She went to [facility] yesterday for similar complaints and was discharged. Chest pain is worse with breathing, with associated nonproductive cough. She also endorses dizziness and fatigue. She denies SOB, abdominal pain, urinary symptoms, headache. She is confused but HD stable.  She was recently hospitalized at [facility] (discharged 9/3) for a saddle embolism and discharged on warfarin.      ED:  VS: afebrile, , /104, RR 20, SpO2 99% on 3L NC  Labs: BNP 18,742, , AST 44, pH VBG 7/4, SCr 1.09, Lactate 4.1  CTA: Saddle PE similar to 8/22/21 with additional resolving emboli bilaterally, stable atelectasis LL lobe  CTH: old infarcts R frontal and parietal lobes, stable 1.6 cm supraclinoid aneurysm  s/p hydralazine 5 mg x 1 and midazolam

## 2023-05-17 NOTE — PHYSICAL THERAPY INITIAL EVALUATION ADULT - WEIGHT-BEARING RESTRICTIONS: GAIT, REHAB EVAL
[Erythematous Oropharynx] : erythematous oropharynx [NL] : no abnormal lymph nodes palpated full weight-bearing

## 2023-08-03 NOTE — PATIENT PROFILE ADULT - NS PRO AD PATIENT TYPE
Health Care Proxy (HCP) Xeljanz Counseling: I discussed with the patient the risks of Xeljanz therapy including increased risk of infection, liver issues, headache, diarrhea, or cold symptoms. Live vaccines should be avoided. They were instructed to call if they have any problems.

## 2023-12-06 NOTE — PROGRESS NOTE ADULT - PROBLEM SELECTOR PLAN 8
Well controlled on metoprolol, hydralazine  C/w home meds while holding Entresto for CHEN  C/w Statin for coexisting HLD  DASH/DM diet  C/w hydralazine/Imdur  Card Dr Goodrich (3) no apparent problem

## 2024-01-23 NOTE — PROGRESS NOTE ADULT - PROBLEM SELECTOR PROBLEM 1
Currently living with mother, reports interpersonal strain and recent homelessness as result.  Has previously received housing resources through the emergency department - we will have Yoly Toro reach out with further resources.   NSTEMI (non-ST elevated myocardial infarction)

## 2024-03-18 NOTE — PROGRESS NOTE ADULT - ASSESSMENT
65 year-old female  smoker with DM, HTN, HLD, breast CA s/p chemo/XRT, SCC, CVA no motor deficits, COPD not on home O2, and NICM (catheterization 02/2019 showed dRPL occlusion without ability to intervene), EF 31% by MUGA 7/2019, s/p SubQ ICD 10/2019, AAA came to ED after she was having shortness of breath x 1-2 days.    Patient has nausea x 3 weeks and had vomit 2 times last week, denied diarrhea or constipation. Consent: The patient's consent was obtained including but not limited to risks of crusting, scabbing, blistering, scarring, darker or lighter pigmentary change, recurrence, incomplete removal and infection. Show Aperture Variable?: Yes Render Note In Bullet Format When Appropriate: No Post-Care Instructions: I reviewed with the patient in detail post-care instructions. Patient is to wear sunprotection, and avoid picking at any of the treated lesions. Pt may apply Vaseline to crusted or scabbing areas. Duration Of Freeze Thaw-Cycle (Seconds): 7 Detail Level: Simple Number Of Freeze-Thaw Cycles: 2 freeze-thaw cycles

## 2024-05-06 NOTE — PROGRESS NOTE ADULT - MINUTES
Subjective:      Patient ID: Rose Mckeon is a 39 y.o. female.    Vitals:  weight is 65 kg (143 lb 4.8 oz). Her oral temperature is 98.3 °F (36.8 °C). Her blood pressure is 125/81 and her pulse is 86. Her respiration is 16 and oxygen saturation is 98%.     Chief Complaint: excessive thirst    This is a 39 y.o. female who presents today with a chief complaint of increased thirst that she noticed about a week ago, worse today.  Pt also feels intermittent fatigue and wooziness.  Denies any urinary symptoms.  Pt has concerns of blood sugar since she is on Ozympic. Pt has been on Ozempic x 5 weeks. Pt currently is on 1200 calorie diet.  No recent change to diet or medications otherwise.  Denies excess activity or exercise or heat exposure.  She denies increased urinary frequency, urgency, hematuria, or dysuria.  No pain.  Patient states that due to the increased thirst, she wants to make sure that her blood sugar looks okay.  Patient states that LMP was 4 weeks ago and she is just starting it again now.  No fever or URI symptoms.  No diarrhea or vomiting.    PPMH: no hx of diabetes, but Family hx of diabetes        Constitution: Positive for fatigue. Negative for chills, sweating and fever.   HENT:  Negative for congestion and sore throat.    Neck: Negative for neck pain and neck stiffness.   Cardiovascular:  Negative for chest pain, leg swelling and palpitations.   Eyes:  Negative for eye itching, eye pain and eye redness.   Respiratory:  Negative for cough, sputum production and shortness of breath.    Gastrointestinal:  Negative for abdominal pain, nausea, vomiting and diarrhea.   Endocrine: excessive thirst. excessive urination.   Genitourinary:  Negative for dysuria, frequency and urgency.   Musculoskeletal:  Negative for pain and joint swelling.   Skin:  Negative for color change, rash and erythema.   Neurological:  Negative for dizziness, headaches, disorientation, altered mental status, numbness and  tingling.   Psychiatric/Behavioral:  Negative for altered mental status and disorientation.       Objective:     Physical Exam   Constitutional: She is oriented to person, place, and time. She appears well-developed.  Non-toxic appearance. She does not appear ill. No distress.   HENT:   Head: Normocephalic and atraumatic. Head is without abrasion, without contusion and without laceration.   Ears:   Right Ear: External ear normal.   Left Ear: External ear normal.   Nose: Nose normal.   Mouth/Throat: Oropharynx is clear and moist and mucous membranes are normal.   Eyes: Conjunctivae, EOM and lids are normal. Pupils are equal, round, and reactive to light.   Neck: Trachea normal and phonation normal. Neck supple.   Cardiovascular: Normal rate, regular rhythm and normal heart sounds.   Pulmonary/Chest: Effort normal and breath sounds normal. No accessory muscle usage or stridor. No tachypnea and no bradypnea. No respiratory distress. She has no decreased breath sounds. She has no wheezes.   Abdominal: Soft. There is no abdominal tenderness. There is no left CVA tenderness and no right CVA tenderness.   Musculoskeletal: Normal range of motion.         General: Normal range of motion.   Neurological: She is alert and oriented to person, place, and time.   Skin: Skin is warm, dry, intact, not diaphoretic and no rash. Capillary refill takes less than 2 seconds. No abrasion, No burn, No bruising, No erythema and No ecchymosis   Psychiatric: Her speech is normal and behavior is normal. Judgment and thought content normal.   Nursing note and vitals reviewed.    Results for orders placed or performed in visit on 05/06/24   POCT Urinalysis, Dipstick, Automated, W/O Scope   Result Value Ref Range    POC Blood, Urine Positive (A) Negative    POC Bilirubin, Urine Negative Negative    POC Urobilinogen, Urine norm 0.1 - 1.1    POC Ketones, Urine Negative Negative    POC Protein, Urine Negative Negative    POC Nitrates, Urine Negative  Negative    POC Glucose, Urine Negative Negative    pH, UA 6.0 5 - 8    POC Specific Gravity, Urine 1.005 1.003 - 1.029    POC Leukocytes, Urine Negative Negative   POCT Glucose, Hand-Held Device   Result Value Ref Range    POC Glucose 82 70 - 110 MG/DL   POCT urine pregnancy   Result Value Ref Range    POC Preg Test, Ur Negative Negative     Acceptable Yes          Assessment:     1. Excessive thirst    2. Fatigue, unspecified type        Plan:       Excessive thirst    Fatigue, unspecified type  -     POCT Urinalysis, Dipstick, Automated, W/O Scope  -     POCT Glucose, Hand-Held Device  -     POCT urine pregnancy        Microscopic hematuria likely due to onset of menstrual period.  Patient has no symptoms of UTI, and UA without leukocytes or nitrates.  Glucose within normal limits.  Instructed to monitor closely, follow up with PCP for further evaluation and workup if persistent, sooner p.r.n. new or worsening symptoms.  - Discussed ddx, home care, tx options, and given follow up precautions.  I have reviewed the patient's chart to view previous visits, labs, and imaging to assess PMH and look for any trends or previous treatments.    Patient Instructions   - Rest.    - Drink plenty of fluids.    - Acetaminophen (tylenol) or Ibuprofen (advil,motrin) as directed as needed for fever/pain. Avoid tylenol if you have a history of liver disease. Do not take ibuprofen if you have a history of GI bleeding, kidney disease, or if you take blood thinners.     - your blood sugar is normal today.  Your urine just shows microscopic blood, which has discussed likely is due to beginning your period.  There are other metabolic in hormonal conditions such as diabetes insipidus that can cause excessive thirst that can not be worked up for diagnosed in urgent care setting, so if symptoms persist, follow up closely with your PCP.  Seek medical attention sooner as needed for new or worsening symptoms.    - Follow up  35 with your PCP or specialty clinic as directed in the next 1-2 weeks if not improved or as needed.  You can call [phone] to schedule an appointment with the appropriate provider.    - Go to the ER or seek medical attention immediately if you develop new or worsening symptoms.     - You must understand that you have received an Urgent Care treatment only and that you may be released before all of your medical problems are known or treated.   - You, the patient, will arrange for follow up care as instructed.   - If your condition worsens or fails to improve we recommend that you receive another evaluation at the ER immediately or contact your PCP to discuss your concerns or return here.

## 2024-07-01 NOTE — PHYSICAL THERAPY INITIAL EVALUATION ADULT - WEIGHT-BEARING RESTRICTIONS: SIT/STAND, REHAB EVAL
[facility]   Physical Therapy Treatment Note      Name: Marcos Barfield  Clinic Number: [id number]    Therapy Diagnosis:        Encounter Diagnosis   Name Primary?    Non-traumatic rotator cuff tear, left       Physician: Nhan Grullon III, *    Physician Orders: PT Eval and Treat  Medical Diagnosis from Referral:   M75.102 (ICD-10-CM) - Non-traumatic rotator cuff tear, left      Evaluation Date: 3/22/2024  Authorization Period Expiration: 12/31/2024  Plan of Care Expiration: 9/30/24  Progress Note Due: Performed 4/26/24  Date of Surgery: 3/12/24  Visit # / Visits authorized: 24/30  FOTO: 1/ 3     Precautions:   POSTOPERATIVE PLAN: We will follow the arthroscopic rotator cuff repair guidelines for a small size rotator cuff tear.  We discussed with the patient's family after surgery.  The patient will remain in a sling for 6 weeks.  PT to start at 1-2 weeks.     Cuff specific program:  Pendulum exercises and Codman's exercises in 5-7 days, protecting rotator cuff repair for 1 week by avoiding active motion program until 1 week.     PASSIVE ROM: ER side 30 degrees, Forward Flex 90 degrees, ABD - 60 degrees   Full AAROM/PROM starting at 5-7 days as tolerated       Time In: 10:00 am  Time Out: 11:00 am  Total Billable Time: 15 minutes one on one   Visit Date: 7/1/2024    Subjective     Patient reports: no complaints today   He was compliant with home exercise program.  Response to previous treatment: Did well after evaluation  Functional change: ongoing    Pain: 2/10  Location: left shoulder      Objective    DOS: 3/12/24  POW: 15 weeks    Observation: wearing ABD sling in proper position     Posture: rounded shoulders      Shoulder Passive Range of Motion:   Shoulder Right Left   Flexion       ER at 90    WNL 65     AROM flexion post session: 130      Joint Mobility: NT       Treatment     Marcos received the treatments listed below:      therapeutic exercises to develop strength,  endurance, and ROM for 00 minutes including:    manual therapy techniques: Joint mobilizations and Soft tissue Mobilization were applied to the: L shoulder for 5 minutes, including:    PROM check ER at 90/Flexion  GH AP, inferior Grades 1-2   Posterior cuff gentle stretch 3x20  Shoulder blade mobs     neuromuscular re-education activities to improve: Balance, Coordination, and Proprioception for 32 minutes. The following activities were included:  ER at 90 abd 3x8 GTB   Serratus wall slide +YTB - 3x10   S/L ER 3# 3x12  Bear hugs GTB - 3x10   Prone rows 3x10 5#       therapeutic activities to improve functional performance for 23 minutes, including:  UBE 4/4  Landmine press 7.5# 3x12  Flexion, abduction 2# 2x10  Shoulder raise  degrees 3x5   Overhead press 3x5 2#        Patient Education and Home Exercises       Education provided:   - POC  - HEP modifications    Written Home Exercises Provided: Patient instructed to cont prior HEP. Exercises were reviewed and Marcos was able to demonstrate them prior to the end of the session.  Marcos demonstrated good  understanding of the education provided. See Electronic Medical Record under Patient Instructions for exercises provided during therapy sessions    Assessment     Patient completed session as noted above. Shoulder elevation actively continues to improve but still needs cuing to avoid elbow bending and shoulder shrug. Strengthening is progressing very well at this time.       Marcos Is progressing well towards his goals.   Patient prognosis is Good.     Patient will continue to benefit from skilled outpatient physical therapy to address the deficits listed in the problem list box on initial evaluation, provide pt/family education and to maximize pt's level of independence in the home and community environment.     Patient's spiritual, cultural and educational needs considered and pt agreeable to plan of care and goals.     Anticipated barriers to physical therapy:  advanced age    Goals:   Post OP shoulder Goals  Short Term Goals (4 Weeks):   1. Pt will be independent with HEP to supplement PT in improving functional use of R UE.  2. Pt will increase pain free left shoulder elevation PROM to >/= 160 deg to improve functional mobility of UE  3. Pt will increase L shoulder ER PROM in 90 deg abduction to >/=20 deg to improve functional mobility of UE  4. Pt will increase L elbow AAROM to WNL deg in 4 weeks to improve functional mobility of UE.     Long Term Goals (24 Weeks):   1. Pt will improve FOTO score to </=30% disability limited to decrease perceived limitation with L shoulder carrying, moving, and handling objects.  2. Pt will increase L shoulder PROM to WNL in all planes to improve functional use of R RUE.  3. Pt will increase L shoulder AROM to WFL in all planes to improve functional use of R RUE.  4. Pt will improve L shoulder MMTs in areas of limitation to promote equal use of B UEs in performing functional tasks.  5. Pt will lift 10 lb objects without pain to promote functional QOL.    Plan     Plan of care Certification: 3/22/2024 to 9/30/24.     Outpatient Physical Therapy 1-2 times weekly for 24 weeks to include the following interventions: Gait Training, Manual Therapy, Neuromuscular Re-ed, Therapeutic Activities, and Therapeutic Exercise.     Grant Falcon, PT [facility]   Physical Therapy Treatment Note      Name: Marcos Barfield  [facility] Number: [id number]    Therapy Diagnosis:        Encounter Diagnosis   Name Primary?    Non-traumatic rotator cuff tear, left       Physician: Nhan Grullon III, *    Physician Orders: PT Eval and Treat  Medical Diagnosis from Referral:   M75.102 (ICD-10-CM) - Non-traumatic rotator cuff tear, left      Evaluation Date: 3/22/2024  Authorization Period Expiration: 12/31/2024  Plan of Care Expiration: 9/30/24  Progress Note Due: Performed 4/26/24  Date of Surgery:  3/12/24  Visit # / Visits authorized: 24/30  FOTO: 1/ 3     Precautions:   POSTOPERATIVE PLAN: We will follow the arthroscopic rotator cuff repair guidelines for a small size rotator cuff tear.  We discussed with the patient's family after surgery.  The patient will remain in a sling for 6 weeks.  PT to start at 1-2 weeks.     Cuff specific program:  Pendulum exercises and Codman's exercises in 5-7 days, protecting rotator cuff repair for 1 week by avoiding active motion program until 1 week.     PASSIVE ROM: ER side 30 degrees, Forward Flex 90 degrees, ABD - 60 degrees   Full AAROM/PROM starting at 5-7 days as tolerated       Time In: 10:00 am  Time Out: 11:00 am  Total Billable Time: 15 minutes one on one   Visit Date: 7/1/2024    Subjective     Patient reports: no complaints today   He was compliant with home exercise program.  Response to previous treatment: Did well after evaluation  Functional change: ongoing    Pain: 2/10  Location: left shoulder      Objective    DOS: 3/12/24  POW: 13 weeks    Observation: wearing ABD sling in proper position     Posture: rounded shoulders      Shoulder Passive Range of Motion:   Shoulder Right Left   Flexion       ER at 90    WNL 65     AROM flexion post session: 130      Joint Mobility: NT       Treatment     Marcos received the treatments listed below:      therapeutic exercises to develop strength, endurance, and ROM for 00 minutes including:    manual therapy techniques: Joint mobilizations and Soft tissue Mobilization were applied to the: L shoulder for 5 minutes, including:    PROM check ER at 90/Flexion  GH AP, inferior Grades 1-2   Posterior cuff gentle stretch 3x20  Shoulder blade mobs     neuromuscular re-education activities to improve: Balance, Coordination, and Proprioception for 32 minutes. The following activities were included:  Supine ER 90/90 with arm supported 4x6 3#  ER at 90 flexion 3x8 OTB   Serratus wall slide - 3x10   S/L ER 3# 4x5  Prone  middle trap lev II - 3x10     therapeutic activities to improve functional performance for 23 minutes, including:  UBE 4/4  Landmine press 7.5# 3x12  Flexion, abduction 2# 2x10          Patient Education and Home Exercises       Education provided:   - POC  - HEP modifications    Written Home Exercises Provided: Patient instructed to cont prior HEP. Exercises were reviewed and Marcos was able to demonstrate them prior to the end of the session.  Marcos demonstrated good  understanding of the education provided. See Electronic Medical Record under Patient Instructions for exercises provided during therapy sessions    Assessment     Patient completed session as noted above. Shoulder active flexion measured 140 at the end of the session. Still limited by post cuff tightness and weakness, but this is improving with each visit. Overall doing well.       Marcos Is progressing well towards his goals.   Patient prognosis is Good.     Patient will continue to benefit from skilled outpatient physical therapy to address the deficits listed in the problem list box on initial evaluation, provide pt/family education and to maximize pt's level of independence in the home and community environment.     Patient's spiritual, cultural and educational needs considered and pt agreeable to plan of care and goals.     Anticipated barriers to physical therapy: advanced age    Goals:   Post OP shoulder Goals  Short Term Goals (4 Weeks):   1. Pt will be independent with HEP to supplement PT in improving functional use of R UE.  2. Pt will increase pain free left shoulder elevation PROM to >/= 160 deg to improve functional mobility of UE  3. Pt will increase L shoulder ER PROM in 90 deg abduction to >/=20 deg to improve functional mobility of UE  4. Pt will increase L elbow AAROM to WNL deg in 4 weeks to improve functional mobility of UE.     Long Term Goals (24 Weeks):   1. Pt will improve FOTO score to </=30% disability limited to decrease  perceived limitation with L shoulder carrying, moving, and handling objects.  2. Pt will increase L shoulder PROM to WNL in all planes to improve functional use of R RUE.  3. Pt will increase L shoulder AROM to WFL in all planes to improve functional use of R RUE.  4. Pt will improve L shoulder MMTs in areas of limitation to promote equal use of B UEs in performing functional tasks.  5. Pt will lift 10 lb objects without pain to promote functional QOL.    Plan     Plan of care Certification: 3/22/2024 to 9/30/24.     Outpatient Physical Therapy 1-2 times weekly for 24 weeks to include the following interventions: Gait Training, Manual Therapy, Neuromuscular Re-ed, Therapeutic Activities, and Therapeutic Exercise.     Wayne Sandoval, PT                                                  full weight-bearing

## 2024-07-17 NOTE — ED ADULT NURSE NOTE - SEPSIS REFERENCE DATA CRITERIA 2
02/06/2022    COVID-19 Vaccine (4 - 2023-24 season) 09/01/2023    Respiratory Syncytial Virus (RSV) Pregnant or age 60 yrs+ (1 - 1-dose 60+ series) Never done     Recommendations for Preventive Services Due: see orders and patient instructions/AVS.   Abnormal Lactate: > 2

## 2024-09-02 NOTE — PATIENT PROFILE ADULT - NSASFALLNEEDSASSISTWITH_GEN_A_NUR
ED Attending Attestation Note     Date of evaluation: 9/2/2024    This patient was seen by the advance practice provider.  I have seen and examined the patient, agree with the workup, evaluation, management and diagnosis. The care plan has been discussed.  I have reviewed the ECG and concur with the SHARON's interpretation.  My assessment reveals 84-year-old gentleman with a significant cardiac history including CAD status post CABG, cardiomyopathy status post biventricular pacemaker who states that yesterday he felt unwell and weak and when he stood up he lost consciousness and fell hitting his head and injuring his lower back.  Patient has no recollection of the event and woke up to his wife standing over him.  Looking at previous records the patient follows with cardiology at [facility] and saw them about 5 days ago.  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  He is on Eliquis for anticoagulation for his atrial fibrillation and given the traumatic injury had imaging obtained of the head and lower back.  He had no tenderness on his cervical and thoracic spine.  Tenderness isolated to the lumbar spine at this time..  Given his significant heart history cardiac workup obtained here today and his pacemaker was interrogated given it is supposedly getting replaced in 2 weeks.  His wife states that he has been feeling and appearing a little off than typical.  EKG shows a paced rhythm at this time.  Patient typically ambulates without any difficulty but today has had to use a walker he had from previous.  His laboratory workup here including cross-sectional imaging of the head was largely unremarkable.  No evidence of any acute traumatic injuries in the brain.  Chest x-ray with no evidence of any pulmonary edema CT scan of the lumbar spine also noted to not have any acute  standing/walking

## 2024-12-18 NOTE — PROGRESS NOTE ADULT - PROBLEM SELECTOR PROBLEM 7
[id number] [place] [address]                           PULMONARY FUNCTION      PATIENT NAME: LENORE WHITING             : 1965  MED REC NO: [id number]                         ROOM:   ACCOUNT NO: [id number]                       ADMIT DATE: 2024  PROVIDER: Burke Lopez MD      DATE OF PROCEDURE: 2024    SURGEON:  Burke Lopez MD    REFERRING PHYSICIAN:  AMAURY FROST    Spirometry shows FVC is 2.68, 90% predicted, which is within normal limits.  FEV1 is 2.08, 87% predicted, which is within normal limits.  FEV1/FVC ratio is normal without evidence of obstruction.  Post bronchodilator, there is borderline to no significant response to bronchodilator.  Lung volume shows residual volume is 2.63, 132% predicted.  Total lung capacity is 5.4, 107% predicted.  RV/TLC ratio could be suggestive of airway trapping.  Diffusion capacity is 17.32, 87% predicted, which is within normal limits.    IMPRESSION:  This pulmonary function test shows normal spirometry without evidence of obstruction.  There could be borderline response to bronchodilator.  Did not meet the criteria for ATS for bronchodilator response.  Lung volume could be suggestive of mild airway trapping according to RV/TLC ratio.  Diffusion capacity is normal.  Clinical correlation is recommended.          BURKE LOPEZ MD      D:  2024 12:32:12     T:  2024 12:43:26     EVONNE/ALDAIR  Job #:  [id number]     Doc#:  [id number]      CVA (cerebral vascular accident)

## 2025-01-01 NOTE — PROGRESS NOTE ADULT - ASSESSMENT
65 yr old female from NH, H/O COPD/CHF/AICD/PE(?) on A/C/right breast CA/old CVA, patient admit hospital for respiratory distress/AMS/metabolic encephalopathy/CHEN/acidosis/elevated INR, admit ICU, NPO IV hydration, close  monitor labs, ECHO, CT head old CVA, ICA aneurysm, CT chest no infiltrate small pleural effusion/right breast CA, lab showed acidosis/with CHEN, elevated INR, all A/C on hold, elevated BP, control BP, DVT prophyalxis, continue ICU care. Pt BIBA from [facility] for multiple falls this morning. Pt c/o pain to left shoulder.

## 2025-01-23 NOTE — PATIENT PROFILE ADULT - NSPRESCRUSEDDRG_GEN_A_NUR
Physical Therapy Evaluation    Visit Type: Initial Evaluation  Visit: 1  Referring Provider: Lencho Rg MD  Medical Diagnosis (from order): M25.512 - Acute pain of left shoulder  M75.22 - Biceps tendonitis on left  M75.82 - Tendinitis of left rotator cuff  M25.612 - Decreased ROM of left shoulder   Treatment Diagnosis: left shoulder - increased pain/symptoms, impaired posture, impaired strength, impaired range of motion and impaired tissue mobility.  Onset  - Date of onset: 12/26/2024  Chart reviewed at time of initial evaluation (relevant co-morbidities, allergies, tests and medications listed):   - Diagnostic tests reviewed: X-Ray  diabetes      SUBJECTIVE                                                                                                               Moving doors around at work and left shoulder started hurting that afternoon. After a few days went to [facility]. Xray and medication. Pain is worse at night. Not much pain during day    Left shoulder is improving. Not feeling the the strength in left hand. Left hand gets numb at times.    Pain / Symptoms  - Pain rating (out of 10): Current: 3 ; Worst: 6  - Location: Left shoulder left arm  - Quality / Description: numbness, ache, sore, stiff  - Alleviating Factors: prescription medications  - Progression since onset: improved    Function:   Limitations / Exacerbation Factors:   - Patient reports pain and difficulty with function reported below.  - Difficulty sleeping  Dressing upper body      Prior Level of Function: pain free ADLs and IADLs,    Patient Goals: decreased pain and increased strength.    Prior treatment  - no therapies  - Discharged from hospital, home health, or skilled nursing facility in last 30 days: no  Home Environment   - Patient lives with: significant other  - Assistance available: consistent  - Denies 2 or more falls or an unexplained fall with injury in the last year.  - Feel safe at home / work / school: yes      OBJECTIVE                                                                                                                     Hand Dominance: right-handed    Posture:  - Shoulders: rounded  Spine: increased thoracic kyphosis  Cervical: forward head      Range of Motion (ROM)   (degrees unless noted; active unless noted; norms in ( ); negative=lacking to 0, positive=beyond 0)  Shoulder:    - Flexion (180):         Left:128           Right: 150    - Extension (50):         Left: 55         Right: 70    - Abduction (180):         Left:   Passive: 115         Right:   Passive: 120    - Internal Rotation:        - at 90° (70-90):             Left:   Passive: 78    - External Rotation:         Left: pain       - at 0°:             Left:   Passive: 45            Right:   Passive: 65       - at 90° (90):             Left: 65  Passive: 68            Right: 78    Passive: 80  Comments: \"sleeper\" Shoulder IR (at 90° flexion) supine: 35° right and 38° left        Strength  (out of 5 unless noted, standard test position unless noted)   Shoulder:     - Flexion:          Left: 4, pain          Right: 4+     - Abduction:         Left: 4-, pain         Right: 4+    - External Rotation:          Left (at 0°): 4, pain         Right (at 0°): 4+          Palpation  Shoulder: [place]/Tendon/Bone  - Biceps Tendon (proximal): - Left: tenderness  - Coracoid Process: - Left: tenderness  Joint Play   Shoulder: Left   - Anterior Capsule: hypomobile and pain  - Posterior Capsule: WFL  - Inferior Capsule: hypomobile     Special Tests  Shoulder: Tendon/Rotator Cuff  - Coracoid Impingement Test:  Left: positive  - Crossover Impingement:  Left: positive  - Empty Can:  Left: positive  - Candelario-Hunter:  Left: negative  - Shoulder Neer's:  Left: positive              Outcome/Assessments  Outcome Measures:   Quick Disabilities of the Arm, Shoulder and Hand: QuickDash Total Score (Score will not calculate if more then 2 questions are left blank): 25   (scored 0-100; a  higher score indicates greater disability) see flowsheet for additional documentation      Treatment     Therapeutic Exercise  Instruction and practice in home exercises listed below    Skilled input: verbal instruction/cues, demonstration and posture correction    Home Exercise Program  Access Code: [email or web address]  URL: [email or web address]  Date: 01/23/2025  Prepared by: Ye Galicia    Exercises  - Supine Shoulder External Rotation in 45 Degrees Abduction AAROM with Dowel  - 2 x daily - 5 x weekly - 1 sets - 10 reps - 5-10 hold  - Standing Pec Stretch at Wall  - 2 x daily - 5-7 x weekly - 1 sets - 10 reps - 5-10 hold  - shoulder flexion wall slide/ finger walk (Mirrored)  - 2 x daily - 5 x weekly - 1 sets - 10 reps - 5 hold      ASSESSMENT                                                                                                          60 year old patient has reported functional limitations listed above impacted by signs and symptoms consistent with treatment diagnosis below.  Treatment Diagnosis:   - Involved: left shoulder.  - Symptoms/impairments: increased pain/symptoms, impaired posture, impaired strength, impaired range of motion and impaired tissue mobility.      SUMMARY:  Patient is receiving PT for left shoulder pain with onset 4 weeks ago when he was moving heavy items at work. The pain was bad enough at that time to require a trip to the [facility]. With medications the pain has settled down somewhat. He is having a lot of left shoulder pain at night. He is also reporting tingling in left hand. Compared to right shoulder, the left shoulder is deficient in elevation, extension and ER with pain in all directions. Signs are positive for impingement in left shoulder. PROM testing shows moderate capsular restrictions. Impingement symptoms likely secondary to shoulder capsular restrictions altering movement mechanics.  Pain/symptoms after session (out of 10): 4    Prognosis: Patient  will benefit from skilled therapy.  Rehabilitative potential is: good.  Predicted patient presentation: Low (stable) - Patient comorbidities and complexities, as defined above, will have little effect on progress for prescribed plan of care.  Education:   - Present and ready to learn: patient    PLAN                                                                                                                         The following skilled interventions to be implemented to achieve goals listed below:  Neuromuscular Re-Education (15507)  Therapeutic Activity (78594)  Therapeutic Exercise (50644)  Manual Therapy (63694)  Electrical Stimulation Unattended (29976 or )  Ultrasound (73474)    Frequency / Duration  2 times per week tapering as patient progresses for 6 weeks for an estimated total of 9 visits    Patient involved in and agreed to plan of care and goals.    Suggestions for next session as indicated: Progress per plan of care    Goals  Long Term Goals: to be met by end of plan of care  1. Patient will sleep 6 hours without pain in left disturbing sleep      2. Patient will dress upper body without left shoulder pain or disability     3. Patient will reach with left hand into cabinet or fridge to retreive dishes or food items without left shoulder pain or weakness        Therapy procedure time and total treatment time can be found documented on the Time Entry flowsheet     No

## 2025-03-26 NOTE — DISCHARGE NOTE PROVIDER - PROVIDER RX CONTACT NUMBER
Patient and wife contacted. Dr. Pierce's note conveyed. Patient verbalized understanding.  Preferred pharmacy is [facility].  Medications resent to this pharmacy.       Discussed relationship between TSH and potassium - stating the thyroid hormones play a role in regulating potassium.  Patient expressed concern due to recently elevated potassium on labs.    Ok for 3/27/25.  Dr Pierce - If potassium should be rechecked again in 4-6 weeks with TSH - please add.  Otherwise ok to close encounter.  Thank you.     [phone]

## 2025-04-01 NOTE — ED PROVIDER NOTE - RATE
Called [phone] (home)   Did they answer the phone: No, left a message on voicemail to return call to the [facility] at [phone], and to ask for any available triage nurse.  (RN did not leave specific details on voicemail for confidential reasons)      Lakisha ZUÑIGA RN  Triage Nurse  [facility]     109

## 2025-04-20 NOTE — BH CONSULTATION LIAISON PROGRESS NOTE - NSBHFUPREASONCONS_PSY_A_CORE
[facility]  part of [place] Hospitalist Progress Note     Vinny Suman Smith JrDianna Patient Status:  Inpatient    1929 MRN [id number]   Location [facility]5W Attending Arnel Barlow MD   Hosp Day # 2 PCP Suman Perla MD     Chief Complaint:   Chief Complaint   Patient presents with    Fall        Subjective:     Patient seen lying in bed.  Son at bedside.  Seen twice  Overnight events reviewed  Fall overnight  Sleepy and confused  Seen twice today    Objective:      Vital signs:  Vitals:    25 0149 25 0446 25 0855 25 1155   BP: 154/81 152/86 149/79 156/77   BP Location:  Right arm Right arm Left arm   Pulse:  93 78 81   Resp:  20 18 16   Temp:  98.6 °F (37 °C) 97.9 °F (36.6 °C) 99.2 °F (37.3 °C)   TempSrc:  Oral Oral Oral   SpO2:  93% 92% 95%   Weight:       Height:           Intake/Output Summary (Last 24 hours) at 2025 1521  Last data filed at 2025 1336  Gross per 24 hour   Intake 1427.08 ml   Output 2200 ml   Net -772.92 ml           Physical Exam:    GENERAL:  Awake and alert, in no acute distress.  HEART:  Regular rhythm, regular rate  LUNGS:  Air entry was minimally decreased.  No increased work of breathing or wheezes   ABDOMEN: Soft and non-tender.    PSYCHIATRIC: Normal mood    Diagnostic Data:    Labs:    Recent Labs   Lab 25  0728 25  0504   WBC 9.9 10.3   HGB 9.8* 10.7*   MCV 83.7 84.1   .0 214.0       Recent Labs   Lab 25  0728 25  0916 25  0504 25  1418 25  1800 25  2332 25  0316 25  0743 25  1137   *  --  108*  --   --   --  141*  --   --    BUN 26*  --  18  --   --   --  27*  --   --    CREATSERUM 1.64*  --  1.44*  --   --   --  1.94*  --   --    CA 13.6*   < > 14.0* 13.6*   < > 13.1* 12.6*  12.6* 12.7* 12.3*   ALB 3.9  --   --   --   --   --   --   --   --      --  141  --   --   --  143  --   --    K 3.9  --  3.0* 3.6  --  4.3 4.4   --   --      --  101  --   --   --  107  --   --    CO2 30.0  --  33.0*  --   --   --  29.0  --   --    ALKPHO 111  --   --   --   --   --   --   --   --    AST 43*  --   --   --   --   --   --   --   --    ALT 26  --   --   --   --   --   --   --   --    BILT 0.4  --   --   --   --   --   --   --   --    TP 6.8  --   --   --   --   --   --   --   --     < > = values in this interval not displayed.           Estimated Creatinine Clearance: 23 mL/min (A) (based on SCr of 1.94 mg/dL (H)).    No results for input(s): \"PTP\", \"INR\" in the last 168 hours.         COVID-19  Lab Results   Component Value Date    COVID19 Not Detected 09/01/2022    COVID19 Detected (A) 06/18/2022    COVID19 Detected (A) 04/12/2022       Pro-Calcitonin  No results for input(s): \"PCT\" in the last 168 hours.    Cardiac  No results for input(s): \"TROP\", \"PBNP\" in the last 168 hours.    Inflammatory Markers  No results for input(s): \"CRP\", \"WILIAN\", \"LDH\", \"DDIMER\" in the last 168 hours.    Culture:  No results found for this visit on 04/18/25.    CT BRAIN OR HEAD (CPT=70450)  Result Date: 4/20/2025  CONCLUSION:  1. Small 4 mm high density focus in the left cerebellum is new since comparison head CT from June, 2022. This is concerning for a small left cerebellar hematoma (could relate to recent trauma or a hemorrhagic lacunar infarct) or less likely interval development of a left cerebellar calcification.  No associated mass effect or midline shift.  Suggest continued head CT surveillance to ensure stability/resolution.  This finding was not described in the preliminary report and a message regarding discrepancy was sent to Dr. Copeland (covering for Dr. Weinstein) via perfect serve at the time of dictation. 2. Nonspecific white matter changes involving both cerebral hemispheres that most likely reflect sequelae of chronic microangiopathy. 3. Intracranial atherosclerosis. 4. Nonspecific subtotal opacification of the right mastoid air cells.   A  preliminary report was issued by the [employer] [facility] teleradiology service. There are no major discrepancies.  elm-remote  Dictated by (CST): Randy Yao MD on 4/20/2025 at 6:58 AM     Finalized by (CST): Randy Yao MD on 4/20/2025 at 7:06 AM          CT CHEST+ABDOMEN+PELVIS(CPT=71250/33292)  Result Date: 4/18/2025  CONCLUSION:   CT CHEST:  1. Ectatic ascending aorta measuring up to 4.5 cm in diameter, which has increased in size when compared to 06/11/2007 when it measured 4.1 cm in diameter. 2. Moderate coronary artery calcifications. 3. Stable benign pulmonary nodules measuring up to 3 mm.  No new or enlarging pulmonary nodule. 4. Scattered peribronchial thickening. 5. Lesser incidental findings described above.  CT ABDOMEN AND PELVIS:  1. Dilated common bile duct measuring up to 15 mm in diameter.  Consider further evaluation with an MRI/MRCP as well as correlation with liver function tests. 2. Distended gallbladder without secondary findings to suggest acute cholecystitis. 3. Indeterminate 9 mm posterior right midpole renal lesion.  Consider further evaluation with a renal ultrasound. 4. Linear metallic radiodensities in the prostate, which likely reflect brachytherapy seeds. 5. Lesser incidental findings described above.    Dictated by (CST): Corey Carter MD on 4/18/2025 at 5:29 PM     Finalized by (CST): Corey Carter MD on 4/18/2025 at 5:42 PM                    Medications: Scheduled Medications[1]    Assessment & Plan:        Hypercalcemia  -Unclear etiology  -calcium 13.6--> 14--> 12.3  -pth, appropriately low  -vit D is normal   -pthrp spep and imaging ordered  - Repeat calcitonin given  - Continue ivf  -Initial concerns for kidney function and tolerance of zoledronic acid, cleared to give per nephrology.  -CT chest ab pelvis reviewed.  Noted stable, benign pulmonary nodules.  Also noted indeterminate right renal lesion.  - Further evaluation of renal lesion with renal ultrasound.-->  several mildly complex cystic lesions   - Continue to monitor    Fall  Lf. Cerebellar hyperdensity  -idalia NS evaluation  -stop xarelto  -repeat CT head in tomorrow    Paroxysmal Atrial Fibrillation  -Rates currently controlled  -Continue diltiazem for rate control  -Continue chronic anticoagulation with Xarelto  -Telemetry monitoring    HTN  - controlled  - CPM  - Monitor and adjust as needed     HL  -cont lipitor       Plan of care discussed with patient and son at bedside . Discussed management/test result(s) with Rn, endocrinology and nephrology consultants.    ADDENDUM: [facility]. D/w pt's son and  granddaughter.  Discussed findings on CT head findings, and DC planning     Quality:  DVT Prophylaxis: Xarelto  CODE status: DNR/select  Estimated date of discharge: TBD  Discharge is dependent on: clinical stability    85 minutes spent discussing with other providers, examining patient, obtaining history, reviewing medical records, interpreting and communicating test results/imaging, ordering tests/medications, discussing plan of care and documenting information.      BERHANE HARGROVE MD            This note was prepared using Dragon Medical voice recognition dictation software. As a result errors may occur. When identified these errors have been corrected. While every attempt is made to correct errors during dictation discrepancies may still exist         [1]    amLODIPine  5 mg Oral Daily    atorvastatin  10 mg Oral Daily    dilTIAZem ER  120 mg Oral Daily    levothyroxine  100 mcg Oral Before breakfast    [Held by provider] rivaroxaban  15 mg Oral Daily with food    pantoprazole  40 mg Oral Before breakfast      delirium/med management

## 2025-06-13 NOTE — PHYSICAL THERAPY INITIAL EVALUATION ADULT - PATIENT/FAMILY AGREES WITH PLAN
Physical Therapy    Visit Type: treatment  SUBJECTIVE  Patient sitting up in chair reports pain in right hip 3/10.      OBJECTIVE          Transfers  Assistive devices: gait belt, 2-wheeled walker  - Sit to stand: contact guard/touching/steadying assist, minimal assist  - Stand to sit: contact guard/touching/steadying assist    Ambulation / Gait  - Assistive device: gait belt and 2-wheeled walker  - Distance (feet unless otherwise indicated): 10, 8  Required seated rest between bouts due to SOB.  - Assist Level: contact guard/touching/steadying assist  - Description: step to and antalgic        Interventions     Supine    Lower Extremity: Bilateral: heel slides, quad sets, gluteus sets, ankle pumps, hip adduction and hip abduction (Required short rests between exercises due to SOB.), 10 reps, 2 sets  Seated    Lower Extremity: knee extensions, 10 reps, 2 sets         Education:   - Present and ready to learn: patient    ASSESSMENT   Progress: progressing toward goals  Interfering components: medical status limitations and decreased activity tolerance    Discharge needs based on today's assessment:   - Current level of function: significantly below baseline level of function   - Therapy needs at discharge: therapy 5 or more times per week   - Activities of daily living (ADLs) requiring support at discharge: stairs, ambulation, bed mobility, toileting, personal hygiene and dressing   - Instrumental activities of daily living (IADLs) requiring support at discharge: community mobility, meal preparation, shopping and home management   - Impairments that require further therapy intervention: activity tolerance, strength and pain    AM-PAC  - Generalized Prior Level of Function: Needs a little help [facility]       Key: MOD A=moderate assistance, IND/MOD I=independent/modified independent  - Generalized Current Level of Function     - Current Mobility Score: 17       AM-PAC Scoring Key= >21 Modified Independent; 20-21  Supervision; 18-19 Minimal assist; 13-17 Moderate assist; 9-12 Max assist; <9 Total assist      Pain at End of Session:   Pain: 5/10, location: Right hip    PLAN (while hospitalized)  Suggestions for next session as indicated: Eval 6/7/25: 6 of 6-7x per week. Mod A for bed mobility. Mobility limited by SOB. Patient ambulated 10 feet min assist. Continue to progress functional mobility.  PT Frequency: 6-7 x per week            Agreement to plan and goals: patient agrees with goals and treatment plan      Documented in the chart in the following areas:  Assessment/Plan.       Patient at End of Session:   Location: in chair  Safety measures: alarm system in place/re-engaged and call light within reach        Therapy procedure time and total treatment time can be found documented on the Time Entry flowsheet   HPT/yes

## 2025-07-15 NOTE — ED PROVIDER NOTE - BREATH SOUNDS
Copied from CRM #[id number]. Topic: General Inquiry - Return Call  >> Jul 15, 2025  8:36 AM Azalia wrote:  Type:  Patient Returning Call    Who Called:pt  Who Left Message for Patient:Angie Fowler MA  Does the patient know what this is regarding?:retuerning call  Would the patient rather a call back or a response via [employer]? call  Best Call Back Number:[phone]  Additional Information:      07/15/25         0847 am  Spoke to patient regarding the above message. Informed patient Dr. Wang is in surgery this morning. If she wanted a virtual or reschedule. Patient stated she wanted to reschedule. Informed patient she is reschedule for 07/22/25 at 11:30 am. Patient verbalized understanding.     
normal